# Patient Record
Sex: FEMALE | Race: BLACK OR AFRICAN AMERICAN | NOT HISPANIC OR LATINO | Employment: OTHER | URBAN - METROPOLITAN AREA
[De-identification: names, ages, dates, MRNs, and addresses within clinical notes are randomized per-mention and may not be internally consistent; named-entity substitution may affect disease eponyms.]

---

## 2017-01-19 ENCOUNTER — TELEPHONE (OUTPATIENT)
Dept: INTERNAL MEDICINE | Facility: CLINIC | Age: 62
End: 2017-01-19

## 2017-01-19 NOTE — TELEPHONE ENCOUNTER
----- Message from Garrett Mckinney sent at 1/19/2017  3:49 PM CST -----  Contact: Self [phone]  The above pt is requesting a call back regarding a study you told her about, please call and advice    Thanks

## 2017-01-24 ENCOUNTER — LAB VISIT (OUTPATIENT)
Dept: LAB | Facility: HOSPITAL | Age: 62
End: 2017-01-24
Attending: INTERNAL MEDICINE
Payer: MEDICARE

## 2017-01-24 ENCOUNTER — OFFICE VISIT (OUTPATIENT)
Dept: INTERNAL MEDICINE | Facility: CLINIC | Age: 62
End: 2017-01-24
Payer: MEDICARE

## 2017-01-24 VITALS
HEIGHT: 70 IN | HEART RATE: 60 BPM | TEMPERATURE: 98 F | DIASTOLIC BLOOD PRESSURE: 70 MMHG | SYSTOLIC BLOOD PRESSURE: 122 MMHG | WEIGHT: 204.56 LBS | BODY MASS INDEX: 29.29 KG/M2

## 2017-01-24 DIAGNOSIS — E11.59 HYPERTENSION ASSOCIATED WITH DIABETES: ICD-10-CM

## 2017-01-24 DIAGNOSIS — E11.65 UNCONTROLLED TYPE 2 DIABETES MELLITUS WITH HYPERGLYCEMIA, WITHOUT LONG-TERM CURRENT USE OF INSULIN: ICD-10-CM

## 2017-01-24 DIAGNOSIS — E11.65 UNCONTROLLED TYPE 2 DIABETES MELLITUS WITH HYPERGLYCEMIA, WITHOUT LONG-TERM CURRENT USE OF INSULIN: Primary | ICD-10-CM

## 2017-01-24 DIAGNOSIS — E11.69 COMBINED HYPERLIPIDEMIA ASSOCIATED WITH TYPE 2 DIABETES MELLITUS: Chronic | ICD-10-CM

## 2017-01-24 DIAGNOSIS — I15.2 HYPERTENSION ASSOCIATED WITH DIABETES: ICD-10-CM

## 2017-01-24 DIAGNOSIS — E78.2 COMBINED HYPERLIPIDEMIA ASSOCIATED WITH TYPE 2 DIABETES MELLITUS: Chronic | ICD-10-CM

## 2017-01-24 DIAGNOSIS — I67.1 CEREBRAL ANEURYSM: ICD-10-CM

## 2017-01-24 LAB
ALBUMIN SERPL BCP-MCNC: 4.2 G/DL
ALP SERPL-CCNC: 79 U/L
ALT SERPL W/O P-5'-P-CCNC: 13 U/L
ANION GAP SERPL CALC-SCNC: 5 MMOL/L
AST SERPL-CCNC: 14 U/L
BILIRUB SERPL-MCNC: 0.6 MG/DL
BUN SERPL-MCNC: 11 MG/DL
CALCIUM SERPL-MCNC: 10.4 MG/DL
CHLORIDE SERPL-SCNC: 103 MMOL/L
CHOLEST/HDLC SERPL: 2.9 {RATIO}
CO2 SERPL-SCNC: 30 MMOL/L
CREAT SERPL-MCNC: 0.9 MG/DL
EST. GFR  (AFRICAN AMERICAN): >60 ML/MIN/1.73 M^2
EST. GFR  (NON AFRICAN AMERICAN): >60 ML/MIN/1.73 M^2
GLUCOSE SERPL-MCNC: 116 MG/DL
HDL/CHOLESTEROL RATIO: 34.3 %
HDLC SERPL-MCNC: 166 MG/DL
HDLC SERPL-MCNC: 57 MG/DL
LDLC SERPL CALC-MCNC: 93.6 MG/DL
NONHDLC SERPL-MCNC: 109 MG/DL
POTASSIUM SERPL-SCNC: 4.4 MMOL/L
PROT SERPL-MCNC: 7.7 G/DL
SODIUM SERPL-SCNC: 138 MMOL/L
TRIGL SERPL-MCNC: 77 MG/DL

## 2017-01-24 PROCEDURE — 4010F ACE/ARB THERAPY RXD/TAKEN: CPT | Mod: S$GLB,,, | Performed by: INTERNAL MEDICINE

## 2017-01-24 PROCEDURE — 99214 OFFICE O/P EST MOD 30 MIN: CPT | Mod: S$GLB,,, | Performed by: INTERNAL MEDICINE

## 2017-01-24 PROCEDURE — 80053 COMPREHEN METABOLIC PANEL: CPT

## 2017-01-24 PROCEDURE — 36415 COLL VENOUS BLD VENIPUNCTURE: CPT

## 2017-01-24 PROCEDURE — 83036 HEMOGLOBIN GLYCOSYLATED A1C: CPT

## 2017-01-24 PROCEDURE — 99499 UNLISTED E&M SERVICE: CPT | Mod: S$GLB,,, | Performed by: INTERNAL MEDICINE

## 2017-01-24 PROCEDURE — 99999 PR PBB SHADOW E&M-EST. PATIENT-LVL III: CPT | Mod: PBBFAC,,, | Performed by: INTERNAL MEDICINE

## 2017-01-24 PROCEDURE — 3078F DIAST BP <80 MM HG: CPT | Mod: S$GLB,,, | Performed by: INTERNAL MEDICINE

## 2017-01-24 PROCEDURE — 3045F PR MOST RECENT HEMOGLOBIN A1C LEVEL 7.0-9.0%: CPT | Mod: S$GLB,,, | Performed by: INTERNAL MEDICINE

## 2017-01-24 PROCEDURE — 80061 LIPID PANEL: CPT

## 2017-01-24 PROCEDURE — 3074F SYST BP LT 130 MM HG: CPT | Mod: S$GLB,,, | Performed by: INTERNAL MEDICINE

## 2017-01-24 PROCEDURE — 1159F MED LIST DOCD IN RCRD: CPT | Mod: S$GLB,,, | Performed by: INTERNAL MEDICINE

## 2017-01-24 RX ORDER — FLUCONAZOLE 150 MG/1
150 TABLET ORAL ONCE
Refills: 3 | COMMUNITY
Start: 2016-12-31 | End: 2017-07-05 | Stop reason: SDUPTHER

## 2017-01-24 RX ORDER — ACETAMINOPHEN 500 MG
5000 TABLET ORAL DAILY
COMMUNITY
End: 2017-01-24

## 2017-01-24 NOTE — PROGRESS NOTES
Subjective:       Patient ID: Alison Branch is a 61 y.o. female.    Chief Complaint: Diabetes and Hypertension    HPI - Mrs. Branch's brother was admitted for diabetes complications; since then, she has been more adherent to medications.  She's taking invokana every day now, and has had 10 lbs weight loss d/t that since August.  Now out of the obese range.  No yeast infections, though she takes a diflucan every couple of months if an itch occurs.  Also taking her antihypertensives and statin.  She is asymptomatic from her coiled aneurysm. Wonders should she take celexa for ADD complaints at her psychiatrist's recommendation (yes). No other complaints.    PMH:  DM2, adherent to regimen now  HTN, at goal  Brain aneurysm s/p coiling  HLP, on a statin  ADD  Obesity     Meds:  Reviewed and reconciled in EPIC with patient during visit today.    Review of Systems   Constitutional: Negative for fatigue.   HENT: Negative for congestion.    Respiratory: Positive for shortness of breath.    Cardiovascular: Positive for chest pain.   Gastrointestinal: Negative for abdominal pain.   Genitourinary: Negative for vaginal discharge.   Musculoskeletal: Negative for arthralgias.   Skin: Negative for rash.   Neurological: Negative for headaches.   Psychiatric/Behavioral: Negative for sleep disturbance.       Objective:      Physical Exam   Constitutional: She is oriented to person, place, and time. She appears well-developed and well-nourished.   HENT:   Head: Normocephalic and atraumatic.   Cardiovascular: Normal rate, regular rhythm and normal heart sounds.  Exam reveals no gallop and no friction rub.    No murmur heard.  Pulmonary/Chest: Effort normal and breath sounds normal. No respiratory distress. She has no wheezes. She has no rales. She exhibits no tenderness.   Neurological: She is alert and oriented to person, place, and time.   Skin: Skin is warm and dry. No erythema.   Psychiatric: She has a normal mood and affect.    Nursing note and vitals reviewed.      Assessment:       1. Uncontrolled type 2 diabetes mellitus with hyperglycemia, without long-term current use of insulin    2. Hypertension associated with diabetes    3. Combined hyperlipidemia associated with type 2 diabetes mellitus    4. Cerebral aneurysm, coiled in 2010        Plan:       Alison was seen today for diabetes and hypertension.    Diagnoses and all orders for this visit:    Uncontrolled type 2 diabetes mellitus with hyperglycemia, without long-term current use of insulin - seems improved.  Repeat lab work; needs opto eval  -     Hemoglobin A1c; Future  -     Ambulatory consult to Optometry  -     Comprehensive metabolic panel; Future    Hypertension associated with diabetes - at goal, stay the course  -     Comprehensive metabolic panel; Future    Combined hyperlipidemia associated with type 2 diabetes mellitus - on a statin, time for repeat lipid panel  -     Lipid panel; Future    Cerebral aneurysm, coiled in 2010 - stable.  Continue observation    rtc 3 months, sooner prn.    JUVE Rodriguez MD MPH  Staff Internist

## 2017-01-25 ENCOUNTER — INITIAL CONSULT (OUTPATIENT)
Dept: OPTOMETRY | Facility: CLINIC | Age: 62
End: 2017-01-25
Payer: MEDICARE

## 2017-01-25 ENCOUNTER — PATIENT MESSAGE (OUTPATIENT)
Dept: INTERNAL MEDICINE | Facility: CLINIC | Age: 62
End: 2017-01-25

## 2017-01-25 DIAGNOSIS — H52.03 HYPERMETROPIA OF BOTH EYES: ICD-10-CM

## 2017-01-25 DIAGNOSIS — H25.13 NUCLEAR SCLEROTIC CATARACT OF BOTH EYES: ICD-10-CM

## 2017-01-25 DIAGNOSIS — E11.9 TYPE 2 DIABETES MELLITUS WITHOUT RETINOPATHY: Primary | ICD-10-CM

## 2017-01-25 DIAGNOSIS — H04.123 BILATERAL DRY EYES: ICD-10-CM

## 2017-01-25 LAB
ESTIMATED AVG GLUCOSE: 177 MG/DL
HBA1C MFR BLD HPLC: 7.8 %

## 2017-01-25 PROCEDURE — 99999 PR PBB SHADOW E&M-EST. PATIENT-LVL II: CPT | Mod: PBBFAC,,, | Performed by: OPTOMETRIST

## 2017-01-25 PROCEDURE — 92014 COMPRE OPH EXAM EST PT 1/>: CPT | Mod: S$GLB,,, | Performed by: OPTOMETRIST

## 2017-01-25 PROCEDURE — 92015 DETERMINE REFRACTIVE STATE: CPT | Mod: S$GLB,,, | Performed by: OPTOMETRIST

## 2017-01-25 PROCEDURE — 99499 UNLISTED E&M SERVICE: CPT | Mod: S$GLB,,, | Performed by: OPTOMETRIST

## 2017-01-25 NOTE — Clinical Note
Dear Dr. Rodriguez,  Thank you for referring Ms. Branch for a diabetic eye examination; there is no retinopathy and I will continue to monitor yearly. Please let me know if you have questions.  Sincerely, Jenny Casey OD

## 2017-01-25 NOTE — LETTER
January 25, 2017      Mike Rodriguez II, MD  [address]  [facility] [address]           [facility]  [address]  [facility] [address]  Phone: [phone]          Patient: Alison Branch   MR Number: [id number]   YOB: 1955   Date of Visit: 1/25/2017       Dear Dr. Mike Rodriguez II:    Thank you for referring Alison Branch to me for evaluation. Attached you will find relevant portions of my assessment and plan of care.    If you have questions, please do not hesitate to call me. I look forward to following Alison Branch along with you.    Sincerely,    Jenny Casey, OD    Enclosure  CC:  No Recipients    If you would like to receive this communication electronically, please contact [email or web address] or [phone] to request more information on [employer] Link access.    For providers and/or their staff who would like to refer a patient to [facility], please contact us through our one-stop-shop provider referral line, [facility] , at [phone].    If you feel you have received this communication in error or would no longer like to receive these types of communications, please e-mail [email or web address]

## 2017-01-25 NOTE — PATIENT INSTRUCTIONS
Thank you very much for coming in for your eye examination. It was a pleasure working with you today. Below is some information you might find helpful.     You have dryness of your eyes. Please use over-the-counter artificial tears or lubricants (such as Systane, Refresh, Blink, Genteal), 1 drop in each eye 3-4 times per day. Please avoid drops that advertise redness-relief as these can be unhealthy for your eyes and can cause permanent redness if used long-term.    ==============================================    Your eyes are very healthy; there are no signs ocular complications from diabetes.  Please continue working with your primary care doctor to manage the diabetes.  It is important you have your eyes checked every year to evaluate the health of your eyes. We will send you a reminder in 1 year for your next examination, or please contact us sooner if you need us.    ==============================================    CATARACT    Symptoms and Signs:  A cataract starts out small, and at first has little effect on your vision. You may notice that your vision is blurred a little, like looking through a cloudy piece of glass or viewing an impressionist painting. A cataract may make light from the sun or a lamp seem too bright or glaring. Or you may notice when you drive at night that the oncoming headlights cause more glare than before. Colors may not appear as bright as they once did.  The type of cataract you have will affect exactly which symptoms you experience and how soon they will occur. When a nuclear cataract first develops it can bring about a temporary improvement in your near vision, called second sight. Unfortunately, the improved vision is short-lived and will disappear as the cataract worsens. Meanwhile, a sub-capsular cataract may not produce any symptoms until it's well-developed.    Causes:  No one knows for sure why the eye's lens changes as we age, forming cataracts. Researchers are gradually  identifying factors that may cause cataracts - and information that may help to prevent them.  Many studies suggest that exposure to ultraviolet light is associated with cataracts, so eye care practitioners recommend wearing sunglasses and a wide-brimmed hat to lessen your exposure.  Other studies suggest people with diabetes are at risk for developing a cataract.   Some eye care practitioners believe that a diet high in antioxidants, such as beta-carotene (vitamin A), selenium and vitamins C and E, may forestall cataracts.  The most important of these is probably vitamin C; it might be helpful to supplement the diet with an extra Vitamin C tablet.  Meanwhile, eating a lot of salt may increase your risk.  Other risk factors include cigarette smoke, air pollution and heavy alcohol consumption.  We simply recommend that you be careful to use sunglasses and to take Vitamin C.    Treatment:  When symptoms begin to appear, we can improve your vision for a while using new glasses, strong bifocals, magnification, appropriate lighting or other visual aids.  This is true in your case; your cataract does not impact your vision very much at this time. If you experience any of the symptoms we described you can return at any time. Otherwise it is fine to see you in 1 year.    Jenny Casey, OD

## 2017-01-25 NOTE — MR AVS SNAPSHOT
[facility]  [address]  [facility] [address]  Phone: [phone]                  Alison Branch   2017 8:00 AM   Initial consult    Description:  Female : 1955   Provider:  Jenny Casey OD   Department:  [facility]           Reason for Visit     Diabetic Eye Exam                To Do List           Future Appointments        Provider Department Dept Phone    2017 8:40 AM Mike Rodriguez II, MD [facility] [phone]      Goals (5 Years of Data)     None      Follow-Up and Disposition     Return in about 1 year (around 2018) for annual diabetic eye examination.      [facility]     [facility] Line -  Assistance  Registered nurses in the [facility] Center provide clinical advisement, health education, appointment booking, and other advisory services.  Call for this free service at [phone].             Medications           Message regarding Medications     Verify the changes and/or additions to your medication regime listed below are the same as discussed with your clinician today.  If any of these changes or additions are incorrect, please notify your healthcare provider.             Verify that the below list of medications is an accurate representation of the medications you are currently taking.  If none reported, the list may be blank. If incorrect, please contact your healthcare provider. Carry this list with you in case of emergency.           Current Medications     aspirin (ECOTRIN) 81 MG EC tablet Take 81 mg by mouth once daily.    atorvastatin (LIPITOR) 40 MG tablet Take 1 tablet (40 mg total) by mouth once daily.    blood sugar diagnostic Strp 1 strip by Misc.(Non-Drug; Combo Route) route once daily. Heladio Result.  250.02.  Check Blood Sugar Twice Daily.    blood-glucose meter kit Use as instructed    conjugated estrogens (PREMARIN) vaginal cream Place 1 g vaginally once  daily.    econazole nitrate 1 % cream Apply topically 2 (two) times daily. To affected areas in between toes x 4 wks    ergocalciferol (ERGOCALCIFEROL) 50,000 unit Cap Take 1 capsule (50,000 Units total) by mouth every 7 days.    fluconazole (DIFLUCAN) 150 MG Tab Take 150 mg by mouth once.    INVOKANA 300 mg Tab Take 1 tablet by mouth once daily.    lancets Misc 1 Device by Misc.(Non-Drug; Combo Route) route once daily. Heladio Result.  250.02.  Check Blood Sugar Twice Daily.    loratadine (CLARITIN) 10 mg tablet Take 1 tablet (10 mg total) by mouth once daily.    losartan (COZAAR) 50 MG tablet Take 1 tablet (50 mg total) by mouth once daily.           Clinical Reference Information           Allergies as of 1/25/2017     Metformin      Immunizations Administered on Date of Encounter - 1/25/2017     None      Instructions    Thank you very much for coming in for your eye examination. It was a pleasure working with you today. Below is some information you might find helpful.     You have dryness of your eyes. Please use over-the-counter artificial tears or lubricants (such as Systane, Refresh, Blink, Genteal), 1 drop in each eye 3-4 times per day. Please avoid drops that advertise redness-relief as these can be unhealthy for your eyes and can cause permanent redness if used long-term.    ==============================================    Your eyes are very healthy; there are no signs ocular complications from diabetes.  Please continue working with your primary care doctor to manage the diabetes.  It is important you have your eyes checked every year to evaluate the health of your eyes. We will send you a reminder in 1 year for your next examination, or please contact us sooner if you need us.    ==============================================    CATARACT    Symptoms and Signs:  A cataract starts out small, and at first has little effect on your vision. You may notice that your vision is blurred a little, like looking  through a cloudy piece of glass or viewing an impressionist painting. A cataract may make light from the sun or a lamp seem too bright or glaring. Or you may notice when you drive at night that the oncoming headlights cause more glare than before. Colors may not appear as bright as they once did.  The type of cataract you have will affect exactly which symptoms you experience and how soon they will occur. When a nuclear cataract first develops it can bring about a temporary improvement in your near vision, called second sight. Unfortunately, the improved vision is short-lived and will disappear as the cataract worsens. Meanwhile, a sub-capsular cataract may not produce any symptoms until it's well-developed.    Causes:  No one knows for sure why the eye's lens changes as we age, forming cataracts. Researchers are gradually identifying factors that may cause cataracts - and information that may help to prevent them.  Many studies suggest that exposure to ultraviolet light is associated with cataracts, so eye care practitioners recommend wearing sunglasses and a wide-brimmed hat to lessen your exposure.  Other studies suggest people with diabetes are at risk for developing a cataract.   Some eye care practitioners believe that a diet high in antioxidants, such as beta-carotene (vitamin A), selenium and vitamins C and E, may forestall cataracts.  The most important of these is probably vitamin C; it might be helpful to supplement the diet with an extra Vitamin C tablet.  Meanwhile, eating a lot of salt may increase your risk.  Other risk factors include cigarette smoke, air pollution and heavy alcohol consumption.  We simply recommend that you be careful to use sunglasses and to take Vitamin C.    Treatment:  When symptoms begin to appear, we can improve your vision for a while using new glasses, strong bifocals, magnification, appropriate lighting or other visual aids.  This is true in your case; your cataract does  not impact your vision very much at this time. If you experience any of the symptoms we described you can return at any time. Otherwise it is fine to see you in 1 year.    Jenny Casey, MAVERICK

## 2017-01-27 ENCOUNTER — PATIENT MESSAGE (OUTPATIENT)
Dept: INTERNAL MEDICINE | Facility: CLINIC | Age: 62
End: 2017-01-27

## 2017-02-01 ENCOUNTER — PATIENT MESSAGE (OUTPATIENT)
Dept: INTERNAL MEDICINE | Facility: CLINIC | Age: 62
End: 2017-02-01

## 2017-02-01 DIAGNOSIS — B35.3 TINEA PEDIS, UNSPECIFIED LATERALITY: ICD-10-CM

## 2017-02-01 RX ORDER — ECONAZOLE NITRATE 10 MG/G
CREAM TOPICAL 2 TIMES DAILY
Qty: 85 G | Refills: 0 | Status: SHIPPED | OUTPATIENT
Start: 2017-02-01 | End: 2017-05-15

## 2017-02-03 DIAGNOSIS — E11.59 HYPERTENSION ASSOCIATED WITH DIABETES: ICD-10-CM

## 2017-02-03 DIAGNOSIS — I15.2 HYPERTENSION ASSOCIATED WITH DIABETES: ICD-10-CM

## 2017-02-03 RX ORDER — LOSARTAN POTASSIUM 50 MG/1
50 TABLET ORAL DAILY
Qty: 90 TABLET | Refills: 3 | Status: SHIPPED | OUTPATIENT
Start: 2017-02-03 | End: 2017-04-26 | Stop reason: SDUPTHER

## 2017-02-21 DIAGNOSIS — E55.9 VITAMIN D DEFICIENCY: ICD-10-CM

## 2017-02-21 RX ORDER — ERGOCALCIFEROL 1.25 MG/1
50000 CAPSULE ORAL
Qty: 8 CAPSULE | Refills: 3 | Status: SHIPPED | OUTPATIENT
Start: 2017-02-21 | End: 2018-02-01 | Stop reason: SDUPTHER

## 2017-03-14 RX ORDER — CANAGLIFLOZIN 300 MG/1
300 TABLET, FILM COATED ORAL DAILY
Qty: 90 TABLET | Refills: 2 | Status: SHIPPED | OUTPATIENT
Start: 2017-03-14 | End: 2017-07-07

## 2017-03-29 ENCOUNTER — PATIENT MESSAGE (OUTPATIENT)
Dept: RESEARCH | Facility: HOSPITAL | Age: 62
End: 2017-03-29

## 2017-04-18 ENCOUNTER — OFFICE VISIT (OUTPATIENT)
Dept: INTERNAL MEDICINE | Facility: CLINIC | Age: 62
End: 2017-04-18
Payer: MEDICARE

## 2017-04-18 ENCOUNTER — LAB VISIT (OUTPATIENT)
Dept: LAB | Facility: HOSPITAL | Age: 62
End: 2017-04-18
Attending: INTERNAL MEDICINE
Payer: MEDICARE

## 2017-04-18 VITALS
HEART RATE: 60 BPM | SYSTOLIC BLOOD PRESSURE: 124 MMHG | TEMPERATURE: 99 F | HEIGHT: 70 IN | DIASTOLIC BLOOD PRESSURE: 78 MMHG | BODY MASS INDEX: 29.95 KG/M2 | WEIGHT: 209.19 LBS

## 2017-04-18 DIAGNOSIS — I15.2 HYPERTENSION ASSOCIATED WITH DIABETES: Primary | ICD-10-CM

## 2017-04-18 DIAGNOSIS — E78.2 COMBINED HYPERLIPIDEMIA ASSOCIATED WITH TYPE 2 DIABETES MELLITUS: Chronic | ICD-10-CM

## 2017-04-18 DIAGNOSIS — E66.9 OBESITY (BMI 30.0-34.9): ICD-10-CM

## 2017-04-18 DIAGNOSIS — E11.65 UNCONTROLLED TYPE 2 DIABETES MELLITUS WITH HYPERGLYCEMIA, WITHOUT LONG-TERM CURRENT USE OF INSULIN: ICD-10-CM

## 2017-04-18 DIAGNOSIS — E11.59 HYPERTENSION ASSOCIATED WITH DIABETES: Primary | ICD-10-CM

## 2017-04-18 DIAGNOSIS — E11.69 COMBINED HYPERLIPIDEMIA ASSOCIATED WITH TYPE 2 DIABETES MELLITUS: Chronic | ICD-10-CM

## 2017-04-18 DIAGNOSIS — K58.9 IRRITABLE BOWEL SYNDROME, UNSPECIFIED TYPE: ICD-10-CM

## 2017-04-18 DIAGNOSIS — F90.0 ATTENTION DEFICIT HYPERACTIVITY DISORDER (ADHD), PREDOMINANTLY INATTENTIVE TYPE: ICD-10-CM

## 2017-04-18 PROCEDURE — 3074F SYST BP LT 130 MM HG: CPT | Mod: S$GLB,,, | Performed by: INTERNAL MEDICINE

## 2017-04-18 PROCEDURE — 1160F RVW MEDS BY RX/DR IN RCRD: CPT | Mod: S$GLB,,, | Performed by: INTERNAL MEDICINE

## 2017-04-18 PROCEDURE — 99999 PR PBB SHADOW E&M-EST. PATIENT-LVL III: CPT | Mod: PBBFAC,,, | Performed by: INTERNAL MEDICINE

## 2017-04-18 PROCEDURE — 3045F PR MOST RECENT HEMOGLOBIN A1C LEVEL 7.0-9.0%: CPT | Mod: S$GLB,,, | Performed by: INTERNAL MEDICINE

## 2017-04-18 PROCEDURE — 4010F ACE/ARB THERAPY RXD/TAKEN: CPT | Mod: S$GLB,,, | Performed by: INTERNAL MEDICINE

## 2017-04-18 PROCEDURE — 99499 UNLISTED E&M SERVICE: CPT | Mod: S$GLB,,, | Performed by: INTERNAL MEDICINE

## 2017-04-18 PROCEDURE — 3078F DIAST BP <80 MM HG: CPT | Mod: S$GLB,,, | Performed by: INTERNAL MEDICINE

## 2017-04-18 PROCEDURE — 83036 HEMOGLOBIN GLYCOSYLATED A1C: CPT

## 2017-04-18 PROCEDURE — 99214 OFFICE O/P EST MOD 30 MIN: CPT | Mod: S$GLB,,, | Performed by: INTERNAL MEDICINE

## 2017-04-18 PROCEDURE — 36415 COLL VENOUS BLD VENIPUNCTURE: CPT

## 2017-04-18 RX ORDER — BUPROPION HYDROCHLORIDE 150 MG/1
150 TABLET ORAL DAILY
Qty: 90 TABLET | Refills: 3 | Status: SHIPPED | OUTPATIENT
Start: 2017-04-18 | End: 2017-05-15

## 2017-04-18 RX ORDER — BUPROPION HYDROCHLORIDE 150 MG/1
150 TABLET ORAL DAILY
Qty: 90 TABLET | Refills: 3 | Status: SHIPPED | OUTPATIENT
Start: 2017-04-18 | End: 2017-04-18 | Stop reason: SDUPTHER

## 2017-04-18 NOTE — PROGRESS NOTES
Subjective:       Patient ID: Alison Branch is a 61 y.o. female.    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  She feels well today.  She's taking her antihypertensives.  BS continues to come down - last a1c was 7.9.  Invokana-associated weight loss has stabilized; she's actually gained 5 lbs and is back in the obese range.  Didn't tolerate citalopram for ADHD, with daytime fatigue.  Still having trouble with concentration and attention.  Lipids at goal    She describes chronic constipation so bad that she intermittently has to increase fiber intake.  Not taking a fiber supplement.  She's a former smoker and a nondrinker.    PMH:  DM2, adherent to regimen now  HTN, at goal  Brain aneurysm s/p coiling  HLP, on a statin  ADD  Obesity     Meds:  Reviewed and reconciled in EPIC with patient during visit today.    Review of Systems   Constitutional: Negative for fever.   HENT: Negative for congestion.    Respiratory: Negative for cough.    Cardiovascular: Negative for chest pain.   Gastrointestinal: Negative for abdominal pain.   Genitourinary: Negative for difficulty urinating.   Musculoskeletal: Negative for arthralgias.   Skin: Negative for rash.   Neurological: Negative for headaches.   Psychiatric/Behavioral: Negative for sleep disturbance.       Objective:      Physical Exam   Constitutional: She is oriented to person, place, and time. She appears well-developed and well-nourished.   Friendly, well-appearing BF in NAD   HENT:   Head: Normocephalic and atraumatic.   Cardiovascular: Normal rate, regular rhythm and normal heart sounds.  Exam reveals no gallop and no friction rub.    No murmur heard.  Pulmonary/Chest: Effort normal and breath sounds normal. No respiratory distress. She has no wheezes. She has no rales. She exhibits no tenderness.   Neurological: She is alert and oriented to person, place, and time.   Skin: Skin is warm and dry. No erythema.    Psychiatric: She has a normal mood and affect.   Nursing note and vitals reviewed.      Assessment:       1. Hypertension associated with diabetes    2. Uncontrolled type 2 diabetes mellitus with hyperglycemia, without long-term current use of insulin    3. Attention deficit hyperactivity disorder (ADHD), predominantly inattentive type    4. Combined hyperlipidemia associated with type 2 diabetes mellitus    5. Irritable bowel syndrome, unspecified type    6. Obesity (BMI 30.0-34.9)        Plan:       Alison was seen today for diabetes.    Diagnoses and all orders for this visit:    Hypertension associated with diabetes - at goal, stay the course    Uncontrolled type 2 diabetes mellitus with hyperglycemia, without long-term current use of insulin - was out of range last visit, but she's been more adherent recently.  Repeat lab work today and adjust based on numbers.  -     Hemoglobin A1c; Future    Attention deficit hyperactivity disorder (ADHD), predominantly inattentive type - untreated.  Traditional ADD meds unsafe given her high risk of MI.  Will change off citalopram to wellbutrin  -     buPROPion (WELLBUTRIN XL) 150 MG TB24 tablet; Take 1 tablet (150 mg total) by mouth once daily.    Combined hyperlipidemia associated with type 2 diabetes mellitus - stable, on a statin    Irritable bowel syndrome, unspecified type - discussed that adding a fiber supplement at night could regularize this    Obesity (BMI 30.0-34.9) - with comorbidity.  Advised weight loss, which she will do    rtc prn, or 3 months.    JUVE Rodriguez MD MPH  Staff Internist

## 2017-04-18 NOTE — MR AVS SNAPSHOT
[facility]  [address]  [facility] [address]  Phone: [phone]  Fax: [phone]                  Alison Branch   2017 8:40 AM   Office Visit    Description:  Female : 1955   Provider:  Mike Rodriguez II, MD   Department:  [facility]           Reason for Visit     Diabetes           Diagnoses this Visit        Comments    Hypertension associated with diabetes    -  Primary     Uncontrolled type 2 diabetes mellitus with hyperglycemia, without long-term current use of insulin         Attention deficit hyperactivity disorder (ADHD), predominantly inattentive type         Combined hyperlipidemia associated with type 2 diabetes mellitus         Irritable bowel syndrome, unspecified type         Obesity (BMI 30.0-34.9)                To Do List           Goals (5 Years of Data)     None      Follow-Up and Disposition     Return in about 3 months (around 2017).       These Medications        Disp Refills Start End    buPROPion (WELLBUTRIN XL) 150 MG TB24 tablet 90 tablet 3 2017    Take 1 tablet (150 mg total) by mouth once daily. - Oral    Pharmacy: [id number] - [place], [place] - [address] Ph #: [phone]         [facility] On Call     [facility] On Call Nurse Care Line -  Assistance  Unless otherwise directed by your provider, please contact [facility], our nurse care line that is available for  assistance.     Registered nurses in the [facility] provide: appointment scheduling, clinical advisement, health education, and other advisory services.  Call: [phone] (toll free)               Medications           Message regarding Medications     Verify the changes and/or additions to your medication regime listed below are the same as discussed with your clinician today.  If any of these changes or additions are incorrect, please notify your healthcare provider.        START taking  "these NEW medications        Refills    buPROPion (WELLBUTRIN XL) 150 MG TB24 tablet 3    Sig: Take 1 tablet (150 mg total) by mouth once daily.    Class: Normal    Route: Oral           Verify that the below list of medications is an accurate representation of the medications you are currently taking.  If none reported, the list may be blank. If incorrect, please contact your healthcare provider. Carry this list with you in case of emergency.           Current Medications     aspirin (ECOTRIN) 81 MG EC tablet Take 81 mg by mouth once daily.    atorvastatin (LIPITOR) 40 MG tablet Take 1 tablet (40 mg total) by mouth once daily.    blood sugar diagnostic Strp 1 strip by Misc.(Non-Drug; Combo Route) route once daily. Heladio Result.  250.02.  Check Blood Sugar Twice Daily.    blood-glucose meter kit Use as instructed    econazole nitrate 1 % cream Apply topically 2 (two) times daily. To affected areas in between toes x 4 wks    ergocalciferol (ERGOCALCIFEROL) 50,000 unit Cap Take 1 capsule (50,000 Units total) by mouth every 7 days.    INVOKANA 300 mg Tab tablet Take 1 tablet (300 mg total) by mouth once daily.    lancets Misc 1 Device by Misc.(Non-Drug; Combo Route) route once daily. Heladio Result.  250.02.  Check Blood Sugar Twice Daily.    loratadine (CLARITIN) 10 mg tablet Take 1 tablet (10 mg total) by mouth once daily.    losartan (COZAAR) 50 MG tablet Take 1 tablet (50 mg total) by mouth once daily.    buPROPion (WELLBUTRIN XL) 150 MG TB24 tablet Take 1 tablet (150 mg total) by mouth once daily.    fluconazole (DIFLUCAN) 150 MG Tab Take 150 mg by mouth once.           Clinical Reference Information           Your Vitals Were     BP Pulse Temp Height Weight BMI    124/78 (BP Location: Right arm, Patient Position: Sitting, BP Method: Manual) 60 98.8 °F (37.1 °C) (Oral) 5' 9.5" (1.765 m) 94.9 kg (209 lb 3.5 oz) 30.45 kg/m2      Blood Pressure          Most Recent Value    BP  124/78      Allergies as of 4/18/2017     " Metformin      Immunizations Administered on Date of Encounter - 4/18/2017     None      Orders Placed During Today's Visit     Future Labs/Procedures Expected by Expires    Hemoglobin A1c  4/18/2017 7/17/2017      Language Assistance Services     ATTENTION: Language assistance services are available, free of charge. Please call [phone].      ATENCIÓN: Si habla español, tiene a grande disposición servicios gratuitos de asistencia lingüística. Llame al [phone].     CHÚ Ý: N?u b?n nói Ti?ng Vi?t, có các d?ch v? h? tr? ngôn ng? mi?n phí dành cho b?n. G?i s? [phone].         [facility] complies with applicable Federal civil rights laws and does not discriminate on the basis of race, color, national origin, age, disability, or sex.

## 2017-04-19 ENCOUNTER — PATIENT MESSAGE (OUTPATIENT)
Dept: INTERNAL MEDICINE | Facility: CLINIC | Age: 62
End: 2017-04-19

## 2017-04-19 LAB
ESTIMATED AVG GLUCOSE: 186 MG/DL
HBA1C MFR BLD HPLC: 8.1 %

## 2017-04-26 ENCOUNTER — PATIENT MESSAGE (OUTPATIENT)
Dept: INTERNAL MEDICINE | Facility: CLINIC | Age: 62
End: 2017-04-26

## 2017-04-26 DIAGNOSIS — I15.2 HYPERTENSION ASSOCIATED WITH DIABETES: ICD-10-CM

## 2017-04-26 DIAGNOSIS — J06.9 UPPER RESPIRATORY TRACT INFECTION, UNSPECIFIED TYPE: ICD-10-CM

## 2017-04-26 DIAGNOSIS — R09.82 POSTNASAL DRIP: ICD-10-CM

## 2017-04-26 DIAGNOSIS — E11.59 HYPERTENSION ASSOCIATED WITH DIABETES: ICD-10-CM

## 2017-04-26 RX ORDER — LOSARTAN POTASSIUM 50 MG/1
50 TABLET ORAL DAILY
Qty: 90 TABLET | Refills: 0 | Status: SHIPPED | OUTPATIENT
Start: 2017-04-26 | End: 2018-05-04 | Stop reason: SDUPTHER

## 2017-04-26 RX ORDER — ATORVASTATIN CALCIUM 40 MG/1
40 TABLET, FILM COATED ORAL DAILY
Qty: 30 TABLET | Refills: 0 | Status: SHIPPED | OUTPATIENT
Start: 2017-04-26 | End: 2017-05-04 | Stop reason: SDUPTHER

## 2017-04-26 RX ORDER — LORATADINE 10 MG/1
10 TABLET ORAL DAILY
Qty: 90 TABLET | Refills: 0 | Status: SHIPPED | OUTPATIENT
Start: 2017-04-26 | End: 2017-11-11

## 2017-05-05 ENCOUNTER — TELEPHONE (OUTPATIENT)
Dept: ADMINISTRATIVE | Facility: HOSPITAL | Age: 62
End: 2017-05-05

## 2017-05-05 RX ORDER — ATORVASTATIN CALCIUM 40 MG/1
TABLET, FILM COATED ORAL
Qty: 90 TABLET | Refills: 3 | Status: SHIPPED | OUTPATIENT
Start: 2017-05-05 | End: 2018-05-07 | Stop reason: SDUPTHER

## 2017-05-05 NOTE — TELEPHONE ENCOUNTER
Please advise if you want me to just make her an appointment to come in a discuss her meds with you?

## 2017-05-05 NOTE — TELEPHONE ENCOUNTER
"Please see message below from [facility] nurse-     Alison Branch [phone] I spoke with her to discuss gaps in fills of her medications: Losartan 90d supply last filled 1/22/17, Invokana 30d supply last filled 3/14/17. She reports she will not start Januvia because she reports she has not taken Invokana consistently because she forgets frequently and did not let Dr. Rodriguez know, reports whens she takes Invokana as ordered her BS are "good, less than 130's". She reports frequent increased forgetfulness, reports she will get up to go get water to take her medications and will forget why. She was unaware that she has missed so many days of her medications and can not recall the last time she took and can not even remember if she took this morning. Please advise patient.     Thanks,  Nasrin Marks, RN  RN Navigator  [facility]     "

## 2017-05-15 ENCOUNTER — OFFICE VISIT (OUTPATIENT)
Dept: INTERNAL MEDICINE | Facility: CLINIC | Age: 62
End: 2017-05-15
Payer: MEDICARE

## 2017-05-15 VITALS
SYSTOLIC BLOOD PRESSURE: 118 MMHG | TEMPERATURE: 98 F | DIASTOLIC BLOOD PRESSURE: 68 MMHG | HEART RATE: 63 BPM | BODY MASS INDEX: 31.25 KG/M2 | HEIGHT: 69 IN | WEIGHT: 211 LBS

## 2017-05-15 DIAGNOSIS — E11.69 COMBINED HYPERLIPIDEMIA ASSOCIATED WITH TYPE 2 DIABETES MELLITUS: Chronic | ICD-10-CM

## 2017-05-15 DIAGNOSIS — E11.59 HYPERTENSION ASSOCIATED WITH DIABETES: Primary | ICD-10-CM

## 2017-05-15 DIAGNOSIS — I15.2 HYPERTENSION ASSOCIATED WITH DIABETES: Primary | ICD-10-CM

## 2017-05-15 DIAGNOSIS — F90.0 ATTENTION DEFICIT HYPERACTIVITY DISORDER (ADHD), PREDOMINANTLY INATTENTIVE TYPE: ICD-10-CM

## 2017-05-15 DIAGNOSIS — E11.65 UNCONTROLLED TYPE 2 DIABETES MELLITUS WITH HYPERGLYCEMIA, WITHOUT LONG-TERM CURRENT USE OF INSULIN: ICD-10-CM

## 2017-05-15 DIAGNOSIS — E78.2 COMBINED HYPERLIPIDEMIA ASSOCIATED WITH TYPE 2 DIABETES MELLITUS: Chronic | ICD-10-CM

## 2017-05-15 PROCEDURE — 3078F DIAST BP <80 MM HG: CPT | Mod: S$GLB,,, | Performed by: INTERNAL MEDICINE

## 2017-05-15 PROCEDURE — 3045F PR MOST RECENT HEMOGLOBIN A1C LEVEL 7.0-9.0%: CPT | Mod: S$GLB,,, | Performed by: INTERNAL MEDICINE

## 2017-05-15 PROCEDURE — 99499 UNLISTED E&M SERVICE: CPT | Mod: S$GLB,,, | Performed by: INTERNAL MEDICINE

## 2017-05-15 PROCEDURE — 99214 OFFICE O/P EST MOD 30 MIN: CPT | Mod: S$GLB,,, | Performed by: INTERNAL MEDICINE

## 2017-05-15 PROCEDURE — 4010F ACE/ARB THERAPY RXD/TAKEN: CPT | Mod: S$GLB,,, | Performed by: INTERNAL MEDICINE

## 2017-05-15 PROCEDURE — 1160F RVW MEDS BY RX/DR IN RCRD: CPT | Mod: S$GLB,,, | Performed by: INTERNAL MEDICINE

## 2017-05-15 PROCEDURE — 3074F SYST BP LT 130 MM HG: CPT | Mod: S$GLB,,, | Performed by: INTERNAL MEDICINE

## 2017-05-15 PROCEDURE — 99999 PR PBB SHADOW E&M-EST. PATIENT-LVL III: CPT | Mod: PBBFAC,,, | Performed by: INTERNAL MEDICINE

## 2017-05-15 RX ORDER — ERGOCALCIFEROL 1.25 MG/1
50000 CAPSULE ORAL
COMMUNITY
End: 2017-05-15

## 2017-05-15 RX ORDER — ALPRAZOLAM 0.25 MG/1
TABLET ORAL
COMMUNITY
Start: 2017-04-26 | End: 2017-12-14

## 2017-05-15 RX ORDER — ESCITALOPRAM OXALATE 5 MG/1
TABLET ORAL
COMMUNITY
Start: 2017-04-26 | End: 2017-12-14

## 2017-05-15 NOTE — PROGRESS NOTES
Subjective:       Patient ID: Alison Branch is a 61 y.o. female.    Chief Complaint: Medication Refill    HPI - Mrs. Branch realized a few weeks ago that she had been missing many doses of her medications.  She has purchased a pill box to help with organization, and her daughters are going to help her remember to take the medications.  She didn't start januvia, b/c she thought more consistent adherence to invokana would bring her BS into control.  She feels well.  Nonsmoker, nondrinker.  Not sexually active.  She's still in seminary school    PMH:  DM2, adherent to regimen  HTN, at goal  Brain aneurysm s/p coiling  HLP, on a statin  ADD  Obesity     Meds:  Reviewed and reconciled in EPIC with patient during visit today.     Review of Systems   Constitutional: Negative for fever.   HENT: Negative for congestion.    Respiratory: Negative for shortness of breath.    Cardiovascular: Negative for chest pain.   Gastrointestinal: Negative for abdominal pain.   Genitourinary: Negative for difficulty urinating.   Musculoskeletal: Negative for arthralgias.   Skin: Negative for rash.   Neurological: Negative for headaches.   Psychiatric/Behavioral: Negative for sleep disturbance.       Objective:      Physical Exam   Constitutional: She is oriented to person, place, and time. She appears well-developed and well-nourished.   HENT:   Head: Normocephalic and atraumatic.   Cardiovascular: Normal rate, regular rhythm and normal heart sounds.  Exam reveals no gallop and no friction rub.    No murmur heard.  Pulmonary/Chest: Effort normal and breath sounds normal. No respiratory distress. She has no wheezes. She has no rales. She exhibits no tenderness.   Neurological: She is alert and oriented to person, place, and time.   Skin: Skin is warm and dry. No erythema.   Psychiatric: She has a normal mood and affect.   Nursing note and vitals reviewed.      Assessment:       1. Hypertension associated with diabetes    2. Uncontrolled type  2 diabetes mellitus with hyperglycemia, without long-term current use of insulin    3. Combined hyperlipidemia associated with type 2 diabetes mellitus    4. Attention deficit hyperactivity disorder (ADHD), predominantly inattentive type        Plan:       Alison was seen today for medication refill.    Diagnoses and all orders for this visit:    Hypertension associated with diabetes - at goal, stay the course    Uncontrolled type 2 diabetes mellitus with hyperglycemia, without long-term current use of insulin - not at goal, but newly adherent to regimen.  Stay the course    Combined hyperlipidemia associated with type 2 diabetes mellitus - on a statin, stay the course    Attention deficit hyperactivity disorder (ADHD), predominantly inattentive type - seeing an outside counselor with adjustment of medications recently.  Stay the course    rtc prn, or 3 months    JUVE Rodriguez MD MPH  Staff Internist

## 2017-05-15 NOTE — MR AVS SNAPSHOT
[facility] - [facility] Medicine  [address]  [facility] [address]  Phone: [phone]  Fax: [phone]                  Alison Branch   5/15/2017 8:40 AM   Office Visit    Description:  Female : 1955   Provider:  Mike Rodriguez II, MD   Department:  [facility] - Internal Medicine           Reason for Visit     Medication Refill           Diagnoses this Visit        Comments    Hypertension associated with diabetes    -  Primary     Uncontrolled type 2 diabetes mellitus with hyperglycemia, without long-term current use of insulin         Combined hyperlipidemia associated with type 2 diabetes mellitus         Attention deficit hyperactivity disorder (ADHD), predominantly inattentive type                To Do List           Goals (5 Years of Data)     None      Follow-Up and Disposition     Return in about 3 months (around 8/15/2017).    Follow-up and Disposition History      [facility]     [facility] -  Assistance  Unless otherwise directed by your provider, please contact [facility], our nurse care line that is available for  assistance.     Registered nurses in the [facility] provide: appointment scheduling, clinical advisement, health education, and other advisory services.  Call: [phone] (toll free)               Medications           Message regarding Medications     Verify the changes and/or additions to your medication regime listed below are the same as discussed with your clinician today.  If any of these changes or additions are incorrect, please notify your healthcare provider.        STOP taking these medications     econazole nitrate 1 % cream Apply topically 2 (two) times daily. To affected areas in between toes x 4 wks    SITagliptan (JANUVIA) 100 MG Tab Take 1 tablet (100 mg total) by mouth once daily.    buPROPion (WELLBUTRIN XL) 150 MG TB24 tablet Take 1 tablet (150 mg total) by mouth once daily.           Verify  "that the below list of medications is an accurate representation of the medications you are currently taking.  If none reported, the list may be blank. If incorrect, please contact your healthcare provider. Carry this list with you in case of emergency.           Current Medications     aspirin (ECOTRIN) 81 MG EC tablet Take 81 mg by mouth once daily.    atorvastatin (LIPITOR) 40 MG tablet TAKE ONE TABLET BY MOUTH ONCE DAILY    blood sugar diagnostic Strp 1 strip by Misc.(Non-Drug; Combo Route) route once daily. Heladio Result.  250.02.  Check Blood Sugar Twice Daily.    blood-glucose meter kit Use as instructed    ergocalciferol (ERGOCALCIFEROL) 50,000 unit Cap Take 1 capsule (50,000 Units total) by mouth every 7 days.    fluconazole (DIFLUCAN) 150 MG Tab Take 150 mg by mouth once.    INVOKANA 300 mg Tab tablet Take 1 tablet (300 mg total) by mouth once daily.    lancets Misc 1 Device by Misc.(Non-Drug; Combo Route) route once daily. Heladio Result.  250.02.  Check Blood Sugar Twice Daily.    loratadine (CLARITIN) 10 mg tablet Take 1 tablet (10 mg total) by mouth once daily.    losartan (COZAAR) 50 MG tablet Take 1 tablet (50 mg total) by mouth once daily.    alprazolam (XANAX) 0.25 MG tablet     escitalopram oxalate (LEXAPRO) 5 MG Tab            Clinical Reference Information           Your Vitals Were     BP Pulse Temp Height Weight BMI    118/68 (BP Location: Right arm, Patient Position: Sitting, BP Method: Manual) 63 97.6 °F (36.4 °C) (Oral) 5' 9" (1.753 m) 95.7 kg (210 lb 15.7 oz) 31.16 kg/m2      Blood Pressure          Most Recent Value    BP  118/68      Allergies as of 5/15/2017     Metformin      Immunizations Administered on Date of Encounter - 5/15/2017     None      Language Assistance Services     ATTENTION: Language assistance services are available, free of charge. Please call [phone].      ATENCIÓN: Si habla español, tiene a grande disposición servicios gratuitos de asistencia lingüística. Llame al " [phone].     CHRISSY Ý: N?u b?n nói Ti?ng Vi?t, có các d?ch v? h? tr? ngôn ng? mi?n phí dành cho b?n. G?i s? [phone].         [facility] complies with applicable Federal civil rights laws and does not discriminate on the basis of race, color, national origin, age, disability, or sex.

## 2017-07-03 ENCOUNTER — TELEPHONE (OUTPATIENT)
Dept: ADMINISTRATIVE | Facility: HOSPITAL | Age: 62
End: 2017-07-03

## 2017-07-03 DIAGNOSIS — E11.9 DIABETES MELLITUS TYPE 2 IN NONOBESE: Primary | ICD-10-CM

## 2017-07-03 NOTE — TELEPHONE ENCOUNTER
Please see message below from [facility] nurse-     Alison Jose Carlos [phone] called to report she no longer wants to take the Invokana due to frequent yeast infections. She is requesting an order for Januvia that she reported was previously ordered but she never filled. Please advise patient.     Thanks,    Nasrin Marks, RN  RN Navigator  [facility]

## 2017-07-05 RX ORDER — FLUCONAZOLE 150 MG/1
150 TABLET ORAL ONCE
Qty: 3 TABLET | Refills: 0 | Status: SHIPPED | OUTPATIENT
Start: 2017-07-05 | End: 2017-08-25 | Stop reason: SDUPTHER

## 2017-07-07 ENCOUNTER — OFFICE VISIT (OUTPATIENT)
Dept: PODIATRY | Facility: CLINIC | Age: 62
End: 2017-07-07
Payer: MEDICARE

## 2017-07-07 VITALS
HEART RATE: 55 BPM | SYSTOLIC BLOOD PRESSURE: 120 MMHG | HEIGHT: 69 IN | DIASTOLIC BLOOD PRESSURE: 65 MMHG | WEIGHT: 210 LBS | RESPIRATION RATE: 18 BRPM | BODY MASS INDEX: 31.1 KG/M2

## 2017-07-07 DIAGNOSIS — E11.49 TYPE II DIABETES MELLITUS WITH NEUROLOGICAL MANIFESTATIONS: ICD-10-CM

## 2017-07-07 DIAGNOSIS — L85.1 ACQUIRED PALMAR AND PLANTAR HYPERKERATOSIS: Primary | ICD-10-CM

## 2017-07-07 PROCEDURE — 99999 PR PBB SHADOW E&M-EST. PATIENT-LVL III: CPT | Mod: PBBFAC,,, | Performed by: PODIATRIST

## 2017-07-07 PROCEDURE — 99499 UNLISTED E&M SERVICE: CPT | Mod: S$GLB,,, | Performed by: PODIATRIST

## 2017-07-07 PROCEDURE — 99213 OFFICE O/P EST LOW 20 MIN: CPT | Mod: S$GLB,,, | Performed by: PODIATRIST

## 2017-07-07 PROCEDURE — 3045F PR MOST RECENT HEMOGLOBIN A1C LEVEL 7.0-9.0%: CPT | Mod: S$GLB,,, | Performed by: PODIATRIST

## 2017-07-07 PROCEDURE — 4010F ACE/ARB THERAPY RXD/TAKEN: CPT | Mod: S$GLB,,, | Performed by: PODIATRIST

## 2017-07-07 RX ORDER — UREA 500 MG/G
1 CREAM TOPICAL DAILY
Qty: 255 G | Refills: 5 | Status: SHIPPED | OUTPATIENT
Start: 2017-07-07 | End: 2017-08-17

## 2017-07-07 RX ORDER — AMMONIUM LACTATE 12 G/100G
1 CREAM TOPICAL DAILY
Qty: 140 G | Refills: 5 | Status: SHIPPED | OUTPATIENT
Start: 2017-07-07 | End: 2017-08-17

## 2017-07-07 RX ORDER — TEMAZEPAM 7.5 MG/1
7.5 CAPSULE ORAL
COMMUNITY
End: 2017-08-17

## 2017-07-07 RX ORDER — BISACODYL 5 MG
5 TABLET, DELAYED RELEASE (ENTERIC COATED) ORAL
COMMUNITY
End: 2017-08-17

## 2017-07-07 NOTE — TELEPHONE ENCOUNTER
Please call Mrs. Branch.  I'll send in the prescription for Januvia (also called sitagliptin).  There are several drugs in this class, and I have no way to know which is covered by her insurance.  If this one isn't covered, she MUST CALL us and let us know which one will be covered and affordable.  DON'T wait until next appointment.    Thanks.  D

## 2017-07-07 NOTE — PROGRESS NOTES
Subjective:      Patient ID: Alison Branch is a 61 y.o. female.    Chief Complaint: PCP (Mike Rodriguez II, MD 5/15/17); Diabetic Foot Exam; and Foot Problem (very dry skin )    Alison is a 61 y.o. female who presents to the clinic upon referral from Dr. Jqaueline forde. provider found  for evaluation and treatment of diabetic feet. Alison has a past medical history of Allergy; Brain aneurysm (2010); Diabetes mellitus; Diabetes type 2, controlled; Fever blister; High cholesterol; History of Bell's palsy; and HTN (hypertension) (5/20/2014). Patient relates no major problem with feet. Only complaints today consist of dry skin. She has tried numerous rx topicals in the past w/ moderate improvement. Seen by Derm in the past.    PCP: Mike Rodriguez Ii, MD    Date Last Seen by PCP: cancer      Current shoe gear: Casual shoes    Hemoglobin A1C   Date Value Ref Range Status   04/18/2017 8.1 (H) 4.5 - 6.2 % Final     Comment:     According to ADA guidelines, hemoglobin A1C <7.0% represents  optimal control in non-pregnant diabetic patients.  Different  metrics may apply to specific populations.   Standards of Medical Care in Diabetes - 2016.  For the purpose of screening for the presence of diabetes:  <5.7%     Consistent with the absence of diabetes  5.7-6.4%  Consistent with increasing risk for diabetes   (prediabetes)  >or=6.5%  Consistent with diabetes  Currently no consensus exists for use of hemoglobin A1C  for diagnosis of diabetes for children.     01/24/2017 7.8 (H) 4.5 - 6.2 % Final     Comment:     According to ADA guidelines, hemoglobin A1C <7.0% represents  optimal control in non-pregnant diabetic patients.  Different  metrics may apply to specific populations.   Standards of Medical Care in Diabetes - 2016.  For the purpose of screening for the presence of diabetes:  <5.7%     Consistent with the absence of diabetes  5.7-6.4%  Consistent with increasing risk for diabetes   (prediabetes)  >or=6.5%  Consistent with  "diabetes  Currently no consensus exists for use of hemoglobin A1C  for diagnosis of diabetes for children.     08/09/2016 8.2 (H) 4.5 - 6.2 % Final     Comment:     According to ADA guidelines, hemoglobin A1C <7.0% represents  optimal control in non-pregnant diabetic patients.  Different  metrics may apply to specific populations.   Standards of Medical Care in Diabetes - 2016.  For the purpose of screening for the presence of diabetes:  <5.7%     Consistent with the absence of diabetes  5.7-6.4%  Consistent with increasing risk for diabetes   (prediabetes)  >or=6.5%  Consistent with diabetes  Currently no consensus exists for use of hemoglobin A1C  for diagnosis of diabetes for children.             ROS        Objective:       Vitals:    07/07/17 0814   BP: 120/65   Pulse: (!) 55   Resp: 18   Weight: 95.3 kg (210 lb)   Height: 5' 9" (1.753 m)   PainSc: 0-No pain        Physical Exam          Assessment:       Encounter Diagnoses   Name Primary?    Acquired palmar and plantar hyperkeratosis Yes    Type II diabetes mellitus with neurological manifestations          Plan:       Alison was seen today for pcp, diabetic foot exam and foot problem.    Diagnoses and all orders for this visit:    Acquired palmar and plantar hyperkeratosis    Type II diabetes mellitus with neurological manifestations    Other orders  -     urea 50 % Crea; Apply 1 application topically once daily at 6am.  -     ammonium lactate 12 % Crea; Apply 1 application topically once daily.      I counseled the patient on her conditions, their implications and medical management.    Hyperkeratotic plaques b/l foot   rx urea  And amlactin  Occlusion disucssed       "

## 2017-08-11 ENCOUNTER — TELEPHONE (OUTPATIENT)
Dept: INTERNAL MEDICINE | Facility: CLINIC | Age: 62
End: 2017-08-11

## 2017-08-11 DIAGNOSIS — Z12.31 OTHER SCREENING MAMMOGRAM: Primary | ICD-10-CM

## 2017-08-11 NOTE — TELEPHONE ENCOUNTER
----- Message from Elizabeth Craven sent at 8/11/2017 11:18 AM CDT -----  Contact: Patient [phone]  Orders Needed    Orders being requested: Mammogram    Does patient have future appt with PCP: Yes 08/17/2017    Please notify patient when orders have been placed. Patient would like to schedule it for 08/17/2017.    Thank You

## 2017-08-17 ENCOUNTER — TELEPHONE (OUTPATIENT)
Dept: INTERNAL MEDICINE | Facility: CLINIC | Age: 62
End: 2017-08-17

## 2017-08-17 ENCOUNTER — OFFICE VISIT (OUTPATIENT)
Dept: INTERNAL MEDICINE | Facility: CLINIC | Age: 62
End: 2017-08-17
Payer: MEDICARE

## 2017-08-17 ENCOUNTER — HOSPITAL ENCOUNTER (OUTPATIENT)
Dept: RADIOLOGY | Facility: HOSPITAL | Age: 62
Discharge: HOME | End: 2017-08-17
Attending: INTERNAL MEDICINE
Payer: MEDICARE

## 2017-08-17 VITALS
HEART RATE: 67 BPM | DIASTOLIC BLOOD PRESSURE: 88 MMHG | BODY MASS INDEX: 30.65 KG/M2 | WEIGHT: 214.06 LBS | TEMPERATURE: 98 F | HEIGHT: 70 IN | SYSTOLIC BLOOD PRESSURE: 136 MMHG

## 2017-08-17 DIAGNOSIS — R21 RASH: ICD-10-CM

## 2017-08-17 DIAGNOSIS — E11.69 COMBINED HYPERLIPIDEMIA ASSOCIATED WITH TYPE 2 DIABETES MELLITUS: Chronic | ICD-10-CM

## 2017-08-17 DIAGNOSIS — E78.2 COMBINED HYPERLIPIDEMIA ASSOCIATED WITH TYPE 2 DIABETES MELLITUS: Chronic | ICD-10-CM

## 2017-08-17 DIAGNOSIS — M54.9 BACK PAIN, UNSPECIFIED BACK LOCATION, UNSPECIFIED BACK PAIN LATERALITY, UNSPECIFIED CHRONICITY: ICD-10-CM

## 2017-08-17 DIAGNOSIS — I15.2 HYPERTENSION ASSOCIATED WITH DIABETES: ICD-10-CM

## 2017-08-17 DIAGNOSIS — E11.59 HYPERTENSION ASSOCIATED WITH DIABETES: ICD-10-CM

## 2017-08-17 DIAGNOSIS — E11.65 UNCONTROLLED TYPE 2 DIABETES MELLITUS WITH HYPERGLYCEMIA, WITHOUT LONG-TERM CURRENT USE OF INSULIN: Primary | ICD-10-CM

## 2017-08-17 DIAGNOSIS — Z12.31 OTHER SCREENING MAMMOGRAM: ICD-10-CM

## 2017-08-17 PROCEDURE — 77063 BREAST TOMOSYNTHESIS BI: CPT | Mod: 26,,, | Performed by: RADIOLOGY

## 2017-08-17 PROCEDURE — 77067 SCR MAMMO BI INCL CAD: CPT | Mod: 26,,, | Performed by: RADIOLOGY

## 2017-08-17 PROCEDURE — 99999 PR PBB SHADOW E&M-EST. PATIENT-LVL III: CPT | Mod: PBBFAC,,, | Performed by: INTERNAL MEDICINE

## 2017-08-17 PROCEDURE — 3045F PR MOST RECENT HEMOGLOBIN A1C LEVEL 7.0-9.0%: CPT | Mod: S$GLB,,, | Performed by: INTERNAL MEDICINE

## 2017-08-17 PROCEDURE — 99214 OFFICE O/P EST MOD 30 MIN: CPT | Mod: S$GLB,,, | Performed by: INTERNAL MEDICINE

## 2017-08-17 PROCEDURE — 99499 UNLISTED E&M SERVICE: CPT | Mod: S$GLB,,, | Performed by: INTERNAL MEDICINE

## 2017-08-17 PROCEDURE — 3079F DIAST BP 80-89 MM HG: CPT | Mod: S$GLB,,, | Performed by: INTERNAL MEDICINE

## 2017-08-17 PROCEDURE — 3008F BODY MASS INDEX DOCD: CPT | Mod: S$GLB,,, | Performed by: INTERNAL MEDICINE

## 2017-08-17 PROCEDURE — 77067 SCR MAMMO BI INCL CAD: CPT | Mod: TC

## 2017-08-17 PROCEDURE — 3075F SYST BP GE 130 - 139MM HG: CPT | Mod: S$GLB,,, | Performed by: INTERNAL MEDICINE

## 2017-08-17 RX ORDER — FLUCONAZOLE 150 MG/1
TABLET ORAL
COMMUNITY
Start: 2017-07-12 | End: 2017-08-17

## 2017-08-17 NOTE — TELEPHONE ENCOUNTER
Please call Mrs. Branch and tell her that the cheaper form of urea cream isn't available.  Thanks.

## 2017-08-17 NOTE — PROGRESS NOTES
Subjective:       Patient ID: Alison Branch is a 61 y.o. female.    Chief Complaint: Diabetes and Hypertension    HPI - Mrs. Branch has lower back pain today.  Started yesterday; recurrent.  Would like to go to back clinic, but the copays are too high for her.  She still has a persistent rash on her feet; uses a wide variety of creams but cannot make it go away.  Would like to try barrier cream; would see derm for diagnostic reasons.  She's taking the januvia regularly (did not tolerate the flozin d/t yeast infections), and wants to see what her a1c is today.  Getting mammogram today.  She's a nonsmoker    PMH:  DM2, adherent to regimen  HTN, at goal  Brain aneurysm s/p coiling  HLP, on a statin  ADD  Obesity     Meds:  Reviewed and reconciled in EPIC with patient during visit today.    Review of Systems   Constitutional: Negative for fever.   HENT: Negative for congestion.    Respiratory: Negative for shortness of breath.    Cardiovascular: Negative for chest pain.   Gastrointestinal: Negative for abdominal pain.   Genitourinary: Negative for difficulty urinating.   Musculoskeletal: Positive for back pain.   Skin: Positive for rash.   Neurological: Negative for headaches.   Psychiatric/Behavioral: Positive for decreased concentration.       Objective:      Physical Exam   Constitutional: She is oriented to person, place, and time. She appears well-developed and well-nourished.   Friendly obese bf in NAD   HENT:   Head: Normocephalic and atraumatic.   Cardiovascular: Normal rate, regular rhythm and normal heart sounds.  Exam reveals no gallop and no friction rub.    No murmur heard.  Pulmonary/Chest: Effort normal and breath sounds normal. No respiratory distress. She has no wheezes. She has no rales. She exhibits no tenderness.   Neurological: She is alert and oriented to person, place, and time.   Skin: Skin is warm and dry. No erythema.   Psychiatric: She has a normal mood and affect.   Nursing note and vitals  reviewed.      Assessment:       1. Uncontrolled type 2 diabetes mellitus with hyperglycemia, without long-term current use of insulin    2. Hypertension associated with diabetes    3. Combined hyperlipidemia associated with type 2 diabetes mellitus    4. Rash    5. Back pain, unspecified back location, unspecified back pain laterality, unspecified chronicity        Plan:       Alison was seen today for diabetes and hypertension.    Diagnoses and all orders for this visit:    Uncontrolled type 2 diabetes mellitus with hyperglycemia, without long-term current use of insulin - stable, on januvia.  Needs another a1c  -     Hemoglobin A1c; Future    Hypertension associated with diabetes - at goal, stay the course    Combined hyperlipidemia associated with type 2 diabetes mellitus - at goal, on a statin.  Stay the course    Rash - uncertain diagnosis.  Will try another type of urea cream and ask derm to evaluate  -     urea 30 % Crea; Apply to feet daily as needed  -     Ambulatory consult to Dermatology    Back pain, unspecified back location, unspecified back pain laterality, unspecified chronicity - short course of nsaids.  Topicals, heating pads.  Time    rtc prn, or 6 months    JUVE Rodriguez MD MPH  Staff Internist

## 2017-08-18 ENCOUNTER — PATIENT MESSAGE (OUTPATIENT)
Dept: INTERNAL MEDICINE | Facility: CLINIC | Age: 62
End: 2017-08-18

## 2017-08-18 DIAGNOSIS — E11.65 TYPE 2 DIABETES MELLITUS WITH HYPERGLYCEMIA, WITHOUT LONG-TERM CURRENT USE OF INSULIN: Primary | ICD-10-CM

## 2017-08-28 RX ORDER — FLUCONAZOLE 150 MG/1
TABLET ORAL
Qty: 3 TABLET | Refills: 0 | Status: SHIPPED | OUTPATIENT
Start: 2017-08-28 | End: 2017-09-02 | Stop reason: SDUPTHER

## 2017-09-02 ENCOUNTER — OFFICE VISIT (OUTPATIENT)
Dept: INTERNAL MEDICINE | Facility: CLINIC | Age: 62
End: 2017-09-02
Payer: MEDICARE

## 2017-09-02 VITALS
DIASTOLIC BLOOD PRESSURE: 78 MMHG | SYSTOLIC BLOOD PRESSURE: 132 MMHG | OXYGEN SATURATION: 97 % | HEIGHT: 70 IN | WEIGHT: 216.06 LBS | BODY MASS INDEX: 30.93 KG/M2 | TEMPERATURE: 98 F | HEART RATE: 72 BPM

## 2017-09-02 DIAGNOSIS — J01.40 ACUTE NON-RECURRENT PANSINUSITIS: Primary | ICD-10-CM

## 2017-09-02 PROCEDURE — 99999 PR PBB SHADOW E&M-EST. PATIENT-LVL III: CPT | Mod: PBBFAC,,, | Performed by: INTERNAL MEDICINE

## 2017-09-02 PROCEDURE — 3078F DIAST BP <80 MM HG: CPT | Mod: S$GLB,,, | Performed by: INTERNAL MEDICINE

## 2017-09-02 PROCEDURE — 3075F SYST BP GE 130 - 139MM HG: CPT | Mod: S$GLB,,, | Performed by: INTERNAL MEDICINE

## 2017-09-02 PROCEDURE — 99213 OFFICE O/P EST LOW 20 MIN: CPT | Mod: S$GLB,,, | Performed by: INTERNAL MEDICINE

## 2017-09-02 PROCEDURE — 3008F BODY MASS INDEX DOCD: CPT | Mod: S$GLB,,, | Performed by: INTERNAL MEDICINE

## 2017-09-02 RX ORDER — FLUCONAZOLE 150 MG/1
150 TABLET ORAL ONCE
Qty: 1 TABLET | Refills: 0 | Status: SHIPPED | OUTPATIENT
Start: 2017-09-02 | End: 2017-09-02

## 2017-09-02 RX ORDER — AMOXICILLIN AND CLAVULANATE POTASSIUM 875; 125 MG/1; MG/1
1 TABLET, FILM COATED ORAL EVERY 12 HOURS
Qty: 14 TABLET | Refills: 0 | Status: SHIPPED | OUTPATIENT
Start: 2017-09-02 | End: 2017-09-09

## 2017-09-02 RX ORDER — CODEINE PHOSPHATE AND GUAIFENESIN 10; 100 MG/5ML; MG/5ML
5 SOLUTION ORAL NIGHTLY PRN
Qty: 118 ML | Refills: 0 | Status: SHIPPED | OUTPATIENT
Start: 2017-09-02 | End: 2017-09-12

## 2017-09-02 NOTE — PROGRESS NOTES
Subjective:       Patient ID: Alison Branch is a 61 y.o. female.    Chief Complaint: Sinusitis; Cough; and Sore Throat    HPI     Patient is a 61 year old female here today for congestion and cough.  Sx began > 1 week ago with nasal congestion, sore throat, now with more sinus pressure, swelling around the eyes, right ear pain, cough is productive. No fever. Chills and body ache and fatigue. No upset stomach or diarrhea. No known sick contacts.     Review of Systems   Constitutional: Positive for chills and fatigue. Negative for fever.   HENT: Positive for congestion, ear pain, facial swelling, postnasal drip, rhinorrhea, sinus pain, sinus pressure, sore throat and trouble swallowing.    Respiratory: Positive for cough.    Cardiovascular: Negative for chest pain.   Gastrointestinal: Negative for abdominal pain, constipation, diarrhea, nausea and vomiting.       Objective:      Physical Exam   Constitutional: She is oriented to person, place, and time. She appears well-developed and well-nourished. No distress.   HENT:   Head: Normocephalic and atraumatic.   Right Ear: External ear normal.   Left Ear: External ear normal.   +swelling noted to maxillary sinuses and tenderness R>L  Posterior oropharyngeal erythema  No tonsillar exudate   Eyes: Conjunctivae and EOM are normal. Pupils are equal, round, and reactive to light.   Neck: Normal range of motion. Neck supple. No thyromegaly present.   Cardiovascular: Normal rate, regular rhythm, normal heart sounds and intact distal pulses.    No murmur heard.  Pulmonary/Chest: Effort normal and breath sounds normal. No respiratory distress. She has no wheezes. She has no rales.   Musculoskeletal: She exhibits no edema.   Lymphadenopathy:     She has cervical adenopathy.   Neurological: She is alert and oriented to person, place, and time.   Skin: Skin is warm and dry. She is not diaphoretic.   Nursing note and vitals reviewed.      Assessment:       1. Acute non-recurrent  pansinusitis        Plan:       Sx > 1 week no improvement with OTC medication  Start Augmentin BID x 7 days, Gi side effects reviewed  Diflucan 150 mg PO x 1 dose in case yeast infection occurs  Continue flonase  Okay to use Mucinex Q12H prn cough during day for cough  Rx for Cheratussin nightly prn cough, sedation side effects reviewed  Hydrate, rest  RTC PRN

## 2017-09-27 ENCOUNTER — TELEPHONE (OUTPATIENT)
Dept: INTERNAL MEDICINE | Facility: CLINIC | Age: 62
End: 2017-09-27

## 2017-09-27 ENCOUNTER — PATIENT MESSAGE (OUTPATIENT)
Dept: INTERNAL MEDICINE | Facility: CLINIC | Age: 62
End: 2017-09-27

## 2017-09-27 DIAGNOSIS — E11.65 UNCONTROLLED TYPE 2 DIABETES MELLITUS WITH HYPERGLYCEMIA, WITHOUT LONG-TERM CURRENT USE OF INSULIN: Primary | ICD-10-CM

## 2017-09-27 RX ORDER — GLIPIZIDE 5 MG/1
5 TABLET, FILM COATED, EXTENDED RELEASE ORAL
Qty: 90 TABLET | Refills: 3 | Status: SHIPPED | OUTPATIENT
Start: 2017-09-27 | End: 2017-10-06 | Stop reason: SDUPTHER

## 2017-09-27 NOTE — TELEPHONE ENCOUNTER
Adding glipizide to her regimen to help control high sugars.  Intolerant of flozins and metformin.

## 2017-10-05 ENCOUNTER — TELEPHONE (OUTPATIENT)
Dept: ADMINISTRATIVE | Facility: HOSPITAL | Age: 62
End: 2017-10-05

## 2017-10-05 DIAGNOSIS — E11.65 TYPE 2 DIABETES MELLITUS WITH HYPERGLYCEMIA, WITHOUT LONG-TERM CURRENT USE OF INSULIN: ICD-10-CM

## 2017-10-05 DIAGNOSIS — E11.9 DIABETES MELLITUS TYPE 2 IN NONOBESE: ICD-10-CM

## 2017-10-05 DIAGNOSIS — E11.65 UNCONTROLLED TYPE 2 DIABETES MELLITUS WITH HYPERGLYCEMIA, WITHOUT LONG-TERM CURRENT USE OF INSULIN: ICD-10-CM

## 2017-10-05 NOTE — TELEPHONE ENCOUNTER
Please see message below from [facility] nurse-     Alison Branch [phone] I spoke with the patient today for medication adherence call following identifying gaps in fills of Januvia, 30d last filled 8-14-17. spoke to the patient who reports she has only been taking Glipizide 5mg with breakfast since ordered. Currently Januvia and Glipizide are on patient's profile. Clarification needed if patient is to continue taking Januvia as well, she reports she was not aware that she was to continue if it was.  In addition please send order for glipizide to pharmacy as when it was ordered it seems as if the transmission failed. Patient reports blood sugars have been 150's before meals and 190's after meals, she reports noncompliance with diabetic diet. Please advise.    Thanks,    Nasrin Marks, RN  RN Navigator  [facility]

## 2017-10-06 RX ORDER — GLIPIZIDE 5 MG/1
5 TABLET, FILM COATED, EXTENDED RELEASE ORAL
Qty: 90 TABLET | Refills: 3 | Status: SHIPPED | OUTPATIENT
Start: 2017-10-06 | End: 2018-01-03 | Stop reason: SDUPTHER

## 2017-10-07 NOTE — TELEPHONE ENCOUNTER
Please call her. She consistently mis-interprets instructions.  This is frustrating.    Please tell her that she needs to be taking three medications for her diabetes - januvia and glipizide daily, plus the injectable dulaglutide once per week for her diabetes.  I'm sending refills for all three to her pharmacy now.    D

## 2017-10-09 ENCOUNTER — TELEPHONE (OUTPATIENT)
Dept: ADMINISTRATIVE | Facility: HOSPITAL | Age: 62
End: 2017-10-09

## 2017-10-09 DIAGNOSIS — E11.65 UNCONTROLLED TYPE 2 DIABETES MELLITUS WITH HYPERGLYCEMIA, WITHOUT LONG-TERM CURRENT USE OF INSULIN: Primary | ICD-10-CM

## 2017-10-09 NOTE — TELEPHONE ENCOUNTER
Please see message below from [facility] nurse-     Please notify Dr. Rodriguez I received a call back from the patient who reports she received a call back from a N clinical pharmacist who informed her that Victoza is a covered PHN medication and she request MD to consider new order in place of Trulicity. Please review and advise patient.    Thanks,  Nasrin Marks RN Navigator [facility]

## 2017-10-09 NOTE — TELEPHONE ENCOUNTER
Spoke to patient in length about which medications she is to take daily and the injectable once a week. Patient understood and was to  prescriptions and start today.

## 2017-10-10 NOTE — TELEPHONE ENCOUNTER
I stopped trulicity and ordered victoza.  Please inform patient that victoza is a daily injection.    Thanks.  D

## 2017-10-11 ENCOUNTER — TELEPHONE (OUTPATIENT)
Dept: INTERNAL MEDICINE | Facility: CLINIC | Age: 62
End: 2017-10-11

## 2017-10-11 NOTE — TELEPHONE ENCOUNTER
----- Message from India Presley sent at 10/11/2017 11:40 AM CDT -----  Contact: self/[phone]  Pt called in regards to wanting to know does she need to go to class for her injection. If so schedule her for Friday after 12:00 pm or before 3:00 pm.        Please advise

## 2017-10-20 ENCOUNTER — CLINICAL SUPPORT (OUTPATIENT)
Dept: DIABETES | Facility: CLINIC | Age: 62
End: 2017-10-20
Payer: MEDICARE

## 2017-10-20 DIAGNOSIS — E11.65 UNCONTROLLED TYPE 2 DIABETES MELLITUS WITH HYPERGLYCEMIA, WITHOUT LONG-TERM CURRENT USE OF INSULIN: ICD-10-CM

## 2017-10-20 PROCEDURE — 99999 PR PBB SHADOW E&M-EST. PATIENT-LVL II: CPT | Mod: PBBFAC,,,

## 2017-10-20 PROCEDURE — G0109 DIAB MANAGE TRN IND/GROUP: HCPCS | Mod: S$GLB,,, | Performed by: INTERNAL MEDICINE

## 2017-11-09 ENCOUNTER — TELEPHONE (OUTPATIENT)
Dept: INTERNAL MEDICINE | Facility: CLINIC | Age: 62
End: 2017-11-09

## 2017-11-09 DIAGNOSIS — B35.9 TINEA: Primary | ICD-10-CM

## 2017-11-09 RX ORDER — FLUCONAZOLE 150 MG/1
150 TABLET ORAL ONCE
Qty: 1 TABLET | Refills: 0 | Status: SHIPPED | OUTPATIENT
Start: 2017-11-09 | End: 2017-11-09

## 2017-11-09 NOTE — TELEPHONE ENCOUNTER
----- Message from Elizabeth Craven sent at 11/9/2017  3:03 PM CST -----  Contact: Patient [phone]  Prescription Request:     Name of medication: See Symptoms    Reason for request: Yeast Infection under breast and on right side of vagina.    Pharmacy: [phone] [place] [phone] [address]    Please advise. Requesting that you send asap.    Thank You

## 2017-11-09 NOTE — TELEPHONE ENCOUNTER
Patient states she has a yeast infection under breast and vaginal. Would like something called in please advise

## 2017-11-11 ENCOUNTER — OFFICE VISIT (OUTPATIENT)
Dept: INTERNAL MEDICINE | Facility: CLINIC | Age: 62
End: 2017-11-11
Payer: MEDICARE

## 2017-11-11 VITALS
WEIGHT: 218.5 LBS | HEIGHT: 70 IN | TEMPERATURE: 98 F | HEART RATE: 64 BPM | OXYGEN SATURATION: 99 % | DIASTOLIC BLOOD PRESSURE: 72 MMHG | SYSTOLIC BLOOD PRESSURE: 128 MMHG | BODY MASS INDEX: 31.28 KG/M2

## 2017-11-11 DIAGNOSIS — J32.9 SINUSITIS, UNSPECIFIED CHRONICITY, UNSPECIFIED LOCATION: ICD-10-CM

## 2017-11-11 DIAGNOSIS — R21 RASH: Primary | ICD-10-CM

## 2017-11-11 PROCEDURE — 99214 OFFICE O/P EST MOD 30 MIN: CPT | Mod: S$GLB,,, | Performed by: INTERNAL MEDICINE

## 2017-11-11 PROCEDURE — 99999 PR PBB SHADOW E&M-EST. PATIENT-LVL III: CPT | Mod: PBBFAC,,, | Performed by: INTERNAL MEDICINE

## 2017-11-11 RX ORDER — DEXTROAMPHETAMINE SACCHARATE, AMPHETAMINE ASPARTATE, DEXTROAMPHETAMINE SULFATE AND AMPHETAMINE SULFATE 1.25; 1.25; 1.25; 1.25 MG/1; MG/1; MG/1; MG/1
5 TABLET ORAL DAILY PRN
COMMUNITY
Start: 2017-09-13 | End: 2017-12-14

## 2017-11-11 RX ORDER — KETOCONAZOLE 20 MG/G
CREAM TOPICAL 2 TIMES DAILY
Qty: 60 G | Refills: 2 | Status: SHIPPED | OUTPATIENT
Start: 2017-11-11 | End: 2018-10-30

## 2017-11-11 RX ORDER — LEVOCETIRIZINE DIHYDROCHLORIDE 5 MG/1
5 TABLET, FILM COATED ORAL NIGHTLY
Qty: 30 TABLET | Refills: 0 | Status: SHIPPED | OUTPATIENT
Start: 2017-11-11 | End: 2017-12-14

## 2017-11-11 NOTE — PROGRESS NOTES
Subjective:       Patient ID: Alison Branch is a 62 y.o. female.    Chief Complaint: Sinusitis and Vaginitis    HPI     62-year-old female here for evaluation of possible yeast infection.    She has had a yeast infection for about a week now.  This is under her skin folds of her abdomen and breast.  It is really itchy and has a bad odor.    She has sinus trouble that started the last three days.  She was up all night coughing and has a scratchy throat. Her ear hurts a little bit.  She has sinus pressure.  Her cough is dry.  She has been taking mucinex and flonase.  She has not tried an anithistamine.    Review of Systems    Objective:      Physical Exam   Constitutional: She is oriented to person, place, and time. She appears well-developed and well-nourished.   HENT:   Head: Normocephalic and atraumatic.   Mouth/Throat: No oropharyngeal exudate.   Eyes: EOM are normal. Pupils are equal, round, and reactive to light. Right eye exhibits no discharge. Left eye exhibits no discharge. No scleral icterus.   Neck: Normal range of motion. Neck supple. No tracheal deviation present. No thyromegaly present.   Cardiovascular: Normal rate, regular rhythm and normal heart sounds.  Exam reveals no gallop and no friction rub.    No murmur heard.  Pulmonary/Chest: Effort normal and breath sounds normal. No respiratory distress. She has no wheezes. She has no rales. She exhibits no tenderness.   Abdominal: Soft. Bowel sounds are normal. She exhibits no distension and no mass. There is no tenderness. There is no rebound and no guarding.   Musculoskeletal: Normal range of motion. She exhibits no edema or tenderness.   Neurological: She is alert and oriented to person, place, and time.   Skin: Skin is warm and dry. Rash noted. No erythema. No pallor.   Psychiatric: She has a normal mood and affect. Her behavior is normal.   Vitals reviewed.      Assessment:       1. Rash    2. Sinusitis, unspecified chronicity, unspecified location         Plan:       1.  Ketoconazole ointment twice a day.  2.  Likely viral.  Counseled patient to call back on Wednesday of next week or Friday if no better.  Would prescribe Augmentin at the time.  Continue with over-the-counter Flonase, Mucinex.  Xyzal prescribed.

## 2017-11-15 RX ORDER — AMOXICILLIN AND CLAVULANATE POTASSIUM 875; 125 MG/1; MG/1
1 TABLET, FILM COATED ORAL EVERY 12 HOURS
Qty: 14 TABLET | Refills: 0 | OUTPATIENT
Start: 2017-11-15

## 2017-11-16 ENCOUNTER — OFFICE VISIT (OUTPATIENT)
Dept: INTERNAL MEDICINE | Facility: CLINIC | Age: 62
End: 2017-11-16
Payer: MEDICARE

## 2017-11-16 ENCOUNTER — TELEPHONE (OUTPATIENT)
Dept: INTERNAL MEDICINE | Facility: CLINIC | Age: 62
End: 2017-11-16

## 2017-11-16 VITALS
WEIGHT: 216.06 LBS | HEIGHT: 70 IN | BODY MASS INDEX: 30.93 KG/M2 | DIASTOLIC BLOOD PRESSURE: 72 MMHG | SYSTOLIC BLOOD PRESSURE: 118 MMHG | HEART RATE: 70 BPM

## 2017-11-16 DIAGNOSIS — J01.00 ACUTE NON-RECURRENT MAXILLARY SINUSITIS: Primary | ICD-10-CM

## 2017-11-16 DIAGNOSIS — J45.21 MILD INTERMITTENT REACTIVE AIRWAY DISEASE WITH ACUTE EXACERBATION: ICD-10-CM

## 2017-11-16 PROCEDURE — 99499 UNLISTED E&M SERVICE: CPT | Mod: S$GLB,,, | Performed by: INTERNAL MEDICINE

## 2017-11-16 PROCEDURE — 99999 PR PBB SHADOW E&M-EST. PATIENT-LVL III: CPT | Mod: PBBFAC,,, | Performed by: INTERNAL MEDICINE

## 2017-11-16 PROCEDURE — 99213 OFFICE O/P EST LOW 20 MIN: CPT | Mod: S$GLB,,, | Performed by: INTERNAL MEDICINE

## 2017-11-16 RX ORDER — FLUCONAZOLE 100 MG/1
100 TABLET ORAL DAILY
Qty: 3 TABLET | Refills: 1 | Status: SHIPPED | OUTPATIENT
Start: 2017-11-16 | End: 2018-04-26 | Stop reason: SDUPTHER

## 2017-11-16 RX ORDER — ALBUTEROL SULFATE 90 UG/1
2 AEROSOL, METERED RESPIRATORY (INHALATION) EVERY 6 HOURS PRN
Qty: 18 G | Refills: 1 | Status: SHIPPED | OUTPATIENT
Start: 2017-11-16 | End: 2019-08-01

## 2017-11-16 RX ORDER — AMOXICILLIN AND CLAVULANATE POTASSIUM 875; 125 MG/1; MG/1
1 TABLET, FILM COATED ORAL EVERY 12 HOURS
Qty: 20 TABLET | Refills: 0 | Status: SHIPPED | OUTPATIENT
Start: 2017-11-16 | End: 2017-11-26

## 2017-11-16 NOTE — TELEPHONE ENCOUNTER
----- Message from Bessy Hughes sent at 11/15/2017 12:25 PM CST -----  Contact: pt [phone]  Pt would like a call from the nurse in regards to seeing the doctor tomorrow pt has a sinus infection pt do not want to see another doctor,please advise pt

## 2017-11-16 NOTE — PROGRESS NOTES
Clinic Note  11/16/2017      Subjective:       Patient ID:  Alison is a 62 y.o. female being seen for an urgent care visit.    Chief Complaint: Sinus Problem; Chest Congestion; Nasal Congestion; Sore Throat; and Cough    Sinus Problem   This is a recurrent problem. The current episode started in the past 7 days. The problem is unchanged. There has been no fever. The pain is mild. Associated symptoms include coughing and a sore throat. Past treatments include oral decongestants (flonase). The treatment provided no relief.   Sore Throat    Associated symptoms include coughing.   Cough   Associated symptoms include a sore throat.       Review of Systems   HENT: Positive for sore throat.    Respiratory: Positive for cough.        Medication List with Changes/Refills   New Medications    ALBUTEROL 90 MCG/ACTUATION INHALER    Inhale 2 puffs into the lungs every 6 (six) hours as needed for Wheezing. Rescue    AMOXICILLIN-CLAVULANATE 875-125MG (AUGMENTIN) 875-125 MG PER TABLET    Take 1 tablet by mouth every 12 (twelve) hours.    FLUCONAZOLE (DIFLUCAN) 100 MG TABLET    Take 1 tablet (100 mg total) by mouth once daily.   Current Medications    ALPRAZOLAM (XANAX) 0.25 MG TABLET        ASPIRIN (ECOTRIN) 81 MG EC TABLET    Take 81 mg by mouth once daily.    ATORVASTATIN (LIPITOR) 40 MG TABLET    TAKE ONE TABLET BY MOUTH ONCE DAILY    BLOOD SUGAR DIAGNOSTIC STRP    1 strip by Misc.(Non-Drug; Combo Route) route once daily. Heladio Result.  250.02.  Check Blood Sugar Twice Daily.    BLOOD-GLUCOSE METER KIT    Use as instructed    DEXTROAMPHETAMINE-AMPHETAMINE 5 MG TAB    Take 5 mg by mouth daily as needed.    ERGOCALCIFEROL (ERGOCALCIFEROL) 50,000 UNIT CAP    Take 1 capsule (50,000 Units total) by mouth every 7 days.    ESCITALOPRAM OXALATE (LEXAPRO) 5 MG TAB        GLIPIZIDE (GLUCOTROL) 5 MG TR24    Take 1 tablet (5 mg total) by mouth daily with breakfast.    KETOCONAZOLE (NIZORAL) 2 % CREAM    Apply topically 2 (two) times daily.     "LANCETS MISC    1 Device by Misc.(Non-Drug; Combo Route) route once daily. Heladio Result.  250.02.  Check Blood Sugar Twice Daily.    LEVOCETIRIZINE (XYZAL) 5 MG TABLET    Take 1 tablet (5 mg total) by mouth every evening.    LIRAGLUTIDE 0.6 MG/0.1 ML, 18 MG/3 ML, SUBQ PNIJ 0.6 MG/0.1 ML (18 MG/3 ML) PNIJ    Inject 0.6 mg into the skin once daily.    LOSARTAN (COZAAR) 50 MG TABLET    Take 1 tablet (50 mg total) by mouth once daily.    SITAGLIPTIN (JANUVIA) 100 MG TAB    Take 1 tablet (100 mg total) by mouth once daily.    UREA 30 % CREA    Apply to feet daily as needed       Patient Active Problem List   Diagnosis    Uncontrolled type 2 diabetes mellitus with hyperglycemia, without long-term current use of insulin    Combined hyperlipidemia associated with type 2 diabetes mellitus    Attention deficit hyperactivity disorder (ADHD), predominantly inattentive type    Cerebral aneurysm, coiled in 2010    Hypertension associated with diabetes    Hyperkeratosis of palms and soles    Irritable bowel syndrome           Objective:      /72   Pulse 70   Ht 5' 10" (1.778 m)   Wt 98 kg (216 lb 0.8 oz)   BMI 31.00 kg/m²   Estimated body mass index is 31 kg/m² as calculated from the following:    Height as of this encounter: 5' 10" (1.778 m).    Weight as of this encounter: 98 kg (216 lb 0.8 oz).  Physical Exam   Constitutional: She is well-developed, well-nourished, and in no distress.   HENT:   Right Ear: A middle ear effusion is present.   Left Ear: A middle ear effusion is present.   Nose: Mucosal edema present. Right sinus exhibits maxillary sinus tenderness and frontal sinus tenderness.   Mouth/Throat: No oropharyngeal exudate, posterior oropharyngeal edema, posterior oropharyngeal erythema or tonsillar abscesses.   Cardiovascular: Normal rate and normal heart sounds.    Pulmonary/Chest: Effort normal. No accessory muscle usage. No respiratory distress. She has no decreased breath sounds. She has wheezes " (scattered, clear with coughing). She has no rhonchi. She has no rales.         Assessment and Plan:         Problem List Items Addressed This Visit     None      Visit Diagnoses     Acute non-recurrent maxillary sinusitis    -  Primary    Right sided - unilateral.  WIll cont flonase, give 10d augmentin.  Diflucan as gets vag yeast infection w abx.  Sugars doing ok in 140s.  Cont otc guaifenesin as well.        Mild intermittent reactive airway disease with acute exacerbation    - albuerol to help with wheezing. Do not feel needs inhaled/oral steroids.          Follow Up:   Return if symptoms worsen or fail to improve.        Salvador Ibarra

## 2017-12-14 ENCOUNTER — OFFICE VISIT (OUTPATIENT)
Dept: INTERNAL MEDICINE | Facility: CLINIC | Age: 62
End: 2017-12-14
Payer: MEDICARE

## 2017-12-14 VITALS
DIASTOLIC BLOOD PRESSURE: 60 MMHG | HEART RATE: 67 BPM | HEIGHT: 70 IN | WEIGHT: 216.25 LBS | BODY MASS INDEX: 30.96 KG/M2 | SYSTOLIC BLOOD PRESSURE: 120 MMHG

## 2017-12-14 DIAGNOSIS — F90.0 ATTENTION DEFICIT HYPERACTIVITY DISORDER (ADHD), PREDOMINANTLY INATTENTIVE TYPE: ICD-10-CM

## 2017-12-14 DIAGNOSIS — J32.9 CHRONIC SINUSITIS, UNSPECIFIED LOCATION: ICD-10-CM

## 2017-12-14 DIAGNOSIS — E11.69 COMBINED HYPERLIPIDEMIA ASSOCIATED WITH TYPE 2 DIABETES MELLITUS: Chronic | ICD-10-CM

## 2017-12-14 DIAGNOSIS — R09.82 POSTNASAL DRIP: ICD-10-CM

## 2017-12-14 DIAGNOSIS — E11.59 HYPERTENSION ASSOCIATED WITH DIABETES: ICD-10-CM

## 2017-12-14 DIAGNOSIS — E78.2 COMBINED HYPERLIPIDEMIA ASSOCIATED WITH TYPE 2 DIABETES MELLITUS: Chronic | ICD-10-CM

## 2017-12-14 DIAGNOSIS — M54.9 BACK PAIN, UNSPECIFIED BACK LOCATION, UNSPECIFIED BACK PAIN LATERALITY, UNSPECIFIED CHRONICITY: ICD-10-CM

## 2017-12-14 DIAGNOSIS — E11.65 UNCONTROLLED TYPE 2 DIABETES MELLITUS WITH HYPERGLYCEMIA, WITHOUT LONG-TERM CURRENT USE OF INSULIN: Primary | ICD-10-CM

## 2017-12-14 DIAGNOSIS — I15.2 HYPERTENSION ASSOCIATED WITH DIABETES: ICD-10-CM

## 2017-12-14 PROCEDURE — 99214 OFFICE O/P EST MOD 30 MIN: CPT | Mod: S$GLB,,, | Performed by: INTERNAL MEDICINE

## 2017-12-14 PROCEDURE — 99999 PR PBB SHADOW E&M-EST. PATIENT-LVL III: CPT | Mod: PBBFAC,,, | Performed by: INTERNAL MEDICINE

## 2017-12-14 PROCEDURE — 99499 UNLISTED E&M SERVICE: CPT | Mod: S$GLB,,, | Performed by: INTERNAL MEDICINE

## 2017-12-14 NOTE — PROGRESS NOTES
"Subjective:       Patient ID: Alison Branch is a 62 y.o. female.    Chief Complaint: Hyperglycemia    HPI - Ms Branch has been off Januvia because she is in the donut hole, and she never started liraglutide due to cost.  We had been making progress on her a1c (last was 7.8), but she doesn't want to check it today b/c she's sure it will be high.  She is getting BS in the "100's" at home - when pressed, numbers are 109-134 range.  She's taking antihypertensives.  Not on adderall for add d/t comorbidities.  She has low back pain, which is worse when she drives [employer].  She has been taking ibuprofen for this and wonders if that's ok (no).  She wonders what she can do for her chronic sinusitis and PND - using flonase, but incorrectly.  No longer on an antihistamine.She is not a smoker.    PMH:  DM2, adherent to regimen  HTN, at goal  Brain aneurysm s/p coiling  HLP, on a statin  ADD  Obesity     Meds:  Reviewed and reconciled in EPIC with patient during visit today.     Review of Systems   Constitutional: Negative for fever.   HENT: Positive for congestion and postnasal drip.    Respiratory: Positive for wheezing. Negative for shortness of breath.    Cardiovascular: Negative for chest pain.   Gastrointestinal: Negative for abdominal pain.   Genitourinary: Negative for difficulty urinating.   Musculoskeletal: Positive for back pain.   Skin: Negative for rash.   Neurological: Negative for headaches.   Psychiatric/Behavioral: Negative for sleep disturbance.       Objective:      Physical Exam   Constitutional: She is oriented to person, place, and time. She appears well-developed and well-nourished.   Friendly BF in [facility]   HENT:   Head: Normocephalic and atraumatic.   Cardiovascular: Normal rate, regular rhythm and normal heart sounds.  Exam reveals no gallop and no friction rub.    No murmur heard.  Pulmonary/Chest: Effort normal and breath sounds normal. No respiratory distress. She has no wheezes. She has no rales. She " exhibits no tenderness.   Neurological: She is alert and oriented to person, place, and time.   Skin: Skin is warm and dry. No erythema.   Psychiatric: She has a normal mood and affect.   Nursing note and vitals reviewed.      Assessment:       1. Uncontrolled type 2 diabetes mellitus with hyperglycemia, without long-term current use of insulin    2. Hypertension associated with diabetes    3. Attention deficit hyperactivity disorder (ADHD), predominantly inattentive type    4. Combined hyperlipidemia associated with type 2 diabetes mellitus    5. Back pain, unspecified back location, unspecified back pain laterality, unspecified chronicity    6. Chronic sinusitis, unspecified location    7. Postnasal drip        Plan:       Alison was seen today for hyperglycemia.    Diagnoses and all orders for this visit:    Uncontrolled type 2 diabetes mellitus with hyperglycemia, without long-term current use of insulin - seems at goal, but I'm not convinced.  Please restart Januvia as soon as you can in the new year.    Hypertension associated with diabetes - at goal, stay the course    Attention deficit hyperactivity disorder (ADHD), predominantly inattentive type - not on treatment.  I cannot support use of adderall d/t multiple comorbidities.  Will consider nonstimulant medications like strattera next visit    Combined hyperlipidemia associated with type 2 diabetes mellitus - stable on a statin.  Stay the course    Back pain, unspecified back location, unspecified back pain laterality, unspecified chronicity - worsening.  I recommended against use of nsaids.  Tylenol would be safer, and she can use topicals like icy hot or chaitanya cartwright.  -     Ambulatory consult to [facility]    Chronic sinusitis, unspecified location - new complaint.  Instructed on proper use of flonase.  Add in an OTC antihistamine    Postnasal drip    rtc prn, or in 3 months    JUVE Rodriguez MD MPH  Staff Internist

## 2017-12-20 NOTE — PROGRESS NOTES
"CC: LEFT > Right knee pain    62 y.o. Female who presents as a new patient to me. Complaint is left knee pain and swelling x 1wk, denies antecedent injury or trauma.  Reports a history of intermittent similar such episodes that have been going on for years.  Usually fairly self-limited but has been worsening recently.  Denies history of gout or pseudogout.  Her father does have gout.  Pain localizes mostly over the medial joint line, questionable mechanical symptoms.  Pain also anterior retropatellar with associated mechanical complaints.  Denies instability. Symptoms worse with stairs, bent knee activities and transitional movements. Better with rest, tylenol. Denies injection or surgical history to the left knee.     Negative for smoking.   Positive for diabetes. Last A1C; 7.8 4 mo ago.  History of recently uncontrolled serum glucose levels    REVIEW OF SYSTEMS:   Constitution: Negative. Negative for chills, fever and night sweats.    Hematologic/Lymphatic: Negative for bleeding problem. Does not bruise/bleed easily.   Skin: Negative for dry skin, itching and rash.   Musculoskeletal: Negative for falls. Positive for left knee pain and  muscle weakness.     PAST MEDICAL HISTORY:   Past Medical History:   Diagnosis Date    Allergy     Brain aneurysm 2010    s/p coiling of one; another not coiled    Diabetes mellitus     Diabetes type 2, controlled     Fever blister     High cholesterol     History of Bell's palsy     HTN (hypertension) 5/20/2014       PAST SURGICAL HISTORY:   Past Surgical History:   Procedure Laterality Date    BREAST CYST ASPIRATION      CERVICAL FUSION      COLONOSCOPY N/A 3/9/2016    Procedure: COLONOSCOPY;  Surgeon: Elliott Zimmerman MD;  Location: [address]);  Service: Endoscopy;  Laterality: N/A;    head surgery      stent and "curling" for aneurysm    HYSTERECTOMY      TVH secondary to SUF       FAMILY HISTORY:   Family History   Problem Relation Age of Onset    Rheum " arthritis Father     Diabetes Father     Heart failure Father     Migraines Father     Cataracts Father     Cancer Mother 63     pancreatic    Stomach cancer Mother     Breast cancer Maternal Aunt      50s    Ovarian cancer Cousin     Diabetes Sister     Diabetes Brother     Cancer Maternal Aunt      Lung CA    Melanoma Neg Hx     Colon cancer Neg Hx     Amblyopia Neg Hx     Blindness Neg Hx     Glaucoma Neg Hx     Macular degeneration Neg Hx     Retinal detachment Neg Hx     Strabismus Neg Hx     Stroke Neg Hx     Thyroid disease Neg Hx        SOCIAL HISTORY:   Social History     Social History    Marital status: Single     Spouse name: N/A    Number of children: N/A    Years of education: N/A     Occupational History    Student      Unemployed     Social History Main Topics    Smoking status: Former Smoker     Packs/day: 2.00     Years: 30.00     Quit date: 1/1/1999    Smokeless tobacco: Never Used    Alcohol use No    Drug use: No    Sexual activity: No     Other Topics Concern    Are You Pregnant Or Think You May Be? No    Breast-Feeding No     Social History Narrative    No narrative on file       MEDICATIONS:     Current Outpatient Prescriptions:     albuterol 90 mcg/actuation inhaler, Inhale 2 puffs into the lungs every 6 (six) hours as needed for Wheezing. Rescue, Disp: 18 g, Rfl: 1    aspirin (ECOTRIN) 81 MG EC tablet, Take 81 mg by mouth once daily., Disp: , Rfl:     atorvastatin (LIPITOR) 40 MG tablet, TAKE ONE TABLET BY MOUTH ONCE DAILY, Disp: 90 tablet, Rfl: 3    blood sugar diagnostic Strp, 1 strip by Misc.(Non-Drug; Combo Route) route once daily. Heladio Result.  250.02.  Check Blood Sugar Twice Daily., Disp: 200 each, Rfl: 3    blood-glucose meter kit, Use as instructed, Disp: 1 each, Rfl: 0    ergocalciferol (ERGOCALCIFEROL) 50,000 unit Cap, Take 1 capsule (50,000 Units total) by mouth every 7 days., Disp: 8 capsule, Rfl: 3    glipiZIDE (GLUCOTROL) 5 MG TR24, Take  "1 tablet (5 mg total) by mouth daily with breakfast., Disp: 90 tablet, Rfl: 3    ketoconazole (NIZORAL) 2 % cream, Apply topically 2 (two) times daily., Disp: 60 g, Rfl: 2    lancets Misc, 1 Device by Misc.(Non-Drug; Combo Route) route once daily. Heladio Result.  250.02.  Check Blood Sugar Twice Daily., Disp: 200 each, Rfl: 3    losartan (COZAAR) 50 MG tablet, Take 1 tablet (50 mg total) by mouth once daily., Disp: 90 tablet, Rfl: 0    SITagliptin (JANUVIA) 100 MG Tab, Take 1 tablet (100 mg total) by mouth once daily., Disp: 90 tablet, Rfl: 3    ALLERGIES:   Review of patient's allergies indicates:   Allergen Reactions    Metformin      diarrhea        PHYSICAL EXAMINATION:  /70   Pulse 67   Ht 5' 10" (1.778 m)   Wt 98 kg (216 lb)   BMI 30.99 kg/m²   General: Well-developed well-nourished 62 y.o. femalein no acute distress   Cardiovascular: Regular rhythm by palpation of distal pulse, normal color and temperature, no concerning varicosities on symptomatic side   Lungs: No labored breathing or wheezing appreciated   Neuro: Alert and oriented ×3   Psychiatric: well oriented to person, place and time, demonstrates normal mood and affect   Skin: No rashes, lesions or ulcers, normal temperature, turgor, and texture on involved extremity    Ortho/SPM Exam  Examination of the left knee demonstrates a 2+ effusion, no warmth or erythema.  Pain with forced flexion and extension.  Passive range of motion from 0° to 120° with pain.  Guards with Jose's testing.  Medial greater than lateral joint line tenderness.  Equivocal patellar grind.  Negative patellar apprehension.  Ligamentously stable.    IMAGING:    X-rays including standing, weight bearing AP and flexion bilateral knees, LEFT knee lateral and sunrise views ordered and images reviewed by me show:    No significant degenerative disease.  Patella sits centrally within the trochlear groove without tilting.  Chondrocalcinosis noted in the contralateral " knee    ASSESSMENT:      ICD-10-CM ICD-9-CM   1. Pain in both knees, unspecified chronicity M25.561 719.46    M25.562    2. Effusion of left knee M25.462 719.06   3. Chondromalacia of left patella M22.42 717.7       PLAN:       Findings were discussed with the patient.  History and exam concerning for possible inflammatory based joint disorder, possible gout or pseudogout.  I recommended knee aspiration today and submission of synovial fluid for crystal analysis.  Patient unable to have a steroid injection given recently uncontrolled serum glucose levels.  She also cannot have any oral anti-inflammatory medication per her primary care provider.  Recurrent knee effusions with pain has been a chronic issue.  Given her intermittent mechanical symptoms, I would like to obtain an MRI to further assess for internal derangement.  I'll see her back after this study to discuss results and treatment options.      Large Joint Aspiration/Injection  Date/Time: 12/21/2017 11:01 AM  Performed by: ESTELA ALBA  Authorized by: ESTELA ALBA     Consent Done?:  Yes (Verbal)  Indications:  Joint swelling  Procedure site marked: Yes    Timeout: Prior to procedure the correct patient, procedure, and site was verified      Location:  Knee  Site:  L knee  Prep: Patient was prepped and draped in usual sterile fashion    Needle size:  22 G  Approach:  Superior  Aspirate amount (ml):  40  Aspirate:  Serous and yellow  Patient tolerance:  Patient tolerated the procedure well with no immediate complications

## 2017-12-21 ENCOUNTER — HOSPITAL ENCOUNTER (OUTPATIENT)
Dept: RADIOLOGY | Facility: HOSPITAL | Age: 62
Discharge: HOME | End: 2017-12-21
Attending: ORTHOPAEDIC SURGERY
Payer: MEDICARE

## 2017-12-21 ENCOUNTER — OFFICE VISIT (OUTPATIENT)
Dept: SPORTS MEDICINE | Facility: CLINIC | Age: 62
End: 2017-12-21
Payer: MEDICARE

## 2017-12-21 VITALS
DIASTOLIC BLOOD PRESSURE: 70 MMHG | BODY MASS INDEX: 30.92 KG/M2 | WEIGHT: 216 LBS | HEIGHT: 70 IN | HEART RATE: 67 BPM | SYSTOLIC BLOOD PRESSURE: 122 MMHG

## 2017-12-21 DIAGNOSIS — M25.561 PAIN IN BOTH KNEES, UNSPECIFIED CHRONICITY: ICD-10-CM

## 2017-12-21 DIAGNOSIS — M25.561 PAIN IN BOTH KNEES, UNSPECIFIED CHRONICITY: Primary | ICD-10-CM

## 2017-12-21 DIAGNOSIS — M25.462 EFFUSION OF LEFT KNEE: ICD-10-CM

## 2017-12-21 DIAGNOSIS — M25.562 PAIN IN BOTH KNEES, UNSPECIFIED CHRONICITY: ICD-10-CM

## 2017-12-21 DIAGNOSIS — M25.562 PAIN IN BOTH KNEES, UNSPECIFIED CHRONICITY: Primary | ICD-10-CM

## 2017-12-21 DIAGNOSIS — M22.42 CHONDROMALACIA OF LEFT PATELLA: ICD-10-CM

## 2017-12-21 LAB
BODY FLD TYPE: NORMAL
CRYSTALS FLD MICRO: NEGATIVE

## 2017-12-21 PROCEDURE — 99499 UNLISTED E&M SERVICE: CPT | Mod: S$GLB,,, | Performed by: ORTHOPAEDIC SURGERY

## 2017-12-21 PROCEDURE — 73564 X-RAY EXAM KNEE 4 OR MORE: CPT | Mod: 26,RT,, | Performed by: RADIOLOGY

## 2017-12-21 PROCEDURE — 87116 MYCOBACTERIA CULTURE: CPT

## 2017-12-21 PROCEDURE — 87075 CULTR BACTERIA EXCEPT BLOOD: CPT

## 2017-12-21 PROCEDURE — 73564 X-RAY EXAM KNEE 4 OR MORE: CPT | Mod: TC,50,PO

## 2017-12-21 PROCEDURE — 89060 EXAM SYNOVIAL FLUID CRYSTALS: CPT

## 2017-12-21 PROCEDURE — 87205 SMEAR GRAM STAIN: CPT

## 2017-12-21 PROCEDURE — 73564 X-RAY EXAM KNEE 4 OR MORE: CPT | Mod: 26,LT,, | Performed by: RADIOLOGY

## 2017-12-21 PROCEDURE — 20610 DRAIN/INJ JOINT/BURSA W/O US: CPT | Mod: LT,S$GLB,, | Performed by: ORTHOPAEDIC SURGERY

## 2017-12-21 PROCEDURE — 99999 PR PBB SHADOW E&M-EST. PATIENT-LVL IV: CPT | Mod: PBBFAC,,, | Performed by: ORTHOPAEDIC SURGERY

## 2017-12-21 PROCEDURE — 89051 BODY FLUID CELL COUNT: CPT

## 2017-12-21 PROCEDURE — 87102 FUNGUS ISOLATION CULTURE: CPT

## 2017-12-21 PROCEDURE — 99204 OFFICE O/P NEW MOD 45 MIN: CPT | Mod: 25,S$GLB,, | Performed by: ORTHOPAEDIC SURGERY

## 2017-12-21 PROCEDURE — 87070 CULTURE OTHR SPECIMN AEROBIC: CPT

## 2017-12-22 ENCOUNTER — TELEPHONE (OUTPATIENT)
Dept: ORTHOPEDICS | Facility: OTHER | Age: 62
End: 2017-12-22

## 2017-12-22 LAB
APPEARANCE FLD: NORMAL
BODY FLD TYPE: NORMAL
COLOR FLD: NORMAL
GRAM STN SPEC: NORMAL
GRAM STN SPEC: NORMAL
LYMPHOCYTES NFR FLD MANUAL: 23 %
MONOS+MACROS NFR FLD MANUAL: 70 %
NEUTROPHILS NFR FLD MANUAL: 7 %
PATH INTERP FLD-IMP: NORMAL
WBC # FLD: 290 /CU MM

## 2017-12-22 NOTE — TELEPHONE ENCOUNTER
I called and talked to the patient this morning about her lab results. No evidence of gout or pseudogout. Knee is feeling somewhat better but effusion coming back a bit. MRI scheduled for 12/27. Will see her back in clinic afterwards.

## 2017-12-26 LAB — BACTERIA SPEC AEROBE CULT: NO GROWTH

## 2017-12-27 ENCOUNTER — HOSPITAL ENCOUNTER (OUTPATIENT)
Dept: RADIOLOGY | Facility: HOSPITAL | Age: 62
Discharge: HOME | End: 2017-12-27
Attending: ORTHOPAEDIC SURGERY
Payer: MEDICARE

## 2017-12-27 DIAGNOSIS — M22.42 CHONDROMALACIA OF LEFT PATELLA: ICD-10-CM

## 2017-12-27 DIAGNOSIS — M25.462 EFFUSION OF LEFT KNEE: ICD-10-CM

## 2017-12-27 DIAGNOSIS — M25.561 PAIN IN BOTH KNEES, UNSPECIFIED CHRONICITY: ICD-10-CM

## 2017-12-27 DIAGNOSIS — M25.562 PAIN IN BOTH KNEES, UNSPECIFIED CHRONICITY: ICD-10-CM

## 2017-12-27 PROCEDURE — 73721 MRI JNT OF LWR EXTRE W/O DYE: CPT | Mod: TC,LT

## 2017-12-27 PROCEDURE — 73721 MRI JNT OF LWR EXTRE W/O DYE: CPT | Mod: 26,LT,, | Performed by: RADIOLOGY

## 2017-12-29 LAB — BACTERIA SPEC ANAEROBE CULT: NORMAL

## 2018-01-03 ENCOUNTER — OFFICE VISIT (OUTPATIENT)
Dept: INTERNAL MEDICINE | Facility: CLINIC | Age: 63
End: 2018-01-03
Payer: MEDICARE

## 2018-01-03 VITALS
HEART RATE: 66 BPM | DIASTOLIC BLOOD PRESSURE: 56 MMHG | BODY MASS INDEX: 31.72 KG/M2 | HEIGHT: 69 IN | SYSTOLIC BLOOD PRESSURE: 96 MMHG | WEIGHT: 214.19 LBS

## 2018-01-03 DIAGNOSIS — I15.2 HYPERTENSION ASSOCIATED WITH DIABETES: Primary | ICD-10-CM

## 2018-01-03 DIAGNOSIS — B96.89 ACUTE BACTERIAL SINUSITIS: ICD-10-CM

## 2018-01-03 DIAGNOSIS — E11.59 HYPERTENSION ASSOCIATED WITH DIABETES: Primary | ICD-10-CM

## 2018-01-03 DIAGNOSIS — J01.90 ACUTE BACTERIAL SINUSITIS: ICD-10-CM

## 2018-01-03 DIAGNOSIS — E11.65 UNCONTROLLED TYPE 2 DIABETES MELLITUS WITH HYPERGLYCEMIA, WITHOUT LONG-TERM CURRENT USE OF INSULIN: ICD-10-CM

## 2018-01-03 PROCEDURE — 99214 OFFICE O/P EST MOD 30 MIN: CPT | Mod: S$GLB,,, | Performed by: INTERNAL MEDICINE

## 2018-01-03 PROCEDURE — 99499 UNLISTED E&M SERVICE: CPT | Mod: S$GLB,,, | Performed by: INTERNAL MEDICINE

## 2018-01-03 PROCEDURE — 99999 PR PBB SHADOW E&M-EST. PATIENT-LVL III: CPT | Mod: PBBFAC,,, | Performed by: INTERNAL MEDICINE

## 2018-01-03 RX ORDER — FLUCONAZOLE 150 MG/1
150 TABLET ORAL WEEKLY
Qty: 3 TABLET | Refills: 0 | Status: SHIPPED | OUTPATIENT
Start: 2018-01-03 | End: 2018-01-04

## 2018-01-03 RX ORDER — FLUTICASONE PROPIONATE 50 MCG
2 SPRAY, SUSPENSION (ML) NASAL DAILY
Qty: 16 G | Refills: 12 | Status: SHIPPED | OUTPATIENT
Start: 2018-01-03 | End: 2019-05-09 | Stop reason: SDUPTHER

## 2018-01-03 RX ORDER — AMOXICILLIN 875 MG/1
875 TABLET, FILM COATED ORAL EVERY 12 HOURS
Qty: 20 TABLET | Refills: 0 | Status: SHIPPED | OUTPATIENT
Start: 2018-01-03 | End: 2018-01-13

## 2018-01-03 RX ORDER — GLIPIZIDE 5 MG/1
5 TABLET, FILM COATED, EXTENDED RELEASE ORAL 2 TIMES DAILY
Qty: 180 TABLET | Refills: 3 | Status: SHIPPED | OUTPATIENT
Start: 2018-01-03 | End: 2018-02-16 | Stop reason: SDUPTHER

## 2018-01-03 NOTE — PROGRESS NOTES
"Chief Complaint: Congestion    HPI: This is a 62 year old woman who presents due to sinus congestion since 12/25/17.  She had scratchy throat at first then sinus congestion. She was tired for 2 days. The congestion quickly moved to her chest. She now has a wet cough with green rhonrrhea. She has left ear pain. NO facial pain or tooth pain.  She has been wheezing and is using her albuterol daily which helps. She has also been taking mucinex am then mucinex pm which helps a little. . No fever or chills, nausea, vomiting, diarrhea.    Blood sugars have been ranging 110-170. She is taking the glipizide 5 mg daily and has been off her Januvia since December because [facility] cannot afford it. She is watching her diet closely    She is on losartan 50 mg daily due to her coiling of her aneurysm. No bp problems. NO chest pain or shortness of breath    Past Medical History:   Diagnosis Date    Allergy     Brain aneurysm 2010    s/p coiling of one; another not coiled    Diabetes mellitus     Diabetes type 2, controlled     Fever blister     High cholesterol     History of Bell's palsy     HTN (hypertension) 5/20/2014     Past Surgical History:   Procedure Laterality Date    BREAST CYST ASPIRATION      CERVICAL FUSION      COLONOSCOPY N/A 3/9/2016    Procedure: COLONOSCOPY;  Surgeon: Elliott Zimmerman MD;  Location: [facility] ([address] [place] [address]);  Service: Endoscopy;  Laterality: N/A;    head surgery      stent and "curling" for aneurysm    HYSTERECTOMY      TVH secondary to SUF       Meds and allergies: updated on [facility]      Physical exam:  BP (!) 96/56 (BP Location: Left arm, Patient Position: Sitting, BP Method: Medium (Manual))   Pulse 66   Ht 5' 9" (1.753 m)   Wt 97.1 kg (214 lb 2.8 oz)   BMI 31.63 kg/m²     General: alert, oriented x 3, no apparent distress.  Affect normal  HEENT: Conjunctivae: anicteric, PERRL, EOMI, TM red fluid bilatearlly, Oralpharynx clear  Neck: supple, no thyroid enlargement, no cervical " lymphadenopathy  Resp: effort normal, lungs clear bilaterally  CV: Regular rate and rhythm without murmurs, gallops or rubs, no lower extremity edema,    Assessment/Plan:  Acute bacterial sinusitis - amoxil 875 mg twice daily for 10 days. Diflucan  Diabetes- follow up with Dr Rodriguez. Try incraseing glipizide to 5 mg twice dialy since not taking Januvia  HTN - controlled.  LEt me know if no improvement or worsen

## 2018-01-04 ENCOUNTER — TELEPHONE (OUTPATIENT)
Dept: INTERNAL MEDICINE | Facility: CLINIC | Age: 63
End: 2018-01-04

## 2018-01-04 NOTE — TELEPHONE ENCOUNTER
----- Message from Elizabeth Craven sent at 1/4/2018  9:35 AM CST -----  Contact: Patient [phone]  Patient needs medical necessity form sent to [facility] [employer] for her diabetic shoes.     Please call and advise.    Thank You

## 2018-01-08 NOTE — PROGRESS NOTES
"CC: LEFT > Right knee pain    62 y.o. Female who returns today as follow-up. LOV we aspirated 40cc of fluid out of her left knee; negative for gout or pseudogout. Overall reports 80% leta due to improved ROM, but having persistent MJL pain with prominent mechanical symptoms. Bothersome with stairs, bent knee activities and transitional movements. Has been wearing a compression sleeve full time which improves her pain. Pain and swelling intermittent for many years. No history of discrete trauma. Better with rest, tylenol, self directed PT. Denies injection or surgical history to the left knee.     No hx of gout. Father  + gout.   Negative for smoking.   Positive for diabetes. Last A1C; 7.8 4 mo ago. History of recently uncontrolled serum glucose levels. Not a candidate for steroid injection for this reason.    REVIEW OF SYSTEMS:   Constitution: Negative. Negative for chills, fever and night sweats.    Hematologic/Lymphatic: Negative for bleeding problem. Does not bruise/bleed easily.   Skin: Negative for dry skin, itching and rash.   Musculoskeletal: Negative for falls. Positive for left knee pain and  muscle weakness.     PAST MEDICAL HISTORY:   Past Medical History:   Diagnosis Date    Allergy     Brain aneurysm 2010    s/p coiling of one; another not coiled    Diabetes mellitus     Diabetes type 2, controlled     Fever blister     High cholesterol     History of Bell's palsy     HTN (hypertension) 5/20/2014       PAST SURGICAL HISTORY:   Past Surgical History:   Procedure Laterality Date    BREAST CYST ASPIRATION      CERVICAL FUSION      COLONOSCOPY N/A 3/9/2016    Procedure: COLONOSCOPY;  Surgeon: Elliott Zimmerman MD;  Location: [facility] ([address] [place] [address]);  Service: Endoscopy;  Laterality: N/A;    head surgery      stent and "curling" for aneurysm    HYSTERECTOMY      TVH secondary to SUF       FAMILY HISTORY:   Family History   Problem Relation Age of Onset    Rheum arthritis Father     Diabetes Father "     Heart failure Father     Migraines Father     Cataracts Father     Cancer Mother 63     pancreatic    Stomach cancer Mother     Breast cancer Maternal Aunt      50s    Ovarian cancer Cousin     Diabetes Sister     Diabetes Brother     Cancer Maternal Aunt      Lung CA    Melanoma Neg Hx     Colon cancer Neg Hx     Amblyopia Neg Hx     Blindness Neg Hx     Glaucoma Neg Hx     Macular degeneration Neg Hx     Retinal detachment Neg Hx     Strabismus Neg Hx     Stroke Neg Hx     Thyroid disease Neg Hx        SOCIAL HISTORY:   Social History     Social History    Marital status: Single     Spouse name: N/A    Number of children: N/A    Years of education: N/A     Occupational History    Student      Unemployed     Social History Main Topics    Smoking status: Former Smoker     Packs/day: 2.00     Years: 30.00     Quit date: 1/1/1999    Smokeless tobacco: Never Used    Alcohol use No    Drug use: No    Sexual activity: No     Other Topics Concern    Are You Pregnant Or Think You May Be? No    Breast-Feeding No     Social History Narrative    No narrative on file       MEDICATIONS:     Current Outpatient Prescriptions:     albuterol 90 mcg/actuation inhaler, Inhale 2 puffs into the lungs every 6 (six) hours as needed for Wheezing. Rescue, Disp: 18 g, Rfl: 1    amoxicillin (AMOXIL) 875 MG tablet, Take 1 tablet (875 mg total) by mouth every 12 (twelve) hours., Disp: 20 tablet, Rfl: 0    aspirin (ECOTRIN) 81 MG EC tablet, Take 81 mg by mouth once daily., Disp: , Rfl:     atorvastatin (LIPITOR) 40 MG tablet, TAKE ONE TABLET BY MOUTH ONCE DAILY, Disp: 90 tablet, Rfl: 3    blood sugar diagnostic Strp, 1 strip by Misc.(Non-Drug; Combo Route) route once daily. Heladio Result.  250.02.  Check Blood Sugar Twice Daily., Disp: 200 each, Rfl: 3    blood-glucose meter kit, Use as instructed, Disp: 1 each, Rfl: 0    ergocalciferol (ERGOCALCIFEROL) 50,000 unit Cap, Take 1 capsule (50,000 Units total)  by mouth every 7 days., Disp: 8 capsule, Rfl: 3    fluticasone (FLONASE) 50 mcg/actuation nasal spray, 2 sprays by Each Nare route once daily., Disp: 16 g, Rfl: 12    glipiZIDE (GLUCOTROL) 5 MG TR24, Take 1 tablet (5 mg total) by mouth 2 (two) times daily., Disp: 180 tablet, Rfl: 3    ketoconazole (NIZORAL) 2 % cream, Apply topically 2 (two) times daily., Disp: 60 g, Rfl: 2    lancets Misc, 1 Device by Misc.(Non-Drug; Combo Route) route once daily. Heladio Result.  250.02.  Check Blood Sugar Twice Daily., Disp: 200 each, Rfl: 3    losartan (COZAAR) 50 MG tablet, Take 1 tablet (50 mg total) by mouth once daily., Disp: 90 tablet, Rfl: 0    SITagliptin (JANUVIA) 100 MG Tab, Take 1 tablet (100 mg total) by mouth once daily., Disp: 90 tablet, Rfl: 3    ALLERGIES:   Review of patient's allergies indicates:   Allergen Reactions    Metformin      diarrhea        PHYSICAL EXAMINATION:  There were no vitals taken for this visit.  General: Well-developed well-nourished 62 y.o. femalein no acute distress   Cardiovascular: Regular rhythm by palpation of distal pulse, normal color and temperature, no concerning varicosities on symptomatic side   Lungs: No labored breathing or wheezing appreciated   Neuro: Alert and oriented ×3   Psychiatric: well oriented to person, place and time, demonstrates normal mood and affect   Skin: No rashes, lesions or ulcers, normal temperature, turgor, and texture on involved extremity    Ortho/SPM Exam  Pain with forced flexion and extension. Medially located. Passive range of motion from 0° to 125° with pain.  Guards with Jose's testing with pain medially.  Medial greater than lateral joint line tenderness.  Equivocal patellar grind.  Negative patellar apprehension.  Ligamentously stable. Trace effusion today.    IMAGING:    X-rays including standing, weight bearing AP and flexion bilateral knees, LEFT knee lateral and sunrise views ordered and images reviewed by me show:    No significant  degenerative disease.  Patella sits centrally within the trochlear groove without tilting.  Chondrocalcinosis noted in the contralateral knee    MRI was personally reviewed by me with the patient, images show.   1. Medial meniscus tear at the junction of the posterior horn and body along its inferior surface.   2. Ligamentous strain at the origin of the MCL and the fibular collateral ligament.   3. Large suprapatellar joint effusion.    ASSESSMENT:      ICD-10-CM ICD-9-CM   1. Complex tear of medial meniscus of left knee as current injury, initial encounter S83.232A 836.0   2. Chronic pain of left knee M25.562 719.46    G89.29 338.29       PLAN:     Findings reviewed with the patient. Chronically symptomatic medial meniscus tear. Failed to respond to initial conservative tx. Patient is a candidate for left knee arthroscopic partial meniscectomy with chondroplasty and peripatellar releases. Inherent risk for continued or recurrent symptoms secondary to underlying DJD. Patient also understands small risk for worsened complaints and eventual need for additional surgery to include arthroplasty. She will need preop medical clearance and will discuss with her primary care provider. Best to wait until serum glucose levels better controlled and she will let us know when she wants to proceed.    Informed Consent:    The details of the surgical procedure were explained, including the location of probable incisions and a description of possible hardware and/or grafts to be used. We also discussed the potential benefit of Amniox tissue biologic augmentation with associated literature support, theoretical risks and benefits. Alternatives to both operative and non-operative options with associated risks and benefits were discussed. The patient understands the likely convalescence after surgery and, in particular, the expected postop rehab and recovery course. The outlined risks and potential complications of the proposed  procedure include but are not limited to: infection, poor wound healing, scarring, deformity, stiffness, swelling, continued or recurrent pain, instability, hardware or prosthetic failure if implanted, symptomatic hardware requiring removal, weakness, neurovascular injury, numbness, chronic regional pain disorder, tissue nonhealing/irreparability/retear, subsequent contralateral limb injury or pathology, chondral injury, arthritis, fracture, blood clot formation, inability to return to previous level of activity, anesthetic or regional block complication up to death, need for additional procedure as indicated intraoperatively, and potential need for further surgery.    The patient was also informed and understands that the risks of surgery are greater for patients with a current condition or history of heart disease, obesity, clotting disorders, recurrent infections, steroid use, current or past smoking, and factors such as sedentary lifestyle and noncompliance with medications, therapy or follow-up. The degree of the increased risk is hard to estimate with any degree of precision. If applicable, smoking cessation was discussed.     All questions were answered. The patient has verbalized understanding of these issues and wishes to proceed with the surgery as discussed.                Procedures

## 2018-01-09 ENCOUNTER — OFFICE VISIT (OUTPATIENT)
Dept: SPORTS MEDICINE | Facility: CLINIC | Age: 63
End: 2018-01-09
Payer: MEDICARE

## 2018-01-09 VITALS
DIASTOLIC BLOOD PRESSURE: 57 MMHG | HEART RATE: 66 BPM | BODY MASS INDEX: 31.7 KG/M2 | WEIGHT: 214 LBS | HEIGHT: 69 IN | SYSTOLIC BLOOD PRESSURE: 122 MMHG

## 2018-01-09 DIAGNOSIS — G89.29 CHRONIC PAIN OF LEFT KNEE: ICD-10-CM

## 2018-01-09 DIAGNOSIS — M25.562 CHRONIC PAIN OF LEFT KNEE: ICD-10-CM

## 2018-01-09 DIAGNOSIS — S83.232A COMPLEX TEAR OF MEDIAL MENISCUS OF LEFT KNEE AS CURRENT INJURY, INITIAL ENCOUNTER: Primary | ICD-10-CM

## 2018-01-09 PROCEDURE — 99214 OFFICE O/P EST MOD 30 MIN: CPT | Mod: 57,S$GLB,, | Performed by: ORTHOPAEDIC SURGERY

## 2018-01-09 PROCEDURE — 99999 PR PBB SHADOW E&M-EST. PATIENT-LVL III: CPT | Mod: PBBFAC,,, | Performed by: ORTHOPAEDIC SURGERY

## 2018-01-22 ENCOUNTER — TELEPHONE (OUTPATIENT)
Dept: INTERNAL MEDICINE | Facility: CLINIC | Age: 63
End: 2018-01-22

## 2018-01-22 NOTE — TELEPHONE ENCOUNTER
----- Message from Donna Loja sent at 1/22/2018  2:06 PM CST -----  Contact: Patient [phone]  Patient is returning a missed call.    Please call and advise.    Thank you

## 2018-01-22 NOTE — TELEPHONE ENCOUNTER
----- Message from Sylvia Block sent at 1/22/2018  8:40 AM CST -----  Contact: [phone]  Patient would like to get medical advice.  Symptoms (please be specific):  Congestion   How long has patient had these symptoms:  1 week  Pharm[facility] name and phone #:  [facility] [phone] (Phone)  [phone] (Fax)  Any drug allergies:    Comments:

## 2018-01-23 ENCOUNTER — OFFICE VISIT (OUTPATIENT)
Dept: INTERNAL MEDICINE | Facility: CLINIC | Age: 63
End: 2018-01-23
Payer: MEDICARE

## 2018-01-23 VITALS
DIASTOLIC BLOOD PRESSURE: 77 MMHG | HEART RATE: 64 BPM | OXYGEN SATURATION: 98 % | HEIGHT: 69 IN | WEIGHT: 213 LBS | BODY MASS INDEX: 31.55 KG/M2 | SYSTOLIC BLOOD PRESSURE: 115 MMHG

## 2018-01-23 DIAGNOSIS — R09.81 NASAL CONGESTION: ICD-10-CM

## 2018-01-23 DIAGNOSIS — R05.9 COUGH: Primary | ICD-10-CM

## 2018-01-23 PROCEDURE — 99999 PR PBB SHADOW E&M-EST. PATIENT-LVL III: CPT | Mod: PBBFAC,,, | Performed by: INTERNAL MEDICINE

## 2018-01-23 PROCEDURE — 99213 OFFICE O/P EST LOW 20 MIN: CPT | Mod: S$GLB,,, | Performed by: INTERNAL MEDICINE

## 2018-01-25 LAB — FUNGUS SPEC CULT: NORMAL

## 2018-01-26 ENCOUNTER — PATIENT MESSAGE (OUTPATIENT)
Dept: SPORTS MEDICINE | Facility: CLINIC | Age: 63
End: 2018-01-26

## 2018-01-26 NOTE — PROGRESS NOTES
Subjective:       Patient ID: Alison Branch is a 62 y.o. female.    Chief Complaint: URI (pt complains of symptoms like nasal/chest congestion, sinus pressure, clear mucus, minor nausea etc.) and Leg Pain (pt complains of moderate pain in the R leg, hard to walk/put pressure on. little stiffness, swelling & soreness.)   urgent visit  The patient reports that this is the third doctor's visit she's had for a cold and sinus problem.  Her ears are ringing.  She has a postnasal drip and frequent cough.  She is worn out with this.  HPI  Review of Systems   Constitutional: Negative.  Negative for activity change, appetite change, chills, fatigue, fever and unexpected weight change.   HENT: Positive for congestion and postnasal drip. Negative for hearing loss and tinnitus.    Eyes: Negative for visual disturbance.   Respiratory: Positive for cough. Negative for shortness of breath and wheezing.    Cardiovascular: Negative.  Negative for chest pain, palpitations and leg swelling.   Gastrointestinal: Negative for abdominal pain, blood in stool, constipation, diarrhea and nausea.   Genitourinary: Negative for dysuria, frequency and urgency.   Musculoskeletal: Negative for back pain and neck stiffness.   Skin: Negative for rash.   Neurological: Negative for headaches.   Psychiatric/Behavioral: Negative for dysphoric mood and sleep disturbance. The patient is not nervous/anxious.        Objective:      Physical Exam   Constitutional: She appears well-nourished.   HENT:   Head: Normocephalic and atraumatic.   Right Ear: External ear normal.   Left Ear: External ear normal.   Nose: Nose normal.   Mouth/Throat: Oropharynx is clear and moist.   Eyes: Conjunctivae are normal. No scleral icterus.   Neck: Neck supple.   Cardiovascular: Normal rate, regular rhythm and normal heart sounds.    Pulmonary/Chest: Effort normal and breath sounds normal. No respiratory distress. She has no wheezes. She has no rales. She exhibits no tenderness.    Abdominal: Soft. There is no tenderness.   Musculoskeletal: She exhibits no edema.   Lymphadenopathy:     She has no cervical adenopathy.   Neurological: She is alert.   Skin: Skin is warm and dry.   Psychiatric: She has a normal mood and affect. Her behavior is normal.   Nursing note and vitals reviewed.      Assessment:       1. Cough    2. Nasal congestion        Plan:   Alison was seen today for uri and leg pain.    Diagnoses and all orders for this visit:    Cough and  Nasal congestion.  Unfortunately, there isn't much that can be done except provide reassurance that a tincture of time will clear all of this up.    She has a number of over-the-counter products that are safe for her to use such as guaifenesin and loratadine.    She also has Flonase nasal spray and an albuterol inhaler if needed for wheezing but no wheezing is heard on this exam

## 2018-01-29 ENCOUNTER — TELEPHONE (OUTPATIENT)
Dept: SPORTS MEDICINE | Facility: CLINIC | Age: 63
End: 2018-01-29

## 2018-01-29 NOTE — TELEPHONE ENCOUNTER
Spoke with Ms. Branch about the possible upcoming surgery. Went over the details of the surgery and recovery time. She has an appointment with her PCP on 2/15 and she will call us back for a surgical date. All questions were answered. EN.

## 2018-02-01 DIAGNOSIS — E55.9 VITAMIN D DEFICIENCY: ICD-10-CM

## 2018-02-01 RX ORDER — ERGOCALCIFEROL 1.25 MG/1
50000 CAPSULE ORAL
Qty: 12 CAPSULE | Refills: 3 | Status: SHIPPED | OUTPATIENT
Start: 2018-02-01 | End: 2019-02-23 | Stop reason: SDUPTHER

## 2018-02-15 ENCOUNTER — OFFICE VISIT (OUTPATIENT)
Dept: INTERNAL MEDICINE | Facility: CLINIC | Age: 63
End: 2018-02-15
Payer: MEDICARE

## 2018-02-15 ENCOUNTER — CLINICAL SUPPORT (OUTPATIENT)
Dept: OPTOMETRY | Facility: CLINIC | Age: 63
End: 2018-02-15
Attending: INTERNAL MEDICINE
Payer: MEDICARE

## 2018-02-15 ENCOUNTER — LAB VISIT (OUTPATIENT)
Dept: LAB | Facility: HOSPITAL | Age: 63
End: 2018-02-15
Attending: INTERNAL MEDICINE
Payer: MEDICARE

## 2018-02-15 VITALS
BODY MASS INDEX: 30.71 KG/M2 | WEIGHT: 214.5 LBS | HEART RATE: 64 BPM | SYSTOLIC BLOOD PRESSURE: 126 MMHG | HEIGHT: 70 IN | DIASTOLIC BLOOD PRESSURE: 72 MMHG

## 2018-02-15 DIAGNOSIS — E11.65 UNCONTROLLED TYPE 2 DIABETES MELLITUS WITH HYPERGLYCEMIA, WITHOUT LONG-TERM CURRENT USE OF INSULIN: ICD-10-CM

## 2018-02-15 DIAGNOSIS — G89.29 CHRONIC PAIN OF LEFT KNEE: ICD-10-CM

## 2018-02-15 DIAGNOSIS — E11.65 UNCONTROLLED TYPE 2 DIABETES MELLITUS WITH HYPERGLYCEMIA, WITHOUT LONG-TERM CURRENT USE OF INSULIN: Primary | ICD-10-CM

## 2018-02-15 DIAGNOSIS — R09.82 POSTNASAL DRIP: ICD-10-CM

## 2018-02-15 DIAGNOSIS — M23.204 DEGENERATIVE TEAR OF LEFT MEDIAL MENISCUS: ICD-10-CM

## 2018-02-15 DIAGNOSIS — R05.3 CHRONIC COUGH: ICD-10-CM

## 2018-02-15 DIAGNOSIS — I15.2 HYPERTENSION ASSOCIATED WITH DIABETES: ICD-10-CM

## 2018-02-15 DIAGNOSIS — I72.9 ANEURYSM OF UNSPECIFIED SITE: ICD-10-CM

## 2018-02-15 DIAGNOSIS — Z01.818 PREOPERATIVE EXAMINATION: ICD-10-CM

## 2018-02-15 DIAGNOSIS — E11.59 HYPERTENSION ASSOCIATED WITH DIABETES: ICD-10-CM

## 2018-02-15 DIAGNOSIS — M25.562 CHRONIC PAIN OF LEFT KNEE: ICD-10-CM

## 2018-02-15 LAB
ALBUMIN SERPL BCP-MCNC: 4.2 G/DL
ALP SERPL-CCNC: 70 U/L
ALT SERPL W/O P-5'-P-CCNC: 32 U/L
ANION GAP SERPL CALC-SCNC: 4 MMOL/L
AST SERPL-CCNC: 24 U/L
BILIRUB SERPL-MCNC: 0.4 MG/DL
BUN SERPL-MCNC: 13 MG/DL
CALCIUM SERPL-MCNC: 10.2 MG/DL
CHLORIDE SERPL-SCNC: 105 MMOL/L
CHOLEST SERPL-MCNC: 168 MG/DL
CHOLEST/HDLC SERPL: 2.8 {RATIO}
CO2 SERPL-SCNC: 30 MMOL/L
CREAT SERPL-MCNC: 0.8 MG/DL
EST. GFR  (AFRICAN AMERICAN): >60 ML/MIN/1.73 M^2
EST. GFR  (NON AFRICAN AMERICAN): >60 ML/MIN/1.73 M^2
ESTIMATED AVG GLUCOSE: 171 MG/DL
GLUCOSE SERPL-MCNC: 181 MG/DL
HBA1C MFR BLD HPLC: 7.6 %
HDLC SERPL-MCNC: 59 MG/DL
HDLC SERPL: 35.1 %
LDLC SERPL CALC-MCNC: 98 MG/DL
NONHDLC SERPL-MCNC: 109 MG/DL
POTASSIUM SERPL-SCNC: 4.8 MMOL/L
PROT SERPL-MCNC: 7.7 G/DL
SODIUM SERPL-SCNC: 139 MMOL/L
TRIGL SERPL-MCNC: 55 MG/DL

## 2018-02-15 PROCEDURE — 36415 COLL VENOUS BLD VENIPUNCTURE: CPT

## 2018-02-15 PROCEDURE — 3008F BODY MASS INDEX DOCD: CPT | Mod: S$GLB,,, | Performed by: INTERNAL MEDICINE

## 2018-02-15 PROCEDURE — 99999 PR PBB SHADOW E&M-EST. PATIENT-LVL II: CPT | Mod: PBBFAC,,,

## 2018-02-15 PROCEDURE — 99999 PR PBB SHADOW E&M-EST. PATIENT-LVL III: CPT | Mod: PBBFAC,,, | Performed by: INTERNAL MEDICINE

## 2018-02-15 PROCEDURE — 83036 HEMOGLOBIN GLYCOSYLATED A1C: CPT

## 2018-02-15 PROCEDURE — 99499 UNLISTED E&M SERVICE: CPT | Mod: S$GLB,,, | Performed by: INTERNAL MEDICINE

## 2018-02-15 PROCEDURE — 99214 OFFICE O/P EST MOD 30 MIN: CPT | Mod: S$GLB,,, | Performed by: INTERNAL MEDICINE

## 2018-02-15 PROCEDURE — 80053 COMPREHEN METABOLIC PANEL: CPT

## 2018-02-15 PROCEDURE — 80061 LIPID PANEL: CPT

## 2018-02-15 PROCEDURE — 92250 FUNDUS PHOTOGRAPHY W/I&R: CPT | Mod: S$GLB,,, | Performed by: OPHTHALMOLOGY

## 2018-02-15 RX ORDER — ALPRAZOLAM 0.25 MG/1
TABLET ORAL
COMMUNITY
Start: 2018-02-07 | End: 2018-02-22 | Stop reason: CLARIF

## 2018-02-15 NOTE — PROGRESS NOTES
Subjective:       Patient ID: Alison Branch is a 62 y.o. female.    Chief Complaint: Diabetes and Hyperlipidemia    HPI - Ms Branch feels well today.  She is here for diabetes and hypertension f/u.  She has been adherent to her medication regimen for diabetes since our last visit, though she has a history of nonadherence in the past.  She has no headaches; she has no sequelae of the aneurysm coiling.  She has a medial meniscus tear in her left knee and would like preoperative evaluation. She can climb a flight of stairs without chest pain or dyspnea.  She has no chest pains or dyspnea at any time.    She also has had a chronic cough - feels mucous draining down her throat.  Sometimes thicker than others.  Not on an ACE-I, not a smoker; no GERD symptoms    PMH:  DM2, finally adherent to regimen  HTN, at goal  Brain aneurysm s/p coiling  HLP, on a statin  ADD  Obesity  Chronic cough  Left knee pain     Meds:  Reviewed and reconciled in EPIC with patient during visit today.    Review of Systems   Constitutional: Negative for fever.   HENT: Positive for postnasal drip.    Respiratory: Negative for shortness of breath.    Cardiovascular: Negative for chest pain.   Gastrointestinal: Negative for abdominal pain.   Genitourinary: Negative for difficulty urinating.   Musculoskeletal: Positive for arthralgias.   Skin: Negative for rash.   Neurological: Negative for headaches.   Psychiatric/Behavioral: Negative for sleep disturbance.       Objective:      Physical Exam   Constitutional: She is oriented to person, place, and time. She appears well-developed and well-nourished.   HENT:   Head: Normocephalic and atraumatic.   Right Ear: External ear normal.   Left Ear: External ear normal.   Mouth/Throat: Oropharynx is clear and moist. No oropharyngeal exudate.   Neck: No thyromegaly present.   Cardiovascular: Normal rate, regular rhythm and normal heart sounds.  Exam reveals no gallop and no friction rub.    No murmur  heard.  Pulmonary/Chest: Effort normal and breath sounds normal. No respiratory distress. She has no wheezes. She has no rales. She exhibits no tenderness.   Lymphadenopathy:     She has no cervical adenopathy.   Neurological: She is alert and oriented to person, place, and time.   Skin: Skin is warm and dry. No erythema.   Psychiatric: She has a normal mood and affect.   Nursing note and vitals reviewed.      Assessment:       1. Uncontrolled type 2 diabetes mellitus with hyperglycemia, without long-term current use of insulin    2. Hypertension associated with diabetes    3. Aneurysm of unspecified site    4. Preoperative examination    5. Degenerative tear of left medial meniscus    6. Chronic pain of left knee    7. Chronic cough    8. Postnasal drip        Plan:       Alison was seen today for diabetes and hyperlipidemia.    Diagnoses and all orders for this visit:    Uncontrolled type 2 diabetes mellitus with hyperglycemia, without long-term current use of insulin - seems more stable.  Due for a good bit of diabetic health maintenance, which we will do today  -     Hemoglobin A1c; Future  -     Lipid panel; Future  -     Diabetic Eye Screening Photo; Future  -     Comprehensive metabolic panel; Future    Hypertension associated with diabetes - at goal, stay the course  -     Lipid panel; Future  -     Comprehensive metabolic panel; Future    Aneurysm of unspecified site - stable, stay the course    Preoperative examination - she is low risk for cardiovascular complications from this intermediate risk surgery.  She can proceed without further cardiovascular risk stratification.  She should continue all medications through the perioperative period, but hold her diabetic medications on the morning of surgery.  DVT prophylaxis per the surgeon's preferred protocol    Degenerative tear of left medial meniscus - new complaint to me.  Management per ortho    Chronic pain of left knee    Chronic cough - discussed use of  nonsedating antihistamines and nasal steroids.  Neti Pot.  She'll try all of this.  New complaint to me    Postnasal drip    rtc prn, or in 3 months    JUVE Rodriguez MD MPH  Staff Internist

## 2018-02-15 NOTE — PROGRESS NOTES
HPI     Diabetic Eye Exam    Additional comments: Photos           Comments   62 y.o. y/o here for screening for Diabetic Renopathy with non-dilated   fundus photos per Mike Rodriguez Ii, MD    Small pupils        Last edited by Jaylene Snyder MA on 2/15/2018 11:14 AM. (History)            Assessment /Plan     For exam results, see Encounter Report.    Uncontrolled type 2 diabetes mellitus with hyperglycemia, without long-term current use of insulin  -     Diabetic Eye Screening Photo

## 2018-02-16 ENCOUNTER — PATIENT MESSAGE (OUTPATIENT)
Dept: SPORTS MEDICINE | Facility: CLINIC | Age: 63
End: 2018-02-16

## 2018-02-16 ENCOUNTER — PATIENT MESSAGE (OUTPATIENT)
Dept: INTERNAL MEDICINE | Facility: CLINIC | Age: 63
End: 2018-02-16

## 2018-02-16 ENCOUNTER — TELEPHONE (OUTPATIENT)
Dept: OPTOMETRY | Facility: CLINIC | Age: 63
End: 2018-02-16

## 2018-02-16 DIAGNOSIS — E11.65 UNCONTROLLED TYPE 2 DIABETES MELLITUS WITH HYPERGLYCEMIA, WITHOUT LONG-TERM CURRENT USE OF INSULIN: ICD-10-CM

## 2018-02-16 RX ORDER — GLIPIZIDE 10 MG/1
10 TABLET, FILM COATED, EXTENDED RELEASE ORAL 2 TIMES DAILY
Qty: 180 TABLET | Refills: 3 | Status: SHIPPED | OUTPATIENT
Start: 2018-02-16 | End: 2019-01-28 | Stop reason: SDUPTHER

## 2018-02-20 ENCOUNTER — TELEPHONE (OUTPATIENT)
Dept: SPORTS MEDICINE | Facility: CLINIC | Age: 63
End: 2018-02-20

## 2018-02-20 DIAGNOSIS — M23.204 OLD TEAR OF MEDIAL MENISCUS OF LEFT KNEE, UNSPECIFIED TEAR TYPE: Primary | ICD-10-CM

## 2018-02-20 DIAGNOSIS — M25.562 ACUTE PAIN OF LEFT KNEE: Primary | ICD-10-CM

## 2018-02-21 ENCOUNTER — PATIENT MESSAGE (OUTPATIENT)
Dept: INTERNAL MEDICINE | Facility: CLINIC | Age: 63
End: 2018-02-21

## 2018-02-22 ENCOUNTER — ANESTHESIA EVENT (OUTPATIENT)
Dept: SURGERY | Facility: OTHER | Age: 63
End: 2018-02-22
Payer: MEDICARE

## 2018-02-22 ENCOUNTER — HOSPITAL ENCOUNTER (OUTPATIENT)
Dept: PREADMISSION TESTING | Facility: OTHER | Age: 63
Discharge: HOME | End: 2018-02-22
Attending: ORTHOPAEDIC SURGERY
Payer: MEDICARE

## 2018-02-22 ENCOUNTER — OFFICE VISIT (OUTPATIENT)
Dept: SPORTS MEDICINE | Facility: CLINIC | Age: 63
End: 2018-02-22
Payer: MEDICARE

## 2018-02-22 ENCOUNTER — TELEPHONE (OUTPATIENT)
Dept: SPORTS MEDICINE | Facility: CLINIC | Age: 63
End: 2018-02-22

## 2018-02-22 VITALS
HEART RATE: 65 BPM | TEMPERATURE: 98 F | OXYGEN SATURATION: 99 % | SYSTOLIC BLOOD PRESSURE: 130 MMHG | DIASTOLIC BLOOD PRESSURE: 66 MMHG

## 2018-02-22 VITALS
BODY MASS INDEX: 30.64 KG/M2 | HEART RATE: 66 BPM | WEIGHT: 214 LBS | SYSTOLIC BLOOD PRESSURE: 138 MMHG | DIASTOLIC BLOOD PRESSURE: 78 MMHG | HEIGHT: 70 IN

## 2018-02-22 DIAGNOSIS — Z01.818 PRE-OPERATIVE CLEARANCE: ICD-10-CM

## 2018-02-22 DIAGNOSIS — S83.242D ACUTE MEDIAL MENISCUS TEAR OF LEFT KNEE, SUBSEQUENT ENCOUNTER: Primary | ICD-10-CM

## 2018-02-22 LAB
BASOPHILS # BLD AUTO: 0.02 K/UL
BASOPHILS NFR BLD: 0.3 %
DIFFERENTIAL METHOD: ABNORMAL
EOSINOPHIL # BLD AUTO: 0.2 K/UL
EOSINOPHIL NFR BLD: 2.6 %
ERYTHROCYTE [DISTWIDTH] IN BLOOD BY AUTOMATED COUNT: 14.6 %
HCT VFR BLD AUTO: 39.4 %
HGB BLD-MCNC: 12.2 G/DL
LYMPHOCYTES # BLD AUTO: 2.6 K/UL
LYMPHOCYTES NFR BLD: 41.1 %
MCH RBC QN AUTO: 26.9 PG
MCHC RBC AUTO-ENTMCNC: 31 G/DL
MCV RBC AUTO: 87 FL
MONOCYTES # BLD AUTO: 0.3 K/UL
MONOCYTES NFR BLD: 5.4 %
NEUTROPHILS # BLD AUTO: 3.1 K/UL
NEUTROPHILS NFR BLD: 50.1 %
PLATELET # BLD AUTO: 224 K/UL
PMV BLD AUTO: 12 FL
RBC # BLD AUTO: 4.53 M/UL
WBC # BLD AUTO: 6.27 K/UL

## 2018-02-22 PROCEDURE — 36415 COLL VENOUS BLD VENIPUNCTURE: CPT

## 2018-02-22 PROCEDURE — 99499 UNLISTED E&M SERVICE: CPT | Mod: S$GLB,,, | Performed by: PHYSICIAN ASSISTANT

## 2018-02-22 PROCEDURE — 93005 ELECTROCARDIOGRAM TRACING: CPT

## 2018-02-22 PROCEDURE — 93010 ELECTROCARDIOGRAM REPORT: CPT | Mod: ,,, | Performed by: INTERNAL MEDICINE

## 2018-02-22 PROCEDURE — 85025 COMPLETE CBC W/AUTO DIFF WBC: CPT

## 2018-02-22 PROCEDURE — 99999 PR PBB SHADOW E&M-EST. PATIENT-LVL III: CPT | Mod: PBBFAC,,, | Performed by: PHYSICIAN ASSISTANT

## 2018-02-22 RX ORDER — FAMOTIDINE 20 MG/1
20 TABLET, FILM COATED ORAL
Status: CANCELLED | OUTPATIENT
Start: 2018-02-22 | End: 2018-02-22

## 2018-02-22 RX ORDER — ASPIRIN 325 MG
325 TABLET, DELAYED RELEASE (ENTERIC COATED) ORAL 2 TIMES DAILY
Qty: 28 TABLET | Refills: 0 | Status: SHIPPED | OUTPATIENT
Start: 2018-02-22 | End: 2018-03-14

## 2018-02-22 RX ORDER — ESCITALOPRAM OXALATE 10 MG/1
10 TABLET ORAL DAILY
COMMUNITY
End: 2019-08-29

## 2018-02-22 RX ORDER — LIDOCAINE HYDROCHLORIDE 10 MG/ML
0.5 INJECTION, SOLUTION EPIDURAL; INFILTRATION; INTRACAUDAL; PERINEURAL ONCE
Status: CANCELLED | OUTPATIENT
Start: 2018-02-22 | End: 2018-02-22

## 2018-02-22 RX ORDER — PREGABALIN 75 MG/1
150 CAPSULE ORAL
Status: ACTIVE | OUTPATIENT
Start: 2018-02-22 | End: 2018-02-23

## 2018-02-22 RX ORDER — OXYCODONE AND ACETAMINOPHEN 5; 325 MG/1; MG/1
1 TABLET ORAL EVERY 6 HOURS PRN
Qty: 30 TABLET | Refills: 0 | Status: SHIPPED | OUTPATIENT
Start: 2018-02-22 | End: 2018-07-12

## 2018-02-22 RX ORDER — SODIUM CHLORIDE, SODIUM LACTATE, POTASSIUM CHLORIDE, CALCIUM CHLORIDE 600; 310; 30; 20 MG/100ML; MG/100ML; MG/100ML; MG/100ML
INJECTION, SOLUTION INTRAVENOUS CONTINUOUS
Status: CANCELLED | OUTPATIENT
Start: 2018-02-22

## 2018-02-22 RX ORDER — ONDANSETRON 4 MG/1
4 TABLET, FILM COATED ORAL EVERY 8 HOURS PRN
Qty: 30 TABLET | Refills: 0 | Status: SHIPPED | OUTPATIENT
Start: 2018-02-22 | End: 2018-03-01

## 2018-02-22 RX ORDER — OXYCODONE HYDROCHLORIDE 5 MG/1
5 TABLET ORAL EVERY 4 HOURS PRN
Qty: 40 TABLET | Refills: 0 | Status: SHIPPED | OUTPATIENT
Start: 2018-02-22 | End: 2018-02-22 | Stop reason: RX

## 2018-02-22 NOTE — DISCHARGE INSTRUCTIONS
PRE-ADMIT TESTING -  [phone]    [address]  [facility]          Your surgery has been scheduled at [facility]. We are pleased to have the opportunity to serve you. For Further Information please call [phone].    On the day of surgery please report to the Information Desk on the 1st floor.    · CONTACT YOUR PHYSICIAN'S OFFICE THE DAY PRIOR TO YOUR SURGERY TO OBTAIN YOUR ARRIVAL TIME.     · The evening before surgery do not eat anything after 9 p.m. ( this includes hard candy, chewing gum and mints).  You may only have GATORADE, POWERADE AND WATER  from 9 p.m. until you leave your home.   DO NOT DRINK ANY LIQUIDS ON THE WAY TO THE HOSPITAL.      SPECIAL MEDICATION INSTRUCTIONS: TAKE medications checked off by the Anesthesiologist on your Medication List.    Angiogram Patients: Take medications as instructed by your physician, including aspirin.     Surgery Patients:    If you take ASPIRIN - Your PHYSICIAN/SURGEON will need to inform you IF/OR when you need to stop taking aspirin prior to your surgery.     Do Not take any medications containing IBUPROFEN.  Do Not Wear any make-up or dark nail polish   (especially eye make-up) to surgery. If you come to surgery with makeup on you will be required to remove the makeup or nail polish.    Do not shave your surgical area at least 5 days prior to your surgery. The surgical prep will be performed at the hospital according to Infection Control regulations.    Leave all valuables at home.   Do Not wear any jewelry or watches, including any metal in body piercings.  Contact Lens must be removed before surgery. Either do not wear the contact lens or bring a case and solution for storage.  Please bring a container for eyeglasses or dentures as required.  Bring any paperwork your physician has provided, such as consent forms,  history and physicals, doctor's orders, etc.   Bring comfortable clothes that are loose fitting to wear upon  discharge. Take into consideration the type of surgery being performed.  Maintain your diet as advised per your physician the day prior to surgery.      Adequate rest the night before surgery is advised.   Park in the Parking lot behind the hospital or in the [facility] across the street from the parking lot. Parking is complimentary.  If you will be discharged the same day as your procedure, please arrange for a responsible adult to drive you home or to accompany you if traveling by taxi.   YOU WILL NOT BE PERMITTED TO DRIVE OR TO LEAVE THE HOSPITAL ALONE AFTER SURGERY.   It is strongly recommended that you arrange for someone to remain with you for the first 24 hrs following your surgery.       Thank you for your cooperation.  The Staff of [facility].        Bathing Instructions                                                                 Please shower the evening before and morning of your procedure with    ANTIBACTERIAL SOAP. ( DIAL, etc )  Concentrate on the surgical area   for at least 3 minutes and rinse completely. Dry off as usual.   Do not use any deodorant, powder, body lotions, perfume, after shave or    cologne.

## 2018-02-22 NOTE — TELEPHONE ENCOUNTER
S/w Ester with [facility] and told her Renate said she will send in Percocet 5mg since they do not have the immediate release

## 2018-02-22 NOTE — TELEPHONE ENCOUNTER
----- Message from Abiola White sent at 2/22/2018 10:27 AM CST -----  Contact: Ester/[facility]  Oxycodone 5mg immediate release. She stated that they dont carry the immediate release plain. Request that she could send to another pharmacy or send the combination. Ester could be reached at  [phone]

## 2018-02-22 NOTE — H&P
"Alison Branch  is here for a completion of her perioperative paperwork. she  Is scheduled to undergo left knee arthroscopic partial meniscectomy with chondroplasty and peripatellar releases and possible Amniox arthrocentesis on 2/23/2018.  She is a healthy individual and does need clearance for this procedure. Dr. Rodriguez stated that "she is low risk for cardiovascular complications from this intermediate risk surgery.  She can proceed without further cardiovascular risk stratification.  She should continue all medications through the perioperative period, but hold her diabetic medications on the morning of surgery.  DVT prophylaxis per the surgeon's preferred protocol."    Risks, indications and benefits of the surgical procedure were discussed with the patient. All questions with regard to surgery, rehab, expected return to functional activities, activities of daily living and recreational endeavors were answered to her satisfaction.    Patient was informed and understands the risks of surgery are greater for patients with a current condition or hx of heart disease, obesity, clotting disorders, recurrent infections, steroid use, current or past smoking, and factors such as sedentary lifestyle and noncompliance with medications, therapy or f/u. The degree of the increased risk is hard to estimate w/ any degree of precision.    Once no other questions were asked, a brief history and physical exam was then performed.      PHYSICAL EXAM:  GEN: A&Ox3, WD WN NAD  HEENT: WNL  CHEST: CTAB, no W/R/R  HEART: RRR, no M/R/G   ABD: Soft, NT ND, BS x4 QUADS  MS: Refer to previous note for detailed MS exam  NEURO: CN II-XII intact       The surgical consent was then reviewed with the patient, who agreed with all the contents of the consent form and it was signed.     PHYSICAL THERAPY:  She was also instructed regarding physical therapy and will begin on  POD#1-3 at [facility].     POST OP CARE: Instructions were reviewed " including care of the wound and dressing after surgery and when she can shower.     PAIN MANAGEMENT: Alison Branch was instructed regarding the Polar ice unit that will be in place after surgery and her postoperative pain medications.     MEDICATION:  Roxicodone 5 mg 1-2 q 4 hours PRN for pain  Zofran 4 mg q 8 hours PRN for nausea and vomiting.  Aspirin 325mg BID x 2 weeks for DVT prophylaxis starting on the evening after surgery.    Patient was instructed to purchase and take Colace to counter possible GI side effects of taking opiates.     DVT prophylaxis was discussed with the patient today including risk factors for developing DVTs and history of DVTs. The patient was asked if any specific recommendations were given from the doctor/s that did pre-operative surgical clearance.      If the patient was previously taking 81mg baby aspirin, they were told to not take it will using the above stated aspirin and to restart the 81mg aspirin after completion of the aspirin dose.      Patient was also told to buy over the counter Prilosec medication and take it once daily for GI protection as long as they are taking NSAIDs or Aspirin.     The patient was told that narcotic pain medications may make them drowsy and instructions were given to not sign legal documents, drive or operate heavy machinery, cars, or equipment while under the influence of narcotic medications.     Dr. Min was present in clinic during this pre-op evaluation. The patient was offered the opportunity to ask Dr. Min any further questions regarding the procedure which may not have been addressed during their previous informed consent discussion. The patient has declined to see Dr. Min today.    As there were no other questions to be asked, she was given my business card along with Dr. Min's business card if she has any questions or concerns prior to surgery or in the postop period.

## 2018-02-22 NOTE — ANESTHESIA PREPROCEDURE EVALUATION
02/22/2018  Alison Branch is a 62 y.o., female.    Anesthesia Evaluation    I have reviewed the Patient Summary Reports.    I have reviewed the Nursing Notes.   I have reviewed the Medications.     Review of Systems  Anesthesia Hx:  No problems with previous Anesthesia  History of prior surgery of interest to airway management or planning: cervical fusion. Previous anesthesia: General Denies Family Hx of Anesthesia complications.   Denies Personal Hx of Anesthesia complications.   Social:  Non-Smoker    Hematology/Oncology:  Hematology Normal   Oncology Normal     EENT/Dental:EENT/Dental Normal   Cardiovascular:   Hypertension, well controlled hyperlipidemia ECG has been reviewed. NSR w non sp st t wave changes   Pulmonary:  Pulmonary Normal    Renal/:  Renal/ Normal     Hepatic/GI:  Hepatic/GI Normal    Musculoskeletal:   Arthritis     Neurological:   Coil for aneurysm 2010   Endocrine:   Diabetes, type 2    Psych:   Psychiatric History          Physical Exam  General:  Obesity    Airway/Jaw/Neck:  Airway Findings: Mouth Opening: Normal Tongue: Normal  Jaw/Neck Findings:  Neck ROM: Extension Decreased, Mild      Dental:  Dental Findings: molar caps, In tact        Mental Status:  Mental Status Findings:  Cooperative, Alert and Oriented         Anesthesia Plan  Type of Anesthesia, risks & benefits discussed:  Anesthesia Type:  general  Patient's Preference:   Intra-op Monitoring Plan:   Intra-op Monitoring Plan Comments:   Post Op Pain Control Plan: multimodal analgesia  Post Op Pain Control Plan Comments:   Induction:   IV  Beta Blocker:         Informed Consent: Patient understands risks and agrees with Anesthesia plan.  Questions answered. Anesthesia consent signed with patient.  ASA Score: 3     Day of Surgery Review of History & Physical:    H&P update referred to the surgeon.     Anesthesia Plan  Notes: BMP ion epic.CBC and EKG today    Day of surgery update: EKG SB        Ready For Surgery From Anesthesia Perspective.

## 2018-02-23 ENCOUNTER — SURGERY (OUTPATIENT)
Age: 63
End: 2018-02-23

## 2018-02-23 ENCOUNTER — HOSPITAL ENCOUNTER (OUTPATIENT)
Facility: OTHER | Age: 63
Discharge: HOME | End: 2018-02-23
Attending: ORTHOPAEDIC SURGERY | Admitting: ORTHOPAEDIC SURGERY
Payer: MEDICARE

## 2018-02-23 ENCOUNTER — ANESTHESIA (OUTPATIENT)
Dept: SURGERY | Facility: OTHER | Age: 63
End: 2018-02-23
Payer: MEDICARE

## 2018-02-23 VITALS
RESPIRATION RATE: 16 BRPM | TEMPERATURE: 97 F | BODY MASS INDEX: 30.64 KG/M2 | SYSTOLIC BLOOD PRESSURE: 137 MMHG | WEIGHT: 214 LBS | OXYGEN SATURATION: 100 % | DIASTOLIC BLOOD PRESSURE: 65 MMHG | HEIGHT: 70 IN | HEART RATE: 70 BPM

## 2018-02-23 DIAGNOSIS — S83.242D ACUTE MEDIAL MENISCUS TEAR OF LEFT KNEE, SUBSEQUENT ENCOUNTER: ICD-10-CM

## 2018-02-23 PROBLEM — S83.242A ACUTE MEDIAL MENISCUS TEAR OF LEFT KNEE: Status: RESOLVED | Noted: 2018-02-23 | Resolved: 2018-02-23

## 2018-02-23 PROBLEM — S83.242A ACUTE MEDIAL MENISCUS TEAR OF LEFT KNEE: Status: ACTIVE | Noted: 2018-02-23

## 2018-02-23 LAB
ACID FAST MOD KINY STN SPEC: NORMAL
MYCOBACTERIUM SPEC QL CULT: NORMAL
POCT GLUCOSE: 142 MG/DL (ref 70–110)

## 2018-02-23 PROCEDURE — 63600175 PHARM REV CODE 636 W HCPCS: Performed by: ANESTHESIOLOGY

## 2018-02-23 PROCEDURE — 25000003 PHARM REV CODE 250: Performed by: NURSE ANESTHETIST, CERTIFIED REGISTERED

## 2018-02-23 PROCEDURE — 82962 GLUCOSE BLOOD TEST: CPT | Performed by: ORTHOPAEDIC SURGERY

## 2018-02-23 PROCEDURE — 25000003 PHARM REV CODE 250: Performed by: ANESTHESIOLOGY

## 2018-02-23 PROCEDURE — 29880 ARTHRS KNE SRG MNISECTMY M&L: CPT | Mod: LT,,, | Performed by: ORTHOPAEDIC SURGERY

## 2018-02-23 PROCEDURE — 71000033 HC RECOVERY, INTIAL HOUR: Performed by: ORTHOPAEDIC SURGERY

## 2018-02-23 PROCEDURE — 36000710: Performed by: ORTHOPAEDIC SURGERY

## 2018-02-23 PROCEDURE — 63600175 PHARM REV CODE 636 W HCPCS: Performed by: ORTHOPAEDIC SURGERY

## 2018-02-23 PROCEDURE — 25000003 PHARM REV CODE 250: Performed by: ORTHOPAEDIC SURGERY

## 2018-02-23 PROCEDURE — 27201423 OPTIME MED/SURG SUP & DEVICES STERILE SUPPLY: Performed by: ORTHOPAEDIC SURGERY

## 2018-02-23 PROCEDURE — 37000009 HC ANESTHESIA EA ADD 15 MINS: Performed by: ORTHOPAEDIC SURGERY

## 2018-02-23 PROCEDURE — 36000711: Performed by: ORTHOPAEDIC SURGERY

## 2018-02-23 PROCEDURE — 37000008 HC ANESTHESIA 1ST 15 MINUTES: Performed by: ORTHOPAEDIC SURGERY

## 2018-02-23 PROCEDURE — 63600175 PHARM REV CODE 636 W HCPCS: Performed by: PHYSICIAN ASSISTANT

## 2018-02-23 PROCEDURE — 71000015 HC POSTOP RECOV 1ST HR: Performed by: ORTHOPAEDIC SURGERY

## 2018-02-23 PROCEDURE — 71000016 HC POSTOP RECOV ADDL HR: Performed by: ORTHOPAEDIC SURGERY

## 2018-02-23 PROCEDURE — 63600175 PHARM REV CODE 636 W HCPCS: Performed by: NURSE ANESTHETIST, CERTIFIED REGISTERED

## 2018-02-23 RX ORDER — EPINEPHRINE 1 MG/ML
INJECTION, SOLUTION INTRACARDIAC; INTRAMUSCULAR; INTRAVENOUS; SUBCUTANEOUS
Status: DISCONTINUED | OUTPATIENT
Start: 2018-02-23 | End: 2018-02-23 | Stop reason: HOSPADM

## 2018-02-23 RX ORDER — ACETAMINOPHEN 10 MG/ML
INJECTION, SOLUTION INTRAVENOUS
Status: DISCONTINUED | OUTPATIENT
Start: 2018-02-23 | End: 2018-02-23

## 2018-02-23 RX ORDER — ONDANSETRON 2 MG/ML
INJECTION INTRAMUSCULAR; INTRAVENOUS
Status: DISCONTINUED | OUTPATIENT
Start: 2018-02-23 | End: 2018-02-23

## 2018-02-23 RX ORDER — MEPERIDINE HYDROCHLORIDE 50 MG/ML
12.5 INJECTION INTRAMUSCULAR; INTRAVENOUS; SUBCUTANEOUS ONCE AS NEEDED
Status: DISCONTINUED | OUTPATIENT
Start: 2018-02-23 | End: 2018-02-23 | Stop reason: HOSPADM

## 2018-02-23 RX ORDER — ASPIRIN 81 MG/1
81 TABLET ORAL DAILY
Refills: 0 | Status: ON HOLD | COMMUNITY
Start: 2018-03-10 | End: 2020-10-01 | Stop reason: HOSPADM

## 2018-02-23 RX ORDER — OXYCODONE HYDROCHLORIDE 5 MG/1
5 TABLET ORAL
Status: DISCONTINUED | OUTPATIENT
Start: 2018-02-23 | End: 2018-02-23 | Stop reason: HOSPADM

## 2018-02-23 RX ORDER — FAMOTIDINE 20 MG/1
20 TABLET, FILM COATED ORAL
Status: COMPLETED | OUTPATIENT
Start: 2018-02-23 | End: 2018-02-23

## 2018-02-23 RX ORDER — LIDOCAINE HCL/PF 100 MG/5ML
SYRINGE (ML) INTRAVENOUS
Status: DISCONTINUED | OUTPATIENT
Start: 2018-02-23 | End: 2018-02-23

## 2018-02-23 RX ORDER — SODIUM CHLORIDE 0.9 % (FLUSH) 0.9 %
3 SYRINGE (ML) INJECTION
Status: DISCONTINUED | OUTPATIENT
Start: 2018-02-23 | End: 2018-02-23 | Stop reason: HOSPADM

## 2018-02-23 RX ORDER — KETOROLAC TROMETHAMINE 30 MG/ML
30 INJECTION, SOLUTION INTRAMUSCULAR; INTRAVENOUS ONCE
Status: COMPLETED | OUTPATIENT
Start: 2018-02-23 | End: 2018-02-23

## 2018-02-23 RX ORDER — OXYCODONE HYDROCHLORIDE 5 MG/1
5 TABLET ORAL EVERY 4 HOURS PRN
Status: DISCONTINUED | OUTPATIENT
Start: 2018-02-23 | End: 2018-02-23 | Stop reason: HOSPADM

## 2018-02-23 RX ORDER — ONDANSETRON 2 MG/ML
4 INJECTION INTRAMUSCULAR; INTRAVENOUS DAILY PRN
Status: DISCONTINUED | OUTPATIENT
Start: 2018-02-23 | End: 2018-02-23 | Stop reason: HOSPADM

## 2018-02-23 RX ORDER — HYDROMORPHONE HYDROCHLORIDE 2 MG/ML
0.4 INJECTION, SOLUTION INTRAMUSCULAR; INTRAVENOUS; SUBCUTANEOUS EVERY 5 MIN PRN
Status: DISCONTINUED | OUTPATIENT
Start: 2018-02-23 | End: 2018-02-23 | Stop reason: HOSPADM

## 2018-02-23 RX ORDER — SODIUM CHLORIDE, SODIUM LACTATE, POTASSIUM CHLORIDE, CALCIUM CHLORIDE 600; 310; 30; 20 MG/100ML; MG/100ML; MG/100ML; MG/100ML
INJECTION, SOLUTION INTRAVENOUS CONTINUOUS
Status: DISCONTINUED | OUTPATIENT
Start: 2018-02-23 | End: 2018-02-23 | Stop reason: HOSPADM

## 2018-02-23 RX ORDER — FENTANYL CITRATE 50 UG/ML
25 INJECTION, SOLUTION INTRAMUSCULAR; INTRAVENOUS EVERY 5 MIN PRN
Status: DISCONTINUED | OUTPATIENT
Start: 2018-02-23 | End: 2018-02-23 | Stop reason: HOSPADM

## 2018-02-23 RX ORDER — MIDAZOLAM HYDROCHLORIDE 1 MG/ML
INJECTION INTRAMUSCULAR; INTRAVENOUS
Status: DISCONTINUED | OUTPATIENT
Start: 2018-02-23 | End: 2018-02-23

## 2018-02-23 RX ORDER — SODIUM CHLORIDE, SODIUM LACTATE, POTASSIUM CHLORIDE, CALCIUM CHLORIDE 600; 310; 30; 20 MG/100ML; MG/100ML; MG/100ML; MG/100ML
INJECTION, SOLUTION INTRAVENOUS CONTINUOUS PRN
Status: DISCONTINUED | OUTPATIENT
Start: 2018-02-23 | End: 2018-02-23

## 2018-02-23 RX ORDER — OXYCODONE HYDROCHLORIDE 5 MG/1
15 TABLET ORAL EVERY 4 HOURS PRN
Status: DISCONTINUED | OUTPATIENT
Start: 2018-02-23 | End: 2018-02-23 | Stop reason: HOSPADM

## 2018-02-23 RX ORDER — FENTANYL CITRATE 50 UG/ML
INJECTION, SOLUTION INTRAMUSCULAR; INTRAVENOUS
Status: DISCONTINUED | OUTPATIENT
Start: 2018-02-23 | End: 2018-02-23

## 2018-02-23 RX ORDER — PROPOFOL 10 MG/ML
VIAL (ML) INTRAVENOUS
Status: DISCONTINUED | OUTPATIENT
Start: 2018-02-23 | End: 2018-02-23

## 2018-02-23 RX ORDER — OXYCODONE HYDROCHLORIDE 5 MG/1
5 TABLET ORAL ONCE
Status: COMPLETED | OUTPATIENT
Start: 2018-02-23 | End: 2018-02-23

## 2018-02-23 RX ORDER — ONDANSETRON 2 MG/ML
4 INJECTION INTRAMUSCULAR; INTRAVENOUS EVERY 12 HOURS PRN
Status: DISCONTINUED | OUTPATIENT
Start: 2018-02-23 | End: 2018-02-23 | Stop reason: HOSPADM

## 2018-02-23 RX ORDER — LIDOCAINE HYDROCHLORIDE 10 MG/ML
0.5 INJECTION, SOLUTION EPIDURAL; INFILTRATION; INTRACAUDAL; PERINEURAL ONCE
Status: DISCONTINUED | OUTPATIENT
Start: 2018-02-23 | End: 2018-02-23 | Stop reason: HOSPADM

## 2018-02-23 RX ORDER — CEFAZOLIN SODIUM 2 G/50ML
2 SOLUTION INTRAVENOUS
Status: DISCONTINUED | OUTPATIENT
Start: 2018-02-23 | End: 2018-02-23 | Stop reason: HOSPADM

## 2018-02-23 RX ORDER — DIPHENHYDRAMINE HYDROCHLORIDE 50 MG/ML
12.5 INJECTION INTRAMUSCULAR; INTRAVENOUS EVERY 30 MIN PRN
Status: DISCONTINUED | OUTPATIENT
Start: 2018-02-23 | End: 2018-02-23 | Stop reason: HOSPADM

## 2018-02-23 RX ADMIN — PROPOFOL 200 MG: 10 INJECTION, EMULSION INTRAVENOUS at 07:02

## 2018-02-23 RX ADMIN — ONDANSETRON 4 MG: 2 INJECTION INTRAMUSCULAR; INTRAVENOUS at 07:02

## 2018-02-23 RX ADMIN — FENTANYL CITRATE 50 MCG: 50 INJECTION, SOLUTION INTRAMUSCULAR; INTRAVENOUS at 08:02

## 2018-02-23 RX ADMIN — CEFAZOLIN SODIUM 2 G: 2 SOLUTION INTRAVENOUS at 07:02

## 2018-02-23 RX ADMIN — FENTANYL CITRATE 25 MCG: 50 INJECTION, SOLUTION INTRAMUSCULAR; INTRAVENOUS at 09:02

## 2018-02-23 RX ADMIN — KETOROLAC TROMETHAMINE 30 MG: 30 INJECTION, SOLUTION INTRAMUSCULAR at 10:02

## 2018-02-23 RX ADMIN — FENTANYL CITRATE 100 MCG: 50 INJECTION, SOLUTION INTRAMUSCULAR; INTRAVENOUS at 07:02

## 2018-02-23 RX ADMIN — LIDOCAINE HYDROCHLORIDE 100 MG: 20 INJECTION, SOLUTION INTRAVENOUS at 07:02

## 2018-02-23 RX ADMIN — LIDOCAINE HYDROCHLORIDE 50 MG: 20 INJECTION, SOLUTION INTRAVENOUS at 08:02

## 2018-02-23 RX ADMIN — OXYCODONE HYDROCHLORIDE 5 MG: 5 TABLET ORAL at 10:02

## 2018-02-23 RX ADMIN — EPINEPHRINE 6 ML: 1 INJECTION, SOLUTION INTRAMUSCULAR; SUBCUTANEOUS at 07:02

## 2018-02-23 RX ADMIN — SODIUM CHLORIDE, SODIUM LACTATE, POTASSIUM CHLORIDE, AND CALCIUM CHLORIDE: 600; 310; 30; 20 INJECTION, SOLUTION INTRAVENOUS at 07:02

## 2018-02-23 RX ADMIN — ACETAMINOPHEN 1000 MG: 10 INJECTION, SOLUTION INTRAVENOUS at 07:02

## 2018-02-23 RX ADMIN — FAMOTIDINE 20 MG: 20 TABLET, FILM COATED ORAL at 06:02

## 2018-02-23 RX ADMIN — OXYCODONE HYDROCHLORIDE 5 MG: 5 TABLET ORAL at 08:02

## 2018-02-23 RX ADMIN — ROPIVACAINE HYDROCHLORIDE: 5 INJECTION, SOLUTION EPIDURAL; INFILTRATION; PERINEURAL at 07:02

## 2018-02-23 NOTE — OP NOTE
[facility]   OPERATIVE REPORT   ORTHOPAEDIC SURGERY   PROVIDER: DR. RADHA ALBA    PATIENT INFORMATION   Alison Branch 62 y.o. female 1955   MRN: [id number]   LOCATION: [facility]     DATE OF PROCEDURE: 2/23/2018     PREOPERATIVE DIAGNOSES:   Left  1. knee medial meniscus tear   2. knee chondromalacia     POSTOPERATIVE DIAGNOSES:   Left  1. knee medial and lateral meniscus tears  2. knee chondromalacia  3. knee synovitis  4. knee plica    PROCEDURE PERFORMED:   Left  1. knee arthroscopic medial and lateral partial meniscectomies (CPT 57834)  2. knee arthroscopic chondroplasty (CPT 25276)  3. knee arthroscopic partial synovectomy/debridement (CPT 59588)         4. knee arthroscopic plica excision(CPT 12219)  5. knee arthroscopic loose body removal (CPT 08649)    Surgeon(s) and Role:     * ESTELA Alba MD - Primary        SMA Debra    ANESTHESIA: General with local injection    ESTIMATED BLOOD LOSS: 10 cc    IMPLANTS: * No implants in log *     SPECIMENS:   Specimen (12h ago through future)    None        COMPLICATIONS: None.     INTRAOPERATIVE COUNTS: Correct.     PROPHYLACTIC IV ANTIBIOTICS: Given per [facility] Protocol.    INDICATIONS FOR PROCEDURE:  The patient presented complaining of chronic knee pain.  Imaging confirmed medial meniscus pathology, along with chondromalacia of the knee.  Primary complaints, on history and exam, are meniscal based with mechanical symptoms.  The patient has failed a course of conservative treatment.  Given the extent and duration of these complaints, the patient has been indicated for arthroscopic intervention to include meniscus treatment as indicated, chondroplasty, debridement and parapatellar releases.  Details of such were reviewed preoperatively to include the expected postoperative rehabilitation and recovery course.  Outlined risks and potential complications of surgery include but are not limited to: infection, stiffness, progression  of arthritis, tissue re-tearing, neurovascular injury, blood clot formation, potential need further surgery. The patient has elected to proceed.    PROCEDURE IN DETAIL:  The patient was identified in preop holding. Permit was obtained for the above procedure. IV antibiotic was initiated for prophylaxis and the patient was brought to the operating room.       After Anesthesia was administered, a Time Out was verbalized with all OR staff agreeing to the patient and procedure.      The left knee and leg were prepped and draped in the usual sterile fashion.      Diagnostic arthroscopy was undertaken after establishing routine anteromedial and anterolateral scope portals.      Significant Findings:  1. Patellofemoral compartment - grade 2 proximal chondromalacia patella with central trochlear grooving, otherwise good mobility and no lateral capsular tightness; large medial and lateral synovial plicae with anterior compartment synovitis.  2. Notch - Normal ACL and PCL  3. Medial Compartment - Meniscus, complex tear from the posterior root with approximate 50% detachment to the mid body. A portion of the torn posterior root was flipped posteriorly. The horizontal component at the body was flipped underneath at the plateau. Cartilage, grade 2 changes with unstable chondral flaps over the middle 1/3 central to lateral portion of the medial femoral condyle, small area grade 2 change over the posteromedial edge of the medial tibial plateau, adjacent to the flipped body fragment  4. Lateral compartment - Meniscus, inner third free edge tearing of the posterior horn at the intact root attachment. Cartilage, grade 1-2 changes of the lateral tibial plateau - small isolated area   5. Other - diffuse anterior compartment synovitis     Detailed description:     1. Meniscectomy: Partial medial and lateral meniscectomies were carried out from the posterior horn to mid body with a combination of biters and isiah.  Trimming was  completed to stable, contoured edges.     2. Releases: Peripatellar releases were performed with combination of cautery and thermal ablation, releasing thickened plicae and performing a partial synovectomy anteriorly   3. Chondroplasty and loose bodies: Debridement was carried out at the medial femoral condyle, medial tibial plateau, lateral tibial plateau and proximal patella to smooth and stabilize the cartilage with a non-aggressive shaver.     Photos were taken.     The dressing was placed after local was administered subcutaneously and the patient was awakened and transferred to the recovery room in satisfactory condition. There were no apparent complications.     POSTOPERATIVE PLAN: Patient may weight bear as tolerates on a walker. Full range of motion to tolerance. Will start physical therapy right away.  mg BID x 2 weeks for DVT prophylaxis.

## 2018-02-23 NOTE — DISCHARGE INSTRUCTIONS
Anesthesia: After Your Surgery  Youve just had surgery. During surgery, you received medication called anesthesia to keep you comfortable and pain-free. After surgery, you may experience some pain or nausea. This is common. Here are some tips for feeling better and recovering after surgery.    Going home  Your doctor or nurse will show you how to take care of yourself when you go home. He or she will also answer your questions. Have an adult family member or friend drive you home. For the first 24 hours after your surgery:  · Do not drive or use heavy equipment.  · Do not make important decisions or sign legal documents.  · Avoid alcohol.  · Have someone stay with you, if needed. He or she can watch for problems and help keep you safe.  Be sure to keep all follow-up appointments with your doctor. And rest after your procedure for as long as your doctor tells you to.    Coping with pain  If you have pain after surgery, pain medication will help you feel better. Take it as directed, before pain becomes severe. Also, ask your doctor or pharmacist about other ways to control pain, such as with heat, ice, and relaxation. And follow any other instructions your surgeon or nurse gives you.    Tips for taking pain medication  To get the best relief possible, remember these points:  · Pain medications can upset your stomach. Taking them with a little food may help.  · Most pain relievers taken by mouth need at least 20 to 30 minutes to take effect.  · Taking medication on a schedule can help you remember to take it. Try to time your medication so that you can take it before beginning an activity, such as dressing, walking, or sitting down for dinner.  · Constipation is a common side effect of pain medications. Contact your doctor before taking any medications like laxatives or stool softeners to help relieve constipation. Also ask about any dietary restrictions, because drinking lots of fluids and eating foods like fruits  and vegetables that are high in fiber can also help. Remember, dont take laxatives unless your surgeon has prescribed them.  · Mixing alcohol and pain medication can cause dizziness and slow your breathing. It can even be fatal. Dont drink alcohol while taking pain medication.  · Pain medication can slow your reflexes. Dont drive or operate machinery while taking pain medication.  If your health care provider tells you to take acetaminophen to help relieve your pain, ask him or her how much you are supposed to take each day. (Acetaminophen is the generic name for Tylenol and other brand-name pain relievers.) Acetaminophen or other pain relievers may interact with your prescription medicines or other over-the-counter (OTC) drugs. Some prescription medications contain acetaminophen along with other active ingredients. Using both prescription and OTC acetaminophen for pain can cause you to overdose. The FDA recommends that you read the labels on your OTC medications carefully. This will help you to clearly understand the list of active ingredients, dosing instructions, and any warnings. It may also help you avoid taking too much acetaminophen. If you have questions or don't understand the information, ask your pharmacist or health care provider to explain it to you before you take the OTC medication.    Managing nausea  Some people have an upset stomach after surgery. This is often due to anesthesia, pain, pain medications, or the stress of surgery. The following tips will help you manage nausea and get good nutrition as you recover. If you were on a special diet before surgery, ask your doctor if you should follow it during recovery. These tips may help:  · Dont push yourself to eat. Your body will tell you when to eat and how much.  · Start off with clear liquids and soup. They are easier to digest.  · Progress to semi-solid foods (mashed potatoes, applesauce, and gelatin) as you feel ready.  · Slowly move to  solid foods. Dont eat fatty, rich, or spicy foods at first.  · Dont force yourself to have three large meals a day. Instead, eat smaller amounts more often.  · Take pain medications with a small amount of solid food, such as crackers or toast to avoid nausea.      Call your surgeon if  · You still have pain an hour after taking medication (it may not be strong enough).  · You feel too sleepy, dizzy, or groggy (medication may be too strong).  · You have side effects like nausea, vomiting, or skin changes (rash, itching, or hives).   © 1863-7825 [employer]. [address] [place] [address], [place] [address]. All rights reserved. This information is not intended as a substitute for professional medical care. Always follow your healthcare professional's instructions.

## 2018-02-23 NOTE — PLAN OF CARE
Alison Branch has met all discharge criteria from Phase II. Vital Signs are stable, ambulating  without difficulty. Discharge instructions given, patient verbalized understanding. Discharged from facility via wheelchair in stable condition.

## 2018-02-23 NOTE — ANESTHESIA POSTPROCEDURE EVALUATION
"Anesthesia Post Evaluation    Patient: Alison Branch    Procedure(s) Performed: Procedure(s) (LRB):  MENISCECTOMY (Left)  CHONDROPLASTY-KNEE (Left)  SYNOVECTOMY-KNEE (Left)    Final Anesthesia Type: general  Patient location during evaluation: PACU  Patient participation: Yes- Able to Participate  Level of consciousness: oriented and awake  Post-procedure vital signs: reviewed and stable  Pain management: adequate  Airway patency: patent  PONV status at discharge: No PONV  Anesthetic complications: no      Cardiovascular status: hemodynamically stable  Respiratory status: unassisted, spontaneous ventilation and room air  Hydration status: euvolemic  Follow-up not needed.        Visit Vitals  BP (!) 141/68   Pulse 75   Temp 36.3 °C (97.4 °F)   Resp 16   Ht 5' 9.5" (1.765 m)   Wt 97.1 kg (214 lb)   SpO2 98%   Breastfeeding? No   BMI 31.15 kg/m²       Pain/Rajat Score: Pain Assessment Performed: Yes (2/23/2018  8:28 AM)  Presence of Pain: complains of pain/discomfort (2/23/2018  9:22 AM)  Pain Rating Prior to Med Admin: 7 (2/23/2018  9:15 AM)  Rajat Score: 9 (2/23/2018  9:22 AM)      "

## 2018-02-23 NOTE — TRANSFER OF CARE
"Anesthesia Transfer of Care Note    Patient: Alison Branch    Procedure(s) Performed: Procedure(s) (LRB):  MENISCECTOMY (Left)  CHONDROPLASTY-KNEE (Left)  SYNOVECTOMY-KNEE (Left)    Patient location: PACU    Anesthesia Type: general    Transport from OR: Transported from OR on 2-3 L/min O2 by NC with adequate spontaneous ventilation    Post pain: adequate analgesia    Post assessment: no apparent anesthetic complications    Post vital signs: stable    Level of consciousness: sedated and responds to stimulation    Nausea/Vomiting: no nausea/vomiting    Complications: none    Transfer of care protocol was followed      Last vitals:   Visit Vitals  /71 (BP Location: Left arm, Patient Position: Sitting)   Pulse 66   Temp 36.6 °C (97.9 °F) (Oral)   Resp 16   Ht 5' 9.5" (1.765 m)   Wt 97.1 kg (214 lb)   SpO2 98%   Breastfeeding? No   BMI 31.15 kg/m²     "

## 2018-02-23 NOTE — PLAN OF CARE
Patient prefers to have one of her daughters - not sure which will be here present for discharge teaching.

## 2018-02-27 ENCOUNTER — CLINICAL SUPPORT (OUTPATIENT)
Dept: REHABILITATION | Facility: HOSPITAL | Age: 63
End: 2018-02-27
Attending: ORTHOPAEDIC SURGERY
Payer: MEDICARE

## 2018-02-27 DIAGNOSIS — M25.562 ACUTE PAIN OF LEFT KNEE: ICD-10-CM

## 2018-02-27 PROCEDURE — 97110 THERAPEUTIC EXERCISES: CPT

## 2018-02-27 PROCEDURE — 97140 MANUAL THERAPY 1/> REGIONS: CPT

## 2018-02-27 PROCEDURE — G8979 MOBILITY GOAL STATUS: HCPCS | Mod: CJ

## 2018-02-27 PROCEDURE — 97161 PT EVAL LOW COMPLEX 20 MIN: CPT

## 2018-02-27 PROCEDURE — G8978 MOBILITY CURRENT STATUS: HCPCS | Mod: CK

## 2018-02-27 NOTE — PROGRESS NOTES
"[facility]   PATIENT EVALUATION    Date: 02/27/2018    Visit #: 1 of 20    Start Time:  500pm  Stop Time:  605pm    Evaluation Date: 2/27/18  Visit # authorized: n/a  Authorization period: n/a  Plan of care Expiration: n/a    History     History of Present Illness: s/p left knee menisectomy x 3 days   Onset Date: chronic left knee pain, "few years"  Date of Surgery:2/23/18  Primary Diagnosis: No diagnosis found.  Treatment Diagnosis: gait abnormality, difficulty walking, weakness  Past Medical History:   Diagnosis Date    Allergy     Brain aneurysm 2010    s/p coiling of one; another not coiled    Diabetes mellitus     Diabetes type 2, controlled     Fever blister     High cholesterol     History of Bell's palsy     HTN (hypertension) 5/20/2014     Precautions: WB as tolerated; ROM as tolerated  Prior Therapy: nil  Diagnostic Tests: n/a  Prior Level of Function: Independent  Sports/Recreational Activities: walking  Extremity Dominance: right  Functional Deficits Leading to Referral/Nature of Injury: decrease in functional mobility   Patient Therapy Goals: return to walking pain free    Subjective     Alison Branch states that her knee is hurting.  She is having trouble with utilizing her walker and getting frustrated.    Pain:  Location: knee   Description: Throbbing  Activities Which Increase Pain: Standing  Activities Which Decrease Pain: pain medication  Pain Scale: 6/10 at best 4/10 now  7/10 at worst    Objective     Posture: guarded, pain present   Palpation: TTP at the surgical region  Sensation: decreased due to procedure and swelling   DTRs:  Range of Motion/Strength:  N/a, pt post op x 3 days.  Able to perform SLR with minimal lag; 5-90 degrees of motions.  More motion noted in seated position versus supine.       Flexibility- deferred    Left Right   SLR     Hamstring 90°/90°     Prone Rectus Femoris     Gastrocnemius     Soleus     Ramakrishnas     Iliopsoas   " "    Range of motion    Knee:        Left Right   Extension  5    Flexion 90                          Strength:  Deferred secondary to procedure          Gait: With AD.  Device Used -  Pick-up walker  Analysis:   Special Tests: n/a  Other: n/a  Treatment:      Therapeutic exercise 15 min  Quad sets 30x  SLR w/ assist 15x  Marching 1 min  Standing hip abd 20x    Manual therapy 9 min  Patella mobilization all directions 5 min  PROM 5-90; ankle talar mobs a/p p/a               PT reviewed FOTO scores for Alison Branch on 2/27/18  FOTO score: 60    Functional Limitations Reports - G Codes  Current CK 40%  Goal CJ 29%  Category: mobility  Tool: FOTO      Current ():  61  Goal (): 70  Discharge ():  n/a            Assessment   This is a 62 y.o. female referred to outpatient physical therapy and presents with a medical diagnosis of s/p left knee menisectomy  and demonstrates limitations as described in the problem list.Pt presented with left knee pain for "several years".  Pt underwent surgery on 2/27/2018.   Pt demonstrates excellent rehab potential. Pt will benefit from physcial therapy services in order to maximize pain free and/or functional use of left knee. The following goals were discussed with the patient and patient is in agreement with them as to be addressed in the treatment plan. Pt was given a HEP consisting of wound care and functional mobility.. Pt verbally understood the instructions as they were given and demonstrated proper form and technique during therapy. Pt was advised to perform these exercises free of pain, and to stop performing them if pain occurs.         Initial Assessment (Pertinent finding, problem list and factors affecting outcome):   Medical necessity is demonstrated by the following problem list:   - Pain limits function of effected part for all activities  - Unable to participate in daily activities   - Requires skilled supervision to complete and progress HEP  - Fall risk " - impaired balance   - Continued inability to participate in vocational pursuits      Rehab Potiential: excellent    Short Term Goals (4-5 Weeks):    - Pt will increase ROM to  Full motion in the left knee in order to perform ADL and IADLs   - Pt will increase strength to 4/5 in the left knee in order to perform ADLs and IADLs  - Decrease Pain to 2-3/10 with all activities   - Pt independent with HEP with progressions.     Long Term Goals (8-10 Weeks):   - Pt will increase strength to full strength in the left knee 5/5 in order to perform ADLs and IADLs more efficiently  - Decrease Pain to  0-2/10 with all activities         Plan     Pt will be seen 2 times per week for 4-5 weeks. Recommended Treatment Plan will include:   AAROM/PROM exercise  Manual soft tissue and/or joint mobilization  Therapeutic Exercise   Individualized Home Exercise Program  Modalities:   Pt education:on HEP    Therapist: Darrel Stern, PT  I CERTIFY THE NEED FOR THESE SERVICES FURNISHED UNDER THIS PLAN OF TREATMENT AND WHILE UNDER MY CARE    Physician's comments: ________________________________________________________________________________________________________________________________________________    Physician's Name: ___________________________________

## 2018-02-28 NOTE — PLAN OF CARE
" [facility]   PATIENT EVALUATION     Date: 02/27/2018     Visit #: 1 of 20     Start Time:  500pm  Stop Time:  605pm     Evaluation Date: 2/27/18  Visit # authorized: n/a  Authorization period: n/a  Plan of care Expiration: n/a     History      History of Present Illness: s/p left knee menisectomy x 3 days   Onset Date: chronic left knee pain, "few years"  Date of Surgery:2/23/18  Primary Diagnosis: No diagnosis found.  Treatment Diagnosis: gait abnormality, difficulty walking, weakness       Past Medical History:   Diagnosis Date    Allergy      Brain aneurysm 2010     s/p coiling of one; another not coiled    Diabetes mellitus      Diabetes type 2, controlled      Fever blister      High cholesterol      History of Bell's palsy      HTN (hypertension) 5/20/2014      Precautions: WB as tolerated; ROM as tolerated  Prior Therapy: nil  Diagnostic Tests: n/a  Prior Level of Function: Independent  Sports/Recreational Activities: walking  Extremity Dominance: right  Functional Deficits Leading to Referral/Nature of Injury: decrease in functional mobility   Patient Therapy Goals: return to walking pain free     Subjective      Alison Branch states that her knee is hurting.  She is having trouble with utilizing her walker and getting frustrated.     Pain:  Location: knee   Description: Throbbing  Activities Which Increase Pain: Standing  Activities Which Decrease Pain: pain medication  Pain Scale: 6/10 at best 4/10 now  7/10 at worst     Objective      Posture: guarded, pain present   Palpation: TTP at the surgical region  Sensation: decreased due to procedure and swelling            DTRs:  Range of Motion/Strength:  N/a, pt post op x 3 days.  Able to perform SLR with minimal lag; 5-90 degrees of motions.  More motion noted in seated position versus supine.        Flexibility- deferred     Left Right   SLR       Hamstring 90°/90°       Prone Rectus Femoris     " "  Gastrocnemius       Soleus       Ramakrishnas       Iliopsoas          Range of motion     Knee:        Left Right   Extension  5     Flexion 90                                      Strength:  Deferred secondary to procedure              Gait: With AD.  Device Used -  Pick-up walker  Analysis:   Special Tests: n/a  Other: n/a  Treatment:       Therapeutic exercise 15 min  Quad sets 30x  SLR w/ assist 15x  Marching 1 min  Standing hip abd 20x     Manual therapy 9 min  Patella mobilization all directions 5 min  PROM 5-90; ankle talar mobs a/p p/a                     PT reviewed FOTO scores for Alison Branch on 2/27/18  FOTO score: 60     Functional Limitations Reports - G Codes  Current CK 40%  Goal CJ 29%  Category: mobility  Tool: FOTO        Current ():  61  Goal (): 70  Discharge ():  n/a                 Assessment   This is a 62 y.o. female referred to outpatient physical therapy and presents with a medical diagnosis of s/p left knee menisectomy  and demonstrates limitations as described in the problem list.Pt presented with left knee pain for "several years".  Pt underwent surgery on 2/27/2018.   Pt demonstrates excellent rehab potential. Pt will benefit from physcial therapy services in order to maximize pain free and/or functional use of left knee. The following goals were discussed with the patient and patient is in agreement with them as to be addressed in the treatment plan. Pt was given a HEP consisting of wound care and functional mobility.. Pt verbally understood the instructions as they were given and demonstrated proper form and technique during therapy. Pt was advised to perform these exercises free of pain, and to stop performing them if pain occurs.            Initial Assessment (Pertinent finding, problem list and factors affecting outcome):   Medical necessity is demonstrated by the following problem list:   - Pain limits function of effected part for all activities  - Unable to " participate in daily activities   - Requires skilled supervision to complete and progress HEP  - Fall risk - impaired balance   - Continued inability to participate in vocational pursuits        Rehab Potiential: excellent     Short Term Goals (4-5 Weeks):    - Pt will increase ROM to  Full motion in the left knee in order to perform ADL and IADLs   - Pt will increase strength to 4/5 in the left knee in order to perform ADLs and IADLs  - Decrease Pain to 2-3/10 with all activities     - Pt independent with HEP with progressions.      Long Term Goals (8-10 Weeks):   - Pt will increase strength to full strength in the left knee 5/5 in order to perform ADLs and IADLs more efficiently  - Decrease Pain to  0-2/10 with all activities            Plan      Pt will be seen 2 times per week for 4-5 weeks. Recommended Treatment Plan will include:              AAROM/PROM exercise  Manual soft tissue and/or joint mobilization  Therapeutic Exercise              Individualized Home Exercise Program  Modalities:              Pt education:on HEP     Therapist: Darrel Stern, PT  I CERTIFY THE NEED FOR THESE SERVICES FURNISHED UNDER THIS PLAN OF TREATMENT AND WHILE UNDER MY CARE     Physician's comments: ________________________________________________________________________________________________________________________________________________     Physician's Name: ___________________________________

## 2018-03-05 ENCOUNTER — TELEPHONE (OUTPATIENT)
Dept: INTERNAL MEDICINE | Facility: CLINIC | Age: 63
End: 2018-03-05

## 2018-03-05 DIAGNOSIS — E11.65 UNCONTROLLED TYPE 2 DIABETES MELLITUS WITH HYPERGLYCEMIA, WITHOUT LONG-TERM CURRENT USE OF INSULIN: Primary | ICD-10-CM

## 2018-03-05 DIAGNOSIS — Q82.8 HYPERKERATOSIS OF PALMS AND SOLES: ICD-10-CM

## 2018-03-06 ENCOUNTER — CLINICAL SUPPORT (OUTPATIENT)
Dept: REHABILITATION | Facility: HOSPITAL | Age: 63
End: 2018-03-06
Attending: ORTHOPAEDIC SURGERY
Payer: MEDICARE

## 2018-03-06 DIAGNOSIS — M25.562 ACUTE PAIN OF LEFT KNEE: Primary | ICD-10-CM

## 2018-03-06 PROCEDURE — 97140 MANUAL THERAPY 1/> REGIONS: CPT

## 2018-03-06 PROCEDURE — 97110 THERAPEUTIC EXERCISES: CPT

## 2018-03-06 NOTE — PROGRESS NOTES
"[facility]   PATIENT PROGRESS NOTE    Date: 03/06/2018    Visit #: 2 of 20    Start Time:  1:08  Stop Time:  2:05    Evaluation Date: 2/27/18  Visit # authorized: n/a  Authorization period: n/a  Plan of care Expiration: n/a    History     History of Present Illness: s/p left knee menisectomy x 3 days   Onset Date: chronic left knee pain, "few years"  Date of Surgery:2/23/18  Primary Diagnosis: No diagnosis found.  Treatment Diagnosis: gait abnormality, difficulty walking, weakness  Past Medical History:   Diagnosis Date    Allergy     Brain aneurysm 2010    s/p coiling of one; another not coiled    Diabetes mellitus     Diabetes type 2, controlled     Fever blister     High cholesterol     History of Bell's palsy     HTN (hypertension) 5/20/2014     Precautions: WB as tolerated; ROM as tolerated  Prior Therapy: nil  Diagnostic Tests: n/a  Prior Level of Function: Independent  Sports/Recreational Activities: walking  Extremity Dominance: right  Functional Deficits Leading to Referral/Nature of Injury: decrease in functional mobility   Patient Therapy Goals: return to walking pain free    Subjective     Pt reports w/ minimal pn in L knee.      Objective     Posture: guarded, pain present               Gait: With AD.  Device Used -  Pick-up walker  Analysis:   Special Tests: n/a  Other: n/a  Treatment:      TREATMENT:    Quad sets 30x  SLR 3 x 8   Marching 1 min  Standing hip abd 30 x   Manual therapy 9 min  Patella mobilization all directions 8 min  Therex time: 45 min     PT reviewed FOTO scores for Alison Branch on 2/27/18  FOTO score: 60    Functional Limitations Reports - G Codes  Current CK 40%  Goal CJ 29%  Category: mobility  Tool: FOTO    Current ():  61  Goal (): 70  Discharge ():  n/a    Assessment   Pt per tx well w/ no c/o pn.  Pt displayed increased strength during therex w/ VCs for technique.  Pt edu on and instructed to cont HEP.  Cont to " progress as per.    Pt will benefit from physcial therapy services in order to maximize pain free and/or functional use of left knee. The following goals were discussed with the patient and patient is in agreement with them as to be addressed in the treatment plan. Pt was given a HEP consisting of wound care and functional mobility.. Pt verbally understood the instructions as they were given and demonstrated proper form and technique during therapy. Pt was advised to perform these exercises free of pain, and to stop performing them if pain occurs.         Initial Assessment (Pertinent finding, problem list and factors affecting outcome):   Medical necessity is demonstrated by the following problem list:   - Pain limits function of effected part for all activities  - Unable to participate in daily activities   - Requires skilled supervision to complete and progress HEP  - Fall risk - impaired balance   - Continued inability to participate in vocational pursuits      Rehab Potiential: excellent    Short Term Goals (4-5 Weeks):    - Pt will increase ROM to  Full motion in the left knee in order to perform ADL and IADLs   - Pt will increase strength to 4/5 in the left knee in order to perform ADLs and IADLs  - Decrease Pain to 2-3/10 with all activities   - Pt independent with HEP with progressions.     Long Term Goals (8-10 Weeks):   - Pt will increase strength to full strength in the left knee 5/5 in order to perform ADLs and IADLs more efficiently  - Decrease Pain to  0-2/10 with all activities         Plan     Pt will be seen 2 times per week for 4-5 weeks. Recommended Treatment Plan will include:   AAROM/PROM exercise  Manual soft tissue and/or joint mobilization  Therapeutic Exercise   Individualized Home Exercise Program  Modalities:   Pt education:on HEP    Therapist: Bayron Avilez PTA

## 2018-03-07 NOTE — PROGRESS NOTES
S:Alison Branch presents for post-operative evaluation.     DATE OF PROCEDURE: 2/23/2018      PROCEDURE PERFORMED:   Left  1. knee arthroscopic medial and lateral partial meniscectomies   2. knee arthroscopic chondroplasty  3. knee arthroscopic partial synovectomy/debridement          4. knee arthroscopic plica excision  5. knee arthroscopic loose body removal      Alison Branch reports to be doing very well 2wk s/p the above mentioned procedure. Accompanied by her daughter today. Denies fevers, chills, night sweats, chest pain, difficulty breathing, calf pain or tenderness. Presents today FWB without assistance; no longer using the walker. Has been in for PT 2x since surgery at the [place] location.  Her physical therapist is in clinic with us today.  Seeing good progress daily. Pre-surgery pain has predominately resolved.  Has d/c'd pain medication.     O: LLE - The incisions are healing well.  No signs of infection.  Sutures were removed. No significant pain or unusual tenderness.  Approximate 5-10° extensor lag.  Full passive extension.  Flexion to 115 degrees.     A/P: Arthroscopic pictures were reviewed with the patient and her daughter. Plan to follow the rehab plan as previously outlined. Discussed the importance of low-impact cardio exercises and weight loss over the long-term. RTC in 4 weeks. All questions answered.

## 2018-03-08 ENCOUNTER — OFFICE VISIT (OUTPATIENT)
Dept: SPORTS MEDICINE | Facility: CLINIC | Age: 63
End: 2018-03-08
Payer: MEDICARE

## 2018-03-08 VITALS
SYSTOLIC BLOOD PRESSURE: 150 MMHG | HEIGHT: 69 IN | HEART RATE: 60 BPM | WEIGHT: 214 LBS | BODY MASS INDEX: 31.7 KG/M2 | DIASTOLIC BLOOD PRESSURE: 74 MMHG

## 2018-03-08 DIAGNOSIS — Z47.89 SURGICAL AFTERCARE, MUSCULOSKELETAL SYSTEM: Primary | ICD-10-CM

## 2018-03-08 PROCEDURE — 99024 POSTOP FOLLOW-UP VISIT: CPT | Mod: S$GLB,,, | Performed by: ORTHOPAEDIC SURGERY

## 2018-03-08 PROCEDURE — 99999 PR PBB SHADOW E&M-EST. PATIENT-LVL III: CPT | Mod: PBBFAC,,, | Performed by: ORTHOPAEDIC SURGERY

## 2018-03-09 ENCOUNTER — CLINICAL SUPPORT (OUTPATIENT)
Dept: REHABILITATION | Facility: HOSPITAL | Age: 63
End: 2018-03-09
Attending: ORTHOPAEDIC SURGERY
Payer: MEDICARE

## 2018-03-09 ENCOUNTER — TELEPHONE (OUTPATIENT)
Dept: SPORTS MEDICINE | Facility: CLINIC | Age: 63
End: 2018-03-09

## 2018-03-09 DIAGNOSIS — Z47.89 SURGICAL AFTERCARE, MUSCULOSKELETAL SYSTEM: Primary | ICD-10-CM

## 2018-03-09 DIAGNOSIS — M25.562 ACUTE PAIN OF LEFT KNEE: Primary | ICD-10-CM

## 2018-03-09 PROCEDURE — 97110 THERAPEUTIC EXERCISES: CPT

## 2018-03-09 PROCEDURE — 97140 MANUAL THERAPY 1/> REGIONS: CPT

## 2018-03-09 NOTE — PROGRESS NOTES
"[facility]   PATIENT EVALUATION    Date: 03/09/2018    Visit #: 3 of 20    Start Time:  1100am  Stop Time:  1155am    Evaluation Date: 2/27/18  Visit # authorized: n/a  Authorization period: n/a  Plan of care Expiration: n/a    History     History of Present Illness: s/p left knee menisectomy x 3 days   Onset Date: chronic left knee pain, "few years"  Date of Surgery:2/23/18  Primary Diagnosis:   1. Acute pain of left knee       Treatment Diagnosis: gait abnormality, difficulty walking, weakness  Past Medical History:   Diagnosis Date    Allergy     Brain aneurysm 2010    s/p coiling of one; another not coiled    Diabetes mellitus     Diabetes type 2, controlled     Fever blister     High cholesterol     History of Bell's palsy     HTN (hypertension) 5/20/2014     Precautions: WB as tolerated; ROM as tolerated  Prior Therapy: nil  Diagnostic Tests: n/a  Prior Level of Function: Independent  Sports/Recreational Activities: walking  Extremity Dominance: right  Functional Deficits Leading to Referral/Nature of Injury: decrease in functional mobility   Patient Therapy Goals: return to walking pain free    Subjective     Alison Branch states that her knee is doing better.  She presents to the PT clinic ambulating without assist device.  Pt states that he walker "just gets in the way" and pt is afraid she may fall over it.      Pain:  Location: knee   Description: Throbbing  Activities Which Increase Pain: Standing  Activities Which Decrease Pain: pain medication  Pain Scale: 6/10 at best 4/10 now  7/10 at worst    Objective     Posture: guarded, pain present   Palpation: TTP at the surgical region  Sensation: decreased due to procedure and swelling   DTRs:  Range of Motion/Strength:  N/a, pt post op x 3 days.  Able to perform SLR with minimal lag; 5-90 degrees of motions.  More motion noted in seated position versus supine.           Treatment:      Therapeutic exercise 25 " min  Quad sets 30x  SLR w/ assist 15x  Marching 1 min  Standing hip abd 20x  Wt shifting behind table 2 min   Standing HS curl 10x   SLR-abduction 10x   Mini squats 10x   LAQ and SAQs 10x each     Manual therapy 8 min  Patella mobilization all directions   PROM 5-90; ankle talar mobs a/p p/a     Total one on one time:  45 min             Assessment   Pt tolerated activities nicely.  We initiated bike revolution today, and she was able to get to half revolutions.  Added SLR in sidelying and LAQs/SAQs to her HEP.  Also initiated mini squats and wt shifting exercises.  Pt ambulating without an assist device and gait is antalgic and somewhat unsteady.  Pt states that she may get a cane from her sister and it was advised that she try and get the cane if she isn't going to use her PUW.  Pt was also advised that she can utilize just one crutch as well.  Her HEP was updated with new exercises and pt was given the handout with pictures and instructions as well.          Initial Assessment (Pertinent finding, problem list and factors affecting outcome):   Medical necessity is demonstrated by the following problem list:   - Pain limits function of effected part for all activities  - Unable to participate in daily activities   - Requires skilled supervision to complete and progress HEP  - Fall risk - impaired balance   - Continued inability to participate in vocational pursuits      Rehab Potiential: excellent    Short Term Goals (4-5 Weeks):    - Pt will increase ROM to  Full motion in the left knee in order to perform ADL and IADLs   - Pt will increase strength to 4/5 in the left knee in order to perform ADLs and IADLs  - Decrease Pain to 2-3/10 with all activities   - Pt independent with HEP with progressions.     Long Term Goals (8-10 Weeks):   - Pt will increase strength to full strength in the left knee 5/5 in order to perform ADLs and IADLs more efficiently  - Decrease Pain to  0-2/10 with all activities         Plan      Continue physical therapy as planned.     Therapist: Darrel Stern PT  I CERTIFY THE NEED FOR THESE SERVICES FURNISHED UNDER THIS PLAN OF TREATMENT AND WHILE UNDER MY CARE    Physician's comments: ________________________________________________________________________________________________________________________________________________    Physician's Name: ___________________________________

## 2018-03-12 ENCOUNTER — CLINICAL SUPPORT (OUTPATIENT)
Dept: REHABILITATION | Facility: HOSPITAL | Age: 63
End: 2018-03-12
Attending: ORTHOPAEDIC SURGERY
Payer: MEDICARE

## 2018-03-12 DIAGNOSIS — M25.562 ACUTE PAIN OF LEFT KNEE: Primary | ICD-10-CM

## 2018-03-12 PROCEDURE — 97110 THERAPEUTIC EXERCISES: CPT

## 2018-03-12 PROCEDURE — 97140 MANUAL THERAPY 1/> REGIONS: CPT

## 2018-03-12 NOTE — PROGRESS NOTES
"[facility]   PATIENT EVALUATION    Date: 03/12/2018    Visit #: 3 of 20    Start Time:  900am  Stop Time:  955am    Evaluation Date: 2/27/18  Visit # authorized: n/a  Authorization period: n/a  Plan of care Expiration: n/a    History     History of Present Illness: s/p left knee menisectomy x 3 days   Onset Date: chronic left knee pain, "few years"  Date of Surgery:2/23/18  Primary Diagnosis:   1. Acute pain of left knee       Treatment Diagnosis: gait abnormality, difficulty walking, weakness  Past Medical History:   Diagnosis Date    Allergy     Brain aneurysm 2010    s/p coiling of one; another not coiled    Diabetes mellitus     Diabetes type 2, controlled     Fever blister     High cholesterol     History of Bell's palsy     HTN (hypertension) 5/20/2014     Precautions: WB as tolerated; ROM as tolerated  Prior Therapy: nil  Diagnostic Tests: n/a  Prior Level of Function: Independent  Sports/Recreational Activities: walking  Extremity Dominance: right  Functional Deficits Leading to Referral/Nature of Injury: decrease in functional mobility   Patient Therapy Goals: return to walking pain free    Subjective     Alison Branch states that her knee is sore today.  She notes that she will be getting a cane today to help with gait.  She does have a PUW, however this is assist device is inappropriate for her at this time.  Pt ambulating into the clinic with antalgic gait.      Pain:  Location: knee   Description: Throbbing  Activities Which Increase Pain: Standing  Activities Which Decrease Pain: pain medication  Pain Scale: 6/10 at best 4/10 now  7/10 at worst    Objective     Posture: guarded, pain present   Palpation: TTP at the surgical region  Sensation: decreased due to procedure and swelling   DTRs:  Range of Motion/Strength:  N/a, pt post op x 3 days.  Able to perform SLR with minimal lag; 5-90 degrees of motions.  More motion noted in seated position versus supine. "           Treatment:      Therapeutic exercise 32 min  Quad sets 30x  SLR w/ assist 30x  Marching 1 min  Bike 7 min with full revolution at 6 seat height   SAQ sets 30x 1.5#  Shuttle mVP DL 30x 3 cords, SL 15x 1 cord       Manual therapy 10 min  Patella mobilization all directions   Seated tibial A/p P/a at 90 degrees gd IV  PROM 5-90; ankle talar mobs a/p p/a     Total one on one time:  45 min             Assessment   Pt tolerated activities nicely.  Initiated shuttle leg press exercises today both SL and Dl. No issues with this exercise.  Pt does perform his exercises slow and deliberate, but with good technique.  Cueing again for heel strike during gait and also for sleeping positions.  Pt presents with -5 degrees of extension which she was educated on knee lag and sleeping positions.  Pt was sleeping with a pillow under knee post op.  Performed LLD stretch stretch with 5# on knee for end range extension in supine.        Initial Assessment (Pertinent finding, problem list and factors affecting outcome):   Medical necessity is demonstrated by the following problem list:   - Pain limits function of effected part for all activities  - Unable to participate in daily activities   - Requires skilled supervision to complete and progress HEP  - Fall risk - impaired balance   - Continued inability to participate in vocational pursuits      Rehab Potiential: excellent    Short Term Goals (4-5 Weeks):    - Pt will increase ROM to  Full motion in the left knee in order to perform ADL and IADLs   - Pt will increase strength to 4/5 in the left knee in order to perform ADLs and IADLs  - Decrease Pain to 2-3/10 with all activities   - Pt independent with HEP with progressions.     Long Term Goals (8-10 Weeks):   - Pt will increase strength to full strength in the left knee 5/5 in order to perform ADLs and IADLs more efficiently  - Decrease Pain to  0-2/10 with all activities         Plan     Continue physical therapy as  planned.     Therapist: ANY Coker CERTIFY THE NEED FOR THESE SERVICES FURNISHED UNDER THIS PLAN OF TREATMENT AND WHILE UNDER MY CARE    Physician's comments: ________________________________________________________________________________________________________________________________________________    Physician's Name: ___________________________________

## 2018-04-04 NOTE — PROGRESS NOTES
S:Alison Branch presents for post-operative evaluation.     DATE OF PROCEDURE: 2/23/2018      PROCEDURE PERFORMED:   Left  1. knee arthroscopic medial and lateral partial meniscectomies   2. knee arthroscopic chondroplasty  3. knee arthroscopic partial synovectomy/debridement          4. knee arthroscopic plica excision  5. knee arthroscopic loose body removal      Alison Branch reports to be doing well 6wk s/p the above mentioned procedure. Denies fevers, chills, night sweats, chest pain, difficulty breathing, calf pain or tenderness. Presents today FWB without assistance; no longer using the cane. Has not been in for therapy over the last 2 weeks due to an insurance issue, but has been doing a HEP. Has a visit scheduled for tomorrow morning at the [facility]. Seeing continued progress. Pre-surgery pain has predominately resolved.  Has d/c'd pain medication. SANE score 80.     O: LLE - The incisions are well healed.  No signs of infection. No significant pain or unusual tenderness. 2+ effusion. Approximate 5° extensor lag.  Full passive extension.  Flexion to 115 degrees. Quad actively fires.    A/P: Arthroscopic pictures were reviewed again with the patient.  Underlying tricompartmental degenerative changes present.  Aspiration today, 50cc - clear synovial fluid. Fit for a 3D knee sleeve. Plan to follow the rehab plan as previously outlined; will place new PT referral today to assure additional visits. Discussed the importance of low-impact cardio exercises and weight loss over the long-term. RTC in 6 weeks. All questions answered.

## 2018-04-05 ENCOUNTER — OFFICE VISIT (OUTPATIENT)
Dept: SPORTS MEDICINE | Facility: CLINIC | Age: 63
End: 2018-04-05
Payer: MEDICARE

## 2018-04-05 VITALS
SYSTOLIC BLOOD PRESSURE: 112 MMHG | BODY MASS INDEX: 31.7 KG/M2 | HEIGHT: 69 IN | DIASTOLIC BLOOD PRESSURE: 68 MMHG | HEART RATE: 67 BPM | WEIGHT: 214 LBS

## 2018-04-05 DIAGNOSIS — Z47.89 SURGICAL AFTERCARE, MUSCULOSKELETAL SYSTEM: Primary | ICD-10-CM

## 2018-04-05 DIAGNOSIS — M25.462 KNEE EFFUSION, LEFT: ICD-10-CM

## 2018-04-05 DIAGNOSIS — M25.562 LEFT KNEE PAIN, UNSPECIFIED CHRONICITY: ICD-10-CM

## 2018-04-05 PROCEDURE — 99499 UNLISTED E&M SERVICE: CPT | Mod: S$GLB,,, | Performed by: ORTHOPAEDIC SURGERY

## 2018-04-05 PROCEDURE — 99999 PR PBB SHADOW E&M-EST. PATIENT-LVL III: CPT | Mod: PBBFAC,,, | Performed by: ORTHOPAEDIC SURGERY

## 2018-04-05 PROCEDURE — 20610 DRAIN/INJ JOINT/BURSA W/O US: CPT | Mod: 58,LT,S$GLB, | Performed by: ORTHOPAEDIC SURGERY

## 2018-04-06 ENCOUNTER — CLINICAL SUPPORT (OUTPATIENT)
Dept: REHABILITATION | Facility: HOSPITAL | Age: 63
End: 2018-04-06
Attending: ORTHOPAEDIC SURGERY
Payer: MEDICARE

## 2018-04-06 DIAGNOSIS — M25.562 ACUTE PAIN OF LEFT KNEE: Primary | ICD-10-CM

## 2018-04-06 PROCEDURE — 97110 THERAPEUTIC EXERCISES: CPT

## 2018-04-06 PROCEDURE — 97140 MANUAL THERAPY 1/> REGIONS: CPT

## 2018-04-06 NOTE — PROGRESS NOTES
"[facility] PHYSICAL THERAP[facility]   PATIENT EVALUATION    Date: 04/06/2018    Visit #: 3 of 20    Start Time:  900am  Stop Time:  955am    Evaluation Date: 2/27/18  Visit # authorized: n/a  Authorization period: n/a  Plan of care Expiration: n/a    History     History of Present Illness: s/p left knee menisectomy x 3 days   Onset Date: chronic left knee pain, "few years"  Date of Surgery:2/23/18  Primary Diagnosis:   1. Acute pain of left knee       Treatment Diagnosis: gait abnormality, difficulty walking, weakness  Past Medical History:   Diagnosis Date    Allergy     Brain aneurysm 2010    s/p coiling of one; another not coiled    Diabetes mellitus     Diabetes type 2, controlled     Fever blister     High cholesterol     History of Bell's palsy     HTN (hypertension) 5/20/2014     Precautions: WB as tolerated; ROM as tolerated  Prior Therapy: nil  Diagnostic Tests: n/a  Prior Level of Function: Independent  Sports/Recreational Activities: walking  Extremity Dominance: right  Functional Deficits Leading to Referral/Nature of Injury: decrease in functional mobility   Patient Therapy Goals: return to walking pain free    Subjective     Alison Branch states that she hasn't been to therapy secondary to insurance issues, however she did have her knee drained yesterday by Dr. Brannon and still has some residual soreness from procedure.  Pt has been very worried about her knee due to lack of therapy.  States that she was having no pain prior to yesterdays procedure.  Explained to pt that it will help her with her exercises, the less fluid in knee.      Pain:  Location: knee   Description: Throbbing  Activities Which Increase Pain: Standing  Activities Which Decrease Pain: pain medication  Pain Scale: 6/10 at best 4/10 now  7/10 at worst    Objective     Posture: guarded, pain present   Palpation: TTP at the surgical region  Sensation: decreased due to procedure and swelling   DTRs:  Range of " Motion/Strength:  N/a, pt post op x 3 days.  Able to perform SLR with minimal lag; 5-90 degrees of motions.  More motion noted in seated position versus supine.           Treatment:      Therapeutic exercise 35 min  Quad sets 30x  SLR w/ assist 30x  Bike 10 min with full revolution at 6 seat height  Nustep 10 min    SAQ sets 30x 1.5#  Shuttle mVP DL 30x 3 cords, SL 15x 1 cord       Manual therapy 10 min  Patella mobilization all directions   Seated tibial A/p P/a at 90 degrees gd IV       Total one on one time:  45 min             Assessment   Pt tolerated activities nicely.  Pt has not been into therapy for 4 weeks and her knee has become stiff.  She has 10 degrees of extension lag in the left knee, however she states that her knee was drained yesterday and now is very sore.  She did have issues with sit to stand and with walking secondary to residual soreness from knee draining procedure.        Initial Assessment (Pertinent finding, problem list and factors affecting outcome):   Medical necessity is demonstrated by the following problem list:   - Pain limits function of effected part for all activities  - Unable to participate in daily activities   - Requires skilled supervision to complete and progress HEP  - Fall risk - impaired balance   - Continued inability to participate in vocational pursuits      Rehab Potiential: excellent    Short Term Goals (4-5 Weeks):    - Pt will increase ROM to  Full motion in the left knee in order to perform ADL and IADLs   - Pt will increase strength to 4/5 in the left knee in order to perform ADLs and IADLs  - Decrease Pain to 2-3/10 with all activities   - Pt independent with HEP with progressions.     Long Term Goals (8-10 Weeks):   - Pt will increase strength to full strength in the left knee 5/5 in order to perform ADLs and IADLs more efficiently  - Decrease Pain to  0-2/10 with all activities         Plan     Continue physical therapy as planned.     Therapist: Darrel ESTES  Jarred, PT  I CERTIFY THE NEED FOR THESE SERVICES FURNISHED UNDER THIS PLAN OF TREATMENT AND WHILE UNDER MY CARE    Physician's comments: ________________________________________________________________________________________________________________________________________________    Physician's Name: ___________________________________

## 2018-04-07 NOTE — PROCEDURES
Large Joint Aspiration/Injection  Date/Time: 4/5/2018 11:32 AM  Performed by: ESTELA ALBA  Authorized by: ESTELA ALBA     Consent Done?:  Yes (Verbal)  Indications:  Joint swelling  Procedure site marked: Yes    Timeout: Prior to procedure the correct patient, procedure, and site was verified      Location:  Knee  Site:  L knee  Prep: Patient was prepped and draped in usual sterile fashion    Needle size:  22 G  Approach:  Superior  Aspirate amount (ml):  50  Aspirate:  Clear and serous  Patient tolerance:  Patient tolerated the procedure well with no immediate complications

## 2018-04-13 ENCOUNTER — TELEPHONE (OUTPATIENT)
Dept: INTERNAL MEDICINE | Facility: CLINIC | Age: 63
End: 2018-04-13

## 2018-04-13 NOTE — TELEPHONE ENCOUNTER
----- Message from Fred Garcia sent at 4/12/2018  3:58 PM CDT -----  Contact: [facility]/[phone]  Office is calling because the pt is in need of a new DEQUAN Matrix glucose meter and they need it faxed over to their office. Fax number is [phone]. Please advise.      Thanks

## 2018-04-26 ENCOUNTER — TELEPHONE (OUTPATIENT)
Dept: SPORTS MEDICINE | Facility: CLINIC | Age: 63
End: 2018-04-26

## 2018-04-26 RX ORDER — FLUCONAZOLE 100 MG/1
TABLET ORAL
Qty: 3 TABLET | Refills: 1 | Status: SHIPPED | OUTPATIENT
Start: 2018-04-26 | End: 2018-07-31

## 2018-04-26 NOTE — TELEPHONE ENCOUNTER
Spoke to pt. Pt called in regards to authorization for PT appointments and swelling in the knee. Pt spoke to Joi. Pt instructed to elevate, use compression stocking, and ice. Pt denies any calf pain and tenderness.     ----- Message from Satnam Chacon sent at 4/26/2018 12:22 PM CDT -----  Contact: self  Patient ask for a call at  due to swelling in left leg

## 2018-05-04 DIAGNOSIS — E11.59 HYPERTENSION ASSOCIATED WITH DIABETES: ICD-10-CM

## 2018-05-04 DIAGNOSIS — I15.2 HYPERTENSION ASSOCIATED WITH DIABETES: ICD-10-CM

## 2018-05-04 RX ORDER — LOSARTAN POTASSIUM 50 MG/1
TABLET ORAL
Qty: 90 TABLET | Refills: 3 | Status: SHIPPED | OUTPATIENT
Start: 2018-05-04 | End: 2019-03-19

## 2018-05-07 RX ORDER — ATORVASTATIN CALCIUM 40 MG/1
TABLET, FILM COATED ORAL
Qty: 90 TABLET | Refills: 3 | Status: SHIPPED | OUTPATIENT
Start: 2018-05-07 | End: 2019-05-09 | Stop reason: SDUPTHER

## 2018-05-10 ENCOUNTER — CLINICAL SUPPORT (OUTPATIENT)
Dept: REHABILITATION | Facility: HOSPITAL | Age: 63
End: 2018-05-10
Attending: ORTHOPAEDIC SURGERY
Payer: MEDICARE

## 2018-05-10 DIAGNOSIS — M25.562 CHRONIC PAIN OF LEFT KNEE: ICD-10-CM

## 2018-05-10 DIAGNOSIS — G89.29 CHRONIC PAIN OF LEFT KNEE: ICD-10-CM

## 2018-05-10 PROCEDURE — 97110 THERAPEUTIC EXERCISES: CPT | Mod: PO

## 2018-05-10 NOTE — PROGRESS NOTES
"  [facility]   PATIENT EVALUATION    Date: 05/10/2018    Visit #: 4 of 20    Start Time:  900am  Stop Time:  1000am    Evaluation Date: 2/27/18  Visit # authorized: n/a  Authorization period: n/a  Plan of care Expiration: n/a     By Location/POS By Department    none ESTELA Min MD [facility]   Priority Start Date Expiration Date Referral Entered By   Routine 04/07/2018 05/15/2018 ESTELA Min MD   Visits Requested Visits Authorized Visits Completed Visits Scheduled   1 5 6 for 2018 1   Procedure Information     Procedure Modifiers Provider Requested Approved   [id number] - Ambulatory Referral to Physical/Occupational Therapy  Darrel Stern, PT 1 1   Diagnosis Information     Diagnosis   M25.562 (ICD-10-CM) - Left knee pain, unspecified chronicity         History   History of Present Illness: 2/23/18  1. knee arthroscopic medial and lateral partial meniscectomies   2. knee arthroscopic chondroplasty  3. knee arthroscopic partial synovectomy/debridement          4. knee arthroscopic plica excision  5. knee arthroscopic loose body removal      History     History of Present Illness: s/p left knee menisectomy 2/23/18  Onset Date: chronic left knee pain, "few years"  Date of Surgery:2/23/18  Treatment Diagnosis: gait abnormality, difficulty walking, weakness  Past Medical History:   Diagnosis Date    Allergy     Brain aneurysm 2010    s/p coiling of one; another not coiled    Diabetes mellitus     Diabetes type 2, controlled     Fever blister     High cholesterol     History of Bell's palsy     HTN (hypertension) 5/20/2014     Precautions: WB as tolerated; ROM as tolerated  Prior Therapy: nil  Diagnostic Tests: n/a  Prior Level of Function: Independent  Sports/Recreational Activities: walking  Extremity Dominance: right  Functional Deficits Leading to Referral/Nature of Injury: decrease in functional mobility   Patient Therapy " "Goals: return to walking pain free    Subjective   . "Girl, I am suffering and this knee doesn't work right!"  Pain:  Location: entire left knee   Description: deep  Activities Which Increase Pain: Standing  Activities Which Decrease Pain: pain medication  Pain Scale:8/10 at best 4/10   9/10 at worst    Objective     Posture: walks with left hip elevated  However by end of session she was able to achieve level pelvis.    Lumbar region hyperlordotic with protrusive abdomen  Palpation: TTP at entire left knee  Edema:  Moderate at lateral border of the knee. She is wearing a compression sleeve for the knee    Left knee ROM  Date:  5/10/18  POSITION: supine  AROM:        7-115  AAROM:      3-117    Treatment   Therapeutic exercises with individualized treatment by therapist for 45 min  Recumbent Bike 10 min level 3 with full revolution for ROM stim  Quad sets 2x10  SLR w/ VCx 2 x 10  SAQ sets 30x 1.5#  Gastroc stretches  On incline 3 x 20 sec  Hamstring stretches on steps 3 x 20 sec  Knee flexion stretches on steps 3 x20 sec  Leg press 2 x 10 100# 3 ways  Gait training with emphasis on correct posture and gait cycle-pt was able to walk without deviation aat conclusion of session    Manual therapy 10 min  Patella mobilization all directions   Seated tibial A/p P/a at 90 degrees gd IV     Education:  Pt advised to continue with initially provided by another PT HEP and to bring in the handout next session so I can review it. She expressed understandign and agreement.      Objective     Pt with slow progress following left knee medial and lateral arthroscopy in February.  She is very anxious and also does not seem to be capable of 'pushing herself' with activity and exercises.  She demonstrated decreased ROM and strength of the left knee which may be partially due to increased edema in the knee.  She will benefit from continued PT to address following concerns.      Medical necessity is demonstrated by the following problem " list:   - Pain limits function of effected part for all activities  - Unable to participate in daily activities   - Requires skilled supervision to complete and progress HEP  - Fall risk - impaired balance   - Continued inability to participate in vocational pursuits      Rehab Potiential: excellent    Short Term Goals (4-5 Weeks):  These have not been met as of 5/10/18 thus will be continued for an additional 4 weeks since she is with a different PT managing her rehab.  - Pt will increase ROM to  Full motion in the left knee in order to perform ADL and IADLs   - Pt will increase strength to 4/5 in the left knee in order to perform ADLs and IADLs  - Decrease Pain to 2-3/10 with all activities   - Pt independent with HEP with progressions.     Long Term Goals (8-10 Weeks):   continued  - Pt will increase strength to full strength in the left knee 5/5 in order to perform ADLs and IADLs more efficiently  - Decrease Pain to  0-2/10 with all activities         Plan     Continue physical therapy as planned.     Therapist: Sylvia Elizabeth, PT

## 2018-05-15 ENCOUNTER — CLINICAL SUPPORT (OUTPATIENT)
Dept: REHABILITATION | Facility: HOSPITAL | Age: 63
End: 2018-05-15
Attending: ORTHOPAEDIC SURGERY
Payer: MEDICARE

## 2018-05-15 DIAGNOSIS — G89.29 CHRONIC PAIN OF LEFT KNEE: ICD-10-CM

## 2018-05-15 DIAGNOSIS — M25.562 CHRONIC PAIN OF LEFT KNEE: ICD-10-CM

## 2018-05-15 PROCEDURE — 97110 THERAPEUTIC EXERCISES: CPT | Mod: PO

## 2018-05-15 NOTE — PROGRESS NOTES
"   PHYSICAL THERAPY       Date: 05/15/2018    Visit #: 7 of 20    Start Time: 815 am  Late arrival  Stop Time:  910am  325 billable minutes of one to one treatment    Evaluation Date: 2/27/18  Visit # authorized: n/a  Authorization period: n/a  Plan of care Expiration: n/a     By Location/POS By Department    none ESTELA Min MD [facility]   Priority Start Date Expiration Date Referral Entered By   Routine 04/07/2018 05/15/2018 ESTELA Min MD   Visits Requested Visits Authorized Visits Completed Visits Scheduled   1 5 7 for 2018 1   Procedure Information     Procedure Modifiers Provider Requested Approved   REF87 - Ambulatory Referral to Physical/Occupational Therapy  Darrel Stern, PT 1 1   Diagnosis Information     Diagnosis   M25.562 (ICD-10-CM) - Left knee pain, unspecified chronicity         History   History of Present Illness: 2/23/18  1. knee arthroscopic medial and lateral partial meniscectomies   2. knee arthroscopic chondroplasty  3. knee arthroscopic partial synovectomy/debridement          4. knee arthroscopic plica excision  5. knee arthroscopic loose body removal      History     History of Present Illness: s/p left knee menisectomy 2/23/18  Onset Date: chronic left knee pain, "few years"  Date of Surgery:2/23/18  Treatment Diagnosis: gait abnormality, difficulty walking, weakness  Past Medical History:   Diagnosis Date    Allergy     Brain aneurysm 2010    s/p coiling of one; another not coiled    Diabetes mellitus     Diabetes type 2, controlled     Fever blister     High cholesterol     History of Bell's palsy     HTN (hypertension) 5/20/2014     Precautions: WB as tolerated; ROM as tolerated  Prior Therapy: nil  Diagnostic Tests: n/a  Prior Level of Function: Independent  Sports/Recreational Activities: walking  Extremity Dominance: right  Functional Deficits Leading to Referral/Nature of Injury: decrease in functional mobility   Patient " Therapy Goals: return to walking pain free    Subjective   .This left knee is misery!  It keeps swelling so much!  Pain:  Location: entire left knee   Description: deep  Pain Scale:6/10 at arrival 4/10 at departure    Objective     Posture: antalgic gait with left knee extended   Palpation: TTP at entire left knee  Edema:  Mild to moderate confined to left knee globally    Date:  5/10/18  POSITION: supine  AROM:        7-115  AAROM:      3-117    Treatment   Therapeutic exercises with individualized treatment by therapist for 30 min  SLR w/ VCx 2 x 10 with 2#  SAQ sets 2 x 10 2#  Sidelying SLR 2 x 10 2#  Prone hip extension 2 x 10 2#  Prone hamstring curls 2 x 10 2#  Knee flexion stretches on steps 3 x20 sec  Leg press 2 x 10 100# 3 ways  Gait training with emphasis on correct posture and gait cycle    Performed independently  Recumbent Bike 10 min level 3 with full revolution for ROM stim  Quad sets 2x10  Gastroc stretches  On incline 3 x 20 sec  Hamstring stretches on steps 3 x 20 sec       Education:  Pt advised to continue with initially provided by another PT HEP and to bring in the handout next session so I can review it. She expressed understandign and agreement.      Objective     Pt made some progress today with exercises.  She self limits more due to fear of pain and then is surprised by her ability.  She needs maximal encouragement.     She demonstrated decreased ROM and strength of the left knee which may be partially due to increased edema in the knee.  She will benefit from continued PT to address following concerns.      Medical necessity is demonstrated by the following problem list:   - Pain limits function of effected part for all activities  - Unable to participate in daily activities   - Requires skilled supervision to complete and progress HEP  - Fall risk - impaired balance   - Continued inability to participate in vocational pursuits      Rehab Potiential: excellent    Short Term Goals (4-5  Weeks):  These have not been met as of 5/10/18 thus will be continued for an additional 4 weeks since she is with a different PT managing her rehab.  - Pt will increase ROM to  Full motion in the left knee in order to perform ADL and IADLs   - Pt will increase strength to 4/5 in the left knee in order to perform ADLs and IADLs  - Decrease Pain to 2-3/10 with all activities   - Pt independent with HEP with progressions.     Long Term Goals (8-10 Weeks):   continued  - Pt will increase strength to full strength in the left knee 5/5 in order to perform ADLs and IADLs more efficiently  - Decrease Pain to  0-2/10 with all activities         Plan     Continue physical therapy as planned.     Therapist: Sylvia Elizabeth, PT

## 2018-05-16 NOTE — PROGRESS NOTES
S:Alison Branch presents for post-operative evaluation.     DATE OF PROCEDURE: 2/23/2018      PROCEDURE PERFORMED:   Left  1. knee arthroscopic medial and lateral partial meniscectomies   2. knee arthroscopic chondroplasty  3. knee arthroscopic partial synovectomy/debridement          4. knee arthroscopic plica excision  5. knee arthroscopic loose body removal      Alison Branch reports to be doing well 12wk s/p the above mentioned procedure. Overall much better from preop.  The knee still feels stiff early in the day but improves with activity.  Occasional activity related intermittent soreness with mild swelling. SANE score 85.     O: LLE - No significant swelling.  Approximate 5° extensor lag with stiffness at terminal range.  Able to achieve full extension with over pressure.  Flexion to 130°.  No pain on range of motion.  No significant tenderness. Quad function improved.  Walks with a slight bent knee gait.    A/P:  Underlying tricompartmental degenerative changes present. Continue to wear 3D Sleeve as needed. Discussed the importance of low-impact cardio exercises and weight loss over the long-term.  Patient will reengage with physical therapy for terminal extension stretch and strengthening.  Gait will be improved once terminal extension is more easily achieved. RTC in 8 weeks. All questions answered.

## 2018-05-17 ENCOUNTER — OFFICE VISIT (OUTPATIENT)
Dept: SPORTS MEDICINE | Facility: CLINIC | Age: 63
End: 2018-05-17
Payer: MEDICARE

## 2018-05-17 VITALS
BODY MASS INDEX: 31.7 KG/M2 | HEIGHT: 69 IN | DIASTOLIC BLOOD PRESSURE: 80 MMHG | HEART RATE: 65 BPM | WEIGHT: 214 LBS | SYSTOLIC BLOOD PRESSURE: 140 MMHG

## 2018-05-17 DIAGNOSIS — Z47.89 SURGICAL AFTERCARE, MUSCULOSKELETAL SYSTEM: Primary | ICD-10-CM

## 2018-05-17 PROCEDURE — 99024 POSTOP FOLLOW-UP VISIT: CPT | Mod: S$GLB,,, | Performed by: ORTHOPAEDIC SURGERY

## 2018-05-17 PROCEDURE — 99999 PR PBB SHADOW E&M-EST. PATIENT-LVL III: CPT | Mod: PBBFAC,,, | Performed by: ORTHOPAEDIC SURGERY

## 2018-05-18 ENCOUNTER — CLINICAL SUPPORT (OUTPATIENT)
Dept: REHABILITATION | Facility: HOSPITAL | Age: 63
End: 2018-05-18
Attending: ORTHOPAEDIC SURGERY
Payer: MEDICARE

## 2018-05-18 DIAGNOSIS — G89.29 CHRONIC PAIN OF LEFT KNEE: ICD-10-CM

## 2018-05-18 DIAGNOSIS — M25.562 CHRONIC PAIN OF LEFT KNEE: ICD-10-CM

## 2018-05-18 PROCEDURE — 97110 THERAPEUTIC EXERCISES: CPT | Mod: PO

## 2018-05-18 NOTE — PROGRESS NOTES
"   PHYSICAL THERAPY       Date: 05/18/2018    Visit #: 9of 20    Start Time: 915 am  Late arrival  Stop Time:  1000  45billable minutes of one to one treatment    Evaluation Date: 2/27/18  Visit # authorized: n/a  Authorization period: n/a  Plan of care Expiration: n/a     By Location/POS By Department    none ESTELA Min MD [facility]   Priority Start Date Expiration Date Referral Entered By   Routine 04/07/2018 05/15/2018 ESTELA Min MD   Visits Requested Visits Authorized Visits Completed Visits Scheduled   1 5 9 for 2018 1   Procedure Information     Diagnosis   M25.562 (ICD-10-CM) - Left knee pain, unspecified chronicity         History   History of Present Illness: 2/23/18  1. knee arthroscopic medial and lateral partial meniscectomies   2. knee arthroscopic chondroplasty  3. knee arthroscopic partial synovectomy/debridement          4. knee arthroscopic plica excision  5. knee arthroscopic loose body removal      History     History of Present Illness: s/p left knee menisectomy 2/23/18  Onset Date: chronic left knee pain, "few years"  Date of Surgery:2/23/18  Treatment Diagnosis: gait abnormality, difficulty walking, weakness  Precautions: WB as tolerated; ROM as tolerated  Patient Therapy Goals: return to walking pain free    Subjective   Lets go! I feel great and no pain in the knee!    Pain Scale:0/10 at arrival 0/10 at departure    Objective     Posture: normal gait pattern without deviation  Edema:  Mild to left knee globally    Date:  5/10/18                             5/18/18  POSITION: supine  AROM:        7-115                           5-119  AAROM:      3-117                           3-121 with pain    Treatment   Therapeutic exercises with individualized treatment by therapist [email or web address]  SLR w/ VCx 2 x 10 with 2#  SAQ sets 2 x 10 2#  VMO SLR 2 x 10 2#  Sidelying SLR 2 x 10 2#  Prone hip extension 2 x 10 2#  Prone hamstring curls 2 x 10 2#  Knee " flexion stretches on steps 3 x20 sec  Gastroc stretch on incline 3 x20 sec  Hamstring stretch on shane[s 3 x20 sec  Leg press 2 x 10 120# 3 ways      Performed independently  Recumbent Bike 8 min level 6 with full revolution     Education:  Pt advised to continue with  HEP She expressed understandign and agreement.      Assessment       Pt making good progress .  She did not self limit or require  coaxing to exercise today   She still needs to work on strength and ROM but I do not anticipate that she will require more than 3 or 4 additional PT sessions to achieve goals.      She will benefit from continued PT to address following concerns.      Medical necessity is demonstrated by the following problem list:   - Pain limits function of effected part for all activities  - Unable to participate in daily activities   - Requires skilled supervision to complete and progress HEP  - Fall risk - impaired balance   - Continued inability to participate in vocational pursuits      Rehab Potiential: excellent    Short Term Goals (4-5 Weeks):  These have not been met as of 5/10/18 thus will be continued for an additional 4 weeks since she is with a different PT managing her rehab.  - Pt will increase ROM to  Full motion in the left knee in order to perform ADL and IADLs   - Pt will increase strength to 4/5 in the left knee in order to perform ADLs and IADLs  - Decrease Pain to 2-3/10 with all activities   - Pt independent with HEP with progressions.     Long Term Goals (8-10 Weeks):   continued  - Pt will increase strength to full strength in the left knee 5/5 in order to perform ADLs and IADLs more efficiently  - Decrease Pain to  0-2/10 with all activities         Plan     Continue physical therapy as planned.     Therapist: Sylvia Elizabeth, PT

## 2018-07-03 ENCOUNTER — DOCUMENTATION ONLY (OUTPATIENT)
Dept: REHABILITATION | Facility: HOSPITAL | Age: 63
End: 2018-07-03

## 2018-07-03 NOTE — PROGRESS NOTES
PHYSICAL THERAPY  Discharge  Date of Discharge 7/3/2018    Name: Alison JIMENEZ [facility] #: [id number]    Pt was seen in Physical Therapy for initial evaluation on:2/27/18  Pt's last date of treatment in Physical Therapy was:5/18/18  Number of treatment sessions completed: 8  Reason for discharge:    self discharge / did not return for follow up appointments  Status at Discharge:  Goals not met     Discharge update in functional G code not available due to unplanned discharge.

## 2018-07-10 ENCOUNTER — OFFICE VISIT (OUTPATIENT)
Dept: SPORTS MEDICINE | Facility: CLINIC | Age: 63
End: 2018-07-10
Payer: MEDICARE

## 2018-07-10 VITALS
HEIGHT: 69 IN | WEIGHT: 214 LBS | SYSTOLIC BLOOD PRESSURE: 131 MMHG | DIASTOLIC BLOOD PRESSURE: 74 MMHG | BODY MASS INDEX: 31.7 KG/M2 | HEART RATE: 62 BPM

## 2018-07-10 DIAGNOSIS — M25.662 KNEE STIFFNESS, LEFT: Primary | ICD-10-CM

## 2018-07-10 PROCEDURE — 99213 OFFICE O/P EST LOW 20 MIN: CPT | Mod: S$GLB,,, | Performed by: ORTHOPAEDIC SURGERY

## 2018-07-10 PROCEDURE — 99999 PR PBB SHADOW E&M-EST. PATIENT-LVL III: CPT | Mod: PBBFAC,,, | Performed by: ORTHOPAEDIC SURGERY

## 2018-07-10 PROCEDURE — 3075F SYST BP GE 130 - 139MM HG: CPT | Mod: CPTII,S$GLB,, | Performed by: ORTHOPAEDIC SURGERY

## 2018-07-10 PROCEDURE — 3008F BODY MASS INDEX DOCD: CPT | Mod: CPTII,S$GLB,, | Performed by: ORTHOPAEDIC SURGERY

## 2018-07-10 PROCEDURE — 3078F DIAST BP <80 MM HG: CPT | Mod: CPTII,S$GLB,, | Performed by: ORTHOPAEDIC SURGERY

## 2018-07-10 NOTE — PROGRESS NOTES
"CC: LEFT knee pain    DATE OF PROCEDURE: 2/23/2018   PROCEDURE PERFORMED:   Left  1. knee arthroscopic medial and lateral partial meniscectomies   2. knee arthroscopic chondroplasty  3. knee arthroscopic partial synovectomy/debridement          4. knee arthroscopic plica excision  5. knee arthroscopic loose body removal      Alison Branch reports to be doing well 4.5 mos s/p the above mentioned procedure. Overall much better from preop. D/c'd therapy. Patient had a slip and fall 3 week ago with some recurrent pain, pain has since resolved and states she is back on track. No recurrent effusions. Admits to not doing as much as she should as far as low impact cardio and strengthening exercises. SANE score 85-90.     Pain Score: 0-No pain    REVIEW OF SYSTEMS:   Constitution: Negative. Negative for chills, fever and night sweats.    Hematologic/Lymphatic: Negative for bleeding problem. Does not bruise/bleed easily.   Skin: Negative for dry skin, itching and rash.   Musculoskeletal: Negative for falls. Positive for left knee muscle weakness.     PAST MEDICAL HISTORY:   Past Medical History:   Diagnosis Date    Allergy     Brain aneurysm 2010    s/p coiling of one; another not coiled    Diabetes mellitus     Diabetes type 2, controlled     Fever blister     High cholesterol     History of Bell's palsy     HTN (hypertension) 5/20/2014       PAST SURGICAL HISTORY:   Past Surgical History:   Procedure Laterality Date    BREAST CYST ASPIRATION      CERVICAL FUSION      COLONOSCOPY N/A 3/9/2016    Procedure: COLONOSCOPY;  Surgeon: Elliott Zimmerman MD;  Location: [address]);  Service: Endoscopy;  Laterality: N/A;    head surgery      stent and "curling" for aneurysm    HYSTERECTOMY      TVH secondary to SUF       FAMILY HISTORY:   Family History   Problem Relation Age of Onset    Rheum arthritis Father     Diabetes Father     Heart failure Father     Migraines Father     Cataracts Father     Cancer " Mother 63        pancreatic    Stomach cancer Mother     Breast cancer Maternal Aunt         50s    Ovarian cancer Cousin     Diabetes Sister     Diabetes Brother     Cancer Maternal Aunt         Lung CA    Melanoma Neg Hx     Colon cancer Neg Hx     Amblyopia Neg Hx     Blindness Neg Hx     Glaucoma Neg Hx     Macular degeneration Neg Hx     Retinal detachment Neg Hx     Strabismus Neg Hx     Stroke Neg Hx     Thyroid disease Neg Hx        SOCIAL HISTORY:   Social History     Social History    Marital status: Single     Spouse name: N/A    Number of children: N/A    Years of education: N/A     Occupational History    Student      Unemployed     Social History Main Topics    Smoking status: Former Smoker     Packs/day: 2.00     Years: 30.00     Quit date: 1/1/1999    Smokeless tobacco: Never Used    Alcohol use No    Drug use: No    Sexual activity: No     Other Topics Concern    Are You Pregnant Or Think You May Be? No    Breast-Feeding No     Social History Narrative    No narrative on file     MEDICATIONS:     Current Outpatient Prescriptions:     albuterol 90 mcg/actuation inhaler, Inhale 2 puffs into the lungs every 6 (six) hours as needed for Wheezing. Rescue, Disp: 18 g, Rfl: 1    aspirin (ECOTRIN) 81 MG EC tablet, Take 1 tablet (81 mg total) by mouth once daily., Disp: , Rfl: 0    atorvastatin (LIPITOR) 40 MG tablet, TAKE ONE TABLET BY MOUTH ONCE DAILY, Disp: 90 tablet, Rfl: 3    blood sugar diagnostic Strp, 1 strip by Misc.(Non-Drug; Combo Route) route once daily. Heladio Result.  250.02.  Check Blood Sugar Twice Daily., Disp: 200 each, Rfl: 3    blood-glucose meter kit, Use as instructed, Disp: 1 each, Rfl: 0    ergocalciferol (ERGOCALCIFEROL) 50,000 unit Cap, Take 1 capsule (50,000 Units total) by mouth every 7 days., Disp: 12 capsule, Rfl: 3    escitalopram oxalate (LEXAPRO) 10 MG tablet, Take 10 mg by mouth once daily., Disp: , Rfl:     fluconazole (DIFLUCAN) 100 MG  "tablet, TAKE ONE TABLET BY MOUTH ONCE DAILY, Disp: 3 tablet, Rfl: 1    glipiZIDE (GLUCOTROL) 10 MG TR24, Take 1 tablet (10 mg total) by mouth 2 (two) times daily., Disp: 180 tablet, Rfl: 3    ketoconazole (NIZORAL) 2 % cream, Apply topically 2 (two) times daily., Disp: 60 g, Rfl: 2    lancets Misc, 1 Device by Misc.(Non-Drug; Combo Route) route once daily. Heladio Result.  250.02.  Check Blood Sugar Twice Daily., Disp: 200 each, Rfl: 3    losartan (COZAAR) 50 MG tablet, TAKE ONE TABLET BY MOUTH ONCE DAILY, Disp: 90 tablet, Rfl: 3    oxyCODONE-acetaminophen (PERCOCET) 5-325 mg per tablet, Take 1 tablet by mouth every 6 (six) hours as needed for Pain., Disp: 30 tablet, Rfl: 0    SITagliptin (JANUVIA) 100 MG Tab, Take 1 tablet (100 mg total) by mouth once daily., Disp: 90 tablet, Rfl: 3    ALLERGIES:   Review of patient's allergies indicates:   Allergen Reactions    Metformin      diarrhea        PHYSICAL EXAMINATION:  /74   Pulse 62   Ht 5' 9" (1.753 m)   Wt 97.1 kg (214 lb)   BMI 31.60 kg/m²   General: Well-developed well-nourished 62 y.o. femalein no acute distress   Cardiovascular: Regular rhythm by palpation of distal pulse, normal color and temperature, no concerning varicosities on symptomatic side   Lungs: No labored breathing or wheezing appreciated   Neuro: Alert and oriented ×3   Psychiatric: well oriented to person, place and time, demonstrates normal mood and affect   Skin: No rashes, lesions or ulcers, normal temperature, turgor, and texture on involved extremity    Ortho/SPM Exam  LLE - No significant swelling. Active flexion to 0-130°.  No pain on range of motion.  No significant tenderness. Quad function improved.      IMAGING: No new images indicated today.     ASSESSMENT:      ICD-10-CM ICD-9-CM   1. Knee stiffness, left M25.662 719.56       PLAN:     -Patient doing well, SANE 85-90  -Underlying tricompartmental degenerative changes present, continue to wear 3D Sleeve as needed. She " understands the inherent risk for continued or recurrent problems related to her arthritis. Discussed the need for weight loss and low-impact cardio exercise.  -RTC as needed.   -All questions answered      Procedures

## 2018-07-12 ENCOUNTER — LAB VISIT (OUTPATIENT)
Dept: LAB | Facility: HOSPITAL | Age: 63
End: 2018-07-12
Attending: INTERNAL MEDICINE
Payer: MEDICARE

## 2018-07-12 ENCOUNTER — OFFICE VISIT (OUTPATIENT)
Dept: INTERNAL MEDICINE | Facility: CLINIC | Age: 63
End: 2018-07-12
Payer: MEDICARE

## 2018-07-12 VITALS
DIASTOLIC BLOOD PRESSURE: 82 MMHG | SYSTOLIC BLOOD PRESSURE: 126 MMHG | HEART RATE: 70 BPM | HEIGHT: 70 IN | BODY MASS INDEX: 31.37 KG/M2 | WEIGHT: 219.13 LBS

## 2018-07-12 DIAGNOSIS — R10.9 ABDOMINAL PAIN, UNSPECIFIED ABDOMINAL LOCATION: ICD-10-CM

## 2018-07-12 DIAGNOSIS — Z12.39 SCREENING FOR BREAST CANCER: ICD-10-CM

## 2018-07-12 DIAGNOSIS — E11.65 UNCONTROLLED TYPE 2 DIABETES MELLITUS WITH HYPERGLYCEMIA, WITHOUT LONG-TERM CURRENT USE OF INSULIN: ICD-10-CM

## 2018-07-12 DIAGNOSIS — G89.29 CHRONIC PAIN OF LEFT KNEE: ICD-10-CM

## 2018-07-12 DIAGNOSIS — I15.2 HYPERTENSION ASSOCIATED WITH DIABETES: ICD-10-CM

## 2018-07-12 DIAGNOSIS — E11.59 HYPERTENSION ASSOCIATED WITH DIABETES: ICD-10-CM

## 2018-07-12 DIAGNOSIS — M25.562 CHRONIC PAIN OF LEFT KNEE: ICD-10-CM

## 2018-07-12 DIAGNOSIS — E11.65 UNCONTROLLED TYPE 2 DIABETES MELLITUS WITH HYPERGLYCEMIA, WITHOUT LONG-TERM CURRENT USE OF INSULIN: Primary | ICD-10-CM

## 2018-07-12 LAB
ALBUMIN SERPL BCP-MCNC: 4.1 G/DL
ALP SERPL-CCNC: 80 U/L
ALT SERPL W/O P-5'-P-CCNC: 14 U/L
ANION GAP SERPL CALC-SCNC: 7 MMOL/L
AST SERPL-CCNC: 13 U/L
BILIRUB SERPL-MCNC: 0.6 MG/DL
BUN SERPL-MCNC: 14 MG/DL
CALCIUM SERPL-MCNC: 10 MG/DL
CHLORIDE SERPL-SCNC: 104 MMOL/L
CO2 SERPL-SCNC: 28 MMOL/L
CREAT SERPL-MCNC: 0.8 MG/DL
EST. GFR  (AFRICAN AMERICAN): >60 ML/MIN/1.73 M^2
EST. GFR  (NON AFRICAN AMERICAN): >60 ML/MIN/1.73 M^2
ESTIMATED AVG GLUCOSE: 192 MG/DL
GLUCOSE SERPL-MCNC: 158 MG/DL
HBA1C MFR BLD HPLC: 8.3 %
POTASSIUM SERPL-SCNC: 4.3 MMOL/L
PROT SERPL-MCNC: 7.4 G/DL
SODIUM SERPL-SCNC: 139 MMOL/L

## 2018-07-12 PROCEDURE — 3079F DIAST BP 80-89 MM HG: CPT | Mod: CPTII,S$GLB,, | Performed by: INTERNAL MEDICINE

## 2018-07-12 PROCEDURE — 80053 COMPREHEN METABOLIC PANEL: CPT

## 2018-07-12 PROCEDURE — 36415 COLL VENOUS BLD VENIPUNCTURE: CPT

## 2018-07-12 PROCEDURE — 3008F BODY MASS INDEX DOCD: CPT | Mod: CPTII,S$GLB,, | Performed by: INTERNAL MEDICINE

## 2018-07-12 PROCEDURE — 3045F PR MOST RECENT HEMOGLOBIN A1C LEVEL 7.0-9.0%: CPT | Mod: CPTII,S$GLB,, | Performed by: INTERNAL MEDICINE

## 2018-07-12 PROCEDURE — 83036 HEMOGLOBIN GLYCOSYLATED A1C: CPT

## 2018-07-12 PROCEDURE — 99214 OFFICE O/P EST MOD 30 MIN: CPT | Mod: S$GLB,,, | Performed by: INTERNAL MEDICINE

## 2018-07-12 PROCEDURE — 3074F SYST BP LT 130 MM HG: CPT | Mod: CPTII,S$GLB,, | Performed by: INTERNAL MEDICINE

## 2018-07-12 PROCEDURE — 99999 PR PBB SHADOW E&M-EST. PATIENT-LVL III: CPT | Mod: PBBFAC,,, | Performed by: INTERNAL MEDICINE

## 2018-07-12 NOTE — PROGRESS NOTES
Subjective:       Patient ID: Alison Branch is a 62 y.o. female.    Chief Complaint: Diabetes    HPI - Ms Branch couldn't afford januvia and has been out of it for two months.  She'll restart with this paycheck.  She knows A1C will be too high.  She's due for mammogram next month.  She had left knee meniscectomy 5 months ago and feels much better.  She has gained some weight and is going to start working on that.  Gas pains on right side of abdomen; not bothering her at the moment.  Due for diabetic foot exam, but o/w up to date.  She's not a cigarette smoker.    PMH:  DM2, nonadherent again  HTN, at goal  Brain aneurysm s/p coiling  HLP, on a statin  ADD  Obesity  Chronic cough  Left knee pain     Meds:  Reviewed and reconciled in EPIC with patient during visit today.    Review of Systems   Constitutional: Negative for fever.   HENT: Negative for congestion.    Respiratory: Negative for shortness of breath.    Cardiovascular: Negative for chest pain.   Gastrointestinal: Positive for abdominal pain.   Genitourinary: Negative for difficulty urinating.   Musculoskeletal: Positive for arthralgias.   Skin: Negative for rash.   Neurological: Negative for dizziness.   Psychiatric/Behavioral: Negative for sleep disturbance.       Objective:      Physical Exam   Constitutional: She is oriented to person, place, and time. She appears well-developed and well-nourished.   HENT:   Head: Normocephalic and atraumatic.   Cardiovascular: Normal rate, regular rhythm and normal heart sounds.  Exam reveals no gallop and no friction rub.    No murmur heard.  Pulses:       Dorsalis pedis pulses are 1+ on the right side, and 2+ on the left side.   Pulmonary/Chest: Effort normal and breath sounds normal. No respiratory distress. She has no wheezes. She has no rales. She exhibits no tenderness.   Musculoskeletal:        Right foot: There is normal range of motion and no deformity.        Left foot: There is no deformity.   Feet:   Right  Foot:   Protective Sensation: 4 sites tested. 4 sites sensed.   Skin Integrity: Negative for ulcer, blister or skin breakdown.   Left Foot:   Protective Sensation: 4 sites tested. 4 sites sensed.   Skin Integrity: Negative for ulcer, blister or skin breakdown.   Neurological: She is alert and oriented to person, place, and time.   Skin: Skin is warm and dry. No erythema.   Psychiatric: She has a normal mood and affect.   Nursing note and vitals reviewed.      Assessment:       1. Uncontrolled type 2 diabetes mellitus with hyperglycemia, without long-term current use of insulin    2. Hypertension associated with diabetes    3. Chronic pain of left knee    4. Screening for breast cancer    5. Abdominal pain, unspecified abdominal location        Plan:       Alison was seen today for diabetes.    Diagnoses and all orders for this visit:    Uncontrolled type 2 diabetes mellitus with hyperglycemia, without long-term current use of insulin - unstable off meds.  Anticipating poor control today with labs.  Encouraged adherence; can't get this cost any lower  -     HEMOGLOBIN A1C; Future  -     Comprehensive metabolic panel; Future    Hypertension associated with diabetes - at goal, stay the course    Chronic pain of left knee - improved s/p surgery and PT.  Stay the course    Screening for breast cancer  -     Mammo Digital Screening Bilat with CAD; Future    Abdominal pain, unspecified abdominal location - new complaint for me.  Seems quiescent.  Provided reassurance; will observe    rtc prn, or in 3 months    JUVE Rodriguez MD MPH  Staff Internist

## 2018-07-31 ENCOUNTER — PATIENT MESSAGE (OUTPATIENT)
Dept: INTERNAL MEDICINE | Facility: CLINIC | Age: 63
End: 2018-07-31

## 2018-07-31 DIAGNOSIS — J18.9 PNEUMONIA DUE TO INFECTIOUS ORGANISM, UNSPECIFIED LATERALITY, UNSPECIFIED PART OF LUNG: Primary | ICD-10-CM

## 2018-07-31 RX ORDER — AZITHROMYCIN 250 MG/1
TABLET, FILM COATED ORAL
Qty: 6 TABLET | Refills: 0 | Status: SHIPPED | OUTPATIENT
Start: 2018-07-31 | End: 2018-08-05

## 2018-07-31 RX ORDER — FLUCONAZOLE 150 MG/1
150 TABLET ORAL DAILY
Qty: 1 TABLET | Refills: 0 | Status: SHIPPED | OUTPATIENT
Start: 2018-07-31 | End: 2018-08-01

## 2018-08-01 ENCOUNTER — TELEPHONE (OUTPATIENT)
Dept: INTERNAL MEDICINE | Facility: CLINIC | Age: 63
End: 2018-08-01

## 2018-08-01 NOTE — TELEPHONE ENCOUNTER
Message left for pt to call office to notify pt that prescription has been called into pharmacy since yesterday

## 2018-08-01 NOTE — TELEPHONE ENCOUNTER
----- Message from Alfredo Ortiz sent at 7/31/2018  4:40 PM CDT -----  Contact: Patient [phone]  Patient stating is having bad Sinus infection requesting Antibiotics to be sent to pharmacy below please. Patient requesting call to inform Rx has been sent.    Pharmacy: [facility] [id number] [place] [address] [address] AT [facility] [phone] (Phone) [phone] (Fax    Please call an advise  Thank you

## 2018-08-02 ENCOUNTER — TELEPHONE (OUTPATIENT)
Dept: INTERNAL MEDICINE | Facility: CLINIC | Age: 63
End: 2018-08-02

## 2018-08-02 NOTE — TELEPHONE ENCOUNTER
----- Message from Lilia Kraft sent at 8/2/2018  8:44 AM CDT -----  Contact: self/[phone]  Patient is returning a phone call.  Who left a message for the patient: Luan  Does patient know what this is regarding:  prescription  Comments: thank you!!

## 2018-08-24 ENCOUNTER — PATIENT MESSAGE (OUTPATIENT)
Dept: INTERNAL MEDICINE | Facility: CLINIC | Age: 63
End: 2018-08-24

## 2018-08-24 DIAGNOSIS — E11.9 DIABETES MELLITUS, TYPE II: ICD-10-CM

## 2018-08-24 RX ORDER — LANCETS
1 EACH MISCELLANEOUS DAILY
Qty: 200 EACH | Refills: 3 | Status: ON HOLD | OUTPATIENT
Start: 2018-08-24 | End: 2021-05-26 | Stop reason: HOSPADM

## 2018-09-07 ENCOUNTER — HOSPITAL ENCOUNTER (OUTPATIENT)
Dept: RADIOLOGY | Facility: HOSPITAL | Age: 63
Discharge: HOME | End: 2018-09-07
Attending: INTERNAL MEDICINE
Payer: MEDICARE

## 2018-09-07 DIAGNOSIS — Z12.39 SCREENING FOR BREAST CANCER: ICD-10-CM

## 2018-09-07 PROCEDURE — 77067 SCR MAMMO BI INCL CAD: CPT | Mod: 26,,, | Performed by: RADIOLOGY

## 2018-09-07 PROCEDURE — 77063 BREAST TOMOSYNTHESIS BI: CPT | Mod: TC

## 2018-09-07 PROCEDURE — 77063 BREAST TOMOSYNTHESIS BI: CPT | Mod: 26,,, | Performed by: RADIOLOGY

## 2018-09-26 ENCOUNTER — OFFICE VISIT (OUTPATIENT)
Dept: OPTOMETRY | Facility: CLINIC | Age: 63
End: 2018-09-26
Payer: MEDICARE

## 2018-09-26 DIAGNOSIS — Z46.0 FITTING AND ADJUSTMENT OF SPECTACLES AND CONTACT LENSES: Primary | ICD-10-CM

## 2018-09-26 DIAGNOSIS — E11.9 DIABETES MELLITUS TYPE 2 WITHOUT RETINOPATHY: Primary | ICD-10-CM

## 2018-09-26 DIAGNOSIS — H25.13 NUCLEAR SCLEROTIC CATARACT OF BOTH EYES: ICD-10-CM

## 2018-09-26 DIAGNOSIS — H52.03 HYPERMETROPIA OF BOTH EYES: ICD-10-CM

## 2018-09-26 PROCEDURE — 99499 UNLISTED E&M SERVICE: CPT | Mod: S$GLB,,, | Performed by: OPTOMETRIST

## 2018-09-26 PROCEDURE — 99999 PR PBB SHADOW E&M-EST. PATIENT-LVL I: CPT | Mod: PBBFAC,,, | Performed by: OPTOMETRIST

## 2018-09-26 PROCEDURE — 99999 PR PBB SHADOW E&M-EST. PATIENT-LVL II: CPT | Mod: PBBFAC,,, | Performed by: OPTOMETRIST

## 2018-09-26 PROCEDURE — 99211 OFF/OP EST MAY X REQ PHY/QHP: CPT | Mod: PBBFAC | Performed by: OPTOMETRIST

## 2018-09-26 PROCEDURE — 92014 COMPRE OPH EXAM EST PT 1/>: CPT | Mod: S$PBB,,, | Performed by: OPTOMETRIST

## 2018-09-26 PROCEDURE — 92015 DETERMINE REFRACTIVE STATE: CPT | Mod: ,,, | Performed by: OPTOMETRIST

## 2018-09-26 PROCEDURE — 92310 CONTACT LENS FITTING OU: CPT | Mod: ,,, | Performed by: OPTOMETRIST

## 2018-09-26 PROCEDURE — 99212 OFFICE O/P EST SF 10 MIN: CPT | Mod: PBBFAC,27 | Performed by: OPTOMETRIST

## 2018-09-26 RX ORDER — DEXTROAMPHETAMINE SACCHARATE, AMPHETAMINE ASPARTATE, DEXTROAMPHETAMINE SULFATE AND AMPHETAMINE SULFATE 2.5; 2.5; 2.5; 2.5 MG/1; MG/1; MG/1; MG/1
10 TABLET ORAL
COMMUNITY
End: 2018-10-30

## 2018-09-26 RX ORDER — PRAVASTATIN SODIUM 40 MG/1
40 TABLET ORAL
COMMUNITY
End: 2018-10-30

## 2018-09-26 RX ORDER — LIDOCAINE 50 MG/G
1 PATCH TOPICAL
Status: ON HOLD | COMMUNITY
End: 2019-07-08

## 2018-09-26 RX ORDER — BISACODYL 5 MG
5 TABLET, DELAYED RELEASE (ENTERIC COATED) ORAL
COMMUNITY
End: 2018-10-30

## 2018-09-26 NOTE — PROGRESS NOTES
HPI     Last Eye Exam: 2017 at [facility] in [facility].    Pt here for routine eye exam CL fitting.  Poor fit so can't wear, poor comfort, falls out of right eye    CONTACT LENSES:  Currently wears aquaclear od / enfilcon A toric os.  Right CL always fall to the nasal corner of eye.  CLs don't fit right per pt--due to this, pt wears spectacles more often.  Solution: Refresh  Disposed of weekly.    GENERAL EYE SYMPTOMS:  (--) Eye pain/discomfort  (--) Blurry Vision -- pt denies blurry vision w/ spectacles/contacts.  (--) Double Vision  (--) Itchy Eyes  (--) Watery Eyes  (+) Dry Eyes  (+) Floaters/Black Spots off/on; started in 2014 od > os.  (--) Headaches  (+) Photophobia more often at night.  ---------------------------------------------------------------------------    EYE MEDS:  none  ---------------------------------------------------------------------------    LENSES:  Glasses: PALs  Contacts: aquaclear od / enfilcon A toric os.      Last edited by Jaquan Machado, OD on 9/26/2018  3:23 PM. (History)            Assessment /Plan     For exam results, see Encounter Report.    Diabetes mellitus type 2 without retinopathy  -No retinopathy noted today.  Continued control with primary care physician and annual comprehensive eye exam.    Nuclear sclerotic cataract of both eyes  -Educated patient on presence of cataracts at today's exam, monitor at annual dilated fundus exam. 5+ years surgical estimate.  -reviewed Cataracts impact on glare    Hypermetropia of both eyes  Eyeglass Final Rx     Eyeglass Final Rx       Sphere Cylinder Axis Dist VA Add    Right +2.00 Sphere  20/20-- +2.25    Left +2.00 +1.00 180 20/20 +2.25    Type:  PAL    Expiration Date:  9/27/2019              Dispensed #2, Order #3  -Ok to dispense 1 wk PHREV      RTC 12 mo annual

## 2018-10-09 ENCOUNTER — TELEPHONE (OUTPATIENT)
Dept: OPTOMETRY | Facility: CLINIC | Age: 63
End: 2018-10-09

## 2018-10-09 ENCOUNTER — PATIENT MESSAGE (OUTPATIENT)
Dept: INTERNAL MEDICINE | Facility: CLINIC | Age: 63
End: 2018-10-09

## 2018-10-09 DIAGNOSIS — B37.9 YEAST INFECTION: Primary | ICD-10-CM

## 2018-10-09 RX ORDER — FLUCONAZOLE 150 MG/1
150 TABLET ORAL DAILY
Qty: 1 TABLET | Refills: 0 | Status: SHIPPED | OUTPATIENT
Start: 2018-10-09 | End: 2018-10-10

## 2018-10-09 NOTE — TELEPHONE ENCOUNTER
----- Message from ELVIN Clemons sent at 10/9/2018 12:42 PM CDT -----  Contact: Alison  Can patient wear readers over contact lens?  ----- Message -----  From: Juana Meza  Sent: 10/9/2018  12:13 PM  To: Jeannette Partida Staff    Pt calling to confirm her contact lens prescription.  She would like to finalize the 300.  She would like to speak to someone to determine if she should use readers when she is reading as well.  She can be reached at [phone]

## 2018-10-09 NOTE — TELEPHONE ENCOUNTER
Final  Contact Lens Final Rx     Final Contact Lens Rx       Brand Base Curve Diameter Sphere Cylinder Axis    Right Acuvue Oasys 1 Day 8.50 14.1 +2.00 Sphere     Left Acuvue Oasys 1 Day for Astigmatism 8.50 14.3 +3.00 -0.75 100    Expiration Date:  10/10/2019    Replacement:  Daily    Wearing Schedule:  Daily wear                + 2.225 OTC readers

## 2018-10-11 ENCOUNTER — OFFICE VISIT (OUTPATIENT)
Dept: OPTOMETRY | Facility: CLINIC | Age: 63
End: 2018-10-11

## 2018-10-11 DIAGNOSIS — H52.03 HYPERMETROPIA OF BOTH EYES: Primary | ICD-10-CM

## 2018-10-11 PROCEDURE — 99499 UNLISTED E&M SERVICE: CPT | Mod: S$GLB,,, | Performed by: OPTOMETRIST

## 2018-10-11 PROCEDURE — 92499 UNLISTED OPH SVC/PROCEDURE: CPT | Mod: ,,, | Performed by: OPTOMETRIST

## 2018-10-11 NOTE — PROGRESS NOTES
HPI     Pt requesting lens covered specifically by [employer]  Was told [employer] takes Acuvue Advanced and AV2    Last edited by Jaquan Machado, OD on 10/11/2018 10:21 AM. (History)            Assessment /Plan     For exam results, see Encounter Report.    Hypermetropia of both eyes  -Explained AV2 and AV Advance no longer come in Astigmatism and that both lenses are outdated   -Pt decided to continue with current Rx  -Pt requested Trials, none given since ok to order supply lenses    RTC 1 yr

## 2018-10-30 ENCOUNTER — PATIENT MESSAGE (OUTPATIENT)
Dept: BARIATRICS | Facility: CLINIC | Age: 63
End: 2018-10-30

## 2018-10-30 ENCOUNTER — OFFICE VISIT (OUTPATIENT)
Dept: INTERNAL MEDICINE | Facility: CLINIC | Age: 63
End: 2018-10-30
Payer: MEDICARE

## 2018-10-30 ENCOUNTER — LAB VISIT (OUTPATIENT)
Dept: LAB | Facility: HOSPITAL | Age: 63
End: 2018-10-30
Attending: INTERNAL MEDICINE
Payer: MEDICARE

## 2018-10-30 VITALS
SYSTOLIC BLOOD PRESSURE: 134 MMHG | OXYGEN SATURATION: 96 % | DIASTOLIC BLOOD PRESSURE: 68 MMHG | HEIGHT: 70 IN | BODY MASS INDEX: 31.34 KG/M2 | HEART RATE: 64 BPM | WEIGHT: 218.94 LBS

## 2018-10-30 DIAGNOSIS — E11.65 UNCONTROLLED TYPE 2 DIABETES MELLITUS WITH HYPERGLYCEMIA, WITHOUT LONG-TERM CURRENT USE OF INSULIN: ICD-10-CM

## 2018-10-30 DIAGNOSIS — E66.9 OBESITY (BMI 30-39.9): ICD-10-CM

## 2018-10-30 DIAGNOSIS — E11.69 MIXED HYPERLIPIDEMIA DUE TO TYPE 2 DIABETES MELLITUS: Chronic | ICD-10-CM

## 2018-10-30 DIAGNOSIS — I15.2 HYPERTENSION ASSOCIATED WITH DIABETES: ICD-10-CM

## 2018-10-30 DIAGNOSIS — E11.59 HYPERTENSION ASSOCIATED WITH DIABETES: ICD-10-CM

## 2018-10-30 DIAGNOSIS — B37.9 YEAST INFECTION: ICD-10-CM

## 2018-10-30 DIAGNOSIS — E11.59 HYPERTENSION ASSOCIATED WITH DIABETES: Primary | ICD-10-CM

## 2018-10-30 DIAGNOSIS — E78.2 MIXED HYPERLIPIDEMIA DUE TO TYPE 2 DIABETES MELLITUS: Chronic | ICD-10-CM

## 2018-10-30 DIAGNOSIS — I15.2 HYPERTENSION ASSOCIATED WITH DIABETES: Primary | ICD-10-CM

## 2018-10-30 LAB
ALBUMIN SERPL BCP-MCNC: 4.2 G/DL
ALP SERPL-CCNC: 88 U/L
ALT SERPL W/O P-5'-P-CCNC: 19 U/L
ANION GAP SERPL CALC-SCNC: 9 MMOL/L
AST SERPL-CCNC: 15 U/L
BILIRUB SERPL-MCNC: 0.6 MG/DL
BUN SERPL-MCNC: 14 MG/DL
CALCIUM SERPL-MCNC: 10.2 MG/DL
CHLORIDE SERPL-SCNC: 102 MMOL/L
CO2 SERPL-SCNC: 28 MMOL/L
CREAT SERPL-MCNC: 0.9 MG/DL
EST. GFR  (AFRICAN AMERICAN): >60 ML/MIN/1.73 M^2
EST. GFR  (NON AFRICAN AMERICAN): >60 ML/MIN/1.73 M^2
ESTIMATED AVG GLUCOSE: 189 MG/DL
GLUCOSE SERPL-MCNC: 206 MG/DL
HBA1C MFR BLD HPLC: 8.2 %
POTASSIUM SERPL-SCNC: 4.7 MMOL/L
PROT SERPL-MCNC: 7.8 G/DL
SODIUM SERPL-SCNC: 139 MMOL/L

## 2018-10-30 PROCEDURE — 80053 COMPREHEN METABOLIC PANEL: CPT

## 2018-10-30 PROCEDURE — 3045F PR MOST RECENT HEMOGLOBIN A1C LEVEL 7.0-9.0%: CPT | Mod: CPTII,,, | Performed by: INTERNAL MEDICINE

## 2018-10-30 PROCEDURE — 99214 OFFICE O/P EST MOD 30 MIN: CPT | Mod: S$PBB,,, | Performed by: INTERNAL MEDICINE

## 2018-10-30 PROCEDURE — 3008F BODY MASS INDEX DOCD: CPT | Mod: CPTII,,, | Performed by: INTERNAL MEDICINE

## 2018-10-30 PROCEDURE — 3078F DIAST BP <80 MM HG: CPT | Mod: CPTII,,, | Performed by: INTERNAL MEDICINE

## 2018-10-30 PROCEDURE — 36415 COLL VENOUS BLD VENIPUNCTURE: CPT

## 2018-10-30 PROCEDURE — 99999 PR PBB SHADOW E&M-EST. PATIENT-LVL IV: CPT | Mod: PBBFAC,,, | Performed by: INTERNAL MEDICINE

## 2018-10-30 PROCEDURE — 3075F SYST BP GE 130 - 139MM HG: CPT | Mod: CPTII,,, | Performed by: INTERNAL MEDICINE

## 2018-10-30 PROCEDURE — 83036 HEMOGLOBIN GLYCOSYLATED A1C: CPT

## 2018-10-30 PROCEDURE — 99214 OFFICE O/P EST MOD 30 MIN: CPT | Mod: PBBFAC | Performed by: INTERNAL MEDICINE

## 2018-10-30 RX ORDER — KETOCONAZOLE 200 MG/1
200 TABLET ORAL ONCE
Qty: 1 TABLET | Refills: 0 | Status: SHIPPED | OUTPATIENT
Start: 2018-10-30 | End: 2019-02-01

## 2018-10-30 NOTE — PROGRESS NOTES
Subjective:       Patient ID: Alison Branch is a 63 y.o. female.    Chief Complaint: Follow-up    [facility] - Ms Branch is most worried about her weight today.  She is trying a low carb diet and walking 30 minutes three times per week, but not making progress.  She also says the sitagliptin is causing yeast infections (doubt).  She is not a smoker.  Not taking BS at home; last a1c was above 8.    PMH:  DM2, says she's taking both meds  HTN, at goal  Brain aneurysm s/p coiling  HLP, on a statin  ADD, not on a stimulant  Obesity  Chronic cough  Left knee pain     Meds:  Reviewed and reconciled in EPIC with patient during visit today.     Review of Systems   Constitutional: Negative for fever.   HENT: Negative for congestion.    Respiratory: Negative for shortness of breath.    Cardiovascular: Negative for chest pain.   Gastrointestinal: Negative for abdominal pain.   Genitourinary: Positive for vaginal discharge.   Musculoskeletal: Negative for arthralgias.   Skin: Negative for rash.   Neurological: Negative for headaches.   Psychiatric/Behavioral: Negative for sleep disturbance.       Objective:      Physical Exam   Constitutional: She is oriented to person, place, and time. She appears well-developed and well-nourished.   Friendly, well-appearing woman   HENT:   Head: Normocephalic and atraumatic.   Cardiovascular: Normal rate, regular rhythm and normal heart sounds. Exam reveals no gallop and no friction rub.   No murmur heard.  Pulmonary/Chest: Effort normal and breath sounds normal. No respiratory distress. She has no wheezes. She has no rales. She exhibits no tenderness.   Neurological: She is alert and oriented to person, place, and time.   Skin: Skin is warm and dry. No erythema.   Psychiatric: She has a normal mood and affect.   Nursing note and vitals reviewed.      Assessment:       1. Hypertension associated with diabetes    2. Uncontrolled type 2 diabetes mellitus with hyperglycemia, without long-term current  use of insulin    3. Mixed hyperlipidemia due to type 2 diabetes mellitus    4. Yeast infection    5. Obesity (BMI 30-39.9)        Plan:       Alison was seen today for follow-up.    Diagnoses and all orders for this visit:    Hypertension associated with diabetes - at goal, stay the course  -     Comprehensive metabolic panel; Future    Uncontrolled type 2 diabetes mellitus with hyperglycemia, without long-term current use of insulin - unstable, not at goal.  Repeat a1c and adjust meds as needed  -     Hemoglobin A1c; Future    Mixed hyperlipidemia due to type 2 diabetes mellitus - stable on a statin.  Stay the course    Yeast infection - will treat based on symptoms.  If this doesn't work, go to gyn  -     ketoconazole (NIZORAL) 200 mg Tab; Take 1 tablet (200 mg total) by mouth once. for 1 dose    Obesity (BMI 30-39.9) - unstable.  Will ask bariatric medicine to help  -     Ambulatory consult to Bariatric Medicine    rtc prn, or in 3 months    JUVE Rodriguez MD MPH  Staff Internist

## 2018-11-01 ENCOUNTER — PATIENT MESSAGE (OUTPATIENT)
Dept: INTERNAL MEDICINE | Facility: CLINIC | Age: 63
End: 2018-11-01

## 2018-11-01 DIAGNOSIS — E11.65 UNCONTROLLED TYPE 2 DIABETES MELLITUS WITH HYPERGLYCEMIA, WITHOUT LONG-TERM CURRENT USE OF INSULIN: Primary | ICD-10-CM

## 2018-11-05 DIAGNOSIS — E11.9 DIABETES MELLITUS TYPE 2 IN NONOBESE: ICD-10-CM

## 2018-11-05 RX ORDER — SITAGLIPTIN 100 MG/1
TABLET, FILM COATED ORAL
Qty: 30 TABLET | Refills: 11 | Status: SHIPPED | OUTPATIENT
Start: 2018-11-05 | End: 2020-01-22 | Stop reason: SDUPTHER

## 2018-11-16 ENCOUNTER — TELEPHONE (OUTPATIENT)
Dept: INTERNAL MEDICINE | Facility: CLINIC | Age: 63
End: 2018-11-16

## 2018-11-16 NOTE — TELEPHONE ENCOUNTER
----- Message from Angeli Chacon sent at 11/16/2018  3:05 PM CST -----  Contact: [phone]  Patient would like to know do MD have any samples of JANUVIA 100 mg Tab, stated she is out of medication and would not be able to buy medication  till January. Please call and advise,Thanks

## 2018-11-16 NOTE — TELEPHONE ENCOUNTER
Please call Ms Branch.  We do not keep samples of medications at [facility].  She can talk to her pharmacist to see what they can do.  She can also talk to our financial assistance person here at [facility]; perhaps they can help.    It is important for her to take the januvia so as to keep her diabetes under control.    Thanks.  Dr. ONEIL

## 2018-11-28 ENCOUNTER — TELEPHONE (OUTPATIENT)
Dept: PULMONOLOGY | Facility: CLINIC | Age: 63
End: 2018-11-28

## 2018-11-29 ENCOUNTER — TELEPHONE (OUTPATIENT)
Dept: OPTOMETRY | Facility: CLINIC | Age: 63
End: 2018-11-29

## 2019-01-15 ENCOUNTER — OFFICE VISIT (OUTPATIENT)
Dept: INTERNAL MEDICINE | Facility: CLINIC | Age: 64
End: 2019-01-15
Payer: MEDICARE

## 2019-01-15 VITALS
HEART RATE: 70 BPM | HEIGHT: 70 IN | BODY MASS INDEX: 31.08 KG/M2 | TEMPERATURE: 98 F | WEIGHT: 217.06 LBS | DIASTOLIC BLOOD PRESSURE: 68 MMHG | SYSTOLIC BLOOD PRESSURE: 126 MMHG

## 2019-01-15 DIAGNOSIS — E11.59 HYPERTENSION ASSOCIATED WITH DIABETES: ICD-10-CM

## 2019-01-15 DIAGNOSIS — B37.9 ANTIBIOTIC-INDUCED YEAST INFECTION: ICD-10-CM

## 2019-01-15 DIAGNOSIS — J32.9 SINUSITIS, UNSPECIFIED CHRONICITY, UNSPECIFIED LOCATION: Primary | ICD-10-CM

## 2019-01-15 DIAGNOSIS — I15.2 HYPERTENSION ASSOCIATED WITH DIABETES: ICD-10-CM

## 2019-01-15 DIAGNOSIS — T36.95XA ANTIBIOTIC-INDUCED YEAST INFECTION: ICD-10-CM

## 2019-01-15 DIAGNOSIS — E11.65 UNCONTROLLED TYPE 2 DIABETES MELLITUS WITH HYPERGLYCEMIA, WITHOUT LONG-TERM CURRENT USE OF INSULIN: ICD-10-CM

## 2019-01-15 PROCEDURE — 3008F BODY MASS INDEX DOCD: CPT | Mod: CPTII,S$GLB,, | Performed by: PHYSICIAN ASSISTANT

## 2019-01-15 PROCEDURE — 3045F PR MOST RECENT HEMOGLOBIN A1C LEVEL 7.0-9.0%: ICD-10-PCS | Mod: CPTII,S$GLB,, | Performed by: PHYSICIAN ASSISTANT

## 2019-01-15 PROCEDURE — 3045F PR MOST RECENT HEMOGLOBIN A1C LEVEL 7.0-9.0%: CPT | Mod: CPTII,S$GLB,, | Performed by: PHYSICIAN ASSISTANT

## 2019-01-15 PROCEDURE — 3074F PR MOST RECENT SYSTOLIC BLOOD PRESSURE < 130 MM HG: ICD-10-PCS | Mod: CPTII,S$GLB,, | Performed by: PHYSICIAN ASSISTANT

## 2019-01-15 PROCEDURE — 99999 PR PBB SHADOW E&M-EST. PATIENT-LVL IV: CPT | Mod: PBBFAC,,, | Performed by: PHYSICIAN ASSISTANT

## 2019-01-15 PROCEDURE — 99214 PR OFFICE/OUTPT VISIT, EST, LEVL IV, 30-39 MIN: ICD-10-PCS | Mod: S$GLB,,, | Performed by: PHYSICIAN ASSISTANT

## 2019-01-15 PROCEDURE — 99999 PR PBB SHADOW E&M-EST. PATIENT-LVL IV: ICD-10-PCS | Mod: PBBFAC,,, | Performed by: PHYSICIAN ASSISTANT

## 2019-01-15 PROCEDURE — 3078F DIAST BP <80 MM HG: CPT | Mod: CPTII,S$GLB,, | Performed by: PHYSICIAN ASSISTANT

## 2019-01-15 PROCEDURE — 3008F PR BODY MASS INDEX (BMI) DOCUMENTED: ICD-10-PCS | Mod: CPTII,S$GLB,, | Performed by: PHYSICIAN ASSISTANT

## 2019-01-15 PROCEDURE — 3074F SYST BP LT 130 MM HG: CPT | Mod: CPTII,S$GLB,, | Performed by: PHYSICIAN ASSISTANT

## 2019-01-15 PROCEDURE — 99214 OFFICE O/P EST MOD 30 MIN: CPT | Mod: S$GLB,,, | Performed by: PHYSICIAN ASSISTANT

## 2019-01-15 PROCEDURE — 3078F PR MOST RECENT DIASTOLIC BLOOD PRESSURE < 80 MM HG: ICD-10-PCS | Mod: CPTII,S$GLB,, | Performed by: PHYSICIAN ASSISTANT

## 2019-01-15 RX ORDER — AMOXICILLIN AND CLAVULANATE POTASSIUM 875; 125 MG/1; MG/1
1 TABLET, FILM COATED ORAL EVERY 12 HOURS
Qty: 20 TABLET | Refills: 0 | Status: SHIPPED | OUTPATIENT
Start: 2019-01-15 | End: 2019-02-01

## 2019-01-15 RX ORDER — TERCONAZOLE 4 MG/G
1 CREAM VAGINAL NIGHTLY
Qty: 45 G | Refills: 0 | Status: SHIPPED | OUTPATIENT
Start: 2019-01-15 | End: 2019-05-20 | Stop reason: SDUPTHER

## 2019-01-15 NOTE — PATIENT INSTRUCTIONS
Sinusitis (Antibiotic Treatment)    The sinuses are air-filled spaces within the bones of the face. They connect to the inside of the nose. Sinusitis is an inflammation of the tissue lining the sinus cavity. Sinus inflammation can occur during a cold. It can also be due to allergies to pollens and other particles in the air. Sinusitis can cause symptoms of sinus congestion and fullness. A sinus infection causes fever, headache and facial pain. There is often green or yellow drainage from the nose or into the back of the throat (post-nasal drip). You have been given antibiotics to treat this condition.  Home care:  · Take the full course of antibiotics as instructed. Do not stop taking them, even if you feel better.  · Drink plenty of water, hot tea, and other liquids. This may help thin mucus. It also may promote sinus drainage.  · Heat may help soothe painful areas of the face. Use a towel soaked in hot water. Or,  the shower and direct the hot spray onto your face. Using a vaporizer along with a menthol rub at night may also help.   · An expectorant containing guaifenesin may help thin the mucus and promote drainage from the sinuses.  · Over-the-counter decongestants may be used unless a similar medicine was prescribed. Nasal sprays work the fastest. Use one that contains phenylephrine or oxymetazoline. First blow the nose gently. Then use the spray. Do not use these medicines more often than directed on the label or symptoms may get worse. You may also use tablets containing pseudoephedrine. Avoid products that combine ingredients, because side effects may be increased. Read labels. You can also ask the pharmacist for help. (NOTE: Persons with high blood pressure should not use decongestants. They can raise blood pressure.)  · Over-the-counter antihistamines may help if allergies contributed to your sinusitis.    · Do not use nasal rinses or irrigation during an acute sinus infection, unless told to by  your health care provider. Rinsing may spread the infection to other sinuses.  · Use acetaminophen or ibuprofen to control pain, unless another pain medicine was prescribed. (If you have chronic liver or kidney disease or ever had a stomach ulcer, talk with your doctor before using these medicines. Aspirin should never be used in anyone under 18 years of age who is ill with a fever. It may cause severe liver damage.)  · Don't smoke. This can worsen symptoms.  Follow-up care  Follow up with your healthcare provider or our staff if you are not improving within the next week.  When to seek medical advice  Call your healthcare provider if any of these occur:  · Facial pain or headache becoming more severe  · Stiff neck  · Unusual drowsiness or confusion  · Swelling of the forehead or eyelids  · Vision problems, including blurred or double vision  · Fever of 100.4ºF (38ºC) or higher, or as directed by your healthcare provider  · Seizure  · Breathing problems  · Symptoms not resolving within 10 days  Date Last Reviewed: 4/13/2015  © 6160-4431 [employer] [address] [place] [address], [place] [address]. All rights reserved. This information is not intended as a substitute for professional medical care. Always follow your healthcare professional's instructions.

## 2019-01-18 DIAGNOSIS — E11.9 DIABETES MELLITUS, TYPE II: ICD-10-CM

## 2019-01-18 RX ORDER — INSULIN PUMP SYRINGE, 3 ML
EACH MISCELLANEOUS
Qty: 1 EACH | Refills: 0 | Status: SHIPPED | OUTPATIENT
Start: 2019-01-18 | End: 2020-05-26

## 2019-01-24 ENCOUNTER — TELEPHONE (OUTPATIENT)
Dept: INTERNAL MEDICINE | Facility: CLINIC | Age: 64
End: 2019-01-24

## 2019-01-24 ENCOUNTER — OFFICE VISIT (OUTPATIENT)
Dept: INTERNAL MEDICINE | Facility: CLINIC | Age: 64
End: 2019-01-24
Payer: MEDICARE

## 2019-01-24 VITALS
TEMPERATURE: 98 F | HEART RATE: 72 BPM | SYSTOLIC BLOOD PRESSURE: 118 MMHG | WEIGHT: 222.88 LBS | DIASTOLIC BLOOD PRESSURE: 60 MMHG | OXYGEN SATURATION: 99 % | HEIGHT: 70 IN | BODY MASS INDEX: 31.91 KG/M2

## 2019-01-24 DIAGNOSIS — J00 ACUTE NASOPHARYNGITIS: Primary | ICD-10-CM

## 2019-01-24 PROCEDURE — 3078F DIAST BP <80 MM HG: CPT | Mod: CPTII,GC,S$GLB, | Performed by: STUDENT IN AN ORGANIZED HEALTH CARE EDUCATION/TRAINING PROGRAM

## 2019-01-24 PROCEDURE — 99213 PR OFFICE/OUTPT VISIT, EST, LEVL III, 20-29 MIN: ICD-10-PCS | Mod: GC,S$GLB,, | Performed by: STUDENT IN AN ORGANIZED HEALTH CARE EDUCATION/TRAINING PROGRAM

## 2019-01-24 PROCEDURE — 3074F PR MOST RECENT SYSTOLIC BLOOD PRESSURE < 130 MM HG: ICD-10-PCS | Mod: CPTII,GC,S$GLB, | Performed by: STUDENT IN AN ORGANIZED HEALTH CARE EDUCATION/TRAINING PROGRAM

## 2019-01-24 PROCEDURE — 3074F SYST BP LT 130 MM HG: CPT | Mod: CPTII,GC,S$GLB, | Performed by: STUDENT IN AN ORGANIZED HEALTH CARE EDUCATION/TRAINING PROGRAM

## 2019-01-24 PROCEDURE — 99999 PR PBB SHADOW E&M-EST. PATIENT-LVL III: ICD-10-PCS | Mod: PBBFAC,GC,, | Performed by: STUDENT IN AN ORGANIZED HEALTH CARE EDUCATION/TRAINING PROGRAM

## 2019-01-24 PROCEDURE — 3008F PR BODY MASS INDEX (BMI) DOCUMENTED: ICD-10-PCS | Mod: CPTII,GC,S$GLB, | Performed by: STUDENT IN AN ORGANIZED HEALTH CARE EDUCATION/TRAINING PROGRAM

## 2019-01-24 PROCEDURE — 3078F PR MOST RECENT DIASTOLIC BLOOD PRESSURE < 80 MM HG: ICD-10-PCS | Mod: CPTII,GC,S$GLB, | Performed by: STUDENT IN AN ORGANIZED HEALTH CARE EDUCATION/TRAINING PROGRAM

## 2019-01-24 PROCEDURE — 99999 PR PBB SHADOW E&M-EST. PATIENT-LVL III: CPT | Mod: PBBFAC,GC,, | Performed by: STUDENT IN AN ORGANIZED HEALTH CARE EDUCATION/TRAINING PROGRAM

## 2019-01-24 PROCEDURE — 3008F BODY MASS INDEX DOCD: CPT | Mod: CPTII,GC,S$GLB, | Performed by: STUDENT IN AN ORGANIZED HEALTH CARE EDUCATION/TRAINING PROGRAM

## 2019-01-24 PROCEDURE — 99213 OFFICE O/P EST LOW 20 MIN: CPT | Mod: GC,S$GLB,, | Performed by: STUDENT IN AN ORGANIZED HEALTH CARE EDUCATION/TRAINING PROGRAM

## 2019-01-24 RX ORDER — IPRATROPIUM BROMIDE 21 UG/1
2 SPRAY, METERED NASAL 2 TIMES DAILY
Qty: 30 ML | Refills: 0 | Status: SHIPPED | OUTPATIENT
Start: 2019-01-24 | End: 2019-02-01

## 2019-01-24 NOTE — PROGRESS NOTES
Subjective     Chief Complaint: cough     History of Present Illness:  Ms. Alison Branch is a 63 y.o. female with HTN and DM II who is here as pt has not been feeling well despite antibiotics treatment for presumptive bacterial sinusitis. Pt was treated with Augmentin 8/10 days today after she developed cough, sore throat along with some head ache. Reports getting better two days after she started abx but then symptoms recurred with feeling of ear fullness. Denies fever, chills, unusual fatigue, myalgia/arthralgia, wheezing. She has congestion and sneezing. Has tried flonase with some relief. Reports sick contact at her [employer].     Review of Systems   Constitutional: Negative for chills, fever and malaise/fatigue.   HENT: Positive for congestion, sinus pain and sore throat. Negative for tinnitus.    Respiratory: Positive for cough. Negative for sputum production, shortness of breath and wheezing.    Cardiovascular: Negative for chest pain, palpitations and leg swelling.   Gastrointestinal: Negative for diarrhea, nausea and vomiting.   Genitourinary: Negative for dysuria, frequency and urgency.   Musculoskeletal: Negative for back pain, joint pain and myalgias.   Skin: Negative for itching and rash.   Neurological: Positive for headaches. Negative for dizziness, tremors and focal weakness.       PAST HISTORY:     Past Medical History:   Diagnosis Date    Allergy     Brain aneurysm 2010    s/p coiling of one; another not coiled    Breast cyst     Diabetes mellitus     Diabetes type 2, controlled     Fever blister     High cholesterol     History of Bell's palsy     HTN (hypertension) 5/20/2014       Past Surgical History:   Procedure Laterality Date    BREAST CYST ASPIRATION      CERVICAL FUSION      CHONDROPLASTY-KNEE Left 2/23/2018    Per[facility]EL[facility] at [facility]    COLONOSCOPY N/A 3/9/2016    Performed by Elliott Zimmerman MD at [facility] (4TH FLR)    head surgery      stent and  ""curling" for aneurysm    HYSTERECTOMY      TVH secondary to SUF    MENISCECTOMY Left 2018    [facility]    SYNOVECTOMY-KNEE Left 2018    [facility]       Family History   Problem Relation Age of Onset    Rheum arthritis Father     Diabetes Father     Heart failure Father     Migraines Father     Cataracts Father     Cancer Mother 63        pancreatic    Stomach cancer Mother     Breast cancer Maternal Aunt         50s    Ovarian cancer Cousin     Diabetes Sister     Diabetes Brother     Cancer Maternal Aunt         Lung CA    Melanoma Neg Hx     Colon cancer Neg Hx     Amblyopia Neg Hx     Blindness Neg Hx     Glaucoma Neg Hx     Macular degeneration Neg Hx     Retinal detachment Neg Hx     Strabismus Neg Hx     Stroke Neg Hx     Thyroid disease Neg Hx        Social History     Socioeconomic History    Marital status: Single     Spouse name: None    Number of children: None    Years of education: None    Highest education level: None   Social Needs    Financial resource strain: None    Food insecurity - worry: None    Food insecurity - inability: None    Transportation needs - medical: None    Transportation needs - non-medical: None   Occupational History    Occupation: Student     Comment: Unemployed   Tobacco Use    Smoking status: Former Smoker     Packs/day: 2.00     Years: 30.00     Pack years: 60.00     Last attempt to quit: 1999     Years since quittin.0    Smokeless tobacco: Never Used   Substance and Sexual Activity    Alcohol use: No     Alcohol/week: 0.0 oz    Drug use: No    Sexual activity: No     Partners: Male     Birth control/protection: Surgical   Other Topics Concern    Are you pregnant or think you may be? No    Breast-feeding No   Social History Narrative    None       MEDICATIONS & ALLERGIES:     Current Outpatient Medications on File Prior to Visit   Medication " "Sig    albuterol 90 mcg/actuation inhaler Inhale 2 puffs into the lungs every 6 (six) hours as needed for Wheezing. Rescue    amoxicillin-clavulanate 875-125mg (AUGMENTIN) 875-125 mg per tablet Take 1 tablet by mouth every 12 (twelve) hours.    aspirin (ECOTRIN) 81 MG EC tablet Take 1 tablet (81 mg total) by mouth once daily.    atorvastatin (LIPITOR) 40 MG tablet TAKE ONE TABLET BY MOUTH ONCE DAILY    blood sugar diagnostic Strp 1 strip by Misc.(Non-Drug; Combo Route) route once daily. Heladio Result.  250.02.  Check Blood Sugar Twice Daily.    blood-glucose meter kit Use as instructed    canagliflozin (INVOKANA) 100 mg Tab Take 1 tablet (100 mg total) by mouth once daily.    ergocalciferol (ERGOCALCIFEROL) 50,000 unit Cap Take 1 capsule (50,000 Units total) by mouth every 7 days.    escitalopram oxalate (LEXAPRO) 10 MG tablet Take 10 mg by mouth once daily.    glipiZIDE (GLUCOTROL) 10 MG TR24 Take 1 tablet (10 mg total) by mouth 2 (two) times daily.    JANUVIA 100 mg Tab TAKE ONE TABLET BY MOUTH ONCE DAILY    ketoconazole (NIZORAL) 200 mg Tab Take 1 tablet (200 mg total) by mouth once. for 1 dose    lancets Misc 1 Device by Misc.(Non-Drug; Combo Route) route once daily. Heladio Result.  250.02.  Check Blood Sugar Twice Daily.    lidocaine (LIDODERM) 5 % Place 1 patch onto the skin.    losartan (COZAAR) 50 MG tablet TAKE ONE TABLET BY MOUTH ONCE DAILY    terconazole (TERAZOL 7) 0.4 % Crea Place 1 applicator vaginally every evening.     No current facility-administered medications on file prior to visit.        Review of patient's allergies indicates:   Allergen Reactions    Metformin      diarrhea       OBJECTIVE:     Vital Signs:  Vitals:    01/24/19 1317   BP: 118/60   BP Location: Right arm   Patient Position: Sitting   BP Method: Large (Manual)   Pulse: 72   Temp: 97.7 °F (36.5 °C)   SpO2: 99%   Weight: 101.1 kg (222 lb 14.2 oz)   Height: 5' 10" (1.778 m)       Body mass index is 31.98 kg/m². "     Physical Exam:  General:  Well developed, well nourished, no acute distress  Head: Normocephalic, atraumatic  Eyes: PERRL, EOMI, clear sclera  Throat: no pharyngeal exudate, no tonsillar exudate. Small petechiae x2 in the oropharynx.  Neck: supple, normal ROM, no thyromegaly, no LAP   CVS:  RRR, S1 and S2 normal, no murmurs, rubs, gallops, 2+ peripheral pulses  Resp:  Lungs clear to auscultation, no wheezes, rales, rhonchi, cough  GI:  Abdomen soft, non-tender, non-distended, normoactive bowel sounds  MSK:  No muscle atrophy, cyanosis, peripheral edema   Skin:  No rashes, ulcers, erythema  Neuro:  CNII-XII grossly intact, no focal deficits noted  Psych:  Appropriate mood and affect, normal judgement    Laboratory  Lab Results   Component Value Date    WBC 6.27 02/22/2018    HGB 12.2 02/22/2018    HCT 39.4 02/22/2018    MCV 87 02/22/2018     02/22/2018     @DGCCZXJMM50(GLU,NA,K,Cl,CO2,BUN,Creatinine,Calcium,MG)@  Lab Results   Component Value Date    INR 1.0 05/20/2014     Lab Results   Component Value Date    HGBA1C 8.2 (H) 10/30/2018       ASSESSMENT & PLAN:   Ms. Alison Branch is a 63 y.o. female with URI here for evaluation.     Alison was seen today for cough, sore throat, otalgia and nasal congestion.    Diagnoses and all orders for this visit:    Acute nasopharyngitis: URI symptoms likely viral given no improvement with Abx likely rhinosinusitis vs pharyngitis. No further diagnostic tests indicated. Continue supportive treatment with anti-histamine-decongestants, NSAIDs/Tylenol for sore throat/pain, nasal sprays for sneezing/nasal inflammation.     Other orders  -     ipratropium (ATROVENT) 0.03 % nasal spray; 2 sprays by Nasal route 2 (two) times daily.    Discussed with Dr. Raymond - staff attestation to follow

## 2019-01-24 NOTE — TELEPHONE ENCOUNTER
Pt symptoms are worse then when she say Jitendra on 1/15. Pt would like recommendations. Please see message below.

## 2019-01-24 NOTE — PATIENT INSTRUCTIONS
--You may take over the counter Aleve or Tylenol for sore throat  --May use Flonase with nasal washes for stuffy/itchy nose and sneezing  --Zyrtec or Allegra- D for decongestion    --try hot tea with lemon and honey as well  --Get more sleep/rest   --You may use Delsym for cough   --Nasal Ipratropium for sneezing and runny nose.  from pharmacy. Apply as directed.

## 2019-01-24 NOTE — TELEPHONE ENCOUNTER
Chief Complaint   Patient presents with   • Knee Pain     HISTORY OF PRESENT ILLNESS: Thierno presents today for evaluation of some bilateral knee pain. The left knee seems worse than the right. It's been a little painful to touch. It's been bothersome for the last 6 weeks.  He decided a couple of months ago that he was start to incorporate some running into his workouts.  For the 1st 2 weeks things seem fine but then he started to notice increasing knee pain.  He actually had to lay off for a couple of weeks (walking and running) because of the pain.  He is been able to work on things but it's just been difficult.  He is tried and occasional ibuprofen with does help  interestingly he only runs when he has a good comfort with the area where he is walking area is blind and has to take his dog. His dog loves to run.  He tried to run yesterday and just couldn't.    The knees do not want to lock up or give out and he's not convinced it was swollen.    He reports he has also been trying to figure out what to do with regard to insurance and things. His wife Tracy will be retiring sometime in the near future.      Past Medical History:   Diagnosis Date   • Allergic rhinitis     spring/fall    • Eczema     childhood    • Hypertension 2013   • Retinitis pigmentosa of both eyes     R.P.    • Sleep apnea with use of continuous positive airway pressure (CPAP)     auto-pap      Problem list reviewed and updated.  Outpatient Medications Marked as Taking for the 9/5/18 encounter (Office Visit) with [facility], DO   Medication Sig Dispense Refill   • DULERA 200-5 MCG/ACT inhaler INHALE 2 PUFFS BY MOUTH TWICE DAILY 39 g 3   • cetirizine (ZYRTEC) 10 MG tablet TAKE 1 TABLET BY MOUTH DAILY 90 tablet 3   • albuterol (VENTOLIN HFA) 108 (90 Base) MCG/ACT inhaler Inhale 2 puffs into the lungs every 4 hours as needed for Shortness of Breath or Wheezing. 3 Inhaler 5   • Cholecalciferol (VITAMIN D) 2000 units tablet Take 2,000 Units by mouth  LM for pt to call the office back     daily.     • Omega-3 Fatty Acids (FISH OIL) 1000 MG capsule Take 3 g by mouth daily.     • multivitamin (THERAGRAN) per tablet Take 1 tablet by mouth daily.       Medications reviewed and updated.  ALLERGIES:   Allergen Reactions   • Peanut Other (See Comments)     Oral and throat swelling    • Tree Nuts Other (See Comments)     Mild      Social History     Socioeconomic History   • Marital status: /Civil Union     Spouse name: Tracy    • Number of children: 0   • Years of education: Not on file   • Highest education level: Not on file   Social Needs   • Financial resource strain: Not on file   • Food insecurity - worry: Not on file   • Food insecurity - inability: Not on file   • Transportation needs - medical: Not on file   • Transportation needs - non-medical: Not on file   Occupational History   • 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     Comment: disabled    Tobacco Use   • Smoking status: Former Smoker     Packs/day: 0.25     Years: 30.00     Pack years: 7.50     Types: Cigarettes   • Smokeless tobacco: Never Used   Substance and Sexual Activity   • Alcohol use: Yes     Alcohol/week: 1.8 - 2.4 oz     Types: 3 - 4 Cans of beer per week   • Drug use: No     Comment: acid and pot as teen    • Sexual activity: Yes     Partners: Female   Other Topics Concern   •  Service Not Asked   • Blood Transfusions Not Asked   • Caffeine Concern Not Asked   • Occupational Exposure Not Asked   • Hobby Hazards Not Asked   • Sleep Concern Yes   • Stress Concern Not Asked   • Weight Concern No   • Special Diet Not Asked   • Back Care No   • Exercise Yes   • Bike Helmet Not Asked   • Seat Belt Yes     Comment: Does not drive   • Self-Exams Not Asked   Social History Narrative        Grew up in [place] area         Worked as IT until sight worsened         [place] to [place] With [place]         Second marriage - Tracy        Previous seeing Eye Dog is Shay - (thrasher retriever)     New dog - Max-  2015         Enjoys Music  - writes/sings -  Bass guiter and upright bass      Social History     Tobacco Use   Smoking Status Former Smoker   • Packs/day: 0.25   • Years: 30.00   • Pack years: 7.50   • Types: Cigarettes   Smokeless Tobacco Never Used     Past Surgical History:   Procedure Laterality Date   • Appendectomy  1970   • Colonoscopy remove lesion by snare  05/28/2013    Dr. Downey, Polyps, hyperplastic polyp   • Nasal scopy,open maxill sinus  03/2011    Sinus Surgery, polyp removal-Dr. Caruso   • Reconstr nose  early 20's    Rhinoplasty   • Remove tonsils/adenoids,14+ y/o     • Remv cataract intracap,insert lens  2008    right/left   • Repair of hammertoe,one Left 10/20/2017    Dr Rolon       Pertinent family history reviewed.    Review of Systems:    Denies hip pain bac pain or sciatic pain.    PHYSICAL EXAMINATION: Blood pressure 120/80, pulse 72, weight 90.3 kg.    Vitals:  Blood pressure 120/80, pulse 72, weight 90.3 kg.  GENERAL: the patient is pleasant and conversant. NAD. Body mass index is 25.55 kg/m².  PSYCH: The patient is alert and oriented x 3 with a normal affect.   SKIN: Warm and dry.  There is no eczema, there is no acute appearing rash on visible skin.   He does have a dermatofibroma about his left arm tattoo. He has a calcified nodule left anterior shin .  Exam of the bilateral knees reveals no observable swelling, induration, effusion, erythema, or ecchymosis. Patient has full active and passive range of motion. Palpation of the joint reveals no joint line tenderness or irregularity. Lachman's and Panda's tests are both negative. Drawer sign is negative.    IMPRESSION:  M25.562, G89.29 Chronic pain of left knee  (primary encounter diagnosis)  M25.561, M25.562, G89.29 Chronic pain of both knees  D23.9 Dermatofibroma  PLAN:    Will obtain an x-ray of the left knee since it's the most symptomatic.  Trial of diclofenac.  Ice after activity and heat as needed in the morning.   He may need to get intophysical  therapy for strengthening.  ortho referral if not getting better  Reviewed the skin changes  Reassurance.    Refill of steroid cream for occasional use on the face.  Risk indication side effects and alternatives to the treatment and/or testing discussed.      Orders Placed This Encounter   • XR Knee 3 View Left   • diclofenac sodium 100 MG extended-release tablet     Sig: Take 1 tablet by mouth daily.     Dispense:  30 tablet     Refill:  0   • triamcinolone (ARISTOCORT) 0.1 % cream     Sig: Apply topically twice daily as needed for rash or itch on affected area avoid face (uses multiple areas)     Dispense:  30 g     Refill:  5       Patient Instructions     Anserine bursitis /  Patellar Tendonitis     With the prescribed anti-inflammatory/arthritis medication you should not use ibuprofen, Advil, Motrin, naproxen, or Aleve.    If not getting better then come in and see Dr. Torres           Return in about 1 month (around 10/5/2018).    See orders as entered this encounter. See patient instructions as documented within this encounter.    For new acute problems the  patient understands that this is a provisional diagnosis. Provisional diagnoses can and do change. The diagnosis provided  is based on the history and symptoms with which the patient presented today.

## 2019-01-24 NOTE — TELEPHONE ENCOUNTER
----- Message from Elizabeth Craven sent at 1/24/2019  7:50 AM CST -----  Contact: Patient [phone]      ----- Message -----  From: Mukesh Hartmann  Sent: 1/24/2019   7:42 AM  To: Jitendra Dennis Staff    Patient would like to get medical advice.    Symptoms (please be specific):  Sore throat, ear ache, nasal congestion, and pain in eyes    How long has patient had these symptoms: Since her last visit with you on 1/15/18      Pharmacy name and phone # [id number] - [place], [place] - [facility] [address] [phone] (Phone) [phone] (Fax)    Comments:  She said her throat hurts worse today that it did on the 15th

## 2019-01-24 NOTE — TELEPHONE ENCOUNTER
Spoke with pt, states the rx helped at first but then symptoms re occurred. Pt states the only thing that has cleared up some is the sinus head pressure. Pt states the sore throat is worse, and she is still filled with mucus. Pt is scheduled to come in for 1 pm today. Wants to know if she needs to keep appointment or if PA can recommend something for her.      Please advise

## 2019-01-28 DIAGNOSIS — E11.65 UNCONTROLLED TYPE 2 DIABETES MELLITUS WITH HYPERGLYCEMIA, WITHOUT LONG-TERM CURRENT USE OF INSULIN: ICD-10-CM

## 2019-01-28 RX ORDER — GLIPIZIDE 10 MG/1
TABLET, FILM COATED, EXTENDED RELEASE ORAL
Qty: 180 TABLET | Refills: 3 | Status: SHIPPED | OUTPATIENT
Start: 2019-01-28 | End: 2019-07-10

## 2019-02-01 ENCOUNTER — LAB VISIT (OUTPATIENT)
Dept: LAB | Facility: HOSPITAL | Age: 64
End: 2019-02-01
Attending: INTERNAL MEDICINE
Payer: MEDICARE

## 2019-02-01 ENCOUNTER — OFFICE VISIT (OUTPATIENT)
Dept: INTERNAL MEDICINE | Facility: CLINIC | Age: 64
End: 2019-02-01
Payer: MEDICARE

## 2019-02-01 VITALS
SYSTOLIC BLOOD PRESSURE: 126 MMHG | OXYGEN SATURATION: 99 % | HEART RATE: 67 BPM | WEIGHT: 219.13 LBS | DIASTOLIC BLOOD PRESSURE: 80 MMHG | BODY MASS INDEX: 31.37 KG/M2 | HEIGHT: 70 IN

## 2019-02-01 DIAGNOSIS — I15.2 HYPERTENSION ASSOCIATED WITH DIABETES: ICD-10-CM

## 2019-02-01 DIAGNOSIS — I72.9 ANEURYSM OF UNSPECIFIED SITE: ICD-10-CM

## 2019-02-01 DIAGNOSIS — E11.65 UNCONTROLLED TYPE 2 DIABETES MELLITUS WITH HYPERGLYCEMIA, WITHOUT LONG-TERM CURRENT USE OF INSULIN: ICD-10-CM

## 2019-02-01 DIAGNOSIS — J06.9 UPPER RESPIRATORY TRACT INFECTION, UNSPECIFIED TYPE: ICD-10-CM

## 2019-02-01 DIAGNOSIS — E11.59 HYPERTENSION ASSOCIATED WITH DIABETES: ICD-10-CM

## 2019-02-01 DIAGNOSIS — E66.9 OBESITY (BMI 30-39.9): ICD-10-CM

## 2019-02-01 DIAGNOSIS — E11.59 HYPERTENSION ASSOCIATED WITH DIABETES: Primary | ICD-10-CM

## 2019-02-01 DIAGNOSIS — I15.2 HYPERTENSION ASSOCIATED WITH DIABETES: Primary | ICD-10-CM

## 2019-02-01 LAB
ALBUMIN SERPL BCP-MCNC: 3.8 G/DL
ALBUMIN/CREAT UR: 10.4 UG/MG
ALP SERPL-CCNC: 87 U/L
ALT SERPL W/O P-5'-P-CCNC: 18 U/L
ANION GAP SERPL CALC-SCNC: 7 MMOL/L
AST SERPL-CCNC: 16 U/L
BILIRUB SERPL-MCNC: 0.4 MG/DL
BUN SERPL-MCNC: 11 MG/DL
CALCIUM SERPL-MCNC: 10.4 MG/DL
CHLORIDE SERPL-SCNC: 103 MMOL/L
CO2 SERPL-SCNC: 29 MMOL/L
CREAT SERPL-MCNC: 0.8 MG/DL
CREAT UR-MCNC: 134 MG/DL
EST. GFR  (AFRICAN AMERICAN): >60 ML/MIN/1.73 M^2
EST. GFR  (NON AFRICAN AMERICAN): >60 ML/MIN/1.73 M^2
ESTIMATED AVG GLUCOSE: 209 MG/DL
GLUCOSE SERPL-MCNC: 173 MG/DL
HBA1C MFR BLD HPLC: 8.9 %
MICROALBUMIN UR DL<=1MG/L-MCNC: 14 UG/ML
POTASSIUM SERPL-SCNC: 4.5 MMOL/L
PROT SERPL-MCNC: 7.7 G/DL
SODIUM SERPL-SCNC: 139 MMOL/L

## 2019-02-01 PROCEDURE — 99214 OFFICE O/P EST MOD 30 MIN: CPT | Mod: S$GLB,,, | Performed by: INTERNAL MEDICINE

## 2019-02-01 PROCEDURE — 3079F PR MOST RECENT DIASTOLIC BLOOD PRESSURE 80-89 MM HG: ICD-10-PCS | Mod: CPTII,S$GLB,, | Performed by: INTERNAL MEDICINE

## 2019-02-01 PROCEDURE — 3074F PR MOST RECENT SYSTOLIC BLOOD PRESSURE < 130 MM HG: ICD-10-PCS | Mod: CPTII,S$GLB,, | Performed by: INTERNAL MEDICINE

## 2019-02-01 PROCEDURE — 82043 UR ALBUMIN QUANTITATIVE: CPT

## 2019-02-01 PROCEDURE — 3045F PR MOST RECENT HEMOGLOBIN A1C LEVEL 7.0-9.0%: ICD-10-PCS | Mod: CPTII,S$GLB,, | Performed by: INTERNAL MEDICINE

## 2019-02-01 PROCEDURE — 80053 COMPREHEN METABOLIC PANEL: CPT

## 2019-02-01 PROCEDURE — 36415 COLL VENOUS BLD VENIPUNCTURE: CPT

## 2019-02-01 PROCEDURE — 83036 HEMOGLOBIN GLYCOSYLATED A1C: CPT

## 2019-02-01 PROCEDURE — 99499 UNLISTED E&M SERVICE: CPT | Mod: S$GLB,,, | Performed by: INTERNAL MEDICINE

## 2019-02-01 PROCEDURE — 3045F PR MOST RECENT HEMOGLOBIN A1C LEVEL 7.0-9.0%: CPT | Mod: CPTII,S$GLB,, | Performed by: INTERNAL MEDICINE

## 2019-02-01 PROCEDURE — 99214 PR OFFICE/OUTPT VISIT, EST, LEVL IV, 30-39 MIN: ICD-10-PCS | Mod: S$GLB,,, | Performed by: INTERNAL MEDICINE

## 2019-02-01 PROCEDURE — 99999 PR PBB SHADOW E&M-EST. PATIENT-LVL III: ICD-10-PCS | Mod: PBBFAC,,, | Performed by: INTERNAL MEDICINE

## 2019-02-01 PROCEDURE — 3008F PR BODY MASS INDEX (BMI) DOCUMENTED: ICD-10-PCS | Mod: CPTII,S$GLB,, | Performed by: INTERNAL MEDICINE

## 2019-02-01 PROCEDURE — 3074F SYST BP LT 130 MM HG: CPT | Mod: CPTII,S$GLB,, | Performed by: INTERNAL MEDICINE

## 2019-02-01 PROCEDURE — 99999 PR PBB SHADOW E&M-EST. PATIENT-LVL III: CPT | Mod: PBBFAC,,, | Performed by: INTERNAL MEDICINE

## 2019-02-01 PROCEDURE — 3008F BODY MASS INDEX DOCD: CPT | Mod: CPTII,S$GLB,, | Performed by: INTERNAL MEDICINE

## 2019-02-01 PROCEDURE — 99499 RISK ADDL DX/OHS AUDIT: ICD-10-PCS | Mod: S$GLB,,, | Performed by: INTERNAL MEDICINE

## 2019-02-01 PROCEDURE — 3079F DIAST BP 80-89 MM HG: CPT | Mod: CPTII,S$GLB,, | Performed by: INTERNAL MEDICINE

## 2019-02-01 RX ORDER — FLUTICASONE PROPIONATE 50 MCG
SPRAY, SUSPENSION (ML) NASAL
COMMUNITY
Start: 2018-12-21 | End: 2019-05-09

## 2019-02-01 NOTE — PROGRESS NOTES
Subjective:       Patient ID: Alison Branch is a 63 y.o. female.    Chief Complaint: Follow-up    HPI - Ms Branch has had URI symptoms for a month; has had a course of abx.  She's better, but still with congestion, dry cough and bloody nasal discharge.  Some sore throat, but not as bad as early in the course.  She's taking allegra and flonase.      She doesn't have a meter; one is coming from [place].  Now taking Glipizide and Januvia, but has only been on januvia for the past month.  She's not a smoker or a drinker.  Working on losing weight - has a  and a group to work with at the gym.  She had coiling of a cerebral aneurysm in the past.  Has another that hasn't been coiled and hasn't seen NSG in the past few years.    PMH:  DM2, A1C over 8  HTN, at goal  Brain aneurysm s/p coiling  HLP, on a statin  ADD, not on a stimulant  Obesity  Chronic cough  Left knee pain     Meds:  Reviewed and reconciled in EPIC with patient during visit today    Review of Systems   Constitutional: Negative for fever.   HENT: Positive for congestion, postnasal drip, rhinorrhea and sore throat.    Respiratory: Positive for cough.    Cardiovascular: Negative for chest pain.   Gastrointestinal: Negative for abdominal pain.   Genitourinary: Negative for difficulty urinating.   Musculoskeletal: Negative for arthralgias.   Skin: Negative for rash.   Neurological: Negative for headaches.   Psychiatric/Behavioral: Negative for sleep disturbance.       Objective:      Physical Exam   Constitutional: She is oriented to person, place, and time. She appears well-developed and well-nourished.   HENT:   Head: Normocephalic and atraumatic.   Right Ear: External ear normal.   Left Ear: External ear normal.   Mouth/Throat: Oropharynx is clear and moist. No oropharyngeal exudate.   Cardiovascular: Normal rate, regular rhythm and normal heart sounds. Exam reveals no gallop and no friction rub.   No murmur heard.  Pulmonary/Chest: Effort  normal and breath sounds normal. No respiratory distress. She has no wheezes. She has no rales. She exhibits no tenderness.   Lymphadenopathy:     She has no cervical adenopathy.   Neurological: She is alert and oriented to person, place, and time.   Skin: Skin is warm and dry. No erythema.   Psychiatric: She has a normal mood and affect.       Assessment:       1. Hypertension associated with diabetes    2. Uncontrolled type 2 diabetes mellitus with hyperglycemia, without long-term current use of insulin    3. Obesity (BMI 30-39.9)    4. Aneurysm of unspecified site    5. Upper respiratory tract infection, unspecified type        Plan:       Alison was seen today for follow-up.    Diagnoses and all orders for this visit:    Hypertension associated with diabetes - at goal, stay the course  -     Comprehensive metabolic panel; Future    Uncontrolled type 2 diabetes mellitus with hyperglycemia, without long-term current use of insulin - due for a1c; expect it to be out of range b/c she hasn't been on Januvia for long enough.  -     Hemoglobin A1c; Future  -     Comprehensive metabolic panel; Future  -     Microalbumin/creatinine urine ratio    Obesity (BMI 30-39.9) - working out now; hope we'll get better soon    Aneurysm of unspecified site - encouraged her to contact her outside neurosurgeon for f/u    Upper respiratory tract infection, unspecified type -  some mucinex.  Stay warm, dry.  Fluids, tylenol for aches and pains.  Rest, stay off work until recovered.    rtc prn    JUVE Rodriguez MD MPH  Staff Internist

## 2019-02-23 DIAGNOSIS — E55.9 VITAMIN D DEFICIENCY: ICD-10-CM

## 2019-02-25 RX ORDER — ERGOCALCIFEROL 1.25 MG/1
CAPSULE ORAL
Qty: 12 CAPSULE | Refills: 3 | Status: SHIPPED | OUTPATIENT
Start: 2019-02-25 | End: 2020-03-04

## 2019-03-04 DIAGNOSIS — E11.9 TYPE 2 DIABETES MELLITUS WITHOUT COMPLICATION: ICD-10-CM

## 2019-03-18 ENCOUNTER — PATIENT MESSAGE (OUTPATIENT)
Dept: INTERNAL MEDICINE | Facility: CLINIC | Age: 64
End: 2019-03-18

## 2019-03-18 DIAGNOSIS — E11.59 HYPERTENSION ASSOCIATED WITH DIABETES: Primary | ICD-10-CM

## 2019-03-18 DIAGNOSIS — I15.2 HYPERTENSION ASSOCIATED WITH DIABETES: Primary | ICD-10-CM

## 2019-03-19 RX ORDER — OLMESARTAN MEDOXOMIL 20 MG/1
20 TABLET ORAL DAILY
Qty: 90 TABLET | Refills: 3 | Status: SHIPPED | OUTPATIENT
Start: 2019-03-19 | End: 2020-02-03 | Stop reason: SDUPTHER

## 2019-03-26 ENCOUNTER — PES CALL (OUTPATIENT)
Dept: ADMINISTRATIVE | Facility: CLINIC | Age: 64
End: 2019-03-26

## 2019-05-08 ENCOUNTER — PATIENT OUTREACH (OUTPATIENT)
Dept: ADMINISTRATIVE | Facility: HOSPITAL | Age: 64
End: 2019-05-08

## 2019-05-08 DIAGNOSIS — E11.65 UNCONTROLLED TYPE 2 DIABETES MELLITUS WITH HYPERGLYCEMIA, WITHOUT LONG-TERM CURRENT USE OF INSULIN: Primary | ICD-10-CM

## 2019-05-08 NOTE — PROGRESS NOTES
Chart review completed. Immunizations reconciled, HM modifiers updated, HM duplicate entries deleted, care team updated, old orders deleted. Pt due for A1c & lipid, orders placed.

## 2019-05-09 DIAGNOSIS — I15.2 HYPERTENSION ASSOCIATED WITH DIABETES: ICD-10-CM

## 2019-05-09 DIAGNOSIS — E11.59 HYPERTENSION ASSOCIATED WITH DIABETES: ICD-10-CM

## 2019-05-09 RX ORDER — FLUTICASONE PROPIONATE 50 MCG
SPRAY, SUSPENSION (ML) NASAL
Qty: 16 G | Refills: 3 | Status: SHIPPED | OUTPATIENT
Start: 2019-05-09 | End: 2019-05-10 | Stop reason: SDUPTHER

## 2019-05-09 RX ORDER — ATORVASTATIN CALCIUM 40 MG/1
TABLET, FILM COATED ORAL
Qty: 90 TABLET | Refills: 3 | Status: SHIPPED | OUTPATIENT
Start: 2019-05-09 | End: 2020-04-02 | Stop reason: SDUPTHER

## 2019-05-09 RX ORDER — LOSARTAN POTASSIUM 50 MG/1
TABLET ORAL
Qty: 90 TABLET | Refills: 3 | OUTPATIENT
Start: 2019-05-09

## 2019-05-10 ENCOUNTER — OFFICE VISIT (OUTPATIENT)
Dept: INTERNAL MEDICINE | Facility: CLINIC | Age: 64
End: 2019-05-10
Payer: MEDICARE

## 2019-05-10 ENCOUNTER — PATIENT MESSAGE (OUTPATIENT)
Dept: INTERNAL MEDICINE | Facility: CLINIC | Age: 64
End: 2019-05-10

## 2019-05-10 ENCOUNTER — TELEPHONE (OUTPATIENT)
Dept: INFECTIOUS DISEASES | Facility: CLINIC | Age: 64
End: 2019-05-10

## 2019-05-10 ENCOUNTER — HOSPITAL ENCOUNTER (OUTPATIENT)
Dept: RADIOLOGY | Facility: HOSPITAL | Age: 64
Discharge: HOME | End: 2019-05-10
Attending: INTERNAL MEDICINE
Payer: MEDICARE

## 2019-05-10 ENCOUNTER — TELEPHONE (OUTPATIENT)
Dept: INTERNAL MEDICINE | Facility: CLINIC | Age: 64
End: 2019-05-10

## 2019-05-10 VITALS
HEIGHT: 70 IN | HEART RATE: 64 BPM | BODY MASS INDEX: 31.18 KG/M2 | DIASTOLIC BLOOD PRESSURE: 60 MMHG | SYSTOLIC BLOOD PRESSURE: 115 MMHG | WEIGHT: 217.81 LBS | OXYGEN SATURATION: 97 %

## 2019-05-10 DIAGNOSIS — E11.59 HYPERTENSION ASSOCIATED WITH DIABETES: ICD-10-CM

## 2019-05-10 DIAGNOSIS — I15.2 HYPERTENSION ASSOCIATED WITH DIABETES: ICD-10-CM

## 2019-05-10 DIAGNOSIS — R05.3 CHRONIC COUGH: ICD-10-CM

## 2019-05-10 DIAGNOSIS — E11.69 MIXED HYPERLIPIDEMIA DUE TO TYPE 2 DIABETES MELLITUS: Chronic | ICD-10-CM

## 2019-05-10 DIAGNOSIS — E11.65 UNCONTROLLED TYPE 2 DIABETES MELLITUS WITH HYPERGLYCEMIA, WITHOUT LONG-TERM CURRENT USE OF INSULIN: Primary | ICD-10-CM

## 2019-05-10 DIAGNOSIS — E78.2 MIXED HYPERLIPIDEMIA DUE TO TYPE 2 DIABETES MELLITUS: Chronic | ICD-10-CM

## 2019-05-10 DIAGNOSIS — B35.4 TINEA CORPORIS: ICD-10-CM

## 2019-05-10 DIAGNOSIS — E55.9 VITAMIN D DEFICIENCY: ICD-10-CM

## 2019-05-10 DIAGNOSIS — J18.9 PNEUMONIA OF LEFT UPPER LOBE DUE TO INFECTIOUS ORGANISM: Primary | ICD-10-CM

## 2019-05-10 PROCEDURE — 3074F SYST BP LT 130 MM HG: CPT | Mod: CPTII,S$GLB,, | Performed by: INTERNAL MEDICINE

## 2019-05-10 PROCEDURE — 3008F BODY MASS INDEX DOCD: CPT | Mod: CPTII,S$GLB,, | Performed by: INTERNAL MEDICINE

## 2019-05-10 PROCEDURE — 71046 X-RAY EXAM CHEST 2 VIEWS: CPT | Mod: TC

## 2019-05-10 PROCEDURE — 99214 OFFICE O/P EST MOD 30 MIN: CPT | Mod: S$GLB,,, | Performed by: INTERNAL MEDICINE

## 2019-05-10 PROCEDURE — 3078F PR MOST RECENT DIASTOLIC BLOOD PRESSURE < 80 MM HG: ICD-10-PCS | Mod: CPTII,S$GLB,, | Performed by: INTERNAL MEDICINE

## 2019-05-10 PROCEDURE — 71046 XR CHEST PA AND LATERAL: ICD-10-PCS | Mod: 26,,, | Performed by: RADIOLOGY

## 2019-05-10 PROCEDURE — 3045F PR MOST RECENT HEMOGLOBIN A1C LEVEL 7.0-9.0%: ICD-10-PCS | Mod: CPTII,S$GLB,, | Performed by: INTERNAL MEDICINE

## 2019-05-10 PROCEDURE — 71046 X-RAY EXAM CHEST 2 VIEWS: CPT | Mod: 26,,, | Performed by: RADIOLOGY

## 2019-05-10 PROCEDURE — 99214 PR OFFICE/OUTPT VISIT, EST, LEVL IV, 30-39 MIN: ICD-10-PCS | Mod: S$GLB,,, | Performed by: INTERNAL MEDICINE

## 2019-05-10 PROCEDURE — 3074F PR MOST RECENT SYSTOLIC BLOOD PRESSURE < 130 MM HG: ICD-10-PCS | Mod: CPTII,S$GLB,, | Performed by: INTERNAL MEDICINE

## 2019-05-10 PROCEDURE — 3008F PR BODY MASS INDEX (BMI) DOCUMENTED: ICD-10-PCS | Mod: CPTII,S$GLB,, | Performed by: INTERNAL MEDICINE

## 2019-05-10 PROCEDURE — 3045F PR MOST RECENT HEMOGLOBIN A1C LEVEL 7.0-9.0%: CPT | Mod: CPTII,S$GLB,, | Performed by: INTERNAL MEDICINE

## 2019-05-10 PROCEDURE — 3078F DIAST BP <80 MM HG: CPT | Mod: CPTII,S$GLB,, | Performed by: INTERNAL MEDICINE

## 2019-05-10 PROCEDURE — 99999 PR PBB SHADOW E&M-EST. PATIENT-LVL III: ICD-10-PCS | Mod: PBBFAC,,, | Performed by: INTERNAL MEDICINE

## 2019-05-10 PROCEDURE — 99999 PR PBB SHADOW E&M-EST. PATIENT-LVL III: CPT | Mod: PBBFAC,,, | Performed by: INTERNAL MEDICINE

## 2019-05-10 RX ORDER — MOXIFLOXACIN HYDROCHLORIDE 400 MG/1
400 TABLET ORAL DAILY
Qty: 10 TABLET | Refills: 0 | Status: SHIPPED | OUTPATIENT
Start: 2019-05-10 | End: 2019-05-20

## 2019-05-10 RX ORDER — FLUTICASONE PROPIONATE 50 MCG
SPRAY, SUSPENSION (ML) NASAL
Qty: 16 G | Refills: 3 | Status: SHIPPED | OUTPATIENT
Start: 2019-05-10 | End: 2020-04-02 | Stop reason: SDUPTHER

## 2019-05-10 NOTE — PROGRESS NOTES
Subjective:       Patient ID: Alison Branch is a 63 y.o. female.    Chief Complaint: Follow-up; Shortness of Breath; and Cough    HPI - Has been coughing and dyspneic for the past couple of months.  Found mold in her house that was bad enough for her to move out.  She's been out for a week, but the cough is about the same.  No f/c.  No bloody cough.  She was seeing BS in the 100 range; stopped sodas and iced tea to improve that.  She had some relapse in bad diet.  5 lbs down since Jan.  She's not a smoker.    PMH:  DM2, A1C over 8  HTN, at goal  Brain aneurysm s/p coiling  HLP, on a statin  ADD, not on a stimulant  Obesity  Chronic cough  Left knee pain     Meds:  Reviewed and reconciled in EPIC with patient during visit today    Review of Systems   Constitutional: Negative for fever.   HENT: Negative for congestion.    Respiratory: Positive for cough and shortness of breath.    Cardiovascular: Negative for chest pain.   Gastrointestinal: Negative for abdominal pain.   Genitourinary: Negative for difficulty urinating.   Musculoskeletal: Negative for arthralgias.   Skin: Negative for rash.   Neurological: Negative for headaches.   Psychiatric/Behavioral: Negative for sleep disturbance.       Objective:      Physical Exam   Constitutional: She appears well-developed and well-nourished. No distress.   Neurological: She is alert.   Skin: She is not diaphoretic.   Psychiatric: She has a normal mood and affect.   Nursing note and vitals reviewed.      Assessment:       1. Uncontrolled type 2 diabetes mellitus with hyperglycemia, without long-term current use of insulin    2. Mixed hyperlipidemia due to type 2 diabetes mellitus    3. Hypertension associated with diabetes    4. Vitamin D deficiency    5. Chronic cough    6. Tinea corporis        Plan:       Alison was seen today for follow-up, shortness of breath and cough.    Diagnoses and all orders for this visit:    Uncontrolled type 2 diabetes mellitus with  hyperglycemia, without long-term current use of insulin - unstable.  Adherent to medications for a full three months, so I'm hopeful a1c is down.  May need to adjust meds based on a1c.  She's sensitive to metformin  -     Hemoglobin A1c; Future    Mixed hyperlipidemia due to type 2 diabetes mellitus - on a statin, but no recent lipid panel  -     Lipid panel; Future    Hypertension associated with diabetes - at goal, stay the course    Vitamin D deficiency - stable, but no check in a couple of years  -     Vitamin D; Future    Chronic cough - discussed that this was likely PND/allergic.  Use claritin/flonase.  CXR to look for something more serious  -     X-Ray Chest PA And Lateral; Future  -     fluticasone propionate (FLONASE) 50 mcg/actuation nasal spray; USE TWO SPRAY(S) IN EACH NOSTRIL ONCE DAILY    Tinea corporis - under breasts.  Recommended gold bond powder.    rtc prn, or in 3 months    JUVE Rodriguez MD MPH  Staff Internist

## 2019-05-10 NOTE — TELEPHONE ENCOUNTER
"----- Message from Tigre Rodriguez MD sent at 5/10/2019 11:00 AM CDT -----  Please cancel her appointment to see Jaquan Watkinslili and arrange for her to see me next week Friday at 9:30 am.    ----- Message -----  From: Mike Rodriguez II, MD  Sent: 5/10/2019  10:02 AM  To: Tigre Rodriguez MD    Obi,  This lady came to me with a chronic cough.  Was told to move out of her house d/t "black mold".  CXR shows a left upper lobe pneumonia.  No risk factors for TB; no travel.  No weight loss, hemoptysis.  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    I started avelox.  How seriously would you consider TB or atypicals here?  What about this mold exposure?    Eckert    "

## 2019-05-13 ENCOUNTER — TELEPHONE (OUTPATIENT)
Dept: INTERNAL MEDICINE | Facility: CLINIC | Age: 64
End: 2019-05-13

## 2019-05-13 NOTE — TELEPHONE ENCOUNTER
----- Message from Corry Del Valle sent at 5/13/2019  2:36 PM CDT -----  Contact: [phone]  Patient is requesting a call from the office regarding her insulin injection.  Patient wants to know if she should continue to take other medicines with the insulin injection.    Please call and advise, thank you

## 2019-05-15 ENCOUNTER — TELEPHONE (OUTPATIENT)
Dept: INTERNAL MEDICINE | Facility: CLINIC | Age: 64
End: 2019-05-15

## 2019-05-15 DIAGNOSIS — I15.2 HYPERTENSION ASSOCIATED WITH DIABETES: ICD-10-CM

## 2019-05-15 DIAGNOSIS — E11.59 HYPERTENSION ASSOCIATED WITH DIABETES: ICD-10-CM

## 2019-05-15 RX ORDER — LOSARTAN POTASSIUM 50 MG/1
TABLET ORAL
Qty: 90 TABLET | Refills: 0 | Status: ON HOLD | OUTPATIENT
Start: 2019-05-15 | End: 2019-07-08

## 2019-05-15 NOTE — TELEPHONE ENCOUNTER
Hi, please call pharmacy and tell them to not fill the losartan prescription I just sent in since I noticed that she is on Benicar/olmesartan already.  Let me know if pharmacist has any questions.  Thank you, Cortez Perez

## 2019-05-17 ENCOUNTER — TELEPHONE (OUTPATIENT)
Dept: INFECTIOUS DISEASES | Facility: CLINIC | Age: 64
End: 2019-05-17

## 2019-05-17 ENCOUNTER — OFFICE VISIT (OUTPATIENT)
Dept: INFECTIOUS DISEASES | Facility: CLINIC | Age: 64
End: 2019-05-17
Payer: MEDICARE

## 2019-05-17 ENCOUNTER — LAB VISIT (OUTPATIENT)
Dept: LAB | Facility: HOSPITAL | Age: 64
End: 2019-05-17
Attending: INTERNAL MEDICINE
Payer: MEDICARE

## 2019-05-17 VITALS
BODY MASS INDEX: 30.87 KG/M2 | HEART RATE: 65 BPM | WEIGHT: 215.19 LBS | SYSTOLIC BLOOD PRESSURE: 98 MMHG | TEMPERATURE: 98 F | DIASTOLIC BLOOD PRESSURE: 68 MMHG

## 2019-05-17 DIAGNOSIS — R05.3 COUGH, PERSISTENT: ICD-10-CM

## 2019-05-17 DIAGNOSIS — R06.02 SHORTNESS OF BREATH: ICD-10-CM

## 2019-05-17 DIAGNOSIS — R91.8 LUNG MASS: Primary | ICD-10-CM

## 2019-05-17 DIAGNOSIS — J18.9 PNEUMONIA OF LEFT UPPER LOBE DUE TO INFECTIOUS ORGANISM: Primary | ICD-10-CM

## 2019-05-17 LAB
ALBUMIN SERPL BCP-MCNC: 4.1 G/DL (ref 3.5–5.2)
ALP SERPL-CCNC: 79 U/L (ref 55–135)
ALT SERPL W/O P-5'-P-CCNC: 26 U/L (ref 10–44)
ANION GAP SERPL CALC-SCNC: 9 MMOL/L (ref 8–16)
AST SERPL-CCNC: 27 U/L (ref 10–40)
BASOPHILS # BLD AUTO: 0.04 K/UL (ref 0–0.2)
BASOPHILS NFR BLD: 0.8 % (ref 0–1.9)
BILIRUB SERPL-MCNC: 0.5 MG/DL (ref 0.1–1)
BUN SERPL-MCNC: 23 MG/DL (ref 8–23)
CALCIUM SERPL-MCNC: 10 MG/DL (ref 8.7–10.5)
CHLORIDE SERPL-SCNC: 103 MMOL/L (ref 95–110)
CO2 SERPL-SCNC: 26 MMOL/L (ref 23–29)
CREAT SERPL-MCNC: 1 MG/DL (ref 0.5–1.4)
DIFFERENTIAL METHOD: ABNORMAL
EOSINOPHIL # BLD AUTO: 0.2 K/UL (ref 0–0.5)
EOSINOPHIL NFR BLD: 2.9 % (ref 0–8)
ERYTHROCYTE [DISTWIDTH] IN BLOOD BY AUTOMATED COUNT: 13.9 % (ref 11.5–14.5)
EST. GFR  (AFRICAN AMERICAN): >60 ML/MIN/1.73 M^2
EST. GFR  (NON AFRICAN AMERICAN): >60 ML/MIN/1.73 M^2
GLUCOSE SERPL-MCNC: 112 MG/DL (ref 70–110)
HCT VFR BLD AUTO: 42.2 % (ref 37–48.5)
HGB BLD-MCNC: 13.1 G/DL (ref 12–16)
IMM GRANULOCYTES # BLD AUTO: 0.02 K/UL (ref 0–0.04)
IMM GRANULOCYTES NFR BLD AUTO: 0.4 % (ref 0–0.5)
LYMPHOCYTES # BLD AUTO: 2 K/UL (ref 1–4.8)
LYMPHOCYTES NFR BLD: 39.6 % (ref 18–48)
MCH RBC QN AUTO: 26.9 PG (ref 27–31)
MCHC RBC AUTO-ENTMCNC: 31 G/DL (ref 32–36)
MCV RBC AUTO: 87 FL (ref 82–98)
MONOCYTES # BLD AUTO: 0.6 K/UL (ref 0.3–1)
MONOCYTES NFR BLD: 11.1 % (ref 4–15)
NEUTROPHILS # BLD AUTO: 2.3 K/UL (ref 1.8–7.7)
NEUTROPHILS NFR BLD: 45.2 % (ref 38–73)
NRBC BLD-RTO: 0 /100 WBC
PLATELET # BLD AUTO: 218 K/UL (ref 150–350)
PMV BLD AUTO: 12.5 FL (ref 9.2–12.9)
POTASSIUM SERPL-SCNC: 4.5 MMOL/L (ref 3.5–5.1)
PROT SERPL-MCNC: 7.7 G/DL (ref 6–8.4)
RBC # BLD AUTO: 4.87 M/UL (ref 4–5.4)
SODIUM SERPL-SCNC: 138 MMOL/L (ref 136–145)
WBC # BLD AUTO: 5.15 K/UL (ref 3.9–12.7)

## 2019-05-17 PROCEDURE — 80053 COMPREHEN METABOLIC PANEL: CPT

## 2019-05-17 PROCEDURE — 3008F PR BODY MASS INDEX (BMI) DOCUMENTED: ICD-10-PCS | Mod: CPTII,S$GLB,, | Performed by: INTERNAL MEDICINE

## 2019-05-17 PROCEDURE — 3074F PR MOST RECENT SYSTOLIC BLOOD PRESSURE < 130 MM HG: ICD-10-PCS | Mod: CPTII,S$GLB,, | Performed by: INTERNAL MEDICINE

## 2019-05-17 PROCEDURE — 99499 RISK ADDL DX/OHS AUDIT: ICD-10-PCS | Mod: S$GLB,,, | Performed by: INTERNAL MEDICINE

## 2019-05-17 PROCEDURE — 3074F SYST BP LT 130 MM HG: CPT | Mod: CPTII,S$GLB,, | Performed by: INTERNAL MEDICINE

## 2019-05-17 PROCEDURE — 99204 PR OFFICE/OUTPT VISIT, NEW, LEVL IV, 45-59 MIN: ICD-10-PCS | Mod: S$GLB,,, | Performed by: INTERNAL MEDICINE

## 2019-05-17 PROCEDURE — 85025 COMPLETE CBC W/AUTO DIFF WBC: CPT

## 2019-05-17 PROCEDURE — 99999 PR PBB SHADOW E&M-EST. PATIENT-LVL III: CPT | Mod: PBBFAC,,, | Performed by: INTERNAL MEDICINE

## 2019-05-17 PROCEDURE — 3078F DIAST BP <80 MM HG: CPT | Mod: CPTII,S$GLB,, | Performed by: INTERNAL MEDICINE

## 2019-05-17 PROCEDURE — 99204 OFFICE O/P NEW MOD 45 MIN: CPT | Mod: S$GLB,,, | Performed by: INTERNAL MEDICINE

## 2019-05-17 PROCEDURE — 3008F BODY MASS INDEX DOCD: CPT | Mod: CPTII,S$GLB,, | Performed by: INTERNAL MEDICINE

## 2019-05-17 PROCEDURE — 99999 PR PBB SHADOW E&M-EST. PATIENT-LVL III: ICD-10-PCS | Mod: PBBFAC,,, | Performed by: INTERNAL MEDICINE

## 2019-05-17 PROCEDURE — 3078F PR MOST RECENT DIASTOLIC BLOOD PRESSURE < 80 MM HG: ICD-10-PCS | Mod: CPTII,S$GLB,, | Performed by: INTERNAL MEDICINE

## 2019-05-17 PROCEDURE — 99499 UNLISTED E&M SERVICE: CPT | Mod: S$GLB,,, | Performed by: INTERNAL MEDICINE

## 2019-05-17 PROCEDURE — 87449 NOS EACH ORGANISM AG IA: CPT

## 2019-05-17 NOTE — PROGRESS NOTES
Subjective:      Patient ID: Alison Branch is a 63 y.o. female.    Chief Complaint:No chief complaint on file.    History of Present Illness    64 y/o with history of chronic cough for a few months.  She thought that she had a cold.  The cough persisted.  A few weeks ago, she had some post tussive emesis.  She did have an episode of diarrhea and thought it could have been some type of 'bug'.  She also had an episode where she sleep or lay down due to the cough.  She has been having some shortness of breath.  She has also been having some wheezing.  She was given albuterol nebulizer which helps sometimes.  She denies hemoptysis.  She feels that her house has mold in it.  She has moved in with her daughter about 2 weeks ago.  She reports nasal congestion and chest congestion. She was started on oral moxifloxacin by Dr. Rodriguez.  Since then, she states that she feels better but is still coughing.    Medical History  Diabetes mellitus  Hyperlipidemia  Overweight  Brain aneurysm    Social History  Born and raised in [place].  Briefly lived in [place] years ago.  She works as [employer] .  She also receives Social Security.  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  Quit smoking tobacco over 20 years ago.  Doesn't drink alcohol.  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      Review of Systems   Constitution: Positive for weight gain. Negative for chills, decreased appetite, fever, malaise/fatigue, night sweats and weight loss.   HENT: Negative for congestion, ear pain, hearing loss, hoarse voice, sore throat and tinnitus.    Eyes: Negative for blurred vision, redness and visual disturbance.   Cardiovascular: Positive for leg swelling. Negative for chest pain and palpitations.   Respiratory: Positive for cough, shortness of breath and wheezing. Negative for hemoptysis and sputum production.    Hematologic/Lymphatic: Negative for adenopathy. Bruises/bleeds easily.   Skin: Positive for dry skin.  Negative for itching, rash and suspicious lesions.   Musculoskeletal: Positive for back pain. Negative for joint pain, myalgias and neck pain.   Gastrointestinal: Positive for constipation. Negative for abdominal pain, diarrhea, heartburn, nausea and vomiting.   Genitourinary: Negative for dysuria, flank pain, frequency, hematuria, hesitancy and urgency.   Neurological: Negative for dizziness, headaches, numbness, paresthesias and weakness.   Psychiatric/Behavioral: Positive for depression. Negative for memory loss. The patient is nervous/anxious. The patient does not have insomnia.      Objective:   Physical Exam   Constitutional: She is oriented to person, place, and time. She appears well-developed and well-nourished. No distress.   HENT:   Head: Normocephalic and atraumatic.   Right Ear: External ear normal.   Left Ear: External ear normal.   Nose: Nose normal.   Mouth/Throat: Oropharynx is clear and moist. No oropharyngeal exudate.   Eyes: Pupils are equal, round, and reactive to light. Conjunctivae and EOM are normal. Right eye exhibits no discharge. Left eye exhibits no discharge. No scleral icterus.   Neck: Normal range of motion. Neck supple. No JVD present. No tracheal deviation present. No thyromegaly present.   Cardiovascular: Normal rate, regular rhythm, normal heart sounds and intact distal pulses. Exam reveals no gallop and no friction rub.   No murmur heard.  Pulmonary/Chest: Effort normal and breath sounds normal. No stridor. No respiratory distress. She has no wheezes. She has no rales. She exhibits no tenderness.   Coughing with each inhalation.   Abdominal: Soft. Bowel sounds are normal. She exhibits no distension and no mass. There is no tenderness. There is no rebound and no guarding.   Musculoskeletal: Normal range of motion. She exhibits no edema or tenderness.   Lymphadenopathy:     She has no cervical adenopathy.   Neurological: She is alert and oriented to person, place, and time. She  displays normal reflexes. No cranial nerve deficit. She exhibits normal muscle tone. Coordination normal.   Skin: Skin is warm. No rash noted. She is not diaphoretic. No erythema. No pallor.   Psychiatric: She has a normal mood and affect. Her behavior is normal. Judgment and thought content normal.   Nursing note and vitals reviewed.    Assessment:       1. Pneumonia of left upper lobe due to infectious organism    2. Cough, persistent    3. Shortness of breath        62 y/o female with chronic cough and left upper lobe opacification on chest x-ray.  She has been on oral moxifloxacin with limited improvement in her symptoms.  Differential includes atypical pneumonia such as legionella vs community acquired pneumonia.  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     I will check a CT scan of the chest to better characterize this area.  I will check sputum AFB and urine legionella.  She will follow up with me in about 3-4 weeks.  Plan:       Pneumonia of left upper lobe due to infectious organism  -     Legionella antigen, urine  -     CT Chest Without Contrast; Future; Expected date: 05/17/2019  -     Quantiferon Gold TB; Future; Expected date: 05/17/2019  -     AFB Culture & Smear; Standing  -     M. tuberculosis DNA by PCR [facility]; Sputum, Expectorated; Future; Expected date: 05/17/2019    Cough, persistent  -     Legionella antigen, urine  -     CT Chest Without Contrast; Future; Expected date: 05/17/2019  -     CBC auto differential; Future; Expected date: 05/17/2019  -     Comprehensive metabolic panel; Future; Expected date: 05/17/2019  -     Quantiferon Gold TB; Future; Expected date: 05/17/2019  -     AFB Culture & Smear; Standing  -     M. tuberculosis DNA by PCR [facility]; Sputum, Expectorated; Future; Expected date: 05/17/2019    Shortness of breath  -     Legionella antigen, urine  -     CT Chest Without Contrast; Future; Expected date: 05/17/2019  -     CBC auto differential;  Future; Expected date: 05/17/2019  -     Comprehensive metabolic panel; Future; Expected date: 05/17/2019  -     Quantiferon Gold TB; Future; Expected date: 05/17/2019  -     AFB Culture & Smear; Standing  -     M. tuberculosis DNA by PCR [facility]; Sputum, Expectorated; Future; Expected date: 05/17/2019

## 2019-05-17 NOTE — LETTER
May 17, 2019      Mike Rodriguez II, MD  [address]  [facility] [address]           [facility]  [address]  [facility] [address]  Phone: [phone]  Fax: [phone]          Patient: Alison Branch   MR Number: [id number]   YOB: 1955   Date of Visit: 5/17/2019       Dear Dr. Mike Rodriguez II:    Thank you for referring Alison Branch to me for evaluation. Attached you will find relevant portions of my assessment and plan of care.    If you have questions, please do not hesitate to call me. I look forward to following Alison Branch along with you.    Sincerely,    Tigre Rodriguez MD    Enclosure  CC:  No Recipients    If you would like to receive this communication electronically, please contact [email or web address] or [phone] to request more information on [employer] Link access.    For providers and/or their staff who would like to refer a patient to [facility], please contact us through our one-stop-shop provider referral line, [facility], at [phone].    If you feel you have received this communication in error or would no longer like to receive these types of communications, please e-mail [email or web address]

## 2019-05-17 NOTE — TELEPHONE ENCOUNTER
Dr. Harris,    I am referring this patient to pulmonary clinic.  I am concerned about her CT chest findings and the possibility of malignancy.  She reports that she quit smoking tobacco in the past.  I am hoping that either you or one of your colleagues would be able to see her soon.  Thanks for your help.

## 2019-05-20 DIAGNOSIS — B37.9 ANTIBIOTIC-INDUCED YEAST INFECTION: ICD-10-CM

## 2019-05-20 DIAGNOSIS — T36.95XA ANTIBIOTIC-INDUCED YEAST INFECTION: ICD-10-CM

## 2019-05-20 LAB — L PNEUMO AG UR QL IA: NOT DETECTED

## 2019-05-21 RX ORDER — TERCONAZOLE 4 MG/G
CREAM VAGINAL
Qty: 45 G | Refills: 0 | Status: SHIPPED | OUTPATIENT
Start: 2019-05-21 | End: 2020-09-03

## 2019-05-23 ENCOUNTER — OFFICE VISIT (OUTPATIENT)
Dept: PULMONOLOGY | Facility: CLINIC | Age: 64
End: 2019-05-23
Payer: MEDICARE

## 2019-05-23 VITALS
DIASTOLIC BLOOD PRESSURE: 67 MMHG | OXYGEN SATURATION: 98 % | BODY MASS INDEX: 31.16 KG/M2 | WEIGHT: 217.63 LBS | HEIGHT: 70 IN | SYSTOLIC BLOOD PRESSURE: 116 MMHG | HEART RATE: 78 BPM

## 2019-05-23 DIAGNOSIS — R91.8 LUNG MASS: ICD-10-CM

## 2019-05-23 PROCEDURE — 3008F BODY MASS INDEX DOCD: CPT | Mod: CPTII,S$GLB,, | Performed by: INTERNAL MEDICINE

## 2019-05-23 PROCEDURE — 3078F PR MOST RECENT DIASTOLIC BLOOD PRESSURE < 80 MM HG: ICD-10-PCS | Mod: CPTII,S$GLB,, | Performed by: INTERNAL MEDICINE

## 2019-05-23 PROCEDURE — 99204 OFFICE O/P NEW MOD 45 MIN: CPT | Mod: S$GLB,,, | Performed by: INTERNAL MEDICINE

## 2019-05-23 PROCEDURE — 99999 PR PBB SHADOW E&M-EST. PATIENT-LVL III: CPT | Mod: PBBFAC,,, | Performed by: INTERNAL MEDICINE

## 2019-05-23 PROCEDURE — 3074F PR MOST RECENT SYSTOLIC BLOOD PRESSURE < 130 MM HG: ICD-10-PCS | Mod: CPTII,S$GLB,, | Performed by: INTERNAL MEDICINE

## 2019-05-23 PROCEDURE — 3008F PR BODY MASS INDEX (BMI) DOCUMENTED: ICD-10-PCS | Mod: CPTII,S$GLB,, | Performed by: INTERNAL MEDICINE

## 2019-05-23 PROCEDURE — 3078F DIAST BP <80 MM HG: CPT | Mod: CPTII,S$GLB,, | Performed by: INTERNAL MEDICINE

## 2019-05-23 PROCEDURE — 3074F SYST BP LT 130 MM HG: CPT | Mod: CPTII,S$GLB,, | Performed by: INTERNAL MEDICINE

## 2019-05-23 PROCEDURE — 99999 PR PBB SHADOW E&M-EST. PATIENT-LVL III: ICD-10-PCS | Mod: PBBFAC,,, | Performed by: INTERNAL MEDICINE

## 2019-05-23 PROCEDURE — 99204 PR OFFICE/OUTPT VISIT, NEW, LEVL IV, 45-59 MIN: ICD-10-PCS | Mod: S$GLB,,, | Performed by: INTERNAL MEDICINE

## 2019-05-23 NOTE — H&P (VIEW-ONLY)
"Subjective:       Patient ID: Alison Branch is a 63 y.o. female.    Chief Complaint: Lung Mass    63 year old former smoker ppd for 30 years.  QUit 20 years ago.  Presented with a cough in January and thought that she had the flu.  Three months later cough persisted. CXR was obtained and sent to ID for pneumonia.  Does endorse that she had mold in her home.  No fevers, chills or sweats.  Cough is clear.      Review of Systems   Constitutional: Negative for weight loss and weight gain.   HENT: Negative for trouble swallowing.    Eyes: Negative for redness and itching.   Respiratory: Positive for sputum production (clear that is worse at night.) and dyspnea on extertion. Negative for wheezing.    Cardiovascular: Negative for chest pain and leg swelling.   Genitourinary: Negative for difficulty urinating.   Endocrine: Negative for cold intolerance and heat intolerance.    Musculoskeletal: Negative for arthralgias.   Skin: Negative for rash.   Gastrointestinal: Negative for acid reflux.   Neurological: Negative for headaches.   Hematological: Negative for adenopathy.   Psychiatric/Behavioral: Negative for confusion.       Started exercising at [employer] and began with chest pain on the left.  Progressively more short of breath since began exercising in [employer].     On ASA daily  No personal or family history of anesthesia complications.  Objective:       Vitals:    05/23/19 1613   BP: 116/67   BP Location: Left arm   Patient Position: Sitting   Pulse: 78   SpO2: 98%   Weight: 98.7 kg (217 lb 9.5 oz)   Height: 5' 10" (1.778 m)     Physical Exam   Constitutional: She is oriented to person, place, and time. She appears well-developed and well-nourished.   HENT:   Head: Normocephalic.   Nose: Nose normal.   Neck: Normal range of motion. Neck supple. No tracheal deviation present.   Cardiovascular: Normal rate, regular rhythm, normal heart sounds and intact distal pulses.   Pulmonary/Chest: Normal expansion, symmetric chest wall " expansion, effort normal and breath sounds normal.   Abdominal: Soft. Bowel sounds are normal. There is no hepatosplenomegaly.   Musculoskeletal: Normal range of motion. She exhibits no edema.   Lymphadenopathy: No supraclavicular adenopathy is present.     She has no cervical adenopathy.   Neurological: She is alert and oriented to person, place, and time. No cranial nerve deficit.   Skin: Skin is warm and dry.   Psychiatric: She has a normal mood and affect.   Vitals reviewed.       Personal Diagnostic Review  CT of chest performed on 5/17/2019 without contrast revealed CARLOS infiltrates with nodules suggesting lymphangitic spread of disease. Prevascular lymph node.    Sputum negative for infection including AFB.  IGRA was negative.  No flowsheet data found.      Assessment:       1. Lung mass        Outpatient Encounter Medications as of 5/23/2019   Medication Sig Dispense Refill    aspirin (ECOTRIN) 81 MG EC tablet Take 1 tablet (81 mg total) by mouth once daily.  0    atorvastatin (LIPITOR) 40 MG tablet TAKE 1 TABLET BY MOUTH ONCE DAILY 90 tablet 3    blood sugar diagnostic Strp 1 strip by Misc.(Non-Drug; Combo Route) route once daily. Heladio Result.  250.02.  Check Blood Sugar Twice Daily. 200 each 3    blood-glucose meter kit Use as instructed 1 each 0    escitalopram oxalate (LEXAPRO) 10 MG tablet Take 10 mg by mouth once daily.      fluticasone propionate (FLONASE) 50 mcg/actuation nasal spray USE TWO SPRAY(S) IN EACH NOSTRIL ONCE DAILY 16 g 3    glipiZIDE (GLUCOTROL) 10 MG TR24 TAKE 1 TABLET BY MOUTH TWICE DAILY 180 tablet 3    JANUVIA 100 mg Tab TAKE ONE TABLET BY MOUTH ONCE DAILY 30 tablet 11    lancets Misc 1 Device by Misc.(Non-Drug; Combo Route) route once daily. Heladio Result.  250.02.  Check Blood Sugar Twice Daily. 200 each 3    lidocaine (LIDODERM) 5 % Place 1 patch onto the skin.      losartan (COZAAR) 50 MG tablet TAKE 1 TABLET BY MOUTH ONCE DAILY 90 tablet 0    olmesartan (BENICAR) 20 MG  tablet Take 1 tablet (20 mg total) by mouth once daily. 90 tablet 3    terconazole (TERAZOL 7) 0.4 % Crea INSERT 1 APPLICATORFUL VAGINALLY ONCE DAILY IN THE EVENING 45 g 0    VITAMIN D2 50,000 unit capsule TAKE 1 CAPSULE BY MOUTH EVERY 7 DAYS 12 capsule 3    albuterol 90 mcg/actuation inhaler Inhale 2 puffs into the lungs every 6 (six) hours as needed for Wheezing. Rescue 18 g 1    [DISCONTINUED] dulaglutide (TRULICITY) 0.75 mg/0.5 mL PnIj Inject 0.5 mLs (0.75 mg total) into the skin every 7 days. 6 mL 3     No facility-administered encounter medications on file as of 5/23/2019.      No orders of the defined types were placed in this encounter.    Plan:     Problem List Items Addressed This Visit     Lung mass    Overview     CARLOS nodules.  Will plan on outpatient bronchoscopy with TBBx, BAL and perhaps brush.

## 2019-05-23 NOTE — PROGRESS NOTES
"Subjective:       Patient ID: Alison Branch is a 63 y.o. female.    Chief Complaint: Lung Mass    63 year old former smoker ppd for 30 years.  QUit 20 years ago.  Presented with a cough in January and thought that she had the flu.  Three months later cough persisted. CXR was obtained and sent to ID for pneumonia.  Does endorse that she had mold in her home.  No fevers, chills or sweats.  Cough is clear.      Review of Systems   Constitutional: Negative for weight loss and weight gain.   HENT: Negative for trouble swallowing.    Eyes: Negative for redness and itching.   Respiratory: Positive for sputum production (clear that is worse at night.) and dyspnea on extertion. Negative for wheezing.    Cardiovascular: Negative for chest pain and leg swelling.   Genitourinary: Negative for difficulty urinating.   Endocrine: Negative for cold intolerance and heat intolerance.    Musculoskeletal: Negative for arthralgias.   Skin: Negative for rash.   Gastrointestinal: Negative for acid reflux.   Neurological: Negative for headaches.   Hematological: Negative for adenopathy.   Psychiatric/Behavioral: Negative for confusion.       Started exercising at [employer] and began with chest pain on the left.  Progressively more short of breath since began exercising in [employer].     On ASA daily  No personal or family history of anesthesia complications.  Objective:       Vitals:    05/23/19 1613   BP: 116/67   BP Location: Left arm   Patient Position: Sitting   Pulse: 78   SpO2: 98%   Weight: 98.7 kg (217 lb 9.5 oz)   Height: 5' 10" (1.778 m)     Physical Exam   Constitutional: She is oriented to person, place, and time. She appears well-developed and well-nourished.   HENT:   Head: Normocephalic.   Nose: Nose normal.   Neck: Normal range of motion. Neck supple. No tracheal deviation present.   Cardiovascular: Normal rate, regular rhythm, normal heart sounds and intact distal pulses.   Pulmonary/Chest: Normal expansion, symmetric chest wall " expansion, effort normal and breath sounds normal.   Abdominal: Soft. Bowel sounds are normal. There is no hepatosplenomegaly.   Musculoskeletal: Normal range of motion. She exhibits no edema.   Lymphadenopathy: No supraclavicular adenopathy is present.     She has no cervical adenopathy.   Neurological: She is alert and oriented to person, place, and time. No cranial nerve deficit.   Skin: Skin is warm and dry.   Psychiatric: She has a normal mood and affect.   Vitals reviewed.       Personal Diagnostic Review  CT of chest performed on 5/17/2019 without contrast revealed CARLOS infiltrates with nodules suggesting lymphangitic spread of disease. Prevascular lymph node.    Sputum negative for infection including AFB.  IGRA was negative.  No flowsheet data found.      Assessment:       1. Lung mass        Outpatient Encounter Medications as of 5/23/2019   Medication Sig Dispense Refill    aspirin (ECOTRIN) 81 MG EC tablet Take 1 tablet (81 mg total) by mouth once daily.  0    atorvastatin (LIPITOR) 40 MG tablet TAKE 1 TABLET BY MOUTH ONCE DAILY 90 tablet 3    blood sugar diagnostic Strp 1 strip by Misc.(Non-Drug; Combo Route) route once daily. Heladio Result.  250.02.  Check Blood Sugar Twice Daily. 200 each 3    blood-glucose meter kit Use as instructed 1 each 0    escitalopram oxalate (LEXAPRO) 10 MG tablet Take 10 mg by mouth once daily.      fluticasone propionate (FLONASE) 50 mcg/actuation nasal spray USE TWO SPRAY(S) IN EACH NOSTRIL ONCE DAILY 16 g 3    glipiZIDE (GLUCOTROL) 10 MG TR24 TAKE 1 TABLET BY MOUTH TWICE DAILY 180 tablet 3    JANUVIA 100 mg Tab TAKE ONE TABLET BY MOUTH ONCE DAILY 30 tablet 11    lancets Misc 1 Device by Misc.(Non-Drug; Combo Route) route once daily. Heladio Result.  250.02.  Check Blood Sugar Twice Daily. 200 each 3    lidocaine (LIDODERM) 5 % Place 1 patch onto the skin.      losartan (COZAAR) 50 MG tablet TAKE 1 TABLET BY MOUTH ONCE DAILY 90 tablet 0    olmesartan (BENICAR) 20 MG  tablet Take 1 tablet (20 mg total) by mouth once daily. 90 tablet 3    terconazole (TERAZOL 7) 0.4 % Crea INSERT 1 APPLICATORFUL VAGINALLY ONCE DAILY IN THE EVENING 45 g 0    VITAMIN D2 50,000 unit capsule TAKE 1 CAPSULE BY MOUTH EVERY 7 DAYS 12 capsule 3    albuterol 90 mcg/actuation inhaler Inhale 2 puffs into the lungs every 6 (six) hours as needed for Wheezing. Rescue 18 g 1    [DISCONTINUED] dulaglutide (TRULICITY) 0.75 mg/0.5 mL PnIj Inject 0.5 mLs (0.75 mg total) into the skin every 7 days. 6 mL 3     No facility-administered encounter medications on file as of 5/23/2019.      No orders of the defined types were placed in this encounter.    Plan:     Problem List Items Addressed This Visit     Lung mass    Overview     CARLOS nodules.  Will plan on outpatient bronchoscopy with TBBx, BAL and perhaps brush.

## 2019-05-23 NOTE — LETTER
May 23, 2019      Tigre Rodriguez MD  [address] [facility] [address]           [facility] - Pulmonary Services  [address]  [facility] [address]  Phone: [phone]          Patient: Alison Branch   MR Number: [id number]   YOB: 1955   Date of Visit: 5/23/2019       Dear Dr. Tigre Rodriguez:    Thank you for referring Alison Branch to me for evaluation. Attached you will find relevant portions of my assessment and plan of care.    If you have questions, please do not hesitate to call me. I look forward to following Alison Branch along with you.    Sincerely,    Clara Harris MD    Enclosure  CC:  No Recipients    If you would like to receive this communication electronically, please contact [email or web address] or [phone] to request more information on [employer] Link access.    For providers and/or their staff who would like to refer a patient to [facility], please contact us through our one-stop-shop provider referral line, [facility], at [phone].    If you feel you have received this communication in error or would no longer like to receive these types of communications, please e-mail [email or web address]

## 2019-05-24 DIAGNOSIS — R91.8 LUNG MASS: Primary | ICD-10-CM

## 2019-05-27 ENCOUNTER — TELEPHONE (OUTPATIENT)
Dept: PULMONOLOGY | Facility: CLINIC | Age: 64
End: 2019-05-27

## 2019-05-27 NOTE — TELEPHONE ENCOUNTER
Spoke with patient, gave instruction for bronchoscopy, NPO after MN the night before procedure, may take meds with small sips of water, no blood thinners, arrive at [facility] 2nd floor at 0630.

## 2019-05-28 ENCOUNTER — HOSPITAL ENCOUNTER (OUTPATIENT)
Facility: HOSPITAL | Age: 64
Discharge: HOME | End: 2019-05-28
Attending: INTERNAL MEDICINE | Admitting: INTERNAL MEDICINE
Payer: MEDICARE

## 2019-05-28 VITALS
HEIGHT: 70 IN | SYSTOLIC BLOOD PRESSURE: 150 MMHG | TEMPERATURE: 98 F | DIASTOLIC BLOOD PRESSURE: 67 MMHG | RESPIRATION RATE: 20 BRPM | OXYGEN SATURATION: 97 % | WEIGHT: 218 LBS | HEART RATE: 74 BPM | BODY MASS INDEX: 31.21 KG/M2

## 2019-05-28 DIAGNOSIS — R91.8 LUNG MASS: Primary | ICD-10-CM

## 2019-05-28 LAB
ACID FAST MOD KINY STN SPEC: NORMAL
APPEARANCE FLD: NORMAL
BODY FLD TYPE: NORMAL
COLOR FLD: NORMAL
KOH PREP SPEC: NORMAL
LYMPHOCYTES NFR FLD MANUAL: 75 %
MONOS+MACROS NFR FLD MANUAL: 21 %
NEUTROPHILS NFR FLD MANUAL: 4 %
POCT GLUCOSE: 122 MG/DL (ref 70–110)
POCT GLUCOSE: 90 MG/DL (ref 70–110)
WBC # FLD: 444 /CU MM

## 2019-05-28 PROCEDURE — 87116 MYCOBACTERIA CULTURE: CPT

## 2019-05-28 PROCEDURE — 88341 PR IHC OR ICC EACH ADD'L SINGLE ANTIBODY  STAINPR: ICD-10-PCS | Mod: 26,,, | Performed by: PATHOLOGY

## 2019-05-28 PROCEDURE — 82962 GLUCOSE BLOOD TEST: CPT

## 2019-05-28 PROCEDURE — 87070 CULTURE OTHR SPECIMN AEROBIC: CPT

## 2019-05-28 PROCEDURE — 88305 TISSUE SPECIMEN TO PATHOLOGY, CARDIOLOGY/PULMONARY: ICD-10-PCS | Mod: 26,,, | Performed by: PATHOLOGY

## 2019-05-28 PROCEDURE — 99153 MOD SED SAME PHYS/QHP EA: CPT | Performed by: INTERNAL MEDICINE

## 2019-05-28 PROCEDURE — 88112 CYTOLOGY SPECIMEN- MEDICAL CYTOLOGY (FLUID/WASH/BRUSH): ICD-10-PCS | Mod: 26,,, | Performed by: PATHOLOGY

## 2019-05-28 PROCEDURE — 88341 IMHCHEM/IMCYTCHM EA ADD ANTB: CPT | Mod: 26,,, | Performed by: PATHOLOGY

## 2019-05-28 PROCEDURE — 89051 BODY FLUID CELL COUNT: CPT

## 2019-05-28 PROCEDURE — 31624 DX BRONCHOSCOPE/LAVAGE: CPT | Mod: 51,LT,, | Performed by: INTERNAL MEDICINE

## 2019-05-28 PROCEDURE — 87206 SMEAR FLUORESCENT/ACID STAI: CPT

## 2019-05-28 PROCEDURE — 87210 SMEAR WET MOUNT SALINE/INK: CPT

## 2019-05-28 PROCEDURE — 88104 CYTOLOGY SPECIMEN- MEDICAL CYTOLOGY (FLUID/WASH/BRUSH): ICD-10-PCS | Mod: 26,,, | Performed by: PATHOLOGY

## 2019-05-28 PROCEDURE — 88342 CYTOLOGY SPECIMEN- MEDICAL CYTOLOGY (FLUID/WASH/BRUSH): ICD-10-PCS | Mod: 26,,, | Performed by: PATHOLOGY

## 2019-05-28 PROCEDURE — 88104 CYTOPATH FL NONGYN SMEARS: CPT | Mod: 26,,, | Performed by: PATHOLOGY

## 2019-05-28 PROCEDURE — 88104 CYTOPATH FL NONGYN SMEARS: CPT | Mod: 59 | Performed by: PATHOLOGY

## 2019-05-28 PROCEDURE — 31628 BRONCHOSCOPY/LUNG BX EACH: CPT | Performed by: INTERNAL MEDICINE

## 2019-05-28 PROCEDURE — 87015 SPECIMEN INFECT AGNT CONCNTJ: CPT

## 2019-05-28 PROCEDURE — 88305 TISSUE EXAM BY PATHOLOGIST: CPT | Mod: 26,,, | Performed by: PATHOLOGY

## 2019-05-28 PROCEDURE — 31624 DX BRONCHOSCOPE/LAVAGE: CPT | Performed by: INTERNAL MEDICINE

## 2019-05-28 PROCEDURE — 87205 SMEAR GRAM STAIN: CPT

## 2019-05-28 PROCEDURE — 27201011 HC FORCEPS DISPOSABLE: Performed by: INTERNAL MEDICINE

## 2019-05-28 PROCEDURE — 87206 SMEAR FLUORESCENT/ACID STAI: CPT | Mod: 91

## 2019-05-28 PROCEDURE — 88312 CYTOLOGY SPECIMEN- MEDICAL CYTOLOGY (FLUID/WASH/BRUSH): ICD-10-PCS | Mod: 26,,, | Performed by: PATHOLOGY

## 2019-05-28 PROCEDURE — 87102 FUNGUS ISOLATION CULTURE: CPT

## 2019-05-28 PROCEDURE — 31623 PR BRONCHOSCOPY,DIAGNOSTIC W BRUSH: ICD-10-PCS | Mod: 51,LT,, | Performed by: INTERNAL MEDICINE

## 2019-05-28 PROCEDURE — 99152 MOD SED SAME PHYS/QHP 5/>YRS: CPT | Performed by: INTERNAL MEDICINE

## 2019-05-28 PROCEDURE — 31623 DX BRONCHOSCOPE/BRUSH: CPT | Performed by: INTERNAL MEDICINE

## 2019-05-28 PROCEDURE — 31628 PR BRONCHOSCOPY,TRANSBRONCH BIOPSY: ICD-10-PCS | Mod: LT,,, | Performed by: INTERNAL MEDICINE

## 2019-05-28 PROCEDURE — 88305 TISSUE EXAM BY PATHOLOGIST: CPT | Performed by: PATHOLOGY

## 2019-05-28 PROCEDURE — 63600175 PHARM REV CODE 636 W HCPCS: Performed by: INTERNAL MEDICINE

## 2019-05-28 PROCEDURE — 88342 IMHCHEM/IMCYTCHM 1ST ANTB: CPT | Mod: 59 | Performed by: PATHOLOGY

## 2019-05-28 PROCEDURE — 88112 CYTOPATH CELL ENHANCE TECH: CPT | Mod: 26,,, | Performed by: PATHOLOGY

## 2019-05-28 PROCEDURE — 25000003 PHARM REV CODE 250: Performed by: INTERNAL MEDICINE

## 2019-05-28 PROCEDURE — 31623 DX BRONCHOSCOPE/BRUSH: CPT | Mod: 51,LT,, | Performed by: INTERNAL MEDICINE

## 2019-05-28 PROCEDURE — 31624 PR BRONCHOSCOPY,DIAG2STIC W LAVAGE: ICD-10-PCS | Mod: 51,LT,, | Performed by: INTERNAL MEDICINE

## 2019-05-28 PROCEDURE — 88312 SPECIAL STAINS GROUP 1: CPT | Mod: 26,,, | Performed by: PATHOLOGY

## 2019-05-28 PROCEDURE — 88342 IMHCHEM/IMCYTCHM 1ST ANTB: CPT | Mod: 26,,, | Performed by: PATHOLOGY

## 2019-05-28 PROCEDURE — 88305 CYTOLOGY SPECIMEN- MEDICAL CYTOLOGY (FLUID/WASH/BRUSH): ICD-10-PCS | Mod: 26,,, | Performed by: PATHOLOGY

## 2019-05-28 PROCEDURE — 31628 BRONCHOSCOPY/LUNG BX EACH: CPT | Mod: LT,,, | Performed by: INTERNAL MEDICINE

## 2019-05-28 RX ORDER — FENTANYL CITRATE 50 UG/ML
INJECTION, SOLUTION INTRAMUSCULAR; INTRAVENOUS CODE/TRAUMA/SEDATION MEDICATION
Status: COMPLETED | OUTPATIENT
Start: 2019-05-28 | End: 2019-05-28

## 2019-05-28 RX ORDER — LIDOCAINE HYDROCHLORIDE 20 MG/ML
SOLUTION OROPHARYNGEAL CODE/TRAUMA/SEDATION MEDICATION
Status: COMPLETED | OUTPATIENT
Start: 2019-05-28 | End: 2019-05-28

## 2019-05-28 RX ORDER — MIDAZOLAM HYDROCHLORIDE 5 MG/ML
INJECTION INTRAMUSCULAR; INTRAVENOUS CODE/TRAUMA/SEDATION MEDICATION
Status: COMPLETED | OUTPATIENT
Start: 2019-05-28 | End: 2019-05-28

## 2019-05-28 RX ORDER — LIDOCAINE HYDROCHLORIDE 20 MG/ML
INJECTION, SOLUTION INFILTRATION; PERINEURAL CODE/TRAUMA/SEDATION MEDICATION
Status: COMPLETED | OUTPATIENT
Start: 2019-05-28 | End: 2019-05-28

## 2019-05-28 RX ORDER — LIDOCAINE HYDROCHLORIDE 10 MG/ML
INJECTION INFILTRATION; PERINEURAL CODE/TRAUMA/SEDATION MEDICATION
Status: COMPLETED | OUTPATIENT
Start: 2019-05-28 | End: 2019-05-28

## 2019-05-28 RX ADMIN — FENTANYL CITRATE 50 MCG: 50 INJECTION, SOLUTION INTRAMUSCULAR; INTRAVENOUS at 08:05

## 2019-05-28 RX ADMIN — TOPICAL ANESTHETIC 0.5 ML: 200 SPRAY DENTAL; PERIODONTAL at 08:05

## 2019-05-28 RX ADMIN — FENTANYL CITRATE 25 MCG: 50 INJECTION, SOLUTION INTRAMUSCULAR; INTRAVENOUS at 08:05

## 2019-05-28 RX ADMIN — LIDOCAINE HYDROCHLORIDE 5 ML: 20 SOLUTION OROPHARYNGEAL at 08:05

## 2019-05-28 RX ADMIN — MIDAZOLAM 1 MG: 5 INJECTION INTRAMUSCULAR; INTRAVENOUS at 08:05

## 2019-05-28 RX ADMIN — LIDOCAINE HYDROCHLORIDE 8 ML: 10 INJECTION, SOLUTION INFILTRATION; PERINEURAL at 08:05

## 2019-05-28 RX ADMIN — LIDOCAINE HYDROCHLORIDE 6 ML: 20 INJECTION, SOLUTION INFILTRATION; PERINEURAL at 08:05

## 2019-05-28 RX ADMIN — MIDAZOLAM 2 MG: 5 INJECTION INTRAMUSCULAR; INTRAVENOUS at 08:05

## 2019-05-28 NOTE — DISCHARGE INSTRUCTIONS
Flexible Bronchoscopy  A flexible bronchoscopy is an exam of the airways of your lungs. A thin, flexible tube called a bronchoscope is used. It has a light and small camera that allow the healthcare provider to view your airways.    Before your test  · Follow your healthcare provider's instructions carefully. If you dont, the exam may be canceled. Or you may need to take it again.  · If you are taking blood-thinning medicine, ask your healthcare provider if you should stop taking the medicine before this test.  · Have no food or drink for at least 8 hours before the test. Also, avoid smoking for 24 hours before the test.  · You will need to remove any dentures or removable devices from your mouth.  · Right before the test, you will be given sedating medicines to help you relax. The medicine may be given by an IV (intravenously) into one of your veins. In addition, your nose and throat may be numbed with a special spray to help prevent gagging and coughing.  · If you are having this test as an outpatient, make sure you have an adult friend or family member to drive you home.  During your test  Bronchoscopy takes 45 to 60 minutes and includes the following steps:  · You may be given medicine (anesthesia) so that you are unconscious or asleep during the procedure.  · The healthcare provider inserts the tube into your nose or mouth.  · If you have not been given anesthesia, you might feel a gagging sensation. To help ease this feeling, you will be told to swallow or take deep breaths. Your airway will remain open even with the tube in place. But you wont be able to talk.  · The provider checks your breathing passage. He or she may also remove tiny tissue samples for biopsy.  After your test  · You may have a mild sore throat or cough. Your voice may also be hoarse.  · Don't eat or drink until the anesthesia wears off.  · If you had a biopsy, you might see traces of blood being coughed up.  When to call your  healthcare provider  Call your healthcare provider right away if you have any of the following:  · Shortness of breath  · Chest pain  · Bleeding from your nose or throat  · Coughing up a large amount of blood  · A fever above 100.4°F (38°C) for more than 24 hours  Call 911  Call 911 if you have:  · Chest pain  · Severe shortness of breath   Date Last Reviewed: 12/1/2016 © 2000-2017 [employer]. [address] [place] [address], [place] [id number]. All rights reserved. This information is not intended as a substitute for professional medical care. Always follow your healthcare professional's instructions.        Recovery After Procedural Sedation (Adult)  You have been given medicine by vein to make you sleep during your surgery. This may have included both a pain medicine and sleeping medicine. Most of the effects have worn off. But you may still have some drowsiness for the next 6 to 8 hours.  Home care  Follow these guidelines when you get home:  · For the next 8 hours, you should be watched by a responsible adult. This person should make sure your condition is not getting worse.  · Don't drink any alcohol for the next 24 hours.  · Don't drive, operate dangerous machinery, or make important business or personal decisions during the next 24 hours.  Note: Your healthcare provider may tell you not to take any medicine by mouth for pain or sleep in the next 4 hours. These medicines may react with the medicines you were given in the hospital. This could cause a much stronger response than usual.  Follow-up care  Follow up with your healthcare provider if you are not alert and back to your usual level of activity within 12 hours.  When to seek medical advice  Call your healthcare provider right away if any of these occur:  · Drowsiness gets worse  · Weakness or dizziness gets worse  · Repeated vomiting  · You can't be awakened   Date Last Reviewed: 10/18/2016  © 1599-9724 [employer]. [address] [place] [address]  Road, [place] [address]. All rights reserved. This information is not intended as a substitute for professional medical care. Always follow your healthcare professional's instructions.

## 2019-05-28 NOTE — PLAN OF CARE
Discharge instructions reviewed with pt and daughter. Understanding verbalized. No complaints of pain reported. Pt reports having a frequent cough prior to procedure, upon post op arrive pt is able to cough up pink/ blood tinged sputum. MD notified about pt complaints stated to take OTC cough medication X 7 days. Daughter stated understanding.  To be transported to car by [facility].

## 2019-05-28 NOTE — SEDATION DOCUMENTATION
Specimens obtained during Bronchoscopy:  BAL CARLOS 180 ml nacl in 35 ml return, TBBX CARLOS, Brush CARLOS.  Verbal report given to DOSC RN at bedside to include documentation charted in procedural sedation documentation.  Patient to be NPO 1 hour post procedure and place in PO tolerance at 1005.  Moderate concious sedation was performed and cardiorespiratory functions were monitored the entire procedure by Bette Edward RN.  Sedation began at 0841  and concluded at 0935.  The patient tolerated the procedure well.  Bette Edward RN

## 2019-05-28 NOTE — PLAN OF CARE
Unable to review home medications with patient prior to transport from [facility] to pulmonology.

## 2019-05-28 NOTE — INTERVAL H&P NOTE
The patient has been examined and the H&P has been reviewed:    I concur with the findings and no changes have occurred since H&P was written.    Anesthesia/Surgery risks, benefits and alternative options discussed and understood by patient/family.    Consent obtained.    ASA: 2 Mallampati: 2          Active Hospital Problems    Diagnosis  POA    Lung mass [R91.8]  Yes     CARLOS nodules.  Will plan on outpatient bronchoscopy with TBBx, BAL and perhaps brush.            Resolved Hospital Problems   No resolved problems to display.

## 2019-05-28 NOTE — SEDATION DOCUMENTATION
H and P updated-yes, patient placed on cardiac monitor, anesthesia Plan:  Conscious sedation, ASA verified-yes, Airway exam performed-yes, Personal or Family history of anesthesia complications-No  Consent signed and witnessed, Bette Edward RN

## 2019-05-28 NOTE — DISCHARGE SUMMARY
[facility]  Pulmonology  Discharge Summary      Patient Name: Alison Branch     MRN: [id number]    Admission Date: 5/28/2019    Hospital Length of Stay: 0 days    Discharge Date and Time:  05/28/2019 9:23 AM    Attending Physician: Michael Medellin MD    Discharging Provider: Maxwell Dickerson MD    Primary Care Provider: Mike Rodriguez Ii, MD    Bronchoscopy Suite Course: Performed BAL/Brush/Transbronchial biopsies in the CARLOS.    Maxwell Dickerson MD  Pulmonology  [facility]

## 2019-05-30 LAB
BACTERIA SPEC AEROBE CULT: NORMAL
BACTERIA SPEC AEROBE CULT: NORMAL
GRAM STN SPEC: NORMAL

## 2019-05-31 ENCOUNTER — TELEPHONE (OUTPATIENT)
Dept: PULMONOLOGY | Facility: HOSPITAL | Age: 64
End: 2019-05-31

## 2019-05-31 DIAGNOSIS — C34.90 NON-SMALL CELL LUNG CANCER, UNSPECIFIED LATERALITY: Primary | ICD-10-CM

## 2019-06-01 NOTE — TELEPHONE ENCOUNTER
Called patient and relayed results of recent bronchoscopy/TBBx: NSCLC-favoring adenocarcinoma.  Placed orders for urgent Hem/Onc referral.    Maxwell Dickerson MD, MSc  Pulmonary/Critical Care Fellow

## 2019-06-03 ENCOUNTER — TELEPHONE (OUTPATIENT)
Dept: PULMONOLOGY | Facility: CLINIC | Age: 64
End: 2019-06-03

## 2019-06-03 ENCOUNTER — TELEPHONE (OUTPATIENT)
Dept: HEMATOLOGY/ONCOLOGY | Facility: CLINIC | Age: 64
End: 2019-06-03

## 2019-06-03 DIAGNOSIS — C34.31 MALIGNANT NEOPLASM OF LOWER LOBE, RIGHT BRONCHUS OR LUNG: ICD-10-CM

## 2019-06-03 DIAGNOSIS — C34.90 ADENOCARCINOMA, LUNG, UNSPECIFIED LATERALITY: Primary | ICD-10-CM

## 2019-06-03 NOTE — TELEPHONE ENCOUNTER
----- Message from Maxwell Dickerson MD sent at 6/1/2019 11:56 AM CDT -----  Dr Harris    I called your patient yesterday and relayed results of  herrecent bronchoscopy/TBBx: NSCLC-favoring adenocarcinoma.  I placed orders for a PET Scan (however unable to process order) and an urgent Hem/Onc referral.  Just wanted to keep you in the loop.    -Maxwell

## 2019-06-03 NOTE — NURSING
Received referral from Dr. Harris for pt with recent bronchoscopy.  Pt aware of results and is agreeable to schedule appointment for PET scan for   and to see hem/onc Dr. Belcher on .  Instructions given on where to go.  Also instructed to remail NPO 4hrs before PET. Verbalized. .   Oncology Navigation   Intake  Date of Diagnosis: 19  Cancer Type: Thoracic  MD Assigned: Ye  Internal / External Referral: Internal  Initial Nurse Navigator Contact: 19  Diagnosis to Initial Contact Timeline (days): 4 days  Contact Method: Individual basket  Date Worked: 19  First Appointment Available: 19  Appointment Date: 19  Schedule to Appointment Timeline (days): 4  First Available Date vs. Scheduled Date (days): 0  Multiple appointments: Yes     Treatment               Acuity  Stage: 0  Treatment Tolerability: Has not started treatment yet/treatment fully completed and side effects resolved  ECO  Comorbidities in Medical History: 0  Hospitalization Within the Past Month: 0   Needed: 0  Support: 0  Verbalizes Financial Concerns: 1  Transportation: 0  Mental Health: PHQ Score: 0  History of noncompliance/frequent no shows and cancellations: 0  Verbalizes the need for more education: 0  Other Factors (+1 for Each): 0  Navigation Acuity: 1

## 2019-06-03 NOTE — NURSING
Returned call to pt who is concerned that laying flat for PET will cause her to cough.  Message sent to Dr. Harris's office.

## 2019-06-04 ENCOUNTER — TELEPHONE (OUTPATIENT)
Dept: PULMONOLOGY | Facility: CLINIC | Age: 64
End: 2019-06-04

## 2019-06-04 ENCOUNTER — TELEPHONE (OUTPATIENT)
Dept: PULMONOLOGY | Facility: HOSPITAL | Age: 64
End: 2019-06-04

## 2019-06-04 RX ORDER — BENZONATATE 200 MG/1
200 CAPSULE ORAL 3 TIMES DAILY PRN
Qty: 30 CAPSULE | Refills: 11 | Status: SHIPPED | OUTPATIENT
Start: 2019-06-04 | End: 2019-06-14

## 2019-06-04 NOTE — TELEPHONE ENCOUNTER
----- Message from Maria T Dangelo RN sent at 6/3/2019  6:04 PM CDT -----  Hi Dr. Harris,       Ms. Branch is concerned about coughing during her PET scan.  She is asking for something for her cough.    Thanks  Maria T

## 2019-06-04 NOTE — TELEPHONE ENCOUNTER
I spoke to Ms Branch to let her know that Dr Harris called in a Rx for cough.  If her insurance does not cover cough medications like many, than Dr Harris suggested Cristy DM over the counter per Dr Harris. Ms Branch verbalized understanding.

## 2019-06-06 ENCOUNTER — TELEPHONE (OUTPATIENT)
Dept: PULMONOLOGY | Facility: CLINIC | Age: 64
End: 2019-06-06

## 2019-06-06 ENCOUNTER — HOSPITAL ENCOUNTER (OUTPATIENT)
Dept: RADIOLOGY | Facility: HOSPITAL | Age: 64
Discharge: HOME | End: 2019-06-06
Attending: INTERNAL MEDICINE
Payer: MEDICARE

## 2019-06-06 DIAGNOSIS — C34.90 ADENOCARCINOMA, LUNG, UNSPECIFIED LATERALITY: ICD-10-CM

## 2019-06-06 DIAGNOSIS — C34.31 MALIGNANT NEOPLASM OF LOWER LOBE, RIGHT BRONCHUS OR LUNG: ICD-10-CM

## 2019-06-06 LAB — POCT GLUCOSE: 71 MG/DL (ref 70–110)

## 2019-06-06 PROCEDURE — 78815 PET IMAGE W/CT SKULL-THIGH: CPT | Mod: 26,PI,, | Performed by: RADIOLOGY

## 2019-06-06 PROCEDURE — 78815 PET IMAGE W/CT SKULL-THIGH: CPT | Mod: TC

## 2019-06-06 PROCEDURE — 78815 NM PET CT ROUTINE: ICD-10-PCS | Mod: 26,PI,, | Performed by: RADIOLOGY

## 2019-06-06 PROCEDURE — A9552 F18 FDG: HCPCS

## 2019-06-06 NOTE — TELEPHONE ENCOUNTER
63 year old with newly diagnosed non small cell lung cancer favoring adenocarcinoma.  She will need a PET/CT to stage her cancer prior to being seen by oncology tomorrow, June 7.  The PET/CT scheduled today at June 6 is medically urgent so treatment planning for lung cancer can begin.

## 2019-06-06 NOTE — TELEPHONE ENCOUNTER
----- Message from Mariama Meza sent at 6/6/2019  8:32 AM CDT -----  Contact:   Patient   Needs Advice    Reason for call:        Communication Preference:   Phone     [phone]    Additional Information:   Patient  Called stating that an approval is needed for the Dr for upcoming Pet Scan schedule for today   ....  Call the pt back to further assist her   Thanks,

## 2019-06-06 NOTE — TELEPHONE ENCOUNTER
I spoke to Ms Branch to let her know Dr Harris sent a note to pre service stating it was medically urgent for PET scan today at 1:30pm to start treatment planning for lung cancer to begin. Pt verbalized understanding.

## 2019-06-07 ENCOUNTER — TELEPHONE (OUTPATIENT)
Dept: HEMATOLOGY/ONCOLOGY | Facility: CLINIC | Age: 64
End: 2019-06-07

## 2019-06-07 ENCOUNTER — INITIAL CONSULT (OUTPATIENT)
Dept: HEMATOLOGY/ONCOLOGY | Facility: CLINIC | Age: 64
End: 2019-06-07
Payer: MEDICARE

## 2019-06-07 VITALS
RESPIRATION RATE: 18 BRPM | DIASTOLIC BLOOD PRESSURE: 62 MMHG | SYSTOLIC BLOOD PRESSURE: 136 MMHG | WEIGHT: 216 LBS | HEIGHT: 69 IN | BODY MASS INDEX: 31.99 KG/M2 | HEART RATE: 62 BPM | OXYGEN SATURATION: 98 % | TEMPERATURE: 98 F

## 2019-06-07 DIAGNOSIS — C34.12 MALIGNANT NEOPLASM OF UPPER LOBE OF LEFT LUNG: Primary | ICD-10-CM

## 2019-06-07 DIAGNOSIS — E11.65 UNCONTROLLED TYPE 2 DIABETES MELLITUS WITH HYPERGLYCEMIA, WITHOUT LONG-TERM CURRENT USE OF INSULIN: ICD-10-CM

## 2019-06-07 DIAGNOSIS — C77.1 SECONDARY MALIGNANT NEOPLASM OF MEDIASTINAL LYMPH NODE: ICD-10-CM

## 2019-06-07 DIAGNOSIS — C34.12 MALIGNANT NEOPLASM OF UPPER LOBE OF LEFT LUNG: ICD-10-CM

## 2019-06-07 PROCEDURE — 99499 RISK ADDL DX/OHS AUDIT: ICD-10-PCS | Mod: S$GLB,,, | Performed by: INTERNAL MEDICINE

## 2019-06-07 PROCEDURE — 99205 PR OFFICE/OUTPT VISIT, NEW, LEVL V, 60-74 MIN: ICD-10-PCS | Mod: S$GLB,,, | Performed by: INTERNAL MEDICINE

## 2019-06-07 PROCEDURE — 3075F PR MOST RECENT SYSTOLIC BLOOD PRESS GE 130-139MM HG: ICD-10-PCS | Mod: CPTII,S$GLB,, | Performed by: INTERNAL MEDICINE

## 2019-06-07 PROCEDURE — 99999 PR PBB SHADOW E&M-EST. PATIENT-LVL III: CPT | Mod: PBBFAC,,, | Performed by: INTERNAL MEDICINE

## 2019-06-07 PROCEDURE — 3008F PR BODY MASS INDEX (BMI) DOCUMENTED: ICD-10-PCS | Mod: CPTII,S$GLB,, | Performed by: INTERNAL MEDICINE

## 2019-06-07 PROCEDURE — 3008F BODY MASS INDEX DOCD: CPT | Mod: CPTII,S$GLB,, | Performed by: INTERNAL MEDICINE

## 2019-06-07 PROCEDURE — 3045F PR MOST RECENT HEMOGLOBIN A1C LEVEL 7.0-9.0%: CPT | Mod: CPTII,S$GLB,, | Performed by: INTERNAL MEDICINE

## 2019-06-07 PROCEDURE — 3078F PR MOST RECENT DIASTOLIC BLOOD PRESSURE < 80 MM HG: ICD-10-PCS | Mod: CPTII,S$GLB,, | Performed by: INTERNAL MEDICINE

## 2019-06-07 PROCEDURE — 99205 OFFICE O/P NEW HI 60 MIN: CPT | Mod: S$GLB,,, | Performed by: INTERNAL MEDICINE

## 2019-06-07 PROCEDURE — 3075F SYST BP GE 130 - 139MM HG: CPT | Mod: CPTII,S$GLB,, | Performed by: INTERNAL MEDICINE

## 2019-06-07 PROCEDURE — 3045F PR MOST RECENT HEMOGLOBIN A1C LEVEL 7.0-9.0%: ICD-10-PCS | Mod: CPTII,S$GLB,, | Performed by: INTERNAL MEDICINE

## 2019-06-07 PROCEDURE — 99999 PR PBB SHADOW E&M-EST. PATIENT-LVL III: ICD-10-PCS | Mod: PBBFAC,,, | Performed by: INTERNAL MEDICINE

## 2019-06-07 PROCEDURE — 99499 UNLISTED E&M SERVICE: CPT | Mod: S$GLB,,, | Performed by: INTERNAL MEDICINE

## 2019-06-07 PROCEDURE — 3078F DIAST BP <80 MM HG: CPT | Mod: CPTII,S$GLB,, | Performed by: INTERNAL MEDICINE

## 2019-06-07 RX ORDER — PROMETHAZINE HYDROCHLORIDE AND CODEINE PHOSPHATE 6.25; 1 MG/5ML; MG/5ML
5 SOLUTION ORAL EVERY 4 HOURS PRN
Qty: 473 ML | Refills: 0 | Status: SHIPPED | OUTPATIENT
Start: 2019-06-07 | End: 2019-06-17

## 2019-06-07 RX ORDER — PROMETHAZINE HYDROCHLORIDE AND CODEINE PHOSPHATE 6.25; 1 MG/5ML; MG/5ML
5 SOLUTION ORAL EVERY 4 HOURS PRN
Qty: 473 ML | Refills: 0 | Status: SHIPPED | OUTPATIENT
Start: 2019-06-07 | End: 2019-06-07 | Stop reason: SDUPTHER

## 2019-06-07 RX ORDER — DIAZEPAM 5 MG/1
5 TABLET ORAL ONCE
Qty: 1 TABLET | Refills: 0 | Status: SHIPPED | OUTPATIENT
Start: 2019-06-07 | End: 2019-06-13

## 2019-06-07 NOTE — TELEPHONE ENCOUNTER
----- Message from Brianne Cam sent at 6/7/2019  2:09 PM CDT -----  Type:  Pharmacy Calling to Clarify an RX    Name of Caller: carine  Pharmacy Name:  [facility]  Prescription Name:  promethazine-codeine 6.25-10 mg/5 ml (PHENERGAN WITH CODEINE) 6.25-10 mg/5 mL syrup  What do they need to clarify?: Need more information on medication   Best Call Back Number: [phone]  Additional Information:   Thank You  KETTY Cam

## 2019-06-07 NOTE — LETTER
June 7, 2019      Clara Harris MD  [address]  [facility] [address]           [facility] Hematology Oncology  [address]  [facility] [address]  Phone: [phone]          Patient: Alison Branch   MR Number: [id number]   YOB: 1955   Date of Visit: 6/7/2019       Dear Dr. Clara Harris:    Thank you for referring Alison Branch to me for evaluation. Attached you will find relevant portions of my assessment and plan of care.    If you have questions, please do not hesitate to call me. I look forward to following Alison Branch along with you.    Sincerely,    Tara Belcher MD    Enclosure  CC:  No Recipients    If you would like to receive this communication electronically, please contact [email or web address] or [phone] to request more information on [facility] access.    For providers and/or their staff who would like to refer a patient to [facility], please contact us through our one-stop-shop provider referral line, [facility] , at [phone].    If you feel you have received this communication in error or would no longer like to receive these types of communications, please e-mail [email or web address]

## 2019-06-07 NOTE — Clinical Note
Schedule MRI brain and MRI pelvis Reji present at Thoracic on Wednesday so schedule to see me on thursday

## 2019-06-07 NOTE — PROGRESS NOTES
REASON FOR VISIT:  New diagnosis of lung cancer.    REFERRING PHYSICIAN:  Clara Harris M.D. with Pulmonary.    HISTORY OF PRESENT ILLNESS:  Ms. Branch is a 63-year-old female who smoked for   about 30 years and quit 20 years ago, presented with cough end of last year, but   worsened into January.  Since the cough persisted, she saw her PCP, underwent a   chest x-ray on 05/10/2019, that revealed left upper lobe pneumonia and a repeat   CT was done in the week after that on 05/17/2019 that showed left upper lobe   mass arising from the lateral pleural surface in the left upper lobe posterior   subsegment measuring 2.6 x 3 cm.  There are satellite mass more medially near   the aortic arch that is 2 cm, also spiculated and irregular as well as thickened   interlobar septa in the left lung apex and anterior segment, prevascular lymph   node lateral to the aortic arch, short axis measuring 9 mm.  She then underwent   a bronchoscopy on 05/28/2019, and that revealed there was an infiltration   obtained in the left apical posterior segment of the left upper lobe cytology   brush was done.  Transbronchial biopsies of an area of infiltration were also   performed in the apicoposterior segment of the left upper lobe.  Pathology   revealed adenocarcinoma; however, the specimen was not adequate enough to send   for molecular testing.  She underwent a PET scan on 06/06/2019.  that reveals   significant hypermetabolic activity in the large irregular spiculated pulmonary   mass in the lateral aspect of the left upper lobe consistent with the patient's   known pulmonary adenocarcinoma.  There is also tracer avidity in the medial left   upper lobe satellite lesion and diffusely throughout much of the anterior left   upper lobe where there is prominent nodular paraseptal thickening.  There are   some numerous scattered subcentimeter pulmonary nodules throughout the right   lung, all of which are too small for detection by PET.  For  example, there is a   0.4 cm nodule in the superior right lower lobe and a micro nodule in the   posterior segment of the right upper lobe.  There is a 0.5 cm, normal size right   paratracheal lymph node with increased radiotracer uptake as well as   hypermetabolic aortic pulmonary lymph node and a left hilar lymph node.  There   is a focal hypermetabolic lesion in the left anterior superior iliac spine   associated with well defined lytic lesion.  There is a hypermetabolic focus in   the anterior right fifth rib with a possible associated lytic lesion.  SUVs as   below lateral:  Left upper lobe  SUV max 17.9, anterior left upper lobe   satellite mass and septal thickening SUV max of 10.1, right paratracheal lymph   node SUV max of 4.8, left AP window lymph node SUV max of 17.9, left anterior   superior iliac spine SUV max of 3.9.  She comes in to discuss further   management.  Her main complaints include coughing, especially worse at night.    She does have some shortness of breath.  She does note some right hip pain,   which she is not sure how long she has had it.  She is accompanied to the clinic   today with her two daughters.  Her ECOG performance status is 0.    REVIEW OF SYSTEMS:  CONSTITUTIONAL:  Negative for appetite change, fatigue or weight change.  HEENT:  Negative for mouth sores.  EYES:  Negative for visual disturbance.  RESPIRATORY:  Positive for cough and shortness of breath.  CARDIOVASCULAR:  Negative for chest pain.  GASTROINTESTINAL:  Negative for diarrhea.  GENITOURINARY:  Negative for frequency.  MUSCULOSKELETAL:  Negative for back pain.  SKIN:  Negative for rash.  NEUROLOGIC:  Negative for headaches.  HEMATOLOGIC:  Negative for adenopathy.  PSYCHIATRIC AND BEHAVIORAL:  Not nervous or anxious.    COMORBID MEDICAL CONDITIONS:  Include brain aneurysm, breast cyst, diabetes,   high cholesterol, history of Bell's Palsy, hypertension.    PAST SURGICAL HISTORY:  Cervical fusion, hysterectomy, breast  cyst aspiration.    FAMILY HISTORY:  Brother with diabetes.  Cousin with ovarian cancer.  Father   with diabetes, heart failure, migraines, rheumatoid arthritis.  Maternal aunt,   breast cancer in the 1950s, also has lung cancer.  Mother with pancreatic cancer   at age 63 and stomach cancer.  Sister with diabetes.    SOCIAL HISTORY:  She quit in 1999, she smoked half pack a day for 30 years.    Denies any drug or alcohol use.  Currently, is not working, volunteers in a   [employer].    PHYSICAL EXAMINATION:  VITAL SIGNS:  Reviewed in EPIC.  GENERAL:  The patient is oriented to person, place and time.  She appears well   developed, well nourished, in no acute distress.  HEENT:  Right and left ears are normal.  Oropharynx is clear and moist, no   oropharyngeal exudate.  Teeth, gums and lips are normal.  No sinus tenderness.    Palate and tongue, posterior pharynx are normal.  EYES:  Conjunctivae and lids are normal.  NECK:  Supple.  No JVD.  No thyromegaly.  CARDIOVASCULAR:  Normal rate, regular rhythm.  Normal heart sounds.  Intact   distal pulses.  No murmurs heard.  No edema or tenderness in the extremities.  PULMONARY AND CHEST:  Bilateral equal breath sounds heard.  Decreased breath   sounds noted in the left upper lobe.  Some wheeze is present.  ABDOMEN:  Soft, nontender, nondistended.  No hepatomegaly or splenomegaly.  MUSCULOSKELETAL:  Normal range of motion.  Gait is normal.  EXTREMITIES:  No clubbing or cyanosis.  INTEGUMENTARY:  No submental, submandibular, cervical, supraclavicular lymph   nodes noted.  NEUROLOGIC:  Alert and oriented to person, place and time.  Normal strength,   normal reflexes.  No sensory deficit.  Gait is normal.  SKIN:  Warm, dry and intact.  No bruising, no lesions, no rashes.  Nail show no   clubbing.  PSYCHIATRIC:  Normal mood and affect.  Speech, behavior, judgment, thought   content, cognition and memory are normal.    LABORATORY DATA:  From 05/17/2019, reveals a normal CBC and a  normal CMP.    IMAGING:  As discussed above in the HPI, which  includes CT chest and a PET   scan.    ASSESSMENT AND PLAN:  Ms. Alison Branch is a 63-year-old female who presents with   a new diagnosis of left upper lobe lung cancer with definite evidence of   mediastinal lymph nodes; however, she does have a couple of lytic lesions, one   in the rib and other in the iliac area.  We will go ahead and obtain MRI of the   pelvis to further evaluate that as well as MRI brain for completion of staging.    We will present her case in the thoracic conference next week.  This appears to   be stage IV since molecular markings were not available, we will send for   Guardant today.  If Guardant comes back inconclusive, she will need a repeat   biopsy.  However, if it is determined to be stage III, then we will proceed as   planned.  All of the patient's questions were answered to her satisfaction.    Distress score is not done.      SPS/HN  dd: 06/07/2019 14:12:00 (CDT)  td: 06/08/2019 04:56:56 (CDT)  Doc ID   #[id number]  Job ID #[id number]    CC:       Dictated # [id number]

## 2019-06-10 ENCOUNTER — TELEPHONE (OUTPATIENT)
Dept: HEMATOLOGY/ONCOLOGY | Facility: CLINIC | Age: 64
End: 2019-06-10

## 2019-06-10 NOTE — TELEPHONE ENCOUNTER
----- Message from Vonnie Noel sent at 6/10/2019  3:51 PM CDT -----  Contact: Alison can be reached at [phone]  Pt would like to be called back concerning appt scheduled for Wednesday, June 12th. Pt wants to confirm date for appt because the doctor told her Thursday, June 13th.    Thank you!

## 2019-06-12 ENCOUNTER — TELEPHONE (OUTPATIENT)
Dept: HEMATOLOGY/ONCOLOGY | Facility: CLINIC | Age: 64
End: 2019-06-12

## 2019-06-12 ENCOUNTER — DOCUMENTATION ONLY (OUTPATIENT)
Dept: CARDIOTHORACIC SURGERY | Facility: CLINIC | Age: 64
End: 2019-06-12

## 2019-06-12 NOTE — PATIENT CARE CONFERENCE
[facility]      THORACIC MULTIDISCIPLINARY TUMOR BOARD  PATIENT REVIEW FORM  ________________________________________________________________________    CLINIC #: [id number]  DATE: 06/12/2019    DIAGNOSIS:   NSCLC    PRESENTER:   Dr. Belcher     PATIENT SUMMARY:   64 yo female former smoker with HTN,  CARLOS adenocarcinoma. Persistent cough for 6 month prompting CXR and ultimately chest CT which showed 3 x 2.6 cm CARLOS mass abutting pleura with additional 2cm more medially and prevascular LN. Bronchoscopy with biopsy on 5/28 significant for adenocarcinoma. PET 6/6/19 significant uptake in CARLOS, mediastinal and metastatic right iliac lesion with associated lytic changes. Additional micronodules in right lung too small for PET.     BOARD RECOMMENDATIONS:   Guardant pending. If negative may need repeat biopsy. Recommend proceeding with chemotherapy. Uptake in ilium can be attributed to bone graft procedure for C spine stabilization surgery.         CONSULT NEEDED:     [] Surgery    [] Hem/Onc    [] Rad/Onc    [] Dietary                 [] Social Service    [] Psychology       [] Pulmonology    Clinical Stage: Tumor 3 Node(s) 2 Metastasis 0  Pathologic Stage: Tumor  Node(s)  Metastasis       GROUP STAGE:     [] O    [] 1A    [] IB    [] IIA    [] IIB     [] IIIA     [x] IIIB     [] IIIC    [] IV                               [] Local recurrence     [] Regional recurrence     [] Distant recurrence                   [x] NSCLC     [] SCLC     Tumor type adenocarcinoma    Unstageable:      [] Yes     [x] No  Metastatic site(s): no         [x] [place] Treatment Guidelines reviewed and care planned is consistent with guidelines.         (i.e., NCCN, NCI, PD, ACO, AUA, etc.)    PRESENTATION AT CANCER CONFERENCE:         [] Prospective    [] Retrospective     [] Follow-Up          [] Eligible for clinical trial

## 2019-06-12 NOTE — TELEPHONE ENCOUNTER
Case discussed in thoracic conference, consensus is to proceed with chemo     Guardant is pending. If negative will need repeat biopsy

## 2019-06-13 ENCOUNTER — OFFICE VISIT (OUTPATIENT)
Dept: HEMATOLOGY/ONCOLOGY | Facility: CLINIC | Age: 64
End: 2019-06-13
Payer: MEDICARE

## 2019-06-13 VITALS
HEART RATE: 65 BPM | SYSTOLIC BLOOD PRESSURE: 117 MMHG | TEMPERATURE: 98 F | RESPIRATION RATE: 18 BRPM | OXYGEN SATURATION: 99 % | DIASTOLIC BLOOD PRESSURE: 52 MMHG | HEIGHT: 69 IN | WEIGHT: 216.25 LBS | BODY MASS INDEX: 32.03 KG/M2

## 2019-06-13 DIAGNOSIS — C77.1 SECONDARY MALIGNANT NEOPLASM OF MEDIASTINAL LYMPH NODE: ICD-10-CM

## 2019-06-13 DIAGNOSIS — C79.51 SECONDARY CANCER OF BONE: ICD-10-CM

## 2019-06-13 DIAGNOSIS — E11.65 UNCONTROLLED TYPE 2 DIABETES MELLITUS WITH HYPERGLYCEMIA, WITHOUT LONG-TERM CURRENT USE OF INSULIN: ICD-10-CM

## 2019-06-13 DIAGNOSIS — C34.12 MALIGNANT NEOPLASM OF UPPER LOBE OF LEFT LUNG: Primary | ICD-10-CM

## 2019-06-13 PROCEDURE — 99215 OFFICE O/P EST HI 40 MIN: CPT | Mod: S$GLB,,, | Performed by: INTERNAL MEDICINE

## 2019-06-13 PROCEDURE — 3045F PR MOST RECENT HEMOGLOBIN A1C LEVEL 7.0-9.0%: ICD-10-PCS | Mod: CPTII,S$GLB,, | Performed by: INTERNAL MEDICINE

## 2019-06-13 PROCEDURE — 99999 PR PBB SHADOW E&M-EST. PATIENT-LVL IV: CPT | Mod: PBBFAC,,, | Performed by: INTERNAL MEDICINE

## 2019-06-13 PROCEDURE — 3074F PR MOST RECENT SYSTOLIC BLOOD PRESSURE < 130 MM HG: ICD-10-PCS | Mod: CPTII,S$GLB,, | Performed by: INTERNAL MEDICINE

## 2019-06-13 PROCEDURE — 99215 PR OFFICE/OUTPT VISIT, EST, LEVL V, 40-54 MIN: ICD-10-PCS | Mod: S$GLB,,, | Performed by: INTERNAL MEDICINE

## 2019-06-13 PROCEDURE — 99499 RISK ADDL DX/OHS AUDIT: ICD-10-PCS | Mod: S$GLB,,, | Performed by: INTERNAL MEDICINE

## 2019-06-13 PROCEDURE — 3045F PR MOST RECENT HEMOGLOBIN A1C LEVEL 7.0-9.0%: CPT | Mod: CPTII,S$GLB,, | Performed by: INTERNAL MEDICINE

## 2019-06-13 PROCEDURE — 3078F DIAST BP <80 MM HG: CPT | Mod: CPTII,S$GLB,, | Performed by: INTERNAL MEDICINE

## 2019-06-13 PROCEDURE — 3008F BODY MASS INDEX DOCD: CPT | Mod: CPTII,S$GLB,, | Performed by: INTERNAL MEDICINE

## 2019-06-13 PROCEDURE — 3008F PR BODY MASS INDEX (BMI) DOCUMENTED: ICD-10-PCS | Mod: CPTII,S$GLB,, | Performed by: INTERNAL MEDICINE

## 2019-06-13 PROCEDURE — 99999 PR PBB SHADOW E&M-EST. PATIENT-LVL IV: ICD-10-PCS | Mod: PBBFAC,,, | Performed by: INTERNAL MEDICINE

## 2019-06-13 PROCEDURE — 3078F PR MOST RECENT DIASTOLIC BLOOD PRESSURE < 80 MM HG: ICD-10-PCS | Mod: CPTII,S$GLB,, | Performed by: INTERNAL MEDICINE

## 2019-06-13 PROCEDURE — 3074F SYST BP LT 130 MM HG: CPT | Mod: CPTII,S$GLB,, | Performed by: INTERNAL MEDICINE

## 2019-06-13 PROCEDURE — 99499 UNLISTED E&M SERVICE: CPT | Mod: S$GLB,,, | Performed by: INTERNAL MEDICINE

## 2019-06-13 NOTE — PROGRESS NOTES
Subjective:       Patient ID: Alison Branch is a 63 y.o. female.    Chief Complaint: Malignant neoplasm of upper lobe of left lung  Ms. Branch is a 63-year-old female who smoked for about 30 years and quit 20 years ago, presented with cough end of last year, but worsened into January.  Since the cough persisted, she saw her PCP, underwent a chest x-ray on 05/10/2019, that revealed left upper lobe pneumonia and a repeat CT was done in the week after that on 05/17/2019 that showed left upper lobe   mass arising from the lateral pleural surface in the left upper lobe posterior subsegment measuring 2.6 x 3 cm.  There are satellite mass more medially near the aortic arch that is 2 cm, also spiculated and irregular as well as thickened interlobar septa in the left lung apex and anterior segment, prevascular lymph node lateral to the aortic arch, short axis measuring 9 mm.      She then underwent a bronchoscopy on 05/28/2019, and that revealed there was an infiltration obtained in the left apical posterior segment of the left upper lobe cytology brush was done.  Transbronchial biopsies of an area of infiltration were also performed in the apicoposterior segment of the left upper lobe.    Pathology revealed adenocarcinoma; however, the specimen was not adequate enough to send for molecular testing.      She underwent a PET scan on 06/06/2019.  that reveals significant hypermetabolic activity in the large irregular spiculated pulmonary mass in the lateral aspect of the left upper lobe consistent with the patient's known pulmonary adenocarcinoma.  There is also tracer avidity in the medial left upper lobe satellite lesion and diffusely throughout much of the anterior left upper lobe where there is prominent nodular paraseptal thickening.  There are some numerous scattered subcentimeter pulmonary nodules throughout the right lung, all of which are too small for detection by PET.  For example, there is a 0.4 cm nodule in the  "superior right lower lobe and a micro nodule in the posterior segment of the right upper lobe.  There is a 0.5 cm, normal size right   paratracheal lymph node with increased radiotracer uptake as well as hypermetabolic aortic pulmonary lymph node and a left hilar lymph node.  There is a focal hypermetabolic lesion in the left anterior superior iliac spine associated with well defined lytic lesion.  There is a hypermetabolic focus in the anterior right fifth rib with a possible associated lytic lesion.  SUVs as   below lateral:  Left upper lobe  SUV max 17.9, anterior left upper lobe satellite mass and septal thickening SUV max of 10.1, right paratracheal lymph node SUV max of 4.8, left AP window lymph node SUV max of 17.9, left anterior superior iliac spine SUV max of 3.9"    MRI pelvis from 6/10/19  reveals "Region of osseous is irregularity at the left anterior iliac spine likely related to bone graft harvest procedure.  See  discussion above.  No suspicious signal or enhancement to suggest active/malignant process"   MRI brain from 6/10//19 reveal No intracranial abnormality.    HPIDiana comes in to discuss further management. We discussed her case yesterday at [facility], and plan is to wait for GAURDANT and proceed with treatment accordingly  She notes cough and denies any new complaints.    Review of Systems   Constitutional: Negative for appetite change, fatigue and unexpected weight change.   HENT: Negative for mouth sores.    Eyes: Negative for visual disturbance.   Respiratory: Positive for cough. Negative for shortness of breath.    Cardiovascular: Negative for chest pain.   Gastrointestinal: Negative for abdominal pain and diarrhea.   Genitourinary: Negative for frequency.   Musculoskeletal: Negative for back pain.   Skin: Negative for rash.   Neurological: Negative for headaches.   Hematological: Negative for adenopathy.   Psychiatric/Behavioral: The patient is not nervous/anxious.    All other systems " reviewed and are negative.      PMFSH: all information reviewed and updated as relevant to today's visit  Objective:      Physical Exam   Constitutional: She is oriented to person, place, and time. She appears well-developed and well-nourished.   HENT:   Mouth/Throat: No oropharyngeal exudate.   Cardiovascular: Normal rate and normal heart sounds.   Pulmonary/Chest: Effort normal and breath sounds normal. She has no wheezes.   Abdominal: Soft. Bowel sounds are normal. There is no tenderness.   Musculoskeletal: She exhibits no edema or tenderness.   Lymphadenopathy:     She has no cervical adenopathy.   Neurological: She is alert and oriented to person, place, and time. Coordination normal.   Skin: Skin is warm and dry. No rash noted.   Psychiatric: She has a normal mood and affect. Judgment and thought content normal.   Vitals reviewed.        LABS:  WBC   Date Value Ref Range Status   05/17/2019 5.15 3.90 - 12.70 K/uL Final     Hemoglobin   Date Value Ref Range Status   05/17/2019 13.1 12.0 - 16.0 g/dL Final     Hematocrit   Date Value Ref Range Status   05/17/2019 42.2 37.0 - 48.5 % Final     Platelets   Date Value Ref Range Status   05/17/2019 218 150 - 350 K/uL Final     Gran # (ANC)   Date Value Ref Range Status   05/17/2019 2.3 1.8 - 7.7 K/uL Final     Gran%   Date Value Ref Range Status   05/17/2019 45.2 38.0 - 73.0 % Final       Chemistry        Component Value Date/Time     05/17/2019 1014    K 4.5 05/17/2019 1014     05/17/2019 1014    CO2 26 05/17/2019 1014    BUN 23 05/17/2019 1014    CREATININE 1.0 05/17/2019 1014     (H) 05/17/2019 1014        Component Value Date/Time    CALCIUM 10.0 05/17/2019 1014    ALKPHOS 79 05/17/2019 1014    AST 27 05/17/2019 1014    ALT 26 05/17/2019 1014    BILITOT 0.5 05/17/2019 1014    ESTGFRAFRICA >60.0 05/17/2019 1014    EGFRNONAA >60.0 05/17/2019 1014          Assessment:       1. 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 Secondary cancer of bone    4. Uncontrolled type 2 diabetes mellitus with hyperglycemia, without long-term current use of insulin        Plan:        1,2,3. Reviewed all scans and recs from [facility]. The rib lesion is concerning fro met. The iliac lesion is most likely related to bone graft. GAURDANT was sent out 1 week ago. As soon as results are back will plan on treatment accordingly/  She will see her dentist in order to get clearance for Zometa/Xgeva   4. Stable on meds    Above care plan was discussed with patient and accompanying daughter and all questions were addressed to their satisfaction

## 2019-06-19 DIAGNOSIS — C34.12 MALIGNANT NEOPLASM OF UPPER LOBE OF LEFT LUNG: Primary | ICD-10-CM

## 2019-06-20 ENCOUNTER — CLINICAL SUPPORT (OUTPATIENT)
Dept: HEMATOLOGY/ONCOLOGY | Facility: CLINIC | Age: 64
End: 2019-06-20
Payer: MEDICARE

## 2019-06-20 VITALS — WEIGHT: 211.44 LBS | HEIGHT: 69 IN | BODY MASS INDEX: 31.32 KG/M2

## 2019-06-20 DIAGNOSIS — Z71.3 NUTRITIONAL COUNSELING: Primary | ICD-10-CM

## 2019-06-20 DIAGNOSIS — E11.65 TYPE 2 DIABETES MELLITUS WITH HYPERGLYCEMIA, WITHOUT LONG-TERM CURRENT USE OF INSULIN: ICD-10-CM

## 2019-06-20 DIAGNOSIS — C34.12 MALIGNANT NEOPLASM OF BRONCHUS OF LEFT UPPER LOBE: ICD-10-CM

## 2019-06-20 PROCEDURE — 99999 PR PBB SHADOW E&M-EST. PATIENT-LVL II: CPT | Mod: PBBFAC,,, | Performed by: DIETITIAN, REGISTERED

## 2019-06-20 PROCEDURE — 99999 PR PBB SHADOW E&M-EST. PATIENT-LVL II: ICD-10-PCS | Mod: PBBFAC,,, | Performed by: DIETITIAN, REGISTERED

## 2019-06-20 PROCEDURE — 97802 PR MED NUTR THER, 1ST, INDIV, EA 15 MIN: ICD-10-PCS | Mod: S$GLB,,, | Performed by: DIETITIAN, REGISTERED

## 2019-06-20 PROCEDURE — 97802 MEDICAL NUTRITION INDIV IN: CPT | Mod: S$GLB,,, | Performed by: DIETITIAN, REGISTERED

## 2019-06-20 NOTE — PROGRESS NOTES
"Medical Nutrition Therapy Oncology Progress Note    Name: Alison Branch MRN: [id number]  : 1955    Age: 63 y.o.  Ethnicity: /Black Language: English    Diagnosis: No diagnosis found.    Chemo Regimen:    Referring MD: Dr. Belcher Frequency:  Radiation:            Goal of Cancer treatment n/a         Nutrition Assessment     Chief Complaint:   Chief Complaint   Patient presents with    Nutrition Counseling    Lung Cancer        Anthropometric Measurements:  Height: 5' 9" (1.753 m)  Current Weight: 95.9 kg (211 lb 6.7 oz)  Ideal Body Weight: 145#  Percent Ideal Body Weight:: 145%  BMI: Body mass index is 31.22 kg/m².     Weight History:   Wt Readings from Last 8 Encounters:   19 95.9 kg (211 lb 6.7 oz)   19 98.1 kg (216 lb 4.3 oz)   19 98 kg (216 lb)   19 98.9 kg (218 lb)   19 98.7 kg (217 lb 9.5 oz)   19 97.6 kg (215 lb 2.7 oz)   05/10/19 98.8 kg (217 lb 13 oz)   19 99.4 kg (219 lb 2.2 oz)     Medical Health History:  Feeding tube placed: No  Pre-treatment: Yes    Past Surgical History:   Procedure Laterality Date    BREAST CYST ASPIRATION      Bronchoscopy N/A 2019    [facility] 2ND [facility]    CERVICAL FUSION      CHONDROPLASTY-KNEE Left 2018    [facility] at [facility]    COLONOSCOPY N/A 3/9/2016    Performed by Elliott Zimmerman MD at [facility] ENDO (4TH FLR)    head surgery      stent and "curling" for aneurysm    HYSTERECTOMY      TVH secondary to SUF    MENISCECTOMY Left 2018    Performed [facility] at [facility] OR    SYNOVECTOMY-KNEE Left 2018    Performed by [facility] at [facility] OR        Medications:   Current Outpatient Medications:     albuterol 90 mcg/actuation inhaler, Inhale 2 puffs into the lungs every 6 (six) hours as needed for Wheezing. Rescue, Disp: 18 g, Rfl: 1    aspirin (ECOTRIN) 81 MG EC tablet, Take 1 tablet (81 mg total) by mouth once " daily., Disp: , Rfl: 0    atorvastatin (LIPITOR) 40 MG tablet, TAKE 1 TABLET BY MOUTH ONCE DAILY, Disp: 90 tablet, Rfl: 3    blood sugar diagnostic Strp, 1 strip by Misc.(Non-Drug; Combo Route) route once daily. Heladio Result.  250.02.  Check Blood Sugar Twice Daily., Disp: 200 each, Rfl: 3    blood-glucose meter kit, Use as instructed, Disp: 1 each, Rfl: 0    escitalopram oxalate (LEXAPRO) 10 MG tablet, Take 10 mg by mouth once daily., Disp: , Rfl:     fluticasone propionate (FLONASE) 50 mcg/actuation nasal spray, USE TWO SPRAY(S) IN EACH NOSTRIL ONCE DAILY, Disp: 16 g, Rfl: 3    glipiZIDE (GLUCOTROL) 10 MG TR24, TAKE 1 TABLET BY MOUTH TWICE DAILY, Disp: 180 tablet, Rfl: 3    JANUVIA 100 mg Tab, TAKE ONE TABLET BY MOUTH ONCE DAILY, Disp: 30 tablet, Rfl: 11    lancets Misc, 1 Device by Misc.(Non-Drug; Combo Route) route once daily. Heladio Result.  250.02.  Check Blood Sugar Twice Daily., Disp: 200 each, Rfl: 3    lidocaine (LIDODERM) 5 %, Place 1 patch onto the skin., Disp: , Rfl:     losartan (COZAAR) 50 MG tablet, TAKE 1 TABLET BY MOUTH ONCE DAILY, Disp: 90 tablet, Rfl: 0    olmesartan (BENICAR) 20 MG tablet, Take 1 tablet (20 mg total) by mouth once daily., Disp: 90 tablet, Rfl: 3    terconazole (TERAZOL 7) 0.4 % Crea, INSERT 1 APPLICATORFUL VAGINALLY ONCE DAILY IN THE EVENING, Disp: 45 g, Rfl: 0    VITAMIN D2 50,000 unit capsule, TAKE 1 CAPSULE BY MOUTH EVERY 7 DAYS, Disp: 12 capsule, Rfl: 3    Last Labs:  Last Labs:  Glucose   Date Value Ref Range Status   05/17/2019 112 (H) 70 - 110 mg/dL Final   02/01/2019 173 (H) 70 - 110 mg/dL Final     BUN, Bld   Date Value Ref Range Status   05/17/2019 23 8 - 23 mg/dL Final   02/01/2019 11 8 - 23 mg/dL Final     Creatinine   Date Value Ref Range Status   05/17/2019 1.0 0.5 - 1.4 mg/dL Final   02/01/2019 0.8 0.5 - 1.4 mg/dL Final     Sodium   Date Value Ref Range Status   05/17/2019 138 136 - 145 mmol/L Final   02/01/2019 139 136 - 145 mmol/L Final     Potassium    Date Value Ref Range Status   05/17/2019 4.5 3.5 - 5.1 mmol/L Final   02/01/2019 4.5 3.5 - 5.1 mmol/L Final     No results found for: PHOS  Calcium   Date Value Ref Range Status   05/17/2019 10.0 8.7 - 10.5 mg/dL Final   02/01/2019 10.4 8.7 - 10.5 mg/dL Final     No results found for: PREALBUMIN  Total Protein   Date Value Ref Range Status   05/17/2019 7.7 6.0 - 8.4 g/dL Final   02/01/2019 7.7 6.0 - 8.4 g/dL Final     Cholesterol   Date Value Ref Range Status   05/10/2019 163 120 - 199 mg/dL Final     Comment:     The National Cholesterol Education Program (NCEP) has set the  following guidelines (reference ranges) for Cholesterol:  Optimal.....................<200 mg/dL  Borderline High.............200-239 mg/dL  High........................> or = 240 mg/dL     02/15/2018 168 120 - 199 mg/dL Final     Comment:     The National Cholesterol Education Program (NCEP) has set the  following guidelines (reference ranges) for Cholesterol:  Optimal.....................<200 mg/dL  Borderline High.............200-239 mg/dL  High........................> or = 240 mg/dL       Hemoglobin A1C   Date Value Ref Range Status   05/10/2019 8.7 (H) 4.0 - 5.6 % Final     Comment:     ADA Screening Guidelines:  5.7-6.4%  Consistent with prediabetes  >or=6.5%  Consistent with diabetes  High levels of fetal hemoglobin interfere with the HbA1C  assay. Heterozygous hemoglobin variants (HbS, HgC, etc)do  not significantly interfere with this assay.   However, presence of multiple variants may affect accuracy.     02/01/2019 8.9 (H) 4.0 - 5.6 % Final     Comment:     ADA Screening Guidelines:  5.7-6.4%  Consistent with prediabetes  >or=6.5%  Consistent with diabetes  High levels of fetal hemoglobin interfere with the HbA1C  assay. Heterozygous hemoglobin variants (HbS, HgC, etc)do  not significantly interfere with this assay.   However, presence of multiple variants may affect accuracy.       Hemoglobin   Date Value Ref Range Status  "  05/17/2019 13.1 12.0 - 16.0 g/dL Final   02/22/2018 12.2 12.0 - 16.0 g/dL Final     Hematocrit   Date Value Ref Range Status   05/17/2019 42.2 37.0 - 48.5 % Final   02/22/2018 39.4 37.0 - 48.5 % Final     No results found for: IRON  No components found for: FROLATE  Vit D, 25-Hydroxy   Date Value Ref Range Status   05/10/2019 29 (L) 30 - 96 ng/mL Final     Comment:     Vitamin D deficiency.........<10 ng/mL                              Vitamin D insufficiency......10-29 ng/mL       Vitamin D sufficiency........> or equal to 30 ng/mL  Vitamin D toxicity............>100 ng/mL     08/09/2016 27 (L) 30 - 96 ng/mL Final     Comment:     Vitamin D deficiency.........<10 ng/mL                              Vitamin D insufficiency......10-29 ng/mL       Vitamin D sufficiency........> or equal to 30 ng/mL  Vitamin D toxicity............>100 ng/mL       WBC   Date Value Ref Range Status   05/17/2019 5.15 3.90 - 12.70 K/uL Final   02/22/2018 6.27 3.90 - 12.70 K/uL Final       Client History/Food Access:    Living Situation: Lives alone   Who: Shops for Groceries? Patient   Who : Prepares meals? Patient   Who: Fills prescriptions? Patient   Are there financial difficulties purchasing food? 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 Current weight of 211# which is a 5# weight loss in 2 weeks. Pt states she eats "anything and everything", but also that she "hasn't been eating a lot lately". Pt has new lung cancer diagnosis that she calls "LC" as she doesn't like to say/think about/or hear the word cancer. She is here today for information to improve diet. At this time, most meals come from outside of the home. Pt notes that she "loves sweets" and eats them daily. Pt has diabetes with an A1C of 8.7% (May 2019). States she has been checking her blood sugar more " "often lately. Corrects her low blood sugars by eating an apple. Encouraged 1/2 orange juice or 3-4 glucose tablets. Provided handout on diabetes management with a sample meal plan. Reviewed plate method of filling 1/2 plate with fruits and vegetables, 1/4 plate with lean protein, and 1/4 plate with whole grains/starches. Encouraged pt to decrease intake of sweets, red meat/processed meat, fried foods, and meals consumed away from the home. Provided ideas on how to meal prep/plan as well as 5 day meal plan and information on healthy diet. Encouraged activity to help with fatigue; 10 min intervals okay as pt with cough. Reviewed medications, supplement/herbal intake and no food/med interactions noted. Lab results noted. Compliance is fair. Comprehension is fair.  24-hour recall:  Breakfast: grits, biscuit, vargas or hot sausage, 1/2 unsweet 1/2 sweet tea  Lunch: chicken nuggets or rice and gravy  Dinner: fast food--"McDouble with fries"  Snack: candy, pie, ice cream, cakes    Nutritional Needs:  Estimated Needs Method Use    7179-1640 kcal/day [] Predictive Equation: Roberts-Benavides   [x]  25-30 kcal/kg (adjusted)   Protein 80-90 g 1.0-1.2 gm/kg/day   Fluid 2200 ml 30 ml/kg/day     Food/Nutrient Intake (oral, enteral or parenteral) and Patient Behaviors     Calorie intake: Excessive   Protein intake: Excessive     Yes/No    N/A Uses medical food supplements   N/A Cooking techniques to minimize fatigue   N/A Currently modifying food textures   Yes Able to maintain usual physical actiivty     Nutrition Diagnosis     Nutrition Diagnosis Related to (Etiology) As Evidenced By (Signs/Symptoms)   Excessive carbohydrate intake Food and nutrition related knowledge deficit 24 hour recall; frequent intake of sweets; A1C of 8.7%                 Nutritional Intervention and Prescription        Nutrition Intervention General/healthful diet and Carbohydrate-modified diet   Goals/Expected Outcomes Pt to follow plate method by filling " 1/2 plate with fruits and vegetables, 1/4 plate with lean protein, and 1/4 plate with whole grains/starches.  Pt to continue monitoring blood sugar and correct lows with glucose tablets as needed.  Pt to limit food consumed away from home.   Progress Initial     Nutrition Intervention Other : exercise   Goals/Expected Outcomes Pt to exercise for 30 minutes 5 days/week as tolerated.   Progress Initial     Nutrition Intervention Strategies  Self-monitoring   Goals/Expected Outcomes Pt to develop meal prep/plan to increase number of meals prepared at home and increase vegetables consumed.   Progress Initial     Pt needs education? yes (see intervention)    Coordination of Nutrition Care: Comments:   Collaboration with other providers MD         Monitoring and Evaluation     Monitor: diet education needs    Next Visit: PRN    Materials Provided:  1. Diabetes management 2. 1500, 2200 calorie meal plans   3. Healthy eating 4. General, healthful nutrition           Total time: 45 minutes    Nutrition Score:      ©2010 [employer] Oncology Toolkit

## 2019-06-20 NOTE — PLAN OF CARE
START ON PATHWAY REGIMEN - Non-Small Cell Lung    [id number]        Pembrolizumab (Keytruda(R))       Pemetrexed (Alimta(R))       Carboplatin (Paraplatin(R))           Additional Orders: Begin folic acid and vitamin B12 supplementation, and   dexamethasone prior to initiation of pemetrexed - see PI for details. In   studies, patients received up to 4 cycles of pembrolizumab + pemetrexed +   carboplatin followed by pembrolizumab up to a total of 35 cycles and pemetrexed   indefinitely until disease progression or toxicity. Serious immune-mediated   adverse events can occur with pembrolizumab. Please monitor your patient and   refer to the linked immune-mediated adverse reaction management materials for   more information.    **Always confirm dose/schedule in your pharmacy ordering system**    Patient Characteristics:  Stage IV Metastatic, Nonsquamous, Initial Chemotherapy/Immunotherapy, PS = 0, 1,   PD-L1 Expression Positive 1-49% (TPS) / Negative / Not Tested / Awaiting Test   Results  AJCC T Category: T2b  Current Disease Status: Distant Metastases  AJCC N Category: N2  AJCC M Category: M1  AJCC 8 Stage Grouping: IV  Histology: Nonsquamous Cell  ROS1 Rearrangement Status: Negative  T790M Mutation Status: Not Applicable - EGFR Mutation Negative/Unknown  Other Mutations/Biomarkers: No Other Actionable Mutations  NTRK Gene Fusion Status: Negative  PD-L1 Expression Status: PD-L1 Positive 1-49% (TPS)  Chemotherapy/Immunotherapy LOT: Initial Chemotherapy/Immunotherapy  Molecular Targeted Therapy: Not Appropriate  ALK Translocation Status: Negative  EGFR Mutation Status: Negative/Wild Type  BRAF V600E Mutation Status: Negative  Performance Status: PS = 0, 1  Intent of Therapy:  Non-Curative / Palliative Intent, Discussed with Patient

## 2019-06-21 ENCOUNTER — TELEPHONE (OUTPATIENT)
Dept: HEMATOLOGY/ONCOLOGY | Facility: CLINIC | Age: 64
End: 2019-06-21

## 2019-06-21 NOTE — TELEPHONE ENCOUNTER
Spoke with patient.  Scheduled her to see dr chapin next Thursday, on the day her daughter is off work.  She thanked nurse.

## 2019-06-21 NOTE — TELEPHONE ENCOUNTER
----- Message from Tara Belcher MD sent at 6/20/2019  6:01 PM CDT -----  Please get auth for Carboplatin, Alimta and Keytruda.    Schedule her to see me one day next week to discuss treatment plan and to start the week after

## 2019-06-21 NOTE — TELEPHONE ENCOUNTER
spoke with daughter in regards to appointments, will keep appointment as is due to availability .

## 2019-06-21 NOTE — TELEPHONE ENCOUNTER
----- Message from Audra Avelar sent at 6/21/2019 10:16 AM CDT -----  Contact: Pt's Daughter Suellen [phone]  Pt's Daughter Suellen called to speak to the nurse regarding the pt's care and to reschedule the pt's appt and would like a call back at [phone]

## 2019-06-25 ENCOUNTER — TELEPHONE (OUTPATIENT)
Dept: HEMATOLOGY/ONCOLOGY | Facility: CLINIC | Age: 64
End: 2019-06-25

## 2019-06-25 NOTE — NURSING
Called to confirm scheduling of Chemo Class for Thursday 6/27 @ 10:00 AM.  Agreed and verbalized understanding.

## 2019-06-27 ENCOUNTER — CLINICAL SUPPORT (OUTPATIENT)
Dept: HEMATOLOGY/ONCOLOGY | Facility: CLINIC | Age: 64
End: 2019-06-27
Payer: MEDICARE

## 2019-06-27 ENCOUNTER — OFFICE VISIT (OUTPATIENT)
Dept: HEMATOLOGY/ONCOLOGY | Facility: CLINIC | Age: 64
End: 2019-06-27
Payer: MEDICARE

## 2019-06-27 VITALS
TEMPERATURE: 98 F | WEIGHT: 210.56 LBS | HEART RATE: 59 BPM | BODY MASS INDEX: 31.19 KG/M2 | OXYGEN SATURATION: 98 % | RESPIRATION RATE: 16 BRPM | HEIGHT: 69 IN | DIASTOLIC BLOOD PRESSURE: 55 MMHG | SYSTOLIC BLOOD PRESSURE: 114 MMHG

## 2019-06-27 DIAGNOSIS — C34.12 MALIGNANT NEOPLASM OF UPPER LOBE OF LEFT LUNG: Primary | ICD-10-CM

## 2019-06-27 DIAGNOSIS — C77.1 SECONDARY MALIGNANT NEOPLASM OF MEDIASTINAL LYMPH NODE: ICD-10-CM

## 2019-06-27 PROCEDURE — 3008F PR BODY MASS INDEX (BMI) DOCUMENTED: ICD-10-PCS | Mod: CPTII,S$GLB,, | Performed by: INTERNAL MEDICINE

## 2019-06-27 PROCEDURE — 3078F PR MOST RECENT DIASTOLIC BLOOD PRESSURE < 80 MM HG: ICD-10-PCS | Mod: CPTII,S$GLB,, | Performed by: INTERNAL MEDICINE

## 2019-06-27 PROCEDURE — 96372 PR INJECTION,THERAP/PROPH/DIAG2ST, IM OR SUBCUT: ICD-10-PCS | Mod: S$GLB,,, | Performed by: INTERNAL MEDICINE

## 2019-06-27 PROCEDURE — 99499 UNLISTED E&M SERVICE: CPT | Mod: S$GLB,,, | Performed by: INTERNAL MEDICINE

## 2019-06-27 PROCEDURE — 99999 PR PBB SHADOW E&M-EST. PATIENT-LVL V: ICD-10-PCS | Mod: PBBFAC,,, | Performed by: INTERNAL MEDICINE

## 2019-06-27 PROCEDURE — 99999 PR PBB SHADOW E&M-EST. PATIENT-LVL V: CPT | Mod: PBBFAC,,, | Performed by: INTERNAL MEDICINE

## 2019-06-27 PROCEDURE — 3074F SYST BP LT 130 MM HG: CPT | Mod: CPTII,S$GLB,, | Performed by: INTERNAL MEDICINE

## 2019-06-27 PROCEDURE — 99499 RISK ADDL DX/OHS AUDIT: ICD-10-PCS | Mod: S$GLB,,, | Performed by: INTERNAL MEDICINE

## 2019-06-27 PROCEDURE — 99215 OFFICE O/P EST HI 40 MIN: CPT | Mod: 25,S$GLB,, | Performed by: INTERNAL MEDICINE

## 2019-06-27 PROCEDURE — 96372 THER/PROPH/DIAG INJ SC/IM: CPT | Mod: S$GLB,,, | Performed by: INTERNAL MEDICINE

## 2019-06-27 PROCEDURE — 99215 PR OFFICE/OUTPT VISIT, EST, LEVL V, 40-54 MIN: ICD-10-PCS | Mod: 25,S$GLB,, | Performed by: INTERNAL MEDICINE

## 2019-06-27 PROCEDURE — 3074F PR MOST RECENT SYSTOLIC BLOOD PRESSURE < 130 MM HG: ICD-10-PCS | Mod: CPTII,S$GLB,, | Performed by: INTERNAL MEDICINE

## 2019-06-27 PROCEDURE — 99497 PR ADVNCD CARE PLAN 30 MIN: ICD-10-PCS | Mod: 25,S$GLB,, | Performed by: INTERNAL MEDICINE

## 2019-06-27 PROCEDURE — 3008F BODY MASS INDEX DOCD: CPT | Mod: CPTII,S$GLB,, | Performed by: INTERNAL MEDICINE

## 2019-06-27 PROCEDURE — 99497 ADVNCD CARE PLAN 30 MIN: CPT | Mod: 25,S$GLB,, | Performed by: INTERNAL MEDICINE

## 2019-06-27 PROCEDURE — 3078F DIAST BP <80 MM HG: CPT | Mod: CPTII,S$GLB,, | Performed by: INTERNAL MEDICINE

## 2019-06-27 RX ORDER — PROMETHAZINE HYDROCHLORIDE 25 MG/1
25 TABLET ORAL EVERY 6 HOURS PRN
Qty: 60 TABLET | Refills: 7 | Status: SHIPPED | OUTPATIENT
Start: 2019-06-27 | End: 2019-07-04

## 2019-06-27 RX ORDER — DEXAMETHASONE 6 MG/1
TABLET ORAL
Qty: 24 TABLET | Refills: 3 | Status: ON HOLD | OUTPATIENT
Start: 2019-06-27 | End: 2019-08-20 | Stop reason: HOSPADM

## 2019-06-27 RX ORDER — ONDANSETRON HYDROCHLORIDE 8 MG/1
8 TABLET, FILM COATED ORAL 4 TIMES DAILY PRN
Qty: 60 TABLET | Refills: 2 | Status: SHIPPED | OUTPATIENT
Start: 2019-06-27 | End: 2020-03-17 | Stop reason: SDUPTHER

## 2019-06-27 RX ORDER — FOLIC ACID 1 MG/1
1 TABLET ORAL DAILY
Qty: 100 TABLET | Refills: 3 | Status: SHIPPED | OUTPATIENT
Start: 2019-06-27 | End: 2020-03-27 | Stop reason: SDUPTHER

## 2019-06-27 RX ORDER — CYANOCOBALAMIN 1000 UG/ML
1000 INJECTION, SOLUTION INTRAMUSCULAR; SUBCUTANEOUS ONCE
Status: COMPLETED | OUTPATIENT
Start: 2019-06-27 | End: 2019-06-27

## 2019-06-27 RX ADMIN — CYANOCOBALAMIN 1000 MCG: 1000 INJECTION, SOLUTION INTRAMUSCULAR; SUBCUTANEOUS at 10:06

## 2019-06-27 NOTE — PROGRESS NOTES
Subjective:       Patient ID: Alison Branch is a 63 y.o. female.    Chief Complaint: Malignant neoplasm of upper lobe of left lung  Ms. Branch is a 63-year-old female who smoked for about 30 years and quit 20 years ago, presented with cough end of last year, but worsened into January.  Since the cough persisted, she saw her PCP, underwent a chest x-ray on 05/10/2019, that revealed left upper lobe pneumonia and a repeat CT was done in the week after that on 05/17/2019 that showed left upper lobe   mass arising from the lateral pleural surface in the left upper lobe posterior subsegment measuring 2.6 x 3 cm.  There are satellite mass more medially near the aortic arch that is 2 cm, also spiculated and irregular as well as thickened interlobar septa in the left lung apex and anterior segment, prevascular lymph node lateral to the aortic arch, short axis measuring 9 mm.       She then underwent a bronchoscopy on 05/28/2019, and that revealed there was an infiltration obtained in the left apical posterior segment of the left upper lobe cytology brush was done.  Transbronchial biopsies of an area of infiltration were also performed in the apicoposterior segment of the left upper lobe.    Pathology revealed adenocarcinoma; however, the specimen was not adequate enough to send for molecular testing.       She underwent a PET scan on 06/06/2019.  that reveals significant hypermetabolic activity in the large irregular spiculated pulmonary mass in the lateral aspect of the left upper lobe consistent with the patient's known pulmonary adenocarcinoma.  There is also tracer avidity in the medial left upper lobe satellite lesion and diffusely throughout much of the anterior left upper lobe where there is prominent nodular paraseptal thickening.  There are some numerous scattered subcentimeter pulmonary nodules throughout the right lung, all of which are too small for detection by PET.  For example, there is a 0.4 cm nodule in the  "superior right lower lobe and a micro nodule in the posterior segment of the right upper lobe.  There is a 0.5 cm, normal size right   paratracheal lymph node with increased radiotracer uptake as well as hypermetabolic aortic pulmonary lymph node and a left hilar lymph node.  There is a focal hypermetabolic lesion in the left anterior superior iliac spine associated with well defined lytic lesion.  There is a hypermetabolic focus in the anterior right fifth rib with a possible associated lytic lesion.  SUVs as   below lateral:  Left upper lobe  SUV max 17.9, anterior left upper lobe satellite mass and septal thickening SUV max of 10.1, right paratracheal lymph node SUV max of 4.8, left AP window lymph node SUV max of 17.9, left anterior superior iliac spine SUV max of 3.9"     MRI pelvis from 6/10/19  reveals "Region of osseous is irregularity at the left anterior iliac spine likely related to bone graft harvest procedure.  See  discussion above.  No suspicious signal or enhancement to suggest active/malignant process"   MRI brain from 6/10//19 reveal No intracranial abnormality.     We discussed her case at [facility], and plan was to wait for GAURDANT and proceed with treatment accordingly  Her GAURDANT is negative for molecular markers.    HPIShe comes in today for Carboplatin, Alimta and Keytruda    Review of Systems   Constitutional: Negative for appetite change, fatigue and unexpected weight change.   HENT: Negative for mouth sores.    Eyes: Negative for visual disturbance.   Respiratory: Negative for cough and shortness of breath.    Cardiovascular: Negative for chest pain.   Gastrointestinal: Negative for abdominal pain and diarrhea.   Genitourinary: Negative for frequency.   Musculoskeletal: Negative for back pain.   Skin: Negative for rash.   Neurological: Negative for headaches.   Hematological: Negative for adenopathy.   Psychiatric/Behavioral: The patient is not nervous/anxious.    All other systems " reviewed and are negative.      Objective:      Physical Exam   Constitutional: She is oriented to person, place, and time. She appears well-developed and well-nourished.   HENT:   Mouth/Throat: No oropharyngeal exudate.   Cardiovascular: Normal rate and normal heart sounds.   Pulmonary/Chest: Effort normal and breath sounds normal. She has no wheezes.   Abdominal: Soft. Bowel sounds are normal. There is no tenderness.   Musculoskeletal: She exhibits no edema or tenderness.   Lymphadenopathy:     She has no cervical adenopathy.   Neurological: She is alert and oriented to person, place, and time. Coordination normal.   Skin: Skin is warm and dry. No rash noted.   Psychiatric: She has a normal mood and affect. Judgment and thought content normal.   Vitals reviewed.        LABS:  WBC   Date Value Ref Range Status   05/17/2019 5.15 3.90 - 12.70 K/uL Final     Hemoglobin   Date Value Ref Range Status   05/17/2019 13.1 12.0 - 16.0 g/dL Final     Hematocrit   Date Value Ref Range Status   05/17/2019 42.2 37.0 - 48.5 % Final     Platelets   Date Value Ref Range Status   05/17/2019 218 150 - 350 K/uL Final     Gran # (ANC)   Date Value Ref Range Status   05/17/2019 2.3 1.8 - 7.7 K/uL Final     Gran%   Date Value Ref Range Status   05/17/2019 45.2 38.0 - 73.0 % Final       Chemistry        Component Value Date/Time     05/17/2019 1014    K 4.5 05/17/2019 1014     05/17/2019 1014    CO2 26 05/17/2019 1014    BUN 23 05/17/2019 1014    CREATININE 1.0 05/17/2019 1014     (H) 05/17/2019 1014        Component Value Date/Time    CALCIUM 10.0 05/17/2019 1014    ALKPHOS 79 05/17/2019 1014    AST 27 05/17/2019 1014    ALT 26 05/17/2019 1014    BILITOT 0.5 05/17/2019 1014    ESTGFRAFRICA >60.0 05/17/2019 1014    EGFRNONAA >60.0 05/17/2019 1014          Assessment:       1. 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 Molecular  markers are negative on GUARDANT. She will proceed with Carboplatin, Alimta and Keytruda as soon as auth is complete.  Patient was consented for chemotherapy today 6/27/2019 .   An extensive discussion was had which included a thorough discussion of the risk and benefits of treatment and alternatives.  Risks, including but not limited to, possible hair loss, bone marrow damage (anemia, thrombocytopenia, immune suppression, neutropenia), damage to body organs (brain, heart, liver, kidney, lungs, nervous system, skin, and others), allergic reactions, sterility, nausea/vomiting, constipation/diarrhea, sores in the mouth, secondary cancers, local damage at possible injection sites, and rarely death were all discussed.  The patient agrees with the plan, and all questions have been answered to their satisfaction.  Consent was signed the patient, provider, and a third party witness.      Will also start Xgeva for cycle 2       Advance Care Planning     Power of   I initiated the process of advance care planning today and explained the importance of this process to the patient.  I introduced the concept of advance directives to the patient, as well. Then the patient received detailed information about the importance of designating a Health Care Power of  (HCPOA). She was also instructed to communicate with this person about their wishes for future healthcare, should she become sick and lose decision-making capacity. The patient has not previously appointed a HCPOA. After our discussion, the patient has decided to complete a HCPOA and has appointed her daughter   I spent a total time of 25 minutes discussing this issue with the patient.         Above care plan was discussed with patient and accompanying daughters and all questions were addressed to their satisfaction

## 2019-06-27 NOTE — PATIENT INSTRUCTIONS
TAKE FOLIC ACID 1 TABLET DAILY  DEXAMETHASONE: TAKE 1 TABLET TWICE A DAY ON DAY THE DAY BEFORE AND FOR 2 DAYS AFTER CHEMO.  DO NOT TAKE ON THE DAY OF CHEMO  TAKE ZOFRAN OR PHENERGAN FOR NAUSEA/VOITING  TAKE ZOFRAN FIRST AND THEN PHENERGAN IF ZOFRAN DOES NOT HELP IN 30 minutes.  YOU CAN REPEAT ZOFRAN AND PHENERGAN EVERY 6 HOURS AS NEEDED   Carboplatin injection  What is this medicine?  CARBOPLATIN (JULIANA nikko gio tin) is a chemotherapy drug. It targets fast dividing cells, like cancer cells, and causes these cells to die. This medicine is used to treat ovarian cancer and many other cancers.  How should I use this medicine?  This drug is usually given as an infusion into a vein. It is administered in a hospital or clinic by a specially trained health care professional.  Talk to your pediatrician regarding the use of this medicine in children. Special care may be needed.  What side effects may I notice from receiving this medicine?  Side effects that you should report to your doctor or health care professional as soon as possible:  · allergic reactions like skin rash, itching or hives, swelling of the face, lips, or tongue  · signs of infection - fever or chills, cough, sore throat, pain or difficulty passing urine  · signs of decreased platelets or bleeding - bruising, pinpoint red spots on the skin, black, tarry stools, nosebleeds  · signs of decreased red blood cells - unusually weak or tired, fainting spells, lightheadedness  · breathing problems  · changes in hearing  · changes in vision  · chest pain  · high blood pressure  · low blood counts - This drug may decrease the number of white blood cells, red blood cells and platelets. You may be at increased risk for infections and bleeding.  · nausea and vomiting  · pain, swelling, redness or irritation at the injection site  · pain, tingling, numbness in the hands or feet  · problems with balance, talking, walking  · trouble passing urine or change in the amount of  urine  Side effects that usually do not require medical attention (report to your doctor or health care professional if they continue or are bothersome):  · hair loss  · loss of appetite  · metallic taste in the mouth or changes in taste  What may interact with this medicine?  · medicines for seizures  · medicines to increase blood counts like filgrastim, pegfilgrastim, sargramostim  · some antibiotics like amikacin, gentamicin, neomycin, streptomycin, tobramycin  · vaccines  Talk to your doctor or health care professional before taking any of these medicines:  · acetaminophen  · aspirin  · ibuprofen  · ketoprofen  · naproxen  What if I miss a dose?  It is important not to miss a dose. Call your doctor or health care professional if you are unable to keep an appointment.  Where should I keep my medicine?  This drug is given in a hospital or clinic and will not be stored at home.  What should I tell my health care provider before I take this medicine?  They need to know if you have any of these conditions:  · blood disorders  · hearing problems  · kidney disease  · recent or ongoing radiation therapy  · an unusual or allergic reaction to carboplatin, cisplatin, other chemotherapy, other medicines, foods, dyes, or preservatives  · pregnant or trying to get pregnant  · breast-feeding  What should I watch for while using this medicine?  Your condition will be monitored carefully while you are receiving this medicine. You will need important blood work done while you are taking this medicine.  This drug may make you feel generally unwell. This is not uncommon, as chemotherapy can affect healthy cells as well as cancer cells. Report any side effects. Continue your course of treatment even though you feel ill unless your doctor tells you to stop.  In some cases, you may be given additional medicines to help with side effects. Follow all directions for their use.  Call your doctor or health care professional for advice if  you get a fever, chills or sore throat, or other symptoms of a cold or flu. Do not treat yourself. This drug decreases your body's ability to fight infections. Try to avoid being around people who are sick.  This medicine may increase your risk to bruise or bleed. Call your doctor or health care professional if you notice any unusual bleeding.  Be careful brushing and flossing your teeth or using a toothpick because you may get an infection or bleed more easily. If you have any dental work done, tell your dentist you are receiving this medicine.  Avoid taking products that contain aspirin, acetaminophen, ibuprofen, naproxen, or ketoprofen unless instructed by your doctor. These medicines may hide a fever.  Do not become pregnant while taking this medicine. Women should inform their doctor if they wish to become pregnant or think they might be pregnant. There is a potential for serious side effects to an unborn child. Talk to your health care professional or pharmacist for more information. Do not breast-feed an infant while taking this medicine.  NOTE:This sheet is a summary. It may not cover all possible information. If you have questions about this medicine, talk to your doctor, pharmacist, or health care provider. Copyright© 2017 [employer]        Pemetrexed injection  What is this medicine?  PEMETREXED (PEM e TREX ed) is a chemotherapy drug. This medicine affects cells that are rapidly growing, such as cancer cells and cells in your mouth and stomach. It is usually used to treat lung cancers like non-small cell lung cancer and mesothelioma. It may also be used to treat other cancers.  How should I use this medicine?  This drug is given as an infusion into a vein. It is administered in a hospital or clinic by a specially trained health care professional.  Talk to your pediatrician regarding the use of this medicine in children. Special care may be needed.  What side effects may I notice from receiving this  medicine?  Side effects that you should report to your doctor or health care professional as soon as possible:  · allergic reactions like skin rash, itching or hives, swelling of the face, lips, or tongue  · low blood counts - this medicine may decrease the number of white blood cells, red blood cells and platelets. You may be at increased risk for infections and bleeding.  · signs of infection - fever or chills, cough, sore throat, pain or difficulty passing urine  · signs of decreased platelets or bleeding - bruising, pinpoint red spots on the skin, black, tarry stools, blood in the urine  · signs of decreased red blood cells - unusually weak or tired, fainting spells, lightheadedness  · breathing problems, like a dry cough  · changes in emotions or moods  · chest pain  · confusion  · diarrhea  · high blood pressure  · mouth or throat sores or ulcers  · pain, swelling, warmth in the leg  · pain on swallowing  · swelling of the ankles, feet, hands  · trouble passing urine or change in the amount of urine  · vomiting  · yellowing of the eyes or skin  Side effects that usually do not require medical attention (report to your doctor or health care professional if they continue or are bothersome):  · hair loss  · loss of appetite  · nausea  · stomach upset  What may interact with this medicine?  · aspirin and aspirin-like medicines  · medicines to increase blood counts like filgrastim, pegfilgrastim, sargramostim  · methotrexate  · NSAIDS, medicines for pain and inflammation, like ibuprofen or naproxen  · probenecid  · pyrimethamine  · vaccines  Talk to your doctor or health care professional before taking any of these medicines:  · acetaminophen  · aspirin  · ibuprofen  · ketoprofen  · naproxen  What if I miss a dose?  It is important not to miss your dose. Call your doctor or health care professional if you are unable to keep an appointment.  Where should I keep my medicine?  This drug is given in a hospital or  clinic and will not be stored at home.  What should I tell my health care provider before I take this medicine?  They need to know if you have any of these conditions:  · if you frequently drink alcohol containing beverages  · infection (especially a virus infection such as chickenpox, cold sores, or herpes)  · kidney disease  · liver disease  · low blood counts, like low platelets, red bloods, or white blood cells  · an unusual or allergic reaction to pemetrexed, mannitol, other medicines, foods, dyes, or preservatives  · pregnant or trying to get pregnant  · breast-feeding  What should I watch for while using this medicine?  Visit your doctor for checks on your progress. This drug may make you feel generally unwell. This is not uncommon, as chemotherapy can affect healthy cells as well as cancer cells. Report any side effects. Continue your course of treatment even though you feel ill unless your doctor tells you to stop.  In some cases, you may be given additional medicines to help with side effects. Follow all directions for their use.  Call your doctor or health care professional for advice if you get a fever, chills or sore throat, or other symptoms of a cold or flu. Do not treat yourself. This drug decreases your body's ability to fight infections. Try to avoid being around people who are sick.  This medicine may increase your risk to bruise or bleed. Call your doctor or health care professional if you notice any unusual bleeding.  Be careful brushing and flossing your teeth or using a toothpick because you may get an infection or bleed more easily. If you have any dental work done, tell your dentist you are receiving this medicine.  Avoid taking products that contain aspirin, acetaminophen, ibuprofen, naproxen, or ketoprofen unless instructed by your doctor. These medicines may hide a fever.  Call your doctor or health care professional if you get diarrhea or mouth sores. Do not treat yourself.  To protect  your kidneys, drink water or other fluids as directed while you are taking this medicine.  Men and women must use effective birth control while taking this medicine. You may also need to continue using effective birth control for a time after stopping this medicine. Do not become pregnant while taking this medicine. Tell your doctor right away if you think that you or your partner might be pregnant. There is a potential for serious side effects to an unborn child. Talk to your health care professional or pharmacist for more information. Do not breast-feed an infant while taking this medicine. This medicine may lower sperm counts.  NOTE:This sheet is a summary. It may not cover all possible information. If you have questions about this medicine, talk to your doctor, pharmacist, or health care provider. Copyright© 2017 [employer]

## 2019-06-27 NOTE — Clinical Note
Please check on auth. She needs CBC,CMP, mag prior to cycle 1 of Carbo, Alimta and KEYTRUDA. Also schedule CBC,CMP, TSH and free T4 and see me 3 weeks after cycle 1 for Carbvoplatin, Alimta and Keytruda and Xgeva (Xgeva needs aith for cycle 2)

## 2019-06-28 DIAGNOSIS — C34.12 MALIGNANT NEOPLASM OF UPPER LOBE OF LEFT LUNG: Primary | ICD-10-CM

## 2019-06-28 NOTE — NURSING
Patient and daughter arrived to chemo class, chemo binder and appointment scheduled reviewed with family while daughter was available on phone.  Reviewed power point and requested a copy of power point that was given.  Reviewed chemo drugs with chemo care information.  Requesting to have a port placed for checmo therapy.  Toured chemo infusion area.  No additional questions asked.

## 2019-07-02 ENCOUNTER — TELEPHONE (OUTPATIENT)
Dept: HEMATOLOGY/ONCOLOGY | Facility: CLINIC | Age: 64
End: 2019-07-02

## 2019-07-02 DIAGNOSIS — C34.12 MALIGNANT NEOPLASM OF UPPER LOBE OF LEFT LUNG: Primary | ICD-10-CM

## 2019-07-02 DIAGNOSIS — R53.1 WEAKNESS: ICD-10-CM

## 2019-07-02 NOTE — TELEPHONE ENCOUNTER
spoke with Suellen on today in regards to all upcoming appointments, Suellen is aware and has confirm.

## 2019-07-02 NOTE — TELEPHONE ENCOUNTER
"----- Message from Liane Quiros sent at 7/2/2019  8:50 AM CDT -----  Contact: [phone] Suellen  Pt calling in regards to getting a port put in, and she has questions about rather or not to continue taking aspirin . She also wants to know if the port placement is something that will be paid for by the insurance. Please contact her at [phone]  "Thank you for all that you do for our patients'"  "

## 2019-07-03 LAB — FUNGUS SPEC CULT: NORMAL

## 2019-07-08 ENCOUNTER — HOSPITAL ENCOUNTER (OUTPATIENT)
Facility: OTHER | Age: 64
Discharge: HOME | End: 2019-07-08
Attending: RADIOLOGY | Admitting: RADIOLOGY
Payer: MEDICARE

## 2019-07-08 VITALS
BODY MASS INDEX: 31.19 KG/M2 | SYSTOLIC BLOOD PRESSURE: 116 MMHG | HEIGHT: 69 IN | DIASTOLIC BLOOD PRESSURE: 61 MMHG | HEART RATE: 66 BPM | TEMPERATURE: 99 F | WEIGHT: 210.56 LBS | OXYGEN SATURATION: 98 % | RESPIRATION RATE: 16 BRPM

## 2019-07-08 DIAGNOSIS — C34.12 MALIGNANT NEOPLASM OF UPPER LOBE OF LEFT LUNG: ICD-10-CM

## 2019-07-08 LAB
BASOPHILS NFR BLD: 2 % (ref 0–1.9)
DIFFERENTIAL METHOD: ABNORMAL
EOSINOPHIL NFR BLD: 2 % (ref 0–8)
ERYTHROCYTE [DISTWIDTH] IN BLOOD BY AUTOMATED COUNT: 14.1 % (ref 11.5–14.5)
HCT VFR BLD AUTO: 38.9 % (ref 37–48.5)
HGB BLD-MCNC: 12.1 G/DL (ref 12–16)
HYPOCHROMIA BLD QL SMEAR: ABNORMAL
INR PPP: 0.9 (ref 0.8–1.2)
LYMPHOCYTES NFR BLD: 20 % (ref 18–48)
MCH RBC QN AUTO: 26.4 PG (ref 27–31)
MCHC RBC AUTO-ENTMCNC: 31.1 G/DL (ref 32–36)
MCV RBC AUTO: 85 FL (ref 82–98)
MONOCYTES NFR BLD: 4 % (ref 4–15)
NEUTROPHILS NFR BLD: 72 % (ref 38–73)
PLATELET # BLD AUTO: 193 K/UL (ref 150–350)
PLATELET BLD QL SMEAR: ABNORMAL
PMV BLD AUTO: 11.8 FL (ref 9.2–12.9)
POCT GLUCOSE: 107 MG/DL (ref 70–110)
PROTHROMBIN TIME: 10.1 SEC (ref 9–12.5)
RBC # BLD AUTO: 4.59 M/UL (ref 4–5.4)
WBC # BLD AUTO: 5.64 K/UL (ref 3.9–12.7)

## 2019-07-08 PROCEDURE — 85610 PROTHROMBIN TIME: CPT

## 2019-07-08 PROCEDURE — 36415 COLL VENOUS BLD VENIPUNCTURE: CPT

## 2019-07-08 PROCEDURE — 85007 BL SMEAR W/DIFF WBC COUNT: CPT

## 2019-07-08 PROCEDURE — 63600175 PHARM REV CODE 636 W HCPCS: Performed by: RADIOLOGY

## 2019-07-08 PROCEDURE — 99153 MOD SED SAME PHYS/QHP EA: CPT | Performed by: RADIOLOGY

## 2019-07-08 PROCEDURE — C1894 INTRO/SHEATH, NON-LASER: HCPCS | Performed by: RADIOLOGY

## 2019-07-08 PROCEDURE — C1788 PORT, INDWELLING, IMP: HCPCS | Performed by: RADIOLOGY

## 2019-07-08 PROCEDURE — 99152 MOD SED SAME PHYS/QHP 5/>YRS: CPT | Performed by: RADIOLOGY

## 2019-07-08 PROCEDURE — 85027 COMPLETE CBC AUTOMATED: CPT

## 2019-07-08 PROCEDURE — 25000003 PHARM REV CODE 250: Performed by: RADIOLOGY

## 2019-07-08 DEVICE — POWERPORT CLEARVUE IMPLANTABLE PORT WITH ATTACHABLE 8F POLYURETHANE OPEN-ENDED SINGLE-LUMEN VENOUS CATHETER INTERMEDIATE KIT
Type: IMPLANTABLE DEVICE | Site: HEART | Status: FUNCTIONAL
Brand: POWERPORT CLEARVUE

## 2019-07-08 RX ORDER — LIDOCAINE HYDROCHLORIDE 10 MG/ML
INJECTION INFILTRATION; PERINEURAL
Status: DISCONTINUED | OUTPATIENT
Start: 2019-07-08 | End: 2019-07-08 | Stop reason: HOSPADM

## 2019-07-08 RX ORDER — FENTANYL CITRATE 50 UG/ML
INJECTION, SOLUTION INTRAMUSCULAR; INTRAVENOUS
Status: DISCONTINUED | OUTPATIENT
Start: 2019-07-08 | End: 2019-07-08 | Stop reason: HOSPADM

## 2019-07-08 RX ORDER — CEFAZOLIN SODIUM 1 G/50ML
SOLUTION INTRAVENOUS
Status: DISCONTINUED | OUTPATIENT
Start: 2019-07-08 | End: 2019-07-08 | Stop reason: HOSPADM

## 2019-07-08 RX ORDER — HYDROCODONE BITARTRATE AND ACETAMINOPHEN 5; 325 MG/1; MG/1
1 TABLET ORAL EVERY 4 HOURS PRN
Status: DISCONTINUED | OUTPATIENT
Start: 2019-07-08 | End: 2019-07-08 | Stop reason: HOSPADM

## 2019-07-08 RX ORDER — MIDAZOLAM HYDROCHLORIDE 1 MG/ML
INJECTION, SOLUTION INTRAMUSCULAR; INTRAVENOUS
Status: DISCONTINUED | OUTPATIENT
Start: 2019-07-08 | End: 2019-07-08 | Stop reason: HOSPADM

## 2019-07-08 NOTE — H&P
"Consult/H&P Note  Interventional Radiology    Consult Requested By: oncology    Reason for Consult: port placement    SUBJECTIVE:     Chief Complaint: lung cancer    History of Present Illness: 62 yo F with L lung cancer, needs port placement.    Past Medical History:   Diagnosis Date    Allergy     Brain aneurysm 2010    s/p coiling of one; another not coiled    Breast cyst     Diabetes mellitus     Diabetes type 2, controlled     Fever blister     High cholesterol     History of Bell's palsy     HTN (hypertension) 5/20/2014     Past Surgical History:   Procedure Laterality Date    BREAST CYST ASPIRATION      Bronchoscopy N/A 5/28/2019    Performed by [facility] at [facility] OR 2ND FLR    CERVICAL FUSION      CHONDROPLASTY-KNEE Left 2/23/2018    Performed by [facility] at [facility] OR    COLONOSCOPY N/A 3/9/2016    Performed by Elliott Zimmerman MD at [facility] ENDO (4TH FLR)    head surgery      stent and "curling" for aneurysm    HYSTERECTOMY      TVH secondary to SUF    MENISCECTOMY Left 2/23/2018    Per[facility] at [facility] OR    SYNOVECTOMY-KNEE Left 2/23/2018    Performed by [facility] at [facility] OR     Family History   Problem Relation Age of Onset    Rheum arthritis Father     Diabetes Father     Heart failure Father     Migraines Father     Cataracts Father     Cancer Mother 63        pancreatic    Stomach cancer Mother     Breast cancer Maternal Aunt         50s    Ovarian cancer Cousin     Diabetes Sister     Diabetes Brother     Cancer Maternal Aunt         Lung CA    Melanoma Neg Hx     Colon cancer Neg Hx     Amblyopia Neg Hx     Blindness Neg Hx     Glaucoma Neg Hx     Macular degeneration Neg Hx     Retinal detachment Neg Hx     Strabismus Neg Hx     Stroke Neg Hx     Thyroid disease Neg Hx      Social History     Tobacco Use    Smoking status: Former Smoker     Packs/day: 0.50     Years: 30.00     Pack years: 15.00     Last " attempt to quit: 1999     Years since quittin.5    Smokeless tobacco: Never Used   Substance Use Topics    Alcohol use: No     Alcohol/week: 0.0 oz    Drug use: No       Review of Systems:  Constitutional/General:No fever, chills, change in appetite or weight loss.  Hematological/Immuno: no known coagulopathies  Respiratory: +cough  Cardiovascular: no chest pain  Gastrointestinal: no abdominal pain  Genito-Urinary: no dysuria  Musculoskeletal: negative  Skin: Negative for rash, itching, pigmentation changes, nail or hair changes.  Neurological: no TIA or stroke symptoms  Psychiatric: normal mood/affect, good insight/judgement      OBJECTIVE:     Vital Signs Range (Last 24H):  Temp:  [98.6 °F (37 °C)]   Pulse:  [68]   Resp:  [16]   BP: (110)/(55)   SpO2:  [98 %]     Physical Exam:  General- Patient alert and oriented x3 in NAD  ENT- PERRLA,  Neck- No masses  CV- Regular rate and rhythm  Resp-  No increased WOB  GI- Non tender/non-distended  Extrem- No cyanosis, clubbing, edema.   Derm- No rashes, masses, or lesions noted  Neuro-  No focal deficits noted.     Physical Exam  Body mass index is 31.09 kg/m².    Scheduled Meds:   Continuous Infusions:   PRN Meds:    Allergies:   Review of patient's allergies indicates:   Allergen Reactions    Metformin      diarrhea       Labs:  Recent Labs   Lab 19  1146   INR 0.9       Recent Labs   Lab 19  1146   WBC 5.64   HGB 12.1   HCT 38.9   MCV 85       No results for input(s): GLU, NA, K, CL, CO2, BUN, CREATININE, CALCIUM, MG, ALT, AST, ALBUMIN, BILITOT, BILIDIR in the last 168 hours.    Vitals (Most Recent):  Temp: 98.6 °F (37 °C) (19)  Pulse: 68 (19)  Resp: 16 (19)  BP: (!) 110/55 (19)  SpO2: 98 % (19)    ASA: 2  Mallampati: 2    Consent obtained    ASSESSMENT/PLAN:     R chest port placement. Moderate sedation.    Active Hospital Problems    Diagnosis  POA    Malignant neoplasm of upper  lobe of left lung [C34.12]  Yes     CARLOS nodules.  Will plan on outpatient bronchoscopy with TBBx, BAL and perhaps brush.            Resolved Hospital Problems   No resolved problems to display.           Cali Damico MD

## 2019-07-08 NOTE — PROCEDURES
Radiology Post-Procedure Note    Pre Op Diagnosis: lung cancer  Post Op Diagnosis: Same    Procedure: port placement    Procedure performed by: Cali Damico MD    Written Informed Consent Obtained: Yes  Specimen Removed: NO  Estimated Blood Loss: Minimal    Findings:   Successful R chest port placement.    Patient tolerated procedure well.    @SIG@

## 2019-07-08 NOTE — DISCHARGE SUMMARY
Radiology Discharge Summary      Hospital Course: No complications    Admit Date: 7/8/2019  Discharge Date: 07/08/2019     Instructions Given to Patient: Yes  Diet: Resume prior diet  Activity: activity as tolerated and no driving for today    Description of Condition on Discharge: Stable  Vital Signs (Most Recent): Temp: 98.6 °F (37 °C) (07/08/19 1213)  Pulse: 68 (07/08/19 1213)  Resp: 16 (07/08/19 1213)  BP: (!) 110/55 (07/08/19 1213)  SpO2: 98 % (07/08/19 1213)    Discharge Disposition: Home    Discharge Diagnosis: lung cancer, port ready for use     Follow-up: per oncology    @SIG@

## 2019-07-08 NOTE — DISCHARGE INSTRUCTIONS
Anesthesia safety  Tips for anesthesia safety include the following:   · Follow all instructions you are given for how long not to eat or drink before your procedure.  · Be sure your healthcare provider knows what medicines you take, especially any anti-inflammatory medicine or blood thinners. This includes aspirin and any other over-the-counter medicines, herbs, and supplements.  · Have an adult family member or friend drive you home after the procedure.  · For the first 24 hours after your surgery:  ¨ Do not drive or use heavy equipment.  ¨ Do not make important decisions or sign documents.  ¨ Avoid alcohol.  ¨ Have someone stay with you, if possible. They can watch for problems and help keep you safe.  Date Last Reviewed: 12/1/2016  © 9075-8249 [employer]. [address] [place] [address], [place] [address]. All rights reserved. This information is not intended as a substitute for professional medical care. Always follow your healthcare professional's instructions.

## 2019-07-09 ENCOUNTER — INFUSION (OUTPATIENT)
Dept: INFUSION THERAPY | Facility: HOSPITAL | Age: 64
End: 2019-07-09
Attending: INTERNAL MEDICINE
Payer: MEDICARE

## 2019-07-09 VITALS
SYSTOLIC BLOOD PRESSURE: 117 MMHG | TEMPERATURE: 98 F | RESPIRATION RATE: 18 BRPM | HEART RATE: 59 BPM | DIASTOLIC BLOOD PRESSURE: 59 MMHG

## 2019-07-09 DIAGNOSIS — C77.1 SECONDARY MALIGNANT NEOPLASM OF MEDIASTINAL LYMPH NODE: Primary | ICD-10-CM

## 2019-07-09 DIAGNOSIS — C34.12 MALIGNANT NEOPLASM OF UPPER LOBE OF LEFT LUNG: ICD-10-CM

## 2019-07-09 PROCEDURE — 25000003 PHARM REV CODE 250: Performed by: INTERNAL MEDICINE

## 2019-07-09 PROCEDURE — 63600175 PHARM REV CODE 636 W HCPCS: Mod: JG | Performed by: INTERNAL MEDICINE

## 2019-07-09 PROCEDURE — 96367 TX/PROPH/DG ADDL SEQ IV INF: CPT

## 2019-07-09 PROCEDURE — 96413 CHEMO IV INFUSION 1 HR: CPT

## 2019-07-09 PROCEDURE — 96417 CHEMO IV INFUS EACH ADDL SEQ: CPT

## 2019-07-09 PROCEDURE — 96411 CHEMO IV PUSH ADDL DRUG: CPT

## 2019-07-09 RX ORDER — SODIUM CHLORIDE 0.9 % (FLUSH) 0.9 %
10 SYRINGE (ML) INJECTION
Status: DISCONTINUED | OUTPATIENT
Start: 2019-07-09 | End: 2019-07-09 | Stop reason: HOSPADM

## 2019-07-09 RX ORDER — HEPARIN 100 UNIT/ML
500 SYRINGE INTRAVENOUS
Status: DISCONTINUED | OUTPATIENT
Start: 2019-07-09 | End: 2019-07-09 | Stop reason: HOSPADM

## 2019-07-09 RX ORDER — SODIUM CHLORIDE 0.9 % (FLUSH) 0.9 %
10 SYRINGE (ML) INJECTION
Status: CANCELLED | OUTPATIENT
Start: 2019-07-09

## 2019-07-09 RX ORDER — HEPARIN 100 UNIT/ML
500 SYRINGE INTRAVENOUS
Status: CANCELLED | OUTPATIENT
Start: 2019-07-09

## 2019-07-09 RX ADMIN — SODIUM CHLORIDE 200 MG: 9 INJECTION, SOLUTION INTRAVENOUS at 04:07

## 2019-07-09 RX ADMIN — SODIUM CHLORIDE: 0.9 INJECTION, SOLUTION INTRAVENOUS at 03:07

## 2019-07-09 RX ADMIN — APREPITANT 130 MG: 130 INJECTION, EMULSION INTRAVENOUS at 04:07

## 2019-07-09 RX ADMIN — CARBOPLATIN 530 MG: 10 INJECTION, SOLUTION INTRAVENOUS at 05:07

## 2019-07-09 RX ADMIN — SODIUM CHLORIDE 1075 MG: 9 INJECTION, SOLUTION INTRAVENOUS at 05:07

## 2019-07-09 RX ADMIN — PALONOSETRON: 0.05 INJECTION, SOLUTION INTRAVENOUS at 05:07

## 2019-07-09 RX ADMIN — HEPARIN 500 UNITS: 100 SYRINGE at 06:07

## 2019-07-09 NOTE — PLAN OF CARE
Problem: Adult Inpatient Plan of Care  Goal: Plan of Care Review  Outcome: Ongoing (interventions implemented as appropriate)  1830:  Patient tolearted C1D1 well, NAD noted, denies any changes.  Port flushed per protocol and de-accessed, steri-strips remain intact.  AVS given.  Patient advised to contact MD for any issues.  Released in stable condition, ambulated off unit accompanied by 2 daughters.

## 2019-07-10 ENCOUNTER — TELEPHONE (OUTPATIENT)
Dept: HEMATOLOGY/ONCOLOGY | Facility: CLINIC | Age: 64
End: 2019-07-10

## 2019-07-10 ENCOUNTER — TELEPHONE (OUTPATIENT)
Dept: INTERNAL MEDICINE | Facility: CLINIC | Age: 64
End: 2019-07-10

## 2019-07-10 DIAGNOSIS — E11.65 UNCONTROLLED TYPE 2 DIABETES MELLITUS WITH HYPERGLYCEMIA, WITHOUT LONG-TERM CURRENT USE OF INSULIN: Primary | ICD-10-CM

## 2019-07-10 RX ORDER — INSULIN GLARGINE 100 [IU]/ML
10 INJECTION, SOLUTION SUBCUTANEOUS NIGHTLY
Qty: 9 ML | Refills: 3 | Status: SHIPPED | OUTPATIENT
Start: 2019-07-10 | End: 2019-07-22 | Stop reason: SDUPTHER

## 2019-07-10 NOTE — TELEPHONE ENCOUNTER
Spoke with patient.  She received carbo/alimta/keytruda yesterday.   She is calling to note her blood glucose being 300 this morning before breakfast and post breakfast it jumped up to right above 400.    Presently her glucose is 390, and she denies any symptoms.     She took her januvia and glipizide this morning.  Dr. Rodriguez, her pcp, manages her diabetes.     She is calling to get advice from dr chapin---even if the recommendation is to contact her PCP.    Nurse informed patient she would forward message over to dr chapin---and would contact her back afterwards. She thanked nurse.      Message routed to dr chapin

## 2019-07-10 NOTE — TELEPHONE ENCOUNTER
Most likely the steroids given with chemo increased her blood sugar. Can she call Dr. Rodriguez or can you send them a message to help with that

## 2019-07-10 NOTE — TELEPHONE ENCOUNTER
Spoke to her at length.  Will transition to insulin while she's getting chemotherapy.  Please call her.    1. She should start taking 10 units of glargine insulin nightly.  Have her come for a nurse appointment for insulin teaching  2. Once she starts that, stop the glipizide.  3. She should continue to monitor sugar, and I'll adjust dose based on her numbers.    Thank.s

## 2019-07-10 NOTE — TELEPHONE ENCOUNTER
----- Message from Brianne Cam sent at 7/10/2019 12:06 PM CDT -----  Contact: Pt  Pt called to speak with nurse about this medication dexAMETHasone (DECADRON) 6 MG tablet states that her sugar level won't go down and pt has questions   callback [phone]  Thank You  KETTY Cam

## 2019-07-10 NOTE — TELEPHONE ENCOUNTER
----- Message from Gina Powell sent at 7/10/2019  4:18 PM CDT -----  Contact: Patient [phone]  Pt states that she is calling to speak with someone in regards to her insulin glargine 100 units/mL (3mL) SubQ pen. She states that this medication was supposed to be sent over to [facility] [address] [place] [address] Kiara Thorne. Please advise.      Thanks

## 2019-07-11 ENCOUNTER — TELEPHONE (OUTPATIENT)
Dept: INTERNAL MEDICINE | Facility: CLINIC | Age: 64
End: 2019-07-11

## 2019-07-11 NOTE — TELEPHONE ENCOUNTER
Please call her back.  Because of her insurance, we have to use this pharmacy here at [facility] to get approval.  Otherwise the insulin can be very expensive.

## 2019-07-11 NOTE — TELEPHONE ENCOUNTER
Called patient to schedule appt to give insulin injection instructions. Patient states her daughter gave her the first injection last night the pharmacist walked her through the injection and her niece is a nurse who also helped her.   Spoke with her daughter and reviewed given a 2 unit air shot before each injection and voiced understanding and that she had not been doing that but will start.   Gave phone number to call if she would like to schedule an appointment.   Kamini Esposito RN HC

## 2019-07-11 NOTE — TELEPHONE ENCOUNTER
Spoke with patient, stated she p/u the insulin from [employer] which was $28. Stated she took her insulin last night but her sugars are running in the 300s-500s, is currently 397. She wants to know if she should take 10u in the morning as well just until her sugars get back regulated.

## 2019-07-11 NOTE — TELEPHONE ENCOUNTER
Advised her to go to twice daily insulin tomorrow for the next 3-4 days.  She has stopped glipizide.

## 2019-07-15 ENCOUNTER — TELEPHONE (OUTPATIENT)
Dept: INTERNAL MEDICINE | Facility: CLINIC | Age: 64
End: 2019-07-15

## 2019-07-15 ENCOUNTER — TELEPHONE (OUTPATIENT)
Dept: HEMATOLOGY/ONCOLOGY | Facility: CLINIC | Age: 64
End: 2019-07-15

## 2019-07-15 NOTE — TELEPHONE ENCOUNTER
----- Message from Bella Nichols sent at 7/15/2019 10:02 AM CDT -----  Contact: [phone]  Patient would like to get medical advice.  Symptoms (please be specific):  Burning while urinating   How long has patient had these symptoms:  2 days   Pharmacy name and phone # (copy/paste from chart): [facility] [address] [place] [address] [address]   [phone] (Phone)  [phone] (Fax)  Would the patient rather a call back or a response via [facility]?: call back   Comments:  Please advise ,thanks

## 2019-07-15 NOTE — TELEPHONE ENCOUNTER
Spoke with patient.  She notes burning with urination and increased frequency.   She will contact her PCP to discuss.  Nurse explained this is appropriate as they may want a UA on her.  She thanked nurse.

## 2019-07-15 NOTE — TELEPHONE ENCOUNTER
Spoke with pt, she is having burning while urinating, symptom started today. I offered her UC appt, pt will have to contact her daughter for transportation and then will call back to schedule an appt.

## 2019-07-15 NOTE — TELEPHONE ENCOUNTER
----- Message from Brianne Cam sent at 7/15/2019  9:43 AM CDT -----  Contact: Pt   Pt called to speak with nurse have some questions   Callback#[phone]  Thank You  KETTY Cam

## 2019-07-16 ENCOUNTER — OFFICE VISIT (OUTPATIENT)
Dept: INTERNAL MEDICINE | Facility: CLINIC | Age: 64
End: 2019-07-16
Payer: MEDICARE

## 2019-07-16 VITALS
DIASTOLIC BLOOD PRESSURE: 60 MMHG | HEIGHT: 69 IN | BODY MASS INDEX: 30.98 KG/M2 | TEMPERATURE: 98 F | WEIGHT: 209.19 LBS | HEART RATE: 80 BPM | SYSTOLIC BLOOD PRESSURE: 92 MMHG | OXYGEN SATURATION: 95 %

## 2019-07-16 DIAGNOSIS — E66.9 OBESITY (BMI 30.0-34.9): ICD-10-CM

## 2019-07-16 DIAGNOSIS — R30.0 DYSURIA: ICD-10-CM

## 2019-07-16 DIAGNOSIS — N39.0 ACUTE LOWER UTI: Primary | ICD-10-CM

## 2019-07-16 LAB
BACTERIA #/AREA URNS AUTO: ABNORMAL /HPF
BILIRUB UR QL STRIP: NEGATIVE
CLARITY UR REFRACT.AUTO: ABNORMAL
COLOR UR AUTO: YELLOW
GLUCOSE UR QL STRIP: ABNORMAL
HGB UR QL STRIP: NEGATIVE
KETONES UR QL STRIP: NEGATIVE
LEUKOCYTE ESTERASE UR QL STRIP: ABNORMAL
MICROSCOPIC COMMENT: ABNORMAL
NITRITE UR QL STRIP: NEGATIVE
PH UR STRIP: 6 [PH] (ref 5–8)
PROT UR QL STRIP: NEGATIVE
RBC #/AREA URNS AUTO: 1 /HPF (ref 0–4)
SP GR UR STRIP: 1.02 (ref 1–1.03)
SQUAMOUS #/AREA URNS AUTO: 4 /HPF
URN SPEC COLLECT METH UR: ABNORMAL
WBC #/AREA URNS AUTO: 8 /HPF (ref 0–5)
YEAST UR QL AUTO: ABNORMAL

## 2019-07-16 PROCEDURE — 99999 PR PBB SHADOW E&M-EST. PATIENT-LVL III: CPT | Mod: PBBFAC,,, | Performed by: NURSE PRACTITIONER

## 2019-07-16 PROCEDURE — 3078F DIAST BP <80 MM HG: CPT | Mod: CPTII,S$GLB,, | Performed by: NURSE PRACTITIONER

## 2019-07-16 PROCEDURE — 99213 PR OFFICE/OUTPT VISIT, EST, LEVL III, 20-29 MIN: ICD-10-PCS | Mod: S$GLB,,, | Performed by: NURSE PRACTITIONER

## 2019-07-16 PROCEDURE — 3008F BODY MASS INDEX DOCD: CPT | Mod: CPTII,S$GLB,, | Performed by: NURSE PRACTITIONER

## 2019-07-16 PROCEDURE — 3074F SYST BP LT 130 MM HG: CPT | Mod: CPTII,S$GLB,, | Performed by: NURSE PRACTITIONER

## 2019-07-16 PROCEDURE — 3078F PR MOST RECENT DIASTOLIC BLOOD PRESSURE < 80 MM HG: ICD-10-PCS | Mod: CPTII,S$GLB,, | Performed by: NURSE PRACTITIONER

## 2019-07-16 PROCEDURE — 3074F PR MOST RECENT SYSTOLIC BLOOD PRESSURE < 130 MM HG: ICD-10-PCS | Mod: CPTII,S$GLB,, | Performed by: NURSE PRACTITIONER

## 2019-07-16 PROCEDURE — 99999 PR PBB SHADOW E&M-EST. PATIENT-LVL III: ICD-10-PCS | Mod: PBBFAC,,, | Performed by: NURSE PRACTITIONER

## 2019-07-16 PROCEDURE — 81001 URINALYSIS AUTO W/SCOPE: CPT

## 2019-07-16 PROCEDURE — 3008F PR BODY MASS INDEX (BMI) DOCUMENTED: ICD-10-PCS | Mod: CPTII,S$GLB,, | Performed by: NURSE PRACTITIONER

## 2019-07-16 PROCEDURE — 99213 OFFICE O/P EST LOW 20 MIN: CPT | Mod: S$GLB,,, | Performed by: NURSE PRACTITIONER

## 2019-07-16 RX ORDER — PHENAZOPYRIDINE HYDROCHLORIDE 100 MG/1
100 TABLET, FILM COATED ORAL
Qty: 9 TABLET | Refills: 0 | Status: SHIPPED | OUTPATIENT
Start: 2019-07-16 | End: 2019-07-19

## 2019-07-16 RX ORDER — NITROFURANTOIN 25; 75 MG/1; MG/1
100 CAPSULE ORAL 2 TIMES DAILY
Qty: 14 CAPSULE | Refills: 0 | Status: SHIPPED | OUTPATIENT
Start: 2019-07-16 | End: 2019-07-23

## 2019-07-16 RX ORDER — DIAZEPAM 5 MG/1
TABLET ORAL
Refills: 2 | COMMUNITY
Start: 2019-06-20 | End: 2020-05-26

## 2019-07-16 NOTE — PATIENT INSTRUCTIONS
"Meds as prescribed.    Drink plenty of water to flush bladder.    Avoid caffeine, sugary drinks, and ETOH.    Wipe from front to back.    Do not hold bladder when needing to void for long periods.      Bladder Infection, Female (Adult)    Urine is normally doesn't have any bacteria in it. But bacteria can get into the urinary tract from the skin around the rectum. Or they can travel in the blood from elsewhere in the body. Once they are in your urinary tract, they can cause infection in the urethra (urethritis), the bladder (cystitis), or the kidneys (pyelonephritis).  The most common place for an infection is in the bladder. This is called a bladder infection. This is one of the most common infections in women. Most bladder infections are easily treated. They are not serious unless the infection spreads to the kidney.  The phrases "bladder infection," "UTI," and "cystitis" are often used to describe the same thing. But they are not always the same. Cystitis is an inflammation of the bladder. The most common cause of cystitis is an infection.  Symptoms  The infection causes inflammation in the urethra and bladder. This causes many of the symptoms. The most common symptoms of a bladder infection are:  · Pain or burning when urinating  · Having to urinate more often than usual  · Urgent need to urinate  · Only a small amount of urine comes out  · Blood in urine  · Abdominal discomfort. This is usually in the lower abdomen above the pubic bone.  · Cloudy urine  · Strong- or bad-smelling urine  · Unable to urinate (urinary retention)  · Unable to hold urine in (urinary incontinence)  · Fever  · Loss of appetite  · Confusion (in older adults)  Causes  Bladder infections are not contagious. You can't get one from someone else, from a toilet seat, or from sharing a bath.  The most common cause of bladder infections is bacteria from the bowels. The bacteria get onto the skin around the opening of the urethra. From there, " they can get into the urine and travel up to the bladder, causing inflammation and infection. This usually happens because of:  · Wiping improperly after urinating. Always wipe from front to back.  · Bowel incontinence  · Pregnancy  · Procedures such as having a catheter inserted  · Older age  · Not emptying your bladder. This can allow bacteria a chance to grow in your urine.  · Dehydration  · Constipation  · Sex  · Use of a diaphragm for birth control   Treatment  Bladder infections are diagnosed by a urine test. They are treated with antibiotics and usually clear up quickly without complications. Treatment helps prevent a more serious kidney infection.  Medicines  Medicines can help in the treatment of a bladder infection:  · Take antibiotics until they are used up, even if you feel better. It is important to finish them to make sure the infection has cleared.  · You can use acetaminophen or ibuprofen for pain, fever, or discomfort, unless another medicine was prescribed. If you have chronic liver or kidney disease, talk with your healthcare provider before using these medicines. Also talk with your provider if you've ever had a stomach ulcer or gastrointestinal bleeding, or are taking blood-thinner medicines.  · If you are given phenazopydridine to reduce burning with urination, it will cause your urine to become a bright orange color. This can stain clothing.  Care and prevention  These self-care steps can help prevent future infections:  · Drink plenty of fluids to prevent dehydration and flush out your bladder. Do this unless you must restrict fluids for other health reasons, or your doctor told you not to.  · Proper cleaning after going to the bathroom is important. Wipe from front to back after using the toilet to prevent the spread of bacteria.  · Urinate more often. Don't try to hold urine in for a long time.  · Wear loose-fitting clothes and cotton underwear. Avoid tight-fitting pants.  · Improve your  diet and prevent constipation. Eat more fresh fruit and vegetables, and fiber, and less junk and fatty foods.  · Avoid sex until your symptoms are gone.  · Avoid caffeine, alcohol, and spicy foods. These can irritate your bladder.  · Urinate right after intercourse to flush out your bladder.  · If you use birth control pills and have frequent bladder infections, discuss it with your doctor.  Follow-up care  Call your healthcare provider if all symptoms are not gone after 3 days of treatment. This is especially important if you have repeat infections.  If a culture was done, you will be told if your treatment needs to be changed. If directed, you can call to find out the results.  If X-rays were done, you will be told if the results will affect your treatment.  Call 911  Call 911 if any of the following occur:  · Trouble breathing  · Hard to wake up or confusion  · Fainting or loss of consciousness  · Rapid heart rate  When to seek medical advice  Call your healthcare provider right away if any of these occur:  · Fever of 100.4ºF (38.0ºC) or higher, or as directed by your healthcare provider  · Symptoms are not better by the third day of treatment  · Back or belly (abdominal) pain that gets worse  · Repeated vomiting, or unable to keep medicine down  · Weakness or dizziness  · Vaginal discharge  · Pain, redness, or swelling in the outer vaginal area (labia)  Date Last Reviewed: 10/1/2016  © 3262-3950 [employer]. [address] [place] [address], [place] [address]. All rights reserved. This information is not intended as a substitute for professional medical care. Always follow your healthcare professional's instructions.

## 2019-07-16 NOTE — PROGRESS NOTES
Subjective:       Patient ID: Alison Branch is a 63 y.o. female.    Chief Complaint: Dysuria and Urinary Frequency    Pt of Dr. Rodriguez, here for burning on urination that started yesterday.    Pt is diabetic, states BS have been elevated.    Dysuria    This is a new problem. The current episode started yesterday. The problem occurs intermittently. The problem has been unchanged. The quality of the pain is described as burning. The patient is experiencing no pain. There has been no fever. Associated symptoms include frequency, nausea and constipation. Pertinent negatives include no chills, discharge, flank pain, hematuria, urgency, vomiting, rash or withholding. She has tried increased fluids for the symptoms. The treatment provided no relief. Her past medical history is significant for diabetes mellitus.     Review of Systems   Constitutional: Negative for chills and fever.   Respiratory: Negative for chest tightness and shortness of breath.    Cardiovascular: Negative for chest pain, palpitations and leg swelling.   Gastrointestinal: Positive for constipation and nausea. Negative for vomiting.   Endocrine: Negative for cold intolerance, heat intolerance, polydipsia, polyphagia and polyuria.   Genitourinary: Positive for dysuria and frequency. Negative for flank pain, hematuria and urgency.   Musculoskeletal: Negative for arthralgias, back pain and myalgias.   Skin: Negative for color change, pallor and rash.   Allergic/Immunologic: Negative for environmental allergies, food allergies and immunocompromised state.   Neurological: Negative for dizziness.   Hematological: Negative for adenopathy. Does not bruise/bleed easily.   Psychiatric/Behavioral: Negative for suicidal ideas.         Review of patient's allergies indicates:   Allergen Reactions    Metformin      diarrhea     Current Outpatient Medications:     aspirin (ECOTRIN) 81 MG EC tablet, Take 1 tablet (81 mg total) by mouth once daily., Disp: , Rfl: 0     atorvastatin (LIPITOR) 40 MG tablet, TAKE 1 TABLET BY MOUTH ONCE DAILY, Disp: 90 tablet, Rfl: 3    blood sugar diagnostic Strp, 1 strip by Misc.(Non-Drug; Combo Route) route once daily. [employer] Result.  250.02.  Check Blood Sugar Twice Daily., Disp: 200 each, Rfl: 3    blood-glucose meter kit, Use as instructed, Disp: 1 each, Rfl: 0    dexAMETHasone (DECADRON) 6 MG tablet, Take 1 tablet twice daily with food the day before and for 2 days after chemo. Do not take on the day of chemo, Disp: 24 tablet, Rfl: 3    diazePAM (VALIUM) 5 MG tablet, TAKE 1 TABLET BY MOUTH ONCE DAILY AS NEEDED FOR ANXIETY, Disp: , Rfl: 2    escitalopram oxalate (LEXAPRO) 10 MG tablet, Take 10 mg by mouth once daily., Disp: , Rfl:     fluticasone propionate (FLONASE) 50 mcg/actuation nasal spray, USE TWO SPRAY(S) IN EACH NOSTRIL ONCE DAILY, Disp: 16 g, Rfl: 3    folic acid (FOLVITE) 1 MG tablet, Take 1 tablet (1 mg total) by mouth once daily. Start today, Disp: 100 tablet, Rfl: 3    insulin glargine 100 units/mL (3mL) SubQ pen, Inject 10 Units into the skin every evening., Disp: 9 mL, Rfl: 3    JANUVIA 100 mg Tab, TAKE ONE TABLET BY MOUTH ONCE DAILY, Disp: 30 tablet, Rfl: 11    lancets Misc, 1 Device by Misc.(Non-Drug; Combo Route) route once daily. [employer] Result.  250.02.  Check Blood Sugar Twice Daily., Disp: 200 each, Rfl: 3    olmesartan (BENICAR) 20 MG tablet, Take 1 tablet (20 mg total) by mouth once daily., Disp: 90 tablet, Rfl: 3    ondansetron (ZOFRAN) 8 MG tablet, Take 1 tablet (8 mg total) by mouth 4 (four) times daily as needed for Nausea., Disp: 60 tablet, Rfl: 2    terconazole (TERAZOL 7) 0.4 % Crea, INSERT 1 APPLICATORFUL VAGINALLY ONCE DAILY IN THE EVENING, Disp: 45 g, Rfl: 0    VITAMIN D2 50,000 unit capsule, TAKE 1 CAPSULE BY MOUTH EVERY 7 DAYS, Disp: 12 capsule, Rfl: 3    albuterol 90 mcg/actuation inhaler, Inhale 2 puffs into the lungs every 6 (six) hours as needed for Wheezing. Rescue, Disp: 18 g, Rfl: 1    Patient  "Active Problem List   Diagnosis    Uncontrolled type 2 diabetes mellitus with hyperglycemia, without long-term current use of insulin    Mixed hyperlipidemia due to type 2 diabetes mellitus    Attention deficit hyperactivity disorder (ADHD), predominantly inattentive type    Cerebral aneurysm, coiled in 2010    Hypertension associated with diabetes    Hyperkeratosis of palms and soles    Irritable bowel syndrome    Aneurysm of unspecified site    Left knee pain    Obesity (BMI 30-39.9)    Vitamin D deficiency    Malignant neoplasm of upper lobe of left lung    Secondary malignant neoplasm of mediastinal lymph node     Past Medical History:   Diagnosis Date    Allergy     Brain aneurysm 2010    s/p coiling of one; another not coiled    Breast cyst     Diabetes mellitus     Diabetes type 2, controlled     Fever blister     High cholesterol     History of Bell's palsy     HTN (hypertension) 5/20/2014     Past Surgical History:   Procedure Laterality Date    BREAST CYST ASPIRATION      Bronchoscopy N/A 5/28/2019    Performed by [facility] Provider at [facility] OR 2ND FLR    CERVICAL FUSION      CHONDROPLASTY-KNEE Left 2/23/2018    Performed by [facility]ELA Min [facility] at [facility] [place] OR    COLONOSCOPY N/A 3/9/2016    Performed by Elliott Zimmerman MD at [facility] ENDO (4TH FLR)    head surgery      stent and "curling" for aneurysm    HYSTERECTOMY      TVH secondary to SUF    INSERTION, PORT-A-CATH Right 7/8/2019    Performed by Cali Damico MD at [facility] [place] CATH LAB    MENISCECTOMY Left 2/23/2018    Performed by ESTELA Min MD at [facility] [place] OR    SYNOVECTOMY-KNEE Left 2/23/2018    Performed by ESTELA Min MD at [facility] [place] [facility]     Social History     Socioeconomic History    Marital status: Single     Spouse name: Not on file    Number of children: Not on file    Years of education: Not on file    Highest education level: Not on file   Occupational History    Occupation: Student     Comment: Unemployed "   Social Needs    Financial resource strain: Not on file    Food insecurity:     Worry: Not on file     Inability: Not on file    Transportation needs:     Medical: Not on file     Non-medical: Not on file   Tobacco Use    Smoking status: Former Smoker     Packs/day: 0.50     Years: 30.00     Pack years: 15.00     Last attempt to quit: 1999     Years since quittin.5    Smokeless tobacco: Never Used   Substance and Sexual Activity    Alcohol use: No     Alcohol/week: 0.0 oz    Drug use: No    Sexual activity: Never     Partners: Female     Birth control/protection: Surgical   Lifestyle    Physical activity:     Days per week: Not on file     Minutes per session: Not on file    Stress: Not on file   Relationships    Social connections:     Talks on phone: Not on file     Gets together: Not on file     Attends Oriental orthodox service: Not on file     Active member of club or organization: Not on file     Attends meetings of clubs or organizations: Not on file     Relationship status: Not on file   Other Topics Concern    Are you pregnant or think you may be? No    Breast-feeding No   Social History Narrative    Not on file     Family History   Problem Relation Age of Onset    Rheum arthritis Father     Diabetes Father     Heart failure Father     Migraines Father     Cataracts Father     Cancer Mother 63        pancreatic    Stomach cancer Mother     Breast cancer Maternal Aunt         50s    Ovarian cancer Cousin     Diabetes Sister     Diabetes Brother     Cancer Maternal Aunt         Lung CA    Melanoma Neg Hx     Colon cancer Neg Hx     Amblyopia Neg Hx     Blindness Neg Hx     Glaucoma Neg Hx     Macular degeneration Neg Hx     Retinal detachment Neg Hx     Strabismus Neg Hx     Stroke Neg Hx     Thyroid disease Neg Hx        Objective:       Vitals:    19 0824   BP: 92/60   Pulse: 80   Temp: 98.3 °F (36.8 °C)   SpO2: 95%   Weight: 94.9 kg (209 lb 3.5 oz)   Height: 5'  "9" (1.753 m)   PainSc: 0-No pain       Body mass index is 30.9 kg/m².    Physical Exam   Constitutional: She is oriented to person, place, and time. She appears well-developed and well-nourished.   obese   HENT:   Head: Normocephalic.   Eyes: Pupils are equal, round, and reactive to light. Conjunctivae are normal.   Neck: Normal range of motion.   Cardiovascular: Normal rate.   Pulmonary/Chest: Effort normal.   Abdominal: Soft. Bowel sounds are normal.   Musculoskeletal: Normal range of motion.   Neurological: She is alert and oriented to person, place, and time.   Skin: Skin is warm and dry. Capillary refill takes less than 2 seconds.   Psychiatric: She has a normal mood and affect. Her behavior is normal. Judgment and thought content normal.   Nursing note and vitals reviewed.      Assessment:       1. Acute lower UTI    2. Dysuria    3. BMI 30.0-30.9,adult    4. Obesity (BMI 30.0-34.9)        Plan:       Alison was seen today for dysuria and urinary frequency.    Diagnoses and all orders for this visit:    Acute lower UTI  -     nitrofurantoin, macrocrystal-monohydrate, (MACROBID) 100 MG capsule; Take 1 capsule (100 mg total) by mouth 2 (two) times daily. With food for 7 days  -     phenazopyridine (PYRIDIUM) 100 MG tablet; Take 1 tablet (100 mg total) by mouth 3 (three) times daily with meals. for 3 days    Dysuria  -     URINALYSIS  -     phenazopyridine (PYRIDIUM) 100 MG tablet; Take 1 tablet (100 mg total) by mouth 3 (three) times daily with meals. for 3 days    BMI 30.0-30.9,adult  BMI reviewed    Obesity (BMI 30.0-34.9)  BMI reviewed.    Diet and exercise to lose weight.    Meds as prescribed.    Drink plenty of water to flush bladder.    Avoid caffeine, sugary drinks, and ETOH.    Wipe from front to back.    Do not hold bladder when needing to void for long periods.    Self care instructions provided in AVS    Follow up if symptoms worsen or fail to improve.        "

## 2019-07-22 ENCOUNTER — PATIENT MESSAGE (OUTPATIENT)
Dept: INTERNAL MEDICINE | Facility: CLINIC | Age: 64
End: 2019-07-22

## 2019-07-22 ENCOUNTER — TELEPHONE (OUTPATIENT)
Dept: INTERNAL MEDICINE | Facility: CLINIC | Age: 64
End: 2019-07-22

## 2019-07-22 DIAGNOSIS — B37.9 YEAST INFECTION: Primary | ICD-10-CM

## 2019-07-22 DIAGNOSIS — E11.65 UNCONTROLLED TYPE 2 DIABETES MELLITUS WITH HYPERGLYCEMIA, WITHOUT LONG-TERM CURRENT USE OF INSULIN: ICD-10-CM

## 2019-07-22 RX ORDER — FLUCONAZOLE 150 MG/1
150 TABLET ORAL DAILY
Qty: 1 TABLET | Refills: 0 | Status: SHIPPED | OUTPATIENT
Start: 2019-07-22 | End: 2019-07-23

## 2019-07-22 RX ORDER — INSULIN GLARGINE 100 [IU]/ML
15 INJECTION, SOLUTION SUBCUTANEOUS NIGHTLY
Qty: 15 ML | Refills: 3 | Status: SHIPPED | OUTPATIENT
Start: 2019-07-22 | End: 2019-08-01 | Stop reason: SDUPTHER

## 2019-07-22 RX ORDER — PEN NEEDLE, DIABETIC 30 GX3/16"
NEEDLE, DISPOSABLE MISCELLANEOUS
Qty: 100 EACH | Refills: 3 | Status: SHIPPED | OUTPATIENT
Start: 2019-07-22 | End: 2019-08-02 | Stop reason: SDUPTHER

## 2019-07-22 NOTE — TELEPHONE ENCOUNTER
----- Message from India Presley sent at 7/22/2019  2:29 PM CDT -----  Contact: pharmacy/lety/[phone]  Pharmacy called in regards to the pt Rx will only pay for a brand name pin needle called bd shruthi.       [facility]  Please advise

## 2019-07-23 NOTE — TELEPHONE ENCOUNTER
Please call [facility] pharmacy and give a verbal order to change my prescription to the brand name pin needle called ABRAN hawkins.    Thanks

## 2019-07-26 ENCOUNTER — LAB VISIT (OUTPATIENT)
Dept: LAB | Facility: HOSPITAL | Age: 64
End: 2019-07-26
Payer: MEDICARE

## 2019-07-26 ENCOUNTER — OFFICE VISIT (OUTPATIENT)
Dept: HEMATOLOGY/ONCOLOGY | Facility: CLINIC | Age: 64
End: 2019-07-26
Payer: MEDICARE

## 2019-07-26 VITALS
DIASTOLIC BLOOD PRESSURE: 57 MMHG | SYSTOLIC BLOOD PRESSURE: 121 MMHG | WEIGHT: 210.56 LBS | HEART RATE: 63 BPM | HEIGHT: 69 IN | RESPIRATION RATE: 18 BRPM | BODY MASS INDEX: 31.19 KG/M2 | TEMPERATURE: 98 F | OXYGEN SATURATION: 99 %

## 2019-07-26 DIAGNOSIS — E11.65 UNCONTROLLED TYPE 2 DIABETES MELLITUS WITH HYPERGLYCEMIA, WITHOUT LONG-TERM CURRENT USE OF INSULIN: ICD-10-CM

## 2019-07-26 DIAGNOSIS — C34.12 MALIGNANT NEOPLASM OF UPPER LOBE OF LEFT LUNG: Primary | ICD-10-CM

## 2019-07-26 DIAGNOSIS — C34.12 MALIGNANT NEOPLASM OF UPPER LOBE OF LEFT LUNG: ICD-10-CM

## 2019-07-26 DIAGNOSIS — C77.1 SECONDARY MALIGNANT NEOPLASM OF MEDIASTINAL LYMPH NODE: ICD-10-CM

## 2019-07-26 DIAGNOSIS — R53.1 WEAKNESS: ICD-10-CM

## 2019-07-26 DIAGNOSIS — R30.0 DYSURIA: ICD-10-CM

## 2019-07-26 LAB
ALBUMIN SERPL BCP-MCNC: 3.8 G/DL (ref 3.5–5.2)
ALP SERPL-CCNC: 77 U/L (ref 55–135)
ALT SERPL W/O P-5'-P-CCNC: 69 U/L (ref 10–44)
ANION GAP SERPL CALC-SCNC: 7 MMOL/L (ref 8–16)
AST SERPL-CCNC: 23 U/L (ref 10–40)
BILIRUB SERPL-MCNC: 0.4 MG/DL (ref 0.1–1)
BUN SERPL-MCNC: 13 MG/DL (ref 8–23)
CALCIUM SERPL-MCNC: 10 MG/DL (ref 8.7–10.5)
CHLORIDE SERPL-SCNC: 103 MMOL/L (ref 95–110)
CO2 SERPL-SCNC: 28 MMOL/L (ref 23–29)
CREAT SERPL-MCNC: 0.9 MG/DL (ref 0.5–1.4)
ERYTHROCYTE [DISTWIDTH] IN BLOOD BY AUTOMATED COUNT: 13.8 % (ref 11.5–14.5)
EST. GFR  (AFRICAN AMERICAN): >60 ML/MIN/1.73 M^2
EST. GFR  (NON AFRICAN AMERICAN): >60 ML/MIN/1.73 M^2
GLUCOSE SERPL-MCNC: 285 MG/DL (ref 70–110)
HCT VFR BLD AUTO: 36.6 % (ref 37–48.5)
HGB BLD-MCNC: 11.3 G/DL (ref 12–16)
IMM GRANULOCYTES # BLD AUTO: 0.03 K/UL (ref 0–0.04)
MCH RBC QN AUTO: 26.7 PG (ref 27–31)
MCHC RBC AUTO-ENTMCNC: 30.9 G/DL (ref 32–36)
MCV RBC AUTO: 86 FL (ref 82–98)
NEUTROPHILS # BLD AUTO: 1.8 K/UL (ref 1.8–7.7)
PLATELET # BLD AUTO: 175 K/UL (ref 150–350)
PMV BLD AUTO: 11.2 FL (ref 9.2–12.9)
POTASSIUM SERPL-SCNC: 5.4 MMOL/L (ref 3.5–5.1)
PROT SERPL-MCNC: 6.9 G/DL (ref 6–8.4)
RBC # BLD AUTO: 4.24 M/UL (ref 4–5.4)
SODIUM SERPL-SCNC: 138 MMOL/L (ref 136–145)
T4 FREE SERPL-MCNC: 0.94 NG/DL (ref 0.71–1.51)
TSH SERPL DL<=0.005 MIU/L-ACNC: 1.64 UIU/ML (ref 0.4–4)
WBC # BLD AUTO: 3.91 K/UL (ref 3.9–12.7)

## 2019-07-26 PROCEDURE — 3008F BODY MASS INDEX DOCD: CPT | Mod: CPTII,S$GLB,, | Performed by: INTERNAL MEDICINE

## 2019-07-26 PROCEDURE — 3045F PR MOST RECENT HEMOGLOBIN A1C LEVEL 7.0-9.0%: CPT | Mod: CPTII,S$GLB,, | Performed by: INTERNAL MEDICINE

## 2019-07-26 PROCEDURE — 99499 RISK ADDL DX/OHS AUDIT: ICD-10-PCS | Mod: S$GLB,,, | Performed by: INTERNAL MEDICINE

## 2019-07-26 PROCEDURE — 3074F SYST BP LT 130 MM HG: CPT | Mod: CPTII,S$GLB,, | Performed by: INTERNAL MEDICINE

## 2019-07-26 PROCEDURE — 99999 PR PBB SHADOW E&M-EST. PATIENT-LVL IV: CPT | Mod: PBBFAC,,, | Performed by: INTERNAL MEDICINE

## 2019-07-26 PROCEDURE — 99215 PR OFFICE/OUTPT VISIT, EST, LEVL V, 40-54 MIN: ICD-10-PCS | Mod: S$GLB,,, | Performed by: INTERNAL MEDICINE

## 2019-07-26 PROCEDURE — 99999 PR PBB SHADOW E&M-EST. PATIENT-LVL IV: ICD-10-PCS | Mod: PBBFAC,,, | Performed by: INTERNAL MEDICINE

## 2019-07-26 PROCEDURE — 84443 ASSAY THYROID STIM HORMONE: CPT

## 2019-07-26 PROCEDURE — 36415 COLL VENOUS BLD VENIPUNCTURE: CPT

## 2019-07-26 PROCEDURE — 85027 COMPLETE CBC AUTOMATED: CPT

## 2019-07-26 PROCEDURE — 84439 ASSAY OF FREE THYROXINE: CPT

## 2019-07-26 PROCEDURE — 3074F PR MOST RECENT SYSTOLIC BLOOD PRESSURE < 130 MM HG: ICD-10-PCS | Mod: CPTII,S$GLB,, | Performed by: INTERNAL MEDICINE

## 2019-07-26 PROCEDURE — 3008F PR BODY MASS INDEX (BMI) DOCUMENTED: ICD-10-PCS | Mod: CPTII,S$GLB,, | Performed by: INTERNAL MEDICINE

## 2019-07-26 PROCEDURE — 3078F DIAST BP <80 MM HG: CPT | Mod: CPTII,S$GLB,, | Performed by: INTERNAL MEDICINE

## 2019-07-26 PROCEDURE — 3045F PR MOST RECENT HEMOGLOBIN A1C LEVEL 7.0-9.0%: ICD-10-PCS | Mod: CPTII,S$GLB,, | Performed by: INTERNAL MEDICINE

## 2019-07-26 PROCEDURE — 99215 OFFICE O/P EST HI 40 MIN: CPT | Mod: S$GLB,,, | Performed by: INTERNAL MEDICINE

## 2019-07-26 PROCEDURE — 99499 UNLISTED E&M SERVICE: CPT | Mod: S$GLB,,, | Performed by: INTERNAL MEDICINE

## 2019-07-26 PROCEDURE — 80053 COMPREHEN METABOLIC PANEL: CPT

## 2019-07-26 PROCEDURE — 3078F PR MOST RECENT DIASTOLIC BLOOD PRESSURE < 80 MM HG: ICD-10-PCS | Mod: CPTII,S$GLB,, | Performed by: INTERNAL MEDICINE

## 2019-07-26 RX ORDER — BENZONATATE 200 MG/1
200 CAPSULE ORAL 3 TIMES DAILY PRN
Refills: 11 | COMMUNITY
Start: 2019-07-21 | End: 2019-09-11

## 2019-07-26 RX ORDER — HEPARIN 100 UNIT/ML
500 SYRINGE INTRAVENOUS
Status: CANCELLED | OUTPATIENT
Start: 2019-07-26

## 2019-07-26 RX ORDER — SODIUM CHLORIDE 0.9 % (FLUSH) 0.9 %
10 SYRINGE (ML) INJECTION
Status: CANCELLED | OUTPATIENT
Start: 2019-07-26

## 2019-07-26 NOTE — Clinical Note
Hi Dr. Rodriguez,Her blood sugar has been running around 300 at home. She gets steroids for 2 days around chemo time which is every 3 weeks. Can you please help adjust her Diabetes meds. Thank you

## 2019-07-26 NOTE — PROGRESS NOTES
Subjective:       Patient ID: Alison Branch is a 63 y.o. female.    Chief Complaint: Malignant neoplasm of upper lobe of left lung  Ms. Branch is a 63-year-old female who smoked for about 30 years and quit 20 years ago, presented with cough end of last year, but worsened into January.  Since the cough persisted, she saw her PCP, underwent a chest x-ray on 05/10/2019, that revealed left upper lobe pneumonia and a repeat CT was done in the week after that on 05/17/2019 that showed left upper lobe   mass arising from the lateral pleural surface in the left upper lobe posterior subsegment measuring 2.6 x 3 cm.  There are satellite mass more medially near the aortic arch that is 2 cm, also spiculated and irregular as well as thickened interlobar septa in the left lung apex and anterior segment, prevascular lymph node lateral to the aortic arch, short axis measuring 9 mm.       She then underwent a bronchoscopy on 05/28/2019, and that revealed there was an infiltration obtained in the left apical posterior segment of the left upper lobe cytology brush was done.  Transbronchial biopsies of an area of infiltration were also performed in the apicoposterior segment of the left upper lobe.    Pathology revealed adenocarcinoma; however, the specimen was not adequate enough to send for molecular testing.       She underwent a PET scan on 06/06/2019.  that reveals significant hypermetabolic activity in the large irregular spiculated pulmonary mass in the lateral aspect of the left upper lobe consistent with the patient's known pulmonary adenocarcinoma.  There is also tracer avidity in the medial left upper lobe satellite lesion and diffusely throughout much of the anterior left upper lobe where there is prominent nodular paraseptal thickening.  There are some numerous scattered subcentimeter pulmonary nodules throughout the right lung, all of which are too small for detection by PET.  For example, there is a 0.4 cm nodule in the  "superior right lower lobe and a micro nodule in the posterior segment of the right upper lobe.  There is a 0.5 cm, normal size right   paratracheal lymph node with increased radiotracer uptake as well as hypermetabolic aortic pulmonary lymph node and a left hilar lymph node.  There is a focal hypermetabolic lesion in the left anterior superior iliac spine associated with well defined lytic lesion.  There is a hypermetabolic focus in the anterior right fifth rib with a possible associated lytic lesion.  SUVs as   below lateral:  Left upper lobe  SUV max 17.9, anterior left upper lobe satellite mass and septal thickening SUV max of 10.1, right paratracheal lymph node SUV max of 4.8, left AP window lymph node SUV max of 17.9, left anterior superior iliac spine SUV max of 3.9"     MRI pelvis from 6/10/19  reveals "Region of osseous is irregularity at the left anterior iliac spine likely related to bone graft harvest procedure.  See  discussion above.  No suspicious signal or enhancement to suggest active/malignant process"   MRI brain from 6/10//19 reveal No intracranial abnormality.     We discussed her case at [facility], and plan was to wait for GAURDANT and proceed with treatment accordingly  Her GAURDANT is negative for molecular markers.     HPI She comes in for cycle 2 of Carboplatin, Alimta and Keytruda. She notes nausea today but she did well after chemo.  After chemo, she denies any nausea, vomiting, diarrhea, constipation, abdominal pain, weight loss or loss of appetite, chest pain, shortness of breath, leg swelling, fatigue, pain, headache, dizziness, or mood changes. Her ECOG PS is zero .  She is accompanied by her daughter. She notes burning sensation when urination, recently had UTI and competed a course of Nitrofurantoin.          Review of Systems   Constitutional: Negative for appetite change, fatigue and unexpected weight change.   HENT: Negative for mouth sores.    Eyes: Negative for visual " disturbance.   Respiratory: Negative for cough and shortness of breath.    Cardiovascular: Negative for chest pain.   Gastrointestinal: Negative for abdominal pain and diarrhea.   Genitourinary: Positive for dysuria. Negative for dyspareunia and frequency.   Musculoskeletal: Negative for back pain.   Skin: Negative for rash.   Neurological: Negative for headaches.   Hematological: Negative for adenopathy.   Psychiatric/Behavioral: The patient is not nervous/anxious.    All other systems reviewed and are negative.      Objective:      Physical Exam   Constitutional: She is oriented to person, place, and time. She appears well-developed and well-nourished.   HENT:   Mouth/Throat: No oropharyngeal exudate.   Cardiovascular: Normal rate and normal heart sounds.   Pulmonary/Chest: Effort normal and breath sounds normal. She has no wheezes.   Abdominal: Soft. Bowel sounds are normal. There is no tenderness.   Musculoskeletal: She exhibits no edema or tenderness.   Lymphadenopathy:     She has no cervical adenopathy.   Neurological: She is alert and oriented to person, place, and time. Coordination normal.   Skin: Skin is warm and dry. No rash noted.   Psychiatric: She has a normal mood and affect. Judgment and thought content normal.   Vitals reviewed.      LABS:  WBC   Date Value Ref Range Status   07/26/2019 3.91 3.90 - 12.70 K/uL Final     Hemoglobin   Date Value Ref Range Status   07/26/2019 11.3 (L) 12.0 - 16.0 g/dL Final     Hematocrit   Date Value Ref Range Status   07/26/2019 36.6 (L) 37.0 - 48.5 % Final     Platelets   Date Value Ref Range Status   07/26/2019 175 150 - 350 K/uL Final     Gran # (ANC)   Date Value Ref Range Status   07/26/2019 1.8 1.8 - 7.7 K/uL Final     Comment:     The ANC is based on a white cell differential from an   automated cell counter. It has not been microscopically   reviewed for the presence of abnormal cells. Clinical   correlation is required.         Chemistry        Component  Value Date/Time     07/26/2019 0738    K 5.4 (H) 07/26/2019 0738     07/26/2019 0738    CO2 28 07/26/2019 0738    BUN 13 07/26/2019 0738    CREATININE 0.9 07/26/2019 0738     (H) 07/26/2019 0738        Component Value Date/Time    CALCIUM 10.0 07/26/2019 0738    ALKPHOS 77 07/26/2019 0738    AST 23 07/26/2019 0738    ALT 69 (H) 07/26/2019 0738    BILITOT 0.4 07/26/2019 0738    ESTGFRAFRICA >60.0 07/26/2019 0738    EGFRNONAA >60.0 07/26/2019 0738        TSH   Date Value Ref Range Status   02/04/2016 0.939 0.400 - 4.000 uIU/mL Final       Assessment:       1. 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 Uncontrolled type 2 diabetes mellitus with hyperglycemia, without long-term current use of insulin    4. Dysuria        Plan:        1,2. She is doing well clinically and will proceed with cycle 2 of Carboplatin, Alimta and Keytruda and will return in 3 weeks for next cycle.  3. Blood sugar at home ~ 300, sent message to Dr. Rodriguez, her PCP  4. Check UA and culture.    Above care plan was discussed with patient and accompanying daughter and all questions were addressed to their satisfaction

## 2019-07-29 ENCOUNTER — TELEPHONE (OUTPATIENT)
Dept: HEMATOLOGY/ONCOLOGY | Facility: CLINIC | Age: 64
End: 2019-07-29

## 2019-07-29 ENCOUNTER — INFUSION (OUTPATIENT)
Dept: INFUSION THERAPY | Facility: HOSPITAL | Age: 64
End: 2019-07-29
Attending: INTERNAL MEDICINE
Payer: MEDICARE

## 2019-07-29 VITALS
RESPIRATION RATE: 18 BRPM | SYSTOLIC BLOOD PRESSURE: 123 MMHG | HEART RATE: 66 BPM | DIASTOLIC BLOOD PRESSURE: 64 MMHG | TEMPERATURE: 98 F

## 2019-07-29 DIAGNOSIS — C77.1 SECONDARY MALIGNANT NEOPLASM OF MEDIASTINAL LYMPH NODE: Primary | ICD-10-CM

## 2019-07-29 DIAGNOSIS — C34.12 MALIGNANT NEOPLASM OF UPPER LOBE OF LEFT LUNG: ICD-10-CM

## 2019-07-29 PROCEDURE — 96367 TX/PROPH/DG ADDL SEQ IV INF: CPT

## 2019-07-29 PROCEDURE — 96417 CHEMO IV INFUS EACH ADDL SEQ: CPT

## 2019-07-29 PROCEDURE — 63600175 PHARM REV CODE 636 W HCPCS: Mod: JG | Performed by: INTERNAL MEDICINE

## 2019-07-29 PROCEDURE — 96411 CHEMO IV PUSH ADDL DRUG: CPT

## 2019-07-29 PROCEDURE — 96413 CHEMO IV INFUSION 1 HR: CPT

## 2019-07-29 RX ORDER — HEPARIN 100 UNIT/ML
500 SYRINGE INTRAVENOUS
Status: DISCONTINUED | OUTPATIENT
Start: 2019-07-29 | End: 2019-07-29 | Stop reason: HOSPADM

## 2019-07-29 RX ORDER — SODIUM CHLORIDE 0.9 % (FLUSH) 0.9 %
10 SYRINGE (ML) INJECTION
Status: DISCONTINUED | OUTPATIENT
Start: 2019-07-29 | End: 2019-07-29 | Stop reason: HOSPADM

## 2019-07-29 RX ADMIN — HEPARIN 500 UNITS: 100 SYRINGE at 01:07

## 2019-07-29 RX ADMIN — SODIUM CHLORIDE 1075 MG: 9 INJECTION, SOLUTION INTRAVENOUS at 12:07

## 2019-07-29 RX ADMIN — DEXAMETHASONE SODIUM PHOSPHATE: 4 INJECTION, SOLUTION INTRAMUSCULAR; INTRAVENOUS at 12:07

## 2019-07-29 RX ADMIN — SODIUM CHLORIDE 200 MG: 9 INJECTION, SOLUTION INTRAVENOUS at 10:07

## 2019-07-29 RX ADMIN — SODIUM CHLORIDE: 0.9 INJECTION, SOLUTION INTRAVENOUS at 10:07

## 2019-07-29 RX ADMIN — APREPITANT 130 MG: 130 INJECTION, EMULSION INTRAVENOUS at 11:07

## 2019-07-29 RX ADMIN — CARBOPLATIN 620 MG: 10 INJECTION, SOLUTION INTRAVENOUS at 12:07

## 2019-07-29 NOTE — PLAN OF CARE
Problem: Adult Inpatient Plan of Care  Goal: Plan of Care Review  Outcome: Ongoing (interventions implemented as appropriate)  Pt tolerated Keytruda, Alimta, Carbo with no complications. VSS. Xgeva not administered. Pre-service department stated it needed to be sent to insurance company and no auth for xgeva as of today. KAILYN Thompson with Dr. Belcher, notified. Pt instructed to call MD with any problems. NAD. Pt discharged home independently.

## 2019-07-29 NOTE — TELEPHONE ENCOUNTER
Spoke with patient.  She did labs last Friday.  Those are sufficient for treatment today.  She thanked nurse.

## 2019-07-30 LAB
ACID FAST MOD KINY STN SPEC: NORMAL
MYCOBACTERIUM SPEC QL CULT: NORMAL

## 2019-08-01 ENCOUNTER — NURSE TRIAGE (OUTPATIENT)
Dept: ADMINISTRATIVE | Facility: CLINIC | Age: 64
End: 2019-08-01

## 2019-08-01 ENCOUNTER — OFFICE VISIT (OUTPATIENT)
Dept: INTERNAL MEDICINE | Facility: CLINIC | Age: 64
End: 2019-08-01
Payer: MEDICARE

## 2019-08-01 VITALS
WEIGHT: 208.75 LBS | OXYGEN SATURATION: 99 % | DIASTOLIC BLOOD PRESSURE: 70 MMHG | SYSTOLIC BLOOD PRESSURE: 124 MMHG | BODY MASS INDEX: 30.92 KG/M2 | HEIGHT: 69 IN | HEART RATE: 51 BPM

## 2019-08-01 DIAGNOSIS — E11.65 UNCONTROLLED TYPE 2 DIABETES MELLITUS WITH HYPERGLYCEMIA, WITHOUT LONG-TERM CURRENT USE OF INSULIN: ICD-10-CM

## 2019-08-01 DIAGNOSIS — R30.0 DYSURIA: ICD-10-CM

## 2019-08-01 DIAGNOSIS — E11.59 HYPERTENSION ASSOCIATED WITH DIABETES: ICD-10-CM

## 2019-08-01 DIAGNOSIS — R53.83 FATIGUE, UNSPECIFIED TYPE: ICD-10-CM

## 2019-08-01 DIAGNOSIS — R53.81 DEBILITY: ICD-10-CM

## 2019-08-01 DIAGNOSIS — C34.12 MALIGNANT NEOPLASM OF UPPER LOBE OF LEFT LUNG: Primary | ICD-10-CM

## 2019-08-01 DIAGNOSIS — I15.2 HYPERTENSION ASSOCIATED WITH DIABETES: ICD-10-CM

## 2019-08-01 PROCEDURE — 99999 PR PBB SHADOW E&M-EST. PATIENT-LVL IV: CPT | Mod: PBBFAC,,, | Performed by: INTERNAL MEDICINE

## 2019-08-01 PROCEDURE — 3078F DIAST BP <80 MM HG: CPT | Mod: CPTII,S$GLB,, | Performed by: INTERNAL MEDICINE

## 2019-08-01 PROCEDURE — 99499 RISK ADDL DX/OHS AUDIT: ICD-10-PCS | Mod: S$GLB,,, | Performed by: INTERNAL MEDICINE

## 2019-08-01 PROCEDURE — 3008F BODY MASS INDEX DOCD: CPT | Mod: CPTII,S$GLB,, | Performed by: INTERNAL MEDICINE

## 2019-08-01 PROCEDURE — 3078F PR MOST RECENT DIASTOLIC BLOOD PRESSURE < 80 MM HG: ICD-10-PCS | Mod: CPTII,S$GLB,, | Performed by: INTERNAL MEDICINE

## 2019-08-01 PROCEDURE — 3045F PR MOST RECENT HEMOGLOBIN A1C LEVEL 7.0-9.0%: ICD-10-PCS | Mod: CPTII,S$GLB,, | Performed by: INTERNAL MEDICINE

## 2019-08-01 PROCEDURE — 3074F PR MOST RECENT SYSTOLIC BLOOD PRESSURE < 130 MM HG: ICD-10-PCS | Mod: CPTII,S$GLB,, | Performed by: INTERNAL MEDICINE

## 2019-08-01 PROCEDURE — 3045F PR MOST RECENT HEMOGLOBIN A1C LEVEL 7.0-9.0%: CPT | Mod: CPTII,S$GLB,, | Performed by: INTERNAL MEDICINE

## 2019-08-01 PROCEDURE — 99214 PR OFFICE/OUTPT VISIT, EST, LEVL IV, 30-39 MIN: ICD-10-PCS | Mod: S$GLB,,, | Performed by: INTERNAL MEDICINE

## 2019-08-01 PROCEDURE — 99214 OFFICE O/P EST MOD 30 MIN: CPT | Mod: S$GLB,,, | Performed by: INTERNAL MEDICINE

## 2019-08-01 PROCEDURE — 99999 PR PBB SHADOW E&M-EST. PATIENT-LVL IV: ICD-10-PCS | Mod: PBBFAC,,, | Performed by: INTERNAL MEDICINE

## 2019-08-01 PROCEDURE — 3074F SYST BP LT 130 MM HG: CPT | Mod: CPTII,S$GLB,, | Performed by: INTERNAL MEDICINE

## 2019-08-01 PROCEDURE — 99499 UNLISTED E&M SERVICE: CPT | Mod: S$GLB,,, | Performed by: INTERNAL MEDICINE

## 2019-08-01 PROCEDURE — 3008F PR BODY MASS INDEX (BMI) DOCUMENTED: ICD-10-PCS | Mod: CPTII,S$GLB,, | Performed by: INTERNAL MEDICINE

## 2019-08-01 RX ORDER — CIPROFLOXACIN 500 MG/1
500 TABLET ORAL 2 TIMES DAILY
Qty: 10 TABLET | Refills: 0 | Status: SHIPPED | OUTPATIENT
Start: 2019-08-01 | End: 2019-08-06

## 2019-08-01 RX ORDER — INSULIN GLARGINE 100 [IU]/ML
15 INJECTION, SOLUTION SUBCUTANEOUS NIGHTLY
Qty: 15 ML | Refills: 3 | Status: SHIPPED | OUTPATIENT
Start: 2019-08-01 | End: 2019-08-01 | Stop reason: SDUPTHER

## 2019-08-01 RX ORDER — INSULIN ASPART 100 [IU]/ML
INJECTION, SOLUTION INTRAVENOUS; SUBCUTANEOUS
Qty: 15 ML | Refills: 3 | Status: SHIPPED | OUTPATIENT
Start: 2019-08-01 | End: 2019-08-19 | Stop reason: SDUPTHER

## 2019-08-01 RX ORDER — INSULIN GLARGINE 100 [IU]/ML
20 INJECTION, SOLUTION SUBCUTANEOUS NIGHTLY
Qty: 18 ML | Refills: 0 | Status: SHIPPED | OUTPATIENT
Start: 2019-08-01 | End: 2019-08-19 | Stop reason: SDUPTHER

## 2019-08-01 NOTE — PROGRESS NOTES
Subjective:       Patient ID: Alison Branch is a 63 y.o. female.    Chief Complaint: Follow-up    [facility] - First visit with Ms Branch since her cancer diagnosis.  Here with her daughter.  Trouble controlling BS at home despite starting insulin once per day.  BP has been fine.  She has started developing dysuria and thinks a UTI is coming.  She's having a lot of fatigue from the CTX and has difficulty standing in the shower; wants a shower stool.  She's a former smoker (distant past).    PMH:  Adenocarcinoma lung, dx 2019  DM2, A1C over 8  HTN, at goal  Brain aneurysm s/p coiling  HLP, on a statin  ADD, not on a stimulant  Obesity  Chronic cough  Left knee pain     Meds:  Reviewed and reconciled in EPIC with patient during visit today     Review of Systems   Constitutional: Positive for fatigue. Negative for fever.   HENT: Negative for congestion.    Respiratory: Negative for shortness of breath.    Cardiovascular: Negative for chest pain.   Gastrointestinal: Negative for abdominal pain.   Genitourinary: Positive for dysuria.   Musculoskeletal: Positive for arthralgias.   Skin: Negative for rash.   Neurological: Positive for weakness. Negative for headaches.   Psychiatric/Behavioral: Positive for sleep disturbance (sleeping more).       Objective:      Physical Exam   Constitutional: She appears well-developed and well-nourished. No distress.   HENT:   Head: Normocephalic and atraumatic.   Neurological: She is alert.   Skin: She is not diaphoretic.   Psychiatric: She has a normal mood and affect.   Nursing note and vitals reviewed.      Assessment:       1. Malignant neoplasm of upper lobe of left lung    2. Uncontrolled type 2 diabetes mellitus with hyperglycemia, without long-term current use of insulin    3. Hypertension associated with diabetes    4. Dysuria    5. Fatigue, unspecified type    6. Debility        Plan:       Alison was seen today for follow-up.    Diagnoses and all orders for this visit:    Malignant  neoplasm of upper lobe of left lung - new problem.  Followed by oncology  -     BATH/SHOWER CHAIR FOR HOME USE  -     Ambulatory referral to Home Health    Uncontrolled type 2 diabetes mellitus with hyperglycemia, without long-term current use of insulin - out of range, d/t dexamethasone.  WIll increase glargine to 20 units every evening and add in sliding scale novolog before three meals per day.  Call on Monday with the sugar and insulin use so that we can adjust further.  -     insulin glargine 100 units/mL (3mL) SubQ pen; Inject 15 Units into the skin every evening.  -     insulin aspart U-100 (NOVOLOG) 100 unit/mL (3 mL) InPn pen; Inject subcutaneously before meals per sliding scale.  -250, 4 units; 250-300, 6 units; 300+, 8 units    Hypertension associated with diabetes - at goal, stay the course    Dysuria - new problem.  Will give cipro to start when UTI symptoms start  -     ciprofloxacin HCl (CIPRO) 500 MG tablet; Take 1 tablet (500 mg total) by mouth 2 (two) times daily. for 5 days    Fatigue, unspecified type - will ask home health to assess needs in the home.  Ordering shower chair now  -     BATH/SHOWER CHAIR FOR HOME USE  -     Ambulatory referral to Home Health    Debility  -     BATH/SHOWER CHAIR FOR HOME USE  -     Ambulatory referral to Home Health    rtc prn, or in 3 months    JUVE Rodriguez MD MPH  Staff Internist

## 2019-08-01 NOTE — TELEPHONE ENCOUNTER
rec'd call from , pt has blood glucose greater than 500, she was trying to reach the endocrinology dept when pt disconnected; I was asked to contact her.  She states her glucose has been elevated all week, and that she has an appt at 0940 this morning with her PCP, which she told the person who tried to transfer her to me.  She has no further needs at this time.     Reason for Disposition   Caller has cancelled the call before the first contact    Protocols used: NO CONTACT OR DUPLICATE CONTACT CALL-A-OH

## 2019-08-02 ENCOUNTER — TELEPHONE (OUTPATIENT)
Dept: INTERNAL MEDICINE | Facility: CLINIC | Age: 64
End: 2019-08-02

## 2019-08-02 DIAGNOSIS — E11.65 UNCONTROLLED TYPE 2 DIABETES MELLITUS WITH HYPERGLYCEMIA, WITHOUT LONG-TERM CURRENT USE OF INSULIN: ICD-10-CM

## 2019-08-02 RX ORDER — FLUCONAZOLE 150 MG/1
150 TABLET ORAL DAILY
Qty: 1 TABLET | Refills: 0 | Status: SHIPPED | OUTPATIENT
Start: 2019-08-02 | End: 2019-08-03

## 2019-08-02 RX ORDER — PEN NEEDLE, DIABETIC 30 GX3/16"
NEEDLE, DISPOSABLE MISCELLANEOUS
Qty: 100 EACH | Refills: 3 | Status: SHIPPED | OUTPATIENT
Start: 2019-08-02 | End: 2019-11-11

## 2019-08-02 NOTE — TELEPHONE ENCOUNTER
----- Message from Elizabeth Craven sent at 8/2/2019  9:14 AM CDT -----  Contact: Patient [phone]  Requesting the following Rx to be sent to [facility] - [place] - [address] due to the copay being lower    insulin aspart U-100 (NOVOLOG) 100 unit/mL (3 mL) InPn pen   insulin glargine 100 units/mL (3mL) SubQ pen  ciprofloxacin HCl (CIPRO) 500 MG tablet    Please call and advise.    Thank You

## 2019-08-02 NOTE — TELEPHONE ENCOUNTER
----- Message from Kati Travis sent at 8/2/2019 11:09 AM CDT -----  Sonny Rodriguez this is Kati from the primary care pharmacy. Ms. Alison Branch has requested a prescription for insulin pen needles. She is asking that the prescription be sent to the [facility] [address]. The phone number there is [phone]. Thanks!

## 2019-08-02 NOTE — TELEPHONE ENCOUNTER
----- Message from Mukesh Hartmann sent at 8/2/2019 11:46 AM CDT -----  Contact: [facility]/ [phone]  Pharmacy is calling to clarify an RX.  RX name:  insulin aspart U-100 (NOVOLOG) 100 unit/mL (3 mL) InPn pen  What do they need to clarify:  The maximum she should take per day

## 2019-08-05 ENCOUNTER — TELEPHONE (OUTPATIENT)
Dept: INTERNAL MEDICINE | Facility: CLINIC | Age: 64
End: 2019-08-05

## 2019-08-05 NOTE — TELEPHONE ENCOUNTER
----- Message from Jeannette Rao LCSW sent at 8/5/2019 10:44 AM CDT -----  Regarding: Need Additional Home Health Orders  Good Morning Dr. Rodriguez,    I received a referral from our  in Cancer Services and in reviewing patient's chart I saw that you ordered  home health services (OT and ) for patient recently.   I spoke with patient this morning and she said that she hasn't heard anything about the home health. Services haven't begun.  Was [facility] with [facility] Medical Necessity Form?  Also, I think she would benefit from a nurse, an aide, and PT. Will you order these services too?  (Skilled services of the Nurse and/or PT needs to be ordered along with OT)       I sent a separate message about prescription assistance.    Thanks,           Jeannette  Oncology Social Worker

## 2019-08-05 NOTE — TELEPHONE ENCOUNTER
----- Message from Jeannette Rao LCSW sent at 8/5/2019 10:31 AM CDT -----  Regarding: Patient will soon be in Part D Gap and needs assistance obtaining prescriptions.  Contact: [phone]  Good Morning,    I received a consult from our  that patient needs assistance. I just spoke with patient and she said that she will be in the Part D gap soon and will not be able to afford prescriptions. I am going to mail her a [employer] application but that will only help with a limited $ amount per month and I see she takes multiple prescriptions. Can you refer her to any available  programs? If she needs anything immediately I may be able to help out of our Patient Assistance Fund. Please keep me posted.    Thanks,             Jeannette  Oncology Social Worker

## 2019-08-06 ENCOUNTER — TELEPHONE (OUTPATIENT)
Dept: PULMONOLOGY | Facility: CLINIC | Age: 64
End: 2019-08-06

## 2019-08-06 ENCOUNTER — PATIENT MESSAGE (OUTPATIENT)
Dept: INTERNAL MEDICINE | Facility: CLINIC | Age: 64
End: 2019-08-06

## 2019-08-09 ENCOUNTER — TELEPHONE (OUTPATIENT)
Dept: INTERNAL MEDICINE | Facility: CLINIC | Age: 64
End: 2019-08-09

## 2019-08-09 DIAGNOSIS — C34.12 MALIGNANT NEOPLASM OF UPPER LOBE OF LEFT LUNG: ICD-10-CM

## 2019-08-09 DIAGNOSIS — R53.81 DEBILITY: ICD-10-CM

## 2019-08-09 DIAGNOSIS — E11.65 UNCONTROLLED TYPE 2 DIABETES MELLITUS WITH HYPERGLYCEMIA, WITHOUT LONG-TERM CURRENT USE OF INSULIN: Primary | ICD-10-CM

## 2019-08-09 DIAGNOSIS — R53.83 FATIGUE, UNSPECIFIED TYPE: ICD-10-CM

## 2019-08-09 NOTE — TELEPHONE ENCOUNTER
Hi, please fax to [facility] --  Orders Placed This Encounter    Ambulatory referral to Home Health     Thank you, Cortez Perez

## 2019-08-09 NOTE — TELEPHONE ENCOUNTER
----- Message from Elizabeth Craven sent at 8/9/2019 11:12 AM CDT -----  Contact: [email or web address] [phone]  Received request for home health and needs clarification.    Please call and advise.    Thank You

## 2019-08-12 ENCOUNTER — TELEPHONE (OUTPATIENT)
Dept: PULMONOLOGY | Facility: CLINIC | Age: 64
End: 2019-08-12

## 2019-08-13 ENCOUNTER — TELEPHONE (OUTPATIENT)
Dept: INTERNAL MEDICINE | Facility: CLINIC | Age: 64
End: 2019-08-13

## 2019-08-13 DIAGNOSIS — R53.81 DEBILITY: ICD-10-CM

## 2019-08-13 DIAGNOSIS — C34.12 MALIGNANT NEOPLASM OF UPPER LOBE OF LEFT LUNG: Primary | ICD-10-CM

## 2019-08-13 PROCEDURE — G0180 PR HOME HEALTH MD CERTIFICATION: ICD-10-PCS | Mod: ,,, | Performed by: INTERNAL MEDICINE

## 2019-08-13 PROCEDURE — G0180 MD CERTIFICATION HHA PATIENT: HCPCS | Mod: ,,, | Performed by: INTERNAL MEDICINE

## 2019-08-13 NOTE — TELEPHONE ENCOUNTER
----- Message from Lilia Kraft sent at 8/13/2019  2:16 PM CDT -----  Contact: Pramod/[facility]/[phone]  Type:Home Health(orders,updates,clarifications,etc)    Home Health Agency/Nurse: [facility]    Phone number: [phone]    Reason for call:    Comments: Pramod called in regards patient need a rollator walker. Please if order can place and [facility] fax # [phone]. Thank you

## 2019-08-15 ENCOUNTER — OFFICE VISIT (OUTPATIENT)
Dept: HEMATOLOGY/ONCOLOGY | Facility: CLINIC | Age: 64
End: 2019-08-15
Payer: MEDICARE

## 2019-08-15 ENCOUNTER — LAB VISIT (OUTPATIENT)
Dept: LAB | Facility: HOSPITAL | Age: 64
End: 2019-08-15
Attending: INTERNAL MEDICINE
Payer: MEDICARE

## 2019-08-15 VITALS
SYSTOLIC BLOOD PRESSURE: 107 MMHG | WEIGHT: 209 LBS | OXYGEN SATURATION: 97 % | HEART RATE: 59 BPM | BODY MASS INDEX: 30.96 KG/M2 | DIASTOLIC BLOOD PRESSURE: 53 MMHG | RESPIRATION RATE: 18 BRPM | HEIGHT: 69 IN | TEMPERATURE: 98 F

## 2019-08-15 DIAGNOSIS — E11.65 UNCONTROLLED TYPE 2 DIABETES MELLITUS WITH HYPERGLYCEMIA, WITHOUT LONG-TERM CURRENT USE OF INSULIN: ICD-10-CM

## 2019-08-15 DIAGNOSIS — C77.1 SECONDARY MALIGNANT NEOPLASM OF MEDIASTINAL LYMPH NODE: ICD-10-CM

## 2019-08-15 DIAGNOSIS — C34.12 MALIGNANT NEOPLASM OF UPPER LOBE OF LEFT LUNG: Primary | ICD-10-CM

## 2019-08-15 DIAGNOSIS — E07.9 THYROID DISORDER: ICD-10-CM

## 2019-08-15 DIAGNOSIS — C34.12 MALIGNANT NEOPLASM OF UPPER LOBE OF LEFT LUNG: ICD-10-CM

## 2019-08-15 LAB
ALBUMIN SERPL BCP-MCNC: 3.8 G/DL (ref 3.5–5.2)
ALP SERPL-CCNC: 77 U/L (ref 55–135)
ALT SERPL W/O P-5'-P-CCNC: 57 U/L (ref 10–44)
ANION GAP SERPL CALC-SCNC: 8 MMOL/L (ref 8–16)
AST SERPL-CCNC: 24 U/L (ref 10–40)
BILIRUB SERPL-MCNC: 0.3 MG/DL (ref 0.1–1)
BUN SERPL-MCNC: 12 MG/DL (ref 8–23)
CALCIUM SERPL-MCNC: 10.3 MG/DL (ref 8.7–10.5)
CHLORIDE SERPL-SCNC: 102 MMOL/L (ref 95–110)
CO2 SERPL-SCNC: 28 MMOL/L (ref 23–29)
CREAT SERPL-MCNC: 1 MG/DL (ref 0.5–1.4)
ERYTHROCYTE [DISTWIDTH] IN BLOOD BY AUTOMATED COUNT: 14.6 % (ref 11.5–14.5)
EST. GFR  (AFRICAN AMERICAN): >60 ML/MIN/1.73 M^2
EST. GFR  (NON AFRICAN AMERICAN): >60 ML/MIN/1.73 M^2
GLUCOSE SERPL-MCNC: 263 MG/DL (ref 70–110)
HCT VFR BLD AUTO: 36.9 % (ref 37–48.5)
HGB BLD-MCNC: 11.5 G/DL (ref 12–16)
IMM GRANULOCYTES # BLD AUTO: 0.07 K/UL (ref 0–0.04)
MCH RBC QN AUTO: 27.1 PG (ref 27–31)
MCHC RBC AUTO-ENTMCNC: 31.2 G/DL (ref 32–36)
MCV RBC AUTO: 87 FL (ref 82–98)
NEUTROPHILS # BLD AUTO: 2.2 K/UL (ref 1.8–7.7)
PLATELET # BLD AUTO: 117 K/UL (ref 150–350)
PMV BLD AUTO: 11.3 FL (ref 9.2–12.9)
POTASSIUM SERPL-SCNC: 4.7 MMOL/L (ref 3.5–5.1)
PROT SERPL-MCNC: 7 G/DL (ref 6–8.4)
RBC # BLD AUTO: 4.24 M/UL (ref 4–5.4)
SODIUM SERPL-SCNC: 138 MMOL/L (ref 136–145)
WBC # BLD AUTO: 4.58 K/UL (ref 3.9–12.7)

## 2019-08-15 PROCEDURE — 99499 RISK ADDL DX/OHS AUDIT: ICD-10-PCS | Mod: S$GLB,,, | Performed by: INTERNAL MEDICINE

## 2019-08-15 PROCEDURE — 99215 OFFICE O/P EST HI 40 MIN: CPT | Mod: S$GLB,,, | Performed by: INTERNAL MEDICINE

## 2019-08-15 PROCEDURE — 36415 COLL VENOUS BLD VENIPUNCTURE: CPT

## 2019-08-15 PROCEDURE — 99499 UNLISTED E&M SERVICE: CPT | Mod: S$GLB,,, | Performed by: INTERNAL MEDICINE

## 2019-08-15 PROCEDURE — 99215 PR OFFICE/OUTPT VISIT, EST, LEVL V, 40-54 MIN: ICD-10-PCS | Mod: S$GLB,,, | Performed by: INTERNAL MEDICINE

## 2019-08-15 PROCEDURE — 3074F SYST BP LT 130 MM HG: CPT | Mod: CPTII,S$GLB,, | Performed by: INTERNAL MEDICINE

## 2019-08-15 PROCEDURE — 3074F PR MOST RECENT SYSTOLIC BLOOD PRESSURE < 130 MM HG: ICD-10-PCS | Mod: CPTII,S$GLB,, | Performed by: INTERNAL MEDICINE

## 2019-08-15 PROCEDURE — 3078F DIAST BP <80 MM HG: CPT | Mod: CPTII,S$GLB,, | Performed by: INTERNAL MEDICINE

## 2019-08-15 PROCEDURE — 99999 PR PBB SHADOW E&M-EST. PATIENT-LVL V: CPT | Mod: PBBFAC,,, | Performed by: INTERNAL MEDICINE

## 2019-08-15 PROCEDURE — 99999 PR PBB SHADOW E&M-EST. PATIENT-LVL V: ICD-10-PCS | Mod: PBBFAC,,, | Performed by: INTERNAL MEDICINE

## 2019-08-15 PROCEDURE — 80053 COMPREHEN METABOLIC PANEL: CPT

## 2019-08-15 PROCEDURE — 3045F PR MOST RECENT HEMOGLOBIN A1C LEVEL 7.0-9.0%: ICD-10-PCS | Mod: CPTII,S$GLB,, | Performed by: INTERNAL MEDICINE

## 2019-08-15 PROCEDURE — 3078F PR MOST RECENT DIASTOLIC BLOOD PRESSURE < 80 MM HG: ICD-10-PCS | Mod: CPTII,S$GLB,, | Performed by: INTERNAL MEDICINE

## 2019-08-15 PROCEDURE — 3008F PR BODY MASS INDEX (BMI) DOCUMENTED: ICD-10-PCS | Mod: CPTII,S$GLB,, | Performed by: INTERNAL MEDICINE

## 2019-08-15 PROCEDURE — 85027 COMPLETE CBC AUTOMATED: CPT

## 2019-08-15 PROCEDURE — 3008F BODY MASS INDEX DOCD: CPT | Mod: CPTII,S$GLB,, | Performed by: INTERNAL MEDICINE

## 2019-08-15 PROCEDURE — 3045F PR MOST RECENT HEMOGLOBIN A1C LEVEL 7.0-9.0%: CPT | Mod: CPTII,S$GLB,, | Performed by: INTERNAL MEDICINE

## 2019-08-15 RX ORDER — HEPARIN 100 UNIT/ML
500 SYRINGE INTRAVENOUS
Status: CANCELLED | OUTPATIENT
Start: 2019-08-19

## 2019-08-15 RX ORDER — CYANOCOBALAMIN 1000 UG/ML
1000 INJECTION, SOLUTION INTRAMUSCULAR; SUBCUTANEOUS
Status: CANCELLED | OUTPATIENT
Start: 2019-08-19

## 2019-08-15 RX ORDER — SODIUM CHLORIDE 0.9 % (FLUSH) 0.9 %
10 SYRINGE (ML) INJECTION
Status: CANCELLED | OUTPATIENT
Start: 2019-08-19

## 2019-08-15 RX ORDER — CARBOXYMETHYLCELLULOSE SODIUM 5 MG/ML
1 SOLUTION/ DROPS OPHTHALMIC 3 TIMES DAILY PRN
COMMUNITY
End: 2021-05-13

## 2019-08-15 NOTE — Clinical Note
Please have chemo RN draw Hgb A1C on Monday prior to chemo. Schedule CBC,CMp, TSH and free T4, CT chest, abdomen/pelvis, bone scan and see me in 3 weeks and for for Carboplatin, Alimta and Keytruda

## 2019-08-19 ENCOUNTER — PATIENT MESSAGE (OUTPATIENT)
Dept: ENDOCRINOLOGY | Facility: CLINIC | Age: 64
End: 2019-08-19

## 2019-08-19 ENCOUNTER — TELEPHONE (OUTPATIENT)
Dept: HEMATOLOGY/ONCOLOGY | Facility: CLINIC | Age: 64
End: 2019-08-19

## 2019-08-19 ENCOUNTER — PATIENT MESSAGE (OUTPATIENT)
Dept: INTERNAL MEDICINE | Facility: CLINIC | Age: 64
End: 2019-08-19

## 2019-08-19 ENCOUNTER — NURSE TRIAGE (OUTPATIENT)
Dept: ADMINISTRATIVE | Facility: CLINIC | Age: 64
End: 2019-08-19

## 2019-08-19 ENCOUNTER — TELEPHONE (OUTPATIENT)
Dept: ENDOCRINOLOGY | Facility: CLINIC | Age: 64
End: 2019-08-19

## 2019-08-19 ENCOUNTER — INFUSION (OUTPATIENT)
Dept: INFUSION THERAPY | Facility: HOSPITAL | Age: 64
End: 2019-08-19
Attending: INTERNAL MEDICINE
Payer: MEDICARE

## 2019-08-19 ENCOUNTER — TELEPHONE (OUTPATIENT)
Dept: INTERNAL MEDICINE | Facility: CLINIC | Age: 64
End: 2019-08-19

## 2019-08-19 VITALS
WEIGHT: 210.75 LBS | TEMPERATURE: 98 F | BODY MASS INDEX: 31.21 KG/M2 | HEIGHT: 69 IN | RESPIRATION RATE: 18 BRPM | HEART RATE: 63 BPM | DIASTOLIC BLOOD PRESSURE: 62 MMHG | SYSTOLIC BLOOD PRESSURE: 127 MMHG

## 2019-08-19 DIAGNOSIS — C77.1 SECONDARY MALIGNANT NEOPLASM OF MEDIASTINAL LYMPH NODE: ICD-10-CM

## 2019-08-19 DIAGNOSIS — C34.12 MALIGNANT NEOPLASM OF UPPER LOBE OF LEFT LUNG: ICD-10-CM

## 2019-08-19 DIAGNOSIS — E11.65 UNCONTROLLED TYPE 2 DIABETES MELLITUS WITH HYPERGLYCEMIA, WITHOUT LONG-TERM CURRENT USE OF INSULIN: ICD-10-CM

## 2019-08-19 DIAGNOSIS — R73.9 HYPERGLYCEMIA: Primary | ICD-10-CM

## 2019-08-19 DIAGNOSIS — E11.65 UNCONTROLLED TYPE 2 DIABETES MELLITUS WITH HYPERGLYCEMIA, WITHOUT LONG-TERM CURRENT USE OF INSULIN: Primary | ICD-10-CM

## 2019-08-19 LAB
ESTIMATED AVG GLUCOSE: 280 MG/DL (ref 68–131)
HBA1C MFR BLD HPLC: 11.4 % (ref 4–5.6)

## 2019-08-19 PROCEDURE — 63600175 PHARM REV CODE 636 W HCPCS: Mod: JG | Performed by: INTERNAL MEDICINE

## 2019-08-19 PROCEDURE — 96372 THER/PROPH/DIAG INJ SC/IM: CPT

## 2019-08-19 PROCEDURE — 96413 CHEMO IV INFUSION 1 HR: CPT

## 2019-08-19 PROCEDURE — 96417 CHEMO IV INFUS EACH ADDL SEQ: CPT

## 2019-08-19 PROCEDURE — 83036 HEMOGLOBIN GLYCOSYLATED A1C: CPT

## 2019-08-19 PROCEDURE — 96367 TX/PROPH/DG ADDL SEQ IV INF: CPT

## 2019-08-19 PROCEDURE — 96411 CHEMO IV PUSH ADDL DRUG: CPT

## 2019-08-19 RX ORDER — SODIUM CHLORIDE 0.9 % (FLUSH) 0.9 %
10 SYRINGE (ML) INJECTION
Status: DISCONTINUED | OUTPATIENT
Start: 2019-08-19 | End: 2019-08-19 | Stop reason: HOSPADM

## 2019-08-19 RX ORDER — INSULIN GLARGINE 100 [IU]/ML
30 INJECTION, SOLUTION SUBCUTANEOUS NIGHTLY
Qty: 27 ML | Refills: 3 | Status: SHIPPED | OUTPATIENT
Start: 2019-08-19 | End: 2019-09-04

## 2019-08-19 RX ORDER — CYANOCOBALAMIN 1000 UG/ML
1000 INJECTION, SOLUTION INTRAMUSCULAR; SUBCUTANEOUS
Status: COMPLETED | OUTPATIENT
Start: 2019-08-19 | End: 2019-08-19

## 2019-08-19 RX ORDER — HEPARIN 100 UNIT/ML
500 SYRINGE INTRAVENOUS
Status: DISCONTINUED | OUTPATIENT
Start: 2019-08-19 | End: 2019-08-19 | Stop reason: HOSPADM

## 2019-08-19 RX ORDER — INSULIN ASPART 100 [IU]/ML
INJECTION, SOLUTION INTRAVENOUS; SUBCUTANEOUS
Qty: 21 ML | Refills: 3 | Status: ON HOLD | OUTPATIENT
Start: 2019-08-19 | End: 2019-08-20 | Stop reason: SDUPTHER

## 2019-08-19 RX ADMIN — CARBOPLATIN 565 MG: 10 INJECTION, SOLUTION INTRAVENOUS at 01:08

## 2019-08-19 RX ADMIN — APREPITANT 130 MG: 130 INJECTION, EMULSION INTRAVENOUS at 12:08

## 2019-08-19 RX ADMIN — SODIUM CHLORIDE 200 MG: 9 INJECTION, SOLUTION INTRAVENOUS at 11:08

## 2019-08-19 RX ADMIN — CYANOCOBALAMIN 1000 MCG: 1000 INJECTION, SOLUTION INTRAMUSCULAR at 12:08

## 2019-08-19 RX ADMIN — DENOSUMAB 120 MG: 120 INJECTION SUBCUTANEOUS at 01:08

## 2019-08-19 RX ADMIN — SODIUM CHLORIDE: 0.9 INJECTION, SOLUTION INTRAVENOUS at 11:08

## 2019-08-19 RX ADMIN — SODIUM CHLORIDE 1075 MG: 9 INJECTION, SOLUTION INTRAVENOUS at 01:08

## 2019-08-19 RX ADMIN — HEPARIN 500 UNITS: 100 SYRINGE at 02:08

## 2019-08-19 RX ADMIN — DEXAMETHASONE SODIUM PHOSPHATE: 4 INJECTION, SOLUTION INTRA-ARTICULAR; INTRALESIONAL; INTRAMUSCULAR; INTRAVENOUS; SOFT TISSUE at 12:08

## 2019-08-19 NOTE — TELEPHONE ENCOUNTER
Patient is scheduled with Ai Billingsley on 08/29 at 8:00am    ----- Message from Courtney Hutchinson sent at 8/19/2019  1:49 PM CDT -----  Contact: pt  Patient Requesting Sooner Appointment.     Reason for sooner appt.: Pt sugar level 11.4, R73.9 (ICD-10-CM) - Hyperglycemia    When is the first available appointment?9/13    Communication Preference:[phone] (cell) or [phone](daughter)    Additional Information:    Patient: BETINA CORMIER [[id number]] MRN: [id number]    <=">  Status: Authorized Type: Consultation  Class: Internal Reasons:    Diagnosis: R73.9 (ICD-10-CM) - Hyperglycemia Procedures: REF22 - AMB REFERRAL TO ENDOCRINOLOGY  Start: Aug 19, 2019 Expiration: Aug 18, 2020  Requested: 1 Authorized: 1  Scheduled: 1 Completed:    Authorization #:   Precertification #:    Referring Location: [facility] Referred to Location:    Referring Department: [facility] HEMATOLOGY ONCOLOGY Referred To Department:    Referring Provider: ENRIQUETA MOTTA Referred To Provider:

## 2019-08-19 NOTE — PLAN OF CARE
Problem: Adult Inpatient Plan of Care  Goal: Plan of Care Review  Outcome: Ongoing (interventions implemented as appropriate)  Pt received Keytruda, alimta and carbo; tolerated well. VSS and NAD. Pt instructed to call MD with any concerns. Pt discharged home independently.

## 2019-08-19 NOTE — TELEPHONE ENCOUNTER
Pharmacist would like to know: Total amount of units allowed per day for Novolog. Please, resend with clarification.

## 2019-08-20 ENCOUNTER — PATIENT MESSAGE (OUTPATIENT)
Dept: ENDOCRINOLOGY | Facility: HOSPITAL | Age: 64
End: 2019-08-20

## 2019-08-20 ENCOUNTER — TELEPHONE (OUTPATIENT)
Dept: INTERNAL MEDICINE | Facility: CLINIC | Age: 64
End: 2019-08-20

## 2019-08-20 ENCOUNTER — TELEPHONE (OUTPATIENT)
Dept: HOME HEALTH SERVICES | Facility: HOSPITAL | Age: 64
End: 2019-08-20

## 2019-08-20 ENCOUNTER — HOSPITAL ENCOUNTER (OUTPATIENT)
Facility: HOSPITAL | Age: 64
Discharge: HOME | End: 2019-08-20
Attending: EMERGENCY MEDICINE | Admitting: INTERNAL MEDICINE
Payer: MEDICARE

## 2019-08-20 ENCOUNTER — TELEPHONE (OUTPATIENT)
Dept: HEMATOLOGY/ONCOLOGY | Facility: CLINIC | Age: 64
End: 2019-08-20

## 2019-08-20 ENCOUNTER — PATIENT MESSAGE (OUTPATIENT)
Dept: INTERNAL MEDICINE | Facility: CLINIC | Age: 64
End: 2019-08-20

## 2019-08-20 VITALS
DIASTOLIC BLOOD PRESSURE: 59 MMHG | HEIGHT: 70 IN | SYSTOLIC BLOOD PRESSURE: 126 MMHG | WEIGHT: 210 LBS | RESPIRATION RATE: 18 BRPM | OXYGEN SATURATION: 98 % | TEMPERATURE: 98 F | BODY MASS INDEX: 30.06 KG/M2 | HEART RATE: 60 BPM

## 2019-08-20 DIAGNOSIS — R73.9 HYPERGLYCEMIA: ICD-10-CM

## 2019-08-20 DIAGNOSIS — T38.0X5A ADRENAL CORTICAL STEROIDS CAUSING ADVERSE EFFECT IN THERAPEUTIC USE: ICD-10-CM

## 2019-08-20 DIAGNOSIS — N17.9 AKI (ACUTE KIDNEY INJURY): Primary | ICD-10-CM

## 2019-08-20 DIAGNOSIS — E11.65 UNCONTROLLED TYPE 2 DIABETES MELLITUS WITH HYPERGLYCEMIA, WITHOUT LONG-TERM CURRENT USE OF INSULIN: ICD-10-CM

## 2019-08-20 PROBLEM — E87.1 HYPONATREMIA: Status: ACTIVE | Noted: 2019-08-20

## 2019-08-20 PROBLEM — E87.1 HYPONATREMIA: Status: RESOLVED | Noted: 2019-08-20 | Resolved: 2019-08-20

## 2019-08-20 LAB
ALBUMIN SERPL BCP-MCNC: 3.5 G/DL (ref 3.5–5.2)
ALBUMIN SERPL BCP-MCNC: 4.1 G/DL (ref 3.5–5.2)
ALLENS TEST: ABNORMAL
ALP SERPL-CCNC: 73 U/L (ref 55–135)
ALP SERPL-CCNC: 93 U/L (ref 55–135)
ALT SERPL W/O P-5'-P-CCNC: 34 U/L (ref 10–44)
ALT SERPL W/O P-5'-P-CCNC: 40 U/L (ref 10–44)
ANION GAP SERPL CALC-SCNC: 12 MMOL/L (ref 8–16)
ANION GAP SERPL CALC-SCNC: 9 MMOL/L (ref 8–16)
AST SERPL-CCNC: 14 U/L (ref 10–40)
AST SERPL-CCNC: 16 U/L (ref 10–40)
B-OH-BUTYR BLD STRIP-SCNC: 0.1 MMOL/L (ref 0–0.5)
BACTERIA #/AREA URNS AUTO: ABNORMAL /HPF
BASOPHILS # BLD AUTO: 0.01 K/UL (ref 0–0.2)
BASOPHILS # BLD AUTO: 0.01 K/UL (ref 0–0.2)
BASOPHILS NFR BLD: 0.1 % (ref 0–1.9)
BASOPHILS NFR BLD: 0.1 % (ref 0–1.9)
BILIRUB SERPL-MCNC: 0.2 MG/DL (ref 0.1–1)
BILIRUB SERPL-MCNC: 0.3 MG/DL (ref 0.1–1)
BILIRUB UR QL STRIP: NEGATIVE
BUN SERPL-MCNC: 26 MG/DL (ref 8–23)
BUN SERPL-MCNC: 28 MG/DL (ref 8–23)
CALCIUM SERPL-MCNC: 10.1 MG/DL (ref 8.7–10.5)
CALCIUM SERPL-MCNC: 9.1 MG/DL (ref 8.7–10.5)
CHLORIDE SERPL-SCNC: 106 MMOL/L (ref 95–110)
CHLORIDE SERPL-SCNC: 98 MMOL/L (ref 95–110)
CLARITY UR REFRACT.AUTO: CLEAR
CO2 SERPL-SCNC: 22 MMOL/L (ref 23–29)
CO2 SERPL-SCNC: 23 MMOL/L (ref 23–29)
COLOR UR AUTO: ABNORMAL
CREAT SERPL-MCNC: 1.1 MG/DL (ref 0.5–1.4)
CREAT SERPL-MCNC: 1.5 MG/DL (ref 0.5–1.4)
DELSYS: ABNORMAL
DIFFERENTIAL METHOD: ABNORMAL
DIFFERENTIAL METHOD: ABNORMAL
EOSINOPHIL # BLD AUTO: 0 K/UL (ref 0–0.5)
EOSINOPHIL # BLD AUTO: 0 K/UL (ref 0–0.5)
EOSINOPHIL NFR BLD: 0 % (ref 0–8)
EOSINOPHIL NFR BLD: 0 % (ref 0–8)
ERYTHROCYTE [DISTWIDTH] IN BLOOD BY AUTOMATED COUNT: 14.5 % (ref 11.5–14.5)
ERYTHROCYTE [DISTWIDTH] IN BLOOD BY AUTOMATED COUNT: 14.7 % (ref 11.5–14.5)
EST. GFR  (AFRICAN AMERICAN): 43 ML/MIN/1.73 M^2
EST. GFR  (AFRICAN AMERICAN): >60 ML/MIN/1.73 M^2
EST. GFR  (NON AFRICAN AMERICAN): 37.3 ML/MIN/1.73 M^2
EST. GFR  (NON AFRICAN AMERICAN): 54.3 ML/MIN/1.73 M^2
GLUCOSE SERPL-MCNC: 429 MG/DL (ref 70–110)
GLUCOSE SERPL-MCNC: 694 MG/DL (ref 70–110)
GLUCOSE UR QL STRIP: ABNORMAL
HCO3 UR-SCNC: 24.2 MMOL/L (ref 24–28)
HCT VFR BLD AUTO: 32.2 % (ref 37–48.5)
HCT VFR BLD AUTO: 36.5 % (ref 37–48.5)
HGB BLD-MCNC: 10.1 G/DL (ref 12–16)
HGB BLD-MCNC: 11.1 G/DL (ref 12–16)
HGB UR QL STRIP: NEGATIVE
IMM GRANULOCYTES # BLD AUTO: 0.13 K/UL (ref 0–0.04)
IMM GRANULOCYTES # BLD AUTO: 0.14 K/UL (ref 0–0.04)
IMM GRANULOCYTES NFR BLD AUTO: 1.3 % (ref 0–0.5)
IMM GRANULOCYTES NFR BLD AUTO: 1.3 % (ref 0–0.5)
KETONES UR QL STRIP: NEGATIVE
LEUKOCYTE ESTERASE UR QL STRIP: NEGATIVE
LYMPHOCYTES # BLD AUTO: 1.1 K/UL (ref 1–4.8)
LYMPHOCYTES # BLD AUTO: 1.1 K/UL (ref 1–4.8)
LYMPHOCYTES NFR BLD: 10.3 % (ref 18–48)
LYMPHOCYTES NFR BLD: 10.5 % (ref 18–48)
MAGNESIUM SERPL-MCNC: 1.9 MG/DL (ref 1.6–2.6)
MCH RBC QN AUTO: 27.1 PG (ref 27–31)
MCH RBC QN AUTO: 27.4 PG (ref 27–31)
MCHC RBC AUTO-ENTMCNC: 30.4 G/DL (ref 32–36)
MCHC RBC AUTO-ENTMCNC: 31.4 G/DL (ref 32–36)
MCV RBC AUTO: 88 FL (ref 82–98)
MCV RBC AUTO: 89 FL (ref 82–98)
MICROSCOPIC COMMENT: ABNORMAL
MODE: ABNORMAL
MONOCYTES # BLD AUTO: 0.5 K/UL (ref 0.3–1)
MONOCYTES # BLD AUTO: 0.7 K/UL (ref 0.3–1)
MONOCYTES NFR BLD: 4.7 % (ref 4–15)
MONOCYTES NFR BLD: 7 % (ref 4–15)
NEUTROPHILS # BLD AUTO: 8.4 K/UL (ref 1.8–7.7)
NEUTROPHILS # BLD AUTO: 9 K/UL (ref 1.8–7.7)
NEUTROPHILS NFR BLD: 81.1 % (ref 38–73)
NEUTROPHILS NFR BLD: 83.6 % (ref 38–73)
NITRITE UR QL STRIP: NEGATIVE
NRBC BLD-RTO: 0 /100 WBC
NRBC BLD-RTO: 0 /100 WBC
PCO2 BLDA: 49.1 MMHG (ref 35–45)
PH SMN: 7.3 [PH] (ref 7.35–7.45)
PH UR STRIP: 5 [PH] (ref 5–8)
PLATELET # BLD AUTO: 234 K/UL (ref 150–350)
PLATELET # BLD AUTO: 268 K/UL (ref 150–350)
PMV BLD AUTO: 11.7 FL (ref 9.2–12.9)
PMV BLD AUTO: 11.7 FL (ref 9.2–12.9)
PO2 BLDA: 25 MMHG (ref 40–60)
POC BE: -2 MMOL/L
POC SATURATED O2: 39 % (ref 95–100)
POC TCO2: 26 MMOL/L (ref 24–29)
POCT GLUCOSE: 266 MG/DL (ref 70–110)
POCT GLUCOSE: 316 MG/DL (ref 70–110)
POCT GLUCOSE: 319 MG/DL (ref 70–110)
POCT GLUCOSE: 389 MG/DL (ref 70–110)
POCT GLUCOSE: 414 MG/DL (ref 70–110)
POCT GLUCOSE: 414 MG/DL (ref 70–110)
POCT GLUCOSE: 490 MG/DL (ref 70–110)
POCT GLUCOSE: >500 MG/DL (ref 70–110)
POTASSIUM SERPL-SCNC: 4.5 MMOL/L (ref 3.5–5.1)
POTASSIUM SERPL-SCNC: 4.6 MMOL/L (ref 3.5–5.1)
PROT SERPL-MCNC: 6.2 G/DL (ref 6–8.4)
PROT SERPL-MCNC: 7.6 G/DL (ref 6–8.4)
PROT UR QL STRIP: NEGATIVE
RBC # BLD AUTO: 3.68 M/UL (ref 4–5.4)
RBC # BLD AUTO: 4.1 M/UL (ref 4–5.4)
RBC #/AREA URNS AUTO: 1 /HPF (ref 0–4)
SAMPLE: ABNORMAL
SITE: ABNORMAL
SODIUM SERPL-SCNC: 133 MMOL/L (ref 136–145)
SODIUM SERPL-SCNC: 137 MMOL/L (ref 136–145)
SP GR UR STRIP: 1.02 (ref 1–1.03)
SQUAMOUS #/AREA URNS AUTO: 1 /HPF
URN SPEC COLLECT METH UR: ABNORMAL
WBC # BLD AUTO: 10.33 K/UL (ref 3.9–12.7)
WBC # BLD AUTO: 10.73 K/UL (ref 3.9–12.7)
WBC #/AREA URNS AUTO: 1 /HPF (ref 0–5)
YEAST UR QL AUTO: ABNORMAL

## 2019-08-20 PROCEDURE — G0378 HOSPITAL OBSERVATION PER HR: HCPCS

## 2019-08-20 PROCEDURE — 93010 ELECTROCARDIOGRAM REPORT: CPT | Mod: ,,, | Performed by: INTERNAL MEDICINE

## 2019-08-20 PROCEDURE — 99214 PR OFFICE/OUTPT VISIT, EST, LEVL IV, 30-39 MIN: ICD-10-PCS | Mod: ,,, | Performed by: NURSE PRACTITIONER

## 2019-08-20 PROCEDURE — 93010 EKG 12-LEAD: ICD-10-PCS | Mod: ,,, | Performed by: INTERNAL MEDICINE

## 2019-08-20 PROCEDURE — 85025 COMPLETE CBC W/AUTO DIFF WBC: CPT

## 2019-08-20 PROCEDURE — 81001 URINALYSIS AUTO W/SCOPE: CPT

## 2019-08-20 PROCEDURE — 80053 COMPREHEN METABOLIC PANEL: CPT | Mod: 91

## 2019-08-20 PROCEDURE — 83735 ASSAY OF MAGNESIUM: CPT

## 2019-08-20 PROCEDURE — S5571 INSULIN DISPOS PEN 3 ML: HCPCS | Performed by: NURSE PRACTITIONER

## 2019-08-20 PROCEDURE — 99214 OFFICE O/P EST MOD 30 MIN: CPT | Mod: ,,, | Performed by: NURSE PRACTITIONER

## 2019-08-20 PROCEDURE — 82010 KETONE BODYS QUAN: CPT

## 2019-08-20 PROCEDURE — 63600175 PHARM REV CODE 636 W HCPCS: Performed by: EMERGENCY MEDICINE

## 2019-08-20 PROCEDURE — 82803 BLOOD GASES ANY COMBINATION: CPT

## 2019-08-20 PROCEDURE — 63600175 PHARM REV CODE 636 W HCPCS: Performed by: STUDENT IN AN ORGANIZED HEALTH CARE EDUCATION/TRAINING PROGRAM

## 2019-08-20 PROCEDURE — 99900035 HC TECH TIME PER 15 MIN (STAT)

## 2019-08-20 PROCEDURE — 99220 PR INITIAL OBSERVATION CARE,LEVL III: CPT | Mod: GC,ICN,, | Performed by: INTERNAL MEDICINE

## 2019-08-20 PROCEDURE — 96374 THER/PROPH/DIAG INJ IV PUSH: CPT

## 2019-08-20 PROCEDURE — 99285 PR EMERGENCY DEPT VISIT,LEVEL V: ICD-10-PCS | Mod: ,,, | Performed by: EMERGENCY MEDICINE

## 2019-08-20 PROCEDURE — 93005 ELECTROCARDIOGRAM TRACING: CPT

## 2019-08-20 PROCEDURE — 63600175 PHARM REV CODE 636 W HCPCS: Performed by: NURSE PRACTITIONER

## 2019-08-20 PROCEDURE — 96361 HYDRATE IV INFUSION ADD-ON: CPT

## 2019-08-20 PROCEDURE — 99285 EMERGENCY DEPT VISIT HI MDM: CPT | Mod: 25

## 2019-08-20 PROCEDURE — 96372 THER/PROPH/DIAG INJ SC/IM: CPT | Mod: 91

## 2019-08-20 PROCEDURE — 80053 COMPREHEN METABOLIC PANEL: CPT

## 2019-08-20 PROCEDURE — 25000003 PHARM REV CODE 250: Performed by: INTERNAL MEDICINE

## 2019-08-20 PROCEDURE — 99285 EMERGENCY DEPT VISIT HI MDM: CPT | Mod: ,,, | Performed by: EMERGENCY MEDICINE

## 2019-08-20 PROCEDURE — 82962 GLUCOSE BLOOD TEST: CPT | Mod: 91

## 2019-08-20 PROCEDURE — 99220 PR INITIAL OBSERVATION CARE,LEVL III: ICD-10-PCS | Mod: GC,ICN,, | Performed by: INTERNAL MEDICINE

## 2019-08-20 RX ORDER — INSULIN ASPART 100 [IU]/ML
5 INJECTION, SOLUTION INTRAVENOUS; SUBCUTANEOUS ONCE
Status: COMPLETED | OUTPATIENT
Start: 2019-08-20 | End: 2019-08-20

## 2019-08-20 RX ORDER — INSULIN ASPART 100 [IU]/ML
2 INJECTION, SOLUTION INTRAVENOUS; SUBCUTANEOUS ONCE
Status: COMPLETED | OUTPATIENT
Start: 2019-08-20 | End: 2019-08-20

## 2019-08-20 RX ORDER — IBUPROFEN 200 MG
16 TABLET ORAL
Status: DISCONTINUED | OUTPATIENT
Start: 2019-08-20 | End: 2019-08-20 | Stop reason: HOSPADM

## 2019-08-20 RX ORDER — DEXTROSE MONOHYDRATE 100 MG/ML
12.5 INJECTION, SOLUTION INTRAVENOUS
Status: DISCONTINUED | OUTPATIENT
Start: 2019-08-20 | End: 2019-08-20

## 2019-08-20 RX ORDER — INSULIN ASPART 100 [IU]/ML
1-10 INJECTION, SOLUTION INTRAVENOUS; SUBCUTANEOUS
Status: DISCONTINUED | OUTPATIENT
Start: 2019-08-20 | End: 2019-08-20 | Stop reason: HOSPADM

## 2019-08-20 RX ORDER — IBUPROFEN 200 MG
24 TABLET ORAL
Status: DISCONTINUED | OUTPATIENT
Start: 2019-08-20 | End: 2019-08-20 | Stop reason: HOSPADM

## 2019-08-20 RX ORDER — INSULIN ASPART 100 [IU]/ML
INJECTION, SOLUTION INTRAVENOUS; SUBCUTANEOUS
Qty: 15 ML | Refills: 3 | Status: SHIPPED | OUTPATIENT
Start: 2019-08-20 | End: 2019-08-20 | Stop reason: CLARIF

## 2019-08-20 RX ORDER — ONDANSETRON 8 MG/1
8 TABLET, ORALLY DISINTEGRATING ORAL EVERY 8 HOURS PRN
Status: DISCONTINUED | OUTPATIENT
Start: 2019-08-20 | End: 2019-08-20 | Stop reason: HOSPADM

## 2019-08-20 RX ORDER — FOLIC ACID 1 MG/1
1 TABLET ORAL DAILY
Status: DISCONTINUED | OUTPATIENT
Start: 2019-08-20 | End: 2019-08-20 | Stop reason: HOSPADM

## 2019-08-20 RX ORDER — INSULIN ASPART 100 [IU]/ML
4 INJECTION, SOLUTION INTRAVENOUS; SUBCUTANEOUS ONCE
Status: COMPLETED | OUTPATIENT
Start: 2019-08-20 | End: 2019-08-20

## 2019-08-20 RX ORDER — INSULIN ASPART 100 [IU]/ML
10 INJECTION, SOLUTION INTRAVENOUS; SUBCUTANEOUS
Status: DISCONTINUED | OUTPATIENT
Start: 2019-08-20 | End: 2019-08-20 | Stop reason: HOSPADM

## 2019-08-20 RX ORDER — DEXTROSE MONOHYDRATE 100 MG/ML
25 INJECTION, SOLUTION INTRAVENOUS
Status: DISCONTINUED | OUTPATIENT
Start: 2019-08-20 | End: 2019-08-20

## 2019-08-20 RX ORDER — GLUCAGON 1 MG
1 KIT INJECTION
Status: DISCONTINUED | OUTPATIENT
Start: 2019-08-20 | End: 2019-08-20 | Stop reason: HOSPADM

## 2019-08-20 RX ORDER — SODIUM CHLORIDE 0.9 % (FLUSH) 0.9 %
10 SYRINGE (ML) INJECTION
Status: DISCONTINUED | OUTPATIENT
Start: 2019-08-20 | End: 2019-08-20 | Stop reason: HOSPADM

## 2019-08-20 RX ORDER — INSULIN ASPART 100 [IU]/ML
8 INJECTION, SOLUTION INTRAVENOUS; SUBCUTANEOUS
Qty: 15 ML | Refills: 0 | Status: SHIPPED | OUTPATIENT
Start: 2019-08-20 | End: 2019-08-24

## 2019-08-20 RX ADMIN — INSULIN ASPART 10 UNITS: 100 INJECTION, SOLUTION INTRAVENOUS; SUBCUTANEOUS at 05:08

## 2019-08-20 RX ADMIN — SODIUM CHLORIDE 1000 ML: 0.9 INJECTION, SOLUTION INTRAVENOUS at 02:08

## 2019-08-20 RX ADMIN — INSULIN ASPART 10 UNITS: 100 INJECTION, SOLUTION INTRAVENOUS; SUBCUTANEOUS at 07:08

## 2019-08-20 RX ADMIN — INSULIN ASPART 2 UNITS: 100 INJECTION, SOLUTION INTRAVENOUS; SUBCUTANEOUS at 03:08

## 2019-08-20 RX ADMIN — INSULIN HUMAN 8 UNITS: 100 INJECTION, SOLUTION PARENTERAL at 02:08

## 2019-08-20 RX ADMIN — INSULIN ASPART 5 UNITS: 100 INJECTION, SOLUTION INTRAVENOUS; SUBCUTANEOUS at 01:08

## 2019-08-20 RX ADMIN — FOLIC ACID 1 MG: 1 TABLET ORAL at 10:08

## 2019-08-20 RX ADMIN — INSULIN ASPART 4 UNITS: 100 INJECTION, SOLUTION INTRAVENOUS; SUBCUTANEOUS at 06:08

## 2019-08-20 RX ADMIN — INSULIN ASPART 6 UNITS: 100 INJECTION, SOLUTION INTRAVENOUS; SUBCUTANEOUS at 05:08

## 2019-08-20 RX ADMIN — INSULIN DETEMIR 24 UNITS: 100 INJECTION, SOLUTION SUBCUTANEOUS at 10:08

## 2019-08-20 RX ADMIN — SODIUM CHLORIDE 1000 ML: 0.9 INJECTION, SOLUTION INTRAVENOUS at 01:08

## 2019-08-20 NOTE — TELEPHONE ENCOUNTER
----- Message from Lilia Kraft sent at 8/20/2019 10:39 AM CDT -----  Contact: [facility]/[phone]  Pharmacy is requesting clarification on Rx insulin aspart U-100 (NOVOLOG) 100 unit/mL (3 mL) InPn pen. Please call and advise. Thank you

## 2019-08-20 NOTE — TELEPHONE ENCOUNTER
"----- Message from Liane Quiros sent at 8/20/2019  4:03 PM CDT -----  Name Of Caller: Alison   Provider name: Tara Belcher MD     What is the nature of the call?:  - pt will be discharged shortly and she's calling about the chemo medications that she will continue once she leaves. They will be making a change to her   How long will be she on the  dexAMETHasone (DECADRON) 6 MG tablet ?   Does patient feel the need to be seen today? No     Relationship to the Pt?: self   Contact Preference?: [phone]  Additional Notes:    "Thank you for all that you do for our patients'"      "

## 2019-08-20 NOTE — HOSPITAL COURSE
Patient is a 63 year old female with non small cell lung cancer and uncontrolled diabetes admitted with ROSEMARY and hyperglycemia. The patient was did not have metabolic acidosis or anion gap The patient ROSEMARY resolved with fluids. The patient has been taking 6mg oral dexamethasone twice daily before day of chemo and once the day after chemo which has been contributing to decline in control of her blood glucose. Endocrine consulted and recommendations and orders placed. Patient will be discharged.

## 2019-08-20 NOTE — TELEPHONE ENCOUNTER
Patient called to report the following:     -chemo treatment today   -cbg reading HI on glucometer   -has taken insulin and water since call and   -cbg reading over 600mg/dl at the time of call   -advised to report to ED     Reason for Disposition   Blood glucose > 500 mg/dl (27.5 mmol/l)    Additional Information   Negative: Acting confused (e.g., disoriented, slurred speech)   Negative: Unconscious or difficult to awaken   Negative: Very weak (e.g., can't stand)   Negative: Sounds like a life-threatening emergency to the triager   Negative: [1] Vomiting AND [2] signs of dehydration (e.g., very dry mouth, lightheaded, etc.)   Negative: [1] Blood glucose > 240 mg/dl (13 mmol/l) AND [2] rapid breathing    Protocols used: DIABETES - HIGH BLOOD SUGAR-A-

## 2019-08-20 NOTE — CONSULTS
[facility]  Endocrinology  Diabetes Consult Note    Consult Requested by: Lucy Bernal MD   Reason for admit: ROSEMARY (acute kidney injury)    HISTORY OF PRESENT ILLNESS:  Reason for Consult: Management of T2DM, Hyperglycemia     Surgical Procedure and Date: N/A    Diabetes diagnosis year: > 10 years    Lab Results   Component Value Date    HGBA1C 11.4 (H) 2019       Home Diabetes Medications: Basaglar 30 units q HS, Novolog correction scale, Januvia 100 mg PO daily - previously on Metformin but stopped due to nausea    How often checking glucose at home? >4 x day   BG readings on regimen: 300-500 recently  Hypoglycemia on the regimen?  No  Missed doses on regimen?  No    Diabetes Complications include:     Hyperglycemia and Diabetic peripheral neuropathy     Complicating diabetes co morbidities:   Active Cancer and Glucocorticoid use       HPI:   Patient is a 63 y.o. female with a diagnosis of DM2, ROSEMARY, and NSCLC.  [place] [facility] [place] critical glucose of > 600 after taking prescribed dexamethasone at home for lung cancer.  Patient previously well controlled on Januvia and Glipizide but recently started on insulin (19) when started on steroids and chemo.  Patient's DM managed by her PCP Dr. Rodriguez.  Patient schedule in endocrinology clinic with Ai Billingsley on 19/ at 8 am.  Positive family history of DM (father, sister, brother).  Endocrinology consulted for DM/BG management.            Interval HPI:   Overnight events: Transferred from ED to [facility] early this morning.  BG markedly elevated overnight, received regular insulin and aspart in ED.    Eatin%  Nausea: No  Hypoglycemia and intervention: No  Fever: No  TPN and/or TF: No    PMH, PSH, FH, SH reviewed     Review of Systems    Constitutional: Positive for weight changes, labile.  Eyes: Negative for visual disturbance.  Respiratory: Negative for cough.   Cardiovascular: Negative for chest  pain.  Gastrointestinal: Negative for nausea.  Positive for constipation.  Endocrine: Negative for polyuria, polydipsia.  Musculoskeletal: Negative for back pain.  Skin: Negative for rash.  Neurological: Negative for syncope.  Psychiatric/Behavioral: Negative for depression.    Current Medications and/or Treatments Impacting Glycemic Control  Immunotherapy:    Immunosuppressants     None        Steroids:   Hormones (From admission, onward)    None        Pressors:    Autonomic Drugs (From admission, onward)    None        Hyperglycemia/Diabetes Medications:   Antihyperglycemics (From admission, onward)    Start     Stop Route Frequency Ordered    08/20/19 0900  insulin detemir U-100 pen 24 Units      -- SubQ Daily 08/20/19 0741    08/20/19 0840  insulin aspart U-100 pen 1-10 Units      -- SubQ Before meals & nightly PRN 08/20/19 0741    08/20/19 0745  insulin aspart U-100 pen 10 Units      -- SubQ 3 times daily with meals 08/20/19 0741             PHYSICAL EXAMINATION:  Vitals:    08/20/19 0716   BP: (!) 113/54   Pulse: 69   Resp: 18   Temp: 97.8 °F (36.6 °C)     Body mass index is 30.13 kg/m².    Physical Exam     Constitutional: Well developed, well nourished, NAD.  ENT: External ears no masses with nose patent; normal hearing.  Neck: Supple; trachea midline.  Cardiovascular: Normal heart sounds, no LE edema. DP +2 bilaterally.  Lungs: Normal effort; lungs anterior bilaterally diminished to auscultation.  Abdomen: Soft, no masses, no hernias.  MS: No clubbing or cyanosis of nails noted; unable to assess gait.  Skin: No rashes, lesions, or ulcers; no nodules. Injection sites are ok. No lipo hypertropthy or atrophy.  Psychiatric: Good judgement and insight; normal mood and affect.  Neurological: Cranial nerves are grossly intact.  Foot: Nails in good condition, no amputations noted.      Labs Reviewed and Include   Recent Labs   Lab 08/20/19  0325   *   CALCIUM 9.1   ALBUMIN 3.5   PROT 6.2      K 4.6    CO2 22*      BUN 26*   CREATININE 1.1   ALKPHOS 73   ALT 34   AST 14   BILITOT 0.2     Lab Results   Component Value Date    WBC 10.33 08/20/2019    HGB 10.1 (L) 08/20/2019    HCT 32.2 (L) 08/20/2019    MCV 88 08/20/2019     08/20/2019     No results for input(s): TSH, FREET4 in the last 168 hours.  Lab Results   Component Value Date    HGBA1C 11.4 (H) 08/19/2019       Nutritional status:   Body mass index is 30.13 kg/m².  Lab Results   Component Value Date    ALBUMIN 3.5 08/20/2019    ALBUMIN 4.1 08/20/2019    ALBUMIN 3.8 08/15/2019     No results found for: PREALBUMIN    Estimated Creatinine Clearance: 67.2 mL/min (based on SCr of 1.1 mg/dL).    Accu-Checks  Recent Labs     08/20/19  0030 08/20/19  0205 08/20/19  0248 08/20/19  0327 08/20/19  0635 08/20/19  1012   POCTGLUCOSE >500* 490* 414* 389* 319* 414*        ASSESSMENT and PLAN    * ROSEMARY (acute kidney injury)  Titrate insulin slowly to avoid hypoglycemia as the risk of hypoglycemia increases with decreased creatinine clearance.  Caution with insulin stacking  Estimated Creatinine Clearance: 67.2 mL/min (based on SCr of 1.1 mg/dL).      Uncontrolled type 2 diabetes mellitus with hyperglycemia, without long-term current use of insulin  BG goal 140 - 180     Levemir 24 units daily in AM, first dose today - determined via reduced home dose and weight based dosing at 0.5 modifier  Novolog 10 units with meals  Moderate dose correction scale - given oral steroids  BG monitoring /HS/0200 - to assess for nocturnal hyper/hypoglycemia    Discharge planning: If patient discharges today, recommend patient discharge on prandial heavy weight based dosing regimen of Basaglar 24 units daily in AM, Novolog 8 units with meals plus correction scale 180/25, and Januvia 100 mg PO daily.  Will provide patient with BG logs with dosing instructions prior to discharge.  Patient to follow up with [facility] endocrinology until clinic appointment on 8/29/19.  Will need refill  on diagnostic glucose test strips prior to discharge.        Adrenal cortical steroids causing adverse effect in therapeutic use  On steroid taper per primary team; may elevate BG readings            Plan discussed with patient and RN at bedside.     Clifford Flores NP  Endocrinology  [facility]

## 2019-08-20 NOTE — HPI
64 yo W with pmhx IDDM, HTN, HLD, NSCLC on chemo (carboplatin, Alimta and keytruda) presents with a chief complaint of hyperglycemia.  Patient's glucometer only reads up to 600, but it read high this evening.  Patient takes meticulous notes of her glucose readings.  Beginning on August 7th, she was started on dexamethasone.  Prior to this time, home glucose readings were in the low 100s.  Since this time, her glucoses have been labile.  They have registered up to the 500s.  Patient reports 100% compliance with her insulin.  She reports compliance with her diabetic diet.  Patient reports recurrent dysuria that was previously treated for UTI on Cipro.  She also reports some rhinorrhea. This evening, her glucose was greater than 600.  In triage, our glucometer read high.  No fevers, chest pain, shortness of breath, rashes, abdominal pain. Patient has been scheduled for an outpatient appointment with endocrinology but given severity of hyperglycemia today, she presents. Patient denies any fevers, night sweats, n/v/d/c, abd pain, dysuria, hematuria or hematochezia, cough, wheezing, SOB, CP, leg swelling, HA, dizziness, weakness, hallucinations, SI, HI, or self injury at this time.      ONCOLOGY HISTORY:  Malignant neoplasm of upper lobe of left lung    Ms. Branch is a 63-year-old female who smoked for about 30 years and quit 20 years ago, presented with cough end of last year, but worsened into January.  Since the cough persisted, she saw her PCP, underwent a chest x-ray on 05/10/2019, that revealed left upper lobe pneumonia and a repeat CT was done in the week after that on 05/17/2019 that showed left upper lobe   mass arising from the lateral pleural surface in the left upper lobe posterior subsegment measuring 2.6 x 3 cm.  There are satellite mass more medially near the aortic arch that is 2 cm, also spiculated and irregular as well as thickened interlobar septa in the left lung apex and anterior segment,  "prevascular lymph node lateral to the aortic arch, short axis measuring 9 mm.       She then underwent a bronchoscopy on 05/28/2019, and that revealed there was an infiltration obtained in the left apical posterior segment of the left upper lobe cytology brush was done.  Transbronchial biopsies of an area of infiltration were also performed in the apicoposterior segment of the left upper lobe.    Pathology revealed adenocarcinoma; however, the specimen was not adequate enough to send for molecular testing.       She underwent a PET scan on 06/06/2019.  that reveals significant hypermetabolic activity in the large irregular spiculated pulmonary mass in the lateral aspect of the left upper lobe consistent with the patient's known pulmonary adenocarcinoma.  There is also tracer avidity in the medial left upper lobe satellite lesion and diffusely throughout much of the anterior left upper lobe where there is prominent nodular paraseptal thickening.  There are some numerous scattered subcentimeter pulmonary nodules throughout the right lung, all of which are too small for detection by PET.  For example, there is a 0.4 cm nodule in the superior right lower lobe and a micro nodule in the posterior segment of the right upper lobe.  There is a 0.5 cm, normal size right   paratracheal lymph node with increased radiotracer uptake as well as hypermetabolic aortic pulmonary lymph node and a left hilar lymph node.  There is a focal hypermetabolic lesion in the left anterior superior iliac spine associated with well defined lytic lesion.  There is a hypermetabolic focus in the anterior right fifth rib with a possible associated lytic lesion.  SUVs as   below lateral:  Left upper lobe  SUV max 17.9, anterior left upper lobe satellite mass and septal thickening SUV max of 10.1, right paratracheal lymph node SUV max of 4.8, left AP window lymph node SUV max of 17.9, left anterior superior iliac spine SUV max of 3.9"     MRI pelvis " "from 6/10/19  reveals "Region of osseous is irregularity at the left anterior iliac spine likely related to bone graft harvest procedure.  See  discussion above.  No suspicious signal or enhancement to suggest active/malignant process"   MRI brain from 6/10//19 reveal No intracranial abnormality.     We discussed her case at [facility], and plan was to wait for GAURDANT and proceed with treatment accordingly  Her GAURDANT is negative for molecular markers.     8/2019: cycle 3 of Carboplatin, Alimta and Keytruda.  "

## 2019-08-20 NOTE — SUBJECTIVE & OBJECTIVE
"Oncology Treatment Plan:   OP NSCLC PEMBROLIZUMAB PEMETREXED CARBOPLATIN Q3W    Medications:  Continuous Infusions:  Scheduled Meds:   insulin aspart U-100  2 Units Subcutaneous Once     PRN Meds:ondansetron, sodium chloride 0.9%     Review of patient's allergies indicates:   Allergen Reactions    Metformin      diarrhea        Past Medical History:   Diagnosis Date    Allergy     Brain aneurysm     s/p coiling of one; another not coiled    Breast cyst     Diabetes mellitus     Diabetes type 2, controlled     Fever blister     High cholesterol     History of Bell's palsy     HTN (hypertension) 2014     Past Surgical History:   Procedure Laterality Date    BREAST CYST ASPIRATION      Bronchoscopy N/A 2019    Performed by [facility] OR 2ND FLR    CERVICAL FUSION      CHONDROPLASTY-KNEE Left 2018    Performed by [facility]ELA Min [facility] at [facility] OR    COLONOSCOPY N/A 3/9/2016    Performed by Elliott Zimmerman MD at [facility] ENDO (4TH FLR)    head surgery      stent and "curling" for aneurysm    HYSTERECTOMY      TVH secondary to SUF    INSERTION, PORT-A-CATH Right 2019    Performed by Cali Damico MD at [facility] CATH LAB    MENISCECTOMY Left 2018    Performed by [facility]ELA Min MD at [facility] OR    SYNOVECTOMY-KNEE Left 2018    Performed by ESTELA Min MD at [facility] OR     Family History     Problem Relation (Age of Onset)    Breast cancer Maternal Aunt    Cancer Mother (63), Maternal Aunt    Cataracts Father    Diabetes Father, Sister, Brother    Heart failure Father    Migraines Father    Ovarian cancer Cousin    Rheum arthritis Father    Stomach cancer Mother        Tobacco Use    Smoking status: Former Smoker     Packs/day: 0.50     Years: 30.00     Pack years: 15.00     Last attempt to quit: 1999     Years since quittin.6    Smokeless tobacco: Never Used   Substance and Sexual Activity    Alcohol use: No     Alcohol/week: 0.0 oz    " Drug use: No    Sexual activity: Never     Partners: Female     Birth control/protection: Surgical       Review of Systems   Constitutional: Negative for activity change, chills and fever.   HENT: Negative for sore throat and trouble swallowing.    Eyes: Negative for photophobia and visual disturbance.   Respiratory: Negative for chest tightness and shortness of breath.    Cardiovascular: Negative for chest pain, palpitations and leg swelling.   Gastrointestinal: Negative for abdominal distention, abdominal pain, blood in stool, constipation, diarrhea, nausea and vomiting.   Endocrine: Positive for polyuria. Negative for heat intolerance.   Genitourinary: Negative for difficulty urinating, dysuria and hematuria.   Musculoskeletal: Negative for gait problem and joint swelling.   Skin: Negative for pallor and rash.   Allergic/Immunologic: Positive for immunocompromised state.   Neurological: Negative for dizziness, seizures, syncope and facial asymmetry.   Psychiatric/Behavioral: Negative for agitation, behavioral problems and confusion.     Objective:     Vital Signs (Most Recent):  Temp: 97.6 °F (36.4 °C) (08/20/19 0205)  Pulse: 94 (08/20/19 0237)  Resp: 17 (08/20/19 0237)  BP: 125/62 (08/20/19 0308)  SpO2: 99 % (08/20/19 0308) Vital Signs (24h Range):  Temp:  [97.4 °F (36.3 °C)-97.7 °F (36.5 °C)] 97.6 °F (36.4 °C)  Pulse:  [57-94] 94  Resp:  [15-22] 17  SpO2:  [98 %-100 %] 99 %  BP: (117-156)/(58-67) 125/62     Weight: 95.3 kg (210 lb)  Body mass index is 30.13 kg/m².  Body surface area is 2.17 meters squared.      Intake/Output Summary (Last 24 hours) at 8/20/2019 0321  Last data filed at 8/20/2019 0212  Gross per 24 hour   Intake 1000 ml   Output --   Net 1000 ml       Physical Exam   Constitutional: She is oriented to person, place, and time. She appears well-developed and well-nourished. No distress.   HENT:   Head: Normocephalic and atraumatic.   Eyes: Pupils are equal, round, and reactive to light. No  scleral icterus.   Neck: Normal range of motion. Neck supple. No JVD present. No thyromegaly present.   Cardiovascular: Normal rate and regular rhythm.   No murmur heard.  Pulmonary/Chest: Effort normal and breath sounds normal. No stridor. She has no wheezes. She has no rales.   Abdominal: Soft. She exhibits no distension and no mass. There is no tenderness. There is no guarding.   Musculoskeletal: Normal range of motion. She exhibits no edema or deformity.   Neurological: She is alert and oriented to person, place, and time. No cranial nerve deficit.   Skin: Skin is warm and dry. Capillary refill takes less than 2 seconds. She is not diaphoretic. No erythema.   Psychiatric: She has a normal mood and affect. Her behavior is normal.   Nursing note and vitals reviewed.      Significant Labs:     Recent Results (from the past 24 hour(s))   Hemoglobin A1c    Collection Time: 08/19/19 11:47 AM   Result Value Ref Range    Hemoglobin A1C 11.4 (H) 4.0 - 5.6 %    Estimated Avg Glucose 280 (H) 68 - 131 mg/dL   CBC auto differential    Collection Time: 08/20/19 12:50 AM   Result Value Ref Range    WBC 10.73 3.90 - 12.70 K/uL    RBC 4.10 4.00 - 5.40 M/uL    Hemoglobin 11.1 (L) 12.0 - 16.0 g/dL    Hematocrit 36.5 (L) 37.0 - 48.5 %    Mean Corpuscular Volume 89 82 - 98 fL    Mean Corpuscular Hemoglobin 27.1 27.0 - 31.0 pg    Mean Corpuscular Hemoglobin Conc 30.4 (L) 32.0 - 36.0 g/dL    RDW 14.7 (H) 11.5 - 14.5 %    Platelets 268 150 - 350 K/uL    MPV 11.7 9.2 - 12.9 fL    Immature Granulocytes 1.3 (H) 0.0 - 0.5 %    Gran # (ANC) 9.0 (H) 1.8 - 7.7 K/uL    Immature Grans (Abs) 0.14 (H) 0.00 - 0.04 K/uL    Lymph # 1.1 1.0 - 4.8 K/uL    Mono # 0.5 0.3 - 1.0 K/uL    Eos # 0.0 0.0 - 0.5 K/uL    Baso # 0.01 0.00 - 0.20 K/uL    nRBC 0 0 /100 WBC    Gran% 83.6 (H) 38.0 - 73.0 %    Lymph% 10.3 (L) 18.0 - 48.0 %    Mono% 4.7 4.0 - 15.0 %    Eosinophil% 0.0 0.0 - 8.0 %    Basophil% 0.1 0.0 - 1.9 %    Differential Method Automated     Comprehensive metabolic panel    Collection Time: 08/20/19 12:50 AM   Result Value Ref Range    Sodium 133 (L) 136 - 145 mmol/L    Potassium 4.5 3.5 - 5.1 mmol/L    Chloride 98 95 - 110 mmol/L    CO2 23 23 - 29 mmol/L    Glucose 694 (HH) 70 - 110 mg/dL    BUN, Bld 28 (H) 8 - 23 mg/dL    Creatinine 1.5 (H) 0.5 - 1.4 mg/dL    Calcium 10.1 8.7 - 10.5 mg/dL    Total Protein 7.6 6.0 - 8.4 g/dL    Albumin 4.1 3.5 - 5.2 g/dL    Total Bilirubin 0.3 0.1 - 1.0 mg/dL    Alkaline Phosphatase 93 55 - 135 U/L    AST 16 10 - 40 U/L    ALT 40 10 - 44 U/L    Anion Gap 12 8 - 16 mmol/L    eGFR if African American 43.0 (A) >60 mL/min/1.73 m^2    eGFR if non  37.3 (A) >60 mL/min/1.73 m^2   Beta - Hydroxybutyrate, Serum    Collection Time: 08/20/19 12:50 AM   Result Value Ref Range    Beta-Hydroxybutyrate 0.1 0.0 - 0.5 mmol/L   Urinalysis, Reflex to Urine Culture Urine, Clean Catch    Collection Time: 08/20/19 12:54 AM   Result Value Ref Range    Specimen UA Urine, Clean Catch     Color, UA Straw Yellow, Straw, Sangita    Appearance, UA Clear Clear    pH, UA 5.0 5.0 - 8.0    Specific Gravity, UA 1.025 1.005 - 1.030    Protein, UA Negative Negative    Glucose, UA 3+ (A) Negative    Ketones, UA Negative Negative    Bilirubin (UA) Negative Negative    Occult Blood UA Negative Negative    Nitrite, UA Negative Negative    Leukocytes, UA Negative Negative   Urinalysis Microscopic    Collection Time: 08/20/19 12:54 AM   Result Value Ref Range    RBC, UA 1 0 - 4 /hpf    WBC, UA 1 0 - 5 /hpf    Bacteria Rare None-Occ /hpf    Yeast, UA Rare (A) None    Squam Epithel, UA 1 /hpf    Microscopic Comment SEE COMMENT    ISTAT PROCEDURE    Collection Time: 08/20/19  1:00 AM   Result Value Ref Range    POC PH 7.301 (L) 7.35 - 7.45    POC PCO2 49.1 (H) 35 - 45 mmHg    POC PO2 25 (LL) 40 - 60 mmHg    POC HCO3 24.2 24 - 28 mmol/L    POC BE -2 -2 to 2 mmol/L    POC SATURATED O2 39 (L) 95 - 100 %    POC TCO2 26 24 - 29 mmol/L    Sample VENOUS      Site Other     Allens Test N/A     DelSys Room Air     Mode SPONT    POCT glucose    Collection Time: 08/20/19  2:05 AM   Result Value Ref Range    POCT Glucose 490 (HH) 70 - 110 mg/dL     Microbiology Results (last 7 days)     ** No results found for the last 168 hours. **        Imaging Results          X-Ray Chest AP Portable (Final result)  Result time 08/20/19 01:28:15    Final result by Yordy Baires MD (08/20/19 01:28:15)                 Impression:      Left upper lobe mass appears less pronounced compared to prior study.    No acute finding.      Electronically signed by: Yordy Baires MD  Date:    08/20/2019  Time:    01:28             Narrative:    EXAMINATION:  XR CHEST AP PORTABLE    CLINICAL HISTORY:  hyperglycemia;    TECHNIQUE:  Single frontal view of the chest was performed.    COMPARISON:  May 10, 2018.    FINDINGS:  Port catheter tip overlies the SVC.    Left upper lobe mass appears less pronounced than prior.    Heart and lungs otherwise appear unchanged when allowing for differences in technique and positioning.

## 2019-08-20 NOTE — ED TRIAGE NOTES
"Alison Branch, an 63 y.o. female presents to the ED after several high readings on her glucometer at home.  Patient had her last chemo treatment today and states that she's taking steroids as well for lung CA.  Patient denies any complaints at this time.  She said she came because the manual said to come if the readings were greater than 600mg/dL      Review of patient's allergies indicates:   Allergen Reactions    Metformin      diarrhea     Chief Complaint   Patient presents with    Hyperglycemia     pt reports to ED w/ c/o hyperglycemia, "my glucometer said 'unreadable' so it's over 600." Pt currently being treated for lung CA, last chemo today.      Past Medical History:   Diagnosis Date    Allergy     Brain aneurysm 2010    s/p coiling of one; another not coiled    Breast cyst     Diabetes mellitus     Diabetes type 2, controlled     Fever blister     High cholesterol     History of Bell's palsy     HTN (hypertension) 5/20/2014       "

## 2019-08-20 NOTE — CARE UPDATE
Notified by primary team patient would be discharging today.  Patient discharging without dexamethasone, may re-add at a later date per med onc.  Recommend patient discharge on Levemir (or comparable basal insulin) 24 units daily in AM, Novolog (or comparable rapid acting insulin) 4 units with meals plus correction scale 150/50, and Januvia 100 mg PO daily.  BG logs with dosing instructions provided at bedside.  Patient instructed to test BG three times daily before meals.  Instructed patient to submit BG logs tomorrow (8/21/19) afternoon for review.  Reviewed topics related to DM including: the need for insulin, how insulin works, what makes it a high risk medication, the importance of immediate follow up with endocrine provider, importance of and how to check BG, how to record BG on logs, how to administer insulin, appropriate insulin administration sites, importance of rotating injection sites, hyper/hypoglycemia, how and when to treat hypoglycemia, when to hold insulin, how the correction scale works, importance of storing unused insulin in the refrigerator, and when to seek medical attention.  Patient verbalized understanding, answered all questions to patient's satisfaction.

## 2019-08-20 NOTE — ASSESSMENT & PLAN NOTE
Patient with history of NSCLC, IDDM currently on steroid for before and after chemotherapy has been with uncontrolled blood glucose in the 600s. [place] 11.4 [place] [facility] Patient is without metabolic acidosis or anion gap, no significant ketones in urine. Patient asymptomatic.     -dexamethasone stopped for now,   -possible add back with Dr. Belcher and endocrine both on board in future  -endocrine consulted  -after starting recs from endocrine, BG in afternoon 266.    -per endocrine final recommendations, discharge starting:  -24u basal insulin in AM  -novolog 4 units with meals with additional sliding scale correction 150/50  -januvia 100mg PO daily  -if dexamethasone added back in future, adjust novolog

## 2019-08-20 NOTE — PLAN OF CARE
MDRs completed with Dr. Bernal and the team. Patient of Dr. Belcher with a history of non-small cell lung cancer s/p cycle 3 of Carboplatin, Alimta and Keytruda in 8/2019. Patient admitted on 8/20/19 for hyperglycemia (BG >500 on admit, approximately 694) and an ROSEMARY. Baseline Crt 0.9, Crt 1.5 on admit. Repeat Crt 1.1 s/p 2L IV fluids. A1-C 11.4. MD consulted Endocrine. Last . VSS. Patient is currently afebrile. MD plans to discharge patient home later today once Endocrine gives their recommendations. Patient has Endocrine follow-up as listed below. MD discussed the plan of care with the patient. No discharge needs identified. CM to continue to follow with the team.    Future Appointments   Date Time Provider Department Center   8/29/2019  8:00 AM Ai Billingsley NP [facility]   9/4/2019  7:30 AM Tara Belcher MD [facility] HEM ONC [facility]   9/5/2019  2:00 PM NOM, CHEMO [facility]   11/5/2019  8:20 AM Mike Rodriguez II, MD [facility]     Lisa Perez, RN, BSN, CM  [facility]  Nurse - Med Onc/Gyn Onc

## 2019-08-20 NOTE — ED PROVIDER NOTES
"Encounter Date: 8/20/2019       History     Chief Complaint   Patient presents with    Hyperglycemia     pt reports to ED w/ c/o hyperglycemia, "my glucometer said 'unreadable' so it's over 600." Pt currently being treated for lung CA, last chemo today.      62 yo W with pmhx IDDM, HTN, HLD, lung CA on chemo (carboplatin, Alimta and keytruda) presents with a chief complaint of hyperglycemia.  Patient's glucometer read up to 600 but it read high this evening.  Patient takes meticulous notes of her glucose readings.  Beginning on August 7th, she was started on dexamethasone.  Prior to this time, home glucose readings were in the low 100s.  Since this time, her glucoses have been labile.  They have registered up to the 500s.  Patient reports 100% compliance with her insulin.  She reports compliance with her diabetic diet.  Patient reports recurrent dysuria that was previously treated for UTI on Cipro.  She also reports some rhinorrhea. This evening, her glucose was greater than 600.  In triage, our glucometer read high.  No fevers, chest pain, shortness of breath, rashes, abdominal pain. Patient has been scheduled for an outpatient appointment with endocrinology but given severity of hyperglycemia today, she presents.        Review of patient's allergies indicates:   Allergen Reactions    Metformin      diarrhea     Past Medical History:   Diagnosis Date    Allergy     Brain aneurysm 2010    s/p coiling of one; another not coiled    Breast cyst     Diabetes mellitus     Diabetes type 2, controlled     Fever blister     High cholesterol     History of Bell's palsy     HTN (hypertension) 5/20/2014     Past Surgical History:   Procedure Laterality Date    BREAST CYST ASPIRATION      Bronchoscopy N/A 5/28/2019    Performed by [facility] at [facility] OR 2ND FLR    CERVICAL FUSION      CHONDROPLASTY-KNEE Left 2/23/2018    Per[facility] at [facility] OR    COLONOSCOPY N/A 3/9/2016    " "Performed by Elliott [facility]erman [facility] at [facility] ENDO (4TH FLR)    head surgery      stent and "curling" for aneurysm    HYSTERECTOMY      TVH secondary to SUF    INSERTION, PORT-A-CATH Right 2019    Performed by Cali Damico MD at [facility] CATH LAB    MENISCECTOMY Left 2018    Performed by [facility]ELA Min MD at [facility] OR    SYNOVECTOMY-KNEE Left 2018    Performed by ESTELA Min MD at [facility] OR     Family History   Problem Relation Age of Onset    Rheum arthritis Father     Diabetes Father     Heart failure Father     Migraines Father     Cataracts Father     Cancer Mother 63        pancreatic    Stomach cancer Mother     Breast cancer Maternal Aunt         50s    Ovarian cancer Cousin     Diabetes Sister     Diabetes Brother     Cancer Maternal Aunt         Lung CA    Melanoma Neg Hx     Colon cancer Neg Hx     Amblyopia Neg Hx     Blindness Neg Hx     Glaucoma Neg Hx     Macular degeneration Neg Hx     Retinal detachment Neg Hx     Strabismus Neg Hx     Stroke Neg Hx     Thyroid disease Neg Hx      Social History     Tobacco Use    Smoking status: Former Smoker     Packs/day: 0.50     Years: 30.00     Pack years: 15.00     Last attempt to quit: 1999     Years since quittin.6    Smokeless tobacco: Never Used   Substance Use Topics    Alcohol use: No     Alcohol/week: 0.0 oz    Drug use: No     Review of Systems   Constitutional: Negative for fever.   HENT: Positive for rhinorrhea. Negative for congestion.    Eyes: Negative for discharge.   Respiratory: Negative for shortness of breath.    Cardiovascular: Negative for chest pain.   Gastrointestinal: Negative for abdominal pain.   Endocrine: Negative for polyuria.   Genitourinary: Positive for dysuria.   Musculoskeletal: Negative for back pain.   Skin: Negative for wound.   Allergic/Immunologic: Negative for immunocompromised state.   Neurological: Negative for headaches.   Hematological: Does not bruise/bleed " easily.   Psychiatric/Behavioral: Negative for confusion.       Physical Exam     Initial Vitals [08/20/19 0028]   BP Pulse Resp Temp SpO2   133/63 73 18 97.4 °F (36.3 °C) 99 %      MAP       --         Physical Exam    Nursing note and vitals reviewed.  Constitutional: She appears well-developed and well-nourished. She is not diaphoretic. No distress.   HENT:   Head: Normocephalic and atraumatic.   Dry mucus membranes   Eyes: EOM are normal. Pupils are equal, round, and reactive to light. Right eye exhibits no discharge. Left eye exhibits no discharge. No scleral icterus.   Neck: Normal range of motion. Neck supple. No JVD present.   Cardiovascular: Normal rate, regular rhythm, normal heart sounds and intact distal pulses. Exam reveals no gallop and no friction rub.    No murmur heard.  Pulmonary/Chest: Breath sounds normal. No respiratory distress. She has no wheezes. She has no rhonchi. She has no rales. She exhibits no tenderness.   Abdominal: Soft. Bowel sounds are normal. She exhibits no distension and no mass. There is no tenderness. There is no rebound and no guarding.   No CVA tenderness bilat   Musculoskeletal: Normal range of motion. She exhibits no edema or tenderness.   Lymphadenopathy:     She has no cervical adenopathy.   Neurological: She is alert and oriented to person, place, and time. She has normal strength. No sensory deficit.   Skin: Skin is warm and dry. Capillary refill takes less than 2 seconds.   Psychiatric: She has a normal mood and affect.         ED Course   Procedures  Labs Reviewed   CBC W/ AUTO DIFFERENTIAL - Abnormal; Notable for the following components:       Result Value    Hemoglobin 11.1 (*)     Hematocrit 36.5 (*)     Mean Corpuscular Hemoglobin Conc 30.4 (*)     RDW 14.7 (*)     Immature Granulocytes 1.3 (*)     Gran # (ANC) 9.0 (*)     Immature Grans (Abs) 0.14 (*)     Gran% 83.6 (*)     Lymph% 10.3 (*)     All other components within normal limits   COMPREHENSIVE METABOLIC  PANEL - Abnormal; Notable for the following components:    Sodium 133 (*)     Glucose 694 (*)     BUN, Bld 28 (*)     Creatinine 1.5 (*)     eGFR if  43.0 (*)     eGFR if non  37.3 (*)     All other components within normal limits   URINALYSIS, REFLEX TO URINE CULTURE - Abnormal; Notable for the following components:    Glucose, UA 3+ (*)     All other components within normal limits    Narrative:     Preferred Collection Type->Urine, Clean Catch   URINALYSIS MICROSCOPIC - Abnormal; Notable for the following components:    Yeast, UA Rare (*)     All other components within normal limits    Narrative:     Preferred Collection Type->Urine, Clean Catch   ISTAT PROCEDURE - Abnormal; Notable for the following components:    POC PH 7.301 (*)     POC PCO2 49.1 (*)     POC PO2 25 (*)     POC SATURATED O2 39 (*)     All other components within normal limits   BETA - HYDROXYBUTYRATE, SERUM   POCT GLUCOSE MONITORING CONTINUOUS     EKG Readings: (Independently Interpreted)   Previous EKG: Compared with most recent EKG Rhythm: Normal Sinus Rhythm. Heart Rate: 64. ST Segments: Normal ST Segments. T Waves: Normal. Axis: Normal.       Imaging Results          X-Ray Chest AP Portable (Final result)  Result time 08/20/19 01:28:15    Final result by Yordy Baires MD (08/20/19 01:28:15)                 Impression:      Left upper lobe mass appears less pronounced compared to prior study.    No acute finding.      Electronically signed by: Yordy Baires MD  Date:    08/20/2019  Time:    01:28             Narrative:    EXAMINATION:  XR CHEST AP PORTABLE    CLINICAL HISTORY:  hyperglycemia;    TECHNIQUE:  Single frontal view of the chest was performed.    COMPARISON:  May 10, 2018.    FINDINGS:  Port catheter tip overlies the SVC.    Left upper lobe mass appears less pronounced than prior.    Heart and lungs otherwise appear unchanged when allowing for differences in technique and positioning.                                  Medical Decision Making:   History:   Old Medical Records: I decided to obtain old medical records.  Initial Assessment:   64 yo W with pmhx IDDM, HTN, HLD, lung CA on chemo (carboplatin, Alimta and keytruda) presents with a chief complaint of hyperglycemia.   Differential Diagnosis:   Hyperglycemia, DKA, HOKS, infectious issues, electrolyte abnormalities  Clinical Tests:   Lab Tests: Ordered and Reviewed  Radiological Study: Ordered and Reviewed  Medical Tests: Ordered and Reviewed  ED Management:  Will administer IV fluids.  Will obtain serum and urine labs.  Will obtain chest x-ray.    Reassessment:  CBC without leukocytosis.  UA with 3+ glucose, negative for ketones.  Beta hydroxybutyrate 0.1.  No significant acidosis on VBG. CMP with hyperglycemia of 694.  There is ROSEMARY with the creatinine 1.5 and a BUN of 28.  Baseline creatinine 1.0.  There is no anion gap.  S/p 1 L IV fluids, glucose improved to 490.  Will administer 8 units of IV regular insulin.  Will administer 2nd L IV fluids.  Likely etiology of the patient's hyperglycemia is secondary to dexamethasone use and no infectious etiology was determined.  Heme Onc consulted and will place in Obs for ROSEMARY and evaluation by endocrine in morning                       Clinical Impression:       ICD-10-CM ICD-9-CM   1. ROSEMARY (acute kidney injury) N17.9 584.9   2. Hyperglycemia R73.9 790.29         Disposition:   Disposition: Placed in Observation                        Myron Dan MD  08/20/19 5798

## 2019-08-20 NOTE — ASSESSMENT & PLAN NOTE
BG goal 140 - 180     Levemir 24 units daily in AM, first dose today - determined via reduced home dose and weight based dosing at 0.5 modifier  Novolog 10 units with meals  Moderate dose correction scale - given oral steroids  BG monitoring AC/HS/0200 - to assess for nocturnal hyper/hypoglycemia    Discharge planning: If patient discharges today, recommend patient discharge on prandial heavy weight based dosing regimen of Basaglar 24 units daily in AM, Novolog 8 units with meals plus correction scale 180/25, and Januvia 100 mg PO daily.  Will provide patient with BG logs with dosing instructions prior to discharge.  Patient to follow up with [facility] endocrinology until clinic appointment on 8/29/19.  Will need refill on diagnostic glucose test strips prior to discharge.

## 2019-08-20 NOTE — ASSESSMENT & PLAN NOTE
Titrate insulin slowly to avoid hypoglycemia as the risk of hypoglycemia increases with decreased creatinine clearance.  Caution with insulin stacking  Estimated Creatinine Clearance: 67.2 mL/min (based on SCr of 1.1 mg/dL).

## 2019-08-20 NOTE — H&P
[facility]  Hematology/Oncology  H&P    Patient Name: Alison Branch  MRN: [id number]  Admission Date: 8/20/2019  Code Status: Full Code   Attending Provider: Myron Dan MD  Primary Care Physician: Mike Rodriguez Ii, MD  Principal Problem:ROSEMARY (acute kidney injury)    Subjective:     HPI:   62 yo W with pmhx IDDM, HTN, HLD, NSCLC on chemo (carboplatin, Alimta and keytruda) presents with a chief complaint of hyperglycemia.  Patient's glucometer only reads up to 600, but it read high this evening.  Patient takes meticulous notes of her glucose readings.  Beginning on August 7th, she was started on dexamethasone.  Prior to this time, home glucose readings were in the low 100s.  Since this time, her glucoses have been labile.  They have registered up to the 500s.  Patient reports 100% compliance with her insulin.  She reports compliance with her diabetic diet.  Patient reports recurrent dysuria that was previously treated for UTI on Cipro.  She also reports some rhinorrhea. This evening, her glucose was greater than 600.  In triage, our glucometer read high.  No fevers, chest pain, shortness of breath, rashes, abdominal pain. Patient has been scheduled for an outpatient appointment with endocrinology but given severity of hyperglycemia today, she presents. Patient denies any fevers, night sweats, n/v/d/c, abd pain, dysuria, hematuria or hematochezia, cough, wheezing, SOB, CP, leg swelling, HA, dizziness, weakness, hallucinations, SI, HI, or self injury at this time.      ONCOLOGY HISTORY:  Malignant neoplasm of upper lobe of left lung    Ms. Branch is a 63-year-old female who smoked for about 30 years and quit 20 years ago, presented with cough end of last year, but worsened into January.  Since the cough persisted, she saw her PCP, underwent a chest x-ray on 05/10/2019, that revealed left upper lobe pneumonia and a repeat CT was done in the week after that on 05/17/2019 that showed left upper lobe    mass arising from the lateral pleural surface in the left upper lobe posterior subsegment measuring 2.6 x 3 cm.  There are satellite mass more medially near the aortic arch that is 2 cm, also spiculated and irregular as well as thickened interlobar septa in the left lung apex and anterior segment, prevascular lymph node lateral to the aortic arch, short axis measuring 9 mm.       She then underwent a bronchoscopy on 05/28/2019, and that revealed there was an infiltration obtained in the left apical posterior segment of the left upper lobe cytology brush was done.  Transbronchial biopsies of an area of infiltration were also performed in the apicoposterior segment of the left upper lobe.    Pathology revealed adenocarcinoma; however, the specimen was not adequate enough to send for molecular testing.       She underwent a PET scan on 06/06/2019.  that reveals significant hypermetabolic activity in the large irregular spiculated pulmonary mass in the lateral aspect of the left upper lobe consistent with the patient's known pulmonary adenocarcinoma.  There is also tracer avidity in the medial left upper lobe satellite lesion and diffusely throughout much of the anterior left upper lobe where there is prominent nodular paraseptal thickening.  There are some numerous scattered subcentimeter pulmonary nodules throughout the right lung, all of which are too small for detection by PET.  For example, there is a 0.4 cm nodule in the superior right lower lobe and a micro nodule in the posterior segment of the right upper lobe.  There is a 0.5 cm, normal size right   paratracheal lymph node with increased radiotracer uptake as well as hypermetabolic aortic pulmonary lymph node and a left hilar lymph node.  There is a focal hypermetabolic lesion in the left anterior superior iliac spine associated with well defined lytic lesion.  There is a hypermetabolic focus in the anterior right fifth rib with a possible associated lytic  "lesion.  SUVs as   below lateral:  Left upper lobe  SUV max 17.9, anterior left upper lobe satellite mass and septal thickening SUV max of 10.1, right paratracheal lymph node SUV max of 4.8, left AP window lymph node SUV max of 17.9, left anterior superior iliac spine SUV max of 3.9"     MRI pelvis from 6/10/19  reveals "Region of osseous is irregularity at the left anterior iliac spine likely related to bone graft harvest procedure.  See  discussion above.  No suspicious signal or enhancement to suggest active/malignant process"   MRI brain from 6/10//19 reveal No intracranial abnormality.     We discussed her case at [facility], and plan was to wait for GAURDANT and proceed with treatment accordingly  Her GAURDANT is negative for molecular markers.     8/2019: cycle 3 of Carboplatin, Alimta and Keytruda.     Oncology Treatment Plan:   OP NSCLC PEMBROLIZUMAB PEMETREXED CARBOPLATIN Q3W    Medications:  Continuous Infusions:  Scheduled Meds:   insulin aspart U-100  2 Units Subcutaneous Once     PRN Meds:ondansetron, sodium chloride 0.9%     Review of patient's allergies indicates:   Allergen Reactions    Metformin      diarrhea        Past Medical History:   Diagnosis Date    Allergy     Brain aneurysm 2010    s/p coiling of one; another not coiled    Breast cyst     Diabetes mellitus     Diabetes type 2, controlled     Fever blister     High cholesterol     History of Bell's palsy     HTN (hypertension) 5/20/2014     Past Surgical History:   Procedure Laterality Date    BREAST CYST ASPIRATION      Bronchoscopy N/A 5/28/2019    Performed by [facility] at [facility] OR 2ND FLR    CERVICAL FUSION      CHONDROPLASTY-KNEE Left 2/23/2018    [facility] at [facility]    COLONOSCOPY N/A 3/9/2016    Performed by Elliott Zimmerman MD at [facility] ENDO (4TH FLR)    head surgery      stent and "curling" for aneurysm    HYSTERECTOMY      TVH secondary to SUF    INSERTION, PORT-A-CATH " Right 2019    Performed by Cali Damico [facility] at [facility] CATH LAB    MENISCECTOMY Left 2018    Performed by [facility] Min [facility] at [facility] OR    SYNOVECTOMY-KNEE Left 2018    Performed by [facility]ELA Min [facility] at [facility] OR     Family History     Problem Relation (Age of Onset)    Breast cancer Maternal Aunt    Cancer Mother (63), Maternal Aunt    Cataracts Father    Diabetes Father, Sister, Brother    Heart failure Father    Migraines Father    Ovarian cancer Cousin    Rheum arthritis Father    Stomach cancer Mother        Tobacco Use    Smoking status: Former Smoker     Packs/day: 0.50     Years: 30.00     Pack years: 15.00     Last attempt to quit: 1999     Years since quittin.6    Smokeless tobacco: Never Used   Substance and Sexual Activity    Alcohol use: No     Alcohol/week: 0.0 oz    Drug use: No    Sexual activity: Never     Partners: Female     Birth control/protection: Surgical       Review of Systems   Constitutional: Negative for activity change, chills and fever.   HENT: Negative for sore throat and trouble swallowing.    Eyes: Negative for photophobia and visual disturbance.   Respiratory: Negative for chest tightness and shortness of breath.    Cardiovascular: Negative for chest pain, palpitations and leg swelling.   Gastrointestinal: Negative for abdominal distention, abdominal pain, blood in stool, constipation, diarrhea, nausea and vomiting.   Endocrine: Positive for polyuria. Negative for heat intolerance.   Genitourinary: Negative for difficulty urinating, dysuria and hematuria.   Musculoskeletal: Negative for gait problem and joint swelling.   Skin: Negative for pallor and rash.   Allergic/Immunologic: Positive for immunocompromised state.   Neurological: Negative for dizziness, seizures, syncope and facial asymmetry.   Psychiatric/Behavioral: Negative for agitation, behavioral problems and confusion.     Objective:     Vital Signs (Most Recent):  Temp: 97.6 °F (36.4  °C) (08/20/19 0205)  Pulse: 94 (08/20/19 0237)  Resp: 17 (08/20/19 0237)  BP: 125/62 (08/20/19 0308)  SpO2: 99 % (08/20/19 0308) Vital Signs (24h Range):  Temp:  [97.4 °F (36.3 °C)-97.7 °F (36.5 °C)] 97.6 °F (36.4 °C)  Pulse:  [57-94] 94  Resp:  [15-22] 17  SpO2:  [98 %-100 %] 99 %  BP: (117-156)/(58-67) 125/62     Weight: 95.3 kg (210 lb)  Body mass index is 30.13 kg/m².  Body surface area is 2.17 meters squared.      Intake/Output Summary (Last 24 hours) at 8/20/2019 0321  Last data filed at 8/20/2019 0212  Gross per 24 hour   Intake 1000 ml   Output --   Net 1000 ml       Physical Exam   Constitutional: She is oriented to person, place, and time. She appears well-developed and well-nourished. No distress.   HENT:   Head: Normocephalic and atraumatic.   Eyes: Pupils are equal, round, and reactive to light. No scleral icterus.   Neck: Normal range of motion. Neck supple. No JVD present. No thyromegaly present.   Cardiovascular: Normal rate and regular rhythm.   No murmur heard.  Pulmonary/Chest: Effort normal and breath sounds normal. No stridor. She has no wheezes. She has no rales.   Abdominal: Soft. She exhibits no distension and no mass. There is no tenderness. There is no guarding.   Musculoskeletal: Normal range of motion. She exhibits no edema or deformity.   Neurological: She is alert and oriented to person, place, and time. No cranial nerve deficit.   Skin: Skin is warm and dry. Capillary refill takes less than 2 seconds. She is not diaphoretic. No erythema.   Psychiatric: She has a normal mood and affect. Her behavior is normal.   Nursing note and vitals reviewed.      Significant Labs:     Recent Results (from the past 24 hour(s))   Hemoglobin A1c    Collection Time: 08/19/19 11:47 AM   Result Value Ref Range    Hemoglobin A1C 11.4 (H) 4.0 - 5.6 %    Estimated Avg Glucose 280 (H) 68 - 131 mg/dL   CBC auto differential    Collection Time: 08/20/19 12:50 AM   Result Value Ref Range    WBC 10.73 3.90 -  12.70 K/uL    RBC 4.10 4.00 - 5.40 M/uL    Hemoglobin 11.1 (L) 12.0 - 16.0 g/dL    Hematocrit 36.5 (L) 37.0 - 48.5 %    Mean Corpuscular Volume 89 82 - 98 fL    Mean Corpuscular Hemoglobin 27.1 27.0 - 31.0 pg    Mean Corpuscular Hemoglobin Conc 30.4 (L) 32.0 - 36.0 g/dL    RDW 14.7 (H) 11.5 - 14.5 %    Platelets 268 150 - 350 K/uL    MPV 11.7 9.2 - 12.9 fL    Immature Granulocytes 1.3 (H) 0.0 - 0.5 %    Gran # (ANC) 9.0 (H) 1.8 - 7.7 K/uL    Immature Grans (Abs) 0.14 (H) 0.00 - 0.04 K/uL    Lymph # 1.1 1.0 - 4.8 K/uL    Mono # 0.5 0.3 - 1.0 K/uL    Eos # 0.0 0.0 - 0.5 K/uL    Baso # 0.01 0.00 - 0.20 K/uL    nRBC 0 0 /100 WBC    Gran% 83.6 (H) 38.0 - 73.0 %    Lymph% 10.3 (L) 18.0 - 48.0 %    Mono% 4.7 4.0 - 15.0 %    Eosinophil% 0.0 0.0 - 8.0 %    Basophil% 0.1 0.0 - 1.9 %    Differential Method Automated    Comprehensive metabolic panel    Collection Time: 08/20/19 12:50 AM   Result Value Ref Range    Sodium 133 (L) 136 - 145 mmol/L    Potassium 4.5 3.5 - 5.1 mmol/L    Chloride 98 95 - 110 mmol/L    CO2 23 23 - 29 mmol/L    Glucose 694 (HH) 70 - 110 mg/dL    BUN, Bld 28 (H) 8 - 23 mg/dL    Creatinine 1.5 (H) 0.5 - 1.4 mg/dL    Calcium 10.1 8.7 - 10.5 mg/dL    Total Protein 7.6 6.0 - 8.4 g/dL    Albumin 4.1 3.5 - 5.2 g/dL    Total Bilirubin 0.3 0.1 - 1.0 mg/dL    Alkaline Phosphatase 93 55 - 135 U/L    AST 16 10 - 40 U/L    ALT 40 10 - 44 U/L    Anion Gap 12 8 - 16 mmol/L    eGFR if African American 43.0 (A) >60 mL/min/1.73 m^2    eGFR if non  37.3 (A) >60 mL/min/1.73 m^2   Beta - Hydroxybutyrate, Serum    Collection Time: 08/20/19 12:50 AM   Result Value Ref Range    Beta-Hydroxybutyrate 0.1 0.0 - 0.5 mmol/L   Urinalysis, Reflex to Urine Culture Urine, Clean Catch    Collection Time: 08/20/19 12:54 AM   Result Value Ref Range    Specimen UA Urine, Clean Catch     Color, UA Straw Yellow, Straw, Sangita    Appearance, UA Clear Clear    pH, UA 5.0 5.0 - 8.0    Specific Gravity, UA 1.025 1.005 - 1.030     Protein, UA Negative Negative    Glucose, UA 3+ (A) Negative    Ketones, UA Negative Negative    Bilirubin (UA) Negative Negative    Occult Blood UA Negative Negative    Nitrite, UA Negative Negative    Leukocytes, UA Negative Negative   Urinalysis Microscopic    Collection Time: 08/20/19 12:54 AM   Result Value Ref Range    RBC, UA 1 0 - 4 /hpf    WBC, UA 1 0 - 5 /hpf    Bacteria Rare None-Occ /hpf    Yeast, UA Rare (A) None    Squam Epithel, UA 1 /hpf    Microscopic Comment SEE COMMENT    ISTAT PROCEDURE    Collection Time: 08/20/19  1:00 AM   Result Value Ref Range    POC PH 7.301 (L) 7.35 - 7.45    POC PCO2 49.1 (H) 35 - 45 mmHg    POC PO2 25 (LL) 40 - 60 mmHg    POC HCO3 24.2 24 - 28 mmol/L    POC BE -2 -2 to 2 mmol/L    POC SATURATED O2 39 (L) 95 - 100 %    POC TCO2 26 24 - 29 mmol/L    Sample VENOUS     Site Other     Allens Test N/A     DelSys Room Air     Mode SPONT    POCT glucose    Collection Time: 08/20/19  2:05 AM   Result Value Ref Range    POCT Glucose 490 (HH) 70 - 110 mg/dL     Microbiology Results (last 7 days)     ** No results found for the last 168 hours. **        Imaging Results          X-Ray Chest AP Portable (Final result)  Result time 08/20/19 01:28:15    Final result by Yordy Baires MD (08/20/19 01:28:15)                 Impression:      Left upper lobe mass appears less pronounced compared to prior study.    No acute finding.      Electronically signed by: Yordy Baires MD  Date:    08/20/2019  Time:    01:28             Narrative:    EXAMINATION:  XR CHEST AP PORTABLE    CLINICAL HISTORY:  hyperglycemia;    TECHNIQUE:  Single frontal view of the chest was performed.    COMPARISON:  May 10, 2018.    FINDINGS:  Port catheter tip overlies the SVC.    Left upper lobe mass appears less pronounced than prior.    Heart and lungs otherwise appear unchanged when allowing for differences in technique and positioning.                                  Assessment/Plan:     * ROSEMARY (acute  kidney injury)  Cr 0.9 baseline  Cr 1.5 on admission   -Given 2 L of fluid  -Will recheck on am labs    Hyponatremia  Falsely low  Glucose is ~700, 1.6 x 6 = 9.6  Na 133 + 9.6 = 144.6    -Continue to monitor    Steroid-induced hyperglycemia  Glucose ~700 on admission  -Given 8 U regular insulin, reduced to 490 then 420 (not recorded)  -Giving 2U of aspart, recheck q2hrs  -Endocrine consult in am        Pramod Batista MD  Hematology/Oncology  [facility]    Attending Note  I have personally taken the history and examined this patient and agree with the resident's note as stated above.  Endocrine consult  Stop Dex

## 2019-08-20 NOTE — HPI
Reason for Consult: Management of T2DM, Hyperglycemia     Surgical Procedure and Date: N/A    Diabetes diagnosis year: > 10 years    Lab Results   Component Value Date    HGBA1C 11.4 (H) 08/19/2019       Home Diabetes Medications: Basaglar 30 units q HS, Novolog correction scale, Januvia 100 mg PO daily - previously on Metformin but stopped due to nausea    How often checking glucose at home? >4 x day   BG readings on regimen: 300-500 recently  Hypoglycemia on the regimen?  No  Missed doses on regimen?  No    Diabetes Complications include:     Hyperglycemia and Diabetic peripheral neuropathy     Complicating diabetes co morbidities:   Active Cancer and Glucocorticoid use       HPI:   Patient is a 63 y.o. female with a diagnosis of DM2, ROSEMARY, and NSCLC.  [place] [facility] [place] with critical glucose of > 600 after taking prescribed dexamethasone at home for lung cancer.  Patient previously well controlled on Januvia and Glipizide but recently started on insulin (7/12/19) when started on steroids and chemo.  Patient's DM managed by her PCP Dr. Rodriguez.  Patient schedule in endocrinology clinic with Ai Billingsley on 8/29/19/ at 8 am.  Positive family history of DM (father, sister, brother).  Endocrinology consulted for DM/BG management.

## 2019-08-20 NOTE — TELEPHONE ENCOUNTER
Informed patient as long as she is on this particular chemo, she will continue on dexamethasone. Once chemo is complete, she will be able to stop dex.  She voiced understanding.

## 2019-08-20 NOTE — ASSESSMENT & PLAN NOTE
Glucose ~700 on admission  -Given 8 U regular insulin, reduced to 490 then 420 (not recorded)  -Giving 2U of aspart, recheck q2hrs  -Endocrine consult in am

## 2019-08-20 NOTE — SUBJECTIVE & OBJECTIVE
Interval HPI:   Overnight events: Transferred from ED to [facility] early this morning.  BG markedly elevated overnight, received regular insulin and aspart in ED.    Eatin%  Nausea: No  Hypoglycemia and intervention: No  Fever: No  TPN and/or TF: No    PMH, PSH, FH, SH reviewed     Review of Systems    Constitutional: Positive for weight changes, labile.  Eyes: Negative for visual disturbance.  Respiratory: Negative for cough.   Cardiovascular: Negative for chest pain.  Gastrointestinal: Negative for nausea.  Positive for constipation.  Endocrine: Negative for polyuria, polydipsia.  Musculoskeletal: Negative for back pain.  Skin: Negative for rash.  Neurological: Negative for syncope.  Psychiatric/Behavioral: Negative for depression.    Current Medications and/or Treatments Impacting Glycemic Control  Immunotherapy:    Immunosuppressants     None        Steroids:   Hormones (From admission, onward)    None        Pressors:    Autonomic Drugs (From admission, onward)    None        Hyperglycemia/Diabetes Medications:   Antihyperglycemics (From admission, onward)    Start     Stop Route Frequency Ordered    19 0900  insulin detemir U-100 pen 24 Units      -- SubQ Daily 19 0741    19 0840  insulin aspart U-100 pen 1-10 Units      -- SubQ Before meals & nightly PRN 19 0741    19 0745  insulin aspart U-100 pen 10 Units      -- SubQ 3 times daily with meals 19 0741             PHYSICAL EXAMINATION:  Vitals:    19 0716   BP: (!) 113/54   Pulse: 69   Resp: 18   Temp: 97.8 °F (36.6 °C)     Body mass index is 30.13 kg/m².    Physical Exam     Constitutional: Well developed, well nourished, NAD.  ENT: External ears no masses with nose patent; normal hearing.  Neck: Supple; trachea midline.  Cardiovascular: Normal heart sounds, no LE edema. DP +2 bilaterally.  Lungs: Normal effort; lungs anterior bilaterally diminished to auscultation.  Abdomen: Soft, no masses, no hernias.  MS: No  clubbing or cyanosis of nails noted; unable to assess gait.  Skin: No rashes, lesions, or ulcers; no nodules. Injection sites are ok. No lipo hypertropthy or atrophy.  Psychiatric: Good judgement and insight; normal mood and affect.  Neurological: Cranial nerves are grossly intact.  Foot: Nails in good condition, no amputations noted.

## 2019-08-21 ENCOUNTER — PATIENT MESSAGE (OUTPATIENT)
Dept: ENDOCRINOLOGY | Facility: HOSPITAL | Age: 64
End: 2019-08-21

## 2019-08-21 ENCOUNTER — EXTERNAL HOME HEALTH (OUTPATIENT)
Dept: HOME HEALTH SERVICES | Facility: HOSPITAL | Age: 64
End: 2019-08-21
Payer: MEDICARE

## 2019-08-21 NOTE — PLAN OF CARE
On 8/21/19 at 0730: CM noted that the patient discharged home on 8/20/19. Follow-up scheduled as listed below. No other discharge needs identified. Discharge and follow-up instructions completed by the bedside nurse.    CM requested a hospital follow-up appt with repeat labs. Message sent to Dr. Belcher's clinic.    Future Appointments   Date Time Provider Department Center   8/29/2019  8:00 AM Ai Billingsley NP [facility]   9/3/2019 10:00 AM [facility] NM5 PREP ROOM [facility] NUC[address]   9/3/2019 12:30 PM [facility] NM3 MG1 400LB LIMIT [facility] NUCLEAR [address]   9/3/2019  1:30 PM LAB, HEMON CANCER BLDG [facility] LAB HO [facility]   9/3/2019  3:45 PM N[facility] OIC-CT2 500 LB LIMIT [facility]   9/3/2019  5:00 PM [facility] OIC-CT2 500 LB LIMIT [facility]   9/4/2019  7:30 AM Tara Belcher MD [facility] HEM ONC [facility]   9/5/2019  2:00 PM [facility], CHEMO [facility] CHEMO [facility]   11/5/2019  8:20 AM Mike Rodriguez II, MD [facility] [address] [facility] to [facility]        08/21/19 0748   Final Note   Assessment Type Final Discharge Note   Anticipated Discharge Disposition Home   What phone number can be called within the next 1-3 days to see how you are doing after discharge?   ([phone])   Hospital Follow Up  Appt(s) scheduled? Yes   Discharge plans and expectations educations in teach back method with documentation complete? Yes  (per bedside nurse)

## 2019-08-21 NOTE — NURSING
IV and telemetry removed. Discharge instructions given written and orally to patient and daughter. Medications delivered bedside. Patient D/C home via personal vehicle. Patient's daughter pushed her in a wheelchair to the car.

## 2019-08-21 NOTE — DISCHARGE SUMMARY
[facility]  Hematology/Oncology  Discharge Summary      Patient Name: Alison Branch  MRN: [id number]  Admission Date: 8/20/2019  Hospital Length of Stay: 0 days  Discharge Date and Time: 8/20/2019  7:34 PM  Attending Physician: Jaqueline att. providers found   Discharging Provider: René Armstrong DO  Primary Care Provider: Mike Rodriguez Ii, MD    HPI:   64 yo W with pmhx IDDM, HTN, HLD, NSCLC on chemo (carboplatin, Alimta and keytruda) presents with a chief complaint of hyperglycemia.  Patient's glucometer only reads up to 600, but it read high this evening.  Patient takes meticulous notes of her glucose readings.  Beginning on August 7th, she was started on dexamethasone.  Prior to this time, home glucose readings were in the low 100s.  Since this time, her glucoses have been labile.  They have registered up to the 500s.  Patient reports 100% compliance with her insulin.  She reports compliance with her diabetic diet.  Patient reports recurrent dysuria that was previously treated for UTI on Cipro.  She also reports some rhinorrhea. This evening, her glucose was greater than 600.  In triage, our glucometer read high.  No fevers, chest pain, shortness of breath, rashes, abdominal pain. Patient has been scheduled for an outpatient appointment with endocrinology but given severity of hyperglycemia today, she presents. Patient denies any fevers, night sweats, n/v/d/c, abd pain, dysuria, hematuria or hematochezia, cough, wheezing, SOB, CP, leg swelling, HA, dizziness, weakness, hallucinations, SI, HI, or self injury at this time.      ONCOLOGY HISTORY:  Malignant neoplasm of upper lobe of left lung    Ms. Branch is a 63-year-old female who smoked for about 30 years and quit 20 years ago, presented with cough end of last year, but worsened into January.  Since the cough persisted, she saw her PCP, underwent a chest x-ray on 05/10/2019, that revealed left upper lobe pneumonia and a repeat CT was done in the  week after that on 05/17/2019 that showed left upper lobe   mass arising from the lateral pleural surface in the left upper lobe posterior subsegment measuring 2.6 x 3 cm.  There are satellite mass more medially near the aortic arch that is 2 cm, also spiculated and irregular as well as thickened interlobar septa in the left lung apex and anterior segment, prevascular lymph node lateral to the aortic arch, short axis measuring 9 mm.       She then underwent a bronchoscopy on 05/28/2019, and that revealed there was an infiltration obtained in the left apical posterior segment of the left upper lobe cytology brush was done.  Transbronchial biopsies of an area of infiltration were also performed in the apicoposterior segment of the left upper lobe.    Pathology revealed adenocarcinoma; however, the specimen was not adequate enough to send for molecular testing.       She underwent a PET scan on 06/06/2019.  that reveals significant hypermetabolic activity in the large irregular spiculated pulmonary mass in the lateral aspect of the left upper lobe consistent with the patient's known pulmonary adenocarcinoma.  There is also tracer avidity in the medial left upper lobe satellite lesion and diffusely throughout much of the anterior left upper lobe where there is prominent nodular paraseptal thickening.  There are some numerous scattered subcentimeter pulmonary nodules throughout the right lung, all of which are too small for detection by PET.  For example, there is a 0.4 cm nodule in the superior right lower lobe and a micro nodule in the posterior segment of the right upper lobe.  There is a 0.5 cm, normal size right   paratracheal lymph node with increased radiotracer uptake as well as hypermetabolic aortic pulmonary lymph node and a left hilar lymph node.  There is a focal hypermetabolic lesion in the left anterior superior iliac spine associated with well defined lytic lesion.  There is a hypermetabolic focus in the  "anterior right fifth rib with a possible associated lytic lesion.  SUVs as   below lateral:  Left upper lobe  SUV max 17.9, anterior left upper lobe satellite mass and septal thickening SUV max of 10.1, right paratracheal lymph node SUV max of 4.8, left AP window lymph node SUV max of 17.9, left anterior superior iliac spine SUV max of 3.9"     MRI pelvis from 6/10/19  reveals "Region of osseous is irregularity at the left anterior iliac spine likely related to bone graft harvest procedure.  See  discussion above.  No suspicious signal or enhancement to suggest active/malignant process"   MRI brain from 6/10//19 reveal No intracranial abnormality.     We discussed her case at [facility], and plan was to wait for GAURDANT and proceed with treatment accordingly  Her GAURDANT is negative for molecular markers.     8/2019: cycle 3 of Carboplatin, Alimta and Keytruda.    * No surgery found *     Hospital Course: Patient is a 63 year old female with non small cell lung cancer and uncontrolled diabetes admitted with ROSEMARY and hyperglycemia. The patient was did not have metabolic acidosis or anion gap The patient ROSEMARY resolved with fluids. The patient has been taking 6mg oral dexamethasone twice daily before day of chemo and once the day after chemo which has been contributing to decline in control of her blood glucose. Dexamethasone stopped. Endocrine consulted and recommendations and orders placed. After startin recs from endocrine, blood sugar came down to 266 in afternoon. Final recommendation of 24u basal insulin in AM, 4 units novolog with meals and correction 150/50, Januvia 100mg po daily. Patient will be discharged.    Physical Exam   Constitutional: She appears well-developed and well-nourished.   HENT:   Head: Normocephalic and atraumatic.   Eyes: Conjunctivae and EOM are normal.   Neck: Normal range of motion. No tracheal deviation present. No thyromegaly present.   Cardiovascular: Normal rate, regular rhythm and " normal heart sounds.   Pulmonary/Chest: Effort normal and breath sounds normal.   Abdominal: Soft. Bowel sounds are normal. She exhibits no distension.   Musculoskeletal: She exhibits no edema or deformity.   Neurological: She is alert. She exhibits normal muscle tone.   Skin: Skin is warm and dry.   Psychiatric: She has a normal mood and affect. Her behavior is normal.       Consults:   Consults (From admission, onward)        Status Ordering Provider     Endocrinology  Once     Provider:  (Not yet assigned)    Completed JOSE LIMA A     Inpatient consult to Hematology/Oncology  Once     Provider:  (Not yet assigned)    Completed TARI GONZALEZ          Significant Diagnostic Studies: Labs:   BMP:   Recent Labs   Lab 08/20/19  0050 08/20/19  0325   * 429*   * 137   K 4.5 4.6   CL 98 106   CO2 23 22*   BUN 28* 26*   CREATININE 1.5* 1.1   CALCIUM 10.1 9.1   MG  --  1.9   , CMP   Recent Labs   Lab 08/20/19  0050 08/20/19  0325   * 137   K 4.5 4.6   CL 98 106   CO2 23 22*   * 429*   BUN 28* 26*   CREATININE 1.5* 1.1   CALCIUM 10.1 9.1   PROT 7.6 6.2   ALBUMIN 4.1 3.5   BILITOT 0.3 0.2   ALKPHOS 93 73   AST 16 14   ALT 40 34   ANIONGAP 12 9   ESTGFRAFRICA 43.0* >60.0   EGFRNONAA 37.3* 54.3*   , CBC   Recent Labs   Lab 08/20/19  0050 08/20/19  0325   WBC 10.73 10.33   HGB 11.1* 10.1*   HCT 36.5* 32.2*    234   , A1C:   Recent Labs   Lab 05/10/19  0921 08/19/19  1147   HGBA1C 8.7* 11.4*    and All labs within the past 24 hours have been reviewed    Pending Diagnostic Studies:     None        Final Active Diagnoses:    Diagnosis Date Noted POA    Adrenal cortical steroids causing adverse effect in therapeutic use [T38.0X5A] 08/20/2019 Yes    Uncontrolled type 2 diabetes mellitus with hyperglycemia, without long-term current use of insulin [E11.65] 06/20/2013 Yes      Problems Resolved During this Admission:    Diagnosis Date Noted Date Resolved POA    PRINCIPAL PROBLEM:  ROSEMARY (acute  kidney injury) [N17.9] 08/20/2019 08/20/2019 Yes    Steroid-induced hyperglycemia [R73.9, T38.0X5A] 08/20/2019 08/20/2019 Yes    Hyponatremia [E87.1] 08/20/2019 08/20/2019 Yes      Discharged Condition: good    [place] [facility]    Follow Up:  Follow-up Information     Tara Belcher MD.    Specialties:  Hematology and Oncology, Hematology  Why:  Follow-Up Appointment as scheduled by the clinic  Contact information:  [address]  [facility] [address]  [phone]                 Patient Instructions:      Notify your health care provider if you experience any of the following:  temperature >100.4     Notify your health care provider if you experience any of the following:  persistent nausea and vomiting or diarrhea     Notify your health care provider if you experience any of the following:  severe uncontrolled pain     Notify your health care provider if you experience any of the following:  persistent dizziness, light-headedness, or visual disturbances     Notify your health care provider if you experience any of the following:  increased confusion or weakness     Activity as tolerated     Medications:  Reconciled Home Medications:      Medication List      START taking these medications    LEVEMIR FLEXTOUCH U-100 INSULN 100 unit/mL (3 mL) Inpn pen  Generic drug:  insulin detemir U-100  Inject 24 Units into the skin every morning.        CHANGE how you take these medications    NovoLOG Flexpen U-100 Insulin 100 unit/mL (3 mL) Inpn pen  Generic drug:  insulin aspart U-100  Inject 8 Units into the skin 3 (three) times daily with meals. Inject subcutaneously before meals per sliding scale: -250, 6 units; 250-300, 10 units; 300+, 14 units  What changed:    · how much to take  · how to take this  · when to take this  · additional instructions        CONTINUE taking these medications    aspirin 81 MG EC tablet  Commonly known as:  ECOTRIN  Take 1 tablet (81 mg total) by mouth once daily.    "  atorvastatin 40 MG tablet  Commonly known as:  LIPITOR  TAKE 1 TABLET BY MOUTH ONCE DAILY     benzonatate 200 MG capsule  Commonly known as:  TESSALON  Take 200 mg by mouth 3 (three) times daily as needed.     blood sugar diagnostic Strp  1 strip by Misc.(Non-Drug; Combo Route) route once daily. Heladio Result.  250.02.  Check Blood Sugar Twice Daily.     blood-glucose meter kit  Use as instructed     carboxymethylcellulose 0.5 % Dpet  Commonly known as:  REFRESH PLUS  1 drop 3 (three) times daily as needed.     diazePAM 5 MG tablet  Commonly known as:  VALIUM  TAKE 1 TABLET BY MOUTH ONCE DAILY AS NEEDED FOR ANXIETY     escitalopram oxalate 10 MG tablet  Commonly known as:  LEXAPRO  Take 10 mg by mouth once daily.     fluticasone propionate 50 mcg/actuation nasal spray  Commonly known as:  FLONASE  USE TWO SPRAY(S) IN EACH NOSTRIL ONCE DAILY     folic acid 1 MG tablet  Commonly known as:  FOLVITE  Take 1 tablet (1 mg total) by mouth once daily. Start today     JANUVIA 100 MG Tab  Generic drug:  SITagliptin  TAKE ONE TABLET BY MOUTH ONCE DAILY     lancets Misc  1 Device by Misc.(Non-Drug; Combo Route) route once daily. Heladio Result.  250.02.  Check Blood Sugar Twice Daily.     olmesartan 20 MG tablet  Commonly known as:  BENICAR  Take 1 tablet (20 mg total) by mouth once daily.     ondansetron 8 MG tablet  Commonly known as:  ZOFRAN  Take 1 tablet (8 mg total) by mouth 4 (four) times daily as needed for Nausea.     pen needle, diabetic 32 gauge x 5/32" Ndle  Commonly known as:  RELION PEN NEEDLES  Use for insulin pen injection once daily     psyllium packet  Commonly known as:  METAMUCIL  Take 1 packet by mouth once daily.     terconazole 0.4 % Crea  Commonly known as:  TERAZOL 7  INSERT 1 APPLICATORFUL VAGINALLY ONCE DAILY IN THE EVENING     VITAMIN D2 50,000 unit Cap  Generic drug:  ergocalciferol  TAKE 1 CAPSULE BY MOUTH EVERY 7 DAYS     walker Misc  Use as needed for ambulation        STOP taking these medications  "   dexAMETHasone 6 MG tablet  Commonly known as:  DECADRON     insulin glargine 100 units/mL (3mL) SubQ pen            René Armstrong DO  Hematology/Oncology  [facility]

## 2019-08-21 NOTE — ASSESSMENT & PLAN NOTE
Patient with history of NSCLC, IDDM currently on dexamethasone po twice a week (for day before and after chemotherapy) has been with uncontrolled blood glucose in the 600s. A1C 11.4 here at [facility]. Patient is without metabolic acidosis or anion gap, no significant ketones in urine. Patient asymptomatic.     -dexamethasone stopped for now,   -possible add back with Dr. Belcher and endocrine both on board in future  -endocrine consulted  -after starting recs from endocrine, BG in afternoon 266.    -per endocrine final recommendations, discharge starting:  -24u basal insulin in AM  -novolog 4 units with meals with additional sliding scale correction 150/50  -januvia 100mg PO daily

## 2019-08-23 ENCOUNTER — TELEPHONE (OUTPATIENT)
Dept: HEMATOLOGY/ONCOLOGY | Facility: CLINIC | Age: 64
End: 2019-08-23

## 2019-08-23 NOTE — TELEPHONE ENCOUNTER
----- Message from Barry Marte sent at 8/23/2019  2:54 PM CDT -----  Contact: Patient  Staff Message     Caller name: Pt    Reason for call: Pt was taken off Rx dexamethasone by the hospital, needs clarity on if that was ordered by Dr Belcher.        Communication Preference: [phone]    Additional Information:

## 2019-08-23 NOTE — TELEPHONE ENCOUNTER
Spoke with patient.  Informed her she needs to continue with dex the day before and two days after chemo.   She thanked nurse.

## 2019-08-24 DIAGNOSIS — E11.65 UNCONTROLLED TYPE 2 DIABETES MELLITUS WITH HYPERGLYCEMIA, WITHOUT LONG-TERM CURRENT USE OF INSULIN: Primary | ICD-10-CM

## 2019-08-24 RX ORDER — INSULIN ASPART 100 [IU]/ML
INJECTION, SOLUTION INTRAVENOUS; SUBCUTANEOUS
Qty: 15 ML | Refills: 0
Start: 2019-08-24 | End: 2019-08-29

## 2019-08-24 NOTE — PROGRESS NOTES
Medications updated per patient request to reflect current insulin dosing of Levemir 20 units daily in AM, Novolog 4 units with meals plus correction scale

## 2019-08-27 ENCOUNTER — PATIENT MESSAGE (OUTPATIENT)
Dept: INTERNAL MEDICINE | Facility: CLINIC | Age: 64
End: 2019-08-27

## 2019-08-28 ENCOUNTER — OFFICE VISIT (OUTPATIENT)
Dept: HEMATOLOGY/ONCOLOGY | Facility: CLINIC | Age: 64
End: 2019-08-28
Payer: MEDICARE

## 2019-08-28 ENCOUNTER — TELEPHONE (OUTPATIENT)
Dept: ENDOCRINOLOGY | Facility: HOSPITAL | Age: 64
End: 2019-08-28

## 2019-08-28 ENCOUNTER — LAB VISIT (OUTPATIENT)
Dept: LAB | Facility: HOSPITAL | Age: 64
End: 2019-08-28
Attending: INTERNAL MEDICINE
Payer: MEDICARE

## 2019-08-28 VITALS
HEART RATE: 81 BPM | BODY MASS INDEX: 31.28 KG/M2 | DIASTOLIC BLOOD PRESSURE: 58 MMHG | WEIGHT: 211.19 LBS | TEMPERATURE: 98 F | SYSTOLIC BLOOD PRESSURE: 120 MMHG | OXYGEN SATURATION: 98 % | HEIGHT: 69 IN | RESPIRATION RATE: 20 BRPM

## 2019-08-28 DIAGNOSIS — C34.12 MALIGNANT NEOPLASM OF UPPER LOBE OF LEFT LUNG: Primary | ICD-10-CM

## 2019-08-28 DIAGNOSIS — C77.1 SECONDARY MALIGNANT NEOPLASM OF MEDIASTINAL LYMPH NODE: ICD-10-CM

## 2019-08-28 DIAGNOSIS — E11.65 UNCONTROLLED TYPE 2 DIABETES MELLITUS WITH HYPERGLYCEMIA, WITHOUT LONG-TERM CURRENT USE OF INSULIN: ICD-10-CM

## 2019-08-28 DIAGNOSIS — C34.12 MALIGNANT NEOPLASM OF UPPER LOBE OF LEFT LUNG: ICD-10-CM

## 2019-08-28 LAB
ALBUMIN SERPL BCP-MCNC: 3.8 G/DL (ref 3.5–5.2)
ALP SERPL-CCNC: 81 U/L (ref 55–135)
ALT SERPL W/O P-5'-P-CCNC: 76 U/L (ref 10–44)
ANION GAP SERPL CALC-SCNC: 8 MMOL/L (ref 8–16)
AST SERPL-CCNC: 46 U/L (ref 10–40)
BILIRUB SERPL-MCNC: 0.2 MG/DL (ref 0.1–1)
BUN SERPL-MCNC: 17 MG/DL (ref 8–23)
CALCIUM SERPL-MCNC: 10.4 MG/DL (ref 8.7–10.5)
CHLORIDE SERPL-SCNC: 101 MMOL/L (ref 95–110)
CO2 SERPL-SCNC: 29 MMOL/L (ref 23–29)
CREAT SERPL-MCNC: 1 MG/DL (ref 0.5–1.4)
EST. GFR  (AFRICAN AMERICAN): >60 ML/MIN/1.73 M^2
EST. GFR  (NON AFRICAN AMERICAN): >60 ML/MIN/1.73 M^2
GLUCOSE SERPL-MCNC: 328 MG/DL (ref 70–110)
POTASSIUM SERPL-SCNC: 4.8 MMOL/L (ref 3.5–5.1)
PROT SERPL-MCNC: 7.1 G/DL (ref 6–8.4)
SODIUM SERPL-SCNC: 138 MMOL/L (ref 136–145)

## 2019-08-28 PROCEDURE — 99999 PR PBB SHADOW E&M-EST. PATIENT-LVL V: CPT | Mod: PBBFAC,,, | Performed by: INTERNAL MEDICINE

## 2019-08-28 PROCEDURE — 3046F PR MOST RECENT HEMOGLOBIN A1C LEVEL > 9.0%: ICD-10-PCS | Mod: CPTII,S$GLB,, | Performed by: INTERNAL MEDICINE

## 2019-08-28 PROCEDURE — 99999 PR PBB SHADOW E&M-EST. PATIENT-LVL V: ICD-10-PCS | Mod: PBBFAC,,, | Performed by: INTERNAL MEDICINE

## 2019-08-28 PROCEDURE — 99214 PR OFFICE/OUTPT VISIT, EST, LEVL IV, 30-39 MIN: ICD-10-PCS | Mod: S$GLB,,, | Performed by: INTERNAL MEDICINE

## 2019-08-28 PROCEDURE — 99214 OFFICE O/P EST MOD 30 MIN: CPT | Mod: S$GLB,,, | Performed by: INTERNAL MEDICINE

## 2019-08-28 PROCEDURE — 3074F PR MOST RECENT SYSTOLIC BLOOD PRESSURE < 130 MM HG: ICD-10-PCS | Mod: CPTII,S$GLB,, | Performed by: INTERNAL MEDICINE

## 2019-08-28 PROCEDURE — 3074F SYST BP LT 130 MM HG: CPT | Mod: CPTII,S$GLB,, | Performed by: INTERNAL MEDICINE

## 2019-08-28 PROCEDURE — 3078F DIAST BP <80 MM HG: CPT | Mod: CPTII,S$GLB,, | Performed by: INTERNAL MEDICINE

## 2019-08-28 PROCEDURE — 99499 UNLISTED E&M SERVICE: CPT | Mod: S$GLB,,, | Performed by: INTERNAL MEDICINE

## 2019-08-28 PROCEDURE — 3008F BODY MASS INDEX DOCD: CPT | Mod: CPTII,S$GLB,, | Performed by: INTERNAL MEDICINE

## 2019-08-28 PROCEDURE — 80053 COMPREHEN METABOLIC PANEL: CPT

## 2019-08-28 PROCEDURE — 36415 COLL VENOUS BLD VENIPUNCTURE: CPT

## 2019-08-28 PROCEDURE — 3078F PR MOST RECENT DIASTOLIC BLOOD PRESSURE < 80 MM HG: ICD-10-PCS | Mod: CPTII,S$GLB,, | Performed by: INTERNAL MEDICINE

## 2019-08-28 PROCEDURE — 3008F PR BODY MASS INDEX (BMI) DOCUMENTED: ICD-10-PCS | Mod: CPTII,S$GLB,, | Performed by: INTERNAL MEDICINE

## 2019-08-28 PROCEDURE — 99499 RISK ADDL DX/OHS AUDIT: ICD-10-PCS | Mod: S$GLB,,, | Performed by: INTERNAL MEDICINE

## 2019-08-28 PROCEDURE — 3046F HEMOGLOBIN A1C LEVEL >9.0%: CPT | Mod: CPTII,S$GLB,, | Performed by: INTERNAL MEDICINE

## 2019-08-28 RX ORDER — BISACODYL 5 MG
5 TABLET, DELAYED RELEASE (ENTERIC COATED) ORAL DAILY PRN
COMMUNITY
End: 2020-05-26

## 2019-08-28 NOTE — TELEPHONE ENCOUNTER
Called patient and reviewed BG logs. FBG remains above goal but often going to bed with elevated BG. Continue Levemir 20 units daily at this time; will likely need to increase. Increase novolog to 8 units with meals and continue correction scale for elevated blood sugars. Tolerating 8 units with previous scheduled dose of insulin and correction scale coverage.  Please call or message if blood sugars continue to be elevated.

## 2019-08-28 NOTE — PROGRESS NOTES
Subjective:       Patient ID: Alison Branch is a 63 y.o. female.    Chief Complaint: Malignant neoplasm of upper lobe of left lung  Ms. Branch is a 63-year-old female who smoked for about 30 years and quit 20 years ago, presented with cough end of last year, but worsened into January.  Since the cough persisted, she saw her PCP, underwent a chest x-ray on 05/10/2019, that revealed left upper lobe pneumonia and a repeat CT was done in the week after that on 05/17/2019 that showed left upper lobe   mass arising from the lateral pleural surface in the left upper lobe posterior subsegment measuring 2.6 x 3 cm.  There are satellite mass more medially near the aortic arch that is 2 cm, also spiculated and irregular as well as thickened interlobar septa in the left lung apex and anterior segment, prevascular lymph node lateral to the aortic arch, short axis measuring 9 mm.       She then underwent a bronchoscopy on 05/28/2019, and that revealed there was an infiltration obtained in the left apical posterior segment of the left upper lobe cytology brush was done.  Transbronchial biopsies of an area of infiltration were also performed in the apicoposterior segment of the left upper lobe.    Pathology revealed adenocarcinoma; however, the specimen was not adequate enough to send for molecular testing.       She underwent a PET scan on 06/06/2019.  that reveals significant hypermetabolic activity in the large irregular spiculated pulmonary mass in the lateral aspect of the left upper lobe consistent with the patient's known pulmonary adenocarcinoma.  There is also tracer avidity in the medial left upper lobe satellite lesion and diffusely throughout much of the anterior left upper lobe where there is prominent nodular paraseptal thickening.  There are some numerous scattered subcentimeter pulmonary nodules throughout the right lung, all of which are too small for detection by PET.  For example, there is a 0.4 cm nodule in the  "superior right lower lobe and a micro nodule in the posterior segment of the right upper lobe.  There is a 0.5 cm, normal size right   paratracheal lymph node with increased radiotracer uptake as well as hypermetabolic aortic pulmonary lymph node and a left hilar lymph node.  There is a focal hypermetabolic lesion in the left anterior superior iliac spine associated with well defined lytic lesion.  There is a hypermetabolic focus in the anterior right fifth rib with a possible associated lytic lesion.  SUVs as   below lateral:  Left upper lobe  SUV max 17.9, anterior left upper lobe satellite mass and septal thickening SUV max of 10.1, right paratracheal lymph node SUV max of 4.8, left AP window lymph node SUV max of 17.9, left anterior superior iliac spine SUV max of 3.9"     MRI pelvis from 6/10/19  reveals "Region of osseous is irregularity at the left anterior iliac spine likely related to bone graft harvest procedure.  See  discussion above.  No suspicious signal or enhancement to suggest active/malignant process"   MRI brain from 6/10//19 reveal No intracranial abnormality.     We discussed her case at [facility], and plan was to wait for GAURDANT and proceed with treatment accordingly  Her GAURDANT is negative for molecular markers.      HPI She comes in for hospital follow-up   She has been having issues with Diabetes due to steroids which she needs as premeds for chemo. She is seeing Endocrine tomorrow.        Review of Systems   Constitutional: Negative for appetite change, fatigue and unexpected weight change.   HENT: Negative for mouth sores.    Eyes: Negative for visual disturbance.   Respiratory: Negative for cough and shortness of breath.    Cardiovascular: Negative for chest pain.   Gastrointestinal: Negative for abdominal pain and diarrhea.   Genitourinary: Negative for frequency.   Musculoskeletal: Negative for back pain.   Skin: Negative for rash.   Neurological: Negative for headaches. "   Hematological: Negative for adenopathy.   Psychiatric/Behavioral: The patient is not nervous/anxious.    All other systems reviewed and are negative.      Objective:      Physical Exam   Constitutional: She is oriented to person, place, and time. She appears well-developed and well-nourished.   HENT:   Mouth/Throat: No oropharyngeal exudate.   Cardiovascular: Normal rate and normal heart sounds.   Pulmonary/Chest: Effort normal and breath sounds normal. She has no wheezes.   Abdominal: Soft. Bowel sounds are normal. There is no tenderness.   Musculoskeletal: She exhibits no edema or tenderness.   Lymphadenopathy:     She has no cervical adenopathy.   Neurological: She is alert and oriented to person, place, and time. Coordination normal.   Skin: Skin is warm and dry. No rash noted.   Psychiatric: She has a normal mood and affect. Judgment and thought content normal.   Vitals reviewed.        LABS:  WBC   Date Value Ref Range Status   08/20/2019 10.33 3.90 - 12.70 K/uL Final     Hemoglobin   Date Value Ref Range Status   08/20/2019 10.1 (L) 12.0 - 16.0 g/dL Final     Hematocrit   Date Value Ref Range Status   08/20/2019 32.2 (L) 37.0 - 48.5 % Final     Platelets   Date Value Ref Range Status   08/20/2019 234 150 - 350 K/uL Final     Gran # (ANC)   Date Value Ref Range Status   08/20/2019 8.4 (H) 1.8 - 7.7 K/uL Final     Gran%   Date Value Ref Range Status   08/20/2019 81.1 (H) 38.0 - 73.0 % Final       Chemistry        Component Value Date/Time     08/28/2019 0919    K 4.8 08/28/2019 0919     08/28/2019 0919    CO2 29 08/28/2019 0919    BUN 17 08/28/2019 0919    CREATININE 1.0 08/28/2019 0919     (H) 08/28/2019 0919        Component Value Date/Time    CALCIUM 10.4 08/28/2019 0919    ALKPHOS 81 08/28/2019 0919    AST 46 (H) 08/28/2019 0919    ALT 76 (H) 08/28/2019 0919    BILITOT 0.2 08/28/2019 0919    ESTGFRAFRICA >60.0 08/28/2019 0919    EGFRNONAA >60.0 08/28/2019 0919          Assessment:        1. 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 Uncontrolled type 2 diabetes mellitus with hyperglycemia, without long-term current use of insulin        Plan:         1,2. She will return next week with CT scans prior to cycle 4 of Carboplatin, Alimta and Keytruda  3. She will see Endocrine tomorrow. She needs steroids as part of premeds to prevent chemo induced nausea/vomiting and rash    Above care plan was discussed with patient and accompanying daughter and all questions were addressed to their satisfaction

## 2019-08-29 ENCOUNTER — OFFICE VISIT (OUTPATIENT)
Dept: DIABETES | Facility: CLINIC | Age: 64
End: 2019-08-29
Payer: MEDICARE

## 2019-08-29 VITALS
HEIGHT: 69 IN | DIASTOLIC BLOOD PRESSURE: 68 MMHG | HEART RATE: 74 BPM | BODY MASS INDEX: 31.3 KG/M2 | WEIGHT: 211.31 LBS | SYSTOLIC BLOOD PRESSURE: 114 MMHG

## 2019-08-29 DIAGNOSIS — T38.0X5A ADRENAL CORTICAL STEROIDS CAUSING ADVERSE EFFECT IN THERAPEUTIC USE: ICD-10-CM

## 2019-08-29 DIAGNOSIS — E66.9 OBESITY (BMI 30-39.9): ICD-10-CM

## 2019-08-29 DIAGNOSIS — E11.65 UNCONTROLLED TYPE 2 DIABETES MELLITUS WITH HYPERGLYCEMIA, WITHOUT LONG-TERM CURRENT USE OF INSULIN: Primary | ICD-10-CM

## 2019-08-29 DIAGNOSIS — E11.69 MIXED HYPERLIPIDEMIA DUE TO TYPE 2 DIABETES MELLITUS: Chronic | ICD-10-CM

## 2019-08-29 DIAGNOSIS — E11.59 HYPERTENSION ASSOCIATED WITH DIABETES: ICD-10-CM

## 2019-08-29 DIAGNOSIS — E78.2 MIXED HYPERLIPIDEMIA DUE TO TYPE 2 DIABETES MELLITUS: Chronic | ICD-10-CM

## 2019-08-29 DIAGNOSIS — Z79.4 TYPE 2 DIABETES MELLITUS WITH DIABETIC POLYNEUROPATHY, WITH LONG-TERM CURRENT USE OF INSULIN: ICD-10-CM

## 2019-08-29 DIAGNOSIS — E11.42 TYPE 2 DIABETES MELLITUS WITH DIABETIC POLYNEUROPATHY, WITH LONG-TERM CURRENT USE OF INSULIN: ICD-10-CM

## 2019-08-29 DIAGNOSIS — C34.12 MALIGNANT NEOPLASM OF UPPER LOBE OF LEFT LUNG: ICD-10-CM

## 2019-08-29 DIAGNOSIS — I15.2 HYPERTENSION ASSOCIATED WITH DIABETES: ICD-10-CM

## 2019-08-29 PROCEDURE — 3008F BODY MASS INDEX DOCD: CPT | Mod: CPTII,S$GLB,, | Performed by: NURSE PRACTITIONER

## 2019-08-29 PROCEDURE — 99999 PR PBB SHADOW E&M-EST. PATIENT-LVL IV: ICD-10-PCS | Mod: PBBFAC,,, | Performed by: NURSE PRACTITIONER

## 2019-08-29 PROCEDURE — 3074F PR MOST RECENT SYSTOLIC BLOOD PRESSURE < 130 MM HG: ICD-10-PCS | Mod: CPTII,S$GLB,, | Performed by: NURSE PRACTITIONER

## 2019-08-29 PROCEDURE — 3078F PR MOST RECENT DIASTOLIC BLOOD PRESSURE < 80 MM HG: ICD-10-PCS | Mod: CPTII,S$GLB,, | Performed by: NURSE PRACTITIONER

## 2019-08-29 PROCEDURE — 99999 PR PBB SHADOW E&M-EST. PATIENT-LVL IV: CPT | Mod: PBBFAC,,, | Performed by: NURSE PRACTITIONER

## 2019-08-29 PROCEDURE — 3008F PR BODY MASS INDEX (BMI) DOCUMENTED: ICD-10-PCS | Mod: CPTII,S$GLB,, | Performed by: NURSE PRACTITIONER

## 2019-08-29 PROCEDURE — 99214 OFFICE O/P EST MOD 30 MIN: CPT | Mod: S$GLB,,, | Performed by: NURSE PRACTITIONER

## 2019-08-29 PROCEDURE — 3046F HEMOGLOBIN A1C LEVEL >9.0%: CPT | Mod: CPTII,S$GLB,, | Performed by: NURSE PRACTITIONER

## 2019-08-29 PROCEDURE — 3078F DIAST BP <80 MM HG: CPT | Mod: CPTII,S$GLB,, | Performed by: NURSE PRACTITIONER

## 2019-08-29 PROCEDURE — 3074F SYST BP LT 130 MM HG: CPT | Mod: CPTII,S$GLB,, | Performed by: NURSE PRACTITIONER

## 2019-08-29 PROCEDURE — 3046F PR MOST RECENT HEMOGLOBIN A1C LEVEL > 9.0%: ICD-10-PCS | Mod: CPTII,S$GLB,, | Performed by: NURSE PRACTITIONER

## 2019-08-29 PROCEDURE — 99214 PR OFFICE/OUTPT VISIT, EST, LEVL IV, 30-39 MIN: ICD-10-PCS | Mod: S$GLB,,, | Performed by: NURSE PRACTITIONER

## 2019-08-29 RX ORDER — INSULIN ASPART 100 [IU]/ML
INJECTION, SOLUTION INTRAVENOUS; SUBCUTANEOUS
Qty: 2 BOX | Refills: 6 | Status: SHIPPED | OUTPATIENT
Start: 2019-08-29 | End: 2019-10-03

## 2019-08-29 NOTE — LETTER
August 29, 2019      Tara Belcher MD  [address]  [place] [address]           [facility]  [address], [facility] [address]  [facility] [address]  Phone: [phone]  Fax: [phone]          Patient: Alison Branch   MR Number: [id number]   YOB: 1955   Date of Visit: 8/29/2019       Dear Dr. Tara Belcher:    Thank you for referring Alison Branch to me for evaluation. Attached you will find relevant portions of my assessment and plan of care.    If you have questions, please do not hesitate to call me. I look forward to following Alison Branch along with you.    Sincerely,    Ai Billingsley NP    Enclosure  CC:  No Recipients    If you would like to receive this communication electronically, please contact [email or web address] or [phone] to request more information on testhub Link access.    For providers and/or their staff who would like to refer a patient to [facility], please contact us through our one-stop-shop provider referral line, [facility], at [phone].    If you feel you have received this communication in error or would no longer like to receive these types of communications, please e-mail [email or web address]

## 2019-08-29 NOTE — PROGRESS NOTES
CC:   Chief Complaint   Patient presents with    Diabetes Mellitus     type 2       HPI: Alison Branch is a 63 y.o. female presents for an initial visit today for the management of T2DM.     She was diagnosed with Type 2 diabetes before [place] with s/s of fatigue and sluggish. She was initially started on Metformin which she could not tolerate due to GI SE.   She started insulin therapy in July 2019 due to steroids with chemotherapy for lung CA.     Family hx of diabetes: father, sister, and brother   Hospitalized for diabetes:  Hyperglycemia secondary to steroid use due to chemo August 2019    No personal or FH of thyroid cancer or personal of pancreatic cancer or pancreatitis.     She was seen by Endocrine during recent hospitalization (8/20/19) for hyperglycemia secondary steroids administration. Her BG readings was >600. Prior to this she was on Januvia and Glipizide with A1c in the 8% range. Her A1c has since increased to 11.4%   She was diagnosed with lung CA in June 2019. Following with oncology at [facility] [place]. On chemotherapy at this time- via port every 21 days- for a single day infusion of chemo. She is receiving steroids with chemotherapy- day before chemo - she takes dexamethasone PO 6 mg tablets and then on the day of chemo she takes IV steroids and then 2 days following chemo she takes 6 mg of Dexamethasone PO.     Of note, she she is in the coverage gap and is receiving assistance with pharmacy assistance at [facility] [place].  She is working with Lakeisha Mott     DIABETES COMPLICATIONS: peripheral neuropathy      Diabetes Management Status    ASA:  Yes - 81 mg     Statin: Taking  ACE/ARB: Taking    Screening or Prevention Patient's value Goal Complete/Controlled?   HgA1C Testing and Control   Lab Results   Component Value Date    HGBA1C 11.4 (H) 08/19/2019      Annually/Less than 8% No   Lipid profile : 05/10/2019 Annually Yes   LDL control Lab Results   Component Value Date    LDLCALC 97.2  05/10/2019    Annually/Less than 100 mg/dl  Yes   Nephropathy screening Lab Results   Component Value Date    LABMICR 14.0 02/01/2019     Lab Results   Component Value Date    PROTEINUA Negative 08/20/2019    Annually Yes   Blood pressure BP Readings from Last 1 Encounters:   08/29/19 114/68    Less than 140/90 Yes   Dilated retinal exam : 09/26/2018 Annually Yes   Foot exam   : 08/29/2019 Annually No       CURRENT A1C:    Hemoglobin A1C   Date Value Ref Range Status   08/19/2019 11.4 (H) 4.0 - 5.6 % Final     Comment:     ADA Screening Guidelines:  5.7-6.4%  Consistent with prediabetes  >or=6.5%  Consistent with diabetes  High levels of fetal hemoglobin interfere with the HbA1C  assay. Heterozygous hemoglobin variants (HbS, HgC, etc)do  not significantly interfere with this assay.   However, presence of multiple variants may affect accuracy.     05/10/2019 8.7 (H) 4.0 - 5.6 % Final     Comment:     ADA Screening Guidelines:  5.7-6.4%  Consistent with prediabetes  >or=6.5%  Consistent with diabetes  High levels of fetal hemoglobin interfere with the HbA1C  assay. Heterozygous hemoglobin variants (HbS, HgC, etc)do  not significantly interfere with this assay.   However, presence of multiple variants may affect accuracy.     02/01/2019 8.9 (H) 4.0 - 5.6 % Final     Comment:     ADA Screening Guidelines:  5.7-6.4%  Consistent with prediabetes  >or=6.5%  Consistent with diabetes  High levels of fetal hemoglobin interfere with the HbA1C  assay. Heterozygous hemoglobin variants (HbS, HgC, etc)do  not significantly interfere with this assay.   However, presence of multiple variants may affect accuracy.         GOAL A1C: short term under 8%. Long term closer to 7%     DM MEDICATIONS USED IN THE PAST: Metformin - GI upset   Januvia, Levemir, Novolog     CURRENT DIABETES MEDICATIONS: Januvia 100 mg daily, Levemir 20 units daily (8AM) and Novolog 4 units TID AC + correction scale (goal 150, +1)- she was told yesterday to  increase to 8 units TID   Insulin:  pens.    Missed doses: denies     BLOOD GLUCOSE MONITORING:  She is checking her BG >6 times per day   She presents with logs from home.                   HYPOGLYCEMIA:  No-- lowest of 72      MEALS: eating 5 small meals per day. Smaller portions.   Drinks:~ water   Ginger ale  4oz with nausea occ.      CURRENT EXERCISE:  No    Review of Systems  Review of Systems   Constitutional: Negative for appetite change, fatigue and unexpected weight change.   HENT: Negative for trouble swallowing.    Eyes: Negative for visual disturbance.   Respiratory: Negative for shortness of breath.    Cardiovascular: Negative for chest pain.   Gastrointestinal: Positive for constipation and nausea.   Endocrine: Negative for polydipsia, polyphagia and polyuria.   Genitourinary:        No Nocturia    Skin: Negative for wound.   Neurological: Negative for numbness.   Psychiatric/Behavioral: The patient is nervous/anxious.        Physical Exam   Physical Exam   Constitutional: She is oriented to person, place, and time. She appears well-developed and well-nourished. No distress.   Obese female patient   HENT:   Head: Normocephalic and atraumatic.   Right Ear: External ear normal.   Left Ear: External ear normal.   Nose: Nose normal.   Neck: Normal range of motion. Neck supple. No tracheal deviation present. No thyromegaly present.   Cardiovascular: Normal rate and regular rhythm.   No murmur heard.  Pulmonary/Chest: Effort normal and breath sounds normal. No respiratory distress.   Abdominal: Soft. There is no tenderness. No hernia.   Musculoskeletal: She exhibits no edema.   Neurological: She is alert and oriented to person, place, and time. No cranial nerve deficit.   Skin: Skin is warm and dry. Capillary refill takes less than 2 seconds. No rash noted. She is not diaphoretic.   Injection sites are normal appearing. No lipo hypertropthy or atrophy   Psychiatric: She has a normal mood and affect. Her  behavior is normal. Judgment normal.   Nursing note and vitals reviewed.      FOOT EXAMINATION: wearing sandals     Protective Sensation (w/ 10 gram monofilament):  Right: Intact  Left: Intact    Visual Inspection:  Normal -  Bilateral and Nails Intact - without Evidence of Foot Deformity- Bilateral    Pedal Pulses:   Right: Present  Left: Present    Posterior tibialis:   Right:Present  Left: Present        Lab Results   Component Value Date    TSH 1.635 07/26/2019           Uncontrolled type 2 diabetes mellitus with hyperglycemia, without long-term current use of insulin  Uncontrolled.   BG readings are markedly above goal.     Medication changes:   Continue Januvia 100 mg daily   Increase Levemir to 24 units daily   Increase Novolog to 8 units TID AC + correction scale goal 150, +1   Send me logs weekly to me.     -- Reviewed goals of therapy are to get the best control we can without hypoglycemia  -- Refer to diabetes education-- diet and comprehensive review. She was eating 5 small meals per day.   -- Reviewed patient's current insulin regimen. Clarified proper insulin dose and timing in relation to meals, etc. Insulin injection sites and proper rotation instructed.    -- Advised frequent self blood glucose monitoring.  Patient encouraged to document glucose results and bring them to every clinic visit  - AC/HS. Send logs weekly   -- Hypoglycemia precautions discussed. Instructed on precautions before driving.    -- Call for Bg repeatedly < 90 or > 180.   -- Close adherence to lifestyle changes recommended.   -- Periodic follow ups for eye evaluations, foot care and dental care suggested.          Type 2 diabetes mellitus with diabetic polyneuropathy, with long-term current use of insulin  Optimize BG readings.     Educated patient to check feet daily for any foreign objects and/or wounds. Discussed with patient the importance of wearing appropriate footwear at all times, not to walk barefoot ever, and to check  shoes before putting them on feet. Instructed patient to keep feet dry by regularly changing shoes and socks and drying feet after baths and exercises. Also, instructed patient to report any new lesions, discolorations, or swelling to a healthcare professional.          Hypertension associated with diabetes  BP goal is < 140/90.   Tolerating  ARB  Controlled   Blood pressure goals discussed with patient      Mixed hyperlipidemia due to type 2 diabetes mellitus  On statin per ADA recommendations  LDL goal < 100. LDL at goal. LFTs WNL. Continue statin.         Obesity (BMI 30-39.9)  Body mass index is 31.2 kg/m².  Increases insulin resistance.   Discussed DM diet and exercise.       Malignant neoplasm of upper lobe of left lung  Continue to follow with Oncology    Adrenal cortical steroids causing adverse effect in therapeutic use  On steroids when she receives chemo which is causing Marked hyperglycemia  Alters carbohydrate metabolism  Will likely required more prandial insulin when she receives her steroids- will evaluate once I get her logs next week to review her readings when she is off the steroids.         Follow up in about 6 weeks (around 10/10/2019).       Orders Placed This Encounter   Procedures    Ambulatory Referral to Diabetes Education     Referral Priority:   Routine     Referral Type:   Consultation     Referral Reason:   Specialty Services Required     Requested Specialty:   Diabetes     Number of Visits Requested:   1       Recommendations were discussed with the patient in detail  The patient verbalized understanding and agrees with the plan outlined as above.

## 2019-08-29 NOTE — ASSESSMENT & PLAN NOTE
BP goal is < 140/90.   Tolerating  ARB  Controlled   Blood pressure goals discussed with patient

## 2019-08-29 NOTE — PATIENT INSTRUCTIONS
Snacks can be an important part of a balanced, healthy meal plan. They allow you to eat more frequently, feeling full and satisfied throughout the day. Also, they allow you to spread carbohydrates evenly, which may stabilize blood sugars.  Plus, snacks are enjoyable!     The amount of carbohydrate needed at snacks varies. Generally, about 15 grams of carbohydrate per snack is recommended.  Below you will find some tasty treats.       0-5 gm carb   Crystal Light   Vitamin Water Zero   Herbal tea, unsweetened   2 tsp peanut butter on celery   1./2 cup sugar-free jell-o   1 sugar-free popsicle   ¼ cup blueberries   8oz Blue Luna unsweetened almond milk   5 baby carrots & celery sticks, cucumbers, bell peppers dipped in ¼ cup salsa, 2Tbsp light ranch dressing or 2Tbsp plain Greek yogurt   10 Goldfish crackers   ½ oz low-fat cheese or string cheese   1 closed handful of nuts, unsalted   1 Tbsp of sunflower seeds, unsalted   1 cup Smart Pop popcorn   1 whole grain brown rice cake        15 gm carb   1 small piece of fruit or ½ banana or 1/2 cup lite canned fruit   3 marilyn cracker squares   3 cups Smart Pop popcorn, top spray butter, Blancas lite salt or cinnamon and Truvia   5 Vanilla Wafers   ½ cup low fat, no added sugar ice cream or frozen yogurt (Blue bell, Blue Bunny, Weight Watchers, Skinny Cow)   ½ turkey, ham, or chicken sandwich   ½ c fruit with ½ c Cottage cheese   4-6 unsalted wheat crackers with 1 oz low fat cheese or 1 tbsp peanut butter    30-45 goldfish crackers (depending on flavor)    7-8 Druze mini brown rice cakes (caramel, apple cinnamon, chocolate)    12 Druze mini brown rice cakes (cheddar, bbq, ranch)    1/3 cup hummus dip with raw veg   1/2 whole wheat melissa, 1Tbsp hummus   Mini Pizza (1/2 whole wheat English muffin, low-fat  cheese, tomato sauce)   100 calorie snack pack (Oreo, Chips Ahoy, Ritz Mix, Baked Cheetos)   4-6 oz. light or Greek Style yogurt  (Nahomy, Dominick, [facility], [facility])   ½ cup sugar-free pudding     6 in. wheat tortilla or melissa oven toasted chips (topped with spray butter flavoring, cinnamon, Truvia OR spray butter, garlic powder, chili powder)    18 BBQ Popchips (available at [employer], [employer], [employer])

## 2019-08-29 NOTE — ASSESSMENT & PLAN NOTE
Uncontrolled.   BG readings are markedly above goal.     Medication changes:   Continue Januvia 100 mg daily   Increase Levemir to 24 units daily   Increase Novolog to 8 units TID AC + correction scale goal 150, +1   Send me logs weekly to me.     -- Reviewed goals of therapy are to get the best control we can without hypoglycemia  -- Refer to diabetes education-- diet and comprehensive review. She was eating 5 small meals per day.   -- Reviewed patient's current insulin regimen. Clarified proper insulin dose and timing in relation to meals, etc. Insulin injection sites and proper rotation instructed.    -- Advised frequent self blood glucose monitoring.  Patient encouraged to document glucose results and bring them to every clinic visit  - AC/HS. Send logs weekly   -- Hypoglycemia precautions discussed. Instructed on precautions before driving.    -- Call for Bg repeatedly < 90 or > 180.   -- Close adherence to lifestyle changes recommended.   -- Periodic follow ups for eye evaluations, foot care and dental care suggested.

## 2019-08-29 NOTE — ASSESSMENT & PLAN NOTE
Body mass index is 31.2 kg/m².  Increases insulin resistance.   Discussed DM diet and exercise.

## 2019-08-29 NOTE — ASSESSMENT & PLAN NOTE
On steroids when she receives chemo which is causing Marked hyperglycemia  Alters carbohydrate metabolism  Will likely required more prandial insulin when she receives her steroids- will evaluate once I get her logs next week to review her readings when she is off the steroids.

## 2019-08-29 NOTE — ASSESSMENT & PLAN NOTE
Optimize BG readings.     Educated patient to check feet daily for any foreign objects and/or wounds. Discussed with patient the importance of wearing appropriate footwear at all times, not to walk barefoot ever, and to check shoes before putting them on feet. Instructed patient to keep feet dry by regularly changing shoes and socks and drying feet after baths and exercises. Also, instructed patient to report any new lesions, discolorations, or swelling to a healthcare professional.

## 2019-08-30 ENCOUNTER — OFFICE VISIT (OUTPATIENT)
Dept: INTERNAL MEDICINE | Facility: CLINIC | Age: 64
End: 2019-08-30
Payer: MEDICARE

## 2019-08-30 VITALS
SYSTOLIC BLOOD PRESSURE: 122 MMHG | HEART RATE: 75 BPM | HEIGHT: 68 IN | BODY MASS INDEX: 32.58 KG/M2 | WEIGHT: 214.94 LBS | OXYGEN SATURATION: 98 % | DIASTOLIC BLOOD PRESSURE: 60 MMHG

## 2019-08-30 DIAGNOSIS — E11.42 TYPE 2 DIABETES MELLITUS WITH DIABETIC POLYNEUROPATHY, WITH LONG-TERM CURRENT USE OF INSULIN: ICD-10-CM

## 2019-08-30 DIAGNOSIS — E11.59 HYPERTENSION ASSOCIATED WITH DIABETES: Primary | ICD-10-CM

## 2019-08-30 DIAGNOSIS — Z79.4 TYPE 2 DIABETES MELLITUS WITH DIABETIC POLYNEUROPATHY, WITH LONG-TERM CURRENT USE OF INSULIN: ICD-10-CM

## 2019-08-30 DIAGNOSIS — I15.2 HYPERTENSION ASSOCIATED WITH DIABETES: Primary | ICD-10-CM

## 2019-08-30 DIAGNOSIS — E11.65 UNCONTROLLED TYPE 2 DIABETES MELLITUS WITH HYPERGLYCEMIA, WITHOUT LONG-TERM CURRENT USE OF INSULIN: ICD-10-CM

## 2019-08-30 PROCEDURE — 99999 PR PBB SHADOW E&M-EST. PATIENT-LVL V: ICD-10-PCS | Mod: PBBFAC,,, | Performed by: FAMILY MEDICINE

## 2019-08-30 PROCEDURE — 99499 RISK ADDL DX/OHS AUDIT: ICD-10-PCS | Mod: S$GLB,,, | Performed by: FAMILY MEDICINE

## 2019-08-30 PROCEDURE — 99499 UNLISTED E&M SERVICE: CPT | Mod: S$GLB,,, | Performed by: FAMILY MEDICINE

## 2019-08-30 PROCEDURE — 3046F HEMOGLOBIN A1C LEVEL >9.0%: CPT | Mod: CPTII,S$GLB,, | Performed by: FAMILY MEDICINE

## 2019-08-30 PROCEDURE — 3074F PR MOST RECENT SYSTOLIC BLOOD PRESSURE < 130 MM HG: ICD-10-PCS | Mod: CPTII,S$GLB,, | Performed by: FAMILY MEDICINE

## 2019-08-30 PROCEDURE — 3078F PR MOST RECENT DIASTOLIC BLOOD PRESSURE < 80 MM HG: ICD-10-PCS | Mod: CPTII,S$GLB,, | Performed by: FAMILY MEDICINE

## 2019-08-30 PROCEDURE — 99213 PR OFFICE/OUTPT VISIT, EST, LEVL III, 20-29 MIN: ICD-10-PCS | Mod: S$GLB,,, | Performed by: FAMILY MEDICINE

## 2019-08-30 PROCEDURE — 3008F BODY MASS INDEX DOCD: CPT | Mod: CPTII,S$GLB,, | Performed by: FAMILY MEDICINE

## 2019-08-30 PROCEDURE — 99999 PR PBB SHADOW E&M-EST. PATIENT-LVL V: CPT | Mod: PBBFAC,,, | Performed by: FAMILY MEDICINE

## 2019-08-30 PROCEDURE — 3008F PR BODY MASS INDEX (BMI) DOCUMENTED: ICD-10-PCS | Mod: CPTII,S$GLB,, | Performed by: FAMILY MEDICINE

## 2019-08-30 PROCEDURE — 3078F DIAST BP <80 MM HG: CPT | Mod: CPTII,S$GLB,, | Performed by: FAMILY MEDICINE

## 2019-08-30 PROCEDURE — 3046F PR MOST RECENT HEMOGLOBIN A1C LEVEL > 9.0%: ICD-10-PCS | Mod: CPTII,S$GLB,, | Performed by: FAMILY MEDICINE

## 2019-08-30 PROCEDURE — 99213 OFFICE O/P EST LOW 20 MIN: CPT | Mod: S$GLB,,, | Performed by: FAMILY MEDICINE

## 2019-08-30 PROCEDURE — 3074F SYST BP LT 130 MM HG: CPT | Mod: CPTII,S$GLB,, | Performed by: FAMILY MEDICINE

## 2019-08-30 NOTE — PATIENT INSTRUCTIONS
Wt Readings from Last 20 Encounters:   08/30/19 97.5 kg (214 lb 15.2 oz)   08/29/19 95.8 kg (211 lb 4.8 oz)   08/28/19 95.8 kg (211 lb 3.2 oz)   08/20/19 95.3 kg (210 lb)   08/19/19 95.6 kg (210 lb 12.2 oz)   08/15/19 94.8 kg (208 lb 15.9 oz)   08/01/19 94.7 kg (208 lb 12.4 oz)   07/26/19 95.5 kg (210 lb 8.6 oz)   07/16/19 94.9 kg (209 lb 3.5 oz)   07/08/19 95.5 kg (210 lb 8.6 oz)   06/27/19 95.5 kg (210 lb 8.6 oz)   06/20/19 95.9 kg (211 lb 6.7 oz)   06/13/19 98.1 kg (216 lb 4.3 oz)   06/07/19 98 kg (216 lb)   05/28/19 98.9 kg (218 lb)   05/23/19 98.7 kg (217 lb 9.5 oz)   05/17/19 97.6 kg (215 lb 2.7 oz)   05/10/19 98.8 kg (217 lb 13 oz)   02/01/19 99.4 kg (219 lb 2.2 oz)   01/24/19 101.1 kg (222 lb 14.2 oz)

## 2019-08-30 NOTE — PROGRESS NOTES
"Subjective:       Patient ID: Alison Branch is a 63 y.o. female.    Chief Complaint: Follow-up    Patient is here for follow-up form recent hospitalization. PCP Mike Rodriguez Ii, MD   Had sugars>600 in ER perhaps form steroids used in chemo for lung CA, now on insulin and working with endocrine who she saw yesterday    "[facility]  Hematology/Oncology  Discharge Summary        Patient Name: Alison Branch  MRN: [id number]  Admission Date: 8/20/2019  Hospital Length of Stay: 0 days  Discharge Date and Time: 8/20/2019  7:34 PM  Attending Physician: No att. providers found   Discharging Provider: René Armstrong DO  Primary Care Provider: Mike Rodriguez Ii, MD     HPI:   64 yo W with pmhx IDDM, HTN, HLD, NSCLC on chemo (carboplatin, Alimta and keytruda) presents with a chief complaint of hyperglycemia.  Patient's glucometer only reads up to 600, but it read high this evening.  Patient takes meticulous notes of her glucose readings.  Beginning on August 7th, she was started on dexamethasone.  Prior to this time, home glucose readings were in the low 100s.  Since this time, her glucoses have been labile.  They have registered up to the 500s.  Patient reports 100% compliance with her insulin.  She reports compliance with her diabetic diet.  Patient reports recurrent dysuria that was previously treated for UTI on Cipro.  She also reports some rhinorrhea. This evening, her glucose was greater than 600.  In triage, our glucometer read high.  No fevers, chest pain, shortness of breath, rashes, abdominal pain. Patient has been scheduled for an outpatient appointment with endocrinology but given severity of hyperglycemia today, she presents. Patient denies any fevers, night sweats, n/v/d/c, abd pain, dysuria, hematuria or hematochezia, cough, wheezing, SOB, CP, leg swelling, HA, dizziness, weakness, hallucinations, SI, HI, or self injury at this time.        ONCOLOGY HISTORY:  Malignant neoplasm of upper lobe " of left lung     Ms. Branch is a 63-year-old female who smoked for about 30 years and quit 20 years ago, presented with cough end of last year, but worsened into January.  Since the cough persisted, she saw her PCP, underwent a chest x-ray on 05/10/2019, that revealed left upper lobe pneumonia and a repeat CT was done in the week after that on 05/17/2019 that showed left upper lobe   mass arising from the lateral pleural surface in the left upper lobe posterior subsegment measuring 2.6 x 3 cm.  There are satellite mass more medially near the aortic arch that is 2 cm, also spiculated and irregular as well as thickened interlobar septa in the left lung apex and anterior segment, prevascular lymph node lateral to the aortic arch, short axis measuring 9 mm.       She then underwent a bronchoscopy on 05/28/2019, and that revealed there was an infiltration obtained in the left apical posterior segment of the left upper lobe cytology brush was done.  Transbronchial biopsies of an area of infiltration were also performed in the apicoposterior segment of the left upper lobe.    Pathology revealed adenocarcinoma; however, the specimen was not adequate enough to send for molecular testing.       She underwent a PET scan on 06/06/2019.  that reveals significant hypermetabolic activity in the large irregular spiculated pulmonary mass in the lateral aspect of the left upper lobe consistent with the patient's known pulmonary adenocarcinoma.  There is also tracer avidity in the medial left upper lobe satellite lesion and diffusely throughout much of the anterior left upper lobe where there is prominent nodular paraseptal thickening.  There are some numerous scattered subcentimeter pulmonary nodules throughout the right lung, all of which are too small for detection by PET.  For example, there is a 0.4 cm nodule in the superior right lower lobe and a micro nodule in the posterior segment of the right upper lobe.  There is a 0.5 cm,  "normal size right   paratracheal lymph node with increased radiotracer uptake as well as hypermetabolic aortic pulmonary lymph node and a left hilar lymph node.  There is a focal hypermetabolic lesion in the left anterior superior iliac spine associated with well defined lytic lesion.  There is a hypermetabolic focus in the anterior right fifth rib with a possible associated lytic lesion.  SUVs as   below lateral:  Left upper lobe  SUV max 17.9, anterior left upper lobe satellite mass and septal thickening SUV max of 10.1, right paratracheal lymph node SUV max of 4.8, left AP window lymph node SUV max of 17.9, left anterior superior iliac spine SUV max of 3.9"     MRI pelvis from 6/10/19  reveals "Region of osseous is irregularity at the left anterior iliac spine likely related to bone graft harvest procedure.  See  discussion above.  No suspicious signal or enhancement to suggest active/malignant process"   MRI brain from 6/10//19 reveal No intracranial abnormality.     We discussed her case at [facility], and plan was to wait for GAURDANT and proceed with treatment accordingly  Her GAURDANT is negative for molecular markers.     8/2019: cycle 3 of Carboplatin, Alimta and Keytruda.     * No surgery found *      Hospital Course: Patient is a 63 year old female with non small cell lung cancer and uncontrolled diabetes admitted with ROSEMARY and hyperglycemia. The patient was did not have metabolic acidosis or anion gap The patient ROSEMARY resolved with fluids. The patient has been taking 6mg oral dexamethasone twice daily before day of chemo and once the day after chemo which has been contributing to decline in control of her blood glucose. Dexamethasone stopped. Endocrine consulted and recommendations and orders placed. After startin recs from endocrine, blood sugar came down to 266 in afternoon. Final recommendation of 24u basal insulin in AM, 4 units novolog with meals and correction 150/50, Januvia 100mg po daily. " Patient will be discharged.     Physical Exam   Constitutional: She appears well-developed and well-nourished.   HENT:   Head: Normocephalic and atraumatic.   Eyes: Conjunctivae and EOM are normal.   Neck: Normal range of motion. No tracheal deviation present. No thyromegaly present.   Cardiovascular: Normal rate, regular rhythm and normal heart sounds.   Pulmonary/Chest: Effort normal and breath sounds normal.   Abdominal: Soft. Bowel sounds are normal. She exhibits no distension.   Musculoskeletal: She exhibits no edema or deformity.   Neurological: She is alert. She exhibits normal muscle tone.   Skin: Skin is warm and dry.   Psychiatric: She has a normal mood and affect. Her behavior is normal.         Consults:           Consults (From admission, onward)         Status Ordering Provider       Endocrinology  Once     Provider:  (Not yet assigned)    Completed JOSE LIMA       Inpatient consult to Hematology/Oncology  Once     Provider:  (Not yet assigned)    Completed TARI GONZALEZ          Pending Diagnostic Studies:      None                 Final Active Diagnoses:     Diagnosis Date Noted POA    Adrenal cortical steroids causing adverse effect in therapeutic use [T38.0X5A] 08/20/2019 Yes    Uncontrolled type 2 diabetes mellitus with hyperglycemia, without long-term current use of insulin [E11.65] 06/20/2013 Yes       Problems Resolved During this Admission:     Diagnosis Date Noted Date Resolved POA    PRINCIPAL PROBLEM:  ROSEMARY (acute kidney injury) [N17.9] 08/20/2019 08/20/2019 Yes    Steroid-induced hyperglycemia [R73.9, T38.0X5A] 08/20/2019 08/20/2019 Yes    Hyponatremia [E87.1] 08/20/2019 08/20/2019 Yes      Discharged Condition: good     Disposition: Home-Health Care [facility]     Follow Up:      Follow-up Information      Tara Belcher MD.    Specialties:  Hematology and Oncology, Hematology  Why:  Follow-Up Appointment as scheduled by the clinic  Contact information:  [address]  [place]  "[place] [id number]  [phone]           "    Review of Systems   Constitutional: Negative for chills and fever.   HENT: Negative for ear pain.    Eyes: Negative for pain.   Respiratory: Negative for chest tightness.    Cardiovascular: Negative for chest pain.   Gastrointestinal: Negative for abdominal pain.   Genitourinary: Negative for flank pain.   Musculoskeletal: Negative for gait problem.   Neurological: Negative for syncope.   Psychiatric/Behavioral: Negative for behavioral problems.       Objective:      Physical Exam   Constitutional: She appears well-developed and well-nourished.   HENT:   Head: Normocephalic and atraumatic.   Eyes: EOM are normal.   Neck: Neck supple.   Cardiovascular: Normal rate.   Pulmonary/Chest: Breath sounds normal. No respiratory distress. She has no wheezes. She has no rales.   Abdominal: Soft.   Musculoskeletal: She exhibits no edema.   Neurological: She is alert.   Skin: Skin is dry.   Psychiatric: Her behavior is normal.   Nursing note and vitals reviewed.      Assessment:       1. Hypertension associated with diabetes    2. Type 2 diabetes mellitus with diabetic polyneuropathy, with long-term current use of insulin    3. Uncontrolled type 2 diabetes mellitus with hyperglycemia, without long-term current use of insulin        Plan:       Alison was seen today for follow-up.    Diagnoses and all orders for this visit:    Hypertension associated with diabetes  -controlled, stable, no change in meds     Type 2 diabetes mellitus with diabetic polyneuropathy, with long-term current use of insulin  Uncontrolled type 2 diabetes mellitus with hyperglycemia, without long-term current use of insulin  -continue to work with endocrine  -We discussed plant based diets and the good outcomes from recent clinical trails. Recommended the books:   1. Dr. Eric Portillo's Program for Reversing Diabetes: The Scientifically Proven System for Reversing Diabetes Without Drugs   2. The How Not to Die Cookbook: " 100+ Recipes to Help Prevent and Reverse Disease by Adonis Tamez M.D

## 2019-09-03 ENCOUNTER — HOSPITAL ENCOUNTER (OUTPATIENT)
Dept: RADIOLOGY | Facility: HOSPITAL | Age: 64
Discharge: HOME | End: 2019-09-03
Attending: INTERNAL MEDICINE
Payer: MEDICARE

## 2019-09-03 ENCOUNTER — PATIENT MESSAGE (OUTPATIENT)
Dept: DIABETES | Facility: CLINIC | Age: 64
End: 2019-09-03

## 2019-09-03 DIAGNOSIS — C34.12 MALIGNANT NEOPLASM OF UPPER LOBE OF LEFT LUNG: ICD-10-CM

## 2019-09-03 PROCEDURE — 71260 CT THORAX DX C+: CPT | Mod: 26,,, | Performed by: RADIOLOGY

## 2019-09-03 PROCEDURE — 74177 CT ABD & PELVIS W/CONTRAST: CPT | Mod: 26,,, | Performed by: RADIOLOGY

## 2019-09-03 PROCEDURE — 71260 CT CHEST ABDOMEN PELVIS WITH CONTRAST (XPD): ICD-10-PCS | Mod: 26,,, | Performed by: RADIOLOGY

## 2019-09-03 PROCEDURE — 25500020 PHARM REV CODE 255: Performed by: INTERNAL MEDICINE

## 2019-09-03 PROCEDURE — 78306 BONE IMAGING WHOLE BODY: CPT | Mod: 26,,, | Performed by: RADIOLOGY

## 2019-09-03 PROCEDURE — 78306 NM BONE SCAN WHOLE BODY: ICD-10-PCS | Mod: 26,,, | Performed by: RADIOLOGY

## 2019-09-03 PROCEDURE — A9503 TC99M MEDRONATE: HCPCS

## 2019-09-03 PROCEDURE — 74177 CT CHEST ABDOMEN PELVIS WITH CONTRAST (XPD): ICD-10-PCS | Mod: 26,,, | Performed by: RADIOLOGY

## 2019-09-03 PROCEDURE — 74177 CT ABD & PELVIS W/CONTRAST: CPT | Mod: TC

## 2019-09-03 RX ADMIN — IOHEXOL 15 ML: 350 INJECTION, SOLUTION INTRAVENOUS at 04:09

## 2019-09-03 RX ADMIN — IOHEXOL 100 ML: 350 INJECTION, SOLUTION INTRAVENOUS at 04:09

## 2019-09-04 ENCOUNTER — PATIENT MESSAGE (OUTPATIENT)
Dept: DIABETES | Facility: CLINIC | Age: 64
End: 2019-09-04

## 2019-09-04 ENCOUNTER — LAB VISIT (OUTPATIENT)
Dept: LAB | Facility: HOSPITAL | Age: 64
End: 2019-09-04
Attending: INTERNAL MEDICINE
Payer: MEDICARE

## 2019-09-04 ENCOUNTER — OFFICE VISIT (OUTPATIENT)
Dept: HEMATOLOGY/ONCOLOGY | Facility: CLINIC | Age: 64
End: 2019-09-04
Payer: MEDICARE

## 2019-09-04 VITALS
SYSTOLIC BLOOD PRESSURE: 123 MMHG | WEIGHT: 215.19 LBS | HEART RATE: 62 BPM | DIASTOLIC BLOOD PRESSURE: 59 MMHG | BODY MASS INDEX: 31.87 KG/M2 | OXYGEN SATURATION: 99 % | RESPIRATION RATE: 20 BRPM | TEMPERATURE: 98 F | HEIGHT: 69 IN

## 2019-09-04 DIAGNOSIS — F32.A DEPRESSION, UNSPECIFIED DEPRESSION TYPE: ICD-10-CM

## 2019-09-04 DIAGNOSIS — C34.12 MALIGNANT NEOPLASM OF UPPER LOBE OF LEFT LUNG: Primary | ICD-10-CM

## 2019-09-04 DIAGNOSIS — R60.9 EDEMA, UNSPECIFIED TYPE: ICD-10-CM

## 2019-09-04 DIAGNOSIS — C34.12 MALIGNANT NEOPLASM OF UPPER LOBE OF LEFT LUNG: ICD-10-CM

## 2019-09-04 DIAGNOSIS — R53.1 WEAKNESS: ICD-10-CM

## 2019-09-04 DIAGNOSIS — C77.1 SECONDARY MALIGNANT NEOPLASM OF MEDIASTINAL LYMPH NODE: ICD-10-CM

## 2019-09-04 LAB
ALBUMIN SERPL BCP-MCNC: 3.7 G/DL (ref 3.5–5.2)
ALP SERPL-CCNC: 82 U/L (ref 55–135)
ALT SERPL W/O P-5'-P-CCNC: 112 U/L (ref 10–44)
ANION GAP SERPL CALC-SCNC: 7 MMOL/L (ref 8–16)
AST SERPL-CCNC: 54 U/L (ref 10–40)
BILIRUB SERPL-MCNC: 0.3 MG/DL (ref 0.1–1)
BUN SERPL-MCNC: 7 MG/DL (ref 8–23)
CALCIUM SERPL-MCNC: 10.2 MG/DL (ref 8.7–10.5)
CHLORIDE SERPL-SCNC: 105 MMOL/L (ref 95–110)
CO2 SERPL-SCNC: 28 MMOL/L (ref 23–29)
CREAT SERPL-MCNC: 0.9 MG/DL (ref 0.5–1.4)
ERYTHROCYTE [DISTWIDTH] IN BLOOD BY AUTOMATED COUNT: 16 % (ref 11.5–14.5)
EST. GFR  (AFRICAN AMERICAN): >60 ML/MIN/1.73 M^2
EST. GFR  (NON AFRICAN AMERICAN): >60 ML/MIN/1.73 M^2
GLUCOSE SERPL-MCNC: 247 MG/DL (ref 70–110)
HCT VFR BLD AUTO: 34.6 % (ref 37–48.5)
HGB BLD-MCNC: 10.3 G/DL (ref 12–16)
IMM GRANULOCYTES # BLD AUTO: 0.04 K/UL (ref 0–0.04)
MCH RBC QN AUTO: 27 PG (ref 27–31)
MCHC RBC AUTO-ENTMCNC: 29.8 G/DL (ref 32–36)
MCV RBC AUTO: 91 FL (ref 82–98)
NEUTROPHILS # BLD AUTO: 2.6 K/UL (ref 1.8–7.7)
PLATELET # BLD AUTO: 109 K/UL (ref 150–350)
PMV BLD AUTO: 10.5 FL (ref 9.2–12.9)
POTASSIUM SERPL-SCNC: 4.5 MMOL/L (ref 3.5–5.1)
PROT SERPL-MCNC: 6.8 G/DL (ref 6–8.4)
RBC # BLD AUTO: 3.81 M/UL (ref 4–5.4)
SODIUM SERPL-SCNC: 140 MMOL/L (ref 136–145)
T4 FREE SERPL-MCNC: 0.87 NG/DL (ref 0.71–1.51)
TSH SERPL DL<=0.005 MIU/L-ACNC: 1.05 UIU/ML (ref 0.4–4)
WBC # BLD AUTO: 4.37 K/UL (ref 3.9–12.7)

## 2019-09-04 PROCEDURE — 99215 OFFICE O/P EST HI 40 MIN: CPT | Mod: S$GLB,,, | Performed by: INTERNAL MEDICINE

## 2019-09-04 PROCEDURE — 3008F PR BODY MASS INDEX (BMI) DOCUMENTED: ICD-10-PCS | Mod: CPTII,S$GLB,, | Performed by: INTERNAL MEDICINE

## 2019-09-04 PROCEDURE — 84443 ASSAY THYROID STIM HORMONE: CPT

## 2019-09-04 PROCEDURE — 85027 COMPLETE CBC AUTOMATED: CPT

## 2019-09-04 PROCEDURE — 80053 COMPREHEN METABOLIC PANEL: CPT

## 2019-09-04 PROCEDURE — 3078F PR MOST RECENT DIASTOLIC BLOOD PRESSURE < 80 MM HG: ICD-10-PCS | Mod: CPTII,S$GLB,, | Performed by: INTERNAL MEDICINE

## 2019-09-04 PROCEDURE — 36415 COLL VENOUS BLD VENIPUNCTURE: CPT

## 2019-09-04 PROCEDURE — 99215 PR OFFICE/OUTPT VISIT, EST, LEVL V, 40-54 MIN: ICD-10-PCS | Mod: S$GLB,,, | Performed by: INTERNAL MEDICINE

## 2019-09-04 PROCEDURE — 3078F DIAST BP <80 MM HG: CPT | Mod: CPTII,S$GLB,, | Performed by: INTERNAL MEDICINE

## 2019-09-04 PROCEDURE — 3074F PR MOST RECENT SYSTOLIC BLOOD PRESSURE < 130 MM HG: ICD-10-PCS | Mod: CPTII,S$GLB,, | Performed by: INTERNAL MEDICINE

## 2019-09-04 PROCEDURE — 99999 PR PBB SHADOW E&M-EST. PATIENT-LVL V: CPT | Mod: PBBFAC,,, | Performed by: INTERNAL MEDICINE

## 2019-09-04 PROCEDURE — 3074F SYST BP LT 130 MM HG: CPT | Mod: CPTII,S$GLB,, | Performed by: INTERNAL MEDICINE

## 2019-09-04 PROCEDURE — 3008F BODY MASS INDEX DOCD: CPT | Mod: CPTII,S$GLB,, | Performed by: INTERNAL MEDICINE

## 2019-09-04 PROCEDURE — 84439 ASSAY OF FREE THYROXINE: CPT

## 2019-09-04 PROCEDURE — 99499 RISK ADDL DX/OHS AUDIT: ICD-10-PCS | Mod: S$GLB,,, | Performed by: INTERNAL MEDICINE

## 2019-09-04 PROCEDURE — 99999 PR PBB SHADOW E&M-EST. PATIENT-LVL V: ICD-10-PCS | Mod: PBBFAC,,, | Performed by: INTERNAL MEDICINE

## 2019-09-04 PROCEDURE — 99499 UNLISTED E&M SERVICE: CPT | Mod: S$GLB,,, | Performed by: INTERNAL MEDICINE

## 2019-09-04 RX ORDER — DEXAMETHASONE 6 MG/1
6 TABLET ORAL
COMMUNITY
Start: 2019-06-28 | End: 2019-11-05

## 2019-09-04 RX ORDER — SODIUM CHLORIDE 0.9 % (FLUSH) 0.9 %
10 SYRINGE (ML) INJECTION
Status: CANCELLED | OUTPATIENT
Start: 2019-09-05

## 2019-09-04 RX ORDER — HEPARIN 100 UNIT/ML
500 SYRINGE INTRAVENOUS
Status: CANCELLED | OUTPATIENT
Start: 2019-09-05

## 2019-09-04 NOTE — PROGRESS NOTES
Subjective:       Patient ID: Alison Branch is a 63 y.o. female.    Chief Complaint: Malignant neoplasm of upper lobe of left lung  Ms. Branch is a 63-year-old female who smoked for about 30 years and quit 20 years ago, presented with cough end of last year, but worsened into January.  Since the cough persisted, she saw her PCP, underwent a chest x-ray on 05/10/2019, that revealed left upper lobe pneumonia and a repeat CT was done in the week after that on 05/17/2019 that showed left upper lobe   mass arising from the lateral pleural surface in the left upper lobe posterior subsegment measuring 2.6 x 3 cm.  There are satellite mass more medially near the aortic arch that is 2 cm, also spiculated and irregular as well as thickened interlobar septa in the left lung apex and anterior segment, prevascular lymph node lateral to the aortic arch, short axis measuring 9 mm.       She then underwent a bronchoscopy on 05/28/2019, and that revealed there was an infiltration obtained in the left apical posterior segment of the left upper lobe cytology brush was done.  Transbronchial biopsies of an area of infiltration were also performed in the apicoposterior segment of the left upper lobe.    Pathology revealed adenocarcinoma; however, the specimen was not adequate enough to send for molecular testing.       She underwent a PET scan on 06/06/2019.  that reveals significant hypermetabolic activity in the large irregular spiculated pulmonary mass in the lateral aspect of the left upper lobe consistent with the patient's known pulmonary adenocarcinoma.  There is also tracer avidity in the medial left upper lobe satellite lesion and diffusely throughout much of the anterior left upper lobe where there is prominent nodular paraseptal thickening.  There are some numerous scattered subcentimeter pulmonary nodules throughout the right lung, all of which are too small for detection by PET.  For example, there is a 0.4 cm nodule in the  "superior right lower lobe and a micro nodule in the posterior segment of the right upper lobe.  There is a 0.5 cm, normal size right   paratracheal lymph node with increased radiotracer uptake as well as hypermetabolic aortic pulmonary lymph node and a left hilar lymph node.  There is a focal hypermetabolic lesion in the left anterior superior iliac spine associated with well defined lytic lesion.  There is a hypermetabolic focus in the anterior right fifth rib with a possible associated lytic lesion.  SUVs as   below lateral:  Left upper lobe  SUV max 17.9, anterior left upper lobe satellite mass and septal thickening SUV max of 10.1, right paratracheal lymph node SUV max of 4.8, left AP window lymph node SUV max of 17.9, left anterior superior iliac spine SUV max of 3.9"     MRI pelvis from 6/10/19  reveals "Region of osseous is irregularity at the left anterior iliac spine likely related to bone graft harvest procedure.  See  discussion above.  No suspicious signal or enhancement to suggest active/malignant process"   MRI brain from 6/10//19 reveal No intracranial abnormality.     We discussed her case at [facility], and plan was to wait for GAURDANT and proceed with treatment accordingly  Her GAURDANT is negative for molecular markers.      HPI She was having issues with Diabetes due to steroids which she needs as premeds for chemo. She saw Endocrine and is on Insulin, unable to take Januvia due to cost. [facility] pharmacy is trying to get it for her.  She comes in for cycle 4 of Carboplatin, Alimta and Keytruda. Bone scan from 9/3/19 reveals "Focus of increased avidity involving the anterior portion of the right 5th rib, corresponding to abnormal radiotracer uptake on prior PET-CT.  Finding remains nonspecific and may represent an area of prior trauma however solitary metastatic focus is not excluded:  Ct scans from 9/3/19 reveal "Decrease in size of the two spiculated masses in the left upper lobe as above. " " Persistent interlobular septal thickening throughout the left upper lobe noted, similar to prior examination.  Decrease in size of the mediastinal lymph node lateral to the aortic arch.  Stable micronodules in the right lung. Mixed lytic/sclerotic osseous lesion involving the left anterior superior iliac spine, concerning for possible osseous metastasis noting that this lesion was hypermetabolic on recent PET-CT. RECIST SUMMARY: Date of prior examination for comparison: 05/17/2019. Lesion 1: Left upper lobe pulmonary nodule.  2.8 cm. Series 2 image 34.  Prior measurement 3.0 cm. Lesion 2: Left upper lobe pulmonary nodule.  1.4 cm. Series 601 image 72.  Prior measurement 2.0 cm. Lesion 3: Mediastinal lymph node.  0.8 cm. Series 2 image 35.  Prior measurement 1 cm"      Review of Systems   Constitutional: Negative for appetite change, fatigue and unexpected weight change.   HENT: Negative for mouth sores.    Eyes: Negative for visual disturbance.   Respiratory: Negative for cough and shortness of breath.    Cardiovascular: Negative for chest pain.   Gastrointestinal: Negative for abdominal pain and diarrhea.   Genitourinary: Negative for frequency.   Musculoskeletal: Negative for back pain.   Skin: Negative for rash.   Neurological: Negative for headaches.   Hematological: Negative for adenopathy.   Psychiatric/Behavioral: The patient is not nervous/anxious.    All other systems reviewed and are negative.      Objective:      Physical Exam   Constitutional: She is oriented to person, place, and time. She appears well-developed and well-nourished.   HENT:   Mouth/Throat: No oropharyngeal exudate.   Cardiovascular: Normal rate and normal heart sounds.   Pulmonary/Chest: Effort normal and breath sounds normal. She has no wheezes.   Abdominal: Soft. Bowel sounds are normal. There is no tenderness.   Musculoskeletal: She exhibits no edema or tenderness.   Lymphadenopathy:     She has no cervical adenopathy. "   Neurological: She is alert and oriented to person, place, and time. Coordination normal.   Skin: Skin is warm and dry. No rash noted.   Psychiatric: She has a normal mood and affect. Judgment and thought content normal.   Vitals reviewed.        LABS:  WBC   Date Value Ref Range Status   09/04/2019 4.37 3.90 - 12.70 K/uL Final     Hemoglobin   Date Value Ref Range Status   09/04/2019 10.3 (L) 12.0 - 16.0 g/dL Final     Hematocrit   Date Value Ref Range Status   09/04/2019 34.6 (L) 37.0 - 48.5 % Final     Platelets   Date Value Ref Range Status   09/04/2019 109 (L) 150 - 350 K/uL Final     Gran # (ANC)   Date Value Ref Range Status   09/04/2019 2.6 1.8 - 7.7 K/uL Final     Comment:     The ANC is based on a white cell differential from an   automated cell counter. It has not been microscopically   reviewed for the presence of abnormal cells. Clinical   correlation is required.         Chemistry        Component Value Date/Time     09/04/2019 0904    K 4.5 09/04/2019 0904     09/04/2019 0904    CO2 28 09/04/2019 0904    BUN 7 (L) 09/04/2019 0904    CREATININE 0.9 09/04/2019 0904     (H) 09/04/2019 0904        Component Value Date/Time    CALCIUM 10.2 09/04/2019 0904    ALKPHOS 82 09/04/2019 0904    AST 54 (H) 09/04/2019 0904     (H) 09/04/2019 0904    BILITOT 0.3 09/04/2019 0904    ESTGFRAFRICA >60.0 09/04/2019 0904    EGFRNONAA >60.0 09/04/2019 0904          Assessment:       1. 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 Depression, unspecified depression type    4. Edema, unspecified type        Plan:        1,2. She is doing well and her CT scans reveal a decrease in the size of the lesions. She will proceed with cycle 4 of Carboplatin, Alimta and Keytruda and will return in 3 weeks to start maintenance Alimta and Keytruda.    3. Referral to Psychology.    4. Obtain lower extremity ultrasound    Above care plan was  discussed with patient and accompanying daughter and all questions were addressed to their satisfaction

## 2019-09-04 NOTE — Clinical Note
Schedule psychology next available. Also schedule CBC,CMp, TSH and free T4 and see me in 3 weeks and for Alimta and Keytruda

## 2019-09-05 ENCOUNTER — TELEPHONE (OUTPATIENT)
Dept: HEMATOLOGY/ONCOLOGY | Facility: CLINIC | Age: 64
End: 2019-09-05

## 2019-09-05 ENCOUNTER — INFUSION (OUTPATIENT)
Dept: INFUSION THERAPY | Facility: HOSPITAL | Age: 64
End: 2019-09-05
Attending: INTERNAL MEDICINE
Payer: MEDICARE

## 2019-09-05 ENCOUNTER — TELEPHONE (OUTPATIENT)
Dept: INFUSION THERAPY | Facility: HOSPITAL | Age: 64
End: 2019-09-05

## 2019-09-05 ENCOUNTER — PATIENT MESSAGE (OUTPATIENT)
Dept: INTERNAL MEDICINE | Facility: CLINIC | Age: 64
End: 2019-09-05

## 2019-09-05 VITALS
WEIGHT: 215.19 LBS | SYSTOLIC BLOOD PRESSURE: 129 MMHG | RESPIRATION RATE: 20 BRPM | BODY MASS INDEX: 31.87 KG/M2 | HEART RATE: 63 BPM | HEIGHT: 69 IN | DIASTOLIC BLOOD PRESSURE: 63 MMHG | TEMPERATURE: 98 F

## 2019-09-05 DIAGNOSIS — C34.12 MALIGNANT NEOPLASM OF UPPER LOBE OF LEFT LUNG: ICD-10-CM

## 2019-09-05 DIAGNOSIS — C77.1 SECONDARY MALIGNANT NEOPLASM OF MEDIASTINAL LYMPH NODE: Primary | ICD-10-CM

## 2019-09-05 DIAGNOSIS — Z12.31 ENCOUNTER FOR SCREENING MAMMOGRAM FOR BREAST CANCER: Primary | ICD-10-CM

## 2019-09-05 DIAGNOSIS — C34.12 MALIGNANT NEOPLASM OF UPPER LOBE OF LEFT LUNG: Primary | ICD-10-CM

## 2019-09-05 PROCEDURE — 63600175 PHARM REV CODE 636 W HCPCS: Performed by: INTERNAL MEDICINE

## 2019-09-05 PROCEDURE — 96411 CHEMO IV PUSH ADDL DRUG: CPT

## 2019-09-05 PROCEDURE — 96417 CHEMO IV INFUS EACH ADDL SEQ: CPT

## 2019-09-05 PROCEDURE — A4216 STERILE WATER/SALINE, 10 ML: HCPCS | Performed by: INTERNAL MEDICINE

## 2019-09-05 PROCEDURE — 25000003 PHARM REV CODE 250: Performed by: INTERNAL MEDICINE

## 2019-09-05 PROCEDURE — 96367 TX/PROPH/DG ADDL SEQ IV INF: CPT

## 2019-09-05 PROCEDURE — 96413 CHEMO IV INFUSION 1 HR: CPT

## 2019-09-05 RX ORDER — HEPARIN 100 UNIT/ML
500 SYRINGE INTRAVENOUS
Status: DISCONTINUED | OUTPATIENT
Start: 2019-09-05 | End: 2019-09-05 | Stop reason: HOSPADM

## 2019-09-05 RX ORDER — LIDOCAINE AND PRILOCAINE 25; 25 MG/G; MG/G
CREAM TOPICAL
Qty: 30 G | Refills: 0 | Status: SHIPPED | OUTPATIENT
Start: 2019-09-05 | End: 2019-12-19

## 2019-09-05 RX ORDER — SODIUM CHLORIDE 0.9 % (FLUSH) 0.9 %
10 SYRINGE (ML) INJECTION
Status: DISCONTINUED | OUTPATIENT
Start: 2019-09-05 | End: 2019-09-05 | Stop reason: HOSPADM

## 2019-09-05 RX ADMIN — DEXAMETHASONE SODIUM PHOSPHATE: 4 INJECTION, SOLUTION INTRAMUSCULAR; INTRAVENOUS at 02:09

## 2019-09-05 RX ADMIN — HEPARIN SODIUM (PORCINE) LOCK FLUSH IV SOLN 100 UNIT/ML 500 UNITS: 100 SOLUTION at 04:09

## 2019-09-05 RX ADMIN — CARBOPLATIN 615 MG: 10 INJECTION, SOLUTION INTRAVENOUS at 03:09

## 2019-09-05 RX ADMIN — Medication 10 ML: at 04:09

## 2019-09-05 RX ADMIN — SODIUM CHLORIDE 1075 MG: 9 INJECTION, SOLUTION INTRAVENOUS at 03:09

## 2019-09-05 RX ADMIN — SODIUM CHLORIDE 200 MG: 9 INJECTION, SOLUTION INTRAVENOUS at 01:09

## 2019-09-05 RX ADMIN — APREPITANT 130 MG: 130 INJECTION, EMULSION INTRAVENOUS at 02:09

## 2019-09-05 NOTE — TELEPHONE ENCOUNTER
----- Message from Tara Belcher MD sent at 9/4/2019  4:32 PM CDT -----  Schedule psychology next available. Also schedule CBC,CMp, TSH and free T4 and see me in 3 weeks and for Alimta and Keytruda

## 2019-09-05 NOTE — PLAN OF CARE
Problem: Nausea and Vomiting (Chemotherapy Effects)  Goal: Fluid and Electrolyte Balance  Outcome: Ongoing (interventions implemented as appropriate)  Patient here for Keytruda/alimta/carbo.  Assessment competed and labs reviewed.  VSS.  No needs at this time.  Chair reclined and blanket offered.  Will continue to monitor.

## 2019-09-05 NOTE — TELEPHONE ENCOUNTER
Good afternoon all.  Spoke patient while receiving chemo today.  has no availability on pt request date. Patient has been scheduled to see Dr Garcia on 9/13 @ 8am. Pt request for daughter to accompany her in visit.

## 2019-09-05 NOTE — PLAN OF CARE
Problem: Adult Inpatient Plan of Care  Goal: Plan of Care Review  Outcome: Ongoing (interventions implemented as appropriate)  Tolerated well.  No reactions noted.  No questions or concerns at this time.  Ambulated off unit in [facility].

## 2019-09-06 ENCOUNTER — PATIENT MESSAGE (OUTPATIENT)
Dept: INTERNAL MEDICINE | Facility: CLINIC | Age: 64
End: 2019-09-06

## 2019-09-06 ENCOUNTER — DOCUMENTATION ONLY (OUTPATIENT)
Dept: HEMATOLOGY/ONCOLOGY | Facility: CLINIC | Age: 64
End: 2019-09-06

## 2019-09-06 ENCOUNTER — PATIENT MESSAGE (OUTPATIENT)
Dept: DIABETES | Facility: CLINIC | Age: 64
End: 2019-09-06

## 2019-09-06 DIAGNOSIS — R53.81 DEBILITY: Primary | ICD-10-CM

## 2019-09-06 DIAGNOSIS — C34.12 MALIGNANT NEOPLASM OF UPPER LOBE OF LEFT LUNG: ICD-10-CM

## 2019-09-06 NOTE — PROGRESS NOTES
Received call/voice mail message from patient and returned call to her at [phone]. Patient called re: needing assistance obtaining a prescription medication - EMLA cream. Initially the pharmacy staff quoted her a cost of $80 but they called her back to let her know that her insurance did approve and cover it and her co-pay was $5, which she was able to afford and she has picked up the medication at [facility]. She also said that she has been out of Tessalon Perles and this one isn't covered by her insurance. Offered to assist her through the [facility] Patient Assistance Fund. Patient will call the pharmacy and ask for the prescription to be transferred from her usual [facility] to [facility]. Spoke with Iban at [facility] and he stated that the cost is $27.13. Explained that I will prepare a cost transfer form and fax it to his attention. He asked that I fax it to [phone], and this is being done. Patient will  the prescription at her convenience. No other needs indicated at this time.

## 2019-09-09 ENCOUNTER — TELEPHONE (OUTPATIENT)
Dept: PHARMACY | Facility: CLINIC | Age: 64
End: 2019-09-09

## 2019-09-09 ENCOUNTER — TELEPHONE (OUTPATIENT)
Dept: INTERNAL MEDICINE | Facility: CLINIC | Age: 64
End: 2019-09-09

## 2019-09-09 NOTE — TELEPHONE ENCOUNTER
----- Message from Cary Horne sent at 9/9/2019  9:52 AM CDT -----  Contact: self   Patient would like to get medical advice.  Symptoms (please be specific):  UTI; incontinence   How long has patient had these symptoms:  Since 9/7  Pharmacy name and phone # (copy/paste from chart):  [facility] [address] [place] [address] - [facility] [address] [phone] (Phone)  [phone] (Fax)  Any drug allergies (copy/paste from chart):    See chart  Would the patient rather a call back or a response via [facility]?:  Call back  Comments:

## 2019-09-10 ENCOUNTER — PATIENT MESSAGE (OUTPATIENT)
Dept: DIABETES | Facility: CLINIC | Age: 64
End: 2019-09-10

## 2019-09-11 ENCOUNTER — OFFICE VISIT (OUTPATIENT)
Dept: INTERNAL MEDICINE | Facility: CLINIC | Age: 64
End: 2019-09-11
Payer: MEDICARE

## 2019-09-11 ENCOUNTER — HOSPITAL ENCOUNTER (OUTPATIENT)
Dept: RADIOLOGY | Facility: HOSPITAL | Age: 64
Discharge: HOME | End: 2019-09-11
Attending: INTERNAL MEDICINE
Payer: MEDICARE

## 2019-09-11 ENCOUNTER — TELEPHONE (OUTPATIENT)
Dept: INTERNAL MEDICINE | Facility: CLINIC | Age: 64
End: 2019-09-11

## 2019-09-11 VITALS
DIASTOLIC BLOOD PRESSURE: 60 MMHG | WEIGHT: 209.44 LBS | HEIGHT: 69 IN | BODY MASS INDEX: 31.02 KG/M2 | SYSTOLIC BLOOD PRESSURE: 120 MMHG

## 2019-09-11 DIAGNOSIS — N30.00 ACUTE CYSTITIS WITHOUT HEMATURIA: Primary | ICD-10-CM

## 2019-09-11 DIAGNOSIS — K59.00 CONSTIPATION, UNSPECIFIED CONSTIPATION TYPE: ICD-10-CM

## 2019-09-11 LAB
BACTERIA #/AREA URNS AUTO: ABNORMAL /HPF
BILIRUB UR QL STRIP: NEGATIVE
CLARITY UR REFRACT.AUTO: ABNORMAL
COLOR UR AUTO: YELLOW
GLUCOSE UR QL STRIP: ABNORMAL
HGB UR QL STRIP: NEGATIVE
HYALINE CASTS UR QL AUTO: 1 /LPF
KETONES UR QL STRIP: NEGATIVE
LEUKOCYTE ESTERASE UR QL STRIP: ABNORMAL
MICROSCOPIC COMMENT: ABNORMAL
NITRITE UR QL STRIP: NEGATIVE
PH UR STRIP: 6 [PH] (ref 5–8)
PROT UR QL STRIP: NEGATIVE
RBC #/AREA URNS AUTO: 1 /HPF (ref 0–4)
SP GR UR STRIP: 1.02 (ref 1–1.03)
SQUAMOUS #/AREA URNS AUTO: 5 /HPF
URN SPEC COLLECT METH UR: ABNORMAL
WBC #/AREA URNS AUTO: 7 /HPF (ref 0–5)
YEAST UR QL AUTO: ABNORMAL

## 2019-09-11 PROCEDURE — 74019 RADEX ABDOMEN 2 VIEWS: CPT | Mod: 26,,, | Performed by: INTERNAL MEDICINE

## 2019-09-11 PROCEDURE — 3008F BODY MASS INDEX DOCD: CPT | Mod: CPTII,S$GLB,, | Performed by: INTERNAL MEDICINE

## 2019-09-11 PROCEDURE — 87086 URINE CULTURE/COLONY COUNT: CPT

## 2019-09-11 PROCEDURE — 74019 XR ABDOMEN FLAT AND ERECT: ICD-10-PCS | Mod: 26,,, | Performed by: INTERNAL MEDICINE

## 2019-09-11 PROCEDURE — 99214 OFFICE O/P EST MOD 30 MIN: CPT | Mod: S$GLB,,, | Performed by: INTERNAL MEDICINE

## 2019-09-11 PROCEDURE — 99999 PR PBB SHADOW E&M-EST. PATIENT-LVL IV: CPT | Mod: PBBFAC,,, | Performed by: INTERNAL MEDICINE

## 2019-09-11 PROCEDURE — 99214 PR OFFICE/OUTPT VISIT, EST, LEVL IV, 30-39 MIN: ICD-10-PCS | Mod: S$GLB,,, | Performed by: INTERNAL MEDICINE

## 2019-09-11 PROCEDURE — 3074F SYST BP LT 130 MM HG: CPT | Mod: CPTII,S$GLB,, | Performed by: INTERNAL MEDICINE

## 2019-09-11 PROCEDURE — 3008F PR BODY MASS INDEX (BMI) DOCUMENTED: ICD-10-PCS | Mod: CPTII,S$GLB,, | Performed by: INTERNAL MEDICINE

## 2019-09-11 PROCEDURE — 74019 RADEX ABDOMEN 2 VIEWS: CPT | Mod: TC

## 2019-09-11 PROCEDURE — 3074F PR MOST RECENT SYSTOLIC BLOOD PRESSURE < 130 MM HG: ICD-10-PCS | Mod: CPTII,S$GLB,, | Performed by: INTERNAL MEDICINE

## 2019-09-11 PROCEDURE — 3078F DIAST BP <80 MM HG: CPT | Mod: CPTII,S$GLB,, | Performed by: INTERNAL MEDICINE

## 2019-09-11 PROCEDURE — 81001 URINALYSIS AUTO W/SCOPE: CPT

## 2019-09-11 PROCEDURE — 99999 PR PBB SHADOW E&M-EST. PATIENT-LVL IV: ICD-10-PCS | Mod: PBBFAC,,, | Performed by: INTERNAL MEDICINE

## 2019-09-11 PROCEDURE — 3078F PR MOST RECENT DIASTOLIC BLOOD PRESSURE < 80 MM HG: ICD-10-PCS | Mod: CPTII,S$GLB,, | Performed by: INTERNAL MEDICINE

## 2019-09-11 RX ORDER — NITROFURANTOIN 25; 75 MG/1; MG/1
100 CAPSULE ORAL 2 TIMES DAILY
Qty: 10 CAPSULE | Refills: 0 | Status: SHIPPED | OUTPATIENT
Start: 2019-09-11 | End: 2019-11-05

## 2019-09-11 NOTE — PROGRESS NOTES
"[facility]  History & Physical    Patient Name: Alison Branch  MRN: [id number]  Admission Date: 09/11/2019    PCP:     Mike Rodriguez MD    CC:     Chief Complaint   Patient presents with    Urinary Tract Infection       HISTORY OF PRESENT ILLNESS:     Alison Branch is a 63 y.o. female that (in part)  has a past medical history of Allergy, Brain aneurysm, Breast cyst, Diabetes mellitus, Diabetes type 2, controlled, Fever blister, High cholesterol, History of Bell's palsy, and HTN (hypertension).    Constipation - drinks 8 bottles of water a day. Enema once a week since June or July. "Clears a little bit" of stool but not much. Stools are pellet shaped. Takes Miralax, 3 stool softeners (Docalax) in the morning and at night; no passage of stool. Takes metamucil daily. Abdominal pain generalized. The constipation has worsened since starting chemo. Bowel leakage occurs twice weekly; no bowel movements. No history of abdominal surgery.     She reports having UTIs after every session of chemo. The UTI usually occurs a couple days after chemo. This is her third case of UTI (9/11/19). Her previous UTIs occurred on 7/16/19 and 8/1/19. She was treated with nitrofurantoin and cipro respectively. She currently complains of increased urinary frequency, incontinence, dysuria (pain when urinating), pain to touch.      REVIEW OF SYSTEMS:     -- Constitutional: No fever or chills.  -- Respiratory: No cough, shortness of breath, hemoptysis, stridor, wheezing, or night sweats.  -- Cardiovascular: No chest pain or palpitations.   -- Gastrointestinal: vomiting, abdominal pain, hematemesis, melena, dyspepsia, or change in bowel habits.  -- Genitourinary: hematuria, dysuria, frequency, urgency, nocturia, polyuria, stones, or incontinence.  -- Integument/breast: No rash, pruritis, pigmentation changes, dryness, or changes in hair    PAST MEDICAL / SURGICAL HISTORY:     Past Medical History:   Diagnosis Date    Allergy     Brain " "aneurysm 2010    s/p coiling of one; another not coiled    Breast cyst     Diabetes mellitus     Diabetes type 2, controlled     Fever blister     High cholesterol     History of Bell's palsy     HTN (hypertension) 5/20/2014     Past Surgical History:   Procedure Laterality Date    BREAST CYST ASPIRATION      Bronchoscopy N/A 5/28/2019    Performed by [facility] Provider at [facility] OR 2ND FLR    CERVICAL FUSION      CHONDROPLASTY-KNEE Left 2/23/2018    Performed by ESTELA Min MD at [facility] OR    COLONOSCOPY N/A 3/9/2016    Performed by Elliott Zimmerman MD at [facility] ENDO (4TH FLR)    head surgery      stent and "curling" for aneurysm    HYSTERECTOMY      TVH secondary to SUF    INSERTION, PORT-A-CATH Right 7/8/2019    Performed by Cali Damico MD at [facility] CATH LAB    MENISCECTOMY Left 2/23/2018    Performed by [facility]ELA Min MD at [facility] OR    SYNOVECTOMY-KNEE Left 2/23/2018    Performed by [facility]ELA Min MD at [facility] OR         FAMILY HISTORY:     Family History   Problem Relation Age of Onset    Rheum arthritis Father     Diabetes Father     Heart failure Father     Migraines Father     Cataracts Father     Cancer Mother 63        pancreatic    Stomach cancer Mother     Breast cancer Maternal Aunt         50s    Ovarian cancer Cousin     Diabetes Sister     Diabetes Brother     Cancer Maternal Aunt         Lung CA    Melanoma Neg Hx     Colon cancer Neg Hx     Amblyopia Neg Hx     Blindness Neg Hx     Glaucoma Neg Hx     Macular degeneration Neg Hx     Retinal detachment Neg Hx     Strabismus Neg Hx     Stroke Neg Hx     Thyroid disease Neg Hx          SOCIAL HISTORY:     Social History     Socioeconomic History    Marital status: Single     Spouse name: Not on file    Number of children: Not on file    Years of education: Not on file    Highest education level: Not on file   Occupational History    Occupation: Student     Comment: Unemployed   Social Needs "    Financial resource strain: Not on file    Food insecurity:     Worry: Not on file     Inability: Not on file    Transportation needs:     Medical: Not on file     Non-medical: Not on file   Tobacco Use    Smoking status: Former Smoker     Packs/day: 0.50     Years: 30.00     Pack years: 15.00     Last attempt to quit: 1999     Years since quittin.7    Smokeless tobacco: Never Used   Substance and Sexual Activity    Alcohol use: No     Alcohol/week: 0.0 oz    Drug use: No    Sexual activity: Never     Partners: Female     Birth control/protection: Surgical   Lifestyle    Physical activity:     Days per week: Not on file     Minutes per session: Not on file    Stress: Not on file   Relationships    Social connections:     Talks on phone: Not on file     Gets together: Not on file     Attends Jainism service: Not on file     Active member of club or organization: Not on file     Attends meetings of clubs or organizations: Not on file     Relationship status: Not on file   Other Topics Concern    Are you pregnant or think you may be? No    Breast-feeding No   Social History Narrative    Not on file       HOME MEDICATIONS:     Prior to Admission medications    Medication Sig Start Date End Date Taking? Authorizing Provider   aspirin (ECOTRIN) 81 MG EC tablet Take 1 tablet (81 mg total) by mouth once daily. 3/10/18  Yes ESTELA Min MD   atorvastatin (LIPITOR) 40 MG tablet TAKE 1 TABLET BY MOUTH ONCE DAILY 19  Yes Mike Rodriguez II, MD   bisacodyl (DULCOLAX) 5 mg EC tablet Take 5 mg by mouth daily as needed for Constipation.   Yes Historical Provider, MD   blood sugar diagnostic Strp To check BG 5 times per day 19  Yes Ai Billingsley NP   blood-glucose meter kit Use as instructed 19  Yes Mike Rodriguez II, MD   carboxymethylcellulose (REFRESH PLUS) 0.5 % Dpet 1 drop 3 (three) times daily as needed.   Yes Historical Provider, MD   dexAMETHasone (DECADRON) 6 MG tablet Take  "6 mg by mouth. 6/28/19  Yes Historical Provider, MD   dexAMETHasone (DECADRON) 6 MG tablet Take 1 tablet by mouth two times a day with food the day before chemotherapy and for two days after chemotherapy. Do not take the day of chemotherapy. 6/27/19  Yes Tara Belcher MD   diazePAM (VALIUM) 5 MG tablet TAKE 1 TABLET BY MOUTH ONCE DAILY AS NEEDED FOR ANXIETY 6/20/19  Yes Historical Provider, MD   ergocalciferol (ERGOCALCIFEROL) 50,000 unit Cap TAKE 1 CAPSULE BY MOUTH EVERY 7 DAYS 2/25/19  Yes Mike Rodriguez II, MD   fluticasone propionate (FLONASE) 50 mcg/actuation nasal spray USE TWO SPRAY(S) IN EACH NOSTRIL ONCE DAILY 5/10/19  Yes Mike Rodriguez II, MD   folic acid (FOLVITE) 1 MG tablet Take 1 tablet (1 mg total) by mouth once daily. Start today 6/27/19 6/26/20 Yes Tara Belcher MD   insulin aspart U-100 (NOVOLOG) 100 unit/mL (3 mL) InPn pen Inject 8 units into the skin 3 times daily with meals plus correction scale. Max TDD 54 units 8/29/19  Yes Ai Billingsley NP   insulin detemir U-100 (LEVEMIR FLEXTOUCH) 100 unit/mL (3 mL) SubQ InPn pen Inject 24 Units into the skin once daily. 8/29/19  Yes Ai Billingsley NP   JANUVIA 100 mg Tab TAKE ONE TABLET BY MOUTH ONCE DAILY 11/5/18  Yes Iban Vincent Jr., MD   lancets Misc 1 Device by Misc.(Non-Drug; Combo Route) route once daily. Heladio Result.  250.02.  Check Blood Sugar Twice Daily. 8/24/18  Yes Mike Rodriguez II, MD   lidocaine-prilocaine (EMLA) cream Apply to PORT one hour prior to chemo administration. 9/5/19  Yes Tara Belcher MD   olmesartan (BENICAR) 20 MG tablet Take 1 tablet (20 mg total) by mouth once daily. 3/19/19 3/18/20 Yes Mike Rodriguez II, MD   ondansetron (ZOFRAN) 8 MG tablet Take 1 tablet (8 mg total) by mouth 4 (four) times daily as needed for Nausea. 6/27/19 6/26/20 Yes Tara Belcher MD   pen needle, diabetic (RELION PEN NEEDLES) 32 gauge x 5/32" Ndle Use for insulin pen injection once daily 8/2/19  Yes Mike Rodriguez II, MD   psyllium " "(METAMUCIL) packet Take 1 packet by mouth once daily.   Yes Historical Provider, MD   terconazole (TERAZOL 7) 0.4 % Crea INSERT 1 APPLICATORFUL VAGINALLY ONCE DAILY IN THE EVENING 5/21/19  Yes MD abhijeet Jenkins II Misc Please provide rollator walker for this debilitated cancer patient.  Thank you. 9/6/19  Yes Mike Rodriguez II, MD   benzonatate (TESSALON) 200 MG capsule Take 200 mg by mouth 3 (three) times daily as needed. 7/21/19 9/11/19 Yes Historical Provider, MD   benzonatate (TESSALON) 200 MG capsule TAKE 1 CAPSULE BY MOUTH THREE TIMES DAILY AS NEEDED FOR COUGH 6/4/19 9/11/19 Yes Clara Harris MD          \[facility] [place] [facility]\"" MEDICATIONS:     Scheduled Meds:   Continuous Infusions:   PRN Meds:       PHYSICAL EXAM:     Wt Readings from Last 1 Encounters:   09/11/19 0950 95 kg (209 lb 7 oz)     Body mass index is 30.93 kg/m².  Vitals:    09/11/19 0950   BP: 120/60   BP Location: Left arm   Patient Position: Sitting   BP Method: Medium (Manual)   Weight: 95 kg (209 lb 7 oz)   Height: 5' 9" (1.753 m)     -- General appearance: well developed. appears stated age   -- Head: normocephalic, atraumatic   -- Neck: supple, symmetrical, trachea midline, no JVD and thyroid not grossly enlarged, appears symmetric  -- Lungs: clear to auscultation bilaterally. normal respiratory effort. No use of accessory muscles.   -- Heart: regular rate and regular rhythm. S1, S2 normal.  no click, rub or gallop   -- Abdomen: soft, non-tender, non-distended, non-tympanic; bowel sounds normal; no masses. No flank pain.        MICROBIOLOGY DATA:     Urine Culture, Routine   Date Value Ref Range Status   07/26/2019   Final    Multiple organisms isolated. None in predominance.  Repeat if   07/26/2019 clinically necessary.  Final   02/01/2016 No growth  Final     AFB Culture & Smear   Date Value Ref Range Status   05/28/2019 No growth after 8 weeks.   Final   12/21/2017 No growth after 8 weeks.   Final     ASSESSMENT & PLAN:     Primary " Diagnosis: UTI & Constipation    Patient presents with persistent constipation and UTI. The constipation has worsened as her chemo treatments progresses. She uses enema, miralax, docalax, and metamucil currently. It was recommended she increases her metamucil intake to twice daily. We will perform an AXR to identify impaction or obstructions. We will decide course of action based on these results. Her UTI is recurrent. Past cultures did not yield any results. We will perform a culture and treat with nitrofurantoin. If cultures remain negative we will refer her to urology for workup.    Alison was seen today for urinary tract infection.    Diagnoses and all orders for this visit:    Acute cystitis without hematuria  -     Urinalysis  -     Urine culture    Constipation, unspecified constipation type  -     X-Ray Abdomen Flat And Erect; Future    Other orders  -     nitrofurantoin, macrocrystal-monohydrate, (MACROBID) 100 MG capsule; Take 1 capsule (100 mg total) by mouth 2 (two) times daily.        I have personally taken the history and examined this patient and I agree with the student's assessment and plan as above.      ===============================================================    Puma Thaddeus

## 2019-09-11 NOTE — TELEPHONE ENCOUNTER
----- Message from Bella Nichols sent at 9/11/2019  8:54 AM CDT -----  Contact: [phone]  Patient would like to get medical advice.  Symptoms (please be specific):UTI     How long has patient had these symptoms: 6 days   Pharmacy name and phone # (copy/paste from chart):  [facility] [address] [place] [address] [address]   [phone] (Phone)  [phone] (Fax)  Would the patient rather a call back or a response via [facility]?: call back   Comments:  Please advise, thanks

## 2019-09-12 ENCOUNTER — CLINICAL SUPPORT (OUTPATIENT)
Dept: DIABETES | Facility: CLINIC | Age: 64
End: 2019-09-12
Payer: MEDICARE

## 2019-09-12 ENCOUNTER — TELEPHONE (OUTPATIENT)
Dept: INTERNAL MEDICINE | Facility: CLINIC | Age: 64
End: 2019-09-12

## 2019-09-12 VITALS — WEIGHT: 209.44 LBS | HEIGHT: 69 IN | BODY MASS INDEX: 31.02 KG/M2

## 2019-09-12 DIAGNOSIS — E11.42 TYPE 2 DIABETES MELLITUS WITH DIABETIC POLYNEUROPATHY, WITH LONG-TERM CURRENT USE OF INSULIN: ICD-10-CM

## 2019-09-12 DIAGNOSIS — E11.65 UNCONTROLLED TYPE 2 DIABETES MELLITUS WITH HYPERGLYCEMIA, WITHOUT LONG-TERM CURRENT USE OF INSULIN: Primary | ICD-10-CM

## 2019-09-12 DIAGNOSIS — Z79.4 TYPE 2 DIABETES MELLITUS WITH DIABETIC POLYNEUROPATHY, WITH LONG-TERM CURRENT USE OF INSULIN: ICD-10-CM

## 2019-09-12 PROBLEM — M25.562 LEFT KNEE PAIN: Status: RESOLVED | Noted: 2018-05-10 | Resolved: 2019-09-12

## 2019-09-12 LAB
BACTERIA UR CULT: NORMAL
BACTERIA UR CULT: NORMAL

## 2019-09-12 PROCEDURE — G0108 PR DIAB MANAGE TRN  PER INDIV: ICD-10-PCS | Mod: S$GLB,,, | Performed by: DIETITIAN, REGISTERED

## 2019-09-12 PROCEDURE — G0108 DIAB MANAGE TRN  PER INDIV: HCPCS | Mod: S$GLB,,, | Performed by: DIETITIAN, REGISTERED

## 2019-09-12 PROCEDURE — 99999 PR PBB SHADOW E&M-EST. PATIENT-LVL III: CPT | Mod: PBBFAC,,, | Performed by: DIETITIAN, REGISTERED

## 2019-09-12 PROCEDURE — 99999 PR PBB SHADOW E&M-EST. PATIENT-LVL III: ICD-10-PCS | Mod: PBBFAC,,, | Performed by: DIETITIAN, REGISTERED

## 2019-09-12 NOTE — TELEPHONE ENCOUNTER
I spoke to her and reviewed her abdominal xray.  No obstruction but a fair amount of stool.  She will work on using MiraLax at least once daily.  She states that she had an excellent bowel movement today and is feeling much better.  Follow up poor results.  Urine culture still pending.

## 2019-09-12 NOTE — PROGRESS NOTES
Diabetes Education  Author: Shelly Crocker RD  Date: 9/12/2019    Diabetes Care Management Summary  Diabetes Education Record Assessment/Progress: Initial  Current Diabetes Risk Level: High     Last A1c:   Lab Results   Component Value Date    HGBA1C 11.4 (H) 08/19/2019     Last Visit with Diabetes Educator: : 10/20/2017  Last OPCM Referral: : Not Found   Enrolled in OPCM: No      Diabetes Type  Diabetes Type : Type II    Diabetes History  Diabetes Diagnosis: >10 years  Current Treatment: Oral Medication, Insulin(novolog, januvia (ran out about 2 weeks ago), levemir)  Reviewed Problem List with Patient: Yes    Health Maintenance was reviewed today with patient. Discussed with patient importance of routine eye exams, foot exams/foot care, blood work (i.e.: A1c, microalbumin, and lipid), dental visits, yearly flu vaccine, and pneumonia vaccine as indicated by PCP. Patient verbalized understanding.     Health Maintenance Topics with due status: Not Due       Topic Last Completion Date    Colonoscopy 04/08/2016    TETANUS VACCINE 08/09/2016    Lipid Panel 05/10/2019    Hemoglobin A1c 08/19/2019    Foot Exam 08/29/2019    Low Dose Statin 09/11/2019     Health Maintenance Due   Topic Date Due    Pneumococcal Vaccine (Highest Risk) (2 of 3 - PCV13) 10/29/2016    Mammogram  09/07/2019    Eye Exam  09/26/2019       Nutrition  Meal Planning: water, 3 meals per day, eats out seldom, diet drinks  What type of beverages do you drink?: water, juice, diet soda/tea  Meal Plan 24 Hour Recall - Breakfast: 7:20am 1c grits, 1 slice ham, 4oz orange juice  Meal Plan 24 Hour Recall - Lunch: 12:00PM whopper dressed with small raymond and diet rootbeer  Meal Plan 24 Hour Recall - Dinner: chicken on honey wheat bread  Meal Plan 24 Hour Recall - Snack: stopped snacking    Monitoring   Monitoring: Other(true metrix)  Self Monitoring : four times a day(Once daily)  Blood Glucose Logs: Yes  Do you use a personal continuous glucose monitor?:  No  In the last month, how often have you had a low blood sugar reaction?: never  What are your symptoms of low blood sugar?: weak and shaky  How do you treat low blood sugar?: juice  Can you tell when your blood sugar is too high?: yes  How do you treat high blood sugar?: insulin    Exercise   Exercise Type: none  Frequency: Never    Current Diabetes Treatment   Current Treatment: Oral Medication, Insulin(novolog, januvia (ran out about 2 weeks ago), levemir)    Social History  Preferred Learning Method: Face to Face  Primary Support: Self, Daughter, Family  Smoking Status: Ex Smoker  Alcohol Use: Rarely            DDS-2 Score  ( > 3 = SIGNIFICANT DISTRESS): 6  DDS Score  ( > 3 = SIGNIFICANT DISTRESS): 3.18  Emotional [place] Score: 4  Physician-Related Distress: 1.5  Regimen-Related Distress: 5  Interpersonal Distress: 1    Barriers to Change  Barriers to Change: None  Learning Challenges : None    Readiness to Learn   Readiness to Learn : Acceptance    Cultural Influences  Cultural Influences: No    Diabetes Education Assessment/Progress  Diabetes Disease Process (diabetes disease process and treatment options): Instructed, Written Materials Provided, Comprehends Key Points, Discussion, Individual Session  Nutrition (Incorporating nutritional management into one's lifestyle): Instructed, Written Materials Provided, Comprehends Key Points, Demonstration, Discussion, Individual Session, Needs Review  Physical Activity (incorporating physical activity into one's lifestyle): Instructed, Written Materials Provided, Comprehends Key Points, Discussion, Individual Session  Medications (states correct name, dose, onset, peak, duration, side effects & timing of meds): Instructed, Written Materials Provided, Comprehends Key Points, Discussion, Individual Session  Monitoring (monitoring blood glucose/other parameters & using results): Instructed, Written Materials Provided, Comprehends Key Points, Discussion, Individual  Session  Acute Complications (preventing, detecting, and treating acute complications): Instructed, Written Materials Provided, Comprehends Key Points, Individual Session, Discussion  Chronic Complications (preventing, detecting, and treating chronic complications): Instructed, Written Materials Provided, Comprehends Key Points, Discussion, Individual Session  Clinical (diabetes, other pertinent medical history, and relevant comorbidities reviewed during visit): Instructed, Comprehends Key Points, Discussion, Individual Session, Written Materials Provided  Cognitive (knowledge of self-management skills, functional health literacy): Instructed, Comprehends Key Points, Discussion, Individual Session, Written Materials Provided  Psychosocial (emotional response to diabetes): Instructed, Comprehends Key Points, Discussion, Individual Session, Written Materials Provided  Diabetes Distress and Support Systems: Instructed, Comprehends Key Points, Discussion, Individual Session, Written Materials Provided  Behavioral (readiness for change, lifestyle practices, self-care behaviors): Instructed, Comprehends Key Points, Discussion, Individual Session, Written Materials Provided  .Patient educated on what is DM, T1DM, T2DM, risk factors, managing DM, DM diet, carbohydrate counting, meal planning, reading a food label, healthy snack options, benefits of physical activity, diabetes care schedule, foot care guidelines, diabetes and retinopathy screening, s/s hypo and hyperglycemia, long/short term complication of uncontrolled DM, importance of compliance with treatment plan, how to use a glucometer, reviewed understanding diabetes distress, medications for treating DM, their mechanism of action and possible side effects, reviewed current level and goal level for HgbA1c, blood glucose, microalbumin, and lipids. Patient provided with written literature, diabetes management resources and support, DM Management program contact  information.  Patient educated on DM diet, injection site rotation, and correction scale insulin plus set dose.       Goals  Patient has selected/evaluated goals during today's session: Yes, selected  Healthy Eating: Set  Start Date: 09/12/19  Target Date: 03/12/20         Diabetes Care Plan/Intervention  Education Plan/Intervention: Individual Follow-Up DSMT    Diabetes Meal Plan  Restrictions: Low Fat, Low Sodium, Restricted Carbohydrate  Calories: 1800  Carbohydrate Per Meal: 30-45g  Carbohydrate Per Snack : 7-15g  Fat: 50  Protein: 135    Today's Self-Management Care Plan was developed with the patient's input and is based on barriers identified during today's assessment.    The long and short-term goals in the care plan were written with the patient/caregiver's input. The patient has agreed to work toward these goals to improve her overall diabetes control.      The patient received a copy of today's self-management plan and verbalized understanding of the care plan, goals, and all of today's instructions.      The patient was encouraged to communicate with her physician and care team regarding her condition(s) and treatment.  I provided the patient with my contact information today and encouraged her to contact me via phone or patient portal as needed.     Education Units of Time   Time Spent: 60 min

## 2019-09-13 ENCOUNTER — HOSPITAL ENCOUNTER (OUTPATIENT)
Dept: RADIOLOGY | Facility: HOSPITAL | Age: 64
Discharge: HOME | End: 2019-09-13
Attending: INTERNAL MEDICINE
Payer: MEDICARE

## 2019-09-13 ENCOUNTER — TELEPHONE (OUTPATIENT)
Dept: INTERNAL MEDICINE | Facility: CLINIC | Age: 64
End: 2019-09-13

## 2019-09-13 ENCOUNTER — PATIENT OUTREACH (OUTPATIENT)
Dept: ADMINISTRATIVE | Facility: OTHER | Age: 64
End: 2019-09-13

## 2019-09-13 DIAGNOSIS — R60.9 EDEMA, UNSPECIFIED TYPE: ICD-10-CM

## 2019-09-13 DIAGNOSIS — Z12.31 ENCOUNTER FOR SCREENING MAMMOGRAM FOR BREAST CANCER: ICD-10-CM

## 2019-09-13 PROCEDURE — 77067 SCR MAMMO BI INCL CAD: CPT | Mod: 26,,, | Performed by: RADIOLOGY

## 2019-09-13 PROCEDURE — 93970 US LOWER EXTREMITY VEINS BILATERAL: ICD-10-PCS | Mod: 26,,, | Performed by: RADIOLOGY

## 2019-09-13 PROCEDURE — 77067 SCR MAMMO BI INCL CAD: CPT | Mod: TC

## 2019-09-13 PROCEDURE — 93970 EXTREMITY STUDY: CPT | Mod: TC

## 2019-09-13 PROCEDURE — 77067 MAMMO DIGITAL SCREENING BILAT WITH CAD: ICD-10-PCS | Mod: 26,,, | Performed by: RADIOLOGY

## 2019-09-13 PROCEDURE — 93970 EXTREMITY STUDY: CPT | Mod: 26,,, | Performed by: RADIOLOGY

## 2019-09-13 NOTE — TELEPHONE ENCOUNTER
----- Message from Sona Gaston sent at 9/13/2019  1:46 PM CDT -----  Contact: Fernando PALMER/ [employer] [phone]    ext [phone]  Fernando is requesting a medical necessity form, clinical notes and doctors for walker bert with a seat.  Fax:[phone]. Please advise

## 2019-09-16 ENCOUNTER — PATIENT MESSAGE (OUTPATIENT)
Dept: INTERNAL MEDICINE | Facility: CLINIC | Age: 64
End: 2019-09-16

## 2019-09-16 ENCOUNTER — OFFICE VISIT (OUTPATIENT)
Dept: OBSTETRICS AND GYNECOLOGY | Facility: CLINIC | Age: 64
End: 2019-09-16
Payer: MEDICARE

## 2019-09-16 VITALS
WEIGHT: 213.38 LBS | SYSTOLIC BLOOD PRESSURE: 110 MMHG | BODY MASS INDEX: 30.55 KG/M2 | HEIGHT: 70 IN | DIASTOLIC BLOOD PRESSURE: 60 MMHG

## 2019-09-16 DIAGNOSIS — Z01.419 ENCOUNTER FOR WELL WOMAN EXAM: Primary | ICD-10-CM

## 2019-09-16 DIAGNOSIS — N39.0 RECURRENT UTI: ICD-10-CM

## 2019-09-16 PROCEDURE — 99999 PR PBB SHADOW E&M-EST. PATIENT-LVL IV: CPT | Mod: PBBFAC,,, | Performed by: NURSE PRACTITIONER

## 2019-09-16 PROCEDURE — 3078F DIAST BP <80 MM HG: CPT | Mod: CPTII,S$GLB,, | Performed by: NURSE PRACTITIONER

## 2019-09-16 PROCEDURE — G0101 PR CA SCREEN;PELVIC/BREAST EXAM: ICD-10-PCS | Mod: S$GLB,,, | Performed by: NURSE PRACTITIONER

## 2019-09-16 PROCEDURE — 3074F SYST BP LT 130 MM HG: CPT | Mod: CPTII,S$GLB,, | Performed by: NURSE PRACTITIONER

## 2019-09-16 PROCEDURE — 3078F PR MOST RECENT DIASTOLIC BLOOD PRESSURE < 80 MM HG: ICD-10-PCS | Mod: CPTII,S$GLB,, | Performed by: NURSE PRACTITIONER

## 2019-09-16 PROCEDURE — 99999 PR PBB SHADOW E&M-EST. PATIENT-LVL IV: ICD-10-PCS | Mod: PBBFAC,,, | Performed by: NURSE PRACTITIONER

## 2019-09-16 PROCEDURE — 3074F PR MOST RECENT SYSTOLIC BLOOD PRESSURE < 130 MM HG: ICD-10-PCS | Mod: CPTII,S$GLB,, | Performed by: NURSE PRACTITIONER

## 2019-09-16 PROCEDURE — G0101 CA SCREEN;PELVIC/BREAST EXAM: HCPCS | Mod: S$GLB,,, | Performed by: NURSE PRACTITIONER

## 2019-09-16 NOTE — TELEPHONE ENCOUNTER
Nadege,  Send my note from 8/1/2019.  I think I already wrote the order, right?  Can you get the medical necessity form?    D

## 2019-09-17 ENCOUNTER — TELEPHONE (OUTPATIENT)
Dept: INTERNAL MEDICINE | Facility: CLINIC | Age: 64
End: 2019-09-17

## 2019-09-17 ENCOUNTER — TELEPHONE (OUTPATIENT)
Dept: INFUSION THERAPY | Facility: HOSPITAL | Age: 64
End: 2019-09-17

## 2019-09-17 NOTE — TELEPHONE ENCOUNTER
----- Message from Lilia Kraft sent at 9/17/2019 12:58 PM CDT -----  Contact: self/[phone]  Patient called in regards needing to talk with Dr Rodriguez about home health requested a rollator and insurance company has not receive the order. Please call and follow up. Please call and advise. Thank you!!

## 2019-09-18 ENCOUNTER — PATIENT OUTREACH (OUTPATIENT)
Dept: ADMINISTRATIVE | Facility: OTHER | Age: 64
End: 2019-09-18

## 2019-09-19 ENCOUNTER — OFFICE VISIT (OUTPATIENT)
Dept: PSYCHIATRY | Facility: CLINIC | Age: 64
End: 2019-09-19
Payer: COMMERCIAL

## 2019-09-19 DIAGNOSIS — F90.0 ATTENTION DEFICIT HYPERACTIVITY DISORDER (ADHD), PREDOMINANTLY INATTENTIVE TYPE: Primary | ICD-10-CM

## 2019-09-19 DIAGNOSIS — Z91.119 NONADHERENCE WITH DIETARY RESTRICTION: ICD-10-CM

## 2019-09-19 DIAGNOSIS — E11.42 TYPE 2 DIABETES MELLITUS WITH DIABETIC POLYNEUROPATHY, WITH LONG-TERM CURRENT USE OF INSULIN: Primary | ICD-10-CM

## 2019-09-19 DIAGNOSIS — Z79.4 TYPE 2 DIABETES MELLITUS WITH DIABETIC POLYNEUROPATHY, WITH LONG-TERM CURRENT USE OF INSULIN: Primary | ICD-10-CM

## 2019-09-19 PROBLEM — F32.A DEPRESSION: Status: ACTIVE | Noted: 2019-09-19

## 2019-09-19 PROCEDURE — 90791 PR PSYCHIATRIC DIAGNOSTIC EVALUATION: ICD-10-PCS | Mod: S$GLB,,, | Performed by: PSYCHOLOGIST

## 2019-09-19 PROCEDURE — 99999 PR PBB SHADOW E&M-EST. PATIENT-LVL II: ICD-10-PCS | Mod: PBBFAC,,, | Performed by: PSYCHOLOGIST

## 2019-09-19 PROCEDURE — 99999 PR PBB SHADOW E&M-EST. PATIENT-LVL II: CPT | Mod: PBBFAC,,, | Performed by: PSYCHOLOGIST

## 2019-09-19 PROCEDURE — 90791 PSYCH DIAGNOSTIC EVALUATION: CPT | Mod: S$GLB,,, | Performed by: PSYCHOLOGIST

## 2019-09-19 PROCEDURE — 99212 OFFICE O/P EST SF 10 MIN: CPT | Mod: PBBFAC | Performed by: PSYCHOLOGIST

## 2019-09-19 NOTE — PATIENT INSTRUCTIONS
GOALS:   Document daily eating; bring DM nutritional material to next appointment  Talk with SW about financial assistane and Medicaid  Bring Daughter to next appointment

## 2019-09-19 NOTE — PROGRESS NOTES
INFORMED CONSENT/ LIMITS of CONFIDENTIALITY: Prior to beginning the interview, the patient's identification was confirmed via name and date of birth. Alison Branch  was informed of the possible risks and benefits of psychological interventions (e.g., counseling, psychotherapy, testing) and provided information regarding the handling of protected health records and   the limits of confidentiality, including the importance of reporting any suicidal or homicidal ideation to ensure safety of all parties. This provider explained the purpose of today's appointment and the patient was provided with time to ask questions regarding this information.  Acceptance and understanding of these conditions was expressed, and Alison Branch freely consented to this evaluation.     PSYCHO-ONCOLOGY INTAKE    Diagnostic Interview - CPT 95519    Date: 9/19/2019  Site: [facility]     Evaluation Length (direct face-to-face time):  45 mins    Referral Source: Tara Belcher MD   Oncologist:   PCP: Mike Rodriguez Ii, MD    Clinical status of patient: Outpatient    Alison Branch, a 63 y.o. female, seen for initial evaluation visit.  Met with patient. Patient was late to the appointment and stated that she needed to leave early to  her grandchildren from school.     Chief complaint/reason for encounter: adjustment to illness, depression, and familial stress    Medical/Surgical History:    Patient Active Problem List   Diagnosis    Uncontrolled type 2 diabetes mellitus with hyperglycemia, without long-term current use of insulin    Mixed hyperlipidemia due to type 2 diabetes mellitus    Attention deficit hyperactivity disorder (ADHD), predominantly inattentive type    Cerebral aneurysm, coiled in 2010    Hypertension associated with diabetes    Hyperkeratosis of palms and soles    Irritable bowel syndrome    Aneurysm of unspecified site    Obesity (BMI 30-39.9)    Vitamin D deficiency    Malignant neoplasm of upper  lobe of left lung    Secondary malignant neoplasm of mediastinal lymph node    Adrenal cortical steroids causing adverse effect in therapeutic use    Type 2 diabetes mellitus with diabetic polyneuropathy, with long-term current use of insulin       Health Behaviors:       ETOH Use: No (rare)       Tobacco Use: No, former smoker   Illicit Drug Use:  No     Prescription Misuse:No   Caffeine: tea rarely   Exercise:The patient engages in environmental activity only.   Firearms:  No   Advanced directives:Yes     Family History:   Psychiatric illness: No     Alcohol/Drug Abuse: No     Suicide: No      Past Psychiatric History:   Inpatient treatment: No     Outpatient treatment: Yes- [facility]; sees a psychiatrist for medication management, but has not been in 3 months due to financial strain    Prior substance abuse treatment: No     Suicide Attempts: No     Psychotropic Medications:  Current: Valium       Past: multiple psychotropics (could not recall names)    Current medications as per below, allergies reviewed in chart.    Current Outpatient Medications   Medication    aspirin (ECOTRIN) 81 MG EC tablet    atorvastatin (LIPITOR) 40 MG tablet    bisacodyl (DULCOLAX) 5 mg EC tablet    blood sugar diagnostic Strp    blood-glucose meter kit    carboxymethylcellulose (REFRESH PLUS) 0.5 % Dpet    dexAMETHasone (DECADRON) 6 MG tablet    dexAMETHasone (DECADRON) 6 MG tablet    diazePAM (VALIUM) 5 MG tablet    ergocalciferol (ERGOCALCIFEROL) 50,000 unit Cap    fluticasone propionate (FLONASE) 50 mcg/actuation nasal spray    folic acid (FOLVITE) 1 MG tablet    insulin aspart U-100 (NOVOLOG) 100 unit/mL (3 mL) InPn pen    insulin detemir U-100 (LEVEMIR FLEXTOUCH) 100 unit/mL (3 mL) SubQ InPn pen    JANUVIA 100 mg Tab    lancets Misc    lidocaine-prilocaine (EMLA) cream    nitrofurantoin, macrocrystal-monohydrate, (MACROBID) 100 MG capsule    olmesartan (BENICAR) 20 MG tablet     "ondansetron (ZOFRAN) 8 MG tablet    pen needle, diabetic (RELION PEN NEEDLES) 32 gauge x 5/32" Ndle    psyllium (METAMUCIL) packet    terconazole (TERAZOL 7) 0.4 % Crea    walker Misc     No current facility-administered medications for this visit.         CAM Therapies: None     Screening: Depression: Denies, Positive screener items related to medical conditions, cancer and associated treatment (I.e., fatigue, sleep issues, memory issues)  Araceli: Denies Psychosis: Denies    Generalized anxiety: Denies  (Standardized anxiety screening used- DELMER-7= does not meet screener) Panic Disorder: Denies    Social/specific phobia: Denies OCD: Denies   Sexual Dysfunction:  Denies Trauma: Denies      Social situation/Stressors: Alison Branch lives with daughter in [place].  She has been on SSI since 2010.  She worked in customer service prior.    Alison Branch has been  3 times, first marriage for 8 years, second marriage for 8 years, and third 14, all ending in divorce. She has three children.  She is single  The patient reports had adequate support from two children, she does not have a good relationship with one of her children.   Alison Branch is spiritual, but not Yazidism.  Alison Branch's hobbies include spiritual reading, movies, eating, shopping.  Additional stressors:  Familial Stress    Strengths:Values and traditions, Setting and pursuing goals, hopes, dreams, aspirations, Interpersonal relationships and supports available - family, relatives, friends and Cultural/spiritual/Yazidism and community involvement  Liabilities: Financial strain    Current Evaluation:     Mental Status Exam: Alison Branch arrived late promptly for the assessment session. The patient was fully cooperative throughout the interview and was an adequate historian   Appearance: age appropriate, appropriately  dressed, adequately  groomed  Behavior/Cooperation: friendly and cooperative  Speech: normal in rate, volume, and tone and " appropriate quality, quantity and organization of sentences  Mood: euthymic  Affect: euthymic  Thought Process: Distractible, jumped from topic to topic  Thought Content: normal, no suicidality, no homicidality, delusions, or paranoia;did not appear to be responding to internal stimuli during the interview.   Orientation: grossly intact  Memory: Grossly intact  Attention Span/Concentration: attention difficulties present  Fund of Knowledge: average  Estimate of Intelligence: average from verbal skills and history  Cognition: grossly intact  Insight: patient has awareness of illness; good insight into own behavior and behavior of others  Judgment: the patient's behavior is adequate to circumstances    Pain: Will be assessed at next appointment    History of present illness:       Malignant neoplasm of upper lobe of left lung    5/23/2019 Initial Diagnosis     Malignant neoplasm of upper lobe of left lung         6/24/2019 -  Chemotherapy     Treatment Summary   Plan Name: OP NSCLC PEMBROLIZUMAB PEMETREXED CARBOPLATIN Q3W  Treatment Goal: Palliative  Status: Active  Start Date: 7/9/2019  End Date: 6/17/2021 (Planned)  Provider: Tara Belcher MD  Chemotherapy: CARBOplatin (PARAPLATIN) 530 mg in sodium chloride 0.9% 250 mL chemo infusion, 530 mg (100 % of original dose 531.5 mg), Intravenous, Clinic/HOD 1 time, 4 of 4 cycles  Dose modification:   (original dose 622 mg, Cycle 2),   (original dose 567 mg, Cycle 3),   (original dose 616 mg, Cycle 4),   (original dose 531.5 mg, Cycle 1)  Administration: 620 mg (7/29/2019), 565 mg (8/19/2019), 615 mg (9/5/2019), 530 mg (7/9/2019)  PEMEtrexed (ALIMTA) 1,075 mg in sodium chloride 0.9% 100 mL chemo infusion, 500 mg/m2 = 1,075 mg, Intravenous, Clinic/HOD 1 time, 4 of 35 cycles  Administration: 1,075 mg (7/29/2019), 1,075 mg (8/19/2019), 1,075 mg (9/5/2019), 1,075 mg (7/9/2019)  pembrolizumab (KEYTRUDA) 200 mg in sodium chloride 0.9% 100 mL chemo infusion, 200 mg, Intravenous,  Clinic/HOD 1 time, 4 of 35 cycles  Administration: 200 mg (7/29/2019), 200 mg (8/19/2019), 200 mg (9/5/2019), 200 mg (7/9/2019)           Secondary malignant neoplasm of mediastinal lymph node    6/7/2019 Initial Diagnosis     Secondary malignant neoplasm of mediastinal lymph node         6/24/2019 -  Chemotherapy     Treatment Summary   Plan Name: OP NSCLC PEMBROLIZUMAB PEMETREXED CARBOPLATIN Q3W  Treatment Goal: Palliative  Status: Active  Start Date: 7/9/2019  End Date: 6/17/2021 (Planned)  Provider: Tara Belcher MD  Chemotherapy: CARBOplatin (PARAPLATIN) 530 mg in sodium chloride 0.9% 250 mL chemo infusion, 530 mg (100 % of original dose 531.5 mg), Intravenous, Clinic/HOD 1 time, 4 of 4 cycles  Dose modification:   (original dose 622 mg, Cycle 2),   (original dose 567 mg, Cycle 3),   (original dose 616 mg, Cycle 4),   (original dose 531.5 mg, Cycle 1)  Administration: 620 mg (7/29/2019), 565 mg (8/19/2019), 615 mg (9/5/2019), 530 mg (7/9/2019)  PEMEtrexed (ALIMTA) 1,075 mg in sodium chloride 0.9% 100 mL chemo infusion, 500 mg/m2 = 1,075 mg, Intravenous, Clinic/HOD 1 time, 4 of 35 cycles  Administration: 1,075 mg (7/29/2019), 1,075 mg (8/19/2019), 1,075 mg (9/5/2019), 1,075 mg (7/9/2019)  pembrolizumab (KEYTRUDA) 200 mg in sodium chloride 0.9% 100 mL chemo infusion, 200 mg, Intravenous, Clinic/HOD 1 time, 4 of 35 cycles  Administration: 200 mg (7/29/2019), 200 mg (8/19/2019), 200 mg (9/5/2019), 200 mg (7/9/2019)          Patient reported having a history of symptoms related to ADHD during childhood, that were exacerbated by her history of brain injuries. Patient has several chronic medical conditions including DM2, for which she has variable compliance. She stated that with regard to nutrition, she is unable to afford most health food options, often forgets doses of her medication, and struggles with consistent eating schedule. She has also been out of Januvia for about a month. She stated that she is working  with DEBORAH for financial assistance.    Alison Branch has adjusted to illness fairly well primarily through active coping strategies, passive coping strategies, focus on alternative activities, focus on family and prayer. She has engaged in appropriate information gathering.  The patient has good family/friend support.  Her support system is coping adequately with the diagnosis/treatment/prognosis. Illness-related psychosocial stressors include financial strain , absence from work and difficulty meeting family responsibilities.  The patient has a good partnership with her [facility] oncology treatment team. The patient reports the following barriers to cancer care:financial limitations and insurance coverage.     Symptoms:   · Mood: weight gain, insomnia and fatigue;  prior depression:residual effect after brain anerysm ; PHQ-9=7  · Anxiety: denied; no prior;  DELMER-7=0  · Substance abuse: denied  · Cognitive functioning: attention, memory, and concentration issues  · Health behaviors: Nonadherence to recomendtion for nutrition and medication for DM2 management  · Sleep: Will assess at next appointment  · Pain: Will assess at next appointment       Assessment - Diagnosis - Goals:     ADHD, by history  Medical Nonadherent     Plan:individual psychotherapy to address cognitive complaints, non-adherence, and adjustment.     Summary and Recommendations  Alison Branch is a 63 y.o. female referred by Tara Belcher MD for psychological evaluation and treatment.  Ms. Branch appears to be coping well with her diagnosis and proposed treatment course.  She is interested in CBT to cognitive complaints and medical non adherence and associated psychosocial correlates and will follow up with me for that purpose.  She was encouraged to continue with pleasant events scheduling and to increase exercise. SMART Goal Development at next appointment    GOALS:   Document daily eating; bring DM nutritional material to next appointment  Talk with DEBORAH  about financial assistance and Medicaid  Bring Daughter to next appointment      Jagruti Garcia, PhD  Clinical Psychologist  [place] License #[id number]

## 2019-09-19 NOTE — PSYCH TESTING
1.  Little interest or pleasure in doing things: Not at all                       = 0   2.  Feeling down, depressed or hopeless: Not at all                       = 0   3.  Trouble falling or staying asleep, or sleeping too much: Several days                = 1   4.  Feeling tired or having little energy: Several days                = 1   5.  Poor appetite or overeating: Nearly every day           = 3   6.  Feeling bad about yourself - or that you are a failure or have let yourself or your family down: Not at all                       = 0   7.  Trouble concentrating on things, such as reading the newspaper or watching television: Several days                = 1   8.  Moving or speaking so slowly that other people could have noticed.  Or the opposite - being fidgety or restless that you have been moving around a lot more than usual: Several days                = 1   9.  Thoughts that you would be better off dead, or of hurting yourself: Not at all                       = 0    PHQ-9 Total:  7, mild     The patient completed the General Anxiety Disorder, 7 Items (GAD7)  and received a score of 0, indicating none anxiety symptoms presently   impacting current functioning.

## 2019-09-19 NOTE — Clinical Note
Thanks for the referral. This patient has non adherence due to financial strain, memory/concentration issues (history of ADHD and brain injury)  and does not manage her diabetes well:  has been out of Januvia for about a month, forgets to take meds, skips meals, and cannot afford healthy food. I will be working an SMART goal setting around managing diabetes since she gets steroids with chemo. She is also seeing nutrition. I recommended that she either write down information from her visit with you or that she is diligent with the AVS. I will also be processing cancer and managing mood. Just wanted to update you. Thanks for the referral.

## 2019-09-19 NOTE — Clinical Note
Saw this patient for Psych service. She reported that she has been out of Januvia for about a month. She has significant concentration and memory problems and forgets to take her diabetes medication. She also will forget to eat during the day, and has limited finances to afford heathy food. I will be working an SMART goal setting, processing cancer, and mood management. Just wanted to update you

## 2019-09-20 ENCOUNTER — TELEPHONE (OUTPATIENT)
Dept: INTERNAL MEDICINE | Facility: CLINIC | Age: 64
End: 2019-09-20

## 2019-09-20 ENCOUNTER — TELEPHONE (OUTPATIENT)
Dept: INFUSION THERAPY | Facility: HOSPITAL | Age: 64
End: 2019-09-20

## 2019-09-20 NOTE — PROGRESS NOTES
"Chief Complaint   Patient presents with    Annual Exam       History of Present Illness: Alison Branch is a 63 y.o. female that presents today 2019   Pt presents today for well woman exam. . Pt denies any abnormal pap smears in the past. Pt had total hysterectomy (non-cancerous reasons per pt) and has not been on hormone replacement. Pt is currently being treated for lung cancer. She does not desire any STD testing. Pt had mammogram screening in September. Pt c/o urinary incontinence and UTIs. She relates the UTIs to the time of her cancer treatments. She denies any current symptoms. No other complaints or concerns noted.     Past Medical History:   Diagnosis Date    Allergy     Brain aneurysm     s/p coiling of one; another not coiled    Breast cyst     Diabetes mellitus     Diabetes type 2, controlled     Fever blister     High cholesterol     History of Bell's palsy     HTN (hypertension) 2014       Past Surgical History:   Procedure Laterality Date    BREAST CYST ASPIRATION      BRONCHOSCOPY N/A 2019    Procedure: Bronchoscopy;  [facility];  Location: [facility] OR [address] [place] [address];  Service: Anesthesiology;  Laterality: N/A;    CERVICAL FUSION      COLONOSCOPY N/A 3/9/2016    Procedure: COLONOSCOPY;  Surgeon: Elliott Zimmerman MD;  Location: [facility] ENDO (4TH FLR);  Service: Endoscopy;  Laterality: N/A;    head surgery      stent and "curling" for aneurysm    HYSTERECTOMY      TVH secondary to SUF    INSERTION OF TUNNELED CENTRAL VENOUS CATHETER (CVC) WITH SUBCUTANEOUS PORT Right 2019    Procedure: INSERTION, PORT-A-CATH;  Surgeon: Cali Damico MD;  Location: [facility] CATH LAB;  Service: Radiology;  Laterality: Right;       Family History   Problem Relation Age of Onset    Rheum arthritis Father     Diabetes Father     Heart failure Father     Migraines Father     Cataracts Father     Cancer Mother 63        pancreatic    Stomach cancer Mother     " Breast cancer Maternal Aunt         50s    Ovarian cancer Cousin     Diabetes Sister     Diabetes Brother     Cancer Maternal Aunt         Lung CA    Melanoma Neg Hx     Colon cancer Neg Hx     Amblyopia Neg Hx     Blindness Neg Hx     Glaucoma Neg Hx     Macular degeneration Neg Hx     Retinal detachment Neg Hx     Strabismus Neg Hx     Stroke Neg Hx     Thyroid disease Neg Hx        Social History     Socioeconomic History    Marital status: Single     Spouse name: Not on file    Number of children: Not on file    Years of education: Not on file    Highest education level: Not on file   Occupational History    Occupation: Student     Comment: Unemployed   Social Needs    Financial resource strain: Not on file    Food insecurity:     Worry: Not on file     Inability: Not on file    Transportation needs:     Medical: Not on file     Non-medical: Not on file   Tobacco Use    Smoking status: Former Smoker     Packs/day: 0.50     Years: 30.00     Pack years: 15.00     Last attempt to quit: 1999     Years since quittin.7    Smokeless tobacco: Never Used   Substance and Sexual Activity    Alcohol use: No     Alcohol/week: 0.0 standard drinks    Drug use: No    Sexual activity: Never     Partners: Female     Birth control/protection: Surgical   Lifestyle    Physical activity:     Days per week: Not on file     Minutes per session: Not on file    Stress: Not on file   Relationships    Social connections:     Talks on phone: Not on file     Gets together: Not on file     Attends Catholic service: Not on file     Active member of club or organization: Not on file     Attends meetings of clubs or organizations: Not on file     Relationship status: Not on file   Other Topics Concern    Are you pregnant or think you may be? No    Breast-feeding No   Social History Narrative    Not on file       OB History    Para Term  AB Living   6 6 3     3   SAB TAB Ectopic Multiple  Live Births           3      # Outcome Date GA Lbr José Luis/2nd Weight Sex Delivery Anes PTL Lv   6 Term            5 Term            4 Term            3 Para      Vag-Spont   MAURISIO   2 Para      Vag-Spont   MAURISIO   1 Para      Vag-Spont   MAURISIO       Current Outpatient Medications   Medication Sig Dispense Refill    aspirin (ECOTRIN) 81 MG EC tablet Take 1 tablet (81 mg total) by mouth once daily.  0    atorvastatin (LIPITOR) 40 MG tablet TAKE 1 TABLET BY MOUTH ONCE DAILY 90 tablet 3    bisacodyl (DULCOLAX) 5 mg EC tablet Take 5 mg by mouth daily as needed for Constipation.      blood sugar diagnostic Strp To check BG 5 times per day 450 each 3    blood sugar diagnostic Strp To check BG 4 times daily, to use with insurance preferred meter 360 strip 3    blood-glucose meter kit Use as instructed 1 each 0    blood-glucose meter kit To check BG 4 times daily, to use with insurance preferred meter 1 each 0    carboxymethylcellulose (REFRESH PLUS) 0.5 % Dpet 1 drop 3 (three) times daily as needed.      dexAMETHasone (DECADRON) 6 MG tablet Take 6 mg by mouth.      dexAMETHasone (DECADRON) 6 MG tablet Take 1 tablet by mouth two times a day with food the day before chemotherapy and for two days after chemotherapy. Do not take the day of chemotherapy. 24 tablet 4    diazePAM (VALIUM) 5 MG tablet TAKE 1 TABLET BY MOUTH ONCE DAILY AS NEEDED FOR ANXIETY  2    ergocalciferol (ERGOCALCIFEROL) 50,000 unit Cap TAKE 1 CAPSULE BY MOUTH EVERY 7 DAYS 12 capsule 3    fluticasone propionate (FLONASE) 50 mcg/actuation nasal spray USE TWO SPRAY(S) IN EACH NOSTRIL ONCE DAILY 16 g 3    folic acid (FOLVITE) 1 MG tablet Take 1 tablet (1 mg total) by mouth once daily. Start today 100 tablet 3    insulin aspart U-100 (NOVOLOG) 100 unit/mL (3 mL) InPn pen Inject 8 units into the skin 3 times daily with meals plus correction scale. Max TDD 54 units 2 Box 6    insulin detemir U-100 (LEVEMIR FLEXTOUCH) 100 unit/mL (3 mL) SubQ InPn pen Inject  "24 Units into the skin every morning. 15 mL 0    insulin detemir U-100 (LEVEMIR FLEXTOUCH) 100 unit/mL (3 mL) SubQ InPn pen Inject 24 Units into the skin once daily. 1 Box 6    JANUVIA 100 mg Tab TAKE ONE TABLET BY MOUTH ONCE DAILY 30 tablet 11    lancets Misc 1 Device by Misc.(Non-Drug; Combo Route) route once daily. Heladio Result.  250.02.  Check Blood Sugar Twice Daily. 200 each 3    lancets Misc To check BG 4 times daily, to use with insurance preferred meter 360 each 3    lidocaine-prilocaine (EMLA) cream Apply to PORT one hour prior to chemo administration. 30 g 0    nitrofurantoin, macrocrystal-monohydrate, (MACROBID) 100 MG capsule Take 1 capsule (100 mg total) by mouth 2 (two) times daily. (Patient not taking: Reported on 9/25/2019) 10 capsule 0    olmesartan (BENICAR) 20 MG tablet Take 1 tablet (20 mg total) by mouth once daily. 90 tablet 3    ondansetron (ZOFRAN) 8 MG tablet Take 1 tablet (8 mg total) by mouth 4 (four) times daily as needed for Nausea. 60 tablet 2    pen needle, diabetic (RELION PEN NEEDLES) 32 gauge x 5/32" Ndle Use for insulin pen injection once daily 100 each 3    phenazopyridine (PYRIDIUM) 200 MG tablet Take 1 tablet (200 mg total) by mouth 3 (three) times daily as needed for Pain. (Patient not taking: Reported on 9/25/2019) 15 tablet 2    psyllium (METAMUCIL) packet Take 1 packet by mouth once daily.      terconazole (TERAZOL 7) 0.4 % Crea INSERT 1 APPLICATORFUL VAGINALLY ONCE DAILY IN THE EVENING 45 g 0    walker Misc Please provide rollator walker for this debilitated cancer patient.  Thank you.  0     No current facility-administered medications for this visit.        Review of patient's allergies indicates:  No Known Allergies    Review of Symptoms:  GENERAL: Denies weight gain or weight loss. Feeling well overall.   SKIN: Denies rash or lesions.   HEAD: Denies head injury or headache.   NODES: Denies enlarged lymph nodes.   CHEST: Denies chest pain or shortness of " "breath.   CARDIOVASCULAR: Denies palpitations or left sided chest pain.   ABDOMEN: No abdominal pain, constipation, diarrhea, nausea, vomiting or rectal bleeding.   URINARY: No frequency, dysuria, hematuria, or burning on urination.    /60   Ht 5' 10" (1.778 m)   Wt 96.8 kg (213 lb 6.5 oz)     Physical Exam:  APPEARANCE: Well nourished, well developed, in no acute distress.  SKIN: Normal skin turgor, no lesions.  NECK: Neck symmetric without masses   RESPIRATORY: Normal respiratory effort with no retractions or use of accessory muscles  ABDOMEN: Soft. No tenderness or masses. No hepatosplenomegaly. No hernias.  BREASTS: Symmetrical, no skin changes or visible lesions. No palpable masses, nipple discharge or adenopathy bilaterally.  PELVIC: Normal external female genitalia without lesions. Normal hair distribution. Adequate perineal body, normal urethral meatus. Urethra with no masses.  Bladder nontender. Vagina dry with minimal rugae without lesions or discharge. Cervix pink and without lesions. No significant cystocele or rectocele. Bimanual exam showed uterus normal size, shape, position, mobile and nontender. Adnexa without masses or tenderness. Urethra and bladder normal.     ASSESSMENT/PLAN:  Encounter for well woman exam    Recurrent UTI  -     Ambulatory referral to Urology          Patient was counseled today on Pap guidelines, recommendation for pelvic exams, mammograms starting annually at age 40, Colonoscopy after the age of 50, Dexa Bone Scan and calcium and vitamin D supplementation in menopause and to see her PCP for other health maintenance.       Follow-up:  RTC in 1 year for well visit as needed  "

## 2019-09-20 NOTE — TELEPHONE ENCOUNTER
----- Message from Cary Horne sent at 9/20/2019 12:00 PM CDT -----  Contact: self   Patient is returning a phone call.  Who left a message for the patient: Nadege Deal MA  Does patient know what this is regarding:  Medical necessity form for the rollater walker; needs additional information for [employer]  Comments:

## 2019-09-20 NOTE — PROGRESS NOTES
Dr. Garcia, I contacted Ms Branch & confirmed her follow up visit with you on Thursday 9/26 @ 4pm.

## 2019-09-20 NOTE — TELEPHONE ENCOUNTER
----- Message from Judith Meza sent at 9/20/2019  9:55 AM CDT -----  Contact: Rosalia with [facility]    Rosalia called in regards to patient needed a roller walker with a seat please fax over clinical notes and 's orders to .

## 2019-09-22 ENCOUNTER — PATIENT MESSAGE (OUTPATIENT)
Dept: DIABETES | Facility: CLINIC | Age: 64
End: 2019-09-22

## 2019-09-23 ENCOUNTER — OFFICE VISIT (OUTPATIENT)
Dept: UROLOGY | Facility: CLINIC | Age: 64
End: 2019-09-23
Payer: MEDICARE

## 2019-09-23 ENCOUNTER — TELEPHONE (OUTPATIENT)
Dept: PULMONOLOGY | Facility: CLINIC | Age: 64
End: 2019-09-23

## 2019-09-23 VITALS
BODY MASS INDEX: 30.49 KG/M2 | HEART RATE: 73 BPM | DIASTOLIC BLOOD PRESSURE: 63 MMHG | HEIGHT: 70 IN | SYSTOLIC BLOOD PRESSURE: 109 MMHG | WEIGHT: 212.94 LBS

## 2019-09-23 DIAGNOSIS — R81 GLUCOSURIA: ICD-10-CM

## 2019-09-23 DIAGNOSIS — N39.41 URGE INCONTINENCE: ICD-10-CM

## 2019-09-23 DIAGNOSIS — R39.15 URGENCY OF URINATION: ICD-10-CM

## 2019-09-23 DIAGNOSIS — R39.9 UTI SYMPTOMS: Primary | ICD-10-CM

## 2019-09-23 DIAGNOSIS — R35.0 FREQUENCY OF URINATION: ICD-10-CM

## 2019-09-23 PROCEDURE — 3078F PR MOST RECENT DIASTOLIC BLOOD PRESSURE < 80 MM HG: ICD-10-PCS | Mod: CPTII,S$GLB,, | Performed by: PHYSICIAN ASSISTANT

## 2019-09-23 PROCEDURE — 51798 US URINE CAPACITY MEASURE: CPT | Mod: S$GLB,,, | Performed by: PHYSICIAN ASSISTANT

## 2019-09-23 PROCEDURE — 3074F SYST BP LT 130 MM HG: CPT | Mod: CPTII,S$GLB,, | Performed by: PHYSICIAN ASSISTANT

## 2019-09-23 PROCEDURE — 3008F BODY MASS INDEX DOCD: CPT | Mod: CPTII,S$GLB,, | Performed by: PHYSICIAN ASSISTANT

## 2019-09-23 PROCEDURE — 99499 RISK ADDL DX/OHS AUDIT: ICD-10-PCS | Mod: S$GLB,,, | Performed by: PHYSICIAN ASSISTANT

## 2019-09-23 PROCEDURE — 99999 PR PBB SHADOW E&M-EST. PATIENT-LVL V: CPT | Mod: PBBFAC,,, | Performed by: PHYSICIAN ASSISTANT

## 2019-09-23 PROCEDURE — 99204 OFFICE O/P NEW MOD 45 MIN: CPT | Mod: S$GLB,,, | Performed by: PHYSICIAN ASSISTANT

## 2019-09-23 PROCEDURE — 99499 UNLISTED E&M SERVICE: CPT | Mod: S$GLB,,, | Performed by: PHYSICIAN ASSISTANT

## 2019-09-23 PROCEDURE — 3074F PR MOST RECENT SYSTOLIC BLOOD PRESSURE < 130 MM HG: ICD-10-PCS | Mod: CPTII,S$GLB,, | Performed by: PHYSICIAN ASSISTANT

## 2019-09-23 PROCEDURE — 3008F PR BODY MASS INDEX (BMI) DOCUMENTED: ICD-10-PCS | Mod: CPTII,S$GLB,, | Performed by: PHYSICIAN ASSISTANT

## 2019-09-23 PROCEDURE — 99999 PR PBB SHADOW E&M-EST. PATIENT-LVL V: ICD-10-PCS | Mod: PBBFAC,,, | Performed by: PHYSICIAN ASSISTANT

## 2019-09-23 PROCEDURE — 99204 PR OFFICE/OUTPT VISIT, NEW, LEVL IV, 45-59 MIN: ICD-10-PCS | Mod: S$GLB,,, | Performed by: PHYSICIAN ASSISTANT

## 2019-09-23 PROCEDURE — 51798 PR MEAS,POST-VOID RES,US,NON-IMAGING: ICD-10-PCS | Mod: S$GLB,,, | Performed by: PHYSICIAN ASSISTANT

## 2019-09-23 PROCEDURE — 3078F DIAST BP <80 MM HG: CPT | Mod: CPTII,S$GLB,, | Performed by: PHYSICIAN ASSISTANT

## 2019-09-23 RX ORDER — PHENAZOPYRIDINE HYDROCHLORIDE 200 MG/1
200 TABLET, FILM COATED ORAL 3 TIMES DAILY PRN
Qty: 15 TABLET | Refills: 2 | Status: SHIPPED | OUTPATIENT
Start: 2019-09-23 | End: 2019-09-28

## 2019-09-23 NOTE — LETTER
September 23, 2019      Adrianna Campbell, NP  [address] [place] [address] Drive  [address]  [facility] [address]           [facility] - Urology 4th Floor  [address]  [place] [address]  Phone: [phone]          Patient: Alison Branch   MR Number: [id number]   YOB: 1955   Date of Visit: 9/23/2019       Dear Adrianna Campbell:    Thank you for referring Alison Branch to me for evaluation. Attached you will find relevant portions of my assessment and plan of care.    If you have questions, please do not hesitate to call me. I look forward to following Alison Branch along with you.    Sincerely,    Nubia Saleh PA-C    Enclosure  CC:  No Recipients    If you would like to receive this communication electronically, please contact [email or web address] or [phone] to request more information on [employer] Link access.    For providers and/or their staff who would like to refer a patient to [facility], please contact us through our one-stop-shop provider referral line, [facility], at [phone].    If you feel you have received this communication in error or would no longer like to receive these types of communications, please e-mail [email or web address]

## 2019-09-23 NOTE — PROGRESS NOTES
CHIEF COMPLAINT:    Mrs. Branch is a 63 y.o. female presenting for recurrent  uti.  PRESENTING ILLNESS:    Alison Branch is a 63 y.o. female with a PMH of lung cancer who presents for recurrent uti.     She has a history of lung cancer and is receiving chemo (carboplatin, alimta, and keytruda).  She does chemo q 21 days.   She reports having UTIs after every session of chemo. The UTI usually occurs a couple days after chemo. This is her third case of UTI symptoms (9/11/19). Her previous UTI symptoms occurred on 7/16/19 and 8/1/19. She was treated with nitrofurantoin and cipro respectively.  After the first chemo she reports darkening of the labia, dysuria, frequency, urgency, and urge incontinence.  Now she just experiences frequency and urgency, urge incontinence after chemo.      Otherwise she feels ok.  She drinks a lot of water before chemo.  She drinks 2-4L of water a day.  She does not have any urinary complaints today.   She does experience constipation as a side effect from the chemo.  She take miralax as needed.  She has seen GI but she was not satisfied with the care she received.        She reports a good urinary stream and complete bladder emptying.  Urine cultures:     Component      Latest Ref Rng & Units 9/11/2019          10:28 AM   Urine Culture, Routine       Multiple organisms isolated. None in predominance.         Component      Latest Ref Rng & Units 7/26/2019           8:44 AM   Urine Culture, Routine       Multiple organisms isolated. None in predominance.       Component      Latest Ref Rng & Units 2/1/2016             Urine Culture, Routine       No growth     REVIEW OF SYSTEMS:      Constitutional: Negative for fever and chills.   HENT: Negative for hearing loss.   Eyes: Negative for visual disturbance.   Respiratory: Negative for shortness of breath.   Cardiovascular: Negative for chest pain.   Gastrointestinal: Positive for constipation.    Negative for vomiting.   Genitourinary:  See  "HPI  Neurological: Negative for dizziness.   Hematological: Does not bruise/bleed easily.   Psychiatric/Behavioral: Negative for confusion.     PATIENT HISTORY:    Past Medical History:   Diagnosis Date    Allergy     Brain aneurysm 2010    s/p coiling of one; another not coiled    Breast cyst     Diabetes mellitus     Diabetes type 2, controlled     Fever blister     High cholesterol     History of Bell's palsy     HTN (hypertension) 5/20/2014       Past Surgical History:   Procedure Laterality Date    BREAST CYST ASPIRATION      Bronchoscopy N/A 5/28/2019    Performed by [facility] OR 2ND FLR    CERVICAL FUSION      CHONDROPLASTY-KNEE Left 2/23/2018    [facility] at [facility] OR    COLONOSCOPY N/A 3/9/2016    Performed by Elliott Zimmerman MD at [facility] ENDO (4TH FLR)    head surgery      stent and "curling" for aneurysm    HYSTERECTOMY      TVH secondary to SUF    INSERTION, PORT-A-CATH Right 7/8/2019    Performed by Cali Damico MD at [facility] CATH LAB    MENISCECTOMY Left 2/23/2018    [facility] OR    SYNOVECTOMY-KNEE Left 2/23/2018    Per[facility] at [facility] OR       Family History   Problem Relation Age of Onset    Rheum arthritis Father     Diabetes Father     Heart failure Father     Migraines Father     Cataracts Father     Cancer Mother 63        pancreatic    Stomach cancer Mother     Breast cancer Maternal Aunt         50s    Ovarian cancer Cousin     Diabetes Sister     Diabetes Brother     Cancer Maternal Aunt         Lung CA    Melanoma Neg Hx     Colon cancer Neg Hx     Amblyopia Neg Hx     Blindness Neg Hx     Glaucoma Neg Hx     Macular degeneration Neg Hx     Retinal detachment Neg Hx     Strabismus Neg Hx     Stroke Neg Hx     Thyroid disease Neg Hx        Social History     Socioeconomic History    Marital status: Single     Spouse name: Not on file    Number of " children: Not on file    Years of education: Not on file    Highest education level: Not on file   Occupational History    Occupation: Student     Comment: Unemployed   Social Needs    Financial resource strain: Not on file    Food insecurity:     Worry: Not on file     Inability: Not on file    Transportation needs:     Medical: Not on file     Non-medical: Not on file   Tobacco Use    Smoking status: Former Smoker     Packs/day: 0.50     Years: 30.00     Pack years: 15.00     Last attempt to quit: 1999     Years since quittin.7    Smokeless tobacco: Never Used   Substance and Sexual Activity    Alcohol use: No     Alcohol/week: 0.0 standard drinks    Drug use: No    Sexual activity: Never     Partners: Female     Birth control/protection: Surgical   Lifestyle    Physical activity:     Days per week: Not on file     Minutes per session: Not on file    Stress: Not on file   Relationships    Social connections:     Talks on phone: Not on file     Gets together: Not on file     Attends Orthodoxy service: Not on file     Active member of club or organization: Not on file     Attends meetings of clubs or organizations: Not on file     Relationship status: Not on file   Other Topics Concern    Are you pregnant or think you may be? No    Breast-feeding No   Social History Narrative    Not on file       Allergies:  Patient has no known allergies.    Medications:    Current Outpatient Medications:     aspirin (ECOTRIN) 81 MG EC tablet, Take 1 tablet (81 mg total) by mouth once daily., Disp: , Rfl: 0    atorvastatin (LIPITOR) 40 MG tablet, TAKE 1 TABLET BY MOUTH ONCE DAILY, Disp: 90 tablet, Rfl: 3    bisacodyl (DULCOLAX) 5 mg EC tablet, Take 5 mg by mouth daily as needed for Constipation., Disp: , Rfl:     blood sugar diagnostic Strp, To check BG 5 times per day, Disp: 450 each, Rfl: 3    blood-glucose meter kit, Use as instructed, Disp: 1 each, Rfl: 0    carboxymethylcellulose (REFRESH PLUS)  0.5 % Dpet, 1 drop 3 (three) times daily as needed., Disp: , Rfl:     dexAMETHasone (DECADRON) 6 MG tablet, Take 6 mg by mouth., Disp: , Rfl:     dexAMETHasone (DECADRON) 6 MG tablet, Take 1 tablet by mouth two times a day with food the day before chemotherapy and for two days after chemotherapy. Do not take the day of chemotherapy., Disp: 24 tablet, Rfl: 4    diazePAM (VALIUM) 5 MG tablet, TAKE 1 TABLET BY MOUTH ONCE DAILY AS NEEDED FOR ANXIETY, Disp: , Rfl: 2    ergocalciferol (ERGOCALCIFEROL) 50,000 unit Cap, TAKE 1 CAPSULE BY MOUTH EVERY 7 DAYS, Disp: 12 capsule, Rfl: 3    fluticasone propionate (FLONASE) 50 mcg/actuation nasal spray, USE TWO SPRAY(S) IN EACH NOSTRIL ONCE DAILY, Disp: 16 g, Rfl: 3    folic acid (FOLVITE) 1 MG tablet, Take 1 tablet (1 mg total) by mouth once daily. Start today, Disp: 100 tablet, Rfl: 3    insulin aspart U-100 (NOVOLOG) 100 unit/mL (3 mL) InPn pen, Inject 8 units into the skin 3 times daily with meals plus correction scale. Max TDD 54 units, Disp: 2 Box, Rfl: 6    insulin detemir U-100 (LEVEMIR FLEXTOUCH) 100 unit/mL (3 mL) SubQ InPn pen, Inject 24 Units into the skin every morning., Disp: 15 mL, Rfl: 0    insulin detemir U-100 (LEVEMIR FLEXTOUCH) 100 unit/mL (3 mL) SubQ InPn pen, Inject 24 Units into the skin once daily., Disp: 1 Box, Rfl: 6    JANUVIA 100 mg Tab, TAKE ONE TABLET BY MOUTH ONCE DAILY, Disp: 30 tablet, Rfl: 11    lancets Misc, 1 Device by Misc.(Non-Drug; Combo Route) route once daily. Heladio Result.  250.02.  Check Blood Sugar Twice Daily., Disp: 200 each, Rfl: 3    lidocaine-prilocaine (EMLA) cream, Apply to PORT one hour prior to chemo administration., Disp: 30 g, Rfl: 0    nitrofurantoin, macrocrystal-monohydrate, (MACROBID) 100 MG capsule, Take 1 capsule (100 mg total) by mouth 2 (two) times daily., Disp: 10 capsule, Rfl: 0    olmesartan (BENICAR) 20 MG tablet, Take 1 tablet (20 mg total) by mouth once daily., Disp: 90 tablet, Rfl: 3    ondansetron  "(ZOFRAN) 8 MG tablet, Take 1 tablet (8 mg total) by mouth 4 (four) times daily as needed for Nausea., Disp: 60 tablet, Rfl: 2    pen needle, diabetic (RELION PEN NEEDLES) 32 gauge x 5/32" Ndle, Use for insulin pen injection once daily, Disp: 100 each, Rfl: 3    psyllium (METAMUCIL) packet, Take 1 packet by mouth once daily., Disp: , Rfl:     terconazole (TERAZOL 7) 0.4 % Crea, INSERT 1 APPLICATORFUL VAGINALLY ONCE DAILY IN THE EVENING, Disp: 45 g, Rfl: 0    walker Misc, Please provide rollator walker for this debilitated cancer patient.  Thank you., Disp: , Rfl: 0    phenazopyridine (PYRIDIUM) 200 MG tablet, Take 1 tablet (200 mg total) by mouth 3 (three) times daily as needed for Pain., Disp: 15 tablet, Rfl: 2    PHYSICAL EXAMINATION:    Constitutional: She appears well-developed and well-nourished.  She is in no apparent distress.    Eyes: No scleral icterus noted bilaterally. No discharge bilaterally.    Nose: No rhinorrhea    Cardiovascular: Normal rate.  No pitting edema noted in lower extremities bilaterally    Pulmonary/Chest: Effort normal. No respiratory distress.     Abdominal:  She exhibits no distension.  There is no CVA tenderness.     Lymphadenopathy:          Right: No supraclavicular adenopathy present.        Left: No supraclavicular adenopathy present.     Neurological: She is alert and oriented to person, place, and time.     Skin: Skin is warm and dry.     Psych: Cooperative with normal affect.    Genitourinary: declined    Physical Exam    Bladder scan performed by Nurse Calvert.  PVR 0ml  LABS:    U/a: 1.020, pH 5, tr leuks, 250 glucose, otherwise negative.     IMPRESSION:    Encounter Diagnoses   Name Primary?    UTI symptoms Yes    Frequency of urination     Urgency of urination     Urge incontinence     Glucosuria        PLAN:        Reviewed urine cultures.  She hasnt had any positive urine cultures indicating an infection.    Pyridium as needed for urinary symptoms after chemo.  Do " not take more than 3 days consecutively.    Home collects  If no relief with pyridium and urine cultures remain negative, discussed trying an OAB medication to take as needed after chemo treatment.    She will message me after her next chemo to give an update on her symptoms.      Discussed glucosuria.  She has been out of her DM medication for 2 weeks. She should be restarting it soon.  Recommend she continue to take her DM medication as needed and continue follow up with pcp.          Nubia Saleh PA-C

## 2019-09-24 ENCOUNTER — TELEPHONE (OUTPATIENT)
Dept: INTERNAL MEDICINE | Facility: CLINIC | Age: 64
End: 2019-09-24

## 2019-09-24 DIAGNOSIS — Z79.4 TYPE 2 DIABETES MELLITUS WITH DIABETIC POLYNEUROPATHY, WITH LONG-TERM CURRENT USE OF INSULIN: Primary | ICD-10-CM

## 2019-09-24 DIAGNOSIS — E11.65 UNCONTROLLED TYPE 2 DIABETES MELLITUS WITH HYPERGLYCEMIA, WITHOUT LONG-TERM CURRENT USE OF INSULIN: ICD-10-CM

## 2019-09-24 DIAGNOSIS — E11.42 TYPE 2 DIABETES MELLITUS WITH DIABETIC POLYNEUROPATHY, WITH LONG-TERM CURRENT USE OF INSULIN: Primary | ICD-10-CM

## 2019-09-24 RX ORDER — INSULIN PUMP SYRINGE, 3 ML
EACH MISCELLANEOUS
Qty: 1 EACH | Refills: 0 | Status: SHIPPED | OUTPATIENT
Start: 2019-09-24 | End: 2019-11-05

## 2019-09-24 RX ORDER — LANCETS
EACH MISCELLANEOUS
Qty: 360 EACH | Refills: 3 | Status: SHIPPED | OUTPATIENT
Start: 2019-09-24 | End: 2019-11-05

## 2019-09-24 NOTE — TELEPHONE ENCOUNTER
----- Message from Elizabeth Craven sent at 9/24/2019  8:23 AM CDT -----  Contact: [facility] [phone]  2nd Request    Requesting orders for a rollator with a seat and clinical notes to be faxed to [phone].    Please call and advise.    Thank You

## 2019-09-24 NOTE — TELEPHONE ENCOUNTER
Called [facility] and spoke to Mango.to confirm faxed over paperwork. She stated that she will check her fax. Confirmation received on my end.

## 2019-09-25 ENCOUNTER — OFFICE VISIT (OUTPATIENT)
Dept: HEMATOLOGY/ONCOLOGY | Facility: CLINIC | Age: 64
End: 2019-09-25
Payer: MEDICARE

## 2019-09-25 VITALS
HEIGHT: 69 IN | TEMPERATURE: 98 F | BODY MASS INDEX: 31.77 KG/M2 | HEART RATE: 75 BPM | SYSTOLIC BLOOD PRESSURE: 112 MMHG | WEIGHT: 214.5 LBS | OXYGEN SATURATION: 97 % | DIASTOLIC BLOOD PRESSURE: 57 MMHG | RESPIRATION RATE: 18 BRPM

## 2019-09-25 DIAGNOSIS — C34.12 MALIGNANT NEOPLASM OF UPPER LOBE OF LEFT LUNG: Primary | ICD-10-CM

## 2019-09-25 DIAGNOSIS — C77.1 SECONDARY MALIGNANT NEOPLASM OF MEDIASTINAL LYMPH NODE: ICD-10-CM

## 2019-09-25 DIAGNOSIS — C79.51 SECONDARY MALIGNANT NEOPLASM OF BONE: ICD-10-CM

## 2019-09-25 PROCEDURE — 3078F PR MOST RECENT DIASTOLIC BLOOD PRESSURE < 80 MM HG: ICD-10-PCS | Mod: CPTII,S$GLB,, | Performed by: INTERNAL MEDICINE

## 2019-09-25 PROCEDURE — 3008F BODY MASS INDEX DOCD: CPT | Mod: CPTII,S$GLB,, | Performed by: INTERNAL MEDICINE

## 2019-09-25 PROCEDURE — 99215 PR OFFICE/OUTPT VISIT, EST, LEVL V, 40-54 MIN: ICD-10-PCS | Mod: S$GLB,,, | Performed by: INTERNAL MEDICINE

## 2019-09-25 PROCEDURE — 99999 PR PBB SHADOW E&M-EST. PATIENT-LVL III: ICD-10-PCS | Mod: PBBFAC,,, | Performed by: INTERNAL MEDICINE

## 2019-09-25 PROCEDURE — 99215 OFFICE O/P EST HI 40 MIN: CPT | Mod: S$GLB,,, | Performed by: INTERNAL MEDICINE

## 2019-09-25 PROCEDURE — 3078F DIAST BP <80 MM HG: CPT | Mod: CPTII,S$GLB,, | Performed by: INTERNAL MEDICINE

## 2019-09-25 PROCEDURE — 3074F SYST BP LT 130 MM HG: CPT | Mod: CPTII,S$GLB,, | Performed by: INTERNAL MEDICINE

## 2019-09-25 PROCEDURE — 3074F PR MOST RECENT SYSTOLIC BLOOD PRESSURE < 130 MM HG: ICD-10-PCS | Mod: CPTII,S$GLB,, | Performed by: INTERNAL MEDICINE

## 2019-09-25 PROCEDURE — 99999 PR PBB SHADOW E&M-EST. PATIENT-LVL III: CPT | Mod: PBBFAC,,, | Performed by: INTERNAL MEDICINE

## 2019-09-25 PROCEDURE — 99499 UNLISTED E&M SERVICE: CPT | Mod: S$GLB,,, | Performed by: INTERNAL MEDICINE

## 2019-09-25 PROCEDURE — 3008F PR BODY MASS INDEX (BMI) DOCUMENTED: ICD-10-PCS | Mod: CPTII,S$GLB,, | Performed by: INTERNAL MEDICINE

## 2019-09-25 PROCEDURE — 99499 RISK ADDL DX/OHS AUDIT: ICD-10-PCS | Mod: S$GLB,,, | Performed by: INTERNAL MEDICINE

## 2019-09-25 RX ORDER — SODIUM CHLORIDE 0.9 % (FLUSH) 0.9 %
10 SYRINGE (ML) INJECTION
Status: CANCELLED | OUTPATIENT
Start: 2019-09-26

## 2019-09-25 RX ORDER — HEPARIN 100 UNIT/ML
500 SYRINGE INTRAVENOUS
Status: CANCELLED | OUTPATIENT
Start: 2019-09-26

## 2019-09-25 NOTE — PROGRESS NOTES
Subjective:       Patient ID: Alison Branch is a 63 y.o. female.    Chief Complaint: Malignant neoplasm of upper lobe of left lung  Ms. Branch is a 63-year-old female who smoked for about 30 years and quit 20 years ago, presented with cough end of last year, but worsened into January.  Since the cough persisted, she saw her PCP, underwent a chest x-ray on 05/10/2019, that revealed left upper lobe pneumonia and a repeat CT was done in the week after that on 05/17/2019 that showed left upper lobe   mass arising from the lateral pleural surface in the left upper lobe posterior subsegment measuring 2.6 x 3 cm.  There are satellite mass more medially near the aortic arch that is 2 cm, also spiculated and irregular as well as thickened interlobar septa in the left lung apex and anterior segment, prevascular lymph node lateral to the aortic arch, short axis measuring 9 mm.       She then underwent a bronchoscopy on 05/28/2019, and that revealed there was an infiltration obtained in the left apical posterior segment of the left upper lobe cytology brush was done.  Transbronchial biopsies of an area of infiltration were also performed in the apicoposterior segment of the left upper lobe.    Pathology revealed adenocarcinoma; however, the specimen was not adequate enough to send for molecular testing.       She underwent a PET scan on 06/06/2019.  that reveals significant hypermetabolic activity in the large irregular spiculated pulmonary mass in the lateral aspect of the left upper lobe consistent with the patient's known pulmonary adenocarcinoma.  There is also tracer avidity in the medial left upper lobe satellite lesion and diffusely throughout much of the anterior left upper lobe where there is prominent nodular paraseptal thickening.  There are some numerous scattered subcentimeter pulmonary nodules throughout the right lung, all of which are too small for detection by PET.  For example, there is a 0.4 cm nodule in the  "superior right lower lobe and a micro nodule in the posterior segment of the right upper lobe.  There is a 0.5 cm, normal size right   paratracheal lymph node with increased radiotracer uptake as well as hypermetabolic aortic pulmonary lymph node and a left hilar lymph node.  There is a focal hypermetabolic lesion in the left anterior superior iliac spine associated with well defined lytic lesion.  There is a hypermetabolic focus in the anterior right fifth rib with a possible associated lytic lesion.  SUVs as   below lateral:  Left upper lobe  SUV max 17.9, anterior left upper lobe satellite mass and septal thickening SUV max of 10.1, right paratracheal lymph node SUV max of 4.8, left AP window lymph node SUV max of 17.9, left anterior superior iliac spine SUV max of 3.9"     MRI pelvis from 6/10/19  reveals "Region of osseous is irregularity at the left anterior iliac spine likely related to bone graft harvest procedure.  See  discussion above.  No suspicious signal or enhancement to suggest active/malignant process"   MRI brain from 6/10//19 reveal No intracranial abnormality.     We discussed her case at [facility], and plan was to wait for GAURDANT and proceed with treatment accordingly  Her GAURDANT is negative for molecular markers.    Marilee has completed 4 cycles of Carboplatin, Alimta and Keytruda. Comes in to start ALimta and Keytruda maintenance. She feels well and denies any new issues      Review of Systems   Constitutional: Negative for appetite change, fatigue and unexpected weight change.   HENT: Negative for mouth sores.    Eyes: Negative for visual disturbance.   Respiratory: Negative for cough and shortness of breath.    Cardiovascular: Negative for chest pain.   Gastrointestinal: Negative for abdominal pain and diarrhea.   Genitourinary: Negative for frequency.   Musculoskeletal: Negative for back pain.   Skin: Negative for rash.   Neurological: Negative for headaches.   Hematological: " Negative for adenopathy.   Psychiatric/Behavioral: The patient is not nervous/anxious.    All other systems reviewed and are negative.      Objective:      Physical Exam   Constitutional: She is oriented to person, place, and time. She appears well-developed and well-nourished.   HENT:   Mouth/Throat: No oropharyngeal exudate.   Cardiovascular: Normal rate and normal heart sounds.   Pulmonary/Chest: Effort normal and breath sounds normal. She has no wheezes.   Abdominal: Soft. Bowel sounds are normal. There is no tenderness.   Musculoskeletal: She exhibits no edema or tenderness.   Lymphadenopathy:     She has no cervical adenopathy.   Neurological: She is alert and oriented to person, place, and time. Coordination normal.   Skin: Skin is warm and dry. No rash noted.   Psychiatric: She has a normal mood and affect. Judgment and thought content normal.   Vitals reviewed.      LABS:  WBC   Date Value Ref Range Status   09/25/2019 4.14 3.90 - 12.70 K/uL Final     Hemoglobin   Date Value Ref Range Status   09/25/2019 10.8 (L) 12.0 - 16.0 g/dL Final     Hematocrit   Date Value Ref Range Status   09/25/2019 34.1 (L) 37.0 - 48.5 % Final     Platelets   Date Value Ref Range Status   09/25/2019 180 150 - 350 K/uL Final     Gran # (ANC)   Date Value Ref Range Status   09/25/2019 1.8 1.8 - 7.7 K/uL Final     Comment:     The ANC is based on a white cell differential from an   automated cell counter. It has not been microscopically   reviewed for the presence of abnormal cells. Clinical   correlation is required.         Chemistry        Component Value Date/Time     09/25/2019 0824    K 4.7 09/25/2019 0824     09/25/2019 0824    CO2 28 09/25/2019 0824    BUN 18 09/25/2019 0824    CREATININE 0.9 09/25/2019 0824     (H) 09/25/2019 0824        Component Value Date/Time    CALCIUM 10.0 09/25/2019 0824    ALKPHOS 78 09/25/2019 0824    AST 24 09/25/2019 0824    ALT 47 (H) 09/25/2019 0824    BILITOT 0.4 09/25/2019  0824    ESTGFRAFRICA >60.0 09/25/2019 0824    EGFRNONAA >60.0 09/25/2019 0824        TSH   Date Value Ref Range Status   09/25/2019 0.484 0.400 - 4.000 uIU/mL Final     Free T4   Date Value Ref Range Status   09/25/2019 0.94 0.71 - 1.51 ng/dL Final        Assessment:       1. 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 Secondary malignant neoplasm of bone        Plan:        1,2,3. She is doing well and will proceed with Alimta, Keytruda and Xgeva today and will return in 3 weeks for next cycle.    Above care plan was discussed with patient and all questions were addressed to her satisfaction

## 2019-09-26 ENCOUNTER — OFFICE VISIT (OUTPATIENT)
Dept: PSYCHIATRY | Facility: CLINIC | Age: 64
End: 2019-09-26
Payer: COMMERCIAL

## 2019-09-26 ENCOUNTER — INFUSION (OUTPATIENT)
Dept: INFUSION THERAPY | Facility: HOSPITAL | Age: 64
End: 2019-09-26
Attending: INTERNAL MEDICINE
Payer: MEDICARE

## 2019-09-26 VITALS
TEMPERATURE: 99 F | DIASTOLIC BLOOD PRESSURE: 63 MMHG | BODY MASS INDEX: 31.77 KG/M2 | RESPIRATION RATE: 18 BRPM | WEIGHT: 214.5 LBS | HEART RATE: 71 BPM | HEIGHT: 69 IN | SYSTOLIC BLOOD PRESSURE: 127 MMHG | OXYGEN SATURATION: 99 %

## 2019-09-26 DIAGNOSIS — C34.12 MALIGNANT NEOPLASM OF UPPER LOBE OF LEFT LUNG: ICD-10-CM

## 2019-09-26 DIAGNOSIS — C77.1 SECONDARY MALIGNANT NEOPLASM OF MEDIASTINAL LYMPH NODE: Primary | ICD-10-CM

## 2019-09-26 DIAGNOSIS — F32.A DEPRESSION, UNSPECIFIED DEPRESSION TYPE: ICD-10-CM

## 2019-09-26 DIAGNOSIS — F90.0 ATTENTION DEFICIT HYPERACTIVITY DISORDER (ADHD), PREDOMINANTLY INATTENTIVE TYPE: Primary | ICD-10-CM

## 2019-09-26 PROCEDURE — 96413 CHEMO IV INFUSION 1 HR: CPT

## 2019-09-26 PROCEDURE — 96417 CHEMO IV INFUS EACH ADDL SEQ: CPT

## 2019-09-26 PROCEDURE — 96411 CHEMO IV PUSH ADDL DRUG: CPT

## 2019-09-26 PROCEDURE — 99999 PR PBB SHADOW E&M-EST. PATIENT-LVL I: CPT | Mod: PBBFAC,,, | Performed by: PSYCHOLOGIST

## 2019-09-26 PROCEDURE — A4216 STERILE WATER/SALINE, 10 ML: HCPCS | Performed by: INTERNAL MEDICINE

## 2019-09-26 PROCEDURE — 96367 TX/PROPH/DG ADDL SEQ IV INF: CPT

## 2019-09-26 PROCEDURE — 90834 PR PSYCHOTHERAPY W/PATIENT, 45 MIN: ICD-10-PCS | Mod: S$GLB,,, | Performed by: PSYCHOLOGIST

## 2019-09-26 PROCEDURE — 96372 THER/PROPH/DIAG INJ SC/IM: CPT | Mod: 59

## 2019-09-26 PROCEDURE — 25000003 PHARM REV CODE 250: Performed by: INTERNAL MEDICINE

## 2019-09-26 PROCEDURE — 90834 PSYTX W PT 45 MINUTES: CPT | Mod: S$GLB,,, | Performed by: PSYCHOLOGIST

## 2019-09-26 PROCEDURE — 99999 PR PBB SHADOW E&M-EST. PATIENT-LVL I: ICD-10-PCS | Mod: PBBFAC,,, | Performed by: PSYCHOLOGIST

## 2019-09-26 PROCEDURE — 63600175 PHARM REV CODE 636 W HCPCS: Performed by: INTERNAL MEDICINE

## 2019-09-26 RX ORDER — BENZONATATE 200 MG/1
CAPSULE ORAL
Qty: 30 CAPSULE | Refills: 10 | OUTPATIENT
Start: 2019-09-26

## 2019-09-26 RX ORDER — SODIUM CHLORIDE 0.9 % (FLUSH) 0.9 %
10 SYRINGE (ML) INJECTION
Status: DISCONTINUED | OUTPATIENT
Start: 2019-09-26 | End: 2019-09-26 | Stop reason: HOSPADM

## 2019-09-26 RX ORDER — HEPARIN 100 UNIT/ML
500 SYRINGE INTRAVENOUS
Status: DISCONTINUED | OUTPATIENT
Start: 2019-09-26 | End: 2019-09-26 | Stop reason: HOSPADM

## 2019-09-26 RX ADMIN — SODIUM CHLORIDE 1075 MG: 9 INJECTION, SOLUTION INTRAVENOUS at 02:09

## 2019-09-26 RX ADMIN — DENOSUMAB 120 MG: 120 INJECTION SUBCUTANEOUS at 02:09

## 2019-09-26 RX ADMIN — SODIUM CHLORIDE 200 MG: 9 INJECTION, SOLUTION INTRAVENOUS at 02:09

## 2019-09-26 RX ADMIN — HEPARIN 500 UNITS: 100 SYRINGE at 03:09

## 2019-09-26 RX ADMIN — DEXAMETHASONE SODIUM PHOSPHATE 10 MG: 4 INJECTION, SOLUTION INTRA-ARTICULAR; INTRALESIONAL; INTRAMUSCULAR; INTRAVENOUS; SOFT TISSUE at 01:09

## 2019-09-26 RX ADMIN — Medication 1 MG: at 02:09

## 2019-09-26 RX ADMIN — SODIUM CHLORIDE: 9 INJECTION, SOLUTION INTRAVENOUS at 01:09

## 2019-09-26 RX ADMIN — SODIUM CHLORIDE, PRESERVATIVE FREE 10 ML: 5 INJECTION INTRAVENOUS at 03:09

## 2019-09-26 NOTE — PLAN OF CARE
Pt tolerated C5 keytruda/alimta without adverse effects. VSS. Provided AVS & verbalized understanding of RTC date. DC ambulating independently.

## 2019-09-26 NOTE — PLAN OF CARE
Labs , hx, and medications reviewed. Assessment completed. Discussed plan of care with patient. Patient in agreement. VSS.  Chair reclined and warm blanket and snack offered. Will cont to monitor

## 2019-09-26 NOTE — Clinical Note
This patient presented with her binder of logs where she documents meals, blood glucose, temperature, and MANY other things. She is VERY disorganized and this may be impacting her adherence (missing medications, forgetting to eat etc). I am working with her to improve.

## 2019-09-27 ENCOUNTER — TELEPHONE (OUTPATIENT)
Dept: DIABETES | Facility: CLINIC | Age: 64
End: 2019-09-27

## 2019-09-27 ENCOUNTER — PATIENT MESSAGE (OUTPATIENT)
Dept: DIABETES | Facility: CLINIC | Age: 64
End: 2019-09-27

## 2019-09-27 NOTE — TELEPHONE ENCOUNTER
----- Message from Daysi Haile sent at 9/27/2019 11:17 AM CDT -----  Contact: Patient  Type: Needs Medical Advice    Who Called:  Alisonpatient  Symptoms (please be specific):  N/A  How long has patient had these symptoms:  N/A  Pharmacy name and phone #:  N/A  Best Call Back Number: [phone]  Additional Information: Calling because she needs a copy of the range that her glucose should be. Please call her. Thanks.

## 2019-10-01 ENCOUNTER — PATIENT MESSAGE (OUTPATIENT)
Dept: DIABETES | Facility: CLINIC | Age: 64
End: 2019-10-01

## 2019-10-02 ENCOUNTER — TELEPHONE (OUTPATIENT)
Dept: DIABETES | Facility: CLINIC | Age: 64
End: 2019-10-02

## 2019-10-02 NOTE — TELEPHONE ENCOUNTER
Pt called the office because she was scared of take Januvia with Levemir and Novolog.     Spoke to JULIA Cloud who says the is ok to take Januvia while on Levermir and Novolog.

## 2019-10-03 ENCOUNTER — PATIENT MESSAGE (OUTPATIENT)
Dept: DIABETES | Facility: CLINIC | Age: 64
End: 2019-10-03

## 2019-10-03 ENCOUNTER — OFFICE VISIT (OUTPATIENT)
Dept: PSYCHIATRY | Facility: CLINIC | Age: 64
End: 2019-10-03
Payer: COMMERCIAL

## 2019-10-03 DIAGNOSIS — F32.A DEPRESSION, UNSPECIFIED DEPRESSION TYPE: ICD-10-CM

## 2019-10-03 DIAGNOSIS — E11.65 UNCONTROLLED TYPE 2 DIABETES MELLITUS WITH HYPERGLYCEMIA, WITHOUT LONG-TERM CURRENT USE OF INSULIN: ICD-10-CM

## 2019-10-03 DIAGNOSIS — C34.12 MALIGNANT NEOPLASM OF UPPER LOBE OF LEFT LUNG: ICD-10-CM

## 2019-10-03 DIAGNOSIS — Z91.119 NONADHERENCE WITH DIETARY RESTRICTION: ICD-10-CM

## 2019-10-03 DIAGNOSIS — F90.0 ATTENTION DEFICIT HYPERACTIVITY DISORDER (ADHD), PREDOMINANTLY INATTENTIVE TYPE: Primary | ICD-10-CM

## 2019-10-03 PROCEDURE — 90832 PR PSYCHOTHERAPY W/PATIENT, 30 MIN: ICD-10-PCS | Mod: S$GLB,,, | Performed by: PSYCHOLOGIST

## 2019-10-03 PROCEDURE — 99999 PR PBB SHADOW E&M-EST. PATIENT-LVL I: CPT | Mod: PBBFAC,,, | Performed by: PSYCHOLOGIST

## 2019-10-03 PROCEDURE — 99499 UNLISTED E&M SERVICE: CPT | Mod: S$PBB,,, | Performed by: PSYCHOLOGIST

## 2019-10-03 PROCEDURE — 99499 RISK ADDL DX/OHS AUDIT: ICD-10-PCS | Mod: S$PBB,,, | Performed by: PSYCHOLOGIST

## 2019-10-03 PROCEDURE — 99999 PR PBB SHADOW E&M-EST. PATIENT-LVL I: ICD-10-PCS | Mod: PBBFAC,,, | Performed by: PSYCHOLOGIST

## 2019-10-03 PROCEDURE — 90832 PSYTX W PT 30 MINUTES: CPT | Mod: S$GLB,,, | Performed by: PSYCHOLOGIST

## 2019-10-03 RX ORDER — INSULIN ASPART 100 [IU]/ML
INJECTION, SOLUTION INTRAVENOUS; SUBCUTANEOUS
Qty: 30 ML | Refills: 6 | Status: SHIPPED | OUTPATIENT
Start: 2019-10-03 | End: 2019-11-07

## 2019-10-07 NOTE — PROGRESS NOTES
INFORMED CONSENT: Alison Branch   is known to this provider and identity was confirmed via NAME and .  The patient was has been informed of the risks and benefits associated with engaging in psychotherapy, the handling of protected health information, the rights of privacy and the limits of confidentiality. The patient has also been informed of the importance of reporting any suicidal or homicidal ideation to this or any provider to ensure safety of all parties, and the Alison Branch expressed understanding. The patient was agreeable to these terms and freely participates in individual psychotherapy.    PSYCHO-ONCOLOGY NOTE/ Individual Psychotherapy     Date: 10/3/2019   [facility]        Therapeutic Intervention: Met with patient.  Outpatient - Behavior modifying psychotherapy 30 min - CPT code 39312      Patient was last seen by me on 2019    Problem list  Patient Active Problem List   Diagnosis    Uncontrolled type 2 diabetes mellitus with hyperglycemia, without long-term current use of insulin    Mixed hyperlipidemia due to type 2 diabetes mellitus    Attention deficit hyperactivity disorder (ADHD), predominantly inattentive type    Cerebral aneurysm, coiled in     Hypertension associated with diabetes    Hyperkeratosis of palms and soles    Irritable bowel syndrome    Aneurysm of unspecified site    Obesity (BMI 30-39.9)    Vitamin D deficiency    Malignant neoplasm of upper lobe of left lung    Secondary malignant neoplasm of mediastinal lymph node    Adrenal cortical steroids causing adverse effect in therapeutic use    Type 2 diabetes mellitus with diabetic polyneuropathy, with long-term current use of insulin    Depression       Chief complaint/reason for encounter: attention deficit and depression   Met with patient to evaluate psychosocial adaptation to diagnosis/treatment/survivorship of Lung Cancer and Diabetes    Current Medications  Current Outpatient Medications  "  Medication    aspirin (ECOTRIN) 81 MG EC tablet    atorvastatin (LIPITOR) 40 MG tablet    bisacodyl (DULCOLAX) 5 mg EC tablet    blood sugar diagnostic Strp    blood sugar diagnostic Strp    blood-glucose meter kit    blood-glucose meter kit    carboxymethylcellulose (REFRESH PLUS) 0.5 % Dpet    dexAMETHasone (DECADRON) 6 MG tablet    dexAMETHasone (DECADRON) 6 MG tablet    diazePAM (VALIUM) 5 MG tablet    ergocalciferol (ERGOCALCIFEROL) 50,000 unit Cap    fluticasone propionate (FLONASE) 50 mcg/actuation nasal spray    folic acid (FOLVITE) 1 MG tablet    insulin aspart U-100 (NOVOLOG) 100 unit/mL (3 mL) InPn pen    insulin detemir U-100 (LEVEMIR FLEXTOUCH) 100 unit/mL (3 mL) SubQ InPn pen    JANUVIA 100 mg Tab    lancets Misc    lancets Misc    lidocaine-prilocaine (EMLA) cream    nitrofurantoin, macrocrystal-monohydrate, (MACROBID) 100 MG capsule    olmesartan (BENICAR) 20 MG tablet    ondansetron (ZOFRAN) 8 MG tablet    pen needle, diabetic (RELION PEN NEEDLES) 32 gauge x 5/32" Ndle    psyllium (METAMUCIL) packet    terconazole (TERAZOL 7) 0.4 % Crea    walker Misc     No current facility-administered medications for this visit.        Objective:  Alison Branch arrived promptly for the session. Her granddaughter was also present during the interview, with the consent of Alison Branch.   Ms. Branch was independently ambulatory at the time of session. The patient was fully cooperative throughout the session.  Appearance: age appropriate, appropriately  dressed, adequately  groomed  Behavior/Cooperation: friendly and cooperative  Speech: normal in rate, volume, and tone and appropriate quality, quantity and organization of sentences  Mood: anxious  Affect: appropriate  Thought Process: goal-directed, logical  Thought Content: normal,  No delusions or paranoia; did not appear to be responding to internal stimuli during the session  Orientation: grossly intact  Memory: Grossly " intact  Attention Span/Concentration: Attends to session without distraction; reports no difficulty  Fund of Knowledge: average  Estimate of Intelligence: average from verbal skills and history  Cognition: grossly intact  Insight: patient has awareness of illness; good insight into own behavior and behavior of others  Judgment: the patient's behavior is adequate to circumstances    Interval history and content of current session: Patient stated that she was unable to stay for a full appointment today due to feeling unwell following chemo and needing to run last minute errands. We reviewed her diabetes management log and discussed options for improving adherence to medical recommendations by increasing compensatory strategies for attention problems.  Discussed current adaptation to disease and treatment status. Reports to be coping in a passive manner. Evaluated cognitive response, paying particular attention to negative intrusive thoughts of a persistent and detrimental nature. Thoughts of this type are in evidence with mild distress. Provided cognitive behavioral therapy to address negative cognitions. Identified and evaluated psychosocial and environmental stressors secondary to diagnosis and treatment.  Examined proactive behaviors that may be implemented to minimize or ameliorate psychosocial stressors secondary to diagnosis and treatment.     Risk parameters:   Patient reports no suicidal ideation  Patient reports no homicidal ideation  Patient reports no self-injurious behavior  Patient reports no violent behavior   Safety needs:  None at this time      Verbal deficits: None     Patient's response to intervention:The patient's response to intervention is accepting.     Progress toward goals and other mental status changes:  The patient's progress toward goals is fair .      Progress to date:Progress - Ongoing, but Slow      Goals from last visit: Attempted, partially met      Client Strengths: verbal,  intelligent, successful, good social support, commitment to wellness, strong shannon, strong cultural traditions     Diagnosis: No diagnosis found.    Treatment Plan:individual psychotherapy  · Target symptoms: distractability, attentional complaints, nonadherence  · Why chosen therapy is appropriate versus another modality: relevant to diagnosis, evidence based practice  · Outcome monitoring methods: self-report, lab data, observation, checklist/rating scale  · Therapeutic intervention type: behavior modifying psychotherapy  · Prognosis: Fair      Behavioral goals:    Exercise: Continued engaging in physical activity as indicated by medical team   Nutrition: Document food intake, blood glucose, insulin usage, and carbs on new and simplified log.    Therapy: Meet with Diabetes educator    Return to clinic: 2 weeks    Next Session:   Reviewed log and begin to develop SMART Goals related to medical recommendations     Length of Service (minutes direct face-to-face contact): 30    Jagruti Garcia, PhD  [place] License #[id number]

## 2019-10-08 ENCOUNTER — TELEPHONE (OUTPATIENT)
Dept: INFUSION THERAPY | Facility: HOSPITAL | Age: 64
End: 2019-10-08

## 2019-10-08 ENCOUNTER — PATIENT MESSAGE (OUTPATIENT)
Dept: DIABETES | Facility: CLINIC | Age: 64
End: 2019-10-08

## 2019-10-08 ENCOUNTER — PATIENT OUTREACH (OUTPATIENT)
Dept: ADMINISTRATIVE | Facility: OTHER | Age: 64
End: 2019-10-08

## 2019-10-08 DIAGNOSIS — Z13.5 ENCOUNTER FOR SCREENING FOR DIABETIC RETINOPATHY: Primary | ICD-10-CM

## 2019-10-09 ENCOUNTER — OFFICE VISIT (OUTPATIENT)
Dept: OPTOMETRY | Facility: CLINIC | Age: 64
End: 2019-10-09
Payer: MEDICARE

## 2019-10-09 ENCOUNTER — TELEPHONE (OUTPATIENT)
Dept: INFUSION THERAPY | Facility: HOSPITAL | Age: 64
End: 2019-10-09

## 2019-10-09 DIAGNOSIS — E11.9 DIABETES MELLITUS TYPE 2 WITHOUT RETINOPATHY: Primary | ICD-10-CM

## 2019-10-09 DIAGNOSIS — H52.03 HYPERMETROPIA OF BOTH EYES: ICD-10-CM

## 2019-10-09 DIAGNOSIS — H25.13 NUCLEAR SCLEROTIC CATARACT OF BOTH EYES: ICD-10-CM

## 2019-10-09 DIAGNOSIS — Z46.0 FITTING AND ADJUSTMENT OF SPECTACLES AND CONTACT LENSES: Primary | ICD-10-CM

## 2019-10-09 PROCEDURE — 92310 CONTACT LENS FITTING OU: CPT | Mod: CSM,,, | Performed by: OPTOMETRIST

## 2019-10-09 PROCEDURE — 92014 PR EYE EXAM, EST PATIENT,COMPREHESV: ICD-10-PCS | Mod: S$GLB,,, | Performed by: OPTOMETRIST

## 2019-10-09 PROCEDURE — 99999 PR PBB SHADOW E&M-EST. PATIENT-LVL IV: ICD-10-PCS | Mod: PBBFAC,,, | Performed by: OPTOMETRIST

## 2019-10-09 PROCEDURE — 92310 PR CONTACT LENS FITTING (NO CHANGE): ICD-10-PCS | Mod: CSM,,, | Performed by: OPTOMETRIST

## 2019-10-09 PROCEDURE — 99999 PR PBB SHADOW E&M-EST. PATIENT-LVL IV: CPT | Mod: PBBFAC,,, | Performed by: OPTOMETRIST

## 2019-10-09 PROCEDURE — 92014 COMPRE OPH EXAM EST PT 1/>: CPT | Mod: S$GLB,,, | Performed by: OPTOMETRIST

## 2019-10-09 PROCEDURE — 92015 DETERMINE REFRACTIVE STATE: CPT | Mod: S$GLB,,, | Performed by: OPTOMETRIST

## 2019-10-09 PROCEDURE — 92015 PR REFRACTION: ICD-10-PCS | Mod: S$GLB,,, | Performed by: OPTOMETRIST

## 2019-10-09 NOTE — PATIENT INSTRUCTIONS
Blepharitis is inflammation of the eyelids caused by increased bacteria on the eyelid margins and eyelashes.  It can contribute to Dry Eyes and cause burning especially in the morning. I recommend you use Ocusoft HypoChlorevery night prior to bedtime to help control this condition.      [email or web address]

## 2019-10-09 NOTE — PROGRESS NOTES
HPI     Annual diabetic eye exam. And CLFit  Refresh for dryness. Using BID. Worse since starting Chemo  Cls part time use only.  Mainly Christian  Hemoglobin A1C       Date                     Value               Ref Range             Status                08/19/2019               11.4 (H)            4.0 - 5.6 %           Final                   05/10/2019               8.7 (H)             4.0 - 5.6 %           Final                  02/01/2019               8.9 (H)             4.0 - 5.6 %           Final               Pt complaints of dryness OU    Last edited by Jaquan Machado, OD on 10/9/2019  8:56 AM. (History)            Assessment /Plan     For exam results, see Encounter Report.    Diabetes mellitus type 2 without retinopathy  -No retinopathy noted today.  Continued control with primary care physician and annual comprehensive eye exam.    Nuclear sclerotic cataract of both eyes  -Educated patient on presence of cataracts at today's exam, monitor at annual dilated fundus exam. 8+ years surgical estimate.    Hypermetropia of both eyes  Eyeglass Final Rx     Eyeglass Final Rx       Sphere Cylinder Axis Dist VA Add    Right +2.25 Sphere  20/25 +2.25    Left +2.25 +1.00 180 20/20 +2.25    Type:  PAL    Expiration Date:  10/9/2020              Contact Lens Final Rx     Final Contact Lens Rx       Brand Base Curve Diameter Sphere Cylinder Axis    Right Acuvue Oasys 1 Day 8.50 14.1 +2.25 Sphere     Left Acuvue Oasys 1 Day for Astigmatism 8.50 14.3 +3.00 -0.75 100    Expiration Date:  10/9/2020    Replacement:  Daily    Wearing Schedule:  Daily wear                  RTC 1 yr

## 2019-10-10 ENCOUNTER — OFFICE VISIT (OUTPATIENT)
Dept: DIABETES | Facility: CLINIC | Age: 64
End: 2019-10-10
Payer: MEDICARE

## 2019-10-10 ENCOUNTER — CLINICAL SUPPORT (OUTPATIENT)
Dept: DIABETES | Facility: CLINIC | Age: 64
End: 2019-10-10
Payer: MEDICARE

## 2019-10-10 VITALS
BODY MASS INDEX: 31.74 KG/M2 | SYSTOLIC BLOOD PRESSURE: 108 MMHG | HEIGHT: 69 IN | DIASTOLIC BLOOD PRESSURE: 64 MMHG | WEIGHT: 214.31 LBS | HEART RATE: 76 BPM

## 2019-10-10 VITALS — BODY MASS INDEX: 31.74 KG/M2 | WEIGHT: 214.31 LBS | HEIGHT: 69 IN

## 2019-10-10 DIAGNOSIS — E11.65 UNCONTROLLED TYPE 2 DIABETES MELLITUS WITH HYPERGLYCEMIA, WITHOUT LONG-TERM CURRENT USE OF INSULIN: ICD-10-CM

## 2019-10-10 DIAGNOSIS — T38.0X5A ADRENAL CORTICAL STEROIDS CAUSING ADVERSE EFFECT IN THERAPEUTIC USE: ICD-10-CM

## 2019-10-10 DIAGNOSIS — E66.9 OBESITY (BMI 30-39.9): ICD-10-CM

## 2019-10-10 DIAGNOSIS — Z79.4 TYPE 2 DIABETES MELLITUS WITH DIABETIC POLYNEUROPATHY, WITH LONG-TERM CURRENT USE OF INSULIN: Primary | ICD-10-CM

## 2019-10-10 DIAGNOSIS — E11.42 TYPE 2 DIABETES MELLITUS WITH DIABETIC POLYNEUROPATHY, WITH LONG-TERM CURRENT USE OF INSULIN: Primary | ICD-10-CM

## 2019-10-10 DIAGNOSIS — I15.2 HYPERTENSION ASSOCIATED WITH DIABETES: ICD-10-CM

## 2019-10-10 DIAGNOSIS — E78.2 MIXED HYPERLIPIDEMIA DUE TO TYPE 2 DIABETES MELLITUS: Chronic | ICD-10-CM

## 2019-10-10 DIAGNOSIS — C34.12 MALIGNANT NEOPLASM OF UPPER LOBE OF LEFT LUNG: ICD-10-CM

## 2019-10-10 DIAGNOSIS — E11.59 HYPERTENSION ASSOCIATED WITH DIABETES: ICD-10-CM

## 2019-10-10 DIAGNOSIS — E11.69 MIXED HYPERLIPIDEMIA DUE TO TYPE 2 DIABETES MELLITUS: Chronic | ICD-10-CM

## 2019-10-10 PROCEDURE — 99999 PR PBB SHADOW E&M-EST. PATIENT-LVL III: CPT | Mod: PBBFAC,,, | Performed by: NURSE PRACTITIONER

## 2019-10-10 PROCEDURE — 99499 UNLISTED E&M SERVICE: CPT | Mod: S$GLB,,, | Performed by: NURSE PRACTITIONER

## 2019-10-10 PROCEDURE — 3008F BODY MASS INDEX DOCD: CPT | Mod: CPTII,S$GLB,, | Performed by: NURSE PRACTITIONER

## 2019-10-10 PROCEDURE — 99999 PR PBB SHADOW E&M-EST. PATIENT-LVL III: CPT | Mod: PBBFAC,,, | Performed by: DIETITIAN, REGISTERED

## 2019-10-10 PROCEDURE — 99999 PR PBB SHADOW E&M-EST. PATIENT-LVL III: ICD-10-PCS | Mod: PBBFAC,,, | Performed by: NURSE PRACTITIONER

## 2019-10-10 PROCEDURE — 99214 PR OFFICE/OUTPT VISIT, EST, LEVL IV, 30-39 MIN: ICD-10-PCS | Mod: S$GLB,,, | Performed by: NURSE PRACTITIONER

## 2019-10-10 PROCEDURE — 3078F PR MOST RECENT DIASTOLIC BLOOD PRESSURE < 80 MM HG: ICD-10-PCS | Mod: CPTII,S$GLB,, | Performed by: NURSE PRACTITIONER

## 2019-10-10 PROCEDURE — G0108 DIAB MANAGE TRN  PER INDIV: HCPCS | Mod: S$GLB,,, | Performed by: DIETITIAN, REGISTERED

## 2019-10-10 PROCEDURE — 3074F PR MOST RECENT SYSTOLIC BLOOD PRESSURE < 130 MM HG: ICD-10-PCS | Mod: CPTII,S$GLB,, | Performed by: NURSE PRACTITIONER

## 2019-10-10 PROCEDURE — 3078F DIAST BP <80 MM HG: CPT | Mod: CPTII,S$GLB,, | Performed by: NURSE PRACTITIONER

## 2019-10-10 PROCEDURE — 99499 RISK ADDL DX/OHS AUDIT: ICD-10-PCS | Mod: S$GLB,,, | Performed by: NURSE PRACTITIONER

## 2019-10-10 PROCEDURE — 3046F HEMOGLOBIN A1C LEVEL >9.0%: CPT | Mod: CPTII,S$GLB,, | Performed by: NURSE PRACTITIONER

## 2019-10-10 PROCEDURE — 3008F PR BODY MASS INDEX (BMI) DOCUMENTED: ICD-10-PCS | Mod: CPTII,S$GLB,, | Performed by: NURSE PRACTITIONER

## 2019-10-10 PROCEDURE — 3046F PR MOST RECENT HEMOGLOBIN A1C LEVEL > 9.0%: ICD-10-PCS | Mod: CPTII,S$GLB,, | Performed by: NURSE PRACTITIONER

## 2019-10-10 PROCEDURE — G0108 PR DIAB MANAGE TRN  PER INDIV: ICD-10-PCS | Mod: S$GLB,,, | Performed by: DIETITIAN, REGISTERED

## 2019-10-10 PROCEDURE — 99999 PR PBB SHADOW E&M-EST. PATIENT-LVL III: ICD-10-PCS | Mod: PBBFAC,,, | Performed by: DIETITIAN, REGISTERED

## 2019-10-10 PROCEDURE — 3074F SYST BP LT 130 MM HG: CPT | Mod: CPTII,S$GLB,, | Performed by: NURSE PRACTITIONER

## 2019-10-10 PROCEDURE — 99214 OFFICE O/P EST MOD 30 MIN: CPT | Mod: S$GLB,,, | Performed by: NURSE PRACTITIONER
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
## 2019-10-10 NOTE — PROGRESS NOTES
Diabetes Education  Author: Shelly Crocker RD  Date: 10/10/2019    Diabetes Care Management Summary  Diabetes Education Record Assessment/Progress: Comprehensive/Group  Current Diabetes Risk Level: High     Last A1c:   Lab Results   Component Value Date    HGBA1C 11.4 (H) 08/19/2019     Last Visit with Diabetes Educator: : 10/20/2017  Last OPCM Referral: : Not Found   Enrolled in OPCM: No      Diabetes Type  Diabetes Type : Type II    Diabetes History  Current Treatment: Oral Medication, Insulin  Reviewed Problem List with Patient: Yes    Health Maintenance was reviewed today with patient. Discussed with patient importance of routine eye exams, foot exams/foot care, blood work (i.e.: A1c, microalbumin, and lipid), dental visits, yearly flu vaccine, and pneumonia vaccine as indicated by PCP. Patient verbalized understanding.     Health Maintenance Topics with due status: Not Due       Topic Last Completion Date    Colonoscopy 04/08/2016    TETANUS VACCINE 08/09/2016    Lipid Panel 05/10/2019    Hemoglobin A1c 08/19/2019    Foot Exam 08/29/2019    Mammogram 09/13/2019    Eye Exam 10/09/2019    Low Dose Statin 10/10/2019     Health Maintenance Due   Topic Date Due    Pneumococcal Vaccine (Highest Risk) (2 of 3 - PCV13) 10/29/2016       Nutrition  Meal Planning: water, 3 meals per day, eats out seldom  What type of sweetener do you use?: sugar  What type of beverages do you drink?: water, juice, diet soda/tea(1/2 and 1/2 tea)  Meal Plan 24 Hour Recall - Breakfast: 7:20am 1c grits with butter  Meal Plan 24 Hour Recall - Lunch: 12:00PM [employer]in [employer] with 1/2 and 1/2 tea  Meal Plan 24 Hour Recall - Dinner: 1c rice, smothered turkey neck and 1/2c beets  Meal Plan 24 Hour Recall - Snack: stopped snacking    Monitoring   Self Monitoring : four times a day at least(Once daily)  Blood Glucose Logs: Yes  Do you use a personal continuous glucose monitor?: No  In the last month, how often have you had a low blood sugar  reaction?: never  Can you tell when your blood sugar is too high?: yes    Exercise   Exercise Type: none  Frequency: Never    Current Diabetes Treatment   Current Treatment: Oral Medication, Insulin    Social History  Preferred Learning Method: Face to Face  Primary Support: Self, Family, Daughter  Smoking Status: Ex Smoker  Alcohol Use: Never                                Barriers to Change  Barriers to Change: None  Learning Challenges : None    Readiness to Learn   Readiness to Learn : Acceptance    Cultural Influences  Cultural Influences: No    Diabetes Education Assessment/Progress  Diabetes Disease Process (diabetes disease process and treatment options): Comprehends Key Points, Discussion, Individual Session  Nutrition (Incorporating nutritional management into one's lifestyle): Comprehends Key Points, Demonstration, Discussion, Individual Session  Physical Activity (incorporating physical activity into one's lifestyle): Instructed, Written Materials Provided, Comprehends Key Points, Discussion, Individual Session  Medications (states correct name, dose, onset, peak, duration, side effects & timing of meds): Comprehends Key Points, Discussion, Individual Session  Monitoring (monitoring blood glucose/other parameters & using results): Comprehends Key Points, Discussion, Individual Session  Acute Complications (preventing, detecting, and treating acute complications): Comprehends Key Points, Individual Session, Discussion  Chronic Complications (preventing, detecting, and treating chronic complications): Comprehends Key Points, Discussion, Individual Session  Clinical (diabetes, other pertinent medical history, and relevant comorbidities reviewed during visit): Comprehends Key Points, Discussion, Individual Session  Cognitive (knowledge of self-management skills, functional health literacy): Comprehends Key Points, Discussion, Individual Session  Psychosocial (emotional response to diabetes): Comprehends Key  Points, Discussion, Individual Session  Diabetes Distress and Support Systems: Comprehends Key Points, Discussion, Individual Session  Behavioral (readiness for change, lifestyle practices, self-care behaviors): Comprehends Key Points, Discussion, Individual Session  Patient educated on what is DM, T1DM, T2DM, risk factors, managing DM, DM diet, carbohydrate counting, meal planning, reading a food label, healthy snack options, benefits of physical activity, diabetes care schedule, foot care guidelines, diabetes and retinopathy screening, s/s hypo and hyperglycemia, long/short term complication of uncontrolled DM, importance of compliance with treatment plan, how to use a glucometer, reviewed understanding diabetes distress, medications for treating DM, their mechanism of action and possible side effects, reviewed current level and goal level for HgbA1c, blood glucose, microalbumin, and lipids. Patient provided with written literature, diabetes management resources and support, DM Management program contact information.    Goals  Patient has selected/evaluated goals during today's session: Yes, evaluated  Healthy Eating: In Progress  Physical Activity: In Progress  Monitoring: In Progress         Diabetes Care Plan/Intervention  Education Plan/Intervention: Individual Follow-Up DSMT    Diabetes Meal Plan  Restrictions: Low Fat, Low Sodium, Restricted Carbohydrate  Calories: 1800  Carbohydrate Per Meal: 30-45g  Carbohydrate Per Snack : 7-15g  Fat: 50  Protein: 135    Today's Self-Management Care Plan was developed with the patient's input and is based on barriers identified during today's assessment.    The long and short-term goals in the care plan were written with the patient/caregiver's input. The patient has agreed to work toward these goals to improve her overall diabetes control.      The patient received a copy of today's self-management plan and verbalized understanding of the care plan, goals, and all of  today's instructions.      The patient was encouraged to communicate with her physician and care team regarding her condition(s) and treatment.  I provided the patient with my contact information today and encouraged her to contact me via phone or patient portal as needed.     Education Units of Time   Time Spent: 30 min

## 2019-10-11 ENCOUNTER — PATIENT MESSAGE (OUTPATIENT)
Dept: DIABETES | Facility: CLINIC | Age: 64
End: 2019-10-11

## 2019-10-15 ENCOUNTER — LAB VISIT (OUTPATIENT)
Dept: LAB | Facility: HOSPITAL | Age: 64
End: 2019-10-15
Attending: INTERNAL MEDICINE
Payer: MEDICARE

## 2019-10-15 DIAGNOSIS — C34.12 MALIGNANT NEOPLASM OF UPPER LOBE OF LEFT LUNG: ICD-10-CM

## 2019-10-15 LAB
ALBUMIN SERPL BCP-MCNC: 3.8 G/DL (ref 3.5–5.2)
ALP SERPL-CCNC: 65 U/L (ref 55–135)
ALT SERPL W/O P-5'-P-CCNC: 65 U/L (ref 10–44)
ANION GAP SERPL CALC-SCNC: 9 MMOL/L (ref 8–16)
AST SERPL-CCNC: 46 U/L (ref 10–40)
BILIRUB SERPL-MCNC: 0.3 MG/DL (ref 0.1–1)
BUN SERPL-MCNC: 9 MG/DL (ref 8–23)
CALCIUM SERPL-MCNC: 9.9 MG/DL (ref 8.7–10.5)
CHLORIDE SERPL-SCNC: 107 MMOL/L (ref 95–110)
CO2 SERPL-SCNC: 28 MMOL/L (ref 23–29)
CREAT SERPL-MCNC: 0.9 MG/DL (ref 0.5–1.4)
ERYTHROCYTE [DISTWIDTH] IN BLOOD BY AUTOMATED COUNT: 17.9 % (ref 11.5–14.5)
EST. GFR  (AFRICAN AMERICAN): >60 ML/MIN/1.73 M^2
EST. GFR  (NON AFRICAN AMERICAN): >60 ML/MIN/1.73 M^2
GLUCOSE SERPL-MCNC: 196 MG/DL (ref 70–110)
HCT VFR BLD AUTO: 36.1 % (ref 37–48.5)
HGB BLD-MCNC: 10.6 G/DL (ref 12–16)
IMM GRANULOCYTES # BLD AUTO: 0.07 K/UL (ref 0–0.04)
MCH RBC QN AUTO: 28 PG (ref 27–31)
MCHC RBC AUTO-ENTMCNC: 29.4 G/DL (ref 32–36)
MCV RBC AUTO: 96 FL (ref 82–98)
NEUTROPHILS # BLD AUTO: 2.9 K/UL (ref 1.8–7.7)
PLATELET # BLD AUTO: 210 K/UL (ref 150–350)
PMV BLD AUTO: 11.5 FL (ref 9.2–12.9)
POTASSIUM SERPL-SCNC: 4.9 MMOL/L (ref 3.5–5.1)
PROT SERPL-MCNC: 6.9 G/DL (ref 6–8.4)
RBC # BLD AUTO: 3.78 M/UL (ref 4–5.4)
SODIUM SERPL-SCNC: 144 MMOL/L (ref 136–145)
WBC # BLD AUTO: 5.35 K/UL (ref 3.9–12.7)

## 2019-10-15 PROCEDURE — 36415 COLL VENOUS BLD VENIPUNCTURE: CPT

## 2019-10-15 PROCEDURE — 85027 COMPLETE CBC AUTOMATED: CPT

## 2019-10-15 PROCEDURE — 87086 URINE CULTURE/COLONY COUNT: CPT

## 2019-10-15 PROCEDURE — 80053 COMPREHEN METABOLIC PANEL: CPT

## 2019-10-16 ENCOUNTER — INFUSION (OUTPATIENT)
Dept: INFUSION THERAPY | Facility: HOSPITAL | Age: 64
End: 2019-10-16
Attending: INTERNAL MEDICINE
Payer: MEDICARE

## 2019-10-16 ENCOUNTER — PATIENT MESSAGE (OUTPATIENT)
Dept: DIABETES | Facility: CLINIC | Age: 64
End: 2019-10-16

## 2019-10-16 ENCOUNTER — OFFICE VISIT (OUTPATIENT)
Dept: HEMATOLOGY/ONCOLOGY | Facility: CLINIC | Age: 64
End: 2019-10-16
Payer: MEDICARE

## 2019-10-16 VITALS
BODY MASS INDEX: 32.65 KG/M2 | HEART RATE: 69 BPM | RESPIRATION RATE: 20 BRPM | DIASTOLIC BLOOD PRESSURE: 60 MMHG | WEIGHT: 220.44 LBS | TEMPERATURE: 98 F | OXYGEN SATURATION: 99 % | SYSTOLIC BLOOD PRESSURE: 129 MMHG | HEIGHT: 69 IN

## 2019-10-16 VITALS
HEART RATE: 71 BPM | RESPIRATION RATE: 18 BRPM | DIASTOLIC BLOOD PRESSURE: 58 MMHG | TEMPERATURE: 98 F | SYSTOLIC BLOOD PRESSURE: 131 MMHG

## 2019-10-16 DIAGNOSIS — C77.1 SECONDARY MALIGNANT NEOPLASM OF MEDIASTINAL LYMPH NODE: ICD-10-CM

## 2019-10-16 DIAGNOSIS — C34.12 MALIGNANT NEOPLASM OF UPPER LOBE OF LEFT LUNG: ICD-10-CM

## 2019-10-16 DIAGNOSIS — C77.1 SECONDARY MALIGNANT NEOPLASM OF MEDIASTINAL LYMPH NODE: Primary | ICD-10-CM

## 2019-10-16 DIAGNOSIS — C34.12 MALIGNANT NEOPLASM OF UPPER LOBE OF LEFT LUNG: Primary | ICD-10-CM

## 2019-10-16 DIAGNOSIS — E03.9 HYPOTHYROIDISM, UNSPECIFIED TYPE: ICD-10-CM

## 2019-10-16 LAB — BACTERIA UR CULT: NORMAL

## 2019-10-16 PROCEDURE — 96367 TX/PROPH/DG ADDL SEQ IV INF: CPT

## 2019-10-16 PROCEDURE — 99215 OFFICE O/P EST HI 40 MIN: CPT | Mod: S$GLB,,, | Performed by: INTERNAL MEDICINE

## 2019-10-16 PROCEDURE — 99999 PR PBB SHADOW E&M-EST. PATIENT-LVL V: ICD-10-PCS | Mod: PBBFAC,,, | Performed by: INTERNAL MEDICINE

## 2019-10-16 PROCEDURE — 3078F PR MOST RECENT DIASTOLIC BLOOD PRESSURE < 80 MM HG: ICD-10-PCS | Mod: CPTII,S$GLB,, | Performed by: INTERNAL MEDICINE

## 2019-10-16 PROCEDURE — 99499 UNLISTED E&M SERVICE: CPT | Mod: S$GLB,,, | Performed by: INTERNAL MEDICINE

## 2019-10-16 PROCEDURE — 96413 CHEMO IV INFUSION 1 HR: CPT

## 2019-10-16 PROCEDURE — 96411 CHEMO IV PUSH ADDL DRUG: CPT

## 2019-10-16 PROCEDURE — 3074F SYST BP LT 130 MM HG: CPT | Mod: CPTII,S$GLB,, | Performed by: INTERNAL MEDICINE

## 2019-10-16 PROCEDURE — 96372 THER/PROPH/DIAG INJ SC/IM: CPT | Mod: 59

## 2019-10-16 PROCEDURE — 63600175 PHARM REV CODE 636 W HCPCS: Performed by: INTERNAL MEDICINE

## 2019-10-16 PROCEDURE — A4216 STERILE WATER/SALINE, 10 ML: HCPCS | Performed by: INTERNAL MEDICINE

## 2019-10-16 PROCEDURE — 3008F BODY MASS INDEX DOCD: CPT | Mod: CPTII,S$GLB,, | Performed by: INTERNAL MEDICINE

## 2019-10-16 PROCEDURE — 99499 RISK ADDL DX/OHS AUDIT: ICD-10-PCS | Mod: S$GLB,,, | Performed by: INTERNAL MEDICINE

## 2019-10-16 PROCEDURE — 3078F DIAST BP <80 MM HG: CPT | Mod: CPTII,S$GLB,, | Performed by: INTERNAL MEDICINE

## 2019-10-16 PROCEDURE — 3008F PR BODY MASS INDEX (BMI) DOCUMENTED: ICD-10-PCS | Mod: CPTII,S$GLB,, | Performed by: INTERNAL MEDICINE

## 2019-10-16 PROCEDURE — 99215 PR OFFICE/OUTPT VISIT, EST, LEVL V, 40-54 MIN: ICD-10-PCS | Mod: S$GLB,,, | Performed by: INTERNAL MEDICINE

## 2019-10-16 PROCEDURE — 99999 PR PBB SHADOW E&M-EST. PATIENT-LVL V: CPT | Mod: PBBFAC,,, | Performed by: INTERNAL MEDICINE

## 2019-10-16 PROCEDURE — 3074F PR MOST RECENT SYSTOLIC BLOOD PRESSURE < 130 MM HG: ICD-10-PCS | Mod: CPTII,S$GLB,, | Performed by: INTERNAL MEDICINE

## 2019-10-16 PROCEDURE — 25000003 PHARM REV CODE 250: Performed by: INTERNAL MEDICINE

## 2019-10-16 RX ORDER — CYANOCOBALAMIN 1000 UG/ML
1000 INJECTION, SOLUTION INTRAMUSCULAR; SUBCUTANEOUS ONCE
Status: COMPLETED | OUTPATIENT
Start: 2019-10-16 | End: 2019-10-16

## 2019-10-16 RX ORDER — CYANOCOBALAMIN 1000 UG/ML
1000 INJECTION, SOLUTION INTRAMUSCULAR; SUBCUTANEOUS ONCE
Status: CANCELLED
Start: 2019-10-17

## 2019-10-16 RX ORDER — HEPARIN 100 UNIT/ML
500 SYRINGE INTRAVENOUS
Status: DISCONTINUED | OUTPATIENT
Start: 2019-10-16 | End: 2019-10-16 | Stop reason: HOSPADM

## 2019-10-16 RX ORDER — SODIUM CHLORIDE 0.9 % (FLUSH) 0.9 %
10 SYRINGE (ML) INJECTION
Status: DISCONTINUED | OUTPATIENT
Start: 2019-10-16 | End: 2019-10-16 | Stop reason: HOSPADM

## 2019-10-16 RX ORDER — BENZONATATE 100 MG/1
100 CAPSULE ORAL 2 TIMES DAILY
Qty: 60 CAPSULE | Refills: 1 | Status: SHIPPED | OUTPATIENT
Start: 2019-10-16 | End: 2020-03-26

## 2019-10-16 RX ORDER — SODIUM CHLORIDE 0.9 % (FLUSH) 0.9 %
10 SYRINGE (ML) INJECTION
Status: CANCELLED | OUTPATIENT
Start: 2019-10-17

## 2019-10-16 RX ORDER — HEPARIN 100 UNIT/ML
500 SYRINGE INTRAVENOUS
Status: CANCELLED | OUTPATIENT
Start: 2019-10-17

## 2019-10-16 RX ADMIN — GRANISETRON HYDROCHLORIDE 1 MG: 0.1 INJECTION, SOLUTION INTRAVENOUS at 01:10

## 2019-10-16 RX ADMIN — SODIUM CHLORIDE: 0.9 INJECTION, SOLUTION INTRAVENOUS at 12:10

## 2019-10-16 RX ADMIN — DEXAMETHASONE SODIUM PHOSPHATE 10 MG: 4 INJECTION, SOLUTION INTRA-ARTICULAR; INTRALESIONAL; INTRAMUSCULAR; INTRAVENOUS; SOFT TISSUE at 01:10

## 2019-10-16 RX ADMIN — SODIUM CHLORIDE 1075 MG: 9 INJECTION, SOLUTION INTRAVENOUS at 01:10

## 2019-10-16 RX ADMIN — SODIUM CHLORIDE 200 MG: 9 INJECTION, SOLUTION INTRAVENOUS at 12:10

## 2019-10-16 RX ADMIN — HEPARIN SODIUM (PORCINE) LOCK FLUSH IV SOLN 100 UNIT/ML 500 UNITS: 100 SOLUTION at 02:10

## 2019-10-16 RX ADMIN — CYANOCOBALAMIN 1000 MCG: 1000 INJECTION, SOLUTION INTRAMUSCULAR at 01:10

## 2019-10-16 RX ADMIN — Medication 10 ML: at 02:10

## 2019-10-16 NOTE — PLAN OF CARE
Pt tolerated Keytruda and Alimta today. NAD. Port flushed + blood return present, flushed. Hep lock and deaccesed. AVS given to pt. Discharged home. Ambulated independently with a walker

## 2019-10-16 NOTE — PLAN OF CARE
Problem: Adult Inpatient Plan of Care  Goal: Optimal Comfort and Wellbeing  Intervention: Provide Person-Centered Care  Flowsheets (Taken 10/16/2019 1528)  Trust Relationship/Rapport: care explained;choices provided;emotional support provided;thoughts/feelings acknowledged;empathic listening provided;questions answered;questions encouraged;reassurance provided

## 2019-10-16 NOTE — PLAN OF CARE
Problem: Adult Inpatient Plan of Care  Goal: Optimal Comfort and Wellbeing  Intervention: Provide Person-Centered Care  10/16/2019 1547 by Caitie Hawley RN  Flowsheets (Taken 10/16/2019 1547)  Trust Relationship/Rapport: choices provided; emotional support provided; empathic listening provided; questions answered; questions encouraged; reassurance provided; thoughts/feelings acknowledged; care explained  10/16/2019 1528 by Caitie Hawley RN  Flowsheets (Taken 10/16/2019 1528)  Trust Relationship/Rapport: care explained;choices provided;emotional support provided;thoughts/feelings acknowledged;empathic listening provided;questions answered;questions encouraged;reassurance provided

## 2019-10-16 NOTE — PROGRESS NOTES
Subjective:       Patient ID: Alison Branch is a 64 y.o. female.    Chief Complaint: Malignant neoplasm of upper lobe of left lung  Ms. Branch is a 63-year-old female who smoked for about 30 years and quit 20 years ago, presented with cough end of last year, but worsened into January.  Since the cough persisted, she saw her PCP, underwent a chest x-ray on 05/10/2019, that revealed left upper lobe pneumonia and a repeat CT was done in the week after that on 05/17/2019 that showed left upper lobe   mass arising from the lateral pleural surface in the left upper lobe posterior subsegment measuring 2.6 x 3 cm.  There are satellite mass more medially near the aortic arch that is 2 cm, also spiculated and irregular as well as thickened interlobar septa in the left lung apex and anterior segment, prevascular lymph node lateral to the aortic arch, short axis measuring 9 mm.       She then underwent a bronchoscopy on 05/28/2019, and that revealed there was an infiltration obtained in the left apical posterior segment of the left upper lobe cytology brush was done.  Transbronchial biopsies of an area of infiltration were also performed in the apicoposterior segment of the left upper lobe.    Pathology revealed adenocarcinoma; however, the specimen was not adequate enough to send for molecular testing.       She underwent a PET scan on 06/06/2019.  that reveals significant hypermetabolic activity in the large irregular spiculated pulmonary mass in the lateral aspect of the left upper lobe consistent with the patient's known pulmonary adenocarcinoma.  There is also tracer avidity in the medial left upper lobe satellite lesion and diffusely throughout much of the anterior left upper lobe where there is prominent nodular paraseptal thickening.  There are some numerous scattered subcentimeter pulmonary nodules throughout the right lung, all of which are too small for detection by PET.  For example, there is a 0.4 cm nodule in the  "superior right lower lobe and a micro nodule in the posterior segment of the right upper lobe.  There is a 0.5 cm, normal size right   paratracheal lymph node with increased radiotracer uptake as well as hypermetabolic aortic pulmonary lymph node and a left hilar lymph node.  There is a focal hypermetabolic lesion in the left anterior superior iliac spine associated with well defined lytic lesion.  There is a hypermetabolic focus in the anterior right fifth rib with a possible associated lytic lesion.  SUVs as   below lateral:  Left upper lobe  SUV max 17.9, anterior left upper lobe satellite mass and septal thickening SUV max of 10.1, right paratracheal lymph node SUV max of 4.8, left AP window lymph node SUV max of 17.9, left anterior superior iliac spine SUV max of 3.9"     MRI pelvis from 6/10/19  reveals "Region of osseous is irregularity at the left anterior iliac spine likely related to bone graft harvest procedure.  See  discussion above.  No suspicious signal or enhancement to suggest active/malignant process"   MRI brain from 6/10//19 reveal No intracranial abnormality.     We discussed her case at [facility], and plan was to wait for GAURDANT and proceed with treatment accordingly  Her GAURDANT is negative for molecular markers.     She has completed 4 cycles of Carboplatin, Alimta and Keytruda as of 9/5/19. She is now on  ALimta and Keytruda maintenance.    [facility] comes in for maintenance Alimta and Keytruda.  She notes shortness of breath on moderate exertion  She denies any nausea, vomiting, diarrhea, constipation, abdominal pain, weight loss or loss of appetite, chest pain,  leg swelling, fatigue, pain, headache, dizziness, or mood changes. Her ECOG PS is zero. She is by herself             Review of Systems   Constitutional: Negative for appetite change, fatigue and unexpected weight change.   HENT: Negative for mouth sores.    Eyes: Negative for visual disturbance.   Respiratory: Positive for " shortness of breath. Negative for cough.    Cardiovascular: Negative for chest pain.   Gastrointestinal: Negative for abdominal pain and diarrhea.   Genitourinary: Negative for frequency.   Musculoskeletal: Negative for back pain.   Skin: Negative for rash.   Neurological: Negative for headaches.   Hematological: Negative for adenopathy.   Psychiatric/Behavioral: The patient is not nervous/anxious.    All other systems reviewed and are negative.      Objective:      Physical Exam   Constitutional: She is oriented to person, place, and time. She appears well-developed and well-nourished.   HENT:   Mouth/Throat: No oropharyngeal exudate.   Cardiovascular: Normal rate and normal heart sounds.   Pulmonary/Chest: Effort normal and breath sounds normal. She has no wheezes.   Abdominal: Soft. Bowel sounds are normal. There is no tenderness.   Musculoskeletal: She exhibits no edema or tenderness.   Lymphadenopathy:     She has no cervical adenopathy.   Neurological: She is alert and oriented to person, place, and time. Coordination normal.   Skin: Skin is warm and dry. No rash noted.   Psychiatric: She has a normal mood and affect. Judgment and thought content normal.   Vitals reviewed.        LABS:  WBC   Date Value Ref Range Status   10/15/2019 5.35 3.90 - 12.70 K/uL Final     Hemoglobin   Date Value Ref Range Status   10/15/2019 10.6 (L) 12.0 - 16.0 g/dL Final     Hematocrit   Date Value Ref Range Status   10/15/2019 36.1 (L) 37.0 - 48.5 % Final     Platelets   Date Value Ref Range Status   10/15/2019 210 150 - 350 K/uL Final     Gran # (ANC)   Date Value Ref Range Status   10/15/2019 2.9 1.8 - 7.7 K/uL Final     Comment:     The ANC is based on a white cell differential from an   automated cell counter. It has not been microscopically   reviewed for the presence of abnormal cells. Clinical   correlation is required.         Chemistry        Component Value Date/Time     10/15/2019 0827    K 4.9 10/15/2019 0827      10/15/2019 0827    CO2 28 10/15/2019 0827    BUN 9 10/15/2019 0827    CREATININE 0.9 10/15/2019 0827     (H) 10/15/2019 0827        Component Value Date/Time    CALCIUM 9.9 10/15/2019 0827    ALKPHOS 65 10/15/2019 0827    AST 46 (H) 10/15/2019 0827    ALT 65 (H) 10/15/2019 0827    BILITOT 0.3 10/15/2019 0827    ESTGFRAFRICA >60.0 10/15/2019 0827    EGFRNONAA >60.0 10/15/2019 0827           Assessment:       1. 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 Hypothyroidism, unspecified type        Plan:        1,2,3. She is doing well and will proceed with Alimta and keytruda maintenance and will return in 3 weeks for next cycle. She will also be due for Xgeva for next time in 3 week.  Check TSH and free T4 in 3 weeks    Above care plan was discussed with patient and all questions were addressed to her satisfaction

## 2019-10-17 ENCOUNTER — DOCUMENTATION ONLY (OUTPATIENT)
Dept: HEMATOLOGY/ONCOLOGY | Facility: CLINIC | Age: 64
End: 2019-10-17

## 2019-10-17 NOTE — NURSING
Visited with pt while waiting in chemo infusion lobby.  Appears well daughter at side.  Treatment Plan Name  OP NSCLC PEMBROLIZUMAB PEMETREXED CARBOPLATIN Q3W    Last Treatment Date  6/17/2021 (Planned)    Recent Labs  No labs resulted in past 72 hours    Next Treatment Date  Upcoming Treatment Dates - OP NSCLC PEMBROLIZUMAB PEMETREXED CARBOPLATIN Q3W       Days with orders in the following treatment categories:            Chemotherapy            Intrathecal Administration    11/7/2019       pembrolizumab (KEYTRUDA) 200 mg in sodium chloride 0.9% 100 mL chemo infusion       PEMEtrexed (ALIMTA) 1,075 mg in sodium chloride 0.9% 100 mL chemo infusion  11/28/2019       pembrolizumab (KEYTRUDA) 200 mg in sodium chloride 0.9% 100 mL chemo infusion       PEMEtrexed (ALIMTA) 1,075 mg in sodium chloride 0.9% 100 mL chemo infusion  12/19/2019       pembrolizumab (KEYTRUDA) 200 mg in sodium chloride 0.9% 100 mL chemo infusion       PEMEtrexed (ALIMTA) 1,075 mg in sodium chloride 0.9% 100 mL chemo infusion

## 2019-10-19 ENCOUNTER — PATIENT MESSAGE (OUTPATIENT)
Dept: DIABETES | Facility: CLINIC | Age: 64
End: 2019-10-19

## 2019-10-21 ENCOUNTER — OFFICE VISIT (OUTPATIENT)
Dept: PSYCHIATRY | Facility: CLINIC | Age: 64
End: 2019-10-21
Payer: MEDICARE

## 2019-10-21 ENCOUNTER — TELEPHONE (OUTPATIENT)
Dept: HEMATOLOGY/ONCOLOGY | Facility: CLINIC | Age: 64
End: 2019-10-21

## 2019-10-21 DIAGNOSIS — F32.A DEPRESSION, UNSPECIFIED DEPRESSION TYPE: ICD-10-CM

## 2019-10-21 DIAGNOSIS — C34.12 MALIGNANT NEOPLASM OF UPPER LOBE OF LEFT LUNG: Primary | ICD-10-CM

## 2019-10-21 DIAGNOSIS — F90.0 ATTENTION DEFICIT HYPERACTIVITY DISORDER (ADHD), PREDOMINANTLY INATTENTIVE TYPE: ICD-10-CM

## 2019-10-21 DIAGNOSIS — C77.1 SECONDARY MALIGNANT NEOPLASM OF MEDIASTINAL LYMPH NODE: ICD-10-CM

## 2019-10-21 PROCEDURE — 90832 PR PSYCHOTHERAPY W/PATIENT, 30 MIN: ICD-10-PCS | Mod: S$GLB,,, | Performed by: PSYCHOLOGIST

## 2019-10-21 PROCEDURE — 90832 PSYTX W PT 30 MINUTES: CPT | Mod: S$GLB,,, | Performed by: PSYCHOLOGIST

## 2019-10-21 PROCEDURE — 99999 PR PBB SHADOW E&M-EST. PATIENT-LVL II: ICD-10-PCS | Mod: PBBFAC,,, | Performed by: PSYCHOLOGIST

## 2019-10-21 PROCEDURE — 99999 PR PBB SHADOW E&M-EST. PATIENT-LVL II: CPT | Mod: PBBFAC,,, | Performed by: PSYCHOLOGIST

## 2019-10-21 NOTE — Clinical Note
Hey there, Seeing this patient for psychotherapy. She is significantly distractible, inattentive, and disorganized, which is impacting her ability to adhere to medications and recommendations (she forgets doses, forgets to eat, and her 4 inch binder is overwhelm) and daily tasks and responsibilities. Brain MRI ruled out mets. I noted that she was previously on a medication for inattentiveness. Have non-stimulant medication options been discussed? Would you think this is reasonable. I will complete a cognitive exam at our next appointment Any info would help in directing her to improve. Thanks!

## 2019-10-21 NOTE — PROGRESS NOTES
INFORMED CONSENT: Alison Branch   is known to this provider and identity was confirmed via NAME and .  The patient was has been informed of the risks and benefits associated with engaging in psychotherapy, the handling of protected health information, the rights of privacy and the limits of confidentiality. The patient has also been informed of the importance of reporting any suicidal or homicidal ideation to this or any provider to ensure safety of all parties, and the Alison Branch expressed understanding. The patient was agreeable to these terms and freely participates in individual psychotherapy.    PSYCHO-ONCOLOGY NOTE/ Individual Psychotherapy     Date: 10/21/2019   [facility]        Therapeutic Intervention: Met with patient.  Outpatient - Behavior modifying psychotherapy 30 min - CPT code 39664      Patient was last seen by me on 10/3/2019    Problem list  Patient Active Problem List   Diagnosis    Uncontrolled type 2 diabetes mellitus with hyperglycemia, without long-term current use of insulin    Mixed hyperlipidemia due to type 2 diabetes mellitus    Attention deficit hyperactivity disorder (ADHD), predominantly inattentive type    Cerebral aneurysm, coiled in     Hypertension associated with diabetes    Hyperkeratosis of palms and soles    Irritable bowel syndrome    Aneurysm of unspecified site    Obesity (BMI 30-39.9)    Vitamin D deficiency    Malignant neoplasm of upper lobe of left lung    Secondary malignant neoplasm of mediastinal lymph node    Adrenal cortical steroids causing adverse effect in therapeutic use    Type 2 diabetes mellitus with diabetic polyneuropathy, with long-term current use of insulin    Depression       Chief complaint/reason for encounter: attention deficit and depression   Met with patient to evaluate psychosocial adaptation to diagnosis/treatment/survivorship of Lung Cancer    Current Medications  Current Outpatient Medications   Medication  "   aspirin (ECOTRIN) 81 MG EC tablet    atorvastatin (LIPITOR) 40 MG tablet    benzonatate (TESSALON PERLES) 100 MG capsule    bisacodyl (DULCOLAX) 5 mg EC tablet    blood sugar diagnostic Strp    blood sugar diagnostic Strp    blood-glucose meter kit    blood-glucose meter kit    carboxymethylcellulose (REFRESH PLUS) 0.5 % Dpet    dexAMETHasone (DECADRON) 6 MG tablet    dexAMETHasone (DECADRON) 6 MG tablet    diazePAM (VALIUM) 5 MG tablet    ergocalciferol (ERGOCALCIFEROL) 50,000 unit Cap    fluticasone propionate (FLONASE) 50 mcg/actuation nasal spray    folic acid (FOLVITE) 1 MG tablet    insulin aspart U-100 (NOVOLOG) 100 unit/mL (3 mL) InPn pen    insulin detemir U-100 (LEVEMIR FLEXTOUCH) 100 unit/mL (3 mL) SubQ InPn pen    JANUVIA 100 mg Tab    lancets Misc    lancets Misc    lidocaine-prilocaine (EMLA) cream    nitrofurantoin, macrocrystal-monohydrate, (MACROBID) 100 MG capsule    olmesartan (BENICAR) 20 MG tablet    ondansetron (ZOFRAN) 8 MG tablet    pen needle, diabetic (RELION PEN NEEDLES) 32 gauge x 5/32" Ndle    psyllium (METAMUCIL) packet    terconazole (TERAZOL 7) 0.4 % Crea    walker Misc     No current facility-administered medications for this visit.        Objective:  Alison Branch arrived 30 mins late for the session.   Ms. Branch was independently ambulatory at the time of session. The patient was fully cooperative throughout the session.  Appearance: age appropriate, appropriately  dressed, adequately  groomed  Behavior/Cooperation: friendly and cooperative  Speech: normal in rate, volume, and tone and appropriate quality, quantity and organization of sentences  Mood: dysthymic  Affect: blunted  Thought Process: goal-directed, logical  Thought Content: normal,  No delusions or paranoia; did not appear to be responding to internal stimuli during the session  Orientation: grossly intact  Memory: Grossly intact  Attention Span/Concentration: Significant difficulty  Fund " "of Knowledge: average  Estimate of Intelligence: average from verbal skills and history  Cognition: grossly intact  Insight: patient has awareness of illness; good insight into own behavior and behavior of others  Judgment: the patient's behavior is adequate to circumstances    Interval history and content of current session: Patient reported continued difficulty with organization, stating that she has "assignments" to document her glucose levels, diet, and other vitals which increases overwhelming feelings.  Discussed current adaptation to disease and treatment status. Reports to be coping with moderate difficulty. Evaluated cognitive response, paying particular attention to negative intrusive thoughts of a persistent and detrimental nature. Thoughts of this type are in evidence with mild distress. Provided cognitive behavioral therapy to address negative cognitions. Identified and evaluated psychosocial and environmental stressors secondary to diagnosis and treatment.  Examined proactive behaviors that may be implemented to minimize or ameliorate psychosocial stressors secondary to diagnosis and treatment.     Risk parameters:   Patient reports no suicidal ideation  Patient reports no homicidal ideation  Patient reports no self-injurious behavior  Patient reports no violent behavior   Safety needs:  None at this time      Verbal deficits: None     Patient's response to intervention:The patient's response to intervention is accepting.     Progress toward goals and other mental status changes:  The patient's progress toward goals is fair , not progressing.      Progress to date:No Progress - Continue Objectives      Goals from last visit: Attempted, not met     Patient reported outcomes:    Distress Thermometer: 5        Client Strengths: verbal, intelligent, successful, good social support, good insight, commitment to wellness, strong shannon, strong cultural traditions     Diagnosis:   ADHD, by " history  Depression      ICD-10-CM ICD-9-CM   1. Malignant neoplasm of upper lobe of left lung C34.12 162.3   2. Secondary malignant neoplasm of mediastinal lymph node C77.1 196.1       Treatment Plan:individual psychotherapy  · Target symptoms: depression, attention defiicts  · Why chosen therapy is appropriate versus another modality: relevant to diagnosis, evidence based practice  · Outcome monitoring methods: self-report, psychological tests, checklist/rating scale  · Therapeutic intervention type: behavior modifying psychotherapy  · Prognosis: Good      Behavioral goals:   Document food intake, insulin use, and daily water  Will consult with PCP and Oncologist regarding inattention and forgetfulness.     Patient has a history of ADHD inattentive type but no longer takes Adderall, which she found effective, she did report inattentiveness for most of her life.      Return to clinic: 2 weeks    Next Session:   Document 7 days of Food, Glucose, and Insulin Use     Length of Service (minutes direct face-to-face contact): 30    Jagruti Garcia, PhD  [place] License #[id number]

## 2019-10-21 NOTE — TELEPHONE ENCOUNTER
Spoke with patient.  Informed her dr cantu and dr chapin spoke about her forgetfulness today---and they want to obtain a MRI brain.   Patient endorses new onset HAs, which she has been attributing to her chemo. No vision changes. No movement/ambulatory issues.     Patient scheduled for MRI brain tomorrow at 1015am.    She thanked nurse.      Message routed to dr dolan and dr chapin

## 2019-10-22 ENCOUNTER — PATIENT OUTREACH (OUTPATIENT)
Dept: ADMINISTRATIVE | Facility: HOSPITAL | Age: 64
End: 2019-10-22

## 2019-10-22 ENCOUNTER — HOSPITAL ENCOUNTER (OUTPATIENT)
Dept: RADIOLOGY | Facility: HOSPITAL | Age: 64
Discharge: HOME | End: 2019-10-22
Attending: INTERNAL MEDICINE
Payer: MEDICARE

## 2019-10-22 DIAGNOSIS — C34.12 MALIGNANT NEOPLASM OF UPPER LOBE OF LEFT LUNG: ICD-10-CM

## 2019-10-22 DIAGNOSIS — C77.1 SECONDARY MALIGNANT NEOPLASM OF MEDIASTINAL LYMPH NODE: ICD-10-CM

## 2019-10-22 PROCEDURE — 70553 MRI BRAIN STEM W/O & W/DYE: CPT | Mod: 26,,, | Performed by: RADIOLOGY

## 2019-10-22 PROCEDURE — A9585 GADOBUTROL INJECTION: HCPCS | Performed by: INTERNAL MEDICINE

## 2019-10-22 PROCEDURE — 70553 MRI BRAIN STEM W/O & W/DYE: CPT | Mod: TC

## 2019-10-22 PROCEDURE — 25500020 PHARM REV CODE 255: Performed by: INTERNAL MEDICINE

## 2019-10-22 PROCEDURE — 70553 MRI BRAIN W WO CONTRAST: ICD-10-PCS | Mod: 26,,, | Performed by: RADIOLOGY

## 2019-10-22 RX ORDER — GADOBUTROL 604.72 MG/ML
10 INJECTION INTRAVENOUS
Status: COMPLETED | OUTPATIENT
Start: 2019-10-22 | End: 2019-10-22

## 2019-10-22 RX ADMIN — GADOBUTROL 10 ML: 604.72 INJECTION INTRAVENOUS at 11:10

## 2019-10-22 NOTE — TELEPHONE ENCOUNTER
----- Message from Tara Belcher MD sent at 10/22/2019  1:29 PM CDT -----  MRI brain is normal. Please let her know

## 2019-10-22 NOTE — PROGRESS NOTES
Chart review completed. Pt most recent A1c 11.4- pt has seen diabetes educator recently and is forwarding blood sugar logs to endo for review. Next A1c has been scheduled.

## 2019-10-24 ENCOUNTER — TELEPHONE (OUTPATIENT)
Dept: INFUSION THERAPY | Facility: HOSPITAL | Age: 64
End: 2019-10-24

## 2019-10-25 ENCOUNTER — TELEPHONE (OUTPATIENT)
Dept: INFUSION THERAPY | Facility: HOSPITAL | Age: 64
End: 2019-10-25

## 2019-10-29 ENCOUNTER — TELEPHONE (OUTPATIENT)
Dept: HEMATOLOGY/ONCOLOGY | Facility: CLINIC | Age: 64
End: 2019-10-29

## 2019-10-29 NOTE — TELEPHONE ENCOUNTER
----- Message from Brianne Cam sent at 10/29/2019  2:59 PM CDT -----  Contact: Pt   Pt called to speak with nurse about getting a letter about pt attending  Fitness class and that she has a caretaker that will attend the class with pt   Callback#[phone]  Thank You  KETTY Cam

## 2019-10-29 NOTE — LETTER
October 29, 2019    Alison Branch  [address] [facility] [address]             [facility]  [address]  [place] [address]  Phone: [phone] To Whom It May Concern:    From an oncological standpoint, it is medically necessary for Alison Branch to have a  with her at her gym visits. Any accommodations you can allow for her to have a visitor with her during her fitness activities would be greatly appreciated. If you need any additional information, please feel free to contact me at my office.    Sincerely,          Tara Belcher MD

## 2019-10-29 NOTE — TELEPHONE ENCOUNTER
Spoke with patient.  She is calling to ask if dr chapin would be ok writing a letter stating it is medically necessary for her to have a  with her while at the gym, as she states she falls often. On her normal membership, she is only allowed 7 guest visit passes. She goes to the gym 3 times per week--and states this would help her a lot, as she doesn't want her family members who come with her to have to pay for a membership. She states she will talk to dr chapin about it at her next clinic visit.   She just wanted to let us know ahead of time.    Message routed to dr chapin (just FYI)

## 2019-11-05 ENCOUNTER — TELEPHONE (OUTPATIENT)
Dept: HEMATOLOGY/ONCOLOGY | Facility: CLINIC | Age: 64
End: 2019-11-05

## 2019-11-05 ENCOUNTER — LAB VISIT (OUTPATIENT)
Dept: LAB | Facility: HOSPITAL | Age: 64
End: 2019-11-05
Attending: INTERNAL MEDICINE
Payer: MEDICARE

## 2019-11-05 ENCOUNTER — OFFICE VISIT (OUTPATIENT)
Dept: INTERNAL MEDICINE | Facility: CLINIC | Age: 64
End: 2019-11-05
Payer: MEDICARE

## 2019-11-05 ENCOUNTER — OFFICE VISIT (OUTPATIENT)
Dept: PSYCHIATRY | Facility: CLINIC | Age: 64
End: 2019-11-05
Payer: COMMERCIAL

## 2019-11-05 VITALS
BODY MASS INDEX: 32.24 KG/M2 | OXYGEN SATURATION: 99 % | HEIGHT: 69 IN | WEIGHT: 217.63 LBS | SYSTOLIC BLOOD PRESSURE: 118 MMHG | HEART RATE: 72 BPM | DIASTOLIC BLOOD PRESSURE: 70 MMHG

## 2019-11-05 DIAGNOSIS — F33.0 MILD EPISODE OF RECURRENT MAJOR DEPRESSIVE DISORDER: Primary | ICD-10-CM

## 2019-11-05 DIAGNOSIS — E11.69 MIXED HYPERLIPIDEMIA DUE TO TYPE 2 DIABETES MELLITUS: Chronic | ICD-10-CM

## 2019-11-05 DIAGNOSIS — E78.2 MIXED HYPERLIPIDEMIA DUE TO TYPE 2 DIABETES MELLITUS: Chronic | ICD-10-CM

## 2019-11-05 DIAGNOSIS — E11.42 TYPE 2 DIABETES MELLITUS WITH DIABETIC POLYNEUROPATHY, WITH LONG-TERM CURRENT USE OF INSULIN: ICD-10-CM

## 2019-11-05 DIAGNOSIS — R41.3 MEMORY DEFICIT: ICD-10-CM

## 2019-11-05 DIAGNOSIS — Z79.4 TYPE 2 DIABETES MELLITUS WITH DIABETIC POLYNEUROPATHY, WITH LONG-TERM CURRENT USE OF INSULIN: ICD-10-CM

## 2019-11-05 DIAGNOSIS — E11.59 HYPERTENSION ASSOCIATED WITH DIABETES: ICD-10-CM

## 2019-11-05 DIAGNOSIS — Z79.4 TYPE 2 DIABETES MELLITUS WITH DIABETIC POLYNEUROPATHY, WITH LONG-TERM CURRENT USE OF INSULIN: Primary | ICD-10-CM

## 2019-11-05 DIAGNOSIS — E11.42 TYPE 2 DIABETES MELLITUS WITH DIABETIC POLYNEUROPATHY, WITH LONG-TERM CURRENT USE OF INSULIN: Primary | ICD-10-CM

## 2019-11-05 DIAGNOSIS — C34.12 MALIGNANT NEOPLASM OF UPPER LOBE OF LEFT LUNG: ICD-10-CM

## 2019-11-05 DIAGNOSIS — I15.2 HYPERTENSION ASSOCIATED WITH DIABETES: ICD-10-CM

## 2019-11-05 DIAGNOSIS — E03.9 HYPOTHYROIDISM, UNSPECIFIED TYPE: ICD-10-CM

## 2019-11-05 DIAGNOSIS — R41.840 ATTENTION AND CONCENTRATION DEFICIT: ICD-10-CM

## 2019-11-05 PROBLEM — F32.A DEPRESSION: Status: RESOLVED | Noted: 2019-09-19 | Resolved: 2019-11-05

## 2019-11-05 PROBLEM — F33.9 MAJOR DEPRESSIVE DISORDER, RECURRENT, UNSPECIFIED: Status: ACTIVE | Noted: 2019-11-05

## 2019-11-05 LAB
ALBUMIN SERPL BCP-MCNC: 4.1 G/DL (ref 3.5–5.2)
ALP SERPL-CCNC: 59 U/L (ref 55–135)
ALT SERPL W/O P-5'-P-CCNC: 119 U/L (ref 10–44)
ANION GAP SERPL CALC-SCNC: 7 MMOL/L (ref 8–16)
AST SERPL-CCNC: 58 U/L (ref 10–40)
BILIRUB SERPL-MCNC: 0.4 MG/DL (ref 0.1–1)
BUN SERPL-MCNC: 21 MG/DL (ref 8–23)
CALCIUM SERPL-MCNC: 10.5 MG/DL (ref 8.7–10.5)
CHLORIDE SERPL-SCNC: 104 MMOL/L (ref 95–110)
CO2 SERPL-SCNC: 29 MMOL/L (ref 23–29)
CREAT SERPL-MCNC: 0.9 MG/DL (ref 0.5–1.4)
ERYTHROCYTE [DISTWIDTH] IN BLOOD BY AUTOMATED COUNT: 16.2 % (ref 11.5–14.5)
EST. GFR  (AFRICAN AMERICAN): >60 ML/MIN/1.73 M^2
EST. GFR  (NON AFRICAN AMERICAN): >60 ML/MIN/1.73 M^2
ESTIMATED AVG GLUCOSE: 220 MG/DL (ref 68–131)
GLUCOSE SERPL-MCNC: 166 MG/DL (ref 70–110)
HBA1C MFR BLD HPLC: 9.3 % (ref 4–5.6)
HCT VFR BLD AUTO: 36 % (ref 37–48.5)
HGB BLD-MCNC: 11.3 G/DL (ref 12–16)
MCH RBC QN AUTO: 28.9 PG (ref 27–31)
MCHC RBC AUTO-ENTMCNC: 31.4 G/DL (ref 32–36)
MCV RBC AUTO: 92 FL (ref 82–98)
NEUTROPHILS # BLD AUTO: 3 K/UL (ref 1.8–7.7)
PLATELET # BLD AUTO: 200 K/UL (ref 150–350)
PMV BLD AUTO: 10.9 FL (ref 9.2–12.9)
POTASSIUM SERPL-SCNC: 4.6 MMOL/L (ref 3.5–5.1)
PROT SERPL-MCNC: 7.3 G/DL (ref 6–8.4)
RBC # BLD AUTO: 3.91 M/UL (ref 4–5.4)
SODIUM SERPL-SCNC: 140 MMOL/L (ref 136–145)
T4 FREE SERPL-MCNC: 0.9 NG/DL (ref 0.71–1.51)
TSH SERPL DL<=0.005 MIU/L-ACNC: 0.76 UIU/ML (ref 0.4–4)
WBC # BLD AUTO: 5.5 K/UL (ref 3.9–12.7)

## 2019-11-05 PROCEDURE — 90686 FLU VACCINE (QUAD) GREATER THAN OR EQUAL TO 3YO PRESERVATIVE FREE IM: ICD-10-PCS | Mod: S$GLB,,, | Performed by: INTERNAL MEDICINE

## 2019-11-05 PROCEDURE — 90670 PCV13 VACCINE IM: CPT | Mod: S$GLB,,, | Performed by: INTERNAL MEDICINE

## 2019-11-05 PROCEDURE — 90832 PR PSYCHOTHERAPY W/PATIENT, 30 MIN: ICD-10-PCS | Mod: S$GLB,,, | Performed by: PSYCHOLOGIST

## 2019-11-05 PROCEDURE — 84439 ASSAY OF FREE THYROXINE: CPT

## 2019-11-05 PROCEDURE — 83036 HEMOGLOBIN GLYCOSYLATED A1C: CPT

## 2019-11-05 PROCEDURE — G0009 PNEUMOCOCCAL CONJUGATE VACCINE 13-VALENT LESS THAN 5YO & GREATER THAN: ICD-10-PCS | Mod: S$GLB,,, | Performed by: INTERNAL MEDICINE

## 2019-11-05 PROCEDURE — 3078F DIAST BP <80 MM HG: CPT | Mod: CPTII,S$GLB,, | Performed by: INTERNAL MEDICINE

## 2019-11-05 PROCEDURE — 3008F PR BODY MASS INDEX (BMI) DOCUMENTED: ICD-10-PCS | Mod: CPTII,S$GLB,, | Performed by: INTERNAL MEDICINE

## 2019-11-05 PROCEDURE — 80053 COMPREHEN METABOLIC PANEL: CPT

## 2019-11-05 PROCEDURE — 99999 PR PBB SHADOW E&M-EST. PATIENT-LVL I: ICD-10-PCS | Mod: PBBFAC,,, | Performed by: PSYCHOLOGIST

## 2019-11-05 PROCEDURE — 99999 PR PBB SHADOW E&M-EST. PATIENT-LVL I: CPT | Mod: PBBFAC,,, | Performed by: PSYCHOLOGIST

## 2019-11-05 PROCEDURE — 3074F PR MOST RECENT SYSTOLIC BLOOD PRESSURE < 130 MM HG: ICD-10-PCS | Mod: CPTII,S$GLB,, | Performed by: INTERNAL MEDICINE

## 2019-11-05 PROCEDURE — 96130 PSYCL TST EVAL PHYS/QHP 1ST: CPT | Mod: S$GLB,,, | Performed by: PSYCHOLOGIST

## 2019-11-05 PROCEDURE — 99999 PR PBB SHADOW E&M-EST. PATIENT-LVL III: CPT | Mod: PBBFAC,,, | Performed by: INTERNAL MEDICINE

## 2019-11-05 PROCEDURE — 99499 UNLISTED E&M SERVICE: CPT | Mod: S$GLB,,, | Performed by: INTERNAL MEDICINE

## 2019-11-05 PROCEDURE — 99999 PR PBB SHADOW E&M-EST. PATIENT-LVL III: ICD-10-PCS | Mod: PBBFAC,,, | Performed by: INTERNAL MEDICINE

## 2019-11-05 PROCEDURE — 3074F SYST BP LT 130 MM HG: CPT | Mod: CPTII,S$GLB,, | Performed by: INTERNAL MEDICINE

## 2019-11-05 PROCEDURE — G0008 FLU VACCINE (QUAD) GREATER THAN OR EQUAL TO 3YO PRESERVATIVE FREE IM: ICD-10-PCS | Mod: S$GLB,,, | Performed by: INTERNAL MEDICINE

## 2019-11-05 PROCEDURE — 3078F PR MOST RECENT DIASTOLIC BLOOD PRESSURE < 80 MM HG: ICD-10-PCS | Mod: CPTII,S$GLB,, | Performed by: INTERNAL MEDICINE

## 2019-11-05 PROCEDURE — 90832 PSYTX W PT 30 MINUTES: CPT | Mod: S$GLB,,, | Performed by: PSYCHOLOGIST

## 2019-11-05 PROCEDURE — 96130 PR PSYCHOLOGIC TEST EVAL SVCS, 1ST HR: ICD-10-PCS | Mod: S$GLB,,, | Performed by: PSYCHOLOGIST

## 2019-11-05 PROCEDURE — G0008 ADMIN INFLUENZA VIRUS VAC: HCPCS | Mod: S$GLB,,, | Performed by: INTERNAL MEDICINE

## 2019-11-05 PROCEDURE — 99499 RISK ADDL DX/OHS AUDIT: ICD-10-PCS | Mod: S$GLB,,, | Performed by: INTERNAL MEDICINE

## 2019-11-05 PROCEDURE — 85027 COMPLETE CBC AUTOMATED: CPT

## 2019-11-05 PROCEDURE — G0009 ADMIN PNEUMOCOCCAL VACCINE: HCPCS | Mod: S$GLB,,, | Performed by: INTERNAL MEDICINE

## 2019-11-05 PROCEDURE — 36415 COLL VENOUS BLD VENIPUNCTURE: CPT

## 2019-11-05 PROCEDURE — 3046F PR MOST RECENT HEMOGLOBIN A1C LEVEL > 9.0%: ICD-10-PCS | Mod: CPTII,S$GLB,, | Performed by: INTERNAL MEDICINE

## 2019-11-05 PROCEDURE — 90686 IIV4 VACC NO PRSV 0.5 ML IM: CPT | Mod: S$GLB,,, | Performed by: INTERNAL MEDICINE

## 2019-11-05 PROCEDURE — 84443 ASSAY THYROID STIM HORMONE: CPT

## 2019-11-05 PROCEDURE — 3008F BODY MASS INDEX DOCD: CPT | Mod: CPTII,S$GLB,, | Performed by: INTERNAL MEDICINE

## 2019-11-05 PROCEDURE — 3046F HEMOGLOBIN A1C LEVEL >9.0%: CPT | Mod: CPTII,S$GLB,, | Performed by: INTERNAL MEDICINE

## 2019-11-05 PROCEDURE — 90670 PNEUMOCOCCAL CONJUGATE VACCINE 13-VALENT LESS THAN 5YO & GREATER THAN: ICD-10-PCS | Mod: S$GLB,,, | Performed by: INTERNAL MEDICINE

## 2019-11-05 PROCEDURE — 99214 OFFICE O/P EST MOD 30 MIN: CPT | Mod: 25,S$GLB,, | Performed by: INTERNAL MEDICINE

## 2019-11-05 PROCEDURE — 99214 PR OFFICE/OUTPT VISIT, EST, LEVL IV, 30-39 MIN: ICD-10-PCS | Mod: 25,S$GLB,, | Performed by: INTERNAL MEDICINE

## 2019-11-05 NOTE — PROGRESS NOTES
Subjective:       Patient ID: Alison Branch is a 64 y.o. female.    Chief Complaint: Follow-up    HPI - Ms Branch feels will.  Still in the midst of lung cancer ctx with dexamethasone as part of the therapy.  BS has stabilized some on higher doses of insulin and aggressive sliding scale, with morning sugar today of 187.  This is higher than usual.  She's due for flu and prevnar 13 immunizations.  Wants to see what her a1c looks like.  No other complaints.  Not a smoker; none in past 20 years    PMH:  Adenocarcinoma lung, dx 2019  DM2, A1C over 8  HTN, at goal  Brain aneurysm s/p coiling  HLP, on a statin  ADD, not on a stimulant  Obesity     Meds:  Reviewed and reconciled in EPIC with patient during visit today    Review of Systems   Constitutional: Negative for fever.   HENT: Negative for congestion.    Respiratory: Negative for shortness of breath.    Cardiovascular: Negative for chest pain.   Gastrointestinal: Negative for abdominal pain.   Genitourinary: Negative for difficulty urinating.   Musculoskeletal: Negative for arthralgias.   Skin: Negative for rash.   Neurological: Negative for headaches.   Psychiatric/Behavioral: Negative for sleep disturbance.       Objective:      Physical Exam   Constitutional: She is oriented to person, place, and time. She appears well-developed and well-nourished.   HENT:   Head: Normocephalic and atraumatic.   Cardiovascular: Normal rate, regular rhythm and normal heart sounds. Exam reveals no gallop and no friction rub.   No murmur heard.  Pulmonary/Chest: Effort normal and breath sounds normal. No respiratory distress. She has no wheezes. She has no rales. She exhibits no tenderness.   Neurological: She is alert and oriented to person, place, and time.   Skin: Skin is warm and dry. No erythema.   Psychiatric: She has a normal mood and affect.   Nursing note and vitals reviewed.      Assessment:       1. Type 2 diabetes mellitus with diabetic polyneuropathy, with long-term  current use of insulin    2. Malignant neoplasm of upper lobe of left lung    3. Mixed hyperlipidemia due to type 2 diabetes mellitus    4. Hypertension associated with diabetes        Plan:       Alison was seen today for follow-up.    Diagnoses and all orders for this visit:    Type 2 diabetes mellitus with diabetic polyneuropathy, with long-term current use of insulin - right now, in the midst of CTX, our goal is reasonable control.  Repeating a1c  -     Hemoglobin A1c; Future    Malignant neoplasm of upper lobe of left lung    Mixed hyperlipidemia due to type 2 diabetes mellitus - stable on a statin.  Stay the course    Hypertension associated with diabetes - at goal, stay the course    Other orders  -     Influenza - Quadrivalent (PF)  -     (In Office Administered) Pneumococcal Conjugate Vaccine (13 Valent) (IM)

## 2019-11-06 ENCOUNTER — TELEPHONE (OUTPATIENT)
Dept: PSYCHIATRY | Facility: CLINIC | Age: 64
End: 2019-11-06

## 2019-11-06 ENCOUNTER — TELEPHONE (OUTPATIENT)
Dept: HEMATOLOGY/ONCOLOGY | Facility: CLINIC | Age: 64
End: 2019-11-06

## 2019-11-06 ENCOUNTER — PATIENT MESSAGE (OUTPATIENT)
Dept: DIABETES | Facility: CLINIC | Age: 64
End: 2019-11-06

## 2019-11-06 ENCOUNTER — OFFICE VISIT (OUTPATIENT)
Dept: HEMATOLOGY/ONCOLOGY | Facility: CLINIC | Age: 64
End: 2019-11-06
Payer: MEDICARE

## 2019-11-06 ENCOUNTER — INFUSION (OUTPATIENT)
Dept: INFUSION THERAPY | Facility: HOSPITAL | Age: 64
End: 2019-11-06
Attending: INTERNAL MEDICINE
Payer: MEDICARE

## 2019-11-06 VITALS
SYSTOLIC BLOOD PRESSURE: 114 MMHG | RESPIRATION RATE: 18 BRPM | TEMPERATURE: 98 F | DIASTOLIC BLOOD PRESSURE: 65 MMHG | HEART RATE: 79 BPM

## 2019-11-06 VITALS
WEIGHT: 210.56 LBS | TEMPERATURE: 98 F | HEART RATE: 88 BPM | OXYGEN SATURATION: 98 % | SYSTOLIC BLOOD PRESSURE: 115 MMHG | RESPIRATION RATE: 18 BRPM | DIASTOLIC BLOOD PRESSURE: 56 MMHG | HEIGHT: 69 IN | BODY MASS INDEX: 31.19 KG/M2

## 2019-11-06 DIAGNOSIS — R79.89 ELEVATED LIVER FUNCTION TESTS: ICD-10-CM

## 2019-11-06 DIAGNOSIS — E55.9 VITAMIN D DEFICIENCY: ICD-10-CM

## 2019-11-06 DIAGNOSIS — C34.12 MALIGNANT NEOPLASM OF UPPER LOBE OF LEFT LUNG: Primary | ICD-10-CM

## 2019-11-06 DIAGNOSIS — C77.1 SECONDARY MALIGNANT NEOPLASM OF MEDIASTINAL LYMPH NODE: ICD-10-CM

## 2019-11-06 DIAGNOSIS — C77.1 SECONDARY MALIGNANT NEOPLASM OF MEDIASTINAL LYMPH NODE: Primary | ICD-10-CM

## 2019-11-06 DIAGNOSIS — R41.3 MEMORY CHANGE: Primary | ICD-10-CM

## 2019-11-06 DIAGNOSIS — C34.12 MALIGNANT NEOPLASM OF UPPER LOBE OF LEFT LUNG: ICD-10-CM

## 2019-11-06 DIAGNOSIS — E11.65 UNCONTROLLED TYPE 2 DIABETES MELLITUS WITH HYPERGLYCEMIA, WITHOUT LONG-TERM CURRENT USE OF INSULIN: ICD-10-CM

## 2019-11-06 PROCEDURE — 3008F BODY MASS INDEX DOCD: CPT | Mod: CPTII,S$GLB,, | Performed by: INTERNAL MEDICINE

## 2019-11-06 PROCEDURE — 3046F HEMOGLOBIN A1C LEVEL >9.0%: CPT | Mod: CPTII,S$GLB,, | Performed by: INTERNAL MEDICINE

## 2019-11-06 PROCEDURE — 99215 OFFICE O/P EST HI 40 MIN: CPT | Mod: S$GLB,,, | Performed by: INTERNAL MEDICINE

## 2019-11-06 PROCEDURE — 63600175 PHARM REV CODE 636 W HCPCS: Performed by: INTERNAL MEDICINE

## 2019-11-06 PROCEDURE — 96367 TX/PROPH/DG ADDL SEQ IV INF: CPT

## 2019-11-06 PROCEDURE — 3074F SYST BP LT 130 MM HG: CPT | Mod: CPTII,S$GLB,, | Performed by: INTERNAL MEDICINE

## 2019-11-06 PROCEDURE — 99999 PR PBB SHADOW E&M-EST. PATIENT-LVL V: ICD-10-PCS | Mod: PBBFAC,,, | Performed by: INTERNAL MEDICINE

## 2019-11-06 PROCEDURE — 3008F PR BODY MASS INDEX (BMI) DOCUMENTED: ICD-10-PCS | Mod: CPTII,S$GLB,, | Performed by: INTERNAL MEDICINE

## 2019-11-06 PROCEDURE — 99499 RISK ADDL DX/OHS AUDIT: ICD-10-PCS | Mod: S$GLB,,, | Performed by: INTERNAL MEDICINE

## 2019-11-06 PROCEDURE — 3078F DIAST BP <80 MM HG: CPT | Mod: CPTII,S$GLB,, | Performed by: INTERNAL MEDICINE

## 2019-11-06 PROCEDURE — 3078F PR MOST RECENT DIASTOLIC BLOOD PRESSURE < 80 MM HG: ICD-10-PCS | Mod: CPTII,S$GLB,, | Performed by: INTERNAL MEDICINE

## 2019-11-06 PROCEDURE — 96413 CHEMO IV INFUSION 1 HR: CPT

## 2019-11-06 PROCEDURE — 99999 PR PBB SHADOW E&M-EST. PATIENT-LVL V: CPT | Mod: PBBFAC,,, | Performed by: INTERNAL MEDICINE

## 2019-11-06 PROCEDURE — 96372 THER/PROPH/DIAG INJ SC/IM: CPT | Mod: 59

## 2019-11-06 PROCEDURE — A4216 STERILE WATER/SALINE, 10 ML: HCPCS | Performed by: INTERNAL MEDICINE

## 2019-11-06 PROCEDURE — 25000003 PHARM REV CODE 250: Performed by: INTERNAL MEDICINE

## 2019-11-06 PROCEDURE — 99499 UNLISTED E&M SERVICE: CPT | Mod: S$GLB,,, | Performed by: INTERNAL MEDICINE

## 2019-11-06 PROCEDURE — 96411 CHEMO IV PUSH ADDL DRUG: CPT

## 2019-11-06 PROCEDURE — 3074F PR MOST RECENT SYSTOLIC BLOOD PRESSURE < 130 MM HG: ICD-10-PCS | Mod: CPTII,S$GLB,, | Performed by: INTERNAL MEDICINE

## 2019-11-06 PROCEDURE — 99215 PR OFFICE/OUTPT VISIT, EST, LEVL V, 40-54 MIN: ICD-10-PCS | Mod: S$GLB,,, | Performed by: INTERNAL MEDICINE

## 2019-11-06 PROCEDURE — 3046F PR MOST RECENT HEMOGLOBIN A1C LEVEL > 9.0%: ICD-10-PCS | Mod: CPTII,S$GLB,, | Performed by: INTERNAL MEDICINE

## 2019-11-06 RX ORDER — HEPARIN 100 UNIT/ML
500 SYRINGE INTRAVENOUS
Status: CANCELLED | OUTPATIENT
Start: 2019-11-07

## 2019-11-06 RX ORDER — SODIUM CHLORIDE 0.9 % (FLUSH) 0.9 %
10 SYRINGE (ML) INJECTION
Status: DISCONTINUED | OUTPATIENT
Start: 2019-11-06 | End: 2019-11-06 | Stop reason: HOSPADM

## 2019-11-06 RX ORDER — HEPARIN 100 UNIT/ML
500 SYRINGE INTRAVENOUS
Status: DISCONTINUED | OUTPATIENT
Start: 2019-11-06 | End: 2019-11-06 | Stop reason: HOSPADM

## 2019-11-06 RX ORDER — SODIUM CHLORIDE 0.9 % (FLUSH) 0.9 %
10 SYRINGE (ML) INJECTION
Status: CANCELLED | OUTPATIENT
Start: 2019-11-07

## 2019-11-06 RX ADMIN — DENOSUMAB 120 MG: 120 INJECTION SUBCUTANEOUS at 01:11

## 2019-11-06 RX ADMIN — SODIUM CHLORIDE 200 MG: 9 INJECTION, SOLUTION INTRAVENOUS at 11:11

## 2019-11-06 RX ADMIN — HEPARIN 500 UNITS: 100 SYRINGE at 01:11

## 2019-11-06 RX ADMIN — DEXAMETHASONE SODIUM PHOSPHATE 10 MG: 4 INJECTION, SOLUTION INTRA-ARTICULAR; INTRALESIONAL; INTRAMUSCULAR; INTRAVENOUS; SOFT TISSUE at 12:11

## 2019-11-06 RX ADMIN — SODIUM CHLORIDE 1075 MG: 9 INJECTION, SOLUTION INTRAVENOUS at 01:11

## 2019-11-06 RX ADMIN — Medication 10 ML: at 01:11

## 2019-11-06 RX ADMIN — SODIUM CHLORIDE: 9 INJECTION, SOLUTION INTRAVENOUS at 11:11

## 2019-11-06 RX ADMIN — GRANISETRON HYDROCHLORIDE 1 MG: 1 INJECTION, SOLUTION INTRAVENOUS at 12:11

## 2019-11-06 NOTE — TELEPHONE ENCOUNTER
----- Message from Tara Belcher MD sent at 11/6/2019 10:57 AM CST -----  Schedule CBC,CMP and see me or Ramona in 3 weeks and for ALimta and Keytruda

## 2019-11-06 NOTE — PLAN OF CARE
1326-Patient tolerated treatment well. Discharged without complaints or S/S of adverse event. AVS given.  Instructed to call provider for any questions or concerns.

## 2019-11-06 NOTE — PLAN OF CARE
1110-Labs , hx, and medications reviewed, patient was seen by Md prior to arrival. Patient verbalized she is taking folic acid and calcium at home as instructed. Assessment completed. Discussed plan of care with patient. Patient in agreement. Chair reclined and warm blanket and snack offered.

## 2019-11-06 NOTE — TELEPHONE ENCOUNTER
----- Message from Tara Belcher MD sent at 11/6/2019  2:14 PM CST -----  Please schedule to see Neurology as soon as possible

## 2019-11-06 NOTE — PROGRESS NOTES
Subjective:       Patient ID: Alison Branch is a 64 y.o. female.    Chief Complaint: Malignant neoplasm of upper lobe of left lung  Ms. Branch is a 63-year-old female who smoked for about 30 years and quit 20 years ago, presented with cough end of last year, but worsened into January.  Since the cough persisted, she saw her PCP, underwent a chest x-ray on 05/10/2019, that revealed left upper lobe pneumonia and a repeat CT was done in the week after that on 05/17/2019 that showed left upper lobe   mass arising from the lateral pleural surface in the left upper lobe posterior subsegment measuring 2.6 x 3 cm.  There are satellite mass more medially near the aortic arch that is 2 cm, also spiculated and irregular as well as thickened interlobar septa in the left lung apex and anterior segment, prevascular lymph node lateral to the aortic arch, short axis measuring 9 mm.       She then underwent a bronchoscopy on 05/28/2019, and that revealed there was an infiltration obtained in the left apical posterior segment of the left upper lobe cytology brush was done.  Transbronchial biopsies of an area of infiltration were also performed in the apicoposterior segment of the left upper lobe.    Pathology revealed adenocarcinoma; however, the specimen was not adequate enough to send for molecular testing.       She underwent a PET scan on 06/06/2019.  that reveals significant hypermetabolic activity in the large irregular spiculated pulmonary mass in the lateral aspect of the left upper lobe consistent with the patient's known pulmonary adenocarcinoma.  There is also tracer avidity in the medial left upper lobe satellite lesion and diffusely throughout much of the anterior left upper lobe where there is prominent nodular paraseptal thickening.  There are some numerous scattered subcentimeter pulmonary nodules throughout the right lung, all of which are too small for detection by PET.  For example, there is a 0.4 cm nodule in the  "superior right lower lobe and a micro nodule in the posterior segment of the right upper lobe.  There is a 0.5 cm, normal size right   paratracheal lymph node with increased radiotracer uptake as well as hypermetabolic aortic pulmonary lymph node and a left hilar lymph node.  There is a focal hypermetabolic lesion in the left anterior superior iliac spine associated with well defined lytic lesion.  There is a hypermetabolic focus in the anterior right fifth rib with a possible associated lytic lesion.  SUVs as   below lateral:  Left upper lobe  SUV max 17.9, anterior left upper lobe satellite mass and septal thickening SUV max of 10.1, right paratracheal lymph node SUV max of 4.8, left AP window lymph node SUV max of 17.9, left anterior superior iliac spine SUV max of 3.9"     MRI pelvis from 6/10/19  reveals "Region of osseous is irregularity at the left anterior iliac spine likely related to bone graft harvest procedure.  See  discussion above.  No suspicious signal or enhancement to suggest active/malignant process"   MRI brain from 6/10//19 reveal No intracranial abnormality.     We discussed her case at [facility], and plan was to wait for GAURDANT and proceed with treatment accordingly  Her GAURDANT is negative for molecular markers.     She has completed 4 cycles of Carboplatin, Alimta and Keytruda as of 9/5/19. She is now on  ALimta and Keytruda maintenance.       HPI She comes in for maintenance Alimta and Keytruda. She notes fatigue, dizziness and falls. MRI brain on 10/22/19 was normal. She has seen Dr. dolan and did not do well on her cognitive eval/  She denies any nausea, vomiting, diarrhea, constipation, abdominal pain, weight loss or loss of appetite, chest pain, shortness of breath, leg swelling, fatigue, pain, headache, dizziness, or mood changes. Her ECOG PS is zero. She is by herself.        Review of Systems   Constitutional: Positive for fatigue. Negative for appetite change and unexpected " weight change.   HENT: Negative for mouth sores.    Eyes: Negative for visual disturbance.   Respiratory: Negative for cough and shortness of breath.    Cardiovascular: Negative for chest pain.   Gastrointestinal: Negative for abdominal pain and diarrhea.   Genitourinary: Negative for frequency.   Musculoskeletal: Negative for back pain.   Skin: Negative for rash.   Neurological: Negative for headaches.   Hematological: Negative for adenopathy.   Psychiatric/Behavioral: The patient is not nervous/anxious.    All other systems reviewed and are negative.      Objective:      Physical Exam   Constitutional: She is oriented to person, place, and time. She appears well-developed and well-nourished.   HENT:   Mouth/Throat: No oropharyngeal exudate.   Cardiovascular: Normal rate and normal heart sounds.   Pulmonary/Chest: Effort normal and breath sounds normal. She has no wheezes.   Abdominal: Soft. Bowel sounds are normal. There is no tenderness.   Musculoskeletal: She exhibits no edema or tenderness.   Lymphadenopathy:     She has no cervical adenopathy.   Neurological: She is alert and oriented to person, place, and time. Coordination normal.   Skin: Skin is warm and dry. No rash noted.   Psychiatric: She has a normal mood and affect. Judgment and thought content normal.   Vitals reviewed.        LABS:  WBC   Date Value Ref Range Status   11/05/2019 5.50 3.90 - 12.70 K/uL Final     Hemoglobin   Date Value Ref Range Status   11/05/2019 11.3 (L) 12.0 - 16.0 g/dL Final     Hematocrit   Date Value Ref Range Status   11/05/2019 36.0 (L) 37.0 - 48.5 % Final     Platelets   Date Value Ref Range Status   11/05/2019 200 150 - 350 K/uL Final     Gran # (ANC)   Date Value Ref Range Status   11/05/2019 3.0 1.8 - 7.7 K/uL Final     Comment:     The ANC is based on a white cell differential from an   automated cell counter. It has not been microscopically   reviewed for the presence of abnormal cells. Clinical   correlation is  required.         Chemistry        Component Value Date/Time     11/05/2019 0957    K 4.6 11/05/2019 0957     11/05/2019 0957    CO2 29 11/05/2019 0957    BUN 21 11/05/2019 0957    CREATININE 0.9 11/05/2019 0957     (H) 11/05/2019 0957        Component Value Date/Time    CALCIUM 10.5 11/05/2019 0957    ALKPHOS 59 11/05/2019 0957    AST 58 (H) 11/05/2019 0957     (H) 11/05/2019 0957    BILITOT 0.4 11/05/2019 0957    ESTGFRAFRICA >60 11/05/2019 0957    EGFRNONAA >60 11/05/2019 0957        TSH   Date Value Ref Range Status   11/05/2019 0.756 0.400 - 4.000 uIU/mL Final     Free T4   Date Value Ref Range Status   11/05/2019 0.90 0.71 - 1.51 ng/dL Final         Assessment:       1. 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 Uncontrolled type 2 diabetes mellitus with hyperglycemia, without long-term current use of insulin    4. Elevated liver function tests        Plan:         She will proceed with Alimta and Keytruda and will return in 3 weeks for next cycle. Scan in mid December  4. Mildly elevated, monitor closely  5. Refer to neurology.    Above care plan was discussed with patient and all questions were addressed to her satisfaction

## 2019-11-07 DIAGNOSIS — E11.65 UNCONTROLLED TYPE 2 DIABETES MELLITUS WITH HYPERGLYCEMIA, WITHOUT LONG-TERM CURRENT USE OF INSULIN: ICD-10-CM

## 2019-11-07 PROBLEM — R41.3 MEMORY DEFICIT: Status: ACTIVE | Noted: 2019-11-07

## 2019-11-07 RX ORDER — INSULIN ASPART 100 [IU]/ML
INJECTION, SOLUTION INTRAVENOUS; SUBCUTANEOUS
Qty: 3 BOX | Refills: 6 | Status: SHIPPED | OUTPATIENT
Start: 2019-11-07 | End: 2020-03-09

## 2019-11-07 NOTE — PROGRESS NOTES
INFORMED CONSENT: Alison Branch   is known to this provider and identity was confirmed via NAME and .  The patient has been informed of the risks and benefits associated with engaging in psychotherapy, the handling of protected health information, the rights of privacy and the limits of confidentiality. The patient has also been informed of the importance of reporting any suicidal or homicidal ideation to this or any provider to ensure safety of all parties, and the Alison Branch expressed understanding. The patient was agreeable to these terms and freely participates in individual psychotherapy.    PSYCHO-ONCOLOGY NOTE/ Individual Psychotherapy     Date: 2019   [facility]        Therapeutic Intervention: Met with patient.  Outpatient - Behavior modifying psychotherapy 30 min - CPT code 07945; Brief Cognitive Screen Administration, results, and feedback to patient, CPT Code: 09486    Patient was last seen by me on 10/21/2019    Problem list  Patient Active Problem List   Diagnosis    Uncontrolled type 2 diabetes mellitus with hyperglycemia, without long-term current use of insulin    Mixed hyperlipidemia due to type 2 diabetes mellitus    Attention and concentration deficit    Cerebral aneurysm, coiled in     Hypertension associated with diabetes    Hyperkeratosis of palms and soles    Irritable bowel syndrome    Aneurysm of unspecified site    Obesity (BMI 30-39.9)    Vitamin D deficiency    Malignant neoplasm of upper lobe of left lung    Secondary malignant neoplasm of mediastinal lymph node    Adrenal cortical steroids causing adverse effect in therapeutic use    Type 2 diabetes mellitus with diabetic polyneuropathy, with long-term current use of insulin    Major depressive disorder, recurrent, unspecified       Chief complaint/reason for encounter: attention deficit, depression and cognition   Met with patient to evaluate psychosocial adaptation to  "diagnosis/treatment/survivorship of Lung Cancer    Current Medications  Current Outpatient Medications   Medication    aspirin (ECOTRIN) 81 MG EC tablet    atorvastatin (LIPITOR) 40 MG tablet    benzonatate (TESSALON PERLES) 100 MG capsule    bisacodyl (DULCOLAX) 5 mg EC tablet    blood sugar diagnostic Strp    blood-glucose meter kit    carboxymethylcellulose (REFRESH PLUS) 0.5 % Dpet    dexAMETHasone (DECADRON) 6 MG tablet    diazePAM (VALIUM) 5 MG tablet    ergocalciferol (ERGOCALCIFEROL) 50,000 unit Cap    fluticasone propionate (FLONASE) 50 mcg/actuation nasal spray    folic acid (FOLVITE) 1 MG tablet    insulin aspart U-100 (NOVOLOG) 100 unit/mL (3 mL) InPn pen    insulin detemir U-100 (LEVEMIR FLEXTOUCH) 100 unit/mL (3 mL) SubQ InPn pen    JANUVIA 100 mg Tab    lancets Misc    lidocaine-prilocaine (EMLA) cream    olmesartan (BENICAR) 20 MG tablet    ondansetron (ZOFRAN) 8 MG tablet    pen needle, diabetic (RELION PEN NEEDLES) 32 gauge x 5/32" Ndle    psyllium (METAMUCIL) packet    terconazole (TERAZOL 7) 0.4 % Crea    walker Misc     No current facility-administered medications for this visit.        Objective:  Alison Branch arrived promptly for the session.    Ms. Branch ambulated with a walker at the time of session. The patient was fully cooperative throughout the session.  Appearance: age appropriate, appropriately  dressed, adequately  groomed  Behavior/Cooperation: lethargic  Speech: appropriate quality, quantity and organization of sentences and slowed   Mood: dysthymic  Affect: dysphoric  Thought Process: goal-directed, logical  Thought Content: normal,  No delusions or paranoia; did not appear to be responding to internal stimuli during the session  Orientation: grossly intact  Memory: Impaired  Attention Span/Concentration: Moderate Difficulty  Fund of Knowledge: average  Estimate of Intelligence: average from verbal skills and history  Cognition: Impaired  Insight: patient " has awareness of illness; good insight into own behavior and behavior of others  Judgment: the patient's behavior is adequate to circumstances    Interval history and content of current session:   Discussed current adaptation to disease and treatment status. Reports to be coping in a passive manner. Evaluated cognitive response, paying particular attention to negative intrusive thoughts of a persistent and detrimental nature. Thoughts of this type are in evidence with mild distress. Provided cognitive behavioral therapy to address negative cognitions. Identified and evaluated psychosocial and environmental stressors secondary to diagnosis and treatment.  Examined proactive behaviors that may be implemented to minimize or ameliorate psychosocial stressors secondary to diagnosis and treatment. Administered and completed the SLUMS.    The [employer] Mental Status Examination (UMS), a cognitive screener, was administered to assess the Patient's current mental status and cognitive capacity. Patient obtained a score of 24/30. This score does not fall within normal limits as compared to those within similar age and level of education, and suggests mild impairments in neurocognitive functioning. Areas of concern include delayed memory, working memory, and immediate recall. She struggled to remember five  objects after a brief delay, could not identify the largest figure from a group of objects, and struggled to recall animals during a one minute time limit. It is difficulty to ascertain that etiology of her problems given her multiple medical concerns, presence of depression, history of attention problems, as well as current treatment for cancer. A full neurology and neuropsychological consult is recommended. Feedback provided to patient and she agreed to a referral to neurology.     Risk parameters:   Patient reports no suicidal ideation  Patient reports no homicidal ideation  Patient reports no  self-injurious behavior  Patient reports no violent behavior   Safety needs:  None at this time      Verbal deficits: None     Patient's response to intervention:The patient's response to intervention is accepting.     Progress toward goals and other mental status changes:  The patient's progress toward goals is limited.      Progress to date:No Progress - Continue Objectives      Goals from last visit: Not attempted     Patient reported outcomes:    Distress Thermometer: 3     Client Strengths: verbal, intelligent, successful, good social support, good insight, commitment to wellness, strong shannon, strong cultural traditions     Diagnosis:   Major Depressive Disorder  Memory and Attention Problems    Recommendation: Refer to Neurology to evaluated memory/attention problems as well has difficulty with gait, dizziness, falling  R/O Depression vs. Neurocognitive Disorder vs. History of ADHD     Treatment Plan:individual psychotherapy, medication management by physician and Neuropsychological Testing  · Target symptoms: depression, cognition  · Why chosen therapy is appropriate versus another modality: relevant to diagnosis, evidence based practice  · Outcome monitoring methods: self-report, observation, checklist/rating scale  · Therapeutic intervention type: behavior modifying psychotherapy  · Prognosis: Fair     Behavioral goals:   Continue to document on daily logs    Return to clinic: 3 weeks       Length of Service (minutes direct face-to-face contact): 70  Jagruti Garcia, PhD  [place] License #[id number]

## 2019-11-09 ENCOUNTER — PATIENT MESSAGE (OUTPATIENT)
Dept: DIABETES | Facility: CLINIC | Age: 64
End: 2019-11-09

## 2019-11-09 DIAGNOSIS — E11.65 UNCONTROLLED TYPE 2 DIABETES MELLITUS WITH HYPERGLYCEMIA, WITHOUT LONG-TERM CURRENT USE OF INSULIN: ICD-10-CM

## 2019-11-11 RX ORDER — PEN NEEDLE, DIABETIC 30 GX3/16"
NEEDLE, DISPOSABLE MISCELLANEOUS
Qty: 150 EACH | Refills: 11 | Status: SHIPPED | OUTPATIENT
Start: 2019-11-11 | End: 2020-05-26

## 2019-11-11 RX ORDER — BLOOD SUGAR DIAGNOSTIC
1 STRIP MISCELLANEOUS
Qty: 150 EACH | Refills: 11 | OUTPATIENT
Start: 2019-11-11

## 2019-11-25 ENCOUNTER — PATIENT MESSAGE (OUTPATIENT)
Dept: INTERNAL MEDICINE | Facility: CLINIC | Age: 64
End: 2019-11-25

## 2019-11-25 DIAGNOSIS — B37.9 YEAST INFECTION: Primary | ICD-10-CM

## 2019-11-26 ENCOUNTER — OFFICE VISIT (OUTPATIENT)
Dept: PSYCHIATRY | Facility: CLINIC | Age: 64
End: 2019-11-26
Payer: COMMERCIAL

## 2019-11-26 ENCOUNTER — TELEPHONE (OUTPATIENT)
Dept: HEMATOLOGY/ONCOLOGY | Facility: CLINIC | Age: 64
End: 2019-11-26

## 2019-11-26 DIAGNOSIS — F33.1 MODERATE EPISODE OF RECURRENT MAJOR DEPRESSIVE DISORDER: Primary | ICD-10-CM

## 2019-11-26 DIAGNOSIS — R41.840 ATTENTION AND CONCENTRATION DEFICIT: ICD-10-CM

## 2019-11-26 DIAGNOSIS — R41.3 MEMORY DEFICIT: ICD-10-CM

## 2019-11-26 PROCEDURE — 99999 PR PBB SHADOW E&M-EST. PATIENT-LVL II: ICD-10-PCS | Mod: PBBFAC,,, | Performed by: PSYCHOLOGIST

## 2019-11-26 PROCEDURE — 90832 PR PSYCHOTHERAPY W/PATIENT, 30 MIN: ICD-10-PCS | Mod: S$GLB,,, | Performed by: PSYCHOLOGIST

## 2019-11-26 PROCEDURE — 99999 PR PBB SHADOW E&M-EST. PATIENT-LVL II: CPT | Mod: PBBFAC,,, | Performed by: PSYCHOLOGIST

## 2019-11-26 PROCEDURE — 99499 RISK ADDL DX/OHS AUDIT: ICD-10-PCS | Mod: S$PBB,,, | Performed by: PSYCHOLOGIST

## 2019-11-26 PROCEDURE — 99499 UNLISTED E&M SERVICE: CPT | Mod: S$PBB,,, | Performed by: PSYCHOLOGIST

## 2019-11-26 PROCEDURE — 90832 PSYTX W PT 30 MINUTES: CPT | Mod: S$GLB,,, | Performed by: PSYCHOLOGIST

## 2019-11-26 RX ORDER — FLUCONAZOLE 150 MG/1
150 TABLET ORAL DAILY
Qty: 1 TABLET | Refills: 0 | Status: SHIPPED | OUTPATIENT
Start: 2019-11-26 | End: 2019-11-27

## 2019-11-26 NOTE — PROGRESS NOTES
INFORMED CONSENT: Alison Branch   is known to this provider and identity was confirmed via NAME and .  The patient has been informed of the risks and benefits associated with engaging in psychotherapy, the handling of protected health information, the rights of privacy and the limits of confidentiality. The patient has also been informed of the importance of reporting any suicidal or homicidal ideation to this or any provider to ensure safety of all parties, and the Alison Branch expressed understanding. The patient was agreeable to these terms and freely participates in individual psychotherapy.    PSYCHO-ONCOLOGY NOTE/ Individual Psychotherapy     Date: 2019   [facility]        Therapeutic Intervention: Met with patient.  Outpatient - Supportive psychotherapy 30 min - CPT Code 80435      Patient was last seen by me on 2019    Problem list  Patient Active Problem List   Diagnosis    Uncontrolled type 2 diabetes mellitus with hyperglycemia, without long-term current use of insulin    Mixed hyperlipidemia due to type 2 diabetes mellitus    Attention and concentration deficit    Cerebral aneurysm, coiled in     Hypertension associated with diabetes    Hyperkeratosis of palms and soles    Irritable bowel syndrome    Aneurysm of unspecified site    Obesity (BMI 30-39.9)    Vitamin D deficiency    Malignant neoplasm of upper lobe of left lung    Secondary malignant neoplasm of mediastinal lymph node    Adrenal cortical steroids causing adverse effect in therapeutic use    Type 2 diabetes mellitus with diabetic polyneuropathy, with long-term current use of insulin    Major depressive disorder, recurrent, unspecified    Memory deficit       Chief complaint/reason for encounter: depression and anxiety   Met with patient to evaluate psychosocial adaptation to diagnosis/treatment/survivorship of Lung Cancer    Current Medications  Current Outpatient Medications   Medication     "aspirin (ECOTRIN) 81 MG EC tablet    atorvastatin (LIPITOR) 40 MG tablet    benzonatate (TESSALON PERLES) 100 MG capsule    bisacodyl (DULCOLAX) 5 mg EC tablet    blood sugar diagnostic Strp    blood-glucose meter kit    carboxymethylcellulose (REFRESH PLUS) 0.5 % Dpet    dexAMETHasone (DECADRON) 6 MG tablet    diazePAM (VALIUM) 5 MG tablet    ergocalciferol (ERGOCALCIFEROL) 50,000 unit Cap    fluconazole (DIFLUCAN) 150 MG Tab    fluticasone propionate (FLONASE) 50 mcg/actuation nasal spray    folic acid (FOLVITE) 1 MG tablet    insulin aspart U-100 (NOVOLOG) 100 unit/mL (3 mL) InPn pen    insulin detemir U-100 (LEVEMIR FLEXTOUCH) 100 unit/mL (3 mL) SubQ InPn pen    JANUVIA 100 mg Tab    lancets Misc    lidocaine-prilocaine (EMLA) cream    olmesartan (BENICAR) 20 MG tablet    ondansetron (ZOFRAN) 8 MG tablet    pen needle, diabetic (BD ULTRA-FINE BRYANT PEN NEEDLE) 32 gauge x 5/32" Ndle    psyllium (METAMUCIL) packet    terconazole (TERAZOL 7) 0.4 % Crea    walker Misc     No current facility-administered medications for this visit.        Objective:  Alison Branch arrived 45 mins late for the session.   Ms. Branch was independently ambulatory at the time of session. The patient was fully cooperative throughout the session.  Appearance: age appropriate, appropriately  dressed, adequately  groomed  Behavior/Cooperation: friendly and cooperative  Speech: normal in rate, volume, and tone and appropriate quality, quantity and organization of sentences  Mood: euthymic  Affect: dysphoric  Thought Process: goal-directed, logical  Thought Content: normal,  No delusions or paranoia; did not appear to be responding to internal stimuli during the session  Orientation: grossly intact  Memory: Grossly intact  Attention Span/Concentration: Attends to session without distraction; reports no difficulty  Fund of Knowledge: average  Estimate of Intelligence: average from verbal skills and history  Cognition: " grossly intact  Insight: patient has awareness of illness; good insight into own behavior and behavior of others  Judgment: the patient's behavior is adequate to circumstances    Interval history and content of current session: Discussed importance of being on time to appointments as patient was 45 mins late today. Problem-solved around setting reminder in calendar.   Reports to be coping with moderate difficulty. Evaluated cognitive response, paying particular attention to negative intrusive thoughts of a persistent and detrimental nature. Thoughts of this type are in evidence with mild distress. Provided cognitive behavioral therapy to address negative cognitions. Identified and evaluated psychosocial and environmental stressors secondary to diagnosis and treatment.  Examined proactive behaviors that may be implemented to minimize or ameliorate psychosocial stressors secondary to diagnosis and treatment.     Risk parameters:   Patient reports no suicidal ideation  Patient reports no homicidal ideation  Patient reports no self-injurious behavior  Patient reports no violent behavior   Safety needs:  None at this time      Verbal deficits: None     Patient's response to intervention:The patient's response to intervention is accepting.     Progress toward goals and other mental status changes:  The patient's progress toward goals is good.      Progress to date:No Progress - Continue Objectives      Goals from last visit: Not attempted     Patient reported outcomes:    Distress Thermometer:     PHQ-9=    DELMER-7=      Client Strengths: verbal, intelligent, successful, good social support, good insight, commitment to wellness, strong shannon, strong cultural traditions     Diagnosis:     ICD-10-CM ICD-9-CM   1. Moderate episode of recurrent major depressive disorder F33.1 296.32   2. Attention and concentration deficit R41.840 799.51   3. Memory deficit R41.3 780.93       Treatment Plan:individual psychotherapy, medication  management by physician and Neuropsychological Testing  · Target symptoms: depression, anxiety   · Why chosen therapy is appropriate versus another modality: relevant to diagnosis, evidence based practice  · Outcome monitoring methods: self-report, observation, checklist/rating scale  · Therapeutic intervention type: behavior modifying psychotherapy  · Prognosis: Fair      Behavioral goals:   Meet with neurology    Return to clinic: 3 weeks       Length of Service (minutes direct face-to-face contact): 30    Jagruti Garcia, PhD  [place] License #[id number]

## 2019-11-26 NOTE — PROGRESS NOTES
"Subjective:       Patient ID: Alison Branch is a 64 y.o. female.    Chief Complaint: Malignant neoplasm of upper lobe of left lung    History:  Past Medical History:   Diagnosis Date    Allergy     Brain aneurysm 2010    s/p coiling of one; another not coiled    Breast cyst     Depression 9/19/2019    Diabetes mellitus     Diabetes type 2, controlled     Fever blister     High cholesterol     History of Bell's palsy     HTN (hypertension) 5/20/2014     Past Surgical History:   Procedure Laterality Date    BREAST CYST ASPIRATION      BRONCHOSCOPY N/A 5/28/2019    Procedure: Bronchoscopy;  Surgeon: [facility] Diagnostic Provider;  [facility];  Service: Anesthesiology;  Laterality: N/A;    CERVICAL FUSION      COLONOSCOPY N/A 3/9/2016    Procedure: COLONOSCOPY;  Surgeon: Elliott Zimmerman MD;  Location: [facility] ENDO (4TH FLR);  Service: Endoscopy;  Laterality: N/A;    head surgery      stent and "curling" for aneurysm    HYSTERECTOMY      TVH secondary to SUF    INSERTION OF TUNNELED CENTRAL VENOUS CATHETER (CVC) WITH SUBCUTANEOUS PORT Right 7/8/2019    Procedure: INSERTION, PORT-A-CATH;  Surgeon: Cali Damico MD;  Location: [facility] CATH LAB;  Service: Radiology;  Laterality: Right;         Malignant neoplasm of upper lobe of left lung    5/23/2019 Initial Diagnosis     Malignant neoplasm of upper lobe of left lung      6/24/2019 -  Chemotherapy     Treatment Summary   Plan Name: OP NSCLC PEMBROLIZUMAB PEMETREXED CARBOPLATIN Q3W  Treatment Goal: Palliative  Status: Active  Start Date: 7/9/2019  End Date: 6/17/2021 (Planned)  Provider: Tara Belcher MD  Chemotherapy: CARBOplatin (PARAPLATIN) 530 mg in sodium chloride 0.9% 250 mL chemo infusion, 530 mg (100 % of original dose 531.5 mg), Intravenous, Clinic/HOD 1 time, 4 of 4 cycles  Dose modification:   (original dose 622 mg, Cycle 2),   (original dose 567 mg, Cycle 3),   (original dose 616 mg, Cycle 4),   (original dose 531.5 mg, Cycle " 1)  Administration: 620 mg (7/29/2019), 565 mg (8/19/2019), 615 mg (9/5/2019), 530 mg (7/9/2019)  PEMEtrexed (ALIMTA) 1,075 mg in sodium chloride 0.9% 100 mL chemo infusion, 500 mg/m2 = 1,075 mg, Intravenous, Clinic/HOD 1 time, 7 of 35 cycles  Administration: 1,075 mg (7/29/2019), 1,075 mg (8/19/2019), 1,075 mg (9/5/2019), 1,075 mg (9/26/2019), 1,075 mg (7/9/2019), 1,075 mg (10/16/2019), 1,075 mg (11/6/2019)  pembrolizumab (KEYTRUDA) 200 mg in sodium chloride 0.9% 100 mL chemo infusion, 200 mg, Intravenous, Clinic/HOD 1 time, 7 of 35 cycles  Administration: 200 mg (7/29/2019), 200 mg (8/19/2019), 200 mg (9/5/2019), 200 mg (9/26/2019), 200 mg (7/9/2019), 200 mg (10/16/2019), 200 mg (11/6/2019)        Secondary malignant neoplasm of mediastinal lymph node    6/7/2019 Initial Diagnosis     Secondary malignant neoplasm of mediastinal lymph node      6/24/2019 -  Chemotherapy     Treatment Summary   Plan Name: OP NSCLC PEMBROLIZUMAB PEMETREXED CARBOPLATIN Q3W  Treatment Goal: Palliative  Status: Active  Start Date: 7/9/2019  End Date: 6/17/2021 (Planned)  Provider: Tara Belcher MD  Chemotherapy: CARBOplatin (PARAPLATIN) 530 mg in sodium chloride 0.9% 250 mL chemo infusion, 530 mg (100 % of original dose 531.5 mg), Intravenous, Clinic/HOD 1 time, 4 of 4 cycles  Dose modification:   (original dose 622 mg, Cycle 2),   (original dose 567 mg, Cycle 3),   (original dose 616 mg, Cycle 4),   (original dose 531.5 mg, Cycle 1)  Administration: 620 mg (7/29/2019), 565 mg (8/19/2019), 615 mg (9/5/2019), 530 mg (7/9/2019)  PEMEtrexed (ALIMTA) 1,075 mg in sodium chloride 0.9% 100 mL chemo infusion, 500 mg/m2 = 1,075 mg, Intravenous, [facility] 1 time, 7 of 35 cycles  Administration: 1,075 mg (7/29/2019), 1,075 mg (8/19/2019), 1,075 mg (9/5/2019), 1,075 mg (9/26/2019), 1,075 mg (7/9/2019), 1,075 mg (10/16/2019), 1,075 mg (11/6/2019)  pembrolizumab (KEYTRUDA) 200 mg in sodium chloride 0.9% 100 mL chemo infusion, 200 mg, Intravenous,  Clinic/HOD 1 time, 7 of 35 cycles  Administration: 200 mg (7/29/2019), 200 mg (8/19/2019), 200 mg (9/5/2019), 200 mg (9/26/2019), 200 mg (7/9/2019), 200 mg (10/16/2019), 200 mg (11/6/2019)          Allergies:  Review of patient's allergies indicates:  No Known Allergies     HPI   Ms. Branch is a 63-year-old female who smoked for about 30 years and quit 20 years ago, presented with cough end of last year, but worsened into January.  Since the cough persisted, she saw her PCP, underwent a chest x-ray on 05/10/2019, that revealed left upper lobe pneumonia and a repeat CT was done in the week after that on 05/17/2019 that showed left upper lobe   mass arising from the lateral pleural surface in the left upper lobe posterior subsegment measuring 2.6 x 3 cm.  There are satellite mass more medially near the aortic arch that is 2 cm, also spiculated and irregular as well as thickened interlobar septa in the left lung apex and anterior segment, prevascular lymph node lateral to the aortic arch, short axis measuring 9 mm.       She then underwent a bronchoscopy on 05/28/2019, and that revealed there was an infiltration obtained in the left apical posterior segment of the left upper lobe cytology brush was done.  Transbronchial biopsies of an area of infiltration were also performed in the apicoposterior segment of the left upper lobe.    Pathology revealed adenocarcinoma; however, the specimen was not adequate enough to send for molecular testing.       She underwent a PET scan on 06/06/2019.  that reveals significant hypermetabolic activity in the large irregular spiculated pulmonary mass in the lateral aspect of the left upper lobe consistent with the patient's known pulmonary adenocarcinoma.  There is also tracer avidity in the medial left upper lobe satellite lesion and diffusely throughout much of the anterior left upper lobe where there is prominent nodular paraseptal thickening.  There are some numerous scattered  "subcentimeter pulmonary nodules throughout the right lung, all of which are too small for detection by PET.  For example, there is a 0.4 cm nodule in the superior right lower lobe and a micro nodule in the posterior segment of the right upper lobe.  There is a 0.5 cm, normal size right   paratracheal lymph node with increased radiotracer uptake as well as hypermetabolic aortic pulmonary lymph node and a left hilar lymph node.  There is a focal hypermetabolic lesion in the left anterior superior iliac spine associated with well defined lytic lesion.  There is a hypermetabolic focus in the anterior right fifth rib with a possible associated lytic lesion.  SUVs as   below lateral:  Left upper lobe  SUV max 17.9, anterior left upper lobe satellite mass and septal thickening SUV max of 10.1, right paratracheal lymph node SUV max of 4.8, left AP window lymph node SUV max of 17.9, left anterior superior iliac spine SUV max of 3.9"     MRI pelvis from 6/10/19  reveals "Region of osseous is irregularity at the left anterior iliac spine likely related to bone graft harvest procedure.  See  discussion above.  No suspicious signal or enhancement to suggest active/malignant process"   MRI brain from 6/10//19 reveal No intracranial abnormality.     We discussed her case at [facility], and plan was to wait for GAURDANT and proceed with treatment accordingly  Her GAURDANT is negative for molecular markers.     She has completed 4 cycles of Carboplatin, Alimta and Keytruda as of 9/5/19. She is now on  ALimta and Keytruda maintenance. Last scan was performed on 9/4/2019 that displayed Decrease in size of the two spiculated masses in the left upper lobe as above.  Persistent interlobular septal thickening throughout the left upper lobe noted, similar to prior examination.  Decrease in size of the mediastinal lymph node lateral to the aortic arch.  Stable micronodules in the right lung.  Mixed lytic/sclerotic osseous lesion involving " the left anterior superior iliac spine, concerning for possible osseous metastasis noting that this lesion was hypermetabolic on recent PET-CT.     HPI She comes in for maintenance Alimta and Keytruda. She notes some moderate swelling of her ankles and feet toward the end of the day. She has some peeling of the skin of the bottom of her feet but there is no bleeding or signs of infection. She continues to ambulate with her walker. She is also having intermittent shortness of breath with exertion but not at rest. Neuropathy is stable. She reports having recurrent dysuria as well when she receives chemotherapy. She was given Pyridium before that helped. She has not had a UA performed lately. She denies worsening fatigue, dizziness and falls. MRI brain on 10/22/19 was normal. She has seen Dr. dolan and did not do well on her cognitive eval/. However, she feels that she is doing much better today. She denies any nausea, vomiting, diarrhea, constipation, abdominal pain, weight loss or loss of appetite, chest pain, leg swelling, fatigue, pain, headache, hematuria, melena, reflux, dysphagia, odynophagia, dizziness, or mood changes. Her ECOG PS is zero. She is with her daughter today.     ECO  ECOG SCORE         Review of Systems   Constitutional: Negative for activity change, appetite change, chills, fatigue, fever and unexpected weight change.   HENT: Negative for mouth sores, sore throat and trouble swallowing.    Eyes: Negative for visual disturbance.   Respiratory: Positive for shortness of breath. Negative for apnea, cough, chest tightness, wheezing and stridor.         Shortness of breath occurs with movement only. No chest pain or shortness of breath at rest   Cardiovascular: Negative for chest pain and leg swelling.        Swelling of the feet and ankles. No report of leg swelling.   Gastrointestinal: Negative for abdominal distention, abdominal pain, anal bleeding, blood in stool, constipation, diarrhea,  nausea, rectal pain and vomiting.   Genitourinary: Negative for dysuria and hematuria.   Musculoskeletal: Negative for back pain and gait problem.   Skin: Negative for color change and rash.   Neurological: Positive for numbness. Negative for dizziness, seizures, syncope, speech difficulty, weakness, light-headedness and headaches.   Hematological: Negative for adenopathy.   Psychiatric/Behavioral: Negative for behavioral problems, confusion, sleep disturbance and suicidal ideas. The patient is not nervous/anxious.        Objective:      Physical Exam   Constitutional: She is oriented to person, place, and time. She appears well-developed and well-nourished. No distress.   HENT:   Head: Normocephalic and atraumatic.   Mouth/Throat: Oropharynx is clear and moist. No oropharyngeal exudate.   Eyes: Pupils are equal, round, and reactive to light. Conjunctivae and EOM are normal. No scleral icterus.   Neck: Normal range of motion. Neck supple.   Cardiovascular: Normal rate, regular rhythm and normal heart sounds. Exam reveals no gallop and no friction rub.   No murmur heard.  No pitting edema is noted of the feet and ankles.    Pulmonary/Chest: Effort normal and breath sounds normal. No respiratory distress. She has no rales. She exhibits no tenderness.   Abdominal: Soft. Bowel sounds are normal. She exhibits no distension and no mass. There is no tenderness. There is no rebound and no guarding. No hernia.   Musculoskeletal: Normal range of motion. She exhibits no edema or tenderness.        Right foot: There is normal range of motion and no deformity.        Left foot: There is normal range of motion and no deformity.   Feet:   Right Foot:   Skin Integrity: Positive for dry skin. Negative for ulcer, blister, skin breakdown, erythema, warmth or callus.   Left Foot:   Skin Integrity: Positive for dry skin. Negative for ulcer, blister, skin breakdown, erythema, warmth or callus.   Lymphadenopathy:     She has no cervical  adenopathy.   Neurological: She is alert and oriented to person, place, and time. She has normal strength. No cranial nerve deficit or sensory deficit. She exhibits normal muscle tone. Gait normal.   Skin: Skin is warm and dry. Capillary refill takes less than 2 seconds. No ecchymosis, no petechiae and no rash noted. No erythema.   Scaling and peeling of soles of the feet bilaterally. Multiple areas of hyperpigmentation and discoloration of the soles.    Psychiatric: She has a normal mood and affect. Her behavior is normal. Judgment and thought content normal.   Nursing note and vitals reviewed.      Results:   [facility]   Order: [id number]   Status:  Final result   Visible to patient:  Yes (Patient Portal) Next appt:  12/10/2019 at 10:00 AM in Primary Care (LAB, [facility]) Dx:  Malignant neoplasm of upper lobe of l...    Ref Range & Units 1d ago 3wk ago 1mo ago   Sodium 136 - 145 mmol/L 142  140  144    Potassium 3.5 - 5.1 mmol/L 4.5  4.6  4.9    Chloride 95 - 110 mmol/L 107  104  107    CO2 23 - 29 mmol/L 28  29  28    Glucose 70 - 110 mg/dL 174 High   166High   196High     BUN, Bld 8 - 23 mg/dL 14  21  9    Creatinine 0.5 - 1.4 mg/dL 1.0  0.9  0.9    Calcium 8.7 - 10.5 mg/dL 10.1  10.5  9.9    Total Protein 6.0 - 8.4 g/dL 7.2  7.3  6.9    Albumin 3.5 - 5.2 g/dL 3.7  4.1  3.8    Total Bilirubin 0.1 - 1.0 mg/dL 0.4  0.4 CM 0.3 CM   Comment: For infants and newborns, interpretation of results should be based   on gestational age, weight and in agreement with clinical   observations.   Premature Infant recommended reference ranges:   Up to 24 hours.............<8.0 mg/dL   Up to 48 hours............<12.0 mg/dL   3-5 days..................<15.0 mg/dL   6-29 days.................<15.0 mg/dL    Alkaline Phosphatase 55 - 135 U/L 65  59  65    AST 10 - 40 U/L 38  58High   46High     ALT 10 - 44 U/L 60 High   119High   65High     Anion Gap 8 - 16 mmol/L 7 Low   7Low   9    eGFR if African American >60 mL/min/1.73 m^2 >60.0  >60   >60.0    eGFR if non African American >60 mL/min/1.73 m^2 >60.0  >60 CM >60.0 CM   Comment: Calculation used to obtain the estimated glomerular filtration   rate (eGFR) is the CKD-EPI equation.            CBC Oncology   Order: [id number]   Status:  Final result   Visible to patient:  No (Not Released) Next appt:  12/10/2019 at 10:00 AM in Primary Care (LAB, [facility]) Dx:  Malignant neoplasm of upper lobe of l...    Ref Range & Units 10:45 3wk ago 1mo ago   WBC 3.90 - 12.70 K/uL 7.86  5.50  5.35    RBC 4.00 - 5.40 M/uL 3.80 Low   3.91Low   3.78Low     Hemoglobin 12.0 - 16.0 g/dL 11.1 Low   11.3Low   10.6Low     Hematocrit 37.0 - 48.5 % 35.7 Low   36.0Low   36.1Low     Mean Corpuscular Volume 82 - 98 fL 94  92  96    Mean Corpuscular Hemoglobin 27.0 - 31.0 pg 29.2  28.9  28.0    Mean Corpuscular Hemoglobin Conc 32.0 - 36.0 g/dL 31.1 Low   31.4Low   29.4Low     RDW 11.5 - 14.5 % 14.8 High   16.2High   17.9High     Platelets 150 - 350 K/uL 280  200  210    MPV 9.2 - 12.9 fL 11.4  10.9  11.5    Gran # (ANC) 1.8 - 7.7 K/uL 6.4  3.0 CM 2.9 CM   Comment: The ANC is based on a white cell differential from an   automated cell counter. It has not been microscopically   reviewed for the presence of abnormal cells. Clinical   correlation is required.    Immature Grans (Abs) 0.00 - 0.04 K/uL 0.08High    0.07High  CM   Comment: Mild elevation in immature granulocytes is non specific and   can be seen in a variety of conditions including stress response,   acute inflammation, trauma and pregnancy. Correlation with other   laboratory and clinical findings is essential            CT Chest Abdoment Pelvis With Contrast   Order: [id number]   Status:  Final result   Visible to patient:  Yes (Patient Portal) Next appt:  12/10/2019 at 10:00 AM in Primary Care (LAB, [facility]) Dx:  Malignant neoplasm of upper lobe of l...   Details     Reading Physician Reading Date Result Priority   Modesto Allred MD 9/4/2019 Routine      Narrative      EXAMINATION:  CT CHEST ABDOMEN PELVIS WITH CONTRAST (XPD)    CLINICAL HISTORY:  lung cancer staging on chemo immunotherapy;Malignant neoplasm of upper lobe, left bronchus or lung    TECHNIQUE:  The patient was surveyed from the thoracic inlet through the pelvis after the administration of 100 cc Omni 350 IV contrast as well as oral contrast and data was reconstructed for coronal, sagittal, and axial images.    COMPARISON:  CT chest 05/17/2019    FINDINGS:  The soft tissues at the base of the neck are unremarkable.  Subcentimeter hypoattenuating right thyroid nodule.  A right chest wall port is noted with the distal catheter tip in the right atrium.    The hilar contours are unremarkable.  The esophagus is normal in course and contour.    The thoracic aorta is normal in course, caliber, and contour without significant atherosclerotic calcifications.The heart does not appear enlarged and there is no pericardial effusion.    Pre-vascular lymph node lateral to the aortic arch measures 0.75 cm in short axis (series 2, image 35), previously measuring 0.97 cm.    Redemonstration of a lobular spiculated mass arising from the lateral pleural surface of the left upper lobe measuring 2.0 x 2.8 cm, 2.2 cm in vertical diameter (series 2, image 34) (series 601, image 86), decreased in size from most recent exam.  The satellite spiculated mass or medially near the aortic arch measures 1.4 cm in vertical diameter (series 601, image 72), previously measuring 2 cm.  Thickened interlobular septa in the left lung apex is mildly improved compared to most recent.    Multiple subcentimeter micronodules throughout the right lung that are stable compared to prior.    No evidence of pleural fluid or pneumothorax.    The trachea and proximal airways are patent.    The liver is normal in size and attenuation with no focal hepatic abnormality.  The gallbladder shows no evidence of stones or pericholecystic fluid.  There is no intra-or  extrahepatic biliary ductal dilatation.    The stomach, spleen, pancreas, and adrenal glands are unremarkable.    The kidneys are normal in size and location.  There is no evidence of hydronephrosis.  The ureters appear normal in course and caliber without evidence of ureteral dilatation. The urinary bladder demonstrates no significant abnormality.    The abdominal aorta is normal in course and caliber with moderate atherosclerotic calcifications.    The visualized loops of small bowel show no evidence of obstruction or inflammation. Large bowel is unremarkable. There is no ascites, free fluid, or intraperitoneal free air. There is no evidence of lymph node enlargement in the abdomen or pelvis.    Superior endplate deformity at T10.  Mild anterolisthesis of L4-L5.  a mixed lytic/sclerotic left anterior superior iliac spine lesion is again noted, previously characterized as hypermetabolic on recent PET-CT and concerning for possible osseous metastasis.  No obvious right rib lesion is seen to correspond to the area of hypermetabolic uptake seen on recent PET-CT.      Impression       Decrease in size of the two spiculated masses in the left upper lobe as above.  Persistent interlobular septal thickening throughout the left upper lobe noted, similar to prior examination.  Decrease in size of the mediastinal lymph node lateral to the aortic arch.  Stable micronodules in the right lung.    Mixed lytic/sclerotic osseous lesion involving the left anterior superior iliac spine, concerning for possible osseous metastasis noting that this lesion was hypermetabolic on recent PET-CT.    RECIST SUMMARY:    Date of prior examination for comparison: 05/17/2019    Lesion 1: Left upper lobe pulmonary nodule.  2.8 cm. Series 2 image 34.  Prior measurement 3.0 cm.    Lesion 2: Left upper lobe pulmonary nodule.  1.4 cm. Series 601 image 72.  Prior measurement 2.0 cm.    Lesion 3: Mediastinal lymph node.  0.8 cm. Series 2 image 35.   Prior measurement 1 cm.    Electronically signed by resident: Skyler Yan  Date: 09/04/2019  Time: 07:19    Electronically signed by: Modesto Allred  Date: 09/04/2019  Time: 14:00                Assessment:   1. Malignant Neoplasm of upper lobe of L lung  2. Secondary malignant neoplasm of mediastinal lymph node  3. Dysuria  4. Shortness of breath with exertion  5. Peripheral edema  6. Peripheral neuropathy  7. Hyperkeratosis of palms and soles     Plan:   1,2. Continue with maintenance Alimta and Keytruda today. Lab work has been reviewed and acceptable for treatment. Will return in 3 weeks for her next cycle. Last dose of Xgeva was given on 11/8/2019. She will receive her next dose in 4-6 weeks.   -Repeat imaging studies with CT C/A/P will be repeated in approx. 6 weeks  3. Will order a UA. Pyridium prescribed 200mg TID x 3 days as needed for dysuria  4. Will continue to monitor. No evidence for respiratory distress and vital signs are stable.   5. Stable. Continue to elevate and monitor sodium intake  6,7. Stable. Will continue to use skin cream as needed for scaling and peeling. [place]ula lotion was also recommended. Continue on folic acid.    Pte and family members displayed understanding of the above encounter and treatment plan. All thoughtful questions were answered to their satisfaction. Pte was advised to notify the care team or proceed to the ER if signs and symptoms worsen.     STAFF NOTE:  Agree with above

## 2019-11-27 ENCOUNTER — OFFICE VISIT (OUTPATIENT)
Dept: HEMATOLOGY/ONCOLOGY | Facility: CLINIC | Age: 64
End: 2019-11-27
Payer: MEDICARE

## 2019-11-27 ENCOUNTER — INFUSION (OUTPATIENT)
Dept: INFUSION THERAPY | Facility: HOSPITAL | Age: 64
End: 2019-11-27
Attending: INTERNAL MEDICINE
Payer: MEDICARE

## 2019-11-27 VITALS
HEART RATE: 20 BPM | DIASTOLIC BLOOD PRESSURE: 74 MMHG | RESPIRATION RATE: 20 BRPM | HEIGHT: 69 IN | BODY MASS INDEX: 32.75 KG/M2 | WEIGHT: 221.13 LBS | OXYGEN SATURATION: 97 % | SYSTOLIC BLOOD PRESSURE: 127 MMHG | TEMPERATURE: 99 F

## 2019-11-27 VITALS
SYSTOLIC BLOOD PRESSURE: 146 MMHG | DIASTOLIC BLOOD PRESSURE: 67 MMHG | TEMPERATURE: 98 F | HEART RATE: 85 BPM | RESPIRATION RATE: 18 BRPM

## 2019-11-27 DIAGNOSIS — C34.12 MALIGNANT NEOPLASM OF UPPER LOBE OF LEFT LUNG: Primary | ICD-10-CM

## 2019-11-27 DIAGNOSIS — C77.1 SECONDARY MALIGNANT NEOPLASM OF MEDIASTINAL LYMPH NODE: Primary | ICD-10-CM

## 2019-11-27 DIAGNOSIS — T45.1X5A CHEMOTHERAPY-INDUCED PERIPHERAL NEUROPATHY: ICD-10-CM

## 2019-11-27 DIAGNOSIS — R06.02 SHORTNESS OF BREATH ON EXERTION: ICD-10-CM

## 2019-11-27 DIAGNOSIS — G62.0 CHEMOTHERAPY-INDUCED PERIPHERAL NEUROPATHY: ICD-10-CM

## 2019-11-27 DIAGNOSIS — R60.0 EDEMA, PERIPHERAL: ICD-10-CM

## 2019-11-27 DIAGNOSIS — R30.0 DYSURIA: ICD-10-CM

## 2019-11-27 DIAGNOSIS — C34.12 MALIGNANT NEOPLASM OF UPPER LOBE OF LEFT LUNG: ICD-10-CM

## 2019-11-27 DIAGNOSIS — C77.1 SECONDARY MALIGNANT NEOPLASM OF MEDIASTINAL LYMPH NODE: ICD-10-CM

## 2019-11-27 DIAGNOSIS — Q82.8 HYPERKERATOSIS OF PALMS AND SOLES: ICD-10-CM

## 2019-11-27 PROBLEM — G62.9 PERIPHERAL NEUROPATHY: Status: ACTIVE | Noted: 2019-11-27

## 2019-11-27 LAB
BACTERIA #/AREA URNS AUTO: NORMAL /HPF
BILIRUB UR QL STRIP: NEGATIVE
CLARITY UR REFRACT.AUTO: CLEAR
COLOR UR AUTO: YELLOW
GLUCOSE UR QL STRIP: ABNORMAL
HGB UR QL STRIP: NEGATIVE
HYALINE CASTS UR QL AUTO: 1 /LPF
KETONES UR QL STRIP: NEGATIVE
LEUKOCYTE ESTERASE UR QL STRIP: NEGATIVE
MICROSCOPIC COMMENT: NORMAL
NITRITE UR QL STRIP: NEGATIVE
PH UR STRIP: 5 [PH] (ref 5–8)
PROT UR QL STRIP: NEGATIVE
RBC #/AREA URNS AUTO: 0 /HPF (ref 0–4)
SP GR UR STRIP: 1.02 (ref 1–1.03)
SQUAMOUS #/AREA URNS AUTO: 4 /HPF
URN SPEC COLLECT METH UR: ABNORMAL
WBC #/AREA URNS AUTO: 0 /HPF (ref 0–5)
YEAST UR QL AUTO: NORMAL

## 2019-11-27 PROCEDURE — 3074F PR MOST RECENT SYSTOLIC BLOOD PRESSURE < 130 MM HG: ICD-10-PCS | Mod: CPTII,S$GLB,, | Performed by: PHYSICIAN ASSISTANT

## 2019-11-27 PROCEDURE — 3008F BODY MASS INDEX DOCD: CPT | Mod: CPTII,S$GLB,, | Performed by: PHYSICIAN ASSISTANT

## 2019-11-27 PROCEDURE — 99999 PR PBB SHADOW E&M-EST. PATIENT-LVL V: CPT | Mod: PBBFAC,,, | Performed by: PHYSICIAN ASSISTANT

## 2019-11-27 PROCEDURE — 3074F SYST BP LT 130 MM HG: CPT | Mod: CPTII,S$GLB,, | Performed by: PHYSICIAN ASSISTANT

## 2019-11-27 PROCEDURE — 3078F PR MOST RECENT DIASTOLIC BLOOD PRESSURE < 80 MM HG: ICD-10-PCS | Mod: CPTII,S$GLB,, | Performed by: PHYSICIAN ASSISTANT

## 2019-11-27 PROCEDURE — 99999 PR PBB SHADOW E&M-EST. PATIENT-LVL V: ICD-10-PCS | Mod: PBBFAC,,, | Performed by: PHYSICIAN ASSISTANT

## 2019-11-27 PROCEDURE — 25000003 PHARM REV CODE 250: Performed by: PHYSICIAN ASSISTANT

## 2019-11-27 PROCEDURE — 96411 CHEMO IV PUSH ADDL DRUG: CPT

## 2019-11-27 PROCEDURE — 63600175 PHARM REV CODE 636 W HCPCS: Performed by: PHYSICIAN ASSISTANT

## 2019-11-27 PROCEDURE — 99499 RISK ADDL DX/OHS AUDIT: ICD-10-PCS | Mod: S$GLB,,, | Performed by: PHYSICIAN ASSISTANT

## 2019-11-27 PROCEDURE — 3008F PR BODY MASS INDEX (BMI) DOCUMENTED: ICD-10-PCS | Mod: CPTII,S$GLB,, | Performed by: PHYSICIAN ASSISTANT

## 2019-11-27 PROCEDURE — 81001 URINALYSIS AUTO W/SCOPE: CPT

## 2019-11-27 PROCEDURE — 3078F DIAST BP <80 MM HG: CPT | Mod: CPTII,S$GLB,, | Performed by: PHYSICIAN ASSISTANT

## 2019-11-27 PROCEDURE — 96413 CHEMO IV INFUSION 1 HR: CPT

## 2019-11-27 PROCEDURE — 99215 PR OFFICE/OUTPT VISIT, EST, LEVL V, 40-54 MIN: ICD-10-PCS | Mod: S$GLB,,, | Performed by: PHYSICIAN ASSISTANT

## 2019-11-27 PROCEDURE — 99499 UNLISTED E&M SERVICE: CPT | Mod: S$GLB,,, | Performed by: PHYSICIAN ASSISTANT

## 2019-11-27 PROCEDURE — 96367 TX/PROPH/DG ADDL SEQ IV INF: CPT

## 2019-11-27 PROCEDURE — 99215 OFFICE O/P EST HI 40 MIN: CPT | Mod: S$GLB,,, | Performed by: PHYSICIAN ASSISTANT

## 2019-11-27 RX ORDER — SODIUM CHLORIDE 0.9 % (FLUSH) 0.9 %
10 SYRINGE (ML) INJECTION
Status: CANCELLED | OUTPATIENT
Start: 2019-11-28

## 2019-11-27 RX ORDER — SODIUM CHLORIDE 0.9 % (FLUSH) 0.9 %
10 SYRINGE (ML) INJECTION
Status: DISCONTINUED | OUTPATIENT
Start: 2019-11-27 | End: 2019-11-27 | Stop reason: HOSPADM

## 2019-11-27 RX ORDER — HEPARIN 100 UNIT/ML
500 SYRINGE INTRAVENOUS
Status: CANCELLED | OUTPATIENT
Start: 2019-11-28

## 2019-11-27 RX ORDER — HEPARIN 100 UNIT/ML
500 SYRINGE INTRAVENOUS
Status: DISCONTINUED | OUTPATIENT
Start: 2019-11-27 | End: 2019-11-27 | Stop reason: HOSPADM

## 2019-11-27 RX ORDER — PHENAZOPYRIDINE HYDROCHLORIDE 200 MG/1
200 TABLET, FILM COATED ORAL 3 TIMES DAILY PRN
Qty: 20 TABLET | Refills: 0 | Status: SHIPPED | OUTPATIENT
Start: 2019-11-27 | End: 2020-01-30 | Stop reason: SDUPTHER

## 2019-11-27 RX ADMIN — SODIUM CHLORIDE 1075 MG: 9 INJECTION, SOLUTION INTRAVENOUS at 01:11

## 2019-11-27 RX ADMIN — Medication 10 MG: at 12:11

## 2019-11-27 RX ADMIN — SODIUM CHLORIDE: 0.9 INJECTION, SOLUTION INTRAVENOUS at 12:11

## 2019-11-27 RX ADMIN — HEPARIN 500 UNITS: 100 SYRINGE at 01:11

## 2019-11-27 RX ADMIN — Medication 1 MG: at 12:11

## 2019-11-27 RX ADMIN — SODIUM CHLORIDE 200 MG: 9 INJECTION, SOLUTION INTRAVENOUS at 01:11

## 2019-11-27 NOTE — Clinical Note
Please schedule 3 week f/u with CBC, CMP and consideration for chemotherapy with Dr. Belcher. Thank you

## 2019-11-27 NOTE — PLAN OF CARE
Patient tolerated Keytruda and Alimta with no complications. VSS. NAD. Pt instructed to call MD with any problems. Pt discharged home independently via walker.

## 2019-11-30 ENCOUNTER — PATIENT MESSAGE (OUTPATIENT)
Dept: DIABETES | Facility: CLINIC | Age: 64
End: 2019-11-30

## 2019-12-02 ENCOUNTER — HOSPITAL ENCOUNTER (EMERGENCY)
Facility: HOSPITAL | Age: 64
Discharge: HOME | End: 2019-12-03
Attending: EMERGENCY MEDICINE
Payer: MEDICARE

## 2019-12-02 DIAGNOSIS — R50.9 FEVER, UNSPECIFIED FEVER CAUSE: Primary | ICD-10-CM

## 2019-12-02 PROCEDURE — 99285 PR EMERGENCY DEPT VISIT,LEVEL V: ICD-10-PCS | Mod: ,,, | Performed by: EMERGENCY MEDICINE

## 2019-12-02 PROCEDURE — 99285 EMERGENCY DEPT VISIT HI MDM: CPT | Mod: ,,, | Performed by: EMERGENCY MEDICINE

## 2019-12-02 PROCEDURE — 87502 INFLUENZA DNA AMP PROBE: CPT

## 2019-12-02 PROCEDURE — 96360 HYDRATION IV INFUSION INIT: CPT

## 2019-12-02 PROCEDURE — 99284 EMERGENCY DEPT VISIT MOD MDM: CPT | Mod: 25

## 2019-12-03 VITALS
DIASTOLIC BLOOD PRESSURE: 74 MMHG | SYSTOLIC BLOOD PRESSURE: 122 MMHG | TEMPERATURE: 97 F | RESPIRATION RATE: 18 BRPM | HEART RATE: 90 BPM | HEIGHT: 70 IN | BODY MASS INDEX: 31.5 KG/M2 | OXYGEN SATURATION: 99 % | WEIGHT: 220 LBS

## 2019-12-03 LAB
ALBUMIN SERPL BCP-MCNC: 3.8 G/DL (ref 3.5–5.2)
ALP SERPL-CCNC: 58 U/L (ref 55–135)
ALT SERPL W/O P-5'-P-CCNC: 32 U/L (ref 10–44)
ANION GAP SERPL CALC-SCNC: 8 MMOL/L (ref 8–16)
AST SERPL-CCNC: 15 U/L (ref 10–40)
BASOPHILS # BLD AUTO: 0.01 K/UL (ref 0–0.2)
BASOPHILS NFR BLD: 0.2 % (ref 0–1.9)
BILIRUB SERPL-MCNC: 0.5 MG/DL (ref 0.1–1)
BILIRUB UR QL STRIP: NEGATIVE
BUN SERPL-MCNC: 26 MG/DL (ref 8–23)
CALCIUM SERPL-MCNC: 10 MG/DL (ref 8.7–10.5)
CHLORIDE SERPL-SCNC: 101 MMOL/L (ref 95–110)
CLARITY UR REFRACT.AUTO: CLEAR
CO2 SERPL-SCNC: 27 MMOL/L (ref 23–29)
COLOR UR AUTO: YELLOW
CREAT SERPL-MCNC: 1 MG/DL (ref 0.5–1.4)
DIFFERENTIAL METHOD: ABNORMAL
EOSINOPHIL # BLD AUTO: 0 K/UL (ref 0–0.5)
EOSINOPHIL NFR BLD: 0.7 % (ref 0–8)
ERYTHROCYTE [DISTWIDTH] IN BLOOD BY AUTOMATED COUNT: 14.6 % (ref 11.5–14.5)
EST. GFR  (AFRICAN AMERICAN): >60 ML/MIN/1.73 M^2
EST. GFR  (NON AFRICAN AMERICAN): >60 ML/MIN/1.73 M^2
GLUCOSE SERPL-MCNC: 138 MG/DL (ref 70–110)
GLUCOSE UR QL STRIP: ABNORMAL
HCT VFR BLD AUTO: 38 % (ref 37–48.5)
HGB BLD-MCNC: 12.2 G/DL (ref 12–16)
HGB UR QL STRIP: NEGATIVE
IMM GRANULOCYTES # BLD AUTO: 0.01 K/UL (ref 0–0.04)
IMM GRANULOCYTES NFR BLD AUTO: 0.2 % (ref 0–0.5)
INFLUENZA A, MOLECULAR: NEGATIVE
INFLUENZA B, MOLECULAR: NEGATIVE
KETONES UR QL STRIP: NEGATIVE
LACTATE SERPL-SCNC: 1.4 MMOL/L (ref 0.5–2.2)
LEUKOCYTE ESTERASE UR QL STRIP: NEGATIVE
LYMPHOCYTES # BLD AUTO: 1.8 K/UL (ref 1–4.8)
LYMPHOCYTES NFR BLD: 30.3 % (ref 18–48)
MAGNESIUM SERPL-MCNC: 1.9 MG/DL (ref 1.6–2.6)
MCH RBC QN AUTO: 30 PG (ref 27–31)
MCHC RBC AUTO-ENTMCNC: 32.1 G/DL (ref 32–36)
MCV RBC AUTO: 93 FL (ref 82–98)
MONOCYTES # BLD AUTO: 0.1 K/UL (ref 0.3–1)
MONOCYTES NFR BLD: 1.3 % (ref 4–15)
NEUTROPHILS # BLD AUTO: 4 K/UL (ref 1.8–7.7)
NEUTROPHILS NFR BLD: 67.3 % (ref 38–73)
NITRITE UR QL STRIP: NEGATIVE
NRBC BLD-RTO: 0 /100 WBC
PH UR STRIP: 5 [PH] (ref 5–8)
PHOSPHATE SERPL-MCNC: 3.8 MG/DL (ref 2.7–4.5)
PLATELET # BLD AUTO: 299 K/UL (ref 150–350)
PMV BLD AUTO: 11 FL (ref 9.2–12.9)
POTASSIUM SERPL-SCNC: 4 MMOL/L (ref 3.5–5.1)
PROT SERPL-MCNC: 7.6 G/DL (ref 6–8.4)
PROT UR QL STRIP: NEGATIVE
RBC # BLD AUTO: 4.07 M/UL (ref 4–5.4)
SODIUM SERPL-SCNC: 136 MMOL/L (ref 136–145)
SP GR UR STRIP: 1.02 (ref 1–1.03)
SPECIMEN SOURCE: NORMAL
URN SPEC COLLECT METH UR: ABNORMAL
WBC # BLD AUTO: 6 K/UL (ref 3.9–12.7)

## 2019-12-03 PROCEDURE — 87502 INFLUENZA DNA AMP PROBE: CPT

## 2019-12-03 PROCEDURE — 81003 URINALYSIS AUTO W/O SCOPE: CPT

## 2019-12-03 PROCEDURE — 84100 ASSAY OF PHOSPHORUS: CPT

## 2019-12-03 PROCEDURE — 80053 COMPREHEN METABOLIC PANEL: CPT

## 2019-12-03 PROCEDURE — 63600175 PHARM REV CODE 636 W HCPCS: Performed by: STUDENT IN AN ORGANIZED HEALTH CARE EDUCATION/TRAINING PROGRAM

## 2019-12-03 PROCEDURE — 85025 COMPLETE CBC W/AUTO DIFF WBC: CPT

## 2019-12-03 PROCEDURE — 83735 ASSAY OF MAGNESIUM: CPT

## 2019-12-03 PROCEDURE — 87040 BLOOD CULTURE FOR BACTERIA: CPT

## 2019-12-03 PROCEDURE — 83605 ASSAY OF LACTIC ACID: CPT

## 2019-12-03 RX ORDER — DOXYCYCLINE 100 MG/1
100 CAPSULE ORAL 2 TIMES DAILY
Qty: 10 CAPSULE | Refills: 0 | Status: SHIPPED | OUTPATIENT
Start: 2019-12-03 | End: 2019-12-08

## 2019-12-03 RX ORDER — DOXYCYCLINE 100 MG/1
100 CAPSULE ORAL 2 TIMES DAILY
Qty: 10 CAPSULE | Refills: 0 | Status: SHIPPED | OUTPATIENT
Start: 2019-12-03 | End: 2019-12-03 | Stop reason: SDUPTHER

## 2019-12-03 RX ORDER — SODIUM CHLORIDE 9 MG/ML
1000 INJECTION, SOLUTION INTRAVENOUS
Status: COMPLETED | OUTPATIENT
Start: 2019-12-03 | End: 2019-12-03

## 2019-12-03 RX ADMIN — SODIUM CHLORIDE 1000 ML: 0.9 INJECTION, SOLUTION INTRAVENOUS at 01:12

## 2019-12-03 NOTE — ED NOTES
LOC: The patient is awake, alert, and oriented to place, time, situation. Affect is appropriate.  Speech is appropriate and clear.     APPEARANCE: Patient resting comfortably in no acute distress.  Patient is clean and well groomed.    SKIN: The skin is warm and dry; color consistent with ethnicity.  Patient has normal skin turgor and moist mucus membranes.  Skin intact; no breakdown or bruising noted.  C/o feeling hot to the touch, Pt c/o fever PTA. Current temp 99.6 after home tylenol.     MUSCULOSKELETAL: Patient moving upper and lower extremities without difficulty. C/o generalized weakness. Ambulatory with steady gait, uses walker.     RESPIRATORY: Airway is open and patent. Respirations spontaneous, even, easy, and non-labored.  Patient has a normal effort and rate.  No accessory muscle use noted. Denies cough.     CARDIAC:  Normal rhythm and rate noted.  No peripheral edema noted. No complaints of chest pain. NS on monitor HR 86.     ABDOMEN: Soft and non tender to palpation.  No distention noted.     NEUROLOGIC: Eyes open spontaneously.  Behavior appropriate to situation.  Follows commands; facial expression symmetrical.  Purposeful motor response noted; normal sensation in all extremities.

## 2019-12-03 NOTE — ED TRIAGE NOTES
Pt reports fever and weakness starting the day after Thanksgiving. Pt currently receiving chemo for lung cancer.

## 2019-12-08 LAB — BACTERIA BLD CULT: NORMAL

## 2019-12-09 ENCOUNTER — OFFICE VISIT (OUTPATIENT)
Dept: INTERNAL MEDICINE | Facility: CLINIC | Age: 64
End: 2019-12-09
Payer: MEDICARE

## 2019-12-09 ENCOUNTER — PATIENT MESSAGE (OUTPATIENT)
Dept: PULMONOLOGY | Facility: CLINIC | Age: 64
End: 2019-12-09

## 2019-12-09 VITALS
WEIGHT: 224 LBS | SYSTOLIC BLOOD PRESSURE: 130 MMHG | BODY MASS INDEX: 32.07 KG/M2 | TEMPERATURE: 99 F | HEIGHT: 70 IN | OXYGEN SATURATION: 99 % | DIASTOLIC BLOOD PRESSURE: 60 MMHG | HEART RATE: 91 BPM

## 2019-12-09 DIAGNOSIS — R06.02 SHORTNESS OF BREATH ON EXERTION: ICD-10-CM

## 2019-12-09 DIAGNOSIS — E11.65 UNCONTROLLED TYPE 2 DIABETES MELLITUS WITH HYPERGLYCEMIA, WITHOUT LONG-TERM CURRENT USE OF INSULIN: ICD-10-CM

## 2019-12-09 DIAGNOSIS — Z09 HOSPITAL DISCHARGE FOLLOW-UP: Primary | ICD-10-CM

## 2019-12-09 DIAGNOSIS — C34.12 MALIGNANT NEOPLASM OF UPPER LOBE OF LEFT LUNG: ICD-10-CM

## 2019-12-09 PROCEDURE — 3046F HEMOGLOBIN A1C LEVEL >9.0%: CPT | Mod: CPTII,S$GLB,, | Performed by: PHYSICIAN ASSISTANT

## 2019-12-09 PROCEDURE — 3008F BODY MASS INDEX DOCD: CPT | Mod: CPTII,S$GLB,, | Performed by: PHYSICIAN ASSISTANT

## 2019-12-09 PROCEDURE — 3075F SYST BP GE 130 - 139MM HG: CPT | Mod: CPTII,S$GLB,, | Performed by: PHYSICIAN ASSISTANT

## 2019-12-09 PROCEDURE — 3075F PR MOST RECENT SYSTOLIC BLOOD PRESS GE 130-139MM HG: ICD-10-PCS | Mod: CPTII,S$GLB,, | Performed by: PHYSICIAN ASSISTANT

## 2019-12-09 PROCEDURE — 99213 PR OFFICE/OUTPT VISIT, EST, LEVL III, 20-29 MIN: ICD-10-PCS | Mod: S$GLB,,, | Performed by: PHYSICIAN ASSISTANT

## 2019-12-09 PROCEDURE — 3078F DIAST BP <80 MM HG: CPT | Mod: CPTII,S$GLB,, | Performed by: PHYSICIAN ASSISTANT

## 2019-12-09 PROCEDURE — 3046F PR MOST RECENT HEMOGLOBIN A1C LEVEL > 9.0%: ICD-10-PCS | Mod: CPTII,S$GLB,, | Performed by: PHYSICIAN ASSISTANT

## 2019-12-09 PROCEDURE — 99499 UNLISTED E&M SERVICE: CPT | Mod: S$GLB,,, | Performed by: PHYSICIAN ASSISTANT

## 2019-12-09 PROCEDURE — 3008F PR BODY MASS INDEX (BMI) DOCUMENTED: ICD-10-PCS | Mod: CPTII,S$GLB,, | Performed by: PHYSICIAN ASSISTANT

## 2019-12-09 PROCEDURE — 99999 PR PBB SHADOW E&M-EST. PATIENT-LVL V: ICD-10-PCS | Mod: PBBFAC,,, | Performed by: PHYSICIAN ASSISTANT

## 2019-12-09 PROCEDURE — 99499 RISK ADDL DX/OHS AUDIT: ICD-10-PCS | Mod: S$GLB,,, | Performed by: PHYSICIAN ASSISTANT

## 2019-12-09 PROCEDURE — 3078F PR MOST RECENT DIASTOLIC BLOOD PRESSURE < 80 MM HG: ICD-10-PCS | Mod: CPTII,S$GLB,, | Performed by: PHYSICIAN ASSISTANT

## 2019-12-09 PROCEDURE — 99213 OFFICE O/P EST LOW 20 MIN: CPT | Mod: S$GLB,,, | Performed by: PHYSICIAN ASSISTANT

## 2019-12-09 PROCEDURE — 99999 PR PBB SHADOW E&M-EST. PATIENT-LVL V: CPT | Mod: PBBFAC,,, | Performed by: PHYSICIAN ASSISTANT

## 2019-12-09 NOTE — PROGRESS NOTES
"Subjective:       Patient ID: Alison Branch is a 64 y.o. female with PMH of DM, HLD, Lung cancer, Brain aneurysm.     Chief Complaint: Follow-up (ER)    HPI     Established pt of Mike Rodriguez Ii, MD (new to me)      Here for ED visit (19) follow up for fever.   CXR found to have bilateral interstitial infiltarte and known CARLOS mass.   She was treated with Doxy, IV Fluids.     Today she is "feeling much better". Has one day left of antibiotics. No more fevers. Mild cough and clear phlegm. Has mild sob with exertion and this is stable.     She inquires about her Novolog. Statees he glucose was around 109 on  prior to her scheduled Novolog, she took 16units and felt her BG drop, this was resoled with food, but because of this held her night time Levemir and glucose was 237 today. She follows closely with Endo. Last A1c 9.3%    Current regimen   Levemir  nightly  Novolog 12-20 units TID      Past Medical History:   Diagnosis Date    Allergy     Brain aneurysm     s/p coiling of one; another not coiled    Breast cyst     Depression 2019    Diabetes mellitus     Diabetes type 2, controlled     Fever blister     High cholesterol     History of Bell's palsy     HTN (hypertension) 2014     Social History     Tobacco Use    Smoking status: Former Smoker     Packs/day: 0.50     Years: 30.00     Pack years: 15.00     Last attempt to quit: 1999     Years since quittin.9    Smokeless tobacco: Never Used   Substance Use Topics    Alcohol use: No     Alcohol/week: 0.0 standard drinks    Drug use: No     Review of patient's allergies indicates:  No Known Allergies      Review of Systems   Constitutional: Negative for chills, diaphoresis, fever and unexpected weight change.   Eyes: Negative for visual disturbance.   Respiratory: Positive for cough and shortness of breath (stable, exertion). Negative for wheezing.    Cardiovascular: Negative for chest pain and leg swelling. " "  Gastrointestinal: Negative for abdominal pain, nausea and vomiting.   Skin: Negative for rash.   Neurological: Negative for weakness and headaches.       Objective: /60 (BP Location: Left arm, Patient Position: Sitting, BP Method: Large (Manual))   Pulse 91   Temp 98.7 °F (37.1 °C)   Ht 5' 10" (1.778 m)   Wt 101.6 kg (223 lb 15.8 oz)   SpO2 99%   BMI 32.14 kg/m²         Physical Exam   Constitutional: She appears well-developed and well-nourished. No distress.   HENT:   Head: Normocephalic and atraumatic.   Mouth/Throat: Oropharynx is clear and moist.   Cardiovascular: Normal rate and regular rhythm. Exam reveals no friction rub.   No murmur heard.  Pulmonary/Chest: Effort normal and breath sounds normal. She has no wheezes. She has no rales.   Abdominal: Soft. Bowel sounds are normal. There is no tenderness.   Musculoskeletal:   ambulating with rolling walker   Neurological: She is alert.   Skin: Skin is warm and dry. No rash noted.   Vitals reviewed.      Assessment:       1. Hospital discharge follow-up    2. Malignant neoplasm of upper lobe of left lung    3. Shortness of breath on exertion    4. Uncontrolled type 2 diabetes mellitus with hyperglycemia, without long-term current use of insulin        Plan:         Alison was seen today for follow-up.    Diagnoses and all orders for this visit:    Hospital discharge follow-up  VSS in clinic  Fever resolved   Complete course of anbx as prescribed  ED precautions discussed  Keep upcoming appt with Oncology    Malignant neoplasm of upper lobe of left lung  Shortness of breath on exertion  Followed by Oncology      Uncontrolled type 2 diabetes mellitus with hyperglycemia, without long-term current use of insulin  Lab Results   Component Value Date    HGBA1C 9.3 (H) 11/05/2019   2/2 of hypoglycemics discussed  Reviewedd Novolog and correction scale  Keep BG log  Follow up with Endo            Future Appointments   Date Time Provider Department Center "   12/10/2019 10:00 AM LAB,  [facility]   12/12/2019 10:00 AM Shelly Crocker RD, CDE [facility]   12/16/2019 11:00 AM Jagruti Garcia, PhD [facility] CAN PSY [facility]   12/19/2019  9:10 AM LAB, HEMONC CANCER BLDG [facility] HO [facility]   12/19/2019 10:15 AM Tara Belcher MD [facility] HEM ONC [facility]   12/19/2019 11:30 AM [facility], CHEMO N[facility] CHEMO [facility]   1/10/2020  9:00 AM David Mo MD [facility] NEURO [address]   2/3/2020 10:40 AM Mike Rodriguez II, MD [facility] IM [address] [facility]   4/22/2020  1:30 PM [facility]

## 2019-12-10 ENCOUNTER — CLINICAL SUPPORT (OUTPATIENT)
Dept: PRIMARY CARE CLINIC | Facility: CLINIC | Age: 64
End: 2019-12-10
Payer: MEDICARE

## 2019-12-10 DIAGNOSIS — E11.65 UNCONTROLLED TYPE 2 DIABETES MELLITUS WITH HYPERGLYCEMIA, WITHOUT LONG-TERM CURRENT USE OF INSULIN: ICD-10-CM

## 2019-12-10 LAB
ESTIMATED AVG GLUCOSE: 214 MG/DL (ref 68–131)
HBA1C MFR BLD HPLC: 9.1 % (ref 4–5.6)

## 2019-12-10 PROCEDURE — 82985 ASSAY OF GLYCATED PROTEIN: CPT

## 2019-12-10 PROCEDURE — 83036 HEMOGLOBIN GLYCOSYLATED A1C: CPT | Mod: 59

## 2019-12-12 ENCOUNTER — TELEPHONE (OUTPATIENT)
Dept: DIABETES | Facility: CLINIC | Age: 64
End: 2019-12-12

## 2019-12-12 ENCOUNTER — OFFICE VISIT (OUTPATIENT)
Dept: INTERNAL MEDICINE | Facility: CLINIC | Age: 64
End: 2019-12-12
Payer: MEDICARE

## 2019-12-12 ENCOUNTER — CLINICAL SUPPORT (OUTPATIENT)
Dept: DIABETES | Facility: CLINIC | Age: 64
End: 2019-12-12
Payer: MEDICARE

## 2019-12-12 VITALS
WEIGHT: 221.56 LBS | HEIGHT: 70 IN | DIASTOLIC BLOOD PRESSURE: 64 MMHG | OXYGEN SATURATION: 96 % | HEART RATE: 97 BPM | SYSTOLIC BLOOD PRESSURE: 106 MMHG | BODY MASS INDEX: 31.72 KG/M2 | WEIGHT: 221.56 LBS | BODY MASS INDEX: 31.79 KG/M2 | TEMPERATURE: 99 F

## 2019-12-12 DIAGNOSIS — R05.3 CHRONIC COUGHING: ICD-10-CM

## 2019-12-12 DIAGNOSIS — E11.42 TYPE 2 DIABETES MELLITUS WITH DIABETIC POLYNEUROPATHY, WITH LONG-TERM CURRENT USE OF INSULIN: ICD-10-CM

## 2019-12-12 DIAGNOSIS — J01.11 ACUTE RECURRENT FRONTAL SINUSITIS: Primary | ICD-10-CM

## 2019-12-12 DIAGNOSIS — Z87.01 HX OF BACTERIAL PNEUMONIA: ICD-10-CM

## 2019-12-12 DIAGNOSIS — C34.12 MALIGNANT NEOPLASM OF UPPER LOBE OF LEFT LUNG: ICD-10-CM

## 2019-12-12 DIAGNOSIS — E66.9 OBESITY (BMI 30-39.9): ICD-10-CM

## 2019-12-12 DIAGNOSIS — E11.65 UNCONTROLLED TYPE 2 DIABETES MELLITUS WITH HYPERGLYCEMIA, WITHOUT LONG-TERM CURRENT USE OF INSULIN: Primary | ICD-10-CM

## 2019-12-12 DIAGNOSIS — Z79.4 TYPE 2 DIABETES MELLITUS WITH DIABETIC POLYNEUROPATHY, WITH LONG-TERM CURRENT USE OF INSULIN: ICD-10-CM

## 2019-12-12 LAB — FRUCTOSAMINE SERPL-SCNC: 514 UMOL /L (ref 151–300)

## 2019-12-12 PROCEDURE — 3078F PR MOST RECENT DIASTOLIC BLOOD PRESSURE < 80 MM HG: ICD-10-PCS | Mod: CPTII,S$GLB,, | Performed by: NURSE PRACTITIONER

## 2019-12-12 PROCEDURE — 3074F SYST BP LT 130 MM HG: CPT | Mod: CPTII,S$GLB,, | Performed by: NURSE PRACTITIONER

## 2019-12-12 PROCEDURE — 99999 PR PBB SHADOW E&M-EST. PATIENT-LVL III: ICD-10-PCS | Mod: PBBFAC,,, | Performed by: DIETITIAN, REGISTERED

## 2019-12-12 PROCEDURE — 99999 PR PBB SHADOW E&M-EST. PATIENT-LVL III: CPT | Mod: PBBFAC,,, | Performed by: DIETITIAN, REGISTERED

## 2019-12-12 PROCEDURE — G0108 PR DIAB MANAGE TRN  PER INDIV: ICD-10-PCS | Mod: S$GLB,,, | Performed by: DIETITIAN, REGISTERED

## 2019-12-12 PROCEDURE — 3008F PR BODY MASS INDEX (BMI) DOCUMENTED: ICD-10-PCS | Mod: CPTII,S$GLB,, | Performed by: NURSE PRACTITIONER

## 2019-12-12 PROCEDURE — 99999 PR PBB SHADOW E&M-EST. PATIENT-LVL III: CPT | Mod: PBBFAC,,, | Performed by: NURSE PRACTITIONER

## 2019-12-12 PROCEDURE — 99214 PR OFFICE/OUTPT VISIT, EST, LEVL IV, 30-39 MIN: ICD-10-PCS | Mod: S$GLB,,, | Performed by: NURSE PRACTITIONER

## 2019-12-12 PROCEDURE — 3074F PR MOST RECENT SYSTOLIC BLOOD PRESSURE < 130 MM HG: ICD-10-PCS | Mod: CPTII,S$GLB,, | Performed by: NURSE PRACTITIONER

## 2019-12-12 PROCEDURE — 3078F DIAST BP <80 MM HG: CPT | Mod: CPTII,S$GLB,, | Performed by: NURSE PRACTITIONER

## 2019-12-12 PROCEDURE — G0108 DIAB MANAGE TRN  PER INDIV: HCPCS | Mod: S$GLB,,, | Performed by: DIETITIAN, REGISTERED

## 2019-12-12 PROCEDURE — 99214 OFFICE O/P EST MOD 30 MIN: CPT | Mod: S$GLB,,, | Performed by: NURSE PRACTITIONER

## 2019-12-12 PROCEDURE — 99999 PR PBB SHADOW E&M-EST. PATIENT-LVL III: ICD-10-PCS | Mod: PBBFAC,,, | Performed by: NURSE PRACTITIONER

## 2019-12-12 PROCEDURE — 3008F BODY MASS INDEX DOCD: CPT | Mod: CPTII,S$GLB,, | Performed by: NURSE PRACTITIONER

## 2019-12-12 RX ORDER — MOXIFLOXACIN HYDROCHLORIDE 400 MG/1
400 TABLET ORAL DAILY
Qty: 10 TABLET | Refills: 0 | Status: SHIPPED | OUTPATIENT
Start: 2019-12-12 | End: 2019-12-22

## 2019-12-12 RX ORDER — FLUCONAZOLE 150 MG/1
150 TABLET ORAL DAILY
Qty: 1 TABLET | Refills: 0 | Status: SHIPPED | OUTPATIENT
Start: 2019-12-12 | End: 2019-12-13

## 2019-12-12 NOTE — TELEPHONE ENCOUNTER
Sonny Acosta ,   Thanks so much for helping us schedule some of Ai's patients that need to be seen by Marisa.   See note below.      Melanie Hughes    private slots so Karla Cortes MA, send to staff  Medicaid pts are tough- only have a few a month- will have to override if necessary  filled up mostly til mid jan fyi   Marisa Lindquist, MSN, APRN, FNP-C  Internal Medicine  Diabetes Nurse Practitioner  [facility]  [facility] [address] [place] [address]  [phone]  fax # [phone]

## 2019-12-12 NOTE — PROGRESS NOTES
Diabetes Education  Author: Shelly Crocker RD, CDE  Date: 12/12/2019    Diabetes Care Management Summary  Diabetes Education Record Assessment/Progress: Comprehensive/Group  Current Diabetes Risk Level: High     Last A1c:   Lab Results   Component Value Date    HGBA1C 9.1 (H) 12/10/2019     Last Visit with Diabetes Educator: Last Education Visit: Not Found  Last OPCM Referral: : Not Found   Enrolled in OPCM: No      Diabetes Type  Diabetes Type : Type II    Diabetes History  Diabetes Diagnosis: >10 years  Current Treatment: Oral Medication, Insulin, Diet  Reviewed Problem List with Patient: Yes    Health Maintenance was reviewed today with patient. Discussed with patient importance of routine eye exams, foot exams/foot care, blood work (i.e.: A1c, microalbumin, and lipid), dental visits, yearly flu vaccine, and pneumonia vaccine as indicated by PCP. Patient verbalized understanding.     Health Maintenance Topics with due status: Not Due       Topic Last Completion Date    Colonoscopy 04/08/2016    TETANUS VACCINE 08/09/2016    Lipid Panel 05/10/2019    Foot Exam 08/29/2019    Mammogram 09/13/2019    Eye Exam 10/09/2019    Pneumococcal Vaccine (Highest Risk) 11/05/2019    Hemoglobin A1c 12/10/2019    Low Dose Statin 12/12/2019     There are no preventive care reminders to display for this patient.    Nutrition  What type of sweetener do you use?: sugar  What type of beverages do you drink?: water, juice, diet soda/tea(1/2 and 1/2 tea)  Meal Plan 24 Hour Recall - Breakfast: 7:20am 1c grits with butter  Meal Plan 24 Hour Recall - Lunch: 12:00PM Grilled Chicken Salad  Meal Plan 24 Hour Recall - Dinner: Smothered Shrimp and Eggplant  Meal Plan 24 Hour Recall - Snack: stopped snacking    Monitoring   Self Monitoring : four times a day at least(Once daily)  Blood Glucose Logs: Yes  Do you use a personal continuous glucose monitor?: No  In the last month, how often have you had a low blood sugar reaction?: more than once a  week  What are your symptoms of low blood sugar?: weak, disoriented, shaky  How do you treat low blood sugar?: juice, went to the ED  Can you tell when your blood sugar is too high?: yes  How do you treat high blood sugar?: insulin    Exercise   Exercise Type: none  Frequency: Never    Current Diabetes Treatment   Current Treatment: Oral Medication, Insulin, Diet    Social History  Preferred Learning Method: Face to Face  Primary Support: Self, Daughter, Family  Smoking Status: Ex Smoker  Alcohol Use: Rarely                                Barriers to Change  Barriers to Change: None  Learning Challenges : None    Readiness to Learn   Readiness to Learn : Acceptance    Cultural Influences  Cultural Influences: No    Diabetes Education Assessment/Progress  Diabetes Disease Process (diabetes disease process and treatment options): Comprehends Key Points, Discussion, Individual Session  Nutrition (Incorporating nutritional management into one's lifestyle): Comprehends Key Points, Demonstration, Discussion, Individual Session  Physical Activity (incorporating physical activity into one's lifestyle): Instructed, Comprehends Key Points, Discussion, Individual Session  Medications (states correct name, dose, onset, peak, duration, side effects & timing of meds): Comprehends Key Points, Discussion, Individual Session  Monitoring (monitoring blood glucose/other parameters & using results): Comprehends Key Points, Discussion, Individual Session  Acute Complications (preventing, detecting, and treating acute complications): Comprehends Key Points, Individual Session, Discussion  Chronic Complications (preventing, detecting, and treating chronic complications): Comprehends Key Points, Discussion, Individual Session  Clinical (diabetes, other pertinent medical history, and relevant comorbidities reviewed during visit): Comprehends Key Points, Discussion, Individual Session  Cognitive (knowledge of self-management skills,  functional health literacy): Comprehends Key Points, Discussion, Individual Session  Psychosocial (emotional response to diabetes): Comprehends Key Points, Discussion, Individual Session  Diabetes Distress and Support Systems: Comprehends Key Points, Discussion, Individual Session  Behavioral (readiness for change, lifestyle practices, self-care behaviors): Comprehends Key Points, Discussion, Individual Session  Patient had insulin adjusted. Interested in getting the Dexcom sensor.    Goals  Patient has selected/evaluated goals during today's session: Yes, evaluated  Healthy Eating: In Progress  Physical Activity: In Progress  Monitoring: In Progress         Diabetes Care Plan/Intervention  Education Plan/Intervention: Individual Follow-Up DSMT    Diabetes Meal Plan  Restrictions: Low Fat, Low Sodium, Restricted Carbohydrate  Calories: 1800  Carbohydrate Per Meal: 30-45g  Carbohydrate Per Snack : 7-15g  Fat: 50  Protein: 135    Today's Self-Management Care Plan was developed with the patient's input and is based on barriers identified during today's assessment.    The long and short-term goals in the care plan were written with the patient/caregiver's input. The patient has agreed to work toward these goals to improve her overall diabetes control.      The patient received a copy of today's self-management plan and verbalized understanding of the care plan, goals, and all of today's instructions.      The patient was encouraged to communicate with her physician and care team regarding her condition(s) and treatment.  I provided the patient with my contact information today and encouraged her to contact me via phone or patient portal as needed.     Education Units of Time   Time Spent: 30 min

## 2019-12-12 NOTE — PATIENT INSTRUCTIONS
Avelox 400mg daily for 10 days    Continue Flonase nasal spray, do one spray each nostril twice a day    Continue Tessalon perles as needed for coughing    Rest and Fluids, Tylenol or Motrin otc as needed for pain and or fever    Warm liquids to thin mucus    Allergen avoidance discussed, humidified air recommended    Warm salt water gargles as needed for throat pain    Nasal spray, taught how to correctly use    Diflucan as a one time dose after you complete Avelox only if yeast infection develops      Sinusitis (Antibiotic Treatment)    The sinuses are air-filled spaces within the bones of the face. They connect to the inside of the nose. Sinusitis is an inflammation of the tissue lining the sinus cavity. Sinus inflammation can occur during a cold. It can also be due to allergies to pollens and other particles in the air. Sinusitis can cause symptoms of sinus congestion and fullness. A sinus infection causes fever, headache and facial pain. There is often green or yellow drainage from the nose or into the back of the throat (post-nasal drip). You have been given antibiotics to treat this condition.  Home care:  · Take the full course of antibiotics as instructed. Do not stop taking them, even if you feel better.  · Drink plenty of water, hot tea, and other liquids. This may help thin mucus. It also may promote sinus drainage.  · Heat may help soothe painful areas of the face. Use a towel soaked in hot water. Or,  the shower and direct the hot spray onto your face. Using a vaporizer along with a menthol rub at night may also help.   · An expectorant containing guaifenesin may help thin the mucus and promote drainage from the sinuses.  · Over-the-counter decongestants may be used unless a similar medicine was prescribed. Nasal sprays work the fastest. Use one that contains phenylephrine or oxymetazoline. First blow the nose gently. Then use the spray. Do not use these medicines more often than directed on  the label or symptoms may get worse. You may also use tablets containing pseudoephedrine. Avoid products that combine ingredients, because side effects may be increased. Read labels. You can also ask the pharmacist for help. (NOTE: Persons with high blood pressure should not use decongestants. They can raise blood pressure.)  · Over-the-counter antihistamines may help if allergies contributed to your sinusitis.    · Do not use nasal rinses or irrigation during an acute sinus infection, unless told to by your health care provider. Rinsing may spread the infection to other sinuses.  · Use acetaminophen or ibuprofen to control pain, unless another pain medicine was prescribed. (If you have chronic liver or kidney disease or ever had a stomach ulcer, talk with your doctor before using these medicines. Aspirin should never be used in anyone under 18 years of age who is ill with a fever. It may cause severe liver damage.)  · Don't smoke. This can worsen symptoms.  Follow-up care  Follow up with your healthcare provider or our staff if you are not improving within the next week.  When to seek medical advice  Call your healthcare provider if any of these occur:  · Facial pain or headache becoming more severe  · Stiff neck  · Unusual drowsiness or confusion  · Swelling of the forehead or eyelids  · Vision problems, including blurred or double vision  · Fever of 100.4ºF (38ºC) or higher, or as directed by your healthcare provider  · Seizure  · Breathing problems  · Symptoms not resolving within 10 days  Date Last Reviewed: 4/13/2015  © 5334-7154 "[employer]. [address] [place] [address], [place] [address]. All rights reserved. This information is not intended as a substitute for professional medical care. Always follow your healthcare professional's instructions.

## 2019-12-12 NOTE — LETTER
December 12, 2019      Ai Billingsley, NP  [address]  [address]  [facility] [address]         Patient: Alison Branch   MR Number: [id number]   YOB: 1955   Date of Visit: 12/12/2019       Dear Dr. Billingsley:    Thank you for referring Alison for diabetes self-management education and support. She has completed all components of our Diabetes Management Program and her Self-Management Support Plan. Below is a summary of her clinical outcomes and goal progress.    Patient Outcomes:    A1c Status:   Lab Results   Component Value Date    HGBA1C 9.1 (H) 12/10/2019    HGBA1C 9.3 (H) 11/05/2019     Goals  Patient has selected/evaluated goals during today's session: Yes, evaluated  Healthy Eating: In Progress  Physical Activity: In Progress  Monitoring: In Progress         Follow up:   · Alison to follow diabetes support plan indicated above  · Alison to attend medical appointments as scheduled  · Alison to update you on her DM education progress as needed  · Complete paperwork for the Dexcom      If you have questions, please do not hesitate to call me. I look forward to providing additional education and support as needed.    Sincerely,    Shelly Crocker RD, CDE

## 2019-12-12 NOTE — PROGRESS NOTES
Subjective:       Patient ID: Alison Branch is a 64 y.o. female.    Chief Complaint: Sinus Problem; Otalgia (right); Cough; and Headache    Pt of Dr. Rodriguez, here for headache, runny nose and earache. Hx of lung cancer, sees Dr. Belcher, has chemo next week. Was recently in ER on 12-2-19 with Pneumonia, completed course of Doxy.     Lingering cough on and off for 2 weeks.    Cough   This is a recurrent problem. The current episode started 1 to 4 weeks ago (off and on last 2 weeks). The problem has been waxing and waning. The problem occurs every few hours. The cough is non-productive. Associated symptoms include ear pain, a fever, headaches, nasal congestion, rhinorrhea and shortness of breath. Pertinent negatives include no chest pain, chills, ear congestion, heartburn, hemoptysis, myalgias, postnasal drip, rash, sore throat, sweats, weight loss or wheezing. Associated symptoms comments: Low grade fever    Right ear pain. Nothing aggravates the symptoms. Risk factors for lung disease include animal exposure (dog in home). She has tried OTC cough suppressant (mucinex otc) for the symptoms. The treatment provided mild relief. Her past medical history is significant for pneumonia. There is no history of environmental allergies. current lung cancer dx     Review of Systems   Constitutional: Positive for fever. Negative for chills and weight loss.        States had low grade temps of 99   HENT: Positive for congestion, ear pain, rhinorrhea and sinus pressure. Negative for postnasal drip, sinus pain, sneezing, sore throat, tinnitus and trouble swallowing.    Eyes: Negative for pain and visual disturbance.   Respiratory: Positive for cough and shortness of breath. Negative for hemoptysis and wheezing.    Cardiovascular: Negative for chest pain.   Gastrointestinal: Negative for abdominal pain, diarrhea, heartburn, nausea and vomiting.   Musculoskeletal: Negative for arthralgias and myalgias.   Skin: Negative for rash.    Allergic/Immunologic: Negative for environmental allergies, food allergies and immunocompromised state.   Neurological: Positive for headaches. Negative for dizziness, syncope and weakness.   Hematological: Negative for adenopathy. Does not bruise/bleed easily.   Psychiatric/Behavioral: Negative for suicidal ideas.         Review of patient's allergies indicates:  No Known Allergies      Current Outpatient Medications:     aspirin (ECOTRIN) 81 MG EC tablet, Take 1 tablet (81 mg total) by mouth once daily., Disp: , Rfl: 0    atorvastatin (LIPITOR) 40 MG tablet, TAKE 1 TABLET BY MOUTH ONCE DAILY, Disp: 90 tablet, Rfl: 3    benzonatate (TESSALON PERLES) 100 MG capsule, Take 1 capsule (100 mg total) by mouth 2 (two) times daily., Disp: 60 capsule, Rfl: 1    bisacodyl (DULCOLAX) 5 mg EC tablet, Take 5 mg by mouth daily as needed for Constipation., Disp: , Rfl:     blood sugar diagnostic Strp, To check BG 5 times per day, Disp: 450 each, Rfl: 3    blood-glucose meter kit, Use as instructed, Disp: 1 each, Rfl: 0    carboxymethylcellulose (REFRESH PLUS) 0.5 % Dpet, 1 drop 3 (three) times daily as needed., Disp: , Rfl:     dexAMETHasone (DECADRON) 6 MG tablet, Take 1 tablet by mouth two times a day with food the day before chemotherapy and for two days after chemotherapy. Do not take the day of chemotherapy., Disp: 24 tablet, Rfl: 4    diazePAM (VALIUM) 5 MG tablet, TAKE 1 TABLET BY MOUTH ONCE DAILY AS NEEDED FOR ANXIETY, Disp: , Rfl: 2    ergocalciferol (ERGOCALCIFEROL) 50,000 unit Cap, TAKE 1 CAPSULE BY MOUTH EVERY 7 DAYS, Disp: 12 capsule, Rfl: 3    fluticasone propionate (FLONASE) 50 mcg/actuation nasal spray, USE TWO SPRAY(S) IN EACH NOSTRIL ONCE DAILY, Disp: 16 g, Rfl: 3    folic acid (FOLVITE) 1 MG tablet, Take 1 tablet (1 mg total) by mouth once daily. Start today, Disp: 100 tablet, Rfl: 3    insulin aspart U-100 (NOVOLOG) 100 unit/mL (3 mL) InPn pen, Inject 12-20 units into the skin 3 times daily with  "meals plus correction scale. Max Total daily dose of 90 units, Disp: 3 Box, Rfl: 6    insulin detemir U-100 (LEVEMIR FLEXTOUCH) 100 unit/mL (3 mL) SubQ InPn pen, Inject 28-33 units nightly, Disp: 1 Box, Rfl: 6    JANUVIA 100 mg Tab, TAKE ONE TABLET BY MOUTH ONCE DAILY, Disp: 30 tablet, Rfl: 11    lancets Misc, 1 Device by Misc.(Non-Drug; Combo Route) route once daily. Heladio Result.  250.02.  Check Blood Sugar Twice Daily., Disp: 200 each, Rfl: 3    lidocaine-prilocaine (EMLA) cream, Apply to PORT one hour prior to chemo administration., Disp: 30 g, Rfl: 0    olmesartan (BENICAR) 20 MG tablet, Take 1 tablet (20 mg total) by mouth once daily., Disp: 90 tablet, Rfl: 3    ondansetron (ZOFRAN) 8 MG tablet, Take 1 tablet (8 mg total) by mouth 4 (four) times daily as needed for Nausea., Disp: 60 tablet, Rfl: 2    pen needle, diabetic (BD ULTRA-FINE BRYANT PEN NEEDLE) 32 gauge x 5/32" Ndle, To use 5 times per day with insulin injections., Disp: 150 each, Rfl: 11    phenazopyridine (PYRIDIUM) 200 MG tablet, Take 1 tablet (200 mg total) by mouth 3 (three) times daily as needed for Pain., Disp: 20 tablet, Rfl: 0    psyllium (METAMUCIL) packet, Take 1 packet by mouth once daily., Disp: , Rfl:     terconazole (TERAZOL 7) 0.4 % Crea, INSERT 1 APPLICATORFUL VAGINALLY ONCE DAILY IN THE EVENING, Disp: 45 g, Rfl: 0    walker Misc, Please provide rollator walker for this debilitated cancer patient.  Thank you., Disp: , Rfl: 0    Patient Active Problem List   Diagnosis    Uncontrolled type 2 diabetes mellitus with hyperglycemia, without long-term current use of insulin    Mixed hyperlipidemia due to type 2 diabetes mellitus    Attention and concentration deficit    Cerebral aneurysm, coiled in 2010    Hypertension associated with diabetes    Hyperkeratosis of palms and soles    Irritable bowel syndrome    Aneurysm of unspecified site    Obesity (BMI 30-39.9)    Vitamin D deficiency    Malignant neoplasm of upper lobe " "of left lung    Secondary malignant neoplasm of mediastinal lymph node    Adrenal cortical steroids causing adverse effect in therapeutic use    Type 2 diabetes mellitus with diabetic polyneuropathy, with long-term current use of insulin    Major depressive disorder, recurrent, unspecified    Memory deficit    Dysuria    Edema, peripheral    Chemotherapy-induced peripheral neuropathy    Shortness of breath on exertion     Past Medical History:   Diagnosis Date    Allergy     Brain aneurysm 2010    s/p coiling of one; another not coiled    Breast cyst     Depression 9/19/2019    Diabetes mellitus     Diabetes type 2, controlled     Fever blister     High cholesterol     History of Bell's palsy     HTN (hypertension) 5/20/2014     Past Surgical History:   Procedure Laterality Date    BREAST CYST ASPIRATION      BRONCHOSCOPY N/A 5/28/2019    Procedure: Bronchoscopy;  Surgeon: [facility] Diagnostic Provider;  [facility];  Service: Anesthesiology;  Laterality: N/A;    CERVICAL FUSION      COLONOSCOPY N/A 3/9/2016    Procedure: COLONOSCOPY;  Surgeon: Elliott Zimmerman MD;  Location: [facility] ENDO (4TH FLR);  Service: Endoscopy;  Laterality: N/A;    head surgery      stent and "curling" for aneurysm    HYSTERECTOMY      TVH secondary to SUF    INSERTION OF TUNNELED CENTRAL VENOUS CATHETER (CVC) WITH SUBCUTANEOUS PORT Right 7/8/2019    Procedure: INSERTION, PORT-A-CATH;  Surgeon: Cali Damico MD;  Location: [facility] [place] CATH LAB;  Service: Radiology;  Laterality: Right;     Social History     Socioeconomic History    Marital status: Single     Spouse name: Not on file    Number of children: Not on file    Years of education: Not on file    Highest education level: Not on file   Occupational History    Occupation: Student     Comment: Unemployed   Social Needs    Financial resource strain: Not on file    Food insecurity:     Worry: Not on file     Inability: Not on file    Transportation " "needs:     Medical: Not on file     Non-medical: Not on file   Tobacco Use    Smoking status: Former Smoker     Packs/day: 0.50     Years: 30.00     Pack years: 15.00     Last attempt to quit: 1999     Years since quittin.9    Smokeless tobacco: Never Used   Substance and Sexual Activity    Alcohol use: No     Alcohol/week: 0.0 standard drinks    Drug use: No    Sexual activity: Never     Partners: Female     Birth control/protection: Surgical   Lifestyle    Physical activity:     Days per week: Not on file     Minutes per session: Not on file    Stress: Not on file   Relationships    Social connections:     Talks on phone: Not on file     Gets together: Not on file     Attends [employer] service: Not on file     Active member of club or organization: Not on file     Attends meetings of clubs or organizations: Not on file     Relationship status: Not on file   Other Topics Concern    Are you pregnant or think you may be? No    Breast-feeding No   Social History Narrative    Not on file     Family History   Problem Relation Age of Onset    Rheum arthritis Father     Diabetes Father     Heart failure Father     Migraines Father     Cataracts Father     Cancer Mother 63        pancreatic    Stomach cancer Mother     Breast cancer Maternal Aunt         50s    Ovarian cancer Cousin     Diabetes Sister     Diabetes Brother     Cancer Maternal Aunt         Lung CA    Melanoma Neg Hx     Colon cancer Neg Hx     Amblyopia Neg Hx     Blindness Neg Hx     Glaucoma Neg Hx     Macular degeneration Neg Hx     Retinal detachment Neg Hx     Strabismus Neg Hx     Stroke Neg Hx     Thyroid disease Neg Hx        Objective:       Vitals:    19 1327   BP: 106/64   Pulse: 97   Temp: 98.5 °F (36.9 °C)   SpO2: 96%   Weight: 100.5 kg (221 lb 9 oz)   Height: 5' 10" (1.778 m)   PainSc: 0-No pain       Body mass index is 31.79 kg/m².    Physical Exam   Constitutional: She is oriented to person, " place, and time. Vital signs are normal. She appears well-developed and well-nourished.   obese   HENT:   Head: Normocephalic.   Right Ear: Tympanic membrane, external ear and ear canal normal.   Left Ear: Tympanic membrane, external ear and ear canal normal.   Nose: Mucosal edema and rhinorrhea present. No nasal deformity. No epistaxis. Right sinus exhibits frontal sinus tenderness. Right sinus exhibits no maxillary sinus tenderness. Left sinus exhibits frontal sinus tenderness. Left sinus exhibits no maxillary sinus tenderness.   Mouth/Throat: Uvula is midline, oropharynx is clear and moist and mucous membranes are normal. Mucous membranes are not pale. No oropharyngeal exudate.   Clear PND noted on exam   Eyes: Pupils are equal, round, and reactive to light. Conjunctivae, EOM and lids are normal.   Neck: Trachea normal, normal range of motion and phonation normal. Neck supple. No JVD present. Carotid bruit is not present. No thyromegaly present.   Cardiovascular: Normal rate, regular rhythm, normal heart sounds and intact distal pulses.   Pulmonary/Chest: Effort normal and breath sounds normal.   Abdominal: Normal appearance.   Musculoskeletal: Normal range of motion.   Neurological: She is alert and oriented to person, place, and time.   Skin: Skin is warm, dry and intact. Capillary refill takes less than 2 seconds.   Psychiatric: She has a normal mood and affect. Her speech is normal and behavior is normal. Judgment and thought content normal. Cognition and memory are normal.   Nursing note and vitals reviewed.      Assessment:       1. Acute recurrent frontal sinusitis    2. Chronic coughing    3. BMI 31.0-31.9,adult    4. Obesity (BMI 30-39.9)    5. Malignant neoplasm of upper lobe of left lung    6. Hx of bacterial pneumonia        Plan:       Alison was seen today for sinus problem, otalgia, cough and headache.    Diagnoses and all orders for this visit:    Acute recurrent frontal sinusitis  -      moxifloxacin (AVELOX) 400 mg tablet; Take 1 tablet (400 mg total) by mouth once daily. for 10 days    Chronic coughing  -     moxifloxacin (AVELOX) 400 mg tablet; Take 1 tablet (400 mg total) by mouth once daily. for 10 days    BMI 31.0-31.9,adult  BMI reviewed    Obesity (BMI 30-39.9)  BMI reviewed.    Diet and exercise to lose weight.    Malignant neoplasm of upper lobe of left lung  Has chemo next week with Dr. Belcher    Hx of bacterial pneumonia  -     moxifloxacin (AVELOX) 400 mg tablet; Take 1 tablet (400 mg total) by mouth once daily. for 10 days    Other orders  -     fluconazole (DIFLUCAN) 150 MG Tab; Take 1 tablet (150 mg total) by mouth once daily. Take if yeast infection develops after you complete the antibiotic for 1 day    Avelox 400mg daily for 10 days    Continue Flonase nasal spray, do one spray each nostril twice a day    Continue Tessalon perles as needed for coughing    Rest and Fluids, Tylenol or Motrin otc as needed for pain and or fever    Warm liquids to thin mucus    Allergen avoidance discussed, humidified air recommended    Warm salt water gargles as needed for throat pain    Nasal spray, taught how to correctly use    Diflucan as a one time dose after you complete Avelox only if yeast infection develops    Self care instructions provided in AVS    Follow up if symptoms worsen or fail to improve.

## 2019-12-12 NOTE — TELEPHONE ENCOUNTER
Can we work on getting patient a Dexcom? Patient has been experiencing hypoglycemia lately. She is on MDI and testing 4 times a day.

## 2019-12-12 NOTE — TELEPHONE ENCOUNTER
----- Message from Ai Billingsley NP sent at 12/11/2019  8:55 PM CST -----  Please call lab results to patient.   A1c is still elevated at 9.1%   It has improved from 11.4% but still above goal.   Please see if we can set her up to see Marisa Lindquist NP while I am out on leave or with [facility] for diabetes management.   Please ask that she bring her BG logs for them to review during the visit so they can make the necessary insulin changes.   Thank you

## 2019-12-13 ENCOUNTER — HOSPITAL ENCOUNTER (EMERGENCY)
Facility: HOSPITAL | Age: 64
Discharge: HOME | End: 2019-12-14
Attending: EMERGENCY MEDICINE
Payer: MEDICARE

## 2019-12-13 ENCOUNTER — PATIENT MESSAGE (OUTPATIENT)
Dept: HEMATOLOGY/ONCOLOGY | Facility: CLINIC | Age: 64
End: 2019-12-13

## 2019-12-13 VITALS
BODY MASS INDEX: 31.5 KG/M2 | OXYGEN SATURATION: 97 % | WEIGHT: 220 LBS | HEART RATE: 96 BPM | RESPIRATION RATE: 18 BRPM | HEIGHT: 70 IN | TEMPERATURE: 98 F | SYSTOLIC BLOOD PRESSURE: 131 MMHG | DIASTOLIC BLOOD PRESSURE: 60 MMHG

## 2019-12-13 DIAGNOSIS — C34.12 CANCER OF UPPER LOBE OF LEFT LUNG: ICD-10-CM

## 2019-12-13 DIAGNOSIS — R06.02 SHORTNESS OF BREATH: ICD-10-CM

## 2019-12-13 DIAGNOSIS — R50.9 FEVER, UNSPECIFIED FEVER CAUSE: Primary | ICD-10-CM

## 2019-12-13 DIAGNOSIS — D64.9 ANEMIA, UNSPECIFIED TYPE: ICD-10-CM

## 2019-12-13 LAB
ALBUMIN SERPL BCP-MCNC: 3.3 G/DL (ref 3.5–5.2)
ALP SERPL-CCNC: 66 U/L (ref 55–135)
ALT SERPL W/O P-5'-P-CCNC: 29 U/L (ref 10–44)
ANION GAP SERPL CALC-SCNC: 8 MMOL/L (ref 8–16)
AST SERPL-CCNC: 26 U/L (ref 10–40)
BASOPHILS # BLD AUTO: 0.03 K/UL (ref 0–0.2)
BASOPHILS NFR BLD: 0.4 % (ref 0–1.9)
BILIRUB SERPL-MCNC: 0.4 MG/DL (ref 0.1–1)
BILIRUB UR QL STRIP: NEGATIVE
BUN SERPL-MCNC: 12 MG/DL (ref 8–23)
CALCIUM SERPL-MCNC: 10.2 MG/DL (ref 8.7–10.5)
CHLORIDE SERPL-SCNC: 104 MMOL/L (ref 95–110)
CLARITY UR REFRACT.AUTO: CLEAR
CO2 SERPL-SCNC: 27 MMOL/L (ref 23–29)
COLOR UR AUTO: YELLOW
CREAT SERPL-MCNC: 1.1 MG/DL (ref 0.5–1.4)
DIFFERENTIAL METHOD: ABNORMAL
EOSINOPHIL # BLD AUTO: 0.1 K/UL (ref 0–0.5)
EOSINOPHIL NFR BLD: 1.1 % (ref 0–8)
ERYTHROCYTE [DISTWIDTH] IN BLOOD BY AUTOMATED COUNT: 14.6 % (ref 11.5–14.5)
EST. GFR  (AFRICAN AMERICAN): >60 ML/MIN/1.73 M^2
EST. GFR  (NON AFRICAN AMERICAN): 53.9 ML/MIN/1.73 M^2
GLUCOSE SERPL-MCNC: 160 MG/DL (ref 70–110)
GLUCOSE UR QL STRIP: NEGATIVE
HCT VFR BLD AUTO: 32.7 % (ref 37–48.5)
HGB BLD-MCNC: 9.9 G/DL (ref 12–16)
HGB UR QL STRIP: NEGATIVE
IMM GRANULOCYTES # BLD AUTO: 0.08 K/UL (ref 0–0.04)
IMM GRANULOCYTES NFR BLD AUTO: 1.1 % (ref 0–0.5)
INFLUENZA A, MOLECULAR: NEGATIVE
INFLUENZA B, MOLECULAR: NEGATIVE
KETONES UR QL STRIP: NEGATIVE
LEUKOCYTE ESTERASE UR QL STRIP: NEGATIVE
LYMPHOCYTES # BLD AUTO: 1.6 K/UL (ref 1–4.8)
LYMPHOCYTES NFR BLD: 21.7 % (ref 18–48)
MCH RBC QN AUTO: 28.8 PG (ref 27–31)
MCHC RBC AUTO-ENTMCNC: 30.3 G/DL (ref 32–36)
MCV RBC AUTO: 95 FL (ref 82–98)
MONOCYTES # BLD AUTO: 1.1 K/UL (ref 0.3–1)
MONOCYTES NFR BLD: 14.2 % (ref 4–15)
NEUTROPHILS # BLD AUTO: 4.5 K/UL (ref 1.8–7.7)
NEUTROPHILS NFR BLD: 61.5 % (ref 38–73)
NITRITE UR QL STRIP: NEGATIVE
NRBC BLD-RTO: 0 /100 WBC
PH UR STRIP: 5 [PH] (ref 5–8)
PLATELET # BLD AUTO: 215 K/UL (ref 150–350)
PMV BLD AUTO: 11.3 FL (ref 9.2–12.9)
POCT GLUCOSE: 161 MG/DL (ref 70–110)
POTASSIUM SERPL-SCNC: 4.1 MMOL/L (ref 3.5–5.1)
PROCALCITONIN SERPL IA-MCNC: 0.07 NG/ML
PROT SERPL-MCNC: 7 G/DL (ref 6–8.4)
PROT UR QL STRIP: NEGATIVE
RBC # BLD AUTO: 3.44 M/UL (ref 4–5.4)
SODIUM SERPL-SCNC: 139 MMOL/L (ref 136–145)
SP GR UR STRIP: 1.01 (ref 1–1.03)
SPECIMEN SOURCE: NORMAL
URN SPEC COLLECT METH UR: NORMAL
WBC # BLD AUTO: 7.37 K/UL (ref 3.9–12.7)

## 2019-12-13 PROCEDURE — 87502 INFLUENZA DNA AMP PROBE: CPT

## 2019-12-13 PROCEDURE — 99285 PR EMERGENCY DEPT VISIT,LEVEL V: ICD-10-PCS | Mod: ,,, | Performed by: EMERGENCY MEDICINE

## 2019-12-13 PROCEDURE — 99284 EMERGENCY DEPT VISIT MOD MDM: CPT | Mod: 25

## 2019-12-13 PROCEDURE — 84145 PROCALCITONIN (PCT): CPT

## 2019-12-13 PROCEDURE — 80053 COMPREHEN METABOLIC PANEL: CPT

## 2019-12-13 PROCEDURE — 81003 URINALYSIS AUTO W/O SCOPE: CPT

## 2019-12-13 PROCEDURE — 85025 COMPLETE CBC W/AUTO DIFF WBC: CPT

## 2019-12-13 PROCEDURE — 82962 GLUCOSE BLOOD TEST: CPT

## 2019-12-13 PROCEDURE — 99285 EMERGENCY DEPT VISIT HI MDM: CPT | Mod: ,,, | Performed by: EMERGENCY MEDICINE

## 2019-12-14 NOTE — ED PROVIDER NOTES
"Encounter Date: 12/13/2019       History     Chief Complaint   Patient presents with    Fever     highest of 101 at 1700--took 1g of tylenol after taking temp. also endorses sweats, headaches, nausea. hx of lung cancer and receiving chemo--scheduled for next session next week, last session 11/27     64 year old female with history of CARLOS adenocarcinoma on keytruda presents for fever at home, Tmax of 101, with cold sweats, which occurred today around 4pm and resolved with tylenol prior to arrival. She came in for further workup based on suggestion of her heme-onc physician. She endorses chronic shortness of breath without acute change in cough or productive cough. She denies myalgias or headache. She denies dysuria, hematuria, urgency or frequency. She endorses normal PO intake and normal BM. Does not feel her symptoms are worsening or improving, but are rather staying steady.        Review of patient's allergies indicates:  No Known Allergies  Past Medical History:   Diagnosis Date    Allergy     Brain aneurysm 2010    s/p coiling of one; another not coiled    Breast cyst     Depression 9/19/2019    Diabetes mellitus     Diabetes type 2, controlled     Fever blister     High cholesterol     History of Bell's palsy     HTN (hypertension) 5/20/2014     Past Surgical History:   Procedure Laterality Date    BREAST CYST ASPIRATION      BRONCHOSCOPY N/A 5/28/2019    Procedure: Bronchoscopy;  Surgeon: [facility] Diagnostic Provider;  Location: [facility] OR [address] [place] [address];  Service: Anesthesiology;  Laterality: N/A;    CERVICAL FUSION      COLONOSCOPY N/A 3/9/2016    Procedure: COLONOSCOPY;  Surgeon: Ellitot Zimmerman MD;  Location: [facility] ENDO (4TH FLR);  Service: Endoscopy;  Laterality: N/A;    head surgery      stent and "curling" for aneurysm    HYSTERECTOMY      TVH secondary to SUF    INSERTION OF TUNNELED CENTRAL VENOUS CATHETER (CVC) WITH SUBCUTANEOUS PORT Right 7/8/2019    Procedure: INSERTION, PORT-A-CATH;  Surgeon: " Cali Damico MD;  [facility]: [facility] CATH LAB;  Service: Radiology;  Laterality: Right;     Family History   Problem Relation Age of Onset    Rheum arthritis Father     Diabetes Father     Heart failure Father     Migraines Father     Cataracts Father     Cancer Mother 63        pancreatic    Stomach cancer Mother     Breast cancer Maternal Aunt         50s    Ovarian cancer Cousin     Diabetes Sister     Diabetes Brother     Cancer Maternal Aunt         Lung CA    Melanoma Neg Hx     Colon cancer Neg Hx     Amblyopia Neg Hx     Blindness Neg Hx     Glaucoma Neg Hx     Macular degeneration Neg Hx     Retinal detachment Neg Hx     Strabismus Neg Hx     Stroke Neg Hx     Thyroid disease Neg Hx      Social History     Tobacco Use    Smoking status: Former Smoker     Packs/day: 0.50     Years: 30.00     Pack years: 15.00     Last attempt to quit: 1999     Years since quittin.9    Smokeless tobacco: Never Used   Substance Use Topics    Alcohol use: No     Alcohol/week: 0.0 standard drinks    Drug use: No     Review of Systems   Constitutional: Positive for fever.   HENT: Negative for sore throat.    Respiratory: Positive for shortness of breath.    Cardiovascular: Negative for chest pain.   Gastrointestinal: Negative for nausea.   Genitourinary: Negative for dysuria.   Musculoskeletal: Negative for back pain.   Skin: Negative for rash.   Neurological: Negative for weakness.   Hematological: Does not bruise/bleed easily.       Physical Exam     Initial Vitals [19]   BP Pulse Resp Temp SpO2   131/60 96 18 98 °F (36.7 °C) 97 %      MAP       --         Physical Exam    Nursing note and vitals reviewed.  Constitutional: She appears well-developed and well-nourished. She is not diaphoretic. No distress.   HENT:   Head: Normocephalic and atraumatic.   Right Ear: External ear normal.   Left Ear: External ear normal.   Mouth/Throat: Oropharynx is clear and moist.   Has yellow  facemask in place.   Eyes: EOM are normal. Pupils are equal, round, and reactive to light. Right eye exhibits no discharge. Left eye exhibits no discharge.   Neck: Normal range of motion. Neck supple. No tracheal deviation present.   Cardiovascular: Normal rate, regular rhythm, normal heart sounds and intact distal pulses.   Pulmonary/Chest: Breath sounds normal. No respiratory distress. She has no wheezes. She has no rhonchi. She has no rales. She exhibits no tenderness.   Abdominal: Soft. She exhibits no distension. There is no tenderness. There is no rebound and no guarding.   Musculoskeletal: Normal range of motion. She exhibits no edema or tenderness.   Neurological: She is alert and oriented to person, place, and time. She has normal strength. No cranial nerve deficit or sensory deficit. GCS score is 15. GCS eye subscore is 4. GCS verbal subscore is 5. GCS motor subscore is 6.   Skin: Skin is warm and dry. Capillary refill takes less than 2 seconds. No rash noted.   Psychiatric: She has a normal mood and affect. Her behavior is normal. Thought content normal.         ED Course   Procedures  Labs Reviewed   CBC W/ AUTO DIFFERENTIAL - Abnormal; Notable for the following components:       Result Value    RBC 3.44 (*)     Hemoglobin 9.9 (*)     Hematocrit 32.7 (*)     Mean Corpuscular Hemoglobin Conc 30.3 (*)     RDW 14.6 (*)     Immature Granulocytes 1.1 (*)     Immature Grans (Abs) 0.08 (*)     Mono # 1.1 (*)     All other components within normal limits   COMPREHENSIVE METABOLIC PANEL - Abnormal; Notable for the following components:    Glucose 160 (*)     Albumin 3.3 (*)     eGFR if non  53.9 (*)     All other components within normal limits   POCT GLUCOSE - Abnormal; Notable for the following components:    POCT Glucose 161 (*)     All other components within normal limits   INFLUENZA A & B BY MOLECULAR   PROCALCITONIN   URINALYSIS, REFLEX TO URINE CULTURE    Narrative:     Preferred  "Collection Type->Urine, Clean Catch          Imaging Results          X-Ray Chest PA And Lateral (Final result)  Result time 12/13/19 21:15:25    Final result by Gregory Jin MD (12/13/19 21:15:25)                 Impression:      Grossly stable masslike opacity in the left upper lobe with coarse reticulonodular densities throughout the left mid and upper lung zone.  No detrimental change in lung aeration when compared with 12/03/2019.      Electronically signed by: Gregory Jin MD  Date:    12/13/2019  Time:    21:15             Narrative:    EXAMINATION:  XR CHEST PA AND LATERAL    CLINICAL HISTORY:  Provided history is "  Shortness of breath".    TECHNIQUE:  Frontal and lateral views of the chest were performed.    COMPARISON:  12/03/2019.    FINDINGS:  Cardiac silhouette is stable.  Right-sided Port-A-Cath overlies the SVC.  Atherosclerotic calcifications overlie the aortic arch.  Masslike opacity again noted in the left upper lobe with reticulonodular opacities throughout the left mid and upper lung zone.  No confluent area of consolidation.  No large pleural effusion.  No pneumothorax.  No detrimental change when compared with the prior study.                              X-Rays:   Independently Interpreted Readings:   Other Readings:  CXR viewed. No acute infiltrate, effusion or PTX on my read. Left upper lobe mass present.    Medical Decision Making:   History:   Old Medical Records: I decided to obtain old medical records.  Old Records Summarized: records from previous admission(s).       <> Summary of Records: Follows here for her lung adenocarcinoma.  Independently Interpreted Test(s):   I have ordered and independently interpreted X-rays - see prior notes.  Clinical Tests:   Lab Tests: Ordered and Reviewed  Radiological Study: Ordered and Reviewed       APC / Resident Notes:   64 year old female with CARLOS adenocarcinoma on keytruda cycle 3 presents with fever at home, treated with tylenol. ROS " otherwise negative, no cough, chronic SOB, no urinary symptoms, no GI symptoms. Vitals normal here. Patients lung sounds are clear to auscultation, abdomen is soft and non-tender. CBC without evidence of neutropenia or leukocytosis, Hgb stable at 9.9. Abdomen soft and non-tender, CMP within normal limits lessening likelihood of intra-abodminal source. Flu negative. CXR without acute consolidation making pneumonia less likely. Patient has been afebrile here. Urinalysis inconsistent with UTI. I do not have a clear indication of why she is having intermittent fevers at this time. However, she is well-appearing, not septic, and does not need hospitalization at this time. She is stable for discharge with outpatient follow-up.    aCl Hdz  [employer] EM  PGY-2         Attending Attestation:   Physician Attestation Statement for Resident:  As the supervising MD   Physician Attestation Statement: I have personally seen and examined this patient.   I agree with the above history. -:   As the supervising MD I agree with the above PE.    As the supervising MD I agree with the above treatment, course, plan, and disposition.   -:     Vitals normal. Afebrile. Well appearing. Here w/ fevers at home, but afebrile here. Not neutropenic; no signs overwhelming sepsis. CXR w/o evidence of acute infection. Lab work grossly unremarkable. Maintained normal vitals here. No obvious source of infection (port side without erythema warmth or TTP). Stable for d/c w/ oncology f/u.      I have reviewed and agree with the residents interpretation of the following: lab data and x-rays.  I have reviewed the following: old records at this facility and old x-rays.                                  Clinical Impression:       ICD-10-CM ICD-9-CM   1. Fever, unspecified fever cause R50.9 780.60   2. Shortness of breath R06.02 786.05   3. Cancer of upper lobe of left lung C34.12 162.3   4. Anemia, unspecified type D64.9 285.9         Disposition:    Disposition: Discharged  Condition: Stable                     Cal Hdz MD  Resident  12/14/19 0018       Chaim Zepeda MD  12/15/19 [id number]

## 2019-12-14 NOTE — DISCHARGE INSTRUCTIONS
Future Appointments   Date Time Provider Department Center   12/16/2019 11:00 AM Jagruti Garcia, PhD [facility] CAN PSY [facility]   12/18/2019  2:30 PM LAB, APPOINTMENT [facility] INTMED [facility] LAB IM [facility] PCW   12/19/2019 10:30 AM Doris Mackey PA-C [facility] HEM ONC [address]   12/19/2019 11:30 AM NOM, CHEMO [facility] CHEMO [facility]rut   12/27/2019  2:00 PM MELODY Alex, FNP [facility] IM Jarad [address] PCW   1/10/2020  9:00 AM David Mo MD [facility] NEURO [address]   2/3/2020 10:40 AM Mike Rodriguez II, MD [facility] IM [facility] PCW   4/22/2020  1:30 PM Ai Billingsley NP [facility] Clin

## 2019-12-14 NOTE — ED TRIAGE NOTES
"Patient presents to the ed with complaints of a low grade fever with some chill and one episode of "sweating out of nowhere" that began at about noon stated she began taking her temps and they began at 99 and progressed to 101 by 4pm she took 1g tylenol stated she is on maintenance doses of keytruda for small cell lung ca. States she is on 21 day rotations and has been feeling pretty good other than having been treated recently for a UTi.   Denies Chest pain, Sob endorses some nausea no vomiting.    "

## 2019-12-16 ENCOUNTER — OFFICE VISIT (OUTPATIENT)
Dept: PSYCHIATRY | Facility: CLINIC | Age: 64
End: 2019-12-16
Payer: COMMERCIAL

## 2019-12-16 DIAGNOSIS — R41.840 ATTENTION AND CONCENTRATION DEFICIT: ICD-10-CM

## 2019-12-16 DIAGNOSIS — R41.3 MEMORY DEFICIT: ICD-10-CM

## 2019-12-16 DIAGNOSIS — F33.0 MILD EPISODE OF RECURRENT MAJOR DEPRESSIVE DISORDER: Primary | ICD-10-CM

## 2019-12-16 DIAGNOSIS — C34.12 MALIGNANT NEOPLASM OF UPPER LOBE OF LEFT LUNG: ICD-10-CM

## 2019-12-16 PROCEDURE — 90834 PSYTX W PT 45 MINUTES: CPT | Mod: S$GLB,,, | Performed by: PSYCHOLOGIST

## 2019-12-16 PROCEDURE — 90834 PR PSYCHOTHERAPY W/PATIENT, 45 MIN: ICD-10-PCS | Mod: S$GLB,,, | Performed by: PSYCHOLOGIST

## 2019-12-16 PROCEDURE — 99999 PR PBB SHADOW E&M-EST. PATIENT-LVL II: ICD-10-PCS | Mod: PBBFAC,,, | Performed by: PSYCHOLOGIST

## 2019-12-16 PROCEDURE — 99999 PR PBB SHADOW E&M-EST. PATIENT-LVL II: CPT | Mod: PBBFAC,,, | Performed by: PSYCHOLOGIST

## 2019-12-16 NOTE — PROGRESS NOTES
INFORMED CONSENT: Alison Branch   is known to this provider and identity was confirmed via NAME and .  The patient has been informed of the risks and benefits associated with engaging in psychotherapy, the handling of protected health information, the rights of privacy and the limits of confidentiality. The patient has also been informed of the importance of reporting any suicidal or homicidal ideation to this or any provider to ensure safety of all parties, and the Alison Branch expressed understanding. The patient was agreeable to these terms and freely participates in individual psychotherapy.    PSYCHO-ONCOLOGY NOTE/ Individual Psychotherapy     Date: 2019   Site:  [facility]        Therapeutic Intervention: Met with patient.  Outpatient - Behavior modifying psychotherapy 45 min - CPT code 55684      Patient was last seen by me on 2019    Problem list  Patient Active Problem List   Diagnosis    Uncontrolled type 2 diabetes mellitus with hyperglycemia, without long-term current use of insulin    Mixed hyperlipidemia due to type 2 diabetes mellitus    Attention and concentration deficit    Cerebral aneurysm, coiled in     Hypertension associated with diabetes    Hyperkeratosis of palms and soles    Irritable bowel syndrome    Aneurysm of unspecified site    Obesity (BMI 30-39.9)    Vitamin D deficiency    Malignant neoplasm of upper lobe of left lung    Secondary malignant neoplasm of mediastinal lymph node    Adrenal cortical steroids causing adverse effect in therapeutic use    Type 2 diabetes mellitus with diabetic polyneuropathy, with long-term current use of insulin    Major depressive disorder, recurrent, unspecified    Memory deficit    Dysuria    Edema, peripheral    Chemotherapy-induced peripheral neuropathy    Shortness of breath on exertion       Chief complaint/reason for encounter: depression, anxiety and cognition   Met with patient to evaluate psychosocial  "adaptation to diagnosis/treatment/survivorship of Lung Cancer    Current Medications  Current Outpatient Medications   Medication    aspirin (ECOTRIN) 81 MG EC tablet    atorvastatin (LIPITOR) 40 MG tablet    benzonatate (TESSALON PERLES) 100 MG capsule    bisacodyl (DULCOLAX) 5 mg EC tablet    blood sugar diagnostic Strp    blood-glucose meter kit    carboxymethylcellulose (REFRESH PLUS) 0.5 % Dpet    dexAMETHasone (DECADRON) 6 MG tablet    diazePAM (VALIUM) 5 MG tablet    ergocalciferol (ERGOCALCIFEROL) 50,000 unit Cap    fluticasone propionate (FLONASE) 50 mcg/actuation nasal spray    folic acid (FOLVITE) 1 MG tablet    insulin aspart U-100 (NOVOLOG) 100 unit/mL (3 mL) InPn pen    insulin detemir U-100 (LEVEMIR FLEXTOUCH) 100 unit/mL (3 mL) SubQ InPn pen    JANUVIA 100 mg Tab    lancets Misc    lidocaine-prilocaine (EMLA) cream    moxifloxacin (AVELOX) 400 mg tablet    olmesartan (BENICAR) 20 MG tablet    ondansetron (ZOFRAN) 8 MG tablet    pen needle, diabetic (BD ULTRA-FINE BRYANT PEN NEEDLE) 32 gauge x 5/32" Ndle    phenazopyridine (PYRIDIUM) 200 MG tablet    psyllium (METAMUCIL) packet    terconazole (TERAZOL 7) 0.4 % Crea    walker Misc     No current facility-administered medications for this visit.        Objective:  Alison Branch arrived late promptly for the session.    Ms. Branch was independently ambulatory at the time of session. The patient was fully cooperative throughout the session.  Appearance: age appropriate, appropriately  dressed, adequately  groomed  Behavior/Cooperation: friendly and cooperative  Speech: quiet  Mood: euthymic  Affect: appropriate  Thought Process: goal-directed, logical  Thought Content: normal,  No delusions or paranoia; did not appear to be responding to internal stimuli during the session  Orientation: grossly intact  Memory: Grossly intact  Attention Span/Concentration: Attends to session without distraction; reports no difficulty  Fund of " Knowledge: average  Estimate of Intelligence: average from verbal skills and history  Cognition: grossly intact  Insight: patient has awareness of illness; good insight into own behavior and behavior of others  Judgment: the patient's behavior is adequate to circumstances    Interval history and content of current session: Patient reported that she has not been feeling well and presented to the ED x2.  Discussed current adaptation to disease and treatment status. Reports to be coping in a passive manner. Evaluated cognitive response, paying particular attention to negative intrusive thoughts of a persistent and detrimental nature. Thoughts of this type are in evidence with mild distress. Provided cognitive behavioral therapy to address negative cognitions. Identified and evaluated psychosocial and environmental stressors secondary to diagnosis and treatment.  Examined proactive behaviors that may be implemented to minimize or ameliorate psychosocial stressors secondary to diagnosis and treatment.     Risk parameters:   Patient reports no suicidal ideation  Patient reports no homicidal ideation  Patient reports no self-injurious behavior  Patient reports no violent behavior   Safety needs:  None at this time      Verbal deficits: None     Patient's response to intervention:The patient's response to intervention is accepting.     Progress toward goals and other mental status changes:  The patient's progress toward goals is not progressing.      Progress to date:No Progress - Continue Objectives      Goals from last visit: Attempted, not met     Patient reported outcomes:    Distress Thermometer: 2   PHQ-9= 4, no SI   DELMER-7=2      Client Strengths: verbal, intelligent, successful, good social support, good insight, commitment to wellness, strong shannon, strong cultural traditions     Diagnosis:     ICD-10-CM ICD-9-CM   1. Mild episode of recurrent major depressive disorder F33.0 296.31   2. Memory deficit R41.3 780.93    3. Attention and concentration deficit R41.840 799.51   4. Malignant neoplasm of upper lobe of left lung C34.12 162.3       Treatment Plan:individual psychotherapy and Referral to Neurology  · Target symptoms: depression, lack of focus, anxiety   · Why chosen therapy is appropriate versus another modality: relevant to diagnosis, evidence based practice  · Outcome monitoring methods: self-report, observation, checklist/rating scale  · Therapeutic intervention type: behavior modifying psychotherapy  · Prognosis: Good      Behavioral goals:   Utilize simple logs in lieu of 5 different documentation sheets to reduce confusion and forgetfulness    Return to clinic: 3 weeks    Next Session:   Draft List of current neurological deficits/symptoms to present for Neuro referral given tendency of forgetfulness in medical appointments     Length of Service (minutes direct face-to-face contact): 45    Jagruti Garcia, PhD  [place] License #[id number]

## 2019-12-18 ENCOUNTER — OFFICE VISIT (OUTPATIENT)
Dept: INTERNAL MEDICINE | Facility: CLINIC | Age: 64
End: 2019-12-18
Payer: MEDICARE

## 2019-12-18 ENCOUNTER — LAB VISIT (OUTPATIENT)
Dept: LAB | Facility: HOSPITAL | Age: 64
End: 2019-12-18
Attending: INTERNAL MEDICINE
Payer: MEDICARE

## 2019-12-18 VITALS
OXYGEN SATURATION: 98 % | TEMPERATURE: 98 F | DIASTOLIC BLOOD PRESSURE: 70 MMHG | HEART RATE: 91 BPM | SYSTOLIC BLOOD PRESSURE: 120 MMHG | BODY MASS INDEX: 31.35 KG/M2 | WEIGHT: 218.5 LBS

## 2019-12-18 DIAGNOSIS — Z87.01 HX OF BACTERIAL PNEUMONIA: ICD-10-CM

## 2019-12-18 DIAGNOSIS — C34.12 MALIGNANT NEOPLASM OF UPPER LOBE OF LEFT LUNG: ICD-10-CM

## 2019-12-18 DIAGNOSIS — E11.42 TYPE 2 DIABETES MELLITUS WITH DIABETIC POLYNEUROPATHY, WITH LONG-TERM CURRENT USE OF INSULIN: ICD-10-CM

## 2019-12-18 DIAGNOSIS — Z09 HOSPITAL DISCHARGE FOLLOW-UP: Primary | ICD-10-CM

## 2019-12-18 DIAGNOSIS — Z79.4 TYPE 2 DIABETES MELLITUS WITH DIABETIC POLYNEUROPATHY, WITH LONG-TERM CURRENT USE OF INSULIN: ICD-10-CM

## 2019-12-18 LAB
ALBUMIN SERPL BCP-MCNC: 3.9 G/DL (ref 3.5–5.2)
ALP SERPL-CCNC: 81 U/L (ref 55–135)
ALT SERPL W/O P-5'-P-CCNC: 39 U/L (ref 10–44)
ANION GAP SERPL CALC-SCNC: 10 MMOL/L (ref 8–16)
AST SERPL-CCNC: 35 U/L (ref 10–40)
BILIRUB SERPL-MCNC: 0.5 MG/DL (ref 0.1–1)
BUN SERPL-MCNC: 18 MG/DL (ref 8–23)
CALCIUM SERPL-MCNC: 10.7 MG/DL (ref 8.7–10.5)
CHLORIDE SERPL-SCNC: 98 MMOL/L (ref 95–110)
CO2 SERPL-SCNC: 25 MMOL/L (ref 23–29)
CREAT SERPL-MCNC: 1.2 MG/DL (ref 0.5–1.4)
ERYTHROCYTE [DISTWIDTH] IN BLOOD BY AUTOMATED COUNT: 14.8 % (ref 11.5–14.5)
EST. GFR  (AFRICAN AMERICAN): 56 ML/MIN/1.73 M^2
EST. GFR  (NON AFRICAN AMERICAN): 49 ML/MIN/1.73 M^2
GLUCOSE SERPL-MCNC: 356 MG/DL (ref 70–110)
HCT VFR BLD AUTO: 35 % (ref 37–48.5)
HGB BLD-MCNC: 11.1 G/DL (ref 12–16)
MCH RBC QN AUTO: 28.8 PG (ref 27–31)
MCHC RBC AUTO-ENTMCNC: 31.7 G/DL (ref 32–36)
MCV RBC AUTO: 91 FL (ref 82–98)
NEUTROPHILS # BLD AUTO: 5.6 K/UL (ref 1.8–7.7)
PLATELET # BLD AUTO: 323 K/UL (ref 150–350)
PMV BLD AUTO: 10.8 FL (ref 9.2–12.9)
POTASSIUM SERPL-SCNC: 4.9 MMOL/L (ref 3.5–5.1)
PROT SERPL-MCNC: 8.7 G/DL (ref 6–8.4)
RBC # BLD AUTO: 3.86 M/UL (ref 4–5.4)
SODIUM SERPL-SCNC: 133 MMOL/L (ref 136–145)
WBC # BLD AUTO: 6.58 K/UL (ref 3.9–12.7)

## 2019-12-18 PROCEDURE — 3074F SYST BP LT 130 MM HG: CPT | Mod: CPTII,S$GLB,, | Performed by: PHYSICIAN ASSISTANT

## 2019-12-18 PROCEDURE — 99999 PR PBB SHADOW E&M-EST. PATIENT-LVL V: ICD-10-PCS | Mod: PBBFAC,,, | Performed by: PHYSICIAN ASSISTANT

## 2019-12-18 PROCEDURE — 3008F PR BODY MASS INDEX (BMI) DOCUMENTED: ICD-10-PCS | Mod: CPTII,S$GLB,, | Performed by: PHYSICIAN ASSISTANT

## 2019-12-18 PROCEDURE — 3046F HEMOGLOBIN A1C LEVEL >9.0%: CPT | Mod: CPTII,S$GLB,, | Performed by: PHYSICIAN ASSISTANT

## 2019-12-18 PROCEDURE — 3008F BODY MASS INDEX DOCD: CPT | Mod: CPTII,S$GLB,, | Performed by: PHYSICIAN ASSISTANT

## 2019-12-18 PROCEDURE — 85027 COMPLETE CBC AUTOMATED: CPT

## 2019-12-18 PROCEDURE — 99999 PR PBB SHADOW E&M-EST. PATIENT-LVL V: CPT | Mod: PBBFAC,,, | Performed by: PHYSICIAN ASSISTANT

## 2019-12-18 PROCEDURE — 80053 COMPREHEN METABOLIC PANEL: CPT

## 2019-12-18 PROCEDURE — 36415 COLL VENOUS BLD VENIPUNCTURE: CPT

## 2019-12-18 PROCEDURE — 99499 RISK ADDL DX/OHS AUDIT: ICD-10-PCS | Mod: S$GLB,,, | Performed by: PHYSICIAN ASSISTANT

## 2019-12-18 PROCEDURE — 3046F PR MOST RECENT HEMOGLOBIN A1C LEVEL > 9.0%: ICD-10-PCS | Mod: CPTII,S$GLB,, | Performed by: PHYSICIAN ASSISTANT

## 2019-12-18 PROCEDURE — 3074F PR MOST RECENT SYSTOLIC BLOOD PRESSURE < 130 MM HG: ICD-10-PCS | Mod: CPTII,S$GLB,, | Performed by: PHYSICIAN ASSISTANT

## 2019-12-18 PROCEDURE — 99213 OFFICE O/P EST LOW 20 MIN: CPT | Mod: S$GLB,,, | Performed by: PHYSICIAN ASSISTANT

## 2019-12-18 PROCEDURE — 99499 UNLISTED E&M SERVICE: CPT | Mod: S$GLB,,, | Performed by: PHYSICIAN ASSISTANT

## 2019-12-18 PROCEDURE — 3078F PR MOST RECENT DIASTOLIC BLOOD PRESSURE < 80 MM HG: ICD-10-PCS | Mod: CPTII,S$GLB,, | Performed by: PHYSICIAN ASSISTANT

## 2019-12-18 PROCEDURE — 3078F DIAST BP <80 MM HG: CPT | Mod: CPTII,S$GLB,, | Performed by: PHYSICIAN ASSISTANT

## 2019-12-18 PROCEDURE — 99213 PR OFFICE/OUTPT VISIT, EST, LEVL III, 20-29 MIN: ICD-10-PCS | Mod: S$GLB,,, | Performed by: PHYSICIAN ASSISTANT

## 2019-12-18 NOTE — PROGRESS NOTES
Subjective:       Patient ID: Alison Branch is a 64 y.o. female.    Chief Complaint: No chief complaint on file.    HPI     Established pt of Mike Rodriguez Ii, MD     Here for ED follow up.     Seen on  for fever, had TMAX 101F, this resolved prior to her arrival to ED with tylenol. In the ED CXR was stable, UA negative, procalcitonin WNL, flu negative, blood cultures negative. She was seen one day prior on  for bacterial sinusitis on Avelox.    Today inclcni she state she is feeling better, coughing less. Notes rhinorrhea and mild nasal congestion. No recurrent fevers. She is afebrile today in clinic. She continues to take antibiotic Avelox.     She has appt on tomorrow with Oncology for chemo.     Past Medical History:   Diagnosis Date    Allergy     Brain aneurysm     s/p coiling of one; another not coiled    Breast cyst     Depression 2019    Diabetes mellitus     Diabetes type 2, controlled     Fever blister     High cholesterol     History of Bell's palsy     HTN (hypertension) 2014     Social History     Tobacco Use    Smoking status: Former Smoker     Packs/day: 0.50     Years: 30.00     Pack years: 15.00     Last attempt to quit: 1999     Years since quittin.9    Smokeless tobacco: Never Used   Substance Use Topics    Alcohol use: No     Alcohol/week: 0.0 standard drinks    Drug use: No     Review of patient's allergies indicates:  No Known Allergies      Review of Systems   Constitutional: Positive for fatigue. Negative for chills, fever and unexpected weight change.   HENT: Positive for congestion and rhinorrhea.    Respiratory: Positive for cough (improved) and shortness of breath (stable). Negative for wheezing.    Cardiovascular: Negative for chest pain and leg swelling.   Gastrointestinal: Negative for abdominal pain, nausea and vomiting.   Skin: Negative for rash.   Neurological: Negative for weakness and headaches.       Objective: /70   Pulse  91   Temp 97.6 °F (36.4 °C)   Wt 99.1 kg (218 lb 7.6 oz)   SpO2 98%   BMI 31.35 kg/m²         Physical Exam   Constitutional: She appears well-developed and well-nourished. No distress.   HENT:   Head: Normocephalic and atraumatic.   Nose: Rhinorrhea present.   Mouth/Throat: Oropharynx is clear and moist.   Cardiovascular: Normal rate and regular rhythm. Exam reveals no friction rub.   No murmur heard.  Pulmonary/Chest: Effort normal and breath sounds normal. She has no wheezes. She has no rales.   Musculoskeletal: She exhibits no edema.   Ambulating with rolling walker   Lymphadenopathy:     She has no cervical adenopathy.   Neurological: She is alert.   Skin: Skin is warm and dry. No rash noted.   Vitals reviewed.      Assessment:       1. Hospital discharge follow-up    2. Malignant neoplasm of upper lobe of left lung    3. Hx of bacterial pneumonia    4. Type 2 diabetes mellitus with diabetic polyneuropathy, with long-term current use of insulin        Plan:         Alison was seen today for follow-up and cough.    Diagnoses and all orders for this visit:    Hospital discharge follow-up  Malignant neoplasm of upper lobe of left lung  Hx of bacterial pneumonia    Keep upcoming f/u with Oncology on tomorrow for re-eval  Continue antibiotics as prescribed  Tessalon prn  OTC antihistamine.         Type 2 diabetes mellitus with diabetic polyneuropathy, with long-term current use of insulin  A1c improved but note transient elevation of BG with steroid for chemo  Complaint with insulin encouraged  Keep upcoming Endo f/u on 12/27        Bhavna Perez PA-C

## 2019-12-19 ENCOUNTER — OFFICE VISIT (OUTPATIENT)
Dept: HEMATOLOGY/ONCOLOGY | Facility: CLINIC | Age: 64
End: 2019-12-19
Payer: MEDICARE

## 2019-12-19 ENCOUNTER — INFUSION (OUTPATIENT)
Dept: INFUSION THERAPY | Facility: HOSPITAL | Age: 64
End: 2019-12-19
Attending: INTERNAL MEDICINE
Payer: MEDICARE

## 2019-12-19 VITALS
DIASTOLIC BLOOD PRESSURE: 56 MMHG | WEIGHT: 217.63 LBS | RESPIRATION RATE: 18 BRPM | HEART RATE: 73 BPM | BODY MASS INDEX: 32.24 KG/M2 | HEIGHT: 69 IN | TEMPERATURE: 97 F | SYSTOLIC BLOOD PRESSURE: 121 MMHG

## 2019-12-19 VITALS
BODY MASS INDEX: 32.24 KG/M2 | WEIGHT: 217.63 LBS | SYSTOLIC BLOOD PRESSURE: 141 MMHG | DIASTOLIC BLOOD PRESSURE: 58 MMHG | OXYGEN SATURATION: 99 % | HEIGHT: 69 IN | RESPIRATION RATE: 20 BRPM | TEMPERATURE: 97 F | HEART RATE: 80 BPM

## 2019-12-19 DIAGNOSIS — T45.1X5A CHEMOTHERAPY-INDUCED PERIPHERAL NEUROPATHY: ICD-10-CM

## 2019-12-19 DIAGNOSIS — R60.0 EDEMA, PERIPHERAL: ICD-10-CM

## 2019-12-19 DIAGNOSIS — R06.02 SHORTNESS OF BREATH ON EXERTION: ICD-10-CM

## 2019-12-19 DIAGNOSIS — C77.1 SECONDARY MALIGNANT NEOPLASM OF MEDIASTINAL LYMPH NODE: ICD-10-CM

## 2019-12-19 DIAGNOSIS — Z79.4 TYPE 2 DIABETES MELLITUS WITH DIABETIC POLYNEUROPATHY, WITH LONG-TERM CURRENT USE OF INSULIN: ICD-10-CM

## 2019-12-19 DIAGNOSIS — R50.9 FEVER OF UNKNOWN ORIGIN (FUO): ICD-10-CM

## 2019-12-19 DIAGNOSIS — E11.42 TYPE 2 DIABETES MELLITUS WITH DIABETIC POLYNEUROPATHY, WITH LONG-TERM CURRENT USE OF INSULIN: ICD-10-CM

## 2019-12-19 DIAGNOSIS — C34.12 MALIGNANT NEOPLASM OF UPPER LOBE OF LEFT LUNG: Primary | ICD-10-CM

## 2019-12-19 DIAGNOSIS — G62.0 CHEMOTHERAPY-INDUCED PERIPHERAL NEUROPATHY: ICD-10-CM

## 2019-12-19 DIAGNOSIS — C34.12 MALIGNANT NEOPLASM OF UPPER LOBE OF LEFT LUNG: ICD-10-CM

## 2019-12-19 DIAGNOSIS — R50.9 FEVER OF UNKNOWN ORIGIN (FUO): Primary | ICD-10-CM

## 2019-12-19 PROCEDURE — 3008F BODY MASS INDEX DOCD: CPT | Mod: CPTII,S$GLB,, | Performed by: PHYSICIAN ASSISTANT

## 2019-12-19 PROCEDURE — 3046F HEMOGLOBIN A1C LEVEL >9.0%: CPT | Mod: CPTII,S$GLB,, | Performed by: PHYSICIAN ASSISTANT

## 2019-12-19 PROCEDURE — 96367 TX/PROPH/DG ADDL SEQ IV INF: CPT

## 2019-12-19 PROCEDURE — 63600175 PHARM REV CODE 636 W HCPCS: Mod: JG | Performed by: PHYSICIAN ASSISTANT

## 2019-12-19 PROCEDURE — 99214 PR OFFICE/OUTPT VISIT, EST, LEVL IV, 30-39 MIN: ICD-10-PCS | Mod: S$GLB,,, | Performed by: PHYSICIAN ASSISTANT

## 2019-12-19 PROCEDURE — 3008F PR BODY MASS INDEX (BMI) DOCUMENTED: ICD-10-PCS | Mod: CPTII,S$GLB,, | Performed by: PHYSICIAN ASSISTANT

## 2019-12-19 PROCEDURE — 99499 UNLISTED E&M SERVICE: CPT | Mod: S$GLB,,, | Performed by: PHYSICIAN ASSISTANT

## 2019-12-19 PROCEDURE — 96413 CHEMO IV INFUSION 1 HR: CPT

## 2019-12-19 PROCEDURE — A4216 STERILE WATER/SALINE, 10 ML: HCPCS | Performed by: PHYSICIAN ASSISTANT

## 2019-12-19 PROCEDURE — 3078F DIAST BP <80 MM HG: CPT | Mod: CPTII,S$GLB,, | Performed by: PHYSICIAN ASSISTANT

## 2019-12-19 PROCEDURE — 99999 PR PBB SHADOW E&M-EST. PATIENT-LVL V: ICD-10-PCS | Mod: PBBFAC,,, | Performed by: PHYSICIAN ASSISTANT

## 2019-12-19 PROCEDURE — 3074F PR MOST RECENT SYSTOLIC BLOOD PRESSURE < 130 MM HG: ICD-10-PCS | Mod: CPTII,S$GLB,, | Performed by: PHYSICIAN ASSISTANT

## 2019-12-19 PROCEDURE — 25000003 PHARM REV CODE 250: Performed by: PHYSICIAN ASSISTANT

## 2019-12-19 PROCEDURE — 96417 CHEMO IV INFUS EACH ADDL SEQ: CPT

## 2019-12-19 PROCEDURE — 99999 PR PBB SHADOW E&M-EST. PATIENT-LVL V: CPT | Mod: PBBFAC,,, | Performed by: PHYSICIAN ASSISTANT

## 2019-12-19 PROCEDURE — 99499 RISK ADDL DX/OHS AUDIT: ICD-10-PCS | Mod: S$GLB,,, | Performed by: PHYSICIAN ASSISTANT

## 2019-12-19 PROCEDURE — 3078F PR MOST RECENT DIASTOLIC BLOOD PRESSURE < 80 MM HG: ICD-10-PCS | Mod: CPTII,S$GLB,, | Performed by: PHYSICIAN ASSISTANT

## 2019-12-19 PROCEDURE — 99214 OFFICE O/P EST MOD 30 MIN: CPT | Mod: S$GLB,,, | Performed by: PHYSICIAN ASSISTANT

## 2019-12-19 PROCEDURE — 3074F SYST BP LT 130 MM HG: CPT | Mod: CPTII,S$GLB,, | Performed by: PHYSICIAN ASSISTANT

## 2019-12-19 PROCEDURE — 3046F PR MOST RECENT HEMOGLOBIN A1C LEVEL > 9.0%: ICD-10-PCS | Mod: CPTII,S$GLB,, | Performed by: PHYSICIAN ASSISTANT

## 2019-12-19 RX ORDER — LIDOCAINE AND PRILOCAINE 25; 25 MG/G; MG/G
CREAM TOPICAL
Qty: 30 G | Refills: 0 | Status: SHIPPED | OUTPATIENT
Start: 2019-12-19 | End: 2020-04-24

## 2019-12-19 RX ORDER — HEPARIN 100 UNIT/ML
500 SYRINGE INTRAVENOUS
Status: DISCONTINUED | OUTPATIENT
Start: 2019-12-19 | End: 2019-12-19 | Stop reason: HOSPADM

## 2019-12-19 RX ORDER — SODIUM CHLORIDE 0.9 % (FLUSH) 0.9 %
10 SYRINGE (ML) INJECTION
Status: DISCONTINUED | OUTPATIENT
Start: 2019-12-19 | End: 2019-12-19 | Stop reason: HOSPADM

## 2019-12-19 RX ORDER — SODIUM CHLORIDE 0.9 % (FLUSH) 0.9 %
10 SYRINGE (ML) INJECTION
Status: CANCELLED | OUTPATIENT
Start: 2019-12-19

## 2019-12-19 RX ORDER — HEPARIN 100 UNIT/ML
500 SYRINGE INTRAVENOUS
Status: CANCELLED | OUTPATIENT
Start: 2019-12-19

## 2019-12-19 RX ADMIN — DENOSUMAB 120 MG: 120 INJECTION SUBCUTANEOUS at 01:12

## 2019-12-19 RX ADMIN — HEPARIN 500 UNITS: 100 SYRINGE at 01:12

## 2019-12-19 RX ADMIN — Medication 10 ML: at 01:12

## 2019-12-19 RX ADMIN — SODIUM CHLORIDE 200 MG: 9 INJECTION, SOLUTION INTRAVENOUS at 12:12

## 2019-12-19 RX ADMIN — SODIUM CHLORIDE 1075 MG: 9 INJECTION, SOLUTION INTRAVENOUS at 01:12

## 2019-12-19 RX ADMIN — DEXAMETHASONE SODIUM PHOSPHATE 10 MG: 4 INJECTION, SOLUTION INTRA-ARTICULAR; INTRALESIONAL; INTRAMUSCULAR; INTRAVENOUS; SOFT TISSUE at 11:12

## 2019-12-19 RX ADMIN — Medication 1 MG: at 12:12

## 2019-12-19 NOTE — PROGRESS NOTES
"Subjective:       Patient ID: Alison Branch is a 64 y.o. female.    Chief Complaint: Malignant neoplasm of upper lobe of left lung    History:  Past Medical History:   Diagnosis Date    Allergy     Brain aneurysm 2010    s/p coiling of one; another not coiled    Breast cyst     Depression 9/19/2019    Diabetes mellitus     Diabetes type 2, controlled     Fever blister     High cholesterol     History of Bell's palsy     HTN (hypertension) 5/20/2014     Past Surgical History:   Procedure Laterality Date    BREAST CYST ASPIRATION      BRONCHOSCOPY N/A 5/28/2019    Procedure: Bronchoscopy;  Surgeon: [facility] Diagnostic Provider;  [facility];  Service: Anesthesiology;  Laterality: N/A;    CERVICAL FUSION      COLONOSCOPY N/A 3/9/2016    Procedure: COLONOSCOPY;  Surgeon: Elliott Zimmerman MD;  Location: [facility] ENDO (4TH FLR);  Service: Endoscopy;  Laterality: N/A;    head surgery      stent and "curling" for aneurysm    HYSTERECTOMY      TVH secondary to SUF    INSERTION OF TUNNELED CENTRAL VENOUS CATHETER (CVC) WITH SUBCUTANEOUS PORT Right 7/8/2019    Procedure: INSERTION, PORT-A-CATH;  Surgeon: Cali Damico MD;  Location: [facility] CATH LAB;  Service: Radiology;  Laterality: Right;         Malignant neoplasm of upper lobe of left lung    5/23/2019 Initial Diagnosis     Malignant neoplasm of upper lobe of left lung      6/24/2019 -  Chemotherapy     Treatment Summary   Plan Name: OP NSCLC PEMBROLIZUMAB PEMETREXED CARBOPLATIN Q3W  Treatment Goal: Palliative  Status: Active  Start Date: 7/9/2019  End Date: 6/17/2021 (Planned)  Provider: Tara Belcher MD  Chemotherapy: CARBOplatin (PARAPLATIN) 530 mg in sodium chloride 0.9% 250 mL chemo infusion, 530 mg (100 % of original dose 531.5 mg), Intravenous, Clinic/HOD 1 time, 4 of 4 cycles  Dose modification:   (original dose 622 mg, Cycle 2),   (original dose 567 mg, Cycle 3),   (original dose 616 mg, Cycle 4),   (original dose 531.5 mg, Cycle " 1)  Administration: 620 mg (7/29/2019), 565 mg (8/19/2019), 615 mg (9/5/2019), 530 mg (7/9/2019)  PEMEtrexed (ALIMTA) 1,075 mg in sodium chloride 0.9% 100 mL chemo infusion, 500 mg/m2 = 1,075 mg, Intravenous, Clinic/HOD 1 time, 8 of 35 cycles  Administration: 1,075 mg (7/29/2019), 1,075 mg (8/19/2019), 1,075 mg (9/5/2019), 1,075 mg (9/26/2019), 1,075 mg (7/9/2019), 1,075 mg (10/16/2019), 1,075 mg (11/6/2019), 1,075 mg (11/27/2019)  pembrolizumab (KEYTRUDA) 200 mg in sodium chloride 0.9% 100 mL chemo infusion, 200 mg, Intravenous, Clinic/HOD 1 time, 8 of 35 cycles  Administration: 200 mg (7/29/2019), 200 mg (8/19/2019), 200 mg (9/5/2019), 200 mg (9/26/2019), 200 mg (7/9/2019), 200 mg (10/16/2019), 200 mg (11/6/2019), 200 mg (11/27/2019)        Secondary malignant neoplasm of mediastinal lymph node    6/7/2019 Initial Diagnosis     Secondary malignant neoplasm of mediastinal lymph node      6/24/2019 -  Chemotherapy     Treatment Summary   Plan Name: OP NSCLC PEMBROLIZUMAB PEMETREXED CARBOPLATIN Q3W  Treatment Goal: Palliative  Status: Active  Start Date: 7/9/2019  End Date: 6/17/2021 (Planned)  Provider: Tara Belcher MD  Chemotherapy: CARBOplatin (PARAPLATIN) 530 mg in sodium chloride 0.9% 250 mL chemo infusion, 530 mg (100 % of original dose 531.5 mg), Intravenous, Clinic/HOD 1 time, 4 of 4 cycles  Dose modification:   (original dose 622 mg, Cycle 2),   (original dose 567 mg, Cycle 3),   (original dose 616 mg, Cycle 4),   (original dose 531.5 mg, Cycle 1)  Administration: 620 mg (7/29/2019), 565 mg (8/19/2019), 615 mg (9/5/2019), 530 mg (7/9/2019)  PEMEtrexed (ALIMTA) 1,075 mg in sodium chloride 0.9% 100 mL chemo infusion, 500 mg/m2 = 1,075 mg, Intravenous, [facility] [place] [facility]\"" 1 time, 8 of 35 cycles  Administration: 1,075 mg (7/29/2019), 1,075 mg (8/19/2019), 1,075 mg (9/5/2019), 1,075 mg (9/26/2019), 1,075 mg (7/9/2019), 1,075 mg (10/16/2019), 1,075 mg (11/6/2019), 1,075 mg (11/27/2019)  pembrolizumab (KEYTRUDA) 200 mg in  sodium chloride 0.9% 100 mL chemo infusion, 200 mg, Intravenous, Clinic/HOD 1 time, 8 of 35 cycles  Administration: 200 mg (7/29/2019), 200 mg (8/19/2019), 200 mg (9/5/2019), 200 mg (9/26/2019), 200 mg (7/9/2019), 200 mg (10/16/2019), 200 mg (11/6/2019), 200 mg (11/27/2019)          Allergies:  Review of patient's allergies indicates:  No Known Allergies     HPI Ms. Branch is a 63-year-old female who smoked for about 30 years and quit 20 years ago, presented with cough end of last year, but worsened into January.  Since the cough persisted, she saw her PCP, underwent a chest x-ray on 05/10/2019, that revealed left upper lobe pneumonia and a repeat CT was done in the week after that on 05/17/2019 that showed left upper lobe   mass arising from the lateral pleural surface in the left upper lobe posterior subsegment measuring 2.6 x 3 cm.  There are satellite mass more medially near the aortic arch that is 2 cm, also spiculated and irregular as well as thickened interlobar septa in the left lung apex and anterior segment, prevascular lymph node lateral to the aortic arch, short axis measuring 9 mm.       She then underwent a bronchoscopy on 05/28/2019, and that revealed there was an infiltration obtained in the left apical posterior segment of the left upper lobe cytology brush was done.  Transbronchial biopsies of an area of infiltration were also performed in the apicoposterior segment of the left upper lobe.    Pathology revealed adenocarcinoma; however, the specimen was not adequate enough to send for molecular testing.       She underwent a PET scan on 06/06/2019.  that reveals significant hypermetabolic activity in the large irregular spiculated pulmonary mass in the lateral aspect of the left upper lobe consistent with the patient's known pulmonary adenocarcinoma.  There is also tracer avidity in the medial left upper lobe satellite lesion and diffusely throughout much of the anterior left upper lobe where there  "is prominent nodular paraseptal thickening.  There are some numerous scattered subcentimeter pulmonary nodules throughout the right lung, all of which are too small for detection by PET.  For example, there is a 0.4 cm nodule in the superior right lower lobe and a micro nodule in the posterior segment of the right upper lobe.  There is a 0.5 cm, normal size right   paratracheal lymph node with increased radiotracer uptake as well as hypermetabolic aortic pulmonary lymph node and a left hilar lymph node.  There is a focal hypermetabolic lesion in the left anterior superior iliac spine associated with well defined lytic lesion.  There is a hypermetabolic focus in the anterior right fifth rib with a possible associated lytic lesion.  SUVs as   below lateral:  Left upper lobe  SUV max 17.9, anterior left upper lobe satellite mass and septal thickening SUV max of 10.1, right paratracheal lymph node SUV max of 4.8, left AP window lymph node SUV max of 17.9, left anterior superior iliac spine SUV max of 3.9"     MRI pelvis from 6/10/19  reveals "Region of osseous is irregularity at the left anterior iliac spine likely related to bone graft harvest procedure.  See  discussion above.  No suspicious signal or enhancement to suggest active/malignant process"   MRI brain from 6/10//19 reveal No intracranial abnormality.     We discussed her case at [facility], and plan was to wait for GAURDANT and proceed with treatment accordingly  Her GAURDANT is negative for molecular markers.     She has completed 4 cycles of Carboplatin, Alimta and Keytruda as of 9/5/19. She is now on  ALimta and Keytruda maintenance. Last scan was performed on 9/4/2019 that displayed Decrease in size of the two spiculated masses in the left upper lobe as above.  Persistent interlobular septal thickening throughout the left upper lobe noted, similar to prior examination.  Decrease in size of the mediastinal lymph node lateral to the aortic arch.  Stable " micronodules in the right lung.  Mixed lytic/sclerotic osseous lesion involving the left anterior superior iliac spine, concerning for possible osseous metastasis noting that this lesion was hypermetabolic on recent PET-CT.     Interval History 12/19/2019: She comes in for cycle 9 of maintenance Alimta and Keytruda. She was recently admitted to the hospital on 2 separate occasions for fever of unknown origin and worsening shortness of breath. During her hospital course, she was afebrile with negative imaging studies for pneumonia. She reported having a fever at home, treated with tylenol. Her exam during her hospital admission was otherwise negative, no cough, chronic SOB, no urinary symptoms, no GI symptoms. Vitals were also normal at the time. Patients lung sounds were clear to auscultation, abdomen was soft and non-tender. CBC without evidence of neutropenia or leukocytosis, Hgb was also stable at 9.9. Given that abdominal exam was unremarkable and CMP was within normal limits, it was less likely that there was an intra-abodminal source causing the fever. Flu negative. CXR without acute consolidation. Urinalysis inconsistent with UTI. Hence, there is no clear indication as to the cause of her intermittent fevers. However, she was well-appearing, not septic, and did not need hospitalization at this time. She was placed on antibiotics that she is due to complete this Sunday.    She is doing much better today and is afebrile. She notes some moderate swelling of her ankles and feet toward the end of the day but this has improved. She also reports intermittent night sweats and itching of her abdomen when she administers her insulin injection to the abdomen . She has some peeling of the skin of the bottom of her feet but there is no bleeding or signs of infection. She continues to ambulate with her walker. She is also having intermittent shortness of breath with exertion but not at rest and has not worsened. Neuropathy  is stable. Previous report of dysuria has improved after this last round of chemotherapy. She denies worsening fatigue, dizziness and falls. MRI brain on 10/22/19 was normal. However, she feels that she is doing much better today. She denies any nausea, vomiting, diarrhea, constipation, abdominal pain, weight loss or loss of appetite, chest pain, leg swelling, fatigue, pain, headache, hematuria, melena, reflux, dysphagia, odynophagia, dizziness, or mood changes. She did have some insomnia but attributes this to the dexamethasone that was taken. Her ECOG PS is zero.     ECO  ECOG SCORE          Review of Systems   Constitutional: Negative for appetite change, chills, fatigue, fever and unexpected weight change.        Night sweats   HENT: Negative for congestion, facial swelling, mouth sores, sinus pressure, sinus pain, sore throat and trouble swallowing.    Eyes: Negative for photophobia, pain and visual disturbance.   Respiratory: Positive for cough and shortness of breath. Negative for apnea, choking, chest tightness, wheezing and stridor.    Cardiovascular: Positive for leg swelling. Negative for chest pain.   Gastrointestinal: Negative for abdominal distention, abdominal pain, blood in stool, constipation, diarrhea, nausea, rectal pain and vomiting.   Genitourinary: Negative for dysuria, flank pain and urgency.   Musculoskeletal: Negative for back pain, gait problem, joint swelling and neck pain.   Skin: Negative for color change and rash.        Itching of the abdomen   Neurological: Negative for dizziness, syncope, weakness, light-headedness, numbness and headaches.   Hematological: Negative for adenopathy. Does not bruise/bleed easily.   Psychiatric/Behavioral: Negative for agitation, behavioral problems and confusion. The patient is not nervous/anxious.        Objective:      Physical Exam   Constitutional: She is oriented to person, place, and time. She appears well-developed and well-nourished. No  distress.   HENT:   Head: Normocephalic and atraumatic.   Mouth/Throat: No oropharyngeal exudate.   Eyes: Pupils are equal, round, and reactive to light. Conjunctivae and EOM are normal. No scleral icterus.   Neck: Normal range of motion. Neck supple.   Cardiovascular: Normal rate and regular rhythm. Exam reveals no gallop and no friction rub.   No murmur heard.  Pulmonary/Chest: Breath sounds normal. No respiratory distress. She exhibits no tenderness.   Abdominal: Soft. Bowel sounds are normal. She exhibits no distension and no mass. There is no tenderness. There is no rebound and no guarding. No hernia.   No rash is noted of the abdomen. There is dryness of the skin with slight scaling. No bleeding or open wounds present. There is no skin breakdown.   Musculoskeletal: Normal range of motion. She exhibits no edema or tenderness.   Lymphadenopathy:     She has no cervical adenopathy.   Neurological: She is alert and oriented to person, place, and time.   Skin: Skin is warm and dry. Capillary refill takes less than 2 seconds. No bruising, no ecchymosis, no laceration and no rash noted. No erythema.   Psychiatric: She has a normal mood and affect. Her behavior is normal. Judgment and thought content normal.   Nursing note and vitals reviewed.      Results:   CBC Oncology   Order: [id number]   Status:  Final result   Visible to patient:  No (Not Released) Next appt:  Today at 10:30 AM in Hematology and Oncology (Doris Mackey PA-C) Dx:  Malignant neoplasm of upper lobe of l...    Ref Range & Units 1d ago 6d ago   WBC 3.90 - 12.70 K/uL 6.58  7.37    RBC 4.00 - 5.40 M/uL 3.86 Low   3.44Low     Hemoglobin 12.0 - 16.0 g/dL 11.1 Low   9.9Low     Hematocrit 37.0 - 48.5 % 35.0 Low   32.7Low     Mean Corpuscular Volume 82 - 98 fL 91  95    Mean Corpuscular Hemoglobin 27.0 - 31.0 pg 28.8  28.8    Mean Corpuscular Hemoglobin Conc 32.0 - 36.0 g/dL 31.7 Low   30.3Low     RDW 11.5 - 14.5 % 14.8 High   14.6High     Platelets 150 -  350 K/uL 323  215    MPV 9.2 - 12.9 fL 10.8  11.3    Gran # (ANC) 1.8 - 7.7 K/uL 5.6  4.5    Comment: The ANC is based on a white cell differential from an   automated cell counter. It has not been microscopically   reviewed for the presence of abnormal cells. Clinical   correlation is required.            Comprehensive metabolic panel   Order: [id number]   Status:  Final result   Visible to patient:  No (Not Released) Next appt:  Today at 10:30 AM in Hematology and Oncology (Doris Mackey PA-C) Dx:  Malignant neoplasm of upper lobe of l...    Ref Range & Units 1d ago 6d ago   Sodium 136 - 145 mmol/L 133 Low   139    Potassium 3.5 - 5.1 mmol/L 4.9  4.1    Chloride 95 - 110 mmol/L 98  104    CO2 23 - 29 mmol/L 25  27    Glucose 70 - 110 mg/dL 356 High   160High     BUN, Bld 8 - 23 mg/dL 18  12    Creatinine 0.5 - 1.4 mg/dL 1.2  1.1    Calcium 8.7 - 10.5 mg/dL 10.7 High   10.2    Total Protein 6.0 - 8.4 g/dL 8.7 High   7.0    Albumin 3.5 - 5.2 g/dL 3.9  3.3Low     Total Bilirubin 0.1 - 1.0 mg/dL 0.5  0.4 CM   Comment: For infants and newborns, interpretation of results should be based   on gestational age, weight and in agreement with clinical   observations.   Premature Infant recommended reference ranges:   Up to 24 hours.............<8.0 mg/dL   Up to 48 hours............<12.0 mg/dL   3-5 days..................<15.0 mg/dL   6-29 days.................<15.0 mg/dL    Alkaline Phosphatase 55 - 135 U/L 81  66    AST 10 - 40 U/L 35  26    ALT 10 - 44 U/L 39  29    Anion Gap 8 - 16 mmol/L 10  8    eGFR if African American >60 mL/min/1.73 m^2 56 Abnormal   >60.0    eGFR if non African American >60 mL/min/1.73 m^2 49 Abnormal   53.9Abnormal  CM   Comment: Calculation used to obtain the estimated glomerular filtration   rate (eGFR) is the CKD-EPI equation.                 Assessment:   1. Malignant neoplasm of upper lobe of L lung  2. Secondary Malignant neoplasm of mediastinal lymph node  3. FUO  4. Night sweats  5. Type  II DM with polyneuropathy  6. Pruritis of the abdomen  7. Peripheral edema  Plan:   1,2. Will continue with cycle 9 of maintenance Alimta and Keytruda. Lab work is stable and she is doing much better clinically. We will order her CT scan of the chest, abdomen and pelvis given her most recent visits to the ER for fever. Her chest x-ray, blood cultures and UA have been unremarkable. It could be that her fever could be 2/2 to her tumor burden. Hence, we will order the CT for tomorrow.   -She will return in 3 weeks for her next cycle of maintenance therapy with Alimta and Keytruda. That will be cycle 10. Will contact her with the CT scan results.   3,4. Will continue to monitor. Will order a CT scan. Pte was advised to notify the care team or proceed to the ER if her temperature rises above 100.4  -Will complete course of antibiotics on Sunday, 12/22/2019.   5. Improving. Pte will continue to f/u with Endocrinologist and take medication as directed.   6. Resolved. Will continue to monitor. Can take Benadryl OTC if needed. Physical exam is negative for any signs of infection.   7. Stable.     Pte displayed understanding of the above encounter and treatment plan. All thoughtful questions were answered to their satisfaction. Pte was advised to notify the care team or proceed to the ER if signs and symptoms worsen.   Staff Note:  Agree with above

## 2019-12-19 NOTE — Clinical Note
Please schedule 3 week f/u with Dr. Belcher for next cycle of treatment with Keytruda and Alimta with CBC and CMP. Please schedule CT scan of chest, abdomen and pelvis for tomorrow. Thank you.

## 2019-12-20 ENCOUNTER — HOSPITAL ENCOUNTER (OUTPATIENT)
Dept: RADIOLOGY | Facility: HOSPITAL | Age: 64
Discharge: HOME | End: 2019-12-20
Attending: PHYSICIAN ASSISTANT
Payer: MEDICARE

## 2019-12-20 ENCOUNTER — OFFICE VISIT (OUTPATIENT)
Dept: PSYCHIATRY | Facility: CLINIC | Age: 64
End: 2019-12-20
Payer: COMMERCIAL

## 2019-12-20 DIAGNOSIS — F33.0 MILD EPISODE OF RECURRENT MAJOR DEPRESSIVE DISORDER: Primary | ICD-10-CM

## 2019-12-20 DIAGNOSIS — R50.9 FEVER OF UNKNOWN ORIGIN (FUO): ICD-10-CM

## 2019-12-20 DIAGNOSIS — R41.840 ATTENTION AND CONCENTRATION DEFICIT: ICD-10-CM

## 2019-12-20 DIAGNOSIS — C34.12 MALIGNANT NEOPLASM OF UPPER LOBE OF LEFT LUNG: ICD-10-CM

## 2019-12-20 DIAGNOSIS — R41.3 MEMORY DEFICIT: ICD-10-CM

## 2019-12-20 PROCEDURE — 74177 CT CHEST ABDOMEN PELVIS WITH CONTRAST (XPD): ICD-10-PCS | Mod: 26,,, | Performed by: RADIOLOGY

## 2019-12-20 PROCEDURE — 99999 PR PBB SHADOW E&M-EST. PATIENT-LVL II: ICD-10-PCS | Mod: PBBFAC,,, | Performed by: PSYCHOLOGIST

## 2019-12-20 PROCEDURE — 71260 CT CHEST ABDOMEN PELVIS WITH CONTRAST (XPD): ICD-10-PCS | Mod: 26,,, | Performed by: RADIOLOGY

## 2019-12-20 PROCEDURE — 74177 CT ABD & PELVIS W/CONTRAST: CPT | Mod: 26,,, | Performed by: RADIOLOGY

## 2019-12-20 PROCEDURE — 90832 PR PSYCHOTHERAPY W/PATIENT, 30 MIN: ICD-10-PCS | Mod: S$GLB,,, | Performed by: PSYCHOLOGIST

## 2019-12-20 PROCEDURE — 71260 CT THORAX DX C+: CPT | Mod: 26,,, | Performed by: RADIOLOGY

## 2019-12-20 PROCEDURE — 99999 PR PBB SHADOW E&M-EST. PATIENT-LVL II: CPT | Mod: PBBFAC,,, | Performed by: PSYCHOLOGIST

## 2019-12-20 PROCEDURE — 74177 CT ABD & PELVIS W/CONTRAST: CPT | Mod: TC

## 2019-12-20 PROCEDURE — 25500020 PHARM REV CODE 255: Performed by: PHYSICIAN ASSISTANT

## 2019-12-20 PROCEDURE — 90832 PSYTX W PT 30 MINUTES: CPT | Mod: S$GLB,,, | Performed by: PSYCHOLOGIST

## 2019-12-20 RX ADMIN — IOHEXOL 15 ML: 350 INJECTION, SOLUTION INTRAVENOUS at 05:12

## 2019-12-20 RX ADMIN — IOHEXOL 100 ML: 350 INJECTION, SOLUTION INTRAVENOUS at 05:12

## 2019-12-20 NOTE — PROGRESS NOTES
INFORMED CONSENT: Alison Branch   is known to this provider and identity was confirmed via NAME and .  The patient has been informed of the risks and benefits associated with engaging in psychotherapy, the handling of protected health information, the rights of privacy and the limits of confidentiality. The patient has also been informed of the importance of reporting any suicidal or homicidal ideation to this or any provider to ensure safety of all parties, and the Alison Branch expressed understanding. The patient was agreeable to these terms and freely participates in individual psychotherapy.    PSYCHO-ONCOLOGY NOTE/ Individual Psychotherapy     Date: 2019   [facility]        Therapeutic Intervention: Met with patient.  Outpatient - Behavior modifying psychotherapy 30 min - CPT code 22542      Patient was last seen by me on 2019    Problem list  Patient Active Problem List   Diagnosis    Uncontrolled type 2 diabetes mellitus with hyperglycemia, without long-term current use of insulin    Mixed hyperlipidemia due to type 2 diabetes mellitus    Attention and concentration deficit    Cerebral aneurysm, coiled in     Hypertension associated with diabetes    Hyperkeratosis of palms and soles    Irritable bowel syndrome    Aneurysm of unspecified site    Obesity (BMI 30-39.9)    Vitamin D deficiency    Malignant neoplasm of upper lobe of left lung    Secondary malignant neoplasm of mediastinal lymph node    Adrenal cortical steroids causing adverse effect in therapeutic use    Type 2 diabetes mellitus with diabetic polyneuropathy, with long-term current use of insulin    Major depressive disorder, recurrent, unspecified    Memory deficit    Dysuria    Edema, peripheral    Chemotherapy-induced peripheral neuropathy    Shortness of breath on exertion    Fever of unknown origin (FUO)       Chief complaint/reason for encounter: depression, anxiety and interpersonal   Met  "with patient to evaluate psychosocial adaptation to diagnosis/treatment/survivorship of Lung cancer    Current Medications  Current Outpatient Medications   Medication    aspirin (ECOTRIN) 81 MG EC tablet    atorvastatin (LIPITOR) 40 MG tablet    benzonatate (TESSALON PERLES) 100 MG capsule    bisacodyl (DULCOLAX) 5 mg EC tablet    blood sugar diagnostic Strp    blood-glucose meter kit    carboxymethylcellulose (REFRESH PLUS) 0.5 % Dpet    dexAMETHasone (DECADRON) 6 MG tablet    diazePAM (VALIUM) 5 MG tablet    ergocalciferol (ERGOCALCIFEROL) 50,000 unit Cap    fluticasone propionate (FLONASE) 50 mcg/actuation nasal spray    folic acid (FOLVITE) 1 MG tablet    insulin aspart U-100 (NOVOLOG) 100 unit/mL (3 mL) InPn pen    insulin detemir U-100 (LEVEMIR FLEXTOUCH) 100 unit/mL (3 mL) SubQ InPn pen    JANUVIA 100 mg Tab    lancets Misc    lidocaine-prilocaine (EMLA) cream    moxifloxacin (AVELOX) 400 mg tablet    olmesartan (BENICAR) 20 MG tablet    ondansetron (ZOFRAN) 8 MG tablet    pen needle, diabetic (BD ULTRA-FINE BRYANT PEN NEEDLE) 32 gauge x 5/32" Ndle    phenazopyridine (PYRIDIUM) 200 MG tablet    psyllium (METAMUCIL) packet    terconazole (TERAZOL 7) 0.4 % Crea    walker Misc     No current facility-administered medications for this visit.        Objective:  Alison Branch arrived 30 mins late for the session.   Ms. Branch was independently ambulatory at the time of session. The patient was fully cooperative throughout the session.  Appearance: age appropriate, appropriately  dressed, adequately  groomed  Behavior/Cooperation: friendly and cooperative  Speech: normal in rate, volume, and tone and appropriate quality, quantity and organization of sentences  Mood: mildly angry  Affect: appropriate  Thought Process: goal-directed, logical  Thought Content: normal,  No delusions or paranoia; did not appear to be responding to internal stimuli during the session  Orientation: grossly " intact  Memory: Grossly intact  Attention Span/Concentration: Attends to session without distraction; reports no difficulty  Fund of Knowledge: average  Estimate of Intelligence: average from verbal skills and history  Cognition: grossly intact  Insight: patient has awareness of illness; good insight into own behavior and behavior of others  Judgment: the patient's behavior is adequate to circumstances    Interval history and content of current session: Patient reported that revealed having family discord with her sisters related her her cancer diagnosis.  Discussed current adaptation to disease and treatment status and family's adaptation to disease and treatment status. Reports to be coping with great difficulty. Evaluated cognitive response, paying particular attention to negative intrusive thoughts of a persistent and detrimental nature. Thoughts of this type are in evidence with moderate distress. Provided cognitive behavioral therapy to address negative cognitions. Identified and evaluated psychosocial and environmental stressors secondary to diagnosis and treatment.  Examined proactive behaviors that may be implemented to minimize or ameliorate psychosocial stressors secondary to diagnosis and treatment.     Risk parameters:   Patient reports no suicidal ideation  Patient reports no homicidal ideation  Patient reports no self-injurious behavior  Patient reports no violent behavior   Safety needs:  None at this time      Verbal deficits: None     Patient's response to intervention:The patient's response to intervention is accepting.     Progress toward goals and other mental status changes:  The patient's progress toward goals is good.      Progress to date:Progress as Expected      Goals from last visit: Not attempted     Patient reported outcomes:    Distress Thermometer: 8   PHQ-9= 7, no SI   DELMER-7= 4      Client Strengths: verbal, successful, good social support, good insight, commitment to wellness,  strong shannon, strong cultural traditions     Diagnosis:     ICD-10-CM ICD-9-CM   1. Mild episode of recurrent major depressive disorder F33.0 296.31   2. Attention and concentration deficit R41.840 799.51   3. Memory deficit R41.3 780.93   4. Malignant neoplasm of upper lobe of left lung C34.12 162.3       Treatment Plan:individual psychotherapy and medication management by physician  · Target symptoms: depression, anxiety   · Why chosen therapy is appropriate versus another modality: relevant to diagnosis, evidence based practice  · Outcome monitoring methods: self-report, observation, checklist/rating scale  · Therapeutic intervention type: behavior modifying psychotherapy  · Prognosis: Good      Behavioral goals:   [place] Setting with toxi family member  Speak with PCP about restarting Lexapro.     Return to clinic: 2 weeks    Next Session:    Continuing with [place] Settings     Length of Service (minutes direct face-to-face contact): 30    Jagruti Garcia, PhD  [place] License #[id number]

## 2019-12-20 NOTE — Clinical Note
Hey! Ms. Branch agreed to restart her Lexapro. Her [facility] PCP writes it for her. She is also worried about how many medications she is taking. Do you know if the clinical pharmacists here do a medication review of some sort

## 2019-12-30 ENCOUNTER — TELEPHONE (OUTPATIENT)
Dept: PSYCHIATRY | Facility: CLINIC | Age: 64
End: 2019-12-30

## 2019-12-30 DIAGNOSIS — E11.42 TYPE 2 DIABETES MELLITUS WITH DIABETIC POLYNEUROPATHY, WITH LONG-TERM CURRENT USE OF INSULIN: Primary | ICD-10-CM

## 2019-12-30 DIAGNOSIS — Z79.4 TYPE 2 DIABETES MELLITUS WITH DIABETIC POLYNEUROPATHY, WITH LONG-TERM CURRENT USE OF INSULIN: Primary | ICD-10-CM

## 2019-12-30 NOTE — TELEPHONE ENCOUNTER
Returned phone call. Patient stated that she needed another Endocrine referral due to her provider being out. Will forward message to Dr. Belcher.

## 2019-12-31 ENCOUNTER — PATIENT MESSAGE (OUTPATIENT)
Dept: INTERNAL MEDICINE | Facility: CLINIC | Age: 64
End: 2019-12-31

## 2020-01-02 NOTE — TELEPHONE ENCOUNTER
----- Message from Tara Belcher MD sent at 8/19/2019  1:24 PM CDT -----  Needs to see Endocrine ASAP  
Spoke with patient.   will schedule with endocrinology asap.  Referral entered.      All appointments mailed.    
minimum assist (75% patients effort)/moderate assist (50% patients effort)

## 2020-01-06 ENCOUNTER — OFFICE VISIT (OUTPATIENT)
Dept: PSYCHIATRY | Facility: CLINIC | Age: 65
End: 2020-01-06
Payer: COMMERCIAL

## 2020-01-06 ENCOUNTER — TELEPHONE (OUTPATIENT)
Dept: ENDOCRINOLOGY | Facility: CLINIC | Age: 65
End: 2020-01-06

## 2020-01-06 DIAGNOSIS — R41.3 MEMORY DEFICIT: ICD-10-CM

## 2020-01-06 DIAGNOSIS — C34.12 MALIGNANT NEOPLASM OF UPPER LOBE OF LEFT LUNG: ICD-10-CM

## 2020-01-06 DIAGNOSIS — F33.0 MILD EPISODE OF RECURRENT MAJOR DEPRESSIVE DISORDER: Primary | ICD-10-CM

## 2020-01-06 PROCEDURE — 99999 PR PBB SHADOW E&M-EST. PATIENT-LVL I: CPT | Mod: PBBFAC,,, | Performed by: PSYCHOLOGIST

## 2020-01-06 PROCEDURE — 99999 PR PBB SHADOW E&M-EST. PATIENT-LVL I: ICD-10-PCS | Mod: PBBFAC,,, | Performed by: PSYCHOLOGIST

## 2020-01-06 PROCEDURE — 90834 PSYTX W PT 45 MINUTES: CPT | Mod: S$GLB,,, | Performed by: PSYCHOLOGIST

## 2020-01-06 PROCEDURE — 90834 PR PSYCHOTHERAPY W/PATIENT, 45 MIN: ICD-10-PCS | Mod: S$GLB,,, | Performed by: PSYCHOLOGIST

## 2020-01-06 NOTE — LETTER
January 6, 2020        David Mo MD  [address] [facility] [address] [address]             [place] [facility]  [address] [facility] [address]  Phone: [phone]  Fax: [phone]   Patient: Alison Branch   MR Number: [id number]   YOB: 1955   Date of Visit: 1/6/2020       Dear Dr. oM:    The [employer] Mental Status Examination (UMS), a cognitive screener, was administered to assess the Patient's current mental status and cognitive capacity. Patient obtained a score of 24/30. This score does not fall within normal limits as compared to those within similar age and level of education, and suggests mild impairments in neurocognitive functioning. Areas of concern include delayed memory, working memory, and immediate recall. She struggled to remember five  objects after a brief delay, could not identify the largest figure from a group of objects, and struggled to recall animals during a one minute time limit. It is difficulty to ascertain that etiology of her problems given her multiple medical concerns, presence of depression, history of attention problems, as well as current treatment for cancer. A full neurology and neuropsychological consult is recommended. Feedback provided to patient and she agreed to a referral to neurology.            If you have questions, please do not hesitate to call me. I look forward to following Alison along with you.    Sincerely,      Jagruti Garcia, PhD           CC  No Recipients

## 2020-01-06 NOTE — TELEPHONE ENCOUNTER
Pt stated that dr montaño message her on 12/9 explaining that pt doctor whom is at the [facility] was on maternity dr montaño will take over for her. I have her seen dr montaño on tomorrow at 10:30 to see dr montaño.                   ----- Message from Nadege Khan MA sent at 1/6/2020  2:58 PM CST -----  Contact: Self/[phone]  PT states that he Endo provider is out on maternity and has been speaking with Dr. Montaño though the portal and stated that she really needs to be seen.  The site where she does her injection have bruised and gotten sore

## 2020-01-06 NOTE — PROGRESS NOTES
INFORMED CONSENT: Alison Branch   is known to this provider and identity was confirmed via NAME and .  The patient has been informed of the risks and benefits associated with engaging in psychotherapy, the handling of protected health information, the rights of privacy and the limits of confidentiality. The patient has also been informed of the importance of reporting any suicidal or homicidal ideation to this or any provider to ensure safety of all parties, and the Alison Branch expressed understanding. The patient was agreeable to these terms and freely participates in individual psychotherapy.    PSYCHO-ONCOLOGY NOTE/ Individual Psychotherapy     Date: 2020   [facility]        Therapeutic Intervention: Met with patient.  Outpatient - Behavior modifying psychotherapy 45 min - CPT code 31316      Patient was last seen by me on 2019    Problem list  Patient Active Problem List   Diagnosis    Uncontrolled type 2 diabetes mellitus with hyperglycemia, without long-term current use of insulin    Mixed hyperlipidemia due to type 2 diabetes mellitus    Attention and concentration deficit    Cerebral aneurysm, coiled in     Hypertension associated with diabetes    Hyperkeratosis of palms and soles    Irritable bowel syndrome    Aneurysm of unspecified site    Obesity (BMI 30-39.9)    Vitamin D deficiency    Malignant neoplasm of upper lobe of left lung    Secondary malignant neoplasm of mediastinal lymph node    Adrenal cortical steroids causing adverse effect in therapeutic use    Type 2 diabetes mellitus with diabetic polyneuropathy, with long-term current use of insulin    Major depressive disorder, recurrent, unspecified    Memory deficit    Dysuria    Edema, peripheral    Chemotherapy-induced peripheral neuropathy    Shortness of breath on exertion    Fever of unknown origin (FUO)       Chief complaint/reason for encounter: depression and cognition   Met with patient to  "evaluate psychosocial adaptation to diagnosis/treatment/survivorship of Lung Cancer    Current Medications  Current Outpatient Medications   Medication    aspirin (ECOTRIN) 81 MG EC tablet    atorvastatin (LIPITOR) 40 MG tablet    benzonatate (TESSALON PERLES) 100 MG capsule    bisacodyl (DULCOLAX) 5 mg EC tablet    blood sugar diagnostic Strp    blood-glucose meter kit    carboxymethylcellulose (REFRESH PLUS) 0.5 % Dpet    dexAMETHasone (DECADRON) 6 MG tablet    diazePAM (VALIUM) 5 MG tablet    ergocalciferol (ERGOCALCIFEROL) 50,000 unit Cap    fluticasone propionate (FLONASE) 50 mcg/actuation nasal spray    folic acid (FOLVITE) 1 MG tablet    insulin aspart U-100 (NOVOLOG) 100 unit/mL (3 mL) InPn pen    insulin detemir U-100 (LEVEMIR FLEXTOUCH) 100 unit/mL (3 mL) SubQ InPn pen    JANUVIA 100 mg Tab    lancets Misc    lidocaine-prilocaine (EMLA) cream    olmesartan (BENICAR) 20 MG tablet    ondansetron (ZOFRAN) 8 MG tablet    pen needle, diabetic (BD ULTRA-FINE BRYANT PEN NEEDLE) 32 gauge x 5/32" Ndle    phenazopyridine (PYRIDIUM) 200 MG tablet    psyllium (METAMUCIL) packet    terconazole (TERAZOL 7) 0.4 % Crea    walker Misc     No current facility-administered medications for this visit.        Objective:  Alison Branch arrived promptly for the session.  Ms. Branch was independently ambulatory at the time of session. The patient was fully cooperative throughout the session.  Appearance: age appropriate, appropriately  dressed, adequately  groomed  Behavior/Cooperation: friendly and cooperative  Speech: normal in rate, volume, and tone and appropriate quality, quantity and organization of sentences  Mood: dysthymic  Affect: dysphoric  Thought Process: goal-directed, logical  Thought Content: normal,  No delusions or paranoia; did not appear to be responding to internal stimuli during the session  Orientation: grossly intact  Memory: Grossly intact  Attention Span/Concentration: Attends to " session without distraction; reports no difficulty  Fund of Knowledge: average  Estimate of Intelligence: average from verbal skills and history  Cognition: grossly intact  Insight: patient has awareness of illness; good insight into own behavior and behavior of others  Judgment: the patient's behavior is adequate to circumstances    Interval history and content of current session:  Patient able to follow goals of establishing boundaries with toxic family members. Discussed current adaptation to disease and treatment status. Reports to be coping with great difficulty. Evaluated cognitive response, paying particular attention to negative intrusive thoughts of a persistent and detrimental nature. Thoughts of this type are in evidence with moderate distress. Provided cognitive behavioral therapy to address negative cognitions. Identified and evaluated psychosocial and environmental stressors secondary to diagnosis and treatment.  Examined proactive behaviors that may be implemented to minimize or ameliorate psychosocial stressors secondary to diagnosis and treatment.     Risk parameters:   Patient reports no suicidal ideation  Patient reports no homicidal ideation  Patient reports no self-injurious behavior  Patient reports no violent behavior   Safety needs:  None at this time      Verbal deficits: None     Patient's response to intervention:The patient's response to intervention is accepting.     Progress toward goals and other mental status changes:  The patient's progress toward goals is fair .      Progress to date:Progress - Ongoing, but Slow      Goals from last visit: Met     Patient reported outcomes:    Distress Thermometer: 7   PHQ-9= 9   DELMER-7= 5      Client Strengths: verbal, intelligent, successful, good social support, good insight, commitment to wellness, strong shannon, strong cultural traditions     Diagnosis:     ICD-10-CM ICD-9-CM   1. Mild episode of recurrent major depressive disorder F33.0 296.31    2. Malignant neoplasm of upper lobe of left lung C34.12 162.3   3. Memory deficit R41.3 780.93       Treatment Plan:individual psychotherapy and medication management by physician  · Target symptoms: depression, memory  · Why chosen therapy is appropriate versus another modality: relevant to diagnosis, patient responds to this modality, evidence based practice  · Outcome monitoring methods: self-report, observation, checklist/rating scale  · Therapeutic intervention type: behavior modifying psychotherapy  · Prognosis: Good     Behavioral goals:   Continue with boundary settings  Follow-up with Neurology    Return to clinic: 3 weeks    Length of Service (minutes direct face-to-face contact): 45    Jagruti Garcia, PhD  [place] License #[id number]

## 2020-01-07 ENCOUNTER — PATIENT MESSAGE (OUTPATIENT)
Dept: ENDOCRINOLOGY | Facility: CLINIC | Age: 65
End: 2020-01-07

## 2020-01-07 ENCOUNTER — OFFICE VISIT (OUTPATIENT)
Dept: ENDOCRINOLOGY | Facility: CLINIC | Age: 65
End: 2020-01-07
Payer: MEDICARE

## 2020-01-07 VITALS
HEART RATE: 109 BPM | DIASTOLIC BLOOD PRESSURE: 60 MMHG | BODY MASS INDEX: 31.69 KG/M2 | WEIGHT: 213.94 LBS | HEIGHT: 69 IN | SYSTOLIC BLOOD PRESSURE: 124 MMHG

## 2020-01-07 DIAGNOSIS — T38.0X5A ADRENAL CORTICAL STEROIDS CAUSING ADVERSE EFFECT IN THERAPEUTIC USE: ICD-10-CM

## 2020-01-07 DIAGNOSIS — Z79.4 TYPE 2 DIABETES MELLITUS WITH HYPERGLYCEMIA, WITH LONG-TERM CURRENT USE OF INSULIN: ICD-10-CM

## 2020-01-07 DIAGNOSIS — E11.65 TYPE 2 DIABETES MELLITUS WITH HYPERGLYCEMIA, WITH LONG-TERM CURRENT USE OF INSULIN: ICD-10-CM

## 2020-01-07 PROCEDURE — 3008F PR BODY MASS INDEX (BMI) DOCUMENTED: ICD-10-PCS | Mod: CPTII,S$GLB,, | Performed by: INTERNAL MEDICINE

## 2020-01-07 PROCEDURE — 99999 PR PBB SHADOW E&M-EST. PATIENT-LVL III: ICD-10-PCS | Mod: PBBFAC,,, | Performed by: INTERNAL MEDICINE

## 2020-01-07 PROCEDURE — 3008F BODY MASS INDEX DOCD: CPT | Mod: CPTII,S$GLB,, | Performed by: INTERNAL MEDICINE

## 2020-01-07 PROCEDURE — 99499 RISK ADDL DX/OHS AUDIT: ICD-10-PCS | Mod: S$GLB,,, | Performed by: INTERNAL MEDICINE

## 2020-01-07 PROCEDURE — 3046F PR MOST RECENT HEMOGLOBIN A1C LEVEL > 9.0%: ICD-10-PCS | Mod: CPTII,S$GLB,, | Performed by: INTERNAL MEDICINE

## 2020-01-07 PROCEDURE — 3074F SYST BP LT 130 MM HG: CPT | Mod: CPTII,S$GLB,, | Performed by: INTERNAL MEDICINE

## 2020-01-07 PROCEDURE — 3078F DIAST BP <80 MM HG: CPT | Mod: CPTII,S$GLB,, | Performed by: INTERNAL MEDICINE

## 2020-01-07 PROCEDURE — 99999 PR PBB SHADOW E&M-EST. PATIENT-LVL III: CPT | Mod: PBBFAC,,, | Performed by: INTERNAL MEDICINE

## 2020-01-07 PROCEDURE — 3046F HEMOGLOBIN A1C LEVEL >9.0%: CPT | Mod: CPTII,S$GLB,, | Performed by: INTERNAL MEDICINE

## 2020-01-07 PROCEDURE — 99214 OFFICE O/P EST MOD 30 MIN: CPT | Mod: S$GLB,,, | Performed by: INTERNAL MEDICINE

## 2020-01-07 PROCEDURE — 3078F PR MOST RECENT DIASTOLIC BLOOD PRESSURE < 80 MM HG: ICD-10-PCS | Mod: CPTII,S$GLB,, | Performed by: INTERNAL MEDICINE

## 2020-01-07 PROCEDURE — 99214 PR OFFICE/OUTPT VISIT, EST, LEVL IV, 30-39 MIN: ICD-10-PCS | Mod: S$GLB,,, | Performed by: INTERNAL MEDICINE

## 2020-01-07 PROCEDURE — 99499 UNLISTED E&M SERVICE: CPT | Mod: S$GLB,,, | Performed by: INTERNAL MEDICINE

## 2020-01-07 PROCEDURE — 3074F PR MOST RECENT SYSTOLIC BLOOD PRESSURE < 130 MM HG: ICD-10-PCS | Mod: CPTII,S$GLB,, | Performed by: INTERNAL MEDICINE

## 2020-01-07 RX ORDER — INSULIN LISPRO 100 [IU]/ML
INJECTION, SOLUTION INTRAVENOUS; SUBCUTANEOUS
Qty: 15 ML | Refills: 3 | Status: SHIPPED | OUTPATIENT
Start: 2020-01-07 | End: 2020-02-03

## 2020-01-07 NOTE — ASSESSMENT & PLAN NOTE
Has regimen that changes when uses high doses of steroids.   Reviewed dosing today     Did not bring log     For now to continue, but once review log may need to make adjustments.   Has two regimens for days of dexamethasone and days of non dex

## 2020-01-07 NOTE — TELEPHONE ENCOUNTER
----- Message from Irena Singh sent at 1/7/2020  9:45 AM CST -----  [employer] NO LONGER COVER THE insulin aspart U-100 (NOVOLOG) 100 unit/mL (3 mL) InPn pen AS OF JANUARY ,2020 [facility] COVER'S THE GENERIC CALLED HUMALOG  PLEASE CALL AND ADVISE, PT WILL SEE DOCTOR TODAY  [facility] ([employer] [place])[phone]         THANKHERMELINDA MCGRAW

## 2020-01-07 NOTE — PATIENT INSTRUCTIONS
Non - Dexamethasone days     Levemir 26 units at bedtime   Humalog 16 units before breakfast/Humalog 10 units with lunch/Humalog 12 units with dinner   Sliding Scale:  150 - 200 + 1 unit  201 - 250 + 2 units   251 - 300 + 3 units   301 - 350 + 4 units    Dexamethasone days     Levemir 33 units at bedtime   Humalog 18 units with breakfast/humalog 12 units with lunch/humalog 14 units with dinner   Sliding scale  150 - 200 + 2 units   201 - 250 + 4 units   251 - 300 + 6 units  301 - 350 + 8 units     Send a blood sugar log so I can review your blood sugars as you may need an adjustment.   Follow up visit in four to five weeks with labs and visit.

## 2020-01-07 NOTE — PROGRESS NOTES
Subjective:      Patient ID: Alison Branch is a 64 y.o. female.    Chief Complaint:  Diabetes      History of Present Illness    Ms. Branch is a 64 year old woman with history of adennocarcinoma of the lung s/p chemotherapy currently on alimta and keytruda and dexamethasone which she is given with her chemotherapy every six weeks.   She is here for a follow up visit for type 2 diabetes that is uncontrolled and complicated.  Diagnosed 25 years ago.  Dexamethasone 6 mg twice a day the day before and two days after chemotherapy. Next dose is tomorrow.     Today patient reports no acute complaints, denies polyuria, excessive thirst, blurred vision.  Pt reports good compliance with therapy.    Current diabetic medications:  Levemir 26 units daily --> nonsteroid days   Levemir 33 Units --> steroid days    Novolog 16 units with breakfast, 10 units with lunch, and 12 units with dinner.  Uses sliding scale  Using pens  Hurts to touch her stomach with needle. So occasionally will have her daughter give the injection in the back of her arm.      Skipping meals due to nausea/low appetite or extreme fatigue/sleeping. Reports hypoglycemia as well, lowest is 64. Feels tremulousness and increased sweating. Treats with glucose tablets.   Admission for DKA a few months ago.     Blood sugar logs: did not bring log    Diabetes complications:    Has received Xgeva for bone mets as well. Last vitamin D level was 29. Serum calcium was 10.7 mg/dl with eGFR of 56 ml/min. Her creat has increased from 0.9 to 1.2 mg/dl.    Review of Systems  + nausea, low appetite   + fevers  + lower extremity swelling   + fatigue     Otherwise see HPI    Objective:   Physical Exam   Constitutional: She appears well-developed and well-nourished.   Cardiovascular: Normal rate.   Pulmonary/Chest: Effort normal and breath sounds normal.   Abdominal: Soft. Bowel sounds are normal. She exhibits no distension. There is no tenderness.   Minimal bruising   No erythema  "no evidence of local reaction   Musculoskeletal: She exhibits edema.   Skin: Skin is warm and dry.     Vitals:    01/07/20 1027   BP: 124/60   Pulse: 109   Weight: 97 kg (213 lb 15.3 oz)   Height: 5' 9" (1.753 m)       BP Readings from Last 3 Encounters:   01/09/20 (!) 145/66   01/09/20 128/60   01/07/20 124/60     Wt Readings from Last 1 Encounters:   01/10/20 0944 97.1 kg (214 lb)         Body mass index is 31.6 kg/m².    Lab Review:   Lab Results   Component Value Date    HGBA1C 9.1 (H) 12/10/2019     Lab Results   Component Value Date    CHOL 163 05/10/2019    HDL 53 05/10/2019    LDLCALC 97.2 05/10/2019    TRIG 64 05/10/2019    CHOLHDL 32.5 05/10/2019     Lab Results   Component Value Date     01/08/2020    K 4.5 01/08/2020     01/08/2020    CO2 26 01/08/2020     (H) 01/08/2020    BUN 13 01/08/2020    CREATININE 1.0 01/08/2020    CALCIUM 10.1 01/08/2020    PROT 7.4 01/08/2020    ALBUMIN 3.3 (L) 01/08/2020    BILITOT 0.3 01/08/2020    ALKPHOS 86 01/08/2020    AST 39 01/08/2020    ALT 59 (H) 01/08/2020    ANIONGAP 11 01/08/2020    ESTGFRAFRICA >60 01/08/2020    EGFRNONAA >60 01/08/2020    TSH 0.756 11/05/2019         Assessment and Plan     Type 2 diabetes mellitus with hyperglycemia, with long-term current use of insulin  Has regimen that changes when uses high doses of steroids.   Reviewed dosing today     Did not bring log     For now to continue, but once review log may need to make adjustments.   Has two regimens for days of dexamethasone and days of non dex            Adrenal cortical steroids causing adverse effect in therapeutic use  Dexamethasone leading to significant hyperglycemia  Have adjusted meal time insulin dosing today  To send log in 1 - 2 weeks        "

## 2020-01-08 ENCOUNTER — LAB VISIT (OUTPATIENT)
Dept: LAB | Facility: HOSPITAL | Age: 65
End: 2020-01-08
Payer: MEDICARE

## 2020-01-08 DIAGNOSIS — C34.12 MALIGNANT NEOPLASM OF UPPER LOBE OF LEFT LUNG: ICD-10-CM

## 2020-01-08 DIAGNOSIS — R50.9 FEVER OF UNKNOWN ORIGIN (FUO): ICD-10-CM

## 2020-01-08 LAB
ALBUMIN SERPL BCP-MCNC: 3.3 G/DL (ref 3.5–5.2)
ALP SERPL-CCNC: 86 U/L (ref 55–135)
ALT SERPL W/O P-5'-P-CCNC: 59 U/L (ref 10–44)
ANION GAP SERPL CALC-SCNC: 11 MMOL/L (ref 8–16)
AST SERPL-CCNC: 39 U/L (ref 10–40)
BILIRUB SERPL-MCNC: 0.3 MG/DL (ref 0.1–1)
BUN SERPL-MCNC: 13 MG/DL (ref 8–23)
CALCIUM SERPL-MCNC: 10.1 MG/DL (ref 8.7–10.5)
CHLORIDE SERPL-SCNC: 101 MMOL/L (ref 95–110)
CO2 SERPL-SCNC: 26 MMOL/L (ref 23–29)
CREAT SERPL-MCNC: 1 MG/DL (ref 0.5–1.4)
ERYTHROCYTE [DISTWIDTH] IN BLOOD BY AUTOMATED COUNT: 15.5 % (ref 11.5–14.5)
EST. GFR  (AFRICAN AMERICAN): >60 ML/MIN/1.73 M^2
EST. GFR  (NON AFRICAN AMERICAN): >60 ML/MIN/1.73 M^2
GLUCOSE SERPL-MCNC: 252 MG/DL (ref 70–110)
HCT VFR BLD AUTO: 32.9 % (ref 37–48.5)
HGB BLD-MCNC: 10.2 G/DL (ref 12–16)
MAGNESIUM SERPL-MCNC: 1.8 MG/DL (ref 1.6–2.6)
MCH RBC QN AUTO: 28 PG (ref 27–31)
MCHC RBC AUTO-ENTMCNC: 31 G/DL (ref 32–36)
MCV RBC AUTO: 90 FL (ref 82–98)
NEUTROPHILS # BLD AUTO: 3.9 K/UL (ref 1.8–7.7)
PLATELET # BLD AUTO: 332 K/UL (ref 150–350)
PMV BLD AUTO: 11.1 FL (ref 9.2–12.9)
POTASSIUM SERPL-SCNC: 4.5 MMOL/L (ref 3.5–5.1)
PROT SERPL-MCNC: 7.4 G/DL (ref 6–8.4)
RBC # BLD AUTO: 3.64 M/UL (ref 4–5.4)
SODIUM SERPL-SCNC: 138 MMOL/L (ref 136–145)
WBC # BLD AUTO: 6.44 K/UL (ref 3.9–12.7)

## 2020-01-08 PROCEDURE — 85027 COMPLETE CBC AUTOMATED: CPT

## 2020-01-08 PROCEDURE — 36415 COLL VENOUS BLD VENIPUNCTURE: CPT

## 2020-01-08 PROCEDURE — 83735 ASSAY OF MAGNESIUM: CPT

## 2020-01-08 PROCEDURE — 80053 COMPREHEN METABOLIC PANEL: CPT

## 2020-01-08 NOTE — TELEPHONE ENCOUNTER
Talk to pharmacy  Change pt Rx to generic               ----- Message from Lesa Chang sent at 1/8/2020 11:28 AM CST -----  Contact: [phone] Patt   Needs generic substitution for insulin lispro (HUMALOG KWIKPEN INSULIN) 100 unit/mL pen 15 mL   Insurance isn't covered please call nikki

## 2020-01-08 NOTE — TELEPHONE ENCOUNTER
Reviewed logs and attached to patient message from yesterday.   Non dex days:  Fastings: 170, 207, 180, 178, x, 252, 227, 127, 126, 151, 181, 215, 194  Before lunch: x, 236, 227, 335, 187, 220, 219, 231, 187, 82, 145, x, 56, 217  Before dinner: x,x, 198, x,x, 194, 171, 189, 145, 115, 156, x, 204, 186    Appetite varies, many days she eats very little due to fatigue, nausea etc.     No change to regimen, continue to use sliding scale    Dex days  Fastings: 134, 188, 169, 374, 402  Prelunch: 320, x, x, 151 (took 26 units of novolog with BG of 374),   Predinner: 286, 431, 298, 327    Continue regimen:  novolog 18/ 12/ 14 units before meals + moderate dose correction   levemir 33 units     Discussed importance of checking even if not hungry or not planning to eat.   Maintain good hydration even if not eating

## 2020-01-09 ENCOUNTER — INFUSION (OUTPATIENT)
Dept: INFUSION THERAPY | Facility: HOSPITAL | Age: 65
End: 2020-01-09
Attending: INTERNAL MEDICINE
Payer: MEDICARE

## 2020-01-09 ENCOUNTER — OFFICE VISIT (OUTPATIENT)
Dept: HEMATOLOGY/ONCOLOGY | Facility: CLINIC | Age: 65
End: 2020-01-09
Payer: MEDICARE

## 2020-01-09 ENCOUNTER — TELEPHONE (OUTPATIENT)
Dept: HEMATOLOGY/ONCOLOGY | Facility: CLINIC | Age: 65
End: 2020-01-09

## 2020-01-09 VITALS
DIASTOLIC BLOOD PRESSURE: 66 MMHG | RESPIRATION RATE: 18 BRPM | SYSTOLIC BLOOD PRESSURE: 145 MMHG | HEART RATE: 79 BPM | BODY MASS INDEX: 31.81 KG/M2 | TEMPERATURE: 98 F | HEIGHT: 69 IN | WEIGHT: 214.75 LBS

## 2020-01-09 VITALS
HEART RATE: 95 BPM | TEMPERATURE: 98 F | BODY MASS INDEX: 31.81 KG/M2 | OXYGEN SATURATION: 96 % | WEIGHT: 214.75 LBS | RESPIRATION RATE: 18 BRPM | SYSTOLIC BLOOD PRESSURE: 128 MMHG | DIASTOLIC BLOOD PRESSURE: 60 MMHG | HEIGHT: 69 IN

## 2020-01-09 DIAGNOSIS — C34.12 MALIGNANT NEOPLASM OF UPPER LOBE OF LEFT LUNG: Primary | ICD-10-CM

## 2020-01-09 DIAGNOSIS — C34.12 MALIGNANT NEOPLASM OF UPPER LOBE OF LEFT LUNG: ICD-10-CM

## 2020-01-09 DIAGNOSIS — R60.0 EDEMA, PERIPHERAL: ICD-10-CM

## 2020-01-09 DIAGNOSIS — C77.1 SECONDARY MALIGNANT NEOPLASM OF MEDIASTINAL LYMPH NODE: ICD-10-CM

## 2020-01-09 DIAGNOSIS — T45.1X5A CHEMOTHERAPY-INDUCED PERIPHERAL NEUROPATHY: ICD-10-CM

## 2020-01-09 DIAGNOSIS — R50.9 FEVER OF UNKNOWN ORIGIN (FUO): Primary | ICD-10-CM

## 2020-01-09 DIAGNOSIS — T17.908A ASPIRATION INTO AIRWAY, INITIAL ENCOUNTER: ICD-10-CM

## 2020-01-09 DIAGNOSIS — E11.65 TYPE 2 DIABETES MELLITUS WITH HYPERGLYCEMIA, WITH LONG-TERM CURRENT USE OF INSULIN: ICD-10-CM

## 2020-01-09 DIAGNOSIS — E03.9 HYPOTHYROIDISM, UNSPECIFIED TYPE: ICD-10-CM

## 2020-01-09 DIAGNOSIS — G62.0 CHEMOTHERAPY-INDUCED PERIPHERAL NEUROPATHY: ICD-10-CM

## 2020-01-09 DIAGNOSIS — Z79.4 TYPE 2 DIABETES MELLITUS WITH HYPERGLYCEMIA, WITH LONG-TERM CURRENT USE OF INSULIN: ICD-10-CM

## 2020-01-09 PROCEDURE — 63600175 PHARM REV CODE 636 W HCPCS: Performed by: INTERNAL MEDICINE

## 2020-01-09 PROCEDURE — 99215 PR OFFICE/OUTPT VISIT, EST, LEVL V, 40-54 MIN: ICD-10-PCS | Mod: S$GLB,,, | Performed by: INTERNAL MEDICINE

## 2020-01-09 PROCEDURE — 99499 UNLISTED E&M SERVICE: CPT | Mod: S$GLB,,, | Performed by: INTERNAL MEDICINE

## 2020-01-09 PROCEDURE — 3074F PR MOST RECENT SYSTOLIC BLOOD PRESSURE < 130 MM HG: ICD-10-PCS | Mod: CPTII,S$GLB,, | Performed by: INTERNAL MEDICINE

## 2020-01-09 PROCEDURE — 99215 OFFICE O/P EST HI 40 MIN: CPT | Mod: S$GLB,,, | Performed by: INTERNAL MEDICINE

## 2020-01-09 PROCEDURE — 3046F PR MOST RECENT HEMOGLOBIN A1C LEVEL > 9.0%: ICD-10-PCS | Mod: CPTII,S$GLB,, | Performed by: INTERNAL MEDICINE

## 2020-01-09 PROCEDURE — 3078F PR MOST RECENT DIASTOLIC BLOOD PRESSURE < 80 MM HG: ICD-10-PCS | Mod: CPTII,S$GLB,, | Performed by: INTERNAL MEDICINE

## 2020-01-09 PROCEDURE — 96411 CHEMO IV PUSH ADDL DRUG: CPT

## 2020-01-09 PROCEDURE — 96372 THER/PROPH/DIAG INJ SC/IM: CPT

## 2020-01-09 PROCEDURE — 3074F SYST BP LT 130 MM HG: CPT | Mod: CPTII,S$GLB,, | Performed by: INTERNAL MEDICINE

## 2020-01-09 PROCEDURE — A4216 STERILE WATER/SALINE, 10 ML: HCPCS | Performed by: INTERNAL MEDICINE

## 2020-01-09 PROCEDURE — 96413 CHEMO IV INFUSION 1 HR: CPT

## 2020-01-09 PROCEDURE — 3046F HEMOGLOBIN A1C LEVEL >9.0%: CPT | Mod: CPTII,S$GLB,, | Performed by: INTERNAL MEDICINE

## 2020-01-09 PROCEDURE — 3078F DIAST BP <80 MM HG: CPT | Mod: CPTII,S$GLB,, | Performed by: INTERNAL MEDICINE

## 2020-01-09 PROCEDURE — 3008F BODY MASS INDEX DOCD: CPT | Mod: CPTII,S$GLB,, | Performed by: INTERNAL MEDICINE

## 2020-01-09 PROCEDURE — 99499 RISK ADDL DX/OHS AUDIT: ICD-10-PCS | Mod: S$GLB,,, | Performed by: INTERNAL MEDICINE

## 2020-01-09 PROCEDURE — 3008F PR BODY MASS INDEX (BMI) DOCUMENTED: ICD-10-PCS | Mod: CPTII,S$GLB,, | Performed by: INTERNAL MEDICINE

## 2020-01-09 PROCEDURE — 99999 PR PBB SHADOW E&M-EST. PATIENT-LVL V: CPT | Mod: PBBFAC,,, | Performed by: INTERNAL MEDICINE

## 2020-01-09 PROCEDURE — 99999 PR PBB SHADOW E&M-EST. PATIENT-LVL V: ICD-10-PCS | Mod: PBBFAC,,, | Performed by: INTERNAL MEDICINE

## 2020-01-09 PROCEDURE — 25000003 PHARM REV CODE 250: Performed by: INTERNAL MEDICINE

## 2020-01-09 PROCEDURE — 96367 TX/PROPH/DG ADDL SEQ IV INF: CPT

## 2020-01-09 RX ORDER — SODIUM CHLORIDE 0.9 % (FLUSH) 0.9 %
10 SYRINGE (ML) INJECTION
Status: DISCONTINUED | OUTPATIENT
Start: 2020-01-09 | End: 2020-01-09 | Stop reason: HOSPADM

## 2020-01-09 RX ORDER — CYANOCOBALAMIN 1000 UG/ML
1000 INJECTION, SOLUTION INTRAMUSCULAR; SUBCUTANEOUS ONCE
Status: CANCELLED | OUTPATIENT
Start: 2020-01-09

## 2020-01-09 RX ORDER — CYANOCOBALAMIN 1000 UG/ML
1000 INJECTION, SOLUTION INTRAMUSCULAR; SUBCUTANEOUS ONCE
Status: COMPLETED | OUTPATIENT
Start: 2020-01-09 | End: 2020-01-09

## 2020-01-09 RX ORDER — HEPARIN 100 UNIT/ML
500 SYRINGE INTRAVENOUS
Status: CANCELLED | OUTPATIENT
Start: 2020-01-09

## 2020-01-09 RX ORDER — SODIUM CHLORIDE 0.9 % (FLUSH) 0.9 %
10 SYRINGE (ML) INJECTION
Status: CANCELLED | OUTPATIENT
Start: 2020-01-09

## 2020-01-09 RX ORDER — ESCITALOPRAM OXALATE 10 MG/1
10 TABLET ORAL DAILY
COMMUNITY
Start: 2019-12-20 | End: 2020-05-19

## 2020-01-09 RX ORDER — HEPARIN 100 UNIT/ML
500 SYRINGE INTRAVENOUS
Status: DISCONTINUED | OUTPATIENT
Start: 2020-01-09 | End: 2020-01-09 | Stop reason: HOSPADM

## 2020-01-09 RX ADMIN — SODIUM CHLORIDE 1075 MG: 9 INJECTION, SOLUTION INTRAVENOUS at 01:01

## 2020-01-09 RX ADMIN — DEXAMETHASONE SODIUM PHOSPHATE 10 MG: 4 INJECTION, SOLUTION INTRA-ARTICULAR; INTRALESIONAL; INTRAMUSCULAR; INTRAVENOUS; SOFT TISSUE at 11:01

## 2020-01-09 RX ADMIN — SODIUM CHLORIDE: 0.9 INJECTION, SOLUTION INTRAVENOUS at 11:01

## 2020-01-09 RX ADMIN — Medication 10 ML: at 01:01

## 2020-01-09 RX ADMIN — SODIUM CHLORIDE 200 MG: 9 INJECTION, SOLUTION INTRAVENOUS at 12:01

## 2020-01-09 RX ADMIN — GRANISETRON HYDROCHLORIDE 1 MG: 0.1 INJECTION, SOLUTION INTRAVENOUS at 12:01

## 2020-01-09 RX ADMIN — HEPARIN 500 UNITS: 100 SYRINGE at 01:01

## 2020-01-09 RX ADMIN — CYANOCOBALAMIN 1000 MCG: 1000 INJECTION, SOLUTION INTRAMUSCULAR at 12:01

## 2020-01-09 NOTE — NURSING
1123 (appt scheduled for 1100)  Pt here for Keytruda, Alimta infusion, B12 injection, accompanied by daughter, no new complaints or concerns at present; discussed treatment plan for today, all questions answered and pt agrees to proceed

## 2020-01-09 NOTE — PROGRESS NOTES
Subjective:       Patient ID: Alison Branch is a 64 y.o. female.    Chief Complaint: Malignant neoplasm of upper lobe of left lung  Ms. Branch is a 63-year-old female who smoked for about 30 years and quit 20 years ago, presented with cough end of last year, but worsened into January.  Since the cough persisted, she saw her PCP, underwent a chest x-ray on 05/10/2019, that revealed left upper lobe pneumonia and a repeat CT was done in the week after that on 05/17/2019 that showed left upper lobe   mass arising from the lateral pleural surface in the left upper lobe posterior subsegment measuring 2.6 x 3 cm.  There are satellite mass more medially near the aortic arch that is 2 cm, also spiculated and irregular as well as thickened interlobar septa in the left lung apex and anterior segment, prevascular lymph node lateral to the aortic arch, short axis measuring 9 mm.       She then underwent a bronchoscopy on 05/28/2019, and that revealed there was an infiltration obtained in the left apical posterior segment of the left upper lobe cytology brush was done.  Transbronchial biopsies of an area of infiltration were also performed in the apicoposterior segment of the left upper lobe.    Pathology revealed adenocarcinoma; however, the specimen was not adequate enough to send for molecular testing.       She underwent a PET scan on 06/06/2019.  that reveals significant hypermetabolic activity in the large irregular spiculated pulmonary mass in the lateral aspect of the left upper lobe consistent with the patient's known pulmonary adenocarcinoma.  There is also tracer avidity in the medial left upper lobe satellite lesion and diffusely throughout much of the anterior left upper lobe where there is prominent nodular paraseptal thickening.  There are some numerous scattered subcentimeter pulmonary nodules throughout the right lung, all of which are too small for detection by PET.  For example, there is a 0.4 cm nodule in the  "superior right lower lobe and a micro nodule in the posterior segment of the right upper lobe.  There is a 0.5 cm, normal size right   paratracheal lymph node with increased radiotracer uptake as well as hypermetabolic aortic pulmonary lymph node and a left hilar lymph node.  There is a focal hypermetabolic lesion in the left anterior superior iliac spine associated with well defined lytic lesion.  There is a hypermetabolic focus in the anterior right fifth rib with a possible associated lytic lesion.  SUVs as   below lateral:  Left upper lobe  SUV max 17.9, anterior left upper lobe satellite mass and septal thickening SUV max of 10.1, right paratracheal lymph node SUV max of 4.8, left AP window lymph node SUV max of 17.9, left anterior superior iliac spine SUV max of 3.9"     MRI pelvis from 6/10/19  reveals "Region of osseous is irregularity at the left anterior iliac spine likely related to bone graft harvest procedure.  See  discussion above.  No suspicious signal or enhancement to suggest active/malignant process"   MRI brain from 6/10//19 reveal No intracranial abnormality.     We discussed her case at [facility], and plan was to wait for GAURDANT and proceed with treatment accordingly  Her GAURDANT is negative for molecular markers.     She has completed 4 cycles of Carboplatin, Alimta and Keytruda as of 9/5/19. She is now on  ALimta and Keytruda maintenance.         HPI She comes in for maintenance Alimta and Keytruda  CT scans from 12/20/19 reveal "Relatively stable 3.0 x 2.7 cm spiculated mass in the left upper lobe, with interval development of patchy, irregular subsegmental opacities throughout the left upper lobe, with considerations including infection, non infectious inflammation, as well as aspiration.  Clinical correlation is advised. Interval development of innumerable right-sided pulmonary nodules measuring up to 0.5 cm, concerning for metastatic disease in this patient with known lung cancer. " "0.6 cm hypodensity in the left hepatic lobe, too small to characterize but favored to represent a simple hepatic cyst. Multiple stable osseous findings including superior endplate deformity at T10, grade 1 anterolisthesis of L4 on L5, and deformity at the left anterior superior iliac spine, and rounded sclerotic lesion in the L1 vertebral body. Other findings as above. RECIST SUMMARY: Date of prior examination for comparison: CT chest abdomen pelvis 09/03/2019 Lesion 1: Left upper lobe pulmonary mass.  3.0 cm. Series 2 image 27.  Prior measurement 2.8 cm"  She notes cough when she lies down at night. She denies any other symptoms. She is here with her daughter. ECOG PS is 1    Review of Systems   Constitutional: Negative for appetite change, fatigue and unexpected weight change.   HENT: Negative for mouth sores.    Eyes: Negative for visual disturbance.   Respiratory: Positive for cough. Negative for shortness of breath.    Cardiovascular: Negative for chest pain.   Gastrointestinal: Negative for abdominal pain and diarrhea.   Genitourinary: Negative for frequency.   Musculoskeletal: Negative for back pain.   Skin: Negative for rash.   Neurological: Negative for headaches.   Hematological: Negative for adenopathy.   Psychiatric/Behavioral: The patient is not nervous/anxious.    All other systems reviewed and are negative.      Objective:      Physical Exam   Constitutional: She is oriented to person, place, and time. She appears well-developed and well-nourished.   HENT:   Mouth/Throat: No oropharyngeal exudate.   Cardiovascular: Normal rate and normal heart sounds.   Pulmonary/Chest: Effort normal and breath sounds normal. She has no wheezes.   Abdominal: Soft. Bowel sounds are normal. There is no tenderness.   Musculoskeletal: She exhibits no edema or tenderness.   Lymphadenopathy:     She has no cervical adenopathy.   Neurological: She is alert and oriented to person, place, and time. Coordination normal.   Skin: " Skin is warm and dry. No rash noted.   Psychiatric: She has a normal mood and affect. Judgment and thought content normal.   Vitals reviewed.        LABS:  WBC   Date Value Ref Range Status   01/08/2020 6.44 3.90 - 12.70 K/uL Final     Hemoglobin   Date Value Ref Range Status   01/08/2020 10.2 (L) 12.0 - 16.0 g/dL Final     Hematocrit   Date Value Ref Range Status   01/08/2020 32.9 (L) 37.0 - 48.5 % Final     Platelets   Date Value Ref Range Status   01/08/2020 332 150 - 350 K/uL Final     Gran # (ANC)   Date Value Ref Range Status   01/08/2020 3.9 1.8 - 7.7 K/uL Final     Comment:     The ANC is based on a white cell differential from an   automated cell counter. It has not been microscopically   reviewed for the presence of abnormal cells. Clinical   correlation is required.         Chemistry        Component Value Date/Time     01/08/2020 1027    K 4.5 01/08/2020 1027     01/08/2020 1027    CO2 26 01/08/2020 1027    BUN 13 01/08/2020 1027    CREATININE 1.0 01/08/2020 1027     (H) 01/08/2020 1027        Component Value Date/Time    CALCIUM 10.1 01/08/2020 1027    ALKPHOS 86 01/08/2020 1027    AST 39 01/08/2020 1027    ALT 59 (H) 01/08/2020 1027    BILITOT 0.3 01/08/2020 1027    ESTGFRAFRICA >60 01/08/2020 1027    EGFRNONAA >60 01/08/2020 1027          Assessment:       1. 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 Type 2 diabetes mellitus with hyperglycemia, with long-term current use of insulin    4. Aspiration into airway, initial encounter    5. Hypothyroidism, unspecified type        Plan:        1,2,3. Reviewed CT scans which are overall stable. She will proceed with Alimta and Keytruda maintenance and will return in 3 weeks for next cycle.  4. Will obtain swallow eval.   5. Recheck TSH and free T4 in 3 weeks    Above care plan was discussed with patient and accompanying daughter and all questions were addressed to their  satisfaction

## 2020-01-09 NOTE — TELEPHONE ENCOUNTER
----- Message from Tara Belcher MD sent at 1/9/2020 11:13 AM CST -----  Also schedule for swallow eval

## 2020-01-09 NOTE — PLAN OF CARE
1323  Infusion completed, IM injection administered to R deltoid, pt tolerated all well; pt instructed to remain well hydrated; discussed when to contact MD, when to report to ER; AVS given to and reviewed with pt, pt and daughter verbalized understanding of all discussed and when to report next

## 2020-01-10 ENCOUNTER — OFFICE VISIT (OUTPATIENT)
Dept: NEUROLOGY | Facility: CLINIC | Age: 65
End: 2020-01-10
Payer: MEDICARE

## 2020-01-10 VITALS — HEIGHT: 69 IN | BODY MASS INDEX: 31.7 KG/M2 | WEIGHT: 214 LBS

## 2020-01-10 DIAGNOSIS — E55.9 VITAMIN D DEFICIENCY: ICD-10-CM

## 2020-01-10 DIAGNOSIS — G62.0 CHEMOTHERAPY-INDUCED NEUROPATHY: ICD-10-CM

## 2020-01-10 DIAGNOSIS — R26.89 BALANCE DISORDER: ICD-10-CM

## 2020-01-10 DIAGNOSIS — R41.3 MEMORY DIFFICULTY: Primary | ICD-10-CM

## 2020-01-10 DIAGNOSIS — T45.1X5A CHEMOTHERAPY-INDUCED NEUROPATHY: ICD-10-CM

## 2020-01-10 PROCEDURE — 99999 PR PBB SHADOW E&M-EST. PATIENT-LVL III: ICD-10-PCS | Mod: PBBFAC,,, | Performed by: PSYCHIATRY & NEUROLOGY

## 2020-01-10 PROCEDURE — 99205 PR OFFICE/OUTPT VISIT, NEW, LEVL V, 60-74 MIN: ICD-10-PCS | Mod: S$GLB,,, | Performed by: PSYCHIATRY & NEUROLOGY

## 2020-01-10 PROCEDURE — 3008F PR BODY MASS INDEX (BMI) DOCUMENTED: ICD-10-PCS | Mod: CPTII,S$GLB,, | Performed by: PSYCHIATRY & NEUROLOGY

## 2020-01-10 PROCEDURE — 3008F BODY MASS INDEX DOCD: CPT | Mod: CPTII,S$GLB,, | Performed by: PSYCHIATRY & NEUROLOGY

## 2020-01-10 PROCEDURE — 99205 OFFICE O/P NEW HI 60 MIN: CPT | Mod: S$GLB,,, | Performed by: PSYCHIATRY & NEUROLOGY

## 2020-01-10 PROCEDURE — 99999 PR PBB SHADOW E&M-EST. PATIENT-LVL III: CPT | Mod: PBBFAC,,, | Performed by: PSYCHIATRY & NEUROLOGY

## 2020-01-10 RX ORDER — GABAPENTIN 300 MG/1
300 CAPSULE ORAL NIGHTLY PRN
Qty: 90 CAPSULE | Refills: 3 | Status: SHIPPED | OUTPATIENT
Start: 2020-01-10 | End: 2020-03-20 | Stop reason: SDUPTHER

## 2020-01-10 NOTE — PATIENT INSTRUCTIONS
YOU WILL BE CONTACTED BY PHYSICAL THERAPY TO SCHEDULE APPOINTMENT FOR BALANCE TRAINING    TAKE GABAPENTIN AT BED TIME TO HELP WITH NERVE PAIN AND SLEEP     CHECK VITAMIN LEVELS TODAY    CONTINUE LEXAPRO AND FOLLOW UP WITH THERAPY

## 2020-01-10 NOTE — PROGRESS NOTES
[facility]  [facility] - NEUROLOGY  [facility], [place]    Date: January 10, 2020   Patient Name: Alison Branch   MRN: [id number]   PCP: Mike Rodriguez Ii  Referring Provider: Self, Aaareferral    Assessment:      This is Alison Branch, 64 y.o. female with DM and lung cancer s/p chemotherapy 2019 with related neuropathy and balance difficulty presents for cognitive difficulties in the setting of mood and sleep disturbances, MRI brain without pathology to explain.     Plan:      -  GBP 300mg qhs  -  Vitamin levels  -  Continue follow up with psychology    Return in 3 months, consider neuropsychology testing if no improvement       I discussed side effects of the medications. I asked the patient to stop the medication if she notices serious adverse effects as we discussed and to seek immediate medical attention at an ER.     David Mo MD  [facility]   Department of Neurology    Subjective:        HPI:   Ms. Alison Branch is a 64 y.o. female who presents with a chief complaint of memory difficulty    Patient reports first noting cognitive difficulty approximately 2010 around the time of intracranial aneurysm coil although she was concurrently diagnosed with depression around that time.  She describes difficulty with repeating herself and forgetting things people have told her although she remains fully independent.  She formerly worked at a [employer] but retired in 2009.  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  She reports frequent night time awakenings and unrestful sleep due in part to neuropathic pain in her feet which developed during chemotherapy, no history of treatment for this.  She has also developed balance difficulty and near falls over the past year.  She established talk therapy for depression 9/2019 and started lexapro a week ago.    PAST MEDICAL HISTORY:  Past Medical  "History:   Diagnosis Date    Allergy     Brain aneurysm 2010    s/p coiling of one; another not coiled    Breast cyst     Depression 9/19/2019    Diabetes mellitus     Diabetes type 2, controlled     Fever blister     High cholesterol     History of Bell's palsy     HTN (hypertension) 5/20/2014       PAST SURGICAL HISTORY:  Past Surgical History:   Procedure Laterality Date    BREAST CYST ASPIRATION      BRONCHOSCOPY N/A 5/28/2019    Procedure: Bronchoscopy;  Surgeon: [facility] Diagnostic Provider;  Location: [facility] OR 2ND FLR;  Service: Anesthesiology;  Laterality: N/A;    CERVICAL FUSION      COLONOSCOPY N/A 3/9/2016    Procedure: COLONOSCOPY;  Surgeon: Elliott Zimmerman MD;  Location: [facility] ENDO (4TH FLR);  Service: Endoscopy;  Laterality: N/A;    head surgery      stent and "curling" for aneurysm    HYSTERECTOMY      TVH secondary to SUF    INSERTION OF TUNNELED CENTRAL VENOUS CATHETER (CVC) WITH SUBCUTANEOUS PORT Right 7/8/2019    Procedure: INSERTION, PORT-A-CATH;  Surgeon: Cali Damico MD;  Location: [facility] CATH LAB;  Service: Radiology;  Laterality: Right;       CURRENT MEDS:  Current Outpatient Medications   Medication Sig Dispense Refill    aspirin (ECOTRIN) 81 MG EC tablet Take 1 tablet (81 mg total) by mouth once daily.  0    atorvastatin (LIPITOR) 40 MG tablet TAKE 1 TABLET BY MOUTH ONCE DAILY 90 tablet 3    benzonatate (TESSALON PERLES) 100 MG capsule Take 1 capsule (100 mg total) by mouth 2 (two) times daily. 60 capsule 1    bisacodyl (DULCOLAX) 5 mg EC tablet Take 5 mg by mouth daily as needed for Constipation.      blood sugar diagnostic Strp To check BG 5 times per day 450 each 3    blood-glucose meter kit Use as instructed 1 each 0    carboxymethylcellulose (REFRESH PLUS) 0.5 % Dpet 1 drop 3 (three) times daily as needed.      dexAMETHasone (DECADRON) 6 MG tablet Take 1 tablet by mouth two times a day with food the day before chemotherapy and for two days after chemotherapy. " "Do not take the day of chemotherapy. 24 tablet 4    diazePAM (VALIUM) 5 MG tablet TAKE 1 TABLET BY MOUTH ONCE DAILY AS NEEDED FOR ANXIETY  2    ergocalciferol (ERGOCALCIFEROL) 50,000 unit Cap TAKE 1 CAPSULE BY MOUTH EVERY 7 DAYS 12 capsule 3    escitalopram oxalate (LEXAPRO) 10 MG tablet       fluticasone propionate (FLONASE) 50 mcg/actuation nasal spray USE TWO SPRAY(S) IN EACH NOSTRIL ONCE DAILY 16 g 3    folic acid (FOLVITE) 1 MG tablet Take 1 tablet (1 mg total) by mouth once daily. Start today 100 tablet 3    insulin aspart U-100 (NOVOLOG) 100 unit/mL (3 mL) InPn pen Inject 12-20 units into the skin 3 times daily with meals plus correction scale. Max Total daily dose of 90 units 3 Box 6    insulin detemir U-100 (LEVEMIR FLEXTOUCH) 100 unit/mL (3 mL) SubQ InPn pen Inject 28-33 units nightly 1 Box 6    insulin lispro (HUMALOG KWIKPEN INSULIN) 100 unit/mL pen Takes 16 units before breakfast, 10 units before lunch and 12 units before dinner plus sliding scale up to 50 units daily 15 mL 3    JANUVIA 100 mg Tab TAKE ONE TABLET BY MOUTH ONCE DAILY 30 tablet 11    lancets Misc 1 Device by Misc.(Non-Drug; Combo Route) route once daily. Heladio Result.  250.02.  Check Blood Sugar Twice Daily. 200 each 3    lidocaine-prilocaine (EMLA) cream Apply to PORT one hour prior to chemo administration. 30 g 0    olmesartan (BENICAR) 20 MG tablet Take 1 tablet (20 mg total) by mouth once daily. 90 tablet 3    ondansetron (ZOFRAN) 8 MG tablet Take 1 tablet (8 mg total) by mouth 4 (four) times daily as needed for Nausea. 60 tablet 2    pen needle, diabetic (BD ULTRA-FINE BRYANT PEN NEEDLE) 32 gauge x 5/32" Ndle To use 5 times per day with insulin injections. 150 each 11    phenazopyridine (PYRIDIUM) 200 MG tablet Take 1 tablet (200 mg total) by mouth 3 (three) times daily as needed for Pain. 20 tablet 0    psyllium (METAMUCIL) packet Take 1 packet by mouth once daily.      terconazole (TERAZOL 7) 0.4 % Crea INSERT 1 " "APPLICATORFUL VAGINALLY ONCE DAILY IN THE EVENING 45 g 0    walker Misc Please provide rollator walker for this debilitated cancer patient.  Thank you.  0    gabapentin (NEURONTIN) 300 MG capsule Take 1 capsule (300 mg total) by mouth nightly as needed (Take as needed at bed time for neuropathy pain and sleep). 90 capsule 3     No current facility-administered medications for this visit.        ALLERGIES:  Review of patient's allergies indicates:  No Known Allergies    FAMILY HISTORY:  Family History   Problem Relation Age of Onset    Rheum arthritis Father     Diabetes Father     Heart failure Father     Migraines Father     Cataracts Father     Cancer Mother 63        pancreatic    Stomach cancer Mother     Breast cancer Maternal Aunt         50s    Ovarian cancer Cousin     Diabetes Sister     Diabetes Brother     Cancer Maternal Aunt         Lung CA    Melanoma Neg Hx     Colon cancer Neg Hx     Amblyopia Neg Hx     Blindness Neg Hx     Glaucoma Neg Hx     Macular degeneration Neg Hx     Retinal detachment Neg Hx     Strabismus Neg Hx     Stroke Neg Hx     Thyroid disease Neg Hx        SOCIAL HISTORY:  Social History     Tobacco Use    Smoking status: Former Smoker     Packs/day: 0.50     Years: 30.00     Pack years: 15.00     Last attempt to quit: 1999     Years since quittin.0    Smokeless tobacco: Never Used   Substance Use Topics    Alcohol use: No     Alcohol/week: 0.0 standard drinks    Drug use: No       Review of Systems:  12 review of systems is negative except for the symptoms mentioned in HPI.        Objective:     Vitals:    01/10/20 0944   Weight: 97.1 kg (214 lb)   Height: 5' 9" (1.753 m)       General: NAD, well nourished   Eyes: no tearing, discharge, no erythema   ENT: moist mucous membranes of the oral cavity, nares patent    Neck: Supple, full range of motion  Cardiovascular: Warm and well perfused, pulses equal and symmetrical  Lungs: Normal work of " breathing, normal chest wall excursions  Skin: No rash, lesions, or breakdown on exposed skin  Psychiatry: psychomotor slowing  Abdomen: soft, non tender, non distended  Extremeties: No cyanosis, clubbing or edema.    Neurological   MENTAL STATUS: Alert and oriented to person, place, and time. Speech without dysarthria, able to name and repeat without difficulty.   CRANIAL NERVES: Visual fields intact. PERRL. EOMI. Facial sensation intact. Face symmetrical. Hearing grossly intact. Full shoulder shrug bilaterally. Tongue protrudes midline   SENSORY: Sensation is intact to light touch throughout.    MOTOR: Normal bulk and tone. No pronator drift.  5/5 deltoid, biceps, triceps, interosseous, hand  bilaterally. 5/5 iliopsoas, knee extension/flexion, foot dorsi/plantarflexion bilaterally.    CEREBELLAR/COORDINATION/GAIT: Gait steady with normal arm swing and stride length.

## 2020-01-11 ENCOUNTER — PATIENT MESSAGE (OUTPATIENT)
Dept: HEMATOLOGY/ONCOLOGY | Facility: CLINIC | Age: 65
End: 2020-01-11

## 2020-01-12 NOTE — ASSESSMENT & PLAN NOTE
Dexamethasone leading to significant hyperglycemia  Have adjusted meal time insulin dosing today  To send log in 1 - 2 weeks

## 2020-01-15 ENCOUNTER — DOCUMENTATION ONLY (OUTPATIENT)
Dept: REHABILITATION | Facility: HOSPITAL | Age: 65
End: 2020-01-15

## 2020-01-15 NOTE — PROGRESS NOTES
Physical Therapy: No show/Cancellation of Visit  Date: 01/15/2020    Patient was a no show to today's PT evaluation. Patient can reschedule evaluation with central scheduling if she decides to proceed with outpatient rehab.     Cancel: 0  No show: 1    Therapist: Matias Nunez, PT

## 2020-01-17 NOTE — MR AVS SNAPSHOT
[facility]  [address]  [facility] [address]  Phone: [phone]  Fax: [phone]                  Alison Branch   2017 8:40 AM   Office Visit    Description:  Female : 1955   Provider:  Mike Rodriguez II, MD   Department:  [facility]           Reason for Visit     Diabetes     Hypertension           Diagnoses this Visit        Comments    Uncontrolled type 2 diabetes mellitus with hyperglycemia, without long-term current use of insulin    -  Primary     Hypertension associated with diabetes         Combined hyperlipidemia associated with type 2 diabetes mellitus         Cerebral aneurysm                To Do List           Goals (5 Years of Data)     None      Follow-Up and Disposition     Return in about 3 months (around 2017).      [facility]     [facility] Nurse [facility] -  Assistance  Registered nurses in the [facility] provide clinical advisement, health education, appointment booking, and other advisory services.  Call for this free service at [phone].             Medications           Message regarding Medications     Verify the changes and/or additions to your medication regime listed below are the same as discussed with your clinician today.  If any of these changes or additions are incorrect, please notify your healthcare provider.        STOP taking these medications     glipiZIDE (GLUCOTROL) 5 MG TR24 Take 1 tablet (5 mg total) by mouth daily with breakfast.    peg 3350-electrolytes-vit C (MOVIPREP) 100-7.5-2.691 gram PwPk Take 1 packet by mouth as directed. Take as directed    cholecalciferol, vitamin D3, (VITAMIN D3) 5,000 unit Tab Take 5,000 Units by mouth once daily.           Verify that the below list of medications is an accurate representation of the medications you are currently taking.  If none reported, the list may be blank. If incorrect, please contact your healthcare provider.  [facility]    Cardiology Consultation    Date of Admission:  1/16/2020  Primary cardiologist: Dr. Yun    Assessment & Plan   Jose Tejada is a 74 year old male who was admitted on 1/16/2020. I was asked to see the patient for post angiogram.    Summary:   1.  Coronary artery disease.  With prior LAD and right coronary artery stenting previously.  Status post attmpted intervention to an RCA  yesterday.   2.  Factor V Leiden and protein C deficiency.   3.  PRIMITIVO    Plan:  1.  Continue dual antiplatelet with aspirin and Plavix.  Resume warfarin.  Given his history of protein C deficiency and factor V Leiden would recommend bridging with Lovenox.  He is familiar with Lovenox as he bridged prior to his procedure.  Would recommend triple therapy for 1 week and then stopping aspirin and continuing Plavix.   2.  Continue high intensity statin, Imdur, Ranexa and lisinopril.  He has not been on metoprolol due to bradycardia.  3.  Repeat angiogram in 4 - 8 weeks to re-attempt intervention. This can be arranged as his next follow up.   4.  Okay for discharge today.  He has follow-up arranged in cardiology clinic on 1/24 at 9:30 AM.    Tiffany Newsome PA-C    Reason for Consult   Reason for consult: I was asked by Dr. Monzon to evaluate this patient for post angiogram.    Primary Care Physician   [facility]    Chief Complaint   Post angiogram    History is obtained from the patient    History of Present Illness   Jose Tejada is a 74 year old male who presents after his scheduled angiogram for intervention to a the RCA .     He has a long history of coronary artery disease and is followed closely in the clinic by  and BRENDAN pSicer.  He also has a history of protein C deficiency and factor he was hospitalized V Leiden with a history of DVT maintained on chronic warfarin.    He had stents to his LAD and right coronary artery in 1996 with a  "Carry this list with you in case of emergency.           Current Medications     aspirin (ECOTRIN) 81 MG EC tablet Take 81 mg by mouth once daily.    atorvastatin (LIPITOR) 40 MG tablet Take 1 tablet (40 mg total) by mouth once daily.    blood sugar diagnostic Strp 1 strip by Misc.(Non-Drug; Combo Route) route once daily. Heladio Result.  250.02.  Check Blood Sugar Twice Daily.    blood-glucose meter kit Use as instructed    conjugated estrogens (PREMARIN) vaginal cream Place 1 g vaginally once daily.    econazole nitrate 1 % cream Apply topically 2 (two) times daily. To affected areas in between toes x 4 wks    ergocalciferol (ERGOCALCIFEROL) 50,000 unit Cap Take 1 capsule (50,000 Units total) by mouth every 7 days.    INVOKANA 300 mg Tab Take 1 tablet by mouth once daily.    lancets Misc 1 Device by Misc.(Non-Drug; Combo Route) route once daily. Heladio Result.  250.02.  Check Blood Sugar Twice Daily.    loratadine (CLARITIN) 10 mg tablet Take 1 tablet (10 mg total) by mouth once daily.    losartan (COZAAR) 50 MG tablet Take 1 tablet (50 mg total) by mouth once daily.    fluconazole (DIFLUCAN) 150 MG Tab Take 150 mg by mouth once.           Clinical Reference Information           Vital Signs - Last Recorded  Most recent update: 1/24/2017  9:02 AM by Maggie Judge MA    BP Pulse Temp Ht Wt BMI    122/70 (BP Location: Left arm, Patient Position: Sitting, BP Method: Manual) 60 97.8 °F (36.6 °C) (Oral) 5' 9.5" (1.765 m) 92.8 kg (204 lb 9.4 oz) 29.78 kg/m2      Blood Pressure          Most Recent Value    BP  122/70      Allergies as of 1/24/2017     Metformin      Immunizations Administered on Date of Encounter - 1/24/2017     None      Orders Placed During Today's Visit      Normal Orders This Visit    Ambulatory consult to Optometry     Future Labs/Procedures Expected by Expires    Comprehensive metabolic panel  1/24/2017 4/24/2017    Hemoglobin A1c  1/24/2017 4/24/2017    Lipid panel  1/24/2017 1/24/2018      " known 70% lesion to his circumflex.  He was hospitalized in October with exertional chest tightness.  He had an abnormal nuclear stress test at that time.  Repeat coronary angiography showed a proximal chronic total occlusion of a dominant right coronary artery.  There is no significant renarrowing in the LAD and the ongoing 70% small circumflex lesion.  Flow reserve suggested this would not benefit from intervention.    He continues to have ongoing symptoms of exertional chest discomfort despite maximally telemetry medical therapy and was ultimately referred for repeat coronary angiography and intervention to the RCA  yesterday.  Intervention to this was difficult.  Post procedure images suggested the lesion has been taken down from the 100% obstruction to 95%.  He was admitted overnight post procedure for monitoring.  Dr. Yun recommended another attempt at intervention in 4 to 8 weeks.    He is doing well this morning. He denies chest pain or shortness of breath. He has been ambulating without difficulty this morning.     Past Medical History    Past Medical History:   Diagnosis Date     Asthma      CAD (coronary artery disease)     1996 angioplasty and stent to the prox LAD, PTCA with intracoronary stent placement of RCA, 70%OM; 10/24/19 Cath: Occluded RCA, prox circumflex lesion - pd/pa not significant, advised medical therapy first by Interventional cardiologist     DVT (deep venous thrombosis) (H)      Factor 5 Leiden mutation, heterozygous (H)      GERD (gastroesophageal reflux disease)      HTN (hypertension)      Hyperlipidemia      Neuropathy     wears brace R foot for drop foot     PRIMITIVO (obstructive sleep apnea)     cpap     Protein C deficiency (H)      Spinal stenosis        Past Surgical History   Past Surgical History:   Procedure Laterality Date     APPENDECTOMY OPEN  1958     ARTHROPLASTY HIP Left 1997     ARTHROPLASTY REVISION HIP Left 2013     AS SPINAL FUSION,ANT,EA ADNL LEVEL  2010     L4-L5     C TOTAL HIP ARTHROPLASTY Right 2015    THR     CV CORONARY ANGIOGRAM N/A 10/24/2019    Procedure: Coronary Angiogram;  Surgeon: Valdemar Hinojosa MD;  Location:  [facility]     CV CORONARY ANGIOGRAM  1996 1996 angioplasty and stent to the prox LAD, PTCA with intracoronary stent placement of RCA, 70%OM     CV FRACTIONAL FLOW RESERVE N/A 10/24/2019    Procedure: Fractional Flow [place];  Surgeon: Valdemar Hinojosa MD;  Location:  [facility]       Prior to Admission Medications   Prior to Admission Medications   Prescriptions Last Dose Informant Patient Reported? Taking?   Warfarin Sodium (COUMADIN PO) 1/10/2020  Yes No   Sig: Take 5 mg by mouth daily 7.5 mg on Monday and Friday, 5 mg all other days   albuterol (PROAIR HFA/PROVENTIL HFA/VENTOLIN HFA) 108 (90 Base) MCG/ACT inhaler More than a month at Unknown time  Yes No   Sig: Inhale 2 puffs into the lungs every 4 hours as needed for shortness of breath / dyspnea or wheezing   clopidogrel (PLAVIX) 75 MG tablet 1/10/2020  No No   Sig: Take 1 tablet (75 mg) by mouth daily   enoxaparin ANTICOAGULANT (LOVENOX ANTICOAGULANT) 80 MG/0.8ML syringe 1/15/2020 at 2000  No Yes   Sig: Inject 0.8 mLs (80 mg) Subcutaneous every 12 hours   gabapentin (NEURONTIN) 400 MG capsule 1/16/2020 at Unknown time  Yes Yes   Sig: Take 1,200 mg by mouth 3 times daily   isosorbide mononitrate (IMDUR) 30 MG 24 hr tablet 1/15/2020 at Unknown time  No Yes   Sig: Take 1 tablet (30 mg) by mouth daily   lidocaine (LIDODERM) 5 % patch More than a month at Unknown time  No No   Sig: Place 1 patch onto the skin every 24 hours   lisinopril (PRINIVIL/ZESTRIL) 20 MG tablet 1/16/2020 at Unknown time  No Yes   Sig: Take 1 tablet (20 mg) by mouth daily   metoprolol succinate ER (TOPROL XL) 25 MG 24 hr tablet Unknown at Unknown time  No No   Sig: Pt did not start med said he had bradycardia previously and was weaned off by PMD 5/2019   nitroGLYcerin (NITROSTAT) 0.4  MG sublingual tablet Unknown at Unknown time  No No   Sig: For chest pain place 1 tablet under the tongue every 5 minutes for 3 doses. If symptoms persist 5 minutes after 1st dose call 911.   nortriptyline (PAMELOR) 25 MG capsule 1/15/2020 at Unknown time  Yes Yes   Sig: Take 25 mg by mouth At Bedtime    omeprazole (PRILOSEC) 20 MG CR capsule 1/15/2020 at Unknown time  Yes Yes   Sig: Take 20 mg by mouth At Bedtime    oxyCODONE-acetaminophen (PERCOCET) 5-325 MG per tablet 1/16/2020 at Unknown time  Yes Yes   Sig: Take 2 tablets by mouth 3 times daily as needed Patient takes 2 tablets TID Scheduled   ranolazine (RANEXA) 500 MG 12 hr tablet 1/15/2020 at Unknown time  No Yes   Sig: Take 1 tablet (500 mg) by mouth 2 times daily   rosuvastatin (CRESTOR) 20 MG tablet 1/15/2020 at Unknown time  No Yes   Sig: Take 1 tablet (20 mg) by mouth daily      Facility-Administered Medications: None     Allergies   Allergies   Allergen Reactions     Niacin Other (See Comments)     Other reaction(s): Flushing         Norvasc [Amlodipine] Rash     Septra [Sulfamethoxazole W-Trimethoprim] Rash       Social History   Former smoker. .     Family History   Family History   Problem Relation Age of Onset     Myocardial Infarction Father      Heart Disease Maternal Grandfather        Review of Systems   The 10 point Review of Systems is negative other than noted in the HPI or here.     Physical Exam   Temp: 97.4  F (36.3  C) Temp src: Oral BP: 91/63(pre ex OT/CR) Pulse: 59 Heart Rate: 75 Resp: 16 SpO2: 95 % O2 Device: None (Room air) Oxygen Delivery: 2 LPM  Vital Signs with Ranges  Temp:  [97.4  F (36.3  C)-98.3  F (36.8  C)] 97.4  F (36.3  C)  Pulse:  [52-66] 59  Heart Rate:  [53-75] 75  Resp:  [11-23] 16  BP: ()/() 91/63  SpO2:  [91 %-100 %] 95 %  177 lbs 11.2 oz    Constitutional: Alert, no acute distress.  Eyes: sclera anicteric  Respiratory: No respiratory distress. Breath sounds are CTAB. No wheezes, rhonchi, or rales    Cardiovascular: Regular rate and rhythm. Normal S1, S2. No murmurs, rubs, or gallops. Bilateral femoral access sites are soft, no bruit or hematoma. He has some mild ecchymosis.   GI: abdomen soft.  Skin: warm and dry.   Musculoskeletal: no LE edema bilaterally. [place]  Neurologic: alert and oriented x 3.  Psychiatric: affect appropriate.     Data   -Data reviewed today: All pertinent laboratory and imaging results from this encounter were reviewed. I personally reviewed no images or EKG's today.  Recent Labs   Lab 01/17/20  0545 01/16/20  1005   WBC 7.4 5.1   HGB 12.4* 13.7   MCV 93 92    212   INR 1.17* 1.11    138   POTASSIUM 4.1 3.7   CHLORIDE 106 105   CO2 28 28   BUN 11 12   CR 0.79 0.82   ANIONGAP 3 5   CODI 8.8 9.3   * 99     Imaging:  Recent Results (from the past 24 hour(s))   Cardiac Catheterization    Narrative      Prox RCA lesion is 100% stenosed.      angioplasty of RCA.  Guider for RCA difficult, none ideal. Best was   AL1.0 with manupulation and 3DRC with guideliner.  Successful wire was  200.  Uncrossable with device until we used   laser to modify proximal cap. After that we could cross and dilate with   Takura 1.5mm balloon. this was only successful crossing. Microcatheters   did not cross. We used Turnpike Gold to partially cross which allowed   passage of rotablator wire but wire likely went behind stent strut so I   did not activate rotablator. At end of case there was EVAN 3 flow but   heavily calcified lesion  REC-another attempt in 4-8 weeks. Likely try AL1 or ART guiders,  200   wire. Consider turnpike spiral microcatheter. Then attempt passing   rotablator wire down RCA past stent struts or another run with laser.   Possible to try CSI device if it only crosses calcified area and doesnt   move past stent-making sure the wire is not behind any struts.  (Pre PCI   the RCA is 100% obstructed and EVAN 0 flow--post above procedures the   vessel is open with 95%  proximal calcified lesion with EVAN 3 flow)

## 2020-01-20 ENCOUNTER — PATIENT OUTREACH (OUTPATIENT)
Dept: ADMINISTRATIVE | Facility: HOSPITAL | Age: 65
End: 2020-01-20

## 2020-01-21 ENCOUNTER — CLINICAL SUPPORT (OUTPATIENT)
Dept: REHABILITATION | Facility: HOSPITAL | Age: 65
End: 2020-01-21
Attending: PSYCHIATRY & NEUROLOGY
Payer: MEDICARE

## 2020-01-21 DIAGNOSIS — Z74.09 IMPAIRED FUNCTIONAL MOBILITY, BALANCE, GAIT, AND ENDURANCE: ICD-10-CM

## 2020-01-21 PROCEDURE — 97162 PT EVAL MOD COMPLEX 30 MIN: CPT | Mod: PO

## 2020-01-21 NOTE — PLAN OF CARE
[facility]  Physical Therapy Neurological Rehabilitation Initial Evaluation    Name: Alison Branch  Clinic Number: [id number]    Therapy Diagnosis:   Encounter Diagnosis   Name Primary?    Impaired functional mobility, balance, gait, and endurance      Physician: David Mo MD    Physician Orders: PT Eval and Treat   Medical Diagnosis from Referral:   R41.3 (ICD-10-CM) - Memory difficulty   G62.0,T45.1X5A (ICD-10-CM) - Chemotherapy-induced neuropathy   R26.89 (ICD-10-CM) - Balance disorder   Evaluation Date: 1/21/2020  Authorization Period Expiration: 1/9/2020  Plan of Care Expiration: 3/21/2020  Visit # / Visits authorized: 1/ 6    Time In: 940 (pt arrives late for apt, unable to accomodate)  Time Out: 1015  Total Billable Time: 35 minutes    Precautions: Standard, Fall and cancer    Subjective   Date of onset: May 2019 (cancer diagnosis)  History of current condition - Alison reports: I've been falling a lot. Last fall was in Sept. Its like she is always losing her balance. Currently in Chemo, every 21 days. Reports she has been overly sleepy lately, which is why she is late today. She reports that there is nothing consistent with her falls, sometimes she gets dizzy, other times she thinks her feet are moving too fast, and sometimes it just happens. She thinks her neuropathy is causing some of the deficits as well, R side more affected with the neuropathy.     Medical History:   Past Medical History:   Diagnosis Date    Allergy     Brain aneurysm 2010    s/p coiling of one; another not coiled    Breast cyst     Depression 9/19/2019    Diabetes mellitus     Diabetes type 2, controlled     Fever blister     High cholesterol     History of Bell's palsy     HTN (hypertension) 5/20/2014       Surgical History:   Alison Branch  has a past surgical history that includes Cervical fusion; head surgery; Hysterectomy; Colonoscopy (N/A, 3/9/2016); Breast cyst aspiration;  "Bronchoscopy (N/A, 5/28/2019); and Insertion of tunneled central venous catheter (CVC) with subcutaneous port (Right, 7/8/2019).    Medications:   Alison has a current medication list which includes the following prescription(s): aspirin, atorvastatin, benzonatate, bisacodyl, blood sugar diagnostic, blood-glucose meter, carboxymethylcellulose, dexamethasone, diazepam, ergocalciferol, escitalopram oxalate, fluticasone propionate, folic acid, gabapentin, insulin aspart u-100, insulin detemir u-100, insulin lispro, januvia, lancets, lidocaine-prilocaine, olmesartan, ondansetron, pen needle, diabetic, phenazopyridine, psyllium, terconazole, and walker.    Allergies:   Review of patient's allergies indicates:  No Known Allergies     Imaging, x-ray chest: "Grossly stable masslike opacity in the left upper lobe with coarse reticulonodular densities throughout the left mid and upper lung zone.  No detrimental change in lung aeration when compared with 12/03/2019." (12/13/2019)    Prior Therapy: yes, at this facility, for back pain  Social History: lives with their daughter  Falls: last fall in Sept   DME: Rollator and Shower chair    Home Environment: 2nd story apt, handrails on B sides    Exercise Routine / History: none   Family Present at time of Eval: none   Occupation: not employed  Prior Level of Function: fully independent  Current Level of Function: mod I with rollator and increased time for completion of task    Pain:  Current 0/10, worst 7/10, best 0/10   Location: right back  and upper legs  Description: Aching and Tight  Aggravating Factors: Standing and Walking  Easing Factors: relaxation, lying down and rest    Pts goals: "To strengthen my legs, especially the R one, try to have more control"    Objective     Observation: pleasant and cooperative   Speech: fluent    Mental status: alert, oriented to person, place, and time, normal mood, behavior, speech, dress, motor activity, and thought processes  Appearance: " Casually dressed and Well groomed  Behavior:  calm, cooperative and adequate rapport can be established  Attention Span and Concentration:  Decreased and Easily distracted    Posture Alignment :slouched posture    Dominant hand:  right     Skin integrity:  Intact    Visual/Auditory: reports increased blurriness with vision recently  Tracking:Intact  Saccades: Intact  Acuity:Impaired: pt reports visual acuity has decreased, she is attempting to see MD for this soon  R/L discrimination: NT  Visual field: NT    ROM:   GROSS AROM/PROM  UPPER EXTREMITY  (R) UE: WFLs  (L) UE: WFLs  LOWER EXTREMITY  (R) LE: WFLs  (L) LE: WFLs       Lower Extremity Strength (tested in seated)  Right LE  Left LE    Hip flexion:  4-/5 Hip flexion: 4/5   Knee extension: 5/5 Knee extension: 5/5   Knee flexion: 5/5 Knee flexion: 5/5   Ankle dorsiflexion:  5/5 Ankle dorsiflexion: 5/5   Ankle plantarflexion:  5/5 Ankle plantarflexion: 5/5   Hip abduction: 5/5 Hip abduction: 5/5   Hip adduction: 5/5 Hip adduction 5/5   Hip extension: 4/5 Hip extension: 4/5     Upper extremity strength:  Not tested due to time restrictions.    Coordination:   Rapid Alternating Movements: intact  Point to Point:    -Finger to Nose: NT   -Heel to Shin: NT    Sensation: intact per patient  Proprioception: NT    Tone/Spasticity: NT    Functional Mobility (Bed mobility, transfers)  Bed mobility: Mod I  Supine to sit: Mod I  Sit to supine: Mod I  Rolling: Mod I  Transfers to bed: Mod I  Transfers to toilet: Mod I  Sit to stand:  Mod I  Stand pivot:  Mod I  Car transfers: Mod I       Evaluation   Single Limb Stance R LE 27 sec  (<10 sec = HIGH FALL RISK)   Single Limb Stance L LE 10 sec  (<10 sec = HIGH FALL RISK)   30 second Chair Rise 7 completed with arms   TUG 13.9 seconds   Self selected walking speed 1.05 m/sec (6m/5.7s)   Fast walking speed 1.25 m/sec (6m/4.8s)     30 second chair rise below average score:   Age  Men  Women  60-64  <14  <12  A below average score  "indicates a risk for fall.    Postural control:  [place] SENSORY TESTING:  (P= Pass, F= Fail; note any sway; hold each position for 30")  Condition 1: (firm surface/feet together/eyes open) P  Condition 2: (firm surface/feet together/eyes closed) P  Condition 3: (firm surface/feet in tandem/eyes open) P  Condition 4: (firm surface/feet in tandem/eyes closed) F; 2 sec  Condition 5: (soft surface/feet together/eyes open) P  Condition 6: (soft surface/feet together/eyes closed) P; mod sway  Condition 7: (Fakuda step test), measure distance varied from center starting position NT    Gait Assessment:   - AD used: none  - Assistance: mod I  - Stairs: Mod I    · GAIT DEVIATIONS:   Alison displays the following deviations with ambulation: WBOS, small shuffling steps, decreased arm swing   Impairments contributing to deviations: impaired motor control, impaired balance, decreased strength      Functional Gait Assessment:   1. Gait on level surface =  2   (3) Normal: less than 5.5 sec, no A.D., no imbalance, normal gait pattern, deviates< 6in   (2) Mild impairment: 7-5.6 sec, uses A.D., mild gait deviations, or deviates 6-10 in   (1) Moderate impairment: > 7 sec, slow speed, imbalance, deviates 10-15 in.   (0) Severe impairment: needs assist, deviates >15 in, reach/touch wall  2. Change in Gait Speed = 2   (3) Normal: smooth change w/o loss of balance or gait deviation, deviates < 6 in, significant difference between speeds   (2) Mild impairment: changes speed, but demonstrates mild gait deviations, deviates 6-10 in, OR no deviations but unable to significantly speed, OR uses A.D.   (1) Moderate impairment: minor changes to speed, OR changes speed w/ significant deviations, deviates 10-15 in, OR  Changes speed , but loses balance & recovers   (0) Severe impairment: cannot change speed, deviates >15 in, or loses balance & needs assist  3. Gait with horizontal head turns  = 2   (3) Normal: no change in gait, deviates <6 in   (2) " Mild impairment: slight change in speed, deviates 6-10 in, OR uses A.D.   (1) Moderate impairment: moderate change in speed, deviates 10-15 in   (0) Severe impairment: severe disruption of gait, deviates >15in  4. Gait with vertical head turns = 2   (3) Normal: no change in gait, deviates <6 in   (2) Mild impairment: slight change in speed, deviates 6-10 in OR uses A.D.   (1) Moderate impairment: moderate change in speed, deviates 10-15 in   (0) Severe impairment: severe disruption of gait, deviates >15 in  5. Gait with pivot turns = 3   (3) Normal: performs safely in 3 sec, no LOB   (2) Mild impairment: performs in >3 sec & no LOB, OR turns safely & requires several steps to regain LOB   (1) Moderate impairment: turns slow, OR requires several small steps for balance following turn & stop   (0) Severe impairment: cannot turn safely, needs assist  6. Step over obstacle = 3   (3) Normal: steps over 2 stacked boxes w/o change in speed or LOB   (2) Mild impairment: able to step over 1 box w/o change in speed or LOB   (1) Moderate impairment: steps over 1 box but must slow down, may require VC   (0) Severe impairment: cannot perform w/o assist  7. Gait with Narrow MINGO = 1   (3) Normal: 10 steps no staggering   (2) Mild impairment: 7-9 steps   (1) Moderate impairment: 4-7 steps   (0) Severe impairment: < 4 steps or cannot perform w/o assist  8. Gait with eyes closed = 2   (3) Normal: < 7 sec, no A.D., no LOB, normal gait pattern, deviates <6 in   (2) Mild impairment: 7.1-9 sec, mild gait deviations, deviates 6-10 in   (1) Moderate impairment: > 9 sec, abnormal pattern, LOB, deviates 10-15 in   (0) Severe impairment: cannot perform w/o assist, LOB, deviates >15in  9. Ambulating Backwards = 2   (3) Normal: no A.D., no LOB, normal gait pattern, deviates <6in   (2) Mild impairment: uses A.D., slower speed, mild gait deviations, deviates 6-10 in   (1) Moderate impairment: slow speed, abnormal gait pattern, LOB, deviates 10-15  in   (0) Severe impairment: severe gait deviations or LOB, deviates >15in  10. Steps = 2   (3) Normal: alternating feet, no rail   (2) Mild Impairment: alternating feet, uses rail   (1) Moderate impairment: step-to, uses rail   (0) Severe impairment: cannot perform safely    Score 21/30     Score:   <22/30 fall risk   <20/30 fall risk in older adults   <18/30 fall risk in Parkinsons       Endurance Deficit: yes; pt requires seated rest often     PT Evaluation Completed? Yes        CMS Impairment/Limitation/Restriction for FOTO Intake Survey    Therapist reviewed FOTO scores for Alison Branch on 1/21/2020.   FOTO documents entered into [employer] - see Media section.    Limitation Score: 44%  Category: Mobility    Current : CK = at least 40% but < 60% impaired, limited or restricted  Goal: CJ = at least 20% but < 40% impaired, limited or restricted         TREATMENT   Treatment not provided due to time restrictions.     Home Exercises and Patient Education Provided    Education provided:   - POC; role of PT      Assessment   Alison is a 64 y.o. female referred to outpatient Physical Therapy with a medical diagnosis of balance disorder. Pt presents to PT with the following impairments leading to his/her functional decline: decreased balance, decreased endurance, decreased strength, and impaired functional mobility. Pt reports that she struggles with R side low back and sciatica pain. She reports increased incidence of falls recently with most recent fall in Sept 2019. It is of note that pt reports being very sleepy recently due to medication changes. Pt reports varying reasons for fall, but PT tends to believe that falls may be partially due to poor endurance from currently participating in chemo treatments. PT to benefit pt with improving strength, endurance, decreasing pain, improving balance, and overall mobility.    Pt prognosis is Good.   Pt will benefit from skilled outpatient Physical Therapy to address the deficits  stated above and in the chart below, provide pt/family education, and to maximize pt's level of independence.     Plan of care discussed with patient: Yes  Pt's spiritual, cultural and educational needs considered and patient is agreeable to the plan of care and goals as stated below:     Anticipated Barriers for therapy: none at this time    Medical Necessity is demonstrated by the following  History  Co-morbidities and personal factors that may impact the plan of care Co-morbidities:   depression, diabetes, history of cancer and HTN    Personal Factors:   lifestyle  character     moderate   Examination  Body Structures and Functions, activity limitations and participation restrictions that may impact the plan of care Body Regions:   back  lower extremities  upper extremities    Body Systems:    gross symmetry  strength  balance  gait  transfers  transitions  motor control  motor learning  respiratory rate    Participation Restrictions:   Pt has a fear of falling and poor balance.    Activity limitations:   Learning and applying knowledge  no deficits    General Tasks and Commands  undertaking multiple tasks    Communication  no deficits    Mobility  lifting and carrying objects  walking    Self care  no deficits    Domestic Life  shopping  cooking  doing house work (cleaning house, washing dishes, laundry)    Interactions/Relationships  complex interpersonal interactions    Life Areas  no deficits    Community and Social Life  no deficits         moderate   Clinical Presentation evolving clinical presentation with changing clinical characteristics moderate   Decision Making/ Complexity Score: moderate     Goals:  Short Term Goals: 4 weeks   1. Pt will improve score on TUG to at least 10 sec in order to decrease risk for fall.   2. Pt will improve SSWS to at least 1.15 m/s to demonstrate improved mobility without AD.   3. Pt will improve 30 second chair rise score to at least 7 without UE support.  4. Pt will  report <40% limitation using FOTO impairment tool.    Long Term Goals: 8 weeks   1. Pt will improve score on FGA to at least 27/30 in order to demonstrate improved balance.   2. Pt will improve 30 second chair rise score to at least 10 without UE support.  3. Pt will improve SLS to at least 30 sec B in order to decrease risk for fall.   4. Pt will report no falls and ambulation without rollator for at least two weeks.     Plan   Plan of care Certification: 1/21/2020 to 3/21/2020.    Outpatient Physical Therapy 2 times weekly for 8 weeks to include the following interventions: Gait Training, Manual Therapy, Moist Heat/ Ice, Neuromuscular Re-ed, Patient Education, Therapeutic Activites and Therapeutic Exercise.     Matias Nunez, PT

## 2020-01-22 ENCOUNTER — PATIENT MESSAGE (OUTPATIENT)
Dept: DIABETES | Facility: CLINIC | Age: 65
End: 2020-01-22

## 2020-01-22 ENCOUNTER — PATIENT MESSAGE (OUTPATIENT)
Dept: ENDOCRINOLOGY | Facility: CLINIC | Age: 65
End: 2020-01-22

## 2020-01-22 ENCOUNTER — PATIENT MESSAGE (OUTPATIENT)
Dept: INTERNAL MEDICINE | Facility: CLINIC | Age: 65
End: 2020-01-22

## 2020-01-22 DIAGNOSIS — E11.9 DIABETES MELLITUS TYPE 2 IN NONOBESE: ICD-10-CM

## 2020-01-24 ENCOUNTER — HOSPITAL ENCOUNTER (OUTPATIENT)
Dept: RADIOLOGY | Facility: HOSPITAL | Age: 65
Discharge: HOME | End: 2020-01-24
Attending: INTERNAL MEDICINE
Payer: MEDICARE

## 2020-01-24 ENCOUNTER — CLINICAL SUPPORT (OUTPATIENT)
Dept: SPEECH THERAPY | Facility: HOSPITAL | Age: 65
End: 2020-01-24
Attending: INTERNAL MEDICINE
Payer: MEDICARE

## 2020-01-24 DIAGNOSIS — T17.908A ASPIRATION INTO AIRWAY, INITIAL ENCOUNTER: ICD-10-CM

## 2020-01-24 PROCEDURE — 74230 X-RAY XM SWLNG FUNCJ C+: CPT | Mod: 26,,, | Performed by: RADIOLOGY

## 2020-01-24 PROCEDURE — 74230 X-RAY XM SWLNG FUNCJ C+: CPT | Mod: TC

## 2020-01-24 PROCEDURE — 74230 FL MODIFIED BARIUM SWALLOW SPEECH STUDY: ICD-10-PCS | Mod: 26,,, | Performed by: RADIOLOGY

## 2020-01-24 PROCEDURE — 92611 MOTION FLUOROSCOPY/SWALLOW: CPT | Mod: GN

## 2020-01-24 NOTE — PATIENT INSTRUCTIONS
IMPRESSIONS:     1. Oropharyngeal swallow within normal limits for all consistencies tested    2. No cervical esophageal swallowing abnormalities were noted.    RECOMMENDATIONS/PLAN OF CARE:     1. Continue regular diet with thin liquids    2. Given difficulty when lying down, patient should ensure upright posture for all oral intake and remain fully upright for at least 30 minutes to 1 hour after oral intake and avoid eating within an hour before eating.    3.  Review of the swallow study results by the referring physician for further management of the patients concerns.    4. Contact [facility] at [phone] with any further questions or concerns.

## 2020-01-24 NOTE — PROGRESS NOTES
"MODIFIED BARIUM SWALLOW STUDY SPEECH PATHOLOGY REPORT    REASON FOR REFERRAL:  Ms. Alison Branch, age 64 y.o., was referred by Tara Shanks MD for a Modified Barium Swallow Study to rule out aspiration,assess overall swallowing anatomy and function, determine efficacy of compensatory strategies and recommend safest consistencies for oral intake if needed.  History negative for pneumonia. History includes cerebral aneurysm, coiled 2010, malignant neoplasm of upper lobe of left lung.    SWALLOWING HISTORY  Patient states she has difficulty feeling coughing, mostly when lying down. She feels her difficulty occurs after eating rather than during meals.  Diet consistency at present is a regular consistency with thin liquids.    Medications are taken by mouth with water without difficulty.  She has not made any changes to her diet as a result of her difficulty.    PREVIOUS MBSS:none  MEDICAL HISTORY:  Past Medical History:   Diagnosis Date    Allergy     Brain aneurysm 2010    s/p coiling of one; another not coiled    Breast cyst     Depression 9/19/2019    Diabetes mellitus     Diabetes type 2, controlled     Fever blister     High cholesterol     History of Bell's palsy     HTN (hypertension) 5/20/2014        SURGICAL HISTORY:  Past Surgical History:   Procedure Laterality Date    BREAST CYST ASPIRATION      BRONCHOSCOPY N/A 5/28/2019    Procedure: Bronchoscopy;  Surgeon: Iesha Diagnostic Provider;  Location: [facility] OR [address] [place] [address];  Service: Anesthesiology;  Laterality: N/A;    CERVICAL FUSION      COLONOSCOPY N/A 3/9/2016    Procedure: COLONOSCOPY;  Surgeon: Elliott Zimmerman MD;  Location: [facility] (4TH FLR);  Service: Endoscopy;  Laterality: N/A;    head surgery      stent and "curling" for aneurysm    HYSTERECTOMY      TVH secondary to SUF    INSERTION OF TUNNELED CENTRAL VENOUS CATHETER (CVC) WITH SUBCUTANEOUS PORT Right 7/8/2019    Procedure: INSERTION, PORT-A-CATH;  Surgeon: aCli Damico MD; "  Location: [facility];  Service: Radiology;  Laterality: Right;         FAMILY HISTORY:  Family History   Problem Relation Age of Onset    Rheum arthritis Father     Diabetes Father     Heart failure Father     Migraines Father     Cataracts Father     Cancer Mother 63        pancreatic    Stomach cancer Mother     Breast cancer Maternal Aunt         50s    Ovarian cancer Cousin     Diabetes Sister     Diabetes Brother     Cancer Maternal Aunt         Lung CA    Melanoma Neg Hx     Colon cancer Neg Hx     Amblyopia Neg Hx     Blindness Neg Hx     Glaucoma Neg Hx     Macular degeneration Neg Hx     Retinal detachment Neg Hx     Strabismus Neg Hx     Stroke Neg Hx     Thyroid disease Neg Hx         SOCIAL HISTORY:  Patient lives in [place].    BEHAVIOR:  Alison Branch was a pleasant lady who had normal affect and social interaction.  She was able to fully cooperate during the study.  Results of today's assessment were considered indicative of current levels of swallowing functioning.      HEARING:  Subjectively, within normal limits.     ORAL PERIPHERAL:   Informal examination of the oral mechanism revealed structures and functioning within normal limits for swallowing and speech purposes.    Voice quality was good. No wet or gurgled quality was appreciated before, during or after the study.    TEST FINDINGS:   Patient was seen in Radiology with the Radiologist for a Modified Barium Swallow Study and was positioned for a left lateral videofluoroscopic view      Consistencies assessed using radiopaque barium contrast:  Thin liquids (1 tsp x2) by spoon and single and continuous swallows from an open cup  Nectar thick liquids  Honey thick liquids  Thin puree (applesauce) with barium contrast pudding by spoon  Thick puree barium contrast pudding by spoon  Solid (1/4 cracker) with barium contrast pudding     Phases:  Oral:  Patient was able to obtain liquid and strip utensils adequately with no  anterior loss of material from the oral cavity.  She moved boluses through the oral cavity with appropriate transit time.  There was no pooling of liquids in the mouth.  Swallow reflex was triggered within normal limits.     Pharyngeal:  Boluses moved through the pharyngeal phase with no laryngeal penetration and no aspiration and no nasal regurgitation.      - Timely initiation  - Adequate soft palate elevation  - Adequate laryngeal elevation and anterior hyoid excursion  - Adequate tongue base retraction  - Complete epiglottic inversion  - Complete laryngeal vestibular closure  - Adequate pharyngeal stripping wave  - Adequate PE segment opening.  -  No pharyngeal residue    Cervical Esophageal:  Boluses entered the upper esophagus within normal limits.  No obstruction was noted.    [place] 8-point Penetration-Aspiration Scale:     Thin liquids: 1 - Material does not enter airway.  Thin puree: 1 - Material does not enter airway.  Thick puree:1 - Material does not enter airway.  Solid(barium laced cracker): 1 - Material does not enter airway.  Tablet/Capsule: 1 - Material does not enter airway.    Strategies assessed None needed, swallow WNL    IMPRESSIONS:     1. Oropharyngeal swallow within normal limits for all consistencies tested    2. No cervical esophageal swallowing abnormalities were noted.    RECOMMENDATIONS/PLAN OF CARE:     1. Continue regular diet with thin liquids    2. Given difficulty when lying down, patient should ensure upright posture for all oral intake and remain fully upright for at least 30 minutes to 1 hour after oral intake and avoid eating within an hour before eating.    3.  Review of the swallow study results by the referring physician for further management of the patients concerns.    4. Contact [facility] at [phone] with any further questions or concerns.

## 2020-01-24 NOTE — PLAN OF CARE
IMPRESSIONS:     1. Oropharyngeal swallow within normal limits for all consistencies tested    2. No cervical esophageal swallowing abnormalities were noted.    RECOMMENDATIONS/PLAN OF CARE:     1. Continue regular diet with thin liquids    2. Given difficulty when lying down, patient should ensure upright posture for all oral intake and remain fully upright for at least 30 minutes to 1 hour after oral intake and avoid eating within an hour before eating.    3.  Review of the swallow study results by the referring physician for further management of the patients concerns.    4. Contact [facility] at [phone] with any further questions or concerns.

## 2020-01-27 ENCOUNTER — OFFICE VISIT (OUTPATIENT)
Dept: PSYCHIATRY | Facility: CLINIC | Age: 65
End: 2020-01-27
Payer: COMMERCIAL

## 2020-01-27 DIAGNOSIS — Z79.4 TYPE 2 DIABETES MELLITUS WITH HYPERGLYCEMIA, WITH LONG-TERM CURRENT USE OF INSULIN: ICD-10-CM

## 2020-01-27 DIAGNOSIS — F33.0 MILD EPISODE OF RECURRENT MAJOR DEPRESSIVE DISORDER: Primary | ICD-10-CM

## 2020-01-27 DIAGNOSIS — C34.12 MALIGNANT NEOPLASM OF UPPER LOBE OF LEFT LUNG: ICD-10-CM

## 2020-01-27 DIAGNOSIS — R41.840 ATTENTION AND CONCENTRATION DEFICIT: ICD-10-CM

## 2020-01-27 DIAGNOSIS — E11.65 TYPE 2 DIABETES MELLITUS WITH HYPERGLYCEMIA, WITH LONG-TERM CURRENT USE OF INSULIN: ICD-10-CM

## 2020-01-27 PROCEDURE — 90834 PSYTX W PT 45 MINUTES: CPT | Mod: S$GLB,,, | Performed by: PSYCHOLOGIST

## 2020-01-27 PROCEDURE — 90834 PR PSYCHOTHERAPY W/PATIENT, 45 MIN: ICD-10-PCS | Mod: S$GLB,,, | Performed by: PSYCHOLOGIST

## 2020-01-27 PROCEDURE — 99999 PR PBB SHADOW E&M-EST. PATIENT-LVL II: CPT | Mod: PBBFAC,,, | Performed by: PSYCHOLOGIST

## 2020-01-27 PROCEDURE — 99999 PR PBB SHADOW E&M-EST. PATIENT-LVL II: ICD-10-PCS | Mod: PBBFAC,,, | Performed by: PSYCHOLOGIST

## 2020-01-27 NOTE — Clinical Note
Hey. I have this very complex patient. Uncontrolled Diabetes, loss of appetite, and does not eat or take insulin appropriately. She sees a diabetes RD but cannot seem to get her loss of appetite managed enough to eat. Whole meals are not realistic for her at this time. Any thoughts?

## 2020-01-27 NOTE — PROGRESS NOTES
INFORMED CONSENT: Alison Branch   is known to this provider and identity was confirmed via NAME and .  The patient has been informed of the risks and benefits associated with engaging in psychotherapy, the handling of protected health information, the rights of privacy and the limits of confidentiality. The patient has also been informed of the importance of reporting any suicidal or homicidal ideation to this or any provider to ensure safety of all parties, and the Alison Branch expressed understanding. The patient was agreeable to these terms and freely participates in individual psychotherapy.    PSYCHO-ONCOLOGY NOTE/ Individual Psychotherapy     Date: 2020   [facility]        Therapeutic Intervention: Met with patient.  Outpatient - Behavior modifying psychotherapy 45 min - CPT code 60372      Patient was last seen by me on 2020    Problem list  Patient Active Problem List   Diagnosis    Type 2 diabetes mellitus with hyperglycemia, with long-term current use of insulin    Mixed hyperlipidemia due to type 2 diabetes mellitus    Attention and concentration deficit    Cerebral aneurysm, coiled in     Hypertension associated with diabetes    Hyperkeratosis of palms and soles    Irritable bowel syndrome    Aneurysm of unspecified site    Obesity (BMI 30-39.9)    Vitamin D deficiency    Malignant neoplasm of upper lobe of left lung    Secondary malignant neoplasm of mediastinal lymph node    Adrenal cortical steroids causing adverse effect in therapeutic use    Type 2 diabetes mellitus with diabetic polyneuropathy, with long-term current use of insulin    Major depressive disorder, recurrent, unspecified    Memory deficit    Dysuria    Edema, peripheral    Chemotherapy-induced peripheral neuropathy    Shortness of breath on exertion    Fever of unknown origin (FUO)    Impaired functional mobility, balance, gait, and endurance       Chief complaint/reason for encounter:  "depression and cognition   Met with patient to evaluate psychosocial adaptation to diagnosis/treatment/survivorship of Lung Cancer    Current Medications  Current Outpatient Medications   Medication    aspirin (ECOTRIN) 81 MG EC tablet    atorvastatin (LIPITOR) 40 MG tablet    benzonatate (TESSALON PERLES) 100 MG capsule    bisacodyl (DULCOLAX) 5 mg EC tablet    blood sugar diagnostic Strp    blood-glucose meter kit    carboxymethylcellulose (REFRESH PLUS) 0.5 % Dpet    dexAMETHasone (DECADRON) 6 MG tablet    diazePAM (VALIUM) 5 MG tablet    ergocalciferol (ERGOCALCIFEROL) 50,000 unit Cap    escitalopram oxalate (LEXAPRO) 10 MG tablet    fluticasone propionate (FLONASE) 50 mcg/actuation nasal spray    folic acid (FOLVITE) 1 MG tablet    gabapentin (NEURONTIN) 300 MG capsule    insulin aspart U-100 (NOVOLOG) 100 unit/mL (3 mL) InPn pen    insulin detemir U-100 (LEVEMIR FLEXTOUCH) 100 unit/mL (3 mL) SubQ InPn pen    insulin lispro (HUMALOG KWIKPEN INSULIN) 100 unit/mL pen    lancets Misc    lidocaine-prilocaine (EMLA) cream    olmesartan (BENICAR) 20 MG tablet    ondansetron (ZOFRAN) 8 MG tablet    pen needle, diabetic (BD ULTRA-FINE BRYANT PEN NEEDLE) 32 gauge x 5/32" Ndle    phenazopyridine (PYRIDIUM) 200 MG tablet    psyllium (METAMUCIL) packet    SITagliptin (JANUVIA) 100 MG Tab    terconazole (TERAZOL 7) 0.4 % Crea    walker Misc     No current facility-administered medications for this visit.        Objective:  Alison Branch arrived  promptly for the session.  Ms. Branch was independently ambulatory at the time of session. The patient was fully cooperative throughout the session.  Appearance: age appropriate, appropriately  dressed, adequately  groomed  Behavior/Cooperation: friendly and cooperative  Speech: appropriate quality, quantity and organization of sentences and quiet  Mood: dysthymic  Affect: dysphoric  Thought Process: goal-directed, logical  Thought Content: normal,  No " delusions or paranoia; did not appear to be responding to internal stimuli during the session  Orientation: grossly intact  Memory: Grossly intact  Attention Span/Concentration: Attends to session without distraction; reports no difficulty  Fund of Knowledge: average  Estimate of Intelligence: average from verbal skills and history  Cognition: grossly intact  Insight: Fair  Judgment: the patient's behavior is adequate to circumstances    Interval history and content of current session: Patient reported that she attended all of her appointment. She denied depression, stating that her medication and continuing to set boundaries with toxic family members help.   Discussed current adaptation to disease and treatment status. Reports to be coping in a passive manner. Evaluated cognitive response, paying particular attention to negative intrusive thoughts of a persistent and detrimental nature. Thoughts of this type are in evidence with mild distress. Provided cognitive behavioral therapy to address negative cognitions. Identified and evaluated psychosocial and environmental stressors secondary to diagnosis and treatment.  Examined proactive behaviors that may be implemented to minimize or ameliorate psychosocial stressors secondary to diagnosis and treatment. Patient still has not fully consolidated her logs and continues to keep several documents per days for the same concerns, which complicates her ability to keep up with everything. Patient is reporting continues difficulty with nutrition and diabetes management.      Risk parameters:   Patient reports no suicidal ideation  Patient reports no homicidal ideation  Patient reports no self-injurious behavior  Patient reports no violent behavior   Safety needs:  None at this time      Verbal deficits: None     Patient's response to intervention:The patient's response to intervention is accepting.     Progress toward goals and other mental status changes:  The patient's  progress toward goals is good.      Progress to date:No Progress - Continue Objectives      Goals from last visit: Met     Patient reported outcomes:    Distress Thermometer: 3   PHQ-9= 4, no SI   DELMER-7= 0      Client Strengths: verbal, intelligent, successful, good social support, strong shannon, strong cultural traditions     Diagnosis:     ICD-10-CM ICD-9-CM   1. Mild episode of recurrent major depressive disorder F33.0 296.31   2. Attention and concentration deficit R41.840 799.51   3. Malignant neoplasm of upper lobe of left lung C34.12 162.3   4. Type 2 diabetes mellitus with hyperglycemia, with long-term current use of insulin E11.65 250.00    Z79.4 790.29     V58.67       Treatment Plan:individual psychotherapy and medication management by physician  · Target symptoms: depression  · Why chosen therapy is appropriate versus another modality: relevant to diagnosis, evidence based practice  · Outcome monitoring methods: self-report, observation, checklist/rating scale  · Therapeutic intervention type: behavior modifying psychotherapy  · Prognosis: Good      Behavioral goals:   Medication Adherence  Discuss Loss of Appetite and Nutritional Substitutions with Med Team    Return to clinic: 1 month       Length of Service (minutes direct face-to-face contact): 45    Jagruti Garcia, PhD  [place] License #[id number]

## 2020-01-27 NOTE — PROGRESS NOTES
"  Physical Therapy Daily Treatment Note     Name: Alison Branch  Clinic Number: [id number]    Therapy Diagnosis:   Encounter Diagnosis   Name Primary?    Impaired functional mobility, balance, gait, and endurance Yes     Physician: Ai Billingsley NP    Visit Date: 2020    Physician Orders: PT Eval and Treat   Medical Diagnosis from Referral:   R41.3 (ICD-10-CM) - Memory difficulty   G62.0,T45.1X5A (ICD-10-CM) - Chemotherapy-induced neuropathy   R26.89 (ICD-10-CM) - Balance disorder   Evaluation Date: 2020  Authorization Period Expiration: 2020  Plan of Care Expiration: 3/21/2020  Visit # / Visits authorized:      Time In: 938 (pt downstairs scheduling at start of session)  Time Out: 1015  Total Billable Time: 37 minutes    Precautions: Standard, Fall and cancer    Subjective     Pt reports: She is feeling a little off today, but she does not want to depend on her walker. She has chemo this week on Thursday. "I needed to change some of my apts because its not a good idea to come when I do chemo."    She was provided with home exercise program.  Response to previous treatment: first session since evaluation  Functional change: ongoing    Pain: 0/10  Location: n/a     Objective     Alison received therapeutic exercises to develop strength, endurance, flexibility, posture and core stabilization for 37 minutes includin minutes SciFit Stepper, level 1.0  3 x 10 reps of bridging with green theraband  3 x 10 reps of SLR  3x 10 reps of supine hip abduction  2 x 30 sec supine hamstring stretch  2 x 30 sec supine piriformis stretch (figure 4)  2 x 30 sec gastroc stretch    Alison participated in neuromuscular re-education activities to improve: Balance, Coordination, Kinesthetic, Sense, Proprioception and Posture for 0 minutes. The following activities were included:      Home Exercises Provided and Patient Education Provided     Education provided:   - HEP provided    Written Home Exercises Provided: " "yes.  Exercises were reviewed and Alison was able to demonstrate them prior to the end of the session.  Alison demonstrated good  understanding of the education provided.     See EMR under Patient Instructions for exercises provided 1/28/2020.    Assessment     Ms. Rosas appeared more fatigued and lethargic today than during evaluation but was willing to "push through and do what she has to do." PT assigned pt with HEP today and she reported normal muscular fatigue toward the end of each set of exercises. Pt demonstrates strong stretch with hamstring stretching and reports that she has done this one before and it always hurts. Pt remains appropriate for skilled PT services in order to improve strength, endurance, and functional mobility while participating in chemo therapy.    Alison is progressing well towards her goals.   Pt prognosis is Good.     Pt will continue to benefit from skilled outpatient physical therapy to address the deficits listed in the problem list box on initial evaluation, provide pt/family education and to maximize pt's level of independence in the home and community environment.     Pt's spiritual, cultural and educational needs considered and pt agreeable to plan of care and goals.     Anticipated barriers to physical therapy: none at this time    Goals:  Short Term Goals: 4 weeks   1. Pt will improve score on TUG to at least 10 sec in order to decrease risk for fall. ongoing  2. Pt will improve SSWS to at least 1.15 m/s to demonstrate improved mobility without AD. ongoing  3. Pt will improve 30 second chair rise score to at least 7 without UE support. ongoing  4. Pt will report <40% limitation using FOTO impairment tool. ongoing     Long Term Goals: 8 weeks   1. Pt will improve score on FGA to at least 27/30 in order to demonstrate improved balance. Ongoing  2. Pt will improve 30 second chair rise score to at least 10 without UE support. ongoing  3. Pt will improve SLS to at least 30 sec B in order " to decrease risk for fall. ongoing  4. Pt will report no falls and ambulation without rollator for at least two weeks. ongoing     Plan   Plan of care Certification: 1/21/2020 to 3/21/2020.    Continue to work on endurance tasks and strengthening, incorporate more balance in coming sessions.    Matias Nunez, PT

## 2020-01-28 ENCOUNTER — CLINICAL SUPPORT (OUTPATIENT)
Dept: REHABILITATION | Facility: HOSPITAL | Age: 65
End: 2020-01-28
Attending: PSYCHIATRY & NEUROLOGY
Payer: MEDICARE

## 2020-01-28 ENCOUNTER — PATIENT MESSAGE (OUTPATIENT)
Dept: DIABETES | Facility: CLINIC | Age: 65
End: 2020-01-28

## 2020-01-28 DIAGNOSIS — Z74.09 IMPAIRED FUNCTIONAL MOBILITY, BALANCE, GAIT, AND ENDURANCE: Primary | ICD-10-CM

## 2020-01-28 PROCEDURE — 97110 THERAPEUTIC EXERCISES: CPT | Mod: PO

## 2020-01-29 ENCOUNTER — INFUSION (OUTPATIENT)
Dept: INFUSION THERAPY | Facility: HOSPITAL | Age: 65
End: 2020-01-29
Attending: INTERNAL MEDICINE
Payer: MEDICARE

## 2020-01-29 DIAGNOSIS — R53.1 WEAKNESS: ICD-10-CM

## 2020-01-29 DIAGNOSIS — C34.12 MALIGNANT NEOPLASM OF UPPER LOBE OF LEFT LUNG: Primary | ICD-10-CM

## 2020-01-29 LAB
ALBUMIN SERPL BCP-MCNC: 3.5 G/DL (ref 3.5–5.2)
ALP SERPL-CCNC: 59 U/L (ref 55–135)
ALT SERPL W/O P-5'-P-CCNC: 104 U/L (ref 10–44)
ANION GAP SERPL CALC-SCNC: 7 MMOL/L (ref 8–16)
AST SERPL-CCNC: 70 U/L (ref 10–40)
BILIRUB SERPL-MCNC: 0.4 MG/DL (ref 0.1–1)
BUN SERPL-MCNC: 12 MG/DL (ref 8–23)
CALCIUM SERPL-MCNC: 9.9 MG/DL (ref 8.7–10.5)
CHLORIDE SERPL-SCNC: 103 MMOL/L (ref 95–110)
CO2 SERPL-SCNC: 29 MMOL/L (ref 23–29)
CREAT SERPL-MCNC: 0.8 MG/DL (ref 0.5–1.4)
ERYTHROCYTE [DISTWIDTH] IN BLOOD BY AUTOMATED COUNT: 17.1 % (ref 11.5–14.5)
EST. GFR  (AFRICAN AMERICAN): >60 ML/MIN/1.73 M^2
EST. GFR  (NON AFRICAN AMERICAN): >60 ML/MIN/1.73 M^2
GLUCOSE SERPL-MCNC: 213 MG/DL (ref 70–110)
HCT VFR BLD AUTO: 34.4 % (ref 37–48.5)
HGB BLD-MCNC: 10.1 G/DL (ref 12–16)
IMM GRANULOCYTES # BLD AUTO: 0.06 K/UL (ref 0–0.04)
MCH RBC QN AUTO: 27.3 PG (ref 27–31)
MCHC RBC AUTO-ENTMCNC: 29.4 G/DL (ref 32–36)
MCV RBC AUTO: 93 FL (ref 82–98)
NEUTROPHILS # BLD AUTO: 2.9 K/UL (ref 1.8–7.7)
PLATELET # BLD AUTO: 207 K/UL (ref 150–350)
PMV BLD AUTO: 10.8 FL (ref 9.2–12.9)
POTASSIUM SERPL-SCNC: 4.2 MMOL/L (ref 3.5–5.1)
PROT SERPL-MCNC: 6.5 G/DL (ref 6–8.4)
RBC # BLD AUTO: 3.7 M/UL (ref 4–5.4)
SODIUM SERPL-SCNC: 139 MMOL/L (ref 136–145)
WBC # BLD AUTO: 4.84 K/UL (ref 3.9–12.7)

## 2020-01-29 PROCEDURE — 36591 DRAW BLOOD OFF VENOUS DEVICE: CPT

## 2020-01-29 PROCEDURE — 63600175 PHARM REV CODE 636 W HCPCS: Performed by: INTERNAL MEDICINE

## 2020-01-29 PROCEDURE — 85027 COMPLETE CBC AUTOMATED: CPT

## 2020-01-29 PROCEDURE — 80053 COMPREHEN METABOLIC PANEL: CPT

## 2020-01-29 RX ORDER — HEPARIN 100 UNIT/ML
500 SYRINGE INTRAVENOUS
Status: COMPLETED | OUTPATIENT
Start: 2020-01-29 | End: 2020-01-29

## 2020-01-29 RX ADMIN — HEPARIN 500 UNITS: 100 SYRINGE at 11:01

## 2020-01-29 NOTE — NURSING
"Pt's PAC accessed via Bryant 20g 3/4" needle, flushed, labs collected and NS flushed and heploc flushed instilled to dwell. deaccessed dsg applied, no problems noted or reported. Ambulated off unit via walker w/o event.  "

## 2020-01-30 ENCOUNTER — OFFICE VISIT (OUTPATIENT)
Dept: HEMATOLOGY/ONCOLOGY | Facility: CLINIC | Age: 65
End: 2020-01-30
Payer: MEDICARE

## 2020-01-30 ENCOUNTER — INFUSION (OUTPATIENT)
Dept: INFUSION THERAPY | Facility: HOSPITAL | Age: 65
End: 2020-01-30
Attending: INTERNAL MEDICINE
Payer: MEDICARE

## 2020-01-30 VITALS
RESPIRATION RATE: 20 BRPM | TEMPERATURE: 98 F | OXYGEN SATURATION: 99 % | DIASTOLIC BLOOD PRESSURE: 60 MMHG | WEIGHT: 217.81 LBS | BODY MASS INDEX: 32.26 KG/M2 | HEART RATE: 71 BPM | HEIGHT: 69 IN | SYSTOLIC BLOOD PRESSURE: 130 MMHG

## 2020-01-30 VITALS
TEMPERATURE: 98 F | RESPIRATION RATE: 18 BRPM | DIASTOLIC BLOOD PRESSURE: 62 MMHG | SYSTOLIC BLOOD PRESSURE: 139 MMHG | HEART RATE: 64 BPM

## 2020-01-30 DIAGNOSIS — R60.0 EDEMA, PERIPHERAL: ICD-10-CM

## 2020-01-30 DIAGNOSIS — G62.0 CHEMOTHERAPY-INDUCED PERIPHERAL NEUROPATHY: ICD-10-CM

## 2020-01-30 DIAGNOSIS — C77.1 SECONDARY MALIGNANT NEOPLASM OF MEDIASTINAL LYMPH NODE: ICD-10-CM

## 2020-01-30 DIAGNOSIS — T45.1X5A CHEMOTHERAPY-INDUCED PERIPHERAL NEUROPATHY: ICD-10-CM

## 2020-01-30 DIAGNOSIS — E11.42 TYPE 2 DIABETES MELLITUS WITH DIABETIC POLYNEUROPATHY, WITH LONG-TERM CURRENT USE OF INSULIN: ICD-10-CM

## 2020-01-30 DIAGNOSIS — R30.0 DYSURIA: ICD-10-CM

## 2020-01-30 DIAGNOSIS — C34.12 MALIGNANT NEOPLASM OF UPPER LOBE OF LEFT LUNG: ICD-10-CM

## 2020-01-30 DIAGNOSIS — Z79.4 TYPE 2 DIABETES MELLITUS WITH DIABETIC POLYNEUROPATHY, WITH LONG-TERM CURRENT USE OF INSULIN: ICD-10-CM

## 2020-01-30 DIAGNOSIS — R50.9 FEVER OF UNKNOWN ORIGIN (FUO): Primary | ICD-10-CM

## 2020-01-30 DIAGNOSIS — R60.9 EDEMA, UNSPECIFIED TYPE: ICD-10-CM

## 2020-01-30 DIAGNOSIS — C34.12 MALIGNANT NEOPLASM OF UPPER LOBE OF LEFT LUNG: Primary | ICD-10-CM

## 2020-01-30 PROCEDURE — 99215 OFFICE O/P EST HI 40 MIN: CPT | Mod: S$GLB,,, | Performed by: INTERNAL MEDICINE

## 2020-01-30 PROCEDURE — 3078F PR MOST RECENT DIASTOLIC BLOOD PRESSURE < 80 MM HG: ICD-10-PCS | Mod: CPTII,S$GLB,, | Performed by: INTERNAL MEDICINE

## 2020-01-30 PROCEDURE — 96411 CHEMO IV PUSH ADDL DRUG: CPT

## 2020-01-30 PROCEDURE — 3075F PR MOST RECENT SYSTOLIC BLOOD PRESS GE 130-139MM HG: ICD-10-PCS | Mod: CPTII,S$GLB,, | Performed by: INTERNAL MEDICINE

## 2020-01-30 PROCEDURE — 3008F BODY MASS INDEX DOCD: CPT | Mod: CPTII,S$GLB,, | Performed by: INTERNAL MEDICINE

## 2020-01-30 PROCEDURE — 3078F DIAST BP <80 MM HG: CPT | Mod: CPTII,S$GLB,, | Performed by: INTERNAL MEDICINE

## 2020-01-30 PROCEDURE — 99215 PR OFFICE/OUTPT VISIT, EST, LEVL V, 40-54 MIN: ICD-10-PCS | Mod: S$GLB,,, | Performed by: INTERNAL MEDICINE

## 2020-01-30 PROCEDURE — 3046F PR MOST RECENT HEMOGLOBIN A1C LEVEL > 9.0%: ICD-10-PCS | Mod: CPTII,S$GLB,, | Performed by: INTERNAL MEDICINE

## 2020-01-30 PROCEDURE — 99499 UNLISTED E&M SERVICE: CPT | Mod: S$GLB,,, | Performed by: INTERNAL MEDICINE

## 2020-01-30 PROCEDURE — 96367 TX/PROPH/DG ADDL SEQ IV INF: CPT

## 2020-01-30 PROCEDURE — 99999 PR PBB SHADOW E&M-EST. PATIENT-LVL V: ICD-10-PCS | Mod: PBBFAC,,, | Performed by: INTERNAL MEDICINE

## 2020-01-30 PROCEDURE — 96413 CHEMO IV INFUSION 1 HR: CPT

## 2020-01-30 PROCEDURE — 99999 PR PBB SHADOW E&M-EST. PATIENT-LVL V: CPT | Mod: PBBFAC,,, | Performed by: INTERNAL MEDICINE

## 2020-01-30 PROCEDURE — 99499 RISK ADDL DX/OHS AUDIT: ICD-10-PCS | Mod: S$GLB,,, | Performed by: INTERNAL MEDICINE

## 2020-01-30 PROCEDURE — 63600175 PHARM REV CODE 636 W HCPCS: Performed by: INTERNAL MEDICINE

## 2020-01-30 PROCEDURE — 3008F PR BODY MASS INDEX (BMI) DOCUMENTED: ICD-10-PCS | Mod: CPTII,S$GLB,, | Performed by: INTERNAL MEDICINE

## 2020-01-30 PROCEDURE — 3075F SYST BP GE 130 - 139MM HG: CPT | Mod: CPTII,S$GLB,, | Performed by: INTERNAL MEDICINE

## 2020-01-30 PROCEDURE — 3046F HEMOGLOBIN A1C LEVEL >9.0%: CPT | Mod: CPTII,S$GLB,, | Performed by: INTERNAL MEDICINE

## 2020-01-30 RX ORDER — PHENAZOPYRIDINE HYDROCHLORIDE 200 MG/1
200 TABLET, FILM COATED ORAL 3 TIMES DAILY PRN
Qty: 20 TABLET | Refills: 0 | Status: SHIPPED | OUTPATIENT
Start: 2020-01-30 | End: 2020-03-17 | Stop reason: SDUPTHER

## 2020-01-30 RX ORDER — HEPARIN 100 UNIT/ML
500 SYRINGE INTRAVENOUS
Status: DISCONTINUED | OUTPATIENT
Start: 2020-01-30 | End: 2020-01-30 | Stop reason: HOSPADM

## 2020-01-30 RX ORDER — SODIUM CHLORIDE 0.9 % (FLUSH) 0.9 %
10 SYRINGE (ML) INJECTION
Status: CANCELLED | OUTPATIENT
Start: 2020-01-30

## 2020-01-30 RX ORDER — HEPARIN 100 UNIT/ML
500 SYRINGE INTRAVENOUS
Status: CANCELLED | OUTPATIENT
Start: 2020-01-30

## 2020-01-30 RX ORDER — SODIUM CHLORIDE 0.9 % (FLUSH) 0.9 %
10 SYRINGE (ML) INJECTION
Status: DISCONTINUED | OUTPATIENT
Start: 2020-01-30 | End: 2020-01-30 | Stop reason: HOSPADM

## 2020-01-30 RX ADMIN — SODIUM CHLORIDE 200 MG: 9 INJECTION, SOLUTION INTRAVENOUS at 11:01

## 2020-01-30 RX ADMIN — HEPARIN 500 UNITS: 100 SYRINGE at 01:01

## 2020-01-30 RX ADMIN — SODIUM CHLORIDE 1075 MG: 9 INJECTION, SOLUTION INTRAVENOUS at 12:01

## 2020-01-30 RX ADMIN — DEXAMETHASONE SODIUM PHOSPHATE 10 MG: 4 INJECTION, SOLUTION INTRA-ARTICULAR; INTRALESIONAL; INTRAMUSCULAR; INTRAVENOUS; SOFT TISSUE at 12:01

## 2020-01-30 RX ADMIN — GRANISETRON HYDROCHLORIDE 1 MG: 0.1 INJECTION, SOLUTION INTRAVENOUS at 12:01

## 2020-01-30 RX ADMIN — SODIUM CHLORIDE: 9 INJECTION, SOLUTION INTRAVENOUS at 11:01

## 2020-01-30 NOTE — PLAN OF CARE
Pt tolerated Keytruda and Alimta with no complications. VSS. Pt instructed to call MD with any problems. NAD. Pt discharged home independently.

## 2020-01-30 NOTE — PROGRESS NOTES
Subjective:       Patient ID: Alison Branch is a 64 y.o. female.    Chief Complaint: Malignant neoplasm of upper lobe of left lung  Ms. Branch is a 63-year-old female who smoked for about 30 years and quit 20 years ago, presented with cough end of last year, but worsened into January.  Since the cough persisted, she saw her PCP, underwent a chest x-ray on 05/10/2019, that revealed left upper lobe pneumonia and a repeat CT was done in the week after that on 05/17/2019 that showed left upper lobe   mass arising from the lateral pleural surface in the left upper lobe posterior subsegment measuring 2.6 x 3 cm.  There are satellite mass more medially near the aortic arch that is 2 cm, also spiculated and irregular as well as thickened interlobar septa in the left lung apex and anterior segment, prevascular lymph node lateral to the aortic arch, short axis measuring 9 mm.       She then underwent a bronchoscopy on 05/28/2019, and that revealed there was an infiltration obtained in the left apical posterior segment of the left upper lobe cytology brush was done.  Transbronchial biopsies of an area of infiltration were also performed in the apicoposterior segment of the left upper lobe.    Pathology revealed adenocarcinoma; however, the specimen was not adequate enough to send for molecular testing.       She underwent a PET scan on 06/06/2019.  that reveals significant hypermetabolic activity in the large irregular spiculated pulmonary mass in the lateral aspect of the left upper lobe consistent with the patient's known pulmonary adenocarcinoma.  There is also tracer avidity in the medial left upper lobe satellite lesion and diffusely throughout much of the anterior left upper lobe where there is prominent nodular paraseptal thickening.  There are some numerous scattered subcentimeter pulmonary nodules throughout the right lung, all of which are too small for detection by PET.  For example, there is a 0.4 cm nodule in the  "superior right lower lobe and a micro nodule in the posterior segment of the right upper lobe.  There is a 0.5 cm, normal size right   paratracheal lymph node with increased radiotracer uptake as well as hypermetabolic aortic pulmonary lymph node and a left hilar lymph node.  There is a focal hypermetabolic lesion in the left anterior superior iliac spine associated with well defined lytic lesion.  There is a hypermetabolic focus in the anterior right fifth rib with a possible associated lytic lesion.  SUVs as   below lateral:  Left upper lobe  SUV max 17.9, anterior left upper lobe satellite mass and septal thickening SUV max of 10.1, right paratracheal lymph node SUV max of 4.8, left AP window lymph node SUV max of 17.9, left anterior superior iliac spine SUV max of 3.9"     MRI pelvis from 6/10/19  reveals "Region of osseous is irregularity at the left anterior iliac spine likely related to bone graft harvest procedure.  See  discussion above.  No suspicious signal or enhancement to suggest active/malignant process"   MRI brain from 6/10//19 reveal No intracranial abnormality.     We discussed her case at [facility], and plan was to wait for GAURDANT and proceed with treatment accordingly  Her GAURDANT is negative for molecular markers.     She has completed 4 cycles of Carboplatin, Alimta and Keytruda as of 9/5/19. She is now on  ALimta and Keytruda maintenance.         HPI She comes in for maintenance Alimta and Keytruda. She feels well overall, notes increase urinary frequency which lasts 3 days after chemo  .  She denies any nausea, vomiting, diarrhea, constipation, abdominal pain, weight loss or loss of appetite, chest pain, shortness of breath, leg swelling, fatigue, pain, headache, dizziness, or mood changes. Her ECOG PS is . She is by herself.           Review of Systems   Constitutional: Negative for appetite change, fatigue and unexpected weight change.   HENT: Negative for mouth sores.    Eyes: " Negative for visual disturbance.   Respiratory: Negative for cough and shortness of breath.    Cardiovascular: Negative for chest pain.   Gastrointestinal: Negative for abdominal pain and diarrhea.   Genitourinary: Negative for frequency.   Musculoskeletal: Negative for back pain.   Skin: Negative for rash.   Neurological: Negative for headaches.   Hematological: Negative for adenopathy.   Psychiatric/Behavioral: The patient is not nervous/anxious.    All other systems reviewed and are negative.      PMFSH: all information reviewed and updated as relevant to today's visit  Objective:      Physical Exam   Constitutional: She is oriented to person, place, and time. She appears well-developed and well-nourished.   HENT:   Mouth/Throat: No oropharyngeal exudate.   Cardiovascular: Normal rate and normal heart sounds.   Pulmonary/Chest: Effort normal and breath sounds normal. She has no wheezes.   Abdominal: Soft. Bowel sounds are normal. There is no tenderness.   Musculoskeletal: She exhibits no edema or tenderness.   Lymphadenopathy:     She has no cervical adenopathy.   Neurological: She is alert and oriented to person, place, and time. Coordination normal.   Skin: Skin is warm and dry. No rash noted.   Psychiatric: She has a normal mood and affect. Judgment and thought content normal.   Vitals reviewed.        LABS:  WBC   Date Value Ref Range Status   01/29/2020 4.84 3.90 - 12.70 K/uL Final     Hemoglobin   Date Value Ref Range Status   01/29/2020 10.1 (L) 12.0 - 16.0 g/dL Final     Hematocrit   Date Value Ref Range Status   01/29/2020 34.4 (L) 37.0 - 48.5 % Final     Platelets   Date Value Ref Range Status   01/29/2020 207 150 - 350 K/uL Final     Gran # (ANC)   Date Value Ref Range Status   01/29/2020 2.9 1.8 - 7.7 K/uL Final     Comment:     The ANC is based on a white cell differential from an   automated cell counter. It has not been microscopically   reviewed for the presence of abnormal cells. Clinical    correlation is required.         Chemistry        Component Value Date/Time     01/29/2020 1058    K 4.2 01/29/2020 1058     01/29/2020 1058    CO2 29 01/29/2020 1058    BUN 12 01/29/2020 1058    CREATININE 0.8 01/29/2020 1058     (H) 01/29/2020 1058        Component Value Date/Time    CALCIUM 9.9 01/29/2020 1058    ALKPHOS 59 01/29/2020 1058    AST 70 (H) 01/29/2020 1058     (H) 01/29/2020 1058    BILITOT 0.4 01/29/2020 1058    ESTGFRAFRICA >60.0 01/29/2020 1058    EGFRNONAA >60.0 01/29/2020 1058        TSH   Date Value Ref Range Status   11/05/2019 0.756 0.400 - 4.000 uIU/mL Final     Free T4   Date Value Ref Range Status   11/05/2019 0.90 0.71 - 1.51 ng/dL Final       Assessment:       1. 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 Dysuria    4. Type 2 diabetes mellitus with diabetic polyneuropathy, with long-term current use of insulin    5. Chemotherapy-induced peripheral neuropathy    6. Edema, unspecified type        Plan:        1,2. She is doing well cliniclaly and will proceed with Alimta and Keytrda maintenance and will return in 3 weeks for next cycle  3. Refill sent  4. Stable on meds  5. She will continue with gabapentin  6. Check ultrasound lower extremities and if negative will get her script for compression stockings.    Above care plan was discussed with patient and all questions were addressed to their satisfaction

## 2020-01-31 DIAGNOSIS — C34.12 MALIGNANT NEOPLASM OF UPPER LOBE OF LEFT LUNG: Primary | ICD-10-CM

## 2020-02-03 ENCOUNTER — DOCUMENTATION ONLY (OUTPATIENT)
Dept: REHABILITATION | Facility: HOSPITAL | Age: 65
End: 2020-02-03

## 2020-02-03 ENCOUNTER — OFFICE VISIT (OUTPATIENT)
Dept: INTERNAL MEDICINE | Facility: CLINIC | Age: 65
End: 2020-02-03
Payer: MEDICARE

## 2020-02-03 VITALS
HEART RATE: 82 BPM | BODY MASS INDEX: 32.13 KG/M2 | DIASTOLIC BLOOD PRESSURE: 60 MMHG | OXYGEN SATURATION: 96 % | SYSTOLIC BLOOD PRESSURE: 100 MMHG | WEIGHT: 216.94 LBS | TEMPERATURE: 99 F | HEIGHT: 69 IN

## 2020-02-03 DIAGNOSIS — E11.65 TYPE 2 DIABETES MELLITUS WITH HYPERGLYCEMIA, WITH LONG-TERM CURRENT USE OF INSULIN: ICD-10-CM

## 2020-02-03 DIAGNOSIS — T38.0X5A ADRENAL CORTICAL STEROIDS CAUSING ADVERSE EFFECT IN THERAPEUTIC USE: ICD-10-CM

## 2020-02-03 DIAGNOSIS — I72.9 ANEURYSM OF UNSPECIFIED SITE: ICD-10-CM

## 2020-02-03 DIAGNOSIS — R53.83 FATIGUE, UNSPECIFIED TYPE: ICD-10-CM

## 2020-02-03 DIAGNOSIS — Z79.4 TYPE 2 DIABETES MELLITUS WITH HYPERGLYCEMIA, WITH LONG-TERM CURRENT USE OF INSULIN: ICD-10-CM

## 2020-02-03 DIAGNOSIS — R41.3 MEMORY DEFICIT: ICD-10-CM

## 2020-02-03 DIAGNOSIS — E11.59 HYPERTENSION ASSOCIATED WITH DIABETES: Primary | ICD-10-CM

## 2020-02-03 DIAGNOSIS — I15.2 HYPERTENSION ASSOCIATED WITH DIABETES: Primary | ICD-10-CM

## 2020-02-03 DIAGNOSIS — C34.12 MALIGNANT NEOPLASM OF UPPER LOBE OF LEFT LUNG: ICD-10-CM

## 2020-02-03 PROCEDURE — 99999 PR PBB SHADOW E&M-EST. PATIENT-LVL III: CPT | Mod: PBBFAC,,, | Performed by: INTERNAL MEDICINE

## 2020-02-03 PROCEDURE — 3074F PR MOST RECENT SYSTOLIC BLOOD PRESSURE < 130 MM HG: ICD-10-PCS | Mod: CPTII,S$GLB,, | Performed by: INTERNAL MEDICINE

## 2020-02-03 PROCEDURE — 3078F PR MOST RECENT DIASTOLIC BLOOD PRESSURE < 80 MM HG: ICD-10-PCS | Mod: CPTII,S$GLB,, | Performed by: INTERNAL MEDICINE

## 2020-02-03 PROCEDURE — 3008F PR BODY MASS INDEX (BMI) DOCUMENTED: ICD-10-PCS | Mod: CPTII,S$GLB,, | Performed by: INTERNAL MEDICINE

## 2020-02-03 PROCEDURE — 3078F DIAST BP <80 MM HG: CPT | Mod: CPTII,S$GLB,, | Performed by: INTERNAL MEDICINE

## 2020-02-03 PROCEDURE — 3046F HEMOGLOBIN A1C LEVEL >9.0%: CPT | Mod: CPTII,S$GLB,, | Performed by: INTERNAL MEDICINE

## 2020-02-03 PROCEDURE — 3074F SYST BP LT 130 MM HG: CPT | Mod: CPTII,S$GLB,, | Performed by: INTERNAL MEDICINE

## 2020-02-03 PROCEDURE — 99499 UNLISTED E&M SERVICE: CPT | Mod: S$GLB,,, | Performed by: INTERNAL MEDICINE

## 2020-02-03 PROCEDURE — 99999 PR PBB SHADOW E&M-EST. PATIENT-LVL III: ICD-10-PCS | Mod: PBBFAC,,, | Performed by: INTERNAL MEDICINE

## 2020-02-03 PROCEDURE — 99214 OFFICE O/P EST MOD 30 MIN: CPT | Mod: S$GLB,,, | Performed by: INTERNAL MEDICINE

## 2020-02-03 PROCEDURE — 3046F PR MOST RECENT HEMOGLOBIN A1C LEVEL > 9.0%: ICD-10-PCS | Mod: CPTII,S$GLB,, | Performed by: INTERNAL MEDICINE

## 2020-02-03 PROCEDURE — 99499 RISK ADDL DX/OHS AUDIT: ICD-10-PCS | Mod: S$GLB,,, | Performed by: INTERNAL MEDICINE

## 2020-02-03 PROCEDURE — 99214 PR OFFICE/OUTPT VISIT, EST, LEVL IV, 30-39 MIN: ICD-10-PCS | Mod: S$GLB,,, | Performed by: INTERNAL MEDICINE

## 2020-02-03 PROCEDURE — 3008F BODY MASS INDEX DOCD: CPT | Mod: CPTII,S$GLB,, | Performed by: INTERNAL MEDICINE

## 2020-02-03 RX ORDER — OLMESARTAN MEDOXOMIL 20 MG/1
10 TABLET ORAL DAILY
Qty: 45 TABLET | Refills: 3 | Status: SHIPPED | OUTPATIENT
Start: 2020-02-03 | End: 2020-03-09

## 2020-02-03 NOTE — PROGRESS NOTES
PT/PTA met face to face to discuss pt's treatment plan and progress towards established goals.  Continue with current PT POC with focus on endurance, strengthening and balance.  Patient will be seen by physical therapist at least every sixth treatment or 30 days, whichever occurs first.    Sylvia Woo, PTA  02/03/2020

## 2020-02-03 NOTE — PROGRESS NOTES
Face to face meeting completed with Matias Nunez PT regarding current status and progress of   Alison Branch .  Pramod Cordova, PTA

## 2020-02-03 NOTE — PROGRESS NOTES
Subjective:       Patient ID: Alison Branch is a 64 y.o. female.    Chief Complaint: Follow-up    HPI - Ms Branch feels fatigued.  She had CTX for her advanced lung cancer on 1/30 and always feels fatigued after that.  BS has been improved on this regimen - FSG was 124 this am, though a1c was 9.1 in December.  She's on basal/bolus insulin to keep this down.  She's living with her youngest daughter who provides a lot of support.  She has a counselor, also.  She's not a smoker, though she smoked in the distant past.    PMH:  Adenocarcinoma lung, dx 2019, currently undergoing CTX  DM2, A1C over 8  HTN, at goal  Brain aneurysm s/p coiling  HLP, on a statin  ADD, not on a stimulant  Obesity     Meds:  Reviewed and reconciled in EPIC with patient during visit today     Review of Systems   Constitutional: Negative for fever.   HENT: Negative for congestion.    Respiratory: Negative for shortness of breath.    Gastrointestinal: Negative for abdominal pain.   Genitourinary: Negative for difficulty urinating.   Musculoskeletal: Negative for arthralgias.   Skin: Negative for rash.   Neurological: Negative for headaches.   Psychiatric/Behavioral: Positive for sleep disturbance.       Objective:      Physical Exam   Constitutional: She is oriented to person, place, and time. She appears well-developed and well-nourished.   Sleepy woman, who closed her eyes in chair in the exam room.  Moon facies; buffalo hump   HENT:   Head: Normocephalic and atraumatic.   Cardiovascular: Normal rate, regular rhythm and normal heart sounds. Exam reveals no gallop and no friction rub.   No murmur heard.  Pulmonary/Chest: Effort normal and breath sounds normal. No respiratory distress. She has no wheezes. She has no rales. She exhibits no tenderness.   Neurological: She is alert and oriented to person, place, and time.   Skin: Skin is warm and dry. No erythema.   Psychiatric: She has a normal mood and affect.   Nursing note and vitals reviewed.       Assessment:       1. Hypertension associated with diabetes    2. Type 2 diabetes mellitus with hyperglycemia, with long-term current use of insulin    3. Aneurysm of unspecified site    4. Malignant neoplasm of upper lobe of left lung    5. Adrenal cortical steroids causing adverse effect in therapeutic use    6. Memory deficit    7. Fatigue, unspecified type        Plan:       Alison was seen today for follow-up.    Diagnoses and all orders for this visit:    Hypertension associated with diabetes - stable.  Maybe over-treated.  Will reduce dose by 1/2  -     olmesartan (BENICAR) 20 MG tablet; Take 0.5 tablets (10 mg total) by mouth once daily.    Type 2 diabetes mellitus with hyperglycemia, with long-term current use of insulin - stable on insulin. Stay the course    Aneurysm of unspecified site - noted in chart    Malignant neoplasm of upper lobe of left lung - per oncology    Adrenal cortical steroids causing adverse effect in therapeutic use - with significant difficulty controlling blood sugar.    Memory deficit    Fatigue, unspecified type    rtc prn, or in 6 months    JUVE Rodriguez MD MPH  Staff Internist

## 2020-02-05 ENCOUNTER — PATIENT MESSAGE (OUTPATIENT)
Dept: NEUROLOGY | Facility: CLINIC | Age: 65
End: 2020-02-05

## 2020-02-05 ENCOUNTER — DOCUMENTATION ONLY (OUTPATIENT)
Dept: REHABILITATION | Facility: HOSPITAL | Age: 65
End: 2020-02-05

## 2020-02-05 NOTE — PROGRESS NOTES
PT/PTA met face to face to discuss pt's treatment plan and progress towards established goals. Continue with current PT POC, including: endurance, strengthening and balance. Pt will be seen by physical therapist at least every 6th treatment day or every 30 days, whichever occurs first.      Honorio Faust, PTA  2/03/20    Face to face completed on 2/3/2020.  Matias Nunez, PT

## 2020-02-07 NOTE — PROGRESS NOTES
Physical Therapy Daily Treatment Note     Name: Alison Branch  [facility] Number: [id number]    Therapy Diagnosis:   Encounter Diagnosis   Name Primary?    Impaired functional mobility, balance, gait, and endurance Yes     Physician: David Mo MD    Visit Date: 2/10/2020    Physician Orders: PT Eval and Treat   Medical Diagnosis from Referral:   R41.3 (ICD-10-CM) - Memory difficulty   G62.0,T45.1X5A (ICD-10-CM) - Chemotherapy-induced neuropathy   R26.89 (ICD-10-CM) - Balance disorder   Evaluation Date: 2020  Authorization Period Expiration: 2020  Plan of Care Expiration: 3/21/2020  Visit # / Visits authorized:      Time In: 930  Time Out: 1015  Total Billable Time: 45 minutes    Precautions: Standard, Fall and cancer    Subjective     Pt reports: Doing fine today though does report that she lost her HEP paper. She states she did the exercises she could remember.    She was compliant with home exercise program.  Response to previous treatment: first session since evaluation  Functional change: ongoing    Pain: 0/10  Location: n/a     Objective     Alison received therapeutic exercises to develop strength, endurance, flexibility, posture and core stabilization for 37 minutes includin minutes SciFit Stepper, level 1.0  3 x 10 reps of bridging with green theraband  3 x 10 reps of SLR  3x 10 reps of supine hip abduction, with green theraband  2 x 30 sec supine hamstring stretch  2 x 30 sec supine piriformis stretch (figure 4)  2 x 30 sec gastroc stretch    Alison participated in neuromuscular re-education activities to improve: Balance, Coordination, Kinesthetic, Sense, Proprioception and Posture for 8 minutes. The following activities were included:  x20 alternating taps onto lg cone, CGA-Marci  x30 sec, firm ground NBOS, head turns L/R  x30 sec, firm ground NBOS, head turns up/down  x10 side stepping onto 4 pt fitter (5 ea direction)    Home Exercises Provided and Patient Education Provided      Education provided:   - HEP provided    Written Home Exercises Provided: yes.  Exercises were reviewed and Alison was able to demonstrate them prior to the end of the session.  Alison demonstrated good  understanding of the education provided.     See EMR under Patient Instructions for exercises provided 1/28/2020.    Assessment     Ms. Rosas did well in session today and demonstrates a fair understanding of HEP exercises despite reporting losing the paper with exercises on it. Pt was provided with reprint of this program. Pt continues to demonstrate muscle tightness in B LE and outwardly struggles with stretches. She does well with addition of balance activities today. Pt remains appropriate for skilled PT services in order to improve strength, endurance, and functional mobility while participating in chemo therapy.    Alison is progressing well towards her goals.   Pt prognosis is Good.     Pt will continue to benefit from skilled outpatient physical therapy to address the deficits listed in the problem list box on initial evaluation, provide pt/family education and to maximize pt's level of independence in the home and community environment.     Pt's spiritual, cultural and educational needs considered and pt agreeable to plan of care and goals.     Anticipated barriers to physical therapy: none at this time    Goals:  Short Term Goals: 4 weeks   1. Pt will improve score on TUG to at least 10 sec in order to decrease risk for fall. ongoing  2. Pt will improve SSWS to at least 1.15 m/s to demonstrate improved mobility without AD. ongoing  3. Pt will improve 30 second chair rise score to at least 7 without UE support. ongoing  4. Pt will report <40% limitation using FOTO impairment tool. ongoing     Long Term Goals: 8 weeks   1. Pt will improve score on FGA to at least 27/30 in order to demonstrate improved balance. Ongoing  2. Pt will improve 30 second chair rise score to at least 10 without UE support. ongoing  3.  Pt will improve SLS to at least 30 sec B in order to decrease risk for fall. ongoing  4. Pt will report no falls and ambulation without rollator for at least two weeks. ongoing     Plan   Plan of care Certification: 1/21/2020 to 3/21/2020.    Continue to work on endurance tasks and strengthening, incorporate more balance in coming sessions.    Matias Nunez, PT

## 2020-02-10 ENCOUNTER — CLINICAL SUPPORT (OUTPATIENT)
Dept: REHABILITATION | Facility: HOSPITAL | Age: 65
End: 2020-02-10
Attending: PSYCHIATRY & NEUROLOGY
Payer: MEDICARE

## 2020-02-10 ENCOUNTER — TELEPHONE (OUTPATIENT)
Dept: HEMATOLOGY/ONCOLOGY | Facility: CLINIC | Age: 65
End: 2020-02-10

## 2020-02-10 ENCOUNTER — HOSPITAL ENCOUNTER (OUTPATIENT)
Dept: RADIOLOGY | Facility: HOSPITAL | Age: 65
Discharge: HOME | End: 2020-02-10
Attending: INTERNAL MEDICINE
Payer: MEDICARE

## 2020-02-10 DIAGNOSIS — R60.9 EDEMA, UNSPECIFIED TYPE: ICD-10-CM

## 2020-02-10 DIAGNOSIS — Z74.09 IMPAIRED FUNCTIONAL MOBILITY, BALANCE, GAIT, AND ENDURANCE: Primary | ICD-10-CM

## 2020-02-10 PROCEDURE — 93970 US LOWER EXTREMITY VEINS BILATERAL: ICD-10-PCS | Mod: 26,,, | Performed by: INTERNAL MEDICINE

## 2020-02-10 PROCEDURE — 93970 EXTREMITY STUDY: CPT | Mod: TC

## 2020-02-10 PROCEDURE — 97112 NEUROMUSCULAR REEDUCATION: CPT | Mod: PO

## 2020-02-10 PROCEDURE — 93970 EXTREMITY STUDY: CPT | Mod: 26,,, | Performed by: INTERNAL MEDICINE

## 2020-02-10 PROCEDURE — 97110 THERAPEUTIC EXERCISES: CPT | Mod: PO

## 2020-02-10 NOTE — TELEPHONE ENCOUNTER
----- Message from Tara Belcher MD sent at 2/10/2020  4:47 PM CST -----  Please let her know ultrasound is normal

## 2020-02-11 ENCOUNTER — PATIENT MESSAGE (OUTPATIENT)
Dept: INTERNAL MEDICINE | Facility: CLINIC | Age: 65
End: 2020-02-11

## 2020-02-11 ENCOUNTER — TELEPHONE (OUTPATIENT)
Dept: HEMATOLOGY/ONCOLOGY | Facility: CLINIC | Age: 65
End: 2020-02-11

## 2020-02-11 DIAGNOSIS — D22.9 BENIGN MOLE: Primary | ICD-10-CM

## 2020-02-11 NOTE — TELEPHONE ENCOUNTER
Patient requesting a prescription for compression stockings. Will check with [facility] if order was placed already.

## 2020-02-11 NOTE — TELEPHONE ENCOUNTER
"----- Message from Liane Quiros sent at 2/11/2020  4:43 PM CST -----  Contact: [phone]  Name of caller:  Alison   Provider name: dari Pacheco MD   Contact Preference: [phone]  Is this regarding current patient or new patient?: current   What is the nature of the call?   - checked the authorization for the stocking, insurance is   good will cover them but, still hasn't received the request from the MD.     Additional Notes:   "Thank you for all that you do for our patients'"     "

## 2020-02-12 ENCOUNTER — OFFICE VISIT (OUTPATIENT)
Dept: INTERNAL MEDICINE | Facility: CLINIC | Age: 65
End: 2020-02-12
Payer: MEDICARE

## 2020-02-12 ENCOUNTER — DOCUMENTATION ONLY (OUTPATIENT)
Dept: REHABILITATION | Facility: HOSPITAL | Age: 65
End: 2020-02-12

## 2020-02-12 VITALS
BODY MASS INDEX: 32.29 KG/M2 | DIASTOLIC BLOOD PRESSURE: 68 MMHG | HEART RATE: 71 BPM | WEIGHT: 218 LBS | SYSTOLIC BLOOD PRESSURE: 116 MMHG | HEIGHT: 69 IN

## 2020-02-12 DIAGNOSIS — R60.0 EDEMA OF BOTH FEET: Primary | ICD-10-CM

## 2020-02-12 DIAGNOSIS — E66.9 OBESITY (BMI 30-39.9): ICD-10-CM

## 2020-02-12 PROCEDURE — 99214 OFFICE O/P EST MOD 30 MIN: CPT | Mod: S$GLB,,, | Performed by: NURSE PRACTITIONER

## 2020-02-12 PROCEDURE — 3074F PR MOST RECENT SYSTOLIC BLOOD PRESSURE < 130 MM HG: ICD-10-PCS | Mod: CPTII,S$GLB,, | Performed by: NURSE PRACTITIONER

## 2020-02-12 PROCEDURE — 99999 PR PBB SHADOW E&M-EST. PATIENT-LVL III: ICD-10-PCS | Mod: PBBFAC,,, | Performed by: NURSE PRACTITIONER

## 2020-02-12 PROCEDURE — 3078F PR MOST RECENT DIASTOLIC BLOOD PRESSURE < 80 MM HG: ICD-10-PCS | Mod: CPTII,S$GLB,, | Performed by: NURSE PRACTITIONER

## 2020-02-12 PROCEDURE — 3008F BODY MASS INDEX DOCD: CPT | Mod: CPTII,S$GLB,, | Performed by: NURSE PRACTITIONER

## 2020-02-12 PROCEDURE — 99214 PR OFFICE/OUTPT VISIT, EST, LEVL IV, 30-39 MIN: ICD-10-PCS | Mod: S$GLB,,, | Performed by: NURSE PRACTITIONER

## 2020-02-12 PROCEDURE — 3074F SYST BP LT 130 MM HG: CPT | Mod: CPTII,S$GLB,, | Performed by: NURSE PRACTITIONER

## 2020-02-12 PROCEDURE — 3008F PR BODY MASS INDEX (BMI) DOCUMENTED: ICD-10-PCS | Mod: CPTII,S$GLB,, | Performed by: NURSE PRACTITIONER

## 2020-02-12 PROCEDURE — 3078F DIAST BP <80 MM HG: CPT | Mod: CPTII,S$GLB,, | Performed by: NURSE PRACTITIONER

## 2020-02-12 PROCEDURE — 99999 PR PBB SHADOW E&M-EST. PATIENT-LVL III: CPT | Mod: PBBFAC,,, | Performed by: NURSE PRACTITIONER

## 2020-02-12 RX ORDER — HYDROCHLOROTHIAZIDE 12.5 MG/1
12.5 CAPSULE ORAL DAILY PRN
Qty: 30 CAPSULE | Refills: 1 | Status: SHIPPED | OUTPATIENT
Start: 2020-02-12 | End: 2020-05-26

## 2020-02-12 NOTE — PATIENT INSTRUCTIONS
Ultrasound of legs were negative for blood clot    Start HCTZ 12.5mg daily as needed for edema    Compression stockings per Dr. Belcher when they come in    Keep legs elevated, watch salt in diet, do not sit with legs prone long periods      Leg Swelling in Both Legs    Swelling of the feet, ankles, and legs is called edema. It is caused by excess fluid that has collected in the tissues. Extra fluid in the body settles in the lowest part because of gravity. This is why the legs and feet are most affected.  Some of the causes for edema include:  · Disease of the heart like congestive heart failure  · Standing or sitting for long periods of time  · Infection of the feet or legs  · Blood pooling in the veins of your legs (venous insufficiency)  · Dilated veins in your lower leg (varicose veins)  · Garters or other clothing that is tight on your legs. This will cause blood to pool in your legs because the clothing limits blood flow.  · Some medicines such as hormones like birth control pills, some blood pressure medicines like calcium channel blockers (amlodipine) and steroids, some antidepressants like MAO inhibitors and tricyclics  · Menstrual periods that cause you to retain fluids  · Many types of renal disease  · Liver failure or cirrhosis  · Pregnancy, some swelling is normal, but a sudden increase in leg swelling or weight gain can be a sign of a dangerous complication of pregnancy  · Poor nutrition  · Thyroid disease  Medical treatment will depend on what is causing the swelling in your legs. Your healthcare provider may prescribe water pills (diuretics) to get rid of the extra fluid.  Home care  Follow these guidelines when caring for yourself at home:  · Don't wear clothing like garters that is tight on your legs.  · Keep your legs up while lying or sitting.  · If infection, injury, or recent surgery is causing the swelling, stay off your legs as much as possible until symptoms get better.  · If your healthcare  provider says that your leg swelling is caused by venous insufficiency or varicose veins, don't sit or  one place for long periods of time. Take breaks and walk about every few hours. Brisk walking is a good exercise. It helps circulate the blood that has collected in your leg. Talk with your provider about using support stockings to stop daytime leg swelling.  · If your provider says that heart disease is causing your leg swelling, follow a low-salt diet to stop extra fluid from staying in your body. You may also need medicine.  Follow-up care  Follow up with your healthcare provider, or as advised.  When to seek medical advice  Call your healthcare provider right away if any of these occur:  · New shortness of breath or chest pain  · Shortness of breath or chest pain that gets worse  · Swelling in both legs or ankles that gets worse  · Swelling of the abdomen  · Redness, warmth, or swelling in one leg  · Fever of 100.4ºF (38ºC) or higher, or as directed by your healthcare provider  · Yellow color to your skin or eyes  · Rapid, unexplained weight gain  · Having to sleep upright or use an increased number of pillows  Date Last Reviewed: 3/31/2016  © 6094-1300 [employer]. [address] [place] [address], [place] [address]. All rights reserved. This information is not intended as a substitute for professional medical care. Always follow your healthcare professional's instructions.

## 2020-02-12 NOTE — PROGRESS NOTES
Physical Therapy: No show/Cancellation of Visit  Date: 02/12/2020    Patient was a no show to today's PT appointment. Patient's next scheduled appointment is 2/19/2020.     Cancel: 0  No show: 2    Therapist: Matias Nunez, PT

## 2020-02-13 ENCOUNTER — TELEPHONE (OUTPATIENT)
Dept: HEMATOLOGY/ONCOLOGY | Facility: CLINIC | Age: 65
End: 2020-02-13

## 2020-02-13 NOTE — TELEPHONE ENCOUNTER
Left VM for patient informing her compression stocking order was written by dr chapin. She can  at her convenience.

## 2020-02-13 NOTE — TELEPHONE ENCOUNTER
Jeannette,  I have compression stockings script for you.  Do you know anything about this?  ~justin

## 2020-02-13 NOTE — TELEPHONE ENCOUNTER
----- Message from Barry Marte sent at 2/13/2020 10:16 AM CST -----  Contact: Patient  Returning a call     Caller name:: Pt     Who Left Message for Patient:: Donna    Communication preference:: [phone]    Additional info:: Calling in regards to the compression stockings. States that the prescription needs to be sent to [facility] on a medical necessity form for auth.

## 2020-02-14 ENCOUNTER — PATIENT MESSAGE (OUTPATIENT)
Dept: DIABETES | Facility: CLINIC | Age: 65
End: 2020-02-14

## 2020-02-14 ENCOUNTER — PATIENT MESSAGE (OUTPATIENT)
Dept: INTERNAL MEDICINE | Facility: CLINIC | Age: 65
End: 2020-02-14

## 2020-02-17 ENCOUNTER — PATIENT MESSAGE (OUTPATIENT)
Dept: ENDOCRINOLOGY | Facility: CLINIC | Age: 65
End: 2020-02-17

## 2020-02-17 ENCOUNTER — LAB VISIT (OUTPATIENT)
Dept: LAB | Facility: HOSPITAL | Age: 65
End: 2020-02-17
Attending: INTERNAL MEDICINE
Payer: MEDICARE

## 2020-02-17 ENCOUNTER — TELEPHONE (OUTPATIENT)
Dept: HEMATOLOGY/ONCOLOGY | Facility: CLINIC | Age: 65
End: 2020-02-17

## 2020-02-17 ENCOUNTER — PATIENT MESSAGE (OUTPATIENT)
Dept: DIABETES | Facility: CLINIC | Age: 65
End: 2020-02-17

## 2020-02-17 DIAGNOSIS — E11.65 TYPE 2 DIABETES MELLITUS WITH HYPERGLYCEMIA, WITH LONG-TERM CURRENT USE OF INSULIN: ICD-10-CM

## 2020-02-17 DIAGNOSIS — Z79.4 TYPE 2 DIABETES MELLITUS WITH HYPERGLYCEMIA, WITH LONG-TERM CURRENT USE OF INSULIN: ICD-10-CM

## 2020-02-17 LAB
ESTIMATED AVG GLUCOSE: 203 MG/DL (ref 68–131)
HBA1C MFR BLD HPLC: 8.7 % (ref 4–5.6)

## 2020-02-17 PROCEDURE — 36415 COLL VENOUS BLD VENIPUNCTURE: CPT

## 2020-02-17 PROCEDURE — 83036 HEMOGLOBIN GLYCOSYLATED A1C: CPT

## 2020-02-17 NOTE — TELEPHONE ENCOUNTER
"----- Message from Obed Chan sent at 2/17/2020  2:52 PM CST -----  Contact: Alison  Consult/Advisory:    Name Of Caller:  Provider Name:   Does patient feel the need to be seen today?  Relationship to the Pt?:  Contact Preference?:  What is the nature of the call?:    Additional Notes:  "Thank you for all that you do for our patients'"      "

## 2020-02-17 NOTE — TELEPHONE ENCOUNTER
Returned call to patient per her request.  She was calling to clarify the appointment on the 19th.  She stated that she usually does not get her chemo until after she sees Dr. Mackey.  Explained to patient that this appointment is actually for some lab work, however, patient had labs today.   After reviewing counts, everything appeared stable.  Will cancel port collect labs.   She expressed gratitude.

## 2020-02-18 ENCOUNTER — PATIENT MESSAGE (OUTPATIENT)
Dept: INTERNAL MEDICINE | Facility: CLINIC | Age: 65
End: 2020-02-18

## 2020-02-18 ENCOUNTER — TELEPHONE (OUTPATIENT)
Dept: HEMATOLOGY/ONCOLOGY | Facility: CLINIC | Age: 65
End: 2020-02-18

## 2020-02-18 ENCOUNTER — OFFICE VISIT (OUTPATIENT)
Dept: PSYCHIATRY | Facility: CLINIC | Age: 65
End: 2020-02-18
Payer: COMMERCIAL

## 2020-02-18 DIAGNOSIS — R41.3 MEMORY DEFICIT: ICD-10-CM

## 2020-02-18 DIAGNOSIS — C34.12 MALIGNANT NEOPLASM OF UPPER LOBE OF LEFT LUNG: ICD-10-CM

## 2020-02-18 DIAGNOSIS — R41.840 ATTENTION AND CONCENTRATION DEFICIT: ICD-10-CM

## 2020-02-18 DIAGNOSIS — F33.0 MILD EPISODE OF RECURRENT MAJOR DEPRESSIVE DISORDER: Primary | ICD-10-CM

## 2020-02-18 PROCEDURE — 90832 PR PSYCHOTHERAPY W/PATIENT, 30 MIN: ICD-10-PCS | Mod: S$GLB,,, | Performed by: PSYCHOLOGIST

## 2020-02-18 PROCEDURE — 99999 PR PBB SHADOW E&M-EST. PATIENT-LVL II: ICD-10-PCS | Mod: PBBFAC,,, | Performed by: PSYCHOLOGIST

## 2020-02-18 PROCEDURE — 99999 PR PBB SHADOW E&M-EST. PATIENT-LVL II: CPT | Mod: PBBFAC,,, | Performed by: PSYCHOLOGIST

## 2020-02-18 PROCEDURE — 90832 PSYTX W PT 30 MINUTES: CPT | Mod: S$GLB,,, | Performed by: PSYCHOLOGIST

## 2020-02-18 NOTE — TELEPHONE ENCOUNTER
Received consult re: patient needing compression stockings. Completed a [employer] Medical Necessity form (printed from the website) and faxed it on 2/13/20 along with the order, patient's demographic and insurance information, list of diagnoses (SnapShot Information), recent Oncology and Internal Medicine progress notes and lower extremity ultrasound report to [employer] at [phone]. The order was written on an [facility] [employer] [facility] [employer] compression stockings prescription pad per Dr. Belcher, therefore specified on the Medical Necessity Form for this provider. Contacted patient via phone and discussed with her. Informed patient that [employer] has up to 14 days to process this standard request. Patient expressed understanding and agreement. Advised patient to follow up with her physician/staff in Internal Medicine or Endocrinology re: order for diabetic shoes and submission of the request to [employer]. Patient stated no other questions/concerns/needs at the time. Will continue to follow and assist as needs are identified.

## 2020-02-18 NOTE — TELEPHONE ENCOUNTER
Continue alternating doses based on dexamethasone dosing.    Increase novolog at lunch to 14 units otherwise no change.     Novolog 18/14/14  levemir 26 units at bedtime     Days after high dose dex:   Uses higher dose of levemir 33 units.

## 2020-02-18 NOTE — PROGRESS NOTES
INFORMED CONSENT: Alison Bracnh   is known to this provider and identity was confirmed via NAME and .  The patient has been informed of the risks and benefits associated with engaging in psychotherapy, the handling of protected health information, the rights of privacy and the limits of confidentiality. The patient has also been informed of the importance of reporting any suicidal or homicidal ideation to this or any provider to ensure safety of all parties, and the Alison Branch expressed understanding. The patient was agreeable to these terms and freely participates in individual psychotherapy.    PSYCHO-ONCOLOGY NOTE/ Individual Psychotherapy     Date: 2020   [facility]        Therapeutic Intervention: Met with patient.  Outpatient - Behavior modifying psychotherapy 30 min - CPT code 74461      Patient was last seen by me on 2020    Problem list  Patient Active Problem List   Diagnosis    Type 2 diabetes mellitus with hyperglycemia, with long-term current use of insulin    Mixed hyperlipidemia due to type 2 diabetes mellitus    Attention and concentration deficit    Cerebral aneurysm, coiled in     Hypertension associated with diabetes    Hyperkeratosis of palms and soles    Irritable bowel syndrome    Aneurysm of unspecified site    Obesity (BMI 30-39.9)    Vitamin D deficiency    Malignant neoplasm of upper lobe of left lung    Secondary malignant neoplasm of mediastinal lymph node    Adrenal cortical steroids causing adverse effect in therapeutic use    Type 2 diabetes mellitus with diabetic polyneuropathy, with long-term current use of insulin    Major depressive disorder, recurrent, unspecified    Memory deficit    Dysuria    Edema, peripheral    Chemotherapy-induced peripheral neuropathy    Shortness of breath on exertion    Fever of unknown origin (FUO)    Impaired functional mobility, balance, gait, and endurance       Chief complaint/reason for encounter:  "depression, anxiety and cognition   Met with patient to evaluate psychosocial adaptation to diagnosis/treatment/survivorship of Lung Cancer    Current Medications  Current Outpatient Medications   Medication    aspirin (ECOTRIN) 81 MG EC tablet    atorvastatin (LIPITOR) 40 MG tablet    benzonatate (TESSALON PERLES) 100 MG capsule    bisacodyl (DULCOLAX) 5 mg EC tablet    blood sugar diagnostic Strp    blood-glucose meter kit    carboxymethylcellulose (REFRESH PLUS) 0.5 % Dpet    dexAMETHasone (DECADRON) 6 MG tablet    diazePAM (VALIUM) 5 MG tablet    ergocalciferol (ERGOCALCIFEROL) 50,000 unit Cap    escitalopram oxalate (LEXAPRO) 10 MG tablet    fluticasone propionate (FLONASE) 50 mcg/actuation nasal spray    folic acid (FOLVITE) 1 MG tablet    gabapentin (NEURONTIN) 300 MG capsule    hydroCHLOROthiazide (MICROZIDE) 12.5 mg capsule    insulin aspart U-100 (NOVOLOG) 100 unit/mL (3 mL) InPn pen    insulin detemir U-100 (LEVEMIR FLEXTOUCH) 100 unit/mL (3 mL) SubQ InPn pen    lancets Misc    lidocaine-prilocaine (EMLA) cream    olmesartan (BENICAR) 20 MG tablet    ondansetron (ZOFRAN) 8 MG tablet    pen needle, diabetic (BD ULTRA-FINE BRYANT PEN NEEDLE) 32 gauge x 5/32" Ndle    phenazopyridine (PYRIDIUM) 200 MG tablet    psyllium (METAMUCIL) packet    SITagliptin (JANUVIA) 100 MG Tab    terconazole (TERAZOL 7) 0.4 % Crea    walker Misc     No current facility-administered medications for this visit.        Objective:  Alison Branch arrived promptly for the session.   Ms. Branch was independently ambulatory at the time of session. The patient was fully cooperative throughout the session.  Appearance: age appropriate, appropriately  dressed, adequately  groomed  Behavior/Cooperation: friendly and cooperative  Speech: normal in rate, volume, and tone and appropriate quality, quantity and organization of sentences  Mood: euthymic  Affect: mood congruent  Thought Process: goal-directed, " logical  Thought Content: normal,  No delusions or paranoia; did not appear to be responding to internal stimuli during the session  Orientation: grossly intact  Memory: Some Deficits  Attention Span/Concentration: Attends to session without distraction; reports no difficulty  Insight: patient has awareness of illness; good insight into own behavior and behavior of others  Judgment: the patient's behavior is adequate to circumstances    Interval history and content of current session: Patient is reporting having better control over management of chronic medical conditions. She has scaled down her logs to two logs and is finding great relief of her stress and better able to remember things. She also stated that she has been adherent to her medications and has taken her nutrition more seriously her last a1c WAS 8.7, a decrease of 11.4 in August 2019. Discussed current adaptation to disease and treatment status. Reports to be coping in an adequate manner. Evaluated cognitive response, paying particular attention to negative intrusive thoughts of a persistent and detrimental nature. Thoughts of this type are in evidence with mild distress. Provided cognitive behavioral therapy to address negative cognitions. Identified and evaluated psychosocial and environmental stressors secondary to diagnosis and treatment.  Examined proactive behaviors that may be implemented to minimize or ameliorate psychosocial stressors secondary to diagnosis and treatment.     Risk parameters:   Patient reports no suicidal ideation  Patient reports no homicidal ideation  Patient reports no self-injurious behavior  Patient reports no violent behavior   Safety needs:  None at this time      Verbal deficits: None     Patient's response to intervention:The patient's response to intervention is accepting.     Progress toward goals and other mental status changes:  The patient's progress toward goals is good.      Progress to date:No Progress -  Continue Objectives      Goals from last visit: Met     Patient reported outcomes:    Distress Thermometer: 4   PHQ-9= 2, no SI   DELMER-7=0      Client Strengths: verbal, intelligent, successful, good social support, good insight, commitment to wellness, strong shannon, strong cultural traditions     Diagnosis:     ICD-10-CM ICD-9-CM   1. Mild episode of recurrent major depressive disorder F33.0 296.31   2. Attention and concentration deficit R41.840 799.51   3. Malignant neoplasm of upper lobe of left lung C34.12 162.3   4. Memory deficit R41.3 780.93       Treatment Plan:individual psychotherapy  · Target symptoms: depression, anxiety   · Why chosen therapy is appropriate versus another modality: relevant to diagnosis, patient responds to this modality, evidence based practice  · Outcome monitoring methods: self-report, observation, checklist/rating scale  · Therapeutic intervention type: behavior modifying psychotherapy  · Prognosis: Fair      Behavioral goals:   Continue documenting eating and diabetes management; limit to two logs.    Return to clinic: 6 weeks    Next Session:   Possible Termination of Treatment  Reassessment of needs     Length of Service (minutes direct face-to-face contact): 30    Jagruti Garcia, PhD  [place] License #[id number]

## 2020-02-18 NOTE — TELEPHONE ENCOUNTER
"----- Message from Liane Quiros sent at 2/18/2020  4:36 PM CST -----  Contact: DEL  Name of caller: Alison   Provider name: Ye PAYTON MD   Contact Preference: Ph: [phone]  Is this regarding current patient or new patient?: current   What is the nature of the call?    - spoke with insurance they told her that thigh highs aren't   covered need a script for the knee high instead.     Additional Notes:   "Thank you for all that you do for our patients'"     "

## 2020-02-18 NOTE — Clinical Note
I am glad to say the Mrs. Branch is finally managing her medical conditions adequately. She is documenting her eating and medication and her A1C/glucose are decreasing!

## 2020-02-18 NOTE — TELEPHONE ENCOUNTER
Spoke with patient.  She is calling to ask for knee high compression hose order, as [employer] told her this would be automatically approved. The thigh high order is still pending review.     She understands dr chapin is out until Friday. Nurse will speak with smaantha tomorrow about possibly ordering them.  She thanked nurse.

## 2020-02-19 DIAGNOSIS — E11.65 UNCONTROLLED TYPE 2 DIABETES MELLITUS WITH HYPERGLYCEMIA, WITHOUT LONG-TERM CURRENT USE OF INSULIN: Primary | ICD-10-CM

## 2020-02-19 RX ORDER — PEN NEEDLE, DIABETIC 33 GX5/32"
1 NEEDLE, DISPOSABLE MISCELLANEOUS
Qty: 100 EACH | Refills: 5 | Status: ON HOLD | OUTPATIENT
Start: 2020-02-19 | End: 2021-05-26 | Stop reason: HOSPADM

## 2020-02-19 NOTE — TELEPHONE ENCOUNTER
"----- Message from Cary Horne sent at 2/18/2020  4:32 PM CST -----  Contact: self   Pt is requesting a Rx for her pen needle, diabetic (BD ULTRA-FINE BRYANT PEN NEEDLE) 32 gauge x 5/32" Ndle. Pt states the pen needles are $45 a box. Pt states Ai Mercado is on maternity leave. Pt states she has another question regarding her diabetic shoes. Please call and advise.   "

## 2020-02-20 ENCOUNTER — INFUSION (OUTPATIENT)
Dept: INFUSION THERAPY | Facility: HOSPITAL | Age: 65
End: 2020-02-20
Attending: INTERNAL MEDICINE
Payer: MEDICARE

## 2020-02-20 ENCOUNTER — OFFICE VISIT (OUTPATIENT)
Dept: HEMATOLOGY/ONCOLOGY | Facility: CLINIC | Age: 65
End: 2020-02-20
Payer: MEDICARE

## 2020-02-20 ENCOUNTER — TELEPHONE (OUTPATIENT)
Dept: HEMATOLOGY/ONCOLOGY | Facility: CLINIC | Age: 65
End: 2020-02-20

## 2020-02-20 VITALS
RESPIRATION RATE: 18 BRPM | DIASTOLIC BLOOD PRESSURE: 58 MMHG | BODY MASS INDEX: 32.58 KG/M2 | HEART RATE: 86 BPM | WEIGHT: 220 LBS | SYSTOLIC BLOOD PRESSURE: 124 MMHG | TEMPERATURE: 98 F | OXYGEN SATURATION: 96 % | HEIGHT: 69 IN

## 2020-02-20 VITALS
RESPIRATION RATE: 18 BRPM | DIASTOLIC BLOOD PRESSURE: 61 MMHG | HEART RATE: 75 BPM | SYSTOLIC BLOOD PRESSURE: 133 MMHG | TEMPERATURE: 99 F

## 2020-02-20 DIAGNOSIS — G62.0 CHEMOTHERAPY-INDUCED PERIPHERAL NEUROPATHY: ICD-10-CM

## 2020-02-20 DIAGNOSIS — T45.1X5A CHEMOTHERAPY-INDUCED PERIPHERAL NEUROPATHY: ICD-10-CM

## 2020-02-20 DIAGNOSIS — C77.1 SECONDARY MALIGNANT NEOPLASM OF MEDIASTINAL LYMPH NODE: ICD-10-CM

## 2020-02-20 DIAGNOSIS — C34.12 MALIGNANT NEOPLASM OF UPPER LOBE OF LEFT LUNG: ICD-10-CM

## 2020-02-20 DIAGNOSIS — R60.0 EDEMA, PERIPHERAL: ICD-10-CM

## 2020-02-20 DIAGNOSIS — Z79.4 TYPE 2 DIABETES MELLITUS WITH DIABETIC POLYNEUROPATHY, WITH LONG-TERM CURRENT USE OF INSULIN: ICD-10-CM

## 2020-02-20 DIAGNOSIS — R50.9 FEVER OF UNKNOWN ORIGIN (FUO): Primary | ICD-10-CM

## 2020-02-20 DIAGNOSIS — E11.42 TYPE 2 DIABETES MELLITUS WITH DIABETIC POLYNEUROPATHY, WITH LONG-TERM CURRENT USE OF INSULIN: ICD-10-CM

## 2020-02-20 DIAGNOSIS — R30.0 DYSURIA: ICD-10-CM

## 2020-02-20 DIAGNOSIS — C34.12 MALIGNANT NEOPLASM OF UPPER LOBE OF LEFT LUNG: Primary | ICD-10-CM

## 2020-02-20 PROCEDURE — 96367 TX/PROPH/DG ADDL SEQ IV INF: CPT

## 2020-02-20 PROCEDURE — 3008F BODY MASS INDEX DOCD: CPT | Mod: CPTII,S$GLB,, | Performed by: PHYSICIAN ASSISTANT

## 2020-02-20 PROCEDURE — 99214 PR OFFICE/OUTPT VISIT, EST, LEVL IV, 30-39 MIN: ICD-10-PCS | Mod: S$GLB,,, | Performed by: PHYSICIAN ASSISTANT

## 2020-02-20 PROCEDURE — 3052F PR MOST RECENT HEMOGLOBIN A1C LEVEL 8.0 - < 9.0%: ICD-10-PCS | Mod: CPTII,S$GLB,, | Performed by: PHYSICIAN ASSISTANT

## 2020-02-20 PROCEDURE — 96411 CHEMO IV PUSH ADDL DRUG: CPT

## 2020-02-20 PROCEDURE — 99214 OFFICE O/P EST MOD 30 MIN: CPT | Mod: S$GLB,,, | Performed by: PHYSICIAN ASSISTANT

## 2020-02-20 PROCEDURE — 3052F HG A1C>EQUAL 8.0%<EQUAL 9.0%: CPT | Mod: CPTII,S$GLB,, | Performed by: PHYSICIAN ASSISTANT

## 2020-02-20 PROCEDURE — 3074F SYST BP LT 130 MM HG: CPT | Mod: CPTII,S$GLB,, | Performed by: PHYSICIAN ASSISTANT

## 2020-02-20 PROCEDURE — 3008F PR BODY MASS INDEX (BMI) DOCUMENTED: ICD-10-PCS | Mod: CPTII,S$GLB,, | Performed by: PHYSICIAN ASSISTANT

## 2020-02-20 PROCEDURE — 99999 PR PBB SHADOW E&M-EST. PATIENT-LVL III: ICD-10-PCS | Mod: PBBFAC,,, | Performed by: PHYSICIAN ASSISTANT

## 2020-02-20 PROCEDURE — 3074F PR MOST RECENT SYSTOLIC BLOOD PRESSURE < 130 MM HG: ICD-10-PCS | Mod: CPTII,S$GLB,, | Performed by: PHYSICIAN ASSISTANT

## 2020-02-20 PROCEDURE — 99999 PR PBB SHADOW E&M-EST. PATIENT-LVL III: CPT | Mod: PBBFAC,,, | Performed by: PHYSICIAN ASSISTANT

## 2020-02-20 PROCEDURE — 96413 CHEMO IV INFUSION 1 HR: CPT

## 2020-02-20 PROCEDURE — 63600175 PHARM REV CODE 636 W HCPCS: Performed by: INTERNAL MEDICINE

## 2020-02-20 PROCEDURE — 3078F PR MOST RECENT DIASTOLIC BLOOD PRESSURE < 80 MM HG: ICD-10-PCS | Mod: CPTII,S$GLB,, | Performed by: PHYSICIAN ASSISTANT

## 2020-02-20 PROCEDURE — 3078F DIAST BP <80 MM HG: CPT | Mod: CPTII,S$GLB,, | Performed by: PHYSICIAN ASSISTANT

## 2020-02-20 RX ORDER — SODIUM CHLORIDE 0.9 % (FLUSH) 0.9 %
10 SYRINGE (ML) INJECTION
Status: CANCELLED | OUTPATIENT
Start: 2020-02-20

## 2020-02-20 RX ORDER — SODIUM CHLORIDE 0.9 % (FLUSH) 0.9 %
10 SYRINGE (ML) INJECTION
Status: DISCONTINUED | OUTPATIENT
Start: 2020-02-20 | End: 2020-02-20 | Stop reason: HOSPADM

## 2020-02-20 RX ORDER — HEPARIN 100 UNIT/ML
500 SYRINGE INTRAVENOUS
Status: DISCONTINUED | OUTPATIENT
Start: 2020-02-20 | End: 2020-02-20 | Stop reason: HOSPADM

## 2020-02-20 RX ORDER — HEPARIN 100 UNIT/ML
500 SYRINGE INTRAVENOUS
Status: CANCELLED | OUTPATIENT
Start: 2020-02-20

## 2020-02-20 RX ADMIN — SODIUM CHLORIDE 200 MG: 9 INJECTION, SOLUTION INTRAVENOUS at 10:02

## 2020-02-20 RX ADMIN — SODIUM CHLORIDE 1 MG: 9 INJECTION, SOLUTION INTRAVENOUS at 11:02

## 2020-02-20 RX ADMIN — DEXAMETHASONE SODIUM PHOSPHATE 10 MG: 4 INJECTION INTRA-ARTICULAR; INTRALESIONAL; INTRAMUSCULAR; INTRAVENOUS; SOFT TISSUE at 11:02

## 2020-02-20 RX ADMIN — HEPARIN 500 UNITS: 100 SYRINGE at 12:02

## 2020-02-20 RX ADMIN — SODIUM CHLORIDE: 9 INJECTION, SOLUTION INTRAVENOUS at 10:02

## 2020-02-20 RX ADMIN — SODIUM CHLORIDE 1075 MG: 9 INJECTION, SOLUTION INTRAVENOUS at 11:02

## 2020-02-20 NOTE — PLAN OF CARE
Patient tolerated Keytruda and Kajal with no complications. VSS. Pt instructed to call MD with any problems. NAD. Pt discharged home independently.

## 2020-02-20 NOTE — PROGRESS NOTES
"Subjective:       Patient ID: Alison Branch is a 64 y.o. female.    Chief Complaint: Malignant neoplasm of upper lobe of L lung    History:  Past Medical History:   Diagnosis Date    Allergy     Brain aneurysm 2010    s/p coiling of one; another not coiled    Breast cyst     Depression 9/19/2019    Diabetes mellitus     Diabetes type 2, controlled     Fever blister     High cholesterol     History of Bell's palsy     HTN (hypertension) 5/20/2014     Past Surgical History:   Procedure Laterality Date    BREAST CYST ASPIRATION      BRONCHOSCOPY N/A 5/28/2019    Procedure: Bronchoscopy;  Surgeon: [facility];  [facility];  Service: Anesthesiology;  Laterality: N/A;    CERVICAL FUSION      COLONOSCOPY N/A 3/9/2016    Procedure: COLONOSCOPY;  Surgeon: Elliott Zimmerman MD;  Location: [facility] ENDO (4TH FLR);  Service: Endoscopy;  Laterality: N/A;    head surgery      stent and "curling" for aneurysm    HYSTERECTOMY      TVH secondary to SUF    INSERTION OF TUNNELED CENTRAL VENOUS CATHETER (CVC) WITH SUBCUTANEOUS PORT Right 7/8/2019    Procedure: INSERTION, PORT-A-CATH;  Surgeon: Cali Damico MD;  Location: [facility] CATH LAB;  Service: Radiology;  Laterality: Right;         Malignant neoplasm of upper lobe of left lung    5/23/2019 Initial Diagnosis     Malignant neoplasm of upper lobe of left lung      6/24/2019 -  Chemotherapy     Treatment Summary   Plan Name: OP NSCLC PEMBROLIZUMAB PEMETREXED CARBOPLATIN Q3W  Treatment Goal: Palliative  Status: Active  Start Date: 7/9/2019  End Date: 6/17/2021 (Planned)  Provider: Tara Belcher MD  Chemotherapy: CARBOplatin (PARAPLATIN) 530 mg in sodium chloride 0.9% 250 mL chemo infusion, 530 mg (100 % of original dose 531.5 mg), Intravenous, Clinic/HOD 1 time, 4 of 4 cycles  Dose modification:   (original dose 622 mg, Cycle 2),   (original dose 567 mg, Cycle 3),   (original dose 616 mg, Cycle 4),   (original dose 531.5 mg, Cycle " 1)  Administration: 620 mg (7/29/2019), 565 mg (8/19/2019), 615 mg (9/5/2019), 530 mg (7/9/2019)  PEMEtrexed (ALIMTA) 1,075 mg in sodium chloride 0.9% 100 mL chemo infusion, 500 mg/m2 = 1,075 mg, Intravenous, Clinic/HOD 1 time, 11 of 35 cycles  Administration: 1,075 mg (7/29/2019), 1,075 mg (8/19/2019), 1,075 mg (9/5/2019), 1,075 mg (9/26/2019), 1,075 mg (7/9/2019), 1,075 mg (10/16/2019), 1,075 mg (11/6/2019), 1,075 mg (11/27/2019), 1,075 mg (12/19/2019), 1,075 mg (1/9/2020), 1,075 mg (1/30/2020)  pembrolizumab (KEYTRUDA) 200 mg in sodium chloride 0.9% 100 mL chemo infusion, 200 mg, Intravenous, Clinic/HOD 1 time, 11 of 35 cycles  Administration: 200 mg (7/29/2019), 200 mg (8/19/2019), 200 mg (9/5/2019), 200 mg (9/26/2019), 200 mg (7/9/2019), 200 mg (10/16/2019), 200 mg (11/6/2019), 200 mg (11/27/2019), 200 mg (12/19/2019), 200 mg (1/9/2020), 200 mg (1/30/2020)        Secondary malignant neoplasm of mediastinal lymph node    6/7/2019 Initial Diagnosis     Secondary malignant neoplasm of mediastinal lymph node      6/24/2019 -  Chemotherapy     Treatment Summary   Plan Name: OP NSCLC PEMBROLIZUMAB PEMETREXED CARBOPLATIN Q3W  Treatment Goal: Palliative  Status: Active  Start Date: 7/9/2019  End Date: 6/17/2021 (Planned)  Provider: Tara Belcher MD  Chemotherapy: CARBOplatin (PARAPLATIN) 530 mg in sodium chloride 0.9% 250 mL chemo infusion, 530 mg (100 % of original dose 531.5 mg), Intravenous, Clinic/HOD 1 time, 4 of 4 cycles  Dose modification:   (original dose 622 mg, Cycle 2),   (original dose 567 mg, Cycle 3),   (original dose 616 mg, Cycle 4),   (original dose 531.5 mg, Cycle 1)  Administration: 620 mg (7/29/2019), 565 mg (8/19/2019), 615 mg (9/5/2019), 530 mg (7/9/2019)  PEMEtrexed (ALIMTA) 1,075 mg in sodium chloride 0.9% 100 mL chemo infusion, 500 mg/m2 = 1,075 mg, Intravenous, Clinic/HOD 1 time, 11 of 35 cycles  Administration: 1,075 mg (7/29/2019), 1,075 mg (8/19/2019), 1,075 mg (9/5/2019), 1,075 mg  (9/26/2019), 1,075 mg (7/9/2019), 1,075 mg (10/16/2019), 1,075 mg (11/6/2019), 1,075 mg (11/27/2019), 1,075 mg (12/19/2019), 1,075 mg (1/9/2020), 1,075 mg (1/30/2020)  pembrolizumab (KEYTRUDA) 200 mg in sodium chloride 0.9% 100 mL chemo infusion, 200 mg, Intravenous, Clinic/HOD 1 time, 11 of 35 cycles  Administration: 200 mg (7/29/2019), 200 mg (8/19/2019), 200 mg (9/5/2019), 200 mg (9/26/2019), 200 mg (7/9/2019), 200 mg (10/16/2019), 200 mg (11/6/2019), 200 mg (11/27/2019), 200 mg (12/19/2019), 200 mg (1/9/2020), 200 mg (1/30/2020)          Allergies:  Review of patient's allergies indicates:  No Known Allergies     HPI Ms. Branch is a 63-year-old female who smoked for about 30 years and quit 20 years ago, presented with cough end of last year, but worsened into January.  Since the cough persisted, she saw her PCP, underwent a chest x-ray on 05/10/2019, that revealed left upper lobe pneumonia and a repeat CT was done in the week after that on 05/17/2019 that showed left upper lobe   mass arising from the lateral pleural surface in the left upper lobe posterior subsegment measuring 2.6 x 3 cm.  There are satellite mass more medially near the aortic arch that is 2 cm, also spiculated and irregular as well as thickened interlobar septa in the left lung apex and anterior segment, prevascular lymph node lateral to the aortic arch, short axis measuring 9 mm.       She then underwent a bronchoscopy on 05/28/2019, and that revealed there was an infiltration obtained in the left apical posterior segment of the left upper lobe cytology brush was done.  Transbronchial biopsies of an area of infiltration were also performed in the apicoposterior segment of the left upper lobe.    Pathology revealed adenocarcinoma; however, the specimen was not adequate enough to send for molecular testing.       She underwent a PET scan on 06/06/2019.  that reveals significant hypermetabolic activity in the large irregular spiculated pulmonary  "mass in the lateral aspect of the left upper lobe consistent with the patient's known pulmonary adenocarcinoma.  There is also tracer avidity in the medial left upper lobe satellite lesion and diffusely throughout much of the anterior left upper lobe where there is prominent nodular paraseptal thickening.  There are some numerous scattered subcentimeter pulmonary nodules throughout the right lung, all of which are too small for detection by PET.  For example, there is a 0.4 cm nodule in the superior right lower lobe and a micro nodule in the posterior segment of the right upper lobe.  There is a 0.5 cm, normal size right   paratracheal lymph node with increased radiotracer uptake as well as hypermetabolic aortic pulmonary lymph node and a left hilar lymph node.  There is a focal hypermetabolic lesion in the left anterior superior iliac spine associated with well defined lytic lesion.  There is a hypermetabolic focus in the anterior right fifth rib with a possible associated lytic lesion.  SUVs as   below lateral:  Left upper lobe  SUV max 17.9, anterior left upper lobe satellite mass and septal thickening SUV max of 10.1, right paratracheal lymph node SUV max of 4.8, left AP window lymph node SUV max of 17.9, left anterior superior iliac spine SUV max of 3.9"     MRI pelvis from 6/10/19  reveals "Region of osseous is irregularity at the left anterior iliac spine likely related to bone graft harvest procedure.  See  discussion above.  No suspicious signal or enhancement to suggest active/malignant process"   MRI brain from 6/10//19 reveal No intracranial abnormality.     We discussed her case at [facility], and plan was to wait for GAURDANT and proceed with treatment accordingly  Her GAURDANT is negative for molecular markers.     She has completed 4 cycles of Carboplatin, Alimta and Keytruda as of 9/5/19. She is now on  ALimta and Keytruda maintenance.      Interval History 2/20/2020: She comes in for maintenance " Alimta and Keytruda. She feels well overall, notes increase urinary frequency which lasts 3 days after chemo. This has not worsened. She also continues to have moderate swelling of the ankles. US of the lower extremity is negative for DVT. She is scheduled to get compression stockings. Overall, she is doing well. She denies any nausea, vomiting, diarrhea, constipation, abdominal pain, weight loss or loss of appetite, chest pain, shortness of breath, leg swelling, fatigue, pain, headache, dizziness, or mood changes. Her ECOG PS is zero. She is by herself.     ECOG:  ECOG SCORE            Review of Systems   Constitutional: Negative for appetite change, chills, fatigue, fever and unexpected weight change.   HENT: Negative for congestion, facial swelling, mouth sores, sinus pressure, sinus pain, sore throat and trouble swallowing.    Eyes: Negative for photophobia, pain and visual disturbance.   Respiratory: Positive for cough. Negative for chest tightness, shortness of breath, wheezing and stridor.    Cardiovascular: Negative for chest pain and leg swelling.   Gastrointestinal: Negative for abdominal distention, abdominal pain, blood in stool, constipation, diarrhea, nausea, rectal pain and vomiting.   Genitourinary: Negative for dysuria, flank pain and urgency.   Musculoskeletal: Negative for back pain, gait problem, joint swelling and neck pain.        Bilateral lower ankle edema   Skin: Negative for color change and rash.   Neurological: Negative for dizziness, syncope, weakness, light-headedness, numbness and headaches.   Hematological: Negative for adenopathy. Does not bruise/bleed easily.   Psychiatric/Behavioral: Negative for agitation, behavioral problems and confusion. The patient is not nervous/anxious.        Objective:      Physical Exam   Constitutional: She is oriented to person, place, and time. She appears well-developed and well-nourished. No distress.   HENT:   Head: Normocephalic and atraumatic.    Mouth/Throat: No oropharyngeal exudate.   Eyes: Pupils are equal, round, and reactive to light. Conjunctivae and EOM are normal. No scleral icterus.   Neck: Normal range of motion. Neck supple.   Cardiovascular: Normal rate and regular rhythm. Exam reveals no gallop and no friction rub.   No murmur heard.  Pulmonary/Chest: Breath sounds normal. No respiratory distress. She exhibits no tenderness.   Abdominal: Soft. Bowel sounds are normal. She exhibits no distension and no mass. There is no tenderness. There is no rebound and no guarding. No hernia.   Musculoskeletal: Normal range of motion. She exhibits edema. She exhibits no tenderness or deformity.   Mild, non-pitting edema of the ankles bilaterally    Lymphadenopathy:     She has no cervical adenopathy.   Neurological: She is alert and oriented to person, place, and time. She displays normal reflexes. No sensory deficit. Coordination normal.   Skin: Skin is warm and dry. Capillary refill takes less than 2 seconds. No rash noted. No erythema.   Psychiatric: She has a normal mood and affect. Her behavior is normal. Judgment and thought content normal.   Nursing note and vitals reviewed.      Results:   CBC Oncology   Order: [id number]   Status:  Final result   Visible to patient:  Yes (Patient Portal)   Next appt:  Today at 09:00 AM in Chemotherapy (NURSE 11, [facility] CHEMO)   Dx:  Malignant neoplasm of upper lobe of l...    Ref Range & Units 3d ago   WBC 3.90 - 12.70 K/uL 5.65    RBC 4.00 - 5.40 M/uL 4.02    Hemoglobin 12.0 - 16.0 g/dL 11.2Low     Hematocrit 37.0 - 48.5 % 37.6    Mean Corpuscular Volume 82 - 98 fL 94    Mean Corpuscular Hemoglobin 27.0 - 31.0 pg 27.9    Mean Corpuscular Hemoglobin Conc 32.0 - 36.0 g/dL 29.8Low     RDW 11.5 - 14.5 % 17.0High     Platelets 150 - 350 K/uL 168    MPV 9.2 - 12.9 fL 11.7    Gran # (ANC) 1.8 - 7.7 K/uL 3.1    Comment: The ANC is based on a white cell differential from an   automated cell counter. It has not been  microscopically   reviewed for the presence of abnormal cells. Clinical   correlation is required.    Immature Grans (Abs) 0.00 - 0.04 K/uL 0.12High     Comment: Mild elevation in immature granulocytes is non specific and   can be seen in a variety of conditions including stress response,   acute inflammation, trauma and pregnancy. Correlation with other   laboratory and clinical findings is essential.            Comprehensive metabolic panel   Order: [id number]   Status:  Final result   Visible to patient:  Yes (Patient Portal)   Next appt:  Today at 09:00 AM in Chemotherapy (NURSE 11, [facility] CHEMO)   Dx:  Malignant neoplasm of upper lobe of l...    Ref Range & Units 3d ago   Sodium 136 - 145 mmol/L 141    Potassium 3.5 - 5.1 mmol/L 4.7    Chloride 95 - 110 mmol/L 104    CO2 23 - 29 mmol/L 27    Glucose 70 - 110 mg/dL 162High     BUN, Bld 8 - 23 mg/dL 14    Creatinine 0.5 - 1.4 mg/dL 0.9    Calcium 8.7 - 10.5 mg/dL 10.0    Total Protein 6.0 - 8.4 g/dL 7.0    Albumin 3.5 - 5.2 g/dL 3.7    Total Bilirubin 0.1 - 1.0 mg/dL 0.4    Comment: For infants and newborns, interpretation of results should be based   on gestational age, weight and in agreement with clinical   observations.   Premature Infant recommended reference ranges:   Up to 24 hours.............<8.0 mg/dL   Up to 48 hours............<12.0 mg/dL   3-5 days..................<15.0 mg/dL   6-29 days.................<15.0 mg/dL    Alkaline Phosphatase 55 - 135 U/L 63    AST 10 - 40 U/L 45High     ALT 10 - 44 U/L 80High     Anion Gap 8 - 16 mmol/L 10    eGFR if African American >60 mL/min/1.73 m^2 >60.0    eGFR if non African American >60 mL/min/1.73 m^2 >60.0    Comment: Calculation used to obtain the estimated glomerular filtration   rate (eGFR) is the CKD-EPI equation.             US Lower Extremity Veins Bilateral   Order: [id number]   Status:  Final result   Visible to patient:  Yes (Patient Portal)   Next appt:  Today at 09:00 AM in Chemotherapy (NURSE 11, [facility]  CHEMO)   Dx:  Edema, unspecified type   Details     Reading Physician Reading Date Result Priority   Vivian Dowd MD 2/10/2020 Routine   Joe Michael MD 2/10/2020       Narrative     EXAMINATION:  US LOWER EXTREMITY VEINS BILATERAL    CLINICAL HISTORY:  Edema, unspecified    TECHNIQUE:  Duplex and color flow Doppler and dynamic compression was performed of the bilateral lower extremity veins was performed.    COMPARISON:  Lower extremity venous ultrasound 09/13/2019    FINDINGS:  Right thigh veins: The common femoral, femoral, popliteal, upper greater saphenous, and deep femoral veins are patent and free of thrombus. The veins are normally compressible and have normal phasic flow and augmentation response.    Right calf veins: The visualized calf veins are patent.    Left thigh veins: The common femoral, femoral, popliteal, upper greater saphenous, and deep femoral veins are patent and free of thrombus. The veins are normally compressible and have normal phasic flow and augmentation response.    Left calf veins: The visualized calf veins are patent.    Miscellaneous: Overlying soft tissue edema.      Impression       No evidence of deep venous thrombosis in either lower extremity.    Electronically signed by resident: Joe Michael  Date: 02/10/2020  Time: 15:12    Electronically signed by: Vivian Dowd MD  Date: 02/10/2020  Time: 15:18              Assessment:     1. 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 Dysuria    4. Type 2 diabetes mellitus with diabetic polyneuropathy, with long-term current use of insulin    5. Chemotherapy-induced peripheral neuropathy    6. Edema, unspecified type      Plan:   1,2. She is doing well clinically and will proceed with Alimta and Keytrda maintenance and will return in 3 weeks for next cycle  3. Stable c/w medications  4. Stable on meds. A1c is 8.7. Has f/u with her Endocrinologist on  2/24/2020.  5. She will continue with gabapentin  6. Ultrasound is negative for DVT. She is scheduled to get compression stockings. Overall, stable.      Pte and family members displayed understanding of the above encounter and treatment plan. All thoughtful questions were answered to their satisfaction. Pte was advised to notify the care team or proceed to the ER if signs and symptoms worsen.     STAFF NOTE: Agree with above

## 2020-02-20 NOTE — TELEPHONE ENCOUNTER
----- Message from Vonnie Snyder sent at 2/20/2020  2:26 PM CST -----  Contact: patient  Please call above patient at [phone] said she was put on fluid pills for anemia need to speak with the nurse to make sure it is ok waiting on a call back thanks.

## 2020-02-20 NOTE — TELEPHONE ENCOUNTER
Spoke with patient.  She is calling to make sure samantha is OK with her taking hctz for edema, as her pcp on call prescribed it.   samantha said patient was OK to take this.  She thanked nurse and voiced understanding.

## 2020-02-20 NOTE — Clinical Note
Please schedule 3 week f/u with Dr. Belcher with CBC, CMP, TSH, T4 free and next cycle of Keytruda and Alimta. Thank you.

## 2020-02-21 ENCOUNTER — TELEPHONE (OUTPATIENT)
Dept: INTERNAL MEDICINE | Facility: CLINIC | Age: 65
End: 2020-02-21

## 2020-02-21 NOTE — TELEPHONE ENCOUNTER
----- Message from Cary Horne sent at 2/21/2020  2:33 PM CST -----  Contact: [facility]    (fax) [phone]  Diabetic or Medical Supplies.  What supplies are needed: diabetic shoes and inserts  What is the brand name of the supplies:   Is this a refill or new prescription:  new  Who prescribed the supplies:  [employer]  Pharmacy or company name, phone # and location:  [facility]     [phone]   (fax) [phone]  Comments:

## 2020-02-24 NOTE — PROGRESS NOTES
Subjective:      Patient ID: Alison Branch is a 64 y.o. female.    Chief Complaint:  Diabetes    History of Present Illness  Alison Branch is here for follow up of T2DM.  Previously seen by Dr. Leos.  Last seen 1/7/20.  This is their first visit with me.      History of adennocarcinoma of the lung s/p chemotherapy currently on alimta and keytruda and dexamethasone which she is given with her chemotherapy every 21days.  Dexamethasone 6 mg twice a day the day before and one day after chemotherapy.   Has received Xgeva for bone mets as well. Last vitamin D level was 29.     Last chemo was on 2/20/20.     With regards to diabetes:    Diagnosed: ~1995  Known complications:  DKA -  RN -  PN -  Nephropathy -  CAD -    Current regimen: (Humalog covered in 2020)  Januvia 100mg daily   Levemir 26units (nonsteroid)  Levemir 33units (steroid)  Novolog 18-14-14 units  Steroid days /25   Non steroid days 150/50    Starting increased dosage the day of her steroid and has been having to continue with the increased dosage for 3 days after.     Other medications tried:  None.     Glucose Monitor: Glucomter  4 times a day testing  Log reviewed: forgot log    Hypoglycemia:  Yes.   Knows how to correct with 15 grams of carbs- juice, coke, or a peppermint.     Diet/Exercise:  Eats 3 meals a day.   Snacks : Occasionally, fruit.  Drinks : water.   Exercise - tries to stay active      Education - last visit: 12/2019  Eye Exam: 10/2019  Podiatry: 2017     Diabetes Management Status    Hemoglobin A1C   Date Value Ref Range Status   02/17/2020 8.7 (H) 4.0 - 5.6 % Final     Comment:     ADA Screening Guidelines:  5.7-6.4%  Consistent with prediabetes  >or=6.5%  Consistent with diabetes  High levels of fetal hemoglobin interfere with the HbA1C  assay. Heterozygous hemoglobin variants (HbS, HgC, etc)do  not significantly interfere with this assay.   However, presence of multiple variants may affect accuracy.     12/10/2019 9.1 (H) 4.0 - 5.6  % Final     Comment:     ADA Screening Guidelines:  5.7-6.4%  Consistent with prediabetes  >or=6.5%  Consistent with diabetes  High levels of fetal hemoglobin interfere with the HbA1C  assay. Heterozygous hemoglobin variants (HbS, HgC, etc)do  not significantly interfere with this assay.   However, presence of multiple variants may affect accuracy.     11/05/2019 9.3 (H) 4.0 - 5.6 % Final     Comment:     ADA Screening Guidelines:  5.7-6.4%  Consistent with prediabetes  >or=6.5%  Consistent with diabetes  High levels of fetal hemoglobin interfere with the HbA1C  assay. Heterozygous hemoglobin variants (HbS, HgC, etc)do  not significantly interfere with this assay.   However, presence of multiple variants may affect accuracy.       Statin: Taking  ACE/ARB: Taking  Screening or Prevention Patient's value Goal Complete/Controlled?   HgA1C Testing and Control   Lab Results   Component Value Date    HGBA1C 8.7 (H) 02/17/2020      Annually/Less than 8% No   Lipid profile : 05/10/2019 Annually Yes   LDL control Lab Results   Component Value Date    LDLCALC 97.2 05/10/2019    Annually/Less than 100 mg/dl  Yes   Nephropathy screening Lab Results   Component Value Date    LABMICR 14.0 02/01/2019     Lab Results   Component Value Date    PROTEINUA Negative 12/13/2019    Annually Yes   Blood pressure BP Readings from Last 1 Encounters:   02/27/20 112/74    Less than 140/90 Yes   Dilated retinal exam : 10/09/2019 Annually Yes   Foot exam   : 08/29/2019 Annually Yes     Review of Systems   Constitutional: Negative for fatigue.   Eyes: Negative for visual disturbance.   Respiratory: Negative for shortness of breath.    Cardiovascular: Negative for chest pain.   Gastrointestinal: Negative for abdominal pain.   Musculoskeletal: Negative for arthralgias.   Skin: Negative for wound.   Neurological: Negative for headaches.   Hematological: Bruises/bleeds easily.   Psychiatric/Behavioral: Negative for sleep disturbance.     + numbness  "and tingling in both feet.     Objective:   Physical Exam   Cardiovascular: Normal rate.   No edema present   Pulmonary/Chest: Effort normal.   Abdominal: Soft.   Vitals reviewed.    Appropriate footwear, foot exam deferred, done 8/2019.  Injection sites are without edema or erythema. No lipo hypertropthy or atrophy.    Visit Vitals  /74 (BP Location: Right arm, Patient Position: Sitting, BP Method: Medium (Manual))   Resp 16   Ht 5' 9" (1.753 m)   Wt 97 kg (213 lb 13.5 oz)   BMI 31.58 kg/m²     Body mass index is 31.58 kg/m².    Lab Review:   Lab Results   Component Value Date    HGBA1C 8.7 (H) 02/17/2020    HGBA1C 9.1 (H) 12/10/2019    HGBA1C 9.3 (H) 11/05/2019       Lab Results   Component Value Date    CHOL 163 05/10/2019    HDL 53 05/10/2019    LDLCALC 97.2 05/10/2019    TRIG 64 05/10/2019    CHOLHDL 32.5 05/10/2019     Lab Results   Component Value Date     02/17/2020    K 4.7 02/17/2020     02/17/2020    CO2 27 02/17/2020     (H) 02/17/2020    BUN 14 02/17/2020    CREATININE 0.9 02/17/2020    CALCIUM 10.0 02/17/2020    PROT 7.0 02/17/2020    ALBUMIN 3.7 02/17/2020    BILITOT 0.4 02/17/2020    ALKPHOS 63 02/17/2020    AST 45 (H) 02/17/2020    ALT 80 (H) 02/17/2020    ANIONGAP 10 02/17/2020    ESTGFRAFRICA >60.0 02/17/2020    EGFRNONAA >60.0 02/17/2020    TSH 0.756 11/05/2019     Vit D, 25-Hydroxy   Date Value Ref Range Status   01/10/2020 34 30 - 96 ng/mL Final     Comment:     Vitamin D deficiency.........<10 ng/mL                              Vitamin D insufficiency......10-29 ng/mL       Vitamin D sufficiency........> or equal to 30 ng/mL  Vitamin D toxicity............>100 ng/mL       Assessment and Plan     1. Type 2 diabetes mellitus with hyperglycemia, with long-term current use of insulin     2. Adrenal cortical steroids causing adverse effect in therapeutic use     3. Vitamin D deficiency     4. Malignant neoplasm of upper lobe of left lung     5. Hypertension associated with " diabetes     6. Mixed hyperlipidemia due to type 2 diabetes mellitus       Type 2 diabetes mellitus with hyperglycemia, with long-term current use of insulin  -- Upcoming appt in April with NP.  Will FU with Dr. Leos 4 months after that.  -- A1c goal <7.5%.  -- Medications discussed:  GLP1-DPP4   Insulin   -- Reviewed logs/CGM:  Patient reports she cannot find her glucometer as of last night. I sent in a new one.   Patient forgot logs. She will send them when she gets home.  Therefore, will continue current regimen for now.  During discussion it does seem she has some hypoglycemia midday if she does not eat, may want to decrease basal insulin.  -- Medication Changes:   CONTINUE:  Januvia 100mg daily   Levemir 26units (nonsteroid)  Levemir 33units (steroid)  Novolog 18-14-14 units  Steroid days /25   Non steroid days 150/50  -- Reviewed goals of therapy are to get the best control we can without hypoglycemia.  -- Reviewed patient's current insulin regimen. Clarified proper insulin dose and timing in relation to meals, etc. Insulin injection sites and proper rotation instructed.    -- Advised frequent self blood glucose monitoring.  Patient encouraged to document glucose results and bring them to every clinic visit.  -- Hypoglycemia precautions discussed. Instructed on precautions before driving.    -- Call for Bg repeatedly < 90 or > 180.   -- Close adherence to lifestyle changes recommended.   -- Periodic follow ups for eye evaluations, foot care and dental care suggested.    Adrenal cortical steroids causing adverse effect in therapeutic use  -- Causing steroid induced hyperglycemia.  -- Will continue to adjust insulin where appropriate.    Vitamin D deficiency  -- Stable.    Malignant neoplasm of upper lobe of left lung  -- Continue following with hem/onc.    Hypertension associated with diabetes  -- On ARB.  -- Controlled.  -- Blood pressure goals discussed with patient.    Mixed hyperlipidemia due to type  2 diabetes mellitus  -- Controlled.  -- On statin per ADA recommendations.    Follow up in about 6 months (around 8/27/2020).

## 2020-02-24 NOTE — PROGRESS NOTES
Physical Therapy Daily Treatment Note     Name: Alison Branch  Clinic Number: [id number]    Therapy Diagnosis:   Encounter Diagnosis   Name Primary?   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 She just finished her chemo and her stomach is really upset. She tried to call to cancel her apt but the  never answers the phone.    She was compliant with home exercise program.  Response to previous treatment: first session since evaluation  Functional change: ongoing    Pain: 0/10  Location: n/a     Objective     Alison received therapeutic exercises to develop strength, endurance, flexibility, posture and core stabilization for 11 minutes including:  10 minutes SciFit Stepper, level 1.0    Alison participated in neuromuscular re-education activities to improve: Balance, Coordination, Kinesthetic, Sense, Proprioception and Posture for 30 minutes. The following activities were included:       Evaluation 2/26/2020   Single Limb Stance R LE 27 sec  (<10 sec = HIGH FALL RISK) NT   Single Limb Stance L LE 10 sec  (<10 sec = HIGH FALL RISK) NT   30 second Chair Rise 7 completed with arms 9 without UE support   TUG 13.9 seconds 8.8 s   Self selected walking speed 1.05 m/sec (6m/5.7s) .54 m/s (6m/11.2s)   Fast walking speed 1.25 m/sec (6m/4.8s) .72 m/s (6m/8.3s)   FGA 21/30 18/30      JESSICA SENSORY TESTING:  (P= Pass, F= Fail;  "note any sway; hold each position for 30")  Condition 1: (firm surface/feet together/eyes open) P  Condition 2: (firm surface/feet together/eyes closed) P  Condition 3: (firm surface/feet in tandem/eyes open) P (rhomberg)  Condition 4: (firm surface/feet in tandem/eyes closed) NT  Condition 5: (soft surface/feet together/eyes open) P  Condition 6: (soft surface/feet together/eyes closed) P  Condition 7: (Fakuda step test), measure distance varied from center starting position NT    Functional Gait Assessment:   1. Gait on level surface =  1   (3) Normal: less than 5.5 sec, no A.D., no imbalance, normal gait pattern, deviates< 6in   (2) Mild impairment: 7-5.6 sec, uses A.D., mild gait deviations, or deviates 6-10 in   (1) Moderate impairment: > 7 sec, slow speed, imbalance, deviates 10-15 in.   (0) Severe impairment: needs assist, deviates >15 in, reach/touch wall  2. Change in Gait Speed = 2   (3) Normal: smooth change w/o loss of balance or gait deviation, deviates < 6 in, significant difference between speeds   (2) Mild impairment: changes speed, but demonstrates mild gait deviations, deviates 6-10 in, OR no deviations but unable to significantly speed, OR uses A.D.   (1) Moderate impairment: minor changes to speed, OR changes speed w/ significant deviations, deviates 10-15 in, OR  Changes speed , but loses balance & recovers   (0) Severe impairment: cannot change speed, deviates >15 in, or loses balance & needs assist  3. Gait with horizontal head turns  = 1   (3) Normal: no change in gait, deviates <6 in   (2) Mild impairment: slight change in speed, deviates 6-10 in, OR uses A.D.   (1) Moderate impairment: moderate change in speed, deviates 10-15 in   (0) Severe impairment: severe disruption of gait, deviates >15in  4. Gait with vertical head turns = 1   (3) Normal: no change in gait, deviates <6 in   (2) Mild impairment: slight change in speed, deviates 6-10 in OR uses A.D.   (1) Moderate impairment: moderate " change in speed, deviates 10-15 in   (0) Severe impairment: severe disruption of gait, deviates >15 in  5. Gait with pivot turns = 2   (3) Normal: performs safely in 3 sec, no LOB   (2) Mild impairment: performs in >3 sec & no LOB, OR turns safely & requires several steps to regain LOB   (1) Moderate impairment: turns slow, OR requires several small steps for balance following turn & stop   (0) Severe impairment: cannot turn safely, needs assist  6. Step over obstacle = 2   (3) Normal: steps over 2 stacked boxes w/o change in speed or LOB   (2) Mild impairment: able to step over 1 box w/o change in speed or LOB   (1) Moderate impairment: steps over 1 box but must slow down, may require VC   (0) Severe impairment: cannot perform w/o assist  7. Gait with Narrow MINGO = 3   (3) Normal: 10 steps no staggering   (2) Mild impairment: 7-9 steps   (1) Moderate impairment: 4-7 steps   (0) Severe impairment: < 4 steps or cannot perform w/o assist  8. Gait with eyes closed = 2   (3) Normal: < 7 sec, no A.D., no LOB, normal gait pattern, deviates <6 in   (2) Mild impairment: 7.1-9 sec, mild gait deviations, deviates 6-10 in   (1) Moderate impairment: > 9 sec, abnormal pattern, LOB, deviates 10-15 in   (0) Severe impairment: cannot perform w/o assist, LOB, deviates >15in  9. Ambulating Backwards = 2   (3) Normal: no A.D., no LOB, normal gait pattern, deviates <6in   (2) Mild impairment: uses A.D., slower speed, mild gait deviations, deviates 6-10 in   (1) Moderate impairment: slow speed, abnormal gait pattern, LOB, deviates 10-15 in   (0) Severe impairment: severe gait deviations or LOB, deviates >15in  10. Steps = 2   (3) Normal: alternating feet, no rail   (2) Mild Impairment: alternating feet, uses rail   (1) Moderate impairment: step-to, uses rail   (0) Severe impairment: cannot perform safely    Score 18/30     Score:   <22/30 fall risk   <20/30 fall risk in older adults   <18/30 fall risk in Parkinsons       CMS  Impairment/Limitation/Restriction for FOTO Status Survey    Therapist reviewed FOTO scores for Alison Branch on 2/26/2020.   FOTO documents entered into [employer] - see Media section.    Limitation Score: 50%  Category: Mobility    Current : CK = at least 40% but < 60% impaired, limited or restricted  Goal: CJ = at least 20% but < 40% impaired, limited or restricted           Home Exercises Provided and Patient Education Provided     Education provided:   - results of reassessment    Written Home Exercises Provided: yes.  Exercises were reviewed and Alison was able to demonstrate them prior to the end of the session.  Alison demonstrated good  understanding of the education provided.     See EMR under Patient Instructions for exercises provided 1/28/2020.    Assessment     Ms. Rosas tolerated reassessment well this morning though demonstrates varied progress during first month of skilled PT services. She was able to improve her time with TUG and improve her 30 sec chair rise score, though demonstrated a decline in SSWS, FWS, and FGA scores. Pt does report that she feels more steady on her feet at home, but spends a lot of time in bed when at home due to being really fatigued from chemo. It is of note that pt has varied participation within last month and is ongoing chemo therapy, a significantly variable product. Pt continues to demonstrate significant endurance deficits, as well and strength and balance deficits. She remains appropriate for skilled PT services and her goals have been updated.     Alison is progressing well towards her goals.   Pt prognosis is Good.     Pt will continue to benefit from skilled outpatient physical therapy to address the deficits listed in the problem list box on initial evaluation, provide pt/family education and to maximize pt's level of independence in the home and community environment.     Pt's spiritual, cultural and educational needs considered and pt agreeable to plan of care and goals.      Anticipated barriers to physical therapy: none at this time    Goals:  Short Term Goals: 4 weeks   1. Pt will improve score on TUG to at least 10 sec in order to decrease risk for fall. MET 2/26/20  2. Pt will improve SSWS to at least 1.15 m/s to demonstrate improved mobility without AD. ongoing  3. Pt will improve 30 second chair rise score to at least 7 without UE support. MET 2/26/2020  4. Pt will report <40% limitation using FOTO impairment tool. ongoing     Long Term Goals: 8 weeks   1. Pt will improve score on FGA to at least 27/30 in order to demonstrate improved balance. Ongoing  2. Pt will improve 30 second chair rise score to at least 10 without UE support. Ongoing, improving  3. Pt will improve SLS to at least 30 sec B in order to decrease risk for fall. ongoing  4. Pt will report no falls and ambulation without rollator for at least two weeks. ongoing    Plan   Plan of care Certification: 1/21/2020 to 3/21/2020.    Continue to work on endurance tasks, strengthening, and balance.     Matias Nunez, PT

## 2020-02-26 ENCOUNTER — TELEPHONE (OUTPATIENT)
Dept: INTERNAL MEDICINE | Facility: CLINIC | Age: 65
End: 2020-02-26

## 2020-02-26 ENCOUNTER — CLINICAL SUPPORT (OUTPATIENT)
Dept: REHABILITATION | Facility: HOSPITAL | Age: 65
End: 2020-02-26
Attending: PSYCHIATRY & NEUROLOGY
Payer: MEDICARE

## 2020-02-26 DIAGNOSIS — Z74.09 IMPAIRED FUNCTIONAL MOBILITY, BALANCE, GAIT, AND ENDURANCE: Primary | ICD-10-CM

## 2020-02-26 PROCEDURE — 97112 NEUROMUSCULAR REEDUCATION: CPT | Mod: PO

## 2020-02-26 PROCEDURE — 97110 THERAPEUTIC EXERCISES: CPT | Mod: PO

## 2020-02-26 NOTE — TELEPHONE ENCOUNTER
Message already sent to provider. Appt 02/28/2020 at 8:40. Needs clinic note stating the need for diabetic shoes    Thanks     Celeste

## 2020-02-26 NOTE — PLAN OF CARE
Physical Therapy Daily Treatment Note     Name: Alison Branch  Clinic Number: [id number]    Therapy Diagnosis:   Encounter Diagnosis   Name Primary?   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 She just finished her chemo and her stomach is really upset. She tried to call to cancel her apt but the  never answers the phone.    She was compliant with home exercise program.  Response to previous treatment: first session since evaluation  Functional change: ongoing    Pain: 0/10  Location: n/a     Objective     Alison received therapeutic exercises to develop strength, endurance, flexibility, posture and core stabilization for 11 minutes including:  10 minutes SciFit Stepper, level 1.0    Alison participated in neuromuscular re-education activities to improve: Balance, Coordination, Kinesthetic, Sense, Proprioception and Posture for 30 minutes. The following activities were included:       Evaluation 2/26/2020   Single Limb Stance R LE 27 sec  (<10 sec = HIGH FALL RISK) NT   Single Limb Stance L LE 10 sec  (<10 sec = HIGH FALL RISK) NT   30 second Chair Rise 7 completed with arms 9 without UE support   TUG 13.9 seconds 8.8 s   Self selected walking speed 1.05 m/sec (6m/5.7s) .54 m/s (6m/11.2s)   Fast walking speed 1.25 m/sec (6m/4.8s) .72 m/s (6m/8.3s)   FGA 21/30 18/30      JESSICA SENSORY TESTING:  (P= Pass, F= Fail;  "note any sway; hold each position for 30")  Condition 1: (firm surface/feet together/eyes open) P  Condition 2: (firm surface/feet together/eyes closed) P  Condition 3: (firm surface/feet in tandem/eyes open) P (rhomberg)  Condition 4: (firm surface/feet in tandem/eyes closed) NT  Condition 5: (soft surface/feet together/eyes open) P  Condition 6: (soft surface/feet together/eyes closed) P  Condition 7: (Fakuda step test), measure distance varied from center starting position NT    Functional Gait Assessment:   1. Gait on level surface =  1   (3) Normal: less than 5.5 sec, no A.D., no imbalance, normal gait pattern, deviates< 6in   (2) Mild impairment: 7-5.6 sec, uses A.D., mild gait deviations, or deviates 6-10 in   (1) Moderate impairment: > 7 sec, slow speed, imbalance, deviates 10-15 in.   (0) Severe impairment: needs assist, deviates >15 in, reach/touch wall  2. Change in Gait Speed = 2   (3) Normal: smooth change w/o loss of balance or gait deviation, deviates < 6 in, significant difference between speeds   (2) Mild impairment: changes speed, but demonstrates mild gait deviations, deviates 6-10 in, OR no deviations but unable to significantly speed, OR uses A.D.   (1) Moderate impairment: minor changes to speed, OR changes speed w/ significant deviations, deviates 10-15 in, OR  Changes speed , but loses balance & recovers   (0) Severe impairment: cannot change speed, deviates >15 in, or loses balance & needs assist  3. Gait with horizontal head turns  = 1   (3) Normal: no change in gait, deviates <6 in   (2) Mild impairment: slight change in speed, deviates 6-10 in, OR uses A.D.   (1) Moderate impairment: moderate change in speed, deviates 10-15 in   (0) Severe impairment: severe disruption of gait, deviates >15in  4. Gait with vertical head turns = 1   (3) Normal: no change in gait, deviates <6 in   (2) Mild impairment: slight change in speed, deviates 6-10 in OR uses A.D.   (1) Moderate impairment: moderate " change in speed, deviates 10-15 in   (0) Severe impairment: severe disruption of gait, deviates >15 in  5. Gait with pivot turns = 2   (3) Normal: performs safely in 3 sec, no LOB   (2) Mild impairment: performs in >3 sec & no LOB, OR turns safely & requires several steps to regain LOB   (1) Moderate impairment: turns slow, OR requires several small steps for balance following turn & stop   (0) Severe impairment: cannot turn safely, needs assist  6. Step over obstacle = 2   (3) Normal: steps over 2 stacked boxes w/o change in speed or LOB   (2) Mild impairment: able to step over 1 box w/o change in speed or LOB   (1) Moderate impairment: steps over 1 box but must slow down, may require VC   (0) Severe impairment: cannot perform w/o assist  7. Gait with Narrow MINGO = 3   (3) Normal: 10 steps no staggering   (2) Mild impairment: 7-9 steps   (1) Moderate impairment: 4-7 steps   (0) Severe impairment: < 4 steps or cannot perform w/o assist  8. Gait with eyes closed = 2   (3) Normal: < 7 sec, no A.D., no LOB, normal gait pattern, deviates <6 in   (2) Mild impairment: 7.1-9 sec, mild gait deviations, deviates 6-10 in   (1) Moderate impairment: > 9 sec, abnormal pattern, LOB, deviates 10-15 in   (0) Severe impairment: cannot perform w/o assist, LOB, deviates >15in  9. Ambulating Backwards = 2   (3) Normal: no A.D., no LOB, normal gait pattern, deviates <6in   (2) Mild impairment: uses A.D., slower speed, mild gait deviations, deviates 6-10 in   (1) Moderate impairment: slow speed, abnormal gait pattern, LOB, deviates 10-15 in   (0) Severe impairment: severe gait deviations or LOB, deviates >15in  10. Steps = 2   (3) Normal: alternating feet, no rail   (2) Mild Impairment: alternating feet, uses rail   (1) Moderate impairment: step-to, uses rail   (0) Severe impairment: cannot perform safely    Score 18/30     Score:   <22/30 fall risk   <20/30 fall risk in older adults   <18/30 fall risk in Parkinsons       CMS  Impairment/Limitation/Restriction for FOTO Status Survey    Therapist reviewed FOTO scores for Alison Branch on 2/26/2020.   FOTO documents entered into [email or web address] - see Media section.    Limitation Score: 50%  Category: Mobility    Current : CK = at least 40% but < 60% impaired, limited or restricted  Goal: CJ = at least 20% but < 40% impaired, limited or restricted           Home Exercises Provided and Patient Education Provided     Education provided:   - results of reassessment    Written Home Exercises Provided: yes.  Exercises were reviewed and Alison was able to demonstrate them prior to the end of the session.  Alison demonstrated good  understanding of the education provided.     See EMR under Patient Instructions for exercises provided 1/28/2020.    Assessment     Ms. Rosas tolerated reassessment well this morning though demonstrates varied progress during first month of skilled PT services. She was able to improve her time with TUG and improve her 30 sec chair rise score, though demonstrated a decline in SSWS, FWS, and FGA scores. Pt does report that she feels more steady on her feet at home, but spends a lot of time in bed when at home due to being really fatigued from chemo. It is of note that pt has varied participation within last month and is ongoing chemo therapy, a significantly variable product. Pt continues to demonstrate significant endurance deficits, as well and strength and balance deficits. She remains appropriate for skilled PT services and her goals have been updated.     Alison is progressing well towards her goals.   Pt prognosis is Good.     Pt will continue to benefit from skilled outpatient physical therapy to address the deficits listed in the problem list box on initial evaluation, provide pt/family education and to maximize pt's level of independence in the home and community environment.     Pt's spiritual, cultural and educational needs considered and pt agreeable to plan of care and goals.      Anticipated barriers to physical therapy: none at this time    Goals:  Short Term Goals: 4 weeks   1. Pt will improve score on TUG to at least 10 sec in order to decrease risk for fall. MET 2/26/20  2. Pt will improve SSWS to at least 1.15 m/s to demonstrate improved mobility without AD. ongoing  3. Pt will improve 30 second chair rise score to at least 7 without UE support. MET 2/26/2020  4. Pt will report <40% limitation using FOTO impairment tool. ongoing     Long Term Goals: 8 weeks   1. Pt will improve score on FGA to at least 27/30 in order to demonstrate improved balance. Ongoing  2. Pt will improve 30 second chair rise score to at least 10 without UE support. Ongoing, improving  3. Pt will improve SLS to at least 30 sec B in order to decrease risk for fall. ongoing  4. Pt will report no falls and ambulation without rollator for at least two weeks. ongoing    Plan   Plan of care Certification: 1/21/2020 to 3/21/2020.    Continue to work on endurance tasks, strengthening, and balance.     Matias Nunez, PT

## 2020-02-26 NOTE — TELEPHONE ENCOUNTER
----- Message from Kamini Kelly sent at 2/26/2020  4:03 PM CST -----  Contact: [facility] [phone] Fax [phone]  Patient needs diabetic shoes and inserts.  Requesting signed Rx, clilnicnotes, medical necessity form

## 2020-02-27 ENCOUNTER — OFFICE VISIT (OUTPATIENT)
Dept: ENDOCRINOLOGY | Facility: CLINIC | Age: 65
End: 2020-02-27
Payer: MEDICARE

## 2020-02-27 VITALS
HEIGHT: 69 IN | BODY MASS INDEX: 31.68 KG/M2 | WEIGHT: 213.88 LBS | RESPIRATION RATE: 16 BRPM | DIASTOLIC BLOOD PRESSURE: 74 MMHG | SYSTOLIC BLOOD PRESSURE: 112 MMHG

## 2020-02-27 DIAGNOSIS — E11.65 TYPE 2 DIABETES MELLITUS WITH HYPERGLYCEMIA, WITH LONG-TERM CURRENT USE OF INSULIN: Primary | ICD-10-CM

## 2020-02-27 DIAGNOSIS — E55.9 VITAMIN D DEFICIENCY: ICD-10-CM

## 2020-02-27 DIAGNOSIS — Z79.4 TYPE 2 DIABETES MELLITUS WITH HYPERGLYCEMIA, WITH LONG-TERM CURRENT USE OF INSULIN: Primary | ICD-10-CM

## 2020-02-27 DIAGNOSIS — I15.2 HYPERTENSION ASSOCIATED WITH DIABETES: ICD-10-CM

## 2020-02-27 DIAGNOSIS — E11.69 MIXED HYPERLIPIDEMIA DUE TO TYPE 2 DIABETES MELLITUS: Chronic | ICD-10-CM

## 2020-02-27 DIAGNOSIS — C34.12 MALIGNANT NEOPLASM OF UPPER LOBE OF LEFT LUNG: ICD-10-CM

## 2020-02-27 DIAGNOSIS — E11.59 HYPERTENSION ASSOCIATED WITH DIABETES: ICD-10-CM

## 2020-02-27 DIAGNOSIS — E78.2 MIXED HYPERLIPIDEMIA DUE TO TYPE 2 DIABETES MELLITUS: Chronic | ICD-10-CM

## 2020-02-27 DIAGNOSIS — T38.0X5A ADRENAL CORTICAL STEROIDS CAUSING ADVERSE EFFECT IN THERAPEUTIC USE: ICD-10-CM

## 2020-02-27 PROCEDURE — 3052F PR MOST RECENT HEMOGLOBIN A1C LEVEL 8.0 - < 9.0%: ICD-10-PCS | Mod: CPTII,S$GLB,, | Performed by: NURSE PRACTITIONER

## 2020-02-27 PROCEDURE — 3052F HG A1C>EQUAL 8.0%<EQUAL 9.0%: CPT | Mod: CPTII,S$GLB,, | Performed by: NURSE PRACTITIONER

## 2020-02-27 PROCEDURE — 99999 PR PBB SHADOW E&M-EST. PATIENT-LVL V: ICD-10-PCS | Mod: PBBFAC,,, | Performed by: NURSE PRACTITIONER

## 2020-02-27 PROCEDURE — 3008F PR BODY MASS INDEX (BMI) DOCUMENTED: ICD-10-PCS | Mod: CPTII,S$GLB,, | Performed by: NURSE PRACTITIONER

## 2020-02-27 PROCEDURE — 99499 UNLISTED E&M SERVICE: CPT | Mod: S$GLB,,, | Performed by: NURSE PRACTITIONER

## 2020-02-27 PROCEDURE — 3008F BODY MASS INDEX DOCD: CPT | Mod: CPTII,S$GLB,, | Performed by: NURSE PRACTITIONER

## 2020-02-27 PROCEDURE — 3074F PR MOST RECENT SYSTOLIC BLOOD PRESSURE < 130 MM HG: ICD-10-PCS | Mod: CPTII,S$GLB,, | Performed by: NURSE PRACTITIONER

## 2020-02-27 PROCEDURE — 3074F SYST BP LT 130 MM HG: CPT | Mod: CPTII,S$GLB,, | Performed by: NURSE PRACTITIONER

## 2020-02-27 PROCEDURE — 99999 PR PBB SHADOW E&M-EST. PATIENT-LVL V: CPT | Mod: PBBFAC,,, | Performed by: NURSE PRACTITIONER

## 2020-02-27 PROCEDURE — 99214 OFFICE O/P EST MOD 30 MIN: CPT | Mod: S$GLB,,, | Performed by: NURSE PRACTITIONER

## 2020-02-27 PROCEDURE — 99214 PR OFFICE/OUTPT VISIT, EST, LEVL IV, 30-39 MIN: ICD-10-PCS | Mod: S$GLB,,, | Performed by: NURSE PRACTITIONER

## 2020-02-27 PROCEDURE — 99499 RISK ADDL DX/OHS AUDIT: ICD-10-PCS | Mod: S$GLB,,, | Performed by: NURSE PRACTITIONER

## 2020-02-27 PROCEDURE — 3078F PR MOST RECENT DIASTOLIC BLOOD PRESSURE < 80 MM HG: ICD-10-PCS | Mod: CPTII,S$GLB,, | Performed by: NURSE PRACTITIONER

## 2020-02-27 PROCEDURE — 3078F DIAST BP <80 MM HG: CPT | Mod: CPTII,S$GLB,, | Performed by: NURSE PRACTITIONER

## 2020-02-27 RX ORDER — LANCETS
EACH MISCELLANEOUS
Qty: 200 EACH | Refills: 11 | Status: SHIPPED | OUTPATIENT
Start: 2020-02-27 | End: 2020-05-26

## 2020-02-27 RX ORDER — INSULIN PUMP SYRINGE, 3 ML
EACH MISCELLANEOUS
Qty: 1 EACH | Refills: 0 | Status: SHIPPED | OUTPATIENT
Start: 2020-02-27 | End: 2020-05-26

## 2020-02-27 NOTE — Clinical Note
She would like diabetic shoes. Apparently has gotten them before. I do not know what needs to be done if you can find out. Dr montaño has seen her if needs to sign off on them.

## 2020-02-27 NOTE — ASSESSMENT & PLAN NOTE
-- Upcoming appt in April with NP.  Will FU with Dr. Leos 4 months after that.  -- A1c goal <7.5%.  -- Medications discussed:  GLP1-DPP4   Insulin   -- Reviewed logs/CGM:  Patient reports she cannot find her glucometer as of last night. I sent in a new one.   Patient forgot logs. She will send them when she gets home.  Therefore, will continue current regimen for now.  During discussion it does seem she has some hypoglycemia midday if she does not eat, may want to decrease basal insulin.  -- Medication Changes:   CONTINUE:  Januvia 100mg daily   Levemir 26units (nonsteroid)  Levemir 33units (steroid)  Novolog 18-14-14 units  Steroid days /25   Non steroid days 150/50  -- Reviewed goals of therapy are to get the best control we can without hypoglycemia.  -- Reviewed patient's current insulin regimen. Clarified proper insulin dose and timing in relation to meals, etc. Insulin injection sites and proper rotation instructed.    -- Advised frequent self blood glucose monitoring.  Patient encouraged to document glucose results and bring them to every clinic visit.  -- Hypoglycemia precautions discussed. Instructed on precautions before driving.    -- Call for Bg repeatedly < 90 or > 180.   -- Close adherence to lifestyle changes recommended.   -- Periodic follow ups for eye evaluations, foot care and dental care suggested.

## 2020-03-02 ENCOUNTER — PATIENT MESSAGE (OUTPATIENT)
Dept: ENDOCRINOLOGY | Facility: CLINIC | Age: 65
End: 2020-03-02

## 2020-03-02 ENCOUNTER — PATIENT MESSAGE (OUTPATIENT)
Dept: DIABETES | Facility: CLINIC | Age: 65
End: 2020-03-02

## 2020-03-02 ENCOUNTER — TELEPHONE (OUTPATIENT)
Dept: HEMATOLOGY/ONCOLOGY | Facility: CLINIC | Age: 65
End: 2020-03-02

## 2020-03-02 NOTE — TELEPHONE ENCOUNTER
----- Message from Vonnie Snyder sent at 3/2/2020 12:03 PM CST -----  Contact: patient  Please call above patient at [phone] need to speak with the nurse about changing lab appointment waiting on a call back thanks.

## 2020-03-02 NOTE — TELEPHONE ENCOUNTER
spoke with pt on today in regards to new lab appointment, pt is aware of changes and has confirm.

## 2020-03-03 ENCOUNTER — DOCUMENTATION ONLY (OUTPATIENT)
Dept: REHABILITATION | Facility: HOSPITAL | Age: 65
End: 2020-03-03

## 2020-03-03 NOTE — PROGRESS NOTES
Physical Therapy: No show/Cancellation of Visit  Date: 03/03/2020    Patient was a no show to today's PT appointment. Patient's next scheduled appointment is 3/9/2020.     Cancel: 0  No show: 3    Therapist: Matias Nunez, PT

## 2020-03-04 DIAGNOSIS — E55.9 VITAMIN D DEFICIENCY: ICD-10-CM

## 2020-03-04 RX ORDER — ERGOCALCIFEROL 1.25 MG/1
CAPSULE ORAL
Qty: 12 CAPSULE | Refills: 3 | Status: SHIPPED | OUTPATIENT
Start: 2020-03-04 | End: 2020-05-22 | Stop reason: SDUPTHER

## 2020-03-06 ENCOUNTER — DOCUMENTATION ONLY (OUTPATIENT)
Dept: REHABILITATION | Facility: HOSPITAL | Age: 65
End: 2020-03-06

## 2020-03-06 ENCOUNTER — PATIENT MESSAGE (OUTPATIENT)
Dept: ENDOCRINOLOGY | Facility: CLINIC | Age: 65
End: 2020-03-06

## 2020-03-06 NOTE — PROGRESS NOTES
PT/PTA met face to face to discuss pt's treatment plan and progress towards established goals.  Continue with current PT POC with focus on endurance, strengthening, and balance.  Patient will be seen by physical therapist at least every sixth treatment or 30 days, whichever occurs first.    Raven Gaytan, PTA  03/06/2020

## 2020-03-07 DIAGNOSIS — I15.2 HYPERTENSION ASSOCIATED WITH DIABETES: ICD-10-CM

## 2020-03-07 DIAGNOSIS — E11.59 HYPERTENSION ASSOCIATED WITH DIABETES: ICD-10-CM

## 2020-03-09 ENCOUNTER — CLINICAL SUPPORT (OUTPATIENT)
Dept: REHABILITATION | Facility: HOSPITAL | Age: 65
End: 2020-03-09
Attending: PSYCHIATRY & NEUROLOGY
Payer: MEDICARE

## 2020-03-09 DIAGNOSIS — E11.65 UNCONTROLLED TYPE 2 DIABETES MELLITUS WITH HYPERGLYCEMIA, WITHOUT LONG-TERM CURRENT USE OF INSULIN: ICD-10-CM

## 2020-03-09 DIAGNOSIS — Z79.4 TYPE 2 DIABETES MELLITUS WITH HYPERGLYCEMIA, WITH LONG-TERM CURRENT USE OF INSULIN: Primary | ICD-10-CM

## 2020-03-09 DIAGNOSIS — E11.65 TYPE 2 DIABETES MELLITUS WITH HYPERGLYCEMIA, WITH LONG-TERM CURRENT USE OF INSULIN: Primary | ICD-10-CM

## 2020-03-09 DIAGNOSIS — Z74.09 IMPAIRED FUNCTIONAL MOBILITY, BALANCE, GAIT, AND ENDURANCE: Primary | ICD-10-CM

## 2020-03-09 PROCEDURE — 97110 THERAPEUTIC EXERCISES: CPT | Mod: PO

## 2020-03-09 RX ORDER — OLMESARTAN MEDOXOMIL 20 MG/1
TABLET ORAL
Qty: 90 TABLET | Refills: 3 | Status: SHIPPED | OUTPATIENT
Start: 2020-03-09 | End: 2020-04-02 | Stop reason: SDUPTHER

## 2020-03-09 RX ORDER — GLIPIZIDE 10 MG/1
TABLET, FILM COATED, EXTENDED RELEASE ORAL
Refills: 0 | OUTPATIENT
Start: 2020-03-09

## 2020-03-09 RX ORDER — SYRINGE,SAFETY WITH NEEDLE,1ML 25GX1"
SYRINGE (EA) MISCELLANEOUS
Qty: 60 EACH | Refills: 4 | Status: SHIPPED | OUTPATIENT
Start: 2020-03-09 | End: 2020-03-12

## 2020-03-09 NOTE — PROGRESS NOTES
Physical Therapy Daily Treatment Note     Name: Alison Branch  [facility] Number: [id number]    Therapy Diagnosis:   Encounter Diagnosis   Name Primary?   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 She noted some increased low back tightness following completion of her HEP.     She was compliant with home exercise program.  Response to previous treatment: first session since evaluation  Functional change: ongoing    Pain: 0/10  Location: n/a     Objective     Alison received therapeutic exercises to develop strength, endurance, flexibility, posture and core stabilization for 40 minutes including:  10 minutes SciFit Stepper, level 1.0  // bars:   2 x 10 reps standing hip abduction  2 x 10 reps standing hip flexion  2 x 10 reps standing hip extension  2 x 10 reps of heel raises  2 x 10 reps of toe raises    2 x 30 sec piriformis stretch  2 x 30 sec DKTC  2 x 30 sec lower trunk rotation    Alison participated in neuromuscular re-education activities to improve: Balance, Coordination, Kinesthetic, Sense, Proprioception and Posture for 0 minutes. The following activities were included:      Home Exercises Provided and Patient Education Provided     Education provided:   - results of reassessment    Written Home Exercises Provided:  yes.  Exercises were reviewed and Alison was able to demonstrate them prior to the end of the session.  Alison demonstrated good  understanding of the education provided.     See EMR under Patient Instructions for exercises provided 1/28/2020.    Assessment     Ms. Rosas tolerated session well today. PT updates pt's HEP after c/o some low back tightness when performing supine exercises. She was instructed to do exercises and finish with stretching to prevent this low back tightness. She tolerated these new exercises well and reports she will add them to her home program. Pt continues to demonstrate significant endurance deficits, as well and strength and balance deficits. She remains appropriate for skilled PT services for these reasons.     Alison is progressing well towards her goals.   Pt prognosis is Good.     Pt will continue to benefit from skilled outpatient physical therapy to address the deficits listed in the problem list box on initial evaluation, provide pt/family education and to maximize pt's level of independence in the home and community environment.     Pt's spiritual, cultural and educational needs considered and pt agreeable to plan of care and goals.     Anticipated barriers to physical therapy: none at this time    Goals:  Short Term Goals: 4 weeks   1. Pt will improve score on TUG to at least 10 sec in order to decrease risk for fall. MET 2/26/20  2. Pt will improve SSWS to at least 1.15 m/s to demonstrate improved mobility without AD. ongoing  3. Pt will improve 30 second chair rise score to at least 7 without UE support. MET 2/26/2020  4. Pt will report <40% limitation using FOTO impairment tool. ongoing     Long Term Goals: 8 weeks   1. Pt will improve score on FGA to at least 27/30 in order to demonstrate improved balance. Ongoing  2. Pt will improve 30 second chair rise score to at least 10 without UE support. Ongoing, improving  3. Pt will improve SLS to at least 30 sec B in order to decrease  risk for fall. ongoing  4. Pt will report no falls and ambulation without rollator for at least two weeks. ongoing    Plan   Plan of care Certification: 1/21/2020 to 3/21/2020.    Continue to work on endurance tasks, strengthening, and balance.     Matias Nunez, PT

## 2020-03-09 NOTE — PROGRESS NOTES
"Spoke with the patient over the phone.    During our visit 2/27/20 patient reported itching that she thought was related to her Novolog.  I inquired if she noticed this started when she started the insulin and she could not recall.  She happened to be switching to Humalog for insurance purposes.  She reports she experienced: swelling ankles and legs, light headaches, coughing, irritates at shot site and uncontrollable itching.  She clarified she has been experiencing this for some time now and has reported it to most of her doctors.  She told me she was started on a "fluid pill" due to this and swelling has decreased in the left leg.     It's hard for me to tell if these symptoms are related to the insulin or other comorbidities.  Regardless, she has stopped prandial insulin.  In the setting of steroid induced hyperglycemia she needs meal time insulin. Patient is willing to try regular insulin vial and syringe - she reports she was on this before.  Discussed decreasing dosage, timing of administration and duration of action.     Patient verbalized understanding,    Instructed to send glucose logs in 3 days.  Reach out to me sooner for any glucose <70 or consistently >200.    "

## 2020-03-10 ENCOUNTER — PATIENT MESSAGE (OUTPATIENT)
Dept: INTERNAL MEDICINE | Facility: CLINIC | Age: 65
End: 2020-03-10

## 2020-03-11 ENCOUNTER — DOCUMENTATION ONLY (OUTPATIENT)
Dept: REHABILITATION | Facility: HOSPITAL | Age: 65
End: 2020-03-11

## 2020-03-11 ENCOUNTER — LAB VISIT (OUTPATIENT)
Dept: LAB | Facility: HOSPITAL | Age: 65
End: 2020-03-11
Payer: MEDICARE

## 2020-03-11 ENCOUNTER — PATIENT MESSAGE (OUTPATIENT)
Dept: ENDOCRINOLOGY | Facility: CLINIC | Age: 65
End: 2020-03-11

## 2020-03-11 DIAGNOSIS — Z79.4 TYPE 2 DIABETES MELLITUS WITH DIABETIC POLYNEUROPATHY, WITH LONG-TERM CURRENT USE OF INSULIN: ICD-10-CM

## 2020-03-11 DIAGNOSIS — C34.12 MALIGNANT NEOPLASM OF UPPER LOBE OF LEFT LUNG: ICD-10-CM

## 2020-03-11 DIAGNOSIS — E11.42 TYPE 2 DIABETES MELLITUS WITH DIABETIC POLYNEUROPATHY, WITH LONG-TERM CURRENT USE OF INSULIN: ICD-10-CM

## 2020-03-11 LAB
ALBUMIN SERPL BCP-MCNC: 3.8 G/DL (ref 3.5–5.2)
ALP SERPL-CCNC: 62 U/L (ref 55–135)
ALT SERPL W/O P-5'-P-CCNC: 58 U/L (ref 10–44)
ANION GAP SERPL CALC-SCNC: 9 MMOL/L (ref 8–16)
AST SERPL-CCNC: 39 U/L (ref 10–40)
BILIRUB SERPL-MCNC: 0.4 MG/DL (ref 0.1–1)
BUN SERPL-MCNC: 13 MG/DL (ref 8–23)
CALCIUM SERPL-MCNC: 9.8 MG/DL (ref 8.7–10.5)
CHLORIDE SERPL-SCNC: 104 MMOL/L (ref 95–110)
CO2 SERPL-SCNC: 27 MMOL/L (ref 23–29)
CREAT SERPL-MCNC: 1 MG/DL (ref 0.5–1.4)
ERYTHROCYTE [DISTWIDTH] IN BLOOD BY AUTOMATED COUNT: 16.5 % (ref 11.5–14.5)
EST. GFR  (AFRICAN AMERICAN): >60 ML/MIN/1.73 M^2
EST. GFR  (NON AFRICAN AMERICAN): >60 ML/MIN/1.73 M^2
GLUCOSE SERPL-MCNC: 249 MG/DL (ref 70–110)
HCT VFR BLD AUTO: 41.1 % (ref 37–48.5)
HGB BLD-MCNC: 12 G/DL (ref 12–16)
IMM GRANULOCYTES # BLD AUTO: 0.04 K/UL (ref 0–0.04)
MCH RBC QN AUTO: 27.7 PG (ref 27–31)
MCHC RBC AUTO-ENTMCNC: 29.2 G/DL (ref 32–36)
MCV RBC AUTO: 95 FL (ref 82–98)
NEUTROPHILS # BLD AUTO: 2.9 K/UL (ref 1.8–7.7)
PLATELET # BLD AUTO: 205 K/UL (ref 150–350)
PMV BLD AUTO: 12.5 FL (ref 9.2–12.9)
POTASSIUM SERPL-SCNC: 4.8 MMOL/L (ref 3.5–5.1)
PROT SERPL-MCNC: 7.2 G/DL (ref 6–8.4)
RBC # BLD AUTO: 4.33 M/UL (ref 4–5.4)
SODIUM SERPL-SCNC: 140 MMOL/L (ref 136–145)
T4 FREE SERPL-MCNC: 0.98 NG/DL (ref 0.71–1.51)
TSH SERPL DL<=0.005 MIU/L-ACNC: 0.59 UIU/ML (ref 0.4–4)
WBC # BLD AUTO: 5.18 K/UL (ref 3.9–12.7)

## 2020-03-11 PROCEDURE — 84443 ASSAY THYROID STIM HORMONE: CPT

## 2020-03-11 PROCEDURE — 84439 ASSAY OF FREE THYROXINE: CPT

## 2020-03-11 PROCEDURE — 85027 COMPLETE CBC AUTOMATED: CPT

## 2020-03-11 PROCEDURE — 80053 COMPREHEN METABOLIC PANEL: CPT

## 2020-03-11 PROCEDURE — 36415 COLL VENOUS BLD VENIPUNCTURE: CPT

## 2020-03-11 NOTE — PROGRESS NOTES
Physical Therapy: No show/Cancellation of Visit  Date: 03/11/2020    Patient was a no show to today's PT appointment. Patient's next scheduled appointment is 3/26/20.     Cancel: 0  No show: 4    Therapist: Matias Nunez, PT

## 2020-03-11 NOTE — PROGRESS NOTES
Face to face meeting completed with Matias Nunez  PT regarding current status and progress of   Alison Branch .  Pramod Cordova PTA    Face to face conference held on 3/9/2020.  Matias Nunez, PT

## 2020-03-12 ENCOUNTER — INFUSION (OUTPATIENT)
Dept: INFUSION THERAPY | Facility: HOSPITAL | Age: 65
End: 2020-03-12
Attending: INTERNAL MEDICINE
Payer: MEDICARE

## 2020-03-12 ENCOUNTER — OFFICE VISIT (OUTPATIENT)
Dept: ENDOCRINOLOGY | Facility: CLINIC | Age: 65
End: 2020-03-12
Payer: MEDICARE

## 2020-03-12 ENCOUNTER — OFFICE VISIT (OUTPATIENT)
Dept: HEMATOLOGY/ONCOLOGY | Facility: CLINIC | Age: 65
End: 2020-03-12
Payer: MEDICARE

## 2020-03-12 ENCOUNTER — TELEPHONE (OUTPATIENT)
Dept: HEMATOLOGY/ONCOLOGY | Facility: CLINIC | Age: 65
End: 2020-03-12

## 2020-03-12 VITALS
WEIGHT: 217.63 LBS | SYSTOLIC BLOOD PRESSURE: 141 MMHG | TEMPERATURE: 98 F | BODY MASS INDEX: 32.24 KG/M2 | DIASTOLIC BLOOD PRESSURE: 65 MMHG | OXYGEN SATURATION: 97 % | HEIGHT: 69 IN | RESPIRATION RATE: 17 BRPM | HEART RATE: 77 BPM

## 2020-03-12 VITALS
DIASTOLIC BLOOD PRESSURE: 60 MMHG | HEART RATE: 94 BPM | TEMPERATURE: 99 F | WEIGHT: 218.25 LBS | SYSTOLIC BLOOD PRESSURE: 124 MMHG | BODY MASS INDEX: 32.33 KG/M2 | HEIGHT: 69 IN

## 2020-03-12 VITALS — RESPIRATION RATE: 18 BRPM | HEART RATE: 84 BPM | DIASTOLIC BLOOD PRESSURE: 65 MMHG | SYSTOLIC BLOOD PRESSURE: 142 MMHG

## 2020-03-12 DIAGNOSIS — C34.12 MALIGNANT NEOPLASM OF UPPER LOBE OF LEFT LUNG: ICD-10-CM

## 2020-03-12 DIAGNOSIS — E78.2 MIXED HYPERLIPIDEMIA DUE TO TYPE 2 DIABETES MELLITUS: Chronic | ICD-10-CM

## 2020-03-12 DIAGNOSIS — C77.1 SECONDARY MALIGNANT NEOPLASM OF MEDIASTINAL LYMPH NODE: ICD-10-CM

## 2020-03-12 DIAGNOSIS — R50.9 FEVER OF UNKNOWN ORIGIN (FUO): Primary | ICD-10-CM

## 2020-03-12 DIAGNOSIS — E11.59 HYPERTENSION ASSOCIATED WITH DIABETES: ICD-10-CM

## 2020-03-12 DIAGNOSIS — I15.2 HYPERTENSION ASSOCIATED WITH DIABETES: ICD-10-CM

## 2020-03-12 DIAGNOSIS — G62.0 CHEMOTHERAPY-INDUCED PERIPHERAL NEUROPATHY: ICD-10-CM

## 2020-03-12 DIAGNOSIS — E55.9 VITAMIN D DEFICIENCY: ICD-10-CM

## 2020-03-12 DIAGNOSIS — E11.65 TYPE 2 DIABETES MELLITUS WITH HYPERGLYCEMIA, WITH LONG-TERM CURRENT USE OF INSULIN: Primary | ICD-10-CM

## 2020-03-12 DIAGNOSIS — Z79.4 TYPE 2 DIABETES MELLITUS WITH HYPERGLYCEMIA, WITH LONG-TERM CURRENT USE OF INSULIN: Primary | ICD-10-CM

## 2020-03-12 DIAGNOSIS — C34.12 MALIGNANT NEOPLASM OF UPPER LOBE OF LEFT LUNG: Primary | ICD-10-CM

## 2020-03-12 DIAGNOSIS — Z79.4 TYPE 2 DIABETES MELLITUS WITH HYPERGLYCEMIA, WITH LONG-TERM CURRENT USE OF INSULIN: ICD-10-CM

## 2020-03-12 DIAGNOSIS — E11.65 TYPE 2 DIABETES MELLITUS WITH HYPERGLYCEMIA, WITH LONG-TERM CURRENT USE OF INSULIN: ICD-10-CM

## 2020-03-12 DIAGNOSIS — T45.1X5A CHEMOTHERAPY-INDUCED PERIPHERAL NEUROPATHY: ICD-10-CM

## 2020-03-12 DIAGNOSIS — E11.69 MIXED HYPERLIPIDEMIA DUE TO TYPE 2 DIABETES MELLITUS: Chronic | ICD-10-CM

## 2020-03-12 DIAGNOSIS — R60.0 EDEMA, PERIPHERAL: ICD-10-CM

## 2020-03-12 DIAGNOSIS — F40.240 CLAUSTROPHOBIA: ICD-10-CM

## 2020-03-12 PROCEDURE — 99999 PR PBB SHADOW E&M-EST. PATIENT-LVL V: CPT | Mod: PBBFAC,,, | Performed by: INTERNAL MEDICINE

## 2020-03-12 PROCEDURE — 3078F PR MOST RECENT DIASTOLIC BLOOD PRESSURE < 80 MM HG: ICD-10-PCS | Mod: CPTII,S$GLB,, | Performed by: NURSE PRACTITIONER

## 2020-03-12 PROCEDURE — 99499 UNLISTED E&M SERVICE: CPT | Mod: S$GLB,,, | Performed by: INTERNAL MEDICINE

## 2020-03-12 PROCEDURE — 3052F HG A1C>EQUAL 8.0%<EQUAL 9.0%: CPT | Mod: CPTII,S$GLB,, | Performed by: INTERNAL MEDICINE

## 2020-03-12 PROCEDURE — 63600175 PHARM REV CODE 636 W HCPCS: Performed by: INTERNAL MEDICINE

## 2020-03-12 PROCEDURE — 3078F DIAST BP <80 MM HG: CPT | Mod: CPTII,S$GLB,, | Performed by: INTERNAL MEDICINE

## 2020-03-12 PROCEDURE — 96372 THER/PROPH/DIAG INJ SC/IM: CPT | Mod: 59

## 2020-03-12 PROCEDURE — 3074F SYST BP LT 130 MM HG: CPT | Mod: CPTII,S$GLB,, | Performed by: NURSE PRACTITIONER

## 2020-03-12 PROCEDURE — 99999 PR PBB SHADOW E&M-EST. PATIENT-LVL V: CPT | Mod: PBBFAC,,, | Performed by: NURSE PRACTITIONER

## 2020-03-12 PROCEDURE — 96411 CHEMO IV PUSH ADDL DRUG: CPT

## 2020-03-12 PROCEDURE — 99499 RISK ADDL DX/OHS AUDIT: ICD-10-PCS | Mod: S$GLB,,, | Performed by: INTERNAL MEDICINE

## 2020-03-12 PROCEDURE — 99214 PR OFFICE/OUTPT VISIT, EST, LEVL IV, 30-39 MIN: ICD-10-PCS | Mod: S$GLB,,, | Performed by: NURSE PRACTITIONER

## 2020-03-12 PROCEDURE — 3077F PR MOST RECENT SYSTOLIC BLOOD PRESSURE >= 140 MM HG: ICD-10-PCS | Mod: CPTII,S$GLB,, | Performed by: INTERNAL MEDICINE

## 2020-03-12 PROCEDURE — 99214 OFFICE O/P EST MOD 30 MIN: CPT | Mod: S$GLB,,, | Performed by: NURSE PRACTITIONER

## 2020-03-12 PROCEDURE — 3052F PR MOST RECENT HEMOGLOBIN A1C LEVEL 8.0 - < 9.0%: ICD-10-PCS | Mod: CPTII,S$GLB,, | Performed by: INTERNAL MEDICINE

## 2020-03-12 PROCEDURE — 99999 PR PBB SHADOW E&M-EST. PATIENT-LVL V: ICD-10-PCS | Mod: PBBFAC,,, | Performed by: NURSE PRACTITIONER

## 2020-03-12 PROCEDURE — 3008F BODY MASS INDEX DOCD: CPT | Mod: CPTII,S$GLB,, | Performed by: INTERNAL MEDICINE

## 2020-03-12 PROCEDURE — 3074F PR MOST RECENT SYSTOLIC BLOOD PRESSURE < 130 MM HG: ICD-10-PCS | Mod: CPTII,S$GLB,, | Performed by: NURSE PRACTITIONER

## 2020-03-12 PROCEDURE — 3008F PR BODY MASS INDEX (BMI) DOCUMENTED: ICD-10-PCS | Mod: CPTII,S$GLB,, | Performed by: INTERNAL MEDICINE

## 2020-03-12 PROCEDURE — 96413 CHEMO IV INFUSION 1 HR: CPT

## 2020-03-12 PROCEDURE — 99499 UNLISTED E&M SERVICE: CPT | Mod: S$GLB,,, | Performed by: NURSE PRACTITIONER

## 2020-03-12 PROCEDURE — 3052F HG A1C>EQUAL 8.0%<EQUAL 9.0%: CPT | Mod: CPTII,S$GLB,, | Performed by: NURSE PRACTITIONER

## 2020-03-12 PROCEDURE — 96367 TX/PROPH/DG ADDL SEQ IV INF: CPT

## 2020-03-12 PROCEDURE — 3052F PR MOST RECENT HEMOGLOBIN A1C LEVEL 8.0 - < 9.0%: ICD-10-PCS | Mod: CPTII,S$GLB,, | Performed by: NURSE PRACTITIONER

## 2020-03-12 PROCEDURE — 99499 RISK ADDL DX/OHS AUDIT: ICD-10-PCS | Mod: S$GLB,,, | Performed by: NURSE PRACTITIONER

## 2020-03-12 PROCEDURE — 3008F BODY MASS INDEX DOCD: CPT | Mod: CPTII,S$GLB,, | Performed by: NURSE PRACTITIONER

## 2020-03-12 PROCEDURE — 3078F PR MOST RECENT DIASTOLIC BLOOD PRESSURE < 80 MM HG: ICD-10-PCS | Mod: CPTII,S$GLB,, | Performed by: INTERNAL MEDICINE

## 2020-03-12 PROCEDURE — 99999 PR PBB SHADOW E&M-EST. PATIENT-LVL V: ICD-10-PCS | Mod: PBBFAC,,, | Performed by: INTERNAL MEDICINE

## 2020-03-12 PROCEDURE — 3077F SYST BP >= 140 MM HG: CPT | Mod: CPTII,S$GLB,, | Performed by: INTERNAL MEDICINE

## 2020-03-12 PROCEDURE — 99215 PR OFFICE/OUTPT VISIT, EST, LEVL V, 40-54 MIN: ICD-10-PCS | Mod: S$GLB,,, | Performed by: INTERNAL MEDICINE

## 2020-03-12 PROCEDURE — 3008F PR BODY MASS INDEX (BMI) DOCUMENTED: ICD-10-PCS | Mod: CPTII,S$GLB,, | Performed by: NURSE PRACTITIONER

## 2020-03-12 PROCEDURE — 3078F DIAST BP <80 MM HG: CPT | Mod: CPTII,S$GLB,, | Performed by: NURSE PRACTITIONER

## 2020-03-12 PROCEDURE — 99215 OFFICE O/P EST HI 40 MIN: CPT | Mod: S$GLB,,, | Performed by: INTERNAL MEDICINE

## 2020-03-12 RX ORDER — CYANOCOBALAMIN 1000 UG/ML
1000 INJECTION, SOLUTION INTRAMUSCULAR; SUBCUTANEOUS ONCE
Status: CANCELLED | OUTPATIENT
Start: 2020-03-12

## 2020-03-12 RX ORDER — HEPARIN 100 UNIT/ML
500 SYRINGE INTRAVENOUS
Status: DISCONTINUED | OUTPATIENT
Start: 2020-03-12 | End: 2020-03-12 | Stop reason: HOSPADM

## 2020-03-12 RX ORDER — INSULIN LISPRO 100 [IU]/ML
INJECTION, SOLUTION INTRAVENOUS; SUBCUTANEOUS
Qty: 30 ML | Refills: 6 | Status: SHIPPED | OUTPATIENT
Start: 2020-03-12 | End: 2020-04-22

## 2020-03-12 RX ORDER — HEPARIN 100 UNIT/ML
500 SYRINGE INTRAVENOUS
Status: CANCELLED | OUTPATIENT
Start: 2020-03-12

## 2020-03-12 RX ORDER — CYANOCOBALAMIN 1000 UG/ML
1000 INJECTION, SOLUTION INTRAMUSCULAR; SUBCUTANEOUS ONCE
Status: COMPLETED | OUTPATIENT
Start: 2020-03-12 | End: 2020-03-12

## 2020-03-12 RX ORDER — SODIUM CHLORIDE 0.9 % (FLUSH) 0.9 %
10 SYRINGE (ML) INJECTION
Status: DISCONTINUED | OUTPATIENT
Start: 2020-03-12 | End: 2020-03-12 | Stop reason: HOSPADM

## 2020-03-12 RX ORDER — DIAZEPAM 5 MG/1
TABLET ORAL
Qty: 1 TABLET | Refills: 0 | Status: SHIPPED | OUTPATIENT
Start: 2020-03-12 | End: 2020-05-26

## 2020-03-12 RX ORDER — SODIUM CHLORIDE 0.9 % (FLUSH) 0.9 %
10 SYRINGE (ML) INJECTION
Status: CANCELLED | OUTPATIENT
Start: 2020-03-12

## 2020-03-12 RX ADMIN — HEPARIN 500 UNITS: 100 SYRINGE at 12:03

## 2020-03-12 RX ADMIN — SODIUM CHLORIDE: 0.9 INJECTION, SOLUTION INTRAVENOUS at 11:03

## 2020-03-12 RX ADMIN — SODIUM CHLORIDE 1075 MG: 9 INJECTION, SOLUTION INTRAVENOUS at 12:03

## 2020-03-12 RX ADMIN — SODIUM CHLORIDE 200 MG: 9 INJECTION, SOLUTION INTRAVENOUS at 12:03

## 2020-03-12 RX ADMIN — CYANOCOBALAMIN 1000 MCG: 1000 INJECTION, SOLUTION INTRAMUSCULAR at 11:03

## 2020-03-12 RX ADMIN — GRANISETRON HYDROCHLORIDE 1 MG: 1 INJECTION, SOLUTION INTRAVENOUS at 11:03

## 2020-03-12 NOTE — PROGRESS NOTES
PT/PTA met face to face to discuss pt's treatment plan and progress towards established goals. Continue with current PT POC, including: endurance and balance. Pt will be seen by physical therapist at least every 6th treatment day or every 30 days, whichever occurs first.      Honorio Faust, PTA  3/09/20

## 2020-03-12 NOTE — TELEPHONE ENCOUNTER
----- Message from Obed Chan sent at 3/12/2020  8:21 AM CDT -----  Contact: Alison Guzman Morning,    Alison Wilson called advised she is enroute to appointment approx. 15 mins away from clinic in traffic.

## 2020-03-12 NOTE — ASSESSMENT & PLAN NOTE
-- Upcoming appt in April with NP.  Will FU with Dr. Leos 4 months after that.  -- A1c goal <7.5%.  -- Medications discussed:  GLP1-DPP4   Insulin   -- Reviewed logs/CGM:  Patient had stopped prandial insulin since last visit.  She has been having ongoing SE that she thought was related to insulin.  We had a long discussion about this likely not being the case. She would like to stop the Januvia.  I am okay with this while she works this out.  Started prandial insulin as of yesterday.  Instructed to send glucose logs in 4 days.  Reach out to me sooner for any glucose <70 or consistently >250.  -- Medication Changes:   CONTINUE:  Levemir 26units (nonsteroid)  Levemir 33units (steroid)  Humalog 20-16-16 units  Steroid days /25   Non steroid days 150/50  STOP:  Januvia   -- Reviewed goals of therapy are to get the best control we can without hypoglycemia.  -- Reviewed patient's current insulin regimen. Clarified proper insulin dose and timing in relation to meals, etc. Insulin injection sites and proper rotation instructed.    -- Advised frequent self blood glucose monitoring.  Patient encouraged to document glucose results and bring them to every clinic visit.  -- Hypoglycemia precautions discussed. Instructed on precautions before driving.    -- Call for Bg repeatedly < 90 or > 180.   -- Close adherence to lifestyle changes recommended.   -- Periodic follow ups for eye evaluations, foot care and dental care suggested.

## 2020-03-12 NOTE — PROGRESS NOTES
Subjective:       Patient ID: Alison Branch is a 64 y.o. female.    Chief Complaint: Malignant neoplasm of upper lobe of left lung; dry cough; Nasal Congestion; slight HA; and Leg Swelling  Ms. Branch is a 63-year-old female who smoked for about 30 years and quit 20 years ago, presented with cough end of last year, but worsened into January.  Since the cough persisted, she saw her PCP, underwent a chest x-ray on 05/10/2019, that revealed left upper lobe pneumonia and a repeat CT was done in the week after that on 05/17/2019 that showed left upper lobe   mass arising from the lateral pleural surface in the left upper lobe posterior subsegment measuring 2.6 x 3 cm.  There are satellite mass more medially near the aortic arch that is 2 cm, also spiculated and irregular as well as thickened interlobar septa in the left lung apex and anterior segment, prevascular lymph node lateral to the aortic arch, short axis measuring 9 mm.       She then underwent a bronchoscopy on 05/28/2019, and that revealed there was an infiltration obtained in the left apical posterior segment of the left upper lobe cytology brush was done.  Transbronchial biopsies of an area of infiltration were also performed in the apicoposterior segment of the left upper lobe.    Pathology revealed adenocarcinoma; however, the specimen was not adequate enough to send for molecular testing.       She underwent a PET scan on 06/06/2019.  that reveals significant hypermetabolic activity in the large irregular spiculated pulmonary mass in the lateral aspect of the left upper lobe consistent with the patient's known pulmonary adenocarcinoma.  There is also tracer avidity in the medial left upper lobe satellite lesion and diffusely throughout much of the anterior left upper lobe where there is prominent nodular paraseptal thickening.  There are some numerous scattered subcentimeter pulmonary nodules throughout the right lung, all of which are too small for  "detection by PET.  For example, there is a 0.4 cm nodule in the superior right lower lobe and a micro nodule in the posterior segment of the right upper lobe.  There is a 0.5 cm, normal size right   paratracheal lymph node with increased radiotracer uptake as well as hypermetabolic aortic pulmonary lymph node and a left hilar lymph node.  There is a focal hypermetabolic lesion in the left anterior superior iliac spine associated with well defined lytic lesion.  There is a hypermetabolic focus in the anterior right fifth rib with a possible associated lytic lesion.  SUVs as   below lateral:  Left upper lobe  SUV max 17.9, anterior left upper lobe satellite mass and septal thickening SUV max of 10.1, right paratracheal lymph node SUV max of 4.8, left AP window lymph node SUV max of 17.9, left anterior superior iliac spine SUV max of 3.9"     MRI pelvis from 6/10/19  reveals "Region of osseous is irregularity at the left anterior iliac spine likely related to bone graft harvest procedure.  See  discussion above.  No suspicious signal or enhancement to suggest active/malignant process"   MRI brain from 6/10//19 reveal No intracranial abnormality.     We discussed her case at [facility], and plan was to wait for GAURDANT and proceed with treatment accordingly  Her GAURDANT is negative for molecular markers.     She has completed 4 cycles of Carboplatin, Alimta and Keytruda as of 9/5/19. She is now on  ALimta and Keytruda maintenance.         HPI She comes in for maintenance Alimta and Keytruda. She notes that she feels well and denies any new issues. She has chronic leg swelling which she thinks is related to Januvia and is seeing Endocrine today.,    Review of Systems   Constitutional: Negative for appetite change, fatigue and unexpected weight change.   HENT: Negative for mouth sores.    Eyes: Negative for visual disturbance.   Respiratory: Negative for cough and shortness of breath.    Cardiovascular: Negative for " chest pain.   Gastrointestinal: Negative for abdominal pain and diarrhea.   Genitourinary: Negative for frequency.   Musculoskeletal: Negative for back pain.   Skin: Negative for rash.   Neurological: Positive for headaches.   Hematological: Negative for adenopathy.   Psychiatric/Behavioral: The patient is not nervous/anxious.    All other systems reviewed and are negative.      Objective:      Physical Exam   Constitutional: She is oriented to person, place, and time. She appears well-developed and well-nourished.   HENT:   Mouth/Throat: No oropharyngeal exudate.   Cardiovascular: Normal rate and normal heart sounds.   Pulmonary/Chest: Effort normal and breath sounds normal. She has no wheezes.   Abdominal: Soft. Bowel sounds are normal. There is no tenderness.   Musculoskeletal: She exhibits edema. She exhibits no tenderness.   Lymphadenopathy:     She has no cervical adenopathy.   Neurological: She is alert and oriented to person, place, and time. Coordination normal.   Skin: Skin is warm and dry. No rash noted.   Psychiatric: She has a normal mood and affect. Judgment and thought content normal.   Vitals reviewed.        LABS:  WBC   Date Value Ref Range Status   03/11/2020 5.18 3.90 - 12.70 K/uL Final     Hemoglobin   Date Value Ref Range Status   03/11/2020 12.0 12.0 - 16.0 g/dL Final     Hematocrit   Date Value Ref Range Status   03/11/2020 41.1 37.0 - 48.5 % Final     Platelets   Date Value Ref Range Status   03/11/2020 205 150 - 350 K/uL Final     Gran # (ANC)   Date Value Ref Range Status   03/11/2020 2.9 1.8 - 7.7 K/uL Final     Comment:     The ANC is based on a white cell differential from an   automated cell counter. It has not been microscopically   reviewed for the presence of abnormal cells. Clinical   correlation is required.         Chemistry        Component Value Date/Time     03/11/2020 0915    K 4.8 03/11/2020 0915     03/11/2020 0915    CO2 27 03/11/2020 0915    BUN 13 03/11/2020  0915    CREATININE 1.0 03/11/2020 0915     (H) 03/11/2020 0915        Component Value Date/Time    CALCIUM 9.8 03/11/2020 0915    ALKPHOS 62 03/11/2020 0915    AST 39 03/11/2020 0915    ALT 58 (H) 03/11/2020 0915    BILITOT 0.4 03/11/2020 0915    ESTGFRAFRICA >60.0 03/11/2020 0915    EGFRNONAA >60.0 03/11/2020 0915        TSH   Date Value Ref Range Status   03/11/2020 0.593 0.400 - 4.000 uIU/mL Final     Free T4   Date Value Ref Range Status   03/11/2020 0.98 0.71 - 1.51 ng/dL Final       Assessment:       1. 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 Type 2 diabetes mellitus with hyperglycemia, with long-term current use of insulin    4. Claustrophobia        Plan:        1,2. She is doing well overall and will proceed with Alimta and Keytruda and will return in 3 weeks for next cycle with restaging CT scans and MRI brain  3. On meds, elevated today as she took dexamethasone premed.  4. Escribed Valium for prior to MRI brain     Above care plan was discussed with patient and accompanying daughter and all questions were addressed to their satisfaction

## 2020-03-12 NOTE — PROGRESS NOTES
Subjective:      Patient ID: Alison Branch is a 64 y.o. female.    Chief Complaint:  No chief complaint on file.    History of Present Illness  Alison Branch is here for follow up of T2DM.  Previously seen by me 2/27/20.    History of adennocarcinoma of the lung s/p chemotherapy currently on alimta and keytruda and dexamethasone which she is given with her chemotherapy every 21days.  Dexamethasone 6 mg twice a day the day before and one day after chemotherapy.   Has received Xgeva for bone mets as well. Last vitamin D level was 29.     Last chemo was on 3/12/20.     With regards to diabetes:    Diagnosed: ~1995  Known complications:  DKA -  RN -  PN -  Nephropathy -  CAD -    Current regimen:     In the interim patient stopped her Humalog due to thinking her SE were related to this.   As of yesterday, 3/11/20, she restarted the Humalog instead of starting regular insulin as discussed.     Januvia daily  Levemir 26units (nonsteroid)  Levemir 33units (steroid)  Humalog 20-16-16 units  Steroid days /25   Non steroid days 150/50    Starting increased dosage the day of her steroid and has been having to continue with the increased dosage for 4 days after.     Other medications tried:  None.     Glucose Monitor: Glucomter  4 times a day testing  Log reviewed: forgot log    Hypoglycemia:  None..   Knows how to correct with 15 grams of carbs- juice, coke, or a peppermint.     Diet/Exercise:  Eats 3 meals a day.   Snacks : Occasionally, fruit.  Drinks : water.   Exercise - tries to stay active      Education - last visit: 12/2019  Eye Exam: 10/2019  Podiatry: 2017     Diabetes Management Status    Hemoglobin A1C   Date Value Ref Range Status   02/17/2020 8.7 (H) 4.0 - 5.6 % Final     Comment:     ADA Screening Guidelines:  5.7-6.4%  Consistent with prediabetes  >or=6.5%  Consistent with diabetes  High levels of fetal hemoglobin interfere with the HbA1C  assay. Heterozygous hemoglobin variants (HbS, HgC, etc)do  not  significantly interfere with this assay.   However, presence of multiple variants may affect accuracy.     12/10/2019 9.1 (H) 4.0 - 5.6 % Final     Comment:     ADA Screening Guidelines:  5.7-6.4%  Consistent with prediabetes  >or=6.5%  Consistent with diabetes  High levels of fetal hemoglobin interfere with the HbA1C  assay. Heterozygous hemoglobin variants (HbS, HgC, etc)do  not significantly interfere with this assay.   However, presence of multiple variants may affect accuracy.     11/05/2019 9.3 (H) 4.0 - 5.6 % Final     Comment:     ADA Screening Guidelines:  5.7-6.4%  Consistent with prediabetes  >or=6.5%  Consistent with diabetes  High levels of fetal hemoglobin interfere with the HbA1C  assay. Heterozygous hemoglobin variants (HbS, HgC, etc)do  not significantly interfere with this assay.   However, presence of multiple variants may affect accuracy.       Statin: Taking  ACE/ARB: Taking  Screening or Prevention Patient's value Goal Complete/Controlled?   HgA1C Testing and Control   Lab Results   Component Value Date    HGBA1C 8.7 (H) 02/17/2020      Annually/Less than 8% No   Lipid profile : 05/10/2019 Annually Yes   LDL control Lab Results   Component Value Date    LDLCALC 97.2 05/10/2019    Annually/Less than 100 mg/dl  Yes   Nephropathy screening Lab Results   Component Value Date    LABMICR 14.0 02/01/2019     Lab Results   Component Value Date    PROTEINUA Negative 12/13/2019    Annually Yes   Blood pressure BP Readings from Last 1 Encounters:   03/12/20 124/60    Less than 140/90 Yes   Dilated retinal exam : 10/09/2019 Annually Yes   Foot exam   : 08/29/2019 Annually Yes     With regards to Vitamin D Deficiency:  Vit D, 25-Hydroxy   Date Value Ref Range Status   01/10/2020 34 30 - 96 ng/mL Final     Comment:     Vitamin D deficiency.........<10 ng/mL                              Vitamin D insufficiency......10-29 ng/mL       Vitamin D sufficiency........> or equal to 30 ng/mL  Vitamin D  "toxicity............>100 ng/mL       Current Meds: Ergo 50,000 weekly.    Review of Systems   Constitutional: Positive for fatigue.   Eyes: Negative for visual disturbance.   Respiratory: Negative for shortness of breath.    Cardiovascular: Negative for chest pain.   Gastrointestinal: Negative for abdominal pain.   Musculoskeletal: Positive for joint swelling. Negative for arthralgias.   Skin: Negative for wound.   Neurological: Positive for headaches.   Hematological: Bruises/bleeds easily.   Psychiatric/Behavioral: Negative for sleep disturbance.     + numbness and tingling in both feet.     Objective:   Physical Exam   Cardiovascular: Normal rate.   No edema present   Pulmonary/Chest: Effort normal.   Abdominal: Soft.   Vitals reviewed.    Appropriate footwear, foot exam deferred, done 8/2019.  Injection sites are without edema or erythema. No lipo hypertropthy or atrophy.    Visit Vitals  /60   Pulse 94   Temp 98.8 °F (37.1 °C)   Ht 5' 9" (1.753 m)   Wt 99 kg (218 lb 4.1 oz)   BMI 32.23 kg/m²     Body mass index is 32.23 kg/m².    Lab Review:   Lab Results   Component Value Date    HGBA1C 8.7 (H) 02/17/2020    HGBA1C 9.1 (H) 12/10/2019    HGBA1C 9.3 (H) 11/05/2019       Lab Results   Component Value Date    CHOL 163 05/10/2019    HDL 53 05/10/2019    LDLCALC 97.2 05/10/2019    TRIG 64 05/10/2019    CHOLHDL 32.5 05/10/2019     Lab Results   Component Value Date     03/11/2020    K 4.8 03/11/2020     03/11/2020    CO2 27 03/11/2020     (H) 03/11/2020    BUN 13 03/11/2020    CREATININE 1.0 03/11/2020    CALCIUM 9.8 03/11/2020    PROT 7.2 03/11/2020    ALBUMIN 3.8 03/11/2020    BILITOT 0.4 03/11/2020    ALKPHOS 62 03/11/2020    AST 39 03/11/2020    ALT 58 (H) 03/11/2020    ANIONGAP 9 03/11/2020    ESTGFRAFRICA >60.0 03/11/2020    EGFRNONAA >60.0 03/11/2020    TSH 0.593 03/11/2020     Vit D, 25-Hydroxy   Date Value Ref Range Status   01/10/2020 34 30 - 96 ng/mL Final     Comment:     Vitamin " D deficiency.........<10 ng/mL                              Vitamin D insufficiency......10-29 ng/mL       Vitamin D sufficiency........> or equal to 30 ng/mL  Vitamin D toxicity............>100 ng/mL       Assessment and Plan     1. Type 2 diabetes mellitus with hyperglycemia, with long-term current use of insulin  Ambulatory referral/consult to Allergy   2. Vitamin D deficiency     3. Malignant neoplasm of upper lobe of left lung     4. Hypertension associated with diabetes     5. Mixed hyperlipidemia due to type 2 diabetes mellitus       Type 2 diabetes mellitus with hyperglycemia, with long-term current use of insulin  -- Upcoming appt in April with NP.  Will FU with Dr. Leos 4 months after that.  -- A1c goal <7.5%.  -- Medications discussed:  GLP1-DPP4   Insulin   -- Reviewed logs/CGM:  Patient had stopped prandial insulin since last visit.  She has been having ongoing SE that she thought was related to insulin.  We had a long discussion about this likely not being the case. She would like to stop the Januvia.  I am okay with this while she works this out.  Started prandial insulin as of yesterday.  Instructed to send glucose logs in 4 days.  Reach out to me sooner for any glucose <70 or consistently >250.  -- Medication Changes:   CONTINUE:  Levemir 26units (nonsteroid)  Levemir 33units (steroid)  Humalog 20-16-16 units  Steroid days /25   Non steroid days 150/50  STOP:  Januvia   -- Reviewed goals of therapy are to get the best control we can without hypoglycemia.  -- Reviewed patient's current insulin regimen. Clarified proper insulin dose and timing in relation to meals, etc. Insulin injection sites and proper rotation instructed.    -- Advised frequent self blood glucose monitoring.  Patient encouraged to document glucose results and bring them to every clinic visit.  -- Hypoglycemia precautions discussed. Instructed on precautions before driving.    -- Call for Bg repeatedly < 90 or > 180.   --  Close adherence to lifestyle changes recommended.   -- Periodic follow ups for eye evaluations, foot care and dental care suggested.    Vitamin D deficiency  -- Stable.    Malignant neoplasm of upper lobe of left lung  -- Continue following with hem/onc.    Hypertension associated with diabetes  -- On ARB.  -- Controlled.  -- Blood pressure goals discussed with patient.    Mixed hyperlipidemia due to type 2 diabetes mellitus  -- Controlled.  -- On statin per ADA recommendations.    No follow-ups on file.    Referral to allergist.

## 2020-03-14 ENCOUNTER — PATIENT MESSAGE (OUTPATIENT)
Dept: ENDOCRINOLOGY | Facility: CLINIC | Age: 65
End: 2020-03-14

## 2020-03-16 ENCOUNTER — TELEPHONE (OUTPATIENT)
Dept: REHABILITATION | Facility: HOSPITAL | Age: 65
End: 2020-03-16

## 2020-03-17 DIAGNOSIS — C34.12 MALIGNANT NEOPLASM OF UPPER LOBE OF LEFT LUNG: ICD-10-CM

## 2020-03-17 DIAGNOSIS — R30.0 DYSURIA: ICD-10-CM

## 2020-03-17 RX ORDER — PHENAZOPYRIDINE HYDROCHLORIDE 200 MG/1
200 TABLET, FILM COATED ORAL 3 TIMES DAILY PRN
Qty: 20 TABLET | Refills: 0 | Status: SHIPPED | OUTPATIENT
Start: 2020-03-17 | End: 2020-03-27 | Stop reason: SDUPTHER

## 2020-03-17 RX ORDER — DEXAMETHASONE 6 MG/1
TABLET ORAL
Qty: 24 TABLET | Refills: 4 | Status: SHIPPED | OUTPATIENT
Start: 2020-03-17 | End: 2020-03-27 | Stop reason: SDUPTHER

## 2020-03-17 RX ORDER — ONDANSETRON HYDROCHLORIDE 8 MG/1
8 TABLET, FILM COATED ORAL 4 TIMES DAILY PRN
Qty: 60 TABLET | Refills: 2 | Status: SHIPPED | OUTPATIENT
Start: 2020-03-17 | End: 2020-06-16 | Stop reason: SDUPTHER

## 2020-03-18 ENCOUNTER — PATIENT MESSAGE (OUTPATIENT)
Dept: ENDOCRINOLOGY | Facility: CLINIC | Age: 65
End: 2020-03-18

## 2020-03-18 ENCOUNTER — TELEPHONE (OUTPATIENT)
Dept: REHABILITATION | Facility: HOSPITAL | Age: 65
End: 2020-03-18

## 2020-03-18 NOTE — TELEPHONE ENCOUNTER
PT spoke to Alison about her high risk for COVID-19 due to being a current chemotherapy patient and her impending therapy appointments. She expresses that she understands that she is high risk and is staying home at this time. Pt agrees to cancelling apts for this week and next week and would like PT to check in with her the week of March 30th about her appointments on that day. PT expressed to patient that there is no penalty for cancellation and we are happy to review/update HEP over the phone if desired. She reports that her HEP continues to be appropriate at this time. We will accommodate adding appointments to end of POC after COVID-19 risks are lowered in the community.     Clinic return phone number [phone]

## 2020-03-20 DIAGNOSIS — R41.3 MEMORY DIFFICULTY: ICD-10-CM

## 2020-03-20 DIAGNOSIS — G62.0 CHEMOTHERAPY-INDUCED NEUROPATHY: ICD-10-CM

## 2020-03-20 DIAGNOSIS — T45.1X5A CHEMOTHERAPY-INDUCED NEUROPATHY: ICD-10-CM

## 2020-03-20 DIAGNOSIS — R26.89 BALANCE DISORDER: ICD-10-CM

## 2020-03-20 RX ORDER — GABAPENTIN 300 MG/1
300 CAPSULE ORAL NIGHTLY PRN
Qty: 90 CAPSULE | Refills: 3 | Status: SHIPPED | OUTPATIENT
Start: 2020-03-20 | End: 2021-05-13

## 2020-03-23 ENCOUNTER — PATIENT MESSAGE (OUTPATIENT)
Dept: ENDOCRINOLOGY | Facility: CLINIC | Age: 65
End: 2020-03-23

## 2020-03-23 ENCOUNTER — TELEPHONE (OUTPATIENT)
Dept: INFUSION THERAPY | Facility: HOSPITAL | Age: 65
End: 2020-03-23

## 2020-03-23 ENCOUNTER — PATIENT MESSAGE (OUTPATIENT)
Dept: DIABETES | Facility: CLINIC | Age: 65
End: 2020-03-23

## 2020-03-23 DIAGNOSIS — E11.65 UNCONTROLLED TYPE 2 DIABETES MELLITUS WITH HYPERGLYCEMIA, WITHOUT LONG-TERM CURRENT USE OF INSULIN: ICD-10-CM

## 2020-03-24 ENCOUNTER — PATIENT MESSAGE (OUTPATIENT)
Dept: ENDOCRINOLOGY | Facility: CLINIC | Age: 65
End: 2020-03-24

## 2020-03-25 ENCOUNTER — PATIENT MESSAGE (OUTPATIENT)
Dept: ENDOCRINOLOGY | Facility: CLINIC | Age: 65
End: 2020-03-25

## 2020-03-25 ENCOUNTER — PATIENT MESSAGE (OUTPATIENT)
Dept: HEMATOLOGY/ONCOLOGY | Facility: CLINIC | Age: 65
End: 2020-03-25

## 2020-03-26 ENCOUNTER — OFFICE VISIT (OUTPATIENT)
Dept: ALLERGY | Facility: CLINIC | Age: 65
End: 2020-03-26
Payer: MEDICARE

## 2020-03-26 ENCOUNTER — LAB VISIT (OUTPATIENT)
Dept: LAB | Facility: HOSPITAL | Age: 65
End: 2020-03-26
Attending: STUDENT IN AN ORGANIZED HEALTH CARE EDUCATION/TRAINING PROGRAM
Payer: MEDICARE

## 2020-03-26 ENCOUNTER — TELEPHONE (OUTPATIENT)
Dept: ENDOCRINOLOGY | Facility: HOSPITAL | Age: 65
End: 2020-03-26

## 2020-03-26 VITALS — HEIGHT: 69 IN | RESPIRATION RATE: 16 BRPM | BODY MASS INDEX: 32.09 KG/M2 | WEIGHT: 216.69 LBS

## 2020-03-26 DIAGNOSIS — E11.59 HYPERTENSION ASSOCIATED WITH DIABETES: ICD-10-CM

## 2020-03-26 DIAGNOSIS — J31.0 CHRONIC RHINITIS: Primary | ICD-10-CM

## 2020-03-26 DIAGNOSIS — E11.65 UNCONTROLLED TYPE 2 DIABETES MELLITUS WITH HYPERGLYCEMIA, WITHOUT LONG-TERM CURRENT USE OF INSULIN: ICD-10-CM

## 2020-03-26 DIAGNOSIS — C34.12 MALIGNANT NEOPLASM OF UPPER LOBE OF LEFT LUNG: ICD-10-CM

## 2020-03-26 DIAGNOSIS — Z79.4 TYPE 2 DIABETES MELLITUS WITH HYPERGLYCEMIA, WITH LONG-TERM CURRENT USE OF INSULIN: ICD-10-CM

## 2020-03-26 DIAGNOSIS — J31.0 CHRONIC RHINITIS: ICD-10-CM

## 2020-03-26 DIAGNOSIS — E11.65 TYPE 2 DIABETES MELLITUS WITH HYPERGLYCEMIA, WITH LONG-TERM CURRENT USE OF INSULIN: ICD-10-CM

## 2020-03-26 DIAGNOSIS — I15.2 HYPERTENSION ASSOCIATED WITH DIABETES: ICD-10-CM

## 2020-03-26 LAB — IGE SERPL-ACNC: <35 IU/ML (ref 0–100)

## 2020-03-26 PROCEDURE — 86003 ALLG SPEC IGE CRUDE XTRC EA: CPT

## 2020-03-26 PROCEDURE — 99204 OFFICE O/P NEW MOD 45 MIN: CPT | Mod: S$GLB,,, | Performed by: STUDENT IN AN ORGANIZED HEALTH CARE EDUCATION/TRAINING PROGRAM

## 2020-03-26 PROCEDURE — 99999 PR PBB SHADOW E&M-EST. PATIENT-LVL III: ICD-10-PCS | Mod: PBBFAC,,, | Performed by: STUDENT IN AN ORGANIZED HEALTH CARE EDUCATION/TRAINING PROGRAM

## 2020-03-26 PROCEDURE — 3052F PR MOST RECENT HEMOGLOBIN A1C LEVEL 8.0 - < 9.0%: ICD-10-PCS | Mod: CPTII,S$GLB,, | Performed by: STUDENT IN AN ORGANIZED HEALTH CARE EDUCATION/TRAINING PROGRAM

## 2020-03-26 PROCEDURE — 99999 PR PBB SHADOW E&M-EST. PATIENT-LVL III: CPT | Mod: PBBFAC,,, | Performed by: STUDENT IN AN ORGANIZED HEALTH CARE EDUCATION/TRAINING PROGRAM

## 2020-03-26 PROCEDURE — 86003 ALLG SPEC IGE CRUDE XTRC EA: CPT | Mod: 59

## 2020-03-26 PROCEDURE — 3052F HG A1C>EQUAL 8.0%<EQUAL 9.0%: CPT | Mod: CPTII,S$GLB,, | Performed by: STUDENT IN AN ORGANIZED HEALTH CARE EDUCATION/TRAINING PROGRAM

## 2020-03-26 PROCEDURE — 36415 COLL VENOUS BLD VENIPUNCTURE: CPT

## 2020-03-26 PROCEDURE — 3008F PR BODY MASS INDEX (BMI) DOCUMENTED: ICD-10-PCS | Mod: CPTII,S$GLB,, | Performed by: STUDENT IN AN ORGANIZED HEALTH CARE EDUCATION/TRAINING PROGRAM

## 2020-03-26 PROCEDURE — 82785 ASSAY OF IGE: CPT

## 2020-03-26 PROCEDURE — 99204 PR OFFICE/OUTPT VISIT, NEW, LEVL IV, 45-59 MIN: ICD-10-PCS | Mod: S$GLB,,, | Performed by: STUDENT IN AN ORGANIZED HEALTH CARE EDUCATION/TRAINING PROGRAM

## 2020-03-26 PROCEDURE — 3008F BODY MASS INDEX DOCD: CPT | Mod: CPTII,S$GLB,, | Performed by: STUDENT IN AN ORGANIZED HEALTH CARE EDUCATION/TRAINING PROGRAM

## 2020-03-26 RX ORDER — BENZONATATE 100 MG/1
100 CAPSULE ORAL 2 TIMES DAILY
Qty: 60 CAPSULE | Refills: 1 | Status: SHIPPED | OUTPATIENT
Start: 2020-03-26 | End: 2020-09-03

## 2020-03-26 RX ORDER — CETIRIZINE HYDROCHLORIDE 10 MG/1
TABLET ORAL
Qty: 100 TABLET | Refills: 1 | Status: ON HOLD | OUTPATIENT
Start: 2020-03-26 | End: 2020-05-20

## 2020-03-26 NOTE — LETTER
March 26, 2020      Lisa Sandoval, JULIA  [address]  [facility] [address]           [facility] - Allergy/ Immunology  [address]  [facility] [address]  Phone: [phone]  Fax: [phone]          Patient: Alison Branch   MR Number: [id number]   YOB: 1955   Date of Visit: 3/26/2020       Dear Lisa Sandoval:    Thank you for referring Alison Branch to me for evaluation. Attached you will find relevant portions of my assessment and plan of care.    If you have questions, please do not hesitate to call me. I look forward to following Alison Branch along with you.    Sincerely,    Sofía Raymundo MD    Enclosure  CC:  No Recipients    If you would like to receive this communication electronically, please contact [email or web address] or [phone] to request more information on [employer] Link access.    For providers and/or their staff who would like to refer a patient to [facility], please contact us through our one-stop-shop provider referral line, [facility], at [phone].    If you feel you have received this communication in error or would no longer like to receive these types of communications, please e-mail [email or web address]

## 2020-03-26 NOTE — PATIENT INSTRUCTIONS
Testing  Blood work for allergy testing today       Check [facility] in one week for results or call [phone]       Contact me with questions or concerns       I will contact you if anything needs immediate attention.        Treatment    Nasal rinse daily.  When sick, increase to twice a day or more           distilled water + packet  (It's important not to use tap or regular bottled water.  If you don't have distilled water, you can boil tap water to sterilize it, then let it cool.)    Zyrtec as needed for runny nose.  1-2 tablets.        Follow up 2 weeks by virtual visit.

## 2020-03-26 NOTE — PROGRESS NOTES
Allergy Clinic Note  [facility]    Subjective:      Patient ID: Alison Branch is a 64 y.o. female.    Chief Complaint: Allergies; Allergic Rhinitis ; and Nasal Congestion      Referring Provider: Lisa Sandoval    History of Present Illness:  64-year-old female referred from endocrinology for evaluation and treatment of rhinitis.  She is here alone, and she is a fair historian.    Related medications  Tessalon Perles    She reports a 15 year history of perennial rhinitis.  Her chief complaint is runny nose.  Associated symptoms include stuffy nose, postnasal drip, ear symptoms, and throat clearing.  She denies significant itching or eye symptoms.  Her symptoms are perennial without clear variation.  There are no obvious precipitants.  She obtains partial relief from Mucinex day and Mucinex night.  She is intolerant of multiple medications:  Flonase causes nose bleeds; Allegra makes her sleepy; Claritin causes blisters in her mouth.  Currently she is using nothing by nose.  She had allergy testing more than 20 years ago and does not recall the results.    Additional History:   Past medical history is significant for lung cancer on chemotherapy currently, hypertension and diabetes.  No Hx of ENT surgery.  Family history is significant for a daughter with allergies.  There is no family history of.  Client  reports that she quit smoking about 21 years ago. She has a 15.00 pack-year smoking history. She has never used smokeless tobacco.  Exposures are notable for mold in the kitchen and 2 dogs.  No exposure to passive smoke or unusual substances.     Patient Active Problem List   Diagnosis    Type 2 diabetes mellitus with hyperglycemia, with long-term current use of insulin    Mixed hyperlipidemia due to type 2 diabetes mellitus    Attention and concentration deficit    Cerebral aneurysm, coiled in 2010    Hypertension associated with diabetes    Hyperkeratosis of palms and soles    Irritable bowel  syndrome    Aneurysm of unspecified site    Obesity (BMI 30-39.9)    Vitamin D deficiency    Malignant neoplasm of upper lobe of left lung    Secondary malignant neoplasm of mediastinal lymph node    Adrenal cortical steroids causing adverse effect in therapeutic use    Type 2 diabetes mellitus with diabetic polyneuropathy, with long-term current use of insulin    Major depressive disorder, recurrent, unspecified    Memory deficit    Dysuria    Edema, peripheral    Chemotherapy-induced peripheral neuropathy    Shortness of breath on exertion    Fever of unknown origin (FUO)    Impaired functional mobility, balance, gait, and endurance     Current Outpatient Medications on File Prior to Visit   Medication Sig Dispense Refill    aspirin (ECOTRIN) 81 MG EC tablet Take 1 tablet (81 mg total) by mouth once daily.  0    atorvastatin (LIPITOR) 40 MG tablet TAKE 1 TABLET BY MOUTH ONCE DAILY 90 tablet 3    blood sugar diagnostic Strp To check BG 4 times daily, to use with insurance preferred meter 200 each 11    blood-glucose meter kit Use as instructed 1 each 0    blood-glucose meter kit To check BG 4 times daily, to use with insurance preferred meter 1 each 0    carboxymethylcellulose (REFRESH PLUS) 0.5 % Dpet 1 drop 3 (three) times daily as needed.      dexAMETHasone (DECADRON) 6 MG tablet Take 1 tablet by mouth two times a day with food the day before chemotherapy and for two days after chemotherapy. Do not take the day of chemotherapy. 24 tablet 4    ergocalciferol (ERGOCALCIFEROL) 50,000 unit Cap TAKE 1 CAPSULE BY MOUTH EVERY 7 DAYS 12 capsule 3    escitalopram oxalate (LEXAPRO) 10 MG tablet Take 10 mg by mouth once daily.       fluticasone propionate (FLONASE) 50 mcg/actuation nasal spray USE TWO SPRAY(S) IN EACH NOSTRIL ONCE DAILY 16 g 3    folic acid (FOLVITE) 1 MG tablet Take 1 tablet (1 mg total) by mouth once daily. Start today 100 tablet 3    gabapentin (NEURONTIN) 300 MG capsule Take  "1 capsule (300 mg total) by mouth nightly as needed (Take as needed at bed time for neuropathy pain and sleep). 90 capsule 3    hydroCHLOROthiazide (MICROZIDE) 12.5 mg capsule Take 1 capsule (12.5 mg total) by mouth daily as needed (for swelling). 30 capsule 1    insulin detemir U-100 (LEVEMIR FLEXTOUCH) 100 unit/mL (3 mL) SubQ InPn pen Steroid day: Inject 33 under the skin units daily;;;Non-steroid day: Inject 26 units daily 15 mL 11    insulin lispro (HUMALOG KWIKPEN INSULIN) 100 unit/mL pen Inject subcutaneously 20-16-16 units plus sliding scale. Max TDD 90units. Make adjustments as directed. 30 mL 6    lancets Misc 1 Device by Misc.(Non-Drug; Combo Route) route once daily. Heladio Result.  250.02.  Check Blood Sugar Twice Daily. 200 each 3    lancets Misc To check BG 4 times daily, to use with insurance preferred meter 200 each 11    lidocaine-prilocaine (EMLA) cream Apply to PORT one hour prior to chemo administration. 30 g 0    olmesartan (BENICAR) 20 MG tablet TAKE 1 TABLET BY MOUTH ONCE DAILY 90 tablet 3    pen needle, diabetic (BD ULTRA-FINE BRYANT PEN NEEDLE) 32 gauge x 5/32" Ndle To use 5 times per day with insulin injections. 150 each 11    pen needle, diabetic 33 gauge x 5/32" Ndle 1 each by Misc.(Non-Drug; Combo Route) route 4 (four) times daily with meals and nightly. 100 each 5    SITagliptin (JANUVIA) 100 MG Tab Take 100 mg by mouth once daily.      [DISCONTINUED] benzonatate (TESSALON PERLES) 100 MG capsule Take 1 capsule (100 mg total) by mouth 2 (two) times daily. 60 capsule 1    [DISCONTINUED] blood sugar diagnostic Strp To check BG 5 times per day 450 each 3    bisacodyl (DULCOLAX) 5 mg EC tablet Take 5 mg by mouth daily as needed for Constipation.      diazePAM (VALIUM) 5 MG tablet TAKE 1 TABLET BY MOUTH ONCE DAILY AS NEEDED FOR ANXIETY  2    diazePAM (VALIUM) 5 MG tablet Take 1 tablet 30 minutes before MRI (Patient not taking: Reported on 3/26/2020) 1 tablet 0    ondansetron " "(ZOFRAN) 8 MG tablet Take 1 tablet (8 mg total) by mouth 4 (four) times daily as needed for Nausea. (Patient not taking: Reported on 3/26/2020) 60 tablet 2    phenazopyridine (PYRIDIUM) 200 MG tablet Take 1 tablet (200 mg total) by mouth 3 (three) times daily as needed for Pain. (Patient not taking: Reported on 3/26/2020) 20 tablet 0    terconazole (TERAZOL 7) 0.4 % Crea INSERT 1 APPLICATORFUL VAGINALLY ONCE DAILY IN THE EVENING (Patient not taking: Reported on 3/26/2020) 45 g 0    walker Misc Please provide rollator walker for this debilitated cancer patient.  Thank you. (Patient not taking: Reported on 3/26/2020)  0    [DISCONTINUED] insulin aspart U-100 (NOVOLOG) 100 unit/mL (3 mL) InPn pen Inject 12-20 units into the skin 3 times daily with meals plus correction scale. Max Total daily dose of 90 units 3 Box 6    [DISCONTINUED] insulin regular 100 unit/mL Inj injection Inject 14-10-10units subcutaneously before meals. (Patient taking differently: 20-16-16 plus sliding scale) 30 mL 12     No current facility-administered medications on file prior to visit.          Review of Systems   Constitutional: Negative for chills and fever.   HENT: Positive for ear pain. Negative for ear discharge and nosebleeds.         Postnasal drip sensation, runny nose   Eyes: Negative for discharge and redness.   Respiratory: Negative for hemoptysis, sputum production, shortness of breath, wheezing and stridor.    Cardiovascular: Negative for chest pain and palpitations.   Gastrointestinal: Negative for blood in stool, melena and vomiting.   Genitourinary: Negative for dysuria and hematuria.   Skin: Negative for itching and rash.   Neurological: Negative for seizures and loss of consciousness.   Endo/Heme/Allergies: Negative for polydipsia. Does not bruise/bleed easily.       Objective:   Resp 16   Ht 5' 9" (1.753 m)   Wt 98.3 kg (216 lb 11.4 oz)   BMI 32.00 kg/m²       Physical Exam   Constitutional: She is well-developed, " well-nourished, and in no distress.   HENT:   Head: Normocephalic and atraumatic.   Nose: Nose normal.   Tympanic membranes clear bilaterally.  Nares pink with no significant turbinates swelling.  Oropharynx benign without exudate.  Tongue is not coated.   Eyes: Conjunctivae are normal. No scleral icterus.   Neck: Neck supple.   Cardiovascular: Normal rate, regular rhythm, normal heart sounds and intact distal pulses.   Pulmonary/Chest: Effort normal and breath sounds normal. No stridor. No respiratory distress. She has no wheezes.   Abdominal: She exhibits no distension.   Musculoskeletal: She exhibits no edema or deformity.   Neurological: She is alert. GCS score is 15.   Skin: No rash noted. No erythema.   Psychiatric: Memory and affect normal.       Data:   Eosinophil count 0 on CBC from 12/03/2019    Assessment:     1. Chronic rhinitis    2. Type 2 diabetes mellitus with hyperglycemia, with long-term current use of insulin    3. Hypertension associated with diabetes        Plan:     Medical decision making:  Patient is presenting with longstanding rhinitis which may or may not be allergic.  I recommend screening for airborne allergies by the Immunocap method.  Therapeutically, in addition to her p.r.n. Zyrtec, I am recommending daily use of nasal rinses.  During periods of infection she should increased frequency of the rinses.    Alison was seen today for allergies, allergic rhinitis  and nasal congestion.    Diagnoses and all orders for this visit:    Chronic rhinitis  -     cetirizine (ZYRTEC) 10 MG tablet; 1-2 tablets daily as needed for runny nose  -     IgE; Future  -     Dermatophagoides [place]; Future  -     Dermatophagoides Pteronyssinus; Future  -     [place]; Future  -     Oleg; Future  -     [place]; Future  -     English Plantain; Future  -     Oak Pecan; Future  -     Pecan; Future  -     [place]; Future  -     Ragweed; Future  -     Alternaria; Future  -     Aspergillus; Future  -     Cat;  Future  -     Cockroach; Future  -     Dog; Future    Type 2 diabetes mellitus with hyperglycemia, with long-term current use of insulin  -     avoid systemic steroids    Hypertension associated with diabetes        -      avoid decongestants        Patient Instructions   Testing  Blood work for allergy testing today       [facility] or call [phone]       Contact me with questions or concerns       I will contact you if anything needs immediate attention.        Treatment    Nasal rinse daily.  When sick, increase to twice a day or more           distilled water + packet  (It's important not to use tap or regular bottled water.  If you don't have distilled water, you can boil tap water to sterilize it, then let it cool.)    Zyrtec as needed for runny nose.  1-2 tablets.        Follow up 2 weeks by virtual visit.          Follow up in about 2 weeks (around 4/9/2020).    Sofía Raymundo MD

## 2020-03-27 ENCOUNTER — TELEPHONE (OUTPATIENT)
Dept: HEMATOLOGY/ONCOLOGY | Facility: CLINIC | Age: 65
End: 2020-03-27

## 2020-03-27 ENCOUNTER — TELEPHONE (OUTPATIENT)
Dept: PULMONOLOGY | Facility: CLINIC | Age: 65
End: 2020-03-27

## 2020-03-27 DIAGNOSIS — R30.0 DYSURIA: ICD-10-CM

## 2020-03-27 DIAGNOSIS — C34.12 MALIGNANT NEOPLASM OF UPPER LOBE OF LEFT LUNG: ICD-10-CM

## 2020-03-27 RX ORDER — DEXAMETHASONE 6 MG/1
TABLET ORAL
Qty: 24 TABLET | Refills: 4 | OUTPATIENT
Start: 2020-03-27 | End: 2020-05-26

## 2020-03-27 RX ORDER — PHENAZOPYRIDINE HYDROCHLORIDE 200 MG/1
200 TABLET, FILM COATED ORAL 3 TIMES DAILY PRN
Qty: 20 TABLET | Refills: 0 | Status: SHIPPED | OUTPATIENT
Start: 2020-03-27 | End: 2020-09-03

## 2020-03-27 RX ORDER — FOLIC ACID 1 MG/1
1 TABLET ORAL DAILY
Qty: 100 TABLET | Refills: 3 | Status: ON HOLD | OUTPATIENT
Start: 2020-03-27 | End: 2020-06-29 | Stop reason: HOSPADM

## 2020-03-27 NOTE — TELEPHONE ENCOUNTER
Spoke with patient.  CT and MRI canceled for next week. Will push back one month.  Patient understands.

## 2020-03-27 NOTE — TELEPHONE ENCOUNTER
----- Message from Tara Belcher MD sent at 3/27/2020  8:56 AM CDT -----  OK to move back 4 weeks. Donna please let her and add a note in chart. Thanks  ----- Message -----  From: RT Luke  Sent: 3/27/2020   8:48 AM CDT  To: Tara Belcher MD    Due to concerns associated with Covid19 the radiology team is seeking to reschedule outpatient diagnostic exams between 3/21 - 4/21 that have been ordered prior to 3/19.  Alison Branch is scheduled for CT Abd/Pel on 3- at [facility].  Please respond to this message if you believe it is safe to postpone this exam and the radiology team will reschedule and update you on the patient's response.  If you have concerns that postponement is not safe (urgent or time sensitive diagnostic study), then reply and it will be performed as ordered.   If further discussion needed, please provide a contact number and a Radiologist will reach out to you shortly.

## 2020-03-30 ENCOUNTER — OFFICE VISIT (OUTPATIENT)
Dept: PSYCHIATRY | Facility: CLINIC | Age: 65
End: 2020-03-30
Payer: COMMERCIAL

## 2020-03-30 DIAGNOSIS — F33.0 MILD EPISODE OF RECURRENT MAJOR DEPRESSIVE DISORDER: Primary | ICD-10-CM

## 2020-03-30 DIAGNOSIS — E11.65 TYPE 2 DIABETES MELLITUS WITH HYPERGLYCEMIA, WITH LONG-TERM CURRENT USE OF INSULIN: ICD-10-CM

## 2020-03-30 DIAGNOSIS — Z79.4 TYPE 2 DIABETES MELLITUS WITH HYPERGLYCEMIA, WITH LONG-TERM CURRENT USE OF INSULIN: ICD-10-CM

## 2020-03-30 DIAGNOSIS — C34.12 MALIGNANT NEOPLASM OF UPPER LOBE OF LEFT LUNG: ICD-10-CM

## 2020-03-30 LAB
A ALTERNATA IGE QN: <0.1 KU/L
A FUMIGATUS IGE QN: <0.1 KU/L
BERMUDA GRASS IGE QN: <0.1 KU/L
CAT DANDER IGE QN: <0.1 KU/L
CEDAR IGE QN: <0.1 KU/L
D FARINAE IGE QN: <0.1 KU/L
D PTERONYSS IGE QN: <0.1 KU/L
DEPRECATED A ALTERNATA IGE RAST QL: NORMAL
DEPRECATED A FUMIGATUS IGE RAST QL: NORMAL
DEPRECATED BERMUDA GRASS IGE RAST QL: NORMAL
DEPRECATED CAT DANDER IGE RAST QL: NORMAL
DEPRECATED CEDAR IGE RAST QL: NORMAL
DEPRECATED D FARINAE IGE RAST QL: NORMAL
DEPRECATED D PTERONYSS IGE RAST QL: NORMAL
DEPRECATED DOG DANDER IGE RAST QL: NORMAL
DEPRECATED ELDER IGE RAST QL: NORMAL
DEPRECATED ENGL PLANTAIN IGE RAST QL: NORMAL
DEPRECATED PECAN/HICK TREE IGE RAST QL: NORMAL
DEPRECATED ROACH IGE RAST QL: NORMAL
DEPRECATED TIMOTHY IGE RAST QL: NORMAL
DEPRECATED WEST RAGWEED IGE RAST QL: NORMAL
DEPRECATED WHITE OAK IGE RAST QL: NORMAL
DOG DANDER IGE QN: <0.1 KU/L
ELDER IGE QN: <0.1 KU/L
ENGL PLANTAIN IGE QN: <0.1 KU/L
PECAN/HICK TREE IGE QN: <0.1 KU/L
ROACH IGE QN: <0.1 KU/L
TIMOTHY IGE QN: <0.1 KU/L
WEST RAGWEED IGE QN: <0.1 KU/L
WHITE OAK IGE QN: <0.1 KU/L

## 2020-03-30 PROCEDURE — 90832 PR PSYCHOTHERAPY W/PATIENT, 30 MIN: ICD-10-PCS | Mod: 95,,, | Performed by: PSYCHOLOGIST

## 2020-03-30 PROCEDURE — 90832 PSYTX W PT 30 MINUTES: CPT | Mod: 95,,, | Performed by: PSYCHOLOGIST

## 2020-03-30 NOTE — PROGRESS NOTES
INFORMED CONSENT: Alison Branch   is known to this provider and identity was confirmed via NAME and .  The patient has been informed of the risks and benefits associated with engaging in psychotherapy, the handling of protected health information, the rights of privacy and the limits of confidentiality. The patient has also been informed of the importance of reporting any suicidal or homicidal ideation to this or any provider to ensure safety of all parties, and the Alison Branch expressed understanding. The patient was agreeable to these terms and freely participates in individual psychotherapy.    Telemedicine PSYCHO-ONCOLOGY NOTE/ Individual Psychotherapy   (patient not on premises due to COVID-19 restrictions)    Consultation started: 3/30/2020 at 11:18 AM   The chief complaint leading to consultation is: anxiety, depression  The patient location is:  Patient home  Virtual visit with synchronous audio and video  Patient alone at the time of consultation    Crisis Disclaimer: Patient was informed that due to the virtual nature of the visit, that if a crisis develops, protocols will be implemented to ensure patient safety, including but not limited to: 1) Initiating a welfare check with local Law Enforcement, 2) Calling [facility]/National Crisis Hotline, and/or 3) Initiating PEC/CEC procedures.      Each patient provided medical services by telemedicine is:  (1) informed of the relationship between the physician and patient and the respective role of any other health care provider with respect to management of the patient; and (2) notified that he or she may decline to receive medical services by telemedicine and may withdraw from such care at any time.    Date: 3/30/2020   Site of therapist:  [facility]         Therapeutic Intervention: Met with patient.  Outpatient - Behavior modifying psychotherapy 30 min - CPT code 22407    Referring provider:    Chief complaint/reason for encounter: depression and  anxiety   Met with patient to evaluate psychosocial adaptation to survivorship of Lung Cancer    Patient was last seen by me on 2/18/2020    Objective:      Alison Branch arrived promptly for the sesion  The patient was fully cooperative throughout the session.  Appearance: age appropriate, appropriately  dressed, adequately  groomed  Behavior/Cooperation: friendly and cooperative  Speech: normal in rate, volume, and tone and appropriate quality, quantity and organization of sentences  Mood: euthymic  Affect: full range and appropriate  Thought Process: goal-directed, logical  Thought Content: normal,  No delusions or paranoia; did not appear to be responding to internal stimuli during the session  Orientation: grossly intact  Memory: Grossly intact  Attention Span/Concentration: Attends to session without distraction; reports no difficulty  Fund of Knowledge: average  Estimate of Intelligence: average from verbal skills and history  Cognition: grossly intact  Insight: patient has awareness of illness; good insight into own behavior and behavior of others  Judgment: the patient's behavior is adequate to circumstances      Interval history and content of current session: Patient reported her frustration with her PCP regarding her perception of him not investigating her concerns related to her diabetes and foot pain. Patient stated that she has several concerns regarding her care and would like to change PCPs. She is also engaging in physical activity, 30-40 mins per day. She continues to successfully manage her medical needs.  Discussed current adaptation to disease and treatment status. Reports to be coping in an adequate manner. Evaluated cognitive response, paying particular attention to negative intrusive thoughts of a persistent and detrimental nature. Thoughts of this type are in evidence with no distress. Identified and evaluated psychosocial and environmental stressors secondary to diagnosis and treatment.      Focused on providing cognitive behavioral therapy to address negative cognitions. Examined proactive behaviors that may be implemented to minimize or ameliorate psychosocial stressors secondary to diagnosis and treatment.     Risk parameters:   Patient reports no suicidal ideation  Patient reports no homicidal ideation  Patient reports no self-injurious behavior  Patient reports no violent behavior   Safety needs:  None at this time      Verbal deficits: None     Patient's response to intervention:The patient's response to intervention is accepting.     Progress toward goals and other mental status changes:  The patient's progress toward goals is good.      Progress to date:Problem Resolved      Goals from last visit: Met      Patient reported outcomes:      Distress Thermometer:   Distress Score 0            Practical Problems Physical Problems                                                   Family Problems                                         Emotional Problems                                                         Spiritual/Religions Concerns               Other Problems                Client Strengths: verbal, intelligent, successful, good social support, good insight, commitment to wellness, strong shannon, strong cultural traditions      ICD-10-CM ICD-9-CM   1. Mild episode of recurrent major depressive disorder F33.0 296.31   2. Type 2 diabetes mellitus with hyperglycemia, with long-term current use of insulin E11.65 250.00    Z79.4 790.29     V58.67   3. Malignant neoplasm of upper lobe of left lung C34.12 162.3       Treatment Plan:medication management by physician  · Target symptoms: depression, anxiety   · Why chosen therapy is appropriate versus another modality: relevant to diagnosis  · Outcome monitoring methods: self-report, observation, checklist/rating scale  · Therapeutic intervention type: behavior modifying psychotherapy  · Prognosis: Good      Behavioral goals:   Patient has reached all  goal in psychotherapy including managing medical logs and scaling down to two documentation log per day; increasing exercise, and stabilizing blood sugars. Patient has done well and has acquired all skills necessary to manage on her own. Patient stated she will RTC if needed or if symptoms worsen.     Return to clinic: as needed     Length of Service (minutes direct face-to-face contact): 30          Jagruti Garcia PhD  [place] License #[id number]

## 2020-03-30 NOTE — Clinical Note
YAY! Ms. Branch finally has it together. She is using all her skills to manage herself medically. Has some foot pain from neuropathy. But overall doing well and at her baseline functioning. She will return to me PRN.

## 2020-03-31 ENCOUNTER — PATIENT MESSAGE (OUTPATIENT)
Dept: DIABETES | Facility: CLINIC | Age: 65
End: 2020-03-31

## 2020-03-31 ENCOUNTER — TELEPHONE (OUTPATIENT)
Dept: REHABILITATION | Facility: HOSPITAL | Age: 65
End: 2020-03-31

## 2020-03-31 ENCOUNTER — LAB VISIT (OUTPATIENT)
Dept: LAB | Facility: HOSPITAL | Age: 65
End: 2020-03-31
Payer: MEDICARE

## 2020-03-31 DIAGNOSIS — C34.12 MALIGNANT NEOPLASM OF UPPER LOBE OF LEFT LUNG: ICD-10-CM

## 2020-03-31 LAB
ALBUMIN SERPL BCP-MCNC: 3.8 G/DL (ref 3.5–5.2)
ALP SERPL-CCNC: 69 U/L (ref 55–135)
ALT SERPL W/O P-5'-P-CCNC: 47 U/L (ref 10–44)
ANION GAP SERPL CALC-SCNC: 6 MMOL/L (ref 8–16)
AST SERPL-CCNC: 31 U/L (ref 10–40)
BILIRUB SERPL-MCNC: 0.4 MG/DL (ref 0.1–1)
BUN SERPL-MCNC: 13 MG/DL (ref 8–23)
CALCIUM SERPL-MCNC: 10.4 MG/DL (ref 8.7–10.5)
CHLORIDE SERPL-SCNC: 104 MMOL/L (ref 95–110)
CO2 SERPL-SCNC: 30 MMOL/L (ref 23–29)
CREAT SERPL-MCNC: 1.1 MG/DL (ref 0.5–1.4)
ERYTHROCYTE [DISTWIDTH] IN BLOOD BY AUTOMATED COUNT: 15.9 % (ref 11.5–14.5)
EST. GFR  (AFRICAN AMERICAN): >60 ML/MIN/1.73 M^2
EST. GFR  (NON AFRICAN AMERICAN): 53.9 ML/MIN/1.73 M^2
GLUCOSE SERPL-MCNC: 219 MG/DL (ref 70–110)
HCT VFR BLD AUTO: 39.6 % (ref 37–48.5)
HGB BLD-MCNC: 11.8 G/DL (ref 12–16)
IMM GRANULOCYTES # BLD AUTO: 0.05 K/UL (ref 0–0.04)
MCH RBC QN AUTO: 28.2 PG (ref 27–31)
MCHC RBC AUTO-ENTMCNC: 29.8 G/DL (ref 32–36)
MCV RBC AUTO: 95 FL (ref 82–98)
NEUTROPHILS # BLD AUTO: 2.8 K/UL (ref 1.8–7.7)
PLATELET # BLD AUTO: 194 K/UL (ref 150–350)
PMV BLD AUTO: 11.4 FL (ref 9.2–12.9)
POTASSIUM SERPL-SCNC: 4.3 MMOL/L (ref 3.5–5.1)
PROT SERPL-MCNC: 7.3 G/DL (ref 6–8.4)
RBC # BLD AUTO: 4.18 M/UL (ref 4–5.4)
SODIUM SERPL-SCNC: 140 MMOL/L (ref 136–145)
WBC # BLD AUTO: 5.44 K/UL (ref 3.9–12.7)

## 2020-03-31 PROCEDURE — 85027 COMPLETE CBC AUTOMATED: CPT

## 2020-03-31 PROCEDURE — 80053 COMPREHEN METABOLIC PANEL: CPT

## 2020-03-31 PROCEDURE — 36415 COLL VENOUS BLD VENIPUNCTURE: CPT

## 2020-03-31 NOTE — TELEPHONE ENCOUNTER
Therapy Postponed / COVID-19 pandemic   Patient Check-In Courtesy Call    Courtesy call to check in with patient today to ensure they are doing their home exercise program and to answer any questions. Alison Branch stated she is doing well. Wants to continue with her current HEP for another week, but may need an upgrade next week.   No new exercise recommendations given today.     Matias Nunez, PT  03/31/2020

## 2020-04-01 ENCOUNTER — TELEPHONE (OUTPATIENT)
Dept: HEMATOLOGY/ONCOLOGY | Facility: CLINIC | Age: 65
End: 2020-04-01

## 2020-04-01 ENCOUNTER — PATIENT MESSAGE (OUTPATIENT)
Dept: DIABETES | Facility: CLINIC | Age: 65
End: 2020-04-01

## 2020-04-01 DIAGNOSIS — Z79.4 TYPE 2 DIABETES MELLITUS WITH HYPERGLYCEMIA, WITH LONG-TERM CURRENT USE OF INSULIN: Primary | ICD-10-CM

## 2020-04-01 DIAGNOSIS — E11.65 TYPE 2 DIABETES MELLITUS WITH HYPERGLYCEMIA, WITH LONG-TERM CURRENT USE OF INSULIN: Primary | ICD-10-CM

## 2020-04-01 NOTE — TELEPHONE ENCOUNTER
Spoke with patient.  Her grandson is going to be driving her to and from appointments in the future, starting tomorrow. She will drop off tomorrow [place] paperwork for him.  She thanked nurse.

## 2020-04-01 NOTE — TELEPHONE ENCOUNTER
"----- Message from Liane Quiros sent at 4/1/2020 11:59 AM CDT -----  Contact: DEL  Name of caller: Alison   Provider name: Ye PAYTON MD  Contact Preference:  [phone]  Is this regarding current patient or new patient?: current   What is the nature of the call?    - pt has a form that will need to be completed by the MD.   Please call and assist Its a FMLA form for a relative that will   now be assisting her with her medical concerns.     Additional Notes:   "Thank you for all that you do for our patients'"     "

## 2020-04-02 ENCOUNTER — PATIENT MESSAGE (OUTPATIENT)
Dept: NEUROLOGY | Facility: CLINIC | Age: 65
End: 2020-04-02

## 2020-04-02 ENCOUNTER — TELEPHONE (OUTPATIENT)
Dept: HEMATOLOGY/ONCOLOGY | Facility: CLINIC | Age: 65
End: 2020-04-02

## 2020-04-02 ENCOUNTER — PATIENT MESSAGE (OUTPATIENT)
Dept: UROLOGY | Facility: CLINIC | Age: 65
End: 2020-04-02

## 2020-04-02 ENCOUNTER — OFFICE VISIT (OUTPATIENT)
Dept: HEMATOLOGY/ONCOLOGY | Facility: CLINIC | Age: 65
End: 2020-04-02
Payer: MEDICARE

## 2020-04-02 ENCOUNTER — INFUSION (OUTPATIENT)
Dept: INFUSION THERAPY | Facility: HOSPITAL | Age: 65
End: 2020-04-02
Attending: INTERNAL MEDICINE
Payer: MEDICARE

## 2020-04-02 ENCOUNTER — PATIENT MESSAGE (OUTPATIENT)
Dept: INTERNAL MEDICINE | Facility: CLINIC | Age: 65
End: 2020-04-02

## 2020-04-02 VITALS
SYSTOLIC BLOOD PRESSURE: 153 MMHG | TEMPERATURE: 98 F | BODY MASS INDEX: 32.09 KG/M2 | WEIGHT: 216.69 LBS | HEIGHT: 69 IN | RESPIRATION RATE: 18 BRPM | HEART RATE: 84 BPM | DIASTOLIC BLOOD PRESSURE: 70 MMHG

## 2020-04-02 DIAGNOSIS — G62.0 CHEMOTHERAPY-INDUCED PERIPHERAL NEUROPATHY: ICD-10-CM

## 2020-04-02 DIAGNOSIS — E55.9 VITAMIN D DEFICIENCY: ICD-10-CM

## 2020-04-02 DIAGNOSIS — T45.1X5A CHEMOTHERAPY-INDUCED PERIPHERAL NEUROPATHY: ICD-10-CM

## 2020-04-02 DIAGNOSIS — C34.12 MALIGNANT NEOPLASM OF UPPER LOBE OF LEFT LUNG: ICD-10-CM

## 2020-04-02 DIAGNOSIS — E03.9 HYPOTHYROIDISM, UNSPECIFIED TYPE: ICD-10-CM

## 2020-04-02 DIAGNOSIS — R05.3 CHRONIC COUGH: ICD-10-CM

## 2020-04-02 DIAGNOSIS — C77.1 SECONDARY MALIGNANT NEOPLASM OF MEDIASTINAL LYMPH NODE: ICD-10-CM

## 2020-04-02 DIAGNOSIS — E11.59 HYPERTENSION ASSOCIATED WITH DIABETES: ICD-10-CM

## 2020-04-02 DIAGNOSIS — E11.42 TYPE 2 DIABETES MELLITUS WITH DIABETIC POLYNEUROPATHY, WITH LONG-TERM CURRENT USE OF INSULIN: ICD-10-CM

## 2020-04-02 DIAGNOSIS — Z79.4 TYPE 2 DIABETES MELLITUS WITH DIABETIC POLYNEUROPATHY, WITH LONG-TERM CURRENT USE OF INSULIN: ICD-10-CM

## 2020-04-02 DIAGNOSIS — I15.2 HYPERTENSION ASSOCIATED WITH DIABETES: ICD-10-CM

## 2020-04-02 DIAGNOSIS — C34.12 MALIGNANT NEOPLASM OF UPPER LOBE OF LEFT LUNG: Primary | ICD-10-CM

## 2020-04-02 DIAGNOSIS — R50.9 FEVER OF UNKNOWN ORIGIN (FUO): Primary | ICD-10-CM

## 2020-04-02 DIAGNOSIS — R60.0 EDEMA, PERIPHERAL: ICD-10-CM

## 2020-04-02 PROCEDURE — 63600175 PHARM REV CODE 636 W HCPCS: Performed by: INTERNAL MEDICINE

## 2020-04-02 PROCEDURE — 96411 CHEMO IV PUSH ADDL DRUG: CPT

## 2020-04-02 PROCEDURE — 99215 PR OFFICE/OUTPT VISIT, EST, LEVL V, 40-54 MIN: ICD-10-PCS | Mod: 95,,, | Performed by: INTERNAL MEDICINE

## 2020-04-02 PROCEDURE — 96413 CHEMO IV INFUSION 1 HR: CPT

## 2020-04-02 PROCEDURE — 96367 TX/PROPH/DG ADDL SEQ IV INF: CPT

## 2020-04-02 PROCEDURE — 99215 OFFICE O/P EST HI 40 MIN: CPT | Mod: 95,,, | Performed by: INTERNAL MEDICINE

## 2020-04-02 PROCEDURE — 96372 THER/PROPH/DIAG INJ SC/IM: CPT

## 2020-04-02 PROCEDURE — 25000003 PHARM REV CODE 250: Performed by: INTERNAL MEDICINE

## 2020-04-02 RX ORDER — SODIUM CHLORIDE 0.9 % (FLUSH) 0.9 %
10 SYRINGE (ML) INJECTION
Status: DISCONTINUED | OUTPATIENT
Start: 2020-04-02 | End: 2020-04-02 | Stop reason: HOSPADM

## 2020-04-02 RX ORDER — CYANOCOBALAMIN 1000 UG/ML
1000 INJECTION, SOLUTION INTRAMUSCULAR; SUBCUTANEOUS ONCE
Status: COMPLETED | OUTPATIENT
Start: 2020-04-02 | End: 2020-04-02

## 2020-04-02 RX ORDER — HEPARIN 100 UNIT/ML
500 SYRINGE INTRAVENOUS
Status: DISCONTINUED | OUTPATIENT
Start: 2020-04-02 | End: 2020-04-02 | Stop reason: HOSPADM

## 2020-04-02 RX ORDER — CYANOCOBALAMIN 1000 UG/ML
1000 INJECTION, SOLUTION INTRAMUSCULAR; SUBCUTANEOUS ONCE
Status: CANCELLED
Start: 2020-04-02

## 2020-04-02 RX ORDER — SODIUM CHLORIDE 0.9 % (FLUSH) 0.9 %
10 SYRINGE (ML) INJECTION
Status: CANCELLED | OUTPATIENT
Start: 2020-04-02

## 2020-04-02 RX ORDER — HEPARIN 100 UNIT/ML
500 SYRINGE INTRAVENOUS
Status: CANCELLED | OUTPATIENT
Start: 2020-04-02

## 2020-04-02 RX ADMIN — Medication 10 MG: at 10:04

## 2020-04-02 RX ADMIN — SODIUM CHLORIDE 1075 MG: 9 INJECTION, SOLUTION INTRAVENOUS at 11:04

## 2020-04-02 RX ADMIN — SODIUM CHLORIDE 200 MG: 9 INJECTION, SOLUTION INTRAVENOUS at 10:04

## 2020-04-02 RX ADMIN — CYANOCOBALAMIN 1000 MCG: 1000 INJECTION, SOLUTION INTRAMUSCULAR at 10:04

## 2020-04-02 RX ADMIN — Medication 1 MG: at 10:04

## 2020-04-02 NOTE — PROGRESS NOTES
Subjective:    The patient location is: car  The chief complaint leading to consultation is: lung cancer  Visit type: Virtual visit with synchronous audio and video  Total time spent with patient: 25 minutes  Each patient to whom he or she provides medical services by telemedicine is:  (1) informed of the relationship between the physician and patient and the respective role of any other health care provider with respect to management of the patient; and (2) notified that he or she may decline to receive medical services by telemedicine and may withdraw from such care at any time.    Notes: see HPI     Patient ID: Alison Branch is a 64 y.o. female.    Chief Complaint: Lung Cancer  Ms. Branch is a 63-year-old female who smoked for about 30 years and quit 20 years ago, presented with cough end of last year, but worsened into January.  Since the cough persisted, she saw her PCP, underwent a chest x-ray on 05/10/2019, that revealed left upper lobe pneumonia and a repeat CT was done in the week after that on 05/17/2019 that showed left upper lobe   mass arising from the lateral pleural surface in the left upper lobe posterior subsegment measuring 2.6 x 3 cm.  There are satellite mass more medially near the aortic arch that is 2 cm, also spiculated and irregular as well as thickened interlobar septa in the left lung apex and anterior segment, prevascular lymph node lateral to the aortic arch, short axis measuring 9 mm.       She then underwent a bronchoscopy on 05/28/2019, and that revealed there was an infiltration obtained in the left apical posterior segment of the left upper lobe cytology brush was done.  Transbronchial biopsies of an area of infiltration were also performed in the apicoposterior segment of the left upper lobe.    Pathology revealed adenocarcinoma; however, the specimen was not adequate enough to send for molecular testing.       She underwent a PET scan on 06/06/2019.  that reveals significant  "hypermetabolic activity in the large irregular spiculated pulmonary mass in the lateral aspect of the left upper lobe consistent with the patient's known pulmonary adenocarcinoma.  There is also tracer avidity in the medial left upper lobe satellite lesion and diffusely throughout much of the anterior left upper lobe where there is prominent nodular paraseptal thickening.  There are some numerous scattered subcentimeter pulmonary nodules throughout the right lung, all of which are too small for detection by PET.  For example, there is a 0.4 cm nodule in the superior right lower lobe and a micro nodule in the posterior segment of the right upper lobe.  There is a 0.5 cm, normal size right   paratracheal lymph node with increased radiotracer uptake as well as hypermetabolic aortic pulmonary lymph node and a left hilar lymph node.  There is a focal hypermetabolic lesion in the left anterior superior iliac spine associated with well defined lytic lesion.  There is a hypermetabolic focus in the anterior right fifth rib with a possible associated lytic lesion.  SUVs as   below lateral:  Left upper lobe  SUV max 17.9, anterior left upper lobe satellite mass and septal thickening SUV max of 10.1, right paratracheal lymph node SUV max of 4.8, left AP window lymph node SUV max of 17.9, left anterior superior iliac spine SUV max of 3.9"     MRI pelvis from 6/10/19  reveals "Region of osseous is irregularity at the left anterior iliac spine likely related to bone graft harvest procedure.  See  discussion above.  No suspicious signal or enhancement to suggest active/malignant process"   MRI brain from 6/10//19 reveal No intracranial abnormality.     We discussed her case at [facility], and plan was to wait for GAURDANT and proceed with treatment accordingly  Her GAURDANT is negative for molecular markers.     She has completed 4 cycles of Carboplatin, Alimta and Keytruda as of 9/5/19. She is now on  ALimta and Keytruda " maintenance.          HPI She comes in for maintenance Alimta and Keytruda  She notes that she feels wells, She notes that she is shortness of breath as she is getting back into her routine. She notes her cough has been gradually worsening. No malaise. She notes headache but she feels that is because she stopped blood pressure meds. Denies fever,    She denies nausea, vomiting, diarrhea, constipation, abdominal pain, weight loss or loss of appetite, chest pain, shortness of breath, leg swelling, fatigue, pain, headache, dizziness, or mood changes. Her ECOG PS is zero.   She notes on and off feet swelling, denies any tingling and numbness.         Review of Systems   Constitutional: Positive for appetite change. Negative for unexpected weight change.   Eyes: Negative for visual disturbance.   Respiratory: Positive for cough and shortness of breath.    Cardiovascular: Negative for chest pain.   Gastrointestinal: Positive for abdominal pain. Negative for diarrhea.   Genitourinary: Positive for frequency.   Musculoskeletal: Positive for back pain.   Skin: Negative for rash.   Neurological: Positive for headaches.   Hematological: Negative for adenopathy.   Psychiatric/Behavioral: The patient is nervous/anxious.        PMFSH: all information reviewed and updated as relevant to today's visit  Objective:      Physical Exam      LABS:  WBC   Date Value Ref Range Status   03/31/2020 5.44 3.90 - 12.70 K/uL Final     Hemoglobin   Date Value Ref Range Status   03/31/2020 11.8 (L) 12.0 - 16.0 g/dL Final     Hematocrit   Date Value Ref Range Status   03/31/2020 39.6 37.0 - 48.5 % Final     Platelets   Date Value Ref Range Status   03/31/2020 194 150 - 350 K/uL Final     Gran # (ANC)   Date Value Ref Range Status   03/31/2020 2.8 1.8 - 7.7 K/uL Final     Comment:     The ANC is based on a white cell differential from an   automated cell counter. It has not been microscopically   reviewed for the presence of abnormal cells.  Clinical   correlation is required.         Chemistry        Component Value Date/Time     03/31/2020 1307    K 4.3 03/31/2020 1307     03/31/2020 1307    CO2 30 (H) 03/31/2020 1307    BUN 13 03/31/2020 1307    CREATININE 1.1 03/31/2020 1307     (H) 03/31/2020 1307        Component Value Date/Time    CALCIUM 10.4 03/31/2020 1307    ALKPHOS 69 03/31/2020 1307    AST 31 03/31/2020 1307    ALT 47 (H) 03/31/2020 1307    BILITOT 0.4 03/31/2020 1307    ESTGFRAFRICA >60.0 03/31/2020 1307    EGFRNONAA 53.9 (A) 03/31/2020 1307          Assessment:       1. 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 Type 2 diabetes mellitus with diabetic polyneuropathy, with long-term current use of insulin    4. Hypothyroidism, unspecified type        Plan:        1,2. She will proceed with Alimta and Keytruda and will return in 3 weeks with restaging CT scans (currently scheduled for 4/23/2020, delayed due to COVID pandemic). However if her cough worsens then will plan on restaging CT scans sooner  3. Stable on meds  4. Will check TSH and free T4 in 3 weeks      Above care plan was discussed with patient and all questions were addressed to her satisfaction

## 2020-04-02 NOTE — TELEPHONE ENCOUNTER
----- Message from Tara Belcher MD sent at 4/2/2020  9:07 AM CDT -----  Schedule CBC,CMP, TSH and free T4 and see me virtually in 3 weeks and for Alimta and Keytruda.

## 2020-04-02 NOTE — TELEPHONE ENCOUNTER
Called patient to schedule lab at [facility] on 4/22, virtual visit 4/23 and infusion same day.  She will get the chemo nurse to print her appts.

## 2020-04-03 RX ORDER — ERGOCALCIFEROL 1.25 MG/1
CAPSULE ORAL
Qty: 12 CAPSULE | Refills: 3 | Status: SHIPPED | OUTPATIENT
Start: 2020-04-03 | End: 2020-05-27

## 2020-04-03 RX ORDER — FLUTICASONE PROPIONATE 50 MCG
SPRAY, SUSPENSION (ML) NASAL
Qty: 16 G | Refills: 3 | Status: ON HOLD | OUTPATIENT
Start: 2020-04-03 | End: 2021-05-26 | Stop reason: HOSPADM

## 2020-04-03 RX ORDER — OLMESARTAN MEDOXOMIL 20 MG/1
20 TABLET ORAL DAILY
Qty: 90 TABLET | Refills: 3 | Status: ON HOLD | OUTPATIENT
Start: 2020-04-03 | End: 2020-06-27 | Stop reason: SDUPTHER

## 2020-04-03 RX ORDER — ATORVASTATIN CALCIUM 40 MG/1
40 TABLET, FILM COATED ORAL DAILY
Qty: 90 TABLET | Refills: 3 | Status: ON HOLD | OUTPATIENT
Start: 2020-04-03 | End: 2020-05-20

## 2020-04-03 RX ORDER — FLUCONAZOLE 100 MG/1
100 TABLET ORAL DAILY PRN
Qty: 5 TABLET | Refills: 0 | Status: SHIPPED | OUTPATIENT
Start: 2020-04-03 | End: 2020-05-03

## 2020-04-03 NOTE — TELEPHONE ENCOUNTER
----- Message from Yolanda Morris sent at 4/3/2020  3:27 PM CDT -----  Contact: Patient [phone]  Would like to get medical advice.  Symptoms (please be specific):  BP has increase 10points (139/52 pulse was 72 today & 138/54 pulse was 84 yesterday) and headaches  How long has patient had these symptoms:  Last month  [facility] [address]  Any drug allergies (copy from chart):      Would the patient rather a call back or a response via [facility]?:  call  Comments:  Patient would like rx olmesartan (BENICAR) 20 MG tablet she has been cutting in half but should like to have the complete 20 mg

## 2020-04-03 NOTE — TELEPHONE ENCOUNTER
Spoke  with pt, blood pressure #s on pt message.  She wants to get back to taking a whole tablet once daily instead of 1/2 tablet,  Pt is also requesting diflucan 100 mg which she stated you prescribe as needed. She is experiencing itching on her private parts.    Medication to go to mail order [facility]    Thanks     Celeste

## 2020-04-05 ENCOUNTER — PATIENT MESSAGE (OUTPATIENT)
Dept: DIABETES | Facility: CLINIC | Age: 65
End: 2020-04-05

## 2020-04-06 ENCOUNTER — PATIENT MESSAGE (OUTPATIENT)
Dept: DIABETES | Facility: CLINIC | Age: 65
End: 2020-04-06

## 2020-04-08 ENCOUNTER — TELEPHONE (OUTPATIENT)
Dept: REHABILITATION | Facility: HOSPITAL | Age: 65
End: 2020-04-08

## 2020-04-08 NOTE — TELEPHONE ENCOUNTER
Therapy Postponed / COVID-19 pandemic   Patient Check-In Courtesy Call    Courtesy call to check in with patient today to ensure they are doing their home exercise program and to answer any questions. Alison Branch stated she is doing well. She and her daughter are currently moving so she has been very busy with that.  No new exercise recommendations given today.   Patient educated on options of telemedicine upcoming and stated yes for interest and desire to get scheduled but not until she is finished moving, so early may for first telehealth visit.     Matias Nunez, PT  04/08/2020

## 2020-04-14 ENCOUNTER — TELEPHONE (OUTPATIENT)
Dept: HEMATOLOGY/ONCOLOGY | Facility: CLINIC | Age: 65
End: 2020-04-14

## 2020-04-14 ENCOUNTER — TELEPHONE (OUTPATIENT)
Dept: REHABILITATION | Facility: HOSPITAL | Age: 65
End: 2020-04-14

## 2020-04-14 NOTE — TELEPHONE ENCOUNTER
Spoke with patient.  Provided her with disability desk's phone number, as she dropped paperwork off to them. She thanked nurse.

## 2020-04-14 NOTE — TELEPHONE ENCOUNTER
----- Message from Barry Marte sent at 4/14/2020 11:05 AM CDT -----  Contact: Patient  Patient Advice/Staff Message     Caller name: Pt    Reason for call: Pt wants to know if the office received the [facility] paperwork for Bayron Branch that was faxed to medical records a week ago.    Do you feel you need to be seen today:: No        Communication Preference:[phone]    Additional Information:

## 2020-04-21 DIAGNOSIS — Z13.9 SCREENING FOR CONDITION: Primary | ICD-10-CM

## 2020-04-21 NOTE — PROGRESS NOTES
04/21/2020      In an effort to protect our immunocompromised patients from potential exposure to COVID-19, [facility] will now require all patients receiving an infusion, an injection, and/or radiation therapy to be tested for COVID-19 prior to their appointment.  All patients currently under treatment will be tested immediately, and patients initiating new treatment cycles or with one-time appointments (injections, transfusions, etc.) must be tested within 72 hours of their appointment.     Placed COVID-19 test order for patient.  A member of our team is to contact the patient in the near future to explain this process and the rationale behind it, to ask the COVID-19 screening questions, and to get the patient scheduled for their COVID-19 test.     The above was completed in accordance with instructions and guidelines set forth by [facility].     Signed,    Francois Bonilla, RIO     Date:  04/21/2020

## 2020-04-22 ENCOUNTER — CLINICAL SUPPORT (OUTPATIENT)
Dept: HEMATOLOGY/ONCOLOGY | Facility: CLINIC | Age: 65
End: 2020-04-22
Payer: MEDICARE

## 2020-04-22 ENCOUNTER — TELEPHONE (OUTPATIENT)
Dept: HEMATOLOGY/ONCOLOGY | Facility: CLINIC | Age: 65
End: 2020-04-22

## 2020-04-22 ENCOUNTER — CLINICAL SUPPORT (OUTPATIENT)
Dept: DIABETES | Facility: CLINIC | Age: 65
End: 2020-04-22
Payer: MEDICARE

## 2020-04-22 ENCOUNTER — OFFICE VISIT (OUTPATIENT)
Dept: DIABETES | Facility: CLINIC | Age: 65
End: 2020-04-22
Payer: MEDICARE

## 2020-04-22 ENCOUNTER — PATIENT MESSAGE (OUTPATIENT)
Dept: DIABETES | Facility: CLINIC | Age: 65
End: 2020-04-22

## 2020-04-22 DIAGNOSIS — E11.649 TYPE 2 DIABETES MELLITUS WITH HYPOGLYCEMIA WITHOUT COMA, WITH LONG-TERM CURRENT USE OF INSULIN: ICD-10-CM

## 2020-04-22 DIAGNOSIS — Z79.4 TYPE 2 DIABETES MELLITUS WITH DIABETIC POLYNEUROPATHY, WITH LONG-TERM CURRENT USE OF INSULIN: Primary | ICD-10-CM

## 2020-04-22 DIAGNOSIS — E11.42 TYPE 2 DIABETES MELLITUS WITH DIABETIC POLYNEUROPATHY, WITH LONG-TERM CURRENT USE OF INSULIN: ICD-10-CM

## 2020-04-22 DIAGNOSIS — Z79.4 TYPE 2 DIABETES MELLITUS WITH HYPERGLYCEMIA, WITH LONG-TERM CURRENT USE OF INSULIN: ICD-10-CM

## 2020-04-22 DIAGNOSIS — Z79.4 TYPE 2 DIABETES MELLITUS WITH DIABETIC POLYNEUROPATHY, WITH LONG-TERM CURRENT USE OF INSULIN: ICD-10-CM

## 2020-04-22 DIAGNOSIS — Z79.4 TYPE 2 DIABETES MELLITUS WITH HYPOGLYCEMIA WITHOUT COMA, WITH LONG-TERM CURRENT USE OF INSULIN: ICD-10-CM

## 2020-04-22 DIAGNOSIS — E11.65 UNCONTROLLED TYPE 2 DIABETES MELLITUS WITH HYPERGLYCEMIA, WITHOUT LONG-TERM CURRENT USE OF INSULIN: ICD-10-CM

## 2020-04-22 DIAGNOSIS — E11.65 TYPE 2 DIABETES MELLITUS WITH HYPERGLYCEMIA, WITH LONG-TERM CURRENT USE OF INSULIN: ICD-10-CM

## 2020-04-22 DIAGNOSIS — E11.42 TYPE 2 DIABETES MELLITUS WITH DIABETIC POLYNEUROPATHY, WITH LONG-TERM CURRENT USE OF INSULIN: Primary | ICD-10-CM

## 2020-04-22 DIAGNOSIS — T38.0X5A ADRENAL CORTICAL STEROIDS CAUSING ADVERSE EFFECT IN THERAPEUTIC USE: ICD-10-CM

## 2020-04-22 DIAGNOSIS — C34.12 MALIGNANT NEOPLASM OF UPPER LOBE OF LEFT LUNG: ICD-10-CM

## 2020-04-22 DIAGNOSIS — Z79.4 TYPE 2 DIABETES MELLITUS WITH HYPERGLYCEMIA, WITH LONG-TERM CURRENT USE OF INSULIN: Primary | ICD-10-CM

## 2020-04-22 DIAGNOSIS — E11.65 TYPE 2 DIABETES MELLITUS WITH HYPERGLYCEMIA, WITH LONG-TERM CURRENT USE OF INSULIN: Primary | ICD-10-CM

## 2020-04-22 DIAGNOSIS — Z13.9 SCREENING FOR CONDITION: ICD-10-CM

## 2020-04-22 LAB — SARS-COV-2 RNA RESP QL NAA+PROBE: NOT DETECTED

## 2020-04-22 PROCEDURE — 3052F HG A1C>EQUAL 8.0%<EQUAL 9.0%: CPT | Mod: CPTII,,, | Performed by: NURSE PRACTITIONER

## 2020-04-22 PROCEDURE — 99214 PR OFFICE/OUTPT VISIT, EST, LEVL IV, 30-39 MIN: ICD-10-PCS | Mod: 95,,, | Performed by: NURSE PRACTITIONER

## 2020-04-22 PROCEDURE — G0108 DIAB MANAGE TRN  PER INDIV: HCPCS | Mod: 95,,, | Performed by: DIETITIAN, REGISTERED

## 2020-04-22 PROCEDURE — U0002 COVID-19 LAB TEST NON-CDC: HCPCS

## 2020-04-22 PROCEDURE — 99214 OFFICE O/P EST MOD 30 MIN: CPT | Mod: 95,,, | Performed by: NURSE PRACTITIONER

## 2020-04-22 PROCEDURE — G0108 PR DIAB MANAGE TRN  PER INDIV: ICD-10-PCS | Mod: 95,,, | Performed by: DIETITIAN, REGISTERED

## 2020-04-22 PROCEDURE — 3052F PR MOST RECENT HEMOGLOBIN A1C LEVEL 8.0 - < 9.0%: ICD-10-PCS | Mod: CPTII,,, | Performed by: NURSE PRACTITIONER

## 2020-04-22 RX ORDER — INSULIN LISPRO 100 [IU]/ML
INJECTION, SOLUTION INTRAVENOUS; SUBCUTANEOUS
Qty: 45 ML | Refills: 11 | Status: ON HOLD | OUTPATIENT
Start: 2020-04-22 | End: 2020-05-21 | Stop reason: SDUPTHER

## 2020-04-22 NOTE — ASSESSMENT & PLAN NOTE
Uncontrolled.   A1c above goal-- but improving.   BG readings are above goal on steroid days and 3 days following.  She is starting to exercise and continues to work on her diet.      -- Medication Changes: Stop Januvia due to cost and being in the coverage Gap   On nonsteroid days: Levemir 26 units nightly and Humalog 20 units with breakfast and 16 units with lunch and dinner + correction scale goal 150, +1     On steroid days : Levemir 33 units nightly and Humalog 24 units TID AC + correction scale + correction scale goal 150, +2. Continue with the Humalog and Levemir doses 3 days following steroids.      -- Reviewed goals of therapy are to get the best control we can without hypoglycemia.  -- Reviewed patient's current insulin regimen. Clarified proper insulin dose and timing in relation to meals, etc. Insulin injection sites and proper rotation instructed.    -- Advised frequent self blood glucose monitoring.  Patient encouraged to document glucose results and bring them to every clinic visit.- continue to monitor blood sugars 4 times per day.  Paperwork submitted to [employer] for the Dexcom   -- Hypoglycemia precautions discussed. Instructed on precautions before driving.    -- Call for Bg repeatedly < 90 or > 180.   -- Close adherence to lifestyle changes recommended.   -- Periodic follow ups for eye evaluations, foot care and dental care suggested.  -- Refer to diabetes education-- Dexcom teaching     Would benefit greatly from a dexcom cgms. She has BG variability from 350-55--as she is on steroids treatment paired with chemotherapy treatment for lung cancer. Her A1c has greatly increased due to poor control and her being afraid of hypoglycemia-- especially nocturnal hypoglycemia. A dexcom cgms would help her keep control of her BG with real time alerts of hypoglycemia and trends. She checks her BG 4-5x daily.

## 2020-04-22 NOTE — Clinical Note
Hey -- Set her up for a follow-up visit with the you for dex com teaching---may be an like 3-4 weeks--I sent paperwork to [employer] todayThank you

## 2020-04-22 NOTE — PROGRESS NOTES
To:  Staff of Dr. Belcher:    Please phone this patient to let her know that her COVID-19 test came back negative and that, based on that result, she can proceed with treatment as indicated by her treatment provider.  If my assistance is needed, don't hesitate to reach out.      Thanks,  Francois Bonilla, DNP, APRN, FNP-BC, AOCNP  [facility] [place], 3rd Floor  [facility] [address], [place]  [address]  [phone]
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
## 2020-04-22 NOTE — TELEPHONE ENCOUNTER
----- Message from Delia Posey sent at 4/22/2020 10:19 AM CDT -----  Contact: Patient      ----- Message -----  From: Barry Marte  Sent: 4/22/2020  10:12 AM CDT  To: [facility]  Pool    Returning a call     Caller name:: Pt    Who Left Message for Patient:: Michelle    Communication preference:: [phone]    Additional info::

## 2020-04-22 NOTE — ASSESSMENT & PLAN NOTE
Reviewed hypoglycemia management: Treat with 1/2 glass of juice, 1/2 can regular coke, or 4 glucose tablets.   Monitor and repeat treatment every 15 minutes until BG is >70   Then have a snack, which includes a complex carbohydrate and protein.    Discussed with patient that she needs to have a complex carbohydrate snack prior to exercising if her blood sugar is 100 or less--with NO insulin administration

## 2020-04-22 NOTE — ASSESSMENT & PLAN NOTE
On steroids when she receives chemo which is causing Marked hyperglycemia  Alters carbohydrate metabolism  Adjusting prandial insulin doses to compensate for steroids on steroid days and days following.

## 2020-04-23 ENCOUNTER — TELEPHONE (OUTPATIENT)
Dept: PHARMACY | Facility: CLINIC | Age: 65
End: 2020-04-23

## 2020-04-23 ENCOUNTER — HOSPITAL ENCOUNTER (OUTPATIENT)
Dept: CARDIOLOGY | Facility: CLINIC | Age: 65
Discharge: HOME | End: 2020-04-23
Payer: MEDICARE

## 2020-04-23 ENCOUNTER — OFFICE VISIT (OUTPATIENT)
Dept: HEMATOLOGY/ONCOLOGY | Facility: CLINIC | Age: 65
End: 2020-04-23
Payer: MEDICARE

## 2020-04-23 ENCOUNTER — TELEPHONE (OUTPATIENT)
Dept: HEMATOLOGY/ONCOLOGY | Facility: CLINIC | Age: 65
End: 2020-04-23

## 2020-04-23 ENCOUNTER — INFUSION (OUTPATIENT)
Dept: INFUSION THERAPY | Facility: HOSPITAL | Age: 65
End: 2020-04-23
Attending: INTERNAL MEDICINE
Payer: MEDICARE

## 2020-04-23 ENCOUNTER — HOSPITAL ENCOUNTER (OUTPATIENT)
Dept: RADIOLOGY | Facility: HOSPITAL | Age: 65
Discharge: HOME | End: 2020-04-23
Attending: INTERNAL MEDICINE
Payer: MEDICARE

## 2020-04-23 DIAGNOSIS — C34.12 MALIGNANT NEOPLASM OF UPPER LOBE OF LEFT LUNG: ICD-10-CM

## 2020-04-23 DIAGNOSIS — Z79.4 TYPE 2 DIABETES MELLITUS WITH HYPERGLYCEMIA, WITH LONG-TERM CURRENT USE OF INSULIN: ICD-10-CM

## 2020-04-23 DIAGNOSIS — C77.1 SECONDARY MALIGNANT NEOPLASM OF MEDIASTINAL LYMPH NODE: ICD-10-CM

## 2020-04-23 DIAGNOSIS — E11.65 TYPE 2 DIABETES MELLITUS WITH HYPERGLYCEMIA, WITH LONG-TERM CURRENT USE OF INSULIN: ICD-10-CM

## 2020-04-23 DIAGNOSIS — C34.12 MALIGNANT NEOPLASM OF UPPER LOBE OF LEFT LUNG: Primary | ICD-10-CM

## 2020-04-23 PROBLEM — R50.9 FEVER OF UNKNOWN ORIGIN (FUO): Status: RESOLVED | Noted: 2019-12-19 | Resolved: 2020-04-23

## 2020-04-23 PROBLEM — R60.0 EDEMA, PERIPHERAL: Status: RESOLVED | Noted: 2019-11-27 | Resolved: 2020-04-23

## 2020-04-23 PROCEDURE — 25500020 PHARM REV CODE 255: Performed by: INTERNAL MEDICINE

## 2020-04-23 PROCEDURE — 93010 ELECTROCARDIOGRAM REPORT: CPT | Mod: S$GLB,,, | Performed by: INTERNAL MEDICINE

## 2020-04-23 PROCEDURE — 71260 CT CHEST WITH CONTRAST: ICD-10-PCS | Mod: 26,,, | Performed by: RADIOLOGY

## 2020-04-23 PROCEDURE — 74177 CT ABD & PELVIS W/CONTRAST: CPT | Mod: TC

## 2020-04-23 PROCEDURE — 71260 CT THORAX DX C+: CPT | Mod: TC

## 2020-04-23 PROCEDURE — 3052F PR MOST RECENT HEMOGLOBIN A1C LEVEL 8.0 - < 9.0%: ICD-10-PCS | Mod: CPTII,95,, | Performed by: INTERNAL MEDICINE

## 2020-04-23 PROCEDURE — 93010 EKG 12-LEAD: ICD-10-PCS | Mod: S$GLB,,, | Performed by: INTERNAL MEDICINE

## 2020-04-23 PROCEDURE — 71260 CT THORAX DX C+: CPT | Mod: 26,,, | Performed by: RADIOLOGY

## 2020-04-23 PROCEDURE — 99441 PR PHYSICIAN TELEPHONE EVALUATION 5-10 MIN: ICD-10-PCS | Mod: 95,,, | Performed by: INTERNAL MEDICINE

## 2020-04-23 PROCEDURE — 93005 EKG 12-LEAD: ICD-10-PCS | Mod: S$GLB,,, | Performed by: INTERNAL MEDICINE

## 2020-04-23 PROCEDURE — 3052F HG A1C>EQUAL 8.0%<EQUAL 9.0%: CPT | Mod: CPTII,95,, | Performed by: INTERNAL MEDICINE

## 2020-04-23 PROCEDURE — 93005 ELECTROCARDIOGRAM TRACING: CPT | Mod: S$GLB,,, | Performed by: INTERNAL MEDICINE

## 2020-04-23 PROCEDURE — 74177 CT ABDOMEN PELVIS WITH CONTRAST: ICD-10-PCS | Mod: 26,,, | Performed by: RADIOLOGY

## 2020-04-23 PROCEDURE — 99441 PR PHYSICIAN TELEPHONE EVALUATION 5-10 MIN: CPT | Mod: 95,,, | Performed by: INTERNAL MEDICINE

## 2020-04-23 PROCEDURE — 74177 CT ABD & PELVIS W/CONTRAST: CPT | Mod: 26,,, | Performed by: RADIOLOGY

## 2020-04-23 RX ADMIN — IOHEXOL 100 ML: 350 INJECTION, SOLUTION INTRAVENOUS at 11:04

## 2020-04-23 RX ADMIN — IOHEXOL 50 ML: 350 INJECTION, SOLUTION INTRAVENOUS at 01:04

## 2020-04-23 RX ADMIN — IOHEXOL 15 ML: 350 INJECTION, SOLUTION INTRAVENOUS at 11:04

## 2020-04-23 NOTE — PLAN OF CARE
DISCONTINUE ON PATHWAY REGIMEN - Non-Small Cell Lung    [id number]        Pembrolizumab (Keytruda(R))       Pemetrexed (Alimta(R))       Carboplatin (Paraplatin(R))           Additional Orders: Begin folic acid and vitamin B12 supplementation, and   dexamethasone prior to initiation of pemetrexed - see PI for details. In   studies, patients received up to 4 cycles of pembrolizumab + pemetrexed +   carboplatin followed by pembrolizumab up to a total of 35 cycles and pemetrexed   indefinitely until disease progression or toxicity. Serious immune-mediated   adverse events can occur with pembrolizumab. Please monitor your patient and   refer to the linked immune-mediated adverse reaction management materials for   more information.    **Always confirm dose/schedule in your pharmacy ordering system**    REASON: Other Reason  PRIOR TREATMENT: [id number]: Pembrolizumab 200 mg + Pemetrexed 500 mg/m2 +   Carboplatin AUC=5 q21 Days x 4-6 Cycles  TREATMENT RESPONSE: Unable to Evaluate    START ON PATHWAY REGIMEN - Non-Small Cell Lung    [id number]        Entrectinib (Rozlytrek)     **Always confirm dose/schedule in your pharmacy ordering system**    Patient Characteristics:  Stage IV Metastatic, Nonsquamous, Molecular Targeted Therapy, Initial Molecular   Targeted Therapy, ROS1 Rearrangement Positive  AJCC T Category: T2b  Current Disease Status: Distant Metastases  AJCC N Category: N2  AJCC M Category: M1  AJCC 8 Stage Grouping: IV  Histology: Nonsquamous Cell  ROS1 Rearrangement Status: Positive  T790M Mutation Status: Not Applicable - EGFR Mutation Negative/Unknown  Other Mutations/Biomarkers: No Other Actionable Mutations  NTRK Gene Fusion Status: Negative  PD-L1 Expression Status: PD-L1 Positive 1-49% (TPS)  Chemotherapy/Immunotherapy LOT: Not Appropriate  Molecular Targeted Therapy: Initial Molecular Targeted Therapy  ALK Rearrangement Status: Negative  EGFR Mutation Status: Negative/Wild Type  BRAF V600E Mutation Status:  Negative  Intent of Therapy:  Non-Curative / Palliative Intent, Discussed with Patient

## 2020-04-23 NOTE — TELEPHONE ENCOUNTER
Spoke with patient.  CT chest was not completed meliza with a/p.  Spoke with ct----they will take her now---  Patient understands to proceed to chemo afterwards--and dr chapin will speak with her then.

## 2020-04-23 NOTE — PATIENT INSTRUCTIONS
"Entrectinib: Patient drug information  Access [employer] here.  Copyright 3687-7894 [employer] All rights reserved.  (For additional information see "Entrectinib: Drug information" and see "Entrectinib: Pediatric drug information")  Brand Names: US  · Rozlytrek    Brand Names: Ayaka  · Rozlytrek    What is this drug used for?    It is used to treat cancer.    What do I need to tell my doctor BEFORE I take this drug?    If you are allergic to this drug; any part of this drug; or any other drugs, foods, or substances. Tell your doctor about the allergy and what signs you had.    If you take any other drugs (prescription or OTC, natural products, vitamins). There are many drugs that interact with this drug, like certain drugs that are used for HIV, infections, or seizures.    If you are breast-feeding. Do not breast-feed while you take this drug and for 1 week after your last dose.    This is not a list of all drugs or health problems that interact with this drug.    Tell your doctor and pharmacist about all of your drugs (prescription or OTC, natural products, vitamins) and health problems. You must check to make sure that it is safe for you to take this drug with all of your drugs and health problems. Do not start, stop, or change the dose of any drug without checking with your doctor.    What are some things I need to know or do while I take this drug?    Tell all of your health care providers that you take this drug. This includes your doctors, nurses, pharmacists, and dentists.    Avoid driving and doing other tasks or actions that call for you to be alert until you see how this drug affects you.    Have blood work checked as you have been told by the doctor. Talk with the doctor.    You will need to have your heart checked before starting this drug and while taking it. This includes an ECG. Talk with your doctor.    Avoid grapefruit and grapefruit juice.    High uric acid levels have happened with this " drug. Call your doctor right away if you have joint pain, swelling, or stiffness.    This drug may cause or make heart failure worse. Talk with the doctor.    A type of abnormal heartbeat (prolonged QT interval) can happen with this drug. Call your doctor right away if you have a fast heartbeat, a heartbeat that does not feel normal, or if you pass out.    This drug may raise the chance of a broken bone. Talk with the doctor.    Use with care in children. Talk with the doctor.    This drug may cause harm to an unborn baby. A pregnancy test will be done before you start this drug to show that you are NOT pregnant.    Women must use birth control while taking this drug and for some time after the last dose. Ask your doctor how long to use birth control. If you get pregnant, call your doctor right away.    Men with a partner who may get pregnant must use birth control while taking this drug and for some time after the last dose. Ask your doctor how long to use birth control. If your partner gets pregnant, call the doctor right away.    What are some side effects that I need to call my doctor about right away?    WARNING/CAUTION: Even though it may be rare, some people may have very bad and sometimes deadly side effects when taking a drug. Tell your doctor or get medical help right away if you have any of the following signs or symptoms that may be related to a very bad side effect:    Signs of an allergic reaction, like rash; hives; itching; red, swollen, blistered, or peeling skin with or without fever; wheezing; tightness in the chest or throat; trouble breathing, swallowing, or talking; unusual hoarseness; or swelling of the mouth, face, lips, tongue, or throat.    Signs of nervous system problems like a burning, numbness, or tingling feeling; change in balance; confusion; dizziness; hallucinations; memory problems or loss; new or worse behavior or mood changes like anxiety, depression, or thoughts of suicide;  trouble focusing; trouble sleeping; or trouble thinking or speaking.    Signs of liver problems like dark urine, feeling tired, not hungry, upset stomach or stomach pain, light-colored stools, throwing up, or yellow skin or eyes.    Signs of fluid and electrolyte problems like mood changes, confusion, muscle pain or weakness, a heartbeat that does not feel normal, very bad dizziness or passing out, fast heartbeat, more thirst, seizures, feeling very tired or weak, not hungry, unable to pass urine or change in the amount of urine produced, dry mouth, dry eyes, or very bad upset stomach or throwing up.    Signs of a urinary tract infection (UTI) like blood in the urine, burning or pain when passing urine, feeling the need to pass urine often or right away, fever, lower stomach pain, or pelvic pain.    Shortness of breath, a big weight gain, or swelling in the arms or legs.    Bone pain.    Change in eyesight.    If bright lights bother your eyes.    Swelling.    Very bad dizziness or passing out.    Feeling very tired or weak.    Fever, chills, or sore throat.    What are some other side effects of this drug?    All drugs may cause side effects. However, many people have no side effects or only have minor side effects. Call your doctor or get medical help if any of these side effects or any other side effects bother you or do not go away:    Feeling dizzy, tired, or weak.    Constipation, diarrhea, stomach pain, upset stomach, throwing up, or feeling less hungry.    Change in taste.    Muscle pain or weakness.    Back pain.    Pain in arms or legs.    Weight gain.    Cough.    Headache.    These are not all of the side effects that may occur. If you have questions about side effects, call your doctor. Call your doctor for medical advice about side effects.    You may report side effects to your national health agency.    How is this drug best taken?    Use this drug as ordered by your doctor. Read all information  given to you. Follow all instructions closely.    Take with or without food.    Swallow whole. Do not chew, break, open, or dissolve.    Keep taking this drug as you have been told by your doctor or other health care provider, even if you feel well.    If you throw up right after taking this drug, take 1 more dose as soon as you can on the same day. Then go back to your normal time the next day.    What do I do if I miss a dose?    Take a missed dose as soon as you think about it.    If it is less than 12 hours until the next dose, skip the missed dose and go back to your normal time.    Do not take 2 doses at the same time or extra doses.    How do I store and/or throw out this drug?    Store at room temperature.    Store in a dry place. Do not store in a bathroom.    Keep all drugs in a safe place. Keep all drugs out of the reach of children and pets.    Throw away unused or  drugs. Do not flush down a toilet or pour down a drain unless you are told to do so. Check with your pharmacist if you have questions about the best way to throw out drugs. There may be drug take-back programs in your area.    General drug facts    If your symptoms or health problems do not get better or if they become worse, call your doctor.    Do not share your drugs with others and do not take anyone else's drugs.    Some drugs may have another patient information leaflet. If you have any questions about this drug, please talk with your doctor, nurse, pharmacist, or other health care provider.    If you think there has been an overdose, call your poison control center or get medical care right away. Be ready to tell or show what was taken, how much, and when it happened.    Use of UpToDate is subject to the Subscription and License Agreement.  Topic 874633 Version 6.0  Close  The use of UpToDate is subject to theSubscription and License Agreement.  [employer] drug information & [employer] are subject to the [employer] License  Agreement.

## 2020-04-23 NOTE — TELEPHONE ENCOUNTER
Spoke with patient.  She notes she is still waiting for CTscan to be performed.  She will not make her VV with dr chapin for 11am.    Nurse switched her VV to an audio only call--so that dr chapin can call her when results return, as she is scheduled for tx today at 1pm.    Message routed to dr chapin

## 2020-04-23 NOTE — NURSING
Pt arrived to clinic, awaiting call from Dr. Belcher. Patient spoke with Dr. Belcher via telephone. Per MD, chemotherapy cancelled today. Patient discharged home independently via walker.

## 2020-04-23 NOTE — TELEPHONE ENCOUNTER
----- Message from Ashley Mace sent at 4/23/2020 12:37 PM CDT -----  Contact: PT  PT called to say she is on the 5th floor for chemo       Callback: [phone]

## 2020-04-23 NOTE — TELEPHONE ENCOUNTER
----- Message from Barry Marte sent at 4/23/2020 10:43 AM CDT -----  Contact: Patient  Patient Advice/Staff Message     Caller name: Pt    Reason for call: Pt calling to let the doctor know she may be a little late for her appt, she's still at the imaging center for her CT.    Do you feel you need to be seen today:: No        Communication Preference: [phone]    Additional Information:

## 2020-04-23 NOTE — Clinical Note
Sent Entrectinib to her pharmacy. Please schedule CBC,CMP and see me as soon as the drug is approved. Also needs Xgeva on that day.Also did not send this to schedulers, only to you to avoid confusion

## 2020-04-23 NOTE — PROGRESS NOTES
Established Patient - Audio Only Telehealth Visit     The patient location is: chemo unit  The chief complaint leading to consultation is: lung cancer  Visit type: Virtual visit with audio only (telephone)     The reason for the audio only service rather than synchronous audio and video virtual visit was related to technical difficulties or patient preference/necessity.     Each patient to whom I provide medical services by telemedicine is:  (1) informed of the relationship between the physician and patient and the respective role of any other health care provider with respect to management of the patient; and (2) notified that they may decline to receive medical services by telemedicine and may withdraw from such care at any time. Patient verbally consented to receive this service via voice-only telephone call.     Ms. Branch is a 63-year-old female who smoked for about 30 years and quit 20 years ago, presented with cough end of last year, but worsened into January.  Since the cough persisted, she saw her PCP, underwent a chest x-ray on 05/10/2019, that revealed left upper lobe pneumonia and a repeat CT was done in the week after that on 05/17/2019 that showed left upper lobe   mass arising from the lateral pleural surface in the left upper lobe posterior subsegment measuring 2.6 x 3 cm.  There are satellite mass more medially near the aortic arch that is 2 cm, also spiculated and irregular as well as thickened interlobar septa in the left lung apex and anterior segment, prevascular lymph node lateral to the aortic arch, short axis measuring 9 mm.       She then underwent a bronchoscopy on 05/28/2019, and that revealed there was an infiltration obtained in the left apical posterior segment of the left upper lobe cytology brush was done.  Transbronchial biopsies of an area of infiltration were also performed in the apicoposterior segment of the left upper lobe.    Pathology revealed adenocarcinoma; however, the  "specimen was not adequate enough to send for molecular testing.       She underwent a PET scan on 06/06/2019.  that reveals significant hypermetabolic activity in the large irregular spiculated pulmonary mass in the lateral aspect of the left upper lobe consistent with the patient's known pulmonary adenocarcinoma.  There is also tracer avidity in the medial left upper lobe satellite lesion and diffusely throughout much of the anterior left upper lobe where there is prominent nodular paraseptal thickening.  There are some numerous scattered subcentimeter pulmonary nodules throughout the right lung, all of which are too small for detection by PET.  For example, there is a 0.4 cm nodule in the superior right lower lobe and a micro nodule in the posterior segment of the right upper lobe.  There is a 0.5 cm, normal size right   paratracheal lymph node with increased radiotracer uptake as well as hypermetabolic aortic pulmonary lymph node and a left hilar lymph node.  There is a focal hypermetabolic lesion in the left anterior superior iliac spine associated with well defined lytic lesion.  There is a hypermetabolic focus in the anterior right fifth rib with a possible associated lytic lesion.  SUVs as   below lateral:  Left upper lobe  SUV max 17.9, anterior left upper lobe satellite mass and septal thickening SUV max of 10.1, right paratracheal lymph node SUV max of 4.8, left AP window lymph node SUV max of 17.9, left anterior superior iliac spine SUV max of 3.9"     MRI pelvis from 6/10/19  reveals "Region of osseous is irregularity at the left anterior iliac spine likely related to bone graft harvest procedure.  See  discussion above.  No suspicious signal or enhancement to suggest active/malignant process"   MRI brain from 6/10//19 reveal No intracranial abnormality.     We discussed her case at [facility], and plan was to wait for GAURDANT and proceed with treatment accordingly  Her GAURDANT is negative for " "molecular markers.     She has completed 4 cycles of Carboplatin, Alimta and Keytruda as of 9/5/19. She is now on  ALimta and Keytruda maintenance.           Marilee has been on maintenance Alimta and Keytruda     She comes in to review her CT scans from today 4/23/2020 which reveal "In this patient with a known history of lung cancer, there has been marked interval detrimental change when compared to CT dated 12/20/2019 as follows. Persistent left upper lobe volume loss with worsening masslike consolidation and enlarging index and non index lesions. Increased number of innumerable bilateral metastatic solid pulmonary nodules. Interval increased size and number of multiple osteoblastic metastatic lesions throughout the visualized axial skeleton. Stable mediastinal lymph nodes, several of which were noted to be hypermetabolic on previous PET-CT.  No new lymphadenopathy. Stable subcentimeter hepatic and splenic hypodensities, too small to accurately characterize.  She notes worsening cough and voice hoarseness. Path addendum from 5/2019 reveals ROS1     She denies any other new issues, her ECOG PS is zero.      Assessment and plan:  Lung cancer, secondary bone cancer: Reviewed CT scans with her and they reveal progression, reviewed old pathology, she has Ros1, sent Entrectinib to [facility].  Ratione of this change was discussed extensively with her and she is agreeable.  Will plan on seeing her once drug is approved. EKG today.  She will continue with Xgeva.    Above care plan was discussed with patient and all questions were addressed to her satisfaction                           This service was not originating from a related E/M service provided within the previous 7 days nor will  to an E/M service or procedure within the next 24 hours or my soonest available appointment.  Prevailing standard of care was able to be met in this audio-only visit.      "

## 2020-04-23 NOTE — TELEPHONE ENCOUNTER
Informed Patient  that [facility] received prescription for Rozlytrek and prior authorization is required.  [facility] will be back in touch once insurance determination is received.

## 2020-04-24 ENCOUNTER — PATIENT MESSAGE (OUTPATIENT)
Dept: DIABETES | Facility: CLINIC | Age: 65
End: 2020-04-24

## 2020-04-24 VITALS — HEIGHT: 69 IN | BODY MASS INDEX: 32.09 KG/M2 | WEIGHT: 216.69 LBS

## 2020-04-24 DIAGNOSIS — C34.12 MALIGNANT NEOPLASM OF UPPER LOBE OF LEFT LUNG: ICD-10-CM

## 2020-04-24 RX ORDER — LIDOCAINE AND PRILOCAINE 25; 25 MG/G; MG/G
CREAM TOPICAL
Qty: 30 G | Refills: 0 | Status: SHIPPED | OUTPATIENT
Start: 2020-04-24 | End: 2020-09-03

## 2020-04-24 NOTE — TELEPHONE ENCOUNTER
Rozlytrek is approved by the patient's insurance with a high copay. We will be assisting the patient in applying to the  patient assistance program. Sending a staff message to Dr Belcher regarding assistance application and faxing application prescription for her review and signature.

## 2020-04-24 NOTE — PROGRESS NOTES
Diabetes Education  Author: Shelly Crocker RD, CDE  Date: 4/24/2020    Diabetes Care Management Summary  Diabetes Education Record Assessment/Progress: Comprehensive/Group  Current Diabetes Risk Level: High     Last A1c:   Lab Results   Component Value Date    HGBA1C 8.7 (H) 02/17/2020     Last Visit with Diabetes Educator: Last Education Visit: Not Found  Last OPCM Referral: : Not Found   Enrolled in OPCM: No  Diabetes Care Specialist Virtual Visit Note   It was in the patient's best interest to receive diabetes self-management education and support services in this format to prevent the exposure to COVID-19.        The patient location is: home   The chief complaint leading to consultation is: Diabetes  Visit type: audiovisual  Total time spent with patient: 40 min   Each patient to whom he or she provides medical services by telemedicine is:  (1) informed of the relationship between the physician and patient and the respective role of any other health care provider with respect to management of the patient; and (2) notified that he or she may decline to receive medical services by telemedicine and may withdraw from such care at any time.    Diabetes Type  Diabetes Type : Type II    Diabetes History  Diabetes Diagnosis: >10 years  Current Treatment: Insulin  Reviewed Problem List with Patient: Yes    Health Maintenance was reviewed today with patient. Discussed with patient importance of routine eye exams, foot exams/foot care, blood work (i.e.: A1c, microalbumin, and lipid), dental visits, yearly flu vaccine, and pneumonia vaccine as indicated by PCP. Patient verbalized understanding.     Health Maintenance Topics with due status: Not Due       Topic Last Completion Date    Colonoscopy 04/08/2016    TETANUS VACCINE 08/09/2016    Lipid Panel 05/10/2019    Foot Exam 08/29/2019    Mammogram 09/13/2019    Eye Exam 10/09/2019    Pneumococcal Vaccine (Highest Risk) 11/05/2019    Hemoglobin A1c 02/17/2020    Low Dose  Statin 04/03/2020     There are no preventive care reminders to display for this patient.    Nutrition  Meal Planning: 3 meals per day, snacks between meal, water, eats out seldom  What type of sweetener do you use?: sugar  What type of beverages do you drink?: water, juice, diet soda/tea(1/2 and 1/2 tea)  Meal Plan 24 Hour Recall - Breakfast: 7:20am 1c grits with butter  Meal Plan 24 Hour Recall - Lunch: 12:00PM Grilled Chicken Salad  Meal Plan 24 Hour Recall - Dinner: Smothered Shrimp and Eggplant  Meal Plan 24 Hour Recall - Snack: stopped snacking    Monitoring   Monitoring: Other(true metrix)  Self Monitoring : four times a day at least(Once daily)  Blood Glucose Logs: Yes  Do you use a personal continuous glucose monitor?: No(could benefit from CGM - Dexcom ordered)  In the last month, how often have you had a low blood sugar reaction?: once  What are your symptoms of low blood sugar?: weak, disoriented, shaky  How do you treat low blood sugar?: juice, if too bad will go to the emergancy room  Can you tell when your blood sugar is too high?: yes  How do you treat high blood sugar?: take insulin    Exercise   Exercise Type: none  Frequency: Never    Current Diabetes Treatment   Current Treatment: Insulin    Social History  Preferred Learning Method: Face to Face  Primary Support: Self, Family, Daughter  Smoking Status: Ex Smoker  Alcohol Use: Rarely                                Barriers to Change  Barriers to Change: None  Learning Challenges : None    Readiness to Learn   Readiness to Learn : Acceptance    Cultural Influences  Cultural Influences: No    Diabetes Education Assessment/Progress  Diabetes Disease Process (diabetes disease process and treatment options): Comprehends Key Points, Discussion, Individual Session  Nutrition (Incorporating nutritional management into one's lifestyle): Comprehends Key Points, Demonstration, Discussion, Individual Session  Physical Activity (incorporating physical  activity into one's lifestyle): Instructed, Comprehends Key Points, Discussion, Individual Session  Medications (states correct name, dose, onset, peak, duration, side effects & timing of meds): Comprehends Key Points, Discussion, Individual Session  Monitoring (monitoring blood glucose/other parameters & using results): Comprehends Key Points, Discussion, Individual Session  Acute Complications (preventing, detecting, and treating acute complications): Comprehends Key Points, Individual Session, Discussion  Chronic Complications (preventing, detecting, and treating chronic complications): Comprehends Key Points, Discussion, Individual Session  Clinical (diabetes, other pertinent medical history, and relevant comorbidities reviewed during visit): Comprehends Key Points, Discussion, Individual Session  Cognitive (knowledge of self-management skills, functional health literacy): Comprehends Key Points, Discussion, Individual Session  Psychosocial (emotional response to diabetes): Comprehends Key Points, Discussion, Individual Session  Diabetes Distress and Support Systems: Comprehends Key Points, Discussion, Individual Session  Behavioral (readiness for change, lifestyle practices, self-care behaviors): Comprehends Key Points, Discussion, Individual Session    Goals  Patient has selected/evaluated goals during today's session: Yes, evaluated  Healthy Eating: In Progress  Physical Activity: In Progress  Monitoring: In Progress         Diabetes Care Plan/Intervention  Education Plan/Intervention: Individual Follow-Up DSMT    Diabetes Meal Plan  Restrictions: Low Fat, Low Sodium, Restricted Carbohydrate  Calories: 1800  Carbohydrate Per Meal: 30-45g  Carbohydrate Per Snack : 7-15g  Fat: 50  Protein: 135    Today's Self-Management Care Plan was developed with the patient's input and is based on barriers identified during today's assessment.    The long and short-term goals in the care plan were written with the  patient/caregiver's input. The patient has agreed to work toward these goals to improve her overall diabetes control.      The patient received a copy of today's self-management plan and verbalized understanding of the care plan, goals, and all of today's instructions.      The patient was encouraged to communicate with her physician and care team regarding her condition(s) and treatment.  I provided the patient with my contact information today and encouraged her to contact me via phone or patient portal as needed.     Education Units of Time   Time Spent: 30 min

## 2020-04-24 NOTE — TELEPHONE ENCOUNTER
DOCUMENTATION ONLY:  Prior authorization for Rozlytrek 200 mg Tablet #90/30 approved from 04/24/2020 to 12/31/2020  Case ID: [id number]    Co-pay: $2,057.75    Patient Assistance IS required. Sending to the financial assistance team to investigate assistance options. - PENG

## 2020-04-29 ENCOUNTER — TELEPHONE (OUTPATIENT)
Dept: REHABILITATION | Facility: HOSPITAL | Age: 65
End: 2020-04-29

## 2020-04-29 NOTE — TELEPHONE ENCOUNTER
Spoke with patient regarding her missed telehealth apt yesterday. She reports she took a pain pill and fell asleep inadvertently. PT offered to reschedule this appointment but patient decided with everything else going on she would rather just wait until she can come in person. Pt expressed that she is doing well and would feel better if she can come in so I can watch her better. PT informed that I do not have information on scheduling yet, but I will be in touch when I have more information.    Matias Nunez, PT  4/29/2020

## 2020-04-30 ENCOUNTER — PATIENT MESSAGE (OUTPATIENT)
Dept: DIABETES | Facility: CLINIC | Age: 65
End: 2020-04-30

## 2020-05-04 ENCOUNTER — HOSPITAL ENCOUNTER (OUTPATIENT)
Dept: RADIOLOGY | Facility: HOSPITAL | Age: 65
Discharge: HOME | End: 2020-05-04
Attending: INTERNAL MEDICINE
Payer: MEDICARE

## 2020-05-04 DIAGNOSIS — C34.12 MALIGNANT NEOPLASM OF UPPER LOBE OF LEFT LUNG: ICD-10-CM

## 2020-05-04 PROCEDURE — 70553 MRI BRAIN W WO CONTRAST: ICD-10-PCS | Mod: 26,,, | Performed by: RADIOLOGY

## 2020-05-04 PROCEDURE — 70553 MRI BRAIN STEM W/O & W/DYE: CPT | Mod: 26,,, | Performed by: RADIOLOGY

## 2020-05-04 PROCEDURE — 70553 MRI BRAIN STEM W/O & W/DYE: CPT | Mod: TC

## 2020-05-04 PROCEDURE — A9585 GADOBUTROL INJECTION: HCPCS | Performed by: INTERNAL MEDICINE

## 2020-05-04 PROCEDURE — 25500020 PHARM REV CODE 255: Performed by: INTERNAL MEDICINE

## 2020-05-04 RX ORDER — GADOBUTROL 604.72 MG/ML
10 INJECTION INTRAVENOUS
Status: COMPLETED | OUTPATIENT
Start: 2020-05-04 | End: 2020-05-04

## 2020-05-04 RX ADMIN — GADOBUTROL 10 ML: 604.72 INJECTION INTRAVENOUS at 08:05

## 2020-05-12 ENCOUNTER — OFFICE VISIT (OUTPATIENT)
Dept: NEUROLOGY | Facility: CLINIC | Age: 65
End: 2020-05-12
Payer: MEDICARE

## 2020-05-12 DIAGNOSIS — T45.1X5A CHEMOTHERAPY-INDUCED PERIPHERAL NEUROPATHY: ICD-10-CM

## 2020-05-12 DIAGNOSIS — G62.0 CHEMOTHERAPY-INDUCED PERIPHERAL NEUROPATHY: ICD-10-CM

## 2020-05-12 DIAGNOSIS — F33.0 MILD EPISODE OF RECURRENT MAJOR DEPRESSIVE DISORDER: ICD-10-CM

## 2020-05-12 DIAGNOSIS — R41.3 MEMORY DEFICIT: Primary | ICD-10-CM

## 2020-05-12 DIAGNOSIS — R41.840 ATTENTION AND CONCENTRATION DEFICIT: ICD-10-CM

## 2020-05-12 DIAGNOSIS — G47.33 OSA (OBSTRUCTIVE SLEEP APNEA): ICD-10-CM

## 2020-05-12 PROCEDURE — 99214 OFFICE O/P EST MOD 30 MIN: CPT | Mod: 95,,, | Performed by: PSYCHIATRY & NEUROLOGY

## 2020-05-12 PROCEDURE — 99499 RISK ADDL DX/OHS AUDIT: ICD-10-PCS | Mod: 95,,, | Performed by: PSYCHIATRY & NEUROLOGY

## 2020-05-12 PROCEDURE — 99214 PR OFFICE/OUTPT VISIT, EST, LEVL IV, 30-39 MIN: ICD-10-PCS | Mod: 95,,, | Performed by: PSYCHIATRY & NEUROLOGY

## 2020-05-12 PROCEDURE — 99499 UNLISTED E&M SERVICE: CPT | Mod: 95,,, | Performed by: PSYCHIATRY & NEUROLOGY

## 2020-05-12 NOTE — PROGRESS NOTES
[facility]  [facility] - NEUROLOGY  [facility], [place]    Date: May 12, 2020   Patient Name: Alison Branch   MRN: [id number]   PCP: Mike Rodriguez Ii  Referring Provider: No ref. provider found    Assessment:      The patient location is: home  The chief complaint leading to consultation is: memory, neuropathy  Visit type: audiovisual  Total time spent with patient: 20 minutes  Each patient to whom he or she provides medical services by telemedicine is:  (1) informed of the relationship between the physician and patient and the respective role of any other health care provider with respect to management of the patient; and (2) notified that he or she may decline to receive medical services by telemedicine and may withdraw from such care at any time.    This is Alison Branch, 64 y.o. female with DM and lung cancer s/p chemotherapy 2019 with related neuropathy and balance difficulty presents for cognitive difficulties in the setting of mood and sleep disturbances, MRI brain without pathology to explain.     Plan:      -  Home sleep study  -  Continue GBP 300mg qhs  -  Continue lexapro and follow up with psychology    Return in 3 months, consider neuropsychology testing if no improvement       I discussed side effects of the medications. I asked the patient to stop the medication if she notices serious adverse effects as we discussed and to seek immediate medical attention at an ER.     David Mo MD  [facility]   Department of Neurology    Subjective:   -  Condition generally stable, notes she often gets side track and forgets insulin or other medications and does not notice until later in the day, continues to remain busy with activities such as reading and cooking which she does without difficulty.  Continues to drive and assist in care of grandchildren.  -  Marked improvement in neuropathy pain with GBP, notes she tends to sleep from 11-12pm to 11am with snoring and some  headaches on awakening, tends to nap in the afternoon when waiting to pick grand daughter up from school  -  Has been living with daughter to avoid excess isolation since quarantine took effect     HPI:   Ms. Alison Branch is a 64 y.o. female who presents with a chief complaint of memory difficulty    Patient reports first noting cognitive difficulty approximately 2010 around the time of intracranial aneurysm coil although she was concurrently diagnosed with depression around that time.  She describes difficulty with repeating herself and forgetting things people have told her although she remains fully independent.  She formerly worked at a [employer] but retired in 2009.  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  She reports frequent night time awakenings and unrestful sleep due in part to neuropathic pain in her feet which developed during chemotherapy, no history of treatment for this.  She has also developed balance difficulty and near falls over the past year.  She established talk therapy for depression 9/2019 and started lexapro a week ago.    PAST MEDICAL HISTORY:  Past Medical History:   Diagnosis Date    Allergy     Brain aneurysm 2010    s/p coiling of one; another not coiled    Breast cyst     Depression 9/19/2019    Diabetes mellitus     Diabetes type 2, controlled     Fever blister     High cholesterol     History of Bell's palsy     HTN (hypertension) 5/20/2014    Recurrent upper respiratory infection (URI)        PAST SURGICAL HISTORY:  Past Surgical History:   Procedure Laterality Date    BREAST CYST ASPIRATION      BRONCHOSCOPY N/A 5/28/2019    Procedure: Bronchoscopy;  Surgeon: Iesha Diagnostic Provider;  Location: [facility] OR [address] [place] [address];  Service: Anesthesiology;  Laterality: N/A;    CERVICAL FUSION      COLONOSCOPY N/A 3/9/2016    Procedure: COLONOSCOPY;  Surgeon: Elliott Zimmerman MD;   "Location: [facility] ENDO (4TH FLR);  Service: Endoscopy;  Laterality: N/A;    head surgery      stent and "curling" for aneurysm    HYSTERECTOMY      TVH secondary to SUF    INSERTION OF TUNNELED CENTRAL VENOUS CATHETER (CVC) WITH SUBCUTANEOUS PORT Right 7/8/2019    Procedure: INSERTION, PORT-A-CATH;  Surgeon: Cali Damico MD;  Location: [facility] CATH LAB;  Service: Radiology;  Laterality: Right;       CURRENT MEDS:  Current Outpatient Medications   Medication Sig Dispense Refill    aspirin (ECOTRIN) 81 MG EC tablet Take 1 tablet (81 mg total) by mouth once daily.  0    atorvastatin (LIPITOR) 40 MG tablet Take 1 tablet (40 mg total) by mouth once daily. 90 tablet 3    benzonatate (TESSALON PERLES) 100 MG capsule Take 1 capsule (100 mg total) by mouth 2 (two) times daily. 60 capsule 1    bisacodyl (DULCOLAX) 5 mg EC tablet Take 5 mg by mouth daily as needed for Constipation.      blood sugar diagnostic Strp To check BG 4 times daily, to use with insurance preferred meter 200 each 11    blood sugar diagnostic Strp To check BG 5 times per day 450 each 3    blood-glucose meter kit Use as instructed 1 each 0    blood-glucose meter kit To check BG 4 times daily, to use with insurance preferred meter 1 each 0    blood-glucose meter,continuous (DEXCOM G6 ) Misc 1 Device by Misc.(Non-Drug; Combo Route) route once. for 1 dose 1 each 0    blood-glucose sensor (DEXCOM G6 SENSOR) Jami 1 sensor every 10 days 3 Device 11    blood-glucose transmitter (DEXCOM G6 TRANSMITTER) Jaim 1 transmitter every 3 months 1 Device 4    carboxymethylcellulose (REFRESH PLUS) 0.5 % Dpet 1 drop 3 (three) times daily as needed.      cetirizine (ZYRTEC) 10 MG tablet 1-2 tablets daily as needed for runny nose 100 tablet 1    dexAMETHasone (DECADRON) 6 MG tablet Take 1 tablet by mouth two times a day with food the day before chemotherapy and for two days after chemotherapy. Do not take the day of chemotherapy. 24 tablet 4    " diazePAM (VALIUM) 5 MG tablet TAKE 1 TABLET BY MOUTH ONCE DAILY AS NEEDED FOR ANXIETY  2    diazePAM (VALIUM) 5 MG tablet Take 1 tablet 30 minutes before MRI (Patient not taking: Reported on 3/26/2020) 1 tablet 0    entrectinib (ROZLYTREK) 200 mg oral tablet Take 3 capsules (600 mg total) by mouth once daily. 90 capsule 2    ergocalciferol (ERGOCALCIFEROL) 50,000 unit Cap TAKE 1 CAPSULE BY MOUTH EVERY 7 DAYS 12 capsule 3    escitalopram oxalate (LEXAPRO) 10 MG tablet Take 10 mg by mouth once daily.       fluticasone propionate (FLONASE) 50 mcg/actuation nasal spray USE TWO SPRAY(S) IN EACH NOSTRIL ONCE DAILY 16 g 3    folic acid (FOLVITE) 1 MG tablet Take 1 tablet (1 mg total) by mouth once daily. Start today 100 tablet 3    gabapentin (NEURONTIN) 300 MG capsule Take 1 capsule (300 mg total) by mouth nightly as needed (Take as needed at bed time for neuropathy pain and sleep). 90 capsule 3    hydroCHLOROthiazide (MICROZIDE) 12.5 mg capsule Take 1 capsule (12.5 mg total) by mouth daily as needed (for swelling). 30 capsule 1    insulin detemir U-100 (LEVEMIR FLEXTOUCH) 100 unit/mL (3 mL) SubQ InPn pen Steroid day: Inject 26-33 under the skin units daily. Non-steroid day: Inject 26 units daily 15 mL 11    insulin lispro (HUMALOG KWIKPEN INSULIN) 100 unit/mL pen Inject subcutaneously 20-16-16 units plus sliding scale.  24 units on steroid days Max  units. 45 mL 11    lancets Misc 1 Device by Misc.(Non-Drug; Combo Route) route once daily. Heladio Result.  250.02.  Check Blood Sugar Twice Daily. 200 each 3    lancets Misc To check BG 4 times daily, to use with insurance preferred meter 200 each 11    lidocaine-prilocaine (EMLA) cream Apply to PORT one hour prior to chemo administration. 30 g 0    olmesartan (BENICAR) 20 MG tablet Take 1 tablet (20 mg total) by mouth once daily. 90 tablet 3    ondansetron (ZOFRAN) 8 MG tablet Take 1 tablet (8 mg total) by mouth 4 (four) times daily as needed for Nausea.  "(Patient not taking: Reported on 3/26/2020) 60 tablet 2    pen needle, diabetic (BD ULTRA-FINE BRYANT PEN NEEDLE) 32 gauge x 5/32" Ndle To use 5 times per day with insulin injections. 150 each 11    pen needle, diabetic 33 gauge x 5/32" Ndle 1 each by Misc.(Non-Drug; Combo Route) route 4 (four) times daily with meals and nightly. 100 each 5    phenazopyridine (PYRIDIUM) 200 MG tablet Take 1 tablet (200 mg total) by mouth 3 (three) times daily as needed for Pain. 20 tablet 0    terconazole (TERAZOL 7) 0.4 % Crea INSERT 1 APPLICATORFUL VAGINALLY ONCE DAILY IN THE EVENING (Patient not taking: Reported on 3/26/2020) 45 g 0    walker Misc Please provide rollator walker for this debilitated cancer patient.  Thank you. (Patient not taking: Reported on 3/26/2020)  0     No current facility-administered medications for this visit.        ALLERGIES:  Review of patient's allergies indicates:  No Known Allergies    FAMILY HISTORY:  Family History   Problem Relation Age of Onset    Rheum arthritis Father     Diabetes Father     Heart failure Father     Migraines Father     Cataracts Father     Cancer Mother 63        pancreatic    Stomach cancer Mother     Breast cancer Maternal Aunt         50s    Ovarian cancer Cousin     Allergies Daughter     Diabetes Sister     Diabetes Brother     Cancer Maternal Aunt         Lung CA    Melanoma Neg Hx     Colon cancer Neg Hx     Amblyopia Neg Hx     Blindness Neg Hx     Glaucoma Neg Hx     Macular degeneration Neg Hx     Retinal detachment Neg Hx     Strabismus Neg Hx     Stroke Neg Hx     Thyroid disease Neg Hx     Allergic rhinitis Neg Hx     Angioedema Neg Hx     Asthma Neg Hx     Atopy Neg Hx     Eczema Neg Hx     Immunodeficiency Neg Hx     Rhinitis Neg Hx     Urticaria Neg Hx        SOCIAL HISTORY:  Social History     Tobacco Use    Smoking status: Former Smoker     Packs/day: 0.50     Years: 30.00     Pack years: 15.00     Last attempt to quit: " 1999     Years since quittin.3    Smokeless tobacco: Never Used   Substance Use Topics    Alcohol use: No     Alcohol/week: 0.0 standard drinks    Drug use: No       Review of Systems:  12 review of systems is negative except for the symptoms mentioned in HPI.        Objective:     There were no vitals filed for this visit.    General: NAD, well nourished   Eyes: no tearing, discharge, no erythema   ENT: moist mucous membranes of the oral cavity, nares patent    Neck: Supple, full range of motion  Cardiovascular: Warm and well perfused  Lungs: Normal work of breathing, normal chest wall excursions  Skin: No rash, lesions, or breakdown on exposed skin  Psychiatry: psychomotor slowing not noted as on prior visit  Abdomen: non distended  Extremeties: No cyanosis, clubbing or edema.    Neurological   MENTAL STATUS: Alert and oriented to person, place, and time. Speech without dysarthria, able to name and repeat without difficulty.   CRANIAL NERVES: Visual fields intact. PERRL. EOMI. Facial sensation intact. Face symmetrical. Hearing grossly intact. Full shoulder shrug bilaterally. Tongue protrudes midline   SENSORY: Sensation is intact to light touch throughout.    MOTOR: Normal bulk and tone. No pronator drift.     CEREBELLAR/COORDINATION/GAIT: Gait steady with normal arm swing and stride length.

## 2020-05-13 NOTE — TELEPHONE ENCOUNTER
FOR DOCUMENTATION ONLY:  Financial Assistance for Rozlytrek is approved from 5/12/20 to 12/31/20  Source: [employer] Patient Assistance Program  Phone: [phone]  Fax: [phone]  Dispensing Pharmacy: [facility]

## 2020-05-13 NOTE — TELEPHONE ENCOUNTER
Patient was approved to receive her Rozlytrek from [employer] Patient Assistance through 12/31/20 with $0.00 copay. Patient has been informed and provided with information needed to schedule a shipment and request refills. Sending a staff message to Dr Belcher regarding Rozlytrek assistance approval.

## 2020-05-14 ENCOUNTER — TELEPHONE (OUTPATIENT)
Dept: HEMATOLOGY/ONCOLOGY | Facility: CLINIC | Age: 65
End: 2020-05-14

## 2020-05-14 ENCOUNTER — PATIENT MESSAGE (OUTPATIENT)
Dept: DIABETES | Facility: CLINIC | Age: 65
End: 2020-05-14

## 2020-05-14 ENCOUNTER — PATIENT MESSAGE (OUTPATIENT)
Dept: INTERNAL MEDICINE | Facility: CLINIC | Age: 65
End: 2020-05-14

## 2020-05-14 ENCOUNTER — TELEPHONE (OUTPATIENT)
Dept: DERMATOLOGY | Facility: CLINIC | Age: 65
End: 2020-05-14

## 2020-05-14 DIAGNOSIS — E11.65 UNCONTROLLED TYPE 2 DIABETES MELLITUS WITH HYPERGLYCEMIA, WITHOUT LONG-TERM CURRENT USE OF INSULIN: ICD-10-CM

## 2020-05-14 DIAGNOSIS — Z13.9 SCREENING FOR CONDITION: Primary | ICD-10-CM

## 2020-05-14 DIAGNOSIS — E11.65 TYPE 2 DIABETES MELLITUS WITH HYPERGLYCEMIA, WITH LONG-TERM CURRENT USE OF INSULIN: Primary | ICD-10-CM

## 2020-05-14 DIAGNOSIS — Z79.4 TYPE 2 DIABETES MELLITUS WITH HYPERGLYCEMIA, WITH LONG-TERM CURRENT USE OF INSULIN: Primary | ICD-10-CM

## 2020-05-14 NOTE — TELEPHONE ENCOUNTER
----- Message from Dorothea Agustin sent at 5/14/2020 10:09 AM CDT -----  Contact: Self  Pt is calling to speak with Staff regarding being rescheduled.  Pt says she spoke with someone on Staff that was supposed to return her call but didn't.    She can be reached at [phone].    Thank you.

## 2020-05-14 NOTE — TELEPHONE ENCOUNTER
----- Message from Tara Belcher MD sent at 5/13/2020  2:38 PM CDT -----  Regarding: RE: Rozlytrek Assistance Approval  Please schedule labs and a virtual visit with me next week,   'CBC,CMP will do. Thanks   ----- Message -----  From: Bijal Mejia  Sent: 5/13/2020   2:34 PM CDT  To: Tara Belcher MD, Ye OQUENDO Staff  Subject: Rozlytrek Assistance Approval                    Hi Dr Belcher and Staff,    Patient was approved to receive her Rozlytrek from [employer] Patient Assistance through 12/31/20 with $0.00 copay. Patient has been informed and provided with information needed to schedule a shipment and request refills.    Thank you,  Bijal

## 2020-05-15 ENCOUNTER — PATIENT MESSAGE (OUTPATIENT)
Dept: INTERNAL MEDICINE | Facility: CLINIC | Age: 65
End: 2020-05-15

## 2020-05-18 ENCOUNTER — TELEPHONE (OUTPATIENT)
Dept: HEMATOLOGY/ONCOLOGY | Facility: CLINIC | Age: 65
End: 2020-05-18

## 2020-05-18 ENCOUNTER — CLINICAL SUPPORT (OUTPATIENT)
Dept: HEMATOLOGY/ONCOLOGY | Facility: CLINIC | Age: 65
DRG: 176 | End: 2020-05-18
Payer: MEDICARE

## 2020-05-18 DIAGNOSIS — Z13.9 SCREENING FOR CONDITION: ICD-10-CM

## 2020-05-18 LAB — SARS-COV-2 RNA RESP QL NAA+PROBE: NOT DETECTED

## 2020-05-18 PROCEDURE — U0003 INFECTIOUS AGENT DETECTION BY NUCLEIC ACID (DNA OR RNA); SEVERE ACUTE RESPIRATORY SYNDROME CORONAVIRUS 2 (SARS-COV-2) (CORONAVIRUS DISEASE [COVID-19]), AMPLIFIED PROBE TECHNIQUE, MAKING USE OF HIGH THROUGHPUT TECHNOLOGIES AS DESCRIBED BY CMS-2020-01-R: HCPCS

## 2020-05-19 ENCOUNTER — OFFICE VISIT (OUTPATIENT)
Dept: HEMATOLOGY/ONCOLOGY | Facility: CLINIC | Age: 65
DRG: 176 | End: 2020-05-19
Payer: MEDICARE

## 2020-05-19 ENCOUNTER — TELEPHONE (OUTPATIENT)
Dept: SLEEP MEDICINE | Facility: OTHER | Age: 65
End: 2020-05-19

## 2020-05-19 ENCOUNTER — TELEPHONE (OUTPATIENT)
Dept: HEMATOLOGY/ONCOLOGY | Facility: CLINIC | Age: 65
End: 2020-05-19

## 2020-05-19 ENCOUNTER — OFFICE VISIT (OUTPATIENT)
Dept: INTERNAL MEDICINE | Facility: CLINIC | Age: 65
DRG: 176 | End: 2020-05-19
Payer: MEDICARE

## 2020-05-19 VITALS
BODY MASS INDEX: 31.03 KG/M2 | DIASTOLIC BLOOD PRESSURE: 60 MMHG | SYSTOLIC BLOOD PRESSURE: 116 MMHG | OXYGEN SATURATION: 90 % | WEIGHT: 210.13 LBS | HEART RATE: 80 BPM

## 2020-05-19 DIAGNOSIS — Z13.9 SCREENING FOR CONDITION: Primary | ICD-10-CM

## 2020-05-19 DIAGNOSIS — B37.2 CANDIDAL INTERTRIGO: Primary | ICD-10-CM

## 2020-05-19 DIAGNOSIS — C79.51 SECONDARY MALIGNANT NEOPLASM OF BONE: ICD-10-CM

## 2020-05-19 DIAGNOSIS — C34.12 MALIGNANT NEOPLASM OF UPPER LOBE OF LEFT LUNG: ICD-10-CM

## 2020-05-19 DIAGNOSIS — C34.12 MALIGNANT NEOPLASM OF UPPER LOBE OF LEFT LUNG: Primary | ICD-10-CM

## 2020-05-19 DIAGNOSIS — C77.1 SECONDARY MALIGNANT NEOPLASM OF MEDIASTINAL LYMPH NODE: ICD-10-CM

## 2020-05-19 PROCEDURE — 99999 PR PBB SHADOW E&M-EST. PATIENT-LVL V: CPT | Mod: PBBFAC,,, | Performed by: PHYSICIAN ASSISTANT

## 2020-05-19 PROCEDURE — 3078F DIAST BP <80 MM HG: CPT | Mod: CPTII,S$GLB,, | Performed by: PHYSICIAN ASSISTANT

## 2020-05-19 PROCEDURE — 3074F SYST BP LT 130 MM HG: CPT | Mod: CPTII,S$GLB,, | Performed by: PHYSICIAN ASSISTANT

## 2020-05-19 PROCEDURE — 99443 PR PHYSICIAN TELEPHONE EVALUATION 21-30 MIN: CPT | Mod: 95,,, | Performed by: INTERNAL MEDICINE

## 2020-05-19 PROCEDURE — 3074F SYST BP LT 130 MM HG: CPT | Mod: CPTII,95,, | Performed by: INTERNAL MEDICINE

## 2020-05-19 PROCEDURE — 99443 PR PHYSICIAN TELEPHONE EVALUATION 21-30 MIN: ICD-10-PCS | Mod: 95,,, | Performed by: INTERNAL MEDICINE

## 2020-05-19 PROCEDURE — 3074F PR MOST RECENT SYSTOLIC BLOOD PRESSURE < 130 MM HG: ICD-10-PCS | Mod: CPTII,95,, | Performed by: INTERNAL MEDICINE

## 2020-05-19 PROCEDURE — 99213 PR OFFICE/OUTPT VISIT, EST, LEVL III, 20-29 MIN: ICD-10-PCS | Mod: S$GLB,,, | Performed by: PHYSICIAN ASSISTANT

## 2020-05-19 PROCEDURE — 3078F PR MOST RECENT DIASTOLIC BLOOD PRESSURE < 80 MM HG: ICD-10-PCS | Mod: CPTII,S$GLB,, | Performed by: PHYSICIAN ASSISTANT

## 2020-05-19 PROCEDURE — 3078F DIAST BP <80 MM HG: CPT | Mod: CPTII,95,, | Performed by: INTERNAL MEDICINE

## 2020-05-19 PROCEDURE — 3008F PR BODY MASS INDEX (BMI) DOCUMENTED: ICD-10-PCS | Mod: CPTII,S$GLB,, | Performed by: PHYSICIAN ASSISTANT

## 2020-05-19 PROCEDURE — 3008F BODY MASS INDEX DOCD: CPT | Mod: CPTII,S$GLB,, | Performed by: PHYSICIAN ASSISTANT

## 2020-05-19 PROCEDURE — 99213 OFFICE O/P EST LOW 20 MIN: CPT | Mod: S$GLB,,, | Performed by: PHYSICIAN ASSISTANT

## 2020-05-19 PROCEDURE — 99999 PR PBB SHADOW E&M-EST. PATIENT-LVL V: ICD-10-PCS | Mod: PBBFAC,,, | Performed by: PHYSICIAN ASSISTANT

## 2020-05-19 PROCEDURE — 3074F PR MOST RECENT SYSTOLIC BLOOD PRESSURE < 130 MM HG: ICD-10-PCS | Mod: CPTII,S$GLB,, | Performed by: PHYSICIAN ASSISTANT

## 2020-05-19 PROCEDURE — 3078F PR MOST RECENT DIASTOLIC BLOOD PRESSURE < 80 MM HG: ICD-10-PCS | Mod: CPTII,95,, | Performed by: INTERNAL MEDICINE

## 2020-05-19 RX ORDER — KETOCONAZOLE 20 MG/G
CREAM TOPICAL 2 TIMES DAILY
Qty: 60 G | Refills: 0 | Status: SHIPPED | OUTPATIENT
Start: 2020-05-19 | End: 2020-05-26

## 2020-05-19 RX ORDER — NYSTATIN 100000 [USP'U]/G
POWDER TOPICAL 2 TIMES DAILY
Qty: 60 G | Refills: 0 | Status: SHIPPED | OUTPATIENT
Start: 2020-05-19 | End: 2020-07-02 | Stop reason: SDUPTHER

## 2020-05-19 NOTE — PATIENT INSTRUCTIONS
Candida Skin Infection (Adult)  Candida is type of yeast. It grows naturally on the skin and in the mouth. If it grows out of control, it can cause an infection. Candida can cause infections in the genital area, skin folds, in the mouth, and under the breasts. Anyone can get this infection. It is more common in a person with a weak immune system, such as from diabetes, HIV, or cancer. Its also more common in someone who has been on antibiotic therapy. And its more common people who are overweight or who have incontinence. Wearing tight-fitting clothing and taking part in activities with lots of skin-to-skin contact can also put you at risk.  Candida causes the skin to become bright red and inflamed. The border of the infected part of the skin is often raised. The infection causes pain and itching. Sometimes the skin peels and bleeds. In the mouth, candida is called thrush, and may cause white thickened areas.  A Candida rash is most often treated with an antifungal cream or ointment. The rash will clear a few days after starting the medicine. Infections that dont go away may need a prescription medicine. In rare cases, a bacterial infection can also occur.  Home care  Your healthcare provider will recommend an antifungal cream or ointment for the rash. He or she may also prescribe a medicine for the itch. Follow all instructions for using these medicines. Dont use cornstarch powder. Cornstarch can cause the Candida infection to get worse.  General care:  · Keep your skin clean by washing the area twice a day.  · Use the cream as directed until your rash is gone. Once the skin has healed, keep it dry to prevent another infection.   · If you are overweight, talk with your healthcare provider about a plan to lose excess weight.  · Avoid clothes that fit tightly.  Follow-up care  Follow up with your healthcare provider, or as advised. Your rash will clear in 7 to 14 days. Call your healthcare provider if the rash  is not gone after 14 days.  When to seek medical advice  Call your healthcare provider right away if any of these occur:  · Pain or redness that gets worse or spreads  · Fluid coming from the skin  · Yellow crusts on the skin  · Fever of 100.4°F (38°C) or higher, or as directed by your healthcare provider  Date Last Reviewed: 9/1/2016  © 7008-4597 [employer] [address] [place] [address], [place] [address]. All rights reserved. This information is not intended as a substitute for professional medical care. Always follow your healthcare professional's instructions.

## 2020-05-19 NOTE — Clinical Note
Can you let chemo know if they can check her ambulatory pulse ox when she comes in to get Xgeva tomorrow. She may need home oxygen

## 2020-05-19 NOTE — PROGRESS NOTES
Subjective:       Patient ID: Alison Branch is a 64 y.o. female.    Chief Complaint: Rash (under both breasts)    Established pt of Mike Rodriguez Ii, MD     C/o rash under her breast, itchy and moist for the past 2 weeks. She tried one applicatoin of ketoconazole with mild improvement. Intertrigo rash has occurred in the past.     Rash   This is a new problem. The current episode started 1 to 4 weeks ago. The problem has been gradually worsening since onset. The affected locations include the chest. The rash is characterized by itchiness and scaling. Associated symptoms include coughing (chronic stable; lung cancer) and shortness of breath (chronic stable; lung cancer). Pertinent negatives include no fever or vomiting. Past treatments include moisturizer (topical antifunfal). The treatment provided mild relief.          Past Medical History:   Diagnosis Date    Allergy     Brain aneurysm     s/p coiling of one; another not coiled    Breast cyst     Depression 2019    Diabetes mellitus     Diabetes type 2, controlled     Fever blister     High cholesterol     History of Bell's palsy     HTN (hypertension) 2014    Recurrent upper respiratory infection (URI)      Social History     Tobacco Use    Smoking status: Former Smoker     Packs/day: 0.50     Years: 30.00     Pack years: 15.00     Last attempt to quit: 1999     Years since quittin.3    Smokeless tobacco: Never Used   Substance Use Topics    Alcohol use: No     Alcohol/week: 0.0 standard drinks    Drug use: No     Review of patient's allergies indicates:  No Known Allergies    Patient Active Problem List    Diagnosis Date Noted    Type 2 diabetes mellitus with hypoglycemia, with long-term current use of insulin 2020    Impaired functional mobility, balance, gait, and endurance 2020    Dysuria 2019    Chemotherapy-induced peripheral neuropathy 2019    Shortness of breath on exertion 2019     Memory deficit 11/07/2019    Major depressive disorder, recurrent, unspecified 11/05/2019    Type 2 diabetes mellitus with diabetic polyneuropathy, with long-term current use of insulin 08/29/2019    Adrenal cortical steroids causing adverse effect in therapeutic use 08/20/2019    Secondary malignant neoplasm of mediastinal lymph node 06/07/2019    Malignant neoplasm of upper lobe of left lung 05/23/2019    Vitamin D deficiency 05/10/2019    Obesity (BMI 30-39.9) 10/30/2018    Aneurysm of unspecified site 02/15/2018    Irritable bowel syndrome 04/18/2017    Hyperkeratosis of palms and soles 05/09/2016    Hypertension associated with diabetes 01/16/2015    Type 2 diabetes mellitus with hyperglycemia, with long-term current use of insulin 06/20/2013    Mixed hyperlipidemia due to type 2 diabetes mellitus 06/20/2013    Attention and concentration deficit 06/20/2013    Cerebral aneurysm, coiled in 2010 06/20/2013         Review of Systems   Constitutional: Negative for chills, diaphoresis and fever.   Respiratory: Positive for cough (chronic stable; lung cancer) and shortness of breath (chronic stable; lung cancer).    Cardiovascular: Negative for chest pain and leg swelling.   Gastrointestinal: Negative for nausea and vomiting.   Skin: Positive for rash.       Objective: /60   Pulse 80   Wt 95.3 kg (210 lb 1.6 oz)   SpO2 (!) 90%   BMI 31.03 kg/m²         Physical Exam   Constitutional: She appears well-developed and well-nourished. No distress.   Pulmonary/Chest: Effort normal and breath sounds normal. No respiratory distress.       Skin: Rash noted.       Assessment:       1. Candidal intertrigo        Plan:         Alison was seen today for rash.    Diagnoses and all orders for this visit:    Candidal intertrigo  -     nystatin (MYCOSTATIN) powder; Apply topically 2 (two) times daily.  -     ketoconazole (NIZORAL) 2 % cream; Apply topically 2 (two) times daily.      Bhavna Perez,  PA-MASOOD    Future Appointments   Date Time Provider Department Center   5/19/2020  2:40 PM Tara Belcher MD [facility] HEM ONC [facility]   5/20/2020  8:00 AM INJECTION, [facility] INFUSION [facility] CHEMO [facility]   5/25/2020  8:40 AM Rafia Loza MD [facility] DERM [place]   6/3/2020  8:30 AM Ken Chatterjee DPM [facility] POD [facility]   6/15/2020  7:30 AM LAB, SBP SBP LAB [facility]   6/17/2020 11:00 AM HOME STUDY, SLEEP [facility] SLEEP [facility]   6/22/2020  8:00 AM Ai Billingsley NP [facility]   8/14/2020 10:00 AM David Mo MD [facility] NEURO [facility]

## 2020-05-19 NOTE — PROGRESS NOTES
Established Patient - Audio Only Telehealth Visit     The patient location is: home  The chief complaint leading to consultation is: Lung cancer  Visit type: Virtual visit with audio only (telephone)  Total time spent with patient: 30 minutes       The reason for the audio only service rather than synchronous audio and video virtual visit was related to technical difficulties or patient preference/necessity.     Each patient to whom I provide medical services by telemedicine is:  (1) informed of the relationship between the physician and patient and the respective role of any other health care provider with respect to management of the patient; and (2) notified that they may decline to receive medical services by telemedicine and may withdraw from such care at any time. Patient verbally consented to receive this service via voice-only telephone call.     Ms. Branch is a 63-year-old female who smoked for about 30 years and quit 20 years ago, presented with cough end of last year, but worsened into January.  Since the cough persisted, she saw her PCP, underwent a chest x-ray on 05/10/2019, that revealed left upper lobe pneumonia and a repeat CT was done in the week after that on 05/17/2019 that showed left upper lobe   mass arising from the lateral pleural surface in the left upper lobe posterior subsegment measuring 2.6 x 3 cm.  There are satellite mass more medially near the aortic arch that is 2 cm, also spiculated and irregular as well as thickened interlobar septa in the left lung apex and anterior segment, prevascular lymph node lateral to the aortic arch, short axis measuring 9 mm.       She then underwent a bronchoscopy on 05/28/2019, and that revealed there was an infiltration obtained in the left apical posterior segment of the left upper lobe cytology brush was done.  Transbronchial biopsies of an area of infiltration were also performed in the apicoposterior segment of the left upper lobe.    Pathology  "revealed adenocarcinoma; however, the specimen was not adequate enough to send for molecular testing.       She underwent a PET scan on 06/06/2019.  that reveals significant hypermetabolic activity in the large irregular spiculated pulmonary mass in the lateral aspect of the left upper lobe consistent with the patient's known pulmonary adenocarcinoma.  There is also tracer avidity in the medial left upper lobe satellite lesion and diffusely throughout much of the anterior left upper lobe where there is prominent nodular paraseptal thickening.  There are some numerous scattered subcentimeter pulmonary nodules throughout the right lung, all of which are too small for detection by PET.  For example, there is a 0.4 cm nodule in the superior right lower lobe and a micro nodule in the posterior segment of the right upper lobe.  There is a 0.5 cm, normal size right   paratracheal lymph node with increased radiotracer uptake as well as hypermetabolic aortic pulmonary lymph node and a left hilar lymph node.  There is a focal hypermetabolic lesion in the left anterior superior iliac spine associated with well defined lytic lesion.  There is a hypermetabolic focus in the anterior right fifth rib with a possible associated lytic lesion.  SUVs as   below lateral:  Left upper lobe  SUV max 17.9, anterior left upper lobe satellite mass and septal thickening SUV max of 10.1, right paratracheal lymph node SUV max of 4.8, left AP window lymph node SUV max of 17.9, left anterior superior iliac spine SUV max of 3.9"     MRI pelvis from 6/10/19  reveals "Region of osseous is irregularity at the left anterior iliac spine likely related to bone graft harvest procedure.  See  discussion above.  No suspicious signal or enhancement to suggest active/malignant process"   MRI brain from 6/10//19 reveal No intracranial abnormality.     We discussed her case at [facility], and plan was to wait for GAURDANT and proceed with treatment " "accordingly  Her GAURDANT is negative for molecular markers.     She has completed 4 cycles of Carboplatin, Alimta and Keytruda as of 9/5/19. She is now on  ALimta and Keytruda maintenance.            She has been on maintenance Alimta and Keytruda     Her CT scans from 4/23/2020 revealed "In this patient with a known history of lung cancer, there has been marked interval detrimental change when compared to CT dated 12/20/2019 as follows. Persistent left upper lobe volume loss with worsening masslike consolidation and enlarging index and non index lesions. Increased number of innumerable bilateral metastatic solid pulmonary nodules. Interval increased size and number of multiple osteoblastic metastatic lesions throughout the visualized axial skeleton. Stable mediastinal lymph nodes, several of which were noted to be hypermetabolic on previous PET-CT.  No new lymphadenopathy. Stable subcentimeter hepatic and splenic hypodensities, too small to accurately characterize.  Path addendum from 5/2019 reveals ROS1      HPI: She comes in to start Entrectinib, she notes cough and shortness of breath, She saw her PCP today and her pulse ox was 90%      LABS:  WBC   Date Value Ref Range Status   05/18/2020 6.19 3.90 - 12.70 K/uL Final     Hemoglobin   Date Value Ref Range Status   05/18/2020 12.8 12.0 - 16.0 g/dL Final     Hematocrit   Date Value Ref Range Status   05/18/2020 42.3 37.0 - 48.5 % Final     Platelets   Date Value Ref Range Status   05/18/2020 198 150 - 350 K/uL Final     Gran # (ANC)   Date Value Ref Range Status   05/18/2020 3.5 1.8 - 7.7 K/uL Final     Comment:     The ANC is based on a white cell differential from an   automated cell counter. It has not been microscopically   reviewed for the presence of abnormal cells. Clinical   correlation is required.         Chemistry        Component Value Date/Time     05/18/2020 0944    K 4.3 05/18/2020 0944     05/18/2020 0944    CO2 27 05/18/2020 0944    " BUN 13 05/18/2020 0944    CREATININE 0.9 05/18/2020 0944     (H) 05/18/2020 0944        Component Value Date/Time    CALCIUM 10.2 05/18/2020 0944    ALKPHOS 69 05/18/2020 0944    AST 21 05/18/2020 0944    ALT 19 05/18/2020 0944    BILITOT 0.6 05/18/2020 0944    ESTGFRAFRICA >60.0 05/18/2020 0944    EGFRNONAA >60.0 05/18/2020 0944        COVID: negative       Assessment and plan:  Lung cancer with secondary nunez cancer, ROS 1 mutated: She will proceed with Entrectinib, side effects were discussed as well.  She will stop Lexapro. Will receive Xgeva tomorrow, check pulse ox in chemo unit to see if she qualifies for home oxygen         This service was not originating from a related E/M service provided within the previous 7 days nor will  to an E/M service or procedure within the next 24 hours or my soonest available appointment.  Prevailing standard of care was able to be met in this audio-only visit.

## 2020-05-19 NOTE — NURSING
Called to speak with pt for follow-up.  Spoke with pt and currently driving to appointment with daughter.  Doing well with increased SOB.  Does not sound short of breath while talking,  Appetite is fine with no nausea at present just after receiving chemo. Awaiting approval of new chemo agent (Entrectinib) that will help slow down the cancer that is progressing.      Oncology Navigation   Intake  Date of Diagnosis: 19  Cancer Type: Thoracic  MD Assigned: Ye  Internal / External Referral: Internal  Initial Nurse Navigator Contact: 19  Diagnosis to Initial Contact Timeline (days): 4 days  Contact Method: Individual basket  Date Worked: 19  First Appointment Available: 19  Appointment Date: 19  Schedule to Appointment Timeline (days): 4  First Available Date vs. Scheduled Date (days): 0  Multiple appointments: No  Start of Treatment: 19  Diagnosis to Treat Timeline (days): 25 days     Treatment  Current Status: Active (Progression of disease as note from Dr. Belcher )  Treatment Type(s): Chemotherapy  Chemotherapy Regimen: CARBOplatin/Pembrolizumab/PEMEtrexed  Deferred Reason Other: Progression of disease to bone    Procedures: CT;MRI  CT Schedule Date: 20  MRI Schedule Date: 20    Support Systems: Children;Family members     Acuity  Stage: 2  Systemic Treatment - predicted or initiated: Chemotherapy regimen with multiple drugs (+1)  Treatment Tolerability: 1  ECO  Comorbidities in Medical History: 1  Hospitalization Within the Past Month: 0   Needed: 0  Support: 0  Verbalizes Financial Concerns: 0  Transportation: 0  Mental Health: PHQ Score: 0  History of noncompliance/frequent no shows and cancellations: 0  Verbalizes the need for more education: 0  Other Factors (+1 for Each): 1  Navigation Acuity: 9     Follow Up  Follow up in about 3 months (around 2020) for Follow up Chemo Treatment 3months.

## 2020-05-19 NOTE — Clinical Note
Schedule CBC, CMP and virtual audio visit with me in 3 weeks. Also schedule next dose of Xgeva in 4 weeks from now

## 2020-05-20 ENCOUNTER — DOCUMENTATION ONLY (OUTPATIENT)
Dept: HEMATOLOGY/ONCOLOGY | Facility: CLINIC | Age: 65
End: 2020-05-20

## 2020-05-20 ENCOUNTER — HOSPITAL ENCOUNTER (INPATIENT)
Facility: HOSPITAL | Age: 65
LOS: 1 days | Discharge: HOME HEALTH | DRG: 176 | End: 2020-05-21
Attending: INTERNAL MEDICINE | Admitting: INTERNAL MEDICINE
Payer: MEDICARE

## 2020-05-20 ENCOUNTER — INFUSION (OUTPATIENT)
Dept: INFUSION THERAPY | Facility: HOSPITAL | Age: 65
DRG: 176 | End: 2020-05-20
Attending: INTERNAL MEDICINE
Payer: MEDICARE

## 2020-05-20 ENCOUNTER — OFFICE VISIT (OUTPATIENT)
Dept: HEMATOLOGY/ONCOLOGY | Facility: CLINIC | Age: 65
DRG: 176 | End: 2020-05-20
Payer: MEDICARE

## 2020-05-20 ENCOUNTER — TELEPHONE (OUTPATIENT)
Dept: HEMATOLOGY/ONCOLOGY | Facility: CLINIC | Age: 65
End: 2020-05-20

## 2020-05-20 ENCOUNTER — HOSPITAL ENCOUNTER (OUTPATIENT)
Dept: RADIOLOGY | Facility: HOSPITAL | Age: 65
Discharge: HOME | DRG: 176 | End: 2020-05-20
Attending: INTERNAL MEDICINE
Payer: MEDICARE

## 2020-05-20 VITALS
WEIGHT: 212.94 LBS | DIASTOLIC BLOOD PRESSURE: 63 MMHG | HEIGHT: 69 IN | HEART RATE: 82 BPM | SYSTOLIC BLOOD PRESSURE: 134 MMHG | BODY MASS INDEX: 31.54 KG/M2 | OXYGEN SATURATION: 94 %

## 2020-05-20 VITALS — HEART RATE: 98 BPM | OXYGEN SATURATION: 92 %

## 2020-05-20 DIAGNOSIS — I26.99 PULMONARY EMBOLISM, UNSPECIFIED CHRONICITY, UNSPECIFIED PULMONARY EMBOLISM TYPE, UNSPECIFIED WHETHER ACUTE COR PULMONALE PRESENT: ICD-10-CM

## 2020-05-20 DIAGNOSIS — C34.12 MALIGNANT NEOPLASM OF UPPER LOBE OF LEFT LUNG: ICD-10-CM

## 2020-05-20 DIAGNOSIS — E11.65 TYPE 2 DIABETES MELLITUS WITH HYPERGLYCEMIA, WITH LONG-TERM CURRENT USE OF INSULIN: ICD-10-CM

## 2020-05-20 DIAGNOSIS — I26.99 PULMONARY EMBOLISM, UNSPECIFIED CHRONICITY, UNSPECIFIED PULMONARY EMBOLISM TYPE, UNSPECIFIED WHETHER ACUTE COR PULMONALE PRESENT: Primary | ICD-10-CM

## 2020-05-20 DIAGNOSIS — Z79.4 TYPE 2 DIABETES MELLITUS WITH HYPERGLYCEMIA, WITH LONG-TERM CURRENT USE OF INSULIN: ICD-10-CM

## 2020-05-20 DIAGNOSIS — R07.9 CHEST PAIN: ICD-10-CM

## 2020-05-20 DIAGNOSIS — I26.99 ACUTE PULMONARY EMBOLISM WITHOUT ACUTE COR PULMONALE, UNSPECIFIED PULMONARY EMBOLISM TYPE: Primary | ICD-10-CM

## 2020-05-20 DIAGNOSIS — T45.1X5A CHEMOTHERAPY-INDUCED PERIPHERAL NEUROPATHY: ICD-10-CM

## 2020-05-20 DIAGNOSIS — F33.0 MILD EPISODE OF RECURRENT MAJOR DEPRESSIVE DISORDER: ICD-10-CM

## 2020-05-20 DIAGNOSIS — J96.01 ACUTE HYPOXEMIC RESPIRATORY FAILURE: ICD-10-CM

## 2020-05-20 DIAGNOSIS — R06.02 SHORTNESS OF BREATH: ICD-10-CM

## 2020-05-20 DIAGNOSIS — G62.0 CHEMOTHERAPY-INDUCED PERIPHERAL NEUROPATHY: ICD-10-CM

## 2020-05-20 DIAGNOSIS — J96.01 ACUTE HYPOXEMIC RESPIRATORY FAILURE: Primary | ICD-10-CM

## 2020-05-20 DIAGNOSIS — Z74.09 IMPAIRED FUNCTIONAL MOBILITY, BALANCE, GAIT, AND ENDURANCE: ICD-10-CM

## 2020-05-20 DIAGNOSIS — C77.1 SECONDARY MALIGNANT NEOPLASM OF MEDIASTINAL LYMPH NODE: Primary | ICD-10-CM

## 2020-05-20 DIAGNOSIS — E11.65 UNCONTROLLED TYPE 2 DIABETES MELLITUS WITH HYPERGLYCEMIA, WITHOUT LONG-TERM CURRENT USE OF INSULIN: ICD-10-CM

## 2020-05-20 DIAGNOSIS — C79.51 SECONDARY MALIGNANT NEOPLASM OF BONE: ICD-10-CM

## 2020-05-20 DIAGNOSIS — I26.99 PULMONARY EMBOLISM: ICD-10-CM

## 2020-05-20 LAB
ALBUMIN SERPL BCP-MCNC: 3.4 G/DL (ref 3.5–5.2)
ALP SERPL-CCNC: 57 U/L (ref 55–135)
ALT SERPL W/O P-5'-P-CCNC: 16 U/L (ref 10–44)
ANION GAP SERPL CALC-SCNC: 5 MMOL/L (ref 8–16)
ASCENDING AORTA: 2.73 CM
AST SERPL-CCNC: 17 U/L (ref 10–40)
AV INDEX (PROSTH): 0.94
AV MEAN GRADIENT: 2 MMHG
AV PEAK GRADIENT: 3 MMHG
AV VALVE AREA: 3.12 CM2
AV VELOCITY RATIO: 1.07
BASOPHILS # BLD AUTO: 0.04 K/UL (ref 0–0.2)
BASOPHILS NFR BLD: 0.8 % (ref 0–1.9)
BILIRUB SERPL-MCNC: 0.4 MG/DL (ref 0.1–1)
BNP SERPL-MCNC: 13 PG/ML (ref 0–99)
BSA FOR ECHO PROCEDURE: 2.17 M2
BUN SERPL-MCNC: 14 MG/DL (ref 8–23)
CALCIUM SERPL-MCNC: 9 MG/DL (ref 8.7–10.5)
CHLORIDE SERPL-SCNC: 104 MMOL/L (ref 95–110)
CO2 SERPL-SCNC: 30 MMOL/L (ref 23–29)
CREAT SERPL-MCNC: 0.8 MG/DL (ref 0.5–1.4)
CV ECHO LV RWT: 0.37 CM
DIFFERENTIAL METHOD: ABNORMAL
DOP CALC AO PEAK VEL: 0.89 M/S
DOP CALC AO VTI: 18.83 CM
DOP CALC LVOT AREA: 3.3 CM2
DOP CALC LVOT DIAMETER: 2.06 CM
DOP CALC LVOT PEAK VEL: 0.95 M/S
DOP CALC LVOT STROKE VOLUME: 58.7 CM3
DOP CALCLVOT PEAK VEL VTI: 17.62 CM
E WAVE DECELERATION TIME: 253.82 MSEC
E/A RATIO: 0.9
E/E' RATIO: 10.71 M/S
ECHO LV POSTERIOR WALL: 0.71 CM (ref 0.6–1.1)
EOSINOPHIL # BLD AUTO: 0.2 K/UL (ref 0–0.5)
EOSINOPHIL NFR BLD: 4.5 % (ref 0–8)
ERYTHROCYTE [DISTWIDTH] IN BLOOD BY AUTOMATED COUNT: 13.8 % (ref 11.5–14.5)
EST. GFR  (AFRICAN AMERICAN): >60 ML/MIN/1.73 M^2
EST. GFR  (NON AFRICAN AMERICAN): >60 ML/MIN/1.73 M^2
FACT X PPP CHRO-ACNC: 0.14 IU/ML (ref 0.3–0.7)
FRACTIONAL SHORTENING: 26 % (ref 28–44)
GLUCOSE SERPL-MCNC: 115 MG/DL (ref 70–110)
GLUCOSE SERPL-MCNC: 169 MG/DL (ref 70–110)
HCT VFR BLD AUTO: 36.4 % (ref 37–48.5)
HGB BLD-MCNC: 11.1 G/DL (ref 12–16)
IMM GRANULOCYTES # BLD AUTO: 0.02 K/UL (ref 0–0.04)
IMM GRANULOCYTES NFR BLD AUTO: 0.4 % (ref 0–0.5)
INR PPP: 1.1 (ref 0.8–1.2)
INTERVENTRICULAR SEPTUM: 0.61 CM (ref 0.6–1.1)
LA MAJOR: 3.69 CM
LA MINOR: 4.68 CM
LA WIDTH: 2.14 CM
LEFT ATRIUM SIZE: 3.09 CM
LEFT ATRIUM VOLUME INDEX: 10.9 ML/M2
LEFT ATRIUM VOLUME: 23.19 CM3
LEFT INTERNAL DIMENSION IN SYSTOLE: 2.85 CM (ref 2.1–4)
LEFT VENTRICLE DIASTOLIC VOLUME INDEX: 29.8 ML/M2
LEFT VENTRICLE DIASTOLIC VOLUME: 63.23 ML
LEFT VENTRICLE MASS INDEX: 32 G/M2
LEFT VENTRICLE SYSTOLIC VOLUME INDEX: 14.5 ML/M2
LEFT VENTRICLE SYSTOLIC VOLUME: 30.74 ML
LEFT VENTRICULAR INTERNAL DIMENSION IN DIASTOLE: 3.83 CM (ref 3.5–6)
LEFT VENTRICULAR MASS: 67.5 G
LV LATERAL E/E' RATIO: 10.71 M/S
LV SEPTAL E/E' RATIO: 10.71 M/S
LYMPHOCYTES # BLD AUTO: 1.3 K/UL (ref 1–4.8)
LYMPHOCYTES NFR BLD: 25.2 % (ref 18–48)
MAGNESIUM SERPL-MCNC: 1.9 MG/DL (ref 1.6–2.6)
MCH RBC QN AUTO: 28.3 PG (ref 27–31)
MCHC RBC AUTO-ENTMCNC: 30.5 G/DL (ref 32–36)
MCV RBC AUTO: 93 FL (ref 82–98)
MONOCYTES # BLD AUTO: 0.6 K/UL (ref 0.3–1)
MONOCYTES NFR BLD: 12 % (ref 4–15)
MV PEAK A VEL: 0.83 M/S
MV PEAK E VEL: 0.75 M/S
NEUTROPHILS # BLD AUTO: 3 K/UL (ref 1.8–7.7)
NEUTROPHILS NFR BLD: 57.1 % (ref 38–73)
NRBC BLD-RTO: 0 /100 WBC
PHOSPHATE SERPL-MCNC: 3.4 MG/DL (ref 2.7–4.5)
PLATELET # BLD AUTO: 176 K/UL (ref 150–350)
PMV BLD AUTO: 11 FL (ref 9.2–12.9)
POCT GLUCOSE: 115 MG/DL (ref 70–110)
POCT GLUCOSE: 122 MG/DL (ref 70–110)
POCT GLUCOSE: 143 MG/DL (ref 70–110)
POCT GLUCOSE: 57 MG/DL (ref 70–110)
POCT GLUCOSE: 67 MG/DL (ref 70–110)
POTASSIUM SERPL-SCNC: 3.9 MMOL/L (ref 3.5–5.1)
PROT SERPL-MCNC: 6.6 G/DL (ref 6–8.4)
PROTHROMBIN TIME: 11 SEC (ref 9–12.5)
RA MAJOR: 3.4 CM
RA WIDTH: 2.25 CM
RBC # BLD AUTO: 3.92 M/UL (ref 4–5.4)
RIGHT VENTRICULAR END-DIASTOLIC DIMENSION: 2.43 CM
RV TISSUE DOPPLER FREE WALL SYSTOLIC VELOCITY 1 (APICAL 4 CHAMBER VIEW): 14 CM/S
SINUS: 3.06 CM
SODIUM SERPL-SCNC: 139 MMOL/L (ref 136–145)
STJ: 2.68 CM
TDI LATERAL: 0.07 M/S
TDI SEPTAL: 0.07 M/S
TDI: 0.07 M/S
WBC # BLD AUTO: 5.32 K/UL (ref 3.9–12.7)

## 2020-05-20 PROCEDURE — 84100 ASSAY OF PHOSPHORUS: CPT

## 2020-05-20 PROCEDURE — 25000003 PHARM REV CODE 250: Performed by: INTERNAL MEDICINE

## 2020-05-20 PROCEDURE — 99499 UNLISTED E&M SERVICE: CPT | Mod: S$GLB,,, | Performed by: INTERNAL MEDICINE

## 2020-05-20 PROCEDURE — 90832 PR PSYCHOTHERAPY W/PATIENT, 30 MIN: ICD-10-PCS | Mod: ,,, | Performed by: PSYCHOLOGIST

## 2020-05-20 PROCEDURE — 3008F PR BODY MASS INDEX (BMI) DOCUMENTED: ICD-10-PCS | Mod: CPTII,S$GLB,, | Performed by: INTERNAL MEDICINE

## 2020-05-20 PROCEDURE — 63600175 PHARM REV CODE 636 W HCPCS: Performed by: INTERNAL MEDICINE

## 2020-05-20 PROCEDURE — 90832 PSYTX W PT 30 MINUTES: CPT | Mod: ,,, | Performed by: PSYCHOLOGIST

## 2020-05-20 PROCEDURE — 99215 PR OFFICE/OUTPT VISIT, EST, LEVL V, 40-54 MIN: ICD-10-PCS | Mod: S$GLB,,, | Performed by: INTERNAL MEDICINE

## 2020-05-20 PROCEDURE — 3078F PR MOST RECENT DIASTOLIC BLOOD PRESSURE < 80 MM HG: ICD-10-PCS | Mod: CPTII,S$GLB,, | Performed by: INTERNAL MEDICINE

## 2020-05-20 PROCEDURE — 99999 PR PBB SHADOW E&M-EST. PATIENT-LVL II: ICD-10-PCS | Mod: PBBFAC,,, | Performed by: INTERNAL MEDICINE

## 2020-05-20 PROCEDURE — 3075F SYST BP GE 130 - 139MM HG: CPT | Mod: CPTII,S$GLB,, | Performed by: INTERNAL MEDICINE

## 2020-05-20 PROCEDURE — 25000003 PHARM REV CODE 250: Performed by: STUDENT IN AN ORGANIZED HEALTH CARE EDUCATION/TRAINING PROGRAM

## 2020-05-20 PROCEDURE — 71275 CT ANGIOGRAPHY CHEST: CPT | Mod: 26,,, | Performed by: RADIOLOGY

## 2020-05-20 PROCEDURE — 93005 ELECTROCARDIOGRAM TRACING: CPT

## 2020-05-20 PROCEDURE — 63600175 PHARM REV CODE 636 W HCPCS: Performed by: STUDENT IN AN ORGANIZED HEALTH CARE EDUCATION/TRAINING PROGRAM

## 2020-05-20 PROCEDURE — 93010 EKG 12-LEAD: ICD-10-PCS | Mod: ,,, | Performed by: INTERNAL MEDICINE

## 2020-05-20 PROCEDURE — 99999 PR PBB SHADOW E&M-EST. PATIENT-LVL II: CPT | Mod: PBBFAC,,, | Performed by: INTERNAL MEDICINE

## 2020-05-20 PROCEDURE — 3078F DIAST BP <80 MM HG: CPT | Mod: CPTII,S$GLB,, | Performed by: INTERNAL MEDICINE

## 2020-05-20 PROCEDURE — 83735 ASSAY OF MAGNESIUM: CPT

## 2020-05-20 PROCEDURE — 99499 RISK ADDL DX/OHS AUDIT: ICD-10-PCS | Mod: S$GLB,,, | Performed by: INTERNAL MEDICINE

## 2020-05-20 PROCEDURE — 25500020 PHARM REV CODE 255: Performed by: INTERNAL MEDICINE

## 2020-05-20 PROCEDURE — 71275 CTA CHEST NON CORONARY: ICD-10-PCS | Mod: 26,,, | Performed by: RADIOLOGY

## 2020-05-20 PROCEDURE — 93010 ELECTROCARDIOGRAM REPORT: CPT | Mod: ,,, | Performed by: INTERNAL MEDICINE

## 2020-05-20 PROCEDURE — 3075F PR MOST RECENT SYSTOLIC BLOOD PRESS GE 130-139MM HG: ICD-10-PCS | Mod: CPTII,S$GLB,, | Performed by: INTERNAL MEDICINE

## 2020-05-20 PROCEDURE — 85610 PROTHROMBIN TIME: CPT

## 2020-05-20 PROCEDURE — 96372 THER/PROPH/DIAG INJ SC/IM: CPT | Mod: 59

## 2020-05-20 PROCEDURE — 83880 ASSAY OF NATRIURETIC PEPTIDE: CPT

## 2020-05-20 PROCEDURE — 20600001 HC STEP DOWN PRIVATE ROOM

## 2020-05-20 PROCEDURE — 85025 COMPLETE CBC W/AUTO DIFF WBC: CPT

## 2020-05-20 PROCEDURE — A4216 STERILE WATER/SALINE, 10 ML: HCPCS | Performed by: INTERNAL MEDICINE

## 2020-05-20 PROCEDURE — 3008F BODY MASS INDEX DOCD: CPT | Mod: CPTII,S$GLB,, | Performed by: INTERNAL MEDICINE

## 2020-05-20 PROCEDURE — 85520 HEPARIN ASSAY: CPT

## 2020-05-20 PROCEDURE — 80053 COMPREHEN METABOLIC PANEL: CPT

## 2020-05-20 PROCEDURE — 71275 CT ANGIOGRAPHY CHEST: CPT | Mod: TC

## 2020-05-20 PROCEDURE — 99215 OFFICE O/P EST HI 40 MIN: CPT | Mod: S$GLB,,, | Performed by: INTERNAL MEDICINE

## 2020-05-20 RX ORDER — FOLIC ACID 1 MG/1
1 TABLET ORAL DAILY
Status: DISCONTINUED | OUTPATIENT
Start: 2020-05-21 | End: 2020-05-21 | Stop reason: HOSPADM

## 2020-05-20 RX ORDER — ENOXAPARIN SODIUM 100 MG/ML
80 INJECTION SUBCUTANEOUS 2 TIMES DAILY
Qty: 60 SYRINGE | Refills: 0 | OUTPATIENT
Start: 2020-05-20

## 2020-05-20 RX ORDER — ENOXAPARIN SODIUM 100 MG/ML
100 INJECTION SUBCUTANEOUS 2 TIMES DAILY
Qty: 60 ML | Refills: 3 | Status: ON HOLD | OUTPATIENT
Start: 2020-05-20 | End: 2020-06-27 | Stop reason: HOSPADM

## 2020-05-20 RX ORDER — SODIUM CHLORIDE 0.9 % (FLUSH) 0.9 %
10 SYRINGE (ML) INJECTION
Status: DISCONTINUED | OUTPATIENT
Start: 2020-05-20 | End: 2020-05-21 | Stop reason: HOSPADM

## 2020-05-20 RX ORDER — GLUCAGON 1 MG
1 KIT INJECTION
Status: DISCONTINUED | OUTPATIENT
Start: 2020-05-20 | End: 2020-05-21 | Stop reason: HOSPADM

## 2020-05-20 RX ORDER — HEPARIN 100 UNIT/ML
500 SYRINGE INTRAVENOUS
Status: COMPLETED | OUTPATIENT
Start: 2020-05-20 | End: 2020-05-20

## 2020-05-20 RX ORDER — HEPARIN 100 UNIT/ML
500 SYRINGE INTRAVENOUS
Status: CANCELLED | OUTPATIENT
Start: 2020-05-20

## 2020-05-20 RX ORDER — ENOXAPARIN SODIUM 100 MG/ML
90 INJECTION SUBCUTANEOUS
Status: DISCONTINUED | OUTPATIENT
Start: 2020-05-20 | End: 2020-05-21 | Stop reason: HOSPADM

## 2020-05-20 RX ORDER — ENOXAPARIN SODIUM 150 MG/ML
1 INJECTION SUBCUTANEOUS
Status: DISCONTINUED | OUTPATIENT
Start: 2020-05-20 | End: 2020-05-20

## 2020-05-20 RX ORDER — GABAPENTIN 300 MG/1
300 CAPSULE ORAL NIGHTLY PRN
Status: DISCONTINUED | OUTPATIENT
Start: 2020-05-20 | End: 2020-05-21 | Stop reason: HOSPADM

## 2020-05-20 RX ORDER — INSULIN ASPART 100 [IU]/ML
1-10 INJECTION, SOLUTION INTRAVENOUS; SUBCUTANEOUS
Status: DISCONTINUED | OUTPATIENT
Start: 2020-05-20 | End: 2020-05-21 | Stop reason: HOSPADM

## 2020-05-20 RX ORDER — INSULIN ASPART 100 [IU]/ML
14 INJECTION, SOLUTION INTRAVENOUS; SUBCUTANEOUS
Status: DISCONTINUED | OUTPATIENT
Start: 2020-05-20 | End: 2020-05-20

## 2020-05-20 RX ORDER — ASPIRIN 81 MG/1
81 TABLET ORAL DAILY
Status: DISCONTINUED | OUTPATIENT
Start: 2020-05-21 | End: 2020-05-21 | Stop reason: HOSPADM

## 2020-05-20 RX ORDER — DIAZEPAM 5 MG/1
5 TABLET ORAL
Status: DISCONTINUED | OUTPATIENT
Start: 2020-05-20 | End: 2020-05-21 | Stop reason: HOSPADM

## 2020-05-20 RX ORDER — BISACODYL 5 MG
5 TABLET, DELAYED RELEASE (ENTERIC COATED) ORAL DAILY PRN
Status: DISCONTINUED | OUTPATIENT
Start: 2020-05-20 | End: 2020-05-21 | Stop reason: HOSPADM

## 2020-05-20 RX ORDER — IBUPROFEN 200 MG
24 TABLET ORAL
Status: DISCONTINUED | OUTPATIENT
Start: 2020-05-20 | End: 2020-05-21 | Stop reason: HOSPADM

## 2020-05-20 RX ORDER — OLMESARTAN MEDOXOMIL 20 MG/1
20 TABLET ORAL DAILY
Status: DISCONTINUED | OUTPATIENT
Start: 2020-05-21 | End: 2020-05-21 | Stop reason: HOSPADM

## 2020-05-20 RX ORDER — SODIUM CHLORIDE 0.9 % (FLUSH) 0.9 %
10 SYRINGE (ML) INJECTION
Status: COMPLETED | OUTPATIENT
Start: 2020-05-20 | End: 2020-05-20

## 2020-05-20 RX ORDER — IBUPROFEN 200 MG
16 TABLET ORAL
Status: DISCONTINUED | OUTPATIENT
Start: 2020-05-20 | End: 2020-05-21 | Stop reason: HOSPADM

## 2020-05-20 RX ORDER — SODIUM CHLORIDE 0.9 % (FLUSH) 0.9 %
10 SYRINGE (ML) INJECTION
Status: CANCELLED | OUTPATIENT
Start: 2020-05-20

## 2020-05-20 RX ORDER — BENZONATATE 100 MG/1
100 CAPSULE ORAL 3 TIMES DAILY PRN
Status: DISCONTINUED | OUTPATIENT
Start: 2020-05-20 | End: 2020-05-21 | Stop reason: HOSPADM

## 2020-05-20 RX ADMIN — BENZONATATE 100 MG: 100 CAPSULE, LIQUID FILLED ORAL at 09:05

## 2020-05-20 RX ADMIN — DENOSUMAB 120 MG: 120 INJECTION SUBCUTANEOUS at 08:05

## 2020-05-20 RX ADMIN — HEPARIN 500 UNITS: 100 SYRINGE at 09:05

## 2020-05-20 RX ADMIN — ENOXAPARIN SODIUM 90 MG: 100 INJECTION SUBCUTANEOUS at 04:05

## 2020-05-20 RX ADMIN — Medication 16 G: at 08:05

## 2020-05-20 RX ADMIN — IOHEXOL 100 ML: 350 INJECTION, SOLUTION INTRAVENOUS at 10:05

## 2020-05-20 RX ADMIN — Medication 10 ML: at 09:05

## 2020-05-20 RX ADMIN — INSULIN ASPART 14 UNITS: 100 INJECTION, SOLUTION INTRAVENOUS; SUBCUTANEOUS at 05:05

## 2020-05-20 NOTE — PROGRESS NOTES
Subjective:       Patient ID: Alison Branch is a 64 y.o. female.    Chief Complaint: Malignant neoplasm of upper lobe of left lung  Ms. Branch is a 63-year-old female who smoked for about 30 years and quit 20 years ago, presented with cough end of last year, but worsened into January.  Since the cough persisted, she saw her PCP, underwent a chest x-ray on 05/10/2019, that revealed left upper lobe pneumonia and a repeat CT was done in the week after that on 05/17/2019 that showed left upper lobe   mass arising from the lateral pleural surface in the left upper lobe posterior subsegment measuring 2.6 x 3 cm.  There are satellite mass more medially near the aortic arch that is 2 cm, also spiculated and irregular as well as thickened interlobar septa in the left lung apex and anterior segment, prevascular lymph node lateral to the aortic arch, short axis measuring 9 mm.       She then underwent a bronchoscopy on 05/28/2019, and that revealed there was an infiltration obtained in the left apical posterior segment of the left upper lobe cytology brush was done.  Transbronchial biopsies of an area of infiltration were also performed in the apicoposterior segment of the left upper lobe.    Pathology revealed adenocarcinoma; however, the specimen was not adequate enough to send for molecular testing.       She underwent a PET scan on 06/06/2019.  that reveals significant hypermetabolic activity in the large irregular spiculated pulmonary mass in the lateral aspect of the left upper lobe consistent with the patient's known pulmonary adenocarcinoma.  There is also tracer avidity in the medial left upper lobe satellite lesion and diffusely throughout much of the anterior left upper lobe where there is prominent nodular paraseptal thickening.  There are some numerous scattered subcentimeter pulmonary nodules throughout the right lung, all of which are too small for detection by PET.  For example, there is a 0.4 cm nodule in the  "superior right lower lobe and a micro nodule in the posterior segment of the right upper lobe.  There is a 0.5 cm, normal size right   paratracheal lymph node with increased radiotracer uptake as well as hypermetabolic aortic pulmonary lymph node and a left hilar lymph node.  There is a focal hypermetabolic lesion in the left anterior superior iliac spine associated with well defined lytic lesion.  There is a hypermetabolic focus in the anterior right fifth rib with a possible associated lytic lesion.  SUVs as   below lateral:  Left upper lobe  SUV max 17.9, anterior left upper lobe satellite mass and septal thickening SUV max of 10.1, right paratracheal lymph node SUV max of 4.8, left AP window lymph node SUV max of 17.9, left anterior superior iliac spine SUV max of 3.9"     MRI pelvis from 6/10/19  reveals "Region of osseous is irregularity at the left anterior iliac spine likely related to bone graft harvest procedure.  See  discussion above.  No suspicious signal or enhancement to suggest active/malignant process"   MRI brain from 6/10//19 reveal No intracranial abnormality.     We discussed her case at [facility], and plan was to wait for GAURDANT and proceed with treatment accordingly  Her GAURDANT is negative for molecular markers.     She has completed 4 cycles of Carboplatin, Alimta and Keytruda as of 9/5/19. She is now on  ALimta and Keytruda maintenance.            She has been on maintenance Alimta and Keytruda     Her CT scans from 4/23/2020 revealed "In this patient with a known history of lung cancer, there has been marked interval detrimental change when compared to CT dated 12/20/2019 as follows. Persistent left upper lobe volume loss with worsening masslike consolidation and enlarging index and non index lesions. Increased number of innumerable bilateral metastatic solid pulmonary nodules. Interval increased size and number of multiple osteoblastic metastatic lesions throughout the visualized " "axial skeleton. Stable mediastinal lymph nodes, several of which were noted to be hypermetabolic on previous PET-CT.  No new lymphadenopathy. Stable subcentimeter hepatic and splenic hypodensities, too small to accurately characterize.  Path addendum from 5/2019 reveals LESTER Hunt has been approved for Entrectinib.   She was noted to have shortness of breath yesterday and had a pulse ox of 90% in her PCP's office. She came in to receive Xgeva today and was very short of breath so she was sent for a stat CTA chest which reveals "Acute pulmonary thromboembolism involving the right upper lobe pulmonary artery. In this patient with known lung cancer, there is a heterogeneous large opacity with tumor and consolidation occupying much of the left upper lobe overall similar to prior exam.  It is difficult to measure tumor size and separate it from the associated consolidation. New multifocal ground-glass opacities in the right upper lobe as above may represent hemorrhage, edema, nonspecific pneumonitis, or other infectious or noninfectious inflammatory process. Innumerable bilateral solid pulmonary nodules overall similar compared to 04/23/2020, consistent with widespread metastatic disease"  I assessed her in the clinic today and her pulse ox is 84% and heart rate is 120 on minimal exertion. She notes shortness of breath.      Review of Systems   Constitutional: Positive for appetite change and fatigue. Negative for unexpected weight change.   HENT: Negative for mouth sores.    Eyes: Negative for visual disturbance.   Respiratory: Positive for chest tightness and shortness of breath. Negative for cough.    Cardiovascular: Negative for chest pain.   Gastrointestinal: Negative for abdominal pain and diarrhea.   Genitourinary: Negative for frequency.   Musculoskeletal: Negative for back pain.   Skin: Negative for rash.   Neurological: Negative for headaches.   Hematological: Negative for adenopathy. "   Psychiatric/Behavioral: The patient is not nervous/anxious.    All other systems reviewed and are negative.      Objective:      Physical Exam      LABS:  WBC   Date Value Ref Range Status   05/18/2020 6.19 3.90 - 12.70 K/uL Final     Hemoglobin   Date Value Ref Range Status   05/18/2020 12.8 12.0 - 16.0 g/dL Final     Hematocrit   Date Value Ref Range Status   05/18/2020 42.3 37.0 - 48.5 % Final     Platelets   Date Value Ref Range Status   05/18/2020 198 150 - 350 K/uL Final     Gran # (ANC)   Date Value Ref Range Status   05/18/2020 3.5 1.8 - 7.7 K/uL Final     Comment:     The ANC is based on a white cell differential from an   automated cell counter. It has not been microscopically   reviewed for the presence of abnormal cells. Clinical   correlation is required.         Chemistry        Component Value Date/Time     05/18/2020 0944    K 4.3 05/18/2020 0944     05/18/2020 0944    CO2 27 05/18/2020 0944    BUN 13 05/18/2020 0944    CREATININE 0.9 05/18/2020 0944     (H) 05/18/2020 0944        Component Value Date/Time    CALCIUM 10.2 05/18/2020 0944    ALKPHOS 69 05/18/2020 0944    AST 21 05/18/2020 0944    ALT 19 05/18/2020 0944    BILITOT 0.6 05/18/2020 0944    ESTGFRAFRICA >60.0 05/18/2020 0944    EGFRNONAA >60.0 05/18/2020 0944          Assessment:       1. Pulmonary embolism, unspecified chronicity, unspecified pulmonary embolism type, unspecified whether acute cor pulmonale present    2. Malignant neoplasm of upper lobe of left lung    3. Secondary malignant neoplasm of bone    4. Chemotherapy-induced peripheral neuropathy        Plan:        1. Admit to Hemo/onc for management of PE, recommend Lovenox 1 mg/kg bid dosing, check 2 D ECHO, Once cliniclaly stable she can be discharged home  2,3. Entrectinib has been approved, waiting on shipment  4,5. Taper lexapro to 5 mg this week and then stop, at the same time start Cymbalta 30 mg and increase to 60 mg in a week.    Above care plan  was discussed with patient and all questions were addressed to her satisfaction     Above plan also discussed with Dr. Mcqueen and Dr. Hernandez as well

## 2020-05-20 NOTE — NURSING
Pt return for port deaccess. Line was not used during CT, so hep locked previously performed. Needle removed without complications.   Pt to report to 3rd floor for provider visit.

## 2020-05-20 NOTE — SUBJECTIVE & OBJECTIVE
"Oncology Treatment Plan:   [No treatment plan]    Medications:  Continuous Infusions:  Scheduled Meds:   enoxaparin  90 mg Subcutaneous Q12H    insulin aspart U-100  14 Units Subcutaneous TIDWM    insulin detemir U-100  20 Units Subcutaneous QHS     PRN Meds:Dextrose 10% Bolus, Dextrose 10% Bolus, glucagon (human recombinant), glucose, glucose, insulin aspart U-100, sodium chloride 0.9%     Review of patient's allergies indicates:  No Known Allergies     Past Medical History:   Diagnosis Date    Allergy     Brain aneurysm 2010    s/p coiling of one; another not coiled    Breast cyst     Depression 9/19/2019    Diabetes mellitus     Diabetes type 2, controlled     Fever blister     High cholesterol     History of Bell's palsy     HTN (hypertension) 5/20/2014    Recurrent upper respiratory infection (URI)      Past Surgical History:   Procedure Laterality Date    BREAST SURGERY      BRONCHOSCOPY N/A 5/28/2019    Procedure: Bronchoscopy;  Surgeon: Iesha Diagnostic Provider;  Location: [facility] OR [address] [place] [address];  Service: Anesthesiology;  Laterality: N/A;    CERVICAL FUSION      COLONOSCOPY N/A 3/9/2016    Procedure: COLONOSCOPY;  Surgeon: Elliott Zimmerman MD;  Location: [facility] ENDO (4TH FLR);  Service: Endoscopy;  Laterality: N/A;    head surgery      stent and "curling" for aneurysm    HYSTERECTOMY      TVH secondary to SUF    INSERTION OF TUNNELED CENTRAL VENOUS CATHETER (CVC) WITH SUBCUTANEOUS PORT Right 7/8/2019    Procedure: INSERTION, PORT-A-CATH;  Surgeon: Cali Damico MD;  Location: [facility] CATH LAB;  Service: Radiology;  Laterality: Right;    MENISCECTOMY Left      Family History     Problem Relation (Age of Onset)    Allergies Daughter    Breast cancer Maternal Aunt    Cancer Mother (63), Maternal Aunt    Cataracts Father    Diabetes Father, Sister, Brother    Heart failure Father    Migraines Father    Ovarian cancer Cousin    Rheum arthritis Father    Stomach cancer Mother        Tobacco Use "    Smoking status: Former Smoker     Packs/day: 0.50     Years: 30.00     Pack years: 15.00     Last attempt to quit: 1999     Years since quittin.3    Smokeless tobacco: Never Used   Substance and Sexual Activity    Alcohol use: No     Alcohol/week: 0.0 standard drinks    Drug use: No    Sexual activity: Never     Partners: Female     Birth control/protection: Surgical       Review of Systems   Constitutional: Positive for fatigue. Negative for chills, diaphoresis and fever.   HENT: Negative for ear pain, hearing loss, postnasal drip, rhinorrhea, sore throat, tinnitus and trouble swallowing.    Eyes: Negative for visual disturbance.   Respiratory: Positive for cough and shortness of breath. Negative for chest tightness, wheezing and stridor.    Cardiovascular: Negative for chest pain, palpitations and leg swelling.   Gastrointestinal: Negative for abdominal distention, abdominal pain, constipation, diarrhea, nausea and vomiting.   Endocrine: Negative for cold intolerance and heat intolerance.   Genitourinary: Negative for difficulty urinating and dysuria.   Musculoskeletal: Negative for arthralgias and myalgias.   Skin: Negative for rash.   Neurological: Positive for weakness. Negative for headaches.   Hematological: Does not bruise/bleed easily.   Psychiatric/Behavioral: Negative for confusion.     Objective:     Vital Signs (Most Recent):  Temp: 98.3 °F (36.8 °C) (20 1253)  Pulse: 82 (20 1253)  Resp: (!) 22 (20 1253)  BP: 135/64 (20 1253)  SpO2: 96 % (20 1253) Vital Signs (24h Range):  Temp:  [98.3 °F (36.8 °C)] 98.3 °F (36.8 °C)  Pulse:  [] 82  Resp:  [22] 22  SpO2:  [86 %-96 %] 96 %  BP: (134-135)/(63-64) 135/64        Body mass index is 31.45 kg/m².  Body surface area is 2.17 meters squared.    No intake or output data in the 24 hours ending 20 1515    Physical Exam   Constitutional: She is oriented to person, place, and time. She appears well-developed  and well-nourished. She does not appear ill. No distress.   HENT:   Head: Normocephalic and atraumatic.   Right Ear: Hearing normal.   Left Ear: Hearing normal.   Eyes: Conjunctivae, EOM and lids are normal.   Neck: Trachea normal, full passive range of motion without pain and phonation normal. No JVD present. Carotid bruit is not present. No tracheal deviation present.   Cardiovascular: Regular rhythm, S1 normal, S2 normal, normal heart sounds and normal pulses. Tachycardia present. Exam reveals no gallop, no S3, no S4 and no friction rub.   No murmur heard.  Pulses:       Radial pulses are 2+ on the right side, and 2+ on the left side.   Pulmonary/Chest: Effort normal. No stridor. No respiratory distress. She has decreased breath sounds. She has no wheezes. She has no rhonchi. She has no rales.   Abdominal: Soft. Normal appearance and bowel sounds are normal. There is no tenderness.   Musculoskeletal: Normal range of motion.   Lymphadenopathy:     She has no cervical adenopathy.   Neurological: She is alert and oriented to person, place, and time. She has normal strength.   Skin: Skin is warm, dry and intact. No rash noted. She is not diaphoretic. No pallor.   Psychiatric: She has a normal mood and affect. Her speech is normal and behavior is normal. Judgment and thought content normal. Cognition and memory are normal. She is attentive.   Nursing note and vitals reviewed.      Significant Labs:   Recent Lab Results       05/20/20  1504        POCT Glucose 143            Diagnostic Results:  I have reviewed and interpreted all pertinent imaging results/findings within the past 24 hours.

## 2020-05-20 NOTE — PROGRESS NOTES
Pt. admitted to room 828 from admit office by wheelchair.  Pt. lying in bed AAOx4 with mild c/o SOB on exertion.  Pt. Notified daughter of admission to hospital.  Pt. oriented to room.  Pt. educated on safety measures to prevent injury to self.  Pt. with nonskid footwear on with bed in lowest position and locked.  Pt. instructed to call for assistance prior to getting OOB.  Call light within reach.  Pt. verbalized understanding.  Will continue to monitor pt.

## 2020-05-20 NOTE — PROGRESS NOTES
Received referral from the clinic that patient was in need of home oxygen. Patient has [employer] so referral was sent in there so they could authorize and assign provider.

## 2020-05-20 NOTE — H&P
"[facility]  Hematology/Oncology  H&P    Patient Name: Alison Branch  MRN: [id number]  Admission Date: 5/20/2020  Code Status: Full Code   Attending Provider: René Mcqueen MD  Primary Care Physician: Mike Rodriguez Ii, MD  Principal Problem:<principal problem not specified>    Subjective:     HPI: 65yo F with h/o pulmonary adenocarcinoma, IDDM Type 2, HTN, HLD, brain aneurysm s/p stent and coiling. Presents as a direct admit from Heme/onc clinic with Dr. Belcher for hypoxia. She was noted to have shortness of breath yesterday and had a pulse ox of 90% in her PCP's office. She had an appt today to receive Xgeva with Dr. Belcher and was found to be very short of breath with pulse ox of 84% and heart rate of 120 on minimal exertion. She was sent for a stat CTA chest which reveals "Acute pulmonary thromboembolism involving the right upper lobe pulmonary artery." Patient states she has felt SOB for the past month with no acute change in the past few days. She had a COVID-19 test performed 5/18 which was negative. She states that while she feels weak, fatigued, and SOB, this appears to be her baseline state of health for the past month.    Oncology History:  Ms. Branch is a 63-year-old female who smoked for about 30 years and quit 20 years ago, presented with cough end of last year, but worsened into January.  Since the cough persisted, she saw her PCP, underwent a chest x-ray on 05/10/2019, that revealed left upper lobe pneumonia and a repeat CT was done in the week after that on 05/17/2019 that showed left upper lobe   mass arising from the lateral pleural surface in the left upper lobe posterior subsegment measuring 2.6 x 3 cm.  There are satellite mass more medially near the aortic arch that is 2 cm, also spiculated and irregular as well as thickened interlobar septa in the left lung apex and anterior segment, prevascular lymph node lateral to the aortic arch, short axis measuring 9 mm.       She then " "underwent a bronchoscopy on 05/28/2019, and that revealed there was an infiltration obtained in the left apical posterior segment of the left upper lobe cytology brush was done.  Transbronchial biopsies of an area of infiltration were also performed in the apicoposterior segment of the left upper lobe.    Pathology revealed adenocarcinoma; however, the specimen was not adequate enough to send for molecular testing.       She underwent a PET scan on 06/06/2019.  that reveals significant hypermetabolic activity in the large irregular spiculated pulmonary mass in the lateral aspect of the left upper lobe consistent with the patient's known pulmonary adenocarcinoma.  There is also tracer avidity in the medial left upper lobe satellite lesion and diffusely throughout much of the anterior left upper lobe where there is prominent nodular paraseptal thickening.  There are some numerous scattered subcentimeter pulmonary nodules throughout the right lung, all of which are too small for detection by PET.  For example, there is a 0.4 cm nodule in the superior right lower lobe and a micro nodule in the posterior segment of the right upper lobe.  There is a 0.5 cm, normal size right   paratracheal lymph node with increased radiotracer uptake as well as hypermetabolic aortic pulmonary lymph node and a left hilar lymph node.  There is a focal hypermetabolic lesion in the left anterior superior iliac spine associated with well defined lytic lesion.  There is a hypermetabolic focus in the anterior right fifth rib with a possible associated lytic lesion.  SUVs as   below lateral:  Left upper lobe  SUV max 17.9, anterior left upper lobe satellite mass and septal thickening SUV max of 10.1, right paratracheal lymph node SUV max of 4.8, left AP window lymph node SUV max of 17.9, left anterior superior iliac spine SUV max of 3.9"     MRI pelvis from 6/10/19  reveals "Region of osseous is irregularity at the left anterior iliac spine " "likely related to bone graft harvest procedure.  See  discussion above.  No suspicious signal or enhancement to suggest active/malignant process"   MRI brain from 6/10//19 reveal No intracranial abnormality.     We discussed her case at [facility], and plan was to wait for GAURDANT and proceed with treatment accordingly  Her GAURDANT is negative for molecular markers.     She has completed 4 cycles of Carboplatin, Alimta and Keytruda as of 9/5/19. She is now on  ALimta and Keytruda maintenance.            She has been on maintenance Alimta and Keytruda     Her CT scans from 4/23/2020 revealed "In this patient with a known history of lung cancer, there has been marked interval detrimental change when compared to CT dated 12/20/2019 as follows. Persistent left upper lobe volume loss with worsening masslike consolidation and enlarging index and non index lesions. Increased number of innumerable bilateral metastatic solid pulmonary nodules. Interval increased size and number of multiple osteoblastic metastatic lesions throughout the visualized axial skeleton. Stable mediastinal lymph nodes, several of which were noted to be hypermetabolic on previous PET-CT.  No new lymphadenopathy. Stable subcentimeter hepatic and splenic hypodensities, too small to accurately characterize.  Path addendum from 5/2019 reveals ROS1      She has been approved for Entrectinib.     Oncology Treatment Plan:   [No treatment plan]    Medications:  Continuous Infusions:  Scheduled Meds:   enoxaparin  90 mg Subcutaneous Q12H    insulin aspart U-100  14 Units Subcutaneous TIDWM    insulin detemir U-100  20 Units Subcutaneous QHS     PRN Meds:Dextrose 10% Bolus, Dextrose 10% Bolus, glucagon (human recombinant), glucose, glucose, insulin aspart U-100, sodium chloride 0.9%     Review of patient's allergies indicates:  No Known Allergies     Past Medical History:   Diagnosis Date    Allergy     Brain aneurysm 2010    s/p coiling of one; " "another not coiled    Breast cyst     Depression 2019    Diabetes mellitus     Diabetes type 2, controlled     Fever blister     High cholesterol     History of Bell's palsy     HTN (hypertension) 2014    Recurrent upper respiratory infection (URI)      Past Surgical History:   Procedure Laterality Date    BREAST SURGERY      BRONCHOSCOPY N/A 2019    Procedure: Bronchoscopy;  Surgeon: Iesha Diagnostic Provider;  Location: [facility] OR 2ND FLR;  Service: Anesthesiology;  Laterality: N/A;    CERVICAL FUSION      COLONOSCOPY N/A 3/9/2016    Procedure: COLONOSCOPY;  Surgeon: Elliott Zimmerman MD;  Location: [facility] ENDO (4TH FLR);  Service: Endoscopy;  Laterality: N/A;    head surgery      stent and "curling" for aneurysm    HYSTERECTOMY      TVH secondary to SUF    INSERTION OF TUNNELED CENTRAL VENOUS CATHETER (CVC) WITH SUBCUTANEOUS PORT Right 2019    Procedure: INSERTION, PORT-A-CATH;  Surgeon: Cali Damico MD;  Location: [facility] CATH LAB;  Service: Radiology;  Laterality: Right;    MENISCECTOMY Left      Family History     Problem Relation (Age of Onset)    Allergies Daughter    Breast cancer Maternal Aunt    Cancer Mother (63), Maternal Aunt    Cataracts Father    Diabetes Father, Sister, Brother    Heart failure Father    Migraines Father    Ovarian cancer Cousin    Rheum arthritis Father    Stomach cancer Mother        Tobacco Use    Smoking status: Former Smoker     Packs/day: 0.50     Years: 30.00     Pack years: 15.00     Last attempt to quit: 1999     Years since quittin.3    Smokeless tobacco: Never Used   Substance and Sexual Activity    Alcohol use: No     Alcohol/week: 0.0 standard drinks    Drug use: No    Sexual activity: Never     Partners: Female     Birth control/protection: Surgical       Review of Systems   Constitutional: Positive for fatigue. Negative for chills, diaphoresis and fever.   HENT: Negative for ear pain, hearing loss, postnasal drip, " rhinorrhea, sore throat, tinnitus and trouble swallowing.    Eyes: Negative for visual disturbance.   Respiratory: Positive for cough and shortness of breath. Negative for chest tightness, wheezing and stridor.    Cardiovascular: Negative for chest pain, palpitations and leg swelling.   Gastrointestinal: Negative for abdominal distention, abdominal pain, constipation, diarrhea, nausea and vomiting.   Endocrine: Negative for cold intolerance and heat intolerance.   Genitourinary: Negative for difficulty urinating and dysuria.   Musculoskeletal: Negative for arthralgias and myalgias.   Skin: Negative for rash.   Neurological: Positive for weakness. Negative for headaches.   Hematological: Does not bruise/bleed easily.   Psychiatric/Behavioral: Negative for confusion.     Objective:     Vital Signs (Most Recent):  Temp: 98.3 °F (36.8 °C) (05/20/20 1253)  Pulse: 82 (05/20/20 1253)  Resp: (!) 22 (05/20/20 1253)  BP: 135/64 (05/20/20 1253)  SpO2: 96 % (05/20/20 1253) Vital Signs (24h Range):  Temp:  [98.3 °F (36.8 °C)] 98.3 °F (36.8 °C)  Pulse:  [] 82  Resp:  [22] 22  SpO2:  [86 %-96 %] 96 %  BP: (134-135)/(63-64) 135/64        Body mass index is 31.45 kg/m².  Body surface area is 2.17 meters squared.    No intake or output data in the 24 hours ending 05/20/20 1515    Physical Exam   Constitutional: She is oriented to person, place, and time. She appears well-developed and well-nourished. She does not appear ill. No distress.   HENT:   Head: Normocephalic and atraumatic.   Right Ear: Hearing normal.   Left Ear: Hearing normal.   Eyes: Conjunctivae, EOM and lids are normal.   Neck: Trachea normal, full passive range of motion without pain and phonation normal. No JVD present. Carotid bruit is not present. No tracheal deviation present.   Cardiovascular: Regular rhythm, S1 normal, S2 normal, normal heart sounds and normal pulses. Tachycardia present. Exam reveals no gallop, no S3, no S4 and no friction rub.   No  murmur heard.  Pulses:       Radial pulses are 2+ on the right side, and 2+ on the left side.   Pulmonary/Chest: Effort normal. No stridor. No respiratory distress. She has decreased breath sounds. She has no wheezes. She has no rhonchi. She has no rales.   Abdominal: Soft. Normal appearance and bowel sounds are normal. There is no tenderness.   Musculoskeletal: Normal range of motion.   Lymphadenopathy:     She has no cervical adenopathy.   Neurological: She is alert and oriented to person, place, and time. She has normal strength.   Skin: Skin is warm, dry and intact. No rash noted. She is not diaphoretic. No pallor.   Psychiatric: She has a normal mood and affect. Her speech is normal and behavior is normal. Judgment and thought content normal. Cognition and memory are normal. She is attentive.   Nursing note and vitals reviewed.      Significant Labs:   Recent Lab Results       05/20/20  1504        POCT Glucose 143            Diagnostic Results:  I have reviewed and interpreted all pertinent imaging results/findings within the past 24 hours.    Assessment/Plan:     Pulmonary embolism  Found to be hypoxic in clinic with O2sat 84%  Patient states she has been SOB for about a month  Currently on lovenox and aspirin at home  CTA Chest: Acute pulmonary thromboembolism involving the right upper lobe pulmonary artery. In this patient with known lung cancer, there is a heterogeneous large opacity with tumor and consolidation occupying much of the left upper lobe overall similar to prior exam.  It is difficult to measure tumor size and separate it from the associated consolidation. New multifocal ground-glass opacities in the right upper lobe as above may represent hemorrhage, edema, nonspecific pneumonitis, or other infectious or noninfectious inflammatory process. Innumerable bilateral solid pulmonary nodules overall similar compared to 04/23/2020, consistent with widespread metastatic disease. Small left-sided  pleural effusion. Stable minimally enlarged mediastinal lymph nodes as above. Multiple osteoblastic metastatic lesions throughout the axial skeleton similar compared to prior.    2D Echo (5/20/20):  · Normal left ventricular systolic function. The estimated ejection fraction is 60%.  · RV not well visualized.  · Normal LV diastolic function.  · No wall motion abnormalities.    Plan:  -Lovenox 1mg/kg BID  -BNP  -PT/INR qd    Impaired functional mobility, balance, gait, and endurance  PT/OT    Malignant neoplasm of upper lobe of left lung  See Oncology History     She has been approved for Entrectinib. She states she has not yet started this chemo yet. She is not currently on any regimen until she starts entrectinib    C/w valium PRN for anxiety  C/w gabapentin qHSPRN for sleep aid, neuropathic pain  C/w bisacodyl PRN for constipation  C/w folic acid qd    Hypertension associated with diabetes  C/w olmesartan    Mixed hyperlipidemia due to type 2 diabetes mellitus  Hold atorvastatin as patient said she was told to stop prior to starting her new chemo regimen    Type 2 diabetes mellitus with hyperglycemia, with long-term current use of insulin  Home regimen: detemir 26U qd, lispro 20-16-16     Plan:  -Detemir 20U qHS  -Aspart 14U TIDWM  -MDSSI  -POC Glu  -Diet: Diabetic Cardiac        Keaton Henry MD  Hematology/Oncology  [facility]

## 2020-05-20 NOTE — ASSESSMENT & PLAN NOTE
Found to be hypoxic in clinic with O2sat 84%  Patient states she has been SOB for about a month  Currently on lovenox and aspirin at home  CTA Chest: Acute pulmonary thromboembolism involving the right upper lobe pulmonary artery. In this patient with known lung cancer, there is a heterogeneous large opacity with tumor and consolidation occupying much of the left upper lobe overall similar to prior exam.  It is difficult to measure tumor size and separate it from the associated consolidation. New multifocal ground-glass opacities in the right upper lobe as above may represent hemorrhage, edema, nonspecific pneumonitis, or other infectious or noninfectious inflammatory process. Innumerable bilateral solid pulmonary nodules overall similar compared to 04/23/2020, consistent with widespread metastatic disease. Small left-sided pleural effusion. Stable minimally enlarged mediastinal lymph nodes as above. Multiple osteoblastic metastatic lesions throughout the axial skeleton similar compared to prior.    2D Echo (5/20/20):  · Normal left ventricular systolic function. The estimated ejection fraction is 60%.  · RV not well visualized.  · Normal LV diastolic function.  · No wall motion abnormalities.    Plan:  -Lovenox 1mg/kg BID  -BNP  -PT/INR qd

## 2020-05-20 NOTE — NURSING
0845 Pt arrived for Xgeva injection. Port accessed for CT scan this morning. Site flushed, blood return, hep locked. No jaw pain noted, calcium reviewed. Pt escorted to radiology for imaging.

## 2020-05-20 NOTE — HPI
"65yo F with h/o pulmonary adenocarcinoma, IDDM Type 2, HTN, HLD, brain aneurysm s/p stent and coiling. Presents as a direct admit from Heme/onc clinic with Dr. Belcher for hypoxia. She was noted to have shortness of breath yesterday and had a pulse ox of 90% in her PCP's office. She had an appt today to receive Xgeva with Dr. Belcher and was found to be very short of breath with pulse ox of 84% and heart rate of 120 on minimal exertion. She was sent for a stat CTA chest which reveals "Acute pulmonary thromboembolism involving the right upper lobe pulmonary artery." Patient states she has felt SOB for the past month with no acute change in the past few days. She had a COVID-19 test performed 5/18 which was negative. She states that while she feels weak, fatigued, and SOB, this appears to be her baseline state of health for the past month.    Oncology History:  Ms. Branch is a 63-year-old female who smoked for about 30 years and quit 20 years ago, presented with cough end of last year, but worsened into January.  Since the cough persisted, she saw her PCP, underwent a chest x-ray on 05/10/2019, that revealed left upper lobe pneumonia and a repeat CT was done in the week after that on 05/17/2019 that showed left upper lobe   mass arising from the lateral pleural surface in the left upper lobe posterior subsegment measuring 2.6 x 3 cm.  There are satellite mass more medially near the aortic arch that is 2 cm, also spiculated and irregular as well as thickened interlobar septa in the left lung apex and anterior segment, prevascular lymph node lateral to the aortic arch, short axis measuring 9 mm.       She then underwent a bronchoscopy on 05/28/2019, and that revealed there was an infiltration obtained in the left apical posterior segment of the left upper lobe cytology brush was done.  Transbronchial biopsies of an area of infiltration were also performed in the apicoposterior segment of the left upper lobe.    Pathology " "revealed adenocarcinoma; however, the specimen was not adequate enough to send for molecular testing.       She underwent a PET scan on 06/06/2019.  that reveals significant hypermetabolic activity in the large irregular spiculated pulmonary mass in the lateral aspect of the left upper lobe consistent with the patient's known pulmonary adenocarcinoma.  There is also tracer avidity in the medial left upper lobe satellite lesion and diffusely throughout much of the anterior left upper lobe where there is prominent nodular paraseptal thickening.  There are some numerous scattered subcentimeter pulmonary nodules throughout the right lung, all of which are too small for detection by PET.  For example, there is a 0.4 cm nodule in the superior right lower lobe and a micro nodule in the posterior segment of the right upper lobe.  There is a 0.5 cm, normal size right   paratracheal lymph node with increased radiotracer uptake as well as hypermetabolic aortic pulmonary lymph node and a left hilar lymph node.  There is a focal hypermetabolic lesion in the left anterior superior iliac spine associated with well defined lytic lesion.  There is a hypermetabolic focus in the anterior right fifth rib with a possible associated lytic lesion.  SUVs as   below lateral:  Left upper lobe  SUV max 17.9, anterior left upper lobe satellite mass and septal thickening SUV max of 10.1, right paratracheal lymph node SUV max of 4.8, left AP window lymph node SUV max of 17.9, left anterior superior iliac spine SUV max of 3.9"     MRI pelvis from 6/10/19  reveals "Region of osseous is irregularity at the left anterior iliac spine likely related to bone graft harvest procedure.  See  discussion above.  No suspicious signal or enhancement to suggest active/malignant process"   MRI brain from 6/10//19 reveal No intracranial abnormality.     We discussed her case at [facility], and plan was to wait for GAURDANT and proceed with treatment " "accordingly  Her GAURDANT is negative for molecular markers.     She has completed 4 cycles of Carboplatin, Alimta and Keytruda as of 9/5/19. She is now on  ALimta and Keytruda maintenance.            She has been on maintenance Alimta and Keytruda     Her CT scans from 4/23/2020 revealed "In this patient with a known history of lung cancer, there has been marked interval detrimental change when compared to CT dated 12/20/2019 as follows. Persistent left upper lobe volume loss with worsening masslike consolidation and enlarging index and non index lesions. Increased number of innumerable bilateral metastatic solid pulmonary nodules. Interval increased size and number of multiple osteoblastic metastatic lesions throughout the visualized axial skeleton. Stable mediastinal lymph nodes, several of which were noted to be hypermetabolic on previous PET-CT.  No new lymphadenopathy. Stable subcentimeter hepatic and splenic hypodensities, too small to accurately characterize.  Path addendum from 5/2019 reveals ROS1      She has been approved for Entrectinib.  "

## 2020-05-20 NOTE — ASSESSMENT & PLAN NOTE
Home regimen: detemir 26U qd, lispro 20-16-16 WM    Plan:  -Detemir 20U qHS  -Aspart 14U TIDWM  -MDSSI  -POC Glu  -Diet: Diabetic Cardiac

## 2020-05-20 NOTE — TELEPHONE ENCOUNTER
----- Message from Justin Meza RN sent at 5/20/2020  8:24 AM CDT -----  Walk test---  Dropped to 86% with minimal exertion  Very labored breathing  ~justin

## 2020-05-20 NOTE — ASSESSMENT & PLAN NOTE
See Oncology History     She has been approved for Entrectinib. She states she has not yet started this chemo yet. She is not currently on any regimen until she starts entrectinib    C/w valium PRN for anxiety  C/w gabapentin qHSPRN for sleep aid, neuropathic pain  C/w bisacodyl PRN for constipation  C/w folic acid qd

## 2020-05-21 ENCOUNTER — PATIENT MESSAGE (OUTPATIENT)
Dept: INTERNAL MEDICINE | Facility: CLINIC | Age: 65
End: 2020-05-21

## 2020-05-21 VITALS
TEMPERATURE: 99 F | RESPIRATION RATE: 18 BRPM | WEIGHT: 212.63 LBS | OXYGEN SATURATION: 98 % | HEART RATE: 78 BPM | DIASTOLIC BLOOD PRESSURE: 54 MMHG | HEIGHT: 69 IN | BODY MASS INDEX: 31.49 KG/M2 | SYSTOLIC BLOOD PRESSURE: 116 MMHG

## 2020-05-21 LAB
ANION GAP SERPL CALC-SCNC: 7 MMOL/L (ref 8–16)
BASOPHILS # BLD AUTO: 0.04 K/UL (ref 0–0.2)
BASOPHILS NFR BLD: 0.7 % (ref 0–1.9)
BUN SERPL-MCNC: 14 MG/DL (ref 8–23)
CALCIUM SERPL-MCNC: 8.7 MG/DL (ref 8.7–10.5)
CHLORIDE SERPL-SCNC: 105 MMOL/L (ref 95–110)
CO2 SERPL-SCNC: 26 MMOL/L (ref 23–29)
CREAT SERPL-MCNC: 0.8 MG/DL (ref 0.5–1.4)
DIFFERENTIAL METHOD: ABNORMAL
EOSINOPHIL # BLD AUTO: 0.3 K/UL (ref 0–0.5)
EOSINOPHIL NFR BLD: 5.1 % (ref 0–8)
ERYTHROCYTE [DISTWIDTH] IN BLOOD BY AUTOMATED COUNT: 13.8 % (ref 11.5–14.5)
EST. GFR  (AFRICAN AMERICAN): >60 ML/MIN/1.73 M^2
EST. GFR  (NON AFRICAN AMERICAN): >60 ML/MIN/1.73 M^2
GLUCOSE SERPL-MCNC: 133 MG/DL (ref 70–110)
HCT VFR BLD AUTO: 35.1 % (ref 37–48.5)
HGB BLD-MCNC: 10.8 G/DL (ref 12–16)
IMM GRANULOCYTES # BLD AUTO: 0.03 K/UL (ref 0–0.04)
IMM GRANULOCYTES NFR BLD AUTO: 0.5 % (ref 0–0.5)
INR PPP: 1 (ref 0.8–1.2)
LYMPHOCYTES # BLD AUTO: 1.4 K/UL (ref 1–4.8)
LYMPHOCYTES NFR BLD: 25.4 % (ref 18–48)
MAGNESIUM SERPL-MCNC: 1.9 MG/DL (ref 1.6–2.6)
MCH RBC QN AUTO: 28.6 PG (ref 27–31)
MCHC RBC AUTO-ENTMCNC: 30.8 G/DL (ref 32–36)
MCV RBC AUTO: 93 FL (ref 82–98)
MONOCYTES # BLD AUTO: 0.6 K/UL (ref 0.3–1)
MONOCYTES NFR BLD: 11.3 % (ref 4–15)
NEUTROPHILS # BLD AUTO: 3.1 K/UL (ref 1.8–7.7)
NEUTROPHILS NFR BLD: 57 % (ref 38–73)
NRBC BLD-RTO: 0 /100 WBC
PHOSPHATE SERPL-MCNC: 3.6 MG/DL (ref 2.7–4.5)
PLATELET # BLD AUTO: 176 K/UL (ref 150–350)
PMV BLD AUTO: 11.4 FL (ref 9.2–12.9)
POCT GLUCOSE: 120 MG/DL (ref 70–110)
POCT GLUCOSE: 133 MG/DL (ref 70–110)
POTASSIUM SERPL-SCNC: 3.8 MMOL/L (ref 3.5–5.1)
PROTHROMBIN TIME: 10.7 SEC (ref 9–12.5)
RBC # BLD AUTO: 3.78 M/UL (ref 4–5.4)
SODIUM SERPL-SCNC: 138 MMOL/L (ref 136–145)
WBC # BLD AUTO: 5.48 K/UL (ref 3.9–12.7)

## 2020-05-21 PROCEDURE — 85025 COMPLETE CBC W/AUTO DIFF WBC: CPT

## 2020-05-21 PROCEDURE — 63600175 PHARM REV CODE 636 W HCPCS: Performed by: STUDENT IN AN ORGANIZED HEALTH CARE EDUCATION/TRAINING PROGRAM

## 2020-05-21 PROCEDURE — 97165 OT EVAL LOW COMPLEX 30 MIN: CPT

## 2020-05-21 PROCEDURE — 25000003 PHARM REV CODE 250: Performed by: STUDENT IN AN ORGANIZED HEALTH CARE EDUCATION/TRAINING PROGRAM

## 2020-05-21 PROCEDURE — 99238 HOSP IP/OBS DSCHRG MGMT 30/<: CPT | Mod: GC,,, | Performed by: INTERNAL MEDICINE

## 2020-05-21 PROCEDURE — 84100 ASSAY OF PHOSPHORUS: CPT

## 2020-05-21 PROCEDURE — 83735 ASSAY OF MAGNESIUM: CPT

## 2020-05-21 PROCEDURE — 97162 PT EVAL MOD COMPLEX 30 MIN: CPT

## 2020-05-21 PROCEDURE — 94761 N-INVAS EAR/PLS OXIMETRY MLT: CPT

## 2020-05-21 PROCEDURE — 99238 PR HOSPITAL DISCHARGE DAY,<30 MIN: ICD-10-PCS | Mod: GC,,, | Performed by: INTERNAL MEDICINE

## 2020-05-21 PROCEDURE — 63600175 PHARM REV CODE 636 W HCPCS: Performed by: INTERNAL MEDICINE

## 2020-05-21 PROCEDURE — 80048 BASIC METABOLIC PNL TOTAL CA: CPT

## 2020-05-21 PROCEDURE — 85610 PROTHROMBIN TIME: CPT

## 2020-05-21 PROCEDURE — 97530 THERAPEUTIC ACTIVITIES: CPT

## 2020-05-21 PROCEDURE — 97535 SELF CARE MNGMENT TRAINING: CPT

## 2020-05-21 RX ORDER — HEPARIN 100 UNIT/ML
300 SYRINGE INTRAVENOUS
Status: DISCONTINUED | OUTPATIENT
Start: 2020-05-21 | End: 2020-05-21 | Stop reason: HOSPADM

## 2020-05-21 RX ORDER — INSULIN LISPRO 100 [IU]/ML
INJECTION, SOLUTION INTRAVENOUS; SUBCUTANEOUS
Qty: 45 ML | Refills: 11 | Status: ON HOLD | OUTPATIENT
Start: 2020-05-21 | End: 2020-06-27 | Stop reason: SDUPTHER

## 2020-05-21 RX ADMIN — HEPARIN 300 UNITS: 100 SYRINGE at 02:05

## 2020-05-21 RX ADMIN — OLMESARTAN MEDOXOMIL 20 MG: 20 TABLET, FILM COATED ORAL at 08:05

## 2020-05-21 RX ADMIN — ENOXAPARIN SODIUM 90 MG: 100 INJECTION SUBCUTANEOUS at 03:05

## 2020-05-21 RX ADMIN — FOLIC ACID 1 MG: 1 TABLET ORAL at 08:05

## 2020-05-21 RX ADMIN — ASPIRIN 81 MG: 81 TABLET, COATED ORAL at 08:05

## 2020-05-21 RX ADMIN — ENOXAPARIN SODIUM 90 MG: 100 INJECTION SUBCUTANEOUS at 02:05

## 2020-05-21 RX ADMIN — BENZONATATE 100 MG: 100 CAPSULE, LIQUID FILLED ORAL at 07:05

## 2020-05-21 NOTE — PROGRESS NOTES
05/21/20 0912   Vital Signs   SpO2 (!) 87 %   O2 Device (Oxygen Therapy) room air     Pt sats 87% on RA at rest; increased O2 to 2L nasal cannula and she recovered to 93%.

## 2020-05-21 NOTE — PROGRESS NOTES
Discharge instructions and prescriptions given and explained to pt. Pt. verbalized understanding with no further questions. Port deaccessed. VS WDL. Patient is awaiting ride home by daughter.    ROAD TEST  O=SpO2 94% on 2L O2 nasal cannula; pt being discharged with home oxygen.  A=Ambulates around room with standby assistance and uses cane.  D=Port deaccessed.  T=Tolerating diabetic/cardiac 2000 homar diet; good appetite.  E=Voids  S=Performs self care independently  T=N/a

## 2020-05-21 NOTE — SUBJECTIVE & OBJECTIVE
Therapeutic Intervention: Met with patient.  Outpatient - Supportive psychotherapy 30 min - CPT Code 32675    Chief Complaint/Reason for Encounter: depression     Interval History and Content of Current Session: Patient admitted to Med/Onc today for PE. Consulted with Dr. Belcher and met with patient for support. Patient stated that she is aware of her need to be in the hospital and will enjoy the quiet time. Patient denied depressed mood or anxiety, understanding her diagnosis and stated that she is able to manage her stress. Mood and protective strategies were discussed.     Risk Parameters:  Patient reports no suicidal ideation  Patient reports no homicidal ideation  Patient reports no self-injurious behavior  Patient reports no violent behavior    Verbal Deficits: None    Patient's response to intervention:  The patient's response to intervention is accepting.    Progress toward goals and other mental status changes:  The patient's progress toward goals is good.

## 2020-05-21 NOTE — PLAN OF CARE
Problem: Physical Therapy Goal  Goal: Physical Therapy Goal  Description  Goals to be met by: 2020     Patient will increase functional independence with mobility by performin. Supine to sit with Modified [place]  2. Sit to supine with Modified [place]  3. Sit to stand transfer with Supervision  4. Bed to chair transfer with Supervision using Single-point Cane   5. Gait  x 150 feet with Supervision using Single-point Cane .   6. Ascend/descend 12 stair with bilateral Handrails Contact Guard Assistance   Outcome: Ongoing, Progressing   Eval completed and POC established    Neptali Chaparro PT,DPT  2020

## 2020-05-21 NOTE — DISCHARGE SUMMARY
"[facility]  Hematology/Oncology  Discharge Summary      Patient Name: Alison Branch  MRN: [id number]  Admission Date: 5/20/2020  Hospital Length of Stay: 1 days  Discharge Date and Time:  05/21/2020 11:33 AM  Attending Physician: René Mcqueen MD   Discharging Provider: Devendra Galaviz MD  Primary Care Provider: Mike Rodriguez Ii, MD    HPI: 65yo F with h/o pulmonary adenocarcinoma, IDDM Type 2, HTN, HLD, brain aneurysm s/p stent and coiling. Presents as a direct admit from Heme/onc clinic with Dr. Belcher for hypoxia. She was noted to have shortness of breath yesterday and had a pulse ox of 90% in her PCP's office. She had an appt today to receive Xgeva with Dr. Belcher and was found to be very short of breath with pulse ox of 84% and heart rate of 120 on minimal exertion. She was sent for a stat CTA chest which reveals "Acute pulmonary thromboembolism involving the right upper lobe pulmonary artery." Patient states she has felt SOB for the past month with no acute change in the past few days. She had a COVID-19 test performed 5/18 which was negative. She states that while she feels weak, fatigued, and SOB, this appears to be her baseline state of health for the past month.    Oncology History:  Ms. Branch is a 63-year-old female who smoked for about 30 years and quit 20 years ago, presented with cough end of last year, but worsened into January.  Since the cough persisted, she saw her PCP, underwent a chest x-ray on 05/10/2019, that revealed left upper lobe pneumonia and a repeat CT was done in the week after that on 05/17/2019 that showed left upper lobe   mass arising from the lateral pleural surface in the left upper lobe posterior subsegment measuring 2.6 x 3 cm.  There are satellite mass more medially near the aortic arch that is 2 cm, also spiculated and irregular as well as thickened interlobar septa in the left lung apex and anterior segment, prevascular lymph node lateral to the aortic " "arch, short axis measuring 9 mm.       She then underwent a bronchoscopy on 05/28/2019, and that revealed there was an infiltration obtained in the left apical posterior segment of the left upper lobe cytology brush was done.  Transbronchial biopsies of an area of infiltration were also performed in the apicoposterior segment of the left upper lobe.    Pathology revealed adenocarcinoma; however, the specimen was not adequate enough to send for molecular testing.       She underwent a PET scan on 06/06/2019.  that reveals significant hypermetabolic activity in the large irregular spiculated pulmonary mass in the lateral aspect of the left upper lobe consistent with the patient's known pulmonary adenocarcinoma.  There is also tracer avidity in the medial left upper lobe satellite lesion and diffusely throughout much of the anterior left upper lobe where there is prominent nodular paraseptal thickening.  There are some numerous scattered subcentimeter pulmonary nodules throughout the right lung, all of which are too small for detection by PET.  For example, there is a 0.4 cm nodule in the superior right lower lobe and a micro nodule in the posterior segment of the right upper lobe.  There is a 0.5 cm, normal size right   paratracheal lymph node with increased radiotracer uptake as well as hypermetabolic aortic pulmonary lymph node and a left hilar lymph node.  There is a focal hypermetabolic lesion in the left anterior superior iliac spine associated with well defined lytic lesion.  There is a hypermetabolic focus in the anterior right fifth rib with a possible associated lytic lesion.  SUVs as   below lateral:  Left upper lobe  SUV max 17.9, anterior left upper lobe satellite mass and septal thickening SUV max of 10.1, right paratracheal lymph node SUV max of 4.8, left AP window lymph node SUV max of 17.9, left anterior superior iliac spine SUV max of 3.9"     MRI pelvis from 6/10/19  reveals "Region of osseous is " "irregularity at the left anterior iliac spine likely related to bone graft harvest procedure.  See  discussion above.  No suspicious signal or enhancement to suggest active/malignant process"   MRI brain from 6/10//19 reveal No intracranial abnormality.     We discussed her case at [facility], and plan was to wait for GAURDANT and proceed with treatment accordingly  Her GAURDANT is negative for molecular markers.     She has completed 4 cycles of Carboplatin, Alimta and Keytruda as of 9/5/19. She is now on  ALimta and Keytruda maintenance.            She has been on maintenance Alimta and Keytruda     Her CT scans from 4/23/2020 revealed "In this patient with a known history of lung cancer, there has been marked interval detrimental change when compared to CT dated 12/20/2019 as follows. Persistent left upper lobe volume loss with worsening masslike consolidation and enlarging index and non index lesions. Increased number of innumerable bilateral metastatic solid pulmonary nodules. Interval increased size and number of multiple osteoblastic metastatic lesions throughout the visualized axial skeleton. Stable mediastinal lymph nodes, several of which were noted to be hypermetabolic on previous PET-CT.  No new lymphadenopathy. Stable subcentimeter hepatic and splenic hypodensities, too small to accurately characterize.  Path addendum from 5/2019 reveals ROS1      She has been approved for Entrectinib.    * No surgery found *     Hospital Course: Patient directly admitted to Medical Oncology on 5/20 from Heme/Onc clinic for hypoxia with documented desaturation to 84% on room air with complaints of SOB following denosumab. She subsequently underwent STAT CTA chest whiched was notable for VTE involving right upper lobe pulmonary artery. She was started on Lovenox 1 mg/kg for treatment of of her new pulmonary embolism with improvement in SOB but still requiring O2 via nasal cannula to maintain saturations greater than " +88%. She is to be discharged on 5/21 with Lovenox, home health and home O2.     Consults: None    Interval History: Patient seen and examine this AM. No acute events overnight. Complaints of cough which were for which she Tessalon Perles with significant relief.  Otherwise slept well, maintaining saturations on O2 via nasal cannula. Reports improvement in SOB and MENDES this AM. Patient will be discharged on Lovenox for treatment of her PE at least during the acute phase in addition to home O2 and home health.     Oncology Treatment Plan:   [No treatment plan]    Medications:  Continuous Infusions:  Scheduled Meds:   aspirin  81 mg Oral Daily    enoxaparin  90 mg Subcutaneous Q12H    folic acid  1 mg Oral Daily    olmesartan  20 mg Oral Daily     PRN Meds:benzonatate, bisacodyL, Dextrose 10% Bolus, Dextrose 10% Bolus, diazePAM, gabapentin, glucagon (human recombinant), glucose, glucose, insulin aspart U-100, sodium chloride 0.9%     Review of Systems   Constitutional: Negative for chills, diaphoresis and fever.   Respiratory: Positive for cough and shortness of breath (improved from day prior). Negative for wheezing.    Cardiovascular: Negative for chest pain, palpitations and leg swelling.   Gastrointestinal: Negative for abdominal distention, abdominal pain, blood in stool, constipation, diarrhea, nausea and vomiting.   Genitourinary: Negative for difficulty urinating, dysuria and frequency.   Musculoskeletal: Negative for arthralgias and myalgias.   Neurological: Negative for dizziness, light-headedness and headaches.   Psychiatric/Behavioral: Negative for confusion and sleep disturbance.     Objective:     Vital Signs (Most Recent):  Temp: 98 °F (36.7 °C) (05/21/20 0737)  Pulse: 79 (05/21/20 0737)  Resp: 17 (05/21/20 0737)  BP: (!) 118/57 (05/21/20 0737)  SpO2: (!) 87 % (05/21/20 0912) Vital Signs (24h Range):  Temp:  [97.8 °F (36.6 °C)-99 °F (37.2 °C)] 98 °F (36.7 °C)  Pulse:  [77-92] 79  Resp:  [16-22]  17  SpO2:  [87 %-99 %] 87 %  BP: (115-135)/(46-64) 118/57     Weight: 96.5 kg (212 lb 10.1 oz)  Body mass index is 31.4 kg/m².  Body surface area is 2.17 meters squared.      Intake/Output Summary (Last 24 hours) at 5/21/2020 1057  Last data filed at 5/21/2020 0918  Gross per 24 hour   Intake 740 ml   Output 700 ml   Net 40 ml       Physical Exam   Constitutional: She is oriented to person, place, and time. She appears well-developed and well-nourished. No distress. Nasal cannula in place.   HENT:   Head: Normocephalic and atraumatic.   Eyes: EOM and lids are normal. No scleral icterus.   Neck: Normal range of motion. Neck supple. No JVD present. No tracheal deviation present.   Cardiovascular: Normal rate, intact distal pulses and normal pulses. Exam reveals no gallop and no friction rub.   No murmur heard.  Pulmonary/Chest: Effort normal. No respiratory distress. She has no wheezes. She has no rhonchi. She has no rales.   Right-sided Port-a-catheter to internal jugular.    Abdominal: Soft. Normal appearance and bowel sounds are normal. She exhibits no distension. There is no tenderness.   Musculoskeletal: She exhibits no edema.   Neurological: She is alert and oriented to person, place, and time. She has normal strength. She exhibits normal muscle tone.   Skin: Skin is warm, dry and intact. She is not diaphoretic.   Psychiatric: She has a normal mood and affect. Her speech is normal and behavior is normal. Cognition and memory are normal. She is attentive.   Nursing note and vitals reviewed.      Significant Labs:   Recent Lab Results       05/21/20  0841   05/21/20  0428   05/21/20  0405   05/20/20  2148   05/20/20  2148        Albumin               Alkaline Phosphatase               ALT               Anion Gap   7           Ascending aorta               Ao peak minerva               Ao VTI               AST               AV valve area               AV mean gradient               AV index (prosthetic)                AV peak gradient               AV Velocity Ratio               Baso #   0.04           Basophil%   0.7           BILIRUBIN TOTAL               BNP               BSA               BUN, Bld   14           Calcium   8.7           Chloride   105           CO2   26           Creatinine   0.8           Left Ventricle Relative Wall Thickness               Differential Method   Automated           E/A ratio               E/E' ratio               eGFR if    >60.0           eGFR if non    >60.0  Comment:  Calculation used to obtain the estimated glomerular filtration  rate (eGFR) is the CKD-EPI equation.              Eos #   0.3           Eosinophil%   5.1           E wave decelartion time               FS               Glucose   133           Gran # (ANC)   3.1           Gran%   57.0           Hematocrit   35.1           Hemoglobin   10.8           Heparin Anti-Xa               Immature Grans (Abs)   0.03  Comment:  Mild elevation in immature granulocytes is non specific and   can be seen in a variety of conditions including stress response,   acute inflammation, trauma and pregnancy. Correlation with other   laboratory and clinical findings is essential.             Immature Granulocytes   0.5           INR   1.0  Comment:  Coumadin Therapy:  2.0 - 3.0 for INR for all indicators except mechanical heart valves  and antiphospholipid syndromes which should use 2.5 - 3.5.             IVS               LA WIDTH               Left Atrium Major Axis               Left Atrium Minor Axis               LA size               LA volume               LA Volume Index               LVOT area               LV LATERAL E/E' RATIO               LV SEPTAL E/E' RATIO               LV Diastolic Volume               LV Diastolic Volume Index               LVIDD               LVIDS               LV mass               LV Mass Index               LVOT diameter               LVOT peak minerva               LVOT stroke  volume               LVOT peak VTI               LV Systolic Volume               LV Systolic Volume Index               Lymph #   1.4           Lymph%   25.4           Magnesium   1.9           MCH   28.6           MCHC   30.8           MCV   93           Mean e'               Mono #   0.6           Mono%   11.3           MPV   11.4           MV Peak A Nabeel               MV Peak E Nabeel               nRBC   0           Phosphorus   3.6           Platelets   176           POC Glucose         115     POCT Glucose 120   133 115       Potassium   3.8           PROTEIN TOTAL               Protime   10.7           PW               RA Major Axis               RA Width               RBC   3.78           RDW   13.8           RV S'               RVDD               Sinus               Sodium   138           STJ               TDI SEPTAL               TDI LATERAL               WBC   5.48                            05/20/20  2036   05/20/20 2007 05/20/20  1701   05/20/20  1543   05/20/20  1506        Albumin       3.4       Alkaline Phosphatase       57       ALT       16       Anion Gap       5       Ascending aorta         2.73     Ao peak nabeel         0.89     Ao VTI         18.83     AST       17       AV valve area         3.12     AV mean gradient         2     AV index (prosthetic)         0.94     AV peak gradient         3     AV Velocity Ratio         1.07     Baso #       0.04       Basophil%       0.8       BILIRUBIN TOTAL       0.4  Comment:  For infants and newborns, interpretation of results should be based  on gestational age, weight and in agreement with clinical  observations.  Premature Infant recommended reference ranges:  Up to 24 hours.............<8.0 mg/dL  Up to 48 hours............<12.0 mg/dL  3-5 days..................<15.0 mg/dL  6-29 days.................<15.0 mg/dL         BNP       13  Comment:  Values of less than 100 pg/ml are consistent with non-CHF populations.       BSA         2.17     BUN,  Bld       14       Calcium       9.0       Chloride       104       CO2       30       Creatinine       0.8       Left Ventricle Relative Wall Thickness         0.37     Differential Method       Automated       E/A ratio         0.90     E/E' ratio         10.71     eGFR if        >60.0       eGFR if non        >60.0  Comment:  Calculation used to obtain the estimated glomerular filtration  rate (eGFR) is the CKD-EPI equation.          Eos #       0.2       Eosinophil%       4.5       E wave decelartion time         253.82     FS         26     Glucose       169       Gran # (ANC)       3.0       Gran%       57.1       Hematocrit       36.4       Hemoglobin       11.1       Heparin Anti-Xa       0.14  Comment:  Expected therapeutic range for Unfractionated heparin (UFH)  is 0.3-0.7 IU/mL.  The therapeutic range for low molecular weight heparins   (LMWH) varies with the type and , but is   typically between 0.4 and 1.1 IU/mL.         Immature Grans (Abs)       0.02  Comment:  Mild elevation in immature granulocytes is non specific and   can be seen in a variety of conditions including stress response,   acute inflammation, trauma and pregnancy. Correlation with other   laboratory and clinical findings is essential.         Immature Granulocytes       0.4       INR       1.1  Comment:  Coumadin Therapy:  2.0 - 3.0 for INR for all indicators except mechanical heart valves  and antiphospholipid syndromes which should use 2.5 - 3.5.         IVS         0.61     LA WIDTH         2.14     Left Atrium Major Axis         3.69     Left Atrium Minor Axis         4.68     LA size         3.09     LA volume         23.19     LA Volume Index         10.9     LVOT area         3.3     LV LATERAL E/E' RATIO         10.71     LV SEPTAL E/E' RATIO         10.71     LV Diastolic Volume         63.23     LV Diastolic Volume Index         29.80     LVIDD         3.83     LVIDS         2.85      LV mass         67.50     LV Mass Index         32     LVOT diameter         2.06     LVOT peak nabeel         0.95     LVOT stroke volume         58.70     LVOT peak VTI         17.62     LV Systolic Volume         30.74     LV Systolic Volume Index         14.5     Lymph #       1.3       Lymph%       25.2       Magnesium       1.9       MCH       28.3       MCHC       30.5       MCV       93       Mean e'         0.07     Mono #       0.6       Mono%       12.0       MPV       11.0       MV Peak A Nabeel         0.83     MV Peak E Nabeel         0.75     nRBC       0       Phosphorus       3.4       Platelets       176       POC Glucose               POCT Glucose 67 57 122         Potassium       3.9       PROTEIN TOTAL       6.6       Protime       11.0       PW         0.71     RA Major Axis         3.40     RA Width         2.25     RBC       3.92       RDW       13.8       RV S'         14     RVDD         2.43     Sinus         3.06     Sodium       139       STJ         2.68     TDI SEPTAL         0.07     TDI LATERAL         0.07     WBC       5.32                        05/20/20  1504        Albumin       Alkaline Phosphatase       ALT       Anion Gap       Ascending aorta       Ao peak nabeel       Ao VTI       AST       AV valve area       AV mean gradient       AV index (prosthetic)       AV peak gradient       AV Velocity Ratio       Baso #       Basophil%       BILIRUBIN TOTAL       BNP       BSA       BUN, Bld       Calcium       Chloride       CO2       Creatinine       Left Ventricle Relative Wall Thickness       Differential Method       E/A ratio       E/E' ratio       eGFR if        eGFR if non        Eos #       Eosinophil%       E wave decelartion time       FS       Glucose       Gran # (ANC)       Gran%       Hematocrit       Hemoglobin       Heparin Anti-Xa       Immature Grans (Abs)       Immature Granulocytes       INR       IVS       LA WIDTH       Left Atrium  Major Bulverde       Left Atrium Minor Axis       LA size       LA volume       LA Volume Index       LVOT area       LV LATERAL E/E' RATIO       LV SEPTAL E/E' RATIO       LV Diastolic Volume       LV Diastolic Volume Index       LVIDD       LVIDS       LV mass       LV Mass Index       LVOT diameter       LVOT peak nabeel       LVOT stroke volume       LVOT peak VTI       LV Systolic Volume       LV Systolic Volume Index       Lymph #       Lymph%       Magnesium       MCH       MCHC       MCV       Mean e'       Mono #       Mono%       MPV       MV Peak A Nabeel       MV Peak E Nabeel       nRBC       Phosphorus       Platelets       POC Glucose       POCT Glucose 143     Potassium       PROTEIN TOTAL       Protime       PW       RA Major Axis       RA Width       RBC       RDW       RV S'       RVDD       Sinus       Sodium       STJ       TDI SEPTAL       TDI LATERAL       WBC             Diagnostic Results:  I have reviewed all pertinent imaging results/findings within the past 24 hours.      Significant Diagnostic Studies: Labs:   BMP:   Recent Labs   Lab 05/20/20  1543 05/21/20 0428   * 133*    138   K 3.9 3.8    105   CO2 30* 26   BUN 14 14   CREATININE 0.8 0.8   CALCIUM 9.0 8.7   MG 1.9 1.9   , CMP   Recent Labs   Lab 05/20/20  1543 05/21/20  0428    138   K 3.9 3.8    105   CO2 30* 26   * 133*   BUN 14 14   CREATININE 0.8 0.8   CALCIUM 9.0 8.7   PROT 6.6  --    ALBUMIN 3.4*  --    BILITOT 0.4  --    ALKPHOS 57  --    AST 17  --    ALT 16  --    ANIONGAP 5* 7*   ESTGFRAFRICA >60.0 >60.0   EGFRNONAA >60.0 >60.0   , CBC   Recent Labs   Lab 05/20/20  1543 05/21/20 0428   WBC 5.32 5.48   HGB 11.1* 10.8*   HCT 36.4* 35.1*    176   , INR   Lab Results   Component Value Date    INR 1.0 05/21/2020    INR 1.1 05/20/2020    INR 0.9 07/08/2019   , Lipid Panel   Lab Results   Component Value Date    CHOL 193 05/18/2020    HDL 59 05/18/2020    LDLCALC 115.6 05/18/2020    TRIG 92  05/18/2020    CHOLHDL 30.6 05/18/2020   and A1C:   Recent Labs   Lab 12/10/19  1043 02/17/20  0912 05/18/20  0944   HGBA1C 9.1* 8.7* 8.1*     Cardiac Graphics: Echocardiogram: Transthoracic echo (TTE) complete (Cupid Only):   Results for orders placed or performed during the hospital encounter of 05/20/20   Echo Color Flow Doppler? Yes   Result Value Ref Range    Ascending aorta 2.73 cm    STJ 2.68 cm    AV mean gradient 2 mmHg    Ao peak nabeel 0.89 m/s    Ao VTI 18.83 cm    IVS 0.61 0.6 - 1.1 cm    LA size 3.09 cm    Left Atrium Major Axis 3.69 cm    Left Atrium Minor Axis 4.68 cm    LVIDD 3.83 3.5 - 6.0 cm    LVIDS 2.85 2.1 - 4.0 cm    LVOT diameter 2.06 cm    LVOT peak VTI 17.62 cm    PW 0.71 0.6 - 1.1 cm    MV Peak A Nabeel 0.83 m/s    E wave decelartion time 253.82 msec    MV Peak E Nabeel 0.75 m/s    RA Major Axis 3.40 cm    RA Width 2.25 cm    RVDD 2.43 cm    Sinus 3.06 cm    TDI LATERAL 0.07 m/s    TDI SEPTAL 0.07 m/s    LA WIDTH 2.14 cm    LV Diastolic Volume 63.23 mL    LV Systolic Volume 30.74 mL    LVOT peak nabeel 0.95 m/s    LV LATERAL E/E' RATIO 10.71 m/s    LV SEPTAL E/E' RATIO 10.71 m/s    FS 26 %    LA volume 23.19 cm3    LV mass 67.50 g    Left Ventricle Relative Wall Thickness 0.37 cm    AV valve area 3.12 cm2    AV Velocity Ratio 1.07     AV index (prosthetic) 0.94     E/A ratio 0.90     Mean e' 0.07 m/s    LVOT area 3.3 cm2    LVOT stroke volume 58.70 cm3    AV peak gradient 3 mmHg    E/E' ratio 10.71 m/s    LV Systolic Volume Index 14.5 mL/m2    LV Diastolic Volume Index 29.80 mL/m2    LA Volume Index 10.9 mL/m2    LV Mass Index 32 g/m2    BSA 2.17 m2    RV S' 14 cm/s    Narrative    · Normal left ventricular systolic function. The estimated ejection   fraction is 60%.  · RV not well visualized.  · Normal LV diastolic function.  · No wall motion abnormalities.          Pending Diagnostic Studies:     None        Final Active Diagnoses:    Diagnosis Date Noted POA    PRINCIPAL PROBLEM:  Pulmonary  embolism [I26.99] 05/20/2020 Yes    Impaired functional mobility, balance, gait, and endurance [Z74.09] 01/21/2020 Yes    Malignant neoplasm of upper lobe of left lung [C34.12] 05/23/2019 Yes    Hypertension associated with diabetes [E11.59, I10] 01/16/2015 Yes    Mixed hyperlipidemia due to type 2 diabetes mellitus [E11.69, E78.2] 06/20/2013 Yes     Chronic    Type 2 diabetes mellitus with hyperglycemia, with long-term current use of insulin [E11.65, Z79.4] 06/20/2013 Not Applicable      Problems Resolved During this Admission:      Discharged Condition: good    Disposition: Home-Health Care [facility]    Follow Up: Oncology    Patient Instructions:   No discharge procedures on file.     Medications:  Reconciled Home Medications:      Medication List      CHANGE how you take these medications    insulin lispro 100 unit/mL pen  Commonly known as:  HumaLOG KwikPen Insulin  Inject subcutaneously 20-12-12 units plus sliding scale.  24 units on steroid days Max  units.  What changed:  additional instructions        CONTINUE taking these medications    aspirin 81 MG EC tablet  Commonly known as:  ECOTRIN  Take 1 tablet (81 mg total) by mouth once daily.     benzonatate 100 MG capsule  Commonly known as:  TESSALON  Take 1 capsule (100 mg total) by mouth 2 (two) times daily.     bisacodyL 5 mg EC tablet  Commonly known as:  DULCOLAX  Take 5 mg by mouth daily as needed for Constipation.     * blood sugar diagnostic Strp  To check BG 4 times daily, to use with insurance preferred meter     * blood sugar diagnostic Strp  To check BG 5 times per day     * blood-glucose meter kit  Use as instructed     * blood-glucose meter kit  To check BG 4 times daily, to use with insurance preferred meter     blood-glucose meter,continuous Misc  Commonly known as:  DEXCOM G6   1 Device by Misc.(Non-Drug; Combo Route) route once. for 1 dose     blood-glucose sensor Jami  Commonly known as:  DEXCOM G6 SENSOR  1 sensor every 10  days     blood-glucose transmitter Jami  Commonly known as:  DEXCOM G6 TRANSMITTER  1 transmitter every 3 months     carboxymethylcellulose 0.5 % Dpet  Commonly known as:  REFRESH PLUS  1 drop 3 (three) times daily as needed.     dexAMETHasone 6 MG tablet  Commonly known as:  DECADRON  Take 1 tablet by mouth two times a day with food the day before chemotherapy and for two days after chemotherapy. Do not take the day of chemotherapy.     * diazePAM 5 MG tablet  Commonly known as:  VALIUM  TAKE 1 TABLET BY MOUTH ONCE DAILY AS NEEDED FOR ANXIETY     * diazePAM 5 MG tablet  Commonly known as:  VALIUM  Take 1 tablet 30 minutes before MRI     enoxaparin 100 mg/mL Syrg  Commonly known as:  LOVENOX  Inject 1 mL (100 mg total) into the skin 2 (two) times a day.     entrectinib 200 mg capsule  Commonly known as:  ROZLYTREK  Take 3 capsules (600 mg total) by mouth once daily.     ergocalciferol 50,000 unit Cap  Commonly known as:  ERGOCALCIFEROL  TAKE 1 CAPSULE BY MOUTH EVERY 7 DAYS     fluticasone propionate 50 mcg/actuation nasal spray  Commonly known as:  FLONASE  USE TWO SPRAY(S) IN EACH NOSTRIL ONCE DAILY     folic acid 1 MG tablet  Commonly known as:  FOLVITE  Take 1 tablet (1 mg total) by mouth once daily. Start today     gabapentin 300 MG capsule  Commonly known as:  NEURONTIN  Take 1 capsule (300 mg total) by mouth nightly as needed (Take as needed at bed time for neuropathy pain and sleep).     hydroCHLOROthiazide 12.5 mg capsule  Commonly known as:  MICROZIDE  Take 1 capsule (12.5 mg total) by mouth daily as needed (for swelling).     insulin detemir U-100 100 unit/mL (3 mL) Inpn pen  Commonly known as:  LEVEMIR FLEXTOUCH  Steroid day: Inject 26-33 under the skin units daily. Non-steroid day: Inject 26 units daily     ketoconazole 2 % cream  Commonly known as:  NIZORAL  Apply topically 2 (two) times daily.     * lancets Misc  1 Device by Misc.(Non-Drug; Combo Route) route once daily. Heladio Result.  250.02.  Check  "Blood Sugar Twice Daily.     * lancets Misc  To check BG 4 times daily, to use with insurance preferred meter     lidocaine-prilocaine cream  Commonly known as:  EMLA  Apply to PORT one hour prior to chemo administration.     NYAMYC powder  Generic drug:  nystatin  Apply topically 2 (two) times daily.     olmesartan 20 MG tablet  Commonly known as:  BENICAR  Take 1 tablet (20 mg total) by mouth once daily.     ondansetron 8 MG tablet  Commonly known as:  ZOFRAN  Take 1 tablet (8 mg total) by mouth 4 (four) times daily as needed for Nausea.     * pen needle, diabetic 32 gauge x 5/32" Ndle  Commonly known as:  BD ULTRA-FINE BRYANT PEN NEEDLE  To use 5 times per day with insulin injections.     * pen needle, diabetic 33 gauge x 5/32" Ndle  1 each by Misc.(Non-Drug; Combo Route) route 4 (four) times daily with meals and nightly.     phenazopyridine 200 MG tablet  Commonly known as:  PYRIDIUM  Take 1 tablet (200 mg total) by mouth 3 (three) times daily as needed for Pain.     terconazole 0.4 % Crea  Commonly known as:  TERAZOL 7  INSERT 1 APPLICATORFUL VAGINALLY ONCE DAILY IN THE EVENING     walker Misc  Please provide rollator walker for this debilitated cancer patient.  Thank you.         * This list has 10 medication(s) that are the same as other medications prescribed for you. Read the directions carefully, and ask your doctor or other care provider to review them with you.            STOP taking these medications    atorvastatin 40 MG tablet  Commonly known as:  LIPITOR     insulin aspart U-100 100 unit/mL (3 mL) Inpn pen  Commonly known as:  NovoLOG     insulin regular 100 unit/mL injection            Devendra Galaviz MD  Hematology/Oncology  [facility]      "

## 2020-05-21 NOTE — PROGRESS NOTES
[facility]  Psychology  Progress Note  Individual Psychotherapy (PhD/LCSW)    Patient Name: Alison Branch  MRN: [id number]    Patient Class: IP- Inpatient  Admission Date: 5/20/2020  Hospital Length of Stay: 1 days  Attending Physician: René Mcqueen MD  Primary Care Provider: Mike Rodriguez Ii, MD    Therapeutic Intervention: Met with patient.  Outpatient - Supportive psychotherapy 30 min - CPT Code 24742    Chief Complaint/Reason for Encounter: depression     Interval History and Content of Current Session: Patient admitted to Med/Onc today for PE. Consulted with Dr. Belcher and met with patient for support. Patient stated that she is aware of her need to be in the hospital and will enjoy the quiet time. Patient denied depressed mood or anxiety, understanding her diagnosis and stated that she is able to manage her stress. Mood and protective strategies were discussed.     Risk Parameters:  Patient reports no suicidal ideation  Patient reports no homicidal ideation  Patient reports no self-injurious behavior  Patient reports no violent behavior    Verbal Deficits: None    Patient's response to intervention:  The patient's response to intervention is accepting.    Progress toward goals and other mental status changes:  The patient's progress toward goals is good.    Objective:The patient was fully cooperative throughout the session.  Appearance: age appropriate, appropriately  dressed, adequately  groomed  Behavior/Cooperation: friendly and cooperative  Speech: normal in rate, volume, and tone and appropriate quality, quantity and organization of sentences  Mood: euthymic  Affect: full range and appropriate  Thought Process: goal-directed, logical  Thought Content: normal,  No delusions or paranoia; did not appear to be responding to internal stimuli during the session  Orientation: grossly intact  Memory: Grossly intact  Attention Span/Concentration: Attends to session without distraction; reports  no difficulty  Fund of Knowledge: average  Estimate of Intelligence: average from verbal skills and history  Cognition: grossly intact  Insight: patient has awareness of illness; good insight into own behavior and behavior of others  Judgment: the patient's behavior is adequate to circumstances    Diagnostic Impression - Plan:     Alison Branch has adjusted to illness well primarily through active coping strategies, focus on alternative activities and focus on family. She has engaged in appropriate information gathering.  The patient has good family/friend support.  Her support system is coping well with the diagnosis/treatment/prognosis.  The patient has a good partnership with her [facility] oncology treatment team. The patient reports the following barriers to cancer care:insurance coverage.       Assessment - Diagnosis - Goals:       ICD-10-CM ICD-9-CM   1. Acute pulmonary embolism without acute cor pulmonale, unspecified pulmonary embolism type I26.99 415.19   2. Pulmonary embolism I26.99 415.19   3. Chest pain R07.9 786.50   4. Type 2 diabetes mellitus with hyperglycemia, with long-term current use of insulin E11.65 250.00    Z79.4 790.29     V58.67   5. Uncontrolled type 2 diabetes mellitus with hyperglycemia, without long-term current use of insulin E11.65 250.02   6. Malignant neoplasm of upper lobe of left lung C34.12 162.3   7. Impaired functional mobility, balance, gait, and endurance Z74.09 V49.89   8. Mild episode of recurrent major depressive disorder F33.0 296.31         Treatment Plan:  · Target symptoms: depression  · Why chosen therapy is appropriate versus another modality: relevant to diagnosis, patient responds to this modality, evidence based practice  · Outcome monitoring methods: self-report, observation, checklist/rating scale  · Therapeutic intervention type: supportive psychotherapy    Plan:  individual psychotherapy and medication management by physician    Length of Service (minutes):  30    Jagruti Garcia, PhD  Psychology  [facility]

## 2020-05-21 NOTE — PT/OT/SLP EVAL
"Physical Therapy Evaluation    Patient Name:  Alison Branch   MRN:  [id number]    Recommendations:     Discharge Recommendations:  home with home health   Discharge Equipment Recommendations: bedside commode   Barriers to discharge: decreased functional mobility    Assessment:     Alison Branch is a 64 y.o. female admitted with a medical diagnosis of Pulmonary embolism.  She presents with the following impairments/functional limitations:  weakness, impaired functional mobilty, gait instability, impaired endurance, impaired balance, impaired self care skills, decreased safety awareness, impaired cardiopulmonary response to activity.  Tolerated session c c/o fatigue.  Performed mobility c S-CGA.  Pt able to amb short household distance c SPC and demo decreased gait speed, narrow MINGO, and unsteadiness.  Pt c limited focus attributing to unsteadiness c gait and decreased safety awareness.  Pt reports frequently running into obstacles at baseline.  Pt safe to amb c assistance of 1x person + SPC.  Pt would benefit from continued skilled acute PT 4x/wk to improve functional mobility.  Recommending pt receive PT services in HH  setting following d/c from hospital once medically cleared.      Rehab Prognosis: Good; patient would benefit from acute skilled PT services to address these deficits and reach maximum level of function.    Recent Surgery: * No surgery found *      Plan:     During this hospitalization, patient to be seen 4 x/week to address the identified rehab impairments via gait training, therapeutic activities, therapeutic exercises, neuromuscular re-education and progress toward the following goals:    · Plan of Care Expires:  06/15/20    Subjective     Chief Complaint: fatigue  Patient/Family Comments/goals: "I run into things a lot at home but I've never fell."  Pain/Comfort:  · Pain Rating 1: 0/10    Patients cultural, spiritual, Episcopalian conflicts given the current situation: no    Living Environment:  Pt " "lives c daughter and 2 grandchildren in 2nd floor apartment c 12STE [address].  PTA driving and reports "I don't remember" when asked if pt has had any falls.  Prior to admission, patients level of function was mostly independent c ADLs but requiring increased time or assistance PRN and using SPC/rollator for mobility.  Equipment used at home: cane, straight, shower chair, rollator.  DME owned (not currently used): none.  Upon discharge, patient will have assistance from family.    Objective:     Communicated with RN and OT prior to session.  Patient found HOB elevated with peripheral IV, telemetry, oxygen  upon PT entry to room.    General Precautions: Standard, fall   Orthopedic Precautions:N/A   Braces: N/A     Exams:  · Cognitive Exam:  Patient is oriented to Person, Place, Time and Situation  · RLE ROM: WFL  · RLE Strength: WFL  · LLE ROM: WFL  · LLE Strength: WFL    Functional Mobility:  · Bed Mobility:     · Rolling Left:  supervision  · Scooting: supervision  · Supine to Sit: supervision  · Sit to Supine: supervision  · Transfers:     · Sit to Stand:  stand by assistance with straight cane  · Gait: 60ft c SPC CGA    · demo decreased gait speed, narrow MINGO, and unsteadiness  · Balance: sitting (S); standing (SBA-CGA)      Therapeutic Activities and Exercises:  Pt educated on: PT role/POC; safety c mobility; benefits of OOB activities; performing therex; d/c recs - v/u  -sat EOB x6mins  -sit<>stand from bed 4x  -therex (LAQ, hip flex, AP)      AM-PAC 6 CLICK MOBILITY  Total Score:18     Patient left HOB elevated with all lines intact, call button in reach and RN present.    GOALS:   Multidisciplinary Problems     Physical Therapy Goals        Problem: Physical Therapy Goal    Goal Priority Disciplines Outcome Goal Variances Interventions   Physical Therapy Goal     PT, PT/OT Ongoing, Progressing     Description:  Goals to be met by: 6/11/2020     Patient will increase functional independence with mobility by " "performin. Supine to sit with Modified [place]  2. Sit to supine with Modified [place]  3. Sit to stand transfer with Supervision  4. Bed to chair transfer with Supervision using Single-point Cane   5. Gait  x 150 feet with Supervision using Single-point Cane .   6. Ascend/descend 12 stair with bilateral Handrails Contact Guard Assistance                    History:     Past Medical History:   Diagnosis Date    Allergy     Brain aneurysm 2010    s/p coiling of one; another not coiled    Breast cyst     Depression 2019    Diabetes mellitus     Diabetes type 2, controlled     Fever blister     High cholesterol     History of Bell's palsy     HTN (hypertension) 2014    Recurrent upper respiratory infection (URI)        Past Surgical History:   Procedure Laterality Date    BREAST SURGERY      BRONCHOSCOPY N/A 2019    Procedure: Bronchoscopy;  Surgeon: [facility] Diagnostic Provider;  [facility];  Service: Anesthesiology;  Laterality: N/A;    CERVICAL FUSION      COLONOSCOPY N/A 3/9/2016    Procedure: COLONOSCOPY;  Surgeon: Elliott Zimmerman MD;  Location: [facility] ENDO (4TH FLR);  Service: Endoscopy;  Laterality: N/A;    head surgery      stent and "curling" for aneurysm    HYSTERECTOMY      TVH secondary to SUF    INSERTION OF TUNNELED CENTRAL VENOUS CATHETER (CVC) WITH SUBCUTANEOUS PORT Right 2019    Procedure: INSERTION, PORT-A-CATH;  Surgeon: Cali Damico MD;  Location: [facility] CATH LAB;  Service: Radiology;  Laterality: Right;    MENISCECTOMY Left        Time Tracking:     PT Received On: 20  PT Start Time: 0854     PT Stop Time: 0910  PT Total Time (min): 16 min     Billable Minutes: Evaluation 8 min and Therapeutic Activity 8 min      Neptali Chaparro, PT  2020  "

## 2020-05-21 NOTE — TELEPHONE ENCOUNTER
Patient is calling to speak with her PCP, as she was told she needs to have her Lipitor changed to an alternative.   Patient states she will call her PCP---

## 2020-05-21 NOTE — PT/OT/SLP EVAL
Occupational Therapy   Evaluation and Discharge Note    Name: Alison Branch  MRN: [id number]  Admitting Diagnosis:  Pulmonary embolism      Recommendations:     Discharge Recommendations: home with home health  Discharge Equipment Recommendations:  bedside commode  Barriers to discharge:  Inaccessible home environment    Assessment:     Alison Branch is a 64 y.o. female with a medical diagnosis of Pulmonary embolism. At this time, patient is functioning approximately at their prior level of function and does not require further acute OT services but will benefit from home health services to ensure home safety .     Plan:     During this hospitalization, patient does not require further acute OT services.  Please re-consult if situation changes.    · Plan of Care Reviewed with: patient    Subjective     Chief Complaint: decreased functional performance   Patient/Family Comments/goals: return to home     Occupational Profile:  Living Environment: Pt lives with daughter in 2nd floor apartment with fight of stairs to enter   Previous level of function: occasional asssit for self care completion,   Equipment Used at home:  cane, straight, shower chair, rollator  Assistance upon Discharge: family ingrid to assist     Pain/Comfort:  ·      Patients cultural, spiritual, Jew conflicts given the current situation: no    Objective:     Communicated with: RN prior to session.  Patient found supine with peripheral IV, telemetry, oxygen upon OT entry to room.    General Precautions: Standard, fall   Orthopedic Precautions:N/A   Braces: N/A     Occupational Performance:    Bed Mobility:    · Pt SBA for safe bed mobiilty     Functional Mobility/Transfers:  · Functional Mobility: Pt with cane and fair balance for in room and bathroom mobility     Activities of Daily Living:  · Pt with CGA for safe self care completion     Cognitive/Visual Perceptual:  Cognitive/Psychosocial Skills:     -       Oriented to: Person, Place, Time and  "Situation   -       Follows Commands/attention:Follows two-step commands  -       Communication: clear/fluent  -       Memory: No Deficits noted  -       Safety awareness/insight to disability: intact   -       Mood/Affect/Coping skills/emotional control: Appropriate to situation    Physical Exam:  Upper Extremity Range of Motion:     -       Right Upper Extremity: WFL  -       Left Upper Extremity: WFL  Upper Extremity Strength:    -       Right Upper Extremity: WFL  -       Left Upper Extremity: WFL    AMPAC 6 Click ADL:  AMPAC Total Score: 24    Treatment & Education:  Evaluation complete.  Pt educated on safety, role of OT, importance of increased participation in self care for gains , expectations for participation, expectations for gains, POC, energy conservation, caregiver strain. White board updated.   - ADL trianing   Education:    Patient left supine with all lines intact    GOALS:   Multidisciplinary Problems     Occupational Therapy Goals     Not on file                History:     Past Medical History:   Diagnosis Date    Allergy     Brain aneurysm 2010    s/p coiling of one; another not coiled    Breast cyst     Depression 9/19/2019    Diabetes mellitus     Diabetes type 2, controlled     Fever blister     High cholesterol     History of Bell's palsy     HTN (hypertension) 5/20/2014    Recurrent upper respiratory infection (URI)        Past Surgical History:   Procedure Laterality Date    BREAST SURGERY      BRONCHOSCOPY N/A 5/28/2019    Procedure: Bronchoscopy;  Surgeon: [facility] Provider;  Location: [facility] OR [address] [place] [address];  Service: Anesthesiology;  Laterality: N/A;    CERVICAL FUSION      COLONOSCOPY N/A 3/9/2016    Procedure: COLONOSCOPY;  Surgeon: Elliott Zimmerman MD;  Location: [facility] ENDO (4TH FLR);  Service: Endoscopy;  Laterality: N/A;    head surgery      stent and "curling" for aneurysm    HYSTERECTOMY      TVH secondary to SUF    INSERTION OF TUNNELED CENTRAL VENOUS CATHETER " (CVC) WITH SUBCUTANEOUS PORT Right 7/8/2019    Procedure: INSERTION, PORT-A-CATH;  Surgeon: Cali Damico MD;  Location: [facility] CATH LAB;  Service: Radiology;  Laterality: Right;    MENISCECTOMY Left        Time Tracking:     OT Date of Treatment: 05/21/20  OT Start Time: 0900  OT Stop Time: 0920  OT Total Time (min): 20 min    Billable Minutes:Evaluation 10  Self Care/Home Management 10    Tara Queen, OT  5/21/2020

## 2020-05-21 NOTE — HOSPITAL COURSE
Patient directly admitted to Medical Oncology on 5/20 from Heme/Onc clinic for hypoxia with documented desaturation to 84% on room air with complaints of SOB following denosumab. She subsequently underwent STAT CTA chest whiched was notable for VTE involving right upper lobe pulmonary artery. She was started on Lovenox 1 mg/kg for treatment of of her new pulmonary embolism with improvement in SOB but still requiring O2 via nasal cannula to maintain saturations greater than +88%. She is to be discharged on 5/21 with Lovenox, home health and home O2.

## 2020-05-21 NOTE — PROGRESS NOTES
Contacted [facility] to let them know that the patient was admitted to the hospital. I had sent referral for the home oxygen yesterday when patient was supposed to be discharged from her procedure. Authorization for oxygen was given to [facility]. Followed up with [facility] to make sure they had what they needed for fill the order. Required new order and testing which was completed. Patient also needed home health upon discharge and it was assigned to [facility]. Patient ready for discharge once portable tank arrives at the bedside.

## 2020-05-21 NOTE — PLAN OF CARE
Patient on lovenox bid for pe.  Spoke with Dr. Esquivel regarding low evening glucose reading (57) despite eating all of her dinner.  Insulin was discontinued and 16g oral glucose tablets given.  She also ate chicken noodle soup last night.  Placed on o2 3-4L due to spo2 reading of 88% and sob due to persistent productive cough with clear sputum .  Md aware and gave tessalon pearls.  Patient  states that she slept well overnight.  Initiated tele and continuous pulse ox monitoring.

## 2020-05-21 NOTE — PLAN OF CARE
[facility]    HOME HEALTH ORDERS  FACE TO FACE ENCOUNTER    Patient Name: Alison Branch  YOB: 1955    PCP: Mike Rodriguez Ii, MD   PC[facility] [address] [facility] [address]  PCP Phone Number: [phone]  PCP Fax: [phone]    Encounter Date: 05/21/2020    Admit to Home Health    Diagnoses:  Active Hospital Problems    Diagnosis  POA    *Pulmonary embolism [I26.99]  Yes    Impaired functional mobility, balance, gait, and endurance [Z74.09]  Yes    Malignant neoplasm of upper lobe of left lung [C34.12]  Yes     CARLOS nodules.  Will plan on outpatient bronchoscopy with TBBx, BAL and perhaps brush.          Hypertension associated with diabetes [E11.59, I10]  Yes    Mixed hyperlipidemia due to type 2 diabetes mellitus [E11.69, E78.2]  Yes     Chronic    Type 2 diabetes mellitus with hyperglycemia, with long-term current use of insulin [E11.65, Z79.4]  Not Applicable      Resolved Hospital Problems   No resolved problems to display.       Future Appointments   Date Time Provider Department Center   5/25/2020  8:40 AM Rafia Loza MD [facility] DERM [place]   6/3/2020  8:30 AM Ken Chatterjee DPM [facility] POD [address]   6/8/2020  7:30 AM COVID TESTING, [facility] HEMONC [facility] HEM ONC [facility]   6/8/2020  7:50 AM LAB, HEMONC CANCER BLDG [facility] HO [facility]   6/9/2020  3:00 PM Tara Belcher MD [facility] HEM ONC [facility]   6/10/2020  8:00 AM INJECTION, [facility] INFUSION [facility] CHEMO [facility]   6/15/2020  7:30 AM LAB, [facility]   6/17/2020 11:00 AM HOME STUDY, SLEEP BAP SLEEP [facility]   6/22/2020  8:00 AM Ai Billingsley NP [facility]   8/14/2020 10:00 AM David oM MD [facility] NEURO [address]           I have seen and examined this patient face to face today. My clinical findings that support the need for the home health skilled services and home bound status are the following:  Weakness/numbness causing balance and gait  disturbance due to Weakness/Debility and Malignancy/Cancer making it taxing to leave home.  Requiring assistive device to leave home due to unsteady gait caused by  Weakness/Debility and Malignancy/Cancer.  Patient with medication mismanagement issues requiring home bound status as evidenced by  Poor understanding of medication regimen/dosage and Uncontrolled hyperglycemic/hypoglycemic events.  Medical restrictions requiring assistance of another human to leave home due to  Dyspnea on exertion (SOB) and Home oxygen requirement.    Allergies:Review of patient's allergies indicates:  No Known Allergies    Diet: cardiac diet and diabetic diet: 2000 calorie    Activities: activity as tolerated    Nursing:   SN to complete comprehensive assessment including routine vital signs. Instruct on disease process and s/s of complications to report to MD. Review/verify medication list sent home with the patient at time of discharge  and instruct patient/caregiver as needed. Frequency may be adjusted depending on start of care date.    Notify MD if SBP > 160 or < 90; DBP > 90 or < 50; HR > 120 or < 50; Temp > 101    CONSULTS:    Physical Therapy to evaluate and treat. Evaluate for home safety and equipment needs; Establish/upgrade home exercise program. Perform / instruct on therapeutic exercises, gait training, transfer training, and Range of Motion.  Occupational Therapy to evaluate and treat. Evaluate home environment for safety and equipment needs. Perform/Instruct on transfers, ADL training, ROM, and therapeutic exercises.    MISCELLANEOUS CARE:  Diabetic Care:   SN to perform and educate Diabetic management with blood glucose monitoring:, Fingerstick blood sugar AC and HS and Report CBG < 60 or > 350 to physician.  Home Oxygen:  Oxygen at 2 L/min nasal canula to be used:  Continuously. and Assess oxygen saturation via pulse oximeter as needed for increase in SOB.    WOUND CARE ORDERS  n/a      Medications: Review discharge  medications with patient and family and provide education.      Current Discharge Medication List      CONTINUE these medications which have CHANGED    Details   insulin lispro (HUMALOG KWIKPEN INSULIN) 100 unit/mL pen Inject subcutaneously 20-12-12 units plus sliding scale.  24 units on steroid days Max  units.  Qty: 45 mL, Refills: 11    Associated Diagnoses: Type 2 diabetes mellitus with hyperglycemia, with long-term current use of insulin         CONTINUE these medications which have NOT CHANGED    Details   aspirin (ECOTRIN) 81 MG EC tablet Take 1 tablet (81 mg total) by mouth once daily.  Refills: 0      benzonatate (TESSALON PERLES) 100 MG capsule Take 1 capsule (100 mg total) by mouth 2 (two) times daily.  Qty: 60 capsule, Refills: 1    Associated Diagnoses: Malignant neoplasm of upper lobe of left lung      enoxaparin (LOVENOX) 100 mg/mL Syrg Inject 1 mL (100 mg total) into the skin 2 (two) times a day.  Qty: 60 mL, Refills: 3    Associated Diagnoses: Pulmonary embolism, unspecified chronicity, unspecified pulmonary embolism type, unspecified whether acute cor pulmonale present      ergocalciferol (ERGOCALCIFEROL) 50,000 unit Cap TAKE 1 CAPSULE BY MOUTH EVERY 7 DAYS  Qty: 12 capsule, Refills: 3    Comments: Please consider 90 day supplies to promote better adherence  Associated Diagnoses: Vitamin D deficiency      folic acid (FOLVITE) 1 MG tablet Take 1 tablet (1 mg total) by mouth once daily. Start today  Qty: 100 tablet, Refills: 3    Associated Diagnoses: Malignant neoplasm of upper lobe of left lung      gabapentin (NEURONTIN) 300 MG capsule Take 1 capsule (300 mg total) by mouth nightly as needed (Take as needed at bed time for neuropathy pain and sleep).  Qty: 90 capsule, Refills: 3    Associated Diagnoses: Memory difficulty; Chemotherapy-induced neuropathy; Balance disorder      insulin detemir U-100 (LEVEMIR FLEXTOUCH) 100 unit/mL (3 mL) SubQ InPn pen Steroid day: Inject 26-33 under the skin  units daily. Non-steroid day: Inject 26 units daily  Qty: 15 mL, Refills: 11    Associated Diagnoses: Type 2 diabetes mellitus with hyperglycemia, with long-term current use of insulin      nystatin (MYCOSTATIN) powder Apply topically 2 (two) times daily.  Qty: 60 g, Refills: 0    Associated Diagnoses: Candidal intertrigo      olmesartan (BENICAR) 20 MG tablet Take 1 tablet (20 mg total) by mouth once daily.  Qty: 90 tablet, Refills: 3    Associated Diagnoses: Hypertension associated with diabetes      bisacodyl (DULCOLAX) 5 mg EC tablet Take 5 mg by mouth daily as needed for Constipation.      !! blood sugar diagnostic Strp To check BG 4 times daily, to use with insurance preferred meter  Qty: 200 each, Refills: 11      !! blood sugar diagnostic Strp To check BG 5 times per day  Qty: 450 each, Refills: 2    Associated Diagnoses: Uncontrolled type 2 diabetes mellitus with hyperglycemia, without long-term current use of insulin      !! blood-glucose meter kit Use as instructed  Qty: 1 each, Refills: 0      !! blood-glucose meter kit To check BG 4 times daily, to use with insurance preferred meter  Qty: 1 each, Refills: 0      blood-glucose meter,continuous (DEXCOM G6 ) Misc 1 Device by Misc.(Non-Drug; Combo Route) route once. for 1 dose  Qty: 1 each, Refills: 0    Associated Diagnoses: Type 2 diabetes mellitus with hyperglycemia, with long-term current use of insulin      blood-glucose sensor (DEXCOM G6 SENSOR) Jami 1 sensor every 10 days  Qty: 3 Device, Refills: 11    Associated Diagnoses: Type 2 diabetes mellitus with hyperglycemia, with long-term current use of insulin      blood-glucose transmitter (DEXCOM G6 TRANSMITTER) Jami 1 transmitter every 3 months  Qty: 1 Device, Refills: 4    Associated Diagnoses: Type 2 diabetes mellitus with hyperglycemia, with long-term current use of insulin      carboxymethylcellulose (REFRESH PLUS) 0.5 % Dpet 1 drop 3 (three) times daily as needed.      dexAMETHasone  (DECADRON) 6 MG tablet Take 1 tablet by mouth two times a day with food the day before chemotherapy and for two days after chemotherapy. Do not take the day of chemotherapy.  Qty: 24 tablet, Refills: 4    Associated Diagnoses: Dysuria      !! diazePAM (VALIUM) 5 MG tablet TAKE 1 TABLET BY MOUTH ONCE DAILY AS NEEDED FOR ANXIETY  Refills: 2      !! diazePAM (VALIUM) 5 MG tablet Take 1 tablet 30 minutes before MRI  Qty: 1 tablet, Refills: 0    Associated Diagnoses: Malignant neoplasm of upper lobe of left lung; Claustrophobia      entrectinib (ROZLYTREK) 200 mg oral tablet Take 3 capsules (600 mg total) by mouth once daily.  Qty: 90 capsule, Refills: 2    Associated Diagnoses: Malignant neoplasm of upper lobe of left lung      fluticasone propionate (FLONASE) 50 mcg/actuation nasal spray USE TWO SPRAY(S) IN EACH NOSTRIL ONCE DAILY  Qty: 16 g, Refills: 3    Comments: Please consider 90 day supplies to promote better adherence  Associated Diagnoses: Chronic cough      hydroCHLOROthiazide (MICROZIDE) 12.5 mg capsule Take 1 capsule (12.5 mg total) by mouth daily as needed (for swelling).  Qty: 30 capsule, Refills: 1    Associated Diagnoses: Edema of both feet      ketoconazole (NIZORAL) 2 % cream Apply topically 2 (two) times daily.  Qty: 60 g, Refills: 0    Associated Diagnoses: Candidal intertrigo      !! lancets Misc 1 Device by Misc.(Non-Drug; Combo Route) route once daily. Heladio Result.  250.02.  Check Blood Sugar Twice Daily.  Qty: 200 each, Refills: 3    Associated Diagnoses: Diabetes mellitus, type II      !! lancets Misc To check BG 4 times daily, to use with insurance preferred meter  Qty: 200 each, Refills: 11      lidocaine-prilocaine (EMLA) cream Apply to PORT one hour prior to chemo administration.  Qty: 30 g, Refills: 0    Associated Diagnoses: Malignant neoplasm of upper lobe of left lung      ondansetron (ZOFRAN) 8 MG tablet Take 1 tablet (8 mg total) by mouth 4 (four) times daily as needed for Nausea.  Qty:  "60 tablet, Refills: 2    Associated Diagnoses: Malignant neoplasm of upper lobe of left lung      pen needle, diabetic (BD ULTRA-FINE BRYANT PEN NEEDLE) 32 gauge x 5/32" Ndle To use 5 times per day with insulin injections.  Qty: 150 each, Refills: 11    Associated Diagnoses: Uncontrolled type 2 diabetes mellitus with hyperglycemia, without long-term current use of insulin      pen needle, diabetic 33 gauge x 5/32" Ndle 1 each by Misc.(Non-Drug; Combo Route) route 4 (four) times daily with meals and nightly.  Qty: 100 each, Refills: 5    Associated Diagnoses: Uncontrolled type 2 diabetes mellitus with hyperglycemia, without long-term current use of insulin      phenazopyridine (PYRIDIUM) 200 MG tablet Take 1 tablet (200 mg total) by mouth 3 (three) times daily as needed for Pain.  Qty: 20 tablet, Refills: 0    Associated Diagnoses: Dysuria      terconazole (TERAZOL 7) 0.4 % Crea INSERT 1 APPLICATORFUL VAGINALLY ONCE DAILY IN THE EVENING  Qty: 45 g, Refills: 0    Comments: Please consider 90 day supplies to promote better adherence  Associated Diagnoses: Antibiotic-induced yeast infection      walker Misc Please provide rollator walker for this debilitated cancer patient.  Thank you.  Refills: 0    Associated Diagnoses: Debility; Malignant neoplasm of upper lobe of left lung       !! - Potential duplicate medications found. Please discuss with provider.      STOP taking these medications       atorvastatin (LIPITOR) 40 MG tablet Comments:   Reason for Stopping:         insulin aspart U-100 (NOVOLOG) 100 unit/mL (3 mL) InPn pen Comments:   Reason for Stopping:         insulin regular 100 unit/mL Inj injection Comments:   Reason for Stopping:               I certify that this patient is confined to her home and needs intermittent skilled nursing care, physical therapy and occupational therapy.      "

## 2020-05-21 NOTE — TELEPHONE ENCOUNTER
----- Message from Barry Marte sent at 5/21/2020  3:01 PM CDT -----  Contact: Patient  Patient Advice/Staff Message     Caller name: Pt    Reason for call: Calling to speak with Donna, in regards to a medication change    Do you feel you need to be seen today::No        Communication Preference: [phone]    Additional Information:

## 2020-05-21 NOTE — SUBJECTIVE & OBJECTIVE
Interval History: Patient seen and examine this AM. No acute events overnight. Complaints of cough which were for which she Tessalon Perles with significant relief.  Otherwise slept well, maintaining saturations on O2 via nasal cannula. Reports improvement in SOB and MENDES this AM. Patient will be discharged on Lovenox for treatment of her PE at least during the acute phase in addition to home O2 and home health.     Oncology Treatment Plan:   [No treatment plan]    Medications:  Continuous Infusions:  Scheduled Meds:   aspirin  81 mg Oral Daily    enoxaparin  90 mg Subcutaneous Q12H    folic acid  1 mg Oral Daily    olmesartan  20 mg Oral Daily     PRN Meds:benzonatate, bisacodyL, Dextrose 10% Bolus, Dextrose 10% Bolus, diazePAM, gabapentin, glucagon (human recombinant), glucose, glucose, insulin aspart U-100, sodium chloride 0.9%     Review of Systems   Constitutional: Negative for chills, diaphoresis and fever.   Respiratory: Positive for cough and shortness of breath (improved from day prior). Negative for wheezing.    Cardiovascular: Negative for chest pain, palpitations and leg swelling.   Gastrointestinal: Negative for abdominal distention, abdominal pain, blood in stool, constipation, diarrhea, nausea and vomiting.   Genitourinary: Negative for difficulty urinating, dysuria and frequency.   Musculoskeletal: Negative for arthralgias and myalgias.   Neurological: Negative for dizziness, light-headedness and headaches.   Psychiatric/Behavioral: Negative for confusion and sleep disturbance.     Objective:     Vital Signs (Most Recent):  Temp: 98 °F (36.7 °C) (05/21/20 0737)  Pulse: 79 (05/21/20 0737)  Resp: 17 (05/21/20 0737)  BP: (!) 118/57 (05/21/20 0737)  SpO2: (!) 87 % (05/21/20 0912) Vital Signs (24h Range):  Temp:  [97.8 °F (36.6 °C)-99 °F (37.2 °C)] 98 °F (36.7 °C)  Pulse:  [77-92] 79  Resp:  [16-22] 17  SpO2:  [87 %-99 %] 87 %  BP: (115-135)/(46-64) 118/57     Weight: 96.5 kg (212 lb 10.1 oz)  Body  mass index is 31.4 kg/m².  Body surface area is 2.17 meters squared.      Intake/Output Summary (Last 24 hours) at 5/21/2020 1057  Last data filed at 5/21/2020 0918  Gross per 24 hour   Intake 740 ml   Output 700 ml   Net 40 ml       Physical Exam   Constitutional: She is oriented to person, place, and time. She appears well-developed and well-nourished. No distress. Nasal cannula in place.   HENT:   Head: Normocephalic and atraumatic.   Eyes: EOM and lids are normal. No scleral icterus.   Neck: Normal range of motion. Neck supple. No JVD present. No tracheal deviation present.   Cardiovascular: Normal rate, intact distal pulses and normal pulses. Exam reveals no gallop and no friction rub.   No murmur heard.  Pulmonary/Chest: Effort normal. No respiratory distress. She has no wheezes. She has no rhonchi. She has no rales.   Right-sided Port-a-catheter to internal jugular.    Abdominal: Soft. Normal appearance and bowel sounds are normal. She exhibits no distension. There is no tenderness.   Musculoskeletal: She exhibits no edema.   Neurological: She is alert and oriented to person, place, and time. She has normal strength. She exhibits normal muscle tone.   Skin: Skin is warm, dry and intact. She is not diaphoretic.   Psychiatric: She has a normal mood and affect. Her speech is normal and behavior is normal. Cognition and memory are normal. She is attentive.   Nursing note and vitals reviewed.      Significant Labs:   Recent Lab Results       05/21/20  0841   05/21/20  0428   05/21/20  0405   05/20/20  2148   05/20/20  2148        Albumin               Alkaline Phosphatase               ALT               Anion Gap   7           Ascending aorta               Ao peak minerva               Ao VTI               AST               AV valve area               AV mean gradient               AV index (prosthetic)               AV peak gradient               AV Velocity Ratio               Baso #   0.04           Basophil%   0.7            BILIRUBIN TOTAL               BNP               BSA               BUN, Bld   14           Calcium   8.7           Chloride   105           CO2   26           Creatinine   0.8           Left Ventricle Relative Wall Thickness               Differential Method   Automated           E/A ratio               E/E' ratio               eGFR if    >60.0           eGFR if non    >60.0  Comment:  Calculation used to obtain the estimated glomerular filtration  rate (eGFR) is the CKD-EPI equation.              Eos #   0.3           Eosinophil%   5.1           E wave decelartion time               FS               Glucose   133           Gran # (ANC)   3.1           Gran%   57.0           Hematocrit   35.1           Hemoglobin   10.8           Heparin Anti-Xa               Immature Grans (Abs)   0.03  Comment:  Mild elevation in immature granulocytes is non specific and   can be seen in a variety of conditions including stress response,   acute inflammation, trauma and pregnancy. Correlation with other   laboratory and clinical findings is essential.             Immature Granulocytes   0.5           INR   1.0  Comment:  Coumadin Therapy:  2.0 - 3.0 for INR for all indicators except mechanical heart valves  and antiphospholipid syndromes which should use 2.5 - 3.5.             IVS               LA WIDTH               Left Atrium Major Axis               Left Atrium Minor Axis               LA size               LA volume               LA Volume Index               LVOT area               LV LATERAL E/E' RATIO               LV SEPTAL E/E' RATIO               LV Diastolic Volume               LV Diastolic Volume Index               LVIDD               LVIDS               LV mass               LV Mass Index               LVOT diameter               LVOT peak minerva               LVOT stroke volume               LVOT peak VTI               LV Systolic Volume               LV Systolic Volume Index                Lymph #   1.4           Lymph%   25.4           Magnesium   1.9           MCH   28.6           MCHC   30.8           MCV   93           Mean e'               Mono #   0.6           Mono%   11.3           MPV   11.4           MV Peak A Nabeel               MV Peak E Nabeel               nRBC   0           Phosphorus   3.6           Platelets   176           POC Glucose         115     POCT Glucose 120   133 115       Potassium   3.8           PROTEIN TOTAL               Protime   10.7           PW               RA Major Axis               RA Width               RBC   3.78           RDW   13.8           RV S'               RVDD               Sinus               Sodium   138           STJ               TDI SEPTAL               TDI LATERAL               WBC   5.48                            05/20/20  2036   05/20/20 2007 05/20/20  1701   05/20/20  1543   05/20/20  1506        Albumin       3.4       Alkaline Phosphatase       57       ALT       16       Anion Gap       5       Ascending aorta         2.73     Ao peak nabeel         0.89     Ao VTI         18.83     AST       17       AV valve area         3.12     AV mean gradient         2     AV index (prosthetic)         0.94     AV peak gradient         3     AV Velocity Ratio         1.07     Baso #       0.04       Basophil%       0.8       BILIRUBIN TOTAL       0.4  Comment:  For infants and newborns, interpretation of results should be based  on gestational age, weight and in agreement with clinical  observations.  Premature Infant recommended reference ranges:  Up to 24 hours.............<8.0 mg/dL  Up to 48 hours............<12.0 mg/dL  3-5 days..................<15.0 mg/dL  6-29 days.................<15.0 mg/dL         BNP       13  Comment:  Values of less than 100 pg/ml are consistent with non-CHF populations.       BSA         2.17     BUN, Bld       14       Calcium       9.0       Chloride       104       CO2       30       Creatinine       0.8        Left Ventricle Relative Wall Thickness         0.37     Differential Method       Automated       E/A ratio         0.90     E/E' ratio         10.71     eGFR if        >60.0       eGFR if non        >60.0  Comment:  Calculation used to obtain the estimated glomerular filtration  rate (eGFR) is the CKD-EPI equation.          Eos #       0.2       Eosinophil%       4.5       E wave decelartion time         253.82     FS         26     Glucose       169       Gran # (ANC)       3.0       Gran%       57.1       Hematocrit       36.4       Hemoglobin       11.1       Heparin Anti-Xa       0.14  Comment:  Expected therapeutic range for Unfractionated heparin (UFH)  is 0.3-0.7 IU/mL.  The therapeutic range for low molecular weight heparins   (LMWH) varies with the type and , but is   typically between 0.4 and 1.1 IU/mL.         Immature Grans (Abs)       0.02  Comment:  Mild elevation in immature granulocytes is non specific and   can be seen in a variety of conditions including stress response,   acute inflammation, trauma and pregnancy. Correlation with other   laboratory and clinical findings is essential.         Immature Granulocytes       0.4       INR       1.1  Comment:  Coumadin Therapy:  2.0 - 3.0 for INR for all indicators except mechanical heart valves  and antiphospholipid syndromes which should use 2.5 - 3.5.         IVS         0.61     LA WIDTH         2.14     Left Atrium Major Axis         3.69     Left Atrium Minor Axis         4.68     LA size         3.09     LA volume         23.19     LA Volume Index         10.9     LVOT area         3.3     LV LATERAL E/E' RATIO         10.71     LV SEPTAL E/E' RATIO         10.71     LV Diastolic Volume         63.23     LV Diastolic Volume Index         29.80     LVIDD         3.83     LVIDS         2.85     LV mass         67.50     LV Mass Index         32     LVOT diameter         2.06     LVOT peak minerva          0.95     LVOT stroke volume         58.70     LVOT peak VTI         17.62     LV Systolic Volume         30.74     LV Systolic Volume Index         14.5     Lymph #       1.3       Lymph%       25.2       Magnesium       1.9       MCH       28.3       MCHC       30.5       MCV       93       Mean e'         0.07     Mono #       0.6       Mono%       12.0       MPV       11.0       MV Peak A Nabeel         0.83     MV Peak E Nabeel         0.75     nRBC       0       Phosphorus       3.4       Platelets       176       POC Glucose               POCT Glucose 67 57 122         Potassium       3.9       PROTEIN TOTAL       6.6       Protime       11.0       PW         0.71     RA Major Axis         3.40     RA Width         2.25     RBC       3.92       RDW       13.8       RV S'         14     RVDD         2.43     Sinus         3.06     Sodium       139       STJ         2.68     TDI SEPTAL         0.07     TDI LATERAL         0.07     WBC       5.32                        05/20/20  1504        Albumin       Alkaline Phosphatase       ALT       Anion Gap       Ascending aorta       Ao peak nabeel       Ao VTI       AST       AV valve area       AV mean gradient       AV index (prosthetic)       AV peak gradient       AV Velocity Ratio       Baso #       Basophil%       BILIRUBIN TOTAL       BNP       BSA       BUN, Bld       Calcium       Chloride       CO2       Creatinine       Left Ventricle Relative Wall Thickness       Differential Method       E/A ratio       E/E' ratio       eGFR if        eGFR if non        Eos #       Eosinophil%       E wave decelartion time       FS       Glucose       Gran # (ANC)       Gran%       Hematocrit       Hemoglobin       Heparin Anti-Xa       Immature Grans (Abs)       Immature Granulocytes       INR       IVS       LA WIDTH       Left Atrium Major Axis       Left Atrium Minor Axis       LA size       LA volume       LA Volume Index       LVOT area        LV LATERAL E/E' RATIO       LV SEPTAL E/E' RATIO       LV Diastolic Volume       LV Diastolic Volume Index       LVIDD       LVIDS       LV mass       LV Mass Index       LVOT diameter       LVOT peak nabeel       LVOT stroke volume       LVOT peak VTI       LV Systolic Volume       LV Systolic Volume Index       Lymph #       Lymph%       Magnesium       MCH       MCHC       MCV       Mean e'       Mono #       Mono%       MPV       MV Peak A Nabeel       MV Peak E Nabeel       nRBC       Phosphorus       Platelets       POC Glucose       POCT Glucose 143     Potassium       PROTEIN TOTAL       Protime       PW       RA Major Axis       RA Width       RBC       RDW       RV S'       RVDD       Sinus       Sodium       STJ       TDI SEPTAL       TDI LATERAL       WBC             Diagnostic Results:  I have reviewed all pertinent imaging results/findings within the past 24 hours.

## 2020-05-22 ENCOUNTER — TELEPHONE (OUTPATIENT)
Dept: HEMATOLOGY/ONCOLOGY | Facility: CLINIC | Age: 65
End: 2020-05-22

## 2020-05-22 DIAGNOSIS — E55.9 VITAMIN D DEFICIENCY: ICD-10-CM

## 2020-05-22 PROCEDURE — G0180 PR HOME HEALTH MD CERTIFICATION: ICD-10-PCS | Mod: ,,, | Performed by: INTERNAL MEDICINE

## 2020-05-22 PROCEDURE — G0180 MD CERTIFICATION HHA PATIENT: HCPCS | Mod: ,,, | Performed by: INTERNAL MEDICINE

## 2020-05-22 RX ORDER — ERGOCALCIFEROL 1.25 MG/1
CAPSULE ORAL
Qty: 12 CAPSULE | Refills: 3 | Status: SHIPPED | OUTPATIENT
Start: 2020-05-22 | End: 2020-05-26

## 2020-05-22 NOTE — TELEPHONE ENCOUNTER
"----- Message from Liane Quiros sent at 5/22/2020  9:33 AM CDT -----  Patient Assist    Name of caller:  Alison   Provider name: Ye PAYTON MD   Contact Preference:  [phone]  Is this regarding current patient or new patient?: current   What is the nature of the call?    - pt got oral chemo and wants to know if she should   start it today. Please call and advise.    Additional Notes:   "Thank you for all that you do for our patients'"     "

## 2020-05-22 NOTE — TELEPHONE ENCOUNTER
So her lexapro is being tapered this week so she should start after she comes off of it. Schedule just  Virtual with me

## 2020-05-22 NOTE — TELEPHONE ENCOUNTER
Left vm for patient informing her nurse will contact her on Monday with appointments for appointment with dr chapin to get started on new medication, as she needs to come in for consent.      Message routed to dr chapin (when would you like to see her with cbc/cmp?)

## 2020-05-25 ENCOUNTER — OFFICE VISIT (OUTPATIENT)
Dept: DERMATOLOGY | Facility: CLINIC | Age: 65
End: 2020-05-25
Payer: MEDICARE

## 2020-05-25 VITALS — WEIGHT: 212 LBS | BODY MASS INDEX: 31.31 KG/M2

## 2020-05-25 DIAGNOSIS — L30.4 INTERTRIGO: ICD-10-CM

## 2020-05-25 DIAGNOSIS — L72.0 EPIDERMAL INCLUSION CYST: Primary | ICD-10-CM

## 2020-05-25 DIAGNOSIS — D22.9 BENIGN MOLE: ICD-10-CM

## 2020-05-25 PROCEDURE — 99202 PR OFFICE/OUTPT VISIT, NEW, LEVL II, 15-29 MIN: ICD-10-PCS | Mod: S$GLB,,, | Performed by: DERMATOLOGY

## 2020-05-25 PROCEDURE — 3008F BODY MASS INDEX DOCD: CPT | Mod: CPTII,S$GLB,, | Performed by: DERMATOLOGY

## 2020-05-25 PROCEDURE — 3008F PR BODY MASS INDEX (BMI) DOCUMENTED: ICD-10-PCS | Mod: CPTII,S$GLB,, | Performed by: DERMATOLOGY

## 2020-05-25 PROCEDURE — 99999 PR PBB SHADOW E&M-EST. PATIENT-LVL III: ICD-10-PCS | Mod: PBBFAC,,, | Performed by: DERMATOLOGY

## 2020-05-25 PROCEDURE — 99999 PR PBB SHADOW E&M-EST. PATIENT-LVL III: CPT | Mod: PBBFAC,,, | Performed by: DERMATOLOGY

## 2020-05-25 PROCEDURE — 99202 OFFICE O/P NEW SF 15 MIN: CPT | Mod: S$GLB,,, | Performed by: DERMATOLOGY

## 2020-05-25 NOTE — PROGRESS NOTES
Pt. Here today for mole on the back, several months, itching, also here today for dry skin all over body.

## 2020-05-25 NOTE — PROGRESS NOTES
Subjective:       Patient ID:  Alison Branch is a 64 y.o. female who presents for   Chief Complaint   Patient presents with    Mole     back, several months, raised    Dry Skin     all over the body      Spot on upper back for months not painful no tx. Also intertrigo is using nuystain powder and terconazole cream helps some.     Mole  - Initial  Affected locations: back and face  Signs / symptoms: dryness and itching  Aggravated by: nothing    Dry Skin         Review of Systems   Constitutional: Negative for fever, chills, weight loss, weight gain, fatigue, night sweats and malaise.   Skin: Positive for rash, dry skin and daily sunscreen use. Negative for activity-related sunscreen use and wears hat.   Hematologic/Lymphatic: Bruises/bleeds easily.        Objective:    Physical Exam   Constitutional: She appears well-developed and well-nourished. No distress.   Neurological: She is alert and oriented to person, place, and time. She is not disoriented.   Psychiatric: She has a normal mood and affect.   Skin:   Areas Examined (abnormalities noted in diagram):   Head / Face Inspection Performed  Neck Inspection Performed  Chest / Axilla Inspection Performed  Back Inspection Performed  RUE Inspected  LUE Inspection Performed              Diagram Legend     Erythematous scaling macule/papule c/w actinic keratosis       Vascular papule c/w angioma      Pigmented verrucoid papule/plaque c/w seborrheic keratosis      Yellow umbilicated papule c/w sebaceous hyperplasia      Irregularly shaped tan macule c/w lentigo     1-2 mm smooth white papules consistent with Milia      Movable subcutaneous cyst with punctum c/w epidermal inclusion cyst      Subcutaneous movable cyst c/w pilar cyst      Firm pink to brown papule c/w dermatofibroma      Pedunculated fleshy papule(s) c/w skin tag(s)      Evenly pigmented macule c/w junctional nevus     Mildly variegated pigmented, slightly irregular-bordered macule c/w mildly atypical  nevus      Flesh colored to evenly pigmented papule c/w intradermal nevus       Pink pearly papule/plaque c/w basal cell carcinoma      Erythematous hyperkeratotic cursted plaque c/w SCC      Surgical scar with no sign of skin cancer recurrence      Open and closed comedones      Inflammatory papules and pustules      Verrucoid papule consistent consistent with wart     Erythematous eczematous patches and plaques     Dystrophic onycholytic nail with subungual debris c/w onychomycosis     Umbilicated papule    Erythematous-base heme-crusted tan verrucoid plaque consistent with inflamed seborrheic keratosis     Erythematous Silvery Scaling Plaque c/w Psoriasis     See annotation      Assessment / Plan:        Epidermal inclusion cyst  reassurance  Call if desires removal    Intertrigo  Current tx, use dial soap    For dry skin no hot water, lubriderm lotion after baths             Follow up if symptoms worsen or fail to improve.

## 2020-05-25 NOTE — LETTER
May 25, 2020      Mike Rodriguez II, MD  [address]  [facility] [address]           [facility]  [address]  [place] [address]  Phone: [phone]  Fax: [phone]          Patient: Alison Branch   MR Number: [id number]   YOB: 1955   Date of Visit: 5/25/2020       Dear Dr. Mike Rodriguez II:    Thank you for referring Alison Branch to me for evaluation. Attached you will find relevant portions of my assessment and plan of care.    If you have questions, please do not hesitate to call me. I look forward to following Alison Branch along with you.    Sincerely,    Rafia Loza MD    Enclosure  CC:  No Recipients    If you would like to receive this communication electronically, please contact [email or web address] or [phone] to request more information on [employer] access.    For providers and/or their staff who would like to refer a patient to [facility], please contact us through our one-stop-shop provider referral line, [facility], at [phone].    If you feel you have received this communication in error or would no longer like to receive these types of communications, please e-mail [email or web address]

## 2020-05-26 ENCOUNTER — OFFICE VISIT (OUTPATIENT)
Dept: INTERNAL MEDICINE | Facility: CLINIC | Age: 65
End: 2020-05-26
Payer: MEDICARE

## 2020-05-26 VITALS
HEART RATE: 76 BPM | HEIGHT: 69 IN | TEMPERATURE: 98 F | BODY MASS INDEX: 31.32 KG/M2 | DIASTOLIC BLOOD PRESSURE: 60 MMHG | SYSTOLIC BLOOD PRESSURE: 110 MMHG | WEIGHT: 211.44 LBS

## 2020-05-26 DIAGNOSIS — Z79.4 TYPE 2 DIABETES MELLITUS WITH HYPERGLYCEMIA, WITH LONG-TERM CURRENT USE OF INSULIN: ICD-10-CM

## 2020-05-26 DIAGNOSIS — I26.99 PULMONARY EMBOLISM, UNSPECIFIED CHRONICITY, UNSPECIFIED PULMONARY EMBOLISM TYPE, UNSPECIFIED WHETHER ACUTE COR PULMONALE PRESENT: ICD-10-CM

## 2020-05-26 DIAGNOSIS — C77.1 SECONDARY MALIGNANT NEOPLASM OF MEDIASTINAL LYMPH NODE: ICD-10-CM

## 2020-05-26 DIAGNOSIS — E55.9 VITAMIN D DEFICIENCY: ICD-10-CM

## 2020-05-26 DIAGNOSIS — E11.59 HYPERTENSION ASSOCIATED WITH DIABETES: ICD-10-CM

## 2020-05-26 DIAGNOSIS — I15.2 HYPERTENSION ASSOCIATED WITH DIABETES: ICD-10-CM

## 2020-05-26 DIAGNOSIS — C34.12 MALIGNANT NEOPLASM OF UPPER LOBE OF LEFT LUNG: Primary | ICD-10-CM

## 2020-05-26 DIAGNOSIS — Z74.09 IMPAIRED FUNCTIONAL MOBILITY, BALANCE, GAIT, AND ENDURANCE: ICD-10-CM

## 2020-05-26 DIAGNOSIS — R06.02 SHORTNESS OF BREATH ON EXERTION: ICD-10-CM

## 2020-05-26 DIAGNOSIS — R05.3 CHRONIC COUGH: ICD-10-CM

## 2020-05-26 DIAGNOSIS — E11.65 TYPE 2 DIABETES MELLITUS WITH HYPERGLYCEMIA, WITH LONG-TERM CURRENT USE OF INSULIN: ICD-10-CM

## 2020-05-26 PROCEDURE — 3052F PR MOST RECENT HEMOGLOBIN A1C LEVEL 8.0 - < 9.0%: ICD-10-PCS | Mod: CPTII,S$GLB,, | Performed by: INTERNAL MEDICINE

## 2020-05-26 PROCEDURE — 99499 RISK ADDL DX/OHS AUDIT: ICD-10-PCS | Mod: S$GLB,,, | Performed by: INTERNAL MEDICINE

## 2020-05-26 PROCEDURE — 3008F BODY MASS INDEX DOCD: CPT | Mod: CPTII,S$GLB,, | Performed by: INTERNAL MEDICINE

## 2020-05-26 PROCEDURE — 99215 PR OFFICE/OUTPT VISIT, EST, LEVL V, 40-54 MIN: ICD-10-PCS | Mod: S$GLB,,, | Performed by: INTERNAL MEDICINE

## 2020-05-26 PROCEDURE — 3008F PR BODY MASS INDEX (BMI) DOCUMENTED: ICD-10-PCS | Mod: CPTII,S$GLB,, | Performed by: INTERNAL MEDICINE

## 2020-05-26 PROCEDURE — 3052F HG A1C>EQUAL 8.0%<EQUAL 9.0%: CPT | Mod: CPTII,S$GLB,, | Performed by: INTERNAL MEDICINE

## 2020-05-26 PROCEDURE — 3078F PR MOST RECENT DIASTOLIC BLOOD PRESSURE < 80 MM HG: ICD-10-PCS | Mod: CPTII,S$GLB,, | Performed by: INTERNAL MEDICINE

## 2020-05-26 PROCEDURE — 99999 PR PBB SHADOW E&M-EST. PATIENT-LVL III: CPT | Mod: PBBFAC,,, | Performed by: INTERNAL MEDICINE

## 2020-05-26 PROCEDURE — 99499 UNLISTED E&M SERVICE: CPT | Mod: S$GLB,,, | Performed by: INTERNAL MEDICINE

## 2020-05-26 PROCEDURE — 3078F DIAST BP <80 MM HG: CPT | Mod: CPTII,S$GLB,, | Performed by: INTERNAL MEDICINE

## 2020-05-26 PROCEDURE — 3074F SYST BP LT 130 MM HG: CPT | Mod: CPTII,S$GLB,, | Performed by: INTERNAL MEDICINE

## 2020-05-26 PROCEDURE — 99215 OFFICE O/P EST HI 40 MIN: CPT | Mod: S$GLB,,, | Performed by: INTERNAL MEDICINE

## 2020-05-26 PROCEDURE — 3074F PR MOST RECENT SYSTOLIC BLOOD PRESSURE < 130 MM HG: ICD-10-PCS | Mod: CPTII,S$GLB,, | Performed by: INTERNAL MEDICINE

## 2020-05-26 PROCEDURE — 99999 PR PBB SHADOW E&M-EST. PATIENT-LVL III: ICD-10-PCS | Mod: PBBFAC,,, | Performed by: INTERNAL MEDICINE

## 2020-05-26 RX ORDER — ERGOCALCIFEROL 1.25 MG/1
CAPSULE ORAL
Qty: 12 CAPSULE | Refills: 3 | Status: CANCELLED | OUTPATIENT
Start: 2020-05-26

## 2020-05-26 RX ORDER — HYDROCODONE BITARTRATE AND HOMATROPINE METHYLBROMIDE ORAL SOLUTION 5; 1.5 MG/5ML; MG/5ML
5 LIQUID ORAL EVERY 4 HOURS PRN
Qty: 120 ML | Refills: 0 | Status: SHIPPED | OUTPATIENT
Start: 2020-05-26 | End: 2020-09-03

## 2020-05-26 NOTE — PROGRESS NOTES
"Subjective:       Patient ID: Alison Branch is a 64 y.o. female.    Chief Complaint: Hospital Follow Up    HPI - In the ER for a PE on Weds/Thu.  Now on eliquis.  She's dyspneic today.  Came with portable oxygen, but the tank appear empty.  She is at 92% saturations, though.  She has had a cough off and on during her chemo treatments, but it is worse today and wonders if there's something we can do for it.  Last a1c was 8.1, which is improved.  Sugars at home are better.  She's here with a daughter who also has questions about insulin and eliquis injections (both need to continue for an indefinite period of time.  She's in good spirits, despite her clinical situation today, stating "I shall beat it all!"    PMH:  Metastatic adenocarcinoma lung, dx 2019, currently undergoing CTX  DM2, A1C over 8  HTN, at goal  Brain aneurysm s/p coiling  HLP, on a statin  ADD, not on a stimulant  Obesity     Meds:  Reviewed and reconciled in EPIC with patient during visit today    Review of Systems   Constitutional: Positive for fatigue. Negative for fever.   HENT: Negative for congestion.    Respiratory: Positive for cough and shortness of breath.    Cardiovascular: Positive for leg swelling. Negative for chest pain.   Gastrointestinal: Negative for constipation.   Genitourinary: Negative for difficulty urinating.   Musculoskeletal: Negative for arthralgias.   Skin: Negative for rash.   Neurological: Negative for headaches.   Psychiatric/Behavioral: Negative for sleep disturbance.       Objective:      Physical Exam   Constitutional: She is oriented to person, place, and time. She appears well-developed and well-nourished.   Fatigued-appearing, more disheveled than usual.  Coughing paroxysms during interview   HENT:   Head: Normocephalic and atraumatic.   Cardiovascular: Normal rate, regular rhythm and normal heart sounds. Exam reveals no gallop and no friction rub.   No murmur heard.  Pulmonary/Chest: Effort normal and breath " sounds normal. No stridor. No respiratory distress. She has no wheezes. She has no rales. She exhibits no tenderness.   Neurological: She is alert and oriented to person, place, and time.   Skin: Skin is warm and dry. No erythema.   Psychiatric: She has a normal mood and affect.   Nursing note and vitals reviewed.      Assessment:       1. 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 Type 2 diabetes mellitus with hyperglycemia, with long-term current use of insulin    4. Hypertension associated with diabetes    5. Pulmonary embolism, unspecified chronicity, unspecified pulmonary embolism type, unspecified whether acute cor pulmonale present    6. Chronic cough    7. Impaired functional mobility, balance, gait, and endurance    8. Shortness of breath on exertion        Plan:       Alison was seen today for hospital follow up.    Diagnoses and all orders for this visit:    Malignant neoplasm of upper lobe of left lung - per oncology notes and CT results, seems to be progressing despite current therapy.  Further management per onc    Secondary malignant neoplasm of mediastinal lymph node    Type 2 diabetes mellitus with hyperglycemia, with long-term current use of insulin -seems to be stable.  No need to be overly aggressive right now.  A1C of 8 is acceptable, though I'd like to see closer to 7.5    Hypertension associated with diabetes - at goal, stay the course    Pulmonary embolism, unspecified chronicity, unspecified pulmonary embolism type, unspecified whether acute cor pulmonale present -  New finding for me.  Emphasized the importance of eliquis    Chronic cough - will give another stronger cough syrup  -     hydrocodone-homatropine 5-1.5 mg/5 ml (HYCODAN) 5-1.5 mg/5 mL Syrp; Take 5 mLs by mouth every 4 (four) hours as needed.    Impaired functional mobility, balance, gait, and endurance    Shortness of breath on exertion - from multiple causes.   Needs to change out her oxygen tank today    rtc prn    G Tomeka Rodriguez MD MPH  Staff Internist

## 2020-05-26 NOTE — PHYSICIAN QUERY
PT Name: Alison Branch  MR #: [id number]     Physician Query Form - Documentation Clarification      CDS: Carlene Chacon RN     Contact information:[email or web address] or (cell) [phone]    This form is a permanent document in the medical record.     Query Date: May 26, 2020    By submitting this query, we are merely seeking further clarification of documentation. Please utilize your independent clinical judgment when addressing the question(s) below.    The Medical record reflects the following:    Supporting Clinical Findings Location in Medical Record   Hypertension associated with diabetes  C/w Olmesartan   H&P 5/20   Type 2 diabetes mellitus with hyperglycemia, with long-term current use of insulin  Home regimen: detemir 26U qd, lispro 20-16-16 WM     Plan:  -Detemir 20U qHS  -Aspart 14U TIDWM  -MDSSI  -POC Glu  -Diet: Diabetic Cardiac   H&P 5/20                                                                            Doctor, please confirm the meaning of hypertension associated with DM.    Provider Use Only        [  ] HTN is a manifestation of DM (Secondary HTN)      [x  ] HTN is not a manifestation of DM      [  ] Other: ______________________________                                                                                                             [  ] Clinically Undetermined

## 2020-05-27 ENCOUNTER — TELEPHONE (OUTPATIENT)
Dept: INTERNAL MEDICINE | Facility: CLINIC | Age: 65
End: 2020-05-27

## 2020-05-27 DIAGNOSIS — I26.99 PULMONARY EMBOLISM, UNSPECIFIED CHRONICITY, UNSPECIFIED PULMONARY EMBOLISM TYPE, UNSPECIFIED WHETHER ACUTE COR PULMONALE PRESENT: Primary | ICD-10-CM

## 2020-05-27 RX ORDER — ERGOCALCIFEROL 1.25 MG/1
CAPSULE ORAL
Qty: 12 CAPSULE | Refills: 3 | Status: ON HOLD | OUTPATIENT
Start: 2020-05-27 | End: 2021-05-26 | Stop reason: HOSPADM

## 2020-05-27 NOTE — TELEPHONE ENCOUNTER
Please call her back.  Her insurance is not covering the pulse oximeter, since she's not a COVID patient.  She'll have to purchase one without using insurance to pay for it.    Thanks.  D

## 2020-05-27 NOTE — TELEPHONE ENCOUNTER
----- Message from Carmelo Noel sent at 5/27/2020 10:51 AM CDT -----  Good morning,    Ms. Wilson called this morning inquiring about her pulse oximeter order or prescription that she said you would send to [facility]. I didn't see either in her chart, so I told her I will message you to remind to you put it in for her.     Thank you, have a great day...

## 2020-05-27 NOTE — TELEPHONE ENCOUNTER
Called pt and advised that pulse oxi is not covered by insurance and she will get it over the counter,  Her daughter will get it for her.    Celeste

## 2020-05-28 ENCOUNTER — TELEPHONE (OUTPATIENT)
Dept: HEMATOLOGY/ONCOLOGY | Facility: CLINIC | Age: 65
End: 2020-05-28

## 2020-05-29 ENCOUNTER — PATIENT MESSAGE (OUTPATIENT)
Dept: HEMATOLOGY/ONCOLOGY | Facility: CLINIC | Age: 65
End: 2020-05-29

## 2020-05-29 ENCOUNTER — OFFICE VISIT (OUTPATIENT)
Dept: HEMATOLOGY/ONCOLOGY | Facility: CLINIC | Age: 65
End: 2020-05-29
Payer: MEDICARE

## 2020-05-29 ENCOUNTER — TELEPHONE (OUTPATIENT)
Dept: HEMATOLOGY/ONCOLOGY | Facility: CLINIC | Age: 65
End: 2020-05-29

## 2020-05-29 DIAGNOSIS — E11.65 TYPE 2 DIABETES MELLITUS WITH HYPERGLYCEMIA, WITH LONG-TERM CURRENT USE OF INSULIN: ICD-10-CM

## 2020-05-29 DIAGNOSIS — C77.1 SECONDARY MALIGNANT NEOPLASM OF MEDIASTINAL LYMPH NODE: ICD-10-CM

## 2020-05-29 DIAGNOSIS — I26.99 PULMONARY EMBOLISM, UNSPECIFIED CHRONICITY, UNSPECIFIED PULMONARY EMBOLISM TYPE, UNSPECIFIED WHETHER ACUTE COR PULMONALE PRESENT: ICD-10-CM

## 2020-05-29 DIAGNOSIS — Z79.4 TYPE 2 DIABETES MELLITUS WITH HYPERGLYCEMIA, WITH LONG-TERM CURRENT USE OF INSULIN: ICD-10-CM

## 2020-05-29 DIAGNOSIS — C34.12 MALIGNANT NEOPLASM OF UPPER LOBE OF LEFT LUNG: Primary | ICD-10-CM

## 2020-05-29 PROCEDURE — 3052F HG A1C>EQUAL 8.0%<EQUAL 9.0%: CPT | Mod: CPTII,95,, | Performed by: INTERNAL MEDICINE

## 2020-05-29 PROCEDURE — 99215 PR OFFICE/OUTPT VISIT, EST, LEVL V, 40-54 MIN: ICD-10-PCS | Mod: 95,,, | Performed by: INTERNAL MEDICINE

## 2020-05-29 PROCEDURE — 99215 OFFICE O/P EST HI 40 MIN: CPT | Mod: 95,,, | Performed by: INTERNAL MEDICINE

## 2020-05-29 PROCEDURE — 99499 UNLISTED E&M SERVICE: CPT | Mod: 95,,, | Performed by: INTERNAL MEDICINE

## 2020-05-29 PROCEDURE — 99499 RISK ADDL DX/OHS AUDIT: ICD-10-PCS | Mod: 95,,, | Performed by: INTERNAL MEDICINE

## 2020-05-29 PROCEDURE — 3052F PR MOST RECENT HEMOGLOBIN A1C LEVEL 8.0 - < 9.0%: ICD-10-PCS | Mod: CPTII,95,, | Performed by: INTERNAL MEDICINE

## 2020-05-29 NOTE — PROGRESS NOTES
Subjective:      The patient location is: home  The chief complaint leading to consultation is: lung cancer    Visit type: audiovisual    Face to Face time with patient: 40 minutes of total time spent on the encounter, which includes face to face time and non-face to face time preparing to see the patient (eg, review of tests), Obtaining and/or reviewing separately obtained history, Documenting clinical information in the electronic or other health record, Independently interpreting results (not separately reported) and communicating results to the patient/family/caregiver, or Care coordination (not separately reported).         Each patient to whom he or she provides medical services by telemedicine is:  (1) informed of the relationship between the physician and patient and the respective role of any other health care provider with respect to management of the patient; and (2) notified that he or she may decline to receive medical services by telemedicine and may withdraw from such care at any time.    Notes:    Patient ID: Alison Branch is a 64 y.o. female.    Chief Complaint: Lung Cancer  Ms. Branch is a 63-year-old female who smoked for about 30 years and quit 20 years ago, presented with cough end of last year, but worsened into January.  Since the cough persisted, she saw her PCP, underwent a chest x-ray on 05/10/2019, that revealed left upper lobe pneumonia and a repeat CT was done in the week after that on 05/17/2019 that showed left upper lobe   mass arising from the lateral pleural surface in the left upper lobe posterior subsegment measuring 2.6 x 3 cm.  There are satellite mass more medially near the aortic arch that is 2 cm, also spiculated and irregular as well as thickened interlobar septa in the left lung apex and anterior segment, prevascular lymph node lateral to the aortic arch, short axis measuring 9 mm.       She then underwent a bronchoscopy on 05/28/2019, and that revealed there was an  "infiltration obtained in the left apical posterior segment of the left upper lobe cytology brush was done.  Transbronchial biopsies of an area of infiltration were also performed in the apicoposterior segment of the left upper lobe.    Pathology revealed adenocarcinoma; however, the specimen was not adequate enough to send for molecular testing.       She underwent a PET scan on 06/06/2019.  that reveals significant hypermetabolic activity in the large irregular spiculated pulmonary mass in the lateral aspect of the left upper lobe consistent with the patient's known pulmonary adenocarcinoma.  There is also tracer avidity in the medial left upper lobe satellite lesion and diffusely throughout much of the anterior left upper lobe where there is prominent nodular paraseptal thickening.  There are some numerous scattered subcentimeter pulmonary nodules throughout the right lung, all of which are too small for detection by PET.  For example, there is a 0.4 cm nodule in the superior right lower lobe and a micro nodule in the posterior segment of the right upper lobe.  There is a 0.5 cm, normal size right   paratracheal lymph node with increased radiotracer uptake as well as hypermetabolic aortic pulmonary lymph node and a left hilar lymph node.  There is a focal hypermetabolic lesion in the left anterior superior iliac spine associated with well defined lytic lesion.  There is a hypermetabolic focus in the anterior right fifth rib with a possible associated lytic lesion.  SUVs as   below lateral:  Left upper lobe  SUV max 17.9, anterior left upper lobe satellite mass and septal thickening SUV max of 10.1, right paratracheal lymph node SUV max of 4.8, left AP window lymph node SUV max of 17.9, left anterior superior iliac spine SUV max of 3.9"     MRI pelvis from 6/10/19  reveals "Region of osseous is irregularity at the left anterior iliac spine likely related to bone graft harvest procedure.  See  discussion above.  No " "suspicious signal or enhancement to suggest active/malignant process"   MRI brain from 6/10//19 reveal No intracranial abnormality.     We discussed her case at [facility], and plan was to wait for GAURDANT and proceed with treatment accordingly  Her GAURDANT is negative for molecular markers.     She has completed 4 cycles of Carboplatin, Alimta and Keytruda as of 9/5/19. She is now on  ALimta and Keytruda maintenance.            She has been on maintenance Alimta and Keytruda     Her CT scans from 4/23/2020 revealed "In this patient with a known history of lung cancer, there has been marked interval detrimental change when compared to CT dated 12/20/2019 as follows. Persistent left upper lobe volume loss with worsening masslike consolidation and enlarging index and non index lesions. Increased number of innumerable bilateral metastatic solid pulmonary nodules. Interval increased size and number of multiple osteoblastic metastatic lesions throughout the visualized axial skeleton. Stable mediastinal lymph nodes, several of which were noted to be hypermetabolic on previous PET-CT.  No new lymphadenopathy. Stable subcentimeter hepatic and splenic hypodensities, too small to accurately characterize.  Path addendum from 5/2019 reveals ROS1         HPI She has been approved for Entrectinib.   She is on Lovenox for Pulmonary embolism. Breathing is a little bit better, using home oxygen 2 liters nasal canula.  SHe notes cough with sputum production mostly in AM, denies any chest pain.  She has been off of Lexapro since 5/19/2020     Review of Systems   Constitutional: Negative for appetite change, fatigue and unexpected weight change.   HENT: Negative for mouth sores.    Eyes: Negative for visual disturbance.   Respiratory: Positive for cough and shortness of breath.    Cardiovascular: Negative for chest pain.   Gastrointestinal: Negative for abdominal pain and diarrhea.   Genitourinary: Negative for frequency. "   Musculoskeletal: Negative for back pain.   Skin: Negative for rash.   Neurological: Negative for headaches.   Hematological: Negative for adenopathy.   Psychiatric/Behavioral: The patient is not nervous/anxious.    All other systems reviewed and are negative.      Objective:      Physical Exam      LABS:  WBC   Date Value Ref Range Status   05/21/2020 5.48 3.90 - 12.70 K/uL Final     Hemoglobin   Date Value Ref Range Status   05/21/2020 10.8 (L) 12.0 - 16.0 g/dL Final     Hematocrit   Date Value Ref Range Status   05/21/2020 35.1 (L) 37.0 - 48.5 % Final     Platelets   Date Value Ref Range Status   05/21/2020 176 150 - 350 K/uL Final     Gran # (ANC)   Date Value Ref Range Status   05/21/2020 3.1 1.8 - 7.7 K/uL Final     Gran%   Date Value Ref Range Status   05/21/2020 57.0 38.0 - 73.0 % Final       Chemistry        Component Value Date/Time     05/21/2020 0428    K 3.8 05/21/2020 0428     05/21/2020 0428    CO2 26 05/21/2020 0428    BUN 14 05/21/2020 0428    CREATININE 0.8 05/21/2020 0428     (H) 05/21/2020 0428        Component Value Date/Time    CALCIUM 8.7 05/21/2020 0428    ALKPHOS 57 05/20/2020 1543    AST 17 05/20/2020 1543    ALT 16 05/20/2020 1543    BILITOT 0.4 05/20/2020 1543    ESTGFRAFRICA >60.0 05/21/2020 0428    EGFRNONAA >60.0 05/21/2020 0428          Assessment:       1. 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 Type 2 diabetes mellitus with hyperglycemia, with long-term current use of insulin    4. Pulmonary embolism, unspecified chronicity, unspecified pulmonary embolism type, unspecified whether acute cor pulmonale present        Plan:        1,2. She will proceed with Entrectinib chemo, side effects were discussed. She will return in 2 weeks for labs,  3. She will stay on Insulin  4. She will also continue with lovenox.     Above care plan was discussed with patient and all questions were addressed to  their satisfaction

## 2020-05-29 NOTE — TELEPHONE ENCOUNTER
----- Message from Kiara Curry sent at 5/29/2020  9:30 AM CDT -----  Contact: Pt  Pt called today and Matias Bejarano left a message saying if the virtual can be moved to 11;40 instead of 11     Pt said its fine she can do that     Pt can be reached at [phone]

## 2020-06-03 ENCOUNTER — OFFICE VISIT (OUTPATIENT)
Dept: PODIATRY | Facility: CLINIC | Age: 65
End: 2020-06-03
Payer: MEDICARE

## 2020-06-03 ENCOUNTER — TELEPHONE (OUTPATIENT)
Dept: HEMATOLOGY/ONCOLOGY | Facility: CLINIC | Age: 65
End: 2020-06-03

## 2020-06-03 VITALS
BODY MASS INDEX: 29.54 KG/M2 | HEART RATE: 83 BPM | DIASTOLIC BLOOD PRESSURE: 63 MMHG | WEIGHT: 211 LBS | HEIGHT: 71 IN | SYSTOLIC BLOOD PRESSURE: 104 MMHG

## 2020-06-03 DIAGNOSIS — M20.42 HAMMER TOES OF BOTH FEET: ICD-10-CM

## 2020-06-03 DIAGNOSIS — M20.41 HAMMER TOES OF BOTH FEET: ICD-10-CM

## 2020-06-03 DIAGNOSIS — Z79.4 TYPE 2 DIABETES MELLITUS WITH HYPERGLYCEMIA, WITH LONG-TERM CURRENT USE OF INSULIN: ICD-10-CM

## 2020-06-03 DIAGNOSIS — G62.0 CHEMOTHERAPY-INDUCED PERIPHERAL NEUROPATHY: Primary | ICD-10-CM

## 2020-06-03 DIAGNOSIS — E11.65 TYPE 2 DIABETES MELLITUS WITH HYPERGLYCEMIA, WITH LONG-TERM CURRENT USE OF INSULIN: ICD-10-CM

## 2020-06-03 DIAGNOSIS — T45.1X5A CHEMOTHERAPY-INDUCED PERIPHERAL NEUROPATHY: Primary | ICD-10-CM

## 2020-06-03 PROCEDURE — 99999 PR PBB SHADOW E&M-EST. PATIENT-LVL IV: ICD-10-PCS | Mod: PBBFAC,,, | Performed by: PODIATRIST

## 2020-06-03 PROCEDURE — 3008F PR BODY MASS INDEX (BMI) DOCUMENTED: ICD-10-PCS | Mod: CPTII,S$GLB,, | Performed by: PODIATRIST

## 2020-06-03 PROCEDURE — 99213 OFFICE O/P EST LOW 20 MIN: CPT | Mod: S$GLB,,, | Performed by: PODIATRIST

## 2020-06-03 PROCEDURE — 3008F BODY MASS INDEX DOCD: CPT | Mod: CPTII,S$GLB,, | Performed by: PODIATRIST

## 2020-06-03 PROCEDURE — 3078F DIAST BP <80 MM HG: CPT | Mod: CPTII,S$GLB,, | Performed by: PODIATRIST

## 2020-06-03 PROCEDURE — 3074F SYST BP LT 130 MM HG: CPT | Mod: CPTII,S$GLB,, | Performed by: PODIATRIST

## 2020-06-03 PROCEDURE — 99999 PR PBB SHADOW E&M-EST. PATIENT-LVL IV: CPT | Mod: PBBFAC,,, | Performed by: PODIATRIST

## 2020-06-03 PROCEDURE — 99213 PR OFFICE/OUTPT VISIT, EST, LEVL III, 20-29 MIN: ICD-10-PCS | Mod: S$GLB,,, | Performed by: PODIATRIST

## 2020-06-03 PROCEDURE — 3052F HG A1C>EQUAL 8.0%<EQUAL 9.0%: CPT | Mod: CPTII,S$GLB,, | Performed by: PODIATRIST

## 2020-06-03 PROCEDURE — 3052F PR MOST RECENT HEMOGLOBIN A1C LEVEL 8.0 - < 9.0%: ICD-10-PCS | Mod: CPTII,S$GLB,, | Performed by: PODIATRIST

## 2020-06-03 PROCEDURE — 3074F PR MOST RECENT SYSTOLIC BLOOD PRESSURE < 130 MM HG: ICD-10-PCS | Mod: CPTII,S$GLB,, | Performed by: PODIATRIST

## 2020-06-03 PROCEDURE — 3078F PR MOST RECENT DIASTOLIC BLOOD PRESSURE < 80 MM HG: ICD-10-PCS | Mod: CPTII,S$GLB,, | Performed by: PODIATRIST

## 2020-06-03 NOTE — TELEPHONE ENCOUNTER
Spoke with patient. She state her ankles were swollen for a little bit yesterday, as she had high intake of salt. She just wanted to let dr chapin know the swelling happened yesterday. Nurse thanked her for calling.

## 2020-06-03 NOTE — PROGRESS NOTES
Subjective:      Patient ID: Alison Branch is a 64 y.o. female.    Chief Complaint: Diabetes Mellitus (05/26/2020 dr mo rodriguez) and Diabetic Foot Exam    Alison is a 64 y.o. female who presents to the clinic for evaluation and treatment of high risk feet. Alison has a past medical history of Allergy, Brain aneurysm (2010), Breast cyst, Depression (9/19/2019), Diabetes mellitus, Diabetes type 2, controlled, Fever blister, High cholesterol, History of Bell's palsy, HTN (hypertension) (5/20/2014), and Recurrent upper respiratory infection (URI). The patient's chief complaint is diabetic foot exam and diabetic shoes. This patient has documented high risk feet requiring routine maintenance secondary to peripheral neuropathy.    PCP: Mo Rodriguez Ii, MD    Date Last Seen by PCP: 05/26/2020 dr mo rodriguez    Current shoe gear:  Affected Foot: Tennis shoes     Unaffected Foot: Tennis shoes    Hemoglobin A1C   Date Value Ref Range Status   05/18/2020 8.1 (H) 4.0 - 5.6 % Final     Comment:     ADA Screening Guidelines:  5.7-6.4%  Consistent with prediabetes  >or=6.5%  Consistent with diabetes  High levels of fetal hemoglobin interfere with the HbA1C  assay. Heterozygous hemoglobin variants (HbS, HgC, etc)do  not significantly interfere with this assay.   However, presence of multiple variants may affect accuracy.     02/17/2020 8.7 (H) 4.0 - 5.6 % Final     Comment:     ADA Screening Guidelines:  5.7-6.4%  Consistent with prediabetes  >or=6.5%  Consistent with diabetes  High levels of fetal hemoglobin interfere with the HbA1C  assay. Heterozygous hemoglobin variants (HbS, HgC, etc)do  not significantly interfere with this assay.   However, presence of multiple variants may affect accuracy.     12/10/2019 9.1 (H) 4.0 - 5.6 % Final     Comment:     ADA Screening Guidelines:  5.7-6.4%  Consistent with prediabetes  >or=6.5%  Consistent with diabetes  High levels of fetal hemoglobin interfere with the HbA1C  assay.  Heterozygous hemoglobin variants (HbS, HgC, etc)do  not significantly interfere with this assay.   However, presence of multiple variants may affect accuracy.         Review of Systems   Constitution: Negative for chills, fever and malaise/fatigue.   HENT: Negative for hearing loss.    Cardiovascular: Positive for leg swelling. Negative for claudication.   Respiratory: Negative for shortness of breath.    Skin: Positive for nail changes. Negative for flushing and rash.   Musculoskeletal: Positive for joint pain. Negative for myalgias.   Neurological: Positive for paresthesias and sensory change. Negative for loss of balance and numbness.   Psychiatric/Behavioral: Negative for altered mental status.           Objective:      Physical Exam   Constitutional: She appears well-developed and well-nourished. She is cooperative.   Cardiovascular:   Pulses:       Dorsalis pedis pulses are 1+ on the right side, and 1+ on the left side.        Posterior tibial pulses are 1+ on the right side, and 1+ on the left side.   Non pitting edema b/L   Musculoskeletal:        Right ankle: She exhibits decreased range of motion and abnormal pulse. Achilles tendon exhibits normal Beebe's test results.        Left ankle: She exhibits decreased range of motion and abnormal pulse. Achilles tendon exhibits normal Beebe's test results.        Right foot: There is decreased range of motion.        Left foot: There is decreased range of motion.   Hammertoes noted, digits 2-5 b/L    with adductovarus rotation of b/L fifth digit.       Feet:   Right Foot:   Protective Sensation: 5 sites tested. 2 sites sensed.   Left Foot:   Protective Sensation: 5 sites tested. 2 sites sensed.   Neurological: She is alert.   diminished sensation noted to b/L lower extremities   Skin: Skin is dry. Capillary refill takes more than 3 seconds.   Nails x10 are short and discolored   Psychiatric: Her behavior is normal.   Vitals reviewed.            Assessment:        Encounter Diagnoses   Name Primary?    Type 2 diabetes mellitus with hyperglycemia, with long-term current use of insulin     Chemotherapy-induced peripheral neuropathy Yes    Hammer toes of both feet          Plan:       Alison was seen today for diabetes mellitus and diabetic foot exam.    Diagnoses and all orders for this visit:    Chemotherapy-induced peripheral neuropathy    Type 2 diabetes mellitus with hyperglycemia, with long-term current use of insulin  -     Ambulatory referral/consult to Podiatry  -     DIABETIC SHOES FOR HOME USE    Hammer toes of both feet  -     DIABETIC SHOES FOR HOME USE      I counseled the patient on her conditions, their implications and medical management.        - Shoe inspection. Diabetic Foot Education. Patient reminded of the importance of good nutrition and blood sugar control to help prevent podiatric complications of diabetes. Patient instructed on proper foot hygeine. We discussed wearing proper shoe gear, daily foot inspections, never walking without protective shoe gear.   Rx diabetic shoes  RTC in 6 months or sooner if any new pedal problems should arise.

## 2020-06-03 NOTE — TELEPHONE ENCOUNTER
----- Message from Barry Mrate sent at 6/3/2020 11:32 AM CDT -----  Contact: Patient  Patient Advice/Staff Message     Caller name: Pt    Reason for call: Pt was told to call the office if she experiences any swelling in her ankle. Calling to speak with Dr Belcher to give an update that now she is.    Do you feel you need to be seen today:: No        Communication Preference: [phone]    Additional Information:

## 2020-06-03 NOTE — LETTER
Dana 3, 2020      Lisa Sandoval, JULIA  [address] [facility] [id number]           [facility] - Podiatry  [address]  [facility] [address]  Phone: [phone]          Patient: Alison Branch   MR Number: [id number]   YOB: 1955   Date of Visit: 6/3/2020       Dear Lisa Sandoval:    Thank you for referring Alison Branch to me for evaluation. Attached you will find relevant portions of my assessment and plan of care.    If you have questions, please do not hesitate to call me. I look forward to following Alison Branch along with you.    Sincerely,    Ken Chatterjee, OSCAR    Enclosure  CC:  No Recipients    If you would like to receive this communication electronically, please contact [email or web address] or [phone] to request more information on [employer] access.    For providers and/or their staff who would like to refer a patient to [facility], please contact us through our one-stop-shop provider referral line, [facility], at [phone].    If you feel you have received this communication in error or would no longer like to receive these types of communications, please e-mail [email or web address]

## 2020-06-05 ENCOUNTER — TELEPHONE (OUTPATIENT)
Dept: HEMATOLOGY/ONCOLOGY | Facility: CLINIC | Age: 65
End: 2020-06-05

## 2020-06-05 ENCOUNTER — DOCUMENTATION ONLY (OUTPATIENT)
Dept: REHABILITATION | Facility: HOSPITAL | Age: 65
End: 2020-06-05

## 2020-06-05 NOTE — PROGRESS NOTES
OUTPATIENT PHYSICAL THERAPY DISCHARGE SUMMARY     Name: Alison Branch  [facility] Number: [id number]    Therapy Diagnosis:        Encounter Diagnosis   Name Primary?    Impaired functional mobility, balance, gait, and endurance Yes      Physician: David Mo MD    Treatment Orders: PT Eval and Treat  Past Medical History:   Diagnosis Date    Allergy     Brain aneurysm 2010    s/p coiling of one; another not coiled    Breast cyst     Depression 9/19/2019    Diabetes mellitus     Diabetes type 2, controlled     Fever blister     High cholesterol     History of Bell's palsy     HTN (hypertension) 5/20/2014    Recurrent upper respiratory infection (URI)        Initial visit: 1/21/20  Date of Last visit: 3/9/20  Date of Discharge Note:  6/5/2020  Total Visits Received: 4  Total No Show: 4  Total Cancelled Visits: 0  ASSESSMENT   Status Towards Goals Met:     Goals:  Short Term Goals: 4 weeks   1. Pt will improve score on TUG to at least 10 sec in order to decrease risk for fall. MET 2/26/20  2. Pt will improve SSWS to at least 1.15 m/s to demonstrate improved mobility without AD. ongoing  3. Pt will improve 30 second chair rise score to at least 7 without UE support. MET 2/26/2020  4. Pt will report <40% limitation using FOTO impairment tool. ongoing     Long Term Goals: 8 weeks   1. Pt will improve score on FGA to at least 27/30 in order to demonstrate improved balance. Ongoing  2. Pt will improve 30 second chair rise score to at least 10 without UE support. Ongoing, improving  3. Pt will improve SLS to at least 30 sec B in order to decrease risk for fall. ongoing  4. Pt will report no falls and ambulation without rollator for at least two weeks. ongoing    Goals Not achieved and why: Pt's therapy was postponed due to COVID-19 pandemic. Upon call to return to therapy pt informs PT of hospital admission and that she is currently receiving  PT.         Discharge reason : Hospital admission    PLAN    This patient is discharged from Outpatient [facility] Therapy Services.     Matias Nunez, PT  06/05/2020

## 2020-06-08 ENCOUNTER — TELEPHONE (OUTPATIENT)
Dept: HEMATOLOGY/ONCOLOGY | Facility: CLINIC | Age: 65
End: 2020-06-08

## 2020-06-08 NOTE — TELEPHONE ENCOUNTER
----- Message from Adela Mars sent at 6/8/2020  8:32 AM CDT -----  Contact: PT   PT called to reschedule her lab work for today. She won't be able to make it in bc she doesn't have a ride.     Callback: [phone]

## 2020-06-09 ENCOUNTER — OFFICE VISIT (OUTPATIENT)
Dept: HEMATOLOGY/ONCOLOGY | Facility: CLINIC | Age: 65
End: 2020-06-09
Payer: MEDICARE

## 2020-06-09 DIAGNOSIS — C79.51 SECONDARY MALIGNANT NEOPLASM OF BONE: ICD-10-CM

## 2020-06-09 DIAGNOSIS — C34.12 MALIGNANT NEOPLASM OF UPPER LOBE OF LEFT LUNG: Primary | ICD-10-CM

## 2020-06-09 DIAGNOSIS — C77.1 SECONDARY MALIGNANT NEOPLASM OF MEDIASTINAL LYMPH NODE: ICD-10-CM

## 2020-06-09 DIAGNOSIS — E11.65 TYPE 2 DIABETES MELLITUS WITH HYPERGLYCEMIA, WITH LONG-TERM CURRENT USE OF INSULIN: ICD-10-CM

## 2020-06-09 DIAGNOSIS — I26.99 PULMONARY EMBOLISM, UNSPECIFIED CHRONICITY, UNSPECIFIED PULMONARY EMBOLISM TYPE, UNSPECIFIED WHETHER ACUTE COR PULMONALE PRESENT: ICD-10-CM

## 2020-06-09 DIAGNOSIS — Z79.4 TYPE 2 DIABETES MELLITUS WITH HYPERGLYCEMIA, WITH LONG-TERM CURRENT USE OF INSULIN: ICD-10-CM

## 2020-06-09 PROCEDURE — 99442 PR PHYSICIAN TELEPHONE EVALUATION 11-20 MIN: CPT | Mod: 95,,, | Performed by: INTERNAL MEDICINE

## 2020-06-09 PROCEDURE — 3052F HG A1C>EQUAL 8.0%<EQUAL 9.0%: CPT | Mod: CPTII,95,, | Performed by: INTERNAL MEDICINE

## 2020-06-09 PROCEDURE — 99442 PR PHYSICIAN TELEPHONE EVALUATION 11-20 MIN: ICD-10-PCS | Mod: 95,,, | Performed by: INTERNAL MEDICINE

## 2020-06-09 PROCEDURE — 3052F PR MOST RECENT HEMOGLOBIN A1C LEVEL 8.0 - < 9.0%: ICD-10-PCS | Mod: CPTII,95,, | Performed by: INTERNAL MEDICINE

## 2020-06-09 NOTE — Clinical Note
She needs a letter to [employer] saying that she has lung cancer and is on Oxygen. MAil the letter to her

## 2020-06-09 NOTE — PROGRESS NOTES
Established Patient - Audio Only Telehealth Visit     The patient location is: home  The chief complaint leading to consultation is: lung cancer  Visit type: Virtual visit with audio only (telephone)  Total time spent with patient: 20 minutes       The reason for the audio only service rather than synchronous audio and video virtual visit was related to technical difficulties or patient preference/necessity.     Each patient to whom I provide medical services by telemedicine is:  (1) informed of the relationship between the physician and patient and the respective role of any other health care provider with respect to management of the patient; and (2) notified that they may decline to receive medical services by telemedicine and may withdraw from such care at any time. Patient verbally consented to receive this service via voice-only telephone call.       Ms. Branch is a 63-year-old female who smoked for about 30 years and quit 20 years ago, presented with cough end of last year, but worsened into January.  Since the cough persisted, she saw her PCP, underwent a chest x-ray on 05/10/2019, that revealed left upper lobe pneumonia and a repeat CT was done in the week after that on 05/17/2019 that showed left upper lobe   mass arising from the lateral pleural surface in the left upper lobe posterior subsegment measuring 2.6 x 3 cm.  There are satellite mass more medially near the aortic arch that is 2 cm, also spiculated and irregular as well as thickened interlobar septa in the left lung apex and anterior segment, prevascular lymph node lateral to the aortic arch, short axis measuring 9 mm.       She then underwent a bronchoscopy on 05/28/2019, and that revealed there was an infiltration obtained in the left apical posterior segment of the left upper lobe cytology brush was done.  Transbronchial biopsies of an area of infiltration were also performed in the apicoposterior segment of the left upper lobe.    Pathology  "revealed adenocarcinoma; however, the specimen was not adequate enough to send for molecular testing.       She underwent a PET scan on 06/06/2019.  that reveals significant hypermetabolic activity in the large irregular spiculated pulmonary mass in the lateral aspect of the left upper lobe consistent with the patient's known pulmonary adenocarcinoma.  There is also tracer avidity in the medial left upper lobe satellite lesion and diffusely throughout much of the anterior left upper lobe where there is prominent nodular paraseptal thickening.  There are some numerous scattered subcentimeter pulmonary nodules throughout the right lung, all of which are too small for detection by PET.  For example, there is a 0.4 cm nodule in the superior right lower lobe and a micro nodule in the posterior segment of the right upper lobe.  There is a 0.5 cm, normal size right   paratracheal lymph node with increased radiotracer uptake as well as hypermetabolic aortic pulmonary lymph node and a left hilar lymph node.  There is a focal hypermetabolic lesion in the left anterior superior iliac spine associated with well defined lytic lesion.  There is a hypermetabolic focus in the anterior right fifth rib with a possible associated lytic lesion.  SUVs as   below lateral:  Left upper lobe  SUV max 17.9, anterior left upper lobe satellite mass and septal thickening SUV max of 10.1, right paratracheal lymph node SUV max of 4.8, left AP window lymph node SUV max of 17.9, left anterior superior iliac spine SUV max of 3.9"     MRI pelvis from 6/10/19  reveals "Region of osseous is irregularity at the left anterior iliac spine likely related to bone graft harvest procedure.  See  discussion above.  No suspicious signal or enhancement to suggest active/malignant process"   MRI brain from 6/10//19 reveal No intracranial abnormality.     We discussed her case at [facility], and plan was to wait for GAURDANT and proceed with treatment " "accordingly  Her GAURDANT is negative for molecular markers.     She has completed 4 cycles of Carboplatin, Alimta and Keytruda as of 9/5/19. She is now on  ALimta and Keytruda maintenance.            She has been on maintenance Alimta and Keytruda     Her CT scans from 4/23/2020 revealed "In this patient with a known history of lung cancer, there has been marked interval detrimental change when compared to CT dated 12/20/2019 as follows. Persistent left upper lobe volume loss with worsening masslike consolidation and enlarging index and non index lesions. Increased number of innumerable bilateral metastatic solid pulmonary nodules. Interval increased size and number of multiple osteoblastic metastatic lesions throughout the visualized axial skeleton. Stable mediastinal lymph nodes, several of which were noted to be hypermetabolic on previous PET-CT.  No new lymphadenopathy. Stable subcentimeter hepatic and splenic hypodensities, too small to accurately characterize.  Path addendum from 5/2019 reveals ROS1         HPI She has been on for Entrectinib on 6/6/2020. She notes fatigue since starting the chemo. She feels her shortness of breath is about the same but sometimes feels worse.     She denies any nausea, vomiting, diarrhea, constipation, abdominal pain, weight loss or loss of appetite, chest pain,  leg swelling, pain, headache, dizziness, or mood changes. Her ECOG PS is zero        LABS:  WBC   Date Value Ref Range Status   06/09/2020 3.60 (L) 3.90 - 12.70 K/uL Final     Hemoglobin   Date Value Ref Range Status   06/09/2020 11.9 (L) 12.0 - 16.0 g/dL Final     Hematocrit   Date Value Ref Range Status   06/09/2020 36.3 (L) 37.0 - 48.5 % Final     Platelets   Date Value Ref Range Status   06/09/2020 212 150 - 350 K/uL Final     Gran # (ANC)   Date Value Ref Range Status   06/09/2020 1.4 (L) 1.8 - 7.7 K/uL Final     Gran%   Date Value Ref Range Status   06/09/2020 37.5 (L) 38.0 - 73.0 % Final       Chemistry      "   Component Value Date/Time     06/09/2020 1027    K 4.1 06/09/2020 1027     (L) 06/09/2020 1027    CO2 30 (H) 06/09/2020 1027    BUN 19 06/09/2020 1027    CREATININE 1.2 06/09/2020 1027     (H) 06/09/2020 1027        Component Value Date/Time    CALCIUM 9.4 06/09/2020 1027    ALKPHOS 76 06/09/2020 1027    AST 32 06/09/2020 1027    ALT 41 06/09/2020 1027    BILITOT 0.6 06/09/2020 1027    ESTGFRAFRICA 56.0 (A) 06/09/2020 1027    EGFRNONAA 48.6 (A) 06/09/2020 1027          Assessment and plan:  She will continue with Entrectibninb, she will return in 1 week for live visit with labs prior.    .Above care plan was discussed with patient and all questions were addressed to her satisfaction                           This service was not originating from a related E/M service provided within the previous 7 days nor will  to an E/M service or procedure within the next 24 hours or my soonest available appointment.  Prevailing standard of care was able to be met in this audio-only visit.

## 2020-06-11 ENCOUNTER — EXTERNAL HOME HEALTH (OUTPATIENT)
Dept: HOME HEALTH SERVICES | Facility: HOSPITAL | Age: 65
End: 2020-06-11
Payer: MEDICARE

## 2020-06-11 ENCOUNTER — INFUSION (OUTPATIENT)
Dept: INFUSION THERAPY | Facility: HOSPITAL | Age: 65
End: 2020-06-11
Attending: INTERNAL MEDICINE
Payer: MEDICARE

## 2020-06-11 DIAGNOSIS — C77.1 SECONDARY MALIGNANT NEOPLASM OF MEDIASTINAL LYMPH NODE: Primary | ICD-10-CM

## 2020-06-11 DIAGNOSIS — C34.12 MALIGNANT NEOPLASM OF UPPER LOBE OF LEFT LUNG: ICD-10-CM

## 2020-06-16 ENCOUNTER — TELEPHONE (OUTPATIENT)
Dept: SLEEP MEDICINE | Facility: OTHER | Age: 65
End: 2020-06-16

## 2020-06-16 DIAGNOSIS — C34.12 MALIGNANT NEOPLASM OF UPPER LOBE OF LEFT LUNG: ICD-10-CM

## 2020-06-16 NOTE — TELEPHONE ENCOUNTER
Patient called last week to move appointment today, she needs to be seen here at main campus. Patient states that she is feeling weeks, sounds winded on the phone, she states that it is no worse then normal. Would like a refill on pain and nausea meds.

## 2020-06-16 NOTE — TELEPHONE ENCOUNTER
"----- Message from Liane Quiros sent at 6/16/2020  4:22 PM CDT -----  Patient Assist    Name of caller: Alison   Provider name: Ye PAYTON MD  Contact Preference:  [phone]  Is this regarding current patient or new patient?: current   What is the nature of the call?    - pt had appts that needed to be rescheduled and the location of them   changed. She was also expecting to speak with the RN regarding treatment.   Please call and assist pt, already called in a few times regarding this.     Additional Notes:   "Thank you for all that you do for our patients'"               "

## 2020-06-17 ENCOUNTER — TELEPHONE (OUTPATIENT)
Dept: HEMATOLOGY/ONCOLOGY | Facility: CLINIC | Age: 65
End: 2020-06-17

## 2020-06-17 ENCOUNTER — DOCUMENT SCAN (OUTPATIENT)
Dept: HOME HEALTH SERVICES | Facility: HOSPITAL | Age: 65
End: 2020-06-17
Payer: MEDICARE

## 2020-06-17 RX ORDER — ONDANSETRON HYDROCHLORIDE 8 MG/1
8 TABLET, FILM COATED ORAL 4 TIMES DAILY PRN
Qty: 60 TABLET | Refills: 2 | Status: SHIPPED | OUTPATIENT
Start: 2020-06-17 | End: 2020-09-14 | Stop reason: SDUPTHER

## 2020-06-17 NOTE — TELEPHONE ENCOUNTER
----- Message from Elroy Mustafa sent at 6/17/2020 12:59 PM CDT -----  Regarding: Injection  Contact: self  Att: Donna Hurley is requesting to schedule her injection (she didn't have the name at time of call) and a f/u visit w/      Pt can be reached at [phone]

## 2020-06-17 NOTE — TELEPHONE ENCOUNTER
Called patient to schedule follow up and labs with Dr Belcher. Scheduled lab at [facility] 6/17 and Dr Belcher on Thursday 6/18.

## 2020-06-17 NOTE — TELEPHONE ENCOUNTER
Spoke with patient.  Informed her dr chapin will discuss pain medications at her visit tomorrow.  She thanked nurse.  She has hycodan syrup from dr cannon.

## 2020-06-18 ENCOUNTER — TELEPHONE (OUTPATIENT)
Dept: HEMATOLOGY/ONCOLOGY | Facility: CLINIC | Age: 65
End: 2020-06-18

## 2020-06-18 ENCOUNTER — INFUSION (OUTPATIENT)
Dept: INFUSION THERAPY | Facility: HOSPITAL | Age: 65
DRG: 315 | End: 2020-06-18
Attending: INTERNAL MEDICINE
Payer: MEDICARE

## 2020-06-18 ENCOUNTER — OFFICE VISIT (OUTPATIENT)
Dept: HEMATOLOGY/ONCOLOGY | Facility: CLINIC | Age: 65
DRG: 315 | End: 2020-06-18
Payer: MEDICARE

## 2020-06-18 VITALS
SYSTOLIC BLOOD PRESSURE: 100 MMHG | DIASTOLIC BLOOD PRESSURE: 68 MMHG | TEMPERATURE: 98 F | OXYGEN SATURATION: 100 % | RESPIRATION RATE: 18 BRPM | HEART RATE: 78 BPM

## 2020-06-18 VITALS
TEMPERATURE: 98 F | SYSTOLIC BLOOD PRESSURE: 94 MMHG | DIASTOLIC BLOOD PRESSURE: 56 MMHG | BODY MASS INDEX: 31.34 KG/M2 | WEIGHT: 211.63 LBS | HEART RATE: 79 BPM | RESPIRATION RATE: 17 BRPM | OXYGEN SATURATION: 100 % | HEIGHT: 69 IN

## 2020-06-18 DIAGNOSIS — C34.12 MALIGNANT NEOPLASM OF UPPER LOBE OF LEFT LUNG: Primary | ICD-10-CM

## 2020-06-18 DIAGNOSIS — R63.0 ANOREXIA: ICD-10-CM

## 2020-06-18 DIAGNOSIS — R06.02 SHORTNESS OF BREATH ON EXERTION: ICD-10-CM

## 2020-06-18 DIAGNOSIS — Z79.4 TYPE 2 DIABETES MELLITUS WITH DIABETIC POLYNEUROPATHY, WITH LONG-TERM CURRENT USE OF INSULIN: ICD-10-CM

## 2020-06-18 DIAGNOSIS — C77.1 SECONDARY MALIGNANT NEOPLASM OF MEDIASTINAL LYMPH NODE: ICD-10-CM

## 2020-06-18 DIAGNOSIS — E11.42 TYPE 2 DIABETES MELLITUS WITH DIABETIC POLYNEUROPATHY, WITH LONG-TERM CURRENT USE OF INSULIN: ICD-10-CM

## 2020-06-18 DIAGNOSIS — C79.51 SECONDARY MALIGNANT NEOPLASM OF BONE: ICD-10-CM

## 2020-06-18 DIAGNOSIS — F33.0 MILD EPISODE OF RECURRENT MAJOR DEPRESSIVE DISORDER: ICD-10-CM

## 2020-06-18 DIAGNOSIS — C34.12 MALIGNANT NEOPLASM OF UPPER LOBE OF LEFT LUNG: ICD-10-CM

## 2020-06-18 DIAGNOSIS — I26.99 PULMONARY EMBOLISM, UNSPECIFIED CHRONICITY, UNSPECIFIED PULMONARY EMBOLISM TYPE, UNSPECIFIED WHETHER ACUTE COR PULMONALE PRESENT: ICD-10-CM

## 2020-06-18 DIAGNOSIS — R30.0 DYSURIA: Primary | ICD-10-CM

## 2020-06-18 PROCEDURE — 3052F HG A1C>EQUAL 8.0%<EQUAL 9.0%: CPT | Mod: CPTII,S$GLB,, | Performed by: INTERNAL MEDICINE

## 2020-06-18 PROCEDURE — 96361 HYDRATE IV INFUSION ADD-ON: CPT

## 2020-06-18 PROCEDURE — 25000003 PHARM REV CODE 250: Performed by: INTERNAL MEDICINE

## 2020-06-18 PROCEDURE — 3008F BODY MASS INDEX DOCD: CPT | Mod: CPTII,S$GLB,, | Performed by: INTERNAL MEDICINE

## 2020-06-18 PROCEDURE — 99215 OFFICE O/P EST HI 40 MIN: CPT | Mod: S$GLB,,, | Performed by: INTERNAL MEDICINE

## 2020-06-18 PROCEDURE — 63600175 PHARM REV CODE 636 W HCPCS: Mod: JG | Performed by: INTERNAL MEDICINE

## 2020-06-18 PROCEDURE — 3008F PR BODY MASS INDEX (BMI) DOCUMENTED: ICD-10-PCS | Mod: CPTII,S$GLB,, | Performed by: INTERNAL MEDICINE

## 2020-06-18 PROCEDURE — 3078F PR MOST RECENT DIASTOLIC BLOOD PRESSURE < 80 MM HG: ICD-10-PCS | Mod: CPTII,S$GLB,, | Performed by: INTERNAL MEDICINE

## 2020-06-18 PROCEDURE — 3078F DIAST BP <80 MM HG: CPT | Mod: CPTII,S$GLB,, | Performed by: INTERNAL MEDICINE

## 2020-06-18 PROCEDURE — 3074F PR MOST RECENT SYSTOLIC BLOOD PRESSURE < 130 MM HG: ICD-10-PCS | Mod: CPTII,S$GLB,, | Performed by: INTERNAL MEDICINE

## 2020-06-18 PROCEDURE — 3052F PR MOST RECENT HEMOGLOBIN A1C LEVEL 8.0 - < 9.0%: ICD-10-PCS | Mod: CPTII,S$GLB,, | Performed by: INTERNAL MEDICINE

## 2020-06-18 PROCEDURE — 3074F SYST BP LT 130 MM HG: CPT | Mod: CPTII,S$GLB,, | Performed by: INTERNAL MEDICINE

## 2020-06-18 PROCEDURE — 99215 PR OFFICE/OUTPT VISIT, EST, LEVL V, 40-54 MIN: ICD-10-PCS | Mod: S$GLB,,, | Performed by: INTERNAL MEDICINE

## 2020-06-18 PROCEDURE — 99999 PR PBB SHADOW E&M-EST. PATIENT-LVL IV: ICD-10-PCS | Mod: PBBFAC,,, | Performed by: INTERNAL MEDICINE

## 2020-06-18 PROCEDURE — 96360 HYDRATION IV INFUSION INIT: CPT

## 2020-06-18 PROCEDURE — 96372 THER/PROPH/DIAG INJ SC/IM: CPT

## 2020-06-18 PROCEDURE — 99999 PR PBB SHADOW E&M-EST. PATIENT-LVL IV: CPT | Mod: PBBFAC,,, | Performed by: INTERNAL MEDICINE

## 2020-06-18 RX ORDER — HEPARIN 100 UNIT/ML
500 SYRINGE INTRAVENOUS
Status: COMPLETED | OUTPATIENT
Start: 2020-06-18 | End: 2020-06-18

## 2020-06-18 RX ORDER — CYPROHEPTADINE HYDROCHLORIDE 4 MG/1
4 TABLET ORAL 4 TIMES DAILY
Qty: 120 TABLET | Refills: 5 | Status: SHIPPED | OUTPATIENT
Start: 2020-06-18 | End: 2020-07-01

## 2020-06-18 RX ADMIN — SODIUM CHLORIDE 2000 ML: 0.9 INJECTION, SOLUTION INTRAVENOUS at 12:06

## 2020-06-18 RX ADMIN — DENOSUMAB 120 MG: 120 INJECTION SUBCUTANEOUS at 02:06

## 2020-06-18 RX ADMIN — HEPARIN 500 UNITS: 100 SYRINGE at 02:06

## 2020-06-18 NOTE — PLAN OF CARE
Pt. Admitted for IVF/Xgeva SQ. Labs reviewed and need for hydration discussed. Fatigue addressed and need for increase in oral hydration.Pt received 2L IVF and SQ Xgeva, tolerated well. Pt given AVS and discharged @ 14:35

## 2020-06-18 NOTE — PROGRESS NOTES
Subjective:       Patient ID: Alison Branch is a 64 y.o. female.    Chief Complaint: Malignant neoplasm of upper lobe of left lung, Gait Problem, Fatigue, Nausea, and Shortness of Breath  Ms. Branch is a 63-year-old female who smoked for about 30 years and quit 20 years ago, presented with cough end of last year, but worsened into January.  Since the cough persisted, she saw her PCP, underwent a chest x-ray on 05/10/2019, that revealed left upper lobe pneumonia and a repeat CT was done in the week after that on 05/17/2019 that showed left upper lobe   mass arising from the lateral pleural surface in the left upper lobe posterior subsegment measuring 2.6 x 3 cm.  There are satellite mass more medially near the aortic arch that is 2 cm, also spiculated and irregular as well as thickened interlobar septa in the left lung apex and anterior segment, prevascular lymph node lateral to the aortic arch, short axis measuring 9 mm.       She then underwent a bronchoscopy on 05/28/2019, and that revealed there was an infiltration obtained in the left apical posterior segment of the left upper lobe cytology brush was done.  Transbronchial biopsies of an area of infiltration were also performed in the apicoposterior segment of the left upper lobe.    Pathology revealed adenocarcinoma; however, the specimen was not adequate enough to send for molecular testing.       She underwent a PET scan on 06/06/2019.  that reveals significant hypermetabolic activity in the large irregular spiculated pulmonary mass in the lateral aspect of the left upper lobe consistent with the patient's known pulmonary adenocarcinoma.  There is also tracer avidity in the medial left upper lobe satellite lesion and diffusely throughout much of the anterior left upper lobe where there is prominent nodular paraseptal thickening.  There are some numerous scattered subcentimeter pulmonary nodules throughout the right lung, all of which are too small for  "detection by PET.  For example, there is a 0.4 cm nodule in the superior right lower lobe and a micro nodule in the posterior segment of the right upper lobe.  There is a 0.5 cm, normal size right   paratracheal lymph node with increased radiotracer uptake as well as hypermetabolic aortic pulmonary lymph node and a left hilar lymph node.  There is a focal hypermetabolic lesion in the left anterior superior iliac spine associated with well defined lytic lesion.  There is a hypermetabolic focus in the anterior right fifth rib with a possible associated lytic lesion.  SUVs as   below lateral:  Left upper lobe  SUV max 17.9, anterior left upper lobe satellite mass and septal thickening SUV max of 10.1, right paratracheal lymph node SUV max of 4.8, left AP window lymph node SUV max of 17.9, left anterior superior iliac spine SUV max of 3.9"     MRI pelvis from 6/10/19  reveals "Region of osseous is irregularity at the left anterior iliac spine likely related to bone graft harvest procedure.  See  discussion above.  No suspicious signal or enhancement to suggest active/malignant process"   MRI brain from 6/10//19 reveal No intracranial abnormality.     We discussed her case at [facility], and plan was to wait for GAURDANT and proceed with treatment accordingly  Her GAURDANT is negative for molecular markers.     She has completed 4 cycles of Carboplatin, Alimta and Keytruda as of 9/5/19. She is now on  ALimta and Keytruda maintenance.            She has been on maintenance Alimta and Keytruda     Her CT scans from 4/23/2020 revealed "In this patient with a known history of lung cancer, there has been marked interval detrimental change when compared to CT dated 12/20/2019 as follows. Persistent left upper lobe volume loss with worsening masslike consolidation and enlarging index and non index lesions. Increased number of innumerable bilateral metastatic solid pulmonary nodules. Interval increased size and number of " multiple osteoblastic metastatic lesions throughout the visualized axial skeleton. Stable mediastinal lymph nodes, several of which were noted to be hypermetabolic on previous PET-CT.  No new lymphadenopathy. Stable subcentimeter hepatic and splenic hypodensities, too small to accurately characterize.  Path addendum from 5/2019 reveals ROS1          HPI She has been on Entrectinib since 6/6/2020.  She notes unsteady dy gait which she says since 5/20/2020 when she was hospitalized for PE, she was also started on lovenox.     She notes that she has been sleeping the whole day and sometime notes shortness of breath when she is lying down, Uses home oxygen on occasion of about 2 liters.  Decreased appetite, has not been drinking fluids, does not appear to be working with PT/OT      Review of Systems   Constitutional: Positive for appetite change, fatigue and unexpected weight change.   HENT: Negative for mouth sores.    Eyes: Negative for visual disturbance.   Respiratory: Negative for cough and shortness of breath.    Cardiovascular: Negative for chest pain.   Gastrointestinal: Negative for abdominal pain and diarrhea.   Genitourinary: Negative for frequency.   Musculoskeletal: Negative for back pain.   Integumentary:  Negative for rash.   Neurological: Negative for headaches.   Hematological: Negative for adenopathy.   Psychiatric/Behavioral: The patient is not nervous/anxious.    All other systems reviewed and are negative.        Objective:      Physical Exam  Vitals signs reviewed.   Constitutional:       Appearance: She is well-developed.   HENT:      Mouth/Throat:      Pharynx: No oropharyngeal exudate.   Cardiovascular:      Rate and Rhythm: Normal rate.      Heart sounds: Normal heart sounds.   Pulmonary:      Effort: Pulmonary effort is normal.      Breath sounds: Normal breath sounds. No wheezing.   Abdominal:      General: Bowel sounds are normal.      Palpations: Abdomen is soft.      Tenderness: There is  no abdominal tenderness.   Musculoskeletal:         General: No tenderness.   Lymphadenopathy:      Cervical: No cervical adenopathy.   Skin:     General: Skin is warm and dry.      Findings: No rash.   Neurological:      Mental Status: She is alert and oriented to person, place, and time.      Coordination: Coordination normal.   Psychiatric:         Thought Content: Thought content normal.         Judgment: Judgment normal.           LABS:  WBC   Date Value Ref Range Status   06/17/2020 3.30 (L) 3.90 - 12.70 K/uL Final     Hemoglobin   Date Value Ref Range Status   06/17/2020 10.4 (L) 12.0 - 16.0 g/dL Final     Hematocrit   Date Value Ref Range Status   06/17/2020 32.0 (L) 37.0 - 48.5 % Final     Platelets   Date Value Ref Range Status   06/17/2020 204 150 - 350 K/uL Final     Gran # (ANC)   Date Value Ref Range Status   06/17/2020 1.6 (L) 1.8 - 7.7 K/uL Final     Gran%   Date Value Ref Range Status   06/17/2020 47.7 38.0 - 73.0 % Final       Chemistry        Component Value Date/Time     06/17/2020 1602    K 5.1 06/17/2020 1602     06/17/2020 1602    CO2 28 06/17/2020 1602    BUN 26 (H) 06/17/2020 1602    CREATININE 1.7 (H) 06/17/2020 1602     (H) 06/17/2020 1602        Component Value Date/Time    CALCIUM 9.0 06/17/2020 1602    ALKPHOS 85 06/17/2020 1602    AST 39 06/17/2020 1602    ALT 52 06/17/2020 1602    BILITOT 0.5 06/17/2020 1602    ESTGFRAFRICA 36.7 (A) 06/17/2020 1602    EGFRNONAA 31.9 (A) 06/17/2020 1602          Assessment:       1. Malignant neoplasm of upper lobe of left lung    2. 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 Type 2 diabetes mellitus with diabetic polyneuropathy, with long-term current use of insulin    5. Pulmonary embolism, unspecified chronicity, unspecified pulmonary embolism type, unspecified whether acute cor pulmonale present    6. Anorexia    7. Mild episode of recurrent major depressive disorder         Plan:        1,2,3. She appears to be very depressed with decreased appetite, We discussed the pros and cons of continuing her chemo versus stopping it.  She notes that she would like to continue at this time.  She will receive IV fluids today and will have her return in 1 week for labs  4. She notes that she is compliant with meds  5. Notes that she is taking her Lovenox twice a day  6. Escribed periactin  7. Will have her see Dr. Garcia as well

## 2020-06-18 NOTE — TELEPHONE ENCOUNTER
Called patient left message to call the clinic to schedule consult  with Dr Garcia. Number was provided.          ----- Message from Maria G Lee sent at 6/18/2020  1:41 PM CDT -----  Regarding: Consult from Dr Belcher  Called patient left message to call the clinic to schedule with Dr Gacria. Number was provided.  ----- Message -----  From: Jagruti Garcia, PhD  Sent: 6/18/2020  11:26 AM CDT  To: Tara Belcher MD, Maria G Lee    Of course.    [place] can you get her schedule in my next available.  ----- Message -----  From: Tara Belcher MD  Sent: 6/18/2020  11:21 AM CDT  To: Jagruti Garcia, PhD    CAn you please see her, she is sleeping all day, concerned for depression

## 2020-06-18 NOTE — TELEPHONE ENCOUNTER
----- Message from Tara Belcher MD sent at 6/18/2020 11:20 AM CDT -----  Add 2 liters NS with Xgeva for today. Schedule CBC,CMP and see me in 1 week, live visit on main

## 2020-06-21 ENCOUNTER — HOSPITAL ENCOUNTER (INPATIENT)
Facility: HOSPITAL | Age: 65
LOS: 8 days | Discharge: HOME HEALTH | DRG: 315 | End: 2020-06-29
Attending: EMERGENCY MEDICINE | Admitting: INTERNAL MEDICINE
Payer: MEDICARE

## 2020-06-21 DIAGNOSIS — E11.59 HYPERTENSION ASSOCIATED WITH DIABETES: ICD-10-CM

## 2020-06-21 DIAGNOSIS — R57.9 SHOCK: ICD-10-CM

## 2020-06-21 DIAGNOSIS — E11.65 TYPE 2 DIABETES MELLITUS WITH HYPERGLYCEMIA, WITH LONG-TERM CURRENT USE OF INSULIN: ICD-10-CM

## 2020-06-21 DIAGNOSIS — I95.9 HYPOTENSION: ICD-10-CM

## 2020-06-21 DIAGNOSIS — R65.21 SEPTIC SHOCK: Primary | ICD-10-CM

## 2020-06-21 DIAGNOSIS — Z79.4 TYPE 2 DIABETES MELLITUS WITH HYPERGLYCEMIA, WITH LONG-TERM CURRENT USE OF INSULIN: ICD-10-CM

## 2020-06-21 DIAGNOSIS — C34.12 MALIGNANT NEOPLASM OF UPPER LOBE OF LEFT LUNG: ICD-10-CM

## 2020-06-21 DIAGNOSIS — I15.2 HYPERTENSION ASSOCIATED WITH DIABETES: ICD-10-CM

## 2020-06-21 DIAGNOSIS — A41.9 SEPTIC SHOCK: Primary | ICD-10-CM

## 2020-06-21 PROBLEM — R79.89 ELEVATED TROPONIN: Status: ACTIVE | Noted: 2020-06-21

## 2020-06-21 LAB
ABO + RH BLD: NORMAL
ALBUMIN SERPL BCP-MCNC: 3.2 G/DL (ref 3.5–5.2)
ALLENS TEST: ABNORMAL
ALLENS TEST: ABNORMAL
ALP SERPL-CCNC: 69 U/L (ref 55–135)
ALT SERPL W/O P-5'-P-CCNC: 70 U/L (ref 10–44)
AMORPH CRY UR QL COMP ASSIST: ABNORMAL
AMYLASE SERPL-CCNC: 58 U/L (ref 20–110)
ANION GAP SERPL CALC-SCNC: 10 MMOL/L (ref 8–16)
APTT BLDCRRT: 24.2 SEC (ref 21–32)
APTT BLDCRRT: 24.2 SEC (ref 21–32)
APTT BLDCRRT: >150 SEC (ref 21–32)
AST SERPL-CCNC: 104 U/L (ref 10–40)
B-OH-BUTYR BLD STRIP-SCNC: 0.2 MMOL/L (ref 0–0.5)
BACTERIA #/AREA URNS AUTO: ABNORMAL /HPF
BASOPHILS # BLD AUTO: 0.02 K/UL (ref 0–0.2)
BASOPHILS # BLD AUTO: 0.03 K/UL (ref 0–0.2)
BASOPHILS # BLD AUTO: 0.03 K/UL (ref 0–0.2)
BASOPHILS NFR BLD: 0.4 % (ref 0–1.9)
BASOPHILS NFR BLD: 0.5 % (ref 0–1.9)
BASOPHILS NFR BLD: 0.6 % (ref 0–1.9)
BILIRUB SERPL-MCNC: 0.7 MG/DL (ref 0.1–1)
BILIRUB UR QL STRIP: NEGATIVE
BLD GP AB SCN CELLS X3 SERPL QL: NORMAL
BNP SERPL-MCNC: 22 PG/ML (ref 0–99)
BNP SERPL-MCNC: 22 PG/ML (ref 0–99)
BUN SERPL-MCNC: 26 MG/DL (ref 8–23)
CALCIUM SERPL-MCNC: 7.9 MG/DL (ref 8.7–10.5)
CHLORIDE SERPL-SCNC: 108 MMOL/L (ref 95–110)
CLARITY UR REFRACT.AUTO: ABNORMAL
CO2 SERPL-SCNC: 22 MMOL/L (ref 23–29)
COLOR UR AUTO: YELLOW
CREAT SERPL-MCNC: 2.4 MG/DL (ref 0.5–1.4)
CREAT UR-MCNC: 59 MG/DL (ref 15–325)
DELSYS: ABNORMAL
DELSYS: ABNORMAL
DIFFERENTIAL METHOD: ABNORMAL
EOSINOPHIL # BLD AUTO: 0 K/UL (ref 0–0.5)
EOSINOPHIL NFR BLD: 0.2 % (ref 0–8)
EOSINOPHIL NFR BLD: 0.2 % (ref 0–8)
EOSINOPHIL NFR BLD: 0.4 % (ref 0–8)
ERYTHROCYTE [DISTWIDTH] IN BLOOD BY AUTOMATED COUNT: 15.5 % (ref 11.5–14.5)
ERYTHROCYTE [DISTWIDTH] IN BLOOD BY AUTOMATED COUNT: 15.6 % (ref 11.5–14.5)
ERYTHROCYTE [DISTWIDTH] IN BLOOD BY AUTOMATED COUNT: 15.7 % (ref 11.5–14.5)
EST. GFR  (AFRICAN AMERICAN): 24.2 ML/MIN/1.73 M^2
EST. GFR  (NON AFRICAN AMERICAN): 21 ML/MIN/1.73 M^2
ESTIMATED AVG GLUCOSE: 217 MG/DL (ref 68–131)
FLOW: 2
FLOW: 2
GLUCOSE SERPL-MCNC: 211 MG/DL (ref 70–110)
GLUCOSE UR QL STRIP: ABNORMAL
HBA1C MFR BLD HPLC: 9.2 % (ref 4–5.6)
HCO3 UR-SCNC: 22.1 MMOL/L (ref 24–28)
HCO3 UR-SCNC: 25.8 MMOL/L (ref 24–28)
HCT VFR BLD AUTO: 34.6 % (ref 37–48.5)
HCT VFR BLD AUTO: 35.1 % (ref 37–48.5)
HCT VFR BLD AUTO: 35.2 % (ref 37–48.5)
HGB BLD-MCNC: 10.4 G/DL (ref 12–16)
HGB BLD-MCNC: 10.5 G/DL (ref 12–16)
HGB BLD-MCNC: 10.6 G/DL (ref 12–16)
HGB UR QL STRIP: ABNORMAL
HYALINE CASTS UR QL AUTO: 5 /LPF
IMM GRANULOCYTES # BLD AUTO: 0.02 K/UL (ref 0–0.04)
IMM GRANULOCYTES # BLD AUTO: 0.04 K/UL (ref 0–0.04)
IMM GRANULOCYTES # BLD AUTO: 0.05 K/UL (ref 0–0.04)
IMM GRANULOCYTES NFR BLD AUTO: 0.4 % (ref 0–0.5)
IMM GRANULOCYTES NFR BLD AUTO: 0.6 % (ref 0–0.5)
IMM GRANULOCYTES NFR BLD AUTO: 1 % (ref 0–0.5)
INR PPP: 1.2 (ref 0.8–1.2)
INR PPP: 1.2 (ref 0.8–1.2)
IRON SERPL-MCNC: 21 UG/DL (ref 30–160)
KETONES UR QL STRIP: NEGATIVE
LACTATE SERPL-SCNC: 1.9 MMOL/L (ref 0.5–2.2)
LACTATE SERPL-SCNC: 2 MMOL/L (ref 0.5–2.2)
LACTATE SERPL-SCNC: 2.9 MMOL/L (ref 0.5–2.2)
LACTATE SERPL-SCNC: 3 MMOL/L (ref 0.5–2.2)
LEUKOCYTE ESTERASE UR QL STRIP: NEGATIVE
LIPASE SERPL-CCNC: 49 U/L (ref 4–60)
LYMPHOCYTES # BLD AUTO: 0.8 K/UL (ref 1–4.8)
LYMPHOCYTES # BLD AUTO: 0.9 K/UL (ref 1–4.8)
LYMPHOCYTES # BLD AUTO: 1.6 K/UL (ref 1–4.8)
LYMPHOCYTES NFR BLD: 18.1 % (ref 18–48)
LYMPHOCYTES NFR BLD: 18.1 % (ref 18–48)
LYMPHOCYTES NFR BLD: 24.7 % (ref 18–48)
MAGNESIUM SERPL-MCNC: 1.8 MG/DL (ref 1.6–2.6)
MCH RBC QN AUTO: 27.7 PG (ref 27–31)
MCH RBC QN AUTO: 28.3 PG (ref 27–31)
MCH RBC QN AUTO: 28.6 PG (ref 27–31)
MCHC RBC AUTO-ENTMCNC: 29.5 G/DL (ref 32–36)
MCHC RBC AUTO-ENTMCNC: 30.2 G/DL (ref 32–36)
MCHC RBC AUTO-ENTMCNC: 30.3 G/DL (ref 32–36)
MCV RBC AUTO: 91 FL (ref 82–98)
MCV RBC AUTO: 95 FL (ref 82–98)
MCV RBC AUTO: 96 FL (ref 82–98)
MICROSCOPIC COMMENT: ABNORMAL
MODE: ABNORMAL
MODE: ABNORMAL
MONOCYTES # BLD AUTO: 0.2 K/UL (ref 0.3–1)
MONOCYTES # BLD AUTO: 0.3 K/UL (ref 0.3–1)
MONOCYTES # BLD AUTO: 0.3 K/UL (ref 0.3–1)
MONOCYTES NFR BLD: 4.7 % (ref 4–15)
MONOCYTES NFR BLD: 4.8 % (ref 4–15)
MONOCYTES NFR BLD: 5.2 % (ref 4–15)
NEUTROPHILS # BLD AUTO: 3.5 K/UL (ref 1.8–7.7)
NEUTROPHILS # BLD AUTO: 3.8 K/UL (ref 1.8–7.7)
NEUTROPHILS # BLD AUTO: 4.5 K/UL (ref 1.8–7.7)
NEUTROPHILS NFR BLD: 69.3 % (ref 38–73)
NEUTROPHILS NFR BLD: 74.7 % (ref 38–73)
NEUTROPHILS NFR BLD: 76.1 % (ref 38–73)
NITRITE UR QL STRIP: NEGATIVE
NRBC BLD-RTO: 0 /100 WBC
PCO2 BLDA: 48.4 MMHG (ref 35–45)
PCO2 BLDA: 49 MMHG (ref 35–45)
PH SMN: 7.26 [PH] (ref 7.35–7.45)
PH SMN: 7.33 [PH] (ref 7.35–7.45)
PH UR STRIP: 5 [PH] (ref 5–8)
PHOSPHATE SERPL-MCNC: 2.7 MG/DL (ref 2.7–4.5)
PLATELET # BLD AUTO: 157 K/UL (ref 150–350)
PLATELET # BLD AUTO: 176 K/UL (ref 150–350)
PLATELET # BLD AUTO: 190 K/UL (ref 150–350)
PMV BLD AUTO: 12.1 FL (ref 9.2–12.9)
PMV BLD AUTO: 13 FL (ref 9.2–12.9)
PMV BLD AUTO: 13.3 FL (ref 9.2–12.9)
PO2 BLDA: 29 MMHG (ref 40–60)
PO2 BLDA: 31 MMHG (ref 40–60)
POC BE: -5 MMOL/L
POC BE: 0 MMOL/L
POC SATURATED O2: 49 % (ref 95–100)
POC SATURATED O2: 51 % (ref 95–100)
POC TCO2: 24 MMOL/L (ref 24–29)
POC TCO2: 27 MMOL/L (ref 24–29)
POCT GLUCOSE: 261 MG/DL (ref 70–110)
POCT GLUCOSE: 270 MG/DL (ref 70–110)
POTASSIUM SERPL-SCNC: 4.6 MMOL/L (ref 3.5–5.1)
PROCALCITONIN SERPL IA-MCNC: 1.39 NG/ML
PROT SERPL-MCNC: 5.9 G/DL (ref 6–8.4)
PROT UR QL STRIP: NEGATIVE
PROTHROMBIN TIME: 11.7 SEC (ref 9–12.5)
PROTHROMBIN TIME: 12.4 SEC (ref 9–12.5)
RBC # BLD AUTO: 3.68 M/UL (ref 4–5.4)
RBC # BLD AUTO: 3.7 M/UL (ref 4–5.4)
RBC # BLD AUTO: 3.79 M/UL (ref 4–5.4)
RBC #/AREA URNS AUTO: 1 /HPF (ref 0–4)
SAMPLE: ABNORMAL
SAMPLE: ABNORMAL
SARS-COV-2 RDRP RESP QL NAA+PROBE: NEGATIVE
SATURATED IRON: 13 % (ref 20–50)
SITE: ABNORMAL
SITE: ABNORMAL
SODIUM SERPL-SCNC: 140 MMOL/L (ref 136–145)
SODIUM UR-SCNC: 121 MMOL/L (ref 20–250)
SP GR UR STRIP: 1.01 (ref 1–1.03)
SP02: 96
SQUAMOUS #/AREA URNS AUTO: 0 /HPF
TOTAL IRON BINDING CAPACITY: 161 UG/DL (ref 250–450)
TRANSFERRIN SERPL-MCNC: 109 MG/DL (ref 200–375)
TROPONIN I SERPL DL<=0.01 NG/ML-MCNC: 0.06 NG/ML (ref 0–0.03)
TROPONIN I SERPL DL<=0.01 NG/ML-MCNC: 0.06 NG/ML (ref 0–0.03)
TROPONIN I SERPL DL<=0.01 NG/ML-MCNC: 0.07 NG/ML (ref 0–0.03)
TROPONIN I SERPL DL<=0.01 NG/ML-MCNC: 0.07 NG/ML (ref 0–0.03)
TROPONIN I SERPL DL<=0.01 NG/ML-MCNC: 0.09 NG/ML (ref 0–0.03)
URN SPEC COLLECT METH UR: ABNORMAL
UUN UR-MCNC: 278 MG/DL (ref 140–1050)
WBC # BLD AUTO: 4.63 K/UL (ref 3.9–12.7)
WBC # BLD AUTO: 5.02 K/UL (ref 3.9–12.7)
WBC # BLD AUTO: 6.44 K/UL (ref 3.9–12.7)
WBC #/AREA URNS AUTO: 3 /HPF (ref 0–5)

## 2020-06-21 PROCEDURE — 99291 PR CRITICAL CARE, E/M 30-74 MINUTES: ICD-10-PCS | Mod: ,,, | Performed by: EMERGENCY MEDICINE

## 2020-06-21 PROCEDURE — 25000242 PHARM REV CODE 250 ALT 637 W/ HCPCS: Performed by: STUDENT IN AN ORGANIZED HEALTH CARE EDUCATION/TRAINING PROGRAM

## 2020-06-21 PROCEDURE — 84484 ASSAY OF TROPONIN QUANT: CPT

## 2020-06-21 PROCEDURE — 63600175 PHARM REV CODE 636 W HCPCS: Performed by: EMERGENCY MEDICINE

## 2020-06-21 PROCEDURE — 93005 ELECTROCARDIOGRAM TRACING: CPT

## 2020-06-21 PROCEDURE — 25000003 PHARM REV CODE 250: Performed by: EMERGENCY MEDICINE

## 2020-06-21 PROCEDURE — 99223 1ST HOSP IP/OBS HIGH 75: CPT | Mod: GC,,, | Performed by: INTERNAL MEDICINE

## 2020-06-21 PROCEDURE — 81001 URINALYSIS AUTO W/SCOPE: CPT

## 2020-06-21 PROCEDURE — 27000221 HC OXYGEN, UP TO 24 HOURS

## 2020-06-21 PROCEDURE — 82570 ASSAY OF URINE CREATININE: CPT

## 2020-06-21 PROCEDURE — 84145 PROCALCITONIN (PCT): CPT

## 2020-06-21 PROCEDURE — 83605 ASSAY OF LACTIC ACID: CPT | Mod: 91

## 2020-06-21 PROCEDURE — 83036 HEMOGLOBIN GLYCOSYLATED A1C: CPT

## 2020-06-21 PROCEDURE — 84484 ASSAY OF TROPONIN QUANT: CPT | Mod: 91

## 2020-06-21 PROCEDURE — 82010 KETONE BODYS QUAN: CPT

## 2020-06-21 PROCEDURE — 94799 UNLISTED PULMONARY SVC/PX: CPT

## 2020-06-21 PROCEDURE — 63600175 PHARM REV CODE 636 W HCPCS: Performed by: STUDENT IN AN ORGANIZED HEALTH CARE EDUCATION/TRAINING PROGRAM

## 2020-06-21 PROCEDURE — 83540 ASSAY OF IRON: CPT

## 2020-06-21 PROCEDURE — 83880 ASSAY OF NATRIURETIC PEPTIDE: CPT | Mod: 91

## 2020-06-21 PROCEDURE — 25000003 PHARM REV CODE 250: Performed by: STUDENT IN AN ORGANIZED HEALTH CARE EDUCATION/TRAINING PROGRAM

## 2020-06-21 PROCEDURE — 99291 CRITICAL CARE FIRST HOUR: CPT | Mod: ,,, | Performed by: EMERGENCY MEDICINE

## 2020-06-21 PROCEDURE — 96365 THER/PROPH/DIAG IV INF INIT: CPT

## 2020-06-21 PROCEDURE — 99223 PR INITIAL HOSPITAL CARE,LEVL III: ICD-10-PCS | Mod: GC,,, | Performed by: INTERNAL MEDICINE

## 2020-06-21 PROCEDURE — 85730 THROMBOPLASTIN TIME PARTIAL: CPT

## 2020-06-21 PROCEDURE — C9399 UNCLASSIFIED DRUGS OR BIOLOG: HCPCS | Performed by: STUDENT IN AN ORGANIZED HEALTH CARE EDUCATION/TRAINING PROGRAM

## 2020-06-21 PROCEDURE — 94761 N-INVAS EAR/PLS OXIMETRY MLT: CPT

## 2020-06-21 PROCEDURE — 94640 AIRWAY INHALATION TREATMENT: CPT

## 2020-06-21 PROCEDURE — 87040 BLOOD CULTURE FOR BACTERIA: CPT | Mod: 59

## 2020-06-21 PROCEDURE — 80053 COMPREHEN METABOLIC PANEL: CPT

## 2020-06-21 PROCEDURE — 99900035 HC TECH TIME PER 15 MIN (STAT)

## 2020-06-21 PROCEDURE — 82803 BLOOD GASES ANY COMBINATION: CPT

## 2020-06-21 PROCEDURE — 20000000 HC ICU ROOM

## 2020-06-21 PROCEDURE — 83880 ASSAY OF NATRIURETIC PEPTIDE: CPT

## 2020-06-21 PROCEDURE — 83690 ASSAY OF LIPASE: CPT

## 2020-06-21 PROCEDURE — 84300 ASSAY OF URINE SODIUM: CPT

## 2020-06-21 PROCEDURE — 84100 ASSAY OF PHOSPHORUS: CPT

## 2020-06-21 PROCEDURE — 85730 THROMBOPLASTIN TIME PARTIAL: CPT | Mod: 91

## 2020-06-21 PROCEDURE — 85610 PROTHROMBIN TIME: CPT | Mod: 91

## 2020-06-21 PROCEDURE — 82150 ASSAY OF AMYLASE: CPT

## 2020-06-21 PROCEDURE — 51702 INSERT TEMP BLADDER CATH: CPT

## 2020-06-21 PROCEDURE — 86850 RBC ANTIBODY SCREEN: CPT

## 2020-06-21 PROCEDURE — 84540 ASSAY OF URINE/UREA-N: CPT

## 2020-06-21 PROCEDURE — 93010 EKG 12-LEAD: ICD-10-PCS | Mod: ,,, | Performed by: INTERNAL MEDICINE

## 2020-06-21 PROCEDURE — U0002 COVID-19 LAB TEST NON-CDC: HCPCS

## 2020-06-21 PROCEDURE — 83735 ASSAY OF MAGNESIUM: CPT

## 2020-06-21 PROCEDURE — 96361 HYDRATE IV INFUSION ADD-ON: CPT

## 2020-06-21 PROCEDURE — 99291 CRITICAL CARE FIRST HOUR: CPT | Mod: 25

## 2020-06-21 PROCEDURE — 93010 ELECTROCARDIOGRAM REPORT: CPT | Mod: ,,, | Performed by: INTERNAL MEDICINE

## 2020-06-21 PROCEDURE — 85025 COMPLETE CBC W/AUTO DIFF WBC: CPT | Mod: 91

## 2020-06-21 RX ORDER — ASPIRIN 81 MG/1
81 TABLET ORAL DAILY
Status: DISCONTINUED | OUTPATIENT
Start: 2020-06-22 | End: 2020-06-29 | Stop reason: HOSPADM

## 2020-06-21 RX ORDER — PROMETHAZINE HYDROCHLORIDE 25 MG/1
25 TABLET ORAL EVERY 6 HOURS PRN
Status: DISCONTINUED | OUTPATIENT
Start: 2020-06-21 | End: 2020-06-29 | Stop reason: HOSPADM

## 2020-06-21 RX ORDER — NOREPINEPHRINE BITARTRATE/D5W 4MG/250ML
0.05 PLASTIC BAG, INJECTION (ML) INTRAVENOUS CONTINUOUS
Status: DISCONTINUED | OUTPATIENT
Start: 2020-06-21 | End: 2020-06-24

## 2020-06-21 RX ORDER — FAMOTIDINE 10 MG/ML
20 INJECTION INTRAVENOUS DAILY
Status: DISCONTINUED | OUTPATIENT
Start: 2020-06-22 | End: 2020-06-23

## 2020-06-21 RX ORDER — GLUCAGON 1 MG
1 KIT INJECTION
Status: DISCONTINUED | OUTPATIENT
Start: 2020-06-21 | End: 2020-06-29 | Stop reason: HOSPADM

## 2020-06-21 RX ORDER — FOLIC ACID 1 MG/1
1 TABLET ORAL DAILY
Status: DISCONTINUED | OUTPATIENT
Start: 2020-06-22 | End: 2020-06-29 | Stop reason: HOSPADM

## 2020-06-21 RX ORDER — HEPARIN SODIUM,PORCINE/D5W 25000/250
18 INTRAVENOUS SOLUTION INTRAVENOUS CONTINUOUS
Status: DISCONTINUED | OUTPATIENT
Start: 2020-06-21 | End: 2020-06-21

## 2020-06-21 RX ORDER — IPRATROPIUM BROMIDE AND ALBUTEROL SULFATE 2.5; .5 MG/3ML; MG/3ML
3 SOLUTION RESPIRATORY (INHALATION) EVERY 4 HOURS PRN
Status: DISCONTINUED | OUTPATIENT
Start: 2020-06-21 | End: 2020-06-29 | Stop reason: HOSPADM

## 2020-06-21 RX ORDER — ACETAMINOPHEN 325 MG/1
650 TABLET ORAL EVERY 8 HOURS PRN
Status: DISCONTINUED | OUTPATIENT
Start: 2020-06-21 | End: 2020-06-29 | Stop reason: HOSPADM

## 2020-06-21 RX ORDER — IPRATROPIUM BROMIDE AND ALBUTEROL SULFATE 2.5; .5 MG/3ML; MG/3ML
3 SOLUTION RESPIRATORY (INHALATION) EVERY 4 HOURS
Status: DISCONTINUED | OUTPATIENT
Start: 2020-06-21 | End: 2020-06-27

## 2020-06-21 RX ORDER — INSULIN ASPART 100 [IU]/ML
0-5 INJECTION, SOLUTION INTRAVENOUS; SUBCUTANEOUS EVERY 6 HOURS PRN
Status: DISCONTINUED | OUTPATIENT
Start: 2020-06-21 | End: 2020-06-29 | Stop reason: HOSPADM

## 2020-06-21 RX ORDER — ONDANSETRON 2 MG/ML
4 INJECTION INTRAMUSCULAR; INTRAVENOUS EVERY 8 HOURS PRN
Status: DISCONTINUED | OUTPATIENT
Start: 2020-06-21 | End: 2020-06-29 | Stop reason: HOSPADM

## 2020-06-21 RX ORDER — HEPARIN SODIUM,PORCINE/D5W 25000/250
18 INTRAVENOUS SOLUTION INTRAVENOUS CONTINUOUS
Status: DISCONTINUED | OUTPATIENT
Start: 2020-06-21 | End: 2020-06-23

## 2020-06-21 RX ORDER — FLUTICASONE PROPIONATE 50 MCG
2 SPRAY, SUSPENSION (ML) NASAL DAILY
Status: DISCONTINUED | OUTPATIENT
Start: 2020-06-22 | End: 2020-06-29 | Stop reason: HOSPADM

## 2020-06-21 RX ORDER — SODIUM CHLORIDE 0.9 % (FLUSH) 0.9 %
10 SYRINGE (ML) INJECTION
Status: DISCONTINUED | OUTPATIENT
Start: 2020-06-21 | End: 2020-06-29 | Stop reason: HOSPADM

## 2020-06-21 RX ADMIN — SODIUM CHLORIDE 2880 ML: 0.9 INJECTION, SOLUTION INTRAVENOUS at 01:06

## 2020-06-21 RX ADMIN — VANCOMYCIN HYDROCHLORIDE 2000 MG: 100 INJECTION, POWDER, LYOPHILIZED, FOR SOLUTION INTRAVENOUS at 02:06

## 2020-06-21 RX ADMIN — IPRATROPIUM BROMIDE AND ALBUTEROL SULFATE 3 ML: .5; 3 SOLUTION RESPIRATORY (INHALATION) at 07:06

## 2020-06-21 RX ADMIN — HEPARIN SODIUM AND DEXTROSE 18 UNITS/KG/HR: 10000; 5 INJECTION INTRAVENOUS at 06:06

## 2020-06-21 RX ADMIN — PIPERACILLIN AND TAZOBACTAM 4.5 G: 4; .5 INJECTION, POWDER, FOR SOLUTION INTRAVENOUS at 08:06

## 2020-06-21 RX ADMIN — Medication 0.05 MCG/KG/MIN: at 05:06

## 2020-06-21 RX ADMIN — INSULIN DETEMIR 10 UNITS: 100 INJECTION, SOLUTION SUBCUTANEOUS at 09:06

## 2020-06-21 RX ADMIN — PIPERACILLIN AND TAZOBACTAM 4.5 G: 4; .5 INJECTION, POWDER, LYOPHILIZED, FOR SOLUTION INTRAVENOUS; PARENTERAL at 01:06

## 2020-06-21 NOTE — ED TRIAGE NOTES
Patient comes into ER with complaints of weakness, nausea, and diarrhea for two days. Patient denies vommiting.

## 2020-06-21 NOTE — ED NOTES
Patient looking to ID, not noted in patient room, registration states gave ID back to Ramin, Ramin called and will look into and call back.

## 2020-06-21 NOTE — ASSESSMENT & PLAN NOTE
Stage IV NSCLC with bone mets, recently opted for new po chemotherapy entrectinib with Dr. Belcher 4/23. Patient was given the option of stopping chemo treatments but opted to start new chemo.  Patient expresses full code status.    PLAN    - Consult Hem Onc in AM  - Consider palliative care consult if patient status deteriorates

## 2020-06-21 NOTE — ED NOTES
Hourly rounding complete. Patient resting in stretcher and is in NAD at this time. Pt is awake alert and oriented x4. Pt denies pain at this time. Pt updated on POC. Bed low and locked, SR up x2, call bell in patient reach. Pt remains on continuous cardiac monitor, continuous pulse ox, and auto BP cuff. Pt voices no needs at this time. Pure wic remains in place.

## 2020-06-21 NOTE — H&P
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 She was alert and oriented x4, conversational on initial presentation with SBP in 60s. She has started to require a rollator in setting of progressive weakness in the past few months. Patient was then noted to be weaker this morning and then had to use the bathroom. Family was helping her make it to the bathroom when she lost control of her bowels and bladder and then became even more weak. EMS called and patient brought to ED for eval.   Patient denies chest pain, shortness of breath, headaches, changes in vision, abdominal pain, vomiting. Patient reports history of urinary tract infections. Patient denies any joint pain. No neck stiffness. Of note Dr. Belcher recently provided option to patient to choose to try new chemotherapy or to stop, with the patient opting for new chemotherapy. The patient has not tolerated the chemotherapy greatly, experiencing nausea, nonbloody vomiting at times.       Hospital/ICU Course:  No notes on file     Past Medical History:   Diagnosis Date    Allergy     Brain aneurysm 2010    s/p coiling of one; another not coiled    Breast cyst     Depression 9/19/2019    Diabetes mellitus     Diabetes type 2, controlled     Fever blister     High cholesterol     History of Bell's palsy     HTN (hypertension) 5/20/2014    Recurrent upper respiratory infection (URI)   "      Past Surgical History:   Procedure Laterality Date    BREAST SURGERY      BRONCHOSCOPY N/A 2019    Procedure: Bronchoscopy;  Surgeon: Iesha Diagnostic Provider;  Location: [facility] OR 2ND FLR;  Service: Anesthesiology;  Laterality: N/A;    CERVICAL FUSION      COLONOSCOPY N/A 3/9/2016    Procedure: COLONOSCOPY;  Surgeon: Elliott Zimmerman MD;  Location: [facility] ENDO (4TH FLR);  Service: Endoscopy;  Laterality: N/A;    head surgery      stent and "curling" for aneurysm    HYSTERECTOMY      TVH secondary to SUF    INSERTION OF TUNNELED CENTRAL VENOUS CATHETER (CVC) WITH SUBCUTANEOUS PORT Right 2019    Procedure: INSERTION, PORT-A-CATH;  Surgeon: Cali Damico MD;  Location: [facility] CATH LAB;  Service: Radiology;  Laterality: Right;    MENISCECTOMY Left        Review of patient's allergies indicates:  No Known Allergies    Family History     Problem Relation (Age of Onset)    Allergies Daughter    Breast cancer Maternal Aunt    Cancer Mother (63), Maternal Aunt    Cataracts Father    Diabetes Father, Sister, Brother    Heart failure Father    Migraines Father    Ovarian cancer Cousin    Rheum arthritis Father    Stomach cancer Mother        Tobacco Use    Smoking status: Former Smoker     Packs/day: 0.50     Years: 30.00     Pack years: 15.00     Quit date: 1999     Years since quittin.4    Smokeless tobacco: Never Used   Substance and Sexual Activity    Alcohol use: No     Alcohol/week: 0.0 standard drinks    Drug use: No    Sexual activity: Never     Partners: Female     Birth control/protection: Surgical      Review of Systems   Constitutional: Positive for fatigue and fever.   HENT: Negative for sore throat and trouble swallowing.    Eyes: Negative for photophobia and visual disturbance.   Respiratory: Positive for cough (chronic intermittent). Negative for choking, chest tightness and shortness of breath.    Cardiovascular: Negative for chest pain, palpitations and leg swelling. "   Gastrointestinal: Positive for nausea (chronic). Negative for abdominal distention, abdominal pain, constipation, diarrhea and vomiting.   Genitourinary: Negative for difficulty urinating and flank pain.   Musculoskeletal: Negative for arthralgias and back pain.   Skin: Negative for rash and wound.   Neurological: Positive for dizziness, weakness and light-headedness. Negative for facial asymmetry, numbness and headaches.   Psychiatric/Behavioral: Negative for behavioral problems and confusion.     Objective:     Vital Signs (Most Recent):  Temp: (!) 100.4 °F (38 °C) (06/21/20 1211)  Pulse: 82 (06/21/20 1637)  Resp: 20 (06/21/20 1545)  BP: (!) 84/53 (06/21/20 1637)  SpO2: 97 % (06/21/20 1637) Vital Signs (24h Range):  Temp:  [100.4 °F (38 °C)] 100.4 °F (38 °C)  Pulse:  [80-90] 82  Resp:  [18-20] 20  SpO2:  [96 %-100 %] 97 %  BP: ()/(40-65) 84/53   Weight: 96 kg (211 lb 10.3 oz)  Body mass index is 30.81 kg/m².      Intake/Output Summary (Last 24 hours) at 6/21/2020 1703  Last data filed at 6/21/2020 1352  Gross per 24 hour   Intake 100 ml   Output --   Net 100 ml       Physical Exam  HENT:      Head: Normocephalic and atraumatic.      Right Ear: External ear normal.      Left Ear: External ear normal.      Nose: Nose normal. No congestion.      Mouth/Throat:      Mouth: Mucous membranes are moist.   Eyes:      Conjunctiva/sclera: Conjunctivae normal.      Pupils: Pupils are equal, round, and reactive to light.   Neck:      Musculoskeletal: Normal range of motion and neck supple.   Cardiovascular:      Rate and Rhythm: Normal rate and regular rhythm.      Heart sounds: No murmur.   Pulmonary:      Effort: Pulmonary effort is normal.      Breath sounds: No rales.      Comments: NC in place  Abdominal:      General: Abdomen is flat. Bowel sounds are normal.      Palpations: Abdomen is soft.   Musculoskeletal: Normal range of motion.         General: No swelling or tenderness.   Skin:     Findings: No erythema.       Comments: Cool extremities   Neurological:      General: No focal deficit present.      Mental Status: She is alert and oriented to person, place, and time.   Psychiatric:         Mood and Affect: Mood normal.         Behavior: Behavior normal.         Vents:     Lines/Drains/Airways     Central Venous Catheter Line                 PowerPort A Cath Single Lumen 07/08/19 1327 right internal jugular 349 days          Drain            Female External Urinary Catheter 06/21/20 1449 less than 1 day          Peripheral Intravenous Line                 Peripheral IV - Single Lumen 06/21/20 1602 22 G Left Wrist less than 1 day         Peripheral IV - Single Lumen 06/21/20 18 G Left Antecubital less than 1 day              Significant Labs:    CBC/Anemia Profile:  Recent Labs   Lab 06/21/20  1244   WBC 5.02   HGB 10.5*   HCT 34.6*      MCV 91   RDW 15.5*        Chemistries:  Recent Labs   Lab 06/21/20  1244      K 4.6      CO2 22*   BUN 26*   CREATININE 2.4*   CALCIUM 7.9*   ALBUMIN 3.2*   PROT 5.9*   BILITOT 0.7   ALKPHOS 69   ALT 70*   *   MG 1.8   PHOS 2.7       A1C:   Recent Labs   Lab 06/21/20  1708   HGBA1C 9.2*     ABGs:   Recent Labs   Lab 06/21/20  1720   PH 7.262*   PCO2 49.0*   HCO3 22.1*   POCSATURATED 49*   BE -5     Blood Culture: No results for input(s): LABBLOO in the last 48 hours.  BMP:   Recent Labs   Lab 06/21/20  1244   *      K 4.6      CO2 22*   BUN 26*   CREATININE 2.4*   CALCIUM 7.9*   MG 1.8     CMP:   Recent Labs   Lab 06/21/20  1244      K 4.6      CO2 22*   *   BUN 26*   CREATININE 2.4*   CALCIUM 7.9*   PROT 5.9*   ALBUMIN 3.2*   BILITOT 0.7   ALKPHOS 69   *   ALT 70*   ANIONGAP 10   EGFRNONAA 21.0*     Cardiac Markers: No results for input(s): CKMB, TROPONINT, MYOGLOBIN in the last 48 hours.  Coagulation: No results for input(s): PT, INR, APTT in the last 48 hours.  Lactic Acid:   Recent Labs   Lab 06/21/20  1244  06/21/20  1638 06/21/20  1708   LACTATE 3.0* 1.9 2.0     Lipid Panel: No results for input(s): CHOL, HDL, LDLCALC, TRIG, CHOLHDL in the last 48 hours.  POCT Glucose:   Recent Labs   Lab 06/21/20  1719   POCTGLUCOSE 270*     Prealbumin: No results for input(s): PREALBUMIN in the last 48 hours.  Respiratory Culture: No results for input(s): GSRESP, RESPIRATORYC in the last 48 hours.  Troponin:   Recent Labs   Lab 06/21/20  1244 06/21/20  1638   TROPONINI 0.062* 0.093*     Urine Culture: No results for input(s): LABURIN in the last 48 hours.  Urine Studies: No results for input(s): COLORU, APPEARANCEUA, PHUR, SPECGRAV, PROTEINUA, GLUCUA, KETONESU, BILIRUBINUA, OCCULTUA, NITRITE, UROBILINOGEN, LEUKOCYTESUR, RBCUA, WBCUA, BACTERIA, SQUAMEPITHEL, HYALINECASTS in the last 48 hours.    Invalid input(s): WRIGHTSUR    Significant Imaging: I have reviewed all pertinent imaging results/findings within the past 24 hours.    Assessment/Plan:     Neuro  Cerebral aneurysm, coiled in 2010  -continue ASA    Cardiac/Vascular  Elevated troponin  0.062 --> 0.093  - cont trend until peak  - patient without chest pain  - likely due to septic shock  - TTE ordered    Hypertension associated with diabetes  -hold olmesartan in setting of shock    Renal/  ROSEMARY (acute kidney injury)  - likely second to septic shock and decreased po intake  - 2.8L fluid given  - strict I/O  - avoid nephrotoxic meds  - renal US if renal function worsening  - BMP monitoring daily  - UA  - Urine Na, Cr, Urea ordered    ID  * Septic shock  The patient is a 64 year old female with stage IV metastatic NSCLC, HTN, DVT/PE, intermittent 2L NC at home, presents for worsening weakness, fevers over past 24 hours. SBP in ED in 60s. Patient reported had bowel movement and urinated on self in bathroom and extremely weak this morning and family called EMS. Denies acute changes in cough, SOB, abdominal pain, wounds, bowel or urinary habits. Lactate 3.0 and Procal 1.39 on  presentation, no urine collected, CXR with CARLOS opacity possibly obstructive pneumonia versus cancer. Etiology may be lower respiratory infection versus urinary source. Status post 2.8L fluids given in ED, MAPs in 60s, broad antibiotics started and levo ordered.      - daily CBC  - Vanc, Zosyn  - UA  - BCx, UCx  - RIP ordered  - levophed  - MAP goal >65  - Stool cultures, ova, C diff    Hematology  Pulmonary embolism  PE in 5/2020  -continue home lovenox    Oncology  Malignant neoplasm of upper lobe of left lung  Stage IV NSCLC with bone mets, recently opted for new po chemotherapy entrectinib with Dr. Belcher 4/23. Patient was given the option of stopping chemo treatments but opted to start new chemo.  Patient expresses full code status.    PLAN    - Consult Hem Onc in AM  - Consider palliative care consult if patient status deteriorates    Endocrine  Type 2 diabetes mellitus with hyperglycemia, with long-term current use of insulin  Patient reports Short acting insulin requirement TIDWM 20-16-16u. Reports long acting 20u levemir at night. Patient has had decreased appetite in past few days.    - Currently NPO  - 10u levemir nightly  - LDSSI  - titrate as needed          Critical Care Daily Checklist:    A: Awake: RASS Goal/Actual Goal:    Actual:     B: Spontaneous Breathing Trial Performed?     C: SAT & SBT Coordinated?  na                      D: Delirium: CAM-ICU     E: Early Mobility Performed? Yes   F: Feeding Goal:    Status:     Current Diet Order   Procedures    Diet NPO Except for: Medication, Sips with Medication     Order Specific Question:   Except for     Answer:   Medication     Order Specific Question:   Except for     Answer:   Sips with Medication      AS: Analgesia/Sedation na   T: Thromboembolic Prophylaxis lovenox   H: HOB > 300 Yes   U: Stress Ulcer Prophylaxis (if needed) na   G: Glucose Control levemir/SSI   B: Bowel Function     I: Indwelling Catheter (Lines & Amanda) Necessity    D:  De-escalation of Antimicrobials/Pharmacotherapies vanc/zosyn    Plan for the day/ETD     Code Status:  Family/Goals of Care: Full Code         Critical secondary to septic shock     Critical care was time spent personally by me on the following activities: development of treatment plan with patient or surrogate and bedside caregivers, discussions with consultants, evaluation of patient's response to treatment, examination of patient, ordering and performing treatments and interventions, ordering and review of laboratory studies, ordering and review of radiographic studies, pulse oximetry, re-evaluation of patient's condition. This critical care time did not overlap with that of any other provider or involve time for any procedures.     René Armstrong, DO  Critical Care Medicine  [facility]

## 2020-06-21 NOTE — ED NOTES
Spoke to umer with [facility], states has patient's ID and would like to bring to patient's house, patient okay with that, EMS provided patient's daughter's number with patient permission for drop off.

## 2020-06-21 NOTE — PROGRESS NOTES
Pharmacokinetic Initial Assessment: IV Vancomycin    Assessment/Plan:    Initiate intravenous vancomycin with loading dose of 2000 mg once followed by a maintenance dose of vancomycin 750mg IV every 24 hours  Desired empiric serum trough concentration is 10 to 20 mcg/mL  Draw vancomycin trough level 30 min prior to third dose on 6/23 at approximately 1130  Pharmacy will continue to follow and monitor vancomycin.      Please contact pharmacy at extension [phone] with any questions regarding this assessment.     Thank you for the consult,   Kimi Holloway       Patient brief summary:  Alison Branch is a 64 y.o. female initiated on antimicrobial therapy with IV Vancomycin for treatment of suspected bacteremia/fever    Drug Allergies:   Review of patient's allergies indicates:  No Known Allergies    Actual Body Weight:   96kg    Renal Function:   Estimated Creatinine Clearance: 30.2 mL/min (A) (based on SCr of 2.4 mg/dL (H)).,       CBC (last 72 hours):  Recent Labs   Lab Result Units 06/21/20  1244   WBC K/uL 5.02   Hemoglobin g/dL 10.5*   Hematocrit % 34.6*   Platelets K/uL 176   Gran% % 74.7*   Lymph% % 18.1   Mono% % 5.2   Eosinophil% % 0.4   Basophil% % 0.6   Differential Method  Automated       Metabolic Panel (last 72 hours):  Recent Labs   Lab Result Units 06/21/20  1244   Sodium mmol/L 140   Potassium mmol/L 4.6   Chloride mmol/L 108   CO2 mmol/L 22*   Glucose mg/dL 211*   BUN, Bld mg/dL 26*   Creatinine mg/dL 2.4*   Albumin g/dL 3.2*   Total Bilirubin mg/dL 0.7   Alkaline Phosphatase U/L 69   AST U/L 104*   ALT U/L 70*   Magnesium mg/dL 1.8   Phosphorus mg/dL 2.7       Drug levels (last 3 results):  No results for input(s): VANCOMYCINRA, VANCOMYCINPE, VANCOMYCINTR in the last 72 hours.    Microbiologic Results:  Microbiology Results (last 7 days)     Procedure Component Value Units Date/Time    Clostridium difficile EIA [[id number]]     Order Status: No result Specimen: Stool     Stool culture [[id number]]      Order Status: No result Specimen: Stool     Respiratory Infection Panel (PCR), Nasopharyngeal [[id number]]     Order Status: No result Specimen: Nasopharyngeal Swab     Culture, Respiratory with Gram Stain [[id number]]     Order Status: No result Specimen: Respiratory     Blood culture x two cultures. Draw prior to antibiotics. [[id number]] Collected: 06/21/20 1252    Order Status: Sent Specimen: Blood from Peripheral, Antecubital, Right Updated: 06/21/20 1321    Blood culture x two cultures. Draw prior to antibiotics. [[id number]] Collected: 06/21/20 1244    Order Status: Sent Specimen: Blood from Peripheral, Antecubital, Left Updated: 06/21/20 1321

## 2020-06-21 NOTE — ASSESSMENT & PLAN NOTE
0.062 --> 0.093  - cont trend until peak  - patient without chest pain  - likely due to septic shock  - TTE ordered

## 2020-06-21 NOTE — ED PROVIDER NOTES
Encounter Date: 6/21/2020       History     Chief Complaint   Patient presents with    Weakness     cancer pt w/ increasing fatigue and n/v. Fell off toilet  , unable to get up    Nausea    Vomiting     1:07 PM  Patient is a 64F with PMH of stage IV NSCLC adenocarcinoma with bone mets, on systemic therapy with daily PO Entrectinib since 6/6/2020, patient of Dr Belcher, DM2, HTN, and prior DVT/PE (takes therapeutic dose lovenox at home) who presents to [facility] with hypotension in the setting of recent fevers and weakness for the past 24h.    Patient is alert and oriented x4 and answering questions on arrival. Patient hypotensive to systolic 60s on arrival. Daughter assisting with providing history. States that patient has been running fevers since yesterday that have not improved with tylenol. Patient was then noted to be weak this AM and then had to use the bathroom. Family was helping her make it to the bathroom when she lost control of her bowels and bladder and then became even more weak. EMS called and patient brought to ED for eval.    Patient denies chest pain, shortness of breath, headaches, changes in vision, abdominal pain, vomiting. Patient reports history of urinary tract infections. Patient denies any joint pain. No neck stiffness.         Review of patient's allergies indicates:  No Known Allergies  Past Medical History:   Diagnosis Date    Allergy     Brain aneurysm 2010    s/p coiling of one; another not coiled    Breast cyst     Depression 9/19/2019    Diabetes mellitus     Diabetes type 2, controlled     Fever blister     High cholesterol     History of Bell's palsy     HTN (hypertension) 5/20/2014    Recurrent upper respiratory infection (URI)      Past Surgical History:   Procedure Laterality Date    BREAST SURGERY      BRONCHOSCOPY N/A 5/28/2019    Procedure: Bronchoscopy;  Surgeon: Dosc Diagnostic Provider;  Location: [facility] OR [address] [place] [address];  Service: Anesthesiology;  Laterality: N/A;     "CERVICAL FUSION      COLONOSCOPY N/A 3/9/2016    Procedure: COLONOSCOPY;  Surgeon: Elliott Zimmerman MD;  Location: [facility] ENDO (4TH FLR);  Service: Endoscopy;  Laterality: N/A;    head surgery      stent and "curling" for aneurysm    HYSTERECTOMY      TVH secondary to SUF    INSERTION OF TUNNELED CENTRAL VENOUS CATHETER (CVC) WITH SUBCUTANEOUS PORT Right 2019    Procedure: INSERTION, PORT-A-CATH;  Surgeon: Cali Damico MD;  Location: [facility] CATH LAB;  Service: Radiology;  Laterality: Right;    MENISCECTOMY Left      Family History   Problem Relation Age of Onset    Rheum arthritis Father     Diabetes Father     Heart failure Father     Migraines Father     Cataracts Father     Cancer Mother 63        pancreatic    Stomach cancer Mother     Breast cancer Maternal Aunt         50s    Ovarian cancer Cousin     Allergies Daughter     Diabetes Sister     Diabetes Brother     Cancer Maternal Aunt         Lung CA    Melanoma Neg Hx     Colon cancer Neg Hx     Amblyopia Neg Hx     Blindness Neg Hx     Glaucoma Neg Hx     Macular degeneration Neg Hx     Retinal detachment Neg Hx     Strabismus Neg Hx     Stroke Neg Hx     Thyroid disease Neg Hx     Allergic rhinitis Neg Hx     Angioedema Neg Hx     Asthma Neg Hx     Atopy Neg Hx     Eczema Neg Hx     Immunodeficiency Neg Hx     Rhinitis Neg Hx     Urticaria Neg Hx      Social History     Tobacco Use    Smoking status: Former Smoker     Packs/day: 0.50     Years: 30.00     Pack years: 15.00     Quit date: 1999     Years since quittin.4    Smokeless tobacco: Never Used   Substance Use Topics    Alcohol use: No     Alcohol/week: 0.0 standard drinks    Drug use: No     Review of Systems   Unable to perform ROS: Acuity of condition   Constitutional: Positive for fatigue and fever.   Respiratory: Negative for chest tightness and shortness of breath.    Cardiovascular: Negative for chest pain.   Gastrointestinal: Negative for " abdominal pain.       Physical Exam     Initial Vitals [06/21/20 1211]   BP Pulse Resp Temp SpO2   (!) 101/49 90 18 (!) 100.4 °F (38 °C) 100 %      MAP       --         Physical Exam    Constitutional: She appears distressed.   Hypotensive.    HENT:   Head: Atraumatic.   Eyes: No scleral icterus.   Neck: Neck supple.   Cardiovascular: Normal rate. Exam reveals no gallop and no friction rub.    No murmur heard.  Pulmonary/Chest: No respiratory distress. She has no wheezes. She has no rales.   Abdominal: Soft. She exhibits no distension. There is no abdominal tenderness.   Bumps and rashes noted on lower abdomen from lovenox shots.   Musculoskeletal: No edema.      Comments: No tenderness to palpation or warmth of elbows, knees, UEs, LEs.    Lymphadenopathy:     She has no cervical adenopathy.   Neurological: She is alert and oriented to person, place, and time. No sensory deficit.   Skin: Skin is dry.   Psychiatric: She has a normal mood and affect.         ED Course   Critical Care    Date/Time: 6/21/2020 4:00 PM  Performed by: Sue Rawls MD  Authorized by: Sue Rawls MD   Direct patient critical care time: 15 minutes  Additional history critical care time: 10 minutes  Ordering / reviewing critical care time: 10 minutes  Documentation critical care time: 10 minutes  Consulting other physicians critical care time: 5 minutes  Consult with family critical care time: 5 minutes  Total critical care time (exclusive of procedural time) : 55 minutes  Critical care time was exclusive of separately billable procedures and treating other patients and teaching time.  Critical care was necessary to treat or prevent imminent or life-threatening deterioration of the following conditions: sepsis and shock.  Critical care was time spent personally by me on the following activities: development of treatment plan with patient or surrogate, discussions with consultants, interpretation of cardiac output measurements,  evaluation of patient's response to treatment, examination of patient, obtaining history from patient or surrogate, ordering and performing treatments and interventions, ordering and review of laboratory studies, ordering and review of radiographic studies, pulse oximetry, re-evaluation of patient's condition and review of old charts.        Labs Reviewed   CBC W/ AUTO DIFFERENTIAL - Abnormal; Notable for the following components:       Result Value    RBC 3.79 (*)     Hemoglobin 10.5 (*)     Hematocrit 34.6 (*)     Mean Corpuscular Hemoglobin Conc 30.3 (*)     RDW 15.5 (*)     MPV 13.3 (*)     Immature Granulocytes 1.0 (*)     Immature Grans (Abs) 0.05 (*)     Lymph # 0.9 (*)     Gran% 74.7 (*)     All other components within normal limits   COMPREHENSIVE METABOLIC PANEL - Abnormal; Notable for the following components:    CO2 22 (*)     Glucose 211 (*)     BUN, Bld 26 (*)     Creatinine 2.4 (*)     Calcium 7.9 (*)     Total Protein 5.9 (*)     Albumin 3.2 (*)      (*)     ALT 70 (*)     eGFR if  24.2 (*)     eGFR if non  21.0 (*)     All other components within normal limits   LACTIC ACID, PLASMA - Abnormal; Notable for the following components:    Lactate (Lactic Acid) 3.0 (*)     All other components within normal limits   TROPONIN I - Abnormal; Notable for the following components:    Troponin I 0.062 (*)     All other components within normal limits   ISTAT PROCEDURE - Abnormal; Notable for the following components:    POC PH 7.335 (*)     POC PCO2 48.4 (*)     POC PO2 29 (*)     POC SATURATED O2 51 (*)     All other components within normal limits   CULTURE, BLOOD   CULTURE, BLOOD   MAGNESIUM   PHOSPHORUS   BETA - HYDROXYBUTYRATE, SERUM   PROCALCITONIN   B-TYPE NATRIURETIC PEPTIDE   MAGNESIUM   SARS-COV-2 RNA AMPLIFICATION, QUAL    Narrative:     Is the patient symptomatic?->No   URINALYSIS, REFLEX TO URINE CULTURE   PROCALCITONIN          Imaging Results          X-Ray  Chest AP Portable (Final result)  Result time 06/21/20 13:39:23    Final result by Tiffany Heath MD (06/21/20 13:39:23)                 Impression:      Streaky left upper lobe airspace opacity may be due to atelectasis versus infection versus related to known malignancy.  Small left pleural effusion.    Recommend 6-8 week imaging follow-up to ensure resolution.      Electronically signed by: Tiffany Heath  Date:    06/21/2020  Time:    13:39             Narrative:    EXAMINATION:  XR CHEST AP PORTABLE    CLINICAL HISTORY:  Sepsis;    TECHNIQUE:  Single view of the chest was obtained.    COMPARISON:  Multiple priors, most recent CT chest 05/20/2020    FINDINGS:  Right chest wall port catheter terminates likely location of the lower SVC.    Normal cardiomediastinal contour. Streaky left upper lobe airspace opacity.  Small left pleural effusion.  No pneumothorax.                                 Medical Decision Making:   History:   Old Medical Records: I decided to obtain old medical records.  Old Records Summarized: records from clinic visits and records from previous admission(s).       <> Summary of Records: Seen by oncology Dr. Belcher 6/18:  Left upper lobe lung cancer with metastases to bone, PE (on Lovenox b.i.d.), IDDM, poor appetite, presenting with weakness and fatigue with nausea and multiple episodes of diarrhea in the past day. Takes daily PO CTX.   Initial Assessment:   Patient with history of metastatic lung cancer on systemic therapy entrectinib presenting with hypotension and weakness in the setting of fever and diarrhea this AM. History of UTIs also. Concern for sepsis is high. Suspected source UTI. C diff diarrhea (although no reported recent ABX) and pneumonia (leanne with known lung cancer) are other possibilities. Initiating septic workup with cultures and labs. Will give 30cc/kg fluids.  Differential Diagnosis:   UTI vs pneumonia vs bacteremia vs C diff  Independently Interpreted Test(s):   I have  ordered and independently interpreted EKG Reading(s) - see summary below       <> Summary of EKG Reading(s): NSR, rate 80, normal intervals, no ischemic changes  Clinical Tests:   Lab Tests: Ordered and Reviewed  Radiological Study: Ordered and Reviewed  Medical Tests: Ordered and Reviewed  Sepsis Perfusion Assessment: I attest, a sepsis perfusion exam was performed within 6 hours of Septic Shock presentation, following fluid resuscitation.  ED Management:  2:12 PM  BP in the 80s systolic, fluids still running. Labs showing ROSEMARY with creatinine 2.4 and lactic 3, consistent with likely end organ damage from sepsis. Troponin elevated, will trend. Dispo pending completion of IV fluids and BP response: critical care vs floor admission.    3:55 PM  CXR with CARLOS opacity could be from known underlying malignancy vs postobstructive pneumonia. Patient unable to urinate prior to ABX administration. BP still low with systolics in the 90s after fluid resuscitation. Starting norepinephrine. Consulting critical care medicine.       APC / Resident Notes:   4:00 PM  At end of shift, patient case signed out to attending Dr Rawls. Plan to consult critical care medicine for admission for septic shock. Patient being started on pressors; repeat lactic pending.          Attending Attestation:   Physician Attestation Statement for Resident:  As the supervising MD   Physician Attestation Statement: I have personally seen and examined this patient.   I agree with the above history. -:   As the supervising MD I agree with the above PE.    As the supervising MD I agree with the above treatment, course, plan, and disposition.  I have reviewed and agree with the residents interpretation of the following: lab data and x-rays.                    ED Course as of Jun 21 1531   Sun Jun 21, 2020   1423 Lactate, Bud(!): 3.0 [JR]   1423 Likely demand   Troponin I(!): 0.062 [JR]   1423 Baseline anemia   CBC auto differential(!) [JR]   1424 New ROSEMARY    Creatinine(!): 2.4 [JR]   1424 AST(!): 104 [JR]   1424 New mild LFT elevations   ALT(!): 70 [JR]   1424 8.5 when corrected for albumin   Calcium(!): 7.9 [JR]   1425 Possible post-obstructive PNA   X-Ray Chest AP Portable [JR]      ED Course User Index  [JR] Sue Rawls MD                Clinical Impression:       ICD-10-CM ICD-9-CM   1. Septic shock  A41.9 038.9    R65.21 785.52     995.92   2. Hypotension  I95.9 458.9   3. Shock  R57.9 785.50         Disposition:   Disposition: Admitted  Condition: Critical                        Kenaa Wil Staton MD  Resident  06/21/20 1602       Sue Rawls MD  06/23/20 1032

## 2020-06-21 NOTE — ASSESSMENT & PLAN NOTE
The patient is a 64 year old female with stage IV metastatic NSCLC, HTN, DVT/PE, intermittent 2L NC at home, presents for worsening weakness, fevers over past 24 hours. SBP in ED in 60s. Patient reported had bowel movement and urinated on self in bathroom and extremely weak this morning and family called EMS. Denies acute changes in cough, SOB, abdominal pain, wounds, bowel or urinary habits. Lactate 3.0 and Procal 1.39 on presentation, no urine collected, CXR with CARLOS opacity possibly obstructive pneumonia versus cancer. Etiology may be lower respiratory infection versus urinary source. Status post 2.8L fluids given in ED, MAPs in 60s, broad antibiotics started and levo ordered.      - daily CBC  - Vanc, Zosyn  - UA  - BCx, UCx  - RIP ordered  - levophed  - MAP goal >65  - Stool cultures, ova, C diff

## 2020-06-21 NOTE — ASSESSMENT & PLAN NOTE
Patient reports Short acting insulin requirement TIDWM 20-16-16u. Reports long acting 20u levemir at night. Patient has had decreased appetite in past few days.    - Currently NPO  - 10u levemir nightly  - LDSSI  - titrate as needed

## 2020-06-21 NOTE — HPI
Alison Branch is a 64 year old female with stage IV NSCLC with bone mets, recently started on systemic therapy w/ daily PO Entrectinib since 6/6/2020, patient of Dr Belcher, DM2, HTN, and prior DVT/PE on lovenox, [facility] for weakness, fevers, for past 24 hours. She was alert and oriented x4, conversational on initial presentation with SBP in 60s. She has started to require a rollator in setting of progressive weakness in the past few months. Patient was then noted to be weaker this morning and then had to use the bathroom. Family was helping her make it to the bathroom when she lost control of her bowels and bladder and then became even more weak. EMS called and patient brought to ED for eval.   Patient denies chest pain, shortness of breath, headaches, changes in vision, abdominal pain, vomiting. Patient reports history of urinary tract infections. Patient denies any joint pain. No neck stiffness. Of note Dr. Belcher recently provided option to patient to choose to try new chemotherapy or to stop, with the patient opting for new chemotherapy. The patient has not tolerated the chemotherapy greatly, experiencing nausea, nonbloody vomiting at times.

## 2020-06-22 ENCOUNTER — NURSE TRIAGE (OUTPATIENT)
Dept: ADMINISTRATIVE | Facility: CLINIC | Age: 65
End: 2020-06-22

## 2020-06-22 LAB
ALBUMIN SERPL BCP-MCNC: 2.8 G/DL (ref 3.5–5.2)
ALLENS TEST: ABNORMAL
ALP SERPL-CCNC: 59 U/L (ref 55–135)
ALT SERPL W/O P-5'-P-CCNC: 63 U/L (ref 10–44)
ANION GAP SERPL CALC-SCNC: 10 MMOL/L (ref 8–16)
ANION GAP SERPL CALC-SCNC: 10 MMOL/L (ref 8–16)
ANISOCYTOSIS BLD QL SMEAR: SLIGHT
APTT BLDCRRT: 47.2 SEC (ref 21–32)
APTT BLDCRRT: 92.3 SEC (ref 21–32)
APTT BLDCRRT: 99.5 SEC (ref 21–32)
APTT BLDCRRT: 99.5 SEC (ref 21–32)
ASCENDING AORTA: 2.62 CM
AST SERPL-CCNC: 105 U/L (ref 10–40)
AV INDEX (PROSTH): 0.91
AV MEAN GRADIENT: 5 MMHG
AV PEAK GRADIENT: 8 MMHG
AV VALVE AREA: 3.04 CM2
AV VELOCITY RATIO: 0.94
BASOPHILS # BLD AUTO: 0.02 K/UL (ref 0–0.2)
BASOPHILS NFR BLD: 0.5 % (ref 0–1.9)
BILIRUB SERPL-MCNC: 0.6 MG/DL (ref 0.1–1)
BSA FOR ECHO PROCEDURE: 2.19 M2
BUN SERPL-MCNC: 24 MG/DL (ref 8–23)
BUN SERPL-MCNC: 26 MG/DL (ref 8–23)
CA-I BLDV-SCNC: 0.97 MMOL/L (ref 1.06–1.42)
CALCIUM SERPL-MCNC: 6.7 MG/DL (ref 8.7–10.5)
CALCIUM SERPL-MCNC: 6.9 MG/DL (ref 8.7–10.5)
CHLORIDE SERPL-SCNC: 107 MMOL/L (ref 95–110)
CHLORIDE SERPL-SCNC: 109 MMOL/L (ref 95–110)
CHLORIDE UR-SCNC: 88 MMOL/L (ref 25–200)
CO2 SERPL-SCNC: 18 MMOL/L (ref 23–29)
CO2 SERPL-SCNC: 18 MMOL/L (ref 23–29)
CREAT SERPL-MCNC: 2.1 MG/DL (ref 0.5–1.4)
CREAT SERPL-MCNC: 2.3 MG/DL (ref 0.5–1.4)
CV ECHO LV RWT: 0.34 CM
DELSYS: ABNORMAL
DIFFERENTIAL METHOD: ABNORMAL
DOP CALC AO PEAK VEL: 1.41 M/S
DOP CALC AO VTI: 27.66 CM
DOP CALC LVOT AREA: 3.3 CM2
DOP CALC LVOT DIAMETER: 2.06 CM
DOP CALC LVOT PEAK VEL: 1.32 M/S
DOP CALC LVOT STROKE VOLUME: 84.08 CM3
DOP CALCLVOT PEAK VEL VTI: 25.24 CM
E WAVE DECELERATION TIME: 208.04 MSEC
E/A RATIO: 1.21
E/E' RATIO: 13.06 M/S
ECHO LV POSTERIOR WALL: 0.72 CM (ref 0.6–1.1)
EOSINOPHIL # BLD AUTO: 0.1 K/UL (ref 0–0.5)
EOSINOPHIL NFR BLD: 1.2 % (ref 0–8)
ERYTHROCYTE [DISTWIDTH] IN BLOOD BY AUTOMATED COUNT: 15.6 % (ref 11.5–14.5)
EST. GFR  (AFRICAN AMERICAN): 25.5 ML/MIN/1.73 M^2
EST. GFR  (AFRICAN AMERICAN): 28.5 ML/MIN/1.73 M^2
EST. GFR  (NON AFRICAN AMERICAN): 22.1 ML/MIN/1.73 M^2
EST. GFR  (NON AFRICAN AMERICAN): 24.7 ML/MIN/1.73 M^2
FRACTIONAL SHORTENING: 36 % (ref 28–44)
GLUCOSE SERPL-MCNC: 195 MG/DL (ref 70–110)
GLUCOSE SERPL-MCNC: 311 MG/DL (ref 70–110)
HCO3 UR-SCNC: 19.7 MMOL/L (ref 24–28)
HCT VFR BLD AUTO: 32.2 % (ref 37–48.5)
HGB BLD-MCNC: 9.9 G/DL (ref 12–16)
IMM GRANULOCYTES # BLD AUTO: 0.03 K/UL (ref 0–0.04)
IMM GRANULOCYTES NFR BLD AUTO: 0.7 % (ref 0–0.5)
INTERVENTRICULAR SEPTUM: 0.61 CM (ref 0.6–1.1)
LA MAJOR: 3.87 CM
LA MINOR: 4.52 CM
LA WIDTH: 2.77 CM
LEFT ATRIUM SIZE: 2.2 CM
LEFT ATRIUM VOLUME INDEX: 10.1 ML/M2
LEFT ATRIUM VOLUME: 21.6 CM3
LEFT INTERNAL DIMENSION IN SYSTOLE: 2.69 CM (ref 2.1–4)
LEFT VENTRICLE DIASTOLIC VOLUME INDEX: 36.5 ML/M2
LEFT VENTRICLE DIASTOLIC VOLUME: 78.21 ML
LEFT VENTRICLE MASS INDEX: 37 G/M2
LEFT VENTRICLE SYSTOLIC VOLUME INDEX: 12.5 ML/M2
LEFT VENTRICLE SYSTOLIC VOLUME: 26.89 ML
LEFT VENTRICULAR INTERNAL DIMENSION IN DIASTOLE: 4.19 CM (ref 3.5–6)
LEFT VENTRICULAR MASS: 79.34 G
LV LATERAL E/E' RATIO: 10.09 M/S
LV SEPTAL E/E' RATIO: 18.5 M/S
LYMPHOCYTES # BLD AUTO: 1.2 K/UL (ref 1–4.8)
LYMPHOCYTES NFR BLD: 27.6 % (ref 18–48)
MAGNESIUM SERPL-MCNC: 1.7 MG/DL (ref 1.6–2.6)
MCH RBC QN AUTO: 27.7 PG (ref 27–31)
MCHC RBC AUTO-ENTMCNC: 30.7 G/DL (ref 32–36)
MCV RBC AUTO: 90 FL (ref 82–98)
MODE: ABNORMAL
MONOCYTES # BLD AUTO: 0.2 K/UL (ref 0.3–1)
MONOCYTES NFR BLD: 5 % (ref 4–15)
MV PEAK A VEL: 0.92 M/S
MV PEAK E VEL: 1.11 M/S
MV STENOSIS PRESSURE HALF TIME: 60.33 MS
MV VALVE AREA P 1/2 METHOD: 3.65 CM2
NEUTROPHILS # BLD AUTO: 2.7 K/UL (ref 1.8–7.7)
NEUTROPHILS NFR BLD: 65 % (ref 38–73)
NRBC BLD-RTO: 0 /100 WBC
PCO2 BLDA: 37.2 MMHG (ref 35–45)
PH SMN: 7.33 [PH] (ref 7.35–7.45)
PHOSPHATE SERPL-MCNC: 2.6 MG/DL (ref 2.7–4.5)
PISA TR MAX VEL: 2.42 M/S
PLATELET # BLD AUTO: 180 K/UL (ref 150–350)
PLATELET BLD QL SMEAR: ABNORMAL
PMV BLD AUTO: 13.4 FL (ref 9.2–12.9)
PO2 BLDA: 43 MMHG (ref 40–60)
POC BE: -6 MMOL/L
POC SATURATED O2: 76 % (ref 95–100)
POC TCO2: 21 MMOL/L (ref 24–29)
POCT GLUCOSE: 188 MG/DL (ref 70–110)
POCT GLUCOSE: 196 MG/DL (ref 70–110)
POCT GLUCOSE: 216 MG/DL (ref 70–110)
POCT GLUCOSE: 236 MG/DL (ref 70–110)
POTASSIUM SERPL-SCNC: 4.1 MMOL/L (ref 3.5–5.1)
POTASSIUM SERPL-SCNC: 4.5 MMOL/L (ref 3.5–5.1)
POTASSIUM UR-SCNC: 19 MMOL/L (ref 15–95)
PROT SERPL-MCNC: 5.4 G/DL (ref 6–8.4)
PULM VEIN S/D RATIO: 0.76
PV PEAK D VEL: 0.7 M/S
PV PEAK S VEL: 0.53 M/S
RA MAJOR: 3.93 CM
RA PRESSURE: 3 MMHG
RA WIDTH: 2.95 CM
RBC # BLD AUTO: 3.58 M/UL (ref 4–5.4)
RV TISSUE DOPPLER FREE WALL SYSTOLIC VELOCITY 1 (APICAL 4 CHAMBER VIEW): 14.14 CM/S
SAMPLE: ABNORMAL
SINUS: 3.01 CM
SITE: ABNORMAL
SODIUM SERPL-SCNC: 135 MMOL/L (ref 136–145)
SODIUM SERPL-SCNC: 137 MMOL/L (ref 136–145)
SODIUM UR-SCNC: 121 MMOL/L (ref 20–250)
STJ: 2.6 CM
TDI LATERAL: 0.11 M/S
TDI SEPTAL: 0.06 M/S
TDI: 0.09 M/S
TR MAX PG: 23 MMHG
TRICUSPID ANNULAR PLANE SYSTOLIC EXCURSION: 1.68 CM
TROPONIN I SERPL DL<=0.01 NG/ML-MCNC: 0.09 NG/ML (ref 0–0.03)
TROPONIN I SERPL DL<=0.01 NG/ML-MCNC: 0.1 NG/ML (ref 0–0.03)
TROPONIN I SERPL DL<=0.01 NG/ML-MCNC: 0.11 NG/ML (ref 0–0.03)
TV REST PULMONARY ARTERY PRESSURE: 26 MMHG
WBC # BLD AUTO: 4.16 K/UL (ref 3.9–12.7)

## 2020-06-22 PROCEDURE — 84133 ASSAY OF URINE POTASSIUM: CPT

## 2020-06-22 PROCEDURE — 94640 AIRWAY INHALATION TREATMENT: CPT

## 2020-06-22 PROCEDURE — 84484 ASSAY OF TROPONIN QUANT: CPT

## 2020-06-22 PROCEDURE — 99222 1ST HOSP IP/OBS MODERATE 55: CPT | Mod: GC,,, | Performed by: INTERNAL MEDICINE

## 2020-06-22 PROCEDURE — 25000003 PHARM REV CODE 250: Performed by: STUDENT IN AN ORGANIZED HEALTH CARE EDUCATION/TRAINING PROGRAM

## 2020-06-22 PROCEDURE — 99291 CRITICAL CARE FIRST HOUR: CPT | Mod: ,,, | Performed by: NURSE PRACTITIONER

## 2020-06-22 PROCEDURE — S0030 INJECTION, METRONIDAZOLE: HCPCS | Performed by: NURSE PRACTITIONER

## 2020-06-22 PROCEDURE — 80053 COMPREHEN METABOLIC PANEL: CPT

## 2020-06-22 PROCEDURE — 80048 BASIC METABOLIC PNL TOTAL CA: CPT

## 2020-06-22 PROCEDURE — 82330 ASSAY OF CALCIUM: CPT

## 2020-06-22 PROCEDURE — 99291 PR CRITICAL CARE, E/M 30-74 MINUTES: ICD-10-PCS | Mod: ,,, | Performed by: NURSE PRACTITIONER

## 2020-06-22 PROCEDURE — 27000221 HC OXYGEN, UP TO 24 HOURS

## 2020-06-22 PROCEDURE — 85730 THROMBOPLASTIN TIME PARTIAL: CPT

## 2020-06-22 PROCEDURE — 94761 N-INVAS EAR/PLS OXIMETRY MLT: CPT

## 2020-06-22 PROCEDURE — 87040 BLOOD CULTURE FOR BACTERIA: CPT

## 2020-06-22 PROCEDURE — 82803 BLOOD GASES ANY COMBINATION: CPT

## 2020-06-22 PROCEDURE — 84100 ASSAY OF PHOSPHORUS: CPT

## 2020-06-22 PROCEDURE — 92610 EVALUATE SWALLOWING FUNCTION: CPT

## 2020-06-22 PROCEDURE — 63600175 PHARM REV CODE 636 W HCPCS: Performed by: NURSE PRACTITIONER

## 2020-06-22 PROCEDURE — 85730 THROMBOPLASTIN TIME PARTIAL: CPT | Mod: 91

## 2020-06-22 PROCEDURE — 84484 ASSAY OF TROPONIN QUANT: CPT | Mod: 91

## 2020-06-22 PROCEDURE — 84300 ASSAY OF URINE SODIUM: CPT

## 2020-06-22 PROCEDURE — 83735 ASSAY OF MAGNESIUM: CPT

## 2020-06-22 PROCEDURE — 85025 COMPLETE CBC W/AUTO DIFF WBC: CPT

## 2020-06-22 PROCEDURE — 63600175 PHARM REV CODE 636 W HCPCS: Performed by: STUDENT IN AN ORGANIZED HEALTH CARE EDUCATION/TRAINING PROGRAM

## 2020-06-22 PROCEDURE — 25000003 PHARM REV CODE 250: Performed by: EMERGENCY MEDICINE

## 2020-06-22 PROCEDURE — 25000242 PHARM REV CODE 250 ALT 637 W/ HCPCS: Performed by: STUDENT IN AN ORGANIZED HEALTH CARE EDUCATION/TRAINING PROGRAM

## 2020-06-22 PROCEDURE — S0028 INJECTION, FAMOTIDINE, 20 MG: HCPCS | Performed by: STUDENT IN AN ORGANIZED HEALTH CARE EDUCATION/TRAINING PROGRAM

## 2020-06-22 PROCEDURE — 99900035 HC TECH TIME PER 15 MIN (STAT)

## 2020-06-22 PROCEDURE — 82436 ASSAY OF URINE CHLORIDE: CPT

## 2020-06-22 PROCEDURE — 63600175 PHARM REV CODE 636 W HCPCS: Performed by: INTERNAL MEDICINE

## 2020-06-22 PROCEDURE — 25000003 PHARM REV CODE 250: Performed by: NURSE PRACTITIONER

## 2020-06-22 PROCEDURE — 99222 PR INITIAL HOSPITAL CARE,LEVL II: ICD-10-PCS | Mod: GC,,, | Performed by: INTERNAL MEDICINE

## 2020-06-22 PROCEDURE — 20000000 HC ICU ROOM

## 2020-06-22 RX ORDER — METRONIDAZOLE 500 MG/100ML
500 INJECTION, SOLUTION INTRAVENOUS
Status: DISCONTINUED | OUTPATIENT
Start: 2020-06-22 | End: 2020-06-23

## 2020-06-22 RX ORDER — CEFEPIME HYDROCHLORIDE 2 G/1
2 INJECTION, POWDER, FOR SOLUTION INTRAVENOUS
Status: DISCONTINUED | OUTPATIENT
Start: 2020-06-22 | End: 2020-06-26

## 2020-06-22 RX ORDER — LIDOCAINE 50 MG/G
1 PATCH TOPICAL ONCE AS NEEDED
Status: DISCONTINUED | OUTPATIENT
Start: 2020-06-22 | End: 2020-06-26

## 2020-06-22 RX ADMIN — ACETAMINOPHEN 650 MG: 325 TABLET ORAL at 04:06

## 2020-06-22 RX ADMIN — INSULIN ASPART 2 UNITS: 100 INJECTION, SOLUTION INTRAVENOUS; SUBCUTANEOUS at 01:06

## 2020-06-22 RX ADMIN — ASPIRIN 81 MG: 81 TABLET, COATED ORAL at 09:06

## 2020-06-22 RX ADMIN — FLUTICASONE PROPIONATE 100 MCG: 50 SPRAY, METERED NASAL at 11:06

## 2020-06-22 RX ADMIN — VANCOMYCIN HYDROCHLORIDE 750 MG: 750 INJECTION, POWDER, LYOPHILIZED, FOR SOLUTION INTRAVENOUS at 01:06

## 2020-06-22 RX ADMIN — Medication 0.1 MCG/KG/MIN: at 08:06

## 2020-06-22 RX ADMIN — METRONIDAZOLE 500 MG: 500 INJECTION, SOLUTION INTRAVENOUS at 04:06

## 2020-06-22 RX ADMIN — IPRATROPIUM BROMIDE AND ALBUTEROL SULFATE 3 ML: .5; 3 SOLUTION RESPIRATORY (INHALATION) at 04:06

## 2020-06-22 RX ADMIN — IPRATROPIUM BROMIDE AND ALBUTEROL SULFATE 3 ML: .5; 3 SOLUTION RESPIRATORY (INHALATION) at 07:06

## 2020-06-22 RX ADMIN — IPRATROPIUM BROMIDE AND ALBUTEROL SULFATE 3 ML: .5; 3 SOLUTION RESPIRATORY (INHALATION) at 12:06

## 2020-06-22 RX ADMIN — ACETAMINOPHEN 650 MG: 325 TABLET ORAL at 01:06

## 2020-06-22 RX ADMIN — SODIUM CHLORIDE, SODIUM LACTATE, POTASSIUM CHLORIDE, AND CALCIUM CHLORIDE 500 ML: .6; .31; .03; .02 INJECTION, SOLUTION INTRAVENOUS at 11:06

## 2020-06-22 RX ADMIN — IPRATROPIUM BROMIDE AND ALBUTEROL SULFATE 3 ML: .5; 3 SOLUTION RESPIRATORY (INHALATION) at 03:06

## 2020-06-22 RX ADMIN — Medication 0.08 MCG/KG/MIN: at 06:06

## 2020-06-22 RX ADMIN — FOLIC ACID 1 MG: 1 TABLET ORAL at 09:06

## 2020-06-22 RX ADMIN — IPRATROPIUM BROMIDE AND ALBUTEROL SULFATE 3 ML: .5; 3 SOLUTION RESPIRATORY (INHALATION) at 11:06

## 2020-06-22 RX ADMIN — INSULIN ASPART 2 UNITS: 100 INJECTION, SOLUTION INTRAVENOUS; SUBCUTANEOUS at 06:06

## 2020-06-22 RX ADMIN — HEPARIN SODIUM AND DEXTROSE 14 UNITS/KG/HR: 10000; 5 INJECTION INTRAVENOUS at 12:06

## 2020-06-22 RX ADMIN — METRONIDAZOLE 500 MG: 500 INJECTION, SOLUTION INTRAVENOUS at 11:06

## 2020-06-22 RX ADMIN — CALCIUM GLUCONATE 1000 MG: 98 INJECTION, SOLUTION INTRAVENOUS at 02:06

## 2020-06-22 RX ADMIN — IPRATROPIUM BROMIDE AND ALBUTEROL SULFATE 3 ML: .5; 3 SOLUTION RESPIRATORY (INHALATION) at 08:06

## 2020-06-22 RX ADMIN — PIPERACILLIN AND TAZOBACTAM 4.5 G: 4; .5 INJECTION, POWDER, FOR SOLUTION INTRAVENOUS at 05:06

## 2020-06-22 RX ADMIN — FAMOTIDINE 20 MG: 10 INJECTION INTRAVENOUS at 09:06

## 2020-06-22 RX ADMIN — INSULIN DETEMIR 10 UNITS: 100 INJECTION, SOLUTION SUBCUTANEOUS at 08:06

## 2020-06-22 RX ADMIN — Medication 0.08 MCG/KG/MIN: at 11:06

## 2020-06-22 RX ADMIN — CEFEPIME 2 G: 2 INJECTION, POWDER, FOR SOLUTION INTRAVENOUS at 02:06

## 2020-06-22 NOTE — TELEPHONE ENCOUNTER
Pt called after info was placed on spok board from . Attempted to contact x1, LVM. Noted pt is currently admitted.     Reason for Disposition   Message left on identified voicemail    Additional Information   Negative: Caller has already spoken with the PCP (or office), and has no further questions   Negative: Caller has already spoken with another triager and has no further questions   Negative: Caller has already spoken with another triager or PCP (or office), and has further questions and triager able to answer questions.   Negative: Busy signal.  First attempt to contact caller.  Follow-up call scheduled within 15 minutes.   Negative: No answer.  First attempt to contact caller.  Follow-up call scheduled within 15 minutes.    Protocols used: NO CONTACT OR DUPLICATE CONTACT CALL-A-[place]

## 2020-06-22 NOTE — ASSESSMENT & PLAN NOTE
0.062 --> 0.093 -->0.109, EKG without ischemic changes.  Denies chest pain    - Suspect demand due to shock  - TTE ordered; previous TTE 5/2020 wnl

## 2020-06-22 NOTE — PROGRESS NOTES
[facility]  Critical Care Medicine  Progress Note    Patient Name: Alison Branch  MRN: [id number]  Admission Date: 6/21/2020  Hospital Length of Stay: 1 days  Code Status: Full Code  Attending Provider: Lucho Zavaleta MD  Primary Care Provider: Mike Rodriguez Ii, MD   Principal Problem: Septic shock    Subjective:     HPI:  The patient is a 64 year old female with stage IV NSCLC with bone mets, recently started on systemic therapy w/ daily PO Entrectinib since 6/6/2020, patient of Dr Belcher, DM2, HTN, and prior DVT/PE on lovenox, [place] [facility] [place], fevers, for past 24 hours. She was alert and oriented x4, conversational on initial presentation with SBP in 60s. She has started to require a rollator in setting of progressive weakness in the past few months. Patient was then noted to be weaker this morning and then had to use the bathroom. Family was helping her make it to the bathroom when she lost control of her bowels and bladder and then became even more weak. EMS called and patient brought to ED for eval.   Patient denies chest pain, shortness of breath, headaches, changes in vision, abdominal pain, vomiting. Patient reports history of urinary tract infections. Patient denies any joint pain. No neck stiffness. Of note Dr. Belcher recently provided option to patient to choose to try new chemotherapy or to stop, with the patient opting for new chemotherapy. The patient has not tolerated the chemotherapy greatly, experiencing nausea, nonbloody vomiting at times.       Hospital/ICU Course:  Ms. Branch was admitted to the ICU with [facility] for sepsis with borderline hypotension.  She was started on empiric antibiotics with pip/tazo and vancomycin along with pan cultures.  UA + bacteria, WBC 3.  Awaiting C diff and stool cultures.  Preliminary blood cultures 1/2 with GPC in cocci.  T max 102.  No leukocytosis/leukopenia. Lactate remains normal.  Urinary retention noted; hennessy placed.    Started on low dose norepinephrine 6/21 pm.    Interval History/Significant Events: Started on low dose norepinephrine. Tmax 102.  Urinary retention, hennessy placed. No bowel movements since admission.    Review of Systems   Respiratory: Negative for shortness of breath.    Cardiovascular: Negative for chest pain.   Gastrointestinal: Positive for nausea. Negative for abdominal pain and vomiting.     Objective:     Vital Signs (Most Recent):  Temp: 99.7 °F (37.6 °C) (06/22/20 0500)  Pulse: 83 (06/22/20 0852)  Resp: (!) 22 (06/22/20 0852)  BP: (!) 90/45 (06/22/20 0701)  SpO2: 96 % (06/22/20 0852) Vital Signs (24h Range):  Temp:  [98.1 °F (36.7 °C)-102.8 °F (39.3 °C)] 99.7 °F (37.6 °C)  Pulse:  [] 83  Resp:  [15-30] 22  SpO2:  [95 %-100 %] 96 %  BP: ()/() 90/45   Weight: 99.1 kg (218 lb 7.6 oz)  Body mass index is 31.8 kg/m².      Intake/Output Summary (Last 24 hours) at 6/22/2020 1006  Last data filed at 6/22/2020 0701  Gross per 24 hour   Intake 3791.53 ml   Output 1035 ml   Net 2756.53 ml       Physical Exam  Vitals signs and nursing note reviewed.   Constitutional:       General: She is not in acute distress.  HENT:      Head: Normocephalic.   Cardiovascular:      Rate and Rhythm: Normal rate and regular rhythm.      Heart sounds: No murmur.   Pulmonary:      Effort: Pulmonary effort is normal. No respiratory distress.      Breath sounds: Rales present. No wheezing or rhonchi.   Chest:      Chest wall: No tenderness.   Abdominal:      General: Bowel sounds are normal. There is no distension.      Palpations: Abdomen is soft.      Tenderness: There is no abdominal tenderness.   Skin:     General: Skin is warm and dry.   Neurological:      General: No focal deficit present.      Mental Status: She is alert.      Comments: Appropriate in conversation   Psychiatric:         Mood and Affect: Mood normal.         Behavior: Behavior normal.         Vents:  Oxygen Concentration (%): 28 (06/21/20  1940)  Lines/Drains/Airways     Drain                 Urethral Catheter 06/21/20 2013 Latex 16 Fr. less than 1 day          Peripheral Intravenous Line                 Peripheral IV - Single Lumen 06/21/20 18 G Left Antecubital 1 day         Peripheral IV - Single Lumen 06/21/20 1602 22 G Left Wrist less than 1 day              Significant Labs:    CBC/Anemia Profile:  Recent Labs   Lab 06/21/20  1708 06/21/20  2105 06/22/20  0324   WBC 4.63 6.44 4.16   HGB 10.4* 10.6* 9.9*   HCT 35.2* 35.1* 32.2*    190 180   MCV 96 95 90   RDW 15.6* 15.7* 15.6*   IRON 21*  --   --         Chemistries:  Recent Labs   Lab 06/21/20  1244 06/22/20  0324    137   K 4.6 4.5    109   CO2 22* 18*   BUN 26* 26*   CREATININE 2.4* 2.3*   CALCIUM 7.9* 6.9*   ALBUMIN 3.2* 2.8*   PROT 5.9* 5.4*   BILITOT 0.7 0.6   ALKPHOS 69 59   ALT 70* 63*   * 105*   MG 1.8 1.7   PHOS 2.7 2.6*       All pertinent labs within the past 24 hours have been reviewed.    Significant Imaging:  I have reviewed and interpreted all pertinent imaging results/findings within the past 24 hours.      ABG  Recent Labs   Lab 06/21/20  1720   PH 7.262*   PO2 31*   PCO2 49.0*   HCO3 22.1*   BE -5     Assessment/Plan:     Neuro  Cerebral aneurysm, coiled in 2010  - Continue ASA    Cardiac/Vascular  Elevated troponin  0.062 --> 0.093 -->0.109, EKG without ischemic changes.  Denies chest pain    - Suspect demand due to shock  - TTE ordered; previous TTE 5/2020 wnl    Hypertension associated with diabetes  - Hold olmesartan in setting of shock    Renal/  ROSEMARY (acute kidney injury)  Suspect secondary to dehydration from decreased oral intake and sepsis.  FeNA 3% with urinary retention overnight.  Now with urine catheter.  sCr has been uptrending over the past few weeks, baseline ~0.8.    - US retroperitoneal. to septic shock and decreased po intake  - Strict I/O, avoid nephrotoxic meds      ID  * Septic shock  Unclear source.  Preliminary blood cultures  with GPCs 1/2 sets.  UA + bacteria however WBC 3, UTI less likely.  Has reportedly difficulty with swallowing, possible aspiration.  Chest xray without significant changes from 12/2019, possible worsening infiltrate to RML. Diarrhea, nausea, vomiting since starting new chemotherapy agent.  Abdomen benign. Amylase/lipase wnl.   Lactate WNL    - Continue pip/tazo, vancomycin  - Follow up blood culture, respiratory infectious panel, stool culture and c diff.   - If repeat blood culture +, consider removing port.   - Wean norepinephrine for MAPs>65 mm hg  - Follow up KUB      Hematology  Pulmonary embolism  PE in 5/2020, on therapeutic enoxaparin at home    - Continue heparin infusion    Oncology  Malignant neoplasm of upper lobe of left lung  Stage IV NSCLC with bone mets, recently opted for new po chemotherapy entrectinib with Dr. Belcher, started 6/6/2020.  Was given the option of stopping chemo treatments but opted to start new chemo.  Patient expressed full code status on admission.    - Consult Oncology  - Consider palliative care consult     Endocrine  Type 2 diabetes mellitus with hyperglycemia, with long-term current use of insulin  Recent A1c 9.2. On short acting insulin TIDWM 20-16-16u and long acting 20u levemir at night. Decreased appetite in past few days.    - Currently NPO while awaiting swallow evaluation  - 10u levemir nightly  - LDSSI with frequent glucose checks    NAGMA  Recent diarrhea and vomiting.      - Send urine studies for UAG. Consider sodium bicarb supplementation  - Repeat BMP 1400      VTE Risk Mitigation (From admission, onward)         Ordered     heparin 25,000 units in dextrose 5% 250 mL (100 units/mL) infusion HIGH INTENSITY nomogram - [facility]  Continuous     Question:  Heparin Infusion Adjustment (DO NOT MODIFY ANSWER)  Answer:  [email or web address]\epic\Images\Pharmacy\HeparinInfusions\heparin HIGH INTENSITY nomogram for [facility] [id number]    06/21/20 1802     heparin 25,000 units in dextrose 5%  (100 units/ml) IV bolus from bag - ADDITIONAL PRN BOLUS - 60 units/kg  As needed (PRN)     Question:  Heparin Infusion Adjustment (DO NOT MODIFY ANSWER)  Answer:  [email or web address]\epic\Images\Pharmacy\HeparinInfusions\heparin HIGH INTENSITY nomogram for [facility] [id number].pdf    06/21/20 1802     heparin 25,000 units in dextrose 5% (100 units/ml) IV bolus from bag - ADDITIONAL PRN BOLUS - 30 units/kg  As needed (PRN)     Question:  Heparin Infusion Adjustment (DO NOT MODIFY ANSWER)  Answer:  \[email or web address]\epic\Images\Pharmacy\HeparinInfusions\heparin HIGH INTENSITY nomogram for [facility] [id number].pdf    06/21/20 1802     IP VTE HIGH RISK PATIENT  Once      06/21/20 1642     Place sequential compression device  Until discontinued      06/21/20 1642              Discussed plan with Dr. Zavaleta    Critical Care Time: 35 minutes  Critical secondary to Patient has a condition that poses threat to life and bodily function: Septic shock on norepinephrine.       Critical care was time spent personally by me on the following activities: development of treatment plan with patient or surrogate and bedside caregivers, discussions with consultants, evaluation of patient's response to treatment, examination of patient, ordering and performing treatments and interventions, ordering and review of laboratory studies, ordering and review of radiographic studies, pulse oximetry, re-evaluation of patient's condition. This critical care time did not overlap with that of any other provider or involve time for any procedures.     Yenny Castillo NP  Critical Care Medicine  [facility]

## 2020-06-22 NOTE — ASSESSMENT & PLAN NOTE
Suspect secondary to dehydration from decreased oral intake and sepsis.  FeNA 3% with urinary retention overnight.  Now with urine catheter.  sCr has been uptrending over the past few weeks, baseline ~0.8.    - US retroperitoneal. to septic shock and decreased po intake  - Strict I/O, avoid nephrotoxic meds

## 2020-06-22 NOTE — CONSULTS
Nutrition-Related Diabetes Education      Time Spent: 10 minutes     Learners: Patient    Current HbA1c: 9.2    Is patient aware of their A1c and their goal A1c? Yes    Nutrition Education with handouts: MyPlate, CHO counting    Comments: Provided and explained handout detailing sources of carbohydrates, appropriate serving sizes, and the plate method for meal planning. Pt voiced understanding. All questions and concerns answered.    Barriers to Learning: None identified     Follow up: Yes    Please consult as needed.    Thank you!  DARRYL Hampton, LDN

## 2020-06-22 NOTE — ED NOTES
Pt had 785 ml of urine in bladder per bladder scanner. I notified ICU team. Placed indwelling hennessy.

## 2020-06-22 NOTE — SUBJECTIVE & OBJECTIVE
Interval History/Significant Events: Started on low dose norepinephrine. Tmax 102.  Urinary retention, hennessy placed. No bowel movements since admission.    Review of Systems   Respiratory: Negative for shortness of breath.    Cardiovascular: Negative for chest pain.   Gastrointestinal: Positive for nausea. Negative for abdominal pain and vomiting.     Objective:     Vital Signs (Most Recent):  Temp: 99.7 °F (37.6 °C) (06/22/20 0500)  Pulse: 83 (06/22/20 0852)  Resp: (!) 22 (06/22/20 0852)  BP: (!) 90/45 (06/22/20 0701)  SpO2: 96 % (06/22/20 0852) Vital Signs (24h Range):  Temp:  [98.1 °F (36.7 °C)-102.8 °F (39.3 °C)] 99.7 °F (37.6 °C)  Pulse:  [] 83  Resp:  [15-30] 22  SpO2:  [95 %-100 %] 96 %  BP: ()/() 90/45   Weight: 99.1 kg (218 lb 7.6 oz)  Body mass index is 31.8 kg/m².      Intake/Output Summary (Last 24 hours) at 6/22/2020 1006  Last data filed at 6/22/2020 0701  Gross per 24 hour   Intake 3791.53 ml   Output 1035 ml   Net 2756.53 ml       Physical Exam  Vitals signs and nursing note reviewed.   Constitutional:       General: She is not in acute distress.  HENT:      Head: Normocephalic.   Cardiovascular:      Rate and Rhythm: Normal rate and regular rhythm.      Heart sounds: No murmur.   Pulmonary:      Effort: Pulmonary effort is normal. No respiratory distress.      Breath sounds: Rales present. No wheezing or rhonchi.   Chest:      Chest wall: No tenderness.   Abdominal:      General: Bowel sounds are normal. There is no distension.      Palpations: Abdomen is soft.      Tenderness: There is no abdominal tenderness.   Skin:     General: Skin is warm and dry.   Neurological:      General: No focal deficit present.      Mental Status: She is alert.      Comments: Appropriate in conversation   Psychiatric:         Mood and Affect: Mood normal.         Behavior: Behavior normal.         Vents:  Oxygen Concentration (%): 28 (06/21/20 1940)  Lines/Drains/Airways     Drain                  Urethral Catheter 06/21/20 2013 Latex 16 Fr. less than 1 day          Peripheral Intravenous Line                 Peripheral IV - Single Lumen 06/21/20 18 G Left Antecubital 1 day         Peripheral IV - Single Lumen 06/21/20 1602 22 G Left Wrist less than 1 day              Significant Labs:    CBC/Anemia Profile:  Recent Labs   Lab 06/21/20  1708 06/21/20  2105 06/22/20  0324   WBC 4.63 6.44 4.16   HGB 10.4* 10.6* 9.9*   HCT 35.2* 35.1* 32.2*    190 180   MCV 96 95 90   RDW 15.6* 15.7* 15.6*   IRON 21*  --   --         Chemistries:  Recent Labs   Lab 06/21/20  1244 06/22/20  0324    137   K 4.6 4.5    109   CO2 22* 18*   BUN 26* 26*   CREATININE 2.4* 2.3*   CALCIUM 7.9* 6.9*   ALBUMIN 3.2* 2.8*   PROT 5.9* 5.4*   BILITOT 0.7 0.6   ALKPHOS 69 59   ALT 70* 63*   * 105*   MG 1.8 1.7   PHOS 2.7 2.6*       All pertinent labs within the past 24 hours have been reviewed.    Significant Imaging:  I have reviewed and interpreted all pertinent imaging results/findings within the past 24 hours.

## 2020-06-22 NOTE — PLAN OF CARE
Mike Rodriguez Ii, MD  [address] / [place] [address]      [facility] - [address]  [address]  [facility] [address]  Phone: [phone] Fax: [phone]    [facility] - [place] - [address]  [address] [place] [address]  Phone: [phone] Fax: [phone]    Extended Emergency Contact Information  Primary Emergency Contact: Chito Woodruff   [facility]  Home Phone: [phone]  Mobile Phone: [phone]  Relation: Daughter  Secondary Emergency Contact: MELISSA AGUILAR  Mobile Phone: [phone]  Relation: Daughter    Future Appointments   Date Time Provider Department Center   6/22/2020  2:00 PM LAB, [facility]   6/22/2020  3:00 PM Tara Belcher MD [facility]   8/14/2020 10:00 AM David Mo MD [facility] EPI [facility]       Payor: [employer]GED MEDICARE / Plan: [employer] CHOICES 65 / Product Type: Medicare Advantage /          06/22/20 1220   Discharge Assessment   Assessment Type Discharge Planning Assessment   Confirmed/corrected address and phone number on facesheet? Yes   Assessment information obtained from? Patient   Prior to hospitilization cognitive status: Alert/Oriented   Prior to hospitalization functional status: Assistive Equipment;Independent   Current cognitive status: Alert/Oriented   Current Functional Status: Needs Assistance   Facility Arrived From: ED   Lives With child(debby), adult   Able to Return to Prior Arrangements other (see comments)  (TBD)   Is patient able to care for self after discharge? Unable to determine at this time (comments)   Who are your caregiver(s) and their phone number(s)? Chito Woodruff (dtr) [phone]; [phone]   Patient currently being followed by outpatient case management? Unable to determine (comments)   Equipment Currently Used at Home cane, straight;shower  chair;rollator;bedside commode   Do you have any problems affording any of your prescribed medications? Yes   If yes, what medications? States that her chemo medication (starts with a 'Z') she has to get thru the , costs $2000 and she only gets $1200/mt.  States that she needs to reorder because she only has 1 weeks supply left.   Is the patient taking medications as prescribed? yes   Does the patient have transportation home? Yes   Transportation Anticipated family or friend will provide   Discharge Plan A Home Health;Home with family   Discharge Plan B Skilled Nursing Facility   DME Needed Upon Discharge  other (see comments)  (TBD)       Wallace Ferraro RN MSN  Critical Care-   Ext. [phone]

## 2020-06-22 NOTE — ASSESSMENT & PLAN NOTE
Unclear source.  Preliminary blood cultures with GPCs 1/2 sets.  UA + bacteria however WBC 3, UTI less likely.  Has reportedly difficulty with swallowing, possible aspiration.  Chest xray. Diarrhea, nausea, vomiting since starting new chemotherapy agent.  Abdomen benign. Amylase/lipase wnl.       - Continue pip/tazo, vancomycin  - Follow up blood culture, respiratory infectious panel, stool culture and c diff.   - If repeat blood culture +, consider removing port.   - Wean norepinephrine for MAPs>65 mm hg  - Follow up KUB

## 2020-06-22 NOTE — ASSESSMENT & PLAN NOTE
Stage IV NSCLC with bone mets, recently opted for new po chemotherapy entrectinib with Dr. Belcher, started 6/6/2020.  Was given the option of stopping chemo treatments but opted to start new chemo.  Patient expressed full code status on admission.    - Consult Oncology  - Consider palliative care consult

## 2020-06-22 NOTE — ASSESSMENT & PLAN NOTE
Recent A1c 9.2. On short acting insulin TIDWM 20-16-16u and long acting 20u levemir at night. Decreased appetite in past few days.    - Currently NPO while awaiting swallow evaluation  - 10u levemir nightly  - LDSSI with frequent glucose checks

## 2020-06-22 NOTE — PLAN OF CARE
CMICU DAILY GOALS       A: Awake    RASS: Goal -  0  Actual -  0   Restraint necessity:  No  B: Breath   SBT: Not intubated   C: Coordinate A & B, analgesics/sedatives   Pain: managed    SAT: Not intubated  D: Delirium   CAM-ICU: Overall CAM-ICU: Negative  E: Early Mobility   MOVE Screen: Pass   Activity: Activity Management: activity adjusted per tolerance  FAS: Feeding/Nutrition   Diet order: Diet/Nutrition Received: NPO,   Fluid restriction:    T: Thrombus   DVT prophylaxis: VTE Required Core Measure: Pharmacological prophylaxis initiated/maintained  H: HOB Elevation   Head of Bed (HOB): HOB at 45 degrees  U: Ulcer Prophylaxis   GI: no  G: Glucose control   managed Glycemic Management: blood glucose monitoring, supplemental insulin given  S: Skin   Bundle compliance: yes   Bathing/Skin Care: bath, chlorhexidine, bath, complete, dressed/undressed, linen changed Date: 6/21 night shift  B: Bowel Function   no issues   I: Indwelling Catheters   Amanda necessity:      Urethral Catheter 06/21/20 2013 Latex 16 Fr.-Reason for Continuing Urinary Catheterization: Critically ill in ICU requiring intensive monitoring   CVC necessity: No   IPAD offered: Yes  D: De-escalation Antibx   Yes  Plan for the day   MAP >65, Maintain comfort, Monitor respiratory status  Family/Goals of care/Code Status   Code Status: Full Code     No acute events throughout day, VS and assessment per flow sheet, patient progressing towards goals as tolerated, plan of care reviewed with Alison Branch and family, all concerns addressed, will continue to monitor.

## 2020-06-22 NOTE — HOSPITAL COURSE
Ms. Branch was admitted to the ICU with [facility] for sepsis with borderline hypotension.  She was started on empiric antibiotics with pip/tazo and vancomycin along with pan cultures.  UA + bacteria, WBC 3.  Awaiting C diff and stool cultures.  Preliminary blood cultures 1/2 with GPC in cocci.  T max 102.  No leukocytosis/leukopenia. Lactate remains normal.  Urinary retention noted; hennessy placed.   Started on low dose norepinephrine 6/21 pm.    IR was consulted for port removal on 06/23/20. Weaned off norepinephrine infusion 6/24 am.  Discontinued metronidazole.  Hb noted 9.9-->8.2-->8.1 in the setting of supratherapeutic ptt levels. Heparin infusion held.  Serial CBC did not reveal further decline in hb.  No active signs of bleeding; heparin infusion restarted. Blood culture resulted with coag negative staph, likely a contaminate and all abx were discontinued 6/26. Repeat cultures have been negative. Patient stable for step down to oncology.

## 2020-06-22 NOTE — PLAN OF CARE
Problem: SLP Goal  Goal: SLP Goal  Description: Speech Language Pathology Goals  Goals expected to be met by 2020    1. Pt will participate in ongoing assessment of swallow function to determine safest, least restrictive means of nutrition/hydration  2. Educate Pt and family on aspiration precautions and SLP POC      Outcome: Ongoing, Progressing     SLP Bedside Swallow Study initiated. Pt presents with minimal PO acceptance 2/2 nausea and  appetite. No overt S/S aspiration with trials of thin liquids accepted; however, risk of aspiration remains 2/2 waxing/waning level of alertness.  REC: cautiously resume thin liquids provided Pt is awake, alert and upright at 90 degree angle, provided strict aspiration precautions and remains upright at 90 degree angle following all PO. ST to continue to continue to follow for ongoing swallow assessment.  Findings/recs reviewed with RN and MD team.     TIFFANIE Burciaga., [facility]-SLP  Speech-Language Pathology  Pager: [phone]  2020

## 2020-06-22 NOTE — CONSULTS
"Consult Note    Inpatient consult to Hematology/Oncology  Consult performed by: Cindy Gaitan MD  Consult ordered by: Yenny Castillo NP        SUBJECTIVE:     History of Present Illness:  Patient is a 64 y.o. female with history of metastatic non small cell lung cancer under the care of Dr. Belcher. She received 4 cycles of Carboplatin/Pemetrexed/Pembrolizumab followed by Pembrolizumab maintenance. She experienced disease progression in April 2020, her tumor was ROS1 positive so she was switched to Entrectinib. She was admitted yesterday to the medical ICU with septic shock requiring vasopressor support. She has mild encephalopathy. Currently receiving broad spectrum antibiotics.       Review of patient's allergies indicates:  No Known Allergies  Past Medical History:   Diagnosis Date    Allergy     Brain aneurysm 2010    s/p coiling of one; another not coiled    Breast cyst     Depression 9/19/2019    Diabetes mellitus     Diabetes type 2, controlled     Fever blister     High cholesterol     History of Bell's palsy     HTN (hypertension) 5/20/2014    Recurrent upper respiratory infection (URI)      Past Surgical History:   Procedure Laterality Date    BREAST SURGERY      BRONCHOSCOPY N/A 5/28/2019    Procedure: Bronchoscopy;  Surgeon: Iesha Diagnostic Provider;  Location: [facility] OR [address] [place] [address];  Service: Anesthesiology;  Laterality: N/A;    CERVICAL FUSION      COLONOSCOPY N/A 3/9/2016    Procedure: COLONOSCOPY;  Surgeon: Elliott Zimmerman MD;  Location: [facility] ENDO (4TH FLR);  Service: Endoscopy;  Laterality: N/A;    head surgery      stent and "curling" for aneurysm    HYSTERECTOMY      TVH secondary to SUF    INSERTION OF TUNNELED CENTRAL VENOUS CATHETER (CVC) WITH SUBCUTANEOUS PORT Right 7/8/2019    Procedure: INSERTION, PORT-A-CATH;  Surgeon: Cali Damico MD;  Location: [facility] [place] operated [facility] CATH LAB;  Service: Radiology;  Laterality: Right;    MENISCECTOMY Left      Family History   Problem Relation Age of " Onset    Rheum arthritis Father     Diabetes Father     Heart failure Father     Migraines Father     Cataracts Father     Cancer Mother 63        pancreatic    Stomach cancer Mother     Breast cancer Maternal Aunt         50s    Ovarian cancer Cousin     Allergies Daughter     Diabetes Sister     Diabetes Brother     Cancer Maternal Aunt         Lung CA    Melanoma Neg Hx     Colon cancer Neg Hx     Amblyopia Neg Hx     Blindness Neg Hx     Glaucoma Neg Hx     Macular degeneration Neg Hx     Retinal detachment Neg Hx     Strabismus Neg Hx     Stroke Neg Hx     Thyroid disease Neg Hx     Allergic rhinitis Neg Hx     Angioedema Neg Hx     Asthma Neg Hx     Atopy Neg Hx     Eczema Neg Hx     Immunodeficiency Neg Hx     Rhinitis Neg Hx     Urticaria Neg Hx      Social History     Tobacco Use    Smoking status: Former Smoker     Packs/day: 0.50     Years: 30.00     Pack years: 15.00     Quit date: 1999     Years since quittin.4    Smokeless tobacco: Never Used   Substance Use Topics    Alcohol use: No     Alcohol/week: 0.0 standard drinks    Drug use: No     Review of Systems   Unable to perform ROS: Mental status change     OBJECTIVE:     Vital Signs:  Temp:  [99.3 °F (37.4 °C)-101 °F (38.3 °C)]   Pulse:  [80-97]   Resp:  [15-27]   BP: ()/(45-76)   SpO2:  [93 %-100 %]     Physical Exam  Constitutional:       General: She is sleeping.   HENT:      Head: Normocephalic and atraumatic.   Eyes:      General: No scleral icterus.        Right eye: No discharge.         Left eye: No discharge.   Cardiovascular:      Rate and Rhythm: Normal rate and regular rhythm.   Pulmonary:      Effort: No respiratory distress.      Breath sounds: Rales present. No wheezing or rhonchi.   Abdominal:      General: There is no distension.      Tenderness: There is no abdominal tenderness.   Musculoskeletal:      Right lower leg: No edema.      Left lower leg: No edema.   Neurological:       Mental Status: She is easily aroused.       Laboratory:  CBC:   Recent Labs   Lab 06/22/20  0324   WBC 4.16   RBC 3.58*   HGB 9.9*   HCT 32.2*      MCV 90   MCH 27.7   MCHC 30.7*     CMP:   Recent Labs   Lab 06/22/20  0324   *   CALCIUM 6.9*   ALBUMIN 2.8*   PROT 5.4*      K 4.5   CO2 18*      BUN 26*   CREATININE 2.3*   ALKPHOS 59   ALT 63*   *   BILITOT 0.6           ASSESSMENT/PLAN:     # Septic shock   # Stage IV non small cell lung cancer    Agree with holding Entrectinib during her hospital stay given septic shock and tenuous clinical status. We would accept the patient on to our service once she is ready to be stepped down.     Management of septic shock per primary team.     GoC discussion only if patient fails to improve from an infection standpoint.       Patient was seen and discussed with attending Dr. Gaitan.

## 2020-06-22 NOTE — PT/OT/SLP EVAL
Speech Language Pathology Evaluation  Bedside Swallow    Patient Name:  Alison Branch   MRN:  [id number]  Admitting Diagnosis: Septic shock    Recommendations:                 General Recommendations: ST to continue to follow for ongoing swallow assessment  Diet recommendations: Pt ok for Thin Liquids provided strict aspiration precautions. ST to continue to follow for ongoing swallow assessment pending PO acceptance.  Aspiration Precautions:   - small bites/sips  - Only when awake, alert and attentive to all PO  -smaller, more frequent snacks t/o day advised 2/2 decreased endurance   - all PO intake at 90 degree angle  - remain upright 60 minutes+ following all PO intake  - remain elevated at 30 degree angle or higher when sleeping  - Continue to monitor for signs and symptoms of aspiration and discontinue oral feeding should you notice any of the following: watery eyes, reddened facial area, wet vocal quality, increased work of breathing, change in respiratory status, increased congestion, coughing, fever or change in level of alertness.   General Precautions: Standard, aspiration, fall, respiratory  Communication strategies:  provide increased time to answer and go to room if call light pushed    History:     Past Medical History:   Diagnosis Date    Allergy     Brain aneurysm 2010    s/p coiling of one; another not coiled    Breast cyst     Depression 9/19/2019    Diabetes mellitus     Diabetes type 2, controlled     Fever blister     High cholesterol     History of Bell's palsy     HTN (hypertension) 5/20/2014    Recurrent upper respiratory infection (URI)        Past Surgical History:   Procedure Laterality Date    BREAST SURGERY      BRONCHOSCOPY N/A 5/28/2019    Procedure: Bronchoscopy;  Surgeon: Iesha Diagnostic Provider;  Location: [facility] OR [address] [place] [address];  Service: Anesthesiology;  Laterality: N/A;    CERVICAL FUSION      COLONOSCOPY N/A 3/9/2016    Procedure: COLONOSCOPY;  Surgeon: Elliott Zimmerman  "MD;  Location: [facility] ENDO (4TH FLR);  Service: Endoscopy;  Laterality: N/A;    head surgery      stent and "curling" for aneurysm    HYSTERECTOMY      TVH secondary to SUF    INSERTION OF TUNNELED CENTRAL VENOUS CATHETER (CVC) WITH SUBCUTANEOUS PORT Right 7/8/2019    Procedure: INSERTION, PORT-A-CATH;  Surgeon: Cali Damico MD;  Location: [facility] CATH LAB;  Service: Radiology;  Laterality: Right;    MENISCECTOMY Left        Social/OCcupational History: Limited 2/2 Patient cognitive status, no family present at bedside     Prior Intubation HX:  None this admission     Modified Barium Swallow: Pt completed MBSS at this facility 1/24/2020 which revealed, "Oropharyngeal swallow within normal limits for all consistencies tested"    Chest X-Rays: 6/21/2020: Streaky left upper lobe airspace opacity may be due to atelectasis versus infection versus related to known malignancy.  Small left pleural effusion.     Prior diet: regular, thin       Subjective     SLP reviewed Pt with RN, RN cleared for tx  Pt presents lethargic, pleasantly confused  She states, "[facility]"   Patient goals: water    Pain/Comfort:  · Pain Rating 1: 0/10    Objective:     Oral Musculature Evaluation  · Oral Musculature: unable to assess due to poor participation/comprehension, general weakness  · Mucosal Quality: adequate  · Volitional Cough: present  · Volitional Swallow: elicited, timely  · Voice Prior to PO Intake: clear, adequate intensity    Bedside Swallow Eval:   Consistencies Assessed:  · Thin liquids : cup edge sips single x2, continuous x2   · Pt politely declined additional PO trials presented by SLP 2/2 nausea    Oral Phase:   · WNL    Pharyngeal Phase:   · no overt clinical signs/symptoms of aspiration   · SLP unable to rule out silent aspiration at the bedside    Compensatory Strategies  · pt cued to attend to bite size and pace     Treatment: Pt unavailable upon initial attempts (ECHO, MD team rounding.) Pt found asleep " in bed upon third attempt with peripheral IVs, catheter, and nasal canula in place. She was lethargic and easily distracted which required frequent verbal cues to direct and attend to PO ST. HOB elevated. Pt accepted thin liquid trials only and politely declined additional PO offerings 2/2 nausea and minimal appetite. No overt S/S aspiration noted with trials accepted. SLP unable to r/o silent aspiration at the bedside. Pt remains at risk of aspiration 2/2 waxing/waning alertness and strict aspiration precautions advised. SLP educated Pt on definition, risks and overt clinical signs of aspiration, implementation of standard aspiration precautions, and SLP POC.  Pt with minimal sustained attention as assessment progressed and not able to verbalize understanding of all education provided. No family present in room. No questions noted. White board updated. Patient remained upright in bed upon SLP exit from room. RN and MD team notified of findings/recommendations following session.     Assessment:     Alison Branch is a 64 y.o. female with an SLP diagnosis of Dysphagia.  She presents with risk of aspiration 2/2 waxing/waning level of alertness and generalized weakness. Pt with minimal PO acceptance 2/2 nausea upon initial assessment. ST to continue to follow for ongoing swallow assessment.    Goals:   Multidisciplinary Problems     SLP Goals        Problem: SLP Goal    Goal Priority Disciplines Outcome   SLP Goal     SLP Ongoing, Progressing   Description: Speech Language Pathology Goals  Goals expected to be met by 6/29/2020    1. Pt will participate in ongoing assessment of swallow function to determine safest, least restrictive means of nutrition/hydration  2. Educate Pt and family on aspiration precautions and SLP POC                       Plan:     · Patient to be seen:  5 x/week   · Plan of Care expires:  07/22/20  · Plan of Care reviewed with:  patient   · SLP Follow-Up:  Yes       Discharge recommendations:   other (see comments)(tb)     Time Tracking:     SLP Treatment Date:   06/22/20  Speech Start Time:  1510  Speech Stop Time:  1524     Speech Total Time (min):  14 min    Billable Minutes: Eval Swallow and Oral Function 14    HANNAH Burciaga, [facility]-SLP  Speech-Language Pathology  Pager: [phone]    06/22/2020

## 2020-06-23 ENCOUNTER — TELEPHONE (OUTPATIENT)
Dept: SLEEP MEDICINE | Facility: OTHER | Age: 65
End: 2020-06-23

## 2020-06-23 PROBLEM — E87.20 METABOLIC ACIDOSIS: Status: ACTIVE | Noted: 2020-06-23

## 2020-06-23 LAB
ALBUMIN SERPL BCP-MCNC: 2.8 G/DL (ref 3.5–5.2)
ALP SERPL-CCNC: 51 U/L (ref 55–135)
ALT SERPL W/O P-5'-P-CCNC: 66 U/L (ref 10–44)
ANION GAP SERPL CALC-SCNC: 10 MMOL/L (ref 8–16)
APTT BLDCRRT: 27.4 SEC (ref 21–32)
APTT BLDCRRT: 95 SEC (ref 21–32)
AST SERPL-CCNC: 133 U/L (ref 10–40)
BILIRUB SERPL-MCNC: 0.5 MG/DL (ref 0.1–1)
BUN SERPL-MCNC: 20 MG/DL (ref 8–23)
CALCIUM SERPL-MCNC: 7.1 MG/DL (ref 8.7–10.5)
CHLORIDE SERPL-SCNC: 108 MMOL/L (ref 95–110)
CO2 SERPL-SCNC: 18 MMOL/L (ref 23–29)
CREAT SERPL-MCNC: 1.8 MG/DL (ref 0.5–1.4)
EST. GFR  (AFRICAN AMERICAN): 34.3 ML/MIN/1.73 M^2
EST. GFR  (NON AFRICAN AMERICAN): 29.7 ML/MIN/1.73 M^2
GLUCOSE SERPL-MCNC: 175 MG/DL (ref 70–110)
INR PPP: 1.1 (ref 0.8–1.2)
MAGNESIUM SERPL-MCNC: 1.5 MG/DL (ref 1.6–2.6)
PHOSPHATE SERPL-MCNC: 2.1 MG/DL (ref 2.7–4.5)
POCT GLUCOSE: 128 MG/DL (ref 70–110)
POCT GLUCOSE: 141 MG/DL (ref 70–110)
POCT GLUCOSE: 159 MG/DL (ref 70–110)
POCT GLUCOSE: 163 MG/DL (ref 70–110)
POCT GLUCOSE: 232 MG/DL (ref 70–110)
POCT GLUCOSE: 273 MG/DL (ref 70–110)
POTASSIUM SERPL-SCNC: 4.2 MMOL/L (ref 3.5–5.1)
PROT SERPL-MCNC: 5.4 G/DL (ref 6–8.4)
PROTHROMBIN TIME: 11.1 SEC (ref 9–12.5)
SODIUM SERPL-SCNC: 136 MMOL/L (ref 136–145)
VANCOMYCIN TROUGH SERPL-MCNC: 10.2 UG/ML (ref 10–22)

## 2020-06-23 PROCEDURE — 94640 AIRWAY INHALATION TREATMENT: CPT

## 2020-06-23 PROCEDURE — 27000221 HC OXYGEN, UP TO 24 HOURS

## 2020-06-23 PROCEDURE — 25000003 PHARM REV CODE 250: Performed by: EMERGENCY MEDICINE

## 2020-06-23 PROCEDURE — 36415 COLL VENOUS BLD VENIPUNCTURE: CPT

## 2020-06-23 PROCEDURE — 99291 PR CRITICAL CARE, E/M 30-74 MINUTES: ICD-10-PCS | Mod: ,,, | Performed by: GENERAL ACUTE CARE HOSPITAL

## 2020-06-23 PROCEDURE — 80053 COMPREHEN METABOLIC PANEL: CPT

## 2020-06-23 PROCEDURE — 25000003 PHARM REV CODE 250: Performed by: GENERAL ACUTE CARE HOSPITAL

## 2020-06-23 PROCEDURE — 63600175 PHARM REV CODE 636 W HCPCS: Performed by: GENERAL ACUTE CARE HOSPITAL

## 2020-06-23 PROCEDURE — 94799 UNLISTED PULMONARY SVC/PX: CPT

## 2020-06-23 PROCEDURE — 20000000 HC ICU ROOM

## 2020-06-23 PROCEDURE — 85610 PROTHROMBIN TIME: CPT

## 2020-06-23 PROCEDURE — 63600175 PHARM REV CODE 636 W HCPCS: Performed by: NURSE PRACTITIONER

## 2020-06-23 PROCEDURE — 99291 CRITICAL CARE FIRST HOUR: CPT | Mod: ,,, | Performed by: GENERAL ACUTE CARE HOSPITAL

## 2020-06-23 PROCEDURE — 85730 THROMBOPLASTIN TIME PARTIAL: CPT | Mod: 91

## 2020-06-23 PROCEDURE — 25000003 PHARM REV CODE 250: Performed by: NURSE PRACTITIONER

## 2020-06-23 PROCEDURE — 83735 ASSAY OF MAGNESIUM: CPT

## 2020-06-23 PROCEDURE — 85730 THROMBOPLASTIN TIME PARTIAL: CPT

## 2020-06-23 PROCEDURE — 94761 N-INVAS EAR/PLS OXIMETRY MLT: CPT

## 2020-06-23 PROCEDURE — 63600175 PHARM REV CODE 636 W HCPCS: Performed by: INTERNAL MEDICINE

## 2020-06-23 PROCEDURE — 25000003 PHARM REV CODE 250: Performed by: STUDENT IN AN ORGANIZED HEALTH CARE EDUCATION/TRAINING PROGRAM

## 2020-06-23 PROCEDURE — 87070 CULTURE OTHR SPECIMN AEROBIC: CPT

## 2020-06-23 PROCEDURE — 80202 ASSAY OF VANCOMYCIN: CPT

## 2020-06-23 PROCEDURE — S0030 INJECTION, METRONIDAZOLE: HCPCS | Performed by: NURSE PRACTITIONER

## 2020-06-23 PROCEDURE — 25000003 PHARM REV CODE 250: Performed by: INTERNAL MEDICINE

## 2020-06-23 PROCEDURE — 25000242 PHARM REV CODE 250 ALT 637 W/ HCPCS: Performed by: STUDENT IN AN ORGANIZED HEALTH CARE EDUCATION/TRAINING PROGRAM

## 2020-06-23 PROCEDURE — 84100 ASSAY OF PHOSPHORUS: CPT

## 2020-06-23 PROCEDURE — S0028 INJECTION, FAMOTIDINE, 20 MG: HCPCS | Performed by: STUDENT IN AN ORGANIZED HEALTH CARE EDUCATION/TRAINING PROGRAM

## 2020-06-23 RX ORDER — HEPARIN SODIUM,PORCINE/D5W 25000/250
18 INTRAVENOUS SOLUTION INTRAVENOUS CONTINUOUS
Status: DISCONTINUED | OUTPATIENT
Start: 2020-06-23 | End: 2020-06-26

## 2020-06-23 RX ORDER — FAMOTIDINE 20 MG/1
20 TABLET, FILM COATED ORAL DAILY
Status: DISCONTINUED | OUTPATIENT
Start: 2020-06-24 | End: 2020-06-25

## 2020-06-23 RX ORDER — METRONIDAZOLE 500 MG/1
500 TABLET ORAL EVERY 8 HOURS
Status: DISCONTINUED | OUTPATIENT
Start: 2020-06-23 | End: 2020-06-24

## 2020-06-23 RX ORDER — MAGNESIUM SULFATE HEPTAHYDRATE 40 MG/ML
2 INJECTION, SOLUTION INTRAVENOUS ONCE
Status: COMPLETED | OUTPATIENT
Start: 2020-06-23 | End: 2020-06-23

## 2020-06-23 RX ORDER — SODIUM,POTASSIUM PHOSPHATES 280-250MG
2 POWDER IN PACKET (EA) ORAL EVERY 4 HOURS
Status: COMPLETED | OUTPATIENT
Start: 2020-06-23 | End: 2020-06-23

## 2020-06-23 RX ADMIN — METRONIDAZOLE 500 MG: 500 INJECTION, SOLUTION INTRAVENOUS at 06:06

## 2020-06-23 RX ADMIN — ASPIRIN 81 MG: 81 TABLET, COATED ORAL at 09:06

## 2020-06-23 RX ADMIN — METRONIDAZOLE 500 MG: 500 TABLET ORAL at 11:06

## 2020-06-23 RX ADMIN — FOLIC ACID 1 MG: 1 TABLET ORAL at 09:06

## 2020-06-23 RX ADMIN — IPRATROPIUM BROMIDE AND ALBUTEROL SULFATE 3 ML: .5; 3 SOLUTION RESPIRATORY (INHALATION) at 03:06

## 2020-06-23 RX ADMIN — IPRATROPIUM BROMIDE AND ALBUTEROL SULFATE 3 ML: .5; 3 SOLUTION RESPIRATORY (INHALATION) at 12:06

## 2020-06-23 RX ADMIN — POTASSIUM & SODIUM PHOSPHATES POWDER PACK 280-160-250 MG 2 PACKET: 280-160-250 PACK at 11:06

## 2020-06-23 RX ADMIN — IPRATROPIUM BROMIDE AND ALBUTEROL SULFATE 3 ML: .5; 3 SOLUTION RESPIRATORY (INHALATION) at 08:06

## 2020-06-23 RX ADMIN — FAMOTIDINE 20 MG: 10 INJECTION INTRAVENOUS at 09:06

## 2020-06-23 RX ADMIN — INSULIN DETEMIR 10 UNITS: 100 INJECTION, SOLUTION SUBCUTANEOUS at 09:06

## 2020-06-23 RX ADMIN — CEFEPIME 2 G: 2 INJECTION, POWDER, FOR SOLUTION INTRAVENOUS at 02:06

## 2020-06-23 RX ADMIN — IPRATROPIUM BROMIDE AND ALBUTEROL SULFATE 3 ML: .5; 3 SOLUTION RESPIRATORY (INHALATION) at 04:06

## 2020-06-23 RX ADMIN — METRONIDAZOLE 500 MG: 500 TABLET ORAL at 03:06

## 2020-06-23 RX ADMIN — HEPARIN SODIUM AND DEXTROSE 18 UNITS/KG/HR: 10000; 5 INJECTION INTRAVENOUS at 09:06

## 2020-06-23 RX ADMIN — INSULIN ASPART 1 UNITS: 100 INJECTION, SOLUTION INTRAVENOUS; SUBCUTANEOUS at 12:06

## 2020-06-23 RX ADMIN — ACETAMINOPHEN 650 MG: 325 TABLET ORAL at 11:06

## 2020-06-23 RX ADMIN — Medication 0.06 MCG/KG/MIN: at 06:06

## 2020-06-23 RX ADMIN — MAGNESIUM SULFATE IN WATER 2 G: 40 INJECTION, SOLUTION INTRAVENOUS at 11:06

## 2020-06-23 RX ADMIN — FLUTICASONE PROPIONATE 100 MCG: 50 SPRAY, METERED NASAL at 09:06

## 2020-06-23 RX ADMIN — CEFEPIME 2 G: 2 INJECTION, POWDER, FOR SOLUTION INTRAVENOUS at 03:06

## 2020-06-23 RX ADMIN — POTASSIUM & SODIUM PHOSPHATES POWDER PACK 280-160-250 MG 2 PACKET: 280-160-250 PACK at 09:06

## 2020-06-23 RX ADMIN — IPRATROPIUM BROMIDE AND ALBUTEROL SULFATE 3 ML: .5; 3 SOLUTION RESPIRATORY (INHALATION) at 07:06

## 2020-06-23 RX ADMIN — Medication 1250 MG: at 03:06

## 2020-06-23 NOTE — ASSESSMENT & PLAN NOTE
--Unclear source, however port infection highest on ddx.   --Consult IR to pull port today.  --Surveillance cultures NGTD.   --Continue cefepime + metronidazole + vancomycin for now.

## 2020-06-23 NOTE — SUBJECTIVE & OBJECTIVE
Interval History/Significant Events: Achy and febrile this morning. Ongoing shock requiring norepinephrine. IR consulted to removal port and heparin gtt held.     Review of Systems   Constitutional: Positive for appetite change, chills, fatigue and fever. Negative for diaphoresis.   HENT: Negative for postnasal drip.    Eyes: Negative for visual disturbance.   Respiratory: Negative for shortness of breath.    Cardiovascular: Negative for chest pain.   Gastrointestinal: Negative for abdominal pain and constipation.   Genitourinary: Negative for hematuria.   Musculoskeletal: Negative for myalgias.   Skin: Negative for rash.   Neurological: Negative for headaches.   Hematological: Negative for adenopathy.     Objective:     Vital Signs (Most Recent):  Temp: (!) 101.3 °F (38.5 °C) (06/23/20 0700)  Pulse: 98 (06/23/20 1000)  Resp: (!) 22 (06/23/20 1000)  BP: (!) 87/49 (06/23/20 1000)  SpO2: 100 % (06/23/20 1000) Vital Signs (24h Range):  Temp:  [98.8 °F (37.1 °C)-101.3 °F (38.5 °C)] 101.3 °F (38.5 °C)  Pulse:  [] 98  Resp:  [18-44] 22  SpO2:  [93 %-100 %] 100 %  BP: ()/(41-76) 87/49   Weight: 98.9 kg (218 lb)  Body mass index is 32.19 kg/m².      Intake/Output Summary (Last 24 hours) at 6/23/2020 1027  Last data filed at 6/23/2020 1000  Gross per 24 hour   Intake 2238.18 ml   Output 1780 ml   Net 458.18 ml       Physical Exam    Vents:  Oxygen Concentration (%): 28 (06/23/20 0719)  Lines/Drains/Airways     Drain                 Urethral Catheter 06/21/20 2013 Latex 16 Fr. 1 day          Peripheral Intravenous Line                 Peripheral IV - Single Lumen 06/21/20 18 G Left Antecubital 2 days         Peripheral IV - Single Lumen 06/21/20 1602 22 G Left Wrist 1 day         Peripheral IV - Single Lumen 06/22/20 1630 20 G Left Upper Arm less than 1 day              Significant Labs:    CBC/Anemia Profile:  Recent Labs   Lab 06/21/20  1708 06/21/20  2105 06/22/20  0324   WBC 4.63 6.44 4.16   HGB 10.4* 10.6*  9.9*   HCT 35.2* 35.1* 32.2*    190 180   MCV 96 95 90   RDW 15.6* 15.7* 15.6*   IRON 21*  --   --         Chemistries:  Recent Labs   Lab 06/21/20  1244 06/22/20  0324 06/22/20  1433 06/23/20  0330    137 135* 136   K 4.6 4.5 4.1 4.2    109 107 108   CO2 22* 18* 18* 18*   BUN 26* 26* 24* 20   CREATININE 2.4* 2.3* 2.1* 1.8*   CALCIUM 7.9* 6.9* 6.7* 7.1*   ALBUMIN 3.2* 2.8*  --  2.8*   PROT 5.9* 5.4*  --  5.4*   BILITOT 0.7 0.6  --  0.5   ALKPHOS 69 59  --  51*   ALT 70* 63*  --  66*   * 105*  --  133*   MG 1.8 1.7  --  1.5*   PHOS 2.7 2.6*  --  2.1*     Significant Imaging:  I have reviewed and interpreted all pertinent imaging results/findings within the past 24 hours.

## 2020-06-23 NOTE — NURSING
Pt tolerated port removal well. VSS. Pt to go back to ICU with ICU RN, report given in the procedure area.

## 2020-06-23 NOTE — PT/OT/SLP PROGRESS
Speech Language Pathology      Alison Branch  MRN: [id number]    Patient not seen today secondary to Pt NPO for procedure upon SLP attempt. ST to continue to monitor and follow-up 6/24/20 per ST POC.  Thank you.     TIFFANIE Burciaga., [facility]-SLP  Speech-Language Pathology  Pager: [phone]  6/23/2020

## 2020-06-23 NOTE — PLAN OF CARE
CMICU DAILY GOALS       A: Awake    RASS: Goal -  alert and calm  Actual -  alert and calm   Restraint necessity:  no  B: Breath   SBT: Not intubated   C: Coordinate A & B, analgesics/sedatives   Pain: managed    SAT: Not intubated  D: Delirium   CAM-ICU: Overall CAM-ICU: Negative  E: Early Mobility   MOVE Screen: Pass   Activity: Activity Management: activity adjusted per tolerance  FAS: Feeding/Nutrition   Diet order: Diet/Nutrition Received: full liquid,   Fluid restriction:    T: Thrombus   DVT prophylaxis: VTE Required Core Measure: Pharmacological prophylaxis initiated/maintained  H: HOB Elevation   Head of Bed (HOB): HOB at 30-45 degrees  U: Ulcer Prophylaxis   GI: no  G: Glucose control   managed Glycemic Management: blood glucose monitoring  S: Skin   Bundle compliance: yes   Bathing/Skin Care: bath, chlorhexidine, bath, complete, dressed/undressed, linen changed Date: 6/23 0600  B: Bowel Function   no issues   I: Indwelling Catheters   Amanda necessity:      Urethral Catheter 06/21/20 2013 Latex 16 Fr.-Reason for Continuing Urinary Catheterization: Critically ill in ICU requiring intensive monitoring   CVC necessity: Yes   IPAD offered: Yes  D: De-escalation Antibx   No  Plan for the day   Abx, monitor BP & wean levo.  Family/Goals of care/Code Status   Code Status: Full Code     No acute events throughout day, VS and assessment per flow sheet, patient progressing towards goals as tolerated, plan of care reviewed with Alison Branch and family, all concerns addressed, will continue to monitor.

## 2020-06-23 NOTE — PROCEDURES
Radiology Post-Procedure Note    Pre Op Diagnosis: stage IV NSCLC with concern for port infection    Post Op Diagnosis: Same    Procedure: PORT removal     Procedure performed by: Honorio Shelby MD; Ellen Cortes MD    Written Informed Consent Obtained: Yes    Specimen Removed: No    Estimated Blood Loss: Minimal    Findings:   Lidocaine was used as local anesthetic. A small incision was made and blunt dissection was used to remove the indwelling chest port.   There were no immediate complications.     Ellen Cortes, PGY-3

## 2020-06-23 NOTE — PROGRESS NOTES
Pharmacokinetic Assessment Follow Up: IV Vancomycin    Vancomycin serum concentration assessment(s):    Vancomycin trough level resulted at 10.2 mcg/mL approximately 22 hours after the previous dose. Goal level is 15 to 20 mcg/mL.     Drug levels (last 3 results):  Recent Labs   Lab Result Units 06/23/20  1103   Vancomycin-Trough ug/mL 10.2     Vancomycin Regimen Plan:    Increase to vancomycin 1250 mg IV every 24 hours with next serum trough concentration measured on 6/25 1430 prior to 3rd dose of new regimen.    Pharmacy will continue to follow and monitor vancomycin.    Please contact pharmacy at extension [phone] for questions regarding this assessment.    Thank you for the consult,   Kizzy Ruby, PharmD, BCCCP             Patient brief summary:  Alison Branch is a 64 y.o. female initiated on antimicrobial therapy with IV vancomycin for treatment of bacteremia    Drug Allergies:   Review of patient's allergies indicates:  No Known Allergies    Actual Body Weight:   98.9 kg     Renal Function:   Estimated Creatinine Clearance: 40.6 mL/min (A) (based on SCr of 1.8 mg/dL (H)).    Dialysis Method (if applicable):  N/A    CBC (last 72 hours):  Recent Labs   Lab Result Units 06/21/20  1244 06/21/20  1708 06/21/20  2105 06/22/20  0324   WBC K/uL 5.02 4.63 6.44 4.16   Hemoglobin g/dL 10.5* 10.4* 10.6* 9.9*   Hemoglobin A1C %  --  9.2*  --   --    Hematocrit % 34.6* 35.2* 35.1* 32.2*   Platelets K/uL 176 157 190 180   Gran% % 74.7* 76.1* 69.3 65.0   Lymph% % 18.1 18.1 24.7 27.6   Mono% % 5.2 4.8 4.7 5.0   Eosinophil% % 0.4 0.2 0.2 1.2   Basophil% % 0.6 0.4 0.5 0.5   Differential Method  Automated Automated Automated Automated       Metabolic Panel (last 72 hours):  Recent Labs   Lab Result Units 06/21/20  1244 06/21/20  2013 06/22/20  0324 06/22/20  1125 06/22/20  1433 06/23/20  0330   Sodium mmol/L 140  --  137  --  135* 136   Sodium Urine Random mmol/L  --  121  --  121  --   --    Potassium mmol/L 4.6  --  4.5  --   4.1 4.2   Potassium Urine Random mmol/L  --   --   --  19  --   --    Chloride mmol/L 108  --  109  --  107 108   Chloride, Rand Ur mmol/L  --   --   --  88  --   --    CO2 mmol/L 22*  --  18*  --  18* 18*   Glucose mg/dL 211*  --  195*  --  311* 175*   Glucose, UA   --  1+*  --   --   --   --    BUN, Bld mg/dL 26*  --  26*  --  24* 20   Creatinine mg/dL 2.4*  --  2.3*  --  2.1* 1.8*   Creatinine, Random Ur mg/dL  --  59.0  --   --   --   --    Albumin g/dL 3.2*  --  2.8*  --   --  2.8*   Total Bilirubin mg/dL 0.7  --  0.6  --   --  0.5   Alkaline Phosphatase U/L 69  --  59  --   --  51*   AST U/L 104*  --  105*  --   --  133*   ALT U/L 70*  --  63*  --   --  66*   Magnesium mg/dL 1.8  --  1.7  --   --  1.5*   Phosphorus mg/dL 2.7  --  2.6*  --   --  2.1*       Vancomycin Administrations:  vancomycin given in the last 96 hours                   vancomycin 750 mg in dextrose 5 % 250 mL IVPB (ready to mix system) (mg) 750 mg New Bag 06/22/20 1319    vancomycin 2 g in dextrose 5 % 500 mL IVPB (mg) 2,000 mg New Bag 06/21/20 1410                Microbiologic Results:  Microbiology Results (last 7 days)     Procedure Component Value Units Date/Time    Blood culture x two cultures. Draw prior to antibiotics. [[id number]]  (Abnormal) Collected: 06/21/20 1244    Order Status: Completed Specimen: Blood from Peripheral, Antecubital, Left Updated: 06/23/20 1351     Blood Culture, Routine Gram stain aer bottle: Gram positive cocci in clusters resembling Staph      Gram stain zoila bottle: Gram positive cocci in clusters resembling Staph       Results called to and read back by: Beth Benson 06/22/2020  08:36      COAGULASE-NEGATIVE STAPHYLOCOCCUS SPECIES  Organism is a probable contaminant      Narrative:      Aerobic and anaerobic    Blood culture [[id number]] Collected: 06/22/20 1428    Order Status: Completed Specimen: Blood from Peripheral, Upper Arm, Left Updated: 06/22/20 2145     Blood Culture, Routine No Growth to date     Narrative:      Blood cultures x 2 different sites. 4 bottles total. Please  draw cultures before administering antibiotics.    Blood culture [[id number]] Collected: 06/22/20 1427    Order Status: Completed Specimen: Blood from Peripheral, Forearm, Right Updated: 06/22/20 2145     Blood Culture, Routine No Growth to date    Narrative:      Blood cultures from 2 different sites. 4 bottles total.  Please draw before starting antibiotics.    Blood culture x two cultures. Draw prior to antibiotics. [[id number]] Collected: 06/21/20 1252    Order Status: Completed Specimen: Blood from Peripheral, Antecubital, Right Updated: 06/22/20 1612     Blood Culture, Routine No Growth to date      No Growth to date    Narrative:      Aerobic and anaerobic    Clostridium difficile EIA [[id number]]     Order Status: Canceled Specimen: Stool     Stool culture [[id number]]     Order Status: No result Specimen: Stool     Respiratory Infection Panel (PCR), Nasopharyngeal [[id number]]     Order Status: No result Specimen: Nasopharyngeal Swab     Culture, Respiratory with Gram Stain [[id number]]     Order Status: No result Specimen: Respiratory

## 2020-06-23 NOTE — PROGRESS NOTES
[facility]  Critical Care Medicine  Progress Note    Patient Name: Alison Branch  MRN: [id number]  Admission Date: 6/21/2020  Hospital Length of Stay: 2 days  Code Status: Full Code  Attending Provider: Lucho Zavaleta MD  Primary Care Provider: Mike Rodriguez Ii, MD   Principal Problem: Septic shock    Subjective:     HPI:  The patient is a 64 year old female with stage IV NSCLC with bone mets, recently started on systemic therapy w/ daily PO Entrectinib since 6/6/2020, patient of Dr Belcher, DM2, HTN, and prior DVT/PE on lovenox, presenting to [facility] for weakness, fevers, for past 24 hours. She was alert and oriented x4, conversational on initial presentation with SBP in 60s. She has started to require a rollator in setting of progressive weakness in the past few months. Patient was then noted to be weaker this morning and then had to use the bathroom. Family was helping her make it to the bathroom when she lost control of her bowels and bladder and then became even more weak. EMS called and patient brought to ED for eval.   Patient denies chest pain, shortness of breath, headaches, changes in vision, abdominal pain, vomiting. Patient reports history of urinary tract infections. Patient denies any joint pain. No neck stiffness. Of note Dr. Belcher recently provided option to patient to choose to try new chemotherapy or to stop, with the patient opting for new chemotherapy. The patient has not tolerated the chemotherapy greatly, experiencing nausea, nonbloody vomiting at times.       Hospital/ICU Course:  Ms. Branch was admitted to the ICU with [facility] for sepsis with borderline hypotension.  She was started on empiric antibiotics with pip/tazo and vancomycin along with pan cultures.  UA + bacteria, WBC 3.  Awaiting C diff and stool cultures.  Preliminary blood cultures 1/2 with GPC in cocci.  T max 102.  No leukocytosis/leukopenia. Lactate remains normal.  Urinary retention noted; hennessy placed.    Started on low dose norepinephrine 6/21 pm.    IR was consulted for port removal on 06/23/20.     Interval History/Significant Events: Achy and febrile this morning. Ongoing shock requiring norepinephrine. IR consulted to removal port and heparin gtt held.     Review of Systems   Constitutional: Positive for appetite change, chills, fatigue and fever. Negative for diaphoresis.   HENT: Negative for postnasal drip.    Eyes: Negative for visual disturbance.   Respiratory: Negative for shortness of breath.    Cardiovascular: Negative for chest pain.   Gastrointestinal: Negative for abdominal pain and constipation.   Genitourinary: Negative for hematuria.   Musculoskeletal: Negative for myalgias.   Skin: Negative for rash.   Neurological: Negative for headaches.   Hematological: Negative for adenopathy.     Objective:     Vital Signs (Most Recent):  Temp: (!) 101.3 °F (38.5 °C) (06/23/20 0700)  Pulse: 98 (06/23/20 1000)  Resp: (!) 22 (06/23/20 1000)  BP: (!) 87/49 (06/23/20 1000)  SpO2: 100 % (06/23/20 1000) Vital Signs (24h Range):  Temp:  [98.8 °F (37.1 °C)-101.3 °F (38.5 °C)] 101.3 °F (38.5 °C)  Pulse:  [] 98  Resp:  [18-44] 22  SpO2:  [93 %-100 %] 100 %  BP: ()/(41-76) 87/49   Weight: 98.9 kg (218 lb)  Body mass index is 32.19 kg/m².      Intake/Output Summary (Last 24 hours) at 6/23/2020 1027  Last data filed at 6/23/2020 1000  Gross per 24 hour   Intake 2238.18 ml   Output 1780 ml   Net 458.18 ml       Physical Exam    Vents:  Oxygen Concentration (%): 28 (06/23/20 0719)  Lines/Drains/Airways     Drain                 Urethral Catheter 06/21/20 2013 Latex 16 Fr. 1 day          Peripheral Intravenous Line                 Peripheral IV - Single Lumen 06/21/20 18 G Left Antecubital 2 days         Peripheral IV - Single Lumen 06/21/20 1602 22 G Left Wrist 1 day         Peripheral IV - Single Lumen 06/22/20 1630 20 G Left Upper Arm less than 1 day              Significant Labs:    CBC/Anemia Profile:  Recent  Labs   Lab 06/21/20  1708 06/21/20  2105 06/22/20  0324   WBC 4.63 6.44 4.16   HGB 10.4* 10.6* 9.9*   HCT 35.2* 35.1* 32.2*    190 180   MCV 96 95 90   RDW 15.6* 15.7* 15.6*   IRON 21*  --   --         Chemistries:  Recent Labs   Lab 06/21/20  1244 06/22/20  0324 06/22/20  1433 06/23/20  0330    137 135* 136   K 4.6 4.5 4.1 4.2    109 107 108   CO2 22* 18* 18* 18*   BUN 26* 26* 24* 20   CREATININE 2.4* 2.3* 2.1* 1.8*   CALCIUM 7.9* 6.9* 6.7* 7.1*   ALBUMIN 3.2* 2.8*  --  2.8*   PROT 5.9* 5.4*  --  5.4*   BILITOT 0.7 0.6  --  0.5   ALKPHOS 69 59  --  51*   ALT 70* 63*  --  66*   * 105*  --  133*   MG 1.8 1.7  --  1.5*   PHOS 2.7 2.6*  --  2.1*     Significant Imaging:  I have reviewed and interpreted all pertinent imaging results/findings within the past 24 hours.      ABG  Recent Labs   Lab 06/22/20  1440   PH 7.332*   PO2 43   PCO2 37.2   HCO3 19.7*   BE -6     Assessment/Plan:     Neuro  Cerebral aneurysm, coiled in 2010  - Continue ASA    Cardiac/Vascular  Elevated troponin  --Suspect demand ischemia.   --Echo within normal limits.     Hypertension associated with diabetes  - Hold olmesartan in setting of shock    Renal/  Metabolic acidosis  --2/2 ROSEMARY.  --Continue to monitor.     ROSEMARY (acute kidney injury)  Suspect secondary to dehydration from decreased oral intake and sepsis + ischemic ATN from shock..  FeNA 3% with urinary retention overnight.  Now with urine catheter.  sCr has been uptrending over the past few weeks, baseline ~0.8.    - US retroperitoneal. to septic shock and decreased po intake  - Strict I/O, avoid nephrotoxic meds      ID  * Septic shock  --Unclear source, however port infection highest on ddx.   --Consult IR to pull port today.  --Surveillance cultures NGTD.   --Continue cefepime + metronidazole + vancomycin for now.       Hematology  Pulmonary embolism  --PE in 5/2020, on therapeutic enoxaparin at home  --Hold heparin gtt awaiting port removal then restart.      Oncology  Malignant neoplasm of upper lobe of left lung  Stage IV NSCLC with bone mets, recently opted for new po chemotherapy entrectinib with Dr. Belcher, started 6/6/2020.  Was given the option of stopping chemo treatments but opted to start new chemo.  Patient expressed full code status on admission.    - Consult Oncology  - Consider palliative care consult     Endocrine  Type 2 diabetes mellitus with hyperglycemia, with long-term current use of insulin  Recent A1c 9.2. On short acting insulin TIDWM 20-16-16u and long acting 20u levemir at night. Decreased appetite in past few days.    - Currently NPO while awaiting swallow evaluation  - 10u levemir nightly  - LDSSI with frequent glucose checks          Critical Care Time: 60 minutes  Critical secondary to Patient has a condition that poses threat to life and bodily function: shock      Critical care was time spent personally by me on the following activities: development of treatment plan with patient or surrogate and bedside caregivers, discussions with consultants, evaluation of patient's response to treatment, examination of patient, ordering and performing treatments and interventions, ordering and review of laboratory studies, ordering and review of radiographic studies, pulse oximetry, re-evaluation of patient's condition. This critical care time did not overlap with that of any other provider or involve time for any procedures.     Sumit Saunders PA-C  Critical Care Medicine  [facility]

## 2020-06-23 NOTE — ASSESSMENT & PLAN NOTE
Suspect secondary to dehydration from decreased oral intake and sepsis + ischemic ATN from shock..  FeNA 3% with urinary retention overnight.  Now with urine catheter.  sCr has been uptrending over the past few weeks, baseline ~0.8.    - US retroperitoneal. to septic shock and decreased po intake  - Strict I/O, avoid nephrotoxic meds

## 2020-06-23 NOTE — ASSESSMENT & PLAN NOTE
--PE in 5/2020, on therapeutic enoxaparin at home  --Hold heparin gtt awaiting port removal then restart.

## 2020-06-23 NOTE — H&P
"Inpatient Radiology Pre-procedure Note    History of Present Illness:  Alison Branch is a 64 y.o. female who presents for port removal due to concern for infection.    Admission H&P reviewed.  Past Medical History:   Diagnosis Date    Allergy     Brain aneurysm 2010    s/p coiling of one; another not coiled    Breast cyst     Depression 9/19/2019    Diabetes mellitus     Diabetes type 2, controlled     Fever blister     High cholesterol     History of Bell's palsy     HTN (hypertension) 5/20/2014    Recurrent upper respiratory infection (URI)      Past Surgical History:   Procedure Laterality Date    BREAST SURGERY      BRONCHOSCOPY N/A 5/28/2019    Procedure: Bronchoscopy;  Surgeon: [facility] Diagnostic Provider;  [facility];  Service: Anesthesiology;  Laterality: N/A;    CERVICAL FUSION      COLONOSCOPY N/A 3/9/2016    Procedure: COLONOSCOPY;  Surgeon: Elliott Zimmerman MD;  Location: [facility] ENDO (4TH FLR);  Service: Endoscopy;  Laterality: N/A;    head surgery      stent and "curling" for aneurysm    HYSTERECTOMY      TVH secondary to SUF    INSERTION OF TUNNELED CENTRAL VENOUS CATHETER (CVC) WITH SUBCUTANEOUS PORT Right 7/8/2019    Procedure: INSERTION, PORT-A-CATH;  Surgeon: Cali Damico MD;  Location: [facility] CATH LAB;  Service: Radiology;  Laterality: Right;    MENISCECTOMY Left        Review of Systems:   As documented in primary team H&P    Home Meds:   Prior to Admission medications    Medication Sig Start Date End Date Taking? Authorizing Provider   aspirin (ECOTRIN) 81 MG EC tablet Take 1 tablet (81 mg total) by mouth once daily. 3/10/18   W James Min MD   benzonatate (TESSALON PERLES) 100 MG capsule Take 1 capsule (100 mg total) by mouth 2 (two) times daily. 3/26/20 3/26/21  Tara Belcher MD   blood sugar diagnostic Strp To check BG 4 times daily, to use with insurance preferred meter 2/27/20   Lisa Sandoavl NP   carboxymethylcellulose (REFRESH PLUS) 0.5 % Dpet 1 " drop 3 (three) times daily as needed.    iZon Provider, MD   cyproheptadine (PERIACTIN) 4 mg tablet Take 1 tablet (4 mg total) by mouth 4 (four) times daily. 6/18/20   Tara Belcher MD   enoxaparin (LOVENOX) 100 mg/mL Syrg Inject 1 mL (100 mg total) into the skin 2 (two) times a day. 5/20/20   Tara Belcher MD   entrectinib (ROZLYTREK) 200 mg oral tablet Take 3 capsules (600 mg total) by mouth once daily. 4/23/20   Tara Belcher MD   ergocalciferol (ERGOCALCIFEROL) 50,000 unit Cap TAKE 1 CAPSULE BY MOUTH EVERY 7 DAYS 5/27/20   Mike Rodriguez II, MD   fluticasone propionate (FLONASE) 50 mcg/actuation nasal spray USE TWO SPRAY(S) IN EACH NOSTRIL ONCE DAILY 4/3/20   Mike Rodriguez II, MD   folic acid (FOLVITE) 1 MG tablet Take 1 tablet (1 mg total) by mouth once daily. Start today 3/27/20 3/27/21  Tara Belcher MD   gabapentin (NEURONTIN) 300 MG capsule Take 1 capsule (300 mg total) by mouth nightly as needed (Take as needed at bed time for neuropathy pain and sleep). 3/20/20 3/20/21  David Mo MD   hydrocodone-homatropine 5-1.5 mg/5 ml (HYCODAN) 5-1.5 mg/5 mL Syrp Take 5 mLs by mouth every 4 (four) hours as needed. 5/26/20   Mike Rodriguez II, MD   insulin detemir U-100 (LEVEMIR FLEXTOUCH) 100 unit/mL (3 mL) SubQ InPn pen Steroid day: Inject 26-33 under the skin units daily. Non-steroid day: Inject 26 units daily 4/22/20   Ai Billingsley NP   insulin lispro (HUMALOG KWIKPEN INSULIN) 100 unit/mL pen Inject subcutaneously 20-12-12 units plus sliding scale.  24 units on steroid days Max  units. 5/21/20   Devendra Galaviz MD   lancets Misc 1 Device by Misc.(Non-Drug; Combo Route) route once daily. Heladio Result.  250.02.  Check Blood Sugar Twice Daily. 8/24/18   Mike Rodriguez II, MD   lidocaine-prilocaine (EMLA) cream Apply to PORT one hour prior to chemo administration. 4/24/20   Tara Belcher MD   nystatin (MYCOSTATIN) powder Apply topically 2 (two) times daily. 5/19/20   Bhavna KEYES  "TIGIST Perez   olmesartan (BENICAR) 20 MG tablet Take 1 tablet (20 mg total) by mouth once daily. 4/3/20   Mike Rodriguez II, MD   ondansetron (ZOFRAN) 8 MG tablet Take 1 tablet (8 mg total) by mouth 4 (four) times daily as needed for Nausea. 6/17/20 6/17/21  Tara Belcher MD   pen needle, diabetic 33 gauge x 5/32" Ndle 1 each by Misc.(Non-Drug; Combo Route) route 4 (four) times daily with meals and nightly. 2/19/20   Mike Rodriguez II, MD   phenazopyridine (PYRIDIUM) 200 MG tablet Take 1 tablet (200 mg total) by mouth 3 (three) times daily as needed for Pain. 3/27/20 3/27/21  Tara Belcher MD   terconazole (TERAZOL 7) 0.4 % Crea INSERT 1 APPLICATORFUL VAGINALLY ONCE DAILY IN THE EVENING 5/21/19   Mike Rodriguez II, MD   walker Misc Please provide rollator walker for this debilitated cancer patient.  Thank you. 9/6/19   Mike Rodriguez II, MD   insulin aspart U-100 (NOVOLOG) 100 unit/mL (3 mL) InPn pen Inject 12-20 units into the skin 3 times daily with meals plus correction scale. Max Total daily dose of 90 units 11/7/19 3/9/20  Ai Billingsley NP   insulin regular 100 unit/mL Inj injection Inject 14-10-10units subcutaneously before meals.  Patient taking differently: 20-16-16 plus sliding scale 3/9/20 3/12/20  iLsa Sandoval NP     Scheduled Meds:    albuterol-ipratropium  3 mL Nebulization Q4H    aspirin  81 mg Oral Daily    ceFEPime (MAXIPIME) IVPB  2 g Intravenous Q12H    [START ON 6/24/2020] famotidine  20 mg Oral Daily    fluticasone propionate  2 spray Each Nostril Daily    folic acid  1 mg Oral Daily    insulin detemir U-100  10 Units Subcutaneous QHS    magnesium sulfate IVPB  2 g Intravenous Once    metroNIDAZOLE  500 mg Oral Q8H    vancomycin (VANCOCIN) IVPB  750 mg Intravenous Q24H     Continuous Infusions:    norepinephrine bitartrate-D5W 0.04 mcg/kg/min (06/23/20 1100)     PRN Meds:acetaminophen, albuterol-ipratropium, Dextrose 10% Bolus, glucagon (human recombinant), insulin " aspart U-100, lidocaine, ondansetron, promethazine, sodium chloride 0.9%  Anticoagulants/Antiplatelets: Heparin gtt stopped, last dose aspirin this AM.    Allergies: Review of patient's allergies indicates:  No Known Allergies  Sedation Hx: have not been any systemic reactions    Labs:  Recent Labs   Lab 06/21/20  2105   INR 1.2       Recent Labs   Lab 06/22/20  0324   WBC 4.16   HGB 9.9*   HCT 32.2*   MCV 90         Recent Labs   Lab 06/23/20  0330   *      K 4.2      CO2 18*   BUN 20   CREATININE 1.8*   CALCIUM 7.1*   MG 1.5*   ALT 66*   *   ALBUMIN 2.8*   BILITOT 0.5         Vitals:  Temp: (!) 102.5 °F (39.2 °C) (06/23/20 1110)  Pulse: 99 (06/23/20 1100)  Resp: (!) 26 (06/23/20 1100)  BP: (!) 81/50 (06/23/20 1100)  SpO2: 100 % (06/23/20 1100)     Physical Exam:  ASA: II  Mallampati: III    General: no acute distress  Mental Status: Patient is oriented x3, and is slightly somnolent  HEENT: normocephalic, atraumatic  Chest: unlabored breathing  Heart: regular heart rate  Abdomen: nondistended  Extremity: moves all extremities    Plan: Port removal  Sedation Plan: Moderate    Dirk Pierce M.D.  PGY-3  Radiology

## 2020-06-23 NOTE — CONSULTS
"Radiology Consult    Alison Branch is a 64 y.o. female with a history of stage IV NSCLC with bone mets, recently started on systemic therapy w/ daily PO Entrectinib since 6/6/2020, patient of Dr Belcher, DM2, HTN, and prior DVT/PE on lovenox, presenting to [facility] for weakness, fevers, for past 24 hours. Patient has continued to spike fevers and remain hypotensive while on antibiotic therapy, so IR has been consulted for port removal for source control. Blood cultures drawn on 6/21/20 were positive for gram positive cocci in clusters. Patient is on pressors with low blood pressure in the ICU.     Past Medical History:   Diagnosis Date    Allergy     Brain aneurysm 2010    s/p coiling of one; another not coiled    Breast cyst     Depression 9/19/2019    Diabetes mellitus     Diabetes type 2, controlled     Fever blister     High cholesterol     History of Bell's palsy     HTN (hypertension) 5/20/2014    Recurrent upper respiratory infection (URI)      Past Surgical History:   Procedure Laterality Date    BREAST SURGERY      BRONCHOSCOPY N/A 5/28/2019    Procedure: Bronchoscopy;  Surgeon: [facility] Diagnostic Provider;  [facility];  Service: Anesthesiology;  Laterality: N/A;    CERVICAL FUSION      COLONOSCOPY N/A 3/9/2016    Procedure: COLONOSCOPY;  Surgeon: Elliott Zimmerman MD;  Location: [facility] ENDO (4TH FLR);  Service: Endoscopy;  Laterality: N/A;    head surgery      stent and "curling" for aneurysm    HYSTERECTOMY      TVH secondary to SUF    INSERTION OF TUNNELED CENTRAL VENOUS CATHETER (CVC) WITH SUBCUTANEOUS PORT Right 7/8/2019    Procedure: INSERTION, PORT-A-CATH;  Surgeon: Cali Damico MD;  Location: [facility] CATH LAB;  Service: Radiology;  Laterality: Right;    MENISCECTOMY Left        Imaging reviewed with Radiology staff, Dr. Shelby.     Procedure: Port removal    Scheduled Meds:    albuterol-ipratropium  3 mL Nebulization Q4H    aspirin  81 mg Oral Daily    ceFEPime " (MAXIPIME) IVPB  2 g Intravenous Q12H    [START ON 6/24/2020] famotidine  20 mg Oral Daily    fluticasone propionate  2 spray Each Nostril Daily    folic acid  1 mg Oral Daily    insulin detemir U-100  10 Units Subcutaneous QHS    magnesium sulfate IVPB  2 g Intravenous Once    metroNIDAZOLE  500 mg Oral Q8H    vancomycin (VANCOCIN) IVPB  750 mg Intravenous Q24H     Continuous Infusions:    norepinephrine bitartrate-D5W 0.04 mcg/kg/min (06/23/20 1100)     PRN Meds:acetaminophen, albuterol-ipratropium, Dextrose 10% Bolus, glucagon (human recombinant), insulin aspart U-100, lidocaine, ondansetron, promethazine, sodium chloride 0.9%    Allergies: Review of patient's allergies indicates:  No Known Allergies    Labs:  Recent Labs   Lab 06/21/20  2105   INR 1.2       Recent Labs   Lab 06/22/20  0324   WBC 4.16   HGB 9.9*   HCT 32.2*   MCV 90         Recent Labs   Lab 06/23/20  0330   *      K 4.2      CO2 18*   BUN 20   CREATININE 1.8*   CALCIUM 7.1*   MG 1.5*   ALT 66*   *   ALBUMIN 2.8*   BILITOT 0.5         Vitals (Most Recent):  Temp: (!) 102.5 °F (39.2 °C) (06/23/20 1110)  Pulse: 99 (06/23/20 1100)  Resp: (!) 26 (06/23/20 1100)  BP: (!) 81/50 (06/23/20 1100)  SpO2: 100 % (06/23/20 1100)    Plan:   1. Patient has been NPO since midnight.  2. Hold anticoagulants.  3. Will plan for port removal today, 6/23/20.    Dirk Pierce M.D.  PGY-3  Radiology

## 2020-06-23 NOTE — HPI
Alison Branch is a 64 year old woman with stage IV lung adenocarcinoma with metastasis to bone diagnosed in May 2019, on PO Entrectinib since 6/6/2020, IDDM2, HTN, HLD, DM, PE diagnosed May 2020 on enoxaparin, chronic hypoxic respiratory failure intermittently on 2L NC at home, history of recurrent UTIs, ADD not on a stimulant, and a history of brain aneurysm s/p coiling who [place] [facility] [place] for 1 day of fever and a few months of worsening generalized weakness which culminated in a loss of bowl and bladder control on the way to the bathroom the day of admission that prompted her to call EMS. At that time she denied any chest pain, SOB, LOC, headaches, vision changes, abdominal pain, joint pain, neck stiffness. She did admit to some nausea and non-bloody vomiting since starting daily PO Entrectinib causing overall decreased PO intake.    ED Course: SBPs in the 60s on arrival. Blood and urine cultures collected. Started on vancomycin and piperacillin-tazobactam. Lactate 3.0 and Procal 1.39. ROSEMARY with Cr 2.4 (baseline 1.2). CXR showed CARLOS opacity but unclear whether this was obstructive pneumonia vs cancer. Received 2.8L IV fluids in ED with MAPs still in low 60s. Was admitted to ICU and started on levophed for septic shock.      Oncology History (from Dr. Belcher): Smoked for 30 years, quit around year 2000. Started having cough in late 2018 however persisted into 2019. Saw her PCP who ordered CXR on 5/10/19, showed CARLOS PNA. Chest CT done on 5/17/19 showed CARLOS mass arising from the lateral pleural surface in the left upper lobe posterior subsegment measuring 2.6 x 3 cm. There were satellite mass more medially near the aortic arch that was 2 cm, also spiculated and irregular as well as thickened interlobar septa in the left lung apex and anterior segment, prevascular lymph node lateral to the aortic arch, short axis measuring 9 mm. She underwent bronchoscopy on 05/28/19, which revealed an infiltration in the left  "apical posterior segment of the left upper lobe, cytology brush was done. Transbronchial biopsies of an area of infiltration were also performed in the apicoposterior segment of the left upper lobe. Pathology revealed adenocarcinoma, however the specimen was not adequate enough to send for molecular testing.     PET scan on 6/6/19 showed significant hypermetabolic activity in the large irregular spiculated pulmonary mass in the lateral aspect of the left upper lobe consistent with the patient's known pulmonary adenocarcinoma. There was also tracer avidity in the medial left upper lobe satellite lesion and diffusely throughout much of the anterior left upper lobe where there was prominent nodular paraseptal thickening. There were some numerous scattered subcentimeter pulmonary nodules throughout the right lung, all of which are too small for detection by PET. There was a 0.5 cm, normal size right paratracheal lymph node with increased radiotracer uptake as well as hypermetabolic aortic pulmonary lymph node and a left hilar lymph node. There were focal hypermetabolic lesions in the left anterior superior iliac spine and anterior right fifth rib both associated with well defined lytic lesions. MRI pelvis from 6/10/19  revealed "region of osseous is irregularity at the left anterior iliac spine likely related to bone graft harvest procedure" and "no suspicious signal or enhancement to suggest active/malignant process". MRI brain from 6/10/19 revealed no intracranial abnormality.     Her GAURDANT was negative for molecular markers. Started Carboplatin, Alimta and Keytruda on 7/9/19 and completed 4 cycles, followed by Alimta and Keytruda maintenance. Her CT scans from 4/23/2020 revealed "In this patient with a known history of lung cancer, there has been marked interval detrimental change when compared to CT dated 12/20/2019 as follows. Persistent left upper lobe volume loss with worsening masslike consolidation and " enlarging index and non index lesions. Increased number of innumerable bilateral metastatic solid pulmonary nodules. Interval increased size and number of multiple osteoblastic metastatic lesions throughout the visualized axial skeleton. Stable mediastinal lymph nodes, several of which were noted to be hypermetabolic on previous PET-CT.  No new lymphadenopathy. Stable subcentimeter hepatic and splenic hypodensities, too small to accurately characterize. Path addendum from 5/2019 revealed ROS1. Patient was hospitalized in May 2019 and diagnosed with PE. Started on PO Entrectinib 600 mg qd on 6/6/2020.

## 2020-06-24 LAB
ALBUMIN SERPL BCP-MCNC: 2.8 G/DL (ref 3.5–5.2)
ALP SERPL-CCNC: 45 U/L (ref 55–135)
ALT SERPL W/O P-5'-P-CCNC: 64 U/L (ref 10–44)
ANION GAP SERPL CALC-SCNC: 10 MMOL/L (ref 8–16)
ANISOCYTOSIS BLD QL SMEAR: SLIGHT
ANISOCYTOSIS BLD QL SMEAR: SLIGHT
APTT BLDCRRT: 120 SEC (ref 21–32)
APTT BLDCRRT: 26.2 SEC (ref 21–32)
APTT BLDCRRT: 31.9 SEC (ref 21–32)
APTT BLDCRRT: >150 SEC (ref 21–32)
AST SERPL-CCNC: 137 U/L (ref 10–40)
BACTERIA BLD CULT: ABNORMAL
BASOPHILS # BLD AUTO: 0.01 K/UL (ref 0–0.2)
BASOPHILS # BLD AUTO: 0.03 K/UL (ref 0–0.2)
BASOPHILS # BLD AUTO: 0.03 K/UL (ref 0–0.2)
BASOPHILS NFR BLD: 0.2 % (ref 0–1.9)
BASOPHILS NFR BLD: 0.6 % (ref 0–1.9)
BASOPHILS NFR BLD: 0.6 % (ref 0–1.9)
BILIRUB SERPL-MCNC: 0.5 MG/DL (ref 0.1–1)
BUN SERPL-MCNC: 15 MG/DL (ref 8–23)
BURR CELLS BLD QL SMEAR: ABNORMAL
CALCIUM SERPL-MCNC: 7.1 MG/DL (ref 8.7–10.5)
CHLORIDE SERPL-SCNC: 107 MMOL/L (ref 95–110)
CO2 SERPL-SCNC: 20 MMOL/L (ref 23–29)
CREAT SERPL-MCNC: 1.5 MG/DL (ref 0.5–1.4)
DIFFERENTIAL METHOD: ABNORMAL
EOSINOPHIL # BLD AUTO: 0.1 K/UL (ref 0–0.5)
EOSINOPHIL NFR BLD: 1.3 % (ref 0–8)
EOSINOPHIL NFR BLD: 1.5 % (ref 0–8)
EOSINOPHIL NFR BLD: 1.8 % (ref 0–8)
ERYTHROCYTE [DISTWIDTH] IN BLOOD BY AUTOMATED COUNT: 15.5 % (ref 11.5–14.5)
ERYTHROCYTE [DISTWIDTH] IN BLOOD BY AUTOMATED COUNT: 15.7 % (ref 11.5–14.5)
ERYTHROCYTE [DISTWIDTH] IN BLOOD BY AUTOMATED COUNT: 15.7 % (ref 11.5–14.5)
EST. GFR  (AFRICAN AMERICAN): 42.7 ML/MIN/1.73 M^2
EST. GFR  (NON AFRICAN AMERICAN): 37.1 ML/MIN/1.73 M^2
GLUCOSE SERPL-MCNC: 126 MG/DL (ref 70–110)
HCT VFR BLD AUTO: 26.3 % (ref 37–48.5)
HCT VFR BLD AUTO: 26.3 % (ref 37–48.5)
HCT VFR BLD AUTO: 26.6 % (ref 37–48.5)
HGB BLD-MCNC: 8.1 G/DL (ref 12–16)
HGB BLD-MCNC: 8.1 G/DL (ref 12–16)
HGB BLD-MCNC: 8.2 G/DL (ref 12–16)
HYPOCHROMIA BLD QL SMEAR: ABNORMAL
IMM GRANULOCYTES # BLD AUTO: 0.04 K/UL (ref 0–0.04)
IMM GRANULOCYTES # BLD AUTO: 0.05 K/UL (ref 0–0.04)
IMM GRANULOCYTES # BLD AUTO: 0.08 K/UL (ref 0–0.04)
IMM GRANULOCYTES NFR BLD AUTO: 0.8 % (ref 0–0.5)
IMM GRANULOCYTES NFR BLD AUTO: 1 % (ref 0–0.5)
IMM GRANULOCYTES NFR BLD AUTO: 1.5 % (ref 0–0.5)
LYMPHOCYTES # BLD AUTO: 1.1 K/UL (ref 1–4.8)
LYMPHOCYTES # BLD AUTO: 1.4 K/UL (ref 1–4.8)
LYMPHOCYTES # BLD AUTO: 1.5 K/UL (ref 1–4.8)
LYMPHOCYTES NFR BLD: 21.9 % (ref 18–48)
LYMPHOCYTES NFR BLD: 28.3 % (ref 18–48)
LYMPHOCYTES NFR BLD: 28.8 % (ref 18–48)
MAGNESIUM SERPL-MCNC: 1.9 MG/DL (ref 1.6–2.6)
MCH RBC QN AUTO: 27.9 PG (ref 27–31)
MCH RBC QN AUTO: 27.9 PG (ref 27–31)
MCH RBC QN AUTO: 28.3 PG (ref 27–31)
MCHC RBC AUTO-ENTMCNC: 30.5 G/DL (ref 32–36)
MCHC RBC AUTO-ENTMCNC: 30.8 G/DL (ref 32–36)
MCHC RBC AUTO-ENTMCNC: 31.2 G/DL (ref 32–36)
MCV RBC AUTO: 91 FL (ref 82–98)
MCV RBC AUTO: 91 FL (ref 82–98)
MCV RBC AUTO: 92 FL (ref 82–98)
MONOCYTES # BLD AUTO: 0.3 K/UL (ref 0.3–1)
MONOCYTES NFR BLD: 5.8 % (ref 4–15)
MONOCYTES NFR BLD: 6.3 % (ref 4–15)
MONOCYTES NFR BLD: 6.3 % (ref 4–15)
NEUTROPHILS # BLD AUTO: 3 K/UL (ref 1.8–7.7)
NEUTROPHILS # BLD AUTO: 3.4 K/UL (ref 1.8–7.7)
NEUTROPHILS # BLD AUTO: 3.4 K/UL (ref 1.8–7.7)
NEUTROPHILS NFR BLD: 62 % (ref 38–73)
NEUTROPHILS NFR BLD: 62.4 % (ref 38–73)
NEUTROPHILS NFR BLD: 68.9 % (ref 38–73)
NRBC BLD-RTO: 0 /100 WBC
OVALOCYTES BLD QL SMEAR: ABNORMAL
PHOSPHATE SERPL-MCNC: 1.9 MG/DL (ref 2.7–4.5)
PLATELET # BLD AUTO: 133 K/UL (ref 150–350)
PLATELET # BLD AUTO: 137 K/UL (ref 150–350)
PLATELET # BLD AUTO: 143 K/UL (ref 150–350)
PLATELET BLD QL SMEAR: ABNORMAL
PLATELET BLD QL SMEAR: ABNORMAL
PMV BLD AUTO: 12.6 FL (ref 9.2–12.9)
PMV BLD AUTO: 13.2 FL (ref 9.2–12.9)
PMV BLD AUTO: 13.2 FL (ref 9.2–12.9)
POCT GLUCOSE: 101 MG/DL (ref 70–110)
POCT GLUCOSE: 138 MG/DL (ref 70–110)
POCT GLUCOSE: 139 MG/DL (ref 70–110)
POCT GLUCOSE: 147 MG/DL (ref 70–110)
POCT GLUCOSE: 221 MG/DL (ref 70–110)
POIKILOCYTOSIS BLD QL SMEAR: SLIGHT
POIKILOCYTOSIS BLD QL SMEAR: SLIGHT
POLYCHROMASIA BLD QL SMEAR: ABNORMAL
POTASSIUM SERPL-SCNC: 4.3 MMOL/L (ref 3.5–5.1)
PROT SERPL-MCNC: 5.5 G/DL (ref 6–8.4)
RBC # BLD AUTO: 2.9 M/UL (ref 4–5.4)
SODIUM SERPL-SCNC: 137 MMOL/L (ref 136–145)
WBC # BLD AUTO: 4.76 K/UL (ref 3.9–12.7)
WBC # BLD AUTO: 4.98 K/UL (ref 3.9–12.7)
WBC # BLD AUTO: 5.4 K/UL (ref 3.9–12.7)

## 2020-06-24 PROCEDURE — 25000003 PHARM REV CODE 250: Performed by: STUDENT IN AN ORGANIZED HEALTH CARE EDUCATION/TRAINING PROGRAM

## 2020-06-24 PROCEDURE — 85730 THROMBOPLASTIN TIME PARTIAL: CPT

## 2020-06-24 PROCEDURE — 94799 UNLISTED PULMONARY SVC/PX: CPT

## 2020-06-24 PROCEDURE — 83735 ASSAY OF MAGNESIUM: CPT

## 2020-06-24 PROCEDURE — 36415 COLL VENOUS BLD VENIPUNCTURE: CPT

## 2020-06-24 PROCEDURE — 99900035 HC TECH TIME PER 15 MIN (STAT)

## 2020-06-24 PROCEDURE — 63600175 PHARM REV CODE 636 W HCPCS: Performed by: INTERNAL MEDICINE

## 2020-06-24 PROCEDURE — 27000221 HC OXYGEN, UP TO 24 HOURS

## 2020-06-24 PROCEDURE — 20000000 HC ICU ROOM

## 2020-06-24 PROCEDURE — 94640 AIRWAY INHALATION TREATMENT: CPT

## 2020-06-24 PROCEDURE — 80053 COMPREHEN METABOLIC PANEL: CPT

## 2020-06-24 PROCEDURE — 97530 THERAPEUTIC ACTIVITIES: CPT

## 2020-06-24 PROCEDURE — 97166 OT EVAL MOD COMPLEX 45 MIN: CPT

## 2020-06-24 PROCEDURE — 25000003 PHARM REV CODE 250: Performed by: EMERGENCY MEDICINE

## 2020-06-24 PROCEDURE — 85730 THROMBOPLASTIN TIME PARTIAL: CPT | Mod: 91

## 2020-06-24 PROCEDURE — 25000003 PHARM REV CODE 250: Performed by: INTERNAL MEDICINE

## 2020-06-24 PROCEDURE — 99233 PR SUBSEQUENT HOSPITAL CARE,LEVL III: ICD-10-PCS | Mod: ,,, | Performed by: NURSE PRACTITIONER

## 2020-06-24 PROCEDURE — 85025 COMPLETE CBC W/AUTO DIFF WBC: CPT

## 2020-06-24 PROCEDURE — 25000003 PHARM REV CODE 250: Performed by: NURSE PRACTITIONER

## 2020-06-24 PROCEDURE — 25000242 PHARM REV CODE 250 ALT 637 W/ HCPCS: Performed by: STUDENT IN AN ORGANIZED HEALTH CARE EDUCATION/TRAINING PROGRAM

## 2020-06-24 PROCEDURE — 99233 SBSQ HOSP IP/OBS HIGH 50: CPT | Mod: ,,, | Performed by: NURSE PRACTITIONER

## 2020-06-24 PROCEDURE — 94761 N-INVAS EAR/PLS OXIMETRY MLT: CPT

## 2020-06-24 PROCEDURE — 63600175 PHARM REV CODE 636 W HCPCS: Performed by: NURSE PRACTITIONER

## 2020-06-24 PROCEDURE — 84100 ASSAY OF PHOSPHORUS: CPT

## 2020-06-24 PROCEDURE — 63600175 PHARM REV CODE 636 W HCPCS: Performed by: GENERAL ACUTE CARE HOSPITAL

## 2020-06-24 RX ADMIN — IPRATROPIUM BROMIDE AND ALBUTEROL SULFATE 3 ML: .5; 3 SOLUTION RESPIRATORY (INHALATION) at 12:06

## 2020-06-24 RX ADMIN — Medication 1250 MG: at 02:06

## 2020-06-24 RX ADMIN — SODIUM PHOSPHATE, MONOBASIC, MONOHYDRATE 30 MMOL: 276; 142 INJECTION, SOLUTION INTRAVENOUS at 09:06

## 2020-06-24 RX ADMIN — FLUTICASONE PROPIONATE 100 MCG: 50 SPRAY, METERED NASAL at 09:06

## 2020-06-24 RX ADMIN — CEFEPIME 2 G: 2 INJECTION, POWDER, FOR SOLUTION INTRAVENOUS at 02:06

## 2020-06-24 RX ADMIN — ACETAMINOPHEN 650 MG: 325 TABLET ORAL at 03:06

## 2020-06-24 RX ADMIN — CEFEPIME 2 G: 2 INJECTION, POWDER, FOR SOLUTION INTRAVENOUS at 03:06

## 2020-06-24 RX ADMIN — HEPARIN SODIUM AND DEXTROSE 14 UNITS/KG/HR: 10000; 5 INJECTION INTRAVENOUS at 07:06

## 2020-06-24 RX ADMIN — IPRATROPIUM BROMIDE AND ALBUTEROL SULFATE 3 ML: .5; 3 SOLUTION RESPIRATORY (INHALATION) at 04:06

## 2020-06-24 RX ADMIN — IPRATROPIUM BROMIDE AND ALBUTEROL SULFATE 3 ML: .5; 3 SOLUTION RESPIRATORY (INHALATION) at 08:06

## 2020-06-24 RX ADMIN — METRONIDAZOLE 500 MG: 500 TABLET ORAL at 05:06

## 2020-06-24 RX ADMIN — IPRATROPIUM BROMIDE AND ALBUTEROL SULFATE 3 ML: .5; 3 SOLUTION RESPIRATORY (INHALATION) at 03:06

## 2020-06-24 RX ADMIN — ASPIRIN 81 MG: 81 TABLET, COATED ORAL at 08:06

## 2020-06-24 RX ADMIN — Medication 0.05 MCG/KG/MIN: at 05:06

## 2020-06-24 RX ADMIN — FOLIC ACID 1 MG: 1 TABLET ORAL at 08:06

## 2020-06-24 RX ADMIN — FAMOTIDINE 20 MG: 20 TABLET, FILM COATED ORAL at 08:06

## 2020-06-24 RX ADMIN — INSULIN ASPART 2 UNITS: 100 INJECTION, SOLUTION INTRAVENOUS; SUBCUTANEOUS at 06:06

## 2020-06-24 NOTE — ASSESSMENT & PLAN NOTE
Suspect secondary to dehydration from decreased oral intake and sepsis + ischemic ATN from shock..  FeNA 3% with urinary retention overnight.  Now with urine catheter.  sCr has been uptrending over the past few weeks, baseline ~0.8. Scr improving.  Making adequate urine.     - US retroperitoneal with medical renal disease  - Strict I/O, avoid nephrotoxic meds  - D/c hennessy, bladder scan in 6 hours after removal.

## 2020-06-24 NOTE — NURSING
Returned from IR where port was removed dressing intact no blood or drainage. Daughter at bedside. Awake talking to daughter.

## 2020-06-24 NOTE — PT/OT/SLP PROGRESS
Speech Language Pathology      Alison Branch  MRN: [id number]    SLP attempted to see Patient for ongoing swallow assessment. Upon review of Pt with RN, RN Confirmed Pt advanced to regular diet with thin liquids and Patient with minimal PO intake 2/2 lethargy. Upon ST attempt, Pt declined PO trials and requested to rest. SLP unable to re-attempt later service day.  Aspiration precautions and recommendations for Pt to remain upright following all PO reviewed with RN.  Patient not seen today secondary to Pt fatigue/lethargy.  Will follow-up to re-attempt 6/25/2020. Thank you.     TIFFANIE Burciaga., [facility]-SLP  Speech-Language Pathology  Pager: [phone]  6/24/2020

## 2020-06-24 NOTE — PLAN OF CARE
Pt on CMICU due to Septic Shock.  Pt is stage 4 breast ca.  Pt currently receives chemo therapy.  SW received a transfer summary indicating that pt has [facility]  ([phone]).  Discharge planning needs are continued h/h vs snf.  Pt will probably step down to oncology once medical stable.      SW will continue to follow and assist has needed.     Taty Richards, YESI  [facility] -   Dept  [phone]

## 2020-06-24 NOTE — PT/OT/SLP EVAL
"Occupational Therapy   Evaluation    Name: Alison Branch  MRN: [id number]  Admitting Diagnosis:  Septic shock      Recommendations:     Discharge Recommendations: nursing facility, skilled  Discharge Equipment Recommendations:  none  Barriers to discharge:  None    Assessment:     Alison Branch is a 64 y.o. female with a medical diagnosis of Septic shock.  She presents with the following performance deficits affecting function: weakness, impaired balance, impaired endurance, impaired cardiopulmonary response to activity, impaired self care skills, impaired functional mobilty, decreased safety awareness. Pt tolerated assessment fair this date with debility being her main functional limitation. Pt lethargic throughout the session requiring min A for transfers and side steps EOB. OT intervention to address listed functional deficits, provide patient/caregiver education, reduce fall risk, and maximize (I) and safety with self care independence. Once medically stable, recommending pt discharge to skilled nursing facility. While in house, pt will benefit from skilled OT 4x/wk to address the deficits affecting his occupational performance.     Rehab Prognosis: Good; patient would benefit from acute skilled OT services to address these deficits and reach maximum level of function.       Plan:     Patient to be seen 4 x/week to address the above listed problems via self-care/home management, therapeutic activities, therapeutic exercises  · Plan of Care Expires: 07/23/20  · Plan of Care Reviewed with: patient    Subjective     Chief Complaint: Nausea and dizziness EOB  Comments: " I prefer to be at home."  Patient/Family Comments/goals: Pt agreeable to OT evaluation and OT POC.     Occupational Profile:  Living Environment: Pt lives with daughter in a 2nd floor apartment with a flight of CIRA. Bathroom set up: Step in tub with chair   Previous level of function: Pt recently required A with ADLs, A provided by home health [facility]. Pt " utilized the cane indoors and RW outdoors. 1 recent fall reported due to sudden onset of weakness.   Roles and Routines: Homemaker, mother, grandmother, enjoys shopping  Equipment Used at Home:  cane, straight, walker, rolling, shower chair, bedside commode  Assistance upon Discharge: Pt will have assistance from daughter upon discharge. Daughter works during the day.     Pain/Comfort:  · Pain Rating 1: 0/10  · Pain Rating Post-Intervention 1: 0/10    Patients cultural, spiritual, Taoist conflicts given the current situation: no    Objective:     Communicated with: RN prior to session.  Patient found HOB elevated with blood pressure cuff, peripheral IV, telemetry, oxygen, hennessy catheter upon OT entry to room.    General Precautions: Standard, fall, aspiration, respiratory   Orthopedic Precautions:N/A   Braces: N/A     Occupational Performance:    Bed Mobility:    · Patient completed Rolling/Turning to Left with  minimum assistance and with side rail  · Patient completed Scooting/Bridging with minimum assistance for anterior hip scooting   · Patient completed Supine to Sit with minimum assistance for trunk elevation   · Patient completed Sit to Supine with moderate assistance for BLE placement into bed     Functional Mobility/Transfers:  · Patient completed Sit <> Stand Transfer from bed with minimum assistance  with  hand-held assist   · Functional Mobility: 3 L lateral steps EOB with min A and B HHA. No LOB noted. No RBs required.     Activities of Daily Living:  · Feeding:  set up A to grasp cup and bring to mouth  with HOB elevated  · Lower Body Dressing: stand by assistance to don RLE sock EOB  · Toileting: dependence with use of hennessy for voiding    Cognitive/Visual Perceptual:  Cognitive/Psychosocial Skills:     -       Oriented to: Person, Place, Time and Situation   -       Follows Commands/attention:Follows multistep  commands  -       Communication: clear/fluent  -       Memory: No Deficits noted  -        Safety awareness/insight to disability: intact   -       Mood/Affect/Coping skills/emotional control: Lethargic  Visual/Perceptual:      -glasses donned during session     Physical Exam:  Balance:    -       EOB: 15 mins: SBA- min A  Postural examination/scapula alignment:    -       Rounded shoulders  Skin integrity: Dry  Edema:  Mild L hand due to IV  Sensation:    -       Intact  Upper Extremity Range of Motion:     -       Right Upper Extremity: WFL  -       Left Upper Extremity: WFL  Upper Extremity Strength:    -       Right Upper Extremity: 3+/5  -       Left Upper Extremity: 3+/5   Strength:    -       Right Upper Extremity: WFL  -       Left Upper Extremity: WFL    AMPAC 6 Click ADL:  AMPAC Total Score: 13    Treatment & Education:  - Role of OT/ OT POC  - Self care safety/ independence  - Functional transfer/ mobility safety  - Bed mobility safety  - Pursed lip breathing  - Importance of sitting UIC throughout the day as tolerated   - Energy conservation techniques such as taking rest breaks as needed  - Pt tolerated 15 minutes EOB for 15 minutes requiring SBA-min A. Pt c/o nausea and dizziness once EOB. Vitals monitored throughout the session. BP in supine: 95/57; BP EOB: 85/49; BP in supine: 94/55.   Education:    Patient left HOB elevated with all lines intact, call button in reach and RN notified    GOALS:   Multidisciplinary Problems     Occupational Therapy Goals        Problem: Occupational Therapy Goal    Goal Priority Disciplines Outcome Interventions   Occupational Therapy Goal     OT, PT/OT Ongoing, Progressing    Description: Goals to be met by: 7/4/20    Patient will increase functional independence with ADLs by performing:    UE Dressing with Stand-by Assistance.  LE Dressing with Stand-by Assistance.  Grooming while standing at sink with Contact Guard Assistance.  Toileting from toilet with Contact Guard Assistance for hygiene and clothing management.   Step transfer with Contact  "Guard Assistance and AD as needed to prepare for household mobility to complete occupations of choice  Toilet transfer to toilet with Contact Guard Assistance.  Upper extremity exercise program x15 reps per handout, with independence to build strength and endurance for functional tasks.                     History:     Past Medical History:   Diagnosis Date    Allergy     Brain aneurysm 2010    s/p coiling of one; another not coiled    Breast cyst     Depression 9/19/2019    Diabetes mellitus     Diabetes type 2, controlled     Fever blister     High cholesterol     History of Bell's palsy     HTN (hypertension) 5/20/2014    Recurrent upper respiratory infection (URI)        Past Surgical History:   Procedure Laterality Date    BREAST SURGERY      BRONCHOSCOPY N/A 5/28/2019    Procedure: Bronchoscopy;  [facility];  [facility];  Service: Anesthesiology;  Laterality: N/A;    CERVICAL FUSION      COLONOSCOPY N/A 3/9/2016    Procedure: COLONOSCOPY;  Surgeon: Elliott Zimmerman MD;  Location: [facility] ENDO (4TH FLR);  Service: Endoscopy;  Laterality: N/A;    head surgery      stent and "curling" for aneurysm    HYSTERECTOMY      TVH secondary to SUF    INSERTION OF TUNNELED CENTRAL VENOUS CATHETER (CVC) WITH SUBCUTANEOUS PORT Right 7/8/2019    Procedure: INSERTION, PORT-A-CATH;  Surgeon: Cali Damico MD;  Location: [facility] CATH LAB;  Service: Radiology;  Laterality: Right;    MENISCECTOMY Left        Time Tracking:     OT Date of Treatment: 06/24/20  OT Start Time: 0855  OT Stop Time: 0925  OT Total Time (min): 30 min    Billable Minutes:Evaluation 20  Therapeutic Activity 10    Bhavna Olguin, OT  6/24/2020    "

## 2020-06-24 NOTE — ASSESSMENT & PLAN NOTE
Recent A1c 9.2. On short acting insulin TIDWM 20-16-16u and long acting 20u levemir at night. Decreased appetite in past few days.    - LDSSI with frequent glucose checks  - Started on diet.  ST following.

## 2020-06-24 NOTE — PLAN OF CARE
Problem: Occupational Therapy Goal  Goal: Occupational Therapy Goal  Description: Goals to be met by: 7/4/20    Patient will increase functional independence with ADLs by performing:    UE Dressing with Stand-by Assistance.  LE Dressing with Stand-by Assistance.  Grooming while standing at sink with Contact Guard Assistance.  Toileting from toilet with Contact Guard Assistance for hygiene and clothing management.   Step transfer with Contact Guard Assistance and AD as needed to prepare for household mobility to complete occupations of choice  Toilet transfer to toilet with Contact Guard Assistance.  Upper extremity exercise program x15 reps per handout, with independence to build strength and endurance for functional tasks.    Outcome: Ongoing, Progressing     OT evaluation complete and POC established. See evaluation note for further details.   Disposition recommendation: Skilled nursing facility      Bhavna Olguin OTR/L  6/24/20

## 2020-06-24 NOTE — PROGRESS NOTES
[facility]  Critical Care Medicine  Progress Note    Patient Name: Alison Branch  MRN: [id number]  Admission Date: 6/21/2020  Hospital Length of Stay: 3 days  Code Status: Full Code  Attending Provider: Lucho Zavaleta MD  Primary Care Provider: Mike Rodriguez Ii, MD   Principal Problem: Septic shock    Subjective:     HPI:  The patient is a 64 year old female with stage IV NSCLC with bone mets, recently started on systemic therapy w/ daily PO Entrectinib since 6/6/2020, patient of Dr Belcher, DM2, HTN, and prior DVT/PE on lovenox, presenting to [facility] for weakness, fevers, for past 24 hours. She was alert and oriented x4, conversational on initial presentation with SBP in 60s. She has started to require a rollator in setting of progressive weakness in the past few months. Patient was then noted to be weaker this morning and then had to use the bathroom. Family was helping her make it to the bathroom when she lost control of her bowels and bladder and then became even more weak. EMS called and patient brought to ED for eval.   Patient denies chest pain, shortness of breath, headaches, changes in vision, abdominal pain, vomiting. Patient reports history of urinary tract infections. Patient denies any joint pain. No neck stiffness. Of note Dr. Belcher recently provided option to patient to choose to try new chemotherapy or to stop, with the patient opting for new chemotherapy. The patient has not tolerated the chemotherapy greatly, experiencing nausea, nonbloody vomiting at times.       Hospital/ICU Course:  Ms. Branch was admitted to the ICU with [facility] for sepsis with borderline hypotension.  She was started on empiric antibiotics with pip/tazo and vancomycin along with pan cultures.  UA + bacteria, WBC 3.  Awaiting C diff and stool cultures.  Preliminary blood cultures 1/2 with GPC in cocci.  T max 102.  No leukocytosis/leukopenia. Lactate remains normal.  Urinary retention noted; hennessy placed.    Started on low dose norepinephrine 6/21 pm.    IR was consulted for port removal on 06/23/20. Weaned off norepinephrine infusion 6/24 am.     Interval History/Significant Events: No acute events overnight.      Review of Systems   Respiratory: Negative for shortness of breath.    Cardiovascular: Negative for chest pain.   Gastrointestinal: Negative for abdominal pain, diarrhea, nausea and vomiting.     Objective:     Vital Signs (Most Recent):  Temp: 98.5 °F (36.9 °C) (06/24/20 0700)  Pulse: 96 (06/24/20 1000)  Resp: (!) 25 (06/24/20 1000)  BP: (!) 102/53 (06/24/20 1000)  SpO2: 96 % (06/24/20 1000) Vital Signs (24h Range):  Temp:  [98.5 °F (36.9 °C)-102 °F (38.9 °C)] 98.5 °F (36.9 °C)  Pulse:  [] 96  Resp:  [14-38] 25  SpO2:  [91 %-100 %] 96 %  BP: ()/(40-74) 102/53   Weight: 98.9 kg (218 lb)  Body mass index is 32.19 kg/m².      Intake/Output Summary (Last 24 hours) at 6/24/2020 1110  Last data filed at 6/24/2020 1008  Gross per 24 hour   Intake 1237.37 ml   Output 1290 ml   Net -52.63 ml       Physical Exam  Vitals signs and nursing note reviewed.   Constitutional:       General: She is not in acute distress.  HENT:      Head: Normocephalic.   Cardiovascular:      Rate and Rhythm: Normal rate and regular rhythm.      Heart sounds: No murmur.   Pulmonary:      Effort: Pulmonary effort is normal. No respiratory distress.      Breath sounds: No wheezing, rhonchi or rales.   Chest:      Chest wall: No tenderness.   Abdominal:      General: Bowel sounds are normal. There is no distension.      Palpations: Abdomen is soft.      Tenderness: There is no abdominal tenderness.   Skin:     General: Skin is warm and dry.   Neurological:      General: No focal deficit present.      Mental Status: She is alert.      Comments: Appropriate in conversation   Psychiatric:         Mood and Affect: Mood normal.         Behavior: Behavior normal.         Vents:  Oxygen Concentration (%): 28 (06/24/20  0459)  Lines/Drains/Airways     Drain            Female External Urinary Catheter 06/24/20 1009 less than 1 day          Peripheral Intravenous Line                 Peripheral IV - Single Lumen 06/21/20 18 G Left Antecubital 3 days         Peripheral IV - Single Lumen 06/21/20 1602 22 G Left Wrist 2 days         Peripheral IV - Single Lumen 06/22/20 1630 20 G Left Upper Arm 1 day              Significant Labs:    CBC/Anemia Profile:  Recent Labs   Lab 06/24/20  0748   WBC 5.40   HGB 8.2*   HCT 26.3*   *   MCV 91   RDW 15.7*        Chemistries:  Recent Labs   Lab 06/22/20  1433 06/23/20  0330 06/24/20  0349   * 136 137   K 4.1 4.2 4.3    108 107   CO2 18* 18* 20*   BUN 24* 20 15   CREATININE 2.1* 1.8* 1.5*   CALCIUM 6.7* 7.1* 7.1*   ALBUMIN  --  2.8* 2.8*   PROT  --  5.4* 5.5*   BILITOT  --  0.5 0.5   ALKPHOS  --  51* 45*   ALT  --  66* 64*   AST  --  133* 137*   MG  --  1.5* 1.9   PHOS  --  2.1* 1.9*       All pertinent labs within the past 24 hours have been reviewed.    Significant Imaging:  All pertinent imaging within the past 24 hours has been reviewed.       ABG  Recent Labs   Lab 06/22/20  1440   PH 7.332*   PO2 43   PCO2 37.2   HCO3 19.7*   BE -6     Assessment/Plan:     Neuro  Cerebral aneurysm, coiled in 2010  - Continue ASA    Cardiac/Vascular  Elevated troponin  Suspect demand ischemia.  Echo within normal limits.     Hypertension associated with diabetes  - Hold olmesartan in setting of shock    Renal/  Metabolic acidosis  NAGMA.  Improving. Urine anion gap + suggesting RTA.    --Continue to monitor.     ROSEMARY (acute kidney injury)  Suspect secondary to dehydration from decreased oral intake and sepsis + ischemic ATN from shock..  FeNA 3% with urinary retention overnight.  Now with urine catheter.  sCr has been uptrending over the past few weeks, baseline ~0.8. Scr improving.  Making adequate urine.     - US retroperitoneal with medical renal disease  - Strict I/O, avoid nephrotoxic  meds  - D/c hennessy, bladder scan in 6 hours after removal.      ID  * Septic shock  Unclear source, 1 set of blood cultures on 6/21 with Coag neg staph.  Port removed per IR on 6/23. Repeat blood cultures 6/22 NGTD. No further episodes of diarrhea.     - Follow up blood cultures  - D/c metronidazole.  Continue vancomycin and cefepime for now.   - Weaned off norepinephrine infusion ~ 8 am on 6/24.           Hematology  Pulmonary embolism  PE in 5/2020, on therapeutic enoxaparin at home    - Continue heparin gtt, titrating per protocol    Oncology  Malignant neoplasm of upper lobe of left lung  Stage IV NSCLC with bone mets, recently opted for new po chemotherapy entrectinib with Dr. Belcher, started 6/6/2020.  Was given the option of stopping chemo treatments but opted to start new chemo.  Patient expressed full code status on admission.    - Consult Oncology  - Consider palliative care consult     Endocrine  Type 2 diabetes mellitus with hyperglycemia, with long-term current use of insulin  Recent A1c 9.2. On short acting insulin TIDWM 20-16-16u and long acting 20u levemir at night. Decreased appetite in past few days.    - LDSSI with frequent glucose checks  - Started on diet.  ST following.    Normocytic Anemia  Hb downtrending on am CBC 9.9-->8.2. On heparin infusion.  No evidence of ongoing bleeding.    - Repeat CBC this afternoon    Reports of difficulty swallowing food at home.   - ST following   - Aspiration precautions      VTE Risk Mitigation (From admission, onward)         Ordered     heparin 25,000 units in dextrose 5% 250 mL (100 units/mL) infusion HIGH INTENSITY nomogram - [facility]  Continuous     Question:  Heparin Infusion Adjustment (DO NOT MODIFY ANSWER)  Answer:  [email or web address]\epic\Images\Pharmacy\HeparinInfusions\heparin HIGH INTENSITY nomogram for [facility] [id number]    06/23/20 1915     heparin 25,000 units in dextrose 5% (100 units/ml) IV bolus from bag - ADDITIONAL PRN BOLUS - 60 units/kg  As needed (PRN)      Question:  Heparin Infusion Adjustment (DO NOT MODIFY ANSWER)  Answer:  \\[email or web address]\epic\Images\Pharmacy\HeparinInfusions\heparin HIGH INTENSITY nomogram for [facility] [id number]    06/23/20 1915     heparin 25,000 units in dextrose 5% (100 units/ml) IV bolus from bag - ADDITIONAL PRN BOLUS - 30 units/kg  As needed (PRN)     Question:  Heparin Infusion Adjustment (DO NOT MODIFY ANSWER)  Answer:  \\[email or web address]\epic\Images\Pharmacy\HeparinInfusions\heparin HIGH INTENSITY nomogram for [facility] [id number]    06/23/20 1915     IP VTE HIGH RISK PATIENT  Once      06/21/20 1642     Place sequential compression device  Until discontinued      06/21/20 1642              PT/OT ordered    Possible transfer to Oncology this afternoon if remains normotensive off vasopressors.     Discussed plan with Dr. Waqar YEH spent >70 minutes reviewing patient records, examining, and counseling the patient with greater than 50% of the time spent with direct patient care and coordination.     Yenny Castillo NP  Critical Care Medicine  [facility]

## 2020-06-24 NOTE — ASSESSMENT & PLAN NOTE
Unclear source, 1 set of blood cultures on 6/21 with Coag neg staph.  Port removed per IR on 6/23. Repeat blood cultures 6/22 NGTD. No further episodes of diarrhea.     - Follow up blood cultures  - D/c metronidazole.  Continue vancomycin and cefepime for now.   - Weaned off norepinephrine infusion ~ 8 am on 6/24.

## 2020-06-24 NOTE — SUBJECTIVE & OBJECTIVE
Interval History/Significant Events: No acute events overnight.      Review of Systems   Respiratory: Negative for shortness of breath.    Cardiovascular: Negative for chest pain.   Gastrointestinal: Negative for abdominal pain, diarrhea, nausea and vomiting.     Objective:     Vital Signs (Most Recent):  Temp: 98.5 °F (36.9 °C) (06/24/20 0700)  Pulse: 96 (06/24/20 1000)  Resp: (!) 25 (06/24/20 1000)  BP: (!) 102/53 (06/24/20 1000)  SpO2: 96 % (06/24/20 1000) Vital Signs (24h Range):  Temp:  [98.5 °F (36.9 °C)-102 °F (38.9 °C)] 98.5 °F (36.9 °C)  Pulse:  [] 96  Resp:  [14-38] 25  SpO2:  [91 %-100 %] 96 %  BP: ()/(40-74) 102/53   Weight: 98.9 kg (218 lb)  Body mass index is 32.19 kg/m².      Intake/Output Summary (Last 24 hours) at 6/24/2020 1110  Last data filed at 6/24/2020 1008  Gross per 24 hour   Intake 1237.37 ml   Output 1290 ml   Net -52.63 ml       Physical Exam  Vitals signs and nursing note reviewed.   Constitutional:       General: She is not in acute distress.  HENT:      Head: Normocephalic.   Cardiovascular:      Rate and Rhythm: Normal rate and regular rhythm.      Heart sounds: No murmur.   Pulmonary:      Effort: Pulmonary effort is normal. No respiratory distress.      Breath sounds: No wheezing, rhonchi or rales.   Chest:      Chest wall: No tenderness.   Abdominal:      General: Bowel sounds are normal. There is no distension.      Palpations: Abdomen is soft.      Tenderness: There is no abdominal tenderness.   Skin:     General: Skin is warm and dry.   Neurological:      General: No focal deficit present.      Mental Status: She is alert.      Comments: Appropriate in conversation   Psychiatric:         Mood and Affect: Mood normal.         Behavior: Behavior normal.         Vents:  Oxygen Concentration (%): 28 (06/24/20 0459)  Lines/Drains/Airways     Drain            Female External Urinary Catheter 06/24/20 1009 less than 1 day          Peripheral Intravenous Line                  Peripheral IV - Single Lumen 06/21/20 18 G Left Antecubital 3 days         Peripheral IV - Single Lumen 06/21/20 1602 22 G Left Wrist 2 days         Peripheral IV - Single Lumen 06/22/20 1630 20 G Left Upper Arm 1 day              Significant Labs:    CBC/Anemia Profile:  Recent Labs   Lab 06/24/20  0748   WBC 5.40   HGB 8.2*   HCT 26.3*   *   MCV 91   RDW 15.7*        Chemistries:  Recent Labs   Lab 06/22/20  1433 06/23/20  0330 06/24/20  0349   * 136 137   K 4.1 4.2 4.3    108 107   CO2 18* 18* 20*   BUN 24* 20 15   CREATININE 2.1* 1.8* 1.5*   CALCIUM 6.7* 7.1* 7.1*   ALBUMIN  --  2.8* 2.8*   PROT  --  5.4* 5.5*   BILITOT  --  0.5 0.5   ALKPHOS  --  51* 45*   ALT  --  66* 64*   AST  --  133* 137*   MG  --  1.5* 1.9   PHOS  --  2.1* 1.9*       All pertinent labs within the past 24 hours have been reviewed.    Significant Imaging:  All pertinent imaging within the past 24 hours has been reviewed.

## 2020-06-25 PROBLEM — E83.42 HYPOMAGNESEMIA: Status: ACTIVE | Noted: 2020-06-25

## 2020-06-25 PROBLEM — R33.9 URINARY RETENTION: Status: ACTIVE | Noted: 2020-06-25

## 2020-06-25 PROBLEM — J96.11 CHRONIC RESPIRATORY FAILURE WITH HYPOXIA: Status: ACTIVE | Noted: 2020-06-25

## 2020-06-25 PROBLEM — R79.89 ELEVATED LFTS: Status: ACTIVE | Noted: 2020-06-25

## 2020-06-25 PROBLEM — A41.9 SEPSIS: Status: ACTIVE | Noted: 2020-06-25

## 2020-06-25 PROBLEM — D64.9 ANEMIA: Status: ACTIVE | Noted: 2020-06-25

## 2020-06-25 PROBLEM — E83.39 HYPOPHOSPHATEMIA: Status: ACTIVE | Noted: 2020-06-25

## 2020-06-25 PROBLEM — Z71.89 GOALS OF CARE, COUNSELING/DISCUSSION: Status: ACTIVE | Noted: 2020-06-25

## 2020-06-25 LAB
ALBUMIN SERPL BCP-MCNC: 2.7 G/DL (ref 3.5–5.2)
ALP SERPL-CCNC: 49 U/L (ref 55–135)
ALT SERPL W/O P-5'-P-CCNC: 72 U/L (ref 10–44)
ANION GAP SERPL CALC-SCNC: 10 MMOL/L (ref 8–16)
ANISOCYTOSIS BLD QL SMEAR: SLIGHT
ANISOCYTOSIS BLD QL SMEAR: SLIGHT
APTT BLDCRRT: 52.5 SEC (ref 21–32)
APTT BLDCRRT: 56.2 SEC (ref 21–32)
APTT BLDCRRT: >150 SEC (ref 21–32)
AST SERPL-CCNC: 148 U/L (ref 10–40)
BASOPHILS # BLD AUTO: 0.02 K/UL (ref 0–0.2)
BASOPHILS # BLD AUTO: 0.02 K/UL (ref 0–0.2)
BASOPHILS NFR BLD: 0.4 % (ref 0–1.9)
BASOPHILS NFR BLD: 0.4 % (ref 0–1.9)
BILIRUB SERPL-MCNC: 0.5 MG/DL (ref 0.1–1)
BUN SERPL-MCNC: 15 MG/DL (ref 8–23)
BURR CELLS BLD QL SMEAR: ABNORMAL
BURR CELLS BLD QL SMEAR: ABNORMAL
CALCIUM SERPL-MCNC: 7.4 MG/DL (ref 8.7–10.5)
CHLORIDE SERPL-SCNC: 108 MMOL/L (ref 95–110)
CO2 SERPL-SCNC: 21 MMOL/L (ref 23–29)
CREAT SERPL-MCNC: 1.3 MG/DL (ref 0.5–1.4)
DIFFERENTIAL METHOD: ABNORMAL
DIFFERENTIAL METHOD: ABNORMAL
EOSINOPHIL # BLD AUTO: 0.1 K/UL (ref 0–0.5)
EOSINOPHIL # BLD AUTO: 0.1 K/UL (ref 0–0.5)
EOSINOPHIL NFR BLD: 2.6 % (ref 0–8)
EOSINOPHIL NFR BLD: 2.6 % (ref 0–8)
ERYTHROCYTE [DISTWIDTH] IN BLOOD BY AUTOMATED COUNT: 15.6 % (ref 11.5–14.5)
ERYTHROCYTE [DISTWIDTH] IN BLOOD BY AUTOMATED COUNT: 15.6 % (ref 11.5–14.5)
EST. GFR  (AFRICAN AMERICAN): 50.8 ML/MIN/1.73 M^2
EST. GFR  (NON AFRICAN AMERICAN): 44.1 ML/MIN/1.73 M^2
GLUCOSE SERPL-MCNC: 140 MG/DL (ref 70–110)
HCT VFR BLD AUTO: 26.9 % (ref 37–48.5)
HCT VFR BLD AUTO: 26.9 % (ref 37–48.5)
HGB BLD-MCNC: 8.1 G/DL (ref 12–16)
HGB BLD-MCNC: 8.1 G/DL (ref 12–16)
HYPOCHROMIA BLD QL SMEAR: ABNORMAL
HYPOCHROMIA BLD QL SMEAR: ABNORMAL
IMM GRANULOCYTES # BLD AUTO: 0.04 K/UL (ref 0–0.04)
IMM GRANULOCYTES # BLD AUTO: 0.04 K/UL (ref 0–0.04)
IMM GRANULOCYTES NFR BLD AUTO: 0.8 % (ref 0–0.5)
IMM GRANULOCYTES NFR BLD AUTO: 0.8 % (ref 0–0.5)
LYMPHOCYTES # BLD AUTO: 1.7 K/UL (ref 1–4.8)
LYMPHOCYTES # BLD AUTO: 1.7 K/UL (ref 1–4.8)
LYMPHOCYTES NFR BLD: 32.3 % (ref 18–48)
LYMPHOCYTES NFR BLD: 32.3 % (ref 18–48)
MAGNESIUM SERPL-MCNC: 2.1 MG/DL (ref 1.6–2.6)
MCH RBC QN AUTO: 27.8 PG (ref 27–31)
MCH RBC QN AUTO: 27.8 PG (ref 27–31)
MCHC RBC AUTO-ENTMCNC: 30.1 G/DL (ref 32–36)
MCHC RBC AUTO-ENTMCNC: 30.1 G/DL (ref 32–36)
MCV RBC AUTO: 92 FL (ref 82–98)
MCV RBC AUTO: 92 FL (ref 82–98)
MONOCYTES # BLD AUTO: 0.4 K/UL (ref 0.3–1)
MONOCYTES # BLD AUTO: 0.4 K/UL (ref 0.3–1)
MONOCYTES NFR BLD: 7 % (ref 4–15)
MONOCYTES NFR BLD: 7 % (ref 4–15)
NEUTROPHILS # BLD AUTO: 3 K/UL (ref 1.8–7.7)
NEUTROPHILS # BLD AUTO: 3 K/UL (ref 1.8–7.7)
NEUTROPHILS NFR BLD: 56.9 % (ref 38–73)
NEUTROPHILS NFR BLD: 56.9 % (ref 38–73)
NRBC BLD-RTO: 0 /100 WBC
NRBC BLD-RTO: 0 /100 WBC
OB PNL STL: POSITIVE
OVALOCYTES BLD QL SMEAR: ABNORMAL
OVALOCYTES BLD QL SMEAR: ABNORMAL
PHOSPHATE SERPL-MCNC: 2.6 MG/DL (ref 2.7–4.5)
PLATELET # BLD AUTO: 142 K/UL (ref 150–350)
PLATELET # BLD AUTO: 142 K/UL (ref 150–350)
PMV BLD AUTO: 12.3 FL (ref 9.2–12.9)
PMV BLD AUTO: 12.3 FL (ref 9.2–12.9)
POCT GLUCOSE: 133 MG/DL (ref 70–110)
POCT GLUCOSE: 145 MG/DL (ref 70–110)
POCT GLUCOSE: 146 MG/DL (ref 70–110)
POCT GLUCOSE: 163 MG/DL (ref 70–110)
POCT GLUCOSE: 190 MG/DL (ref 70–110)
POIKILOCYTOSIS BLD QL SMEAR: SLIGHT
POIKILOCYTOSIS BLD QL SMEAR: SLIGHT
POLYCHROMASIA BLD QL SMEAR: ABNORMAL
POLYCHROMASIA BLD QL SMEAR: ABNORMAL
POTASSIUM SERPL-SCNC: 3.8 MMOL/L (ref 3.5–5.1)
PROT SERPL-MCNC: 5.6 G/DL (ref 6–8.4)
RBC # BLD AUTO: 2.91 M/UL (ref 4–5.4)
RBC # BLD AUTO: 2.91 M/UL (ref 4–5.4)
SCHISTOCYTES BLD QL SMEAR: ABNORMAL
SCHISTOCYTES BLD QL SMEAR: ABNORMAL
SODIUM SERPL-SCNC: 139 MMOL/L (ref 136–145)
VANCOMYCIN TROUGH SERPL-MCNC: 10.6 UG/ML (ref 10–22)
WBC # BLD AUTO: 5.32 K/UL (ref 3.9–12.7)
WBC # BLD AUTO: 5.32 K/UL (ref 3.9–12.7)
WBC #/AREA STL HPF: NORMAL /[HPF]

## 2020-06-25 PROCEDURE — 25000242 PHARM REV CODE 250 ALT 637 W/ HCPCS: Performed by: STUDENT IN AN ORGANIZED HEALTH CARE EDUCATION/TRAINING PROGRAM

## 2020-06-25 PROCEDURE — 99900035 HC TECH TIME PER 15 MIN (STAT)

## 2020-06-25 PROCEDURE — 99232 PR SUBSEQUENT HOSPITAL CARE,LEVL II: ICD-10-PCS | Mod: GC,,, | Performed by: INTERNAL MEDICINE

## 2020-06-25 PROCEDURE — 94640 AIRWAY INHALATION TREATMENT: CPT

## 2020-06-25 PROCEDURE — 87046 STOOL CULTR AEROBIC BACT EA: CPT | Mod: 59

## 2020-06-25 PROCEDURE — 85730 THROMBOPLASTIN TIME PARTIAL: CPT

## 2020-06-25 PROCEDURE — 97163 PT EVAL HIGH COMPLEX 45 MIN: CPT

## 2020-06-25 PROCEDURE — 80053 COMPREHEN METABOLIC PANEL: CPT

## 2020-06-25 PROCEDURE — 99233 PR SUBSEQUENT HOSPITAL CARE,LEVL III: ICD-10-PCS | Mod: ,,, | Performed by: NURSE PRACTITIONER

## 2020-06-25 PROCEDURE — 97535 SELF CARE MNGMENT TRAINING: CPT

## 2020-06-25 PROCEDURE — 20000000 HC ICU ROOM

## 2020-06-25 PROCEDURE — 99233 SBSQ HOSP IP/OBS HIGH 50: CPT | Mod: ,,, | Performed by: NURSE PRACTITIONER

## 2020-06-25 PROCEDURE — 80202 ASSAY OF VANCOMYCIN: CPT

## 2020-06-25 PROCEDURE — 92526 ORAL FUNCTION THERAPY: CPT

## 2020-06-25 PROCEDURE — 82272 OCCULT BLD FECES 1-3 TESTS: CPT

## 2020-06-25 PROCEDURE — 84100 ASSAY OF PHOSPHORUS: CPT

## 2020-06-25 PROCEDURE — 63600175 PHARM REV CODE 636 W HCPCS: Performed by: GENERAL ACUTE CARE HOSPITAL

## 2020-06-25 PROCEDURE — 63600175 PHARM REV CODE 636 W HCPCS: Performed by: STUDENT IN AN ORGANIZED HEALTH CARE EDUCATION/TRAINING PROGRAM

## 2020-06-25 PROCEDURE — 87209 SMEAR COMPLEX STAIN: CPT

## 2020-06-25 PROCEDURE — 25000003 PHARM REV CODE 250: Performed by: STUDENT IN AN ORGANIZED HEALTH CARE EDUCATION/TRAINING PROGRAM

## 2020-06-25 PROCEDURE — 27000221 HC OXYGEN, UP TO 24 HOURS

## 2020-06-25 PROCEDURE — 85025 COMPLETE CBC W/AUTO DIFF WBC: CPT

## 2020-06-25 PROCEDURE — 94761 N-INVAS EAR/PLS OXIMETRY MLT: CPT

## 2020-06-25 PROCEDURE — 89055 LEUKOCYTE ASSESSMENT FECAL: CPT

## 2020-06-25 PROCEDURE — 63600175 PHARM REV CODE 636 W HCPCS: Performed by: NURSE PRACTITIONER

## 2020-06-25 PROCEDURE — 25000003 PHARM REV CODE 250: Performed by: INTERNAL MEDICINE

## 2020-06-25 PROCEDURE — 99232 SBSQ HOSP IP/OBS MODERATE 35: CPT | Mod: GC,,, | Performed by: INTERNAL MEDICINE

## 2020-06-25 PROCEDURE — 97530 THERAPEUTIC ACTIVITIES: CPT

## 2020-06-25 PROCEDURE — 83735 ASSAY OF MAGNESIUM: CPT

## 2020-06-25 PROCEDURE — 87427 SHIGA-LIKE TOXIN AG IA: CPT | Mod: 59

## 2020-06-25 PROCEDURE — 87045 FECES CULTURE AEROBIC BACT: CPT

## 2020-06-25 PROCEDURE — 85730 THROMBOPLASTIN TIME PARTIAL: CPT | Mod: 91

## 2020-06-25 RX ADMIN — IPRATROPIUM BROMIDE AND ALBUTEROL SULFATE 3 ML: .5; 3 SOLUTION RESPIRATORY (INHALATION) at 08:06

## 2020-06-25 RX ADMIN — ASPIRIN 81 MG: 81 TABLET, COATED ORAL at 09:06

## 2020-06-25 RX ADMIN — CEFEPIME 2 G: 2 INJECTION, POWDER, FOR SOLUTION INTRAVENOUS at 01:06

## 2020-06-25 RX ADMIN — FAMOTIDINE 20 MG: 20 TABLET, FILM COATED ORAL at 09:06

## 2020-06-25 RX ADMIN — ONDANSETRON 4 MG: 2 INJECTION INTRAMUSCULAR; INTRAVENOUS at 11:06

## 2020-06-25 RX ADMIN — IPRATROPIUM BROMIDE AND ALBUTEROL SULFATE 3 ML: .5; 3 SOLUTION RESPIRATORY (INHALATION) at 12:06

## 2020-06-25 RX ADMIN — FOLIC ACID 1 MG: 1 TABLET ORAL at 09:06

## 2020-06-25 RX ADMIN — IPRATROPIUM BROMIDE AND ALBUTEROL SULFATE 3 ML: .5; 3 SOLUTION RESPIRATORY (INHALATION) at 04:06

## 2020-06-25 RX ADMIN — IPRATROPIUM BROMIDE AND ALBUTEROL SULFATE 3 ML: .5; 3 SOLUTION RESPIRATORY (INHALATION) at 07:06

## 2020-06-25 RX ADMIN — HEPARIN SODIUM AND DEXTROSE 14 UNITS/KG/HR: 10000; 5 INJECTION INTRAVENOUS at 09:06

## 2020-06-25 RX ADMIN — CEFEPIME 2 G: 2 INJECTION, POWDER, FOR SOLUTION INTRAVENOUS at 03:06

## 2020-06-25 RX ADMIN — FLUTICASONE PROPIONATE 100 MCG: 50 SPRAY, METERED NASAL at 09:06

## 2020-06-25 RX ADMIN — ACETAMINOPHEN 650 MG: 325 TABLET ORAL at 11:06

## 2020-06-25 NOTE — SUBJECTIVE & OBJECTIVE
Interval History/Significant Events: No acute events overnight. Hb remains unchanged.  Restarted on heparin infusion.  Remains off norepinephrine infusion.     Review of Systems   Respiratory: Negative for shortness of breath.    Cardiovascular: Negative for chest pain.   Gastrointestinal: Negative for abdominal pain and nausea.   Neurological: Negative for dizziness and headaches.     Objective:     Vital Signs (Most Recent):  Temp: 98.2 °F (36.8 °C) (06/25/20 0700)  Pulse: 87 (06/25/20 0700)  Resp: 18 (06/25/20 0700)  BP: (!) 96/54 (06/25/20 0700)  SpO2: 100 % (06/25/20 0700) Vital Signs (24h Range):  Temp:  [98.2 °F (36.8 °C)-99.2 °F (37.3 °C)] 98.2 °F (36.8 °C)  Pulse:  [] 87  Resp:  [12-32] 18  SpO2:  [91 %-100 %] 100 %  BP: ()/(49-59) 96/54   Weight: 98.9 kg (218 lb)  Body mass index is 32.19 kg/m².      Intake/Output Summary (Last 24 hours) at 6/25/2020 0748  Last data filed at 6/25/2020 0700  Gross per 24 hour   Intake 732.42 ml   Output 295 ml   Net 437.42 ml       Physical Exam  Vitals signs and nursing note reviewed.   Constitutional:       General: She is not in acute distress.  HENT:      Head: Normocephalic.   Cardiovascular:      Rate and Rhythm: Normal rate and regular rhythm.      Heart sounds: No murmur.   Pulmonary:      Effort: Pulmonary effort is normal. No respiratory distress.      Breath sounds: No wheezing, rhonchi or rales.   Chest:      Chest wall: No tenderness.   Abdominal:      General: Bowel sounds are normal. There is no distension.      Palpations: Abdomen is soft.      Tenderness: There is no abdominal tenderness.   Skin:     General: Skin is warm and dry.   Neurological:      General: No focal deficit present.      Mental Status: She is alert.      Comments: Appropriate in conversation   Psychiatric:         Mood and Affect: Mood normal.         Behavior: Behavior normal.         Vents:  Oxygen Concentration (%): 28 (06/25/20 0020)  Lines/Drains/Airways     Peripheral  Intravenous Line                 Peripheral IV - Single Lumen 06/21/20 18 G Left Antecubital 4 days         Peripheral IV - Single Lumen 06/21/20 1602 22 G Left Wrist 3 days              Significant Labs:    CBC/Anemia Profile:  Recent Labs   Lab 06/24/20  1322 06/24/20  1801 06/25/20  0547   WBC 4.76 4.98 5.32  5.32   HGB 8.1* 8.1* 8.1*  8.1*   HCT 26.3* 26.6* 26.9*  26.9*   * 133* 142*  142*   MCV 91 92 92  92   RDW 15.7* 15.5* 15.6*  15.6*        Chemistries:  Recent Labs   Lab 06/24/20  0349 06/25/20  0547    139   K 4.3 3.8    108   CO2 20* 21*   BUN 15 15   CREATININE 1.5* 1.3   CALCIUM 7.1* 7.4*   ALBUMIN 2.8* 2.7*   PROT 5.5* 5.6*   BILITOT 0.5 0.5   ALKPHOS 45* 49*   ALT 64* 72*   * 148*   MG 1.9 2.1   PHOS 1.9* 2.6*       All pertinent labs within the past 24 hours have been reviewed.    Significant Imaging:  I have reviewed and interpreted all pertinent imaging results/findings within the past 24 hours.

## 2020-06-25 NOTE — PT/OT/SLP PROGRESS
"Speech Language Pathology Treatment    Patient Name:  Alison Branch   MRN:  [id number]  Admitting Diagnosis: Septic shock    Recommendations:                 General Recommendations:  none  Diet recommendations:  Regular, Liquid Diet Level: Thin   Aspiration Precautions: HOB to 90 degrees, Monitor for s/s of aspiration, Remain upright 30 minutes post meal, Small bites/sips and Standard aspiration precautions   General Precautions: Standard, aspiration, fall  Communication strategies:  none    Subjective     " I have a dog named Sheela"  Patient goals: go home    Pain/Comfort:  · Pain Rating 1: 0/10  · Pain Rating Post-Intervention 1: 0/10    Objective:     Has the patient been evaluated by SLP for swallowing?   Yes  Keep patient NPO? No   Current Respiratory Status: nasal cannula      Pt seen bedside with no family present. Pt awake/alert. She reported not liking the food but denied any difficulty with swallowing. Observed pt swallowing a bottle of water, bite of grits and one bite of vargas. No overt s/s of aspiration noted. Education provided re: swallowing precautions and s/s of aspiration. Pt expressed good understanding of information. White board updated. No further speech tx recommended.     Assessment:     Alison Branch is a 64 y.o. female with an SLP diagnosis of swalowing wfl.  She presents with no further speech tx needs.    Goals:   Multidisciplinary Problems     SLP Goals     Not on file          Multidisciplinary Problems (Resolved)        Problem: SLP Goal    Goal Priority Disciplines Outcome   SLP Goal   (Resolved)     SLP Met   Description: Speech Language Pathology Goals  Goals expected to be met by 6/29/2020    1. Pt will participate in ongoing assessment of swallow function to determine safest, least restrictive means of nutrition/hydration  2. Educate Pt and family on aspiration precautions and SLP POC                       Plan:     · Patient to be seen:  5 x/week   · Plan of Care expires:  " 07/22/20  · Plan of Care reviewed with:  patient   · SLP Follow-Up:  No       Discharge recommendations:  home   Barriers to Discharge:  None    Time Tracking:     SLP Treatment Date:   06/25/20  Speech Start Time:  0845  Speech Stop Time:  0901     Speech Total Time (min):  16 min    Billable Minutes: Treatment Swallowing Dysfunction 8 and Seld Care/Home Management Training 8    ZENON Spear, CCC-SLP  06/25/2020

## 2020-06-25 NOTE — PT/OT/SLP EVAL
Physical Therapy Evaluation    Patient Name:  Alison Branch   MRN:  [id number]  Admit Date: 6/21/2020  Admitting Diagnosis:  Septic shock  Length of Stay: 4 days  Recent Surgery: * No surgery found *      Recommendations:     Discharge Recommendations:  home health PT   Discharge Equipment Recommendations: none     Assessment:     Alison Branch is a 64 y.o. female admitted with a medical diagnosis of Septic shock. Pt with stage IV non-small cell lung cancer with bone mets. Weakness most likely due to a combination of cancer and deconditioning. Pt unable to tolerate sitting EOB during session. MAP decreased from 69 to 61 to 57. MAP increased to 70 once returned to supine. MAP goal is 65. Pt with associated dizziness and postural sway. Due to pt's poor activity tolerance, recommend HHPT once medically stable to maximize functional mobility, ensure safety, and decrease caregiver burden at home.       Problem List: weakness, impaired endurance, impaired cardiopulmonary response to activity  Rehab Prognosis: Fair; patient would benefit from acute skilled PT services to address these deficits and reach maximum level of function.      Plan:     During this hospitalization, patient to be seen 3 x/week to address the identified rehab impairments via gait training, therapeutic activities, therapeutic exercises, neuromuscular re-education and progress towards the established goals.    · Plan of Care Expires:  07/24/20    Subjective   Communicated with RN prior to session.  Patient found HOB elevated upon PT entry to room, agreeable to evaluation. Alison Branch's alone present during session.    Chief Complaint: Weakness (cancer pt w/ increasing fatigue and n/v. Fell off toilet  , unable to get up), Nausea, and Vomiting    Patient/Family Comments/goals: to return home   Pain/Comfort:  · Pain Rating 1: 0/10  · Pain Rating Post-Intervention 1: 0/10    Living Environment:  Living Environment: Pt lives with daughter in a 2nd floor  "apartment with a flight of CIRA. Bathroom set up: Step in tub with chair   Previous level of function: Pt recently required A with ADLs, A provided by home health [facility]. Pt utilized the cane indoors and RW outdoors. 1 recent fall reported due to sudden onset of weakness.   Roles and Routines: Homemaker, mother, grandmother, enjoys shopping  Equipment Used at Home:  cane, straight, walker, rolling, shower chair, bedside commode  Assistance upon Discharge: Pt will have assistance from daughter upon discharge. Daughter works during the day.     Objective:   Patient found with: blood pressure cuff, telemetry, pulse ox (continuous), peripheral IV, oxygen     General Precautions: Standard, Cardiac fall   Orthopedic Precautions:N/A   Braces: N/A   Oxygen Device: Nasal Cannula   Vitals: BP (!) 90/54 (BP Location: Right arm, Patient Position: Lying)   Pulse 93   Temp 98.2 °F (36.8 °C) (Oral)   Resp (!) 21   Ht 5' 9" (1.753 m)   Wt 98.9 kg (218 lb)   SpO2 99%   Breastfeeding No   BMI 32.19 kg/m²     Exams:  · Cognition:   · Alert and Cooperative  · Ox4  · Command following: Follows multistep  commands  · Fluency: clear/fluent    · RLE ROM: WFL  · RLE Strength: grossly 4/5  · LLE ROM: WFL  · LLE Strength: grossly 4/5    Outcome Measures:  AM-PAC 6 CLICK MOBILITY  Turning over in bed (including adjusting bedclothes, sheets and blankets)?: 3  Sitting down on and standing up from a chair with arms (e.g., wheelchair, bedside commode, etc.): 1  Moving from lying on back to sitting on the side of the bed?: 3  Moving to and from a bed to a chair (including a wheelchair)?: 1  Need to walk in hospital room?: 1  Climbing 3-5 steps with a railing?: 1  Basic Mobility Total Score: 10     Functional Mobility:  Additional staff present: OT  Bed Mobility:  · Supine to Sit: contact guard assistance; HOB elevated  · Scooting anteriorly to EOB to have both feet planted on floor: minimum assistance  · Sit to Supine: contact guard assistance; " HOB elevated    Sitting Balance at Edge of Bed:   Assistance Level Required: Stand-by Assistance   Time: 10 minutes   Comments:   o Wash face with OT  o Pt returned to supine due to impaired activity tolerance (dropping BP)    Transfers:   Not performed due to low BP    Gait:   Not performed     Therapeutic Activities, Exercises, & Education:   Educated pt on PT role/POC  Educated pt on importance of OOB activity and daily ambulation   Educated pt to monitor how she feels with activity. If she feel dizziness and malaise, most likely BP dropping  Pt verbalized understanding    Rolling to L and R with min A to place bed pan under pt. Pt wanted to attempt to urinate     Patient left HOB elevated with all lines intact, call button in reach and RN notified.    GOALS:   Multidisciplinary Problems     Physical Therapy Goals        Problem: Physical Therapy Goal    Goal Priority Disciplines Outcome Goal Variances Interventions   Physical Therapy Goal     PT, PT/OT Ongoing, Progressing     Description: Goals to be met by: 2020    Patient will increase functional independence with mobility by performin. Supine to sit with Stand-by Assistance - not met  2. Sit to stand transfer with Minimal Assistance using LRAD - not met  3. Bed to chair transfer with Minimal Assistance using LRAD - not met  4. Gait  x 10 feet with Minimal Assistance using LRAD - not met  5. Sitting at edge of bed x10 minutes with Supervision with stable BP - not met  6. Lower extremity exercise program x15 reps per handout, with assistance as needed - not met                     History:     Past Medical History:   Diagnosis Date    Allergy     Brain aneurysm     s/p coiling of one; another not coiled    Breast cyst     Depression 2019    Diabetes mellitus     Diabetes type 2, controlled     Fever blister     High cholesterol     History of Bell's palsy     HTN (hypertension) 2014    Recurrent upper respiratory  "infection (URI)        Past Surgical History:   Procedure Laterality Date    BREAST SURGERY      BRONCHOSCOPY N/A 5/28/2019    Procedure: Bronchoscopy;  Surgeon: Iesha Diagnostic Provider;  Location: [facility] OR 2ND FLR;  Service: Anesthesiology;  Laterality: N/A;    CERVICAL FUSION      COLONOSCOPY N/A 3/9/2016    Procedure: COLONOSCOPY;  Surgeon: Elliott Zimmerman MD;  Location: [facility] ENDO (4TH FLR);  Service: Endoscopy;  Laterality: N/A;    head surgery      stent and "curling" for aneurysm    HYSTERECTOMY      TVH secondary to SUF    INSERTION OF TUNNELED CENTRAL VENOUS CATHETER (CVC) WITH SUBCUTANEOUS PORT Right 7/8/2019    Procedure: INSERTION, PORT-A-CATH;  Surgeon: Cali Damico MD;  Location: [facility] CATH LAB;  Service: Radiology;  Laterality: Right;    MENISCECTOMY Left        Time Tracking:     PT Received On: 06/25/20  PT Start Time: 0800     PT Stop Time: 0825  PT Total Time (min): 25 min     Billable Minutes: Evaluation 10 and Therapeutic Activity 10    Stephanie Arango, PT, DPT  6/25/2020  [phone]    "

## 2020-06-25 NOTE — PROGRESS NOTES
"Oncology Progress Note      SUBJECTIVE:     History of Present Illness:  Patient is a 64 y.o. female with history of metastatic non small cell lung cancer under the care of Dr. Belcher. She received 4 cycles of Carboplatin/Pemetrexed/Pembrolizumab followed by Pembrolizumab maintenance. She experienced disease progression in April 2020, her tumor was ROS1 positive so she was switched to Entrectinib. She was admitted yesterday to the medical ICU with septic shock requiring vasopressor support. She has mild encephalopathy. Currently receiving broad spectrum antibiotics.     Interval History:  Patient ahs been off pressors >24 hours. Mental status is much better and encephalopathy has resolved. Renal function has also normalized.     Review of patient's allergies indicates:   Allergen Reactions    Piperacillin-tazobactam Rash     Tolerated cefepime 06/2020     Past Medical History:   Diagnosis Date    Allergy     Brain aneurysm 2010    s/p coiling of one; another not coiled    Breast cyst     Depression 9/19/2019    Diabetes mellitus     Diabetes type 2, controlled     Fever blister     High cholesterol     History of Bell's palsy     HTN (hypertension) 5/20/2014    Recurrent upper respiratory infection (URI)      Past Surgical History:   Procedure Laterality Date    BREAST SURGERY      BRONCHOSCOPY N/A 5/28/2019    Procedure: Bronchoscopy;  Surgeon: Iesha Diagnostic Provider;  [facility];  Service: Anesthesiology;  Laterality: N/A;    CERVICAL FUSION      COLONOSCOPY N/A 3/9/2016    Procedure: COLONOSCOPY;  Surgeon: Elliott Zimmerman MD;  Location: [facility] ENDO (4TH FLR);  Service: Endoscopy;  Laterality: N/A;    head surgery      stent and "curling" for aneurysm    HYSTERECTOMY      TVH secondary to SUF    INSERTION OF TUNNELED CENTRAL VENOUS CATHETER (CVC) WITH SUBCUTANEOUS PORT Right 7/8/2019    Procedure: INSERTION, PORT-A-CATH;  Surgeon: Cali Damico MD;  Location: [facility] CATH LAB;  " Service: Radiology;  Laterality: Right;    MENISCECTOMY Left      Family History   Problem Relation Age of Onset    Rheum arthritis Father     Diabetes Father     Heart failure Father     Migraines Father     Cataracts Father     Cancer Mother 63        pancreatic    Stomach cancer Mother     Breast cancer Maternal Aunt         50s    Ovarian cancer Cousin     Allergies Daughter     Diabetes Sister     Diabetes Brother     Cancer Maternal Aunt         Lung CA    Melanoma Neg Hx     Colon cancer Neg Hx     Amblyopia Neg Hx     Blindness Neg Hx     Glaucoma Neg Hx     Macular degeneration Neg Hx     Retinal detachment Neg Hx     Strabismus Neg Hx     Stroke Neg Hx     Thyroid disease Neg Hx     Allergic rhinitis Neg Hx     Angioedema Neg Hx     Asthma Neg Hx     Atopy Neg Hx     Eczema Neg Hx     Immunodeficiency Neg Hx     Rhinitis Neg Hx     Urticaria Neg Hx      Social History     Tobacco Use    Smoking status: Former Smoker     Packs/day: 0.50     Years: 30.00     Pack years: 15.00     Quit date: 1999     Years since quittin.4    Smokeless tobacco: Never Used   Substance Use Topics    Alcohol use: No     Alcohol/week: 0.0 standard drinks    Drug use: No     Review of Systems   Unable to perform ROS: Mental status change   Constitutional: Positive for malaise/fatigue. Negative for fever.   Respiratory: Negative for cough and shortness of breath.    Cardiovascular: Negative for chest pain.   Gastrointestinal: Negative for abdominal pain, nausea and vomiting.   Skin: Positive for rash. Negative for itching.     OBJECTIVE:     Vital Signs:  Temp:  [98.2 °F (36.8 °C)-98.4 °F (36.9 °C)]   Pulse:  [87-96]   Resp:  [12-34]   BP: ()/(50-54)   SpO2:  [98 %-100 %]     Physical Exam  Constitutional:       General: She is sleeping.   HENT:      Head: Normocephalic and atraumatic.   Eyes:      General: No scleral icterus.        Right eye: No discharge.         Left eye: No  discharge.   Cardiovascular:      Rate and Rhythm: Normal rate and regular rhythm.   Pulmonary:      Effort: No respiratory distress.      Breath sounds: Rales present. No wheezing or rhonchi.   Abdominal:      General: There is no distension.      Tenderness: There is no abdominal tenderness.   Musculoskeletal:      Right lower leg: No edema.      Left lower leg: No edema.   Neurological:      Mental Status: She is easily aroused.       Laboratory:  CBC:   Recent Labs   Lab 06/25/20  0547   WBC 5.32  5.32   RBC 2.91*  2.91*   HGB 8.1*  8.1*   HCT 26.9*  26.9*   *  142*   MCV 92  92   MCH 27.8  27.8   MCHC 30.1*  30.1*     CMP:   Recent Labs   Lab 06/25/20  0547   *   CALCIUM 7.4*   ALBUMIN 2.7*   PROT 5.6*      K 3.8   CO2 21*      BUN 15   CREATININE 1.3   ALKPHOS 49*   ALT 72*   *   BILITOT 0.5           ASSESSMENT/PLAN:     # Shock   # Stage IV non small cell lung cancer    Patient is currently off pressors, no source of infection identified. GPC in 1/4 bottles of blood cultures was coagulase negative (likely a contaminant). With her lack of fevers there is suspicion that her hypotension might have been a reaction to Entrectinib (causes hypotension in 18% of patients).     She will be stepped down to medical oncology floor today. PT/OT resumed. Continue with Cefepime for now. Continue holding Entrectinib.       Patient was seen and discussed with attending Dr. Gaitan.

## 2020-06-25 NOTE — PT/OT/SLP PROGRESS
"Occupational Therapy   Treatment    Name: Alison Branch  MRN: [id number]  Admitting Diagnosis:  Septic shock      Pt with stage IV lung CA with mets.   Recommendations:     Discharge Recommendations: home health OT  Discharge Equipment Recommendations:  none      Assessment:     Alison Branch is a 64 y.o. female with a medical diagnosis of Septic shock.Performance deficits affecting function are weakness, impaired endurance, impaired self care skills, impaired functional mobilty, gait instability, impaired balance, decreased upper extremity function, decreased lower extremity function. Pt tolerated session fair. Pt with good motivation and effort; however, pt with declining BP as sitting EOB. Unable to progress with activity due to hypotension. However, OT anticipates pt will be ready for d/c home with fly support when medically stable.     Rehab Prognosis:  Fair; patient would benefit from acute skilled OT services to address these deficits and reach maximum level of function.       Plan:     Patient to be seen 4 x/week to address the above listed problems via self-care/home management, therapeutic activities, therapeutic exercises  · Plan of Care Expires: 07/23/20  · Plan of Care Reviewed with: patient    Subjective     "I want to get in that chair" pt reports.   Pain/Comfort:  · Pain Rating 1: 0/10    Objective:     Communicated with: nsg prior to session.  Pt found supine in bed with MAP 69  Upon sitting EOB, MAP 61 then 57. Thus, pt returned supine with MAP 70    Pt did have c/o of dizziness with sitting EOB which resolved once supine.     General Precautions: Standard, fall   Occupational Performance:     Bed Mobility:    Supine>sit with CGA      Activities of Daily Living:  · G/H: seated EOB and in supported sitting with set-up  · Feeding: set-up        AMPAC 6 Click ADL: 8    Treatment & Education:  Pt tolerated sitting EOB approx 8 min with declining BP and MAP; thus, pt returned supine.  Pt requesting to get " in chair with education provided re: hypotension and safety with functional mobility/ADl skills.  OT placed bed pan under patient when supine to allow for urination.  Education provided re; OT POC and importance to call for assist with all mobility.     Patient left supine with all lines intact, call button in reach and nsg notifiedEducation:      GOALS:   Multidisciplinary Problems     Occupational Therapy Goals        Problem: Occupational Therapy Goal    Goal Priority Disciplines Outcome Interventions   Occupational Therapy Goal     OT, PT/OT Ongoing, Progressing    Description: Goals to be met by: 7/4/20    Patient will increase functional independence with ADLs by performing:    UE Dressing with Stand-by Assistance.  LE Dressing with Stand-by Assistance.  Grooming while standing at sink with Contact Guard Assistance.  Toileting from toilet with Contact Guard Assistance for hygiene and clothing management.   Step transfer with Contact Guard Assistance and AD as needed to prepare for household mobility to complete occupations of choice  Toilet transfer to toilet with Contact Guard Assistance.  Upper extremity exercise program x15 reps per handout, with independence to build strength and endurance for functional tasks.                     Time Tracking:     OT Date of Treatment: 06/25/20  OT Start Time: 0800  OT Stop Time: 0826  OT Total Time (min): 26 min    Billable Minutes:Self Care/Home Management 14  Therapeutic Activity 12    TIFFANY Acosta  6/25/2020

## 2020-06-25 NOTE — ASSESSMENT & PLAN NOTE
Secondary malignant neoplasm of bone  Goals of care, counseling/discussion  Stage IV NSCLC with bone mets, recently opted for new PO chemotherapy entrectinib with Dr. Belcher, started 6/6/2020. Was given the option of stopping chemo treatments but opted to start new chemo.  Patient expressed full code status on admission.    - Will arrange outpatient follow up with Dr. Belcher regarding continuing Entrectinib (possibly at lower dose) versus transitioning to home hospice/comfort care

## 2020-06-25 NOTE — ASSESSMENT & PLAN NOTE
Normocytic Anemia, no signs of active blood loss.  Possibly dilutional component. Hb grossly unchanged over the last 24 hours.     - Monitor CBC

## 2020-06-25 NOTE — ASSESSMENT & PLAN NOTE
Recent A1c 9.2. On short acting insulin TIDWM 20-16-16u and long acting 20u levemir at night. Decreased appetite in past few days.    - LDSSI with frequent glucose checks  - Started on diet.  ST following. Aspiration precautions.

## 2020-06-25 NOTE — ASSESSMENT & PLAN NOTE
Troponin 0.062 on admission, peaked at 0.109 on 6/22. EKG unremarkable. TTE without focal wall motion abnormalities, valvular dysfunction or pericardial effusion, LVEF 55%. Likely 2/2 demand ischemia from hypotension.

## 2020-06-25 NOTE — ASSESSMENT & PLAN NOTE
Urinary retention  Cr 2.4 and FeNa 3% on admission. Cr was been uptrending over the past few weeks prior to admission, baseline ~0.8. Urinary retention overnight the day of admission, Amanda placed. Retroperitoneal U/S negative for obstruction, showed medical renal disease. ROSEMARY likely multifactorial from dehydration from decreased PO intake, poor renal perfusion from sepsis with possible ATN (amorphous casts on UA), and possibly increase in creatinine from Entrectonib. Cr improved throughout admission, Amanda removed 6/24.      - CMP daily  - Avoid nephrotoxic meds  - Strict intake and output  - Purewick prn

## 2020-06-25 NOTE — ASSESSMENT & PLAN NOTE
Possibly multifactorial from Entrectinib and poor perfusion to liver from septic shock. Were trending down with slight increase 5/26.    - Monitoring with CMP daily

## 2020-06-25 NOTE — ASSESSMENT & PLAN NOTE
Stage IV NSCLC with bone mets, recently opted for new po chemotherapy entrectinib with Dr. Belcher, started 6/6/2020.  Was given the option of stopping chemo treatments but opted to start new chemo.  Patient expressed full code status on admission.    - Oncology following  - Consider palliative care consult

## 2020-06-25 NOTE — PLAN OF CARE
Problem: SLP Goal  Goal: SLP Goal  Description: Speech Language Pathology Goals  Goals expected to be met by 6/29/2020    1. Pt will participate in ongoing assessment of swallow function to determine safest, least restrictive means of nutrition/hydration  2. Educate Pt and family on aspiration precautions and SLP POC      Outcome: Met     Pt tolerating regular diet and thin liquids. No further speech tx recommended.     ZENON Spear, CCC-SLP  6/25/2020

## 2020-06-25 NOTE — RESIDENT HANDOFF
ICU Transfer of Care Note  Critical Care Medicine    Admit Date: 6/21/2020  LOS: 4    CC: Septic shock    Code Status: Full Code       HPI and Hospital Course:     HPI:  Ms. Alison Branch is a 64 year old female with stage IV NSCLC with bone mets, recently started on systemic therapy w/ daily PO Entrectinib since 6/6/2020, patient of Dr Belcher, DM2, HTN, and prior DVT/PE on lovenox, [place] [facility] [place], fevers, for past 24 hours. She was alert and oriented x4, conversational on initial presentation with SBP in 60s. She has started to require a rollator in setting of progressive weakness in the past few months. Patient was then noted to be weaker this morning and then had to use the bathroom. Family was helping her make it to the bathroom when she lost control of her bowels and bladder and then became even more weak. EMS called and patient brought to ED for eval.   Patient denies chest pain, shortness of breath, headaches, changes in vision, abdominal pain, vomiting. Patient reports history of urinary tract infections. Patient denies any joint pain. No neck stiffness. Of note Dr. Belcher recently provided option to patient to choose to try new chemotherapy or to stop, with the patient opting for new chemotherapy. The patient has not tolerated the chemotherapy greatly, experiencing nausea, nonbloody vomiting at times.       Hospital/ICU Course:  Ms. Branch was admitted to the ICU with [facility] for sepsis with borderline hypotension.  She was started on empiric antibiotics with pip/tazo and vancomycin along with pan cultures.  UA + bacteria, WBC 3.  Awaiting C diff and stool cultures.  Preliminary blood cultures 1/2 with GPC in cocci.  T max 102.  No leukocytosis/leukopenia. Lactate remains normal.  Urinary retention noted; hennessy placed.   Started on low dose norepinephrine 6/21 pm.    IR was consulted for port removal on 06/23/20. Weaned off norepinephrine infusion 6/24 am.  Discontinued metronidazole.  Hb noted  9.9-->8.2-->8.1 in the setting of supratherapeutic ptt levels. Heparin infusion held.  Serial CBC did not reveal further decline in hb.  No active signs of bleeding; heparin infusion restarted.       To Follow Up:     1. Culture results; de-escalate antibiotics as appropriate  2. Urine output following removing of indwelling catheter  3. ST       Discharge Plan:     PT/OT recommending SNF    Call with questions.

## 2020-06-25 NOTE — ASSESSMENT & PLAN NOTE
On admission was complaining of diarrhea prior to arrival, no respiratory or urinary symptoms. In ED patient was febrile and hypotensive, resistant to approximately 3L IV fluids. Started on vancomycin and piperacillin-tazobactam in ED. Required admission to ICU for levophed, started on 6/21, and switched piperacillin-tazobactam to cefepime and metronidazole. Weaned off levophed 6/24. Discontinued metronidazole 6/24. Only had 1 BM throughout admission. CXR showing possible consolidation vs known malignancy. UA shows bacteria but no significant leukocytes or nitrites. Did not have any signs of infection at port, but blood cultures from 6/21 grew Staph in 1 bottle so Port-a-cath was removed by IR on 6/23. Initial culture grew CoNS and repeat blood cultures from 6/23 NGTD. Hypotension might have been at least in contribution from a reaction to Entrectinib (causes hypotension in 18% of patients).    - Critical care team discontinued antibiotics (vanc d/c'd 6/25, cefepime d/c'd 6/26) as the coag negative staph in one bottle was likely a contaminant, and we do not have a likely skin/soft tissue source, and her Port-a-cath which would be a source has been removed and catheter tip cultures have been negative. No symptoms to suggest pneumonia or UTI. Already received approximately 6 days of IV antibiotics while inpatient.   - MAP goal > 65  - Holding home antihypertensive

## 2020-06-25 NOTE — HOSPITAL COURSE
Ms. Branch was admitted to the ICU for septic shock. She was started on empiric antibiotics with pip/tazo and vancomycin along with pan cultures. Levophed started on admission. Patient's enoxaparin was replaced with heparin gtt in anticipation of procedure and down-trending Hgb. UA showed bacteria however patient did not have urinary symptoms. Urinary retention noted; hennessy placed. Blood cultures from 6/21 with coag-negative Staph growing in 1/2 bottles, repeat blood cultures on 6/22 NGTD. T max 102.8 on day of admission, but continued to be febrile throughout admission. IR was consulted for port removal on 06/23. Weaned off norepinephrine infusion 6/24 am. Discontinued metronidazole. Hb noted 9.9-->8.2-->8.1 in the setting of supratherapeutic ptt levels. Heparin infusion held. Serial CBC did not reveal further decline in hb. No active signs of bleeding; heparin infusion restarted. Only had 1 BM throughout admission despite history of diarrhea prior to admission. Discontinued vancomycin on 6/25. Discontinued cefepime on 6/26 and stepped down to medical oncology service. Heparin transitioned to Lovenox 100 mg BID. Got X-rays of spine once patient told the team she fell prior to admission, showed new T10 compression fracture, pain controlled with tylenol and PRN oxycodone. Patient told us that the enoxaparin has been over $250 per month at home and has not been able to afford it, and was not able to pay for her most recent prescription. She was switched to Eliquis on 6/28. Plan for discharge with  PT and OT, and CM to arrange follow up with Dr. Belcher in clinic within the next 1-2 weeks, with Entrectinib on hold until then.

## 2020-06-25 NOTE — ASSESSMENT & PLAN NOTE
Unclear source, 1 set of blood cultures on 6/21 with Coag neg staph.  Port removed per IR on 6/23. Repeat blood cultures 6/22 NGTD. Large, loose, brown bowel movement reported 6/24.  Afebrile.    - Follow up blood cultures  - Metronidazole d/c'd 6/24.  Continue vancomycin and cefepime for now.   - Weaned off norepinephrine infusion ~ 8 am on 6/24.

## 2020-06-25 NOTE — ASSESSMENT & PLAN NOTE
Suspect secondary to dehydration from decreased oral intake and sepsis + ischemic ATN from shock..  FeNA 3% with urinary retention overnight.  Now with urine catheter.  sCr has been uptrending over the past few weeks, baseline ~0.8. Scr continues to improve.  Making adequate urine.     - US retroperitoneal with medical renal disease  - Avoid nephrotoxic meds  - Amanda removed 6/24, follow up UOP.

## 2020-06-25 NOTE — ASSESSMENT & PLAN NOTE
A1c 9.2 on admission. At home patient takes short acting insulin TIDWM 20-16-16u and long acting 20u levemir at night. On admission patient was started on 10 units detemir qHS with LDISS, however the detemir was discontinued on 6/24 after a fasting . Since then has only required a few units a day with sliding scale.    - Diabetic diet  - POCT glucose checks qAC and qHS with LDISS  - Will need to decrease home insulin dose on discharge significantly

## 2020-06-25 NOTE — ASSESSMENT & PLAN NOTE
Iron studies on admission c/w ACD from malignancy/chemotherapy-related with possible iron deficiency component. No known source of bleeding.    - CBC daily  - Transfuse for Hgb < 7, symptomatic anemia, or active bleeding

## 2020-06-25 NOTE — PLAN OF CARE
CMICU DAILY GOALS       A: Awake    RASS: Goal - RASS Goal: 0-->alert and calm  Actual - RASS (Stewart Agitation-Sedation Scale): 0-->alert and calm   Restraint necessity:    B: Breath   SBT: Not intubated   C: Coordinate A & B, analgesics/sedatives   Pain: managed    SAT: Not intubated  D: Delirium   CAM-ICU: Overall CAM-ICU: Negative  E: Early Mobility   MOVE Screen: Fail   Activity: Activity Management: activity adjusted per tolerance  FAS: Feeding/Nutrition   Diet order: Diet/Nutrition Received: regular,   Fluid restriction:    T: Thrombus   DVT prophylaxis: VTE Required Core Measure: Pharmacological prophylaxis initiated/maintained  H: HOB Elevation   Head of Bed (HOB): HOB at 30-45 degrees  U: Ulcer Prophylaxis   GI: yes  G: Glucose control   managed Glycemic Management: blood glucose monitoring  S: Skin   Bundle compliance: yes   Bathing/Skin Care: bath, chlorhexidine, linen changed, incontinence care Date: <No height available>-1  B: Bowel Function   no issues   I: Indwelling Catheters   Amanda necessity: [REMOVED]      Urethral Catheter 06/21/20 2013 Latex 16 Fr.-Reason for Continuing Urinary Catheterization: Critically ill in ICU requiring intensive monitoring   CVC necessity: No   IPAD offered: Not appropriate  D: De-escalation Antibx   No  Plan for the day   Stepdown orders in, awaiting room assignment  Family/Goals of care/Code Status   Code Status: Full Code     No acute events throughout day, VS and assessment per flow sheet, patient progressing towards goals as tolerated, plan of care reviewed with Alison Branch and family, all concerns addressed, will continue to monitor.

## 2020-06-25 NOTE — PLAN OF CARE
Problem: Occupational Therapy Goal  Goal: Occupational Therapy Goal  Description: Goals to be met by: 7/4/20    Patient will increase functional independence with ADLs by performing:    UE Dressing with Stand-by Assistance.  LE Dressing with Stand-by Assistance.  Grooming while standing at sink with Contact Guard Assistance.  Toileting from toilet with Contact Guard Assistance for hygiene and clothing management.   Step transfer with Contact Guard Assistance and AD as needed to prepare for household mobility to complete occupations of choice  Toilet transfer to toilet with Contact Guard Assistance.  Upper extremity exercise program x15 reps per handout, with independence to build strength and endurance for functional tasks.    Outcome: Ongoing, Progressing

## 2020-06-25 NOTE — ASSESSMENT & PLAN NOTE
Chronic respiratory failure with hypoxia  PE diagnosed May 2020, on enoxaparin 100 mg BID at home. Since then has been on supplemental oxygen at home, 2L via nasal cannula intermittently with activity. Started on heparin gtt on admission due to shock, down-trending Hgb, and possible anticipated procedure (port removal). Paused on 6/23 for Port-a-cath removal with IR, and paused again on 6/24 for supra-therapeutic PTT.    - Discontinue heparin gtt and restart therapeutic enoxaparin injections at 100 mg BID  - Continue supplemental O2 PRN with goal O2 saturation ? 92 %

## 2020-06-25 NOTE — PLAN OF CARE
Problem: Physical Therapy Goal  Goal: Physical Therapy Goal  Description: Goals to be met by: 2020    Patient will increase functional independence with mobility by performin. Supine to sit with Stand-by Assistance - not met  2. Sit to stand transfer with Minimal Assistance using LRAD - not met  3. Bed to chair transfer with Minimal Assistance using LRAD - not met  4. Gait  x 10 feet with Minimal Assistance using LRAD - not met  5. Sitting at edge of bed x10 minutes with Supervision with stable BP - not met  6. Lower extremity exercise program x15 reps per handout, with assistance as needed - not met    Outcome: Ongoing, Progressing     Eval competed and goals appropriate

## 2020-06-25 NOTE — CARE UPDATE
Daily update provided to patient's daughter, Suellen.  Discussed plans to transfer to floor with Oncology.  All questions answered.     KENTON OLIVER, ACNP-BC  Critical Care Medicine      
Macular rash noted on bilateral arms and inner thighs.  Face appears slightly edematous.  No tongue or lip swelling, difficulty swallowing, or breathing.  Rash non pruritic rash.    On vancomycin (last dose 6/21 at 1400) and pip/tazo (last dose 0523).     -Monitor rash closely  -D/c pip/tazo.  Start cefepime and metronidazole.  -Check vancomycin level.    BENNIE Adams-BC  Critical Care Medicine      
Updated patient and her daughter at bedside. All questions answered.     KENTON OLIVER, [facility]  Critical Care Medicine      
Updated patient's daughter, Suellen, at bedside on patient's iphone on speaker mode.  All questions answered.     KENTON OLIVER, ACNP-BC  Critical Care Medicine      
home after voiding

## 2020-06-25 NOTE — PROGRESS NOTES
Therapy with vancomycin complete and/or consult discontinued by provider.  Pharmacy will sign off, please re-consult as needed.    Anahy Meyers, PharmD, BCCCP  [phone]

## 2020-06-25 NOTE — PLAN OF CARE
CMICU DAILY GOALS       A: Awake    RASS: Goal - RASS Goal: 0-->alert and calm  Actual - RASS (Stewart Agitation-Sedation Scale): 0-->alert and calm   Restraint necessity:    B: Breath   SBT: Not intubated   C: Coordinate A & B, analgesics/sedatives   Pain: managed    SAT: Not intubated  D: Delirium   CAM-ICU: Overall CAM-ICU: Negative  E: Early Mobility   MOVE Screen: Pass   Activity: Activity Management: activity adjusted per tolerance  FAS: Feeding/Nutrition   Diet order: Diet/Nutrition Received: regular,   Fluid restriction:    T: Thrombus   DVT prophylaxis: VTE Required Core Measure: Pharmacological prophylaxis initiated/maintained  H: HOB Elevation   Head of Bed (HOB): HOB at 30-45 degrees  U: Ulcer Prophylaxis   GI: yes  G: Glucose control   managed Glycemic Management: blood glucose monitoring  S: Skin   Bundle compliance: yes   Bathing/Skin Care: bath, chlorhexidine, linen changed Date: 6/24/20  B: Bowel Function   no issues   I: Indwelling Catheters   Amanda necessity: [REMOVED]      Urethral Catheter 06/21/20 2013 Latex 16 Fr.-Reason for Continuing Urinary Catheterization: Critically ill in ICU requiring intensive monitoring   CVC necessity: No   IPAD offered: No  D: De-escalation Antibx   Yes  Plan for the day   Step down to oncology floor  Family/Goals of care/Code Status   Code Status: Full Code     No acute events throughout day, VS and assessment per flow sheet, patient progressing towards goals as tolerated, plan of care reviewed with Alison Branch and family, all concerns addressed, will continue to monitor.

## 2020-06-25 NOTE — PROGRESS NOTES
[facility]  Critical Care Medicine  Progress Note    Patient Name: Alison Branch  MRN: [id number]  Admission Date: 6/21/2020  Hospital Length of Stay: 4 days  Code Status: Full Code  Attending Provider: Lucho Zavaleta MD  Primary Care Provider: Mike Rodriguez Ii, MD   Principal Problem: Septic shock    Subjective:     HPI:  Alison Branch is a 64 year old female with stage IV NSCLC with bone mets, recently started on systemic therapy w/ daily PO Entrectinib since 6/6/2020, patient of Dr Belcher, DM2, HTN, and prior DVT/PE on lovenox, presenting to [facility] for [place], fevers, for past 24 hours. She was alert and oriented x4, conversational on initial presentation with SBP in 60s. She has started to require a rollator in setting of progressive weakness in the past few months. Patient was then noted to be weaker this morning and then had to use the bathroom. Family was helping her make it to the bathroom when she lost control of her bowels and bladder and then became even more weak. EMS called and patient brought to ED for eval.   Patient denies chest pain, shortness of breath, headaches, changes in vision, abdominal pain, vomiting. Patient reports history of urinary tract infections. Patient denies any joint pain. No neck stiffness. Of note Dr. Belcher recently provided option to patient to choose to try new chemotherapy or to stop, with the patient opting for new chemotherapy. The patient has not tolerated the chemotherapy greatly, experiencing nausea, nonbloody vomiting at times.       Hospital/ICU Course:  Ms. Branch was admitted to the ICU with [facility] for sepsis with borderline hypotension.  She was started on empiric antibiotics with pip/tazo and vancomycin along with pan cultures.  UA + bacteria, WBC 3.  Awaiting C diff and stool cultures.  Preliminary blood cultures 1/2 with GPC in cocci.  T max 102.  No leukocytosis/leukopenia. Lactate remains normal.  Urinary retention noted; hennessy placed.    Started on low dose norepinephrine 6/21 pm.    IR was consulted for port removal on 06/23/20. Weaned off norepinephrine infusion 6/24 am.  Discontinued metronidazole.  Hb noted 9.9-->8.2-->8.1 in the setting of supratherapeutic ptt levels. Heparin infusion held.  Serial CBC did not reveal further decline in hb.  No active signs of bleeding; heparin infusion restarted    Interval History/Significant Events: No acute events overnight. Hb remains unchanged.  Restarted on heparin infusion.  Remains off norepinephrine infusion.     Review of Systems   Respiratory: Negative for shortness of breath.    Cardiovascular: Negative for chest pain.   Gastrointestinal: Negative for abdominal pain and nausea.   Neurological: Negative for dizziness and headaches.     Objective:     Vital Signs (Most Recent):  Temp: 98.2 °F (36.8 °C) (06/25/20 0700)  Pulse: 87 (06/25/20 0700)  Resp: 18 (06/25/20 0700)  BP: (!) 96/54 (06/25/20 0700)  SpO2: 100 % (06/25/20 0700) Vital Signs (24h Range):  Temp:  [98.2 °F (36.8 °C)-99.2 °F (37.3 °C)] 98.2 °F (36.8 °C)  Pulse:  [] 87  Resp:  [12-32] 18  SpO2:  [91 %-100 %] 100 %  BP: ()/(49-59) 96/54   Weight: 98.9 kg (218 lb)  Body mass index is 32.19 kg/m².      Intake/Output Summary (Last 24 hours) at 6/25/2020 0748  Last data filed at 6/25/2020 0700  Gross per 24 hour   Intake 732.42 ml   Output 295 ml   Net 437.42 ml       Physical Exam  Vitals signs and nursing note reviewed.   Constitutional:       General: She is not in acute distress.  HENT:      Head: Normocephalic.   Cardiovascular:      Rate and Rhythm: Normal rate and regular rhythm.      Heart sounds: No murmur.   Pulmonary:      Effort: Pulmonary effort is normal. No respiratory distress.      Breath sounds: No wheezing, rhonchi or rales.   Chest:      Chest wall: No tenderness.   Abdominal:      General: Bowel sounds are normal. There is no distension.      Palpations: Abdomen is soft.      Tenderness: There is no abdominal  tenderness.   Skin:     General: Skin is warm and dry.   Neurological:      General: No focal deficit present.      Mental Status: She is alert.      Comments: Appropriate in conversation   Psychiatric:         Mood and Affect: Mood normal.         Behavior: Behavior normal.         Vents:  Oxygen Concentration (%): 28 (06/25/20 0020)  Lines/Drains/Airways     Peripheral Intravenous Line                 Peripheral IV - Single Lumen 06/21/20 18 G Left Antecubital 4 days         Peripheral IV - Single Lumen 06/21/20 1602 22 G Left Wrist 3 days              Significant Labs:    CBC/Anemia Profile:  Recent Labs   Lab 06/24/20  1322 06/24/20  1801 06/25/20  0547   WBC 4.76 4.98 5.32  5.32   HGB 8.1* 8.1* 8.1*  8.1*   HCT 26.3* 26.6* 26.9*  26.9*   * 133* 142*  142*   MCV 91 92 92  92   RDW 15.7* 15.5* 15.6*  15.6*        Chemistries:  Recent Labs   Lab 06/24/20  0349 06/25/20  0547    139   K 4.3 3.8    108   CO2 20* 21*   BUN 15 15   CREATININE 1.5* 1.3   CALCIUM 7.1* 7.4*   ALBUMIN 2.8* 2.7*   PROT 5.5* 5.6*   BILITOT 0.5 0.5   ALKPHOS 45* 49*   ALT 64* 72*   * 148*   MG 1.9 2.1   PHOS 1.9* 2.6*       All pertinent labs within the past 24 hours have been reviewed.    Significant Imaging:  I have reviewed and interpreted all pertinent imaging results/findings within the past 24 hours.      ABG  Recent Labs   Lab 06/22/20  1440   PH 7.332*   PO2 43   PCO2 37.2   HCO3 19.7*   BE -6     Assessment/Plan:     Neuro  Cerebral aneurysm, coiled in 2010  - Continue ASA    Cardiac/Vascular  Elevated troponin  Suspect demand ischemia.  Echo within normal limits.     Hypertension associated with diabetes  - Hold olmesartan in setting of recent shock    Renal/  Metabolic acidosis  NAGMA.  Improving. Urine anion gap + suggesting RTA.    --Continue to monitor.     ROSEMARY (acute kidney injury)  Suspect secondary to dehydration from decreased oral intake and sepsis + ischemic ATN from shock..  FeNA 3% with  urinary retention overnight.  Now with urine catheter.  sCr has been uptrending over the past few weeks, baseline ~0.8. Scr continues to improve.  Making adequate urine.     - US retroperitoneal with medical renal disease  - Avoid nephrotoxic meds  - Amanda removed 6/24, follow up UOP.      ID  * Septic shock  Unclear source, 1 set of blood cultures on 6/21 with Coag neg staph.  Port removed per IR on 6/23. Repeat blood cultures 6/22 NGTD. Large, loose, brown bowel movement reported 6/24.  Afebrile.    - Follow up blood cultures  - Metronidazole d/c'd 6/24.  Continue vancomycin and cefepime for now.   - Weaned off norepinephrine infusion ~ 8 am on 6/24.           Hematology  Pulmonary embolism  PE in 5/2020, on therapeutic enoxaparin at home    - Continue heparin gtt, titrating per protocol    Oncology  Anemia  Normocytic Anemia, no signs of active blood loss.  Possibly dilutional component. Hb grossly unchanged over the last 24 hours.     - Monitor CBC    Malignant neoplasm of upper lobe of left lung  Stage IV NSCLC with bone mets, recently opted for new po chemotherapy entrectinib with Dr. Belcher, started 6/6/2020.  Was given the option of stopping chemo treatments but opted to start new chemo.  Patient expressed full code status on admission.    - Oncology following  - Consider palliative care consult     Endocrine  Type 2 diabetes mellitus with hyperglycemia, with long-term current use of insulin  Recent A1c 9.2. On short acting insulin TIDWM 20-16-16u and long acting 20u levemir at night. Decreased appetite in past few days.    - LDSSI with frequent glucose checks  - Started on diet.  ST following. Aspiration precautions.          VTE Risk Mitigation (From admission, onward)         Ordered     heparin 25,000 units in dextrose 5% 250 mL (100 units/mL) infusion HIGH INTENSITY nomogram - [facility]  Continuous     Question:  Heparin Infusion Adjustment (DO NOT MODIFY ANSWER)  Answer:   \\[email or web address]\epic\Images\Pharmacy\HeparinInfusions\heparin HIGH INTENSITY nomogram for [facility] [id number]    06/23/20 1915     heparin 25,000 units in dextrose 5% (100 units/ml) IV bolus from bag - ADDITIONAL PRN BOLUS - 60 units/kg  As needed (PRN)     Question:  Heparin Infusion Adjustment (DO NOT MODIFY ANSWER)  Answer:  \\[email or web address]\epic\Images\Pharmacy\HeparinInfusions\heparin HIGH INTENSITY nomogram for [facility] [id number]    06/23/20 1915     heparin 25,000 units in dextrose 5% (100 units/ml) IV bolus from bag - ADDITIONAL PRN BOLUS - 30 units/kg  As needed (PRN)     Question:  Heparin Infusion Adjustment (DO NOT MODIFY ANSWER)  Answer:  [email or web address]\epic\Images\Pharmacy\HeparinInfusions\heparin HIGH INTENSITY nomogram for [facility] [id number]    06/23/20 1915     IP VTE HIGH RISK PATIENT  Once      06/21/20 1642     Place sequential compression device  Until discontinued      06/21/20 1642              PT/OT: Recommending SNF at discharge    Transfer to floor with Medical hematology/oncology.     Discussed with Dr. Waqar YEH spent >70 minutes reviewing patient records, examining, and counseling the patient with greater than 50% of the time spent with direct patient care and coordination.        Yenny Castillo NP  Critical Care Medicine  [facility]

## 2020-06-26 LAB
ALBUMIN SERPL BCP-MCNC: 2.8 G/DL (ref 3.5–5.2)
ALP SERPL-CCNC: 58 U/L (ref 55–135)
ALT SERPL W/O P-5'-P-CCNC: 86 U/L (ref 10–44)
ANION GAP SERPL CALC-SCNC: 8 MMOL/L (ref 8–16)
ANISOCYTOSIS BLD QL SMEAR: SLIGHT
APTT BLDCRRT: 92.3 SEC (ref 21–32)
AST SERPL-CCNC: 160 U/L (ref 10–40)
BACTERIA BLD CULT: NORMAL
BACTERIA CATH TIP CULT: NO GROWTH
BASOPHILS # BLD AUTO: 0.03 K/UL (ref 0–0.2)
BASOPHILS NFR BLD: 0.5 % (ref 0–1.9)
BILIRUB SERPL-MCNC: 0.5 MG/DL (ref 0.1–1)
BUN SERPL-MCNC: 17 MG/DL (ref 8–23)
BURR CELLS BLD QL SMEAR: ABNORMAL
CALCIUM SERPL-MCNC: 7.5 MG/DL (ref 8.7–10.5)
CHLORIDE SERPL-SCNC: 110 MMOL/L (ref 95–110)
CO2 SERPL-SCNC: 22 MMOL/L (ref 23–29)
CREAT SERPL-MCNC: 1.4 MG/DL (ref 0.5–1.4)
DIFFERENTIAL METHOD: ABNORMAL
EOSINOPHIL # BLD AUTO: 0.2 K/UL (ref 0–0.5)
EOSINOPHIL NFR BLD: 3.5 % (ref 0–8)
ERYTHROCYTE [DISTWIDTH] IN BLOOD BY AUTOMATED COUNT: 15.9 % (ref 11.5–14.5)
EST. GFR  (AFRICAN AMERICAN): 46.5 ML/MIN/1.73 M^2
EST. GFR  (NON AFRICAN AMERICAN): 40.3 ML/MIN/1.73 M^2
GLUCOSE SERPL-MCNC: 182 MG/DL (ref 70–110)
HCT VFR BLD AUTO: 25 % (ref 37–48.5)
HGB BLD-MCNC: 7.6 G/DL (ref 12–16)
HYPOCHROMIA BLD QL SMEAR: ABNORMAL
IMM GRANULOCYTES # BLD AUTO: 0.1 K/UL (ref 0–0.04)
IMM GRANULOCYTES NFR BLD AUTO: 1.7 % (ref 0–0.5)
LYMPHOCYTES # BLD AUTO: 1.8 K/UL (ref 1–4.8)
LYMPHOCYTES NFR BLD: 30 % (ref 18–48)
MAGNESIUM SERPL-MCNC: 2.2 MG/DL (ref 1.6–2.6)
MCH RBC QN AUTO: 28.3 PG (ref 27–31)
MCHC RBC AUTO-ENTMCNC: 30.4 G/DL (ref 32–36)
MCV RBC AUTO: 93 FL (ref 82–98)
MONOCYTES # BLD AUTO: 0.5 K/UL (ref 0.3–1)
MONOCYTES NFR BLD: 8.9 % (ref 4–15)
NEUTROPHILS # BLD AUTO: 3.4 K/UL (ref 1.8–7.7)
NEUTROPHILS NFR BLD: 55.4 % (ref 38–73)
NRBC BLD-RTO: 0 /100 WBC
PHOSPHATE SERPL-MCNC: 2.4 MG/DL (ref 2.7–4.5)
PLATELET # BLD AUTO: 175 K/UL (ref 150–350)
PLATELET BLD QL SMEAR: ABNORMAL
PMV BLD AUTO: 12.8 FL (ref 9.2–12.9)
POCT GLUCOSE: 149 MG/DL (ref 70–110)
POCT GLUCOSE: 186 MG/DL (ref 70–110)
POCT GLUCOSE: 200 MG/DL (ref 70–110)
POIKILOCYTOSIS BLD QL SMEAR: SLIGHT
POLYCHROMASIA BLD QL SMEAR: ABNORMAL
POTASSIUM SERPL-SCNC: 4 MMOL/L (ref 3.5–5.1)
PROT SERPL-MCNC: 5.7 G/DL (ref 6–8.4)
RBC # BLD AUTO: 2.69 M/UL (ref 4–5.4)
SODIUM SERPL-SCNC: 140 MMOL/L (ref 136–145)
WBC # BLD AUTO: 6.04 K/UL (ref 3.9–12.7)

## 2020-06-26 PROCEDURE — 99900035 HC TECH TIME PER 15 MIN (STAT)

## 2020-06-26 PROCEDURE — 25000242 PHARM REV CODE 250 ALT 637 W/ HCPCS: Performed by: STUDENT IN AN ORGANIZED HEALTH CARE EDUCATION/TRAINING PROGRAM

## 2020-06-26 PROCEDURE — 85025 COMPLETE CBC W/AUTO DIFF WBC: CPT

## 2020-06-26 PROCEDURE — 25000003 PHARM REV CODE 250: Performed by: STUDENT IN AN ORGANIZED HEALTH CARE EDUCATION/TRAINING PROGRAM

## 2020-06-26 PROCEDURE — 85730 THROMBOPLASTIN TIME PARTIAL: CPT

## 2020-06-26 PROCEDURE — 63600175 PHARM REV CODE 636 W HCPCS: Performed by: GENERAL ACUTE CARE HOSPITAL

## 2020-06-26 PROCEDURE — 94640 AIRWAY INHALATION TREATMENT: CPT

## 2020-06-26 PROCEDURE — 20600001 HC STEP DOWN PRIVATE ROOM

## 2020-06-26 PROCEDURE — 84100 ASSAY OF PHOSPHORUS: CPT

## 2020-06-26 PROCEDURE — 25000003 PHARM REV CODE 250: Performed by: PHYSICIAN ASSISTANT

## 2020-06-26 PROCEDURE — 27000221 HC OXYGEN, UP TO 24 HOURS

## 2020-06-26 PROCEDURE — 63600175 PHARM REV CODE 636 W HCPCS: Performed by: NURSE PRACTITIONER

## 2020-06-26 PROCEDURE — 94761 N-INVAS EAR/PLS OXIMETRY MLT: CPT

## 2020-06-26 PROCEDURE — 99233 SBSQ HOSP IP/OBS HIGH 50: CPT | Mod: ,,, | Performed by: PHYSICIAN ASSISTANT

## 2020-06-26 PROCEDURE — 83735 ASSAY OF MAGNESIUM: CPT

## 2020-06-26 PROCEDURE — 63600175 PHARM REV CODE 636 W HCPCS: Performed by: STUDENT IN AN ORGANIZED HEALTH CARE EDUCATION/TRAINING PROGRAM

## 2020-06-26 PROCEDURE — 80053 COMPREHEN METABOLIC PANEL: CPT

## 2020-06-26 PROCEDURE — 99233 PR SUBSEQUENT HOSPITAL CARE,LEVL III: ICD-10-PCS | Mod: ,,, | Performed by: PHYSICIAN ASSISTANT

## 2020-06-26 RX ORDER — OXYCODONE HYDROCHLORIDE 5 MG/1
5 TABLET ORAL EVERY 6 HOURS PRN
Status: DISCONTINUED | OUTPATIENT
Start: 2020-06-26 | End: 2020-06-29 | Stop reason: HOSPADM

## 2020-06-26 RX ORDER — ENOXAPARIN SODIUM 100 MG/ML
100 INJECTION SUBCUTANEOUS
Status: DISCONTINUED | OUTPATIENT
Start: 2020-06-26 | End: 2020-06-27

## 2020-06-26 RX ORDER — ENOXAPARIN SODIUM 100 MG/ML
1 INJECTION SUBCUTANEOUS
Status: CANCELLED | OUTPATIENT
Start: 2020-06-26

## 2020-06-26 RX ORDER — SODIUM,POTASSIUM PHOSPHATES 280-250MG
2 POWDER IN PACKET (EA) ORAL EVERY 4 HOURS PRN
Status: DISCONTINUED | OUTPATIENT
Start: 2020-06-26 | End: 2020-06-26

## 2020-06-26 RX ORDER — ENOXAPARIN SODIUM 100 MG/ML
100 INJECTION SUBCUTANEOUS 2 TIMES DAILY
Status: CANCELLED | OUTPATIENT
Start: 2020-06-26

## 2020-06-26 RX ADMIN — IPRATROPIUM BROMIDE AND ALBUTEROL SULFATE 3 ML: .5; 3 SOLUTION RESPIRATORY (INHALATION) at 08:06

## 2020-06-26 RX ADMIN — FOLIC ACID 1 MG: 1 TABLET ORAL at 08:06

## 2020-06-26 RX ADMIN — OXYCODONE HYDROCHLORIDE 5 MG: 5 TABLET ORAL at 03:06

## 2020-06-26 RX ADMIN — DEXTROSE MONOHYDRATE 30 MMOL: 50 INJECTION, SOLUTION INTRAVENOUS at 04:06

## 2020-06-26 RX ADMIN — HEPARIN SODIUM AND DEXTROSE 10 UNITS/KG/HR: 10000; 5 INJECTION INTRAVENOUS at 10:06

## 2020-06-26 RX ADMIN — ASPIRIN 81 MG: 81 TABLET, COATED ORAL at 08:06

## 2020-06-26 RX ADMIN — CEFEPIME 2 G: 2 INJECTION, POWDER, FOR SOLUTION INTRAVENOUS at 02:06

## 2020-06-26 RX ADMIN — FLUTICASONE PROPIONATE 100 MCG: 50 SPRAY, METERED NASAL at 08:06

## 2020-06-26 RX ADMIN — ENOXAPARIN SODIUM 100 MG: 100 INJECTION SUBCUTANEOUS at 04:06

## 2020-06-26 RX ADMIN — POTASSIUM & SODIUM PHOSPHATES POWDER PACK 280-160-250 MG 2 PACKET: 280-160-250 PACK at 08:06

## 2020-06-26 RX ADMIN — IPRATROPIUM BROMIDE AND ALBUTEROL SULFATE 3 ML: .5; 3 SOLUTION RESPIRATORY (INHALATION) at 03:06

## 2020-06-26 RX ADMIN — OXYCODONE HYDROCHLORIDE 5 MG: 5 TABLET ORAL at 11:06

## 2020-06-26 RX ADMIN — ACETAMINOPHEN 650 MG: 325 TABLET ORAL at 08:06

## 2020-06-26 NOTE — PROGRESS NOTES
[facility]  Hematology/Oncology  Progress Note    Patient Name: Alison Branch  Admission Date: 6/21/2020  Hospital Length of Stay: 5 days  Code Status: Full Code     Subjective:     HPI:  Alison Branch is a 64 year old woman with stage IV lung adenocarcinoma with metastasis to bone diagnosed in May 2019, on PO Entrectinib since 6/6/2020, IDDM2, HTN, HLD, DM, PE diagnosed May 2020 on enoxaparin, chronic hypoxic respiratory failure intermittently on 2L NC at home, history of recurrent UTIs, ADD not on a stimulant, and a history of brain aneurysm s/p coiling who presented to [facility] on 6/21 for 1 day of fever and a few months of worsening generalized weakness which culminated in a loss of bowl and bladder control on the way to the bathroom the day of admission that prompted her to call EMS. At that time she denied any chest pain, SOB, LOC, headaches, vision changes, abdominal pain, joint pain, neck stiffness. She did admit to some nausea and non-bloody vomiting since starting daily PO Entrectinib causing overall decreased PO intake.    ED Course: SBPs in the 60s on arrival. Blood and urine cultures collected. Started on vancomycin and piperacillin-tazobactam. Lactate 3.0 and Procal 1.39. ROSEMARY with Cr 2.4 (baseline 1.2). CXR showed CARLOS opacity but unclear whether this was obstructive pneumonia vs cancer. Received 2.8L IV fluids in ED with MAPs still in low 60s. Was admitted to ICU and started on levophed for septic shock.      Oncology History (from Dr. Belcher): Smoked for 30 years, quit around year 2000. Started having cough in late 2018 however persisted into 2019. Saw her PCP who ordered CXR on 5/10/19, showed CARLOS PNA. Chest CT done on 5/17/19 showed CARLOS mass arising from the lateral pleural surface in the left upper lobe posterior subsegment measuring 2.6 x 3 cm. There were satellite mass more medially near the aortic arch that was 2 cm, also spiculated and irregular as well as thickened interlobar septa  "in the left lung apex and anterior segment, prevascular lymph node lateral to the aortic arch, short axis measuring 9 mm. She underwent bronchoscopy on 05/28/19, which revealed an infiltration in the left apical posterior segment of the left upper lobe, cytology brush was done. Transbronchial biopsies of an area of infiltration were also performed in the apicoposterior segment of the left upper lobe. Pathology revealed adenocarcinoma, however the specimen was not adequate enough to send for molecular testing.     PET scan on 6/6/19 showed significant hypermetabolic activity in the large irregular spiculated pulmonary mass in the lateral aspect of the left upper lobe consistent with the patient's known pulmonary adenocarcinoma. There was also tracer avidity in the medial left upper lobe satellite lesion and diffusely throughout much of the anterior left upper lobe where there was prominent nodular paraseptal thickening. There were some numerous scattered subcentimeter pulmonary nodules throughout the right lung, all of which are too small for detection by PET. There was a 0.5 cm, normal size right paratracheal lymph node with increased radiotracer uptake as well as hypermetabolic aortic pulmonary lymph node and a left hilar lymph node. There were focal hypermetabolic lesions in the left anterior superior iliac spine and anterior right fifth rib both associated with well defined lytic lesions. MRI pelvis from 6/10/19  revealed "region of osseous is irregularity at the left anterior iliac spine likely related to bone graft harvest procedure" and "no suspicious signal or enhancement to suggest active/malignant process". MRI brain from 6/10/19 revealed no intracranial abnormality.     Her GAURDANT was negative for molecular markers. Started Carboplatin, Alimta and Keytruda on 7/9/19 and completed 4 cycles, followed by Alimta and Keytruda maintenance. Her CT scans from 4/23/2020 revealed "In this patient with a known " history of lung cancer, there has been marked interval detrimental change when compared to CT dated 12/20/2019 as follows. Persistent left upper lobe volume loss with worsening masslike consolidation and enlarging index and non index lesions. Increased number of innumerable bilateral metastatic solid pulmonary nodules. Interval increased size and number of multiple osteoblastic metastatic lesions throughout the visualized axial skeleton. Stable mediastinal lymph nodes, several of which were noted to be hypermetabolic on previous PET-CT.  No new lymphadenopathy. Stable subcentimeter hepatic and splenic hypodensities, too small to accurately characterize. Path addendum from 5/2019 revealed ROS1. Patient was hospitalized in May 2019 and diagnosed with PE. Started on PO Entrectinib 600 mg qd on 6/6/2020.    Interval History: Doing OK, complaining of back pain. States she fell at home and did not tell anyone. Lovenox re-started.     Oncology Treatment Plan:   [No treatment plan]    Medications:  Continuous Infusions:  Scheduled Meds:   albuterol-ipratropium  3 mL Nebulization Q4H    aspirin  81 mg Oral Daily    enoxaparin  100 mg Subcutaneous Q12H    fluticasone propionate  2 spray Each Nostril Daily    folic acid  1 mg Oral Daily     PRN Meds:acetaminophen, albuterol-ipratropium, Dextrose 10% Bolus, glucagon (human recombinant), insulin aspart U-100, ondansetron, promethazine, sodium chloride 0.9%     Review of Systems   Constitutional: Positive for fatigue. Negative for chills and fever.   HENT: Negative for congestion.    Respiratory: Negative for shortness of breath and wheezing.    Cardiovascular: Negative for chest pain.   Gastrointestinal: Negative for abdominal pain.   Genitourinary: Negative for dysuria.   Musculoskeletal: Positive for back pain and gait problem.   Allergic/Immunologic: Positive for immunocompromised state.   Neurological: Negative for tremors, syncope and speech difficulty.    Psychiatric/Behavioral: Negative for confusion.     Objective:     Vital Signs (Most Recent):  Temp: 97.5 °F (36.4 °C) (06/26/20 1406)  Pulse: 86 (06/26/20 1406)  Resp: 16 (06/26/20 1406)  BP: (!) 98/59 (06/26/20 1406)  SpO2: 96 % (06/26/20 1406) Vital Signs (24h Range):  Temp:  [97.3 °F (36.3 °C)-98.6 °F (37 °C)] 97.5 °F (36.4 °C)  Pulse:  [] 86  Resp:  [13-33] 16  SpO2:  [94 %-100 %] 96 %  BP: ()/(52-76) 98/59     Weight: 98.9 kg (218 lb)  Body mass index is 32.19 kg/m².  Body surface area is 2.19 meters squared.      Intake/Output Summary (Last 24 hours) at 6/26/2020 1443  Last data filed at 6/26/2020 1300  Gross per 24 hour   Intake 282.14 ml   Output 100 ml   Net 182.14 ml       Physical Exam  Constitutional:       General: She is awake. She is not in acute distress.     Appearance: She is well-developed. She is not diaphoretic.      Interventions: Nasal cannula in place.   HENT:      Head:      Comments: Sloughing, peeling of skin over face diffusely   Eyes:      Extraocular Movements: Extraocular movements intact.      Pupils: Pupils are equal, round, and reactive to light.   Neck:      Musculoskeletal: Normal range of motion and neck supple.      Thyroid: No thyromegaly.      Trachea: No tracheal deviation.   Cardiovascular:      Rate and Rhythm: Normal rate and regular rhythm.      Heart sounds: Normal heart sounds.   Pulmonary:      Effort: Pulmonary effort is normal. No tachypnea or accessory muscle usage.      Breath sounds: Normal breath sounds. No wheezing.   Abdominal:      General: Bowel sounds are normal.      Palpations: Abdomen is soft.      Tenderness: There is no abdominal tenderness.   Musculoskeletal: Normal range of motion.         General: No swelling.   Skin:     General: Skin is warm and dry.   Neurological:      Mental Status: She is alert and oriented to person, place, and time.      Sensory: No sensory deficit.   Psychiatric:         Behavior: Behavior is cooperative.          Significant Labs:   CBC:   Recent Labs   Lab 06/24/20  1801 06/25/20  0547 06/26/20  0248   WBC 4.98 5.32  5.32 6.04   HGB 8.1* 8.1*  8.1* 7.6*   HCT 26.6* 26.9*  26.9* 25.0*   * 142*  142* 175    and CMP:   Recent Labs   Lab 06/25/20  0547 06/26/20  0248    140   K 3.8 4.0    110   CO2 21* 22*   * 182*   BUN 15 17   CREATININE 1.3 1.4   CALCIUM 7.4* 7.5*   PROT 5.6* 5.7*   ALBUMIN 2.7* 2.8*   BILITOT 0.5 0.5   ALKPHOS 49* 58   * 160*   ALT 72* 86*   ANIONGAP 10 8   EGFRNONAA 44.1* 40.3*       Diagnostic Results:  I have reviewed and interpreted all pertinent imaging results/findings within the past 24 hours.    Assessment/Plan:     * Septic shock  On admission was complaining of diarrhea prior to arrival, no respiratory or urinary symptoms. In ED patient was febrile and hypotensive, resistant to approximately 3L IV fluids. Started on vancomycin and piperacillin-tazobactam in ED. Required admission to ICU for levophed, started on 6/21, and switched piperacillin-tazobactam to cefepime and metronidazole. Weaned off levophed 6/24. Discontinued metronidazole 6/24. Only had 1 BM throughout admission. CXR showing possible consolidation vs known malignancy. UA shows bacteria but no significant leukocytes or nitrites. Did not have any signs of infection at port, but blood cultures from 6/21 grew Staph in 1 bottle so Port-a-cath was removed by IR on 6/23. Initial culture grew CoNS and repeat blood cultures from 6/23 NGTD. Hypotension might have been at least in contribution from a reaction to Entrectinib (causes hypotension in 18% of patients).    - Critical care team discontinued antibiotics (vanc d/c'd 6/25, cefepime d/c'd 6/26) as the coag negative staph in one bottle was likely a contaminant, and we do not have a likely skin/soft tissue source, and her Port-a-cath which would be a source has been removed and catheter tip cultures have been negative. No symptoms to suggest  pneumonia or UTI. Already received approximately 6 days of IV antibiotics while inpatient.   - MAP goal > 65  - Holding home antihypertensive    Hypomagnesemia  - Mg level daily  - Replacing as needed      Elevated LFTs  Possibly multifactorial from Entrectinib and poor perfusion to liver from septic shock. Were trending down with slight increase 5/26.    - Monitoring with CMP daily      Hypophosphatemia  - Phos level daily  - Replacing as needed      Anemia  Iron studies on admission c/w ACD from malignancy/chemotherapy-related with possible iron deficiency component. No known source of bleeding.    - CBC daily  - Transfuse for Hgb < 7, symptomatic anemia, or active bleeding      Elevated troponin  Troponin 0.062 on admission, peaked at 0.109 on 6/22. EKG unremarkable. TTE without focal wall motion abnormalities, valvular dysfunction or pericardial effusion, LVEF 55%. Likely 2/2 demand ischemia from hypotension.      Pulmonary embolism  Chronic respiratory failure with hypoxia  PE diagnosed May 2020, on enoxaparin 100 mg BID at home. Since then has been on supplemental oxygen at home, 2L via nasal cannula intermittently with activity. Started on heparin gtt on admission due to shock, down-trending Hgb, and possible anticipated procedure (port removal). Paused on 6/23 for Port-a-cath removal with IR, and paused again on 6/24 for supra-therapeutic PTT.    - Discontinue heparin gtt and restart therapeutic enoxaparin injections at 100 mg BID  - Continue supplemental O2 PRN with goal O2 saturation ? 92 %      ROSEMARY (acute kidney injury)  Urinary retention  Cr 2.4 and FeNa 3% on admission. Cr was been uptrending over the past few weeks prior to admission, baseline ~0.8. Urinary retention overnight the day of admission, Amanda placed. Retroperitoneal U/S negative for obstruction, showed medical renal disease. ROSEMARY likely multifactorial from dehydration from decreased PO intake, poor renal perfusion from sepsis with possible  ATN (amorphous casts on UA), and possibly increase in creatinine from Entrectonib. Cr improved throughout admission, Amanda removed 6/24.      - CMP daily  - Avoid nephrotoxic meds  - Strict intake and output  - Purewick prn      Malignant neoplasm of upper lobe of left lung  Secondary malignant neoplasm of bone  Goals of care, counseling/discussion  Stage IV NSCLC with bone mets, recently opted for new PO chemotherapy entrectinib with Dr. Belcher, started 6/6/2020. Was given the option of stopping chemo treatments but opted to start new chemo.  Patient expressed full code status on admission.    - Will arrange outpatient follow up with Dr. Belcher regarding continuing Entrectinib (possibly at lower dose) versus transitioning to home hospice/comfort care      Hypertension associated with diabetes  - Holding home olmesartan given continued hypotension      Type 2 diabetes mellitus with hyperglycemia, with long-term current use of insulin  A1c 9.2 on admission. At home patient takes short acting insulin TIDWM 20-16-16u and long acting 20u levemir at night. On admission patient was started on 10 units detemir qHS with LDISS, however the detemir was discontinued on 6/24 after a fasting . Since then has only required a few units a day with sliding scale.    - Diabetic diet  - POCT glucose checks Q4 with LDISS  - Will need to decrease home insulin dose on discharge significantly             Abeba Sheppard MD  Hematology/Oncology  [facility]

## 2020-06-26 NOTE — ASSESSMENT & PLAN NOTE
Unclear source, 1 set of blood cultures on 6/21 with Coag neg staph.  Port removed per IR on 6/23. Repeat blood cultures 6/22 NGTD. Large, loose, brown bowel movement reported 6/24.  Afebrile.    - Follow up blood cultures  - Metronidazole d/c'd 6/24.    - Discontinued cefepime and vancomycin 6/26 due to BCx with coag negative staph, likely contaminate.   - Weaned off norepinephrine infusion ~ 8 am on 6/24.     RESOLVED

## 2020-06-26 NOTE — SUBJECTIVE & OBJECTIVE
Interval History: Doing OK, complaining of back pain. States she fell at home and did not tell anyone. Lovenox re-started.     Oncology Treatment Plan:   [No treatment plan]    Medications:  Continuous Infusions:  Scheduled Meds:   albuterol-ipratropium  3 mL Nebulization Q4H    aspirin  81 mg Oral Daily    enoxaparin  100 mg Subcutaneous Q12H    fluticasone propionate  2 spray Each Nostril Daily    folic acid  1 mg Oral Daily     PRN Meds:acetaminophen, albuterol-ipratropium, Dextrose 10% Bolus, glucagon (human recombinant), insulin aspart U-100, ondansetron, promethazine, sodium chloride 0.9%     Review of Systems   Constitutional: Positive for fatigue. Negative for chills and fever.   HENT: Negative for congestion.    Respiratory: Negative for shortness of breath and wheezing.    Cardiovascular: Negative for chest pain.   Gastrointestinal: Negative for abdominal pain.   Genitourinary: Negative for dysuria.   Musculoskeletal: Positive for back pain and gait problem.   Allergic/Immunologic: Positive for immunocompromised state.   Neurological: Negative for tremors, syncope and speech difficulty.   Psychiatric/Behavioral: Negative for confusion.     Objective:     Vital Signs (Most Recent):  Temp: 97.5 °F (36.4 °C) (06/26/20 1406)  Pulse: 86 (06/26/20 1406)  Resp: 16 (06/26/20 1406)  BP: (!) 98/59 (06/26/20 1406)  SpO2: 96 % (06/26/20 1406) Vital Signs (24h Range):  Temp:  [97.3 °F (36.3 °C)-98.6 °F (37 °C)] 97.5 °F (36.4 °C)  Pulse:  [] 86  Resp:  [13-33] 16  SpO2:  [94 %-100 %] 96 %  BP: ()/(52-76) 98/59     Weight: 98.9 kg (218 lb)  Body mass index is 32.19 kg/m².  Body surface area is 2.19 meters squared.      Intake/Output Summary (Last 24 hours) at 6/26/2020 1443  Last data filed at 6/26/2020 1300  Gross per 24 hour   Intake 282.14 ml   Output 100 ml   Net 182.14 ml       Physical Exam  Constitutional:       General: She is awake. She is not in acute distress.     Appearance: She is  well-developed. She is not diaphoretic.      Interventions: Nasal cannula in place.   HENT:      Head:      Comments: Sloughing, peeling of skin over face diffusely   Eyes:      Extraocular Movements: Extraocular movements intact.      Pupils: Pupils are equal, round, and reactive to light.   Neck:      Musculoskeletal: Normal range of motion and neck supple.      Thyroid: No thyromegaly.      Trachea: No tracheal deviation.   Cardiovascular:      Rate and Rhythm: Normal rate and regular rhythm.      Heart sounds: Normal heart sounds.   Pulmonary:      Effort: Pulmonary effort is normal. No tachypnea or accessory muscle usage.      Breath sounds: Normal breath sounds. No wheezing.   Abdominal:      General: Bowel sounds are normal.      Palpations: Abdomen is soft.      Tenderness: There is no abdominal tenderness.   Musculoskeletal: Normal range of motion.         General: No swelling.   Skin:     General: Skin is warm and dry.   Neurological:      Mental Status: She is alert and oriented to person, place, and time.      Sensory: No sensory deficit.   Psychiatric:         Behavior: Behavior is cooperative.         Significant Labs:   CBC:   Recent Labs   Lab 06/24/20  1801 06/25/20  0547 06/26/20  0248   WBC 4.98 5.32  5.32 6.04   HGB 8.1* 8.1*  8.1* 7.6*   HCT 26.6* 26.9*  26.9* 25.0*   * 142*  142* 175    and CMP:   Recent Labs   Lab 06/25/20  0547 06/26/20  0248    140   K 3.8 4.0    110   CO2 21* 22*   * 182*   BUN 15 17   CREATININE 1.3 1.4   CALCIUM 7.4* 7.5*   PROT 5.6* 5.7*   ALBUMIN 2.7* 2.8*   BILITOT 0.5 0.5   ALKPHOS 49* 58   * 160*   ALT 72* 86*   ANIONGAP 10 8   EGFRNONAA 44.1* 40.3*       Diagnostic Results:  I have reviewed and interpreted all pertinent imaging results/findings within the past 24 hours.

## 2020-06-26 NOTE — PLAN OF CARE
Patient is a transfer from ICU via hospital bed. AAOx$, chairfast, fall precautions maintained. VSS. Afebrile. Patient instructed on how to use call light and to call for assistance when needed. Purewick in place. Patient c/o back pain, PRN oxycodone given with moderate relief. Safety  precautions in place. Bed locked in lowest position. Side rails up x2. Non skid footwear on. Patient stable. Will continue to monitor.

## 2020-06-26 NOTE — PROGRESS NOTES
[facility]  Critical Care Medicine  Progress Note    Patient Name: Alison Branch  MRN: [id number]  Admission Date: 6/21/2020  Hospital Length of Stay: 5 days  Code Status: Full Code  Attending Provider: Lucho Zavaleta MD  Primary Care Provider: Mike Rodriguez Ii, MD   Principal Problem: Septic shock    Subjective:     HPI:  Alison Branch is a 64 year old female with stage IV NSCLC with bone mets, recently started on systemic therapy w/ daily PO Entrectinib since 6/6/2020, patient of Dr Belcher, DM2, HTN, and prior DVT/PE on lovenox, presenting to [facility] for weakness, fevers, for past 24 hours. She was alert and oriented x4, conversational on initial presentation with SBP in 60s. She has started to require a rollator in setting of progressive weakness in the past few months. Patient was then noted to be weaker this morning and then had to use the bathroom. Family was helping her make it to the bathroom when she lost control of her bowels and bladder and then became even more weak. EMS called and patient brought to ED for eval.   Patient denies chest pain, shortness of breath, headaches, changes in vision, abdominal pain, vomiting. Patient reports history of urinary tract infections. Patient denies any joint pain. No neck stiffness. Of note Dr. Belcher recently provided option to patient to choose to try new chemotherapy or to stop, with the patient opting for new chemotherapy. The patient has not tolerated the chemotherapy greatly, experiencing nausea, nonbloody vomiting at times.       Hospital/ICU Course:  Ms. Branch was admitted to the ICU with [facility] for sepsis with borderline hypotension.  She was started on empiric antibiotics with pip/tazo and vancomycin along with pan cultures.  UA + bacteria, WBC 3.  Awaiting C diff and stool cultures.  Preliminary blood cultures 1/2 with GPC in cocci.  T max 102.  No leukocytosis/leukopenia. Lactate remains normal.  Urinary retention noted; hennessy placed.    Started on low dose norepinephrine 6/21 pm.    IR was consulted for port removal on 06/23/20. Weaned off norepinephrine infusion 6/24 am.  Discontinued metronidazole.  Hb noted 9.9-->8.2-->8.1 in the setting of supratherapeutic ptt levels. Heparin infusion held.  Serial CBC did not reveal further decline in hb.  No active signs of bleeding; heparin infusion restarted. Blood culture resulted with coag negative staph, likely a contaminate and all abx were discontinued 6/26. Repeat cultures have been negative. Patient stable for step down to oncology.     Interval History/Significant Events: No acute events overnight. Remains on home 2L NC with oxygen saturations 100%.     Review of Systems   Constitutional: Negative for chills and fever.   HENT: Positive for nosebleeds (related to dryness). Negative for congestion.    Respiratory: Negative for cough and shortness of breath.    Cardiovascular: Negative for chest pain and leg swelling.   Gastrointestinal: Negative for abdominal pain, nausea and vomiting.   Genitourinary: Negative for difficulty urinating and dysuria.   Musculoskeletal: Positive for back pain.   Skin: Negative for rash.   Neurological: Negative for light-headedness and numbness.   Psychiatric/Behavioral: Negative for agitation and confusion.     Objective:     Vital Signs (Most Recent):  Temp: 97.3 °F (36.3 °C) (06/26/20 1100)  Pulse: 82 (06/26/20 1200)  Resp: 17 (06/26/20 1200)  BP: (!) 109/53 (06/26/20 1200)  SpO2: 100 % (06/26/20 1200) Vital Signs (24h Range):  Temp:  [97.3 °F (36.3 °C)-98.6 °F (37 °C)] 97.3 °F (36.3 °C)  Pulse:  [] 82  Resp:  [13-33] 17  SpO2:  [94 %-100 %] 100 %  BP: ()/(52-76) 109/53   Weight: 98.9 kg (218 lb)  Body mass index is 32.19 kg/m².      Intake/Output Summary (Last 24 hours) at 6/26/2020 1247  Last data filed at 6/26/2020 1200  Gross per 24 hour   Intake 301.44 ml   Output 100 ml   Net 201.44 ml       Physical Exam  Constitutional:       General: She is awake.       Appearance: Normal appearance. She is well-developed.      Interventions: Nasal cannula in place.   HENT:      Head: Normocephalic and atraumatic.   Eyes:      Pupils: Pupils are equal, round, and reactive to light.   Neck:      Thyroid: No thyromegaly.      Trachea: No tracheal deviation.   Cardiovascular:      Rate and Rhythm: Normal rate and regular rhythm.      Heart sounds: Normal heart sounds. No murmur. No friction rub. No gallop.    Pulmonary:      Effort: Pulmonary effort is normal. No tachypnea or accessory muscle usage.      Breath sounds: No wheezing, rhonchi or rales.   Abdominal:      General: Bowel sounds are normal.      Palpations: Abdomen is soft.      Tenderness: There is no abdominal tenderness.   Genitourinary:     Comments: Female external catheter in place.   Musculoskeletal: Normal range of motion.   Skin:     General: Skin is warm and dry.   Neurological:      Mental Status: She is alert and oriented to person, place, and time.      Sensory: No sensory deficit.   Psychiatric:         Behavior: Behavior is cooperative.         Vents:  Oxygen Concentration (%): 28 (06/25/20 0020)  Lines/Drains/Airways     Drain            Female External Urinary Catheter 06/26/20 0100 less than 1 day          Peripheral Intravenous Line                 Peripheral IV - Single Lumen 06/21/20 18 G Left Antecubital 5 days         Peripheral IV - Single Lumen 06/25/20 1530 20 G;1 3/4 in Right Forearm less than 1 day              Significant Labs:    CBC/Anemia Profile:  Recent Labs   Lab 06/24/20  1801 06/25/20  0547 06/25/20  1836 06/26/20  0248   WBC 4.98 5.32  5.32  --  6.04   HGB 8.1* 8.1*  8.1*  --  7.6*   HCT 26.6* 26.9*  26.9*  --  25.0*   * 142*  142*  --  175   MCV 92 92  92  --  93   RDW 15.5* 15.6*  15.6*  --  15.9*   OCCULTBLOOD  --   --  Positive*  --         Chemistries:  Recent Labs   Lab 06/25/20  0547 06/26/20  0248    140   K 3.8 4.0    110   CO2 21* 22*   BUN 15 17    CREATININE 1.3 1.4   CALCIUM 7.4* 7.5*   ALBUMIN 2.7* 2.8*   PROT 5.6* 5.7*   BILITOT 0.5 0.5   ALKPHOS 49* 58   ALT 72* 86*   * 160*   MG 2.1 2.2   PHOS 2.6* 2.4*       All pertinent labs within the past 24 hours have been reviewed.    Significant Imaging:  I have reviewed and interpreted all pertinent imaging results/findings within the past 24 hours.      ABG  Recent Labs   Lab 06/22/20  1440   PH 7.332*   PO2 43   PCO2 37.2   HCO3 19.7*   BE -6     Assessment/Plan:     Neuro  Cerebral aneurysm, coiled in 2010  - Continue ASA    Cardiac/Vascular  Elevated troponin  Suspect demand ischemia.  Echo within normal limits.     Hypertension associated with diabetes  - Hold olmesartan in setting of normotension    Renal/  Metabolic acidosis  NAGMA.  Improving. Urine anion gap + suggesting RTA.    --Continue to monitor.     RESOLVED    ROSEMARY (acute kidney injury)  Suspect secondary to dehydration from decreased oral intake and sepsis + ischemic ATN from shock..  FeNA 3% with urinary retention overnight.  Now with urine catheter.  sCr has been uptrending over the past few weeks, baseline ~0.8. Scr continues to improve.  Making adequate urine.     - US retroperitoneal with medical renal disease  - Avoid nephrotoxic meds  - Amanda removed 6/24, follow up UOP.      ID  * Septic shock  Unclear source, 1 set of blood cultures on 6/21 with Coag neg staph.  Port removed per IR on 6/23. Repeat blood cultures 6/22 NGTD. Large, loose, brown bowel movement reported 6/24.  Afebrile.    - Follow up blood cultures  - Metronidazole d/c'd 6/24.    - Discontinued cefepime and vancomycin 6/26 due to BCx with coag negative staph, likely contaminate.   - Weaned off norepinephrine infusion ~ 8 am on 6/24.     RESOLVED    Hematology  Pulmonary embolism  PE in 5/2020, on therapeutic enoxaparin at home    - Continue heparin gtt, titrating per protocol    Oncology  Anemia  Normocytic Anemia, no signs of active blood loss.  Possibly  dilutional component. Hb grossly unchanged over the last 24 hours.     - Monitor CBC    Malignant neoplasm of upper lobe of left lung  Stage IV NSCLC with bone mets, recently opted for new po chemotherapy entrectinib with Dr. Belcher, started 6/6/2020.  Was given the option of stopping chemo treatments but opted to start new chemo.  Patient expressed full code status on admission.    - Oncology following  - Consider palliative care consult     Endocrine  Type 2 diabetes mellitus with hyperglycemia, with long-term current use of insulin  Recent A1c 9.2. On short acting insulin TIDWM 20-16-16u and long acting 20u levemir at night. Decreased appetite in past few days.    - LDSSI with frequent glucose checks  - Started on diet.  ST following. Aspiration precautions.    Discussed with Dr. Zavaleta.     I have spent 35 min with this patient, with over 50% of this time spent coordinating care and speaking with the family     Chelsey Ye PA-C  Critical Care Medicine  [facility]

## 2020-06-26 NOTE — SUBJECTIVE & OBJECTIVE
Interval History/Significant Events: No acute events overnight. Remains on home 2L NC with oxygen saturations 100%.     Review of Systems   Constitutional: Negative for chills and fever.   HENT: Positive for nosebleeds (related to dryness). Negative for congestion.    Respiratory: Negative for cough and shortness of breath.    Cardiovascular: Negative for chest pain and leg swelling.   Gastrointestinal: Negative for abdominal pain, nausea and vomiting.   Genitourinary: Negative for difficulty urinating and dysuria.   Musculoskeletal: Positive for back pain.   Skin: Negative for rash.   Neurological: Negative for light-headedness and numbness.   Psychiatric/Behavioral: Negative for agitation and confusion.     Objective:     Vital Signs (Most Recent):  Temp: 97.3 °F (36.3 °C) (06/26/20 1100)  Pulse: 82 (06/26/20 1200)  Resp: 17 (06/26/20 1200)  BP: (!) 109/53 (06/26/20 1200)  SpO2: 100 % (06/26/20 1200) Vital Signs (24h Range):  Temp:  [97.3 °F (36.3 °C)-98.6 °F (37 °C)] 97.3 °F (36.3 °C)  Pulse:  [] 82  Resp:  [13-33] 17  SpO2:  [94 %-100 %] 100 %  BP: ()/(52-76) 109/53   Weight: 98.9 kg (218 lb)  Body mass index is 32.19 kg/m².      Intake/Output Summary (Last 24 hours) at 6/26/2020 1247  Last data filed at 6/26/2020 1200  Gross per 24 hour   Intake 301.44 ml   Output 100 ml   Net 201.44 ml       Physical Exam  Constitutional:       General: She is awake.      Appearance: Normal appearance. She is well-developed.      Interventions: Nasal cannula in place.   HENT:      Head: Normocephalic and atraumatic.   Eyes:      Pupils: Pupils are equal, round, and reactive to light.   Neck:      Thyroid: No thyromegaly.      Trachea: No tracheal deviation.   Cardiovascular:      Rate and Rhythm: Normal rate and regular rhythm.      Heart sounds: Normal heart sounds. No murmur. No friction rub. No gallop.    Pulmonary:      Effort: Pulmonary effort is normal. No tachypnea or accessory muscle usage.      Breath  sounds: No wheezing, rhonchi or rales.   Abdominal:      General: Bowel sounds are normal.      Palpations: Abdomen is soft.      Tenderness: There is no abdominal tenderness.   Genitourinary:     Comments: Female external catheter in place.   Musculoskeletal: Normal range of motion.   Skin:     General: Skin is warm and dry.   Neurological:      Mental Status: She is alert and oriented to person, place, and time.      Sensory: No sensory deficit.   Psychiatric:         Behavior: Behavior is cooperative.         Vents:  Oxygen Concentration (%): 28 (06/25/20 0020)  Lines/Drains/Airways     Drain            Female External Urinary Catheter 06/26/20 0100 less than 1 day          Peripheral Intravenous Line                 Peripheral IV - Single Lumen 06/21/20 18 G Left Antecubital 5 days         Peripheral IV - Single Lumen 06/25/20 1530 20 G;1 3/4 in Right Forearm less than 1 day              Significant Labs:    CBC/Anemia Profile:  Recent Labs   Lab 06/24/20  1801 06/25/20  0547 06/25/20  1836 06/26/20  0248   WBC 4.98 5.32  5.32  --  6.04   HGB 8.1* 8.1*  8.1*  --  7.6*   HCT 26.6* 26.9*  26.9*  --  25.0*   * 142*  142*  --  175   MCV 92 92  92  --  93   RDW 15.5* 15.6*  15.6*  --  15.9*   OCCULTBLOOD  --   --  Positive*  --         Chemistries:  Recent Labs   Lab 06/25/20  0547 06/26/20  0248    140   K 3.8 4.0    110   CO2 21* 22*   BUN 15 17   CREATININE 1.3 1.4   CALCIUM 7.4* 7.5*   ALBUMIN 2.7* 2.8*   PROT 5.6* 5.7*   BILITOT 0.5 0.5   ALKPHOS 49* 58   ALT 72* 86*   * 160*   MG 2.1 2.2   PHOS 2.6* 2.4*       All pertinent labs within the past 24 hours have been reviewed.    Significant Imaging:  I have reviewed and interpreted all pertinent imaging results/findings within the past 24 hours.

## 2020-06-27 PROBLEM — S22.070A COMPRESSION FRACTURE OF T10 VERTEBRA: Status: ACTIVE | Noted: 2020-06-27

## 2020-06-27 LAB
ALBUMIN SERPL BCP-MCNC: 3 G/DL (ref 3.5–5.2)
ALP SERPL-CCNC: 74 U/L (ref 55–135)
ALT SERPL W/O P-5'-P-CCNC: 122 U/L (ref 10–44)
ANION GAP SERPL CALC-SCNC: 10 MMOL/L (ref 8–16)
ANISOCYTOSIS BLD QL SMEAR: SLIGHT
AST SERPL-CCNC: 204 U/L (ref 10–40)
BACTERIA BLD CULT: NORMAL
BACTERIA BLD CULT: NORMAL
BASOPHILS # BLD AUTO: ABNORMAL K/UL (ref 0–0.2)
BASOPHILS NFR BLD: 0 % (ref 0–1.9)
BILIRUB SERPL-MCNC: 0.4 MG/DL (ref 0.1–1)
BUN SERPL-MCNC: 14 MG/DL (ref 8–23)
BURR CELLS BLD QL SMEAR: ABNORMAL
CALCIUM SERPL-MCNC: 7.6 MG/DL (ref 8.7–10.5)
CHLORIDE SERPL-SCNC: 107 MMOL/L (ref 95–110)
CO2 SERPL-SCNC: 23 MMOL/L (ref 23–29)
CREAT SERPL-MCNC: 1.2 MG/DL (ref 0.5–1.4)
DIFFERENTIAL METHOD: ABNORMAL
EOSINOPHIL # BLD AUTO: ABNORMAL K/UL (ref 0–0.5)
EOSINOPHIL NFR BLD: 2 % (ref 0–8)
ERYTHROCYTE [DISTWIDTH] IN BLOOD BY AUTOMATED COUNT: 16 % (ref 11.5–14.5)
EST. GFR  (AFRICAN AMERICAN): 56 ML/MIN/1.73 M^2
EST. GFR  (NON AFRICAN AMERICAN): 48.6 ML/MIN/1.73 M^2
GLUCOSE SERPL-MCNC: 175 MG/DL (ref 70–110)
HCT VFR BLD AUTO: 27.8 % (ref 37–48.5)
HGB BLD-MCNC: 8.2 G/DL (ref 12–16)
HYPOCHROMIA BLD QL SMEAR: ABNORMAL
IMM GRANULOCYTES # BLD AUTO: ABNORMAL K/UL (ref 0–0.04)
IMM GRANULOCYTES NFR BLD AUTO: ABNORMAL % (ref 0–0.5)
LYMPHOCYTES # BLD AUTO: ABNORMAL K/UL (ref 1–4.8)
LYMPHOCYTES NFR BLD: 26 % (ref 18–48)
MAGNESIUM SERPL-MCNC: 2.1 MG/DL (ref 1.6–2.6)
MCH RBC QN AUTO: 28.2 PG (ref 27–31)
MCHC RBC AUTO-ENTMCNC: 29.5 G/DL (ref 32–36)
MCV RBC AUTO: 96 FL (ref 82–98)
MONOCYTES # BLD AUTO: ABNORMAL K/UL (ref 0.3–1)
MONOCYTES NFR BLD: 12 % (ref 4–15)
NEUTROPHILS NFR BLD: 60 % (ref 38–73)
NRBC BLD-RTO: 1 /100 WBC
OVALOCYTES BLD QL SMEAR: ABNORMAL
PHOSPHATE SERPL-MCNC: 3 MG/DL (ref 2.7–4.5)
PLATELET # BLD AUTO: 252 K/UL (ref 150–350)
PLATELET BLD QL SMEAR: ABNORMAL
PMV BLD AUTO: 12.4 FL (ref 9.2–12.9)
POCT GLUCOSE: 167 MG/DL (ref 70–110)
POCT GLUCOSE: 177 MG/DL (ref 70–110)
POCT GLUCOSE: 189 MG/DL (ref 70–110)
POIKILOCYTOSIS BLD QL SMEAR: SLIGHT
POLYCHROMASIA BLD QL SMEAR: ABNORMAL
POTASSIUM SERPL-SCNC: 4.3 MMOL/L (ref 3.5–5.1)
PROT SERPL-MCNC: 6.2 G/DL (ref 6–8.4)
RBC # BLD AUTO: 2.91 M/UL (ref 4–5.4)
SODIUM SERPL-SCNC: 140 MMOL/L (ref 136–145)
WBC # BLD AUTO: 6.02 K/UL (ref 3.9–12.7)

## 2020-06-27 PROCEDURE — 20600001 HC STEP DOWN PRIVATE ROOM

## 2020-06-27 PROCEDURE — 99233 SBSQ HOSP IP/OBS HIGH 50: CPT | Mod: ,,, | Performed by: INTERNAL MEDICINE

## 2020-06-27 PROCEDURE — 84100 ASSAY OF PHOSPHORUS: CPT

## 2020-06-27 PROCEDURE — 94640 AIRWAY INHALATION TREATMENT: CPT

## 2020-06-27 PROCEDURE — 25000242 PHARM REV CODE 250 ALT 637 W/ HCPCS: Performed by: STUDENT IN AN ORGANIZED HEALTH CARE EDUCATION/TRAINING PROGRAM

## 2020-06-27 PROCEDURE — 99900035 HC TECH TIME PER 15 MIN (STAT)

## 2020-06-27 PROCEDURE — 25000003 PHARM REV CODE 250: Performed by: STUDENT IN AN ORGANIZED HEALTH CARE EDUCATION/TRAINING PROGRAM

## 2020-06-27 PROCEDURE — 27000221 HC OXYGEN, UP TO 24 HOURS

## 2020-06-27 PROCEDURE — 63600175 PHARM REV CODE 636 W HCPCS: Performed by: STUDENT IN AN ORGANIZED HEALTH CARE EDUCATION/TRAINING PROGRAM

## 2020-06-27 PROCEDURE — 80053 COMPREHEN METABOLIC PANEL: CPT

## 2020-06-27 PROCEDURE — 85007 BL SMEAR W/DIFF WBC COUNT: CPT

## 2020-06-27 PROCEDURE — 99233 PR SUBSEQUENT HOSPITAL CARE,LEVL III: ICD-10-PCS | Mod: ,,, | Performed by: INTERNAL MEDICINE

## 2020-06-27 PROCEDURE — 85027 COMPLETE CBC AUTOMATED: CPT

## 2020-06-27 PROCEDURE — 94761 N-INVAS EAR/PLS OXIMETRY MLT: CPT

## 2020-06-27 PROCEDURE — 36415 COLL VENOUS BLD VENIPUNCTURE: CPT

## 2020-06-27 PROCEDURE — 83735 ASSAY OF MAGNESIUM: CPT

## 2020-06-27 RX ORDER — CYPROHEPTADINE HYDROCHLORIDE 4 MG/1
4 TABLET ORAL 4 TIMES DAILY
Status: DISCONTINUED | OUTPATIENT
Start: 2020-06-27 | End: 2020-06-29 | Stop reason: HOSPADM

## 2020-06-27 RX ORDER — BENZONATATE 100 MG/1
100 CAPSULE ORAL 3 TIMES DAILY PRN
Status: DISCONTINUED | OUTPATIENT
Start: 2020-06-27 | End: 2020-06-29 | Stop reason: HOSPADM

## 2020-06-27 RX ORDER — GABAPENTIN 300 MG/1
300 CAPSULE ORAL NIGHTLY PRN
Status: DISCONTINUED | OUTPATIENT
Start: 2020-06-27 | End: 2020-06-29 | Stop reason: HOSPADM

## 2020-06-27 RX ORDER — OLMESARTAN MEDOXOMIL 20 MG/1
20 TABLET ORAL DAILY
Qty: 90 TABLET | Refills: 3 | Status: SHIPPED | OUTPATIENT
Start: 2020-06-27 | End: 2020-07-01

## 2020-06-27 RX ORDER — AMOXICILLIN 250 MG
1 CAPSULE ORAL 2 TIMES DAILY
Status: DISCONTINUED | OUTPATIENT
Start: 2020-06-27 | End: 2020-06-29 | Stop reason: HOSPADM

## 2020-06-27 RX ORDER — LIDOCAINE 50 MG/G
1 PATCH TOPICAL
Status: DISCONTINUED | OUTPATIENT
Start: 2020-06-27 | End: 2020-06-29 | Stop reason: HOSPADM

## 2020-06-27 RX ORDER — INSULIN LISPRO 100 [IU]/ML
INJECTION, SOLUTION INTRAVENOUS; SUBCUTANEOUS
Qty: 45 ML | Refills: 11 | Status: SHIPPED | OUTPATIENT
Start: 2020-06-27 | End: 2020-07-16

## 2020-06-27 RX ORDER — ENOXAPARIN SODIUM 100 MG/ML
100 INJECTION SUBCUTANEOUS 2 TIMES DAILY
Status: DISCONTINUED | OUTPATIENT
Start: 2020-06-27 | End: 2020-06-28

## 2020-06-27 RX ORDER — ACETAMINOPHEN 325 MG/1
650 TABLET ORAL EVERY 6 HOURS PRN
Status: DISCONTINUED | OUTPATIENT
Start: 2020-06-27 | End: 2020-06-27

## 2020-06-27 RX ADMIN — DOCUSATE SODIUM 50 MG AND SENNOSIDES 8.6 MG 1 TABLET: 8.6; 5 TABLET, FILM COATED ORAL at 10:06

## 2020-06-27 RX ADMIN — CYPROHEPTADINE HYDROCHLORIDE 4 MG: 4 TABLET ORAL at 05:06

## 2020-06-27 RX ADMIN — CYPROHEPTADINE HYDROCHLORIDE 4 MG: 4 TABLET ORAL at 02:06

## 2020-06-27 RX ADMIN — IPRATROPIUM BROMIDE AND ALBUTEROL SULFATE 3 ML: .5; 3 SOLUTION RESPIRATORY (INHALATION) at 01:06

## 2020-06-27 RX ADMIN — OXYCODONE HYDROCHLORIDE 5 MG: 5 TABLET ORAL at 08:06

## 2020-06-27 RX ADMIN — ENOXAPARIN SODIUM 100 MG: 100 INJECTION SUBCUTANEOUS at 03:06

## 2020-06-27 RX ADMIN — ENOXAPARIN SODIUM 100 MG: 100 INJECTION SUBCUTANEOUS at 08:06

## 2020-06-27 RX ADMIN — IPRATROPIUM BROMIDE AND ALBUTEROL SULFATE 3 ML: .5; 3 SOLUTION RESPIRATORY (INHALATION) at 04:06

## 2020-06-27 RX ADMIN — CYPROHEPTADINE HYDROCHLORIDE 4 MG: 4 TABLET ORAL at 08:06

## 2020-06-27 RX ADMIN — ASPIRIN 81 MG: 81 TABLET, COATED ORAL at 08:06

## 2020-06-27 RX ADMIN — LIDOCAINE 1 PATCH: 50 PATCH TOPICAL at 10:06

## 2020-06-27 RX ADMIN — FOLIC ACID 1 MG: 1 TABLET ORAL at 08:06

## 2020-06-27 RX ADMIN — FLUTICASONE PROPIONATE 100 MCG: 50 SPRAY, METERED NASAL at 08:06

## 2020-06-27 NOTE — PROGRESS NOTES
[facility]  Hematology/Oncology  Progress Note    Patient Name: Alison Branch  Admission Date: 6/21/2020  Hospital Length of Stay: 6 days  Code Status: Full Code     Subjective:     HPI:  Alison Branch is a 64 year old woman with stage IV lung adenocarcinoma with metastasis to bone diagnosed in May 2019, on PO Entrectinib since 6/6/2020, IDDM2, HTN, HLD, DM, PE diagnosed May 2020 on enoxaparin, chronic hypoxic respiratory failure intermittently on 2L NC at home, history of recurrent UTIs, ADD not on a stimulant, and a history of brain aneurysm s/p coiling who presented to [facility] [place] [facility] on 6/21 for 1 day of fever and a few months of worsening generalized weakness which culminated in a loss of bowl and bladder control on the way to the bathroom the day of admission that prompted her to call EMS. At that time she denied any chest pain, SOB, LOC, headaches, vision changes, abdominal pain, joint pain, neck stiffness. She did admit to some nausea and non-bloody vomiting since starting daily PO Entrectinib causing overall decreased PO intake.    ED Course: SBPs in the 60s on arrival. Blood and urine cultures collected. Started on vancomycin and piperacillin-tazobactam. Lactate 3.0 and Procal 1.39. ROSEMARY with Cr 2.4 (baseline 1.2). CXR showed CARLOS opacity but unclear whether this was obstructive pneumonia vs cancer. Received 2.8L IV fluids in ED with MAPs still in low 60s. Was admitted to ICU and started on levophed for septic shock.      Oncology History (from Dr. Belcher): Smoked for 30 years, quit around year 2000. Started having cough in late 2018 however persisted into 2019. Saw her PCP who ordered CXR on 5/10/19, showed CARLOS PNA. Chest CT done on 5/17/19 showed CARLOS mass arising from the lateral pleural surface in the left upper lobe posterior subsegment measuring 2.6 x 3 cm. There were satellite mass more medially near the aortic arch that was 2 cm, also spiculated and irregular as well as thickened interlobar septa  "in the left lung apex and anterior segment, prevascular lymph node lateral to the aortic arch, short axis measuring 9 mm. She underwent bronchoscopy on 05/28/19, which revealed an infiltration in the left apical posterior segment of the left upper lobe, cytology brush was done. Transbronchial biopsies of an area of infiltration were also performed in the apicoposterior segment of the left upper lobe. Pathology revealed adenocarcinoma, however the specimen was not adequate enough to send for molecular testing.     PET scan on 6/6/19 showed significant hypermetabolic activity in the large irregular spiculated pulmonary mass in the lateral aspect of the left upper lobe consistent with the patient's known pulmonary adenocarcinoma. There was also tracer avidity in the medial left upper lobe satellite lesion and diffusely throughout much of the anterior left upper lobe where there was prominent nodular paraseptal thickening. There were some numerous scattered subcentimeter pulmonary nodules throughout the right lung, all of which are too small for detection by PET. There was a 0.5 cm, normal size right paratracheal lymph node with increased radiotracer uptake as well as hypermetabolic aortic pulmonary lymph node and a left hilar lymph node. There were focal hypermetabolic lesions in the left anterior superior iliac spine and anterior right fifth rib both associated with well defined lytic lesions. MRI pelvis from 6/10/19  revealed "region of osseous is irregularity at the left anterior iliac spine likely related to bone graft harvest procedure" and "no suspicious signal or enhancement to suggest active/malignant process". MRI brain from 6/10/19 revealed no intracranial abnormality.     Her GAURDANT was negative for molecular markers. Started Carboplatin, Alimta and Keytruda on 7/9/19 and completed 4 cycles, followed by Alimta and Keytruda maintenance. Her CT scans from 4/23/2020 revealed "In this patient with a known " history of lung cancer, there has been marked interval detrimental change when compared to CT dated 12/20/2019 as follows. Persistent left upper lobe volume loss with worsening masslike consolidation and enlarging index and non index lesions. Increased number of innumerable bilateral metastatic solid pulmonary nodules. Interval increased size and number of multiple osteoblastic metastatic lesions throughout the visualized axial skeleton. Stable mediastinal lymph nodes, several of which were noted to be hypermetabolic on previous PET-CT.  No new lymphadenopathy. Stable subcentimeter hepatic and splenic hypodensities, too small to accurately characterize. Path addendum from 5/2019 revealed ROS1. Patient was hospitalized in May 2019 and diagnosed with PE. Started on PO Entrectinib 600 mg qd on 6/6/2020.     Hospital Course: Ms. Branch was admitted to the ICU for septic shock. She was started on empiric antibiotics with pip/tazo and vancomycin along with pan cultures. Levophed started on admission. Patient's enoxaparin was replaced with heparin gtt in anticipation of procedure and down-trending Hgb. UA showed bacteria however patient did not have urinary symptoms. Urinary retention noted; hennessy placed. Blood cultures from 6/21 with coag-negative Staph growing in 1/2 bottles, repeat blood cultures on 6/22 NGTD. T max 102.8 on day of admission, but continued to be febrile throughout admission. IR was consulted for port removal on 06/23. Weaned off norepinephrine infusion 6/24 am. Discontinued metronidazole. Hb noted 9.9-->8.2-->8.1 in the setting of supratherapeutic ptt levels. Heparin infusion held. Serial CBC did not reveal further decline in hb. No active signs of bleeding; heparin infusion restarted. Only had 1 BM throughout admission despite history of diarrhea prior to admission. Discontinued vancomycin on 6/25. Discontinued cefepime on 6/26 and stepped down to medical oncology service. Heparin transitioned to  Lovenox 100 mg BID. Got X-rays of spine once patient told the team she fell prior to admission, showed new T10 compression fracture.    Interval History: Still complaining of occasional sharp back pain when moving, points to the area where she has the compression fracture. Having BMs (last one yesterday) but they are scant and she feels constipated.    Oncology Treatment Plan:   [No treatment plan]    Medications:  Continuous Infusions:  Scheduled Meds:   albuterol-ipratropium  3 mL Nebulization Q4H    aspirin  81 mg Oral Daily    cyproheptadine  4 mg Oral QID    enoxaparin  100 mg Subcutaneous BID    fluticasone propionate  2 spray Each Nostril Daily    folic acid  1 mg Oral Daily     PRN Meds:acetaminophen, albuterol-ipratropium, benzonatate, Dextrose 10% Bolus, gabapentin, glucagon (human recombinant), insulin aspart U-100, ondansetron, oxyCODONE, promethazine, sodium chloride 0.9%     Review of Systems   Constitutional: Positive for fatigue. Negative for chills and fever.   HENT: Negative for congestion.    Respiratory: Negative for shortness of breath and wheezing.    Cardiovascular: Negative for chest pain.   Gastrointestinal: Negative for abdominal pain.   Genitourinary: Negative for dysuria.   Musculoskeletal: Positive for back pain and gait problem.   Allergic/Immunologic: Positive for immunocompromised state.   Neurological: Negative for tremors, syncope and speech difficulty.   Psychiatric/Behavioral: Negative for confusion.     Objective:     Vital Signs (Most Recent):  Temp: 98.4 °F (36.9 °C) (06/27/20 0741)  Pulse: 99 (06/27/20 0741)  Resp: 18 (06/27/20 0741)  BP: 100/65 (06/27/20 0741)  SpO2: 96 % (06/27/20 0741) Vital Signs (24h Range):  Temp:  [97.3 °F (36.3 °C)-98.4 °F (36.9 °C)] 98.4 °F (36.9 °C)  Pulse:  [] 99  Resp:  [13-25] 18  SpO2:  [94 %-100 %] 96 %  BP: ()/(53-85) 100/65     Weight: 98.9 kg (218 lb)  Body mass index is 32.19 kg/m².  Body surface area is 2.19 meters  squared.      Intake/Output Summary (Last 24 hours) at 6/27/2020 0815  Last data filed at 6/26/2020 1616  Gross per 24 hour   Intake 564.5 ml   Output 100 ml   Net 464.5 ml       Physical Exam  Constitutional:       General: She is awake. She is not in acute distress.     Appearance: She is well-developed. She is not diaphoretic.      Interventions: Nasal cannula in place.   HENT:      Head:      Comments: Sloughing, peeling of skin over face diffusely   Eyes:      Extraocular Movements: Extraocular movements intact.      Pupils: Pupils are equal, round, and reactive to light.   Neck:      Musculoskeletal: Normal range of motion and neck supple.      Thyroid: No thyromegaly.      Trachea: No tracheal deviation.   Cardiovascular:      Rate and Rhythm: Normal rate and regular rhythm.      Heart sounds: Normal heart sounds.   Pulmonary:      Effort: Pulmonary effort is normal. No tachypnea or accessory muscle usage.      Breath sounds: Normal breath sounds. No wheezing.   Abdominal:      General: Bowel sounds are normal.      Palpations: Abdomen is soft.      Tenderness: There is no abdominal tenderness.   Musculoskeletal: Normal range of motion.         General: No swelling.   Skin:     General: Skin is warm and dry.   Neurological:      Mental Status: She is alert and oriented to person, place, and time.      Sensory: No sensory deficit.   Psychiatric:         Behavior: Behavior is cooperative.         Significant Labs:   CBC:   Recent Labs   Lab 06/26/20  0248 06/27/20  0433   WBC 6.04 6.02   HGB 7.6* 8.2*   HCT 25.0* 27.8*    252   , CMP:   Recent Labs   Lab 06/26/20  0248 06/27/20  0433    140   K 4.0 4.3    107   CO2 22* 23   * 175*   BUN 17 14   CREATININE 1.4 1.2   CALCIUM 7.5* 7.6*   PROT 5.7* 6.2   ALBUMIN 2.8* 3.0*   BILITOT 0.5 0.4   ALKPHOS 58 74   * 204*   ALT 86* 122*   ANIONGAP 8 10   EGFRNONAA 40.3* 48.6*    and All pertinent labs from the last 24 hours have been  reviewed.    Diagnostic Results:  I have reviewed all pertinent imaging results/findings within the past 24 hours.    Assessment/Plan:     * Septic shock  On admission was complaining of diarrhea prior to arrival, no respiratory or urinary symptoms. In ED patient was febrile and hypotensive, resistant to approximately 3L IV fluids. Started on vancomycin and piperacillin-tazobactam in ED. Required admission to ICU for levophed, started on 6/21, and switched piperacillin-tazobactam to cefepime and metronidazole. Weaned off levophed 6/24. Discontinued metronidazole 6/24. Only had 1 BM throughout admission. CXR showing possible consolidation vs known malignancy. UA shows bacteria but no significant leukocytes or nitrites. Did not have any signs of infection at port, but blood cultures from 6/21 grew Staph in 1 bottle so Port-a-cath was removed by IR on 6/23. Initial culture grew CoNS and repeat blood cultures from 6/23 NGTD. Hypotension might have been at least in contribution from a reaction to Entrectinib (causes hypotension in 18% of patients).    - Critical care team discontinued antibiotics (vanc d/c'd 6/25, cefepime d/c'd 6/26) as the coag negative staph in one bottle was likely a contaminant, and we do not have a likely skin/soft tissue source, and her Port-a-cath which would be a source has been removed and catheter tip cultures have been negative. No symptoms to suggest pneumonia or UTI. Already received approximately 6 days of IV antibiotics while inpatient.   - MAP goal > 65  - Holding home antihypertensive    Compression fracture of T10 vertebra  - Continue oxycodone 5 mg q6 PRN. Only taking this 2-3 times per day. Can increase dose/frequency if needed  - Tylenol PRN  - lidocaine patch to T10  - Continue working with PT/OT, recommending HH    Hypomagnesemia  - Mg level daily  - Replacing as needed    Elevated LFTs  Possibly multifactorial from Entrectinib and poor perfusion to liver from septic shock. Were  trending down with slight increase 5/26.    - Monitoring with CMP daily    Hypophosphatemia  - Phos level daily  - Replacing as needed    Anemia  Iron studies on admission c/w ACD from malignancy/chemotherapy-related with possible iron deficiency component. No known source of bleeding.    - CBC daily  - Transfuse for Hgb < 7, symptomatic anemia, or active bleeding    Elevated troponin  Troponin 0.062 on admission, peaked at 0.109 on 6/22. EKG unremarkable. TTE without focal wall motion abnormalities, valvular dysfunction or pericardial effusion, LVEF 55%. Likely 2/2 demand ischemia from hypotension.    Pulmonary embolism  Chronic respiratory failure with hypoxia  PE diagnosed May 2020, on enoxaparin 100 mg BID at home. Since then has been on supplemental oxygen at home, 2L via nasal cannula intermittently with activity. Started on heparin gtt on admission due to shock, down-trending Hgb, and possible anticipated procedure (port removal). Paused on 6/23 for Port-a-cath removal with IR, and paused again on 6/24 for supra-therapeutic PTT.    - Discontinue heparin gtt and restart therapeutic enoxaparin injections at 100 mg BID  - Continue supplemental O2 PRN with goal O2 saturation ? 92 %    ROSEMARY (acute kidney injury)  Urinary retention  Cr 2.4 and FeNa 3% on admission. Cr was been uptrending over the past few weeks prior to admission, baseline ~0.8. Urinary retention overnight the day of admission, Amanda placed. Retroperitoneal U/S negative for obstruction, showed medical renal disease. ROSEMARY likely multifactorial from dehydration from decreased PO intake, poor renal perfusion from sepsis with possible ATN (amorphous casts on UA), and possibly increase in creatinine from Entrectonib. Cr improved throughout admission, Amanda removed 6/24.      - CMP daily  - Avoid nephrotoxic meds  - Strict intake and output  - Purewick prn    Malignant neoplasm of upper lobe of left lung  Secondary malignant neoplasm of bone  Goals of  care, counseling/discussion  Stage IV NSCLC with bone mets, recently opted for new PO chemotherapy entrectinib with Dr. Belcher, started 6/6/2020. Was given the option of stopping chemo treatments but opted to start new chemo.  Patient expressed full code status on admission.    - Will arrange outpatient follow up with Dr. Belcher regarding continuing Entrectinib (possibly at lower dose) versus transitioning to home hospice/comfort care    Hypertension associated with diabetes  - Holding home olmesartan given continued hypotension    Type 2 diabetes mellitus with hyperglycemia, with long-term current use of insulin  A1c 9.2 on admission. At home patient takes short acting insulin TIDWM 20-16-16u and long acting 20u levemir at night. On admission patient was started on 10 units detemir qHS with LDISS, however the detemir was discontinued on 6/24 after a fasting . Since then has only required a few units a day with sliding scale.    - Diabetic diet  - POCT glucose checks qAC and qHS with LDISS  - Will need to decrease home insulin dose on discharge significantly           Rayshawn Todd DO  Hematology/Oncology  [facility]      ATTENDING NOTE, ONCOLOGY INPATIENT TEAM    As above; events of last 24 hours noted.  Patient seen and examined, chart reviewed.  Appears comfortable at rest, in NAD.  Lungs with scattered rhonchi.  Abdomen is soft, nontender.  Labs reviewed.  Elevated AST and ALT noted.  Her bilirubin is 0.4 mg/dL.  The x-rays with the thoracic spine reveal a mild compression of T10.    PLAN  Follow LFTs daily.  Follow-up blood cultures.  That have been negative so far.  PT OT to evaluate  Continue therapeutic doses of enoxaparin for her PE.  We will ask the  to investigate the possible use of Eliquis instead of enoxaparin.  Continue Oxycodone p.r.n. for pain.  We will follow.    Omar Evans MD

## 2020-06-27 NOTE — PLAN OF CARE
Plan of care reviewed with the patient at the beginning of shift. The patient is alert and oriented. GCS 15. Denying complaints at this time. The patient complained of pain several times during the night. Well controlled with PRN medications. The patient is on a purewick. O2 remains at 2lpm. Remained free from falls and injuries throughout shift. VSS. Bed in low locked position. Call bell and personal items within reach. Will continue to monitor.

## 2020-06-27 NOTE — ASSESSMENT & PLAN NOTE
- Continue oxycodone 5 mg q6 PRN. Only taking this 2-3 times per day. Can increase dose/frequency if needed  - Tylenol PRN  - lidocaine patch to T10  - Continue working with PT/OT, recommending HH

## 2020-06-27 NOTE — PLAN OF CARE
[facility]    HOME HEALTH ORDERS  FACE TO FACE ENCOUNTER    Patient Name: Alison Branch  YOB: 1955    PCP: Mike Rodriguez Ii, MD   PCP Address: [address] / [place] [address]  PCP Phone Number: [phone]  PCP Fax: [phone]    Encounter Date: 06/29/2020    Admit to Home Health    Diagnoses:  Active Hospital Problems    Diagnosis  POA    *Septic shock [A41.9, R65.21]  Yes    Compression fracture of T10 vertebra [S22.070A]  Yes    Anemia [D64.9]  Yes    Hypophosphatemia [E83.39]  Yes    Chronic respiratory failure with hypoxia [J96.11]  Yes    Urinary retention [R33.9]  Yes    Goals of care, counseling/discussion [Z71.89]  Not Applicable    Elevated LFTs [R94.5]  Yes    Hypomagnesemia [E83.42]  No    Metabolic acidosis [E87.2]  Yes    Elevated troponin [R79.89]  Yes    Pulmonary embolism [I26.99]  Yes    Secondary malignant neoplasm of bone [C79.51]  Yes    ROSEMARY (acute kidney injury) [N17.9]  Yes    Malignant neoplasm of upper lobe of left lung [C34.12]  Yes     CARLOS nodules.  Will plan on outpatient bronchoscopy with TBBx, BAL and perhaps brush.          Hypertension associated with diabetes [E11.59, I10]  Yes    Type 2 diabetes mellitus with hyperglycemia, with long-term current use of insulin [E11.65, Z79.4]  Not Applicable      Resolved Hospital Problems   No resolved problems to display.       Future Appointments   Date Time Provider Department Center   8/14/2020 10:00 AM David Mo MD [facility]           I have seen and examined this patient face to face today. My clinical findings that support the need for the home health skilled services and home bound status are the following:  Weakness/numbness causing balance and gait disturbance due to Weakness/Debility, Anemia and Malignancy/Cancer making it taxing to leave home.  Requiring assistive device to leave home due to unsteady gait caused by  Weakness/Debility, Anemia and  Malignancy/Cancer.    Allergies:  Review of patient's allergies indicates:   Allergen Reactions    Piperacillin-tazobactam Rash     Tolerated cefepime 06/2020       Diet: diabetic diet: 2000 calorie    Activities: activity as tolerated    Nursing:   SN to complete comprehensive assessment including routine vital signs. Instruct on disease process and s/s of complications to report to MD. Review/verify medication list sent home with the patient at time of discharge  and instruct patient/caregiver as needed. Frequency may be adjusted depending on start of care date.    Notify MD if SBP > 160 or < 90; DBP > 90 or < 50; HR > 120 or < 50; Temp > 101      CONSULTS:    Physical Therapy to evaluate and treat. Evaluate for home safety and equipment needs; Establish/upgrade home exercise program. Perform / instruct on therapeutic exercises, gait training, transfer training, and Range of Motion.  Occupational Therapy to evaluate and treat. Evaluate home environment for safety and equipment needs. Perform/Instruct on transfers, ADL training, ROM, and therapeutic exercises.    MISCELLANEOUS CARE:  Home Oxygen:  Oxygen at 2 L/min nasal canula to be used:  As needed for SOB. This is not a new prescription.        Medications: Review discharge medications with patient and family and provide education.      Current Discharge Medication List      START taking these medications    Details   apixaban (ELIQUIS) 5 mg Tab Take 1 tablet (5 mg total) by mouth 2 (two) times daily.  Qty: 60 tablet, Refills: 0      oxyCODONE (ROXICODONE) 10 mg Tab immediate release tablet Take 0.5 tablets (5 mg total) by mouth every 6 (six) hours as needed for Pain.  Qty: 20 tablet, Refills: 0    Comments: Quantity prescribed more than 7 day supply? Yes, quantity medically necessary         CONTINUE these medications which have CHANGED    Details   entrectinib (ROZLYTREK) 200 mg oral tablet Take 3 capsules (600 mg total) by mouth once daily. DO NOT TAKE THIS  UNTIL TOLD TO RESTART BY DR. MOTTA  Qty: 90 capsule, Refills: 2    Associated Diagnoses: Malignant neoplasm of upper lobe of left lung      insulin detemir U-100 (LEVEMIR FLEXTOUCH) 100 unit/mL (3 mL) SubQ InPn pen Inject 4 Units into the skin once daily.  Qty: 15 mL, Refills: 11    Associated Diagnoses: Type 2 diabetes mellitus with hyperglycemia, with long-term current use of insulin      insulin lispro (HUMALOG KWIKPEN INSULIN) 100 unit/mL pen Inject subcutaneously 20-12-12 units plus sliding scale.  24 units on steroid days Max  units.  DO NOT TAKE THIS UNTIL TOLD TO RESTART BY PHYSICIAN  Qty: 45 mL, Refills: 11    Associated Diagnoses: Type 2 diabetes mellitus with hyperglycemia, with long-term current use of insulin      olmesartan (BENICAR) 20 MG tablet Take 1 tablet (20 mg total) by mouth once daily. DO NOT TAKE THIS UNTIL TOLD TO RESTART BY PHYSICIAN  Qty: 90 tablet, Refills: 3    Associated Diagnoses: Hypertension associated with diabetes         CONTINUE these medications which have NOT CHANGED    Details   aspirin (ECOTRIN) 81 MG EC tablet Take 1 tablet (81 mg total) by mouth once daily.  Refills: 0      benzonatate (TESSALON PERLES) 100 MG capsule Take 1 capsule (100 mg total) by mouth 2 (two) times daily.  Qty: 60 capsule, Refills: 1    Associated Diagnoses: Malignant neoplasm of upper lobe of left lung      blood sugar diagnostic Strp To check BG 4 times daily, to use with insurance preferred meter  Qty: 200 each, Refills: 11      carboxymethylcellulose (REFRESH PLUS) 0.5 % Dpet 1 drop 3 (three) times daily as needed.      cyproheptadine (PERIACTIN) 4 mg tablet Take 1 tablet (4 mg total) by mouth 4 (four) times daily.  Qty: 120 tablet, Refills: 5    Associated Diagnoses: Anorexia      ergocalciferol (ERGOCALCIFEROL) 50,000 unit Cap TAKE 1 CAPSULE BY MOUTH EVERY 7 DAYS  Qty: 12 capsule, Refills: 3    Comments: Please consider 90 day supplies to promote better adherence  Associated Diagnoses:  "Vitamin D deficiency      fluticasone propionate (FLONASE) 50 mcg/actuation nasal spray USE TWO SPRAY(S) IN EACH NOSTRIL ONCE DAILY  Qty: 16 g, Refills: 3    Comments: Please consider 90 day supplies to promote better adherence  Associated Diagnoses: Chronic cough      gabapentin (NEURONTIN) 300 MG capsule Take 1 capsule (300 mg total) by mouth nightly as needed (Take as needed at bed time for neuropathy pain and sleep).  Qty: 90 capsule, Refills: 3    Associated Diagnoses: Memory difficulty; Chemotherapy-induced neuropathy; Balance disorder      hydrocodone-homatropine 5-1.5 mg/5 ml (HYCODAN) 5-1.5 mg/5 mL Syrp Take 5 mLs by mouth every 4 (four) hours as needed.  Qty: 120 mL, Refills: 0    Comments: Quantity prescribed more than 7 day supply? Yes, quantity medically necessary  Associated Diagnoses: Chronic cough      lancets Misc 1 Device by Misc.(Non-Drug; Combo Route) route once daily. Heladio Result.  250.02.  Check Blood Sugar Twice Daily.  Qty: 200 each, Refills: 3    Associated Diagnoses: Diabetes mellitus, type II      lidocaine-prilocaine (EMLA) cream Apply to PORT one hour prior to chemo administration.  Qty: 30 g, Refills: 0    Associated Diagnoses: Malignant neoplasm of upper lobe of left lung      nystatin (MYCOSTATIN) powder Apply topically 2 (two) times daily.  Qty: 60 g, Refills: 0    Associated Diagnoses: Candidal intertrigo      ondansetron (ZOFRAN) 8 MG tablet Take 1 tablet (8 mg total) by mouth 4 (four) times daily as needed for Nausea.  Qty: 60 tablet, Refills: 2    Associated Diagnoses: Malignant neoplasm of upper lobe of left lung      pen needle, diabetic 33 gauge x 5/32" Ndle 1 each by Misc.(Non-Drug; Combo Route) route 4 (four) times daily with meals and nightly.  Qty: 100 each, Refills: 5    Associated Diagnoses: Uncontrolled type 2 diabetes mellitus with hyperglycemia, without long-term current use of insulin      phenazopyridine (PYRIDIUM) 200 MG tablet Take 1 tablet (200 mg total) by mouth " 3 (three) times daily as needed for Pain.  Qty: 20 tablet, Refills: 0    Associated Diagnoses: Dysuria      terconazole (TERAZOL 7) 0.4 % Crea INSERT 1 APPLICATORFUL VAGINALLY ONCE DAILY IN THE EVENING  Qty: 45 g, Refills: 0    Comments: Please consider 90 day supplies to promote better adherence  Associated Diagnoses: Antibiotic-induced yeast infection      walker Misc Please provide rollator walker for this debilitated cancer patient.  Thank you.  Refills: 0    Associated Diagnoses: Debility; Malignant neoplasm of upper lobe of left lung         STOP taking these medications       folic acid (FOLVITE) 1 MG tablet Comments:   Reason for Stopping:         enoxaparin (LOVENOX) 100 mg/mL Syrg Comments:   Reason for Stopping:         insulin aspart U-100 (NOVOLOG) 100 unit/mL (3 mL) InPn pen Comments:   Reason for Stopping:         insulin regular 100 unit/mL Inj injection Comments:   Reason for Stopping:               I certify that this patient is confined to her home and needs physical therapy and occupational therapy.

## 2020-06-27 NOTE — PLAN OF CARE
Problem: Adult Inpatient Plan of Care  Goal: Plan of Care Review  Outcome: Ongoing, Progressing  Flowsheets (Taken 6/27/2020 1518)  Plan of Care Reviewed With: patient  Patient remains free from falls and injury this shift. Bed in low, locked position with call light in reach. Plan of care reviewed with pt.  VSS.  Back pain relieved with PRN Oxy.  Blood sugar monitored prior to meals.  Inc x2, BM today.  Purewick in place, reinforcement needed. Patient encouraged to call for assistance when getting out of bed.  Patient verbalized understanding. All belongings within reach.  Will continue to monitor.

## 2020-06-27 NOTE — SUBJECTIVE & OBJECTIVE
Interval History: Still complaining of occasional sharp back pain when moving, points to the area where she has the compression fracture. Having BMs (last one yesterday) but they are scant and she feels constipated.    Oncology Treatment Plan:   [No treatment plan]    Medications:  Continuous Infusions:  Scheduled Meds:   albuterol-ipratropium  3 mL Nebulization Q4H    aspirin  81 mg Oral Daily    cyproheptadine  4 mg Oral QID    enoxaparin  100 mg Subcutaneous BID    fluticasone propionate  2 spray Each Nostril Daily    folic acid  1 mg Oral Daily     PRN Meds:acetaminophen, albuterol-ipratropium, benzonatate, Dextrose 10% Bolus, gabapentin, glucagon (human recombinant), insulin aspart U-100, ondansetron, oxyCODONE, promethazine, sodium chloride 0.9%     Review of Systems   Constitutional: Positive for fatigue. Negative for chills and fever.   HENT: Negative for congestion.    Respiratory: Negative for shortness of breath and wheezing.    Cardiovascular: Negative for chest pain.   Gastrointestinal: Negative for abdominal pain.   Genitourinary: Negative for dysuria.   Musculoskeletal: Positive for back pain and gait problem.   Allergic/Immunologic: Positive for immunocompromised state.   Neurological: Negative for tremors, syncope and speech difficulty.   Psychiatric/Behavioral: Negative for confusion.     Objective:     Vital Signs (Most Recent):  Temp: 98.4 °F (36.9 °C) (06/27/20 0741)  Pulse: 99 (06/27/20 0741)  Resp: 18 (06/27/20 0741)  BP: 100/65 (06/27/20 0741)  SpO2: 96 % (06/27/20 0741) Vital Signs (24h Range):  Temp:  [97.3 °F (36.3 °C)-98.4 °F (36.9 °C)] 98.4 °F (36.9 °C)  Pulse:  [] 99  Resp:  [13-25] 18  SpO2:  [94 %-100 %] 96 %  BP: ()/(53-85) 100/65     Weight: 98.9 kg (218 lb)  Body mass index is 32.19 kg/m².  Body surface area is 2.19 meters squared.      Intake/Output Summary (Last 24 hours) at 6/27/2020 0815  Last data filed at 6/26/2020 1616  Gross per 24 hour   Intake 564.5 ml    Output 100 ml   Net 464.5 ml       Physical Exam  Constitutional:       General: She is awake. She is not in acute distress.     Appearance: She is well-developed. She is not diaphoretic.      Interventions: Nasal cannula in place.   HENT:      Head:      Comments: Sloughing, peeling of skin over face diffusely   Eyes:      Extraocular Movements: Extraocular movements intact.      Pupils: Pupils are equal, round, and reactive to light.   Neck:      Musculoskeletal: Normal range of motion and neck supple.      Thyroid: No thyromegaly.      Trachea: No tracheal deviation.   Cardiovascular:      Rate and Rhythm: Normal rate and regular rhythm.      Heart sounds: Normal heart sounds.   Pulmonary:      Effort: Pulmonary effort is normal. No tachypnea or accessory muscle usage.      Breath sounds: Normal breath sounds. No wheezing.   Abdominal:      General: Bowel sounds are normal.      Palpations: Abdomen is soft.      Tenderness: There is no abdominal tenderness.   Musculoskeletal: Normal range of motion.         General: No swelling.   Skin:     General: Skin is warm and dry.   Neurological:      Mental Status: She is alert and oriented to person, place, and time.      Sensory: No sensory deficit.   Psychiatric:         Behavior: Behavior is cooperative.         Significant Labs:   CBC:   Recent Labs   Lab 06/26/20  0248 06/27/20  0433   WBC 6.04 6.02   HGB 7.6* 8.2*   HCT 25.0* 27.8*    252   , CMP:   Recent Labs   Lab 06/26/20 0248 06/27/20  0433    140   K 4.0 4.3    107   CO2 22* 23   * 175*   BUN 17 14   CREATININE 1.4 1.2   CALCIUM 7.5* 7.6*   PROT 5.7* 6.2   ALBUMIN 2.8* 3.0*   BILITOT 0.5 0.4   ALKPHOS 58 74   * 204*   ALT 86* 122*   ANIONGAP 8 10   EGFRNONAA 40.3* 48.6*    and All pertinent labs from the last 24 hours have been reviewed.    Diagnostic Results:  I have reviewed all pertinent imaging results/findings within the past 24 hours.

## 2020-06-28 LAB
ALBUMIN SERPL BCP-MCNC: 2.9 G/DL (ref 3.5–5.2)
ALP SERPL-CCNC: 83 U/L (ref 55–135)
ALT SERPL W/O P-5'-P-CCNC: 111 U/L (ref 10–44)
ANION GAP SERPL CALC-SCNC: 9 MMOL/L (ref 8–16)
ANISOCYTOSIS BLD QL SMEAR: SLIGHT
AST SERPL-CCNC: 130 U/L (ref 10–40)
BASOPHILS # BLD AUTO: ABNORMAL K/UL (ref 0–0.2)
BASOPHILS NFR BLD: 0 % (ref 0–1.9)
BILIRUB SERPL-MCNC: 0.4 MG/DL (ref 0.1–1)
BUN SERPL-MCNC: 18 MG/DL (ref 8–23)
CALCIUM SERPL-MCNC: 8.3 MG/DL (ref 8.7–10.5)
CHLORIDE SERPL-SCNC: 109 MMOL/L (ref 95–110)
CO2 SERPL-SCNC: 23 MMOL/L (ref 23–29)
CREAT SERPL-MCNC: 1.3 MG/DL (ref 0.5–1.4)
DIFFERENTIAL METHOD: ABNORMAL
EOSINOPHIL # BLD AUTO: ABNORMAL K/UL (ref 0–0.5)
EOSINOPHIL NFR BLD: 1 % (ref 0–8)
ERYTHROCYTE [DISTWIDTH] IN BLOOD BY AUTOMATED COUNT: 16.3 % (ref 11.5–14.5)
EST. GFR  (AFRICAN AMERICAN): 50.8 ML/MIN/1.73 M^2
EST. GFR  (NON AFRICAN AMERICAN): 44.1 ML/MIN/1.73 M^2
GLUCOSE SERPL-MCNC: 157 MG/DL (ref 70–110)
HCT VFR BLD AUTO: 31.6 % (ref 37–48.5)
HGB BLD-MCNC: 8.9 G/DL (ref 12–16)
HYPOCHROMIA BLD QL SMEAR: ABNORMAL
IMM GRANULOCYTES # BLD AUTO: ABNORMAL K/UL (ref 0–0.04)
IMM GRANULOCYTES NFR BLD AUTO: ABNORMAL % (ref 0–0.5)
LYMPHOCYTES # BLD AUTO: ABNORMAL K/UL (ref 1–4.8)
LYMPHOCYTES NFR BLD: 24 % (ref 18–48)
MAGNESIUM SERPL-MCNC: 2.3 MG/DL (ref 1.6–2.6)
MCH RBC QN AUTO: 27.3 PG (ref 27–31)
MCHC RBC AUTO-ENTMCNC: 28.2 G/DL (ref 32–36)
MCV RBC AUTO: 97 FL (ref 82–98)
METAMYELOCYTES NFR BLD MANUAL: 2 %
MONOCYTES # BLD AUTO: ABNORMAL K/UL (ref 0.3–1)
MONOCYTES NFR BLD: 5 % (ref 4–15)
MYELOCYTES NFR BLD MANUAL: 1 %
NEUTROPHILS NFR BLD: 65 % (ref 38–73)
NEUTS BAND NFR BLD MANUAL: 2 %
NRBC BLD-RTO: 1 /100 WBC
OVALOCYTES BLD QL SMEAR: ABNORMAL
PHOSPHATE SERPL-MCNC: 3.4 MG/DL (ref 2.7–4.5)
PLATELET # BLD AUTO: 285 K/UL (ref 150–350)
PMV BLD AUTO: 12 FL (ref 9.2–12.9)
POCT GLUCOSE: 132 MG/DL (ref 70–110)
POCT GLUCOSE: 154 MG/DL (ref 70–110)
POCT GLUCOSE: 187 MG/DL (ref 70–110)
POCT GLUCOSE: 220 MG/DL (ref 70–110)
POCT GLUCOSE: 227 MG/DL (ref 70–110)
POIKILOCYTOSIS BLD QL SMEAR: SLIGHT
POLYCHROMASIA BLD QL SMEAR: ABNORMAL
POTASSIUM SERPL-SCNC: 4.8 MMOL/L (ref 3.5–5.1)
PROT SERPL-MCNC: 6.4 G/DL (ref 6–8.4)
RBC # BLD AUTO: 3.26 M/UL (ref 4–5.4)
SCHISTOCYTES BLD QL SMEAR: ABNORMAL
SODIUM SERPL-SCNC: 141 MMOL/L (ref 136–145)
WBC # BLD AUTO: 5.75 K/UL (ref 3.9–12.7)

## 2020-06-28 PROCEDURE — 80053 COMPREHEN METABOLIC PANEL: CPT

## 2020-06-28 PROCEDURE — 99233 PR SUBSEQUENT HOSPITAL CARE,LEVL III: ICD-10-PCS | Mod: ,,, | Performed by: INTERNAL MEDICINE

## 2020-06-28 PROCEDURE — 25000003 PHARM REV CODE 250: Performed by: STUDENT IN AN ORGANIZED HEALTH CARE EDUCATION/TRAINING PROGRAM

## 2020-06-28 PROCEDURE — 27000221 HC OXYGEN, UP TO 24 HOURS

## 2020-06-28 PROCEDURE — 63600175 PHARM REV CODE 636 W HCPCS: Performed by: STUDENT IN AN ORGANIZED HEALTH CARE EDUCATION/TRAINING PROGRAM

## 2020-06-28 PROCEDURE — 84100 ASSAY OF PHOSPHORUS: CPT

## 2020-06-28 PROCEDURE — 20600001 HC STEP DOWN PRIVATE ROOM

## 2020-06-28 PROCEDURE — 36415 COLL VENOUS BLD VENIPUNCTURE: CPT

## 2020-06-28 PROCEDURE — 85027 COMPLETE CBC AUTOMATED: CPT

## 2020-06-28 PROCEDURE — 85007 BL SMEAR W/DIFF WBC COUNT: CPT

## 2020-06-28 PROCEDURE — 94761 N-INVAS EAR/PLS OXIMETRY MLT: CPT

## 2020-06-28 PROCEDURE — 83735 ASSAY OF MAGNESIUM: CPT

## 2020-06-28 PROCEDURE — 99233 SBSQ HOSP IP/OBS HIGH 50: CPT | Mod: ,,, | Performed by: INTERNAL MEDICINE

## 2020-06-28 RX ORDER — OXYCODONE HYDROCHLORIDE 5 MG/1
5 TABLET ORAL EVERY 6 HOURS PRN
Qty: 30 TABLET | Refills: 0 | Status: SHIPPED | OUTPATIENT
Start: 2020-06-28 | End: 2020-06-28 | Stop reason: HOSPADM

## 2020-06-28 RX ORDER — OXYCODONE HYDROCHLORIDE 10 MG/1
5 TABLET ORAL EVERY 6 HOURS PRN
Qty: 20 TABLET | Refills: 0 | Status: SHIPPED | OUTPATIENT
Start: 2020-06-28 | End: 2020-09-03

## 2020-06-28 RX ADMIN — CYPROHEPTADINE HYDROCHLORIDE 4 MG: 4 TABLET ORAL at 08:06

## 2020-06-28 RX ADMIN — CYPROHEPTADINE HYDROCHLORIDE 4 MG: 4 TABLET ORAL at 01:06

## 2020-06-28 RX ADMIN — APIXABAN 5 MG: 5 TABLET, FILM COATED ORAL at 08:06

## 2020-06-28 RX ADMIN — CYPROHEPTADINE HYDROCHLORIDE 4 MG: 4 TABLET ORAL at 05:06

## 2020-06-28 RX ADMIN — INSULIN ASPART 2 UNITS: 100 INJECTION, SOLUTION INTRAVENOUS; SUBCUTANEOUS at 05:06

## 2020-06-28 RX ADMIN — ASPIRIN 81 MG: 81 TABLET, COATED ORAL at 08:06

## 2020-06-28 RX ADMIN — FOLIC ACID 1 MG: 1 TABLET ORAL at 08:06

## 2020-06-28 RX ADMIN — ENOXAPARIN SODIUM 100 MG: 100 INJECTION SUBCUTANEOUS at 08:06

## 2020-06-28 RX ADMIN — LIDOCAINE 1 PATCH: 50 PATCH TOPICAL at 08:06

## 2020-06-28 RX ADMIN — INSULIN ASPART 1 UNITS: 100 INJECTION, SOLUTION INTRAVENOUS; SUBCUTANEOUS at 11:06

## 2020-06-28 NOTE — PLAN OF CARE
Plan of care reviewed with the patient at the beginning of shift. The patient is alert and oriented. GCS 15. Denying complaints at this time. NAEON. Sat in chair for several hours. Remained free from falls and injuries throughout shift. VSS. Bed in low locked position. Call bell and personal items within reach. Will continue to monitor.

## 2020-06-28 NOTE — PROGRESS NOTES
[facility]  Hematology/Oncology  Progress Note    Patient Name: Alison Branch  Admission Date: 6/21/2020  Hospital Length of Stay: 7 days  Code Status: Full Code     Subjective:     HPI:  Alison Branch is a 64 year old woman with stage IV lung adenocarcinoma with metastasis to bone diagnosed in May 2019, on PO Entrectinib since 6/6/2020, IDDM2, HTN, HLD, DM, PE diagnosed May 2020 on enoxaparin, chronic hypoxic respiratory failure intermittently on 2L NC at home, history of recurrent UTIs, ADD not on a stimulant, and a history of brain aneurysm s/p coiling who presented to [facility] on 6/21 for 1 day of fever and a few months of worsening generalized weakness which culminated in a loss of bowl and bladder control on the way to the bathroom the day of admission that prompted her to call EMS. At that time she denied any chest pain, SOB, LOC, headaches, vision changes, abdominal pain, joint pain, neck stiffness. She did admit to some nausea and non-bloody vomiting since starting daily PO Entrectinib causing overall decreased PO intake.    ED Course: SBPs in the 60s on arrival. Blood and urine cultures collected. Started on vancomycin and piperacillin-tazobactam. Lactate 3.0 and Procal 1.39. ROSEMARY with Cr 2.4 (baseline 1.2). CXR showed CARLOS opacity but unclear whether this was obstructive pneumonia vs cancer. Received 2.8L IV fluids in ED with MAPs still in low 60s. Was admitted to ICU and started on levophed for septic shock.      Oncology History (from Dr. Belcher): Smoked for 30 years, quit around year 2000. Started having cough in late 2018 however persisted into 2019. Saw her PCP who ordered CXR on 5/10/19, showed CARLOS PNA. Chest CT done on 5/17/19 showed CARLOS mass arising from the lateral pleural surface in the left upper lobe posterior subsegment measuring 2.6 x 3 cm. There were satellite mass more medially near the aortic arch that was 2 cm, also spiculated and irregular as well as thickened interlobar septa  "in the left lung apex and anterior segment, prevascular lymph node lateral to the aortic arch, short axis measuring 9 mm. She underwent bronchoscopy on 05/28/19, which revealed an infiltration in the left apical posterior segment of the left upper lobe, cytology brush was done. Transbronchial biopsies of an area of infiltration were also performed in the apicoposterior segment of the left upper lobe. Pathology revealed adenocarcinoma, however the specimen was not adequate enough to send for molecular testing.     PET scan on 6/6/19 showed significant hypermetabolic activity in the large irregular spiculated pulmonary mass in the lateral aspect of the left upper lobe consistent with the patient's known pulmonary adenocarcinoma. There was also tracer avidity in the medial left upper lobe satellite lesion and diffusely throughout much of the anterior left upper lobe where there was prominent nodular paraseptal thickening. There were some numerous scattered subcentimeter pulmonary nodules throughout the right lung, all of which are too small for detection by PET. There was a 0.5 cm, normal size right paratracheal lymph node with increased radiotracer uptake as well as hypermetabolic aortic pulmonary lymph node and a left hilar lymph node. There were focal hypermetabolic lesions in the left anterior superior iliac spine and anterior right fifth rib both associated with well defined lytic lesions. MRI pelvis from 6/10/19  revealed "region of osseous is irregularity at the left anterior iliac spine likely related to bone graft harvest procedure" and "no suspicious signal or enhancement to suggest active/malignant process". MRI brain from 6/10/19 revealed no intracranial abnormality.     Her GAURDANT was negative for molecular markers. Started Carboplatin, Alimta and Keytruda on 7/9/19 and completed 4 cycles, followed by Alimta and Keytruda maintenance. Her CT scans from 4/23/2020 revealed "In this patient with a known " history of lung cancer, there has been marked interval detrimental change when compared to CT dated 12/20/2019 as follows. Persistent left upper lobe volume loss with worsening masslike consolidation and enlarging index and non index lesions. Increased number of innumerable bilateral metastatic solid pulmonary nodules. Interval increased size and number of multiple osteoblastic metastatic lesions throughout the visualized axial skeleton. Stable mediastinal lymph nodes, several of which were noted to be hypermetabolic on previous PET-CT.  No new lymphadenopathy. Stable subcentimeter hepatic and splenic hypodensities, too small to accurately characterize. Path addendum from 5/2019 revealed ROS1. Patient was hospitalized in May 2019 and diagnosed with PE. Started on PO Entrectinib 600 mg qd on 6/6/2020.    Interval History: No acute events overnight. Patient spent majority of day yesterday up in chair which she states felt great. This morning feeling hesitant to go home due to weakness and the fact that her daughter whom she lives with will be at work.    Oncology Treatment Plan:   [No treatment plan]    Medications:  Continuous Infusions:  Scheduled Meds:   apixaban  5 mg Oral BID    aspirin  81 mg Oral Daily    cyproheptadine  4 mg Oral QID    fluticasone propionate  2 spray Each Nostril Daily    folic acid  1 mg Oral Daily    lidocaine  1 patch Transdermal Q24H    senna-docusate 8.6-50 mg  1 tablet Oral BID     PRN Meds:acetaminophen, albuterol-ipratropium, benzonatate, Dextrose 10% Bolus, gabapentin, glucagon (human recombinant), insulin aspart U-100, ondansetron, oxyCODONE, promethazine, sodium chloride 0.9%     Review of Systems   Constitutional: Positive for fatigue. Negative for chills and fever.   HENT: Negative for congestion.    Respiratory: Negative for shortness of breath and wheezing.    Cardiovascular: Negative for chest pain.   Gastrointestinal: Negative for abdominal pain.   Genitourinary:  Negative for dysuria.   Musculoskeletal: Positive for back pain and gait problem.   Allergic/Immunologic: Positive for immunocompromised state.   Neurological: Negative for tremors, syncope and speech difficulty.   Psychiatric/Behavioral: Negative for confusion.     Objective:     Vital Signs (Most Recent):  Temp: 98.1 °F (36.7 °C) (06/28/20 0736)  Pulse: 82 (06/28/20 0736)  Resp: 14 (06/28/20 0736)  BP: (!) 99/51 (06/28/20 0736)  SpO2: 100 % (06/28/20 0736) Vital Signs (24h Range):  Temp:  [97.9 °F (36.6 °C)-98.2 °F (36.8 °C)] 98.1 °F (36.7 °C)  Pulse:  [80-97] 82  Resp:  [14-19] 14  SpO2:  [91 %-100 %] 100 %  BP: ()/(51-65) 99/51     Weight: 98.9 kg (218 lb)  Body mass index is 32.19 kg/m².  Body surface area is 2.19 meters squared.      Intake/Output Summary (Last 24 hours) at 6/28/2020 1021  Last data filed at 6/28/2020 0736  Gross per 24 hour   Intake 350 ml   Output 550 ml   Net -200 ml       Physical Exam  Constitutional:       General: She is awake. She is not in acute distress.     Appearance: She is well-developed. She is not diaphoretic.      Interventions: Nasal cannula in place.   HENT:      Head:      Comments: Sloughing, peeling of skin over face diffusely (improved)  Eyes:      Extraocular Movements: Extraocular movements intact.      Pupils: Pupils are equal, round, and reactive to light.   Neck:      Musculoskeletal: Normal range of motion and neck supple.      Thyroid: No thyromegaly.      Trachea: No tracheal deviation.   Cardiovascular:      Rate and Rhythm: Normal rate and regular rhythm.      Heart sounds: Normal heart sounds.   Pulmonary:      Effort: Pulmonary effort is normal. No tachypnea or accessory muscle usage.      Breath sounds: Normal breath sounds. No wheezing.   Abdominal:      General: Bowel sounds are normal.      Palpations: Abdomen is soft.      Tenderness: There is no abdominal tenderness.   Musculoskeletal: Normal range of motion.         General: No swelling.    Skin:     General: Skin is warm and dry.   Neurological:      Mental Status: She is alert and oriented to person, place, and time.      Sensory: No sensory deficit.   Psychiatric:         Behavior: Behavior is cooperative.         Significant Labs:   CBC:   Recent Labs   Lab 06/27/20 0433 06/28/20  0452   WBC 6.02 5.75   HGB 8.2* 8.9*   HCT 27.8* 31.6*    285   , CMP:   Recent Labs   Lab 06/27/20 0433 06/28/20  0452    141   K 4.3 4.8    109   CO2 23 23   * 157*   BUN 14 18   CREATININE 1.2 1.3   CALCIUM 7.6* 8.3*   PROT 6.2 6.4   ALBUMIN 3.0* 2.9*   BILITOT 0.4 0.4   ALKPHOS 74 83   * 130*   * 111*   ANIONGAP 10 9   EGFRNONAA 48.6* 44.1*    and All pertinent labs from the last 24 hours have been reviewed.    Diagnostic Results:  I have reviewed all pertinent imaging results/findings within the past 24 hours.    Assessment/Plan:     * Septic shock  On admission was complaining of diarrhea prior to arrival, no respiratory or urinary symptoms. In ED patient was febrile and hypotensive, resistant to approximately 3L IV fluids. Started on vancomycin and piperacillin-tazobactam in ED. Required admission to ICU for levophed, started on 6/21, and switched piperacillin-tazobactam to cefepime and metronidazole. Weaned off levophed 6/24. Discontinued metronidazole 6/24. Only had 1 BM throughout admission. CXR showing possible consolidation vs known malignancy. UA shows bacteria but no significant leukocytes or nitrites. Did not have any signs of infection at port, but blood cultures from 6/21 grew Staph in 1 bottle so Port-a-cath was removed by IR on 6/23. Initial culture grew CoNS and repeat blood cultures from 6/23 NGTD. Hypotension might have been at least in contribution from a reaction to Entrectinib (causes hypotension in 18% of patients).    - Critical care team discontinued antibiotics (vanc d/c'd 6/25, cefepime d/c'd 6/26) as the coag negative staph in one bottle was likely  a contaminant, and we do not have a likely skin/soft tissue source, and her Port-a-cath which would be a source has been removed and catheter tip cultures have been negative. No symptoms to suggest pneumonia or UTI. Already received approximately 6 days of IV antibiotics while inpatient.   - MAP goal > 65  - Holding home antihypertensive    Compression fracture of T10 vertebra  - Continue oxycodone 5 mg q6 PRN. Only taking this 2-3 times per day. Can increase dose/frequency if needed  - Tylenol PRN  - lidocaine patch to T10  - Continue working with PT/OT, recommending HH    Hypomagnesemia  - Mg level daily  - Replacing as needed    Elevated LFTs  Possibly multifactorial from Entrectinib and poor perfusion to liver from septic shock. Were trending down with slight increase 5/26.    - Monitoring with CMP daily    Hypophosphatemia  - Phos level daily  - Replacing as needed    Anemia  Iron studies on admission c/w ACD from malignancy/chemotherapy-related with possible iron deficiency component. No known source of bleeding.    - CBC daily  - Transfuse for Hgb < 7, symptomatic anemia, or active bleeding    Elevated troponin  Troponin 0.062 on admission, peaked at 0.109 on 6/22. EKG unremarkable. TTE without focal wall motion abnormalities, valvular dysfunction or pericardial effusion, LVEF 55%. Likely 2/2 demand ischemia from hypotension.    Pulmonary embolism  Chronic respiratory failure with hypoxia  PE diagnosed May 2020, on enoxaparin 100 mg BID at home. Since then has been on supplemental oxygen at home, 2L via nasal cannula intermittently with activity. Started on heparin gtt on admission due to shock, down-trending Hgb, and possible anticipated procedure (port removal). Paused on 6/23 for Port-a-cath removal with IR, and paused again on 6/24 for supra-therapeutic PTT. Once stepped down to med onc, discontinued heparin gtt and restarted therapeutic enoxaparin injections at 100 mg BID.    - Patient informed us that  her enoxaparin was unaffordable and she was not able to pay for her most recent refill. Eliquis price checked with pharmacy -- still expensive but half the price of enoxaparin so she preferred this. We transitioned her to Eliquis 5 mg BID on 6/28  - Continue supplemental O2 PRN with goal O2 saturation ? 92 %    ROSEMARY (acute kidney injury)  Urinary retention  Cr 2.4 and FeNa 3% on admission. Cr was been uptrending over the past few weeks prior to admission, baseline ~0.8. Urinary retention overnight the day of admission, Amanda placed. Retroperitoneal U/S negative for obstruction, showed medical renal disease. ROSEMARY likely multifactorial from dehydration from decreased PO intake, poor renal perfusion from sepsis with possible ATN (amorphous casts on UA), and possibly increase in creatinine from Entrectonib. Cr improved throughout admission, Amanda removed 6/24.      - CMP daily, Cr now at baseline  - Avoid nephrotoxic meds  - Strict intake and output  - Purewick prn    Malignant neoplasm of upper lobe of left lung  Secondary malignant neoplasm of bone  Goals of care, counseling/discussion  Stage IV NSCLC with bone mets, recently opted for new PO chemotherapy entrectinib with Dr. Belcher, started 6/6/2020. Was given the option of stopping chemo treatments but opted to start new chemo.  Patient expressed full code status on admission.    - Will arrange outpatient follow up with Dr. Belcher regarding continuing Entrectinib (possibly at lower dose) versus transitioning to home hospice/comfort care    Hypertension associated with diabetes  - Holding home olmesartan given continued hypotension    Type 2 diabetes mellitus with hyperglycemia, with long-term current use of insulin  A1c 9.2 on admission. At home patient takes short acting insulin TIDWM 20-16-16u and long acting 20u levemir at night. On admission patient was started on 10 units detemir qHS with LDISS, however the detemir was discontinued on 6/24 after a fasting .  Since then has only required a few units a day with sliding scale.    - Diabetic diet  - POCT glucose checks qAC and qHS with LDISS  - Will need to decrease home insulin dose on discharge significantly          Rayshawn Todd DO  Hematology/Oncology  [facility]      ATTENDING NOTE, ONCOLOGY INPATIENT TEAM    Patient seen and examined.  Please refer to my note of same day for our assessment and plan.     Omar Evans MD

## 2020-06-28 NOTE — DISCHARGE SUMMARY
[facility]  Hematology/Oncology  Discharge Summary      Patient Name: Alison Branch  MRN: [id number]  Admission Date: 6/21/2020  Hospital Length of Stay: 8 days  Discharge Date and Time:  06/29/2020 6:32 AM  Attending Physician: Omar Evans MD   Discharging Provider: Rayshawn Todd DO  Primary Care Provider: Mike Rodriguez Ii, MD    HPI: Alison Branch is a 64 year old woman with stage IV lung adenocarcinoma with metastasis to bone diagnosed in May 2019, on PO Entrectinib since 6/6/2020, IDDM2, HTN, HLD, PE diagnosed May 2020 on enoxaparin, chronic hypoxic respiratory failure intermittently on 2L NC at home, history of recurrent UTIs, ADD not on a stimulant, and a history of brain aneurysm s/p coiling who presented to [facility] on 6/21 for 1 day of fever and a few months of worsening generalized weakness which culminated in a loss of bowl and bladder control on the way to the bathroom the day of admission that prompted her to call EMS. At that time she denied any chest pain, SOB, LOC, headaches, vision changes, abdominal pain, joint pain, neck stiffness. She did admit to some nausea and non-bloody vomiting since starting daily PO Entrectinib causing overall decreased PO intake.    ED Course: SBPs in the 60s on arrival. Blood and urine cultures collected. Started on vancomycin and piperacillin-tazobactam. Lactate 3.0 and Procal 1.39. ROSEMARY with Cr 2.4 (baseline 1.2). CXR showed CARLOS opacity but unclear whether this was obstructive pneumonia vs cancer. Received 2.8L IV fluids in ED with MAPs still in low 60s. Was admitted to ICU and started on levophed for septic shock.      Oncology History (from Dr. Belcher): Smoked for 30 years, quit around year 2000. Started having cough in late 2018 however persisted into 2019. Saw her PCP who ordered CXR on 5/10/19, showed CARLOS PNA. Chest CT done on 5/17/19 showed CARLOS mass arising from the lateral pleural surface in the left upper lobe posterior subsegment measuring  "2.6 x 3 cm. There were satellite mass more medially near the aortic arch that was 2 cm, also spiculated and irregular as well as thickened interlobar septa in the left lung apex and anterior segment, prevascular lymph node lateral to the aortic arch, short axis measuring 9 mm. She underwent bronchoscopy on 05/28/19, which revealed an infiltration in the left apical posterior segment of the left upper lobe, cytology brush was done. Transbronchial biopsies of an area of infiltration were also performed in the apicoposterior segment of the left upper lobe. Pathology revealed adenocarcinoma, however the specimen was not adequate enough to send for molecular testing.     PET scan on 6/6/19 showed significant hypermetabolic activity in the large irregular spiculated pulmonary mass in the lateral aspect of the left upper lobe consistent with the patient's known pulmonary adenocarcinoma. There was also tracer avidity in the medial left upper lobe satellite lesion and diffusely throughout much of the anterior left upper lobe where there was prominent nodular paraseptal thickening. There were some numerous scattered subcentimeter pulmonary nodules throughout the right lung, all of which are too small for detection by PET. There was a 0.5 cm, normal size right paratracheal lymph node with increased radiotracer uptake as well as hypermetabolic aortic pulmonary lymph node and a left hilar lymph node. There were focal hypermetabolic lesions in the left anterior superior iliac spine and anterior right fifth rib both associated with well defined lytic lesions. MRI pelvis from 6/10/19  revealed "region of osseous is irregularity at the left anterior iliac spine likely related to bone graft harvest procedure" and "no suspicious signal or enhancement to suggest active/malignant process". MRI brain from 6/10/19 revealed no intracranial abnormality.     Her GAURDANT was negative for molecular markers. Started Carboplatin, Alimta and " "Keytruda on 7/9/19 and completed 4 cycles, followed by Alimta and Keytruda maintenance. Her CT scans from 4/23/2020 revealed "In this patient with a known history of lung cancer, there has been marked interval detrimental change when compared to CT dated 12/20/2019 as follows. Persistent left upper lobe volume loss with worsening masslike consolidation and enlarging index and non index lesions. Increased number of innumerable bilateral metastatic solid pulmonary nodules. Interval increased size and number of multiple osteoblastic metastatic lesions throughout the visualized axial skeleton. Stable mediastinal lymph nodes, several of which were noted to be hypermetabolic on previous PET-CT.  No new lymphadenopathy. Stable subcentimeter hepatic and splenic hypodensities, too small to accurately characterize. Path addendum from 5/2019 revealed ROS1. Patient was hospitalized in May 2019 and diagnosed with PE. Started on PO Entrectinib 600 mg qd on 6/6/2020.    * No surgery found *     Hospital Course: Ms. Branch was admitted to the ICU for septic shock. She was started on empiric antibiotics with pip/tazo and vancomycin along with pan cultures. Levophed started on admission. Patient's enoxaparin was replaced with heparin gtt in anticipation of procedure and down-trending Hgb. UA showed bacteria however patient did not have urinary symptoms. Urinary retention noted; hennessy placed. Blood cultures from 6/21 with coag-negative Staph growing in 1/2 bottles, repeat blood cultures on 6/22 NGTD. T max 102.8 on day of admission, but continued to be febrile throughout admission. IR was consulted for port removal on 06/23. Weaned off norepinephrine infusion 6/24 am. Discontinued metronidazole. Hb noted 9.9-->8.2-->8.1 in the setting of supratherapeutic ptt levels. Heparin infusion held. Serial CBC did not reveal further decline in hb. No active signs of bleeding; heparin infusion restarted. Only had 1 BM throughout admission despite " history of diarrhea prior to admission. Discontinued vancomycin on 6/25. Discontinued cefepime on 6/26 and stepped down to medical oncology service. Heparin transitioned to Lovenox 100 mg BID. Got X-rays of spine once patient told the team she fell prior to admission, showed new T10 compression fracture, pain controlled with tylenol and PRN oxycodone. Patient told us that the enoxaparin has been over $250 per month at home and has not been able to afford it, and was not able to pay for her most recent prescription. She was switched to Eliquis on discharge. Insulin dose drastically decreased to only detemir 4 units daily, no short-acting insulin. Discharged with  PT and OT, and CM to arrange follow up with Dr. Belcher in clinic within the next 1-2 weeks, with Entrectinib on hold until then.    Consults:   Consults (From admission, onward)        Status Ordering Provider     Inpatient consult to Critical Care Medicine  Once     Provider:  (Not yet assigned)    Completed DANEILE GOMEZ     Inpatient consult to Hematology/Oncology  Once     Provider:  (Not yet assigned)    Completed NAVA VANCE     Inpatient consult to Interventional Radiology  Once     Provider:  (Not yet assigned)    Completed AMEE BARRIOS     Inpatient consult to Midline team  Once     Provider:  (Not yet assigned)    Completed ALEJANDRA CHERY     Inpatient consult to Registered Dietitian/Nutritionist  Once     Provider:  (Not yet assigned)    Completed JEROMY GLASS        Vitals:    06/28/20 1531 06/28/20 1917 06/28/20 2333 06/29/20 0500   BP: (!) 107/53 111/62 121/60 (!) 116/53   BP Location: Left arm Left arm Left arm Left arm   Patient Position: Sitting Lying Lying Lying   Pulse: 91 92 89 90   Resp: 19 18 17 16   Temp: 97.9 °F (36.6 °C) 97.7 °F (36.5 °C) 97.9 °F (36.6 °C) 98.2 °F (36.8 °C)   TempSrc: Oral Oral Oral Oral   SpO2: 100% 99% 100% (!) 93%   Weight:       Height:         Physical Exam  Constitutional:        General: She is awake. She is not in acute distress.     Appearance: She is well-developed. She is not diaphoretic.      Interventions: Nasal cannula in place.   HENT:      Head:      Comments: Sloughing, peeling of skin over face diffusely, much improved   Eyes:      Extraocular Movements: Extraocular movements intact.      Pupils: Pupils are equal, round, and reactive to light.   Neck:      Musculoskeletal: Normal range of motion and neck supple.      Thyroid: No thyromegaly.      Trachea: No tracheal deviation.   Cardiovascular:      Rate and Rhythm: Normal rate and regular rhythm.      Heart sounds: Normal heart sounds.   Pulmonary:      Effort: Pulmonary effort is normal. No tachypnea or accessory muscle usage.      Breath sounds: Normal breath sounds. No wheezing.   Abdominal:      General: Bowel sounds are normal.      Palpations: Abdomen is soft.      Tenderness: There is no abdominal tenderness.   Musculoskeletal: Normal range of motion.         General: No swelling.   Skin:     General: Skin is warm and dry.   Neurological:      Mental Status: She is alert and oriented to person, place, and time.      Sensory: No sensory deficit.   Psychiatric:         Behavior: Behavior is cooperative      Significant Diagnostic Studies: Labs:   CMP   Recent Labs   Lab 06/27/20  0433 06/28/20  0452    141   K 4.3 4.8    109   CO2 23 23   * 157*   BUN 14 18   CREATININE 1.2 1.3   CALCIUM 7.6* 8.3*   PROT 6.2 6.4   ALBUMIN 3.0* 2.9*   BILITOT 0.4 0.4   ALKPHOS 74 83   * 130*   * 111*   ANIONGAP 10 9   ESTGFRAFRICA 56.0* 50.8*   EGFRNONAA 48.6* 44.1*   , CBC   Recent Labs   Lab 06/27/20  0433 06/28/20  0452   WBC 6.02 5.75   HGB 8.2* 8.9*   HCT 27.8* 31.6*    285    and All labs within the past 24 hours have been reviewed    Pending Diagnostic Studies:     Procedure Component Value Units Date/Time    APTT [[id number]] Collected: 06/23/20 1103    Order Status: Sent Lab Status: In process  Updated: 06/23/20 1104    Specimen: Blood     Stool Exam-Ova,Cysts,Parasites [[id number]] Collected: 06/25/20 1836    Order Status: Sent Lab Status: In process Updated: 06/25/20 1906    Specimen: Stool         Final Active Diagnoses:    Diagnosis Date Noted POA    PRINCIPAL PROBLEM:  Septic shock [A41.9, R65.21] 06/21/2020 Yes    Compression fracture of T10 vertebra [S22.070A] 06/27/2020 Unknown    Anemia [D64.9] 06/25/2020 Yes    Hypophosphatemia [E83.39] 06/25/2020 Yes    Chronic respiratory failure with hypoxia [J96.11] 06/25/2020 Yes    Urinary retention [R33.9] 06/25/2020 Yes    Goals of care, counseling/discussion [Z71.89] 06/25/2020 Not Applicable    Elevated LFTs [R94.5] 06/25/2020 Yes    Hypomagnesemia [E83.42] 06/25/2020 No    Metabolic acidosis [E87.2] 06/23/2020 Yes    Elevated troponin [R79.89] 06/21/2020 Yes    Pulmonary embolism [I26.99] 05/20/2020 Yes    Secondary malignant neoplasm of bone [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] 05/20/2020 Yes    ROSEMARY (acute kidney injury) [N17.9] 08/20/2019 Yes    Malignant neoplasm of upper lobe of left lung [C34.12] 05/23/2019 Yes    Hypertension associated with diabetes [E11.59, I10] 01/16/2015 Yes    Type 2 diabetes mellitus with hyperglycemia, with long-term current use of insulin [E11.65, Z79.4] 06/20/2013 Not Applicable      Problems Resolved During this Admission:      Discharged Condition: fair    Disposition: Home-Health Care [facility]    Medications:  Reconciled Home Medications:      Medication List      START taking these medications    apixaban 5 mg Tab  Commonly known as: ELIQUIS  Take 1 tablet (5 mg total) by mouth 2 (two) times daily.     insulin detemir U-100 100 unit/mL (3 mL) Inpn pen  Commonly known as: LEVEMIR FLEXTOUCH  Inject 4 Units into the skin once daily.     oxyCODONE 10 mg Tab immediate release tablet  Commonly known as: ROXICODONE  Take 0.5 tablets (5 mg total) by mouth every 6 (six) hours as needed for Pain.        CHANGE how you take these medications     entrectinib 200 mg capsule  Commonly known as: ROZLYTREK  Take 3 capsules (600 mg total) by mouth once daily. DO NOT TAKE THIS UNTIL TOLD TO RESTART BY DR. MOTTA  What changed: additional instructions     insulin lispro 100 unit/mL pen  Commonly known as: HumaLOG KwikPen Insulin  Inject subcutaneously 20-12-12 units plus sliding scale.  24 units on steroid days Max  units.  DO NOT TAKE THIS UNTIL TOLD TO RESTART BY PHYSICIAN  What changed: additional instructions     olmesartan 20 MG tablet  Commonly known as: BENICAR  Take 1 tablet (20 mg total) by mouth once daily. DO NOT TAKE THIS UNTIL TOLD TO RESTART BY PHYSICIAN  What changed: additional instructions        CONTINUE taking these medications    aspirin 81 MG EC tablet  Commonly known as: ECOTRIN  Take 1 tablet (81 mg total) by mouth once daily.     benzonatate 100 MG capsule  Commonly known as: TESSALON PERLES  Take 1 capsule (100 mg total) by mouth 2 (two) times daily.     blood sugar diagnostic Strp  To check BG 4 times daily, to use with insurance preferred meter     carboxymethylcellulose 0.5 % Dpet  Commonly known as: REFRESH PLUS  1 drop 3 (three) times daily as needed.     cyproheptadine 4 mg tablet  Commonly known as: PERIACTIN  Take 1 tablet (4 mg total) by mouth 4 (four) times daily.     fluticasone propionate 50 mcg/actuation nasal spray  Commonly known as: FLONASE  USE TWO SPRAY(S) IN EACH NOSTRIL ONCE DAILY     folic acid 1 MG tablet  Commonly known as: FOLVITE  Take 1 tablet (1 mg total) by mouth once daily. Start today     gabapentin 300 MG capsule  Commonly known as: NEURONTIN  Take 1 capsule (300 mg total) by mouth nightly as needed (Take as needed at bed time for neuropathy pain and sleep).     hydrocodone-homatropine 5-1.5 mg/5 ml 5-1.5 mg/5 mL Syrp  Commonly known as: HYCODAN  Take 5 mLs by mouth every 4 (four) hours as needed.     lancets Misc  1 Device by Misc.(Non-Drug; Combo Route) route once daily. Heladio Result.  250.02.  Check  "Blood Sugar Twice Daily.     lidocaine-prilocaine cream  Commonly known as: EMLA  Apply to PORT one hour prior to chemo administration.     NYAMYC powder  Generic drug: nystatin  Apply topically 2 (two) times daily.     ondansetron 8 MG tablet  Commonly known as: ZOFRAN  Take 1 tablet (8 mg total) by mouth 4 (four) times daily as needed for Nausea.     pen needle, diabetic 33 gauge x 5/32" Ndle  1 each by Misc.(Non-Drug; Combo Route) route 4 (four) times daily with meals and nightly.     phenazopyridine 200 MG tablet  Commonly known as: PYRIDIUM  Take 1 tablet (200 mg total) by mouth 3 (three) times daily as needed for Pain.     terconazole 0.4 % Crea  Commonly known as: TERAZOL 7  INSERT 1 APPLICATORFUL VAGINALLY ONCE DAILY IN THE EVENING     VITAMIN D2 50,000 unit Cap  Generic drug: ergocalciferol  TAKE 1 CAPSULE BY MOUTH EVERY 7 DAYS     walker Misc  Please provide rollator walker for this debilitated cancer patient.  Thank you.        STOP taking these medications    enoxaparin 100 mg/mL Syrg  Commonly known as: LOVENOX     insulin aspart U-100 100 unit/mL (3 mL) Inpn pen  Commonly known as: NovoLOG     insulin regular 100 unit/mL injection            Rayshawn Todd DO  Hematology/Oncology  [facility]      ATTENDING NOTE, ONCOLOGY INPATIENT TEAM    As above; events of last 24 hours noted.  Patient seen and examined, chart reviewed.  Appears comfortable, in NAD.  Lungs are clear to auscultation.  Abdomen is soft, nontender.    PLAN    She will be discharged home today and she will follow-up with Dr. Belcher later this week.  Her multiple questions were answered to her satisfaction.      Omar Evans MD        "

## 2020-06-28 NOTE — ASSESSMENT & PLAN NOTE
Urinary retention  Cr 2.4 and FeNa 3% on admission. Cr was been uptrending over the past few weeks prior to admission, baseline ~0.8. Urinary retention overnight the day of admission, Amanda placed. Retroperitoneal U/S negative for obstruction, showed medical renal disease. ROSEMARY likely multifactorial from dehydration from decreased PO intake, poor renal perfusion from sepsis with possible ATN (amorphous casts on UA), and possibly increase in creatinine from Entrectonib. Cr improved throughout admission, Amanda removed 6/24.      - CMP daily, Cr now at baseline  - Avoid nephrotoxic meds  - Strict intake and output  - Purewick prn

## 2020-06-28 NOTE — SUBJECTIVE & OBJECTIVE
Interval History: No acute events overnight. Patient spent majority of day yesterday up in chair which she states felt great. This morning feeling hesitant to go home due to weakness and the fact that her daughter whom she lives with will be at work.    Oncology Treatment Plan:   [No treatment plan]    Medications:  Continuous Infusions:  Scheduled Meds:   apixaban  5 mg Oral BID    aspirin  81 mg Oral Daily    cyproheptadine  4 mg Oral QID    fluticasone propionate  2 spray Each Nostril Daily    folic acid  1 mg Oral Daily    lidocaine  1 patch Transdermal Q24H    senna-docusate 8.6-50 mg  1 tablet Oral BID     PRN Meds:acetaminophen, albuterol-ipratropium, benzonatate, Dextrose 10% Bolus, gabapentin, glucagon (human recombinant), insulin aspart U-100, ondansetron, oxyCODONE, promethazine, sodium chloride 0.9%     Review of Systems   Constitutional: Positive for fatigue. Negative for chills and fever.   HENT: Negative for congestion.    Respiratory: Negative for shortness of breath and wheezing.    Cardiovascular: Negative for chest pain.   Gastrointestinal: Negative for abdominal pain.   Genitourinary: Negative for dysuria.   Musculoskeletal: Positive for back pain and gait problem.   Allergic/Immunologic: Positive for immunocompromised state.   Neurological: Negative for tremors, syncope and speech difficulty.   Psychiatric/Behavioral: Negative for confusion.     Objective:     Vital Signs (Most Recent):  Temp: 98.1 °F (36.7 °C) (06/28/20 0736)  Pulse: 82 (06/28/20 0736)  Resp: 14 (06/28/20 0736)  BP: (!) 99/51 (06/28/20 0736)  SpO2: 100 % (06/28/20 0736) Vital Signs (24h Range):  Temp:  [97.9 °F (36.6 °C)-98.2 °F (36.8 °C)] 98.1 °F (36.7 °C)  Pulse:  [80-97] 82  Resp:  [14-19] 14  SpO2:  [91 %-100 %] 100 %  BP: ()/(51-65) 99/51     Weight: 98.9 kg (218 lb)  Body mass index is 32.19 kg/m².  Body surface area is 2.19 meters squared.      Intake/Output Summary (Last 24 hours) at 6/28/2020 1021  Last  data filed at 6/28/2020 0736  Gross per 24 hour   Intake 350 ml   Output 550 ml   Net -200 ml       Physical Exam  Constitutional:       General: She is awake. She is not in acute distress.     Appearance: She is well-developed. She is not diaphoretic.      Interventions: Nasal cannula in place.   HENT:      Head:      Comments: Sloughing, peeling of skin over face diffusely (improved)  Eyes:      Extraocular Movements: Extraocular movements intact.      Pupils: Pupils are equal, round, and reactive to light.   Neck:      Musculoskeletal: Normal range of motion and neck supple.      Thyroid: No thyromegaly.      Trachea: No tracheal deviation.   Cardiovascular:      Rate and Rhythm: Normal rate and regular rhythm.      Heart sounds: Normal heart sounds.   Pulmonary:      Effort: Pulmonary effort is normal. No tachypnea or accessory muscle usage.      Breath sounds: Normal breath sounds. No wheezing.   Abdominal:      General: Bowel sounds are normal.      Palpations: Abdomen is soft.      Tenderness: There is no abdominal tenderness.   Musculoskeletal: Normal range of motion.         General: No swelling.   Skin:     General: Skin is warm and dry.   Neurological:      Mental Status: She is alert and oriented to person, place, and time.      Sensory: No sensory deficit.   Psychiatric:         Behavior: Behavior is cooperative.         Significant Labs:   CBC:   Recent Labs   Lab 06/27/20  0433 06/28/20  0452   WBC 6.02 5.75   HGB 8.2* 8.9*   HCT 27.8* 31.6*    285   , CMP:   Recent Labs   Lab 06/27/20  0433 06/28/20  0452    141   K 4.3 4.8    109   CO2 23 23   * 157*   BUN 14 18   CREATININE 1.2 1.3   CALCIUM 7.6* 8.3*   PROT 6.2 6.4   ALBUMIN 3.0* 2.9*   BILITOT 0.4 0.4   ALKPHOS 74 83   * 130*   * 111*   ANIONGAP 10 9   EGFRNONAA 48.6* 44.1*    and All pertinent labs from the last 24 hours have been reviewed.    Diagnostic Results:  I have reviewed all pertinent imaging  results/findings within the past 24 hours.

## 2020-06-28 NOTE — PROGRESS NOTES
ATTENDING NOTE, ONCOLOGY INPATIENT TEAM    As above; events of last 24 hours noted.  Patient seen and examined, chart reviewed.  Appears comfortable, in NAD.  Lungs with scattered rhonchi.    Abdomen is soft, nontender.  Labs reviewed.    PLAN  Labs Q 48 hr from now  DC telemetry.  Change enoxaparin to apixaban 5 mg b.i.d..  PT/ OT.  If she continues to improve we will discharge home.  Her multiple questions were answered to her satisfaction.      Omar Evans MD

## 2020-06-28 NOTE — PLAN OF CARE
Problem: Adult Inpatient Plan of Care  Goal: Plan of Care Review  Outcome: Ongoing, Progressing  Flowsheets (Taken 6/28/2020 1405)  Plan of Care Reviewed With: patient     Patient remains free from falls and injury this shift. Bed in low, locked position with call light in reach. Plan of care reviewed with pt.  VSS.   Blood sugar monitored prior to meals.  Purewick in place, reinforcement needed. Patient encouraged to call for assistance when getting out of bed.  Patient verbalized understanding. All belongings within reach.  Will continue to monitor.

## 2020-06-28 NOTE — PROGRESS NOTES
Pt being discharged today with HH needs.  TOM called [facility] -[phone]) for authorization of HH.  She said [facility] would resume with pt.  Faxed orders and discharge summary to [facility] ([phone]).  Tom received a message from [facility] resident that pt DC will be tomorrow.  TOM alerted [facility] who will alert [facility].

## 2020-06-28 NOTE — ASSESSMENT & PLAN NOTE
Chronic respiratory failure with hypoxia  PE diagnosed May 2020, on enoxaparin 100 mg BID at home. Since then has been on supplemental oxygen at home, 2L via nasal cannula intermittently with activity. Started on heparin gtt on admission due to shock, down-trending Hgb, and possible anticipated procedure (port removal). Paused on 6/23 for Port-a-cath removal with IR, and paused again on 6/24 for supra-therapeutic PTT. Once stepped down to med onc, discontinued heparin gtt and restarted therapeutic enoxaparin injections at 100 mg BID.    - Patient informed us that her enoxaparin was unaffordable and she was not able to pay for her most recent refill. Eliquis price checked with pharmacy -- still expensive but half the price of enoxaparin so she preferred this. We transitioned her to Eliquis 5 mg BID on 6/28  - Continue supplemental O2 PRN with goal O2 saturation ? 92 %

## 2020-06-29 ENCOUNTER — TELEPHONE (OUTPATIENT)
Dept: HEMATOLOGY/ONCOLOGY | Facility: CLINIC | Age: 65
End: 2020-06-29

## 2020-06-29 ENCOUNTER — DOCUMENT SCAN (OUTPATIENT)
Dept: HOME HEALTH SERVICES | Facility: HOSPITAL | Age: 65
End: 2020-06-29
Payer: MEDICARE

## 2020-06-29 VITALS
SYSTOLIC BLOOD PRESSURE: 109 MMHG | WEIGHT: 218 LBS | BODY MASS INDEX: 32.29 KG/M2 | HEIGHT: 69 IN | DIASTOLIC BLOOD PRESSURE: 53 MMHG | HEART RATE: 93 BPM | RESPIRATION RATE: 18 BRPM | TEMPERATURE: 98 F | OXYGEN SATURATION: 98 %

## 2020-06-29 LAB
BACTERIA STL CULT: NORMAL
BACTERIA STL CULT: NORMAL
O+P STL MICRO: NORMAL
POCT GLUCOSE: 192 MG/DL (ref 70–110)

## 2020-06-29 PROCEDURE — 99238 PR HOSPITAL DISCHARGE DAY,<30 MIN: ICD-10-PCS | Mod: ,,, | Performed by: INTERNAL MEDICINE

## 2020-06-29 PROCEDURE — 99238 HOSP IP/OBS DSCHRG MGMT 30/<: CPT | Mod: ,,, | Performed by: INTERNAL MEDICINE

## 2020-06-29 PROCEDURE — 97530 THERAPEUTIC ACTIVITIES: CPT

## 2020-06-29 PROCEDURE — 25000003 PHARM REV CODE 250: Performed by: STUDENT IN AN ORGANIZED HEALTH CARE EDUCATION/TRAINING PROGRAM

## 2020-06-29 PROCEDURE — 97116 GAIT TRAINING THERAPY: CPT

## 2020-06-29 PROCEDURE — 97535 SELF CARE MNGMENT TRAINING: CPT

## 2020-06-29 PROCEDURE — 94761 N-INVAS EAR/PLS OXIMETRY MLT: CPT

## 2020-06-29 PROCEDURE — 27000221 HC OXYGEN, UP TO 24 HOURS

## 2020-06-29 RX ADMIN — APIXABAN 5 MG: 5 TABLET, FILM COATED ORAL at 08:06

## 2020-06-29 RX ADMIN — ASPIRIN 81 MG: 81 TABLET, COATED ORAL at 08:06

## 2020-06-29 RX ADMIN — LIDOCAINE 1 PATCH: 50 PATCH TOPICAL at 08:06

## 2020-06-29 RX ADMIN — FOLIC ACID 1 MG: 1 TABLET ORAL at 09:06

## 2020-06-29 RX ADMIN — DOCUSATE SODIUM 50 MG AND SENNOSIDES 8.6 MG 1 TABLET: 8.6; 5 TABLET, FILM COATED ORAL at 08:06

## 2020-06-29 RX ADMIN — FLUTICASONE PROPIONATE 100 MCG: 50 SPRAY, METERED NASAL at 08:06

## 2020-06-29 RX ADMIN — CYPROHEPTADINE HYDROCHLORIDE 4 MG: 4 TABLET ORAL at 08:06

## 2020-06-29 NOTE — PT/OT/SLP PROGRESS
Physical Therapy Treatment    Patient Name:  Alison Branch   MRN:  [id number]    Recommendations:     Discharge Recommendations:  home health PT   Discharge Equipment Recommendations: none   Barriers to discharge: Decreased caregiver support- PT educated patient that she would benefit from 24 hour assistance at home    Assessment:     Alison Branch is a 64 y.o. female admitted with a medical diagnosis of Septic shock.  She presents with the following impairments/functional limitations:  weakness, impaired endurance, impaired functional mobilty, gait instability, impaired balance, impaired self care skills, decreased lower extremity function, decreased safety awareness, impaired cardiopulmonary response to activity. Patient tolerated PT session fairly. She ambulated 50ft and 100ft with seated rest break between trials, RW, and CGA. She required constant verbal/tactile cues for safety with ambulation and will be a high fall risk at home. PT educated patient that she needs to have 24 hour help at home.      Rehab Prognosis: Good; patient would benefit from acute skilled PT services to address these deficits and reach maximum level of function.    Recent Surgery: * No surgery found *      Plan:     During this hospitalization, patient to be seen 3 x/week to address the identified rehab impairments via gait training, therapeutic activities, therapeutic exercises and progress toward the following goals:    · Plan of Care Expires:  07/24/20    Subjective     Chief Complaint: Ready to go home.   Patient/Family Comments/goals: To be able to walk when she gets home.   Pain/Comfort:  Pain Rating 1: 0/10      Objective:     Communicated with RN prior to session.  Patient found up in chair with telemetry, oxygen, peripheral IV upon PT entry to room.     General Precautions: Standard, fall   Orthopedic Precautions:spinal precautions(T 10 compression fracture due to fall at home. No ortho consult while in the hospital.)   Braces: N/A      Functional Mobility:  · Transfers:     · Sit to Stand:  contact guard assistance with rolling walker x5 trials from bedside chair with verbal cues for hand placement and safety   · Gait: Patient ambulated 50ft, 100ft, and 30ft with seated rest break between trials, 2L NC O2, Rolling Walker and CGA. Patient demonstrated decreased saritha and increased time in double stance during gait due to impaired balance, decreased strength and impaired endurance. She required constant verbal/tactile cues for safety with ambulation and pursed to lip breathing to recover during rest breaks.       AM-PAC 6 CLICK MOBILITY  Turning over in bed (including adjusting bedclothes, sheets and blankets)?: 3  Sitting down on and standing up from a chair with arms (e.g., wheelchair, bedside commode, etc.): 3  Moving from lying on back to sitting on the side of the bed?: 3  Moving to and from a bed to a chair (including a wheelchair)?: 3  Need to walk in hospital room?: 3  Climbing 3-5 steps with a railing?: 1  Basic Mobility Total Score: 16       Therapeutic Activities and Exercises:  Patient insistent on getting dressed to go home prior to walking in the hallway. She was SBA for UB dressing and CGA for LB dressing. She performed 2 standing trials to pull her pants up and on the 1st trial she lost her balance and sat back in the chair. She required increased time to perform LB dressing due to SOB. She required verbal cues for pursed lip breathing to recover.       Patient educated in:  -PT role and POC  -safety with transfers including hand placement  -gait sequencing and RW management  -OOB activity to maximize recovery with family assistance and RW      Patient left up in chair with all lines intact, call button in reach and RN present.    GOALS:   Multidisciplinary Problems     Physical Therapy Goals        Problem: Physical Therapy Goal    Goal Priority Disciplines Outcome Goal Variances Interventions   Physical Therapy Goal     PT,  PT/OT Ongoing, Progressing     Description: Goals to be met by: 2020    Patient will increase functional independence with mobility by performin. Supine to sit with Stand-by Assistance - not met  2. Sit to stand transfer with Minimal Assistance using LRAD -  Met  2a. Sit to stand transfer with stand by assistance and RW.   3. Bed to chair transfer with Minimal Assistance using LRAD - not met  4. Gait  x 10 feet with Minimal Assistance using LRAD -  Met  4a. Gait x150 feet with stand by assistance and RW.   5. Sitting at edge of bed x10 minutes with Supervision with stable BP - not met  6. Lower extremity exercise program x15 reps per handout, with assistance as needed - not met                     Time Tracking:     PT Received On: 20  PT Start Time: 900     PT Stop Time: 927  PT Total Time (min): 27 min     Billable Minutes: Gait Training 15 and Therapeutic Activity 12    Treatment Type: Treatment  PT/PTA: PT       Jaleesa Bobo, PT  2020

## 2020-06-29 NOTE — TELEPHONE ENCOUNTER
----- Message from Lisa Perez RN sent at 6/29/2020  9:12 AM CDT -----  Regarding: FW: MRN:  [id number]  Apologies, the team would like her to follow this week if possible. Thank you.  ----- Message -----  From: Lisa Perez RN  Sent: 6/29/2020   7:55 AM CDT  To: Ye OQUENDO Staff  Subject: MRN:  [id number]                                    Good morning. This patient is discharging home today and the team has requested that she follow-up with Dr. Belcher in the clinic in 1-2 weeks. Can we get her scheduled please? Thank you.

## 2020-06-29 NOTE — PHYSICIAN QUERY
PT Name: Alison Branch  MR #: [id number]     CDS: Lillian Call RN, CCDS         Contact information :ext [phone] [email or web address]     This form is a permanent document in the medical record.    Query Date: June 29, 2020    Neurological Condition Clarification    By submitting this query, we are merely seeking further clarification of documentation to reflect the severity of illness of your patient. Please utilize your independent clinical judgment when addressing the question(s) below.    The Medical record reflects the following:     Indicators   Supporting Clinical Findings Location in Medical Record   x AMS, Confusion,  LOC, etc.  She has mild encephalopathy  Positive for dizziness, weakness and light-headedness  Patient was then noted to be weaker this morning and then had to use the bathroom. Family was helping her make it to the bathroom when she lost control of her bowels and bladder and then became even more weak Hematology/Oncology Consult 6/22/20    H&P 6/22/20   x Acute/Chronic Illness Septic shock  ROSEMARY (acute kidney injury)  - likely second to septic shock and decreased po intake  She was admitted yesterday to the medical ICU with septic shock requiring vasopressor support. H&P 6/22/20        Hematology/Oncology Consult 6/22/20    Radiology Findings     x Electrolyte Imbalance Calcium 7.9 Lab 6/21/20   x Medication Vanc, Zosyn  - levophed H&P 6/22/20   x Treatment         2.8L fluid given H&P 6/22/20   x Other Creatinine 2.4 Lab 6/21/20     Encephalopathy- is a general term for any diffuse disease of the brain that alters brain function or structure. Treatment of the cognitive dysfunction varies but is ultimately dependent on the treatment of the underlying condition.  Major Symptoms of Encephalopathy - Decreased level of consciousness, fluctuating alertness/concentration, confusion, agitation, lethargy, somnolence, drowsiness, obtundation, stupor, or coma.  References: National Institutes of  Healths ([facility]) National [place] of Neurological Disorders and Strokes;  HCPro 2016; Advisory Board       The noted clinical guidelines are only system guidelines and do not replace the providers clinical judgment.  Provider, please specify the diagnosis or diagnoses associated with above clinical findings.  Please specify encephalopathy diagnosis  [   ] Metabolic Encephalopathy - Due to electrolye imbalance, metabolic derangements, or infectious processes, includes Septic Encephalopathy, Uremic Encephalopathy   [   ] Encephalopathy, unspecified      [+   ] Other Encephalopathy (please specify): _No encephalopathy___________________   [   ] Other Neurological Condition- Includes Post-ictal altered mental status (please specify condition): _________   [   ]  Clinically Undetermined       Present on admission (POA) status:   [    ] Yes (Y)                          [    ] Clinically Undetermined (W)                                                          [    ] No (N)                            [    ] Documentation insufficient to determine if condition is POA (U)       Please document in your progress notes daily for the duration of treatment until resolved, and include in your discharge summary.

## 2020-06-29 NOTE — DISCHARGE INSTRUCTIONS
We have stopped your short acting insulin (lispro/aspart) and your blood pressure medication (olmesartan) and you will follow up in clinic about when it is safe to restart this. We have decreased the dose of your long-acting insulin (detemir) to only 4 units once a day. You will stop taking your oral chemotherapy pill (Entrectinib) until you see Dr. Belcher in clinic and she says it's OK to continue it.

## 2020-06-29 NOTE — PLAN OF CARE
Patient discharging home today.  sent a message to Dr. Belcher's clinic and requested a follow-up appointment in the clinic for this week.    Lisa Perez RN, BSN, CM  Utilization Management  [facility]

## 2020-06-29 NOTE — PLAN OF CARE
Patient tolerated PT session fairly. She ambulated 50ft and 100ft with seated rest break between trials, RW, and CGA. She required constant verbal/tactile cues for safety with ambulation and will be a high fall risk at home. PT educated patient that she needs to have 24 hour help at home.      Problem: Physical Therapy Goal  Goal: Physical Therapy Goal  Description: Goals to be met by: 2020    Patient will increase functional independence with mobility by performin. Supine to sit with Stand-by Assistance - not met  2. Sit to stand transfer with Minimal Assistance using LRAD - met  2a. Sit to stand transfer with stand by assistance and RW.   3. Bed to chair transfer with Minimal Assistance using LRAD - not met  4. Gait  x 10 feet with Minimal Assistance using LRAD -  met  4a. Gait x150 feet with stand by assistance and RW.   5. Sitting at edge of bed x10 minutes with Supervision with stable BP - not met  6. Lower extremity exercise program x15 reps per handout, with assistance as needed - not met    Outcome: Ongoing, Progressing

## 2020-06-29 NOTE — PLAN OF CARE
Plan of care reviewed with the patient at the beginning of shift. The patient is alert and oriented. GCS 15. Denying complaints at this time. NAEON. Remained free from falls and injuries throughout shift. VSS. Sliding scale coverage provided. Bed in low locked position. Call bell and personal items within reach. Will continue to monitor.

## 2020-06-29 NOTE — PHYSICIAN QUERY
PT Name: Alison Branch  MR #: [id number]     Diagnosis Clarification      CDS: Lillian Call RN, CCDS         Contact information :ext [phone] [email or web address]       This form is a permanent document in the medical record.     Query Date: June 29, 2020    Dear Provider,  By submitting this query, we are merely seeking further clarification of documentation.  Please utilize your independent clinical judgment when addressing the question(s) below.     The medical record contains the following:    Supporting Clinical Information Location in Medical Record     ROSEMARY (acute kidney injury)  - likely second to septic shock and decreased po intake    Hyaline casts 5  BUN 26, creatinine 2.4, GFR 24.2  BUN 18, creatinine 1.3, GFR 50.8      ROSEMARY  Suspect secondary to dehydration from decreased oral intake and sepsis + ischemic ATN from shock.. FeNA 3% with urinary retention overnight. Now with urine catheter. sCr has been uptrending over the past few weeks, baseline ~0.8.     H&P 6/22/20      Lab 6/21/20  Lab 6/21/20  Lab 6/28/20      Critical Care PN 6/23/20     Please clarify if the ATN  diagnosis has been:    [   ] Ruled In   [   ] Ruled In, Now Resolved   [ +  ] Ruled Out   [   ] Other/Clarification of findings (please specify)_______________    [   ] Clinically undetermined       Present on admission (POA) status:   [    ] Yes (Y)                          [  ] Clinically Undetermined (W)  [    ] No (N)                            [   ] Documentation insufficient to determine if condition is POA (U)       Please document in your progress notes daily for the duration of treatment, until resolved, and include in your discharge summary.

## 2020-06-29 NOTE — PROGRESS NOTES
"Road Test  Oxygen-Patient going home on oxygen.  Ambulation-Patient up with no assistance.  Devices-Patient going home with no devices.  Tolerating-Regular diet.  Elimination-Patient voiding without difficulty.  Self Care-Performs self care without assistance.  Teaching-Verbal and written discharge teaching given.    Patient going home on oxygen, ambulates with no assistance, regular diet, voiding without difficulty, and is going home with no devices. Peripheral IV removed, catheter intact, dressing dry gauze and coban. No bleeding present. Patient going home. Medication delivered bedside: Eliquis, Benicar, and oxycodone. Patient refused insulin determir and short acting insulin. Patient stated, "I have plenty of insulin at home." Discharge paperwork discussed and medications reviewed with patient and daughter at bedside. Patient has all belongings and no questions at this time. Awaiting transport at this time.     "

## 2020-06-29 NOTE — PT/OT/SLP PROGRESS
Occupational Therapy   Treatment    Name: Alison Branch  MRN: [id number]  Admitting Diagnosis:  Septic shock       Recommendations:     Discharge Recommendations: home with home health  Discharge Equipment Recommendations:  none  Barriers to discharge:       Assessment:     Alison Branch is a 64 y.o. female with a medical diagnosis of Septic shock.  She presents supine and willing to participate.  Pt preparing for DC this date.  ADL trianing for safe toileting . Performance deficits affecting function are  .     Rehab Prognosis:  Good; patient would benefit from acute skilled OT services to address these deficits and reach maximum level of function.       Plan:     Patient to be seen 4 x/week to address the above listed problems via self-care/home management, therapeutic activities, therapeutic exercises  · Plan of Care Expires: 07/23/20  · Plan of Care Reviewed with: patient    Subjective     Pain/Comfort:  ·      Objective:     Communicated with: RN prior to session.  Patient found supine with telemetry, oxygen, peripheral IV upon OT entry to room.    General Precautions: Standard, fall   Orthopedic Precautions:spinal precautions   Braces: N/A     Occupational Performance:     Bed Mobility:    · Patient completed Rolling/Turning to Left with  minimum assistance  · Patient completed Rolling/Turning to Right with minimum assistance  · Patient completed Scooting/Bridging with minimum assistance   ·     Activities of Daily Living:  · Grooming: contact guard assistance    · Bathing: minimum assistance and moderate assistance    · Toileting: moderate assistance        [facility] 6 Click ADL: 18    Treatment & Education:  Pt educated on safety, role of OT, importance of increased participation in self care for gains , expectations for participation, expectations for gains, POC, energy conservation, caregiver strain. White board updated.   - ADL training     Patient left supine with all lines intactEducation:      GOALS:    Multidisciplinary Problems     Occupational Therapy Goals        Problem: Occupational Therapy Goal    Goal Priority Disciplines Outcome Interventions   Occupational Therapy Goal     OT, PT/OT Ongoing, Progressing    Description: Goals to be met by: 7/4/20    Patient will increase functional independence with ADLs by performing:    UE Dressing with Stand-by Assistance.  LE Dressing with Stand-by Assistance.  Grooming while standing at sink with Contact Guard Assistance.  Toileting from toilet with Contact Guard Assistance for hygiene and clothing management.   Step transfer with Contact Guard Assistance and AD as needed to prepare for household mobility to complete occupations of choice  Toilet transfer to toilet with Contact Guard Assistance.  Upper extremity exercise program x15 reps per handout, with independence to build strength and endurance for functional tasks.                     Time Tracking:     OT Date of Treatment: 06/29/20  OT Start Time: 0800  OT Stop Time: 0825  OT Total Time (min): 25 min    Billable Minutes:Self Care/Home Management 15  Therapeutic Activity 10    Tara Queen, OT  6/29/2020

## 2020-06-29 NOTE — PHYSICIAN QUERY
PT Name: Alison Branch  MR #: [id number]     Physician Query Form - Documentation Clarification      CDS: Lillian Call RN, CCDS         Contact information :ext [phone] [email or web address]       This form is a permanent document in the medical record.     Query Date: June 29, 2020    By submitting this query, we are merely seeking further clarification of documentation. Please utilize your independent clinical judgment when addressing the question(s) below.    The Medical record reflects the following:    Supporting Clinical Findings Location in Medical Record     Principal Problem: Septic shock  ROSEMARY (acute kidney injury)  - likely second to septic shock and decreased po intake  Vital Signs (24h Range):  Temp:  [100.4 °F (38 °C)] 100.4 °F (38 °C)  Pulse:  [80-90] 82  Resp:  [18-20] 20  SpO2:  [96 %-100 %] 97 %  BP: ()/(40-65) 84/53  Lactate 3.0 and Procal 1.39 on presentation, no urine collected, CXR with CARLOS opacity possibly obstructive pneumonia versus cancer.    Blood culture  COAGULASE-NEGATIVE STAPHYLOCOCCUS SPECIES   Organism is a probable contaminant       Septic shock   admitted to the ICU for septic shock. She was started on empiric antibiotics with pip/tazo and vancomycin along with pan cultures. Levophed started on admission.     Blood cultures from 6/21 with coag-negative Staph growing in 1/2 bottles, repeat blood cultures on 6/22 NGTD. T max 102.8 on day of admission, but continued to be febrile throughout admission. IR was consulted for port removal on 06/23    Discontinued vancomycin on 6/25. Discontinued cefepime on 6/26 and stepped down to medical oncology service     H&P 6/22/20                              Lab 6/21/20            Discharge summary 6/29/20                                                                                Doctor, Please specify diagnosis or diagnoses associated with above clinical findings.  Please clarify sepsis diagnosis and known or suspected  source    Provider Use Only        [     ] Sepsis due to infected port          [     ] Sepsis due to pneumonia    [     ] Sepsis due to other or unknown source, please specify,_____    [ +    ] Sepsis ruled out, please clarify etiology of shock, undetermined hypotension  [     ] Other clarification, __________                                                                                                               [  ] Clinically Undetermined

## 2020-06-30 ENCOUNTER — TELEPHONE (OUTPATIENT)
Dept: PHARMACY | Facility: CLINIC | Age: 65
End: 2020-06-30

## 2020-06-30 ENCOUNTER — TELEPHONE (OUTPATIENT)
Dept: HEMATOLOGY/ONCOLOGY | Facility: CLINIC | Age: 65
End: 2020-06-30

## 2020-06-30 DIAGNOSIS — C34.12 MALIGNANT NEOPLASM OF UPPER LOBE OF LEFT LUNG: Primary | ICD-10-CM

## 2020-06-30 DIAGNOSIS — C79.51 SECONDARY MALIGNANT NEOPLASM OF BONE: ICD-10-CM

## 2020-06-30 NOTE — TELEPHONE ENCOUNTER
----- Message from Ashley ANDERSON Rodri sent at 6/30/2020  2:48 PM CDT -----  Regarding: Medication Advice  Contact: PT  PT called to verify medication - hydrochlorothiaziade 12.5mg  PT has swollen ankles and wants to make sure she can take this and it'll be alright.  PT also started her chemo medication this morning.    Callback; [phone]

## 2020-06-30 NOTE — TELEPHONE ENCOUNTER
----- Message from Bijal Mejia sent at 6/30/2020 11:58 AM CDT -----  Regarding: Rozlytrek  Good afternoon,    Patient was approved to receive her Rozlytrek from [employer] through 5/12/21 with $0.00 copay. Please a new prescription for Rozlytrek to [phone] so the patient can schedule a shipment. Patient has been informed and provided with information needed to schedule a shipment and request refills.    Thank you,  Bijal

## 2020-06-30 NOTE — TELEPHONE ENCOUNTER
Donna,  I tried printing script and it is not letting me. Can you try from your computer and get it me

## 2020-06-30 NOTE — TELEPHONE ENCOUNTER
Patient was approved to receive her Rozlytrek from [employer] [facility] through 5/12/21 with $0.00 copay. Please a new prescription for Rozlytrek to [phone] so the patient can schedule a shipment. Patient has been informed and provided with information needed to schedule a shipment and request refills. Sending a staff message to Dr Todd and Dr Belcher regarding patients [facility] enrollment for Rozlytrek.

## 2020-07-01 ENCOUNTER — LAB VISIT (OUTPATIENT)
Dept: LAB | Facility: HOSPITAL | Age: 65
End: 2020-07-01
Attending: INTERNAL MEDICINE
Payer: MEDICARE

## 2020-07-01 ENCOUNTER — OFFICE VISIT (OUTPATIENT)
Dept: HEMATOLOGY/ONCOLOGY | Facility: CLINIC | Age: 65
End: 2020-07-01
Payer: MEDICARE

## 2020-07-01 VITALS
HEART RATE: 89 BPM | WEIGHT: 220.88 LBS | BODY MASS INDEX: 32.72 KG/M2 | SYSTOLIC BLOOD PRESSURE: 129 MMHG | DIASTOLIC BLOOD PRESSURE: 70 MMHG | HEIGHT: 69 IN | OXYGEN SATURATION: 100 % | RESPIRATION RATE: 24 BRPM

## 2020-07-01 DIAGNOSIS — C34.12 MALIGNANT NEOPLASM OF UPPER LOBE OF LEFT LUNG: ICD-10-CM

## 2020-07-01 DIAGNOSIS — C79.51 SECONDARY MALIGNANT NEOPLASM OF BONE: ICD-10-CM

## 2020-07-01 DIAGNOSIS — R60.9 EDEMA, UNSPECIFIED TYPE: ICD-10-CM

## 2020-07-01 DIAGNOSIS — R63.0 ANOREXIA: ICD-10-CM

## 2020-07-01 DIAGNOSIS — C34.12 MALIGNANT NEOPLASM OF UPPER LOBE OF LEFT LUNG: Primary | ICD-10-CM

## 2020-07-01 DIAGNOSIS — Z79.4 TYPE 2 DIABETES MELLITUS WITH DIABETIC POLYNEUROPATHY, WITH LONG-TERM CURRENT USE OF INSULIN: ICD-10-CM

## 2020-07-01 DIAGNOSIS — C77.1 SECONDARY MALIGNANT NEOPLASM OF MEDIASTINAL LYMPH NODE: ICD-10-CM

## 2020-07-01 DIAGNOSIS — E11.42 TYPE 2 DIABETES MELLITUS WITH DIABETIC POLYNEUROPATHY, WITH LONG-TERM CURRENT USE OF INSULIN: ICD-10-CM

## 2020-07-01 LAB
ALBUMIN SERPL BCP-MCNC: 3 G/DL (ref 3.5–5.2)
ALP SERPL-CCNC: 91 U/L (ref 55–135)
ALT SERPL W/O P-5'-P-CCNC: 82 U/L (ref 10–44)
ANION GAP SERPL CALC-SCNC: 7 MMOL/L (ref 8–16)
AST SERPL-CCNC: 75 U/L (ref 10–40)
BILIRUB SERPL-MCNC: 0.4 MG/DL (ref 0.1–1)
BUN SERPL-MCNC: 16 MG/DL (ref 8–23)
CALCIUM SERPL-MCNC: 9.9 MG/DL (ref 8.7–10.5)
CHLORIDE SERPL-SCNC: 109 MMOL/L (ref 95–110)
CO2 SERPL-SCNC: 27 MMOL/L (ref 23–29)
CREAT SERPL-MCNC: 1.4 MG/DL (ref 0.5–1.4)
ERYTHROCYTE [DISTWIDTH] IN BLOOD BY AUTOMATED COUNT: 17.1 % (ref 11.5–14.5)
EST. GFR  (AFRICAN AMERICAN): 46.5 ML/MIN/1.73 M^2
EST. GFR  (NON AFRICAN AMERICAN): 40.3 ML/MIN/1.73 M^2
GLUCOSE SERPL-MCNC: 345 MG/DL (ref 70–110)
HCT VFR BLD AUTO: 30.5 % (ref 37–48.5)
HGB BLD-MCNC: 8.9 G/DL (ref 12–16)
IMM GRANULOCYTES # BLD AUTO: 0.21 K/UL (ref 0–0.04)
MCH RBC QN AUTO: 28.6 PG (ref 27–31)
MCHC RBC AUTO-ENTMCNC: 29.2 G/DL (ref 32–36)
MCV RBC AUTO: 98 FL (ref 82–98)
NEUTROPHILS # BLD AUTO: 1.3 K/UL (ref 1.8–7.7)
PLATELET # BLD AUTO: 266 K/UL (ref 150–350)
PMV BLD AUTO: 11.1 FL (ref 9.2–12.9)
POTASSIUM SERPL-SCNC: 4.3 MMOL/L (ref 3.5–5.1)
PROT SERPL-MCNC: 6.7 G/DL (ref 6–8.4)
RBC # BLD AUTO: 3.11 M/UL (ref 4–5.4)
SODIUM SERPL-SCNC: 143 MMOL/L (ref 136–145)
WBC # BLD AUTO: 5.2 K/UL (ref 3.9–12.7)

## 2020-07-01 PROCEDURE — 85027 COMPLETE CBC AUTOMATED: CPT

## 2020-07-01 PROCEDURE — 3046F PR MOST RECENT HEMOGLOBIN A1C LEVEL > 9.0%: ICD-10-PCS | Mod: CPTII,S$GLB,, | Performed by: INTERNAL MEDICINE

## 2020-07-01 PROCEDURE — 3008F BODY MASS INDEX DOCD: CPT | Mod: CPTII,S$GLB,, | Performed by: INTERNAL MEDICINE

## 2020-07-01 PROCEDURE — 36415 COLL VENOUS BLD VENIPUNCTURE: CPT

## 2020-07-01 PROCEDURE — 3074F SYST BP LT 130 MM HG: CPT | Mod: CPTII,S$GLB,, | Performed by: INTERNAL MEDICINE

## 2020-07-01 PROCEDURE — 99215 PR OFFICE/OUTPT VISIT, EST, LEVL V, 40-54 MIN: ICD-10-PCS | Mod: S$GLB,,, | Performed by: INTERNAL MEDICINE

## 2020-07-01 PROCEDURE — 3008F PR BODY MASS INDEX (BMI) DOCUMENTED: ICD-10-PCS | Mod: CPTII,S$GLB,, | Performed by: INTERNAL MEDICINE

## 2020-07-01 PROCEDURE — 3074F PR MOST RECENT SYSTOLIC BLOOD PRESSURE < 130 MM HG: ICD-10-PCS | Mod: CPTII,S$GLB,, | Performed by: INTERNAL MEDICINE

## 2020-07-01 PROCEDURE — 3046F HEMOGLOBIN A1C LEVEL >9.0%: CPT | Mod: CPTII,S$GLB,, | Performed by: INTERNAL MEDICINE

## 2020-07-01 PROCEDURE — 80053 COMPREHEN METABOLIC PANEL: CPT

## 2020-07-01 PROCEDURE — 99999 PR PBB SHADOW E&M-EST. PATIENT-LVL IV: CPT | Mod: PBBFAC,,, | Performed by: INTERNAL MEDICINE

## 2020-07-01 PROCEDURE — 3078F PR MOST RECENT DIASTOLIC BLOOD PRESSURE < 80 MM HG: ICD-10-PCS | Mod: CPTII,S$GLB,, | Performed by: INTERNAL MEDICINE

## 2020-07-01 PROCEDURE — 99215 OFFICE O/P EST HI 40 MIN: CPT | Mod: S$GLB,,, | Performed by: INTERNAL MEDICINE

## 2020-07-01 PROCEDURE — 99499 UNLISTED E&M SERVICE: CPT | Mod: S$GLB,,, | Performed by: INTERNAL MEDICINE

## 2020-07-01 PROCEDURE — 99499 RISK ADDL DX/OHS AUDIT: ICD-10-PCS | Mod: S$GLB,,, | Performed by: INTERNAL MEDICINE

## 2020-07-01 PROCEDURE — 99999 PR PBB SHADOW E&M-EST. PATIENT-LVL IV: ICD-10-PCS | Mod: PBBFAC,,, | Performed by: INTERNAL MEDICINE

## 2020-07-01 PROCEDURE — 3078F DIAST BP <80 MM HG: CPT | Mod: CPTII,S$GLB,, | Performed by: INTERNAL MEDICINE

## 2020-07-01 RX ORDER — CYPROHEPTADINE HYDROCHLORIDE 4 MG/1
4 TABLET ORAL 4 TIMES DAILY
Qty: 120 TABLET | Refills: 5 | Status: SHIPPED | OUTPATIENT
Start: 2020-07-01 | End: 2021-05-13

## 2020-07-01 RX ORDER — FUROSEMIDE 20 MG/1
20 TABLET ORAL DAILY
Qty: 30 TABLET | Refills: 11 | Status: SHIPPED | OUTPATIENT
Start: 2020-07-01 | End: 2020-07-01

## 2020-07-01 RX ORDER — FUROSEMIDE 20 MG/1
20 TABLET ORAL DAILY
Qty: 30 TABLET | Refills: 11 | Status: SHIPPED | OUTPATIENT
Start: 2020-07-01 | End: 2020-08-07 | Stop reason: SDUPTHER

## 2020-07-01 NOTE — PROGRESS NOTES
Subjective:       Patient ID: Alison Branch is a 64 y.o. female.    Chief Complaint: Lung Cancer  Ms. Branch is a 63-year-old female who smoked for about 30 years and quit 20 years ago, presented with cough end of last year, but worsened into January.  Since the cough persisted, she saw her PCP, underwent a chest x-ray on 05/10/2019, that revealed left upper lobe pneumonia and a repeat CT was done in the week after that on 05/17/2019 that showed left upper lobe   mass arising from the lateral pleural surface in the left upper lobe posterior subsegment measuring 2.6 x 3 cm.  There are satellite mass more medially near the aortic arch that is 2 cm, also spiculated and irregular as well as thickened interlobar septa in the left lung apex and anterior segment, prevascular lymph node lateral to the aortic arch, short axis measuring 9 mm.       She then underwent a bronchoscopy on 05/28/2019, and that revealed there was an infiltration obtained in the left apical posterior segment of the left upper lobe cytology brush was done.  Transbronchial biopsies of an area of infiltration were also performed in the apicoposterior segment of the left upper lobe.    Pathology revealed adenocarcinoma; however, the specimen was not adequate enough to send for molecular testing.       She underwent a PET scan on 06/06/2019.  that reveals significant hypermetabolic activity in the large irregular spiculated pulmonary mass in the lateral aspect of the left upper lobe consistent with the patient's known pulmonary adenocarcinoma.  There is also tracer avidity in the medial left upper lobe satellite lesion and diffusely throughout much of the anterior left upper lobe where there is prominent nodular paraseptal thickening.  There are some numerous scattered subcentimeter pulmonary nodules throughout the right lung, all of which are too small for detection by PET.  For example, there is a 0.4 cm nodule in the superior right lower lobe and a  "micro nodule in the posterior segment of the right upper lobe.  There is a 0.5 cm, normal size right   paratracheal lymph node with increased radiotracer uptake as well as hypermetabolic aortic pulmonary lymph node and a left hilar lymph node.  There is a focal hypermetabolic lesion in the left anterior superior iliac spine associated with well defined lytic lesion.  There is a hypermetabolic focus in the anterior right fifth rib with a possible associated lytic lesion.  SUVs as   below lateral:  Left upper lobe  SUV max 17.9, anterior left upper lobe satellite mass and septal thickening SUV max of 10.1, right paratracheal lymph node SUV max of 4.8, left AP window lymph node SUV max of 17.9, left anterior superior iliac spine SUV max of 3.9"     MRI pelvis from 6/10/19  reveals "Region of osseous is irregularity at the left anterior iliac spine likely related to bone graft harvest procedure.  See  discussion above.  No suspicious signal or enhancement to suggest active/malignant process"   MRI brain from 6/10//19 reveal No intracranial abnormality.     We discussed her case at [facility], and plan was to wait for GAURDANT and proceed with treatment accordingly  Her GAURDANT is negative for molecular markers.     She has completed 4 cycles of Carboplatin, Alimta and Keytruda as of 9/5/19. She is now on  ALimta and Keytruda maintenance.            She has been on maintenance Alimta and Keytruda     Her CT scans from 4/23/2020 revealed "In this patient with a known history of lung cancer, there has been marked interval detrimental change when compared to CT dated 12/20/2019 as follows. Persistent left upper lobe volume loss with worsening masslike consolidation and enlarging index and non index lesions. Increased number of innumerable bilateral metastatic solid pulmonary nodules. Interval increased size and number of multiple osteoblastic metastatic lesions throughout the visualized axial skeleton. Stable mediastinal " "lymph nodes, several of which were noted to be hypermetabolic on previous PET-CT.  No new lymphadenopathy. Stable subcentimeter hepatic and splenic hypodensities, too small to accurately characterize.  Path addendum from 5/2019 reveals ROS1          HPI She has been on Entrectinib since 6/6/2020.  She was hospitalized between 6/21-6/29/2020, she was "admitted to the ICU for septic shock. She was started on empiric antibiotics with pip/tazo and vancomycin along with pan cultures. Levophed started on admission. Patient's enoxaparin was replaced with heparin gtt in anticipation of procedure and down-trending Hgb. UA showed bacteria however patient did not have urinary symptoms. Urinary retention noted; hennessy placed. Blood cultures from 6/21 with coag-negative Staph growing in 1/2 bottles, repeat blood cultures on 6/22 NGTD. T max 102.8 on day of admission, but continued to be febrile throughout admission. IR was consulted for port removal on 06/23. Weaned off norepinephrine infusion 6/24 am. Discontinued metronidazole. Hb noted 9.9-->8.2-->8.1 in the setting of supratherapeutic ptt levels. Heparin infusion held. Serial CBC did not reveal further decline in hb. No active signs of bleeding; heparin infusion restarted. Only had 1 BM throughout admission despite history of diarrhea prior to admission. Discontinued vancomycin on 6/25. Discontinued cefepime on 6/26 and stepped down to medical oncology service. Heparin transitioned to Lovenox 100 mg BID. Got X-rays of spine once patient told the team she fell prior to admission, showed new T10 compression fracture, pain controlled with tylenol and PRN oxycodone. She was switched to Eliquis on discharge. Insulin dose drastically decreased to only detemir 4 units daily, no short-acting insulin. Discharged with HH PT and OT"    Marilee comes in as a hospital follow-up   She feels well now. She has stopped her blood pressure meds. She is using her rollator. HH PT/OT has been " working with her.  Appetite is still poor, notes leg swelling.  Denies shortness of breath.      Review of Systems   Constitutional: Positive for appetite change, fatigue and unexpected weight change.   HENT: Negative for mouth sores.    Eyes: Negative for visual disturbance.   Respiratory: Negative for cough and shortness of breath.    Cardiovascular: Positive for leg swelling. Negative for chest pain.   Gastrointestinal: Negative for abdominal pain and diarrhea.   Genitourinary: Negative for frequency.   Musculoskeletal: Negative for back pain.   Integumentary:  Negative for rash.   Neurological: Negative for headaches.   Hematological: Negative for adenopathy.   Psychiatric/Behavioral: The patient is not nervous/anxious.    All other systems reviewed and are negative.        Objective:      Physical Exam  Vitals signs reviewed.   Constitutional:       Appearance: She is well-developed.   HENT:      Mouth/Throat:      Pharynx: No oropharyngeal exudate.   Cardiovascular:      Rate and Rhythm: Normal rate.      Heart sounds: Normal heart sounds.   Pulmonary:      Effort: Pulmonary effort is normal.      Breath sounds: Normal breath sounds. No wheezing.   Abdominal:      General: Bowel sounds are normal.      Palpations: Abdomen is soft.      Tenderness: There is no abdominal tenderness.   Musculoskeletal:         General: Swelling present. No tenderness.   Lymphadenopathy:      Cervical: No cervical adenopathy.   Skin:     General: Skin is warm and dry.      Findings: No rash.   Neurological:      Mental Status: She is alert and oriented to person, place, and time.      Coordination: Coordination normal.   Psychiatric:         Thought Content: Thought content normal.         Judgment: Judgment normal.           LABS:  WBC   Date Value Ref Range Status   07/01/2020 5.20 3.90 - 12.70 K/uL Final     Hemoglobin   Date Value Ref Range Status   07/01/2020 8.9 (L) 12.0 - 16.0 g/dL Final     Hematocrit   Date Value Ref  Range Status   07/01/2020 30.5 (L) 37.0 - 48.5 % Final     Platelets   Date Value Ref Range Status   07/01/2020 266 150 - 350 K/uL Final     Gran # (ANC)   Date Value Ref Range Status   07/01/2020 1.3 (L) 1.8 - 7.7 K/uL Final     Comment:     The ANC is based on a white cell differential from an   automated cell counter. It has not been microscopically   reviewed for the presence of abnormal cells. Clinical   correlation is required.         Chemistry        Component Value Date/Time     07/01/2020 0801    K 4.3 07/01/2020 0801     07/01/2020 0801    CO2 27 07/01/2020 0801    BUN 16 07/01/2020 0801    CREATININE 1.4 07/01/2020 0801     (H) 07/01/2020 0801        Component Value Date/Time    CALCIUM 9.9 07/01/2020 0801    ALKPHOS 91 07/01/2020 0801    AST 75 (H) 07/01/2020 0801    ALT 82 (H) 07/01/2020 0801    BILITOT 0.4 07/01/2020 0801    ESTGFRAFRICA 46.5 (A) 07/01/2020 0801    EGFRNONAA 40.3 (A) 07/01/2020 0801          Assessment:       1. Malignant neoplasm of upper lobe of left lung    2. 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 Type 2 diabetes mellitus with diabetic polyneuropathy, with long-term current use of insulin    5. Anorexia    6. Edema, unspecified type        Plan:         1,2,3. She will restart Entrectinib but at a dose of 400 mg daily, weill reassess in 1 weeks  4. Stable on meds  5. Escribed periactin  6. Lasix 20 mg as needed    Above care plan was discussed with patient and accompanying daughter and all questions were addressed to their satisfaction

## 2020-07-02 DIAGNOSIS — B37.2 CANDIDAL INTERTRIGO: ICD-10-CM

## 2020-07-02 RX ORDER — NYSTATIN 100000 [USP'U]/G
POWDER TOPICAL 2 TIMES DAILY
Qty: 60 G | Refills: 0 | Status: ON HOLD | OUTPATIENT
Start: 2020-07-02 | End: 2021-05-26 | Stop reason: HOSPADM

## 2020-07-02 RX ORDER — NYSTATIN 100000 [USP'U]/G
POWDER TOPICAL 2 TIMES DAILY
Qty: 60 G | Refills: 0 | Status: CANCELLED | OUTPATIENT
Start: 2020-07-02

## 2020-07-06 ENCOUNTER — TELEPHONE (OUTPATIENT)
Dept: HEMATOLOGY/ONCOLOGY | Facility: CLINIC | Age: 65
End: 2020-07-06

## 2020-07-06 ENCOUNTER — TELEPHONE (OUTPATIENT)
Dept: DIABETES | Facility: CLINIC | Age: 65
End: 2020-07-06

## 2020-07-06 NOTE — TELEPHONE ENCOUNTER
"----- Message from Liane Quiros sent at 7/6/2020 10:40 AM CDT -----  Patient Assist    Name of caller: Alison   Provider name: Ye PAYTON MD   Contact Preference:  [phone]  Is this regarding current patient or new patient?: current   What is the nature of the call?    - pt called in hopes that the f/u appt could be on Wed along with   the lab appt. Please call and advise if able to make the change.     Additional Notes:   "Thank you for all that you do for our patients'"          "

## 2020-07-06 NOTE — TELEPHONE ENCOUNTER
Dr. Belcher, you are needing to see her, correct?  Can it be the beginning of next week?  ~Donna

## 2020-07-06 NOTE — TELEPHONE ENCOUNTER
----- Message from José Antonio Rowland sent at 7/6/2020 10:15 AM CDT -----  Patient called to speak w/ someone regarding rescheduling her appt that she was a no show for on 6/22 due to her being admitted to the hospital, requesting callback     Callback: [phone]

## 2020-07-07 ENCOUNTER — TELEPHONE (OUTPATIENT)
Dept: HEMATOLOGY/ONCOLOGY | Facility: CLINIC | Age: 65
End: 2020-07-07

## 2020-07-07 NOTE — TELEPHONE ENCOUNTER
"----- Message from Liane Quiros sent at 7/7/2020  1:34 PM CDT -----  Regarding: Appt time change requested  Patient Assist    Name of caller:   Provider name:  Ye PAYTON MD   Contact Preference:  [phone]  Is this regarding current patient or new patient?: current   What is the nature of the call?    - pt called and asked that ALL future appts be scheduled in   consideration for her limited mobility. It takes time for her to   get prepared in the morning. Please change time of 7/15 appts   and others to later, closer to 11 am per her request. Contact   her to confirm changes.     Additional Notes:   "Thank you for all that you do for our patients'"          "

## 2020-07-09 ENCOUNTER — TELEPHONE (OUTPATIENT)
Dept: INTERNAL MEDICINE | Facility: CLINIC | Age: 65
End: 2020-07-09

## 2020-07-09 NOTE — TELEPHONE ENCOUNTER
Pt called and requesting another appt. Missed a hospital follow up appt today with Dr Rodriguez. Virtual appt made with Missy Horner NP.    Celeste Alonso

## 2020-07-09 NOTE — TELEPHONE ENCOUNTER
----- Message from Alfredo Ortiz sent at 7/9/2020  2:02 PM CDT -----  Regarding: Patient  Contact: Patient [phone]  Patient would like to get medical advice.    Comments: Patient calling stating missed the appt for today, due to Rx that takes at night, makes over sleep, want to know if can be fitted in today any time prior to closing, call to inform. Hospital follow up appt.    Please call an advise  Thank you

## 2020-07-10 ENCOUNTER — NURSE TRIAGE (OUTPATIENT)
Dept: ADMINISTRATIVE | Facility: CLINIC | Age: 65
End: 2020-07-10

## 2020-07-10 ENCOUNTER — HOSPITAL ENCOUNTER (EMERGENCY)
Facility: HOSPITAL | Age: 65
Discharge: HOME | End: 2020-07-11
Attending: EMERGENCY MEDICINE
Payer: MEDICARE

## 2020-07-10 ENCOUNTER — TELEPHONE (OUTPATIENT)
Dept: HEMATOLOGY/ONCOLOGY | Facility: CLINIC | Age: 65
End: 2020-07-10

## 2020-07-10 DIAGNOSIS — C50.912 MALIGNANT NEOPLASM OF LEFT FEMALE BREAST, UNSPECIFIED ESTROGEN RECEPTOR STATUS, UNSPECIFIED SITE OF BREAST: ICD-10-CM

## 2020-07-10 DIAGNOSIS — R42 LIGHTHEADEDNESS: Primary | ICD-10-CM

## 2020-07-10 PROCEDURE — 99285 PR EMERGENCY DEPT VISIT,LEVEL V: ICD-10-PCS | Mod: ,,, | Performed by: EMERGENCY MEDICINE

## 2020-07-10 PROCEDURE — 36000 PLACE NEEDLE IN VEIN: CPT

## 2020-07-10 PROCEDURE — 83735 ASSAY OF MAGNESIUM: CPT

## 2020-07-10 PROCEDURE — 99285 EMERGENCY DEPT VISIT HI MDM: CPT | Mod: ,,, | Performed by: EMERGENCY MEDICINE

## 2020-07-10 PROCEDURE — 80053 COMPREHEN METABOLIC PANEL: CPT

## 2020-07-10 PROCEDURE — 99284 EMERGENCY DEPT VISIT MOD MDM: CPT | Mod: 25

## 2020-07-10 NOTE — TELEPHONE ENCOUNTER
----- Message from Liane Quiros sent at 7/10/2020  4:10 PM CDT -----  Pt called to advise that her blood pressure was dropping   and asked to be called.  [phone]

## 2020-07-10 NOTE — TELEPHONE ENCOUNTER
"Pt reports she has had some Low BP readings, 104/54 and 95/51, 98/53. Discharged from hospital on 6/29 for septic shock. She was told her diastolic # should not be below 60. Pt's HR is 85-89. Pt reports feeling lightheaded and sluggish/slower than normal. RN advised pt to go to the ER. She VU. She will call her daughter to bring her. RN advised pt to rest and drink some fluids while she waited for her daughter. Advised pt to call back with any concerns. She VU.    Reason for Disposition   Patient sounds very sick or weak to the triager    Additional Information   Negative: Started suddenly after an allergic medicine, an allergic food, or bee sting   Negative: Shock suspected (e.g., cold/pale/clammy skin, too weak to stand, low BP, rapid pulse)   Negative: Difficult to awaken or acting confused  (e.g., disoriented, slurred speech)   Negative: Fainted   Negative: Systolic BP < 90 and feeling dizzy, lightheaded, or weak   Negative: Chest pain   Negative: Bleeding (e.g., vomiting blood, rectal bleeding or tarry stools, severe vaginal bleeding)   Negative: Extra heart beats or heart is beating fast  (i.e., "palpitations")   Negative: Sounds like a life-threatening emergency to the triager   Negative: Fall in systolic BP > 20 mm Hg from normal and feeling dizzy, lightheaded, or weak    Protocols used: LOW BLOOD PRESSURE-A-OH      "

## 2020-07-10 NOTE — TELEPHONE ENCOUNTER
Spoke with patient.  She is calling to let dr chapin know she is proceeding to the ED, as per her PCP's team. She is experiencing hypotension.  She will be heading to main campus shortly. Her children will be bringing her.

## 2020-07-11 ENCOUNTER — TELEPHONE (OUTPATIENT)
Dept: SLEEP MEDICINE | Facility: OTHER | Age: 65
End: 2020-07-11

## 2020-07-11 VITALS
HEIGHT: 69 IN | BODY MASS INDEX: 31.4 KG/M2 | OXYGEN SATURATION: 100 % | RESPIRATION RATE: 19 BRPM | HEART RATE: 86 BPM | DIASTOLIC BLOOD PRESSURE: 83 MMHG | TEMPERATURE: 98 F | SYSTOLIC BLOOD PRESSURE: 156 MMHG | WEIGHT: 212 LBS

## 2020-07-11 LAB
ALBUMIN SERPL BCP-MCNC: 3.2 G/DL (ref 3.5–5.2)
ALP SERPL-CCNC: 77 U/L (ref 55–135)
ALT SERPL W/O P-5'-P-CCNC: 68 U/L (ref 10–44)
ANION GAP SERPL CALC-SCNC: 10 MMOL/L (ref 8–16)
ANISOCYTOSIS BLD QL SMEAR: SLIGHT
AST SERPL-CCNC: 149 U/L (ref 10–40)
BACTERIA #/AREA URNS AUTO: NORMAL /HPF
BASOPHILS # BLD AUTO: ABNORMAL K/UL (ref 0–0.2)
BASOPHILS NFR BLD: 0 % (ref 0–1.9)
BILIRUB SERPL-MCNC: 0.4 MG/DL (ref 0.1–1)
BILIRUB UR QL STRIP: NEGATIVE
BUN SERPL-MCNC: 13 MG/DL (ref 8–23)
BURR CELLS BLD QL SMEAR: ABNORMAL
CALCIUM SERPL-MCNC: 8.8 MG/DL (ref 8.7–10.5)
CHLORIDE SERPL-SCNC: 104 MMOL/L (ref 95–110)
CLARITY UR REFRACT.AUTO: CLEAR
CO2 SERPL-SCNC: 24 MMOL/L (ref 23–29)
COLOR UR AUTO: YELLOW
CREAT SERPL-MCNC: 1.4 MG/DL (ref 0.5–1.4)
DIFFERENTIAL METHOD: ABNORMAL
EOSINOPHIL # BLD AUTO: ABNORMAL K/UL (ref 0–0.5)
EOSINOPHIL NFR BLD: 3 % (ref 0–8)
ERYTHROCYTE [DISTWIDTH] IN BLOOD BY AUTOMATED COUNT: 16.8 % (ref 11.5–14.5)
EST. GFR  (AFRICAN AMERICAN): 46.5 ML/MIN/1.73 M^2
EST. GFR  (NON AFRICAN AMERICAN): 40.3 ML/MIN/1.73 M^2
GLUCOSE SERPL-MCNC: 234 MG/DL (ref 70–110)
GLUCOSE UR QL STRIP: ABNORMAL
HCT VFR BLD AUTO: 30.2 % (ref 37–48.5)
HGB BLD-MCNC: 9.3 G/DL (ref 12–16)
HGB UR QL STRIP: ABNORMAL
HYPOCHROMIA BLD QL SMEAR: ABNORMAL
IMM GRANULOCYTES # BLD AUTO: ABNORMAL K/UL (ref 0–0.04)
IMM GRANULOCYTES NFR BLD AUTO: ABNORMAL % (ref 0–0.5)
KETONES UR QL STRIP: NEGATIVE
LACTATE SERPL-SCNC: 1.7 MMOL/L (ref 0.5–2.2)
LEUKOCYTE ESTERASE UR QL STRIP: NEGATIVE
LYMPHOCYTES # BLD AUTO: ABNORMAL K/UL (ref 1–4.8)
LYMPHOCYTES NFR BLD: 51 % (ref 18–48)
MAGNESIUM SERPL-MCNC: 1.5 MG/DL (ref 1.6–2.6)
MCH RBC QN AUTO: 29.4 PG (ref 27–31)
MCHC RBC AUTO-ENTMCNC: 30.8 G/DL (ref 32–36)
MCV RBC AUTO: 96 FL (ref 82–98)
METAMYELOCYTES NFR BLD MANUAL: 2 %
MICROSCOPIC COMMENT: NORMAL
MONOCYTES # BLD AUTO: ABNORMAL K/UL (ref 0.3–1)
MONOCYTES NFR BLD: 10 % (ref 4–15)
NEUTROPHILS NFR BLD: 32 % (ref 38–73)
NEUTS BAND NFR BLD MANUAL: 2 %
NITRITE UR QL STRIP: NEGATIVE
NRBC BLD-RTO: 0 /100 WBC
OVALOCYTES BLD QL SMEAR: ABNORMAL
PH UR STRIP: 6 [PH] (ref 5–8)
PLATELET # BLD AUTO: 214 K/UL (ref 150–350)
PLATELET BLD QL SMEAR: ABNORMAL
PMV BLD AUTO: 12.4 FL (ref 9.2–12.9)
POIKILOCYTOSIS BLD QL SMEAR: SLIGHT
POLYCHROMASIA BLD QL SMEAR: ABNORMAL
POTASSIUM SERPL-SCNC: 5.2 MMOL/L (ref 3.5–5.1)
PROCALCITONIN SERPL IA-MCNC: 0.16 NG/ML
PROT SERPL-MCNC: 7.8 G/DL (ref 6–8.4)
PROT UR QL STRIP: NEGATIVE
RBC # BLD AUTO: 3.16 M/UL (ref 4–5.4)
RBC #/AREA URNS AUTO: 1 /HPF (ref 0–4)
SCHISTOCYTES BLD QL SMEAR: ABNORMAL
SCHISTOCYTES BLD QL SMEAR: PRESENT
SODIUM SERPL-SCNC: 138 MMOL/L (ref 136–145)
SP GR UR STRIP: 1.01 (ref 1–1.03)
SQUAMOUS #/AREA URNS AUTO: 1 /HPF
URN SPEC COLLECT METH UR: ABNORMAL
WBC # BLD AUTO: 3.28 K/UL (ref 3.9–12.7)
WBC #/AREA URNS AUTO: 2 /HPF (ref 0–5)
YEAST UR QL AUTO: NORMAL

## 2020-07-11 PROCEDURE — 87040 BLOOD CULTURE FOR BACTERIA: CPT

## 2020-07-11 PROCEDURE — 84145 PROCALCITONIN (PCT): CPT

## 2020-07-11 PROCEDURE — 25000003 PHARM REV CODE 250: Performed by: PHYSICIAN ASSISTANT

## 2020-07-11 PROCEDURE — 85007 BL SMEAR W/DIFF WBC COUNT: CPT

## 2020-07-11 PROCEDURE — 85027 COMPLETE CBC AUTOMATED: CPT

## 2020-07-11 PROCEDURE — 83605 ASSAY OF LACTIC ACID: CPT

## 2020-07-11 PROCEDURE — 81001 URINALYSIS AUTO W/SCOPE: CPT

## 2020-07-11 RX ADMIN — SODIUM CHLORIDE 1000 ML: 0.9 INJECTION, SOLUTION INTRAVENOUS at 01:07

## 2020-07-11 NOTE — ED NOTES
Patient got up to use the bed pan and accidentally yanked her IV out of her arm. Patient is okay and was not hurt.

## 2020-07-11 NOTE — ED PROVIDER NOTES
Source of History:  Patient    Chief complaint:  Hypotension (BP at home 101/52, hr was 81, weakness today, discharged, hx of lung ca, on oral chemo, denies fevers or chills at home)      HPI:  Alison Branch is a 64 y.o. female with history of L breast cancer with metastasis to bone and mediastinal lymph node, hypertension, diabetes, hyperlipidemia, previous pulmonary embolism on Eliquis, chronic respiratory failure on home 2L NC presenting to emergency department with complaint of hypotension. Patient reports generalized weakness today that worsens with standing up. She has associated lightheadedness. She noticed lower BP readings today 104/54 and 95/51, 98/53. She expresses concern for reoccurrence of infection as she was recently discharged on 6/29/2020 after admission for septic shock and CARLOS pneumonia. She states she feels similiarly now prior to the admission. She denies fever, chest pain, SOB, cough, abdominal pain, nausea, vomiting, urinary or bowel movement changes. She is not on BP medications.     ROS: As per HPI and below:  Review of Systems   Constitutional: Negative for chills and fever.   HENT: Negative for congestion and sore throat.    Eyes: Negative for pain.   Respiratory: Negative for cough and shortness of breath.    Cardiovascular: Negative for chest pain.   Gastrointestinal: Negative for abdominal pain, nausea and vomiting.   Genitourinary: Negative for dysuria.   Musculoskeletal: Negative for back pain.   Skin: Negative for rash.   Neurological: Positive for weakness. Negative for headaches.       Review of patient's allergies indicates:   Allergen Reactions    Piperacillin-tazobactam Rash     Tolerated cefepime 06/2020       No current facility-administered medications on file prior to encounter.      Current Outpatient Medications on File Prior to Encounter   Medication Sig Dispense Refill    apixaban (ELIQUIS) 5 mg Tab Take 1 tablet (5 mg total) by mouth 2 (two) times daily. 60 tablet 0     aspirin (ECOTRIN) 81 MG EC tablet Take 1 tablet (81 mg total) by mouth once daily.  0    benzonatate (TESSALON PERLES) 100 MG capsule Take 1 capsule (100 mg total) by mouth 2 (two) times daily. 60 capsule 1    blood sugar diagnostic Strp To check BG 4 times daily, to use with insurance preferred meter 200 each 11    carboxymethylcellulose (REFRESH PLUS) 0.5 % Dpet 1 drop 3 (three) times daily as needed.      cyproheptadine (PERIACTIN) 4 mg tablet Take 1 tablet (4 mg total) by mouth 4 (four) times daily. 120 tablet 5    entrectinib (ROZLYTREK) 200 mg oral tablet Take 3 capsules (600 mg total) by mouth once daily. 90 capsule 3    ergocalciferol (ERGOCALCIFEROL) 50,000 unit Cap TAKE 1 CAPSULE BY MOUTH EVERY 7 DAYS 12 capsule 3    fluticasone propionate (FLONASE) 50 mcg/actuation nasal spray USE TWO SPRAY(S) IN EACH NOSTRIL ONCE DAILY 16 g 3    furosemide (LASIX) 20 MG tablet Take 1 tablet (20 mg total) by mouth once daily. 30 tablet 11    gabapentin (NEURONTIN) 300 MG capsule Take 1 capsule (300 mg total) by mouth nightly as needed (Take as needed at bed time for neuropathy pain and sleep). 90 capsule 3    hydrocodone-homatropine 5-1.5 mg/5 ml (HYCODAN) 5-1.5 mg/5 mL Syrp Take 5 mLs by mouth every 4 (four) hours as needed. 120 mL 0    insulin detemir U-100 (LEVEMIR FLEXTOUCH) 100 unit/mL (3 mL) SubQ InPn pen Inject 4 Units into the skin once daily. 15 mL 11    insulin lispro (HUMALOG KWIKPEN INSULIN) 100 unit/mL pen Inject subcutaneously 20-12-12 units plus sliding scale.  24 units on steroid days Max  units.  DO NOT TAKE THIS UNTIL TOLD TO RESTART BY PHYSICIAN 45 mL 11    lancets Misc 1 Device by Misc.(Non-Drug; Combo Route) route once daily. Heladio Result.  250.02.  Check Blood Sugar Twice Daily. 200 each 3    lidocaine-prilocaine (EMLA) cream Apply to PORT one hour prior to chemo administration. 30 g 0    nystatin (MYCOSTATIN) powder Apply topically 2 (two) times daily. 60 g 0    ondansetron  "(ZOFRAN) 8 MG tablet Take 1 tablet (8 mg total) by mouth 4 (four) times daily as needed for Nausea. 60 tablet 2    oxyCODONE (ROXICODONE) 10 mg Tab immediate release tablet Take 0.5 tablets (5 mg total) by mouth every 6 (six) hours as needed for Pain. 20 tablet 0    pen needle, diabetic 33 gauge x 5/32" Ndle 1 each by Misc.(Non-Drug; Combo Route) route 4 (four) times daily with meals and nightly. 100 each 5    phenazopyridine (PYRIDIUM) 200 MG tablet Take 1 tablet (200 mg total) by mouth 3 (three) times daily as needed for Pain. (Patient not taking: Reported on 7/1/2020) 20 tablet 0    terconazole (TERAZOL 7) 0.4 % Crea INSERT 1 APPLICATORFUL VAGINALLY ONCE DAILY IN THE EVENING 45 g 0    walker Misc Please provide rollator walker for this debilitated cancer patient.  Thank you.  0    [DISCONTINUED] insulin aspart U-100 (NOVOLOG) 100 unit/mL (3 mL) InPn pen Inject 12-20 units into the skin 3 times daily with meals plus correction scale. Max Total daily dose of 90 units 3 Box 6    [DISCONTINUED] insulin regular 100 unit/mL Inj injection Inject 14-10-10units subcutaneously before meals. (Patient taking differently: 20-16-16 plus sliding scale) 30 mL 12       PMH:  As per HPI and below:  Past Medical History:   Diagnosis Date    Allergy     Brain aneurysm 2010    s/p coiling of one; another not coiled    Breast cyst     Depression 9/19/2019    Diabetes mellitus     Diabetes type 2, controlled     Fever blister     High cholesterol     History of Bell's palsy     HTN (hypertension) 5/20/2014    Recurrent upper respiratory infection (URI)      Past Surgical History:   Procedure Laterality Date    BREAST SURGERY      BRONCHOSCOPY N/A 5/28/2019    Procedure: Bronchoscopy;  [facility];  Location: [facility] OR [address] [place] [address];  Service: Anesthesiology;  Laterality: N/A;    CERVICAL FUSION      COLONOSCOPY N/A 3/9/2016    Procedure: COLONOSCOPY;  Surgeon: Elliott Zimmerman MD;  Location: [facility]" "ENDO (4TH FLR);  Service: Endoscopy;  Laterality: N/A;    head surgery      stent and "curling" for aneurysm    HYSTERECTOMY      TVH secondary to SUF    INSERTION OF TUNNELED CENTRAL VENOUS CATHETER (CVC) WITH SUBCUTANEOUS PORT Right 2019    Procedure: INSERTION, PORT-A-CATH;  Surgeon: Cali Damico MD;  Location: [facility] CATH LAB;  Service: Radiology;  Laterality: Right;    MENISCECTOMY Left        Social History     Socioeconomic History    Marital status: Single     Spouse name: Not on file    Number of children: Not on file    Years of education: Not on file    Highest education level: Not on file   Occupational History    Occupation: Student     Comment: Unemployed   Social Needs    Financial resource strain: Not on file    Food insecurity     Worry: Not on file     Inability: Not on file    Transportation needs     Medical: Not on file     Non-medical: Not on file   Tobacco Use    Smoking status: Former Smoker     Packs/day: 0.50     Years: 30.00     Pack years: 15.00     Quit date: 1999     Years since quittin.5    Smokeless tobacco: Never Used   Substance and Sexual Activity    Alcohol use: No     Alcohol/week: 0.0 standard drinks    Drug use: No    Sexual activity: Never     Partners: Female     Birth control/protection: Surgical   Lifestyle    Physical activity     Days per week: 3 days     Minutes per session: 20 min    Stress: Not on file   Relationships    Social connections     Talks on phone: More than three times a week     Gets together: More than three times a week     Attends Nondenominational service: Not on file     Active member of club or organization: No     Attends meetings of clubs or organizations: Never     Relationship status: Patient refused   Other Topics Concern    Are you pregnant or think you may be? No    Breast-feeding No   Social History Narrative    Not on file       Family History   Problem Relation Age of Onset    Rheum arthritis Father     " Diabetes Father     Heart failure Father     Migraines Father     Cataracts Father     Cancer Mother 63        pancreatic    Stomach cancer Mother     Breast cancer Maternal Aunt         50s    Ovarian cancer Cousin     Allergies Daughter     Diabetes Sister     Diabetes Brother     Cancer Maternal Aunt         Lung CA    Melanoma Neg Hx     Colon cancer Neg Hx     Amblyopia Neg Hx     Blindness Neg Hx     Glaucoma Neg Hx     Macular degeneration Neg Hx     Retinal detachment Neg Hx     Strabismus Neg Hx     Stroke Neg Hx     Thyroid disease Neg Hx     Allergic rhinitis Neg Hx     Angioedema Neg Hx     Asthma Neg Hx     Atopy Neg Hx     Eczema Neg Hx     Immunodeficiency Neg Hx     Rhinitis Neg Hx     Urticaria Neg Hx        Physical Exam:    Physical Exam  Constitutional:       General: She is not in acute distress.     Appearance: Normal appearance. She is not ill-appearing.   HENT:      Head: Normocephalic and atraumatic.      Right Ear: Ear canal normal.      Left Ear: Ear canal normal.      Nose: Nose normal.      Mouth/Throat:      Mouth: Mucous membranes are moist.   Eyes:      Extraocular Movements: Extraocular movements intact.      Pupils: Pupils are equal, round, and reactive to light.   Neck:      Musculoskeletal: Normal range of motion and neck supple. No neck rigidity.   Cardiovascular:      Rate and Rhythm: Normal rate and regular rhythm.      Comments: +1 trace pitting edema BLE  Pulmonary:      Effort: No respiratory distress.      Breath sounds: No wheezing.      Comments: 2L NC O2  Abdominal:      General: There is no distension.      Palpations: Abdomen is soft.      Tenderness: There is no abdominal tenderness.   Musculoskeletal: Normal range of motion.         General: No tenderness or deformity.   Lymphadenopathy:      Cervical: No cervical adenopathy.   Skin:     General: Skin is warm and dry.   Neurological:      General: No focal deficit present.      Mental  Status: She is alert and oriented to person, place, and time. Mental status is at baseline.       Vitals:    07/11/20 0327   BP: (!) 156/83   Pulse: 86   Resp: 19   Temp:          Laboratory Studies:  Labs Reviewed   CBC W/ AUTO DIFFERENTIAL - Abnormal; Notable for the following components:       Result Value    WBC 3.28 (*)     RBC 3.16 (*)     Hemoglobin 9.3 (*)     Hematocrit 30.2 (*)     Mean Corpuscular Hemoglobin Conc 30.8 (*)     RDW 16.8 (*)     Gran% 32.0 (*)     Lymph% 51.0 (*)     All other components within normal limits   COMPREHENSIVE METABOLIC PANEL - Abnormal; Notable for the following components:    Potassium 5.2 (*)     Glucose 234 (*)     Albumin 3.2 (*)      (*)     ALT 68 (*)     eGFR if  46.5 (*)     eGFR if non  40.3 (*)     All other components within normal limits   URINALYSIS, REFLEX TO URINE CULTURE - Abnormal; Notable for the following components:    Glucose, UA 3+ (*)     Occult Blood UA 1+ (*)     All other components within normal limits    Narrative:     Specimen Source->Urine   MAGNESIUM - Abnormal; Notable for the following components:    Magnesium 1.5 (*)     All other components within normal limits   CULTURE, BLOOD   CULTURE, BLOOD   PROCALCITONIN   LACTIC ACID, PLASMA   URINALYSIS MICROSCOPIC    Narrative:     Specimen Source->Urine       X-rays (independently interpreted by me): No significant changes from previous, stable CARLOS mass. Small pleural effusions    Chart reviewed.     Imaging Results          X-Ray Chest PA And Lateral (Final result)  Result time 07/11/20 00:54:16    Final result by Gregory Jin MD (07/11/20 00:54:16)                 Impression:      No detrimental change when compared with 06/21/2020.    Left upper lobe mass with numerous small bilateral pulmonary nodules consistent with known malignancy, better characterized on prior chest CT dated 05/20/2020.    Small pleural effusions.      Electronically signed  "by: Gregory Jin MD  Date:    07/11/2020  Time:    00:54             Narrative:    EXAMINATION:  XR CHEST PA AND LATERAL    CLINICAL HISTORY:  Provided history is "  Dizziness and giddiness".    TECHNIQUE:  Frontal and lateral views of the chest were performed.    COMPARISON:  Chest radiograph, 06/21/2020.  Chest CTA, 05/20/2020.    FINDINGS:  Cardiac silhouette is stable in size.  Atherosclerotic calcifications overlie the aortic arch.  Mild diffuse prominent interstitial lung markings with numerous small nodular opacities bilaterally.  Larger nodular opacity in the left upper lobe/suprahilar region again noted consistent with the patient's history of lung malignancy.  Small bilateral pleural effusions, left side greater than right.  Aeration overall appears similar to the prior study dated 06/21/2020.                                Medications Given:  Medications   sodium chloride 0.9% bolus 1,000 mL (0 mLs Intravenous Stopped 7/11/20 0210)       MDM:    64 y.o. female with L breast cancer with metastasis to bone and mediastinal lymph node, hypertension, diabetes, hyperlipidemia, previous pulmonary embolism on Eliquis, chronic respiratory failure on home 2L NC presenting to the ED with the chief complaint of generalized weakness for 1 day. Reports hypotension at home 104/54 and 95/51, 98/53. She was admitted to septic shock a few weeks ago and expresses concern that she has return of infection.    Differential diagnosis including but not limited to: Symptomatic hypotension, orthostasis, dehydration, malignancy, bacteremia, pneumonia, UTI, medication side effect.     No emergent findings on ED work-up. No episodes of hypotension during ED stay. Orthostatics negative. Labs show mild hyperkalemia, but suspect this to be 2/2 visible hemolysis. Pancytopenia at baseline. Do not suspect emergent processes at this time. Advised patient to follow-up with her PCP and Oncologist for further management. Patient expresses " understanding and agreeable to the plan. Return to ED precautions given for new, worsening, or concerning symptoms. I have discussed the care of this patient with my supervising physician.     Diagnostic Impression:    1. Lightheadedness    2. Malignant neoplasm of left female breast, unspecified estrogen receptor status, unspecified site of breast         ED Disposition Condition    Discharge Stable        ED Prescriptions     None        Follow-up Information     Follow up With Specialties Details Why Contact Info    iMke Rodriguez II, MD Internal Medicine Schedule an appointment as soon as possible for a visit in 1 week  [address]  [place] [address]  [phone]                          Patient and/or family understands the plan and is in agreement, verbalized understanding, questions answered        Lex Culver PA-C  07/11/20 0932    Attending Note:    Physician Attestation Statement: I have personally seen and examined this patient. As the supervising MD I agree with the above history. As the supervising MD I agree with the above PE. As the supervising MD I agree with the above treatment, course, plan, and disposition.     MD Steffanie Barlow MD  07/11/20 0593

## 2020-07-11 NOTE — DISCHARGE INSTRUCTIONS
Tests today showed:   Labs Reviewed   CBC W/ AUTO DIFFERENTIAL - Abnormal; Notable for the following components:       Result Value    WBC 3.28 (*)     RBC 3.16 (*)     Hemoglobin 9.3 (*)     Hematocrit 30.2 (*)     Mean Corpuscular Hemoglobin Conc 30.8 (*)     RDW 16.8 (*)     Gran% 32.0 (*)     Lymph% 51.0 (*)     All other components within normal limits   COMPREHENSIVE METABOLIC PANEL - Abnormal; Notable for the following components:    Potassium 5.2 (*)     Glucose 234 (*)     Albumin 3.2 (*)      (*)     ALT 68 (*)     eGFR if  46.5 (*)     eGFR if non  40.3 (*)     All other components within normal limits   MAGNESIUM - Abnormal; Notable for the following components:    Magnesium 1.5 (*)     All other components within normal limits   CULTURE, BLOOD   CULTURE, BLOOD   PROCALCITONIN   LACTIC ACID, PLASMA   URINALYSIS, REFLEX TO URINE CULTURE     X-Ray Chest PA And Lateral   Final Result      No detrimental change when compared with 06/21/2020.      Left upper lobe mass with numerous small bilateral pulmonary nodules consistent with known malignancy, better characterized on prior chest CT dated 05/20/2020.      Small pleural effusions.         Electronically signed by: Gregory Jin MD   Date:    07/11/2020   Time:    00:54          Treatments you had today:   Medications   sodium chloride 0.9% bolus 1,000 mL (1,000 mLs Intravenous New Bag 7/11/20 0140)       Follow-Up Plan:  - Follow-up with primary care doctor and oncologist within 3 - 5 days  - Additional testing and/or evaluation as directed by your primary doctor    Return to the Emergency Department for symptoms including but not limited to: worsening symptoms, shortness of breath or chest pain, vomiting with inability to hold down fluids, fevers greater than 100.4°F, passing out/fainting/unconsciousness, or other concerning symptoms.

## 2020-07-15 ENCOUNTER — OFFICE VISIT (OUTPATIENT)
Dept: HEMATOLOGY/ONCOLOGY | Facility: CLINIC | Age: 65
End: 2020-07-15
Payer: MEDICARE

## 2020-07-15 ENCOUNTER — TELEPHONE (OUTPATIENT)
Dept: SLEEP MEDICINE | Facility: OTHER | Age: 65
End: 2020-07-15

## 2020-07-15 ENCOUNTER — INFUSION (OUTPATIENT)
Dept: INFUSION THERAPY | Facility: HOSPITAL | Age: 65
End: 2020-07-15
Attending: INTERNAL MEDICINE
Payer: MEDICARE

## 2020-07-15 VITALS
HEIGHT: 69 IN | HEART RATE: 86 BPM | BODY MASS INDEX: 32.95 KG/M2 | SYSTOLIC BLOOD PRESSURE: 113 MMHG | DIASTOLIC BLOOD PRESSURE: 64 MMHG | OXYGEN SATURATION: 98 % | WEIGHT: 222.44 LBS | RESPIRATION RATE: 24 BRPM

## 2020-07-15 DIAGNOSIS — C77.1 SECONDARY MALIGNANT NEOPLASM OF MEDIASTINAL LYMPH NODE: ICD-10-CM

## 2020-07-15 DIAGNOSIS — Z79.4 TYPE 2 DIABETES MELLITUS WITH HYPOGLYCEMIA WITHOUT COMA, WITH LONG-TERM CURRENT USE OF INSULIN: ICD-10-CM

## 2020-07-15 DIAGNOSIS — C34.12 MALIGNANT NEOPLASM OF UPPER LOBE OF LEFT LUNG: ICD-10-CM

## 2020-07-15 DIAGNOSIS — C77.1 SECONDARY MALIGNANT NEOPLASM OF MEDIASTINAL LYMPH NODE: Primary | ICD-10-CM

## 2020-07-15 DIAGNOSIS — C34.12 MALIGNANT NEOPLASM OF UPPER LOBE OF LEFT LUNG: Primary | ICD-10-CM

## 2020-07-15 DIAGNOSIS — C79.51 SECONDARY MALIGNANT NEOPLASM OF BONE: ICD-10-CM

## 2020-07-15 DIAGNOSIS — E11.649 TYPE 2 DIABETES MELLITUS WITH HYPOGLYCEMIA WITHOUT COMA, WITH LONG-TERM CURRENT USE OF INSULIN: ICD-10-CM

## 2020-07-15 PROCEDURE — 3074F PR MOST RECENT SYSTOLIC BLOOD PRESSURE < 130 MM HG: ICD-10-PCS | Mod: CPTII,S$GLB,, | Performed by: INTERNAL MEDICINE

## 2020-07-15 PROCEDURE — 3078F DIAST BP <80 MM HG: CPT | Mod: CPTII,S$GLB,, | Performed by: INTERNAL MEDICINE

## 2020-07-15 PROCEDURE — 99499 RISK ADDL DX/OHS AUDIT: ICD-10-PCS | Mod: S$GLB,,, | Performed by: INTERNAL MEDICINE

## 2020-07-15 PROCEDURE — 3074F SYST BP LT 130 MM HG: CPT | Mod: CPTII,S$GLB,, | Performed by: INTERNAL MEDICINE

## 2020-07-15 PROCEDURE — 99214 OFFICE O/P EST MOD 30 MIN: CPT | Mod: S$GLB,,, | Performed by: INTERNAL MEDICINE

## 2020-07-15 PROCEDURE — 99214 PR OFFICE/OUTPT VISIT, EST, LEVL IV, 30-39 MIN: ICD-10-PCS | Mod: S$GLB,,, | Performed by: INTERNAL MEDICINE

## 2020-07-15 PROCEDURE — 63600175 PHARM REV CODE 636 W HCPCS: Mod: JG | Performed by: INTERNAL MEDICINE

## 2020-07-15 PROCEDURE — 3008F BODY MASS INDEX DOCD: CPT | Mod: CPTII,S$GLB,, | Performed by: INTERNAL MEDICINE

## 2020-07-15 PROCEDURE — 3046F PR MOST RECENT HEMOGLOBIN A1C LEVEL > 9.0%: ICD-10-PCS | Mod: CPTII,S$GLB,, | Performed by: INTERNAL MEDICINE

## 2020-07-15 PROCEDURE — 3008F PR BODY MASS INDEX (BMI) DOCUMENTED: ICD-10-PCS | Mod: CPTII,S$GLB,, | Performed by: INTERNAL MEDICINE

## 2020-07-15 PROCEDURE — 3078F PR MOST RECENT DIASTOLIC BLOOD PRESSURE < 80 MM HG: ICD-10-PCS | Mod: CPTII,S$GLB,, | Performed by: INTERNAL MEDICINE

## 2020-07-15 PROCEDURE — 99999 PR PBB SHADOW E&M-EST. PATIENT-LVL V: CPT | Mod: PBBFAC,,, | Performed by: INTERNAL MEDICINE

## 2020-07-15 PROCEDURE — 96372 THER/PROPH/DIAG INJ SC/IM: CPT

## 2020-07-15 PROCEDURE — 99499 UNLISTED E&M SERVICE: CPT | Mod: S$GLB,,, | Performed by: INTERNAL MEDICINE

## 2020-07-15 PROCEDURE — 3046F HEMOGLOBIN A1C LEVEL >9.0%: CPT | Mod: CPTII,S$GLB,, | Performed by: INTERNAL MEDICINE

## 2020-07-15 PROCEDURE — 99999 PR PBB SHADOW E&M-EST. PATIENT-LVL V: ICD-10-PCS | Mod: PBBFAC,,, | Performed by: INTERNAL MEDICINE

## 2020-07-15 RX ADMIN — DENOSUMAB 120 MG: 120 INJECTION SUBCUTANEOUS at 01:07

## 2020-07-15 NOTE — PROGRESS NOTES
Subjective:       Patient ID: Alison Branch is a 64 y.o. female.    Chief Complaint: Malignant neoplasm of upper lobe of left lung  Ms. Branch is a 63-year-old female who smoked for about 30 years and quit 20 years ago, presented with cough end of last year, but worsened into January.  Since the cough persisted, she saw her PCP, underwent a chest x-ray on 05/10/2019, that revealed left upper lobe pneumonia and a repeat CT was done in the week after that on 05/17/2019 that showed left upper lobe   mass arising from the lateral pleural surface in the left upper lobe posterior subsegment measuring 2.6 x 3 cm.  There are satellite mass more medially near the aortic arch that is 2 cm, also spiculated and irregular as well as thickened interlobar septa in the left lung apex and anterior segment, prevascular lymph node lateral to the aortic arch, short axis measuring 9 mm.       She then underwent a bronchoscopy on 05/28/2019, and that revealed there was an infiltration obtained in the left apical posterior segment of the left upper lobe cytology brush was done.  Transbronchial biopsies of an area of infiltration were also performed in the apicoposterior segment of the left upper lobe.    Pathology revealed adenocarcinoma; however, the specimen was not adequate enough to send for molecular testing.       She underwent a PET scan on 06/06/2019.  that reveals significant hypermetabolic activity in the large irregular spiculated pulmonary mass in the lateral aspect of the left upper lobe consistent with the patient's known pulmonary adenocarcinoma.  There is also tracer avidity in the medial left upper lobe satellite lesion and diffusely throughout much of the anterior left upper lobe where there is prominent nodular paraseptal thickening.  There are some numerous scattered subcentimeter pulmonary nodules throughout the right lung, all of which are too small for detection by PET.  For example, there is a 0.4 cm nodule in the  "superior right lower lobe and a micro nodule in the posterior segment of the right upper lobe.  There is a 0.5 cm, normal size right   paratracheal lymph node with increased radiotracer uptake as well as hypermetabolic aortic pulmonary lymph node and a left hilar lymph node.  There is a focal hypermetabolic lesion in the left anterior superior iliac spine associated with well defined lytic lesion.  There is a hypermetabolic focus in the anterior right fifth rib with a possible associated lytic lesion.  SUVs as   below lateral:  Left upper lobe  SUV max 17.9, anterior left upper lobe satellite mass and septal thickening SUV max of 10.1, right paratracheal lymph node SUV max of 4.8, left AP window lymph node SUV max of 17.9, left anterior superior iliac spine SUV max of 3.9"     MRI pelvis from 6/10/19  reveals "Region of osseous is irregularity at the left anterior iliac spine likely related to bone graft harvest procedure.  See  discussion above.  No suspicious signal or enhancement to suggest active/malignant process"   MRI brain from 6/10//19 reveal No intracranial abnormality.     We discussed her case at [facility], and plan was to wait for GAURDANT and proceed with treatment accordingly  Her GAURDANT is negative for molecular markers.     She has completed 4 cycles of Carboplatin, Alimta and Keytruda as of 9/5/19. She is now on  ALimta and Keytruda maintenance.            She has been on maintenance Alimta and Keytruda     Her CT scans from 4/23/2020 revealed "In this patient with a known history of lung cancer, there has been marked interval detrimental change when compared to CT dated 12/20/2019 as follows. Persistent left upper lobe volume loss with worsening masslike consolidation and enlarging index and non index lesions. Increased number of innumerable bilateral metastatic solid pulmonary nodules. Interval increased size and number of multiple osteoblastic metastatic lesions throughout the visualized " axial skeleton. Stable mediastinal lymph nodes, several of which were noted to be hypermetabolic on previous PET-CT.  No new lymphadenopathy. Stable subcentimeter hepatic and splenic hypodensities, too small to accurately characterize.  Path addendum from 5/2019 reveals LESTER Hunt is now on Entrectinib at 400 mg dose on 7/1/2020. She noted dizziness and went to the ED on 7/11/2020, BP was normal there and she was discharged.      Review of Systems   Constitutional: Negative for appetite change, fatigue and unexpected weight change.   HENT: Negative for mouth sores.    Eyes: Negative for visual disturbance.   Respiratory: Positive for shortness of breath. Negative for cough.    Cardiovascular: Negative for chest pain.   Gastrointestinal: Negative for abdominal pain and diarrhea.   Genitourinary: Negative for frequency.   Musculoskeletal: Negative for back pain.   Integumentary:  Negative for rash.   Neurological: Positive for dizziness and weakness. Negative for headaches.   Hematological: Negative for adenopathy.   Psychiatric/Behavioral: The patient is not nervous/anxious.    All other systems reviewed and are negative.        Objective:      Physical Exam  Vitals signs reviewed.   Constitutional:       Appearance: She is well-developed.   HENT:      Mouth/Throat:      Pharynx: No oropharyngeal exudate.   Cardiovascular:      Rate and Rhythm: Normal rate.      Heart sounds: Normal heart sounds.   Pulmonary:      Effort: Pulmonary effort is normal.      Breath sounds: Normal breath sounds. No wheezing.   Abdominal:      General: Bowel sounds are normal.      Palpations: Abdomen is soft.      Tenderness: There is no abdominal tenderness.   Musculoskeletal:         General: No tenderness.   Lymphadenopathy:      Cervical: No cervical adenopathy.   Skin:     General: Skin is warm and dry.      Findings: No rash.   Neurological:      Mental Status: She is alert and oriented to person, place, and time.       Coordination: Coordination normal.   Psychiatric:         Thought Content: Thought content normal.         Judgment: Judgment normal.           LABs:  WBC   Date Value Ref Range Status   07/15/2020 4.76 3.90 - 12.70 K/uL Final     Hemoglobin   Date Value Ref Range Status   07/15/2020 10.0 (L) 12.0 - 16.0 g/dL Final     Hematocrit   Date Value Ref Range Status   07/15/2020 33.7 (L) 37.0 - 48.5 % Final     Platelets   Date Value Ref Range Status   07/15/2020 296 150 - 350 K/uL Final     Gran # (ANC)   Date Value Ref Range Status   07/15/2020 1.3 (L) 1.8 - 7.7 K/uL Final     Comment:     The ANC is based on a white cell differential from an   automated cell counter. It has not been microscopically   reviewed for the presence of abnormal cells. Clinical   correlation is required.         Chemistry        Component Value Date/Time     07/15/2020 1133    K 4.8 07/15/2020 1133     07/15/2020 1133    CO2 32 (H) 07/15/2020 1133    BUN 15 07/15/2020 1133    CREATININE 1.5 (H) 07/15/2020 1133     (H) 07/15/2020 1133        Component Value Date/Time    CALCIUM 9.5 07/15/2020 1133    ALKPHOS 79 07/15/2020 1133     (H) 07/15/2020 1133    ALT 84 (H) 07/15/2020 1133    BILITOT 0.4 07/15/2020 1133    ESTGFRAFRICA 42.7 (A) 07/15/2020 1133    EGFRNONAA 37.1 (A) 07/15/2020 1133          Assessment:       1. Malignant neoplasm of upper lobe of left lung    2. 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 Type 2 diabetes mellitus with hypoglycemia without coma, with long-term current use of insulin        Plan:        1,2,3. She will continue with Entrectinib and will return in 3 weeks with restaging CT scans  4. Stable on meds.    Above care plan was discussed with patient  and all questions were addressed to her satisfaction

## 2020-07-16 ENCOUNTER — TELEPHONE (OUTPATIENT)
Dept: INFUSION THERAPY | Facility: HOSPITAL | Age: 65
End: 2020-07-16

## 2020-07-16 ENCOUNTER — TELEPHONE (OUTPATIENT)
Dept: DIABETES | Facility: CLINIC | Age: 65
End: 2020-07-16

## 2020-07-16 DIAGNOSIS — E11.65 TYPE 2 DIABETES MELLITUS WITH HYPERGLYCEMIA, WITH LONG-TERM CURRENT USE OF INSULIN: ICD-10-CM

## 2020-07-16 DIAGNOSIS — Z79.4 TYPE 2 DIABETES MELLITUS WITH HYPERGLYCEMIA, WITH LONG-TERM CURRENT USE OF INSULIN: ICD-10-CM

## 2020-07-16 LAB — BACTERIA BLD CULT: NORMAL

## 2020-07-16 RX ORDER — INSULIN LISPRO 100 [IU]/ML
INJECTION, SOLUTION INTRAVENOUS; SUBCUTANEOUS
Qty: 4 BOX | Refills: 2
Start: 2020-07-16 | End: 2020-09-03

## 2020-07-16 NOTE — TELEPHONE ENCOUNTER
----- Message from Zoe Wu MA sent at 7/16/2020  2:59 PM CDT -----  Contact: Tawnya with [facility] phone [phone]    ----- Message -----  From: Daysi Haile  Sent: 7/16/2020   2:47 PM CDT  To: Analilia Cloud Staff    Tawnya with [facility] phone [phone], Calling to inform you her glucose was 397 and 392 non fasting. Please advise. Thanks.

## 2020-07-16 NOTE — TELEPHONE ENCOUNTER
Spoke with home health nurse regarding patient's blood sugar readings.  She was supposed to have a virtual visit with me this afternoon but never logged in.  Home health nurse reports that she had issues with her password and is now able to log in.  Blood sugar readings are in the high 300s--home health nurse reports that patient is not taking any insulin at all..  Will call patient to clarify what is going on and reschedule a follow-up visit.

## 2020-07-16 NOTE — TELEPHONE ENCOUNTER
Calling patient to clarify   BG readings in the high 300's   We were suppose to have a virtual visit today--and she had technology issues.  Admitted to the hospital with septic shock (6/21/2020) and she reports that they stopped all her medications including insulin.  She reports that her chemo was changed to Rozlytrek  200 mg and no longer on Steroids-- dexamethasone.   Off insulin since she was out of the hospital.       Restart insulin:  Will weight base insulin at 0.5 units/kg per day total daily dose.  Insulin needs may be decreased now that patient is no longer on steroids.  Start Levemir 26 units nightly   Start Humalog 9 units before each meal + correction scale goal 150, +1     She will Send me logs in 1 week.   Vital visit in 2 weeks.

## 2020-07-17 ENCOUNTER — TELEPHONE (OUTPATIENT)
Dept: DIABETES | Facility: CLINIC | Age: 65
End: 2020-07-17

## 2020-07-17 NOTE — TELEPHONE ENCOUNTER
----- Message from Emilia Epps sent at 7/17/2020  4:39 PM CDT -----  Contact: [facility] [phone] Sima Gao called to follow up on yesterday visit and states the glucose sent is $350 and would like to find out what doses the patient is on her insulin . Please call and advise

## 2020-07-17 NOTE — TELEPHONE ENCOUNTER
Spoke with Sima from Home Health.   Clarified insulin doses with home health nurse.   Patient has not taken insulin today and not eaten.   Patient got upset with her home health nurse and has discharged herself from home health.

## 2020-07-21 ENCOUNTER — TELEPHONE (OUTPATIENT)
Dept: SLEEP MEDICINE | Facility: OTHER | Age: 65
End: 2020-07-21

## 2020-07-23 ENCOUNTER — DOCUMENT SCAN (OUTPATIENT)
Dept: HOME HEALTH SERVICES | Facility: HOSPITAL | Age: 65
End: 2020-07-23
Payer: MEDICARE

## 2020-07-27 ENCOUNTER — TELEPHONE (OUTPATIENT)
Dept: DIABETES | Facility: CLINIC | Age: 65
End: 2020-07-27

## 2020-07-27 ENCOUNTER — DOCUMENT SCAN (OUTPATIENT)
Dept: HOME HEALTH SERVICES | Facility: HOSPITAL | Age: 65
End: 2020-07-27
Payer: MEDICARE

## 2020-07-29 ENCOUNTER — DOCUMENT SCAN (OUTPATIENT)
Dept: HOME HEALTH SERVICES | Facility: HOSPITAL | Age: 65
End: 2020-07-29
Payer: MEDICARE

## 2020-08-03 ENCOUNTER — DOCUMENT SCAN (OUTPATIENT)
Dept: HOME HEALTH SERVICES | Facility: HOSPITAL | Age: 65
End: 2020-08-03
Payer: MEDICARE

## 2020-08-04 ENCOUNTER — HOSPITAL ENCOUNTER (OUTPATIENT)
Dept: RADIOLOGY | Facility: HOSPITAL | Age: 65
Discharge: HOME | End: 2020-08-04
Attending: INTERNAL MEDICINE
Payer: MEDICARE

## 2020-08-04 DIAGNOSIS — C34.12 MALIGNANT NEOPLASM OF UPPER LOBE OF LEFT LUNG: ICD-10-CM

## 2020-08-04 PROCEDURE — 74177 CT CHEST ABDOMEN PELVIS WITH CONTRAST (XPD): ICD-10-PCS | Mod: 26,,, | Performed by: RADIOLOGY

## 2020-08-04 PROCEDURE — 25500020 PHARM REV CODE 255: Performed by: INTERNAL MEDICINE

## 2020-08-04 PROCEDURE — 71260 CT CHEST ABDOMEN PELVIS WITH CONTRAST (XPD): ICD-10-PCS | Mod: 26,,, | Performed by: RADIOLOGY

## 2020-08-04 PROCEDURE — 74177 CT ABD & PELVIS W/CONTRAST: CPT | Mod: TC

## 2020-08-04 PROCEDURE — 71260 CT THORAX DX C+: CPT | Mod: 26,,, | Performed by: RADIOLOGY

## 2020-08-04 PROCEDURE — 74177 CT ABD & PELVIS W/CONTRAST: CPT | Mod: 26,,, | Performed by: RADIOLOGY

## 2020-08-04 RX ADMIN — IOHEXOL 15 ML: 350 INJECTION, SOLUTION INTRAVENOUS at 02:08

## 2020-08-04 RX ADMIN — IOHEXOL 100 ML: 350 INJECTION, SOLUTION INTRAVENOUS at 03:08

## 2020-08-04 RX ADMIN — IOHEXOL 15 ML: 350 INJECTION, SOLUTION INTRAVENOUS at 01:08

## 2020-08-06 ENCOUNTER — TELEPHONE (OUTPATIENT)
Dept: HEMATOLOGY/ONCOLOGY | Facility: CLINIC | Age: 65
End: 2020-08-06

## 2020-08-06 NOTE — TELEPHONE ENCOUNTER
Spoke with patient.  Switched her to a virtual visit today, as her car is broken and has no one to bring her in today.

## 2020-08-06 NOTE — TELEPHONE ENCOUNTER
"----- Message from Liane Quiros sent at 8/6/2020 12:08 PM CDT -----  Patient Assist    Name of caller:  Alison   Provider name: Ye PAYTON MD  Contact Preference:   [phone]  Current patient or new patient?: current   Does Patient feel the need to see the MD today? No   What is the nature of the call?    -  pt would like a virtual visit, she is experiencing car issues,   unable to come in. Please call her.   Also she asked about the oxygen tanks. Its really hard for her to do   anything with it, because of its size/weight and would like to get the   mobile one that she can carry on her shoulder.     Additional Notes:   "Thank you for all that you do for our patients'"          "

## 2020-08-07 ENCOUNTER — OFFICE VISIT (OUTPATIENT)
Dept: HEMATOLOGY/ONCOLOGY | Facility: CLINIC | Age: 65
End: 2020-08-07
Payer: MEDICARE

## 2020-08-07 DIAGNOSIS — T45.1X5A CHEMOTHERAPY INDUCED NEUTROPENIA: ICD-10-CM

## 2020-08-07 DIAGNOSIS — C34.12 MALIGNANT NEOPLASM OF UPPER LOBE OF LEFT LUNG: Primary | ICD-10-CM

## 2020-08-07 DIAGNOSIS — C79.51 SECONDARY MALIGNANT NEOPLASM OF BONE: ICD-10-CM

## 2020-08-07 DIAGNOSIS — E11.42 TYPE 2 DIABETES MELLITUS WITH DIABETIC POLYNEUROPATHY, WITH LONG-TERM CURRENT USE OF INSULIN: ICD-10-CM

## 2020-08-07 DIAGNOSIS — Z79.4 TYPE 2 DIABETES MELLITUS WITH DIABETIC POLYNEUROPATHY, WITH LONG-TERM CURRENT USE OF INSULIN: ICD-10-CM

## 2020-08-07 DIAGNOSIS — D70.1 CHEMOTHERAPY INDUCED NEUTROPENIA: ICD-10-CM

## 2020-08-07 DIAGNOSIS — R30.0 DYSURIA: ICD-10-CM

## 2020-08-07 DIAGNOSIS — R60.9 EDEMA, UNSPECIFIED TYPE: ICD-10-CM

## 2020-08-07 DIAGNOSIS — C77.1 SECONDARY MALIGNANT NEOPLASM OF MEDIASTINAL LYMPH NODE: ICD-10-CM

## 2020-08-07 PROCEDURE — 3046F HEMOGLOBIN A1C LEVEL >9.0%: CPT | Mod: CPTII,95,, | Performed by: INTERNAL MEDICINE

## 2020-08-07 PROCEDURE — 99499 RISK ADDL DX/OHS AUDIT: ICD-10-PCS | Mod: 95,,, | Performed by: INTERNAL MEDICINE

## 2020-08-07 PROCEDURE — 99499 UNLISTED E&M SERVICE: CPT | Mod: 95,,, | Performed by: INTERNAL MEDICINE

## 2020-08-07 PROCEDURE — 99215 OFFICE O/P EST HI 40 MIN: CPT | Mod: 95,,, | Performed by: INTERNAL MEDICINE

## 2020-08-07 PROCEDURE — 3046F PR MOST RECENT HEMOGLOBIN A1C LEVEL > 9.0%: ICD-10-PCS | Mod: CPTII,95,, | Performed by: INTERNAL MEDICINE

## 2020-08-07 PROCEDURE — 99215 PR OFFICE/OUTPT VISIT, EST, LEVL V, 40-54 MIN: ICD-10-PCS | Mod: 95,,, | Performed by: INTERNAL MEDICINE

## 2020-08-07 RX ORDER — FUROSEMIDE 20 MG/1
40 TABLET ORAL DAILY
Qty: 60 TABLET | Refills: 11 | Status: ON HOLD | OUTPATIENT
Start: 2020-08-07 | End: 2021-05-26 | Stop reason: HOSPADM

## 2020-08-07 NOTE — PROGRESS NOTES
Subjective:    The patient location is: home  The chief complaint leading to consultation is: lung cancer  Visit type: audiovisual    Face to Face time with patient: 40 minutes of total time spent on the encounter, which includes face to face time and non-face to face time preparing to see the patient (eg, review of tests), Obtaining and/or reviewing separately obtained history, Documenting clinical information in the electronic or other health record, Independently interpreting results (not separately reported) and communicating results to the patient/family/caregiver, or Care coordination (not separately reported).         Each patient to whom he or she provides medical services by telemedicine is:  (1) informed of the relationship between the physician and patient and the respective role of any other health care provider with respect to management of the patient; and (2) notified that he or she may decline to receive medical services by telemedicine and may withdraw from such care at any time.    Notes:    Patient ID: Alison Branch is a 64 y.o. female.    Chief Complaint: Lung Cancer  Ms. Branch is a 63-year-old female who smoked for about 30 years and quit 20 years ago, presented with cough end of last year, but worsened into January.  Since the cough persisted, she saw her PCP, underwent a chest x-ray on 05/10/2019, that revealed left upper lobe pneumonia and a repeat CT was done in the week after that on 05/17/2019 that showed left upper lobe   mass arising from the lateral pleural surface in the left upper lobe posterior subsegment measuring 2.6 x 3 cm.  There are satellite mass more medially near the aortic arch that is 2 cm, also spiculated and irregular as well as thickened interlobar septa in the left lung apex and anterior segment, prevascular lymph node lateral to the aortic arch, short axis measuring 9 mm.       She then underwent a bronchoscopy on 05/28/2019, and that revealed there was an  "infiltration obtained in the left apical posterior segment of the left upper lobe cytology brush was done.  Transbronchial biopsies of an area of infiltration were also performed in the apicoposterior segment of the left upper lobe.    Pathology revealed adenocarcinoma; however, the specimen was not adequate enough to send for molecular testing.       She underwent a PET scan on 06/06/2019.  that reveals significant hypermetabolic activity in the large irregular spiculated pulmonary mass in the lateral aspect of the left upper lobe consistent with the patient's known pulmonary adenocarcinoma.  There is also tracer avidity in the medial left upper lobe satellite lesion and diffusely throughout much of the anterior left upper lobe where there is prominent nodular paraseptal thickening.  There are some numerous scattered subcentimeter pulmonary nodules throughout the right lung, all of which are too small for detection by PET.  For example, there is a 0.4 cm nodule in the superior right lower lobe and a micro nodule in the posterior segment of the right upper lobe.  There is a 0.5 cm, normal size right   paratracheal lymph node with increased radiotracer uptake as well as hypermetabolic aortic pulmonary lymph node and a left hilar lymph node.  There is a focal hypermetabolic lesion in the left anterior superior iliac spine associated with well defined lytic lesion.  There is a hypermetabolic focus in the anterior right fifth rib with a possible associated lytic lesion.  SUVs as   below lateral:  Left upper lobe  SUV max 17.9, anterior left upper lobe satellite mass and septal thickening SUV max of 10.1, right paratracheal lymph node SUV max of 4.8, left AP window lymph node SUV max of 17.9, left anterior superior iliac spine SUV max of 3.9"     MRI pelvis from 6/10/19  reveals "Region of osseous is irregularity at the left anterior iliac spine likely related to bone graft harvest procedure.  See  discussion above.  No " "suspicious signal or enhancement to suggest active/malignant process"   MRI brain from 6/10//19 reveal No intracranial abnormality.     We discussed her case at [facility], and plan was to wait for GAURDANT and proceed with treatment accordingly  Her GAURDANT is negative for molecular markers.     She has completed 4 cycles of Carboplatin, Alimta and Keytruda as of 9/5/19. She is now on  ALimta and Keytruda maintenance.            She has been on maintenance Alimta and Keytruda     Her CT scans from 4/23/2020 revealed "In this patient with a known history of lung cancer, there has been marked interval detrimental change when compared to CT dated 12/20/2019 as follows. Persistent left upper lobe volume loss with worsening masslike consolidation and enlarging index and non index lesions. Increased number of innumerable bilateral metastatic solid pulmonary nodules. Interval increased size and number of multiple osteoblastic metastatic lesions throughout the visualized axial skeleton. Stable mediastinal lymph nodes, several of which were noted to be hypermetabolic on previous PET-CT.  No new lymphadenopathy. Stable subcentimeter hepatic and splenic hypodensities, too small to accurately characterize.  Path addendum from 5/2019 reveals ROS1      HPI She is now on Entrectinib at 400 mg dose on 7/1/2020  Her restaging CT scans reveal "In this patient with known lung cancer there is a heterogeneous large opacity with tumor and consolidation occupying much of the left upper lobe demonstrating interval improvement in size compared to previous exam.  Interval improvement in size and number of bilateral lung nodules consistent with metastatic disease.  Small left pleural effusion. Multiple osteoblastic metastatic lesions throughout the skeleton greater in size which may represent healing versus worsening disease.  No new osteoblastic lesions identified.  Hyperattenuating material within the gallbladder possibly representing " "sludge without evidence of cholecystitis.  RECIST SUMMARY: Date of prior examination for comparison: CT chest 04/23/2020 Lesion 1: Left upper lobe.  1.9 cm. Series 2 image 28.  Prior measurement 3.6 cm. Note - blastic bone lesions and subcentimeter lung nodules do not qualify for recist summary"  She is feeling well and notes that her breathing and dizziness in better.      Review of Systems   Constitutional: Negative for appetite change, fatigue and unexpected weight change.   HENT: Negative for mouth sores.    Eyes: Negative for visual disturbance.   Respiratory: Negative for cough and shortness of breath.    Cardiovascular: Negative for chest pain.   Gastrointestinal: Negative for abdominal pain and diarrhea.   Genitourinary: Negative for frequency.   Musculoskeletal: Negative for back pain.   Integumentary:  Negative for rash.   Neurological: Negative for headaches.   Hematological: Negative for adenopathy.   Psychiatric/Behavioral: The patient is not nervous/anxious.    All other systems reviewed and are negative.        Objective:      Physical Exam      LABS:  WBC   Date Value Ref Range Status   08/04/2020 3.82 (L) 3.90 - 12.70 K/uL Final     Hemoglobin   Date Value Ref Range Status   08/04/2020 10.0 (L) 12.0 - 16.0 g/dL Final     Hematocrit   Date Value Ref Range Status   08/04/2020 34.5 (L) 37.0 - 48.5 % Final     Platelets   Date Value Ref Range Status   08/04/2020 220 150 - 350 K/uL Final     Gran # (ANC)   Date Value Ref Range Status   08/04/2020 1.1 (L) 1.8 - 7.7 K/uL Final     Comment:     The ANC is based on a white cell differential from an   automated cell counter. It has not been microscopically   reviewed for the presence of abnormal cells. Clinical   correlation is required.         Chemistry        Component Value Date/Time     08/04/2020 1517    K 4.1 08/04/2020 1517     08/04/2020 1517    CO2 29 08/04/2020 1517    BUN 11 08/04/2020 1517    CREATININE 1.0 08/04/2020 1517     " (H) 08/04/2020 1517        Component Value Date/Time    CALCIUM 9.0 08/04/2020 1517    ALKPHOS 65 08/04/2020 1517    AST 89 (H) 08/04/2020 1517    ALT 64 (H) 08/04/2020 1517    BILITOT 0.4 08/04/2020 1517    ESTGFRAFRICA >60.0 08/04/2020 1517    EGFRNONAA >60.0 08/04/2020 1517          Assessment:       1. Malignant neoplasm of upper lobe of left lung    2. 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 Chemotherapy induced neutropenia    5. Edema, unspecified type    6. Dysuria    7. Type 2 diabetes mellitus with diabetic polyneuropathy, with long-term current use of insulin        Plan:        1,2,3,4. She is doing well now clinically, CT scans done 3 weeks after starting treatment are already showing a partial response. So will continue Entrectinib. Her ANC is about 1000, so will continue with 400 mg dosing at this time. Will recheck labs in 2 weeks  5. Increase lasix to 40 mg daily. Most likely related to chemo. She knows to call me if symptoms worsen. Denies shortness of breath  6. She has a UA cup ordered by her PCP which she will drop off in lab in [facility] for UA and culture  7. Stable on insulin. Continue to monitor.    Above care plan was discussed with patient and all questions were addressed to her satisfaction

## 2020-08-08 NOTE — TELEPHONE ENCOUNTER
Dr. Rodriguez,    Please see the attached refill request for one of your patients. Unsure why the refill request came to me. I took care of her on the inpatient side a couple months back.    Justo,  Rayshawn

## 2020-08-10 ENCOUNTER — TELEPHONE (OUTPATIENT)
Dept: HEMATOLOGY/ONCOLOGY | Facility: CLINIC | Age: 65
End: 2020-08-10

## 2020-08-10 ENCOUNTER — HOSPITAL ENCOUNTER (OUTPATIENT)
Dept: RADIOLOGY | Facility: HOSPITAL | Age: 65
Discharge: HOME | End: 2020-08-10
Attending: INTERNAL MEDICINE
Payer: MEDICARE

## 2020-08-10 DIAGNOSIS — C34.12 MALIGNANT NEOPLASM OF UPPER LOBE OF LEFT LUNG: ICD-10-CM

## 2020-08-10 DIAGNOSIS — N30.00 ACUTE CYSTITIS WITHOUT HEMATURIA: Primary | ICD-10-CM

## 2020-08-10 PROCEDURE — 70553 MRI BRAIN STEM W/O & W/DYE: CPT | Mod: 26,,, | Performed by: RADIOLOGY

## 2020-08-10 PROCEDURE — A9585 GADOBUTROL INJECTION: HCPCS | Performed by: INTERNAL MEDICINE

## 2020-08-10 PROCEDURE — 70553 MRI BRAIN STEM W/O & W/DYE: CPT | Mod: TC

## 2020-08-10 PROCEDURE — 70553 MRI BRAIN W WO CONTRAST: ICD-10-PCS | Mod: 26,,, | Performed by: RADIOLOGY

## 2020-08-10 PROCEDURE — 25500020 PHARM REV CODE 255: Performed by: INTERNAL MEDICINE

## 2020-08-10 RX ORDER — CIPROFLOXACIN 500 MG/1
500 TABLET ORAL EVERY 12 HOURS
Qty: 10 TABLET | Refills: 0 | Status: SHIPPED | OUTPATIENT
Start: 2020-08-10 | End: 2020-08-15

## 2020-08-10 RX ORDER — GADOBUTROL 604.72 MG/ML
10 INJECTION INTRAVENOUS
Status: COMPLETED | OUTPATIENT
Start: 2020-08-10 | End: 2020-08-10

## 2020-08-10 RX ADMIN — GADOBUTROL 10 ML: 604.72 INJECTION INTRAVENOUS at 09:08

## 2020-08-10 NOTE — TELEPHONE ENCOUNTER
Informed patient her urine shows infection. cipro sent. She thanked nurse.  She is requesting refills on folic acid and something else to be prescribed instead of eliquis, as she is in her gap right now. eliquis is a little over $100 monthly, and she can't afford that.      Message routed to dr chapin/jacob (jacob, anything we send over will be the same, since she is in the gap. Can we help pay for the eliquis for her?)

## 2020-08-10 NOTE — TELEPHONE ENCOUNTER
----- Message from Tara Belcher MD sent at 8/10/2020  2:18 PM CDT -----  She has E.coli, sensitive to Cipro. I escribed it. Please let her know

## 2020-08-11 ENCOUNTER — TELEPHONE (OUTPATIENT)
Dept: INTERNAL MEDICINE | Facility: CLINIC | Age: 65
End: 2020-08-11

## 2020-08-11 ENCOUNTER — DOCUMENTATION ONLY (OUTPATIENT)
Dept: HEMATOLOGY/ONCOLOGY | Facility: CLINIC | Age: 65
End: 2020-08-11

## 2020-08-11 RX ORDER — FOLIC ACID 1 MG/1
1 TABLET ORAL DAILY
Qty: 30 TABLET | Refills: 3 | OUTPATIENT
Start: 2020-08-11 | End: 2021-08-11

## 2020-08-11 NOTE — TELEPHONE ENCOUNTER
Spoke with patient. Informed her patient assistance was able to cover her eliquis and folic acid. She thanked nurse.

## 2020-08-11 NOTE — TELEPHONE ENCOUNTER
----- Message from Dorothea Agustin sent at 8/11/2020 12:46 PM CDT -----  Regarding: Information  Pt is calling to speak with Ms. 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  She is seeking a new doctor and would like to know more about them, or be referred to a doctor.    She can be reached at [phone].    Thank you.

## 2020-08-11 NOTE — PROGRESS NOTES
Request sent from [facility] ([facility]) requesting assistance with co-pay for Eloquis. Cost is $123.78. Cost transfer form sent to Outpatient pharmacy. No other needs identified.

## 2020-08-12 ENCOUNTER — TELEPHONE (OUTPATIENT)
Dept: SLEEP MEDICINE | Facility: OTHER | Age: 65
End: 2020-08-12

## 2020-08-12 ENCOUNTER — DOCUMENTATION ONLY (OUTPATIENT)
Dept: HEMATOLOGY/ONCOLOGY | Facility: CLINIC | Age: 65
End: 2020-08-12

## 2020-08-12 NOTE — TELEPHONE ENCOUNTER
Left messaged,talked to patient and sent out a message through [employer] to schedule the home sleep study.  No response.

## 2020-08-12 NOTE — PROGRESS NOTES
Spoke with pt. This am and informed her that cost for Jos has been covered thru [facility] patient assistance fund ($123.78). Pt. States that she has had a difficult  time paying for medication due to limited income. Spoke with pt about social security extra help program, she states that she applied but was told she does not qualify. Pt. Encouraged to speak with [facility] outpatient pharmacy to apply for patient assistance program. Pt. Expresses understanding and states that she will f/u. No other needs identified.

## 2020-08-13 ENCOUNTER — TELEPHONE (OUTPATIENT)
Dept: INFUSION THERAPY | Facility: HOSPITAL | Age: 65
End: 2020-08-13

## 2020-08-18 ENCOUNTER — TELEPHONE (OUTPATIENT)
Dept: HEMATOLOGY/ONCOLOGY | Facility: CLINIC | Age: 65
End: 2020-08-18

## 2020-08-18 NOTE — TELEPHONE ENCOUNTER
"----- Message from Liane Quiros sent at 8/18/2020  8:43 AM CDT -----  Patient Assist    Name of caller:  Alison   Provider name: Ye Pacheco MD   Contact Preference:  [phone]  Current patient or new patient?: current   Does Patient feel the need to see the MD today? No   What is the nature of the call?    - Please contact pt because she was requesting folic acid   to be sent to the pharmacy listed. She hasn't been feeling well   and she believes its because she has been without it for a while already,     Pharmacy:  [facility]   [address] [facility] [address] [address]   [phone]    Additional Notes:   "Thank you for all that you do for our patients'"          "

## 2020-08-18 NOTE — TELEPHONE ENCOUNTER
Spoke with patient.  Explained to her folic acid was taken with alimta. She does not need folic acid with the new chemo she is on.  She confirmed appointment with dr chapin this Thursday.

## 2020-08-19 ENCOUNTER — TELEPHONE (OUTPATIENT)
Dept: HEMATOLOGY/ONCOLOGY | Facility: CLINIC | Age: 65
End: 2020-08-19

## 2020-08-19 NOTE — NURSING
Returned call to pt, requesting assistance with transportation for her next chemo treatment.  Will forward message to DEBORAH.

## 2020-08-20 ENCOUNTER — LAB VISIT (OUTPATIENT)
Dept: LAB | Facility: HOSPITAL | Age: 65
End: 2020-08-20
Attending: INTERNAL MEDICINE
Payer: MEDICARE

## 2020-08-20 ENCOUNTER — OFFICE VISIT (OUTPATIENT)
Dept: HEMATOLOGY/ONCOLOGY | Facility: CLINIC | Age: 65
End: 2020-08-20
Payer: MEDICARE

## 2020-08-20 ENCOUNTER — TELEPHONE (OUTPATIENT)
Dept: PULMONOLOGY | Facility: CLINIC | Age: 65
End: 2020-08-20

## 2020-08-20 VITALS
BODY MASS INDEX: 32.39 KG/M2 | DIASTOLIC BLOOD PRESSURE: 61 MMHG | HEART RATE: 87 BPM | TEMPERATURE: 98 F | SYSTOLIC BLOOD PRESSURE: 130 MMHG | WEIGHT: 218.69 LBS | RESPIRATION RATE: 16 BRPM | OXYGEN SATURATION: 98 % | HEIGHT: 69 IN

## 2020-08-20 DIAGNOSIS — C34.12 MALIGNANT NEOPLASM OF UPPER LOBE OF LEFT LUNG: ICD-10-CM

## 2020-08-20 DIAGNOSIS — E11.42 TYPE 2 DIABETES MELLITUS WITH DIABETIC POLYNEUROPATHY, WITH LONG-TERM CURRENT USE OF INSULIN: ICD-10-CM

## 2020-08-20 DIAGNOSIS — Z79.4 TYPE 2 DIABETES MELLITUS WITH DIABETIC POLYNEUROPATHY, WITH LONG-TERM CURRENT USE OF INSULIN: ICD-10-CM

## 2020-08-20 DIAGNOSIS — C77.1 SECONDARY MALIGNANT NEOPLASM OF MEDIASTINAL LYMPH NODE: ICD-10-CM

## 2020-08-20 DIAGNOSIS — N17.9 AKI (ACUTE KIDNEY INJURY): ICD-10-CM

## 2020-08-20 DIAGNOSIS — C34.12 MALIGNANT NEOPLASM OF UPPER LOBE OF LEFT LUNG: Primary | ICD-10-CM

## 2020-08-20 DIAGNOSIS — C79.51 SECONDARY MALIGNANT NEOPLASM OF BONE: ICD-10-CM

## 2020-08-20 LAB
ALBUMIN SERPL BCP-MCNC: 3.7 G/DL (ref 3.5–5.2)
ALP SERPL-CCNC: 53 U/L (ref 55–135)
ALT SERPL W/O P-5'-P-CCNC: 25 U/L (ref 10–44)
ANION GAP SERPL CALC-SCNC: 10 MMOL/L (ref 8–16)
AST SERPL-CCNC: 36 U/L (ref 10–40)
BILIRUB SERPL-MCNC: 0.3 MG/DL (ref 0.1–1)
BUN SERPL-MCNC: 27 MG/DL (ref 8–23)
CALCIUM SERPL-MCNC: 9.8 MG/DL (ref 8.7–10.5)
CHLORIDE SERPL-SCNC: 105 MMOL/L (ref 95–110)
CO2 SERPL-SCNC: 27 MMOL/L (ref 23–29)
CREAT SERPL-MCNC: 1.7 MG/DL (ref 0.5–1.4)
ERYTHROCYTE [DISTWIDTH] IN BLOOD BY AUTOMATED COUNT: 15.5 % (ref 11.5–14.5)
EST. GFR  (AFRICAN AMERICAN): 36.7 ML/MIN/1.73 M^2
EST. GFR  (NON AFRICAN AMERICAN): 31.9 ML/MIN/1.73 M^2
GLUCOSE SERPL-MCNC: 244 MG/DL (ref 70–110)
HCT VFR BLD AUTO: 36.4 % (ref 37–48.5)
HGB BLD-MCNC: 10.9 G/DL (ref 12–16)
IMM GRANULOCYTES # BLD AUTO: 0.01 K/UL (ref 0–0.04)
MCH RBC QN AUTO: 26.8 PG (ref 27–31)
MCHC RBC AUTO-ENTMCNC: 29.9 G/DL (ref 32–36)
MCV RBC AUTO: 89 FL (ref 82–98)
NEUTROPHILS # BLD AUTO: 1.4 K/UL (ref 1.8–7.7)
PLATELET # BLD AUTO: 221 K/UL (ref 150–350)
PMV BLD AUTO: 12.6 FL (ref 9.2–12.9)
POTASSIUM SERPL-SCNC: 4.3 MMOL/L (ref 3.5–5.1)
PROT SERPL-MCNC: 7.2 G/DL (ref 6–8.4)
RBC # BLD AUTO: 4.07 M/UL (ref 4–5.4)
SODIUM SERPL-SCNC: 142 MMOL/L (ref 136–145)
WBC # BLD AUTO: 3.84 K/UL (ref 3.9–12.7)

## 2020-08-20 PROCEDURE — 99499 UNLISTED E&M SERVICE: CPT | Mod: S$GLB,,, | Performed by: INTERNAL MEDICINE

## 2020-08-20 PROCEDURE — 3046F PR MOST RECENT HEMOGLOBIN A1C LEVEL > 9.0%: ICD-10-PCS | Mod: CPTII,S$GLB,, | Performed by: INTERNAL MEDICINE

## 2020-08-20 PROCEDURE — 85027 COMPLETE CBC AUTOMATED: CPT

## 2020-08-20 PROCEDURE — 3075F SYST BP GE 130 - 139MM HG: CPT | Mod: CPTII,S$GLB,, | Performed by: INTERNAL MEDICINE

## 2020-08-20 PROCEDURE — 99215 PR OFFICE/OUTPT VISIT, EST, LEVL V, 40-54 MIN: ICD-10-PCS | Mod: S$GLB,,, | Performed by: INTERNAL MEDICINE

## 2020-08-20 PROCEDURE — 99999 PR PBB SHADOW E&M-EST. PATIENT-LVL V: CPT | Mod: PBBFAC,,, | Performed by: INTERNAL MEDICINE

## 2020-08-20 PROCEDURE — 99499 RISK ADDL DX/OHS AUDIT: ICD-10-PCS | Mod: S$GLB,,, | Performed by: INTERNAL MEDICINE

## 2020-08-20 PROCEDURE — 3008F PR BODY MASS INDEX (BMI) DOCUMENTED: ICD-10-PCS | Mod: CPTII,S$GLB,, | Performed by: INTERNAL MEDICINE

## 2020-08-20 PROCEDURE — 3078F DIAST BP <80 MM HG: CPT | Mod: CPTII,S$GLB,, | Performed by: INTERNAL MEDICINE

## 2020-08-20 PROCEDURE — 80053 COMPREHEN METABOLIC PANEL: CPT

## 2020-08-20 PROCEDURE — 36415 COLL VENOUS BLD VENIPUNCTURE: CPT

## 2020-08-20 PROCEDURE — 3075F PR MOST RECENT SYSTOLIC BLOOD PRESS GE 130-139MM HG: ICD-10-PCS | Mod: CPTII,S$GLB,, | Performed by: INTERNAL MEDICINE

## 2020-08-20 PROCEDURE — 99215 OFFICE O/P EST HI 40 MIN: CPT | Mod: S$GLB,,, | Performed by: INTERNAL MEDICINE

## 2020-08-20 PROCEDURE — 3008F BODY MASS INDEX DOCD: CPT | Mod: CPTII,S$GLB,, | Performed by: INTERNAL MEDICINE

## 2020-08-20 PROCEDURE — 3046F HEMOGLOBIN A1C LEVEL >9.0%: CPT | Mod: CPTII,S$GLB,, | Performed by: INTERNAL MEDICINE

## 2020-08-20 PROCEDURE — 3078F PR MOST RECENT DIASTOLIC BLOOD PRESSURE < 80 MM HG: ICD-10-PCS | Mod: CPTII,S$GLB,, | Performed by: INTERNAL MEDICINE

## 2020-08-20 PROCEDURE — 99999 PR PBB SHADOW E&M-EST. PATIENT-LVL V: ICD-10-PCS | Mod: PBBFAC,,, | Performed by: INTERNAL MEDICINE

## 2020-08-20 NOTE — PROGRESS NOTES
Subjective:       Patient ID: Alison Branch is a 64 y.o. female.    Chief Complaint: Malignant neoplasm of upper lobe of left lung  Ms. Branch is a 63-year-old female who smoked for about 30 years and quit 20 years ago, presented with cough end of last year, but worsened into January.  Since the cough persisted, she saw her PCP, underwent a chest x-ray on 05/10/2019, that revealed left upper lobe pneumonia and a repeat CT was done in the week after that on 05/17/2019 that showed left upper lobe   mass arising from the lateral pleural surface in the left upper lobe posterior subsegment measuring 2.6 x 3 cm.  There are satellite mass more medially near the aortic arch that is 2 cm, also spiculated and irregular as well as thickened interlobar septa in the left lung apex and anterior segment, prevascular lymph node lateral to the aortic arch, short axis measuring 9 mm.       She then underwent a bronchoscopy on 05/28/2019, and that revealed there was an infiltration obtained in the left apical posterior segment of the left upper lobe cytology brush was done.  Transbronchial biopsies of an area of infiltration were also performed in the apicoposterior segment of the left upper lobe.    Pathology revealed adenocarcinoma; however, the specimen was not adequate enough to send for molecular testing.       She underwent a PET scan on 06/06/2019.  that reveals significant hypermetabolic activity in the large irregular spiculated pulmonary mass in the lateral aspect of the left upper lobe consistent with the patient's known pulmonary adenocarcinoma.  There is also tracer avidity in the medial left upper lobe satellite lesion and diffusely throughout much of the anterior left upper lobe where there is prominent nodular paraseptal thickening.  There are some numerous scattered subcentimeter pulmonary nodules throughout the right lung, all of which are too small for detection by PET.  For example, there is a 0.4 cm nodule in the  "superior right lower lobe and a micro nodule in the posterior segment of the right upper lobe.  There is a 0.5 cm, normal size right   paratracheal lymph node with increased radiotracer uptake as well as hypermetabolic aortic pulmonary lymph node and a left hilar lymph node.  There is a focal hypermetabolic lesion in the left anterior superior iliac spine associated with well defined lytic lesion.  There is a hypermetabolic focus in the anterior right fifth rib with a possible associated lytic lesion.  SUVs as   below lateral:  Left upper lobe  SUV max 17.9, anterior left upper lobe satellite mass and septal thickening SUV max of 10.1, right paratracheal lymph node SUV max of 4.8, left AP window lymph node SUV max of 17.9, left anterior superior iliac spine SUV max of 3.9"     MRI pelvis from 6/10/19  reveals "Region of osseous is irregularity at the left anterior iliac spine likely related to bone graft harvest procedure.  See  discussion above.  No suspicious signal or enhancement to suggest active/malignant process"   MRI brain from 6/10//19 reveal No intracranial abnormality.     We discussed her case at [facility], and plan was to wait for GAURDANT and proceed with treatment accordingly  Her GAURDANT is negative for molecular markers.     She has completed 4 cycles of Carboplatin, Alimta and Keytruda as of 9/5/19. She is now on  ALimta and Keytruda maintenance.            She has been on maintenance Alimta and Keytruda     Her CT scans from 4/23/2020 revealed "In this patient with a known history of lung cancer, there has been marked interval detrimental change when compared to CT dated 12/20/2019 as follows. Persistent left upper lobe volume loss with worsening masslike consolidation and enlarging index and non index lesions. Increased number of innumerable bilateral metastatic solid pulmonary nodules. Interval increased size and number of multiple osteoblastic metastatic lesions throughout the visualized " axial skeleton. Stable mediastinal lymph nodes, several of which were noted to be hypermetabolic on previous PET-CT.  No new lymphadenopathy. Stable subcentimeter hepatic and splenic hypodensities, too small to accurately characterize.  Path addendum from 5/2019 reveals ROS1      She is now on Entrectinib at 400 mg dose on 7/1/2020    HPI She notes some rash and lower extremity swelling since 2 weeks. She notes that her breathing is much better and is not on home Oxygen. Her CT scans 4 weeks after starting Entrectinib revealed partial response,.      Review of Systems   Constitutional: Negative for appetite change, fatigue and unexpected weight change.   HENT: Negative for mouth sores.    Eyes: Negative for visual disturbance.   Respiratory: Negative for cough and shortness of breath.    Cardiovascular: Positive for leg swelling. Negative for chest pain.   Gastrointestinal: Negative for abdominal pain and diarrhea.   Genitourinary: Negative for frequency.   Musculoskeletal: Negative for back pain.   Integumentary:  Positive for rash.   Neurological: Negative for headaches.   Hematological: Negative for adenopathy.   Psychiatric/Behavioral: The patient is not nervous/anxious.    All other systems reviewed and are negative.        Objective:      Physical Exam  Vitals signs reviewed.   Constitutional:       Appearance: She is well-developed.   HENT:      Mouth/Throat:      Pharynx: No oropharyngeal exudate.   Cardiovascular:      Rate and Rhythm: Normal rate.      Heart sounds: Normal heart sounds.   Pulmonary:      Effort: Pulmonary effort is normal.      Breath sounds: Normal breath sounds. No wheezing.   Abdominal:      General: Bowel sounds are normal.      Palpations: Abdomen is soft.      Tenderness: There is no abdominal tenderness.   Musculoskeletal:         General: No tenderness.   Lymphadenopathy:      Cervical: No cervical adenopathy.   Skin:     General: Skin is warm and dry.      Findings: No rash.    Neurological:      Mental Status: She is alert and oriented to person, place, and time.      Coordination: Coordination normal.   Psychiatric:         Thought Content: Thought content normal.         Judgment: Judgment normal.           LABS:  WBC   Date Value Ref Range Status   08/20/2020 3.84 (L) 3.90 - 12.70 K/uL Final     Hemoglobin   Date Value Ref Range Status   08/20/2020 10.9 (L) 12.0 - 16.0 g/dL Final     Hematocrit   Date Value Ref Range Status   08/20/2020 36.4 (L) 37.0 - 48.5 % Final     Platelets   Date Value Ref Range Status   08/20/2020 221 150 - 350 K/uL Final     Gran # (ANC)   Date Value Ref Range Status   08/20/2020 1.4 (L) 1.8 - 7.7 K/uL Final     Comment:     The ANC is based on a white cell differential from an   automated cell counter. It has not been microscopically   reviewed for the presence of abnormal cells. Clinical   correlation is required.         Chemistry        Component Value Date/Time     08/20/2020 1255    K 4.3 08/20/2020 1255     08/20/2020 1255    CO2 27 08/20/2020 1255    BUN 27 (H) 08/20/2020 1255    CREATININE 1.7 (H) 08/20/2020 1255     (H) 08/20/2020 1255        Component Value Date/Time    CALCIUM 9.8 08/20/2020 1255    ALKPHOS 53 (L) 08/20/2020 1255    AST 36 08/20/2020 1255    ALT 25 08/20/2020 1255    BILITOT 0.3 08/20/2020 1255    ESTGFRAFRICA 36.7 (A) 08/20/2020 1255    EGFRNONAA 31.9 (A) 08/20/2020 1255          Assessment:       1. Malignant neoplasm of upper lobe of left lung    2. 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 Type 2 diabetes mellitus with diabetic polyneuropathy, with long-term current use of insulin    5. ROSEMARY (acute kidney injury)        Plan:         1,2. She is doing much better clinically, on Entrectinib. Will plan on CT scans in early October 2020,   Mild side effects like peripheral edema, mild rash, ANC 1400.  Xgeva this week.  4. She is compliant with insulin  but not diet compliant  5. Advised her to increase oral hydration      Above care plan was discussed with patient and accompanying daughter and all questions were addressed to their satisfaction

## 2020-08-21 ENCOUNTER — DOCUMENTATION ONLY (OUTPATIENT)
Dept: HEMATOLOGY/ONCOLOGY | Facility: CLINIC | Age: 65
End: 2020-08-21

## 2020-08-21 ENCOUNTER — TELEPHONE (OUTPATIENT)
Dept: HEMATOLOGY/ONCOLOGY | Facility: CLINIC | Age: 65
End: 2020-08-21

## 2020-08-21 NOTE — TELEPHONE ENCOUNTER
Spoke with patient. Informed her she does not need vit b12, as she is not getting alimta anymore. She voiced understanding.

## 2020-08-21 NOTE — TELEPHONE ENCOUNTER
"----- Message from Liane Quiros sent at 8/21/2020  1:31 PM CDT -----  Regarding: B12  Patient Assist    Name of caller:  Alison   Provider name: Ye PAYTON MD   Contact Preference: [phone]  Current patient or new patient?: current   Does Patient feel the need to see the MD today? No   What is the nature of the call?    - would like to schedule next b12 injection. Please call and assist.     Additional Notes:   "Thank you for all that you do for our patients'"          "

## 2020-08-21 NOTE — PROGRESS NOTES
Met with patient and her daughter in clinic yesterday. She expressed need for assistance with medication in particular her eliquiost. She has already picked up the prescription for this month. Discussed the resources available through [employer] and potentially patient assistance. She is agreeable to meet at next appointment to complete application. She was unable to stay to complete it today. Inquired about transportation since I received another referral message about this for her. She reports that she may need assistance with transport at times. Explained available resources. I provided her with my direct office number to contact me.

## 2020-09-03 ENCOUNTER — OFFICE VISIT (OUTPATIENT)
Dept: HEMATOLOGY/ONCOLOGY | Facility: CLINIC | Age: 65
End: 2020-09-03
Payer: MEDICARE

## 2020-09-03 ENCOUNTER — OFFICE VISIT (OUTPATIENT)
Dept: DIABETES | Facility: CLINIC | Age: 65
End: 2020-09-03
Payer: MEDICARE

## 2020-09-03 ENCOUNTER — LAB VISIT (OUTPATIENT)
Dept: LAB | Facility: HOSPITAL | Age: 65
End: 2020-09-03
Attending: INTERNAL MEDICINE
Payer: MEDICARE

## 2020-09-03 VITALS
OXYGEN SATURATION: 94 % | HEIGHT: 69 IN | RESPIRATION RATE: 18 BRPM | WEIGHT: 222.25 LBS | BODY MASS INDEX: 32.92 KG/M2 | DIASTOLIC BLOOD PRESSURE: 66 MMHG | HEART RATE: 85 BPM | TEMPERATURE: 98 F | SYSTOLIC BLOOD PRESSURE: 149 MMHG

## 2020-09-03 DIAGNOSIS — R60.9 EDEMA, UNSPECIFIED TYPE: ICD-10-CM

## 2020-09-03 DIAGNOSIS — C77.1 SECONDARY MALIGNANT NEOPLASM OF MEDIASTINAL LYMPH NODE: ICD-10-CM

## 2020-09-03 DIAGNOSIS — E11.42 TYPE 2 DIABETES MELLITUS WITH DIABETIC POLYNEUROPATHY, WITH LONG-TERM CURRENT USE OF INSULIN: ICD-10-CM

## 2020-09-03 DIAGNOSIS — N17.9 AKI (ACUTE KIDNEY INJURY): ICD-10-CM

## 2020-09-03 DIAGNOSIS — C79.51 SECONDARY MALIGNANT NEOPLASM OF BONE: ICD-10-CM

## 2020-09-03 DIAGNOSIS — Z79.4 TYPE 2 DIABETES MELLITUS WITH DIABETIC POLYNEUROPATHY, WITH LONG-TERM CURRENT USE OF INSULIN: ICD-10-CM

## 2020-09-03 DIAGNOSIS — Z91.199 NONCOMPLIANCE WITH DIABETES TREATMENT: ICD-10-CM

## 2020-09-03 DIAGNOSIS — Z71.9 HEALTH EDUCATION/COUNSELING: ICD-10-CM

## 2020-09-03 DIAGNOSIS — K59.00 CONSTIPATION, UNSPECIFIED CONSTIPATION TYPE: ICD-10-CM

## 2020-09-03 DIAGNOSIS — C34.12 MALIGNANT NEOPLASM OF UPPER LOBE OF LEFT LUNG: ICD-10-CM

## 2020-09-03 DIAGNOSIS — Z79.4 TYPE 2 DIABETES MELLITUS WITH HYPERGLYCEMIA, WITH LONG-TERM CURRENT USE OF INSULIN: Primary | ICD-10-CM

## 2020-09-03 DIAGNOSIS — E11.65 TYPE 2 DIABETES MELLITUS WITH HYPERGLYCEMIA, WITH LONG-TERM CURRENT USE OF INSULIN: Primary | ICD-10-CM

## 2020-09-03 DIAGNOSIS — N39.45 CONTINUOUS LEAKAGE OF URINE: ICD-10-CM

## 2020-09-03 DIAGNOSIS — E11.649 TYPE 2 DIABETES MELLITUS WITH HYPOGLYCEMIA WITHOUT COMA, WITH LONG-TERM CURRENT USE OF INSULIN: ICD-10-CM

## 2020-09-03 DIAGNOSIS — Z59.9 FINANCIAL DIFFICULTIES: ICD-10-CM

## 2020-09-03 DIAGNOSIS — Z79.4 TYPE 2 DIABETES MELLITUS WITH HYPOGLYCEMIA WITHOUT COMA, WITH LONG-TERM CURRENT USE OF INSULIN: ICD-10-CM

## 2020-09-03 LAB
ALBUMIN SERPL BCP-MCNC: 3.9 G/DL (ref 3.5–5.2)
ALP SERPL-CCNC: 58 U/L (ref 55–135)
ALT SERPL W/O P-5'-P-CCNC: 20 U/L (ref 10–44)
ANION GAP SERPL CALC-SCNC: 5 MMOL/L (ref 8–16)
AST SERPL-CCNC: 26 U/L (ref 10–40)
BILIRUB SERPL-MCNC: 0.3 MG/DL (ref 0.1–1)
BUN SERPL-MCNC: 17 MG/DL (ref 8–23)
CALCIUM SERPL-MCNC: 9.2 MG/DL (ref 8.7–10.5)
CHLORIDE SERPL-SCNC: 107 MMOL/L (ref 95–110)
CO2 SERPL-SCNC: 30 MMOL/L (ref 23–29)
CREAT SERPL-MCNC: 1.3 MG/DL (ref 0.5–1.4)
ERYTHROCYTE [DISTWIDTH] IN BLOOD BY AUTOMATED COUNT: 15.7 % (ref 11.5–14.5)
EST. GFR  (AFRICAN AMERICAN): 50.8 ML/MIN/1.73 M^2
EST. GFR  (NON AFRICAN AMERICAN): 44.1 ML/MIN/1.73 M^2
GLUCOSE SERPL-MCNC: 173 MG/DL (ref 70–110)
HCT VFR BLD AUTO: 36.9 % (ref 37–48.5)
HGB BLD-MCNC: 11.2 G/DL (ref 12–16)
IMM GRANULOCYTES # BLD AUTO: 0.02 K/UL (ref 0–0.04)
MCH RBC QN AUTO: 27 PG (ref 27–31)
MCHC RBC AUTO-ENTMCNC: 30.4 G/DL (ref 32–36)
MCV RBC AUTO: 89 FL (ref 82–98)
NEUTROPHILS # BLD AUTO: 2.2 K/UL (ref 1.8–7.7)
PLATELET # BLD AUTO: 211 K/UL (ref 150–350)
PMV BLD AUTO: 12.6 FL (ref 9.2–12.9)
POTASSIUM SERPL-SCNC: 4.9 MMOL/L (ref 3.5–5.1)
PROT SERPL-MCNC: 7.5 G/DL (ref 6–8.4)
RBC # BLD AUTO: 4.15 M/UL (ref 4–5.4)
SODIUM SERPL-SCNC: 142 MMOL/L (ref 136–145)
WBC # BLD AUTO: 4.53 K/UL (ref 3.9–12.7)

## 2020-09-03 PROCEDURE — 3078F DIAST BP <80 MM HG: CPT | Mod: CPTII,95,, | Performed by: NURSE PRACTITIONER

## 2020-09-03 PROCEDURE — 3008F PR BODY MASS INDEX (BMI) DOCUMENTED: ICD-10-PCS | Mod: CPTII,S$GLB,, | Performed by: PHYSICIAN ASSISTANT

## 2020-09-03 PROCEDURE — 3046F PR MOST RECENT HEMOGLOBIN A1C LEVEL > 9.0%: ICD-10-PCS | Mod: CPTII,S$GLB,, | Performed by: PHYSICIAN ASSISTANT

## 2020-09-03 PROCEDURE — 3046F PR MOST RECENT HEMOGLOBIN A1C LEVEL > 9.0%: ICD-10-PCS | Mod: CPTII,95,, | Performed by: NURSE PRACTITIONER

## 2020-09-03 PROCEDURE — 99214 PR OFFICE/OUTPT VISIT, EST, LEVL IV, 30-39 MIN: ICD-10-PCS | Mod: S$GLB,,, | Performed by: PHYSICIAN ASSISTANT

## 2020-09-03 PROCEDURE — 3077F SYST BP >= 140 MM HG: CPT | Mod: CPTII,S$GLB,, | Performed by: PHYSICIAN ASSISTANT

## 2020-09-03 PROCEDURE — 3008F BODY MASS INDEX DOCD: CPT | Mod: CPTII,S$GLB,, | Performed by: PHYSICIAN ASSISTANT

## 2020-09-03 PROCEDURE — 99999 PR PBB SHADOW E&M-EST. PATIENT-LVL V: CPT | Mod: PBBFAC,,, | Performed by: PHYSICIAN ASSISTANT

## 2020-09-03 PROCEDURE — 99499 UNLISTED E&M SERVICE: CPT | Mod: S$GLB,,, | Performed by: PHYSICIAN ASSISTANT

## 2020-09-03 PROCEDURE — 80053 COMPREHEN METABOLIC PANEL: CPT

## 2020-09-03 PROCEDURE — 3077F SYST BP >= 140 MM HG: CPT | Mod: CPTII,95,, | Performed by: NURSE PRACTITIONER

## 2020-09-03 PROCEDURE — 3046F HEMOGLOBIN A1C LEVEL >9.0%: CPT | Mod: CPTII,95,, | Performed by: NURSE PRACTITIONER

## 2020-09-03 PROCEDURE — 99214 PR OFFICE/OUTPT VISIT, EST, LEVL IV, 30-39 MIN: ICD-10-PCS | Mod: 95,,, | Performed by: NURSE PRACTITIONER

## 2020-09-03 PROCEDURE — 3078F PR MOST RECENT DIASTOLIC BLOOD PRESSURE < 80 MM HG: ICD-10-PCS | Mod: CPTII,95,, | Performed by: NURSE PRACTITIONER

## 2020-09-03 PROCEDURE — 3046F HEMOGLOBIN A1C LEVEL >9.0%: CPT | Mod: CPTII,S$GLB,, | Performed by: PHYSICIAN ASSISTANT

## 2020-09-03 PROCEDURE — 99499 RISK ADDL DX/OHS AUDIT: ICD-10-PCS | Mod: S$GLB,,, | Performed by: PHYSICIAN ASSISTANT

## 2020-09-03 PROCEDURE — 99999 PR PBB SHADOW E&M-EST. PATIENT-LVL V: ICD-10-PCS | Mod: PBBFAC,,, | Performed by: PHYSICIAN ASSISTANT

## 2020-09-03 PROCEDURE — 3077F PR MOST RECENT SYSTOLIC BLOOD PRESSURE >= 140 MM HG: ICD-10-PCS | Mod: CPTII,S$GLB,, | Performed by: PHYSICIAN ASSISTANT

## 2020-09-03 PROCEDURE — 99214 OFFICE O/P EST MOD 30 MIN: CPT | Mod: S$GLB,,, | Performed by: PHYSICIAN ASSISTANT

## 2020-09-03 PROCEDURE — 99214 OFFICE O/P EST MOD 30 MIN: CPT | Mod: 95,,, | Performed by: NURSE PRACTITIONER

## 2020-09-03 PROCEDURE — 3077F PR MOST RECENT SYSTOLIC BLOOD PRESSURE >= 140 MM HG: ICD-10-PCS | Mod: CPTII,95,, | Performed by: NURSE PRACTITIONER

## 2020-09-03 PROCEDURE — 85027 COMPLETE CBC AUTOMATED: CPT

## 2020-09-03 PROCEDURE — 36415 COLL VENOUS BLD VENIPUNCTURE: CPT

## 2020-09-03 PROCEDURE — 3078F DIAST BP <80 MM HG: CPT | Mod: CPTII,S$GLB,, | Performed by: PHYSICIAN ASSISTANT

## 2020-09-03 PROCEDURE — 3078F PR MOST RECENT DIASTOLIC BLOOD PRESSURE < 80 MM HG: ICD-10-PCS | Mod: CPTII,S$GLB,, | Performed by: PHYSICIAN ASSISTANT

## 2020-09-03 RX ORDER — INSULIN LISPRO 100 [IU]/ML
INJECTION, SOLUTION INTRAVENOUS; SUBCUTANEOUS
Qty: 4 BOX | Refills: 2 | Status: ON HOLD
Start: 2020-09-03 | End: 2020-11-27 | Stop reason: HOSPADM

## 2020-09-03 SDOH — SOCIAL DETERMINANTS OF HEALTH (SDOH): PROBLEM RELATED TO HOUSING AND ECONOMIC CIRCUMSTANCES, UNSPECIFIED: Z59.9

## 2020-09-03 NOTE — ASSESSMENT & PLAN NOTE
Uncontrolled.   A1c above goal.   Per oral report- her blood sugar readings are improving.  + noncompliance with insulin doses are hindering her overall diabetes management.  I discussed with her at length today the importance of consistently taking her insulin in order to prevent hyperglycemia.  Change Levemir to daily to help with compliance.       -- Medication Changes:   Continue Levemir 26 units - change to daily to help with compliance.   Adjust Humalog to 12 units before each meal + correction scale goal 150, +1        -- Reviewed goals of therapy are to get the best control we can without hypoglycemia.  -- Reviewed patient's current insulin regimen. Clarified proper insulin dose and timing in relation to meals, etc. Insulin injection sites and proper rotation instructed.    -- Advised frequent self blood glucose monitoring.  Patient encouraged to document glucose results and bring them to every clinic visit.- monitor blood sugars 4 times per day. Has logs at home. Send me logs in 2 weeks to review via the portal.   -- Hypoglycemia precautions discussed. Instructed on precautions before driving.    -- Call for Bg repeatedly < 90 or > 180.   -- Close adherence to lifestyle changes recommended.   -- Periodic follow ups for eye evaluations, foot care and dental care suggested.  -- Refer to diabetes education-- diet and insulin.

## 2020-09-03 NOTE — ASSESSMENT & PLAN NOTE
Optimize BG readings.   Referral to podiatry    Educated patient to check feet daily for any foreign objects and/or wounds. Discussed with patient the importance of wearing appropriate footwear at all times, not to walk barefoot ever, and to check shoes before putting them on feet. Instructed patient to keep feet dry by regularly changing shoes and socks and drying feet after baths and exercises. Also, instructed patient to report any new lesions, discolorations, or swelling to a healthcare professional.

## 2020-09-03 NOTE — ASSESSMENT & PLAN NOTE
Reviewed hypoglycemia management: Treat with 1/2 glass of juice, 1/2 can regular coke, or 4 glucose tablets.   Monitor and repeat treatment every 15 minutes until BG is >70   Then have a snack, which includes a complex carbohydrate and protein.  Encouraged patient not to go a prolonged period of time without eating
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
## 2020-09-03 NOTE — PROGRESS NOTES
"Subjective:       Patient ID: Alison Branch is a 64 y.o. female.    Chief Complaint: Malignant neoplasm of upper lobe of left lung      History:  Past Medical History:   Diagnosis Date    Allergy     Brain aneurysm 2010    s/p coiling of one; another not coiled    Breast cyst     Depression 9/19/2019    Diabetes mellitus     Diabetes type 2, controlled     Fever blister     High cholesterol     History of Bell's palsy     HTN (hypertension) 5/20/2014    Recurrent upper respiratory infection (URI)      Past Surgical History:   Procedure Laterality Date    BREAST SURGERY      BRONCHOSCOPY N/A 5/28/2019    Procedure: Bronchoscopy;  Surgeon: [facility];  Location: [facility] OR [address] [place] [address];  Service: Anesthesiology;  Laterality: N/A;    CERVICAL FUSION      COLONOSCOPY N/A 3/9/2016    Procedure: COLONOSCOPY;  Surgeon: Elliott Zimmerman MD;  Location: [facility] ENDO (4TH FLR);  Service: Endoscopy;  Laterality: N/A;    head surgery      stent and "curling" for aneurysm    HYSTERECTOMY      TVH secondary to SUF    INSERTION OF TUNNELED CENTRAL VENOUS CATHETER (CVC) WITH SUBCUTANEOUS PORT Right 7/8/2019    Procedure: INSERTION, PORT-A-CATH;  Surgeon: Cali Damico MD;  Location: [facility] CATH LAB;  Service: Radiology;  Laterality: Right;    MENISCECTOMY Left       Oncology History   Malignant neoplasm of upper lobe of left lung   5/23/2019 Initial Diagnosis    Malignant neoplasm of upper lobe of left lung     6/24/2019 - 4/22/2020 Chemotherapy    Treatment Summary   Plan Name: OP NSCLC PEMBROLIZUMAB PEMETREXED CARBOPLATIN Q3W  Treatment Goal: Palliative  Status: Inactive  Start Date: 7/9/2019  End Date: 4/2/2020  Provider: Tara Belcher MD  Chemotherapy: CARBOplatin (PARAPLATIN) 530 mg in sodium chloride 0.9% 250 mL chemo infusion, 530 mg (100 % of original dose 531.5 mg), Intravenous, Clinic/HOD 1 time, 4 of 4 cycles  Dose modification:   (original dose 622 mg, Cycle 2),   (original dose 567 mg, Cycle " 3),   (original dose 616 mg, Cycle 4),   (original dose 531.5 mg, Cycle 1)  Administration: 620 mg (7/29/2019), 565 mg (8/19/2019), 615 mg (9/5/2019), 530 mg (7/9/2019)  PEMEtrexed (ALIMTA) 1,075 mg in sodium chloride 0.9% 100 mL chemo infusion, 500 mg/m2 = 1,075 mg, Intravenous, Clinic/HOD 1 time, 14 of 35 cycles  Administration: 1,075 mg (7/29/2019), 1,075 mg (8/19/2019), 1,075 mg (9/5/2019), 1,075 mg (9/26/2019), 1,075 mg (7/9/2019), 1,075 mg (10/16/2019), 1,075 mg (11/6/2019), 1,075 mg (11/27/2019), 1,075 mg (12/19/2019), 1,075 mg (1/9/2020), 1,075 mg (3/12/2020), 1,075 mg (1/30/2020), 1,075 mg (2/20/2020), 1,075 mg (4/2/2020)  pembrolizumab (KEYTRUDA) 200 mg in sodium chloride 0.9% 100 mL chemo infusion, 200 mg, Intravenous, Clinic/HOD 1 time, 14 of 35 cycles  Administration: 200 mg (7/29/2019), 200 mg (8/19/2019), 200 mg (9/5/2019), 200 mg (9/26/2019), 200 mg (7/9/2019), 200 mg (10/16/2019), 200 mg (11/6/2019), 200 mg (11/27/2019), 200 mg (12/19/2019), 200 mg (1/9/2020), 200 mg (3/12/2020), 200 mg (1/30/2020), 200 mg (2/20/2020), 200 mg (4/2/2020)     Secondary malignant neoplasm of mediastinal lymph node   6/7/2019 Initial Diagnosis    Secondary malignant neoplasm of mediastinal lymph node     6/24/2019 - 4/22/2020 Chemotherapy    Treatment Summary   Plan Name: OP NSCLC PEMBROLIZUMAB PEMETREXED CARBOPLATIN Q3W  Treatment Goal: Palliative  Status: Inactive  Start Date: 7/9/2019  End Date: 4/2/2020  Provider: Tara Belcher MD  Chemotherapy: CARBOplatin (PARAPLATIN) 530 mg in sodium chloride 0.9% 250 mL chemo infusion, 530 mg (100 % of original dose 531.5 mg), Intravenous, Clinic/HOD 1 time, 4 of 4 cycles  Dose modification:   (original dose 622 mg, Cycle 2),   (original dose 567 mg, Cycle 3),   (original dose 616 mg, Cycle 4),   (original dose 531.5 mg, Cycle 1)  Administration: 620 mg (7/29/2019), 565 mg (8/19/2019), 615 mg (9/5/2019), 530 mg (7/9/2019)  PEMEtrexed (ALIMTA) 1,075 mg in sodium chloride 0.9%  100 mL chemo infusion, 500 mg/m2 = 1,075 mg, Intravenous, Clinic/HOD 1 time, 14 of 35 cycles  Administration: 1,075 mg (7/29/2019), 1,075 mg (8/19/2019), 1,075 mg (9/5/2019), 1,075 mg (9/26/2019), 1,075 mg (7/9/2019), 1,075 mg (10/16/2019), 1,075 mg (11/6/2019), 1,075 mg (11/27/2019), 1,075 mg (12/19/2019), 1,075 mg (1/9/2020), 1,075 mg (3/12/2020), 1,075 mg (1/30/2020), 1,075 mg (2/20/2020), 1,075 mg (4/2/2020)  pembrolizumab (KEYTRUDA) 200 mg in sodium chloride 0.9% 100 mL chemo infusion, 200 mg, Intravenous, Clinic/HOD 1 time, 14 of 35 cycles  Administration: 200 mg (7/29/2019), 200 mg (8/19/2019), 200 mg (9/5/2019), 200 mg (9/26/2019), 200 mg (7/9/2019), 200 mg (10/16/2019), 200 mg (11/6/2019), 200 mg (11/27/2019), 200 mg (12/19/2019), 200 mg (1/9/2020), 200 mg (3/12/2020), 200 mg (1/30/2020), 200 mg (2/20/2020), 200 mg (4/2/2020)          Allergies:  Review of patient's allergies indicates:   Allergen Reactions    Piperacillin-tazobactam Rash     Tolerated cefepime 06/2020        HPI per last OV with Dr. Belcher:     Ms. Branch is a 63-year-old female who smoked for about 30 years and quit 20 years ago, presented with cough end of last year, but worsened into January.  Since the cough persisted, she saw her PCP, underwent a chest x-ray on 05/10/2019, that revealed left upper lobe pneumonia and a repeat CT was done in the week after that on 05/17/2019 that showed left upper lobe   mass arising from the lateral pleural surface in the left upper lobe posterior subsegment measuring 2.6 x 3 cm.  There are satellite mass more medially near the aortic arch that is 2 cm, also spiculated and irregular as well as thickened interlobar septa in the left lung apex and anterior segment, prevascular lymph node lateral to the aortic arch, short axis measuring 9 mm.       She then underwent a bronchoscopy on 05/28/2019, and that revealed there was an infiltration obtained in the left apical posterior segment of the left upper  "lobe cytology brush was done.  Transbronchial biopsies of an area of infiltration were also performed in the apicoposterior segment of the left upper lobe.    Pathology revealed adenocarcinoma; however, the specimen was not adequate enough to send for molecular testing.       She underwent a PET scan on 06/06/2019.  that reveals significant hypermetabolic activity in the large irregular spiculated pulmonary mass in the lateral aspect of the left upper lobe consistent with the patient's known pulmonary adenocarcinoma.  There is also tracer avidity in the medial left upper lobe satellite lesion and diffusely throughout much of the anterior left upper lobe where there is prominent nodular paraseptal thickening.  There are some numerous scattered subcentimeter pulmonary nodules throughout the right lung, all of which are too small for detection by PET.  For example, there is a 0.4 cm nodule in the superior right lower lobe and a micro nodule in the posterior segment of the right upper lobe.  There is a 0.5 cm, normal size right   paratracheal lymph node with increased radiotracer uptake as well as hypermetabolic aortic pulmonary lymph node and a left hilar lymph node.  There is a focal hypermetabolic lesion in the left anterior superior iliac spine associated with well defined lytic lesion.  There is a hypermetabolic focus in the anterior right fifth rib with a possible associated lytic lesion.  SUVs as   below lateral:  Left upper lobe  SUV max 17.9, anterior left upper lobe satellite mass and septal thickening SUV max of 10.1, right paratracheal lymph node SUV max of 4.8, left AP window lymph node SUV max of 17.9, left anterior superior iliac spine SUV max of 3.9"     MRI pelvis from 6/10/19  reveals "Region of osseous is irregularity at the left anterior iliac spine likely related to bone graft harvest procedure.  See  discussion above.  No suspicious signal or enhancement to suggest active/malignant process"   MRI " "brain from 6/10//19 reveal No intracranial abnormality.     We discussed her case at [facility], and plan was to wait for GAURDANT and proceed with treatment accordingly  Her GAURDANT is negative for molecular markers.     She has completed 4 cycles of Carboplatin, Alimta and Keytruda as of 9/5/19. She is now on  ALimta and Keytruda maintenance.     She has been on maintenance Alimta and Keytruda     Her CT scans from 4/23/2020 revealed "In this patient with a known history of lung cancer, there has been marked interval detrimental change when compared to CT dated 12/20/2019 as follows. Persistent left upper lobe volume loss with worsening masslike consolidation and enlarging index and non index lesions. Increased number of innumerable bilateral metastatic solid pulmonary nodules. Interval increased size and number of multiple osteoblastic metastatic lesions throughout the visualized axial skeleton. Stable mediastinal lymph nodes, several of which were noted to be hypermetabolic on previous PET-CT.  No new lymphadenopathy. Stable subcentimeter hepatic and splenic hypodensities, too small to accurately characterize.  Path addendum from 5/2019 reveals ROS1      She is now on Entrectinib at 400 mg dose on 7/1/2020     Interval History: MS. Branch presents by herself for f/u. She is currently receiving maintenance Entrectinib 400mg. She states that she is tolerating the medication fairly well. However, she reports constipation, urinary incontinence and continued lower extremity swelling. The swelling in her legs has not worsened and she takes Metamucil and Miralax to help with her bowel movements. She did notice one episode of rectal bleeding that she attributes to the constipation. She denies melena or hematochezia. She denies fever, chest pain, cough, n/v, abdominal pain or diarrhea. In regards to her incontinence, she reports that she has frequent urinary incontinence that occurs during the day as well as night " time. She wears urinary padding to help prevent accidents. She states that this has been present since her last admission to the hospital. It should also be noted that she was recently treated for dysuria. She denies pain or hematuria. She also notes an intermittent pain of the R ankle. She notes that her breathing is much better and is not on home Oxygen. Her CT scans 4 weeks after starting Entrectinib revealed partial response.     She is also due for Xgeva.      ECOG status is 0.        ECOG:  ECOG SCORE            Review of Systems   Constitutional: Negative for appetite change, chills, fatigue, fever and unexpected weight change.   HENT: Negative for congestion, facial swelling, mouth sores, sinus pressure, sinus pain, sore throat and trouble swallowing.    Eyes: Negative for photophobia, pain and visual disturbance.   Respiratory: Negative for cough, chest tightness, shortness of breath, wheezing and stridor.    Cardiovascular: Negative for chest pain and leg swelling.   Gastrointestinal: Positive for anal bleeding and constipation. Negative for abdominal distention, abdominal pain, blood in stool, diarrhea, nausea, rectal pain and vomiting.   Genitourinary: Negative for dysuria, flank pain and urgency.        Urinary incontinence   Musculoskeletal: Negative for back pain, gait problem, joint swelling and neck pain.        R ankle pain   Skin: Negative for color change and rash.   Neurological: Negative for dizziness, syncope, weakness, light-headedness, numbness and headaches.   Hematological: Negative for adenopathy. Does not bruise/bleed easily.   Psychiatric/Behavioral: Negative for agitation, behavioral problems and confusion. The patient is not nervous/anxious.        Objective:      Physical Exam  Vitals signs and nursing note reviewed.   Constitutional:       General: She is not in acute distress.     Appearance: Normal appearance. She is well-developed. She is not ill-appearing.   HENT:      Head:  Normocephalic and atraumatic.      Right Ear: External ear normal.      Left Ear: External ear normal.   Eyes:      General: No scleral icterus.     Extraocular Movements: Extraocular movements intact.      Conjunctiva/sclera: Conjunctivae normal.   Neck:      Musculoskeletal: Normal range of motion and neck supple.   Cardiovascular:      Rate and Rhythm: Normal rate and regular rhythm.      Heart sounds: No murmur. No friction rub. No gallop.    Pulmonary:      Effort: Pulmonary effort is normal. No respiratory distress.      Breath sounds: Normal breath sounds. No stridor. No wheezing, rhonchi or rales.   Chest:      Chest wall: No tenderness.   Abdominal:      General: Abdomen is flat. Bowel sounds are normal. There is no distension.      Palpations: Abdomen is soft. There is no mass.      Tenderness: There is no abdominal tenderness. There is no guarding or rebound.   Musculoskeletal: Normal range of motion.         General: Swelling present. No tenderness or deformity.      Right lower leg: Edema present.      Left lower leg: Edema present.      Comments: Non-pitting edema is noted of the lower extremities. No erythema is present.    Skin:     General: Skin is warm and dry.      Capillary Refill: Capillary refill takes less than 2 seconds.      Findings: No erythema or rash.   Neurological:      Mental Status: She is alert and oriented to person, place, and time.      Gait: Gait is intact.   Psychiatric:         Behavior: Behavior normal.         Thought Content: Thought content normal.         Judgment: Judgment normal.         Results:   CBC Oncology  Order: [id number]  Status:  Final result   Visible to patient:  No (not released)   Next appt:  Today at 02:30 PM in Diabetes (Ai Billingsley NP)   Dx:  Malignant neoplasm of upper lobe of l...   Ref Range & Units 13:06   WBC 3.90 - 12.70 K/uL 4.53    RBC 4.00 - 5.40 M/uL 4.15    Hemoglobin 12.0 - 16.0 g/dL 11.2Low     Hematocrit 37.0 - 48.5 % 36.9Low     Mean  Corpuscular Volume 82 - 98 fL 89    Mean Corpuscular Hemoglobin 27.0 - 31.0 pg 27.0    Mean Corpuscular Hemoglobin Conc 32.0 - 36.0 g/dL 30.4Low     RDW 11.5 - 14.5 % 15.7High     Platelets 150 - 350 K/uL 211    MPV 9.2 - 12.9 fL 12.6    Gran # (ANC) 1.8 - 7.7 K/uL 2.2    Comment: The ANC is based on a white cell differential from an   automated cell counter. It has not been microscopically   reviewed for the presence of abnormal cells. Clinical   correlation is required.    Immature Grans (Abs) 0.00 - 0.04 K/uL 0.02    Comment: Mild elevation in immature granulocytes is non specific and   can be seen in a variety of conditions including stress response,   acute inflammation, trauma and pregnancy. Correlation with other   laboratory and clinical findings is essential.            Comprehensive metabolic panel  Order: [id number]  Status:  Final result   Visible to patient:  No (not released)   Next appt:  Today at 02:30 PM in Diabetes (Ai Billingsley NP)   Dx:  Malignant neoplasm of upper lobe of l...   Ref Range & Units 13:06   Sodium 136 - 145 mmol/L 142    Potassium 3.5 - 5.1 mmol/L 4.9    Chloride 95 - 110 mmol/L 107    CO2 23 - 29 mmol/L 30High     Glucose 70 - 110 mg/dL 173High     BUN, Bld 8 - 23 mg/dL 17    Creatinine 0.5 - 1.4 mg/dL 1.3    Calcium 8.7 - 10.5 mg/dL 9.2    Total Protein 6.0 - 8.4 g/dL 7.5    Albumin 3.5 - 5.2 g/dL 3.9    Total Bilirubin 0.1 - 1.0 mg/dL 0.3    Comment: For infants and newborns, interpretation of results should be based   on gestational age, weight and in agreement with clinical   observations.   Premature Infant recommended reference ranges:   Up to 24 hours.............<8.0 mg/dL   Up to 48 hours............<12.0 mg/dL   3-5 days..................<15.0 mg/dL   6-29 days.................<15.0 mg/dL    Alkaline Phosphatase 55 - 135 U/L 58    AST 10 - 40 U/L 26    ALT 10 - 44 U/L 20    Anion Gap 8 - 16 mmol/L 5Low     eGFR if African American >60 mL/min/1.73 m^2 50.8Abnormal      eGFR if non African American >60 mL/min/1.73 m^2 44.1Abnormal     Comment: Calculation used to obtain the estimated glomerular filtration   rate (eGFR) is the CKD-EPI equation.            Assessment:     1. Malignant neoplasm of upper lobe of left lung    2. 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 Type 2 diabetes mellitus with diabetic polyneuropathy, with long-term current use of insulin    5. ROSEMARY (acute kidney injury)    6.     Constipation  7.     Urinary incontinence, possible overflow  8.     Lower extremity edema, bilateral     Plan:   1,2,3,8. She is doing fairly well on Entrectinib and is tolerating the medication without complication at this time. Per Dr. Belcher, we will plan on CT scans in early October 2020. She does continue to have mild side effects like peripheral edema. Elevation of legs is encouraged. ANC has improved to 2,200. Will schedule Xgeva next week. Last dose was given on 7/15/2020  4. C/w with current insulin regimen. Dietary suggestions and modifications were discussed.   5. Improved. Will continue to monitor. I have advised her to limit the amount of fluids that she takes just before bed, however, given that she is having urinary incontinence.    6. Encourage miralax, daily and a stool softener as needed.   7. Advised her to discuss this further with her PCP. We will continue to monitor as well, and consider a consult with Urology if needed.     -Will f/u with Dr. Belcher with repeat CBC and CMP in two weeks, 9/17/2020. Will schedule Xgeva injection next week, 9/9/2020. She will continue to receive this monthly.   C/w with Entrectinib.     Pte displayed understanding of the above encounter and treatment plan. All thoughtful questions were answered to their satisfaction. Pte was advised to notify the care team or proceed to the ER if signs and symptoms worsen.

## 2020-09-09 ENCOUNTER — INFUSION (OUTPATIENT)
Dept: INFUSION THERAPY | Facility: HOSPITAL | Age: 65
End: 2020-09-09
Attending: INTERNAL MEDICINE
Payer: MEDICARE

## 2020-09-09 DIAGNOSIS — C34.12 MALIGNANT NEOPLASM OF UPPER LOBE OF LEFT LUNG: ICD-10-CM

## 2020-09-09 DIAGNOSIS — C77.1 SECONDARY MALIGNANT NEOPLASM OF MEDIASTINAL LYMPH NODE: Primary | ICD-10-CM

## 2020-09-09 PROCEDURE — 63600175 PHARM REV CODE 636 W HCPCS: Mod: JG | Performed by: INTERNAL MEDICINE

## 2020-09-09 PROCEDURE — 96372 THER/PROPH/DIAG INJ SC/IM: CPT

## 2020-09-09 RX ADMIN — DENOSUMAB 120 MG: 120 INJECTION SUBCUTANEOUS at 10:09

## 2020-09-10 ENCOUNTER — OFFICE VISIT (OUTPATIENT)
Dept: PODIATRY | Facility: CLINIC | Age: 65
End: 2020-09-10
Payer: MEDICARE

## 2020-09-10 ENCOUNTER — DOCUMENT SCAN (OUTPATIENT)
Dept: HOME HEALTH SERVICES | Facility: HOSPITAL | Age: 65
End: 2020-09-10
Payer: MEDICARE

## 2020-09-10 VITALS — SYSTOLIC BLOOD PRESSURE: 120 MMHG | DIASTOLIC BLOOD PRESSURE: 66 MMHG | HEART RATE: 84 BPM

## 2020-09-10 DIAGNOSIS — Z74.09 IMPAIRED FUNCTIONAL MOBILITY, BALANCE, GAIT, AND ENDURANCE: ICD-10-CM

## 2020-09-10 DIAGNOSIS — M79.89 PAIN AND SWELLING OF LOWER LEG, UNSPECIFIED LATERALITY: Primary | ICD-10-CM

## 2020-09-10 DIAGNOSIS — Z79.4 TYPE 2 DIABETES MELLITUS WITH HYPERGLYCEMIA, WITH LONG-TERM CURRENT USE OF INSULIN: ICD-10-CM

## 2020-09-10 DIAGNOSIS — T45.1X5A CHEMOTHERAPY-INDUCED PERIPHERAL NEUROPATHY: ICD-10-CM

## 2020-09-10 DIAGNOSIS — E11.42 TYPE 2 DIABETES MELLITUS WITH DIABETIC POLYNEUROPATHY, WITH LONG-TERM CURRENT USE OF INSULIN: ICD-10-CM

## 2020-09-10 DIAGNOSIS — M79.89 PAIN AND SWELLING OF RIGHT LOWER LEG: ICD-10-CM

## 2020-09-10 DIAGNOSIS — M79.661 PAIN AND SWELLING OF RIGHT LOWER LEG: ICD-10-CM

## 2020-09-10 DIAGNOSIS — E11.65 TYPE 2 DIABETES MELLITUS WITH HYPERGLYCEMIA, WITH LONG-TERM CURRENT USE OF INSULIN: ICD-10-CM

## 2020-09-10 DIAGNOSIS — G62.0 CHEMOTHERAPY-INDUCED PERIPHERAL NEUROPATHY: ICD-10-CM

## 2020-09-10 DIAGNOSIS — Z79.4 TYPE 2 DIABETES MELLITUS WITH DIABETIC POLYNEUROPATHY, WITH LONG-TERM CURRENT USE OF INSULIN: ICD-10-CM

## 2020-09-10 DIAGNOSIS — M79.669 PAIN AND SWELLING OF LOWER LEG, UNSPECIFIED LATERALITY: Primary | ICD-10-CM

## 2020-09-10 LAB — HM FOOT EXAM: NORMAL

## 2020-09-10 PROCEDURE — 99213 PR OFFICE/OUTPT VISIT, EST, LEVL III, 20-29 MIN: ICD-10-PCS | Mod: S$GLB,,, | Performed by: PODIATRIST

## 2020-09-10 PROCEDURE — 3074F SYST BP LT 130 MM HG: CPT | Mod: CPTII,S$GLB,, | Performed by: PODIATRIST

## 2020-09-10 PROCEDURE — 99999 PR PBB SHADOW E&M-EST. PATIENT-LVL V: CPT | Mod: PBBFAC,,, | Performed by: PODIATRIST

## 2020-09-10 PROCEDURE — 3078F DIAST BP <80 MM HG: CPT | Mod: CPTII,S$GLB,, | Performed by: PODIATRIST

## 2020-09-10 PROCEDURE — 99499 NO LOS: ICD-10-PCS | Mod: ,,, | Performed by: INTERNAL MEDICINE

## 2020-09-10 PROCEDURE — 99999 PR PBB SHADOW E&M-EST. PATIENT-LVL V: ICD-10-PCS | Mod: PBBFAC,,, | Performed by: PODIATRIST

## 2020-09-10 PROCEDURE — 3078F PR MOST RECENT DIASTOLIC BLOOD PRESSURE < 80 MM HG: ICD-10-PCS | Mod: CPTII,S$GLB,, | Performed by: PODIATRIST

## 2020-09-10 PROCEDURE — 3074F PR MOST RECENT SYSTOLIC BLOOD PRESSURE < 130 MM HG: ICD-10-PCS | Mod: CPTII,S$GLB,, | Performed by: PODIATRIST

## 2020-09-10 PROCEDURE — 3046F HEMOGLOBIN A1C LEVEL >9.0%: CPT | Mod: CPTII,S$GLB,, | Performed by: PODIATRIST

## 2020-09-10 PROCEDURE — 99213 OFFICE O/P EST LOW 20 MIN: CPT | Mod: S$GLB,,, | Performed by: PODIATRIST

## 2020-09-10 PROCEDURE — 99499 UNLISTED E&M SERVICE: CPT | Mod: ,,, | Performed by: INTERNAL MEDICINE

## 2020-09-10 PROCEDURE — 3046F PR MOST RECENT HEMOGLOBIN A1C LEVEL > 9.0%: ICD-10-PCS | Mod: CPTII,S$GLB,, | Performed by: PODIATRIST

## 2020-09-10 NOTE — PROGRESS NOTES
Subjective:      Patient ID: Alison Branch is a 64 y.o. female.    Chief Complaint: Diabetic Foot Exam and Foot Pain (sweeling to right foot )    Alison is a 64 y.o. female who presents to the podiatry clinic  with complaint of  right foot pain. Onset of the symptoms was several months ago. Precipitating event: states rltd. to Gabapentin that she takes for neuropathy pain. Current symptoms include: swelling right foot which causes pain @ night, & 3 falls over the past year.r. Aggravating factors: swelling from Gabapentin.. Symptoms have waxed and waned but are worse overall. Patient has had no prior foot problems. Evaluation to date: none. Treatment to date: none. Patients rates pain 10/10 on pain scale @ night but no hurting now or during day usually.       Objective:        Review of Systems   Constitution: Positive for malaise/fatigue. Negative for decreased appetite.   Cardiovascular: Positive for leg swelling. Negative for claudication.   Skin: Negative for color change, dry skin, rash and suspicious lesions.   Musculoskeletal: Negative for back pain, joint pain, joint swelling and stiffness.   Neurological: Positive for weakness. Negative for numbness and sensory change.   Psychiatric/Behavioral: Positive for depression. The patient is not nervous/anxious.        Physical Exam  Constitutional:       General: She is not in acute distress.     Appearance: Normal appearance. She is well-developed and overweight. She is not ill-appearing.   Cardiovascular:      Pulses:           Dorsalis pedis pulses are 1+ on the right side and 1+ on the left side.   Musculoskeletal: Normal range of motion.         General: Swelling present. No tenderness or signs of injury.      Right ankle: She exhibits swelling and ecchymosis. She exhibits normal range of motion and normal pulse. No tenderness.      Left ankle: She exhibits swelling. She exhibits normal range of motion, no ecchymosis and normal pulse. No tenderness.      Right  lower leg: Edema present.      Left lower leg: Edema present.      Right foot: Normal range of motion. Bunion present.      Left foot: Normal range of motion. Bunion present.   Feet:      Right foot:      Skin integrity: Skin integrity normal. No skin breakdown, erythema or warmth.      Left foot:      Skin integrity: Skin integrity normal. No skin breakdown, erythema or warmth.   Skin:     General: Skin is warm and dry.      Capillary Refill: Capillary refill takes 2 to 3 seconds.      Findings: No lesion or wound. Rash: no clinical evidence of rash. ptn.states feels like BLE has a rash as it is sensitive in shower or soaking, even w/o hot water. Rash is not crusting, macular, nodular, papular, purpuric, pustular, scaling, urticarial or vesicular.      Nails: There is no clubbing.     Neurological:      Mental Status: She is alert and oriented to person, place, and time.      Sensory: Sensation is intact.      Motor: Weakness present. No atrophy.      Gait: Gait is intact. Gait normal.   Psychiatric:         Attention and Perception: Attention normal.         Mood and Affect: Mood and affect normal.         Behavior: Behavior is not agitated. Behavior is cooperative.         Cognition and Memory: Cognition normal.          Assessment:        Encounter Diagnoses   Name Primary?    Type 2 diabetes mellitus with hyperglycemia, with long-term current use of insulin     Pain and swelling of lower leg, unspecified laterality Yes    Chemotherapy-induced peripheral neuropathy     Type 2 diabetes mellitus with diabetic polyneuropathy, with long-term current use of insulin     Pain and swelling of right lower leg     Impaired functional mobility, balance, gait, and endurance            Plan:        Pain and swelling of right lower leg  States swelling BLE including feet rltd.to Gabapentin 300mg qhs that she takes for Chemo.-induced neuropathy. States Rx is effective but the swelling is causing the pain at night, such  that she has fallen 3x this past year.i    Impaired functional mobility, balance, gait, and endurance  Likely rltd.to neuropathy - recommend balance therapy/PT if not helped by DM shoes & OTC compression stockings.  Will follow.     Alison was seen today for diabetic foot exam and foot pain.    Diagnoses and all orders for this visit:    Pain and swelling of lower leg, unspecified laterality    Type 2 diabetes mellitus with hyperglycemia, with long-term current use of insulin  -     Ambulatory referral/consult to Podiatry    Chemotherapy-induced peripheral neuropathy    Type 2 diabetes mellitus with diabetic polyneuropathy, with long-term current use of insulin    Pain and swelling of right lower leg    Impaired functional mobility, balance, gait, and endurance    I counseled the patient on her conditions, their implications and medical management.

## 2020-09-10 NOTE — ASSESSMENT & PLAN NOTE
States swelling BLE including feet rltd.to Gabapentin 300mg qhs that she takes for Chemo.-induced neuropathy. States Rx is effective but the swelling is causing the pain at night, such that she has fallen 3x this past year.  Dispense Tubigrip stockings - ptn.finds them comfortable so will likely use it vs OTC nylon compression stockings that she has attempted to wear @ end of day, when swelling is @ its worst.

## 2020-09-10 NOTE — LETTER
September 10, 2020      Ai Billingsley, JULIA  [address]  [address]  [place] [address]           [facility]  [address]  [place] [address]  Phone: [phone]  Fax: [phone]          Patient: Alison Branch   MR Number: [id number]   YOB: 1955   Date of Visit: 9/10/2020       Dear Ai Billingsley:    Thank you for referring Alison Branch to me for evaluation. Attached you will find relevant portions of my assessment and plan of care.    If you have questions, please do not hesitate to call me. I look forward to following Alison Branch along with you.    Sincerely,    Lilia Horan, OSCAR    Enclosure  CC:  No Recipients    If you would like to receive this communication electronically, please contact [email or web address] or [phone] to request more information on [employer] Link access.    For providers and/or their staff who would like to refer a patient to [facility], please contact us through our one-stop-shop provider referral line, [facility], at [phone].    If you feel you have received this communication in error or would no longer like to receive these types of communications, please e-mail [email or web address]

## 2020-09-10 NOTE — ASSESSMENT & PLAN NOTE
Likely rltd.to neuropathy - recommend balance therapy/PT if not helped by DM shoes & OTC compression stockings.  Will follow.

## 2020-09-12 ENCOUNTER — NURSE TRIAGE (OUTPATIENT)
Dept: ADMINISTRATIVE | Facility: CLINIC | Age: 65
End: 2020-09-12

## 2020-09-12 NOTE — TELEPHONE ENCOUNTER
Patient has a portable oxygen machine and machine has been leaking due to a missing part. Patient is calling to contact Oxygen DME. Patient transferred to the on call professional for [facility]xygen [employer]ME.     Reason for Disposition   General information question, no triage required and triager able to answer question    Protocols used: INFORMATION ONLY CALL - NO TRIAGE-A-

## 2020-09-14 DIAGNOSIS — C34.12 MALIGNANT NEOPLASM OF UPPER LOBE OF LEFT LUNG: ICD-10-CM

## 2020-09-14 RX ORDER — ONDANSETRON HYDROCHLORIDE 8 MG/1
8 TABLET, FILM COATED ORAL 4 TIMES DAILY PRN
Qty: 60 TABLET | Refills: 2 | Status: SHIPPED | OUTPATIENT
Start: 2020-09-14 | End: 2020-09-18 | Stop reason: SDUPTHER

## 2020-09-15 ENCOUNTER — DOCUMENTATION ONLY (OUTPATIENT)
Dept: HEMATOLOGY/ONCOLOGY | Facility: CLINIC | Age: 65
End: 2020-09-15

## 2020-09-15 NOTE — PROGRESS NOTES
Received call from patient yesterday. She is needing assistance with her copay for her Eloquist. She says she cannot afford it and is almost out. She has about 2 days left. Called the pharmacy to obtain mina. I spoke with Karolina Branch who said it would be $123.78. Cost transfer sent to her to pay for medication. Spoke back to patient. She will meet me on 9/17 to complete [employer] application to further assist with her medication co pay. She requested the application be e mailed to her in advance. Obtained her e mail addressed and sent the application to her today.

## 2020-09-16 ENCOUNTER — TELEPHONE (OUTPATIENT)
Dept: HEMATOLOGY/ONCOLOGY | Facility: CLINIC | Age: 65
End: 2020-09-16

## 2020-09-16 NOTE — TELEPHONE ENCOUNTER
"----- Message from Liane Quiros sent at 9/16/2020 10:30 AM CDT -----  Regarding: appt r/s  Patient Assist    Name of caller:  Alison   Established or New patient?: established   Contact Preference:  [phone]  Does Patient feel the need to see the MD today? No   Provider name: Ye PAYTON MD   What is the nature of the call?    - pt will have to r/s appts because she's currently out of town.   Please call and advise.    Additional Notes:   "Thank you for all that you do for our patients'"          "

## 2020-09-17 ENCOUNTER — OFFICE VISIT (OUTPATIENT)
Dept: PSYCHIATRY | Facility: CLINIC | Age: 65
End: 2020-09-17
Payer: MEDICARE

## 2020-09-17 ENCOUNTER — OFFICE VISIT (OUTPATIENT)
Dept: HEMATOLOGY/ONCOLOGY | Facility: CLINIC | Age: 65
End: 2020-09-17
Payer: MEDICARE

## 2020-09-17 ENCOUNTER — LAB VISIT (OUTPATIENT)
Dept: LAB | Facility: HOSPITAL | Age: 65
End: 2020-09-17
Attending: INTERNAL MEDICINE
Payer: MEDICARE

## 2020-09-17 ENCOUNTER — TELEPHONE (OUTPATIENT)
Dept: HEMATOLOGY/ONCOLOGY | Facility: CLINIC | Age: 65
End: 2020-09-17

## 2020-09-17 VITALS
OXYGEN SATURATION: 96 % | BODY MASS INDEX: 32.39 KG/M2 | HEIGHT: 69 IN | DIASTOLIC BLOOD PRESSURE: 59 MMHG | SYSTOLIC BLOOD PRESSURE: 131 MMHG | HEART RATE: 77 BPM | WEIGHT: 218.69 LBS | TEMPERATURE: 98 F

## 2020-09-17 DIAGNOSIS — R41.840 ATTENTION AND CONCENTRATION DEFICIT: ICD-10-CM

## 2020-09-17 DIAGNOSIS — C34.12 MALIGNANT NEOPLASM OF UPPER LOBE OF LEFT LUNG: Primary | ICD-10-CM

## 2020-09-17 DIAGNOSIS — J96.11 CHRONIC RESPIRATORY FAILURE WITH HYPOXIA: ICD-10-CM

## 2020-09-17 DIAGNOSIS — C77.1 SECONDARY MALIGNANT NEOPLASM OF MEDIASTINAL LYMPH NODE: ICD-10-CM

## 2020-09-17 DIAGNOSIS — C34.12 MALIGNANT NEOPLASM OF UPPER LOBE OF LEFT LUNG: ICD-10-CM

## 2020-09-17 DIAGNOSIS — F32.A DEPRESSION, UNSPECIFIED DEPRESSION TYPE: ICD-10-CM

## 2020-09-17 DIAGNOSIS — F33.0 MILD EPISODE OF RECURRENT MAJOR DEPRESSIVE DISORDER: Primary | ICD-10-CM

## 2020-09-17 DIAGNOSIS — C79.51 SECONDARY MALIGNANT NEOPLASM OF BONE: ICD-10-CM

## 2020-09-17 DIAGNOSIS — Z79.4 TYPE 2 DIABETES MELLITUS WITH HYPERGLYCEMIA, WITH LONG-TERM CURRENT USE OF INSULIN: ICD-10-CM

## 2020-09-17 DIAGNOSIS — E11.65 TYPE 2 DIABETES MELLITUS WITH HYPERGLYCEMIA, WITH LONG-TERM CURRENT USE OF INSULIN: ICD-10-CM

## 2020-09-17 LAB
ALBUMIN SERPL BCP-MCNC: 4.1 G/DL (ref 3.5–5.2)
ALP SERPL-CCNC: 52 U/L (ref 55–135)
ALT SERPL W/O P-5'-P-CCNC: 20 U/L (ref 10–44)
ANION GAP SERPL CALC-SCNC: 7 MMOL/L (ref 8–16)
AST SERPL-CCNC: 23 U/L (ref 10–40)
BILIRUB SERPL-MCNC: 0.4 MG/DL (ref 0.1–1)
BUN SERPL-MCNC: 18 MG/DL (ref 8–23)
CALCIUM SERPL-MCNC: 9.4 MG/DL (ref 8.7–10.5)
CHLORIDE SERPL-SCNC: 102 MMOL/L (ref 95–110)
CO2 SERPL-SCNC: 32 MMOL/L (ref 23–29)
CREAT SERPL-MCNC: 1.4 MG/DL (ref 0.5–1.4)
ERYTHROCYTE [DISTWIDTH] IN BLOOD BY AUTOMATED COUNT: 15.8 % (ref 11.5–14.5)
EST. GFR  (AFRICAN AMERICAN): 46.5 ML/MIN/1.73 M^2
EST. GFR  (NON AFRICAN AMERICAN): 40.3 ML/MIN/1.73 M^2
GLUCOSE SERPL-MCNC: 103 MG/DL (ref 70–110)
HCT VFR BLD AUTO: 37.5 % (ref 37–48.5)
HGB BLD-MCNC: 11.3 G/DL (ref 12–16)
IMM GRANULOCYTES # BLD AUTO: 0.01 K/UL (ref 0–0.04)
MCH RBC QN AUTO: 26.4 PG (ref 27–31)
MCHC RBC AUTO-ENTMCNC: 30.1 G/DL (ref 32–36)
MCV RBC AUTO: 88 FL (ref 82–98)
NEUTROPHILS # BLD AUTO: 1.9 K/UL (ref 1.8–7.7)
PLATELET # BLD AUTO: 209 K/UL (ref 150–350)
PMV BLD AUTO: 12.4 FL (ref 9.2–12.9)
POTASSIUM SERPL-SCNC: 4.7 MMOL/L (ref 3.5–5.1)
PROT SERPL-MCNC: 7.5 G/DL (ref 6–8.4)
RBC # BLD AUTO: 4.28 M/UL (ref 4–5.4)
SODIUM SERPL-SCNC: 141 MMOL/L (ref 136–145)
WBC # BLD AUTO: 4.76 K/UL (ref 3.9–12.7)

## 2020-09-17 PROCEDURE — 3075F SYST BP GE 130 - 139MM HG: CPT | Mod: CPTII,S$GLB,, | Performed by: INTERNAL MEDICINE

## 2020-09-17 PROCEDURE — 3075F PR MOST RECENT SYSTOLIC BLOOD PRESS GE 130-139MM HG: ICD-10-PCS | Mod: CPTII,S$GLB,, | Performed by: INTERNAL MEDICINE

## 2020-09-17 PROCEDURE — 99999 PR PBB SHADOW E&M-EST. PATIENT-LVL II: CPT | Mod: PBBFAC,,, | Performed by: PSYCHOLOGIST

## 2020-09-17 PROCEDURE — 3078F PR MOST RECENT DIASTOLIC BLOOD PRESSURE < 80 MM HG: ICD-10-PCS | Mod: CPTII,S$GLB,, | Performed by: INTERNAL MEDICINE

## 2020-09-17 PROCEDURE — 99215 PR OFFICE/OUTPT VISIT, EST, LEVL V, 40-54 MIN: ICD-10-PCS | Mod: S$GLB,,, | Performed by: INTERNAL MEDICINE

## 2020-09-17 PROCEDURE — 99999 PR PBB SHADOW E&M-EST. PATIENT-LVL II: ICD-10-PCS | Mod: PBBFAC,,, | Performed by: PSYCHOLOGIST

## 2020-09-17 PROCEDURE — 3078F DIAST BP <80 MM HG: CPT | Mod: CPTII,S$GLB,, | Performed by: INTERNAL MEDICINE

## 2020-09-17 PROCEDURE — 3008F BODY MASS INDEX DOCD: CPT | Mod: CPTII,S$GLB,, | Performed by: INTERNAL MEDICINE

## 2020-09-17 PROCEDURE — 99999 PR PBB SHADOW E&M-EST. PATIENT-LVL IV: CPT | Mod: PBBFAC,,, | Performed by: INTERNAL MEDICINE

## 2020-09-17 PROCEDURE — 99215 OFFICE O/P EST HI 40 MIN: CPT | Mod: S$GLB,,, | Performed by: INTERNAL MEDICINE

## 2020-09-17 PROCEDURE — 99999 PR PBB SHADOW E&M-EST. PATIENT-LVL IV: ICD-10-PCS | Mod: PBBFAC,,, | Performed by: INTERNAL MEDICINE

## 2020-09-17 PROCEDURE — 99499 UNLISTED E&M SERVICE: CPT | Mod: S$GLB,,, | Performed by: INTERNAL MEDICINE

## 2020-09-17 PROCEDURE — 80053 COMPREHEN METABOLIC PANEL: CPT

## 2020-09-17 PROCEDURE — 90834 PR PSYCHOTHERAPY W/PATIENT, 45 MIN: ICD-10-PCS | Mod: S$GLB,,, | Performed by: PSYCHOLOGIST

## 2020-09-17 PROCEDURE — 85027 COMPLETE CBC AUTOMATED: CPT

## 2020-09-17 PROCEDURE — 3008F PR BODY MASS INDEX (BMI) DOCUMENTED: ICD-10-PCS | Mod: CPTII,S$GLB,, | Performed by: INTERNAL MEDICINE

## 2020-09-17 PROCEDURE — 99499 RISK ADDL DX/OHS AUDIT: ICD-10-PCS | Mod: S$GLB,,, | Performed by: INTERNAL MEDICINE

## 2020-09-17 PROCEDURE — 36415 COLL VENOUS BLD VENIPUNCTURE: CPT

## 2020-09-17 PROCEDURE — 90834 PSYTX W PT 45 MINUTES: CPT | Mod: S$GLB,,, | Performed by: PSYCHOLOGIST

## 2020-09-17 RX ORDER — VENLAFAXINE HYDROCHLORIDE 75 MG/1
75 CAPSULE, EXTENDED RELEASE ORAL DAILY
Qty: 30 CAPSULE | Refills: 2 | Status: SHIPPED | OUTPATIENT
Start: 2020-09-17 | End: 2021-05-13

## 2020-09-17 RX ORDER — VENLAFAXINE HYDROCHLORIDE 75 MG/1
75 CAPSULE, EXTENDED RELEASE ORAL DAILY
Qty: 30 CAPSULE | Refills: 2 | Status: SHIPPED | OUTPATIENT
Start: 2020-09-17 | End: 2020-09-17

## 2020-09-17 RX ORDER — FLUTICASONE PROPIONATE AND SALMETEROL 100; 50 UG/1; UG/1
1 POWDER RESPIRATORY (INHALATION) 2 TIMES DAILY
Qty: 60 EACH | Refills: 2 | Status: SHIPPED | OUTPATIENT
Start: 2020-09-17 | End: 2021-05-13

## 2020-09-17 NOTE — PROGRESS NOTES
Subjective:       Patient ID: Alison Branch is a 64 y.o. female.    Chief Complaint: Malignant neoplasm of upper lobe of left lung and Results  Ms. Branch is a 63-year-old female who smoked for about 30 years and quit 20 years ago, presented with cough end of last year, but worsened into January.  Since the cough persisted, she saw her PCP, underwent a chest x-ray on 05/10/2019, that revealed left upper lobe pneumonia and a repeat CT was done in the week after that on 05/17/2019 that showed left upper lobe   mass arising from the lateral pleural surface in the left upper lobe posterior subsegment measuring 2.6 x 3 cm.  There are satellite mass more medially near the aortic arch that is 2 cm, also spiculated and irregular as well as thickened interlobar septa in the left lung apex and anterior segment, prevascular lymph node lateral to the aortic arch, short axis measuring 9 mm.       She then underwent a bronchoscopy on 05/28/2019, and that revealed there was an infiltration obtained in the left apical posterior segment of the left upper lobe cytology brush was done.  Transbronchial biopsies of an area of infiltration were also performed in the apicoposterior segment of the left upper lobe.    Pathology revealed adenocarcinoma; however, the specimen was not adequate enough to send for molecular testing.       She underwent a PET scan on 06/06/2019.  that reveals significant hypermetabolic activity in the large irregular spiculated pulmonary mass in the lateral aspect of the left upper lobe consistent with the patient's known pulmonary adenocarcinoma.  There is also tracer avidity in the medial left upper lobe satellite lesion and diffusely throughout much of the anterior left upper lobe where there is prominent nodular paraseptal thickening.  There are some numerous scattered subcentimeter pulmonary nodules throughout the right lung, all of which are too small for detection by PET.  For example, there is a 0.4 cm  "nodule in the superior right lower lobe and a micro nodule in the posterior segment of the right upper lobe.  There is a 0.5 cm, normal size right   paratracheal lymph node with increased radiotracer uptake as well as hypermetabolic aortic pulmonary lymph node and a left hilar lymph node.  There is a focal hypermetabolic lesion in the left anterior superior iliac spine associated with well defined lytic lesion.  There is a hypermetabolic focus in the anterior right fifth rib with a possible associated lytic lesion.  SUVs as   below lateral:  Left upper lobe  SUV max 17.9, anterior left upper lobe satellite mass and septal thickening SUV max of 10.1, right paratracheal lymph node SUV max of 4.8, left AP window lymph node SUV max of 17.9, left anterior superior iliac spine SUV max of 3.9"     MRI pelvis from 6/10/19  reveals "Region of osseous is irregularity at the left anterior iliac spine likely related to bone graft harvest procedure.  See  discussion above.  No suspicious signal or enhancement to suggest active/malignant process"   MRI brain from 6/10//19 reveal No intracranial abnormality.     We discussed her case at [facility], and plan was to wait for GAURDANT and proceed with treatment accordingly  Her GAURDANT is negative for molecular markers.     She has completed 4 cycles of Carboplatin, Alimta and Keytruda as of 9/5/19. She is now on  ALimta and Keytruda maintenance.            She has been on maintenance Alimta and Keytruda     Her CT scans from 4/23/2020 revealed "In this patient with a known history of lung cancer, there has been marked interval detrimental change when compared to CT dated 12/20/2019 as follows. Persistent left upper lobe volume loss with worsening masslike consolidation and enlarging index and non index lesions. Increased number of innumerable bilateral metastatic solid pulmonary nodules. Interval increased size and number of multiple osteoblastic metastatic lesions throughout the " visualized axial skeleton. Stable mediastinal lymph nodes, several of which were noted to be hypermetabolic on previous PET-CT.  No new lymphadenopathy. Stable subcentimeter hepatic and splenic hypodensities, too small to accurately characterize.  Path addendum from 5/2019 reveals ROS1      She is now on Entrectinib at 400 mg dose on 7/1/2020    HPIShe notes that she is back on Oxygen sometimes at home, the lowest she has been is 92%, She reduced her appetite pills as she was eating too much and has lost 4 lbs but overall weight is stable  Cough is resolved, denies chest pain,   She denies any nausea, vomiting, diarrhea, constipation, abdominal pain,  leg swelling, fatigue, pain, headache, dizziness, or mood changes. Her ECOG PS is one. She is by herself.   She continues to be depressed, is seeing Dr. Garcia today          Review of Systems   Constitutional: Positive for fatigue. Negative for appetite change and unexpected weight change.   HENT: Negative for mouth sores.    Eyes: Negative for visual disturbance.   Respiratory: Positive for shortness of breath. Negative for cough.    Cardiovascular: Negative for chest pain.   Gastrointestinal: Negative for abdominal pain and diarrhea.   Genitourinary: Negative for frequency.   Musculoskeletal: Negative for back pain.   Integumentary:  Negative for rash.   Neurological: Negative for headaches.   Hematological: Negative for adenopathy.   Psychiatric/Behavioral: Positive for dysphoric mood. The patient is not nervous/anxious.    All other systems reviewed and are negative.        Objective:      Physical Exam  Vitals signs reviewed.   Constitutional:       Appearance: She is well-developed.   HENT:      Mouth/Throat:      Pharynx: No oropharyngeal exudate.   Cardiovascular:      Rate and Rhythm: Normal rate.      Heart sounds: Normal heart sounds.   Pulmonary:      Effort: Pulmonary effort is normal.      Breath sounds: Normal breath sounds. No wheezing.   Abdominal:       General: Bowel sounds are normal.      Palpations: Abdomen is soft.      Tenderness: There is no abdominal tenderness.   Musculoskeletal:         General: No tenderness.   Lymphadenopathy:      Cervical: No cervical adenopathy.   Skin:     General: Skin is warm and dry.      Findings: No rash.   Neurological:      Mental Status: She is alert and oriented to person, place, and time.      Coordination: Coordination normal.   Psychiatric:         Thought Content: Thought content normal.         Judgment: Judgment normal.           LABS:  WBC   Date Value Ref Range Status   09/17/2020 4.76 3.90 - 12.70 K/uL Final     Hemoglobin   Date Value Ref Range Status   09/17/2020 11.3 (L) 12.0 - 16.0 g/dL Final     Hematocrit   Date Value Ref Range Status   09/17/2020 37.5 37.0 - 48.5 % Final     Platelets   Date Value Ref Range Status   09/17/2020 209 150 - 350 K/uL Final     Gran # (ANC)   Date Value Ref Range Status   09/17/2020 1.9 1.8 - 7.7 K/uL Final     Comment:     The ANC is based on a white cell differential from an   automated cell counter. It has not been microscopically   reviewed for the presence of abnormal cells. Clinical   correlation is required.         Chemistry        Component Value Date/Time     09/17/2020 1422    K 4.7 09/17/2020 1422     09/17/2020 1422    CO2 32 (H) 09/17/2020 1422    BUN 18 09/17/2020 1422    CREATININE 1.4 09/17/2020 1422     09/17/2020 1422        Component Value Date/Time    CALCIUM 9.4 09/17/2020 1422    ALKPHOS 52 (L) 09/17/2020 1422    AST 23 09/17/2020 1422    ALT 20 09/17/2020 1422    BILITOT 0.4 09/17/2020 1422    ESTGFRAFRICA 46.5 (A) 09/17/2020 1422    EGFRNONAA 40.3 (A) 09/17/2020 1422           Assessment:       1. Malignant neoplasm of upper lobe of left lung    2. 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 Chronic respiratory failure with hypoxia    5. Depression, unspecified depression type         Plan:        1,2,3. She will continue on Entrectinib and will return in 3 weeks with restaging CT scans and labs.  She will also be due for Xgeva at that time  4.Escribed Advair. Will obtain PFT's  5. Escribed Effexor, after discussing with Dr. Garcia     Above care plan was discussed with patient  and all questions were addressed to her satisfaction

## 2020-09-17 NOTE — PROGRESS NOTES
INFORMED CONSENT: Alison Branch   is known to this provider and identity was confirmed via NAME and .  The patient has been informed of the risks and benefits associated with engaging in psychotherapy, the handling of protected health information, the rights of privacy and the limits of confidentiality. The patient has also been informed of the importance of reporting any suicidal or homicidal ideation to this or any provider to ensure safety of all parties, and the Alison Branch expressed understanding. The patient was agreeable to these terms and freely participates in individual psychotherapy.    PSYCHO-ONCOLOGY NOTE/ Individual Psychotherapy     Date: 2020   [facility]        Therapeutic Intervention: Met with patient.  Outpatient - Behavior modifying psychotherapy 45 min - CPT code 02108      Patient was last seen by me on 3/30/2020    Problem list  Patient Active Problem List   Diagnosis    Type 2 diabetes mellitus with hyperglycemia, with long-term current use of insulin    Mixed hyperlipidemia due to type 2 diabetes mellitus    Attention and concentration deficit    Cerebral aneurysm, coiled in     Hypertension associated with diabetes    Hyperkeratosis of palms and soles    Irritable bowel syndrome    Aneurysm of unspecified site    Obesity (BMI 30-39.9)    Vitamin D deficiency    Malignant neoplasm of upper lobe of left lung    Secondary malignant neoplasm of mediastinal lymph node    ROSEMARY (acute kidney injury)    Adrenal cortical steroids causing adverse effect in therapeutic use    Type 2 diabetes mellitus with diabetic polyneuropathy, with long-term current use of insulin    Major depressive disorder, recurrent, unspecified    Memory deficit    Dysuria    Chemotherapy-induced peripheral neuropathy    Shortness of breath on exertion    Impaired functional mobility, balance, gait, and endurance    Type 2 diabetes mellitus with hypoglycemia, with long-term current  "use of insulin    Pulmonary embolism    Secondary malignant neoplasm of bone    Septic shock    Elevated troponin    Metabolic acidosis    Anemia    Hypophosphatemia    Chronic respiratory failure with hypoxia    Urinary retention    Goals of care, counseling/discussion    Elevated LFTs    Hypomagnesemia    Compression fracture of T10 vertebra    Edema    Continuous leakage of urine    Constipation    Pain and swelling of lower leg       Chief complaint/reason for encounter: depression   Met with patient to evaluate psychosocial adaptation to diagnosis/treatment/survivorship of Lung Cancer    Current Medications  Current Outpatient Medications   Medication    apixaban (ELIQUIS) 5 mg Tab    aspirin (ECOTRIN) 81 MG EC tablet    blood sugar diagnostic Strp    carboxymethylcellulose (REFRESH PLUS) 0.5 % Dpet    cyproheptadine (PERIACTIN) 4 mg tablet    entrectinib (ROZLYTREK) 200 mg oral tablet    ergocalciferol (ERGOCALCIFEROL) 50,000 unit Cap    fluticasone propionate (FLONASE) 50 mcg/actuation nasal spray    fluticasone-salmeterol diskus inhaler 100-50 mcg    furosemide (LASIX) 20 MG tablet    gabapentin (NEURONTIN) 300 MG capsule    insulin detemir U-100 (LEVEMIR FLEXTOUCH) 100 unit/mL (3 mL) SubQ InPn pen    insulin lispro (HUMALOG KWIKPEN INSULIN) 100 unit/mL pen    lancets Misc    nystatin (MYCOSTATIN) powder    ondansetron (ZOFRAN) 8 MG tablet    pen needle, diabetic 33 gauge x 5/32" Ndle    walker Misc     No current facility-administered medications for this visit.        Objective:  Alison Branch arrived promptly for the session.  Ms. Branch was independently ambulatory at the time of session. The patient was fully cooperative throughout the session.  Appearance: age appropriate, appropriately  dressed, adequately  groomed  Behavior/Cooperation: friendly and cooperative  Speech: normal in rate, volume, and tone and appropriate quality, quantity and organization of " sentences  Mood: sad  Affect: full range  Thought Process: goal-directed, logical  Thought Content: normal,  No delusions or paranoia; did not appear to be responding to internal stimuli during the session  Orientation: grossly intact  Memory: Grossly intact  Attention Span/Concentration: Attends to session without distraction; reports no difficulty  Fund of Knowledge: average  Estimate of Intelligence: average from verbal skills and history  Cognition: grossly intact  Insight: patient has awareness of illness; good insight into own behavior and behavior of others  Judgment: the patient's behavior is adequate to circumstances    Interval history and content of current session: Patient reported increased stress related to not living in her own home, Patient feels like a burden to her daughter. At present, patient has mold in her house and cannot live there. She feels like she is losing her independence with all her restrictions for her diet.  Discussed current adaptation to disease and treatment status. Reports to be coping with great difficulty. Discussed her cognitive error of emotional reasoning. Dicussed ways to increase independence and time with friends. Evaluated cognitive response, paying particular attention to negative intrusive thoughts of a persistent and detrimental nature. Thoughts of this type are in evidence with severe distress. Provided cognitive behavioral therapy to address negative cognitions. Identified and evaluated psychosocial and environmental stressors secondary to diagnosis and treatment.  Examined proactive behaviors that may be implemented to minimize or ameliorate psychosocial stressors secondary to diagnosis and treatment.     Risk parameters:   Patient reports no suicidal ideation  Patient reports no homicidal ideation  Patient reports no self-injurious behavior  Patient reports no violent behavior   Safety needs:  None at this time      Verbal deficits: None     Patient's response to  intervention:The patient's response to intervention is accepting.     Progress toward goals and other mental status changes:  The patient's progress toward goals is good.      Progress to date:Progress as Expected      Goals from last visit: Met     Patient reported outcomes:    Distress Thermometer: 10     Client Strengths: verbal, intelligent, successful, good social support, good insight, commitment to wellness, strong shannon, strong cultural traditions     Diagnosis:     ICD-10-CM ICD-9-CM   1. Mild episode of recurrent major depressive disorder  F33.0 296.31   2. Attention and concentration deficit  R41.840 799.51   3. Malignant neoplasm of upper lobe of left lung  C34.12 162.3   4. Type 2 diabetes mellitus with hyperglycemia, with long-term current use of insulin  E11.65 250.00    Z79.4 790.29     V58.67     Treatment Plan:individual psychotherapy and medication management by physician  · Target symptoms: depression, anxiety   · Why chosen therapy is appropriate versus another modality: relevant to diagnosis, evidence based practice  · Outcome monitoring methods: self-report, observation, checklist/rating scale  · Therapeutic intervention type: behavior modifying psychotherapy  · Prognosis: Good      Behavioral goals:    Exercise:   Stress management:   Social engagement:   Nutrition:   Smoking Cessation:   Therapy: Note was sent to Dr. Belcher to coordinate antidepressant medication give lexapro's interaction with new chemo. Will refer to Dr. Sanchez as needed.      Return to clinic: 2 weeks       Length of Service (minutes direct face-to-face contact): 45    Jagruti Garcia, PhD  [place] License #[id number]

## 2020-09-18 DIAGNOSIS — C34.12 MALIGNANT NEOPLASM OF UPPER LOBE OF LEFT LUNG: ICD-10-CM

## 2020-09-18 RX ORDER — ONDANSETRON HYDROCHLORIDE 8 MG/1
8 TABLET, FILM COATED ORAL 4 TIMES DAILY PRN
Qty: 60 TABLET | Refills: 2 | Status: SHIPPED | OUTPATIENT
Start: 2020-09-18 | End: 2021-09-18

## 2020-09-21 ENCOUNTER — TELEPHONE (OUTPATIENT)
Dept: HEMATOLOGY/ONCOLOGY | Facility: CLINIC | Age: 65
End: 2020-09-21

## 2020-09-21 NOTE — TELEPHONE ENCOUNTER
----- Message from Nehal Hoff RN sent at 9/21/2020  2:44 PM CDT -----    ----- Message -----  From: Jeannette Montague  Sent: 9/21/2020   2:36 PM CDT  To: [facility] Staff    Patient called to reschedule an appointment specifically with Dr Rojas  And wishes to speak with a nurse regarding this matter.          can be reached at [phone]    Thanks  KB

## 2020-09-21 NOTE — TELEPHONE ENCOUNTER
----- Message from Adela Mars sent at 9/21/2020  2:53 PM CDT -----  Regarding: PT  Contact: PT  Pt called to say she was at the clinic today to see Kati Crystal but she had an accident and had to leave. She wanted to reschedule her appointments for today along with getting an appointment with a urologist. Please call back     Callback: [phone]

## 2020-09-25 ENCOUNTER — OFFICE VISIT (OUTPATIENT)
Dept: UROLOGY | Facility: CLINIC | Age: 65
DRG: 064 | End: 2020-09-25
Payer: MEDICARE

## 2020-09-25 ENCOUNTER — TELEPHONE (OUTPATIENT)
Dept: UROLOGY | Facility: CLINIC | Age: 65
End: 2020-09-25

## 2020-09-25 VITALS
HEART RATE: 75 BPM | DIASTOLIC BLOOD PRESSURE: 73 MMHG | WEIGHT: 219.56 LBS | BODY MASS INDEX: 32.43 KG/M2 | SYSTOLIC BLOOD PRESSURE: 127 MMHG

## 2020-09-25 DIAGNOSIS — R33.9 INCOMPLETE BLADDER EMPTYING: ICD-10-CM

## 2020-09-25 DIAGNOSIS — N39.3 STRESS INCONTINENCE: Primary | ICD-10-CM

## 2020-09-25 PROCEDURE — 3008F BODY MASS INDEX DOCD: CPT | Mod: CPTII,S$GLB,, | Performed by: PHYSICIAN ASSISTANT

## 2020-09-25 PROCEDURE — 99999 PR PBB SHADOW E&M-EST. PATIENT-LVL IV: ICD-10-PCS | Mod: PBBFAC,,, | Performed by: PHYSICIAN ASSISTANT

## 2020-09-25 PROCEDURE — 87086 URINE CULTURE/COLONY COUNT: CPT

## 2020-09-25 PROCEDURE — 3074F PR MOST RECENT SYSTOLIC BLOOD PRESSURE < 130 MM HG: ICD-10-PCS | Mod: CPTII,S$GLB,, | Performed by: PHYSICIAN ASSISTANT

## 2020-09-25 PROCEDURE — 99214 OFFICE O/P EST MOD 30 MIN: CPT | Mod: 25,S$GLB,, | Performed by: PHYSICIAN ASSISTANT

## 2020-09-25 PROCEDURE — 3078F PR MOST RECENT DIASTOLIC BLOOD PRESSURE < 80 MM HG: ICD-10-PCS | Mod: CPTII,S$GLB,, | Performed by: PHYSICIAN ASSISTANT

## 2020-09-25 PROCEDURE — 51701 PR INSERTION OF NON-INDWELLING BLADDER CATHETERIZATION FOR RESIDUAL UR: ICD-10-PCS | Mod: S$GLB,,, | Performed by: PHYSICIAN ASSISTANT

## 2020-09-25 PROCEDURE — 99214 PR OFFICE/OUTPT VISIT, EST, LEVL IV, 30-39 MIN: ICD-10-PCS | Mod: 25,S$GLB,, | Performed by: PHYSICIAN ASSISTANT

## 2020-09-25 PROCEDURE — 3078F DIAST BP <80 MM HG: CPT | Mod: CPTII,S$GLB,, | Performed by: PHYSICIAN ASSISTANT

## 2020-09-25 PROCEDURE — 99999 PR PBB SHADOW E&M-EST. PATIENT-LVL IV: CPT | Mod: PBBFAC,,, | Performed by: PHYSICIAN ASSISTANT

## 2020-09-25 PROCEDURE — 3008F PR BODY MASS INDEX (BMI) DOCUMENTED: ICD-10-PCS | Mod: CPTII,S$GLB,, | Performed by: PHYSICIAN ASSISTANT

## 2020-09-25 PROCEDURE — 51701 INSERT BLADDER CATHETER: CPT | Mod: S$GLB,,, | Performed by: PHYSICIAN ASSISTANT

## 2020-09-25 PROCEDURE — 3074F SYST BP LT 130 MM HG: CPT | Mod: CPTII,S$GLB,, | Performed by: PHYSICIAN ASSISTANT

## 2020-09-25 NOTE — PROGRESS NOTES
CHIEF COMPLAINT:    Mrs. Branch is a 64 y.o. female presenting for urinary incontinence.  PRESENTING ILLNESS:    Alison Branch is a 64 y.o. female with a PMH of HTN, DM, HLD, lung cancer who presents for urinary incontinence.   She reports incontinence during the day and night.  She wears 2 pads at night.  She wakes up and her bed is soaked. She does not feel the urge to void while she is sleeping.  She goes through 4-6 pads a day.  She reports incontinence with leaning over, upon standing, walking or getting out of the bed.  She denies having the urge to void before an incontinent episode.   She reports frequency.  She denies dysuria.  She reports having to rush to get the restroom.  She reports rushing because of urine leakage not from a strong urge to void.   She only drinks water.    This has been going on for about 1.5 months.  She was treated with cipro in August for an infection.  Her symptoms did not resolve with treatment.      REVIEW OF SYSTEMS:      Constitutional: Negative for fever and chills.   HENT: Negative for hearing loss.   Eyes: Negative for visual disturbance.   Respiratory: Negative for shortness of breath.   Cardiovascular: Negative for chest pain.   Gastrointestinal: Negative for vomiting, and constipation.   Genitourinary:  See HPI  Neurological: Negative for dizziness.   Hematological: Does not bruise/bleed easily.   Psychiatric/Behavioral: Negative for confusion.     PATIENT HISTORY:    Past Medical History:   Diagnosis Date    Allergy     Brain aneurysm 2010    s/p coiling of one; another not coiled    Breast cyst     Depression 9/19/2019    Diabetes mellitus     Diabetes type 2, controlled     Fever blister     High cholesterol     History of Bell's palsy     HTN (hypertension) 5/20/2014    Recurrent upper respiratory infection (URI)        Past Surgical History:   Procedure Laterality Date    BREAST SURGERY      BRONCHOSCOPY N/A 5/28/2019    Procedure: Bronchoscopy;   "Surgeon: [facility] Diagnostic Provider;  Location: [facility] OR 2ND FLR;  Service: Anesthesiology;  Laterality: N/A;    CERVICAL FUSION      COLONOSCOPY N/A 3/9/2016    Procedure: COLONOSCOPY;  Surgeon: Elliott Zimmerman MD;  Location: [facility] ENDO (4TH FLR);  Service: Endoscopy;  Laterality: N/A;    head surgery      stent and "curling" for aneurysm    HYSTERECTOMY      TVH secondary to SUF    INSERTION OF TUNNELED CENTRAL VENOUS CATHETER (CVC) WITH SUBCUTANEOUS PORT Right 7/8/2019    Procedure: INSERTION, PORT-A-CATH;  Surgeon: Cali Damico MD;  Location: [facility] CATH LAB;  Service: Radiology;  Laterality: Right;    MENISCECTOMY Left        Family History   Problem Relation Age of Onset    Rheum arthritis Father     Diabetes Father     Heart failure Father     Migraines Father     Cataracts Father     Cancer Mother 63        pancreatic    Stomach cancer Mother     Breast cancer Maternal Aunt         50s    Ovarian cancer Cousin     Allergies Daughter     Diabetes Sister     Diabetes Brother     Cancer Maternal Aunt         Lung CA    Melanoma Neg Hx     Colon cancer Neg Hx     Amblyopia Neg Hx     Blindness Neg Hx     Glaucoma Neg Hx     Macular degeneration Neg Hx     Retinal detachment Neg Hx     Strabismus Neg Hx     Stroke Neg Hx     Thyroid disease Neg Hx     Allergic rhinitis Neg Hx     Angioedema Neg Hx     Asthma Neg Hx     Atopy Neg Hx     Eczema Neg Hx     Immunodeficiency Neg Hx     Rhinitis Neg Hx     Urticaria Neg Hx        Social History     Socioeconomic History    Marital status: Single     Spouse name: Not on file    Number of children: Not on file    Years of education: Not on file    Highest education level: Not on file   Occupational History    Occupation: Student     Comment: Unemployed   Social Needs    Financial resource strain: Not on file    Food insecurity     Worry: Not on file     Inability: Not on file    Transportation needs     Medical: Not on file "     Non-medical: Not on file   Tobacco Use    Smoking status: Former Smoker     Packs/day: 0.50     Years: 30.00     Pack years: 15.00     Quit date: 1999     Years since quittin.7    Smokeless tobacco: Never Used   Substance and Sexual Activity    Alcohol use: No     Alcohol/week: 0.0 standard drinks    Drug use: No    Sexual activity: Never     Partners: Female     Birth control/protection: Surgical   Lifestyle    Physical activity     Days per week: 3 days     Minutes per session: 20 min    Stress: Not on file   Relationships    Social connections     Talks on phone: More than three times a week     Gets together: More than three times a week     Attends Spiritism service: Not on file     Active member of club or organization: No     Attends meetings of clubs or organizations: Never     Relationship status: Patient refused   Other Topics Concern    Are you pregnant or think you may be? No    Breast-feeding No   Social History Narrative    Not on file       Allergies:  Piperacillin-tazobactam    Medications:    Current Outpatient Medications:     apixaban (ELIQUIS) 5 mg Tab, Take 1 tablet (5 mg total) by mouth 2 (two) times daily., Disp: 180 tablet, Rfl: 3    aspirin (ECOTRIN) 81 MG EC tablet, Take 1 tablet (81 mg total) by mouth once daily., Disp: , Rfl: 0    blood sugar diagnostic Strp, To check BG 4 times daily, to use with insurance preferred meter, Disp: 200 each, Rfl: 11    carboxymethylcellulose (REFRESH PLUS) 0.5 % Dpet, 1 drop 3 (three) times daily as needed., Disp: , Rfl:     cyproheptadine (PERIACTIN) 4 mg tablet, Take 1 tablet (4 mg total) by mouth 4 (four) times daily., Disp: 120 tablet, Rfl: 5    entrectinib (ROZLYTREK) 200 mg oral tablet, Take 3 capsules (600 mg total) by mouth once daily., Disp: 90 capsule, Rfl: 3    ergocalciferol (ERGOCALCIFEROL) 50,000 unit Cap, TAKE 1 CAPSULE BY MOUTH EVERY 7 DAYS, Disp: 12 capsule, Rfl: 3    fluticasone propionate (FLONASE) 50  "mcg/actuation nasal spray, USE TWO SPRAY(S) IN EACH NOSTRIL ONCE DAILY, Disp: 16 g, Rfl: 3    fluticasone-salmeterol diskus inhaler 100-50 mcg, Inhale 1 puff into the lungs 2 (two) times daily. Controller, Disp: 60 each, Rfl: 2    furosemide (LASIX) 20 MG tablet, Take 2 tablets (40 mg total) by mouth once daily., Disp: 60 tablet, Rfl: 11    gabapentin (NEURONTIN) 300 MG capsule, Take 1 capsule (300 mg total) by mouth nightly as needed (Take as needed at bed time for neuropathy pain and sleep)., Disp: 90 capsule, Rfl: 3    insulin detemir U-100 (LEVEMIR FLEXTOUCH) 100 unit/mL (3 mL) SubQ InPn pen, Inject 26 Units into the skin once daily., Disp: 2 Box, Rfl: 2    insulin lispro (HUMALOG KWIKPEN INSULIN) 100 unit/mL pen, Inject 12 units TID AC + correction scale. Max TDD of 57 units, Disp: 4 Box, Rfl: 2    lancets Misc, 1 Device by Misc.(Non-Drug; Combo Route) route once daily. Heladio Result.  250.02.  Check Blood Sugar Twice Daily., Disp: 200 each, Rfl: 3    nystatin (MYCOSTATIN) powder, Apply topically 2 (two) times daily., Disp: 60 g, Rfl: 0    ondansetron (ZOFRAN) 8 MG tablet, Take 1 tablet (8 mg total) by mouth 4 (four) times daily as needed for Nausea., Disp: 60 tablet, Rfl: 2    pen needle, diabetic 33 gauge x 5/32" Ndle, 1 each by Misc.(Non-Drug; Combo Route) route 4 (four) times daily with meals and nightly., Disp: 100 each, Rfl: 5    venlafaxine (EFFEXOR XR) 75 MG 24 hr capsule, Take 1 capsule (75 mg total) by mouth once daily., Disp: 30 capsule, Rfl: 2    walker Misc, Please provide rollator walker for this debilitated cancer patient.  Thank you., Disp: , Rfl: 0    PHYSICAL EXAMINATION:    Constitutional: She appears well-developed and well-nourished.  She is in no apparent distress.    Eyes: No scleral icterus noted bilaterally. No discharge bilaterally.    Nose: No rhinorrhea    Cardiovascular: Normal rate.      Pulmonary/Chest: Effort normal. No respiratory distress.     Abdominal:  She exhibits " no distension.      Neurological: She is alert and oriented to person, place, and time.     Skin: Skin is warm and dry.     Psych: Cooperative with normal affect.    Genitourinary: Normal external female genitalia  Urethral meatus is normal    Consent verbally obtained.  Betadine prep was applied to the urethral meatus. An in and out cath was performed after voiding.  The PVR was 260 ml.      Physical Exam      LABS:    U/a: 1.005, pH 5, negative.      IMPRESSION:    Encounter Diagnoses   Name Primary?    Stress incontinence Yes    Incomplete bladder emptying        PLAN:    Urine culture.  Will make sure the infection has resolved.   If culture is positive will treat with antibiotics.   Discussed flomax for incomplete bladder emptying.  Will start if urine culture is negative.  Recommend time voiding for incomplete bladder emptying.    Follow up based on culture.    I spent 25 minutes with the patient of which more than half was spent in direct consultation with the patient in regards to our treatment and plan.             Nubia Saleh PA-C

## 2020-09-25 NOTE — TELEPHONE ENCOUNTER
----- Message from Gricel Medina sent at 9/25/2020  2:16 PM CDT -----  Regarding: Pt  Reason: Pt in route to appt she maybe 10 min late.     Communication: [phone]

## 2020-09-26 LAB — BACTERIA UR CULT: NO GROWTH

## 2020-09-28 ENCOUNTER — TELEPHONE (OUTPATIENT)
Dept: UROLOGY | Facility: CLINIC | Age: 65
End: 2020-09-28

## 2020-09-28 ENCOUNTER — HOSPITAL ENCOUNTER (INPATIENT)
Facility: HOSPITAL | Age: 65
LOS: 7 days | Discharge: NURSING, REHAB OR LONG-TERM CARE | DRG: 064 | End: 2020-10-05
Attending: EMERGENCY MEDICINE | Admitting: PSYCHIATRY & NEUROLOGY
Payer: MEDICARE

## 2020-09-28 ENCOUNTER — HOSPITAL ENCOUNTER (OUTPATIENT)
Dept: PULMONOLOGY | Facility: CLINIC | Age: 65
Discharge: HOME | DRG: 064 | End: 2020-09-28
Payer: MEDICARE

## 2020-09-28 ENCOUNTER — CLINICAL SUPPORT (OUTPATIENT)
Dept: HEMATOLOGY/ONCOLOGY | Facility: CLINIC | Age: 65
DRG: 064 | End: 2020-09-28
Payer: MEDICARE

## 2020-09-28 DIAGNOSIS — C34.12 MALIGNANT NEOPLASM OF UPPER LOBE OF LEFT LUNG: ICD-10-CM

## 2020-09-28 DIAGNOSIS — I63.9 STROKE: ICD-10-CM

## 2020-09-28 DIAGNOSIS — R29.898 WEAKNESS OF RIGHT LOWER EXTREMITY: ICD-10-CM

## 2020-09-28 DIAGNOSIS — E11.65 UNCONTROLLED TYPE 2 DIABETES MELLITUS WITH HYPERGLYCEMIA, WITHOUT LONG-TERM CURRENT USE OF INSULIN: ICD-10-CM

## 2020-09-28 DIAGNOSIS — U07.1 COVID-19: Primary | ICD-10-CM

## 2020-09-28 DIAGNOSIS — J96.11 CHRONIC RESPIRATORY FAILURE WITH HYPOXIA: ICD-10-CM

## 2020-09-28 DIAGNOSIS — Z13.9 SCREENING FOR CONDITION: ICD-10-CM

## 2020-09-28 DIAGNOSIS — R33.9 URINARY RETENTION: ICD-10-CM

## 2020-09-28 DIAGNOSIS — I63.89 ACUTE ISCHEMIC MULTIFOCAL MULTIPLE VASCULAR TERRITORIES STROKE: Primary | ICD-10-CM

## 2020-09-28 LAB
ALBUMIN SERPL BCP-MCNC: 4 G/DL (ref 3.5–5.2)
ALP SERPL-CCNC: 46 U/L (ref 55–135)
ALT SERPL W/O P-5'-P-CCNC: 15 U/L (ref 10–44)
ANION GAP SERPL CALC-SCNC: 10 MMOL/L (ref 8–16)
AST SERPL-CCNC: 26 U/L (ref 10–40)
BASOPHILS # BLD AUTO: 0.03 K/UL (ref 0–0.2)
BASOPHILS NFR BLD: 0.7 % (ref 0–1.9)
BILIRUB SERPL-MCNC: 0.4 MG/DL (ref 0.1–1)
BUN SERPL-MCNC: 15 MG/DL (ref 8–23)
CALCIUM SERPL-MCNC: 9.3 MG/DL (ref 8.7–10.5)
CHLORIDE SERPL-SCNC: 106 MMOL/L (ref 95–110)
CHOLEST SERPL-MCNC: 242 MG/DL (ref 120–199)
CHOLEST/HDLC SERPL: 3.9 {RATIO} (ref 2–5)
CO2 SERPL-SCNC: 25 MMOL/L (ref 23–29)
CREAT SERPL-MCNC: 1.3 MG/DL (ref 0.5–1.4)
CREAT SERPL-MCNC: 1.3 MG/DL (ref 0.5–1.4)
DIFFERENTIAL METHOD: ABNORMAL
EOSINOPHIL # BLD AUTO: 0.2 K/UL (ref 0–0.5)
EOSINOPHIL NFR BLD: 3.7 % (ref 0–8)
ERYTHROCYTE [DISTWIDTH] IN BLOOD BY AUTOMATED COUNT: 15.6 % (ref 11.5–14.5)
EST. GFR  (AFRICAN AMERICAN): 50.8 ML/MIN/1.73 M^2
EST. GFR  (NON AFRICAN AMERICAN): 44.1 ML/MIN/1.73 M^2
ESTIMATED AVG GLUCOSE: 220 MG/DL (ref 68–131)
GLUCOSE SERPL-MCNC: 126 MG/DL (ref 70–110)
HBA1C MFR BLD HPLC: 9.3 % (ref 4–5.6)
HCT VFR BLD AUTO: 35.1 % (ref 37–48.5)
HDLC SERPL-MCNC: 62 MG/DL (ref 40–75)
HDLC SERPL: 25.6 % (ref 20–50)
HGB BLD-MCNC: 11 G/DL (ref 12–16)
IMM GRANULOCYTES # BLD AUTO: 0.01 K/UL (ref 0–0.04)
IMM GRANULOCYTES NFR BLD AUTO: 0.2 % (ref 0–0.5)
INR PPP: 1 (ref 0.8–1.2)
LDLC SERPL CALC-MCNC: 147 MG/DL (ref 63–159)
LYMPHOCYTES # BLD AUTO: 2.2 K/UL (ref 1–4.8)
LYMPHOCYTES NFR BLD: 47.7 % (ref 18–48)
MCH RBC QN AUTO: 27 PG (ref 27–31)
MCHC RBC AUTO-ENTMCNC: 31.3 G/DL (ref 32–36)
MCV RBC AUTO: 86 FL (ref 82–98)
MONOCYTES # BLD AUTO: 0.5 K/UL (ref 0.3–1)
MONOCYTES NFR BLD: 10.2 % (ref 4–15)
NEUTROPHILS # BLD AUTO: 1.7 K/UL (ref 1.8–7.7)
NEUTROPHILS NFR BLD: 37.5 % (ref 38–73)
NONHDLC SERPL-MCNC: 180 MG/DL
NRBC BLD-RTO: 0 /100 WBC
PLATELET # BLD AUTO: 197 K/UL (ref 150–350)
PMV BLD AUTO: 12.2 FL (ref 9.2–12.9)
POC PTINR: 1 (ref 0.9–1.2)
POC PTWBT: 11.6 SEC (ref 9.7–14.3)
POCT GLUCOSE: 136 MG/DL (ref 70–110)
POTASSIUM SERPL-SCNC: 4.7 MMOL/L (ref 3.5–5.1)
PROT SERPL-MCNC: 7.3 G/DL (ref 6–8.4)
PROTHROMBIN TIME: 11.5 SEC (ref 9–12.5)
RBC # BLD AUTO: 4.07 M/UL (ref 4–5.4)
SAMPLE: NORMAL
SAMPLE: NORMAL
SARS-COV-2 RDRP RESP QL NAA+PROBE: NEGATIVE
SODIUM SERPL-SCNC: 141 MMOL/L (ref 136–145)
TRIGL SERPL-MCNC: 165 MG/DL (ref 30–150)
TSH SERPL DL<=0.005 MIU/L-ACNC: 0.68 UIU/ML (ref 0.4–4)
WBC # BLD AUTO: 4.59 K/UL (ref 3.9–12.7)

## 2020-09-28 PROCEDURE — 36415 COLL VENOUS BLD VENIPUNCTURE: CPT

## 2020-09-28 PROCEDURE — 25500020 PHARM REV CODE 255: Performed by: EMERGENCY MEDICINE

## 2020-09-28 PROCEDURE — 94060 PR EVAL OF BRONCHOSPASM: ICD-10-PCS | Mod: S$GLB,,, | Performed by: INTERNAL MEDICINE

## 2020-09-28 PROCEDURE — 99999 PR PBB SHADOW E&M-EST. PATIENT-LVL I: ICD-10-PCS | Mod: PBBFAC,,, | Performed by: NURSE PRACTITIONER

## 2020-09-28 PROCEDURE — 82962 GLUCOSE BLOOD TEST: CPT

## 2020-09-28 PROCEDURE — 83036 HEMOGLOBIN GLYCOSYLATED A1C: CPT

## 2020-09-28 PROCEDURE — 99900035 HC TECH TIME PER 15 MIN (STAT)

## 2020-09-28 PROCEDURE — 99223 PR INITIAL HOSPITAL CARE,LEVL III: ICD-10-PCS | Mod: ,,, | Performed by: PSYCHIATRY & NEUROLOGY

## 2020-09-28 PROCEDURE — 94727 PR PULM FUNCTION TEST BY GAS: ICD-10-PCS | Mod: S$GLB,,, | Performed by: INTERNAL MEDICINE

## 2020-09-28 PROCEDURE — 99291 CRITICAL CARE FIRST HOUR: CPT | Mod: ,,, | Performed by: EMERGENCY MEDICINE

## 2020-09-28 PROCEDURE — 94727 GAS DIL/WSHOT DETER LNG VOL: CPT | Mod: S$GLB,,, | Performed by: INTERNAL MEDICINE

## 2020-09-28 PROCEDURE — 94729 DIFFUSING CAPACITY: CPT | Mod: S$GLB,,, | Performed by: INTERNAL MEDICINE

## 2020-09-28 PROCEDURE — A9585 GADOBUTROL INJECTION: HCPCS | Performed by: EMERGENCY MEDICINE

## 2020-09-28 PROCEDURE — 85610 PROTHROMBIN TIME: CPT

## 2020-09-28 PROCEDURE — U0002 COVID-19 LAB TEST NON-CDC: HCPCS

## 2020-09-28 PROCEDURE — 93010 ELECTROCARDIOGRAM REPORT: CPT | Mod: ,,, | Performed by: INTERNAL MEDICINE

## 2020-09-28 PROCEDURE — 80053 COMPREHEN METABOLIC PANEL: CPT

## 2020-09-28 PROCEDURE — 82565 ASSAY OF CREATININE: CPT

## 2020-09-28 PROCEDURE — 94060 EVALUATION OF WHEEZING: CPT | Mod: S$GLB,,, | Performed by: INTERNAL MEDICINE

## 2020-09-28 PROCEDURE — 93005 ELECTROCARDIOGRAM TRACING: CPT

## 2020-09-28 PROCEDURE — 99999 PR PBB SHADOW E&M-EST. PATIENT-LVL I: CPT | Mod: PBBFAC,,, | Performed by: NURSE PRACTITIONER

## 2020-09-28 PROCEDURE — 85025 COMPLETE CBC W/AUTO DIFF WBC: CPT

## 2020-09-28 PROCEDURE — 80061 LIPID PANEL: CPT

## 2020-09-28 PROCEDURE — 93010 EKG 12-LEAD: ICD-10-PCS | Mod: ,,, | Performed by: INTERNAL MEDICINE

## 2020-09-28 PROCEDURE — 99223 1ST HOSP IP/OBS HIGH 75: CPT | Mod: ,,, | Performed by: PSYCHIATRY & NEUROLOGY

## 2020-09-28 PROCEDURE — 11000001 HC ACUTE MED/SURG PRIVATE ROOM

## 2020-09-28 PROCEDURE — 94729 PR C02/MEMBANE DIFFUSE CAPACITY: ICD-10-PCS | Mod: S$GLB,,, | Performed by: INTERNAL MEDICINE

## 2020-09-28 PROCEDURE — 84443 ASSAY THYROID STIM HORMONE: CPT

## 2020-09-28 PROCEDURE — 99285 EMERGENCY DEPT VISIT HI MDM: CPT | Mod: 25

## 2020-09-28 PROCEDURE — 99291 PR CRITICAL CARE, E/M 30-74 MINUTES: ICD-10-PCS | Mod: ,,, | Performed by: EMERGENCY MEDICINE

## 2020-09-28 RX ORDER — TAMSULOSIN HYDROCHLORIDE 0.4 MG/1
0.4 CAPSULE ORAL NIGHTLY
Qty: 30 CAPSULE | Refills: 11 | Status: ON HOLD | OUTPATIENT
Start: 2020-09-28 | End: 2021-05-26 | Stop reason: HOSPADM

## 2020-09-28 RX ORDER — LABETALOL HCL 20 MG/4 ML
10 SYRINGE (ML) INTRAVENOUS
Status: DISCONTINUED | OUTPATIENT
Start: 2020-09-28 | End: 2020-10-05 | Stop reason: HOSPADM

## 2020-09-28 RX ORDER — ATORVASTATIN CALCIUM 20 MG/1
40 TABLET, FILM COATED ORAL DAILY
Status: DISCONTINUED | OUTPATIENT
Start: 2020-09-29 | End: 2020-09-29

## 2020-09-28 RX ORDER — ONDANSETRON 8 MG/1
8 TABLET, ORALLY DISINTEGRATING ORAL EVERY 8 HOURS PRN
Status: DISCONTINUED | OUTPATIENT
Start: 2020-09-28 | End: 2020-10-05 | Stop reason: HOSPADM

## 2020-09-28 RX ORDER — GADOBUTROL 604.72 MG/ML
10 INJECTION INTRAVENOUS
Status: COMPLETED | OUTPATIENT
Start: 2020-09-28 | End: 2020-09-28

## 2020-09-28 RX ORDER — SODIUM CHLORIDE 0.9 % (FLUSH) 0.9 %
10 SYRINGE (ML) INJECTION
Status: DISCONTINUED | OUTPATIENT
Start: 2020-09-28 | End: 2020-10-05 | Stop reason: HOSPADM

## 2020-09-28 RX ADMIN — GADOBUTROL 10 ML: 604.72 INJECTION INTRAVENOUS at 07:09

## 2020-09-28 NOTE — ASSESSMENT & PLAN NOTE
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 Denies previous unilateral LE weakness, but does report progressive weakness of b/l LE for the past 2 weeks and also urinary symptoms that are new.   Currently concerned for metastatic disease causing patient's symptoms. She also reports groin pain x 5 days, which is limiting her motor exam.     CTH unremarkable. She is not a tPA candidate as she is on chronic AC w/ Eliquis for DVTs.   Patient's symptoms are not consistent w/ a LVO.     Pending MRI T/L spine that was ordered by ED as well as MRI Brain.   Unlikely that this is a vascular process but we will follow up w/ the patient and the scan once it becomes available.   Continue Eliquis as prescribed.     Appreciate consult.   Case discussed w/ Vascular Neurology staff.

## 2020-09-28 NOTE — ED PROVIDER NOTES
"Encounter Date: 9/28/2020       History     Chief Complaint   Patient presents with    Multiple complaints     r leg was shaking and fell, headache, sugar was 131, + drift to r leg     63 yo f, h/o lung cancer with diffuse mets, brain aneurysm, DM, HTN, on eliquis (for "blood clots"), c/o R leg weakness, onset at 3:40 pm today.  Pt was walking to car after pulm appointment here, felt that her R leg began to shake and then felt weak.  Since then has had persistent R LE weakness.  No UE weakness. No numbness.  Denies back pain.  Reports that both legs have been weak since her lung CA dx but that over the past week, legs have felt weaker.  In addition, she's had increasing difficulty getting urine out.  This has been present for months but has also been worsening lately.      Last chemo Xgeva on 9/9    The history is provided by the patient.     Review of patient's allergies indicates:   Allergen Reactions    Piperacillin-tazobactam Rash     Tolerated cefepime 06/2020     Past Medical History:   Diagnosis Date    Allergy     Brain aneurysm 2010    s/p coiling of one; another not coiled    Breast cyst     Depression 9/19/2019    Diabetes mellitus     Diabetes type 2, controlled     Fever blister     High cholesterol     History of Bell's palsy     HTN (hypertension) 5/20/2014    Recurrent upper respiratory infection (URI)      Past Surgical History:   Procedure Laterality Date    BREAST SURGERY      BRONCHOSCOPY N/A 5/28/2019    Procedure: Bronchoscopy;  Surgeon: Iesha Diagnostic Provider;  Location: [facility] OR [address] [place] [address];  Service: Anesthesiology;  Laterality: N/A;    CERVICAL FUSION      COLONOSCOPY N/A 3/9/2016    Procedure: COLONOSCOPY;  Surgeon: Elliott Zimmerman MD;  Location: [facility] (4TH FLR);  Service: Endoscopy;  Laterality: N/A;    head surgery      stent and "curling" for aneurysm    HYSTERECTOMY      TVH secondary to SUF    INSERTION OF TUNNELED CENTRAL VENOUS CATHETER (CVC) WITH SUBCUTANEOUS " PORT Right 2019    Procedure: INSERTION, PORT-A-CATH;  Surgeon: Cali Damico MD;  Location: [facility] CATH LAB;  Service: Radiology;  Laterality: Right;    MENISCECTOMY Left      Family History   Problem Relation Age of Onset    Rheum arthritis Father     Diabetes Father     Heart failure Father     Migraines Father     Cataracts Father     Cancer Mother 63        pancreatic    Stomach cancer Mother     Breast cancer Maternal Aunt         50s    Ovarian cancer Cousin     Allergies Daughter     Diabetes Sister     Diabetes Brother     Cancer Maternal Aunt         Lung CA    Melanoma Neg Hx     Colon cancer Neg Hx     Amblyopia Neg Hx     Blindness Neg Hx     Glaucoma Neg Hx     Macular degeneration Neg Hx     Retinal detachment Neg Hx     Strabismus Neg Hx     Stroke Neg Hx     Thyroid disease Neg Hx     Allergic rhinitis Neg Hx     Angioedema Neg Hx     Asthma Neg Hx     Atopy Neg Hx     Eczema Neg Hx     Immunodeficiency Neg Hx     Rhinitis Neg Hx     Urticaria Neg Hx      Social History     Tobacco Use    Smoking status: Former Smoker     Packs/day: 0.50     Years: 30.00     Pack years: 15.00     Quit date: 1999     Years since quittin.7    Smokeless tobacco: Never Used   Substance Use Topics    Alcohol use: No     Alcohol/week: 0.0 standard drinks    Drug use: No     Review of Systems   Unable to perform ROS: Acuity of condition       Physical Exam     Initial Vitals [20 1623]   BP Pulse Resp Temp SpO2   123/60 95 18 98.1 °F (36.7 °C) 95 %      MAP       --         Physical Exam    Nursing note and vitals reviewed.  Constitutional: Vital signs are normal. She appears well-developed and well-nourished. She is not diaphoretic.  Non-toxic appearance. She does not appear ill. No distress.   HENT:   Head: Normocephalic and atraumatic.   Mouth/Throat: Oropharynx is clear and moist and mucous membranes are normal. Mucous membranes are not dry.   Eyes: Conjunctivae  and lids are normal. Pupils are equal, round, and reactive to light.   Neck: Normal range of motion. Neck supple.   Cardiovascular: Normal rate and regular rhythm.   Pulmonary/Chest: No respiratory distress.   Abdominal: Soft. She exhibits no distension. There is no abdominal tenderness.   Musculoskeletal: No edema.   Neurological: She is alert and oriented to person, place, and time. No cranial nerve deficit or sensory deficit. GCS eye subscore is 4. GCS verbal subscore is 5. GCS motor subscore is 6.   R LE strength is 3/5  L LE strength is 5/5    Unable to obtain reflexes in either leg    Speech normal   Skin: Skin is warm, dry and intact. No pallor.   Psychiatric: She has a normal mood and affect. Her speech is normal and behavior is normal.         ED Course   Procedures  Labs Reviewed   CBC W/ AUTO DIFFERENTIAL - Abnormal; Notable for the following components:       Result Value    Hemoglobin 11.0 (*)     Hematocrit 35.1 (*)     Mean Corpuscular Hemoglobin Conc 31.3 (*)     RDW 15.6 (*)     Gran # (ANC) 1.7 (*)     Gran% 37.5 (*)     All other components within normal limits   COMPREHENSIVE METABOLIC PANEL - Abnormal; Notable for the following components:    Glucose 126 (*)     Alkaline Phosphatase 46 (*)     eGFR if  50.8 (*)     eGFR if non  44.1 (*)     All other components within normal limits   LIPID PANEL - Abnormal; Notable for the following components:    Cholesterol 242 (*)     Triglycerides 165 (*)     All other components within normal limits   POCT GLUCOSE - Abnormal; Notable for the following components:    POCT Glucose 136 (*)     All other components within normal limits   PROTIME-INR   TSH   POCT GLUCOSE, HAND-HELD DEVICE   ISTAT PROCEDURE   ISTAT CREATININE     EKG Readings: (Independently Interpreted)   Initial Reading: No STEMI. Rhythm: Normal Sinus Rhythm. Ectopy: No Ectopy. Conduction: Normal. ST Segments: Normal ST Segments. T Waves: Normal.     ECG Results           ECG 12 lead (In process)  Result time 09/29/20 08:30:00    In process by Interface, Lab In [facility] (09/29/20 08:30:00)                 Narrative:    Test Reason : I63.9,    Vent. Rate : 074 BPM     Atrial Rate : 074 BPM     P-R Int : 152 ms          QRS Dur : 078 ms      QT Int : 398 ms       P-R-T Axes : 041 040 041 degrees     QTc Int : 441 ms    Normal sinus rhythm  Normal ECG  When compared with ECG of 21-JUN-2020 12:43,  No significant change was found    Referred By: [email or web address]   SELF           Confirmed By:                             Imaging Results           MRI Thoracic Spine W WO Cont (Final result)  Result time 09/28/20 21:23:07   Procedure changed from MRI Thoracic Spine Without Contrast     Final result by Yordy Baires MD (09/28/20 21:23:07)                 Impression:      Multifocal osseous metastasis involving thoracolumbar spine and sacrum, overall similar in distribution as compared to most recent CT chest/abdomen/pelvis from 08/04/2020.  No involvement of spinal canal.  Normal cord signal.    Mild thoracolumbar spondylosis, as above, most prominent L4-5.    Large dependent left pleural effusion.    Mild bilateral hydronephrosis.    This report was flagged in Epic as abnormal.    Electronically signed by resident: Derrick Medrano  Date:    09/28/2020  Time:    20:24    Electronically signed by: Yordy Baires MD  Date:    09/28/2020  Time:    21:23             Narrative:    EXAMINATION:  MRI THORACIC SPINE W WO CONTRAST; MRI LUMBAR SPINE W WO CONTRAST    CLINICAL HISTORY:  Metastatic disease evaluation;; Back pain, cauda equina syndrome suspected;    TECHNIQUE:  Multiplanar, multisequence MR images were acquired of the thoracic and lumbar spine before and after administration of 10 cc IV Gadavist.    COMPARISON:  CT chest/abdomen/pelvis from 08/04/2020.  CTA chest from 05/20/2020.  CT chest/abdomen/pelvis from 04/23/2020.  Multiple additional prior  examinations.    FINDINGS:  Thoracic spine:    Alignment: Normal.    Vertebrae: Multifocal low signal T1 lesions with minimal postcontrast enhancement throughout the thoracic vertebral bodies and posterior elements.  Findings are consistent with osseous metastasis.  Overall distribution of disease is similar as compared to CT chest/abdomen/pelvis from 08/04/2020.  No significant expansile components or involvement of thoracic spinal canal.    Discs: Mild degenerative disc disease.  No significant disc space height loss.  Stable prominent Schmorl's node involving the superior endplate of T10.    Cord: Normal cord signal.  No abnormal enhancement.    Minimal thoracic spondylosis.  No significant spinal canal stenosis or neural foraminal narrowing.    Lumbar spine:    Alignment: Normal.    Vertebrae: Multifocal low signal T1 lesions with minimal post-contrast enhancement throughout the lumbar in partially visualized sacral vertebral bodies.  Findings are consistent with osseous metastasis.  Overall distribution of disease is similar compared to CT chest/abdomen/pelvis from 08/04/2020.  No significant expansile components or involvement of lumbar spinal canal.    Discs: Mild degenerative disc disease most prominent at L4-L5 with minimal disc space narrowing.    Cord: Terminates at the level of the L1 vertebral body.  No abnormal signal or enhancement.    Mild lumbar spondylosis.  Findings are most prominent at L4-L5 with broad-based posterior disc bulge, mild facet hypertrophy, and ligamentum flavum hypertrophy resulting in mild spinal canal stenosis and mild bilateral neural foraminal narrowing.    Incidental findings: Paraspinal musculature appears unremarkable.  Mild bilateral hydronephrosis.  Large dependent left pleural effusion.                                MRI Lumbar Spine W WO Cont (Final result)  Result time 09/28/20 21:23:07   Procedure changed from MRI Lumbar Spine Without Contrast     Final result by  Yordy Baires MD (09/28/20 21:23:07)                 Impression:      Multifocal osseous metastasis involving thoracolumbar spine and sacrum, overall similar in distribution as compared to most recent CT chest/abdomen/pelvis from 08/04/2020.  No involvement of spinal canal.  Normal cord signal.    Mild thoracolumbar spondylosis, as above, most prominent L4-5.    Large dependent left pleural effusion.    Mild bilateral hydronephrosis.    This report was flagged in Epic as abnormal.    Electronically signed by resident: Derrick Medrano  Date:    09/28/2020  Time:    20:24    Electronically signed by: Yodry Baires MD  Date:    09/28/2020  Time:    21:23             Narrative:    EXAMINATION:  MRI THORACIC SPINE W WO CONTRAST; MRI LUMBAR SPINE W WO CONTRAST    CLINICAL HISTORY:  Metastatic disease evaluation;; Back pain, cauda equina syndrome suspected;    TECHNIQUE:  Multiplanar, multisequence MR images were acquired of the thoracic and lumbar spine before and after administration of 10 cc IV Gadavist.    COMPARISON:  CT chest/abdomen/pelvis from 08/04/2020.  CTA chest from 05/20/2020.  CT chest/abdomen/pelvis from 04/23/2020.  Multiple additional prior examinations.    FINDINGS:  Thoracic spine:    Alignment: Normal.    Vertebrae: Multifocal low signal T1 lesions with minimal postcontrast enhancement throughout the thoracic vertebral bodies and posterior elements.  Findings are consistent with osseous metastasis.  Overall distribution of disease is similar as compared to CT chest/abdomen/pelvis from 08/04/2020.  No significant expansile components or involvement of thoracic spinal canal.    Discs: Mild degenerative disc disease.  No significant disc space height loss.  Stable prominent Schmorl's node involving the superior endplate of T10.    Cord: Normal cord signal.  No abnormal enhancement.    Minimal thoracic spondylosis.  No significant spinal canal stenosis or neural foraminal narrowing.    Lumbar  spine:    Alignment: Normal.    Vertebrae: Multifocal low signal T1 lesions with minimal post-contrast enhancement throughout the lumbar in partially visualized sacral vertebral bodies.  Findings are consistent with osseous metastasis.  Overall distribution of disease is similar compared to CT chest/abdomen/pelvis from 08/04/2020.  No significant expansile components or involvement of lumbar spinal canal.    Discs: Mild degenerative disc disease most prominent at L4-L5 with minimal disc space narrowing.    Cord: Terminates at the level of the L1 vertebral body.  No abnormal signal or enhancement.    Mild lumbar spondylosis.  Findings are most prominent at L4-L5 with broad-based posterior disc bulge, mild facet hypertrophy, and ligamentum flavum hypertrophy resulting in mild spinal canal stenosis and mild bilateral neural foraminal narrowing.    Incidental findings: Paraspinal musculature appears unremarkable.  Mild bilateral hydronephrosis.  Large dependent left pleural effusion.                                MRI Brain W WO Contrast (Final result)  Result time 09/28/20 20:30:30   Procedure changed from MRI Brain Without Contrast     Final result by Jossue Gomez MD (09/28/20 20:30:30)                 Impression:      There are few high T2 small foci bilaterally.  A small lesion in the right centrum semiovale and left corpus callosum are diffusion positive only.  There are other similar small foci which are diffusion positive with associated enhancement.  Chart review indicates metastatic disease.  These findings could represent metastatic disease also.  Few small foci of superimposed acute/subacute lacunar infarction cannot be excluded.  Recommend follow-up.    This report was flagged in Epic as abnormal.      Electronically signed by: Jossue Gomez  Date:    09/28/2020  Time:    20:30             Narrative:    EXAMINATION:  MRI BRAIN W WO CONTRAST    CLINICAL HISTORY:  Stroke, follow  up;    TECHNIQUE:  Multiplanar multisequence MR imaging of the brain was performed before and after the administration of 10 mL Gadavist intravenous contrast.    COMPARISON:  MRI 08/10/2020, CT 09/28/2020    FINDINGS:  Diffusion-weighted imaging demonstrates a tiny focus of increased diffusion signal in the right centrum semiovale suggesting small acute/subacute lacunar infarction.  There is a 2nd small focus of increased diffusion signal in the left mid corpus callosum.  This could represent small acute/subacute lacunar infarction.  No significant enhancement in these areas.  Small metastatic foci are not completely excluded.    There is a very small focus of increased T2 and FLAIR signal in the left parietal subcortical white matter.  Faint increased diffusion signal in this lesion.  There is associated enhancement in this lesion.  Additionally there are small enhancing lesions in the cerebellum bilaterally also.  These lesions in the cerebellum are diffusion positive also.    There is no significant edema associated with the lesions.    Findings could represent small foci of metastatic disease.    No midline shift or hydrocephalus.  No evidence of hemorrhage.                               CT Head Without Contrast (Final result)  Result time 09/28/20 16:59:16    Final result by Jossue Gomez MD (09/28/20 16:59:16)                 Impression:      1. No acute intracranial process.  2. Stable aneurysm coils on the right.      Electronically signed by: Jossue Gomez  Date:    09/28/2020  Time:    16:59             Narrative:    EXAMINATION:  CT HEAD WITHOUT CONTRAST    CLINICAL HISTORY:  Stroke, follow up;    TECHNIQUE:  Low dose axial CT images obtained throughout the head without intravenous contrast. Sagittal and coronal reconstructions were performed.    COMPARISON:  MRI 08/10/2020, CT 05/20/2014    FINDINGS:  Intracranial compartment:    Ventricles and sulci are normal in size for age without evidence of  hydrocephalus. No extra-axial blood or fluid collections.    The brain parenchyma appears normal. No parenchymal mass, hemorrhage, edema or major vascular distribution infarct.    Skull/extracranial contents (limited evaluation): No fracture. Mastoid air cells and paranasal sinuses are essentially clear.    Aneurysm coils near the right skull base with mild metallic artifact.                                 Medical Decision Making:   History:   Old Medical Records: I decided to obtain old medical records.  Old Records Summarized: records from clinic visits and records from previous admission(s).       <> Summary of Records: CT  Bones: Multiple osteoblastic metastatic lesions throughout the vertebrae, sternum, pelvis, femurs.  The osteoblastic lesions appearing greater in size which may represent either bone healing or worsening disease.  No new identified skeletal lesions.  No destructive process or acute fracture.    Impression:  In this patient with known lung cancer there is a heterogeneous large opacity with tumor and consolidation occupying much of the left upper lobe demonstrating interval improvement in size compared to previous exam.  Interval improvement in size and number of bilateral lung nodules consistent with metastatic disease.  Small left pleural effusion.  Multiple osteoblastic metastatic lesions throughout the skeleton greater in size which may represent healing versus worsening disease.  No new osteoblastic lesions identified.  Hyperattenuating material within the gallbladder possibly representing sludge without evidence of cholecystitis.  Initial Assessment:   63 yo f, complex PMH as above, now here with acute R LE weakness    VSS  On neuro exam, pt does have significant weakness to R LE but no other deficits present  Differential Diagnosis:   Acute stroke possible, particularly given age/risk factors, though pt on anticoagulation which is an absolute contraindication for TPA.  In addition, I am  equally worried about spinal cord disease causing cord compression vs radiculopathy vs cauda equina given pt's known widely metastatic disease that involves her spine, worsening urinary sx  Clinical Tests:   Lab Tests: Ordered and Reviewed  Radiological Study: Ordered and Reviewed  Medical Tests: Ordered and Reviewed  ED Management:  Glucose normal  Will get MRI brain and T/L spine to evaluate further  Stroke code initiated on arrival - they have evaluated the pt and are in agreement with the plan  Anticipate admission               Attending Attestation:         Attending Critical Care:   Critical Care Times:   ==============================================================  · Total Critical Care Time - exclusive of procedural time: 35 minutes.  ==============================================================  Critical care was necessary to treat or prevent imminent or life-threatening deterioration of the following conditions: CNS failure and stroke.   Critical care was time spent personally by me on the following activities: obtaining history from patient or relative, examination of patient, review of x-rays / CT sent with the patient, review of old charts, development of treatment plan with patient or relative, ordering lab, x-rays, and/or EKG, ordering and performing treatments and interventions, evaluation of patient's response to treatment, discussion with consultants and re-evaluation of patient's conition.   Critical Care Condition: critical               ED Course as of Sep 29 1055   Mon Sep 28, 2020   2053 MRI shows small strokes vs metastatic disease.  T and L spine MRI still pending.  Discussed with vascular neurology.  They will admit to their service.  Pt not a candidate for interventional procedures as stroke not c/w LVO.    [AS]      ED Course User Index  [AS] Steffanie Vaughn MD            Clinical Impression:       ICD-10-CM ICD-9-CM   1. Stroke  I63.9 434.91   2. Acute ischemic multifocal multiple  vascular territories stroke  I63.89 434.91                          ED Disposition Condition    Admit                             Steffanie Vaughn MD  09/29/20 1052

## 2020-09-28 NOTE — ASSESSMENT & PLAN NOTE
-As noted on previous imaging studies  -Concern for bony metastases causing the RLE weakness as well as urinary symptoms

## 2020-09-28 NOTE — SUBJECTIVE & OBJECTIVE
"    Past Medical History:   Diagnosis Date    Allergy     Brain aneurysm 2010    s/p coiling of one; another not coiled    Breast cyst     Depression 9/19/2019    Diabetes mellitus     Diabetes type 2, controlled     Fever blister     High cholesterol     History of Bell's palsy     HTN (hypertension) 5/20/2014    Recurrent upper respiratory infection (URI)      Past Surgical History:   Procedure Laterality Date    BREAST SURGERY      BRONCHOSCOPY N/A 5/28/2019    Procedure: Bronchoscopy;  Surgeon: [facility] Diagnostic Provider;  [facility];  Service: Anesthesiology;  Laterality: N/A;    CERVICAL FUSION      COLONOSCOPY N/A 3/9/2016    Procedure: COLONOSCOPY;  Surgeon: Elliott Zimmerman MD;  Location: [facility] ENDO (4TH FLR);  Service: Endoscopy;  Laterality: N/A;    head surgery      stent and "curling" for aneurysm    HYSTERECTOMY      TVH secondary to SUF    INSERTION OF TUNNELED CENTRAL VENOUS CATHETER (CVC) WITH SUBCUTANEOUS PORT Right 7/8/2019    Procedure: INSERTION, PORT-A-CATH;  Surgeon: Cali Damico MD;  Location: [facility] CATH LAB;  Service: Radiology;  Laterality: Right;    MENISCECTOMY Left      Family History   Problem Relation Age of Onset    Rheum arthritis Father     Diabetes Father     Heart failure Father     Migraines Father     Cataracts Father     Cancer Mother 63        pancreatic    Stomach cancer Mother     Breast cancer Maternal Aunt         50s    Ovarian cancer Cousin     Allergies Daughter     Diabetes Sister     Diabetes Brother     Cancer Maternal Aunt         Lung CA    Melanoma Neg Hx     Colon cancer Neg Hx     Amblyopia Neg Hx     Blindness Neg Hx     Glaucoma Neg Hx     Macular degeneration Neg Hx     Retinal detachment Neg Hx     Strabismus Neg Hx     Stroke Neg Hx     Thyroid disease Neg Hx     Allergic rhinitis Neg Hx     Angioedema Neg Hx     Asthma Neg Hx     Atopy Neg Hx     Eczema Neg Hx     Immunodeficiency Neg Hx  "    Rhinitis Neg Hx     Urticaria Neg Hx      Social History     Tobacco Use    Smoking status: Former Smoker     Packs/day: 0.50     Years: 30.00     Pack years: 15.00     Quit date: 1999     Years since quittin.7    Smokeless tobacco: Never Used   Substance Use Topics    Alcohol use: No     Alcohol/week: 0.0 standard drinks    Drug use: No     Review of patient's allergies indicates:   Allergen Reactions    Piperacillin-tazobactam Rash     Tolerated cefepime 2020       Medications: I have reviewed the current medication administration record.    (Not in a hospital admission)      Review of Systems   Constitutional: Positive for fatigue. Negative for fever.   HENT: Negative.    Eyes: Negative.    Respiratory: Positive for shortness of breath.    Cardiovascular: Negative.    Gastrointestinal: Negative.  Negative for nausea and vomiting.   Endocrine: Negative.    Genitourinary: Positive for difficulty urinating.   Musculoskeletal: Positive for gait problem.   Skin: Negative.    Allergic/Immunologic: Negative.    Neurological: Positive for weakness.   Hematological: Negative.    Psychiatric/Behavioral: Negative.      Objective:     Vital Signs (Most Recent):  Temp: 98.1 °F (36.7 °C) (20 1623)  Pulse: 76 (20 1708)  Resp: 20 (20 1708)  BP: (!) 126/58 (20 1708)  SpO2: 97 % (20 170)    Vital Signs Range (Last 24H):  Temp:  [98.1 °F (36.7 °C)]   Pulse:  [76-95]   Resp:  [18-20]   BP: (123-126)/(58-60)   SpO2:  [95 %-97 %]     Physical Exam  Vitals signs and nursing note reviewed.   Constitutional:       Appearance: Normal appearance.   HENT:      Head: Normocephalic and atraumatic.      Nose: Nose normal.   Eyes:      Extraocular Movements: Extraocular movements intact.      Pupils: Pupils are equal, round, and reactive to light.   Neck:      Musculoskeletal: Normal range of motion.   Cardiovascular:      Rate and Rhythm: Normal rate.   Skin:     General: Skin is warm and  dry.   Neurological:      Mental Status: She is oriented to person, place, and time.      Cranial Nerves: No cranial nerve deficit.   Psychiatric:         Mood and Affect: Mood normal.         Neurological Exam:   LOC: alert  Attention Span: Good   Language: No aphasia  Articulation: No dysarthria  Orientation: Person, Place, Time   Visual Fields: Full  EOM (CN III, IV, VI): Full/intact  Pupils (CN II, III): PERRL  Facial Sensation (CN V): Normal  Facial Movement (CN VII): Symmetric facial expression    Motor: Arm left  Normal 5/5  Leg left  Normal 5/5  Arm right  Normal 5/5  Leg right Paresis: 4/5  Cebellar: No evidence of appendicular or axial ataxia  Sensation: Intact to light touch, temperature and vibration  Tone: Normal tone throughout     Weakness in the RLE is primarily proximal, 5/5 w/ dorsi and plantarflexion       Laboratory:  CMP: No results for input(s): GLUCOSE, CALCIUM, ALBUMIN, PROT, NA, K, CO2, CL, BUN, CREATININE, ALKPHOS, ALT, AST, BILITOT in the last 24 hours.  CBC:   Recent Labs   Lab 09/28/20  1654   WBC 4.59   RBC 4.07   HGB 11.0*   HCT 35.1*      MCV 86   MCH 27.0   MCHC 31.3*     Lipid Panel: No results for input(s): CHOL, LDLCALC, HDL, TRIG in the last 168 hours.  Coagulation:   Recent Labs   Lab 09/28/20  1654   INR 1.0     Hgb A1C: No results for input(s): HGBA1C in the last 168 hours.  TSH: No results for input(s): TSH in the last 168 hours.    Diagnostic Results:      Brain imaging:  CTH w.o contrast (09/28/2020)    1. No acute intracranial process.  2. Stable aneurysm coils on the right.  Vessel Imaging:  N/A    Cardiac Evaluation:   ECHO ( 6/22/2020)  · Normal left ventricular systolic function. The estimated ejection fraction is 55%.  · Normal LV diastolic function.  · No wall motion abnormalities.  · Mildly reduced right ventricular systolic function.  · Normal central venous pressure (3 mmHg).  · The estimated PA systolic pressure is 26 mmHg.

## 2020-09-28 NOTE — TELEPHONE ENCOUNTER
Pt identified herself on her VM so a detailed message was left.  She was notified that her urine culture did not show an infection.  Flomax was sent to [facility].  Instructions were given on how to take flomax.   Instructed patient to take 1st dose at night when in bed due to potential 1st dose syncope episode.  Follow up in 6 weeks.

## 2020-09-28 NOTE — HPI
Patient is a 63 yo female w/ past medical history significant for pulmonary malignancy (adenocarcinoma, currently receiving Xgeva as Tx, follows w/ Dr. 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 Pt states she was walking to car after PFT testing and felt that her R leg began to shake and then felt weak. States that since the initial onset of symptoms, they have started to gradually resolve, but have not resolved all together.   She denies sensory changes, other focal weakness, denies visual changes.   States that both of her LE have been weaker since the diagnosis of cancer, but noted more weakness in the last 2 weeks. She reports urinary hesitancy as well. Also describes R groin pain, which limits the examination of her RLE.  Vascular Neurology consulted for an acute stroke code.

## 2020-09-28 NOTE — CONSULTS
[facility]  Vascular Neurology  Comprehensive Stroke Center  Consult Note    Inpatient consult to Vascular (Stroke) Neurology  Consult performed by: Kiara Méndez MD  Consult ordered by: Steffanie Vaughn MD        Assessment/Plan:     Patient is a 64 y.o. year old female with:    * Weakness of right lower extremity  Patient is a 65 yo female w/ known history of pulmonary malignancy w/ metastases to bone, HTN, HLD, Tobacco abuse, Hx of PE/DVT, DM who currently is undergoing chemotherapy presents to the ED from the [facility] parking lot w/ new onset RLE weakness w/o sensory deficits. Denies previous unilateral LE weakness, but does report progressive weakness of b/l LE for the past 2 weeks and also urinary symptoms that are new.   Currently concerned for metastatic disease causing patient's symptoms. She also reports groin pain x 5 days, which is limiting her motor exam.     CTH unremarkable. She is not a tPA candidate as she is on chronic AC w/ Eliquis for DVTs.   Patient's symptoms are not consistent w/ a LVO.     Pending MRI T/L spine that was ordered by ED as well as MRI Brain.   Unlikely that this is a vascular process but we will follow up w/ the patient and the scan once it becomes available.   Continue Eliquis as prescribed.     Appreciate consult.   Case discussed w/ Vascular Neurology staff.     Secondary malignant neoplasm of bone  -As noted on previous imaging studies  -Concern for bony metastases causing the RLE weakness as well as urinary symptoms     Malignant neoplasm of upper lobe of left lung  - Stroke RF due to hypercoagulable state  - Follows w/ Dr. Belcher  - Last chemo on 09/09    Hypertension associated with diabetes  - Stroke RF  - SBP goal <130 once acute infarct has been r/o w/ MRI     Cerebral aneurysm, coiled in 2010  - As reported by patient  - Coil noted on CTH    Mixed hyperlipidemia due to type 2 diabetes mellitus  - Stroke RF   - HbA1c 9.2 3 months prior  - Consider  repeating  - -180  - DM diet     Type 2 diabetes mellitus with hyperglycemia, with long-term current use of insulin  - Insulin dependent  - DM diet         STROKE DOCUMENTATION     Acute Stroke Times   Last Known Normal Date: 09/28/20  Last Known Normal Time: 1540  Symptom Onset Date: 09/28/20  Symptom Onset Time: 1540  Stroke Team Called Date: 09/28/20  Stroke Team Called Time: 1630  Stroke Team Arrival Date: 09/28/20  Stroke Team Arrival Time: 1631  CT Interpretation Time: 1645  Decision to Treat Time for Alteplase: (Not a candidate, on Eliquis )  Decision to Treat Time for IR: (No )    NIH Scale:  1a. Level of Consciousness: 0-->Alert, keenly responsive  1b. LOC Questions: 0-->Answers both questions correctly  1c. LOC Commands: 0-->Performs both tasks correctly  2. Best Gaze: 0-->Normal  3. Visual: 0-->No visual loss  4. Facial Palsy: 0-->Normal symmetrical movements  5a. Motor Arm, Left: 0-->No drift, limb holds 90 (or 45) degrees for full 10 secs  5b. Motor Arm, Right: 0-->No drift, limb holds 90 (or 45) degrees for full 10 secs  6a. Motor Leg, Left: 0-->No drift, leg holds 30 degree position for full 5 secs  6b. Motor Leg, Right: 1-->Drift, leg falls by the end of the 5-sec period but does not hit bed  7. Limb Ataxia: 0-->Absent  8. Sensory: 0-->Normal, no sensory loss  9. Best Language: 0-->No aphasia, normal  10. Dysarthria: 0-->Normal  11. Extinction and Inattention (formerly Neglect): 0-->No abnormality  Total (NIH Stroke Scale): 1    Modified Noah Score: 0  Dang Coma Scale:15   ABCD2 Score:    [id number] Score:   HAS -BLED Score:   ICH Score:   Hunt & Sutton Classification:       Thrombolysis Candidate? No, Current use of direct thrombin inhibitors (dabigatran) or direct factor Xa inhibitors (rivaroxaban, apixaban, edoxaban) with elevated sensitive laboratory tests     Delays to Thrombolysis?  No    Interventional Revascularization Candidate?   Is the patient eligible for mechanical  endovascular reperfusion (MAC)?  Yes      Hemorrhagic change of an Ischemic Stroke: Does this patient have an ischemic stroke with hemorrhagic changes? No     Subjective:     History of Present Illness:  Patient is a 65 yo female w/ past medical history significant for pulmonary malignancy (adenocarcinoma, currently receiving Xgeva as Tx, follows w/ Dr. 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 Pt states she was walking to car after PFT testing and felt that her R leg began to shake and then felt weak. States that since the initial onset of symptoms, they have started to gradually resolve, but have not resolved all together.   She denies sensory changes, other focal weakness, denies visual changes.   States that both of her LE have been weaker since the diagnosis of cancer, but noted more weakness in the last 2 weeks. She reports urinary hesitancy as well. Also describes R groin pain, which limits the examination of her RLE.  Vascular Neurology consulted for an acute stroke code.         Past Medical History:   Diagnosis Date    Allergy     Brain aneurysm 2010    s/p coiling of one; another not coiled    Breast cyst     Depression 9/19/2019    Diabetes mellitus     Diabetes type 2, controlled     Fever blister     High cholesterol     History of Bell's palsy     HTN (hypertension) 5/20/2014    Recurrent upper respiratory infection (URI)      Past Surgical History:   Procedure Laterality Date    BREAST SURGERY      BRONCHOSCOPY N/A 5/28/2019    Procedure: Bronchoscopy;  Surgeon: [facility] Diagnostic Provider;  Location: [facility] OR [address] [place] [address];  Service: Anesthesiology;  Laterality: N/A;    CERVICAL FUSION      COLONOSCOPY N/A 3/9/2016    Procedure: COLONOSCOPY;   "Surgeon: Elliott Zimmerman MD;  Location: [facility] ENDO (4TH FLR);  Service: Endoscopy;  Laterality: N/A;    head surgery      stent and "curling" for aneurysm    HYSTERECTOMY      TVH secondary to SUF    INSERTION OF TUNNELED CENTRAL VENOUS CATHETER (CVC) WITH SUBCUTANEOUS PORT Right 2019    Procedure: INSERTION, PORT-A-CATH;  Surgeon: Cali Damico MD;  Location: [facility] CATH LAB;  Service: Radiology;  Laterality: Right;    MENISCECTOMY Left      Family History   Problem Relation Age of Onset    Rheum arthritis Father     Diabetes Father     Heart failure Father     Migraines Father     Cataracts Father     Cancer Mother 63        pancreatic    Stomach cancer Mother     Breast cancer Maternal Aunt         50s    Ovarian cancer Cousin     Allergies Daughter     Diabetes Sister     Diabetes Brother     Cancer Maternal Aunt         Lung CA    Melanoma Neg Hx     Colon cancer Neg Hx     Amblyopia Neg Hx     Blindness Neg Hx     Glaucoma Neg Hx     Macular degeneration Neg Hx     Retinal detachment Neg Hx     Strabismus Neg Hx     Stroke Neg Hx     Thyroid disease Neg Hx     Allergic rhinitis Neg Hx     Angioedema Neg Hx     Asthma Neg Hx     Atopy Neg Hx     Eczema Neg Hx     Immunodeficiency Neg Hx     Rhinitis Neg Hx     Urticaria Neg Hx      Social History     Tobacco Use    Smoking status: Former Smoker     Packs/day: 0.50     Years: 30.00     Pack years: 15.00     Quit date: 1999     Years since quittin.7    Smokeless tobacco: Never Used   Substance Use Topics    Alcohol use: No     Alcohol/week: 0.0 standard drinks    Drug use: No     Review of patient's allergies indicates:   Allergen Reactions    Piperacillin-tazobactam Rash     Tolerated cefepime 2020       Medications: I have reviewed the current medication administration record.    (Not in a hospital admission)      Review of Systems   Constitutional: Positive for fatigue. Negative for fever.   HENT: " Negative.    Eyes: Negative.    Respiratory: Positive for shortness of breath.    Cardiovascular: Negative.    Gastrointestinal: Negative.  Negative for nausea and vomiting.   Endocrine: Negative.    Genitourinary: Positive for difficulty urinating.   Musculoskeletal: Positive for gait problem.   Skin: Negative.    Allergic/Immunologic: Negative.    Neurological: Positive for weakness.   Hematological: Negative.    Psychiatric/Behavioral: Negative.      Objective:     Vital Signs (Most Recent):  Temp: 98.1 °F (36.7 °C) (09/28/20 1623)  Pulse: 76 (09/28/20 1708)  Resp: 20 (09/28/20 1708)  BP: (!) 126/58 (09/28/20 1708)  SpO2: 97 % (09/28/20 1708)    Vital Signs Range (Last 24H):  Temp:  [98.1 °F (36.7 °C)]   Pulse:  [76-95]   Resp:  [18-20]   BP: (123-126)/(58-60)   SpO2:  [95 %-97 %]     Physical Exam  Vitals signs and nursing note reviewed.   Constitutional:       Appearance: Normal appearance.   HENT:      Head: Normocephalic and atraumatic.      Nose: Nose normal.   Eyes:      Extraocular Movements: Extraocular movements intact.      Pupils: Pupils are equal, round, and reactive to light.   Neck:      Musculoskeletal: Normal range of motion.   Cardiovascular:      Rate and Rhythm: Normal rate.   Skin:     General: Skin is warm and dry.   Neurological:      Mental Status: She is oriented to person, place, and time.      Cranial Nerves: No cranial nerve deficit.   Psychiatric:         Mood and Affect: Mood normal.         Neurological Exam:   LOC: alert  Attention Span: Good   Language: No aphasia  Articulation: No dysarthria  Orientation: Person, Place, Time   Visual Fields: Full  EOM (CN III, IV, VI): Full/intact  Pupils (CN II, III): PERRL  Facial Sensation (CN V): Normal  Facial Movement (CN VII): Symmetric facial expression    Motor: Arm left  Normal 5/5  Leg left  Normal 5/5  Arm right  Normal 5/5  Leg right Paresis: 4/5  Cebellar: No evidence of appendicular or axial ataxia  Sensation: Intact to light touch,  temperature and vibration  Tone: Normal tone throughout     Weakness in the RLE is primarily proximal, 5/5 w/ dorsi and plantarflexion       Laboratory:  CMP: No results for input(s): GLUCOSE, CALCIUM, ALBUMIN, PROT, NA, K, CO2, CL, BUN, CREATININE, ALKPHOS, ALT, AST, BILITOT in the last 24 hours.  CBC:   Recent Labs   Lab 09/28/20  1654   WBC 4.59   RBC 4.07   HGB 11.0*   HCT 35.1*      MCV 86   MCH 27.0   MCHC 31.3*     Lipid Panel: No results for input(s): CHOL, LDLCALC, HDL, TRIG in the last 168 hours.  Coagulation:   Recent Labs   Lab 09/28/20  1654   INR 1.0     Hgb A1C: No results for input(s): HGBA1C in the last 168 hours.  TSH: No results for input(s): TSH in the last 168 hours.    Diagnostic Results:      Brain imaging:  CTH w.o contrast (09/28/2020)    1. No acute intracranial process.  2. Stable aneurysm coils on the right.  Vessel Imaging:  N/A    Cardiac Evaluation:   ECHO ( 6/22/2020)  · Normal left ventricular systolic function. The estimated ejection fraction is 55%.  · Normal LV diastolic function.  · No wall motion abnormalities.  · Mildly reduced right ventricular systolic function.  · Normal central venous pressure (3 mmHg).  · The estimated PA systolic pressure is 26 mmHg.      Kiara Méndez MD  Comprehensive Stroke Center  Department of Vascular Neurology   [facility]

## 2020-09-29 PROBLEM — I63.40: Status: ACTIVE | Noted: 2020-09-28

## 2020-09-29 LAB
ALBUMIN SERPL BCP-MCNC: 3.7 G/DL (ref 3.5–5.2)
ALP SERPL-CCNC: 44 U/L (ref 55–135)
ALT SERPL W/O P-5'-P-CCNC: 16 U/L (ref 10–44)
ANION GAP SERPL CALC-SCNC: 9 MMOL/L (ref 8–16)
APTT BLDCRRT: 24.9 SEC (ref 21–32)
AST SERPL-CCNC: 21 U/L (ref 10–40)
BASOPHILS # BLD AUTO: 0.02 K/UL (ref 0–0.2)
BASOPHILS NFR BLD: 0.4 % (ref 0–1.9)
BILIRUB SERPL-MCNC: 0.5 MG/DL (ref 0.1–1)
BUN SERPL-MCNC: 15 MG/DL (ref 8–23)
CALCIUM SERPL-MCNC: 9.5 MG/DL (ref 8.7–10.5)
CHLORIDE SERPL-SCNC: 104 MMOL/L (ref 95–110)
CK MB SERPL-MCNC: 1.3 NG/ML (ref 0.1–6.5)
CK MB SERPL-RTO: 0.9 % (ref 0–5)
CK SERPL-CCNC: 150 U/L (ref 20–180)
CO2 SERPL-SCNC: 30 MMOL/L (ref 23–29)
CREAT SERPL-MCNC: 1.3 MG/DL (ref 0.5–1.4)
DIFFERENTIAL METHOD: ABNORMAL
EOSINOPHIL # BLD AUTO: 0.1 K/UL (ref 0–0.5)
EOSINOPHIL NFR BLD: 2.9 % (ref 0–8)
ERYTHROCYTE [DISTWIDTH] IN BLOOD BY AUTOMATED COUNT: 15.9 % (ref 11.5–14.5)
EST. GFR  (AFRICAN AMERICAN): 50.8 ML/MIN/1.73 M^2
EST. GFR  (NON AFRICAN AMERICAN): 44.1 ML/MIN/1.73 M^2
GLUCOSE SERPL-MCNC: 168 MG/DL (ref 70–110)
HCT VFR BLD AUTO: 35.8 % (ref 37–48.5)
HGB BLD-MCNC: 11 G/DL (ref 12–16)
IMM GRANULOCYTES # BLD AUTO: 0.01 K/UL (ref 0–0.04)
IMM GRANULOCYTES NFR BLD AUTO: 0.2 % (ref 0–0.5)
INR PPP: 1 (ref 0.8–1.2)
LYMPHOCYTES # BLD AUTO: 1.4 K/UL (ref 1–4.8)
LYMPHOCYTES NFR BLD: 28.8 % (ref 18–48)
MAGNESIUM SERPL-MCNC: 1.7 MG/DL (ref 1.6–2.6)
MCH RBC QN AUTO: 26.8 PG (ref 27–31)
MCHC RBC AUTO-ENTMCNC: 30.7 G/DL (ref 32–36)
MCV RBC AUTO: 87 FL (ref 82–98)
MONOCYTES # BLD AUTO: 0.7 K/UL (ref 0.3–1)
MONOCYTES NFR BLD: 13.8 % (ref 4–15)
NEUTROPHILS # BLD AUTO: 2.6 K/UL (ref 1.8–7.7)
NEUTROPHILS NFR BLD: 53.9 % (ref 38–73)
NRBC BLD-RTO: 0 /100 WBC
PHOSPHATE SERPL-MCNC: 4.1 MG/DL (ref 2.7–4.5)
PLATELET # BLD AUTO: 191 K/UL (ref 150–350)
PMV BLD AUTO: 12.3 FL (ref 9.2–12.9)
POCT GLUCOSE: 124 MG/DL (ref 70–110)
POCT GLUCOSE: 138 MG/DL (ref 70–110)
POCT GLUCOSE: 140 MG/DL (ref 70–110)
POCT GLUCOSE: 206 MG/DL (ref 70–110)
POTASSIUM SERPL-SCNC: 4.3 MMOL/L (ref 3.5–5.1)
PROT SERPL-MCNC: 6.7 G/DL (ref 6–8.4)
PROTHROMBIN TIME: 11.5 SEC (ref 9–12.5)
RBC # BLD AUTO: 4.1 M/UL (ref 4–5.4)
SODIUM SERPL-SCNC: 143 MMOL/L (ref 136–145)
TROPONIN I SERPL DL<=0.01 NG/ML-MCNC: 0.2 NG/ML (ref 0–0.03)
TROPONIN I SERPL DL<=0.01 NG/ML-MCNC: 0.33 NG/ML (ref 0–0.03)
WBC # BLD AUTO: 4.86 K/UL (ref 3.9–12.7)

## 2020-09-29 PROCEDURE — 99233 SBSQ HOSP IP/OBS HIGH 50: CPT | Mod: GC,,, | Performed by: PSYCHIATRY & NEUROLOGY

## 2020-09-29 PROCEDURE — 85730 THROMBOPLASTIN TIME PARTIAL: CPT

## 2020-09-29 PROCEDURE — 92610 EVALUATE SWALLOWING FUNCTION: CPT

## 2020-09-29 PROCEDURE — 92523 SPEECH SOUND LANG COMPREHEN: CPT

## 2020-09-29 PROCEDURE — 11000001 HC ACUTE MED/SURG PRIVATE ROOM

## 2020-09-29 PROCEDURE — 99233 PR SUBSEQUENT HOSPITAL CARE,LEVL III: ICD-10-PCS | Mod: GC,,, | Performed by: PSYCHIATRY & NEUROLOGY

## 2020-09-29 PROCEDURE — 85025 COMPLETE CBC W/AUTO DIFF WBC: CPT

## 2020-09-29 PROCEDURE — 97165 OT EVAL LOW COMPLEX 30 MIN: CPT

## 2020-09-29 PROCEDURE — C9399 UNCLASSIFIED DRUGS OR BIOLOG: HCPCS | Performed by: NURSE PRACTITIONER

## 2020-09-29 PROCEDURE — 84100 ASSAY OF PHOSPHORUS: CPT

## 2020-09-29 PROCEDURE — 99222 PR INITIAL HOSPITAL CARE,LEVL II: ICD-10-PCS | Mod: ,,, | Performed by: INTERNAL MEDICINE

## 2020-09-29 PROCEDURE — 99222 1ST HOSP IP/OBS MODERATE 55: CPT | Mod: ,,, | Performed by: INTERNAL MEDICINE

## 2020-09-29 PROCEDURE — 97535 SELF CARE MNGMENT TRAINING: CPT

## 2020-09-29 PROCEDURE — 85610 PROTHROMBIN TIME: CPT

## 2020-09-29 PROCEDURE — 84484 ASSAY OF TROPONIN QUANT: CPT

## 2020-09-29 PROCEDURE — 25000003 PHARM REV CODE 250: Performed by: FAMILY MEDICINE

## 2020-09-29 PROCEDURE — 84484 ASSAY OF TROPONIN QUANT: CPT | Mod: 91

## 2020-09-29 PROCEDURE — 97802 MEDICAL NUTRITION INDIV IN: CPT

## 2020-09-29 PROCEDURE — 82553 CREATINE MB FRACTION: CPT

## 2020-09-29 PROCEDURE — 82550 ASSAY OF CK (CPK): CPT

## 2020-09-29 PROCEDURE — 25000003 PHARM REV CODE 250: Performed by: NURSE PRACTITIONER

## 2020-09-29 PROCEDURE — 36415 COLL VENOUS BLD VENIPUNCTURE: CPT

## 2020-09-29 PROCEDURE — 83735 ASSAY OF MAGNESIUM: CPT

## 2020-09-29 PROCEDURE — 97530 THERAPEUTIC ACTIVITIES: CPT

## 2020-09-29 PROCEDURE — 97161 PT EVAL LOW COMPLEX 20 MIN: CPT

## 2020-09-29 PROCEDURE — 80053 COMPREHEN METABOLIC PANEL: CPT

## 2020-09-29 RX ORDER — IBUPROFEN 200 MG
16 TABLET ORAL
Status: DISCONTINUED | OUTPATIENT
Start: 2020-09-29 | End: 2020-10-05 | Stop reason: HOSPADM

## 2020-09-29 RX ORDER — IBUPROFEN 200 MG
24 TABLET ORAL
Status: DISCONTINUED | OUTPATIENT
Start: 2020-09-29 | End: 2020-10-05 | Stop reason: HOSPADM

## 2020-09-29 RX ORDER — ERGOCALCIFEROL 1.25 MG/1
50000 CAPSULE ORAL
Status: DISCONTINUED | OUTPATIENT
Start: 2020-09-29 | End: 2020-10-05 | Stop reason: HOSPADM

## 2020-09-29 RX ORDER — INSULIN ASPART 100 [IU]/ML
0-5 INJECTION, SOLUTION INTRAVENOUS; SUBCUTANEOUS
Status: DISCONTINUED | OUTPATIENT
Start: 2020-09-29 | End: 2020-10-05 | Stop reason: HOSPADM

## 2020-09-29 RX ORDER — VENLAFAXINE HYDROCHLORIDE 37.5 MG/1
75 CAPSULE, EXTENDED RELEASE ORAL DAILY
Status: DISCONTINUED | OUTPATIENT
Start: 2020-09-29 | End: 2020-10-05 | Stop reason: HOSPADM

## 2020-09-29 RX ORDER — GABAPENTIN 100 MG/1
300 CAPSULE ORAL NIGHTLY
Status: DISCONTINUED | OUTPATIENT
Start: 2020-09-29 | End: 2020-10-05 | Stop reason: HOSPADM

## 2020-09-29 RX ORDER — GLUCAGON 1 MG
1 KIT INJECTION
Status: DISCONTINUED | OUTPATIENT
Start: 2020-09-29 | End: 2020-10-05 | Stop reason: HOSPADM

## 2020-09-29 RX ORDER — ACETAMINOPHEN 325 MG/1
650 TABLET ORAL EVERY 6 HOURS PRN
Status: DISCONTINUED | OUTPATIENT
Start: 2020-09-29 | End: 2020-10-05 | Stop reason: HOSPADM

## 2020-09-29 RX ADMIN — INSULIN DETEMIR 10 UNITS: 100 INJECTION, SOLUTION SUBCUTANEOUS at 09:09

## 2020-09-29 RX ADMIN — ACETAMINOPHEN 650 MG: 325 TABLET ORAL at 01:09

## 2020-09-29 RX ADMIN — VENLAFAXINE HYDROCHLORIDE 75 MG: 37.5 CAPSULE, EXTENDED RELEASE ORAL at 03:09

## 2020-09-29 RX ADMIN — GABAPENTIN 200 MG: 100 CAPSULE ORAL at 08:09

## 2020-09-29 RX ADMIN — APIXABAN 5 MG: 5 TABLET, FILM COATED ORAL at 01:09

## 2020-09-29 RX ADMIN — APIXABAN 5 MG: 5 TABLET, FILM COATED ORAL at 09:09

## 2020-09-29 RX ADMIN — APIXABAN 5 MG: 5 TABLET, FILM COATED ORAL at 08:09

## 2020-09-29 RX ADMIN — ERGOCALCIFEROL 50000 UNITS: 1.25 CAPSULE ORAL at 08:09

## 2020-09-29 NOTE — PROGRESS NOTES
[facility]  Vascular Neurology  Comprehensive Stroke Center  Progress Note    Assessment/Plan:     Acute ischemic multifocal multiple vascular territories stroke  Patient is a 63 yo female w/ known history of pulmonary malignancy w/ metastases to bone, HTN, HLD, Tobacco abuse, Hx of PE/DVT, DM who currently is undergoing chemotherapy presents to the ED from the [facility] parking lot w/ new onset RLE weakness w/o sensory deficits. Denies previous unilateral LE weakness, but does report progressive weakness of b/l LE for the past 2 weeks and also urinary symptoms that are new.     She was not a tPA candidate as she is on chronic AC w/ Eliquis for DVTs.     MRI brain was completed demonstrating small lesion in the right centrum semiovale and left corpus callosum, diffusion positive only. Lesions are not enhancing and no significant vasogenic edema surrounding lesions. Suspect lesions are likely late subacute ischemic infarcts. Etiology likely hypercoaguable state related to cancer.    Antithrombotics for secondary stroke prevention:  Continue Eliquis 5 mg BID for now; hem/onc consulted     Statins for secondary stroke prevention and hyperlipidemia, if present:   Per patient she can not take statin with oral chemo agent    Aggressive risk factor modification: DM, HLD, Lung cancer      Rehab efforts: The patient has been evaluated by a stroke team provider and the therapy needs have been fully considered based off the presenting complaints and exam findings. The following therapy evaluations are needed: PT evaluate and treat, OT evaluate and treat, SLP evaluate and treat, PM&R evaluate for appropriate placement    Diagnostics ordered/pending: None     VTE prophylaxis: None: Reason for No Pharmacological VTE Prophylaxis: Currently on anticoagulation    BP parameters: Infarct: No intervention, SBP <220        Secondary malignant neoplasm of bone  -As noted on previous imaging studies  -hem/onc  consulted    Malignant neoplasm of upper lobe of left lung  - Stroke RF due to hypercoagulable state  - Follows w/ Dr. Belcher  - Last chemo on 09/09  - Hem/onc consulted for further assistance     Hypertension associated with diabetes  - Stroke RF  - SBP goal <220    Cerebral aneurysm, coiled in 2010  - As reported by patient  - Coil noted on CTH    Mixed hyperlipidemia due to type 2 diabetes mellitus  - Stroke risk factor   - Patient reports statin can not be taken with her oral chemo agent   -     Type 2 diabetes mellitus with hyperglycemia, with long-term current use of insulin  - Insulin dependent  - Hgb 9.3  - SSI while inpatient  - Detemir 10 u nightly          MRI brain completed with small acute infarcts throughout the cerebral hemisphere. Continuing Eliquis 5 mg BID at this time. Hem/Onc consulted regarding chemo and anticoagulation recommendations. Therapy recs pending.     STROKE DOCUMENTATION   Acute Stroke Times   Last Known Normal Date: 09/28/20  Last Known Normal Time: 1540  Symptom Onset Date: 09/28/20  Symptom Onset Time: 1540  Stroke Team Called Date: 09/28/20  Stroke Team Called Time: 1630  Stroke Team Arrival Date: 09/28/20  Stroke Team Arrival Time: 1631  CT Interpretation Time: 1645  Decision to Treat Time for Alteplase: (Not a candidate, on Eliquis )  Decision to Treat Time for IR: (No )    NIH Scale:  1a. Level of Consciousness: 0-->Alert, keenly responsive  1b. LOC Questions: 0-->Answers both questions correctly  1c. LOC Commands: 0-->Performs both tasks correctly  2. Best Gaze: 0-->Normal  3. Visual: 0-->No visual loss  4. Facial Palsy: 0-->Normal symmetrical movements  5a. Motor Arm, Left: 0-->No drift, limb holds 90 (or 45) degrees for full 10 secs  5b. Motor Arm, Right: 0-->No drift, limb holds 90 (or 45) degrees for full 10 secs  6a. Motor Leg, Left: 1-->Drift, leg falls by the end of the 5-sec period but does not hit bed  6b. Motor Leg, Right: 2-->Some effort against gravity, leg  falls to bed by 5 secs, but has some effort against gravity  7. Limb Ataxia: 0-->Absent  8. Sensory: 0-->Normal, no sensory loss  9. Best Language: 0-->No aphasia, normal  10. Dysarthria: 0-->Normal  11. Extinction and Inattention (formerly Neglect): 0-->No abnormality  Total (NIH Stroke Scale): 3       Modified Gilpin Score: 0  [place] Coma Scale:    ABCD2 Score:    [id number] Score:   HAS -BLED Score:   ICH Score:   Hunt & Sutton Classification:      Hemorrhagic change of an Ischemic Stroke: Does this patient have an ischemic stroke with hemorrhagic changes? No     Neurologic Chief Complaint: RLE weakness     Subjective:     Interval History: Patient is seen for follow-up neurological assessment and treatment recommendations:     MRI brain completed with small acute infarcts throughout the cerebral hemisphere. Continuing Eliquis 5 mg BID at this time. Hem/Onc consulted regarding chemo and anticoagulation recommendations. Therapy recs pending.     HPI, Past Medical, Family, and Social History remains the same as documented in the initial encounter.     Review of Systems   Constitutional: Positive for fatigue. Negative for fever.   Eyes: Negative for visual disturbance.   Musculoskeletal: Positive for gait problem.   Neurological: Positive for weakness. Negative for facial asymmetry and speech difficulty.   Psychiatric/Behavioral: Negative for agitation and confusion.     Scheduled Meds:   apixaban  5 mg Oral BID    ergocalciferol  50,000 Units Oral Q7 Days    gabapentin  300 mg Oral QHS    venlafaxine  75 mg Oral Daily     Continuous Infusions:   sodium chloride 0.9%       PRN Meds:acetaminophen, labetaloL, ondansetron, sodium chloride 0.9%, sodium chloride 0.9%    Objective:     Vital Signs (Most Recent):  Temp: 97.6 °F (36.4 °C) (09/29/20 0752)  Pulse: 66 (09/29/20 0752)  Resp: 18 (09/29/20 0752)  BP: (!) 123/57 (09/29/20 0752)  SpO2: 99 % (09/29/20 0752)  BP Location: Right arm    Vital Signs Range (Last  24H):  Temp:  [97.6 °F (36.4 °C)-98.2 °F (36.8 °C)]   Pulse:  [66-95]   Resp:  [14-20]   BP: (122-137)/(57-75)   SpO2:  [95 %-99 %]   BP Location: Right arm    Physical Exam  Vitals signs reviewed.   HENT:      Head: Normocephalic.   Cardiovascular:      Rate and Rhythm: Normal rate.   Pulmonary:      Effort: Pulmonary effort is normal.   Abdominal:      General: Abdomen is flat.   Skin:     Capillary Refill: Capillary refill takes less than 2 seconds.   Neurological:      Mental Status: She is alert.      Motor: Weakness present.         Neurological Exam:   LOC: alert  Attention Span: Good   Language: No aphasia  Articulation: No dysarthria  Orientation: Person, Place, Time   Visual Fields: Full  EOM (CN III, IV, VI): Full/intact  Pupils (CN II, III): PERRL  Facial Movement (CN VII): Symmetric facial expression    Motor: Arm left  Normal 5/5  Leg left  Paresis: 4/5  Arm right  Normal 5/5  Leg right Paresis: 3/5  Cebellar: No evidence of appendicular or axial ataxia  Sensation: Intact to light touch, temperature and vibration    Laboratory:  CMP:   Recent Labs   Lab 09/29/20  0415   CALCIUM 9.5   ALBUMIN 3.7   PROT 6.7      K 4.3   CO2 30*      BUN 15   CREATININE 1.3   ALKPHOS 44*   ALT 16   AST 21   BILITOT 0.5     BMP:   Recent Labs   Lab 09/29/20  0415      K 4.3      CO2 30*   BUN 15   CREATININE 1.3   CALCIUM 9.5     CBC:   Recent Labs   Lab 09/29/20  0415   WBC 4.86   RBC 4.10   HGB 11.0*   HCT 35.8*      MCV 87   MCH 26.8*   MCHC 30.7*     Lipid Panel:   Recent Labs   Lab 09/28/20  1654   CHOL 242*   LDLCALC 147.0   HDL 62   TRIG 165*     Coagulation:   Recent Labs   Lab 09/29/20  0415   INR 1.0   APTT 24.9     Platelet Aggregation Study: No results for input(s): PLTAGG, PLTAGINTERP, PLTAGREGLACO, ADPPLTAGGREG in the last 168 hours.  Hgb A1C:   Recent Labs   Lab 09/28/20  2311   HGBA1C 9.3*     TSH:   Recent Labs   Lab 09/28/20  1654   TSH 0.675       Diagnostic Results      Brain Imaging   MRI brain 9/28  There are few high T2 small foci bilaterally.  A small lesion in the right centrum semiovale and left corpus callosum are diffusion positive only.  There are other similar small foci which are diffusion positive with associated enhancement.  Chart review indicates metastatic disease.  These findings could represent metastatic disease also.  Few small foci of superimposed acute/subacute lacunar infarction cannot be excluded.  Recommend follow-up.    Vessel Imaging   US carotid bilateral   No evidence of a hemodynamically significant carotid bifurcation stenosis.  1- 39% stenosis of the ICAs bilaterally.    Cardiac Imaging   TTE 6/22/20  · Normal left ventricular systolic function. The estimated ejection fraction is 55%.  · Normal LV diastolic function.  · No wall motion abnormalities.  · Mildly reduced right ventricular systolic function.  · Normal central venous pressure (3 mmHg).  · The estimated PA systolic pressure is 26 mmHg.      Marielle Mendoza NP  Comprehensive Stroke Center  Department of Vascular Neurology   [facility]

## 2020-09-29 NOTE — PLAN OF CARE
Problem: Occupational Therapy Goal  Goal: Occupational Therapy Goal  Description: Goals to be met by: 10/13/20     Patient will increase functional independence with ADLs by performing:    UE Dressing with [place].  LE Dressing with [place].  Grooming while standing at sink with [place].  Toileting from toilet with [place] for hygiene and clothing management.   Bathing from  shower chair/bench with [place].  Toilet transfer to toilet with [place].  Increased functional strength to WFL for B UE.  Upper extremity exercise program x15 reps per handout, with independence.  Pt will be Ox4.  Pt will increased LTM/STM from fair to good.  Pt will follow multistep commands c 100% accuracy.  Outcome: Ongoing, Progressing

## 2020-09-29 NOTE — PT/OT/SLP EVAL
Physical Therapy Evaluation    Patient Name:  Alison Branch   MRN:  [id number]  Admit Date: 9/28/2020  Admitting Diagnosis:  <principal problem not specified>  Length of Stay: 1 days  Recent Surgery: * No surgery found *      Recommendations:     Discharge Recommendations:  home health PT(24hr supervision)   Discharge Equipment Recommendations: wheelchair   Barriers to discharge: Decreased caregiver support    Assessment:     Alison Branch is a 64 y.o. female admitted with a medical diagnosis of <principal problem not specified>.  She presents with the following impairments/functional limitations: decreased functional mobility and balance. Pt reports increasing strength of RLE, minimal deficit noted. Pt requires vc's to slow mobility and safety. Pt will require supervision at home due to decreased safety awareness. Alison Branch's deficits effect their ability to safely and independently participate in self-care tasks and functional mobility which increases their caregiver burden. Due to her physical therapy diagnosis of debility and deconditioning, they continue to benefit from acute PT services to address the following limitations: high fall risk, weakness, instability, and the need for supervised instruction of exercise. These deficits effect their roles and responsibilities in which they were able to complete prior to admit. Pt would benefit from an intensive and collaborative PT/OT/SLP therapy program to improve quality of life and focus on recovery of impairments.     Problem List: weakness, impaired endurance, impaired sensation, impaired self care skills, impaired functional mobilty, decreased safety awareness, impaired balance, gait instability, impaired cardiopulmonary response to activity  Rehab Prognosis: Good; patient would benefit from acute skilled PT services to address these deficits and reach maximum level of function.      Plan:     During this hospitalization, patient to be seen 3 x/week to address  "the identified rehab impairments via gait training, therapeutic activities, therapeutic exercises, neuromuscular re-education and progress towards the established goals.    · Plan of Care Expires:  10/28/20    Subjective   Communicated with RN prior to session.  Patient found up in chair upon PT entry to room, agreeable to evaluation. Alison Branch's alone present during session.    Chief Complaint: Multiple complaints (r leg was shaking and fell, headache, sugar was 131, + drift to r leg)    Patient/Family Comments/goals: "I don't like when my kids try to tell me what to do."  Pain/Comfort:  · Pain Rating 1: (c/o R groin pain, did not grade)  · Pain Addressed 1: Reposition, Distraction    Living Environment:  Patient lives with daughter and 15y/o granddaughter, 7 y/o grandson in a single family home with no CIRA.   Prior Level of Function:   Patient reports being independent with mobility with SPC & with ADLs. Hand Dominance: right. Patient uses DME as follows: bedside commode, walker, rolling, shower chair, cane, straight. DME owned (not currently used): none.  Roles/Repsonsibilities:   Work: Unemployed. Drive: minimally. Managing Medicines/Managing Home: yes. Hobbies: being with friends.    Patient reports they will have assistance from family upon discharge.    Objective:   Patient found with: telemetry     General Precautions: Standard, Cardiac aspiration, fall   Orthopedic Precautions:N/A   Braces: N/A   Oxygen Device: Room Air  Vitals: BP (!) 108/57 (BP Location: Right arm, Patient Position: Sitting)   Pulse 75   Temp 96.6 °F (35.9 °C) (Oral)   Resp 18   Ht 5' 9" (1.753 m)   Wt 99.3 kg (218 lb 14.7 oz)   SpO2 98%   Breastfeeding No   BMI 32.33 kg/m²     Exams:  · Cognition:   · Alert and Cooperative  · AxOx3  · Command following: Follows one-step commands, easily distracted  · Fluency: clear/fluent  · Hearing: Intact  · Vision:  Intact visual fields and wears glasses      Left LE Right LE   Edema " absent absent   ROM AROM WFL AROM WFL   Modified Kimber Scale 0: No increase in muscle tone 0: No increase in muscle tone   Strength 4/5 4/5,  -   Sensation intact to light touch intact to light touch   Coordination normal normal     Outcome Measures:  AM-PAC 6 CLICK MOBILITY  Turning over in bed (including adjusting bedclothes, sheets and blankets)?: 3  Sitting down on and standing up from a chair with arms (e.g., wheelchair, bedside commode, etc.): 3  Moving from lying on back to sitting on the side of the bed?: 3  Moving to and from a bed to a chair (including a wheelchair)?: 3  Need to walk in hospital room?: 3  Climbing 3-5 steps with a railing?: 2  Basic Mobility Total Score: 17     Functional Mobility:  Additional staff present: N/A  Bed Mobility:  · Pt found/returned to bedside chair    Transfers:   · Sit <> Stand Transfer: minimum assistance with hand-held assist   · Stand <> Sit Transfer: minimum assistance with hand-held assist   · [id number] from bedside chair  · Pt requires increased time to come to full erect posture, relying on BUE for support.  · Pt able to perform perineal hygiene in standing      Gait:   · Patient ambulated: 4-5 steps at EOB   · Patient required: minimal assist  · Patient used: hand-held assist  · Gait Pattern observed: reciprocal gait  · Gait Deviation(s): decreased saritha  · Comments: pt focused on cleaning up due to urinating while in chair.    Therapeutic Activities & Exercises:   *ordered RW and BSC for in-room use to increase safety.    Education:  Patient provided with daily orientation and goals of this PT session. Patient agreed to participate in session. They were educated to transfer to bedside chair/bedside commode/bathroom with RN/PCT present. Patient educated on Fall risk, home safety, Home exercise program and plan of care by explanation.  Patient was receptive to education and verbalizes understanding. Encouraged patient to perform daily exercises & mobility to  increase endurance and decrease effects of bedrest. Time provided for therapeutic counseling and discussion of current health disposition. All questions answered to patient's and family's satisfaction, within scope of PT practice; voiced no other concerns. White board updated in patient's room, RN notified of session.    Patient left up in chair, with head in midline, neutral pelvis & heels floated for skin protection with all lines intact, call button in reach and RN notified.    GOALS:   Multidisciplinary Problems     Physical Therapy Goals        Problem: Physical Therapy Goal    Goal Priority Disciplines Outcome Goal Variances Interventions   Physical Therapy Goal     PT, PT/OT Ongoing, Progressing     Description: Goals to be met by: 10/7/2020    Patient will increase functional independence with mobility by performin. Supine <> sit with Supervision.  2. Sit <> stand transfer with Supervision using Rolling Walker.  3. Bed <> chair transfer via Stand Pivot with Supervision using Rolling Walker.  4. Gait  x 100 feet with Contact Guard Assistance using Rolling Walker to prepare for community ambulation and endurance activities.  5. Able to tolerate exercise for 15-20 reps with independence.                         History:     Past Medical History:   Diagnosis Date    Allergy     Brain aneurysm     s/p coiling of one; another not coiled    Breast cyst     Depression 2019    Diabetes mellitus     Diabetes type 2, controlled     Fever blister     High cholesterol     History of Bell's palsy     HTN (hypertension) 2014    Recurrent upper respiratory infection (URI)        Past Surgical History:   Procedure Laterality Date    BREAST SURGERY      BRONCHOSCOPY N/A 2019    Procedure: Bronchoscopy;  Surgeon: Iesha Diagnostic Provider;  Location: [facility] OR [address] [place] [address];  Service: Anesthesiology;  Laterality: N/A;    CERVICAL FUSION      COLONOSCOPY N/A 3/9/2016    Procedure: COLONOSCOPY;   "Surgeon: Elliott Zimmerman MD;  Location: [facility] ENDO (4TH FLR);  Service: Endoscopy;  Laterality: N/A;    head surgery      stent and "curling" for aneurysm    HYSTERECTOMY      TVH secondary to SUF    INSERTION OF TUNNELED CENTRAL VENOUS CATHETER (CVC) WITH SUBCUTANEOUS PORT Right 7/8/2019    Procedure: INSERTION, PORT-A-CATH;  Surgeon: Cali Damico MD;  Location: [facility] CATH LAB;  Service: Radiology;  Laterality: Right;    MENISCECTOMY Left        Time Tracking:     PT Received On: 09/29/20  PT Start Time: 1412     PT Stop Time: 1445  PT Total Time (min): 33 min     Billable Minutes: Evaluation 15 and Therapeutic Activity 15    Bessy Lopez PT, DPT  9/29/2020  Pager: [phone]    "

## 2020-09-29 NOTE — CONSULTS
Inpatient consult to Physical Medicine Rehab  Consult performed by: Jenny Falk NP  Consult ordered by: Arvin Farah NP  Reason for consult: assess rehab needs        Reviewed patient history and current admission.  Rehab team following.  Full consult to follow.    MELODY Suarez, FNP-C  Physical Medicine & Rehabilitation   09/29/2020

## 2020-09-29 NOTE — PLAN OF CARE
Plan of Care:  Discharge Recommendation: HHPT with 24hr supervision/assist.  Please continue Progressive Mobility Protocol as appropriate.  Appropriate transfer level with nursing staff: Safe to transfer to bedside chair/bedside commode: stand pivot with 1 person with RW.    Bessy Lopez PT, DPT  2020  Pager: [phone]    Physical Therapy Treatment Goals:  Multidisciplinary Problems       Physical Therapy Goals          Problem: Physical Therapy Goal    Goal Priority Disciplines Outcome Goal Variances Interventions   Physical Therapy Goal     PT, PT/OT Ongoing, Progressing     Description: Goals to be met by: 10/7/2020    Patient will increase functional independence with mobility by performin. Supine <> sit with Supervision.  2. Sit <> stand transfer with Supervision using Rolling Walker.  3. Bed <> chair transfer via Stand Pivot with Supervision using Rolling Walker.  4. Gait  x 100 feet with Contact Guard Assistance using Rolling Walker to prepare for community ambulation and endurance activities.  5. Able to tolerate exercise for 15-20 reps with independence.

## 2020-09-29 NOTE — PT/OT/SLP EVAL
"Speech Language Pathology Evaluation  Cognitive/Bedside Swallow    Patient Name:  Alison Branch   MRN:  [id number]  Admitting Diagnosis: Weakness of right lower extremity    Recommendations:                  General Recommendations:  Dysphagia therapy and Cognitive-linguistic therapy  Diet recommendations:  Regular, Liquid Diet Level: Thin   Aspiration Precautions: Avoid talking while eating, Feed only when awake/alert, HOB to 90 degrees and Standard aspiration precautions   General Precautions: Standard, aspiration  Communication strategies:  none    History:     Past Medical History:   Diagnosis Date    Allergy     Brain aneurysm 2010    s/p coiling of one; another not coiled    Breast cyst     Depression 9/19/2019    Diabetes mellitus     Diabetes type 2, controlled     Fever blister     High cholesterol     History of Bell's palsy     HTN (hypertension) 5/20/2014    Recurrent upper respiratory infection (URI)        Past Surgical History:   Procedure Laterality Date    BREAST SURGERY      BRONCHOSCOPY N/A 5/28/2019    Procedure: Bronchoscopy;  [facility];  Location: [facility] OR [address] [place] [address];  Service: Anesthesiology;  Laterality: N/A;    CERVICAL FUSION      COLONOSCOPY N/A 3/9/2016    Procedure: COLONOSCOPY;  Surgeon: Elliott Zimmerman MD;  Location: [facility] ENDO (4TH FLR);  Service: Endoscopy;  Laterality: N/A;    head surgery      stent and "curling" for aneurysm    HYSTERECTOMY      TVH secondary to SUF    INSERTION OF TUNNELED CENTRAL VENOUS CATHETER (CVC) WITH SUBCUTANEOUS PORT Right 7/8/2019    Procedure: INSERTION, PORT-A-CATH;  Surgeon: Cali Damico MD;  Location: [facility] CATH LAB;  Service: Radiology;  Laterality: Right;    MENISCECTOMY Left        Social History: Patient lives with daughter in [place]    Prior Intubation HX: none noted    Modified Barium Swallow: none noted    Chest X-Rays: none this admission    MRI: 9/28/2020 FINDINGS:  Diffusion-weighted imaging " demonstrates a tiny focus of increased diffusion signal in the right centrum semiovale suggesting small acute/subacute lacunar infarction.  There is a 2nd small focus of increased diffusion signal in the left mid corpus callosum.  This could represent small acute/subacute lacunar infarction.  No significant enhancement in these areas.  Small metastatic foci are not completely excluded.     There is a very small focus of increased T2 and FLAIR signal in the left parietal subcortical white matter.  Faint increased diffusion signal in this lesion.  There is associated enhancement in this lesion.  Additionally there are small enhancing lesions in the cerebellum bilaterally also.  These lesions in the cerebellum are diffusion positive also.     There is no significant edema associated with the lesions.     Findings could represent small foci of metastatic disease.     No midline shift or hydrocephalus.  No evidence of hemorrhage.    Prior diet: Regular, Thin    Occupation/hobbies/homemaking: Retired, worked in customer service. Daughter aids in some ADLs at home.    Subjective     Pt fatigued     Pain/Comfort:  · Pain Rating 1: 0/10    Objective:     Cognitive Status:    Arousal/Alertness Appropriate response to stimuli  Attention No obvious deficits observed    Orientation Oriented x4  Memory Immediate Recall Recalled 7/7 numbers independently and Delayed Recalled 3/3 unrelated items post 5-minute delay independently  Problem Solving Solutions Provided 4/4 solutions independently  Simple calculation answered time questions 3/3 given visual cue (clock in room)      Receptive Language:   Comprehension:      WFL  Questions Simple yes/no answered 4/4 independently  Complex yes/no 7/7 independently  Open ended questions WNL  Commands  two step basic commands 5/5 independently    Pragmatics:    Pt with appropriate turn taking skills and eye contact throughout evaluation    Expressive Language:  Verbal:    Naming Divergent  responsive Pt named 15 items in a concrete category independently and Single word responsive naming 6/6 independently  Conversational speech Pt with adequate expressive language abilities to participate in structured and spontaneous speech tasks      Motor Speech:  WFL    Voice:   WFL    Visual-Spatial:  TBA    Reading:   TBA     Written Expression:   TBA    Oral Musculature Evaluation  · Oral Musculature: WNL  · Dentition: present and adequate  · Secretion Management: adequate  · Mucosal Quality: adequate  · Mandibular Strength and Mobility: WNL  · Oral Labial Strength and Mobility: WNL  · Lingual Strength and Mobility: WNL  · Velar Elevation: WNL  · Buccal Strength and Mobility: WNL  · Volitional Cough: Elicited and adequate  · Volitional Swallow: Elicited and adequate  · Voice Prior to PO Intake: (Mildly hoarse with reduced intensity)    Bedside Swallow Eval:   Consistencies Assessed:  · Thin liquids cyclical sip x1  · Solids small bites x1     Oral Phase:   · WNL    Pharyngeal Phase:   · no overt clinical signs/symptoms of aspiration  · no overt clinical signs/symptoms of pharyngeal dysphagia    Compensatory Strategies  · None    Treatment:   Pt seen for swallowing and [id number] evaluation. Pt found awake in bed upon SLP entry to room. Pt presented with fatigue throughout session, but participated in all tasks. SLP educated Pt on SLP role and POC. Pt remained in bed upon SLP exit from room. SLP to evaluate math, reading, writing, and visualspatial skills in next session.    Assessment:     Alison Branch is a 64 y.o. female with intact oral feeding and swallowing skills. Appearing at or near baseline cognitive-linguistic skills; however, additional assessment of math, reading, and writing skills warranted in future sessions.    Goals:   Multidisciplinary Problems     SLP Goals        Problem: SLP Goal    Goal Priority Disciplines Outcome   SLP Goal     SLP Ongoing, Progressing   Description: SLP  Goals to be Met 10/6/2020    1. Pt will tolerate Regular texture diet with Thin liquids with no overt signs of airway obstruction independently  2. Pt will participate in further evaluation of math, visual-spatial, reading, and writing skills                     Plan:     · Patient to be seen:  3 x/week   · Plan of Care expires:  10/29/20  · Plan of Care reviewed with:  patient   · SLP Follow-Up:  Yes       Discharge recommendations:  Discharge Facility/Level of Care Needs: other (see comments)   Barriers to Discharge:  None    Time Tracking:     SLP Treatment Date:   09/29/20  Speech Start Time:  1034  Speech Stop Time:  1050     Speech Total Time (min):  16 min    Billable Minutes: Eval 8  and Eval Swallow and Oral Function 8    GILBERTO Marsh  09/29/2020

## 2020-09-29 NOTE — PLAN OF CARE
CM met with patient in room for Discharge Planning Assessment.  Per patient,  patient lives with daughter Suellen in a(n) house with ? steps to enter.   Per patient, patient was independent with ADLS and used rollator for ambulation.  Per patient, the patient will have assistance from family upon discharge.  Discharge Plan A IRF and Discharge Plan B SNF.  Discharge Planning Booklet given to patient/family and discussed.  All questions addressed.     PCP:  Mike Rodriguez Ii, MD      Pharmacy:    [facility] - [address]  [address] [place] [address]  [address]  [facility] [address]  Phone: [phone] Fax: [phone]    [facility] - [place] - [address]  [address] [place] [address]  Phone: [phone] Fax: [phone]        Emergency Contacts:  Extended Emergency Contact Information  Primary Emergency Contact: Chito Woodruff   [place]  Work Phone: [phone]  Mobile Phone: [phone]  Relation: Daughter  Secondary Emergency Contact: KENIA AGUILAR  Mobile Phone: [phone]  Relation: Daughter      Insurance:    Payor: [employer] / Plan: [employer] 65 / Product Type: Medicare Advantage /        09/29/20 1422   Discharge Assessment   Assessment Type Discharge Planning Assessment   Confirmed/corrected address and phone number on facesheet? Yes   Assessment information obtained from? Patient   Expected Length of Stay (days) 5   Communicated expected length of stay with patient/caregiver yes   Prior to hospitilization cognitive status: Alert/Oriented;No Deficits   Prior to hospitalization functional status: Independent   Current cognitive status: Alert/Oriented   Current Functional Status: Assistive Equipment;Needs Assistance   Lives With child(debby), adult   Able to Return to Prior Arrangements other (see comments)  (tbd)   Is patient able to care for self after discharge? Unable to  determine at this time (comments)   Who are your caregiver(s) and their phone number(s)? Chito Woodruff daughter [phone], [phone]; Min alcocer [phone]   Patient's perception of discharge disposition rehab facility   Readmission Within the Last 30 Days no previous admission in last 30 days   Patient currently being followed by outpatient case management? No   Patient currently receives any other outside agency services? No   Equipment Currently Used at Home bedside commode;glucometer;shower chair;cane, straight   Do you have any problems affording any of your prescribed medications? TBD   Is the patient taking medications as prescribed? yes   Does the patient have transportation home? Yes   Transportation Anticipated family or friend will provide  (daughter)   Dialysis Name and Scheduled days na   Does the patient receive services at the Coumadin Clinic? No   Discharge Plan A Rehab   Discharge Plan B Skilled Nursing Facility   DME Needed Upon Discharge  other (see comments)  (tbd)   Patient/Family in Agreement with Plan yes       Sabina Adler RN  Case Management  Ext: [phone]

## 2020-09-29 NOTE — H&P
See consult note dated 9/28/2020 for full H&P.    Amarilis Aleman DNP  [facility]  Department of Vascular Neurology   [facility]  [phone]

## 2020-09-29 NOTE — CONSULTS
"PATIENT: Alison Branch  MRN: [id number]  DATE: 9/29/2020      Diagnosis:   1. Stroke    2. Acute ischemic multifocal multiple vascular territories stroke    3. Weakness of right lower extremity        Chief Complaint: Multiple complaints (r leg was shaking and fell, headache, sugar was 131, + drift to r leg)      Subjective:    Initial History: Ms. Branch is a 64 y.o. 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 Admitted with stroke like symptoms including RLE weakness and some confusion. Currently feels symptoms have improved but has not returned to baseline.    Past Medical History:   Past Medical History:   Diagnosis Date    Allergy     Brain aneurysm 2010    s/p coiling of one; another not coiled    Breast cyst     Depression 9/19/2019    Diabetes mellitus     Diabetes type 2, controlled     Fever blister     High cholesterol     History of Bell's palsy     HTN (hypertension) 5/20/2014    Recurrent upper respiratory infection (URI)        Past Surgical HIstory:   Past Surgical History:   Procedure Laterality Date    BREAST SURGERY      BRONCHOSCOPY N/A 5/28/2019    Procedure: Bronchoscopy;  Surgeon: [facility] Diagnostic Provider;  Location: [facility] OR [address] [place] [address];  Service: Anesthesiology;  Laterality: N/A;    CERVICAL FUSION      COLONOSCOPY N/A 3/9/2016    Procedure: COLONOSCOPY;  Surgeon: Elliott Zimmerman MD;  Location: [facility] ENDO (4TH FLR);  Service: Endoscopy;  Laterality: N/A;    head surgery      stent and "curling" for aneurysm    HYSTERECTOMY      TVH secondary to SUF    INSERTION OF TUNNELED CENTRAL VENOUS CATHETER (CVC) WITH SUBCUTANEOUS PORT Right 7/8/2019    Procedure: INSERTION, PORT-A-CATH;  Surgeon: Cali Damico MD;  Location: [facility] [place] C[facility] LAB;  Service: " Radiology;  Laterality: Right;    MENISCECTOMY Left        Family History:   Family History   Problem Relation Age of Onset    Rheum arthritis Father     Diabetes Father     Heart failure Father     Migraines Father     Cataracts Father     Cancer Mother 63        pancreatic    Stomach cancer Mother     Breast cancer Maternal Aunt         50s    Ovarian cancer Cousin     Allergies Daughter     Diabetes Sister     Diabetes Brother     Cancer Maternal Aunt         Lung CA    Melanoma Neg Hx     Colon cancer Neg Hx     Amblyopia Neg Hx     Blindness Neg Hx     Glaucoma Neg Hx     Macular degeneration Neg Hx     Retinal detachment Neg Hx     Strabismus Neg Hx     Stroke Neg Hx     Thyroid disease Neg Hx     Allergic rhinitis Neg Hx     Angioedema Neg Hx     Asthma Neg Hx     Atopy Neg Hx     Eczema Neg Hx     Immunodeficiency Neg Hx     Rhinitis Neg Hx     Urticaria Neg Hx        Social History:  reports that she quit smoking about 21 years ago. She has a 15.00 pack-year smoking history. She has never used smokeless tobacco. She reports that she does not drink alcohol or use drugs.    Allergies:  Review of patient's allergies indicates:   Allergen Reactions    Piperacillin-tazobactam Rash     Tolerated cefepime 06/2020       Medications:  Current Facility-Administered Medications   Medication Dose Route Frequency Provider Last Rate Last Dose    acetaminophen tablet 650 mg  650 mg Oral Q6H PRN Amarilis Aleman DNP, NP   650 mg at 09/29/20 0111    apixaban tablet 5 mg  5 mg Oral BID Arvin Farah NP   5 mg at 09/29/20 0902    dextrose 50% injection 12.5 g  12.5 g Intravenous PRN Marielle Mendoza NP        dextrose 50% injection 25 g  25 g Intravenous PRN Marielle Mendoza NP        ergocalciferol capsule 50,000 Units  50,000 Units Oral Q7 Days Marielle Mendoza NP        gabapentin capsule 300 mg  300 mg Oral QHS Marielle Mendoza NP        glucagon (human recombinant) injection 1 mg   1 mg Intramuscular PRN Marielle Mendoza, NP        glucose chewable tablet 16 g  16 g Oral PRN Marielle Mendoza, NP        glucose chewable tablet 24 g  24 g Oral PRN Marielle Mendoza, NP        insulin aspart U-100 pen 0-5 Units  0-5 Units Subcutaneous QID (AC + HS) PRN Marielle Mendoza, NP        insulin detemir U-100 pen 10 Units  10 Units Subcutaneous QHS Marielle Mendoza, JULIA        labetalol 20 mg/4 mL (5 mg/mL) IV syring  10 mg Intravenous Q15 Min PRN Arvin M. Luter, NP        ondansetron disintegrating tablet 8 mg  8 mg Oral Q8H PRN Arvin M. Luter, NP        sodium chloride 0.9% bolus 500 mL  500 mL Intravenous Continuous PRN Arvin M. Luter, NP        sodium chloride 0.9% flush 10 mL  10 mL Intravenous PRN Arvin M. Luter, NP        venlafaxine 24 hr capsule 75 mg  75 mg Oral Daily Marielle Mendoza, NP   75 mg at 09/29/20 1556       Review of Systems   Constitutional: Negative for appetite change, chills, fatigue and fever.   HENT: Negative for nosebleeds and sore throat.    Eyes: Negative for photophobia and visual disturbance.   Respiratory: Negative for cough and shortness of breath.    Cardiovascular: Negative for chest pain and palpitations.   Gastrointestinal: Negative for abdominal pain, diarrhea, nausea and vomiting.   Endocrine: Negative for cold intolerance, heat intolerance, polydipsia and polyuria.   Genitourinary: Negative for dysuria and urgency.   Musculoskeletal: Negative for arthralgias and myalgias.   Skin: Negative for rash and wound.   Neurological: Positive for weakness (RLE). Negative for dizziness and headaches.   Hematological: Negative for adenopathy. Does not bruise/bleed easily.   Psychiatric/Behavioral: Negative for confusion. The patient is not nervous/anxious.          Objective:      Vitals:   Vitals:    09/29/20 0752 09/29/20 1259 09/29/20 1539 09/29/20 1700   BP: (!) 123/57 (!) 108/57  108/62   BP Location:  Right arm  Right arm   Patient Position: Lying Sitting     Pulse: 66  74 75 80   Resp: 18 18  18   Temp: 97.6 °F (36.4 °C) 96.6 °F (35.9 °C)  96.6 °F (35.9 °C)   TempSrc: Oral Oral  Oral   SpO2: 99% 98%  98%   Weight:       Height:           Physical Exam  Vitals signs reviewed.   Constitutional:       Appearance: Normal appearance.   HENT:      Head: Normocephalic and atraumatic.      Mouth/Throat:      Mouth: Mucous membranes are moist.   Eyes:      Extraocular Movements: Extraocular movements intact.      Conjunctiva/sclera: Conjunctivae normal.   Neck:      Musculoskeletal: Normal range of motion and neck supple.   Cardiovascular:      Rate and Rhythm: Normal rate and regular rhythm.   Pulmonary:      Effort: Pulmonary effort is normal.      Breath sounds: Normal breath sounds.   Abdominal:      General: Bowel sounds are normal.      Palpations: Abdomen is soft.   Skin:     General: Skin is warm and dry.   Neurological:      Mental Status: She is alert and oriented to person, place, and time.   Psychiatric:         Mood and Affect: Affect normal.         Behavior: Behavior is cooperative.         Laboratory Data:  Labs reviewed    Imaging:   MRI brain  Impression:     There are few high T2 small foci bilaterally.  A small lesion in the right centrum semiovale and left corpus callosum are diffusion positive only.  There are other similar small foci which are diffusion positive with associated enhancement.  Chart review indicates metastatic disease.  These findings could represent metastatic disease also.  Few small foci of superimposed acute/subacute lacunar infarction cannot be excluded.  Recommend follow-up.    MRI sprine  Impression:     Multifocal osseous metastasis involving thoracolumbar spine and sacrum, overall similar in distribution as compared to most recent CT chest/abdomen/pelvis from 08/04/2020.  No involvement of spinal canal.  Normal cord signal.     Mild thoracolumbar spondylosis, as above, most prominent L4-5.     Large dependent left pleural effusion.     Mild  bilateral hydronephrosis.    US carotid  Impression:     No evidence of a hemodynamically significant carotid bifurcation stenosis.  1- 39% stenosis of the ICAs bilaterally.    Assessment:       1. Stroke    2. Acute ischemic multifocal multiple vascular territories stroke    3. Weakness of right lower extremity           Plan:     Adenocarcinoma of left lung  Acute ischemic multifocal vascular territories stroke  -- Entrectinib has not been demonstrated to contribute to stroke/stroke like events  -- Can continue Apixaban for now, will further discuss anticoagulation tomorrow  -- Rest of management per primary team    Bogdan Thomas, PGY- IV  Hematology/Oncology Fellow       STAFF NOTE:  I have personally reviewed the past notes, images, labs and other provoider's notes and taken the history and examined this patient and agree with Dr. Thomas's Note as stated above.    Tara Belcher MD

## 2020-09-29 NOTE — SUBJECTIVE & OBJECTIVE
Neurologic Chief Complaint: RLE weakness     Subjective:     Interval History: Patient is seen for follow-up neurological assessment and treatment recommendations:     MRI brain completed with small acute infarcts throughout the cerebral hemisphere. Continuing Eliquis 5 mg BID at this time. Hem/Onc consulted regarding chemo and anticoagulation recommendations. Therapy recs pending.     HPI, Past Medical, Family, and Social History remains the same as documented in the initial encounter.     Review of Systems   Constitutional: Positive for fatigue. Negative for fever.   Eyes: Negative for visual disturbance.   Musculoskeletal: Positive for gait problem.   Neurological: Positive for weakness. Negative for facial asymmetry and speech difficulty.   Psychiatric/Behavioral: Negative for agitation and confusion.     Scheduled Meds:   apixaban  5 mg Oral BID    ergocalciferol  50,000 Units Oral Q7 Days    gabapentin  300 mg Oral QHS    venlafaxine  75 mg Oral Daily     Continuous Infusions:   sodium chloride 0.9%       PRN Meds:acetaminophen, labetaloL, ondansetron, sodium chloride 0.9%, sodium chloride 0.9%    Objective:     Vital Signs (Most Recent):  Temp: 97.6 °F (36.4 °C) (09/29/20 0752)  Pulse: 66 (09/29/20 0752)  Resp: 18 (09/29/20 0752)  BP: (!) 123/57 (09/29/20 0752)  SpO2: 99 % (09/29/20 0752)  BP Location: Right arm    Vital Signs Range (Last 24H):  Temp:  [97.6 °F (36.4 °C)-98.2 °F (36.8 °C)]   Pulse:  [66-95]   Resp:  [14-20]   BP: (122-137)/(57-75)   SpO2:  [95 %-99 %]   BP Location: Right arm    Physical Exam  Vitals signs reviewed.   HENT:      Head: Normocephalic.   Cardiovascular:      Rate and Rhythm: Normal rate.   Pulmonary:      Effort: Pulmonary effort is normal.   Abdominal:      General: Abdomen is flat.   Skin:     Capillary Refill: Capillary refill takes less than 2 seconds.   Neurological:      Mental Status: She is alert.      Motor: Weakness present.         Neurological Exam:   LOC:  alert  Attention Span: Good   Language: No aphasia  Articulation: No dysarthria  Orientation: Person, Place, Time   Visual Fields: Full  EOM (CN III, IV, VI): Full/intact  Pupils (CN II, III): PERRL  Facial Movement (CN VII): Symmetric facial expression    Motor: Arm left  Normal 5/5  Leg left  Paresis: 4/5  Arm right  Normal 5/5  Leg right Paresis: 3/5  Cebellar: No evidence of appendicular or axial ataxia  Sensation: Intact to light touch, temperature and vibration    Laboratory:  CMP:   Recent Labs   Lab 09/29/20  0415   CALCIUM 9.5   ALBUMIN 3.7   PROT 6.7      K 4.3   CO2 30*      BUN 15   CREATININE 1.3   ALKPHOS 44*   ALT 16   AST 21   BILITOT 0.5     BMP:   Recent Labs   Lab 09/29/20  0415      K 4.3      CO2 30*   BUN 15   CREATININE 1.3   CALCIUM 9.5     CBC:   Recent Labs   Lab 09/29/20 0415   WBC 4.86   RBC 4.10   HGB 11.0*   HCT 35.8*      MCV 87   MCH 26.8*   MCHC 30.7*     Lipid Panel:   Recent Labs   Lab 09/28/20  1654   CHOL 242*   LDLCALC 147.0   HDL 62   TRIG 165*     Coagulation:   Recent Labs   Lab 09/29/20 0415   INR 1.0   APTT 24.9     Platelet Aggregation Study: No results for input(s): PLTAGG, PLTAGINTERP, PLTAGREGLACO, ADPPLTAGGREG in the last 168 hours.  Hgb A1C:   Recent Labs   Lab 09/28/20  2311   HGBA1C 9.3*     TSH:   Recent Labs   Lab 09/28/20  1654   TSH 0.675       Diagnostic Results     Brain Imaging   MRI brain 9/28  There are few high T2 small foci bilaterally.  A small lesion in the right centrum semiovale and left corpus callosum are diffusion positive only.  There are other similar small foci which are diffusion positive with associated enhancement.  Chart review indicates metastatic disease.  These findings could represent metastatic disease also.  Few small foci of superimposed acute/subacute lacunar infarction cannot be excluded.  Recommend follow-up.    Vessel Imaging   US carotid bilateral   No evidence of a hemodynamically significant  carotid bifurcation stenosis.  1- 39% stenosis of the ICAs bilaterally.    Cardiac Imaging   TTE 6/22/20  · Normal left ventricular systolic function. The estimated ejection fraction is 55%.  · Normal LV diastolic function.  · No wall motion abnormalities.  · Mildly reduced right ventricular systolic function.  · Normal central venous pressure (3 mmHg).  · The estimated PA systolic pressure is 26 mmHg.

## 2020-09-29 NOTE — ASSESSMENT & PLAN NOTE
- Stroke RF due to hypercoagulable state  - Follows w/ Dr. Belcher  - Last chemo on 09/09  - Hem/onc consulted for further assistance
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
## 2020-09-29 NOTE — PLAN OF CARE
POC reviewed with patient. Pt verbalized understanding. Questions and concerns addressed. No acute events today. Pt up in chair majority of day. Patient voiding per purewick. No bm today. Patient denies any pain or discomfort. VSS. Blood sugars stable. Pt progressing toward goals. Will continue to monitor. See flowsheets for full assessments and VS info.

## 2020-09-29 NOTE — ASSESSMENT & PLAN NOTE
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 Denies previous unilateral LE weakness, but does report progressive weakness of b/l LE for the past 2 weeks and also urinary symptoms that are new.     She was not a tPA candidate as she is on chronic AC w/ Eliquis for DVTs.     MRI brain was completed demonstrating small lesion in the right centrum semiovale and left corpus callosum, diffusion positive only. Lesions are not enhancing and no significant vasogenic edema surrounding lesions. Suspect lesions are likely late subacute ischemic infarcts. Etiology likely hypercoaguable state related to cancer.

## 2020-09-29 NOTE — PLAN OF CARE
Problem: Fall Injury Risk  Goal: Absence of Fall and Fall-Related Injury  Outcome: Ongoing, Progressing     Problem: Adjustment to Illness (Stroke, Ischemic/Transient Ischemic Attack)  Goal: Optimal Coping  Outcome: Ongoing, Progressing

## 2020-09-29 NOTE — ASSESSMENT & PLAN NOTE
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 Denies previous unilateral LE weakness, but does report progressive weakness of b/l LE for the past 2 weeks and also urinary symptoms that are new.     She was not a tPA candidate as she is on chronic AC w/ Eliquis for DVTs.     MRI brain was completed demonstrating small lesion in the right centrum semiovale and left corpus callosum, diffusion positive only. Lesions are not enhancing and no significant vasogenic edema surrounding lesions. Suspect lesions are likely late subacute ischemic infarcts. Etiology likely hypercoaguable state related to cancer.    Antithrombotics for secondary stroke prevention:  Continue Eliquis 5 mg BID for now; hem/onc consulted     Statins for secondary stroke prevention and hyperlipidemia, if present:   Per patient she can not take statin with oral chemo agent    Aggressive risk factor modification: DM, HLD, Lung cancer      Rehab efforts: The patient has been evaluated by a stroke team provider and the therapy needs have been fully considered based off the presenting complaints and exam findings. The following therapy evaluations are needed: PT evaluate and treat, OT evaluate and treat, SLP evaluate and treat, PM&R evaluate for appropriate placement    Diagnostics ordered/pending: None     VTE prophylaxis: None: Reason for No Pharmacological VTE Prophylaxis: Currently on anticoagulation    BP parameters: Infarct: No intervention, SBP <220
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
## 2020-09-29 NOTE — PLAN OF CARE
Problem: SLP Goal  Goal: SLP Goal  Description: SLP Goals to be Met 10/6/2020    1. Pt will tolerate Regular texture diet with Thin liquids with no overt signs of airway obstruction independently  2. Pt will participate in further evaluation of math, visual-spatial, reading, and writing skills    Outcome: Ongoing, Progressing     Pt seen for swallowing and [id number] assessment. RECS: Continue Regular texture diet with Thin liquids. ST to continue to follow.    GILBERTO Marsh  9/29/2020

## 2020-09-30 ENCOUNTER — PATIENT OUTREACH (OUTPATIENT)
Dept: ADMINISTRATIVE | Facility: OTHER | Age: 65
End: 2020-09-30

## 2020-09-30 PROBLEM — G93.6 CYTOTOXIC CEREBRAL EDEMA: Status: ACTIVE | Noted: 2020-09-30

## 2020-09-30 LAB
ALBUMIN SERPL BCP-MCNC: 3.6 G/DL (ref 3.5–5.2)
ALP SERPL-CCNC: 42 U/L (ref 55–135)
ALT SERPL W/O P-5'-P-CCNC: 13 U/L (ref 10–44)
ANION GAP SERPL CALC-SCNC: 9 MMOL/L (ref 8–16)
AST SERPL-CCNC: 19 U/L (ref 10–40)
BASOPHILS # BLD AUTO: 0.02 K/UL (ref 0–0.2)
BASOPHILS NFR BLD: 0.4 % (ref 0–1.9)
BILIRUB SERPL-MCNC: 0.4 MG/DL (ref 0.1–1)
BUN SERPL-MCNC: 19 MG/DL (ref 8–23)
CALCIUM SERPL-MCNC: 8.9 MG/DL (ref 8.7–10.5)
CHLORIDE SERPL-SCNC: 106 MMOL/L (ref 95–110)
CO2 SERPL-SCNC: 28 MMOL/L (ref 23–29)
CREAT SERPL-MCNC: 1.2 MG/DL (ref 0.5–1.4)
DIFFERENTIAL METHOD: ABNORMAL
EOSINOPHIL # BLD AUTO: 0.1 K/UL (ref 0–0.5)
EOSINOPHIL NFR BLD: 2.9 % (ref 0–8)
ERYTHROCYTE [DISTWIDTH] IN BLOOD BY AUTOMATED COUNT: 15.7 % (ref 11.5–14.5)
EST. GFR  (AFRICAN AMERICAN): 56 ML/MIN/1.73 M^2
EST. GFR  (NON AFRICAN AMERICAN): 48.6 ML/MIN/1.73 M^2
GLUCOSE SERPL-MCNC: 139 MG/DL (ref 70–110)
HCT VFR BLD AUTO: 34.7 % (ref 37–48.5)
HGB BLD-MCNC: 10.5 G/DL (ref 12–16)
IMM GRANULOCYTES # BLD AUTO: 0.01 K/UL (ref 0–0.04)
IMM GRANULOCYTES NFR BLD AUTO: 0.2 % (ref 0–0.5)
LYMPHOCYTES # BLD AUTO: 1.7 K/UL (ref 1–4.8)
LYMPHOCYTES NFR BLD: 38.7 % (ref 18–48)
MCH RBC QN AUTO: 26.6 PG (ref 27–31)
MCHC RBC AUTO-ENTMCNC: 30.3 G/DL (ref 32–36)
MCV RBC AUTO: 88 FL (ref 82–98)
MONOCYTES # BLD AUTO: 0.5 K/UL (ref 0.3–1)
MONOCYTES NFR BLD: 10.1 % (ref 4–15)
NEUTROPHILS # BLD AUTO: 2.1 K/UL (ref 1.8–7.7)
NEUTROPHILS NFR BLD: 47.7 % (ref 38–73)
NRBC BLD-RTO: 0 /100 WBC
PLATELET # BLD AUTO: 187 K/UL (ref 150–350)
PMV BLD AUTO: 12.8 FL (ref 9.2–12.9)
POCT GLUCOSE: 102 MG/DL (ref 70–110)
POCT GLUCOSE: 130 MG/DL (ref 70–110)
POCT GLUCOSE: 159 MG/DL (ref 70–110)
POCT GLUCOSE: 168 MG/DL (ref 70–110)
POTASSIUM SERPL-SCNC: 4.6 MMOL/L (ref 3.5–5.1)
PROT SERPL-MCNC: 6.6 G/DL (ref 6–8.4)
RBC # BLD AUTO: 3.94 M/UL (ref 4–5.4)
SODIUM SERPL-SCNC: 143 MMOL/L (ref 136–145)
WBC # BLD AUTO: 4.45 K/UL (ref 3.9–12.7)

## 2020-09-30 PROCEDURE — 36415 COLL VENOUS BLD VENIPUNCTURE: CPT

## 2020-09-30 PROCEDURE — C9399 UNCLASSIFIED DRUGS OR BIOLOG: HCPCS | Performed by: NURSE PRACTITIONER

## 2020-09-30 PROCEDURE — 99222 1ST HOSP IP/OBS MODERATE 55: CPT | Mod: ,,, | Performed by: NURSE PRACTITIONER

## 2020-09-30 PROCEDURE — 80053 COMPREHEN METABOLIC PANEL: CPT

## 2020-09-30 PROCEDURE — 97112 NEUROMUSCULAR REEDUCATION: CPT

## 2020-09-30 PROCEDURE — 99222 PR INITIAL HOSPITAL CARE,LEVL II: ICD-10-PCS | Mod: ,,, | Performed by: NURSE PRACTITIONER

## 2020-09-30 PROCEDURE — 97116 GAIT TRAINING THERAPY: CPT

## 2020-09-30 PROCEDURE — 99233 PR SUBSEQUENT HOSPITAL CARE,LEVL III: ICD-10-PCS | Mod: GC,,, | Performed by: PSYCHIATRY & NEUROLOGY

## 2020-09-30 PROCEDURE — 97530 THERAPEUTIC ACTIVITIES: CPT

## 2020-09-30 PROCEDURE — 85025 COMPLETE CBC W/AUTO DIFF WBC: CPT

## 2020-09-30 PROCEDURE — 97535 SELF CARE MNGMENT TRAINING: CPT

## 2020-09-30 PROCEDURE — 99233 SBSQ HOSP IP/OBS HIGH 50: CPT | Mod: GC,,, | Performed by: PSYCHIATRY & NEUROLOGY

## 2020-09-30 PROCEDURE — 11000001 HC ACUTE MED/SURG PRIVATE ROOM

## 2020-09-30 PROCEDURE — 25000003 PHARM REV CODE 250: Performed by: NURSE PRACTITIONER

## 2020-09-30 PROCEDURE — 25000003 PHARM REV CODE 250: Performed by: FAMILY MEDICINE

## 2020-09-30 RX ADMIN — VENLAFAXINE HYDROCHLORIDE 75 MG: 37.5 CAPSULE, EXTENDED RELEASE ORAL at 08:09

## 2020-09-30 RX ADMIN — GABAPENTIN 300 MG: 100 CAPSULE ORAL at 09:09

## 2020-09-30 RX ADMIN — INSULIN DETEMIR 10 UNITS: 100 INJECTION, SOLUTION SUBCUTANEOUS at 09:09

## 2020-09-30 RX ADMIN — APIXABAN 5 MG: 5 TABLET, FILM COATED ORAL at 08:09

## 2020-09-30 RX ADMIN — APIXABAN 5 MG: 5 TABLET, FILM COATED ORAL at 09:09

## 2020-09-30 NOTE — PT/OT/SLP PROGRESS
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 female admitted with a medical diagnosis of Acute ischemic multifocal multiple vascular territories stroke.   Patient is making progress towards goals, participating well in this session. Pt continues to require significant assist with safety with use of RW. Pt with continued right sided weakness. Education was provided to patient regarding importance of continued participation in therapy, patient's progress and discharge disposition. Pt continues to benefit from a collaborative PT/OT/SLP program to improve quality of life and focus on recovery of impairments.     Problem List: weakness, impaired endurance, impaired self care skills, impaired functional mobilty, gait instability, impaired balance, decreased upper extremity function, decreased lower extremity function, impaired cardiopulmonary response to activity.  Rehab Prognosis: Good     GOALS:   Multidisciplinary Problems     Physical Therapy Goals        Problem: Physical Therapy Goal    Goal Priority Disciplines Outcome Goal Variances Interventions   Physical Therapy Goal     PT, PT/OT Ongoing, Progressing     Description: Goals to be met by: 10/7/2020    Patient will increase functional independence with mobility by  "performin. Supine <> sit with Supervision.  2. Sit <> stand transfer with Supervision using Rolling Walker.  3. Bed <> chair transfer via Stand Pivot with Supervision using Rolling Walker.  4. Gait  x 100 feet with Contact Guard Assistance using Rolling Walker to prepare for community ambulation and endurance activities.  5. Able to tolerate exercise for 15-20 reps with independence.                         Subjective   Communicated with RN prior to session.  Patient found supine upon PT entry to room, agreeable to evaluation. Alison Branch's alone present during session.    Patient/Family Comments/goals: "I just took the a little nap. I'm ready." "Last night, it happened again. My right leg just went out. My nurse was here to help me."  Pain/Comfort:  · Pain Rating 1: 0/10  · Pain Rating Post-Intervention 1: 0/10    Objective:   Patient found with: telemetry, PureWick, bed alarm   General Precautions: Standard, Cardiac aspiration, fall   Orthopedic Precautions:N/A   Braces: N/A   Oxygen Device: Room Air  Vitals: BP (!) 123/57   Pulse 73   Temp 97.5 °F (36.4 °C)   Resp 18   Ht 5' 9" (1.753 m)   Wt 99.3 kg (218 lb 14.7 oz)   SpO2 96%   Breastfeeding No   BMI 32.33 kg/m²     Outcome Measures:  AM-PAC 6 CLICK MOBILITY  Turning over in bed (including adjusting bedclothes, sheets and blankets)?: 3  Sitting down on and standing up from a chair with arms (e.g., wheelchair, bedside commode, etc.): 3  Moving from lying on back to sitting on the side of the bed?: 3  Moving to and from a bed to a chair (including a wheelchair)?: 3  Need to walk in hospital room?: 3  Climbing 3-5 steps with a railing?: 2  Basic Mobility Total Score: 17     Cognition:   · Alert and Cooperative  · AxOx4  · Command following: Follows multistep  commands  · Fluency: clear/fluent    Functional Mobility:  Additional staff present: N/A  Bed Mobility:    Supine to Sit: minimum assistance; HOB flat and from left side of bed   Scooting " anteriorly to EOB to have both feet planted on floor: contact guard assistance    Transfers:    Sit <> Stand Transfer: minimum assistance with rolling walker    Stand <> Sit Transfer: minimum assistance with rolling walker   o [id number] from EOB and [id number] from bedside chair  o vc's for hand placement on RW and upright posture. Pt requires increased time to come to full erect standing.      Gait:  · Patient ambulated: 40ft   · Patient required: minimal assist  · Patient used: rolling walker and wheelchair follow  · Gait Pattern observed: 3-point gait  · Gait Deviation(s): decreased step length, narrow base of support and decreased saritha  · Impairments due to: impaired balance, decreased strength and decreased endurance  · Comments: pt required vc's for RW management and safety. Pt required cues for step length, able to correct.    Therapeutic Activities & Exercises:   Education:  Patient provided with daily orientation and goals of this PT session. Patient agreed to participate in session. Patient aware of patient's deficits and therapy progression. They were educated to transfer to bedside chair/bedside commode/bathroom with RN/PCT present. Patient educated on Fall risk, home safety, Home exercise program and plan of care by explanation. Patient was receptive to education and verbalizes understanding. Encouraged patient to perform daily exercises & mobility to increase endurance and decrease effects of bedrest.Time provided for therapeutic counseling and discussion of health disposition. All questions answered to patient's satisfaction, within scope of PT practice; voiced no other concerns. White board updated in patient's room, RN notified of session.    Patient left up in chair (with chair alarm), with head in midline, neutral pelvis & heels floated for skin protection with all lines intact, call button in reach and RN notified  Time Tracking:     PT Received On: 09/30/20  PT Start Time: 1411     PT Stop  Time: 1444  PT Total Time (min): 33 min     Billable Minutes:   · Gait Training 15 and Therapeutic Activity 15    Treatment Type: Treatment  PT/PTA: PT       Bessy Lopez PT, DPT  09/30/2020  Pager: [phone]

## 2020-09-30 NOTE — PT/OT/SLP PROGRESS
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 female with a medical diagnosis of Acute ischemic multifocal multiple vascular territories stroke.  She presents with performance deficits affecting function are weakness, impaired endurance, impaired sensation, decreased coordination, impaired coordination, decreased upper extremity function, impaired self care skills, impaired functional mobilty, decreased lower extremity function, decreased safety awareness, gait instability, impaired balance.     Rehab Prognosis:  Good; patient would benefit from acute skilled OT services to address these deficits and reach maximum level of function.       Plan:     Patient to be seen 4 x/week to address the above listed problems via self-care/home management, therapeutic activities, therapeutic exercises, neuromuscular re-education, sensory integration  · Plan of Care Expires: 10/27/20  · Plan of Care Reviewed with: patient    Subjective   Patient:  "My right leg was showing off last night.  I walked to the bathroom with the nurse and when I got off the commode, my right leg was shaking.  I had to get on the floor because I was closer to the floor than the toilet.  My arms are good.  My right leg is giving me the most trouble.  I live with my dtg who works so during the day my 16 year old grand dtg will be there because she does school at home and I have an 8 year old grandson.  I don't want to be home with those children.  I think I need to go somewhere to get stronger.  I have a lot of stairs to go up, like 24." "I am not giving up without a fight."  Pain/Comfort:  · Pain Rating 1: " 0/10  · Pain Rating Post-Intervention 1: 0/10    Objective:     Communicated with: Nurse prior to session.  Patient found supine with peripheral IV, PureWick, oxygen, bed alarm upon OT entry to room.  Family not present.  General Precautions: Standard, aspiration, fall   Orthopedic Precautions:N/A   Braces: N/A     Occupational Performance:     Bed Mobility:    · Patient completed Rolling/Turning to Right with supervision  · Patient completed Supine to Sit with stand by assistance  · Patient completed Sit to Supine with stand by assistance     Functional Mobility/Transfers:  · Patient completed Sit <> Stand Transfer with contact guard assistance  with  no assistive device   · Patient completed Bed <> Chair Transfer using Stand Pivot technique with minimum assistance with no assistive device    Activities of Daily Living:  · Grooming: minimum assistance while standing  · Upper Body Dressing: minimum assistance while seated EOB; weight shift posteriorly and to the left.  · Lower Body Dressing: moderate assistance while seated EOB with assistance for sitting and standing balance.    [facility] 6 Click ADL: 16    Treatment & Education:  Patient education provided on role of OT and need for rehab upon discharge.  Patient education provided on fall prevention during ADLs.  Continued education, patient/ family training recommended.  Patient alert and oriented x 3; able to follow 4/4 one step commands.  Patient attentive and interactive throughout the session.  Min assist with dynamic sitting balance 2* increased weight shift to the left and posteriorly. Patient's functional status and disposition recommendation discussed with stroke team in daily rounds.  White board updated in patient's room.  OT asked if there were any other questions; patient/ family had no further questions.    Patient left supine with all lines intact, call button in reach and bed alarm onEducation:      GOALS:   Multidisciplinary Problems     Occupational  Therapy Goals        Problem: Occupational Therapy Goal    Goal Priority Disciplines Outcome Interventions   Occupational Therapy Goal     OT, PT/OT Ongoing, Progressing    Description: Goals set 9/30 be addressed for 14 days with expiration date, 10/14:  Patient will increase functional independence with ADLs by performing:    Patient will demonstrate rolling to the right with modified independence  Not met   Patient will demonstrate rolling to the left with modified independence.   Not met  Patient will demonstrate supine -sit with modified independence.   Not met  Patient will demonstrate stand pivot transfers with SBA.   Not met  Patient will demonstrate grooming while standing with SBA.   Not met  Patient will demonstrate upper body dressing with SBA while seated EOB.   Not met  Patient will demonstrate lower body dressing with SBA while seated EOB.   Not met  Patient will demonstrate toileting with SBA.   Not met  Patient will demonstrate bathing while seated EOB with SBA.   Not met  Patient's family / caregiver will demonstrate independence and safety with assisting patient with self-care skills and functional mobility.     Not met                           Time Tracking:     OT Date of Treatment: 09/30/20  OT Start Time: 0645  OT Stop Time: 0739  OT Total Time (min): 54 min    Billable Minutes:Self Care/Home Management 25  Therapeutic Activity 9  Neuromuscular Re-education 20    TIFFANY Velez  9/30/2020

## 2020-09-30 NOTE — SUBJECTIVE & OBJECTIVE
Neurologic Chief Complaint: RLE weakness     Subjective:     Interval History: Patient is seen for follow-up neurological assessment and treatment recommendations:     Therapy recs changed to inpatient rehab. Per hem/onc patient missed doses of Eliquis so keeping patient on eliquis is okay from there perspective. Continuing to discuss with hem/onc team when to resume oral chemo.    HPI, Past Medical, Family, and Social History remains the same as documented in the initial encounter.     Review of Systems   Constitutional: Positive for fatigue. Negative for fever.   Eyes: Negative for visual disturbance.   Musculoskeletal: Positive for gait problem.   Neurological: Positive for weakness. Negative for facial asymmetry and speech difficulty.   Psychiatric/Behavioral: Negative for agitation and confusion.     Scheduled Meds:   apixaban  5 mg Oral BID    ergocalciferol  50,000 Units Oral Q7 Days    gabapentin  300 mg Oral QHS    insulin detemir U-100  10 Units Subcutaneous QHS    venlafaxine  75 mg Oral Daily     Continuous Infusions:   sodium chloride 0.9%       PRN Meds:acetaminophen, dextrose 50%, dextrose 50%, glucagon (human recombinant), glucose, glucose, insulin aspart U-100, labetaloL, ondansetron, sodium chloride 0.9%, sodium chloride 0.9%    Objective:     Vital Signs (Most Recent):  Temp: 97.7 °F (36.5 °C) (09/30/20 1115)  Pulse: 69 (09/30/20 1115)  Resp: 18 (09/30/20 1115)  BP: (!) 124/56 (09/30/20 1115)  SpO2: 96 % (09/30/20 1115)  BP Location: Right arm    Vital Signs Range (Last 24H):  Temp:  [96.6 °F (35.9 °C)-98.1 °F (36.7 °C)]   Pulse:  [69-96]   Resp:  [16-18]   BP: (104-124)/(51-64)   SpO2:  [93 %-98 %]   BP Location: Right arm    Physical Exam  Vitals signs reviewed.   HENT:      Head: Normocephalic.   Cardiovascular:      Rate and Rhythm: Normal rate.   Pulmonary:      Effort: Pulmonary effort is normal.   Abdominal:      General: Abdomen is flat.   Skin:     Capillary Refill: Capillary refill  takes less than 2 seconds.   Neurological:      Mental Status: She is alert.      Motor: Weakness present.         Neurological Exam:   LOC: alert  Attention Span: Good   Language: No aphasia  Articulation: No dysarthria  Orientation: Person, Place, Time   Visual Fields: Full  EOM (CN III, IV, VI): Full/intact  Pupils (CN II, III): PERRL  Facial Movement (CN VII): Symmetric facial expression    Motor: Arm left  Normal 5/5  Leg left  Paresis: 4/5  Arm right  Normal 5/5  Leg right Paresis: 2/5  Cebellar: No evidence of appendicular or axial ataxia  Sensation: Intact to light touch, temperature and vibration    Laboratory:  CMP:   Recent Labs   Lab 09/30/20  0359   CALCIUM 8.9   ALBUMIN 3.6   PROT 6.6      K 4.6   CO2 28      BUN 19   CREATININE 1.2   ALKPHOS 42*   ALT 13   AST 19   BILITOT 0.4     BMP:   Recent Labs   Lab 09/30/20  0359      K 4.6      CO2 28   BUN 19   CREATININE 1.2   CALCIUM 8.9     CBC:   Recent Labs   Lab 09/30/20  0359   WBC 4.45   RBC 3.94*   HGB 10.5*   HCT 34.7*      MCV 88   MCH 26.6*   MCHC 30.3*     Lipid Panel:   Recent Labs   Lab 09/28/20  1654   CHOL 242*   LDLCALC 147.0   HDL 62   TRIG 165*     Coagulation:   Recent Labs   Lab 09/29/20  0415   INR 1.0   APTT 24.9     Platelet Aggregation Study: No results for input(s): PLTAGG, PLTAGINTERP, PLTAGREGLACO, ADPPLTAGGREG in the last 168 hours.  Hgb A1C:   Recent Labs   Lab 09/28/20  2311   HGBA1C 9.3*     TSH:   Recent Labs   Lab 09/28/20  1654   TSH 0.675       Diagnostic Results     Brain Imaging   MRI brain 9/28  There are few high T2 small foci bilaterally.  A small lesion in the right centrum semiovale and left corpus callosum are diffusion positive only.  There are other similar small foci which are diffusion positive with associated enhancement.  Chart review indicates metastatic disease.  These findings could represent metastatic disease also.  Few small foci of superimposed acute/subacute lacunar  infarction cannot be excluded.  Recommend follow-up.    Vessel Imaging   US carotid bilateral   No evidence of a hemodynamically significant carotid bifurcation stenosis.  1- 39% stenosis of the ICAs bilaterally.    Cardiac Imaging   TTE 6/22/20  · Normal left ventricular systolic function. The estimated ejection fraction is 55%.  · Normal LV diastolic function.  · No wall motion abnormalities.  · Mildly reduced right ventricular systolic function.  · Normal central venous pressure (3 mmHg).  · The estimated PA systolic pressure is 26 mmHg.
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
## 2020-09-30 NOTE — HOSPITAL COURSE
9/29/20: Evaluated by PT. Sit to stand Marci. Ambulated 4-5 steps Marci.   9/30/20: Participated w/ OT. Bed mobility SBA- SV. Sit to stand CGA. Grooming & UBD Marci. LBD modA.

## 2020-09-30 NOTE — PLAN OF CARE
Problem: Adjustment to Illness (Stroke, Ischemic/Transient Ischemic Attack)  Goal: Optimal Coping  Outcome: Ongoing, Progressing  Intervention: Support Patient/Family Psychosocial Response to Stroke  Flowsheets (Taken 9/30/2020 0322)  Supportive Measures:   active listening utilized   positive reinforcement provided   verbalization of feelings encouraged  Family/Support System Care: support provided     Problem: Cerebral Tissue Perfusion Risk (Stroke, Ischemic/Transient Ischemic Attack)  Goal: Optimal Cerebral Tissue Perfusion  Outcome: Ongoing, Progressing  Intervention: Protect and Optimize Cerebral Perfusion  Flowsheets (Taken 9/30/2020 0322)  Sensory Stimulation Regulation: care clustered  Cerebral Perfusion Promotion: blood pressure monitored     Problem: Functional Ability Impaired (Stroke, Ischemic/Transient Ischemic Attack)  Goal: Optimal Functional Ability  Outcome: Ongoing, Progressing  Intervention: Optimize Functional Ability  Flowsheets (Taken 9/30/2020 0322)  Self-Care Promotion:   independence encouraged   BADL personal objects within reach   BADL personal routines maintained   safe use of adaptive equipment encouraged  Activity Management: ambulated to bathroom - L4  Adaptive Equipment Use: use encouraged       POC reviewed with patient. Pt verbalized understanding. Questions and concerns addressed. No acute events today. Walked pt to bathroom x2 with BM noted. Patient denies any pain or discomfort. Some weakness noted to L leg. VSS. Blood sugars stable. Pt progressing toward goals. Will continue to monitor. See flowsheets for full assessments and VS info.
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
## 2020-09-30 NOTE — PHYSICIAN QUERY
PT Name: Alison Branch  MR #: [id number]    Hypertensive Condition Clarification      CDS/: Giuliana Barkley RN, CDS               Contact information:  [email or web address]    This form is a permanent document in the medical record.     Query Date: September 30, 2020    By submitting this query, we are merely seeking further clarification of documentation. Please utilize your independent clinical judgment when addressing the question(s) below.    The Medical Record contains the following:   Indicators   Supporting Clinical Findings Location in Medical Record   x Hypertension associated with diabetes documented --Hypertension associated with diabetes Vas neuro Note 9/30   x Chronic condition(s) --Mixed hyperlipidemia due to type 2 diabetes mellitus  --Type 2 diabetes mellitus with hyperglycemia, with long-term current use of insulin          -Insulin dependent         - Hgb 9.3             Vas neuro Note 9/30    Vital signs     x Treatment/Medication  - SSI while inpatient Vas neuro Note 9/30      Other         Provider, please clarify the relationship between the Hypertension and Diabetes Mellitus    [   ] Hypertension is a manifestation of Diabetes Mellitus (Secondary Hypertension)     [ x  ]  Hypertension is not a manifestation of Diabetes Mellitus (Essential Hypertension)     [   ] Other Clarification (please specify): ____________     [  ] Clinically Undetermined       Please document in your progress notes daily for the duration of treatment, until resolved, and include in your discharge summary.
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
## 2020-09-30 NOTE — PLAN OF CARE
Plan of Care:  Discharge Recommendation: Rehab.  Please continue Progressive Mobility Protocol as appropriate.  Appropriate transfer level with nursing staff: Safe to transfer to bedside chair/bedside commode: stand pivot with 1 person with RW.    Bessy Lopez PT, DPT  2020  Pager: [phone]    Physical Therapy Treatment Goals:  Multidisciplinary Problems       Physical Therapy Goals          Problem: Physical Therapy Goal    Goal Priority Disciplines Outcome Goal Variances Interventions   Physical Therapy Goal     PT, PT/OT Ongoing, Progressing     Description: Goals to be met by: 10/7/2020    Patient will increase functional independence with mobility by performin. Supine <> sit with Supervision.  2. Sit <> stand transfer with Supervision using Rolling Walker.  3. Bed <> chair transfer via Stand Pivot with Supervision using Rolling Walker.  4. Gait  x 100 feet with Contact Guard Assistance using Rolling Walker to prepare for community ambulation and endurance activities.  5. Able to tolerate exercise for 15-20 reps with independence.

## 2020-09-30 NOTE — PT/OT/SLP PROGRESS
Speech Language Pathology    Alison Branch  MRN: [id number]    Patient not seen today secondary to Patient unavailable upon ST attempts (PT working with Pt.) SLP unable to make third attempt later service day. ST to continue to follow per POC.     TIFFANIE Burciaga., [facility]-SLP  Speech-Language Pathology  Pager: [phone]  9/30/2020

## 2020-09-30 NOTE — PROGRESS NOTES
LINKS immunization registry updated  Care Everywhere updated  Health Maintenance updated  DIS/Chart reviewed for overdue Proactive [facility] ([facility]) health maintenance testing (CRS, Breast Ca, Diabetic Eye Exam)   Orders entered:N/A  Mammogram orders are not being placed for [facility] patients at this time.

## 2020-09-30 NOTE — ASSESSMENT & PLAN NOTE
- Stroke RF due to hypercoagulable state  - Follows w/ Dr. Belcher  - Last chemo on 09/09  - Hem/onc consulted for further assistance   - Discussing with hem/onc when patient should resume oral chemo

## 2020-09-30 NOTE — ASSESSMENT & PLAN NOTE
- Stroke risk factor   - Patient reports statin can not be taken with her oral chemo agent   -   - Encouraging diet modification

## 2020-09-30 NOTE — ASSESSMENT & PLAN NOTE
Area of cytotoxic cerebral edema identified when reviewing brain imaging throughout the cerebral hemisphere. There is not mass effect associated with it. We will continue to monitor the patients clinical exam for any worsening of symptoms which may indicate expansion of the stroke or the area of the edema resulting in the clinical change. The pattern is suggestive of hypercoaguable etiology

## 2020-09-30 NOTE — PLAN OF CARE
Goals revised.  Recommending rehab.  TIFFANY Velez  9/30/2020    Problem: Occupational Therapy Goal  Goal: Occupational Therapy Goal  Description: Goals set 9/30 be addressed for 14 days with expiration date, 10/14:  Patient will increase functional independence with ADLs by performing:    Patient will demonstrate rolling to the right with modified independence  Not met   Patient will demonstrate rolling to the left with modified independence.   Not met  Patient will demonstrate supine -sit with modified independence.   Not met  Patient will demonstrate stand pivot transfers with SBA.   Not met  Patient will demonstrate grooming while standing with SBA.   Not met  Patient will demonstrate upper body dressing with SBA while seated EOB.   Not met  Patient will demonstrate lower body dressing with SBA while seated EOB.   Not met  Patient will demonstrate toileting with SBA.   Not met  Patient will demonstrate bathing while seated EOB with SBA.   Not met  Patient's family / caregiver will demonstrate independence and safety with assisting patient with self-care skills and functional mobility.     Not met

## 2020-09-30 NOTE — ASSESSMENT & PLAN NOTE
- Per VN etiology likely hypercoag state related to cancer    - Related to prolonged/acute hospital course.     Recommendations  -  Encourage mobility, OOB in chair at least 3 hours per day, and early ambulation as appropriate  -  PT/OT evaluate and treat  -  Pain management  -  Monitor for and prevent skin breakdown and pressure ulcers  · Early mobility, repositioning/weight shifting every 20-30 minutes when sitting, turn patient every 2 hours, proper mattress/overlay and chair cushioning, pressure relief/heel protector boots  -  DVT prophylaxis    -  Reviewed discharge options (IP rehab, SNF, HH therapy, and OP therapy)

## 2020-10-01 DIAGNOSIS — C34.12 MALIGNANT NEOPLASM OF UPPER LOBE OF LEFT LUNG: ICD-10-CM

## 2020-10-01 LAB
BILIRUB UR QL STRIP: NEGATIVE
CLARITY UR REFRACT.AUTO: CLEAR
COLOR UR AUTO: NORMAL
GLUCOSE UR QL STRIP: NEGATIVE
HGB UR QL STRIP: NEGATIVE
KETONES UR QL STRIP: NEGATIVE
LEUKOCYTE ESTERASE UR QL STRIP: NEGATIVE
NITRITE UR QL STRIP: NEGATIVE
PH UR STRIP: 5 [PH] (ref 5–8)
POCT GLUCOSE: 129 MG/DL (ref 70–110)
POCT GLUCOSE: 137 MG/DL (ref 70–110)
POCT GLUCOSE: 158 MG/DL (ref 70–110)
POCT GLUCOSE: 262 MG/DL (ref 70–110)
PROT UR QL STRIP: NEGATIVE
SP GR UR STRIP: 1.01 (ref 1–1.03)
URN SPEC COLLECT METH UR: NORMAL

## 2020-10-01 PROCEDURE — 63600175 PHARM REV CODE 636 W HCPCS: Performed by: NURSE PRACTITIONER

## 2020-10-01 PROCEDURE — 11000001 HC ACUTE MED/SURG PRIVATE ROOM

## 2020-10-01 PROCEDURE — 92507 TX SP LANG VOICE COMM INDIV: CPT

## 2020-10-01 PROCEDURE — 25000003 PHARM REV CODE 250: Performed by: NURSE PRACTITIONER

## 2020-10-01 PROCEDURE — 97112 NEUROMUSCULAR REEDUCATION: CPT

## 2020-10-01 PROCEDURE — 25000003 PHARM REV CODE 250: Performed by: PHYSICIAN ASSISTANT

## 2020-10-01 PROCEDURE — 81003 URINALYSIS AUTO W/O SCOPE: CPT

## 2020-10-01 PROCEDURE — 99232 SBSQ HOSP IP/OBS MODERATE 35: CPT | Mod: ,,, | Performed by: NURSE PRACTITIONER

## 2020-10-01 PROCEDURE — 99232 PR SUBSEQUENT HOSPITAL CARE,LEVL II: ICD-10-PCS | Mod: ,,, | Performed by: NURSE PRACTITIONER

## 2020-10-01 PROCEDURE — 99233 PR SUBSEQUENT HOSPITAL CARE,LEVL III: ICD-10-PCS | Mod: GC,,, | Performed by: PSYCHIATRY & NEUROLOGY

## 2020-10-01 PROCEDURE — 94761 N-INVAS EAR/PLS OXIMETRY MLT: CPT

## 2020-10-01 PROCEDURE — 97535 SELF CARE MNGMENT TRAINING: CPT

## 2020-10-01 PROCEDURE — 25000003 PHARM REV CODE 250: Performed by: FAMILY MEDICINE

## 2020-10-01 PROCEDURE — 99233 SBSQ HOSP IP/OBS HIGH 50: CPT | Mod: GC,,, | Performed by: PSYCHIATRY & NEUROLOGY

## 2020-10-01 RX ORDER — TAMSULOSIN HYDROCHLORIDE 0.4 MG/1
0.4 CAPSULE ORAL DAILY
Status: DISCONTINUED | OUTPATIENT
Start: 2020-10-01 | End: 2020-10-05 | Stop reason: HOSPADM

## 2020-10-01 RX ORDER — INSULIN ASPART 100 [IU]/ML
0-5 INJECTION, SOLUTION INTRAVENOUS; SUBCUTANEOUS
Refills: 0 | Status: ON HOLD
Start: 2020-10-01 | End: 2020-11-27 | Stop reason: HOSPADM

## 2020-10-01 RX ORDER — LIDOCAINE AND PRILOCAINE 25; 25 MG/G; MG/G
CREAM TOPICAL
Qty: 30 G | Refills: 0 | Status: CANCELLED | OUTPATIENT
Start: 2020-10-01

## 2020-10-01 RX ADMIN — INSULIN ASPART 3 UNITS: 100 INJECTION, SOLUTION INTRAVENOUS; SUBCUTANEOUS at 05:10

## 2020-10-01 RX ADMIN — APIXABAN 5 MG: 5 TABLET, FILM COATED ORAL at 09:10

## 2020-10-01 RX ADMIN — ONDANSETRON 8 MG: 8 TABLET, ORALLY DISINTEGRATING ORAL at 11:10

## 2020-10-01 RX ADMIN — GABAPENTIN 300 MG: 100 CAPSULE ORAL at 09:10

## 2020-10-01 RX ADMIN — APIXABAN 5 MG: 5 TABLET, FILM COATED ORAL at 08:10

## 2020-10-01 RX ADMIN — VENLAFAXINE HYDROCHLORIDE 75 MG: 37.5 CAPSULE, EXTENDED RELEASE ORAL at 08:10

## 2020-10-01 RX ADMIN — INSULIN DETEMIR 10 UNITS: 100 INJECTION, SOLUTION SUBCUTANEOUS at 09:10

## 2020-10-01 RX ADMIN — TAMSULOSIN HYDROCHLORIDE 0.4 MG: 0.4 CAPSULE ORAL at 03:10

## 2020-10-01 NOTE — PLAN OF CARE
9/30 - Therapy recs changed to inpatient rehab. Per hem/onc patient missed doses of Eliquis so keeping patient on eliquis is okay from there perspective. Continuing to discuss with hem/onc team when to resume oral chemo.        10/01/20 1323   Discharge Reassessment   Assessment Type Discharge Planning Reassessment   Provided patient/caregiver education on the expected discharge date and the discharge plan Yes   Do you have any problems affording any of your prescribed medications? TBD   Discharge Plan A Rehab   Discharge Plan B Skilled Nursing Facility   DME Needed Upon Discharge  other (see comments)  (tbd)   Anticipated Discharge Disposition Rehab   Can the patient/caregiver answer the patient profile reliably? Yes, cognitively intact   How does the patient rate their overall health at the present time? Fair   Describe the patient's ability to walk at the present time. Walks with the help of equipment   How often would a person be available to care for the patient? Whenever needed   Number of comorbid conditions (as recorded on the chart) Five or more   Post-Acute Status   Post-Acute Authorization Placement   Post-Acute Placement Status Referrals Sent   Discharge Delays None known at this time     Sabina Adler RN  Case Management  Ext: [phone]

## 2020-10-01 NOTE — ASSESSMENT & PLAN NOTE
- stroke risk factor  - Insulin dependent  - Hgb 9.3  - SSI while inpatient  - Detemir 10 u nightly

## 2020-10-01 NOTE — ASSESSMENT & PLAN NOTE
Seen in urology clinic 9/25 for incomplete bladder emptying  Recent urine culture on 9/25 negative  Repeat UA with reflex to culture  Starting flomax  Renal US

## 2020-10-01 NOTE — PLAN OF CARE
Problem: Adjustment to Illness (Stroke, Ischemic/Transient Ischemic Attack)  Goal: Optimal Coping  Intervention: Support Patient/Family Psychosocial Response to Stroke  Flowsheets (Taken 10/1/2020 0438)  Supportive Measures:   active listening utilized   verbalization of feelings encouraged  Family/Support System Care:   support provided   self-care encouraged     Problem: Adult Inpatient Plan of Care  Goal: Absence of Hospital-Acquired Illness or Injury  Intervention: Prevent VTE (venous thromboembolism)  Flowsheets (Taken 10/1/2020 0438)  VTE Prevention/Management:   remove, assess skin and reapply sequential compression device   bleeding precautions maintained     Problem: Glycemic Control Impaired (Sepsis/Septic Shock)  Goal: Blood Glucose Level Within Desired Range  Intervention: Optimize Glycemic Control  Flowsheets (Taken 10/1/2020 0438)  Glycemic Management: blood glucose monitoring     Problem: Fall Injury Risk  Goal: Absence of Fall and Fall-Related Injury  Intervention: Identify and Manage Contributors to Fall Injury Risk  Flowsheets (Taken 10/1/2020 0438)  Self-Care Promotion:   independence encouraged   BADL personal objects within reach   BADL personal routines maintained   safe use of adaptive equipment encouraged  Medication Review/Management: medications reviewed  Intervention: Promote Injury-Free Environment  Flowsheets (Taken 10/1/2020 0438)  Environmental Safety Modification:   assistive device/personal items within reach   clutter free environment maintained   POC reviewed with patient, voiced understanding. Pt denied any pain within shift; VSS. Reported loose stool X1 yesterday. Pt transferred from chair-to commode chair and to bed with one assist using a walker. No acute events overnight; tele monitor in place and CBG levels wnl, scheduled insulin administered. Fall precautions maintained; bed locked and in lowest position. Bed alarm activated and audible. All questions and concerns  addressed; COLLEEN.

## 2020-10-01 NOTE — SUBJECTIVE & OBJECTIVE
Interval History 10/1/2020:  Patient is seen for follow-up PM&R evaluation and recommendations: participating with therapy.     HPI, Past Medical, Family, and Social History remains the same as documented in the initial encounter.    Scheduled Medications:    apixaban  5 mg Oral BID    ergocalciferol  50,000 Units Oral Q7 Days    gabapentin  300 mg Oral QHS    insulin detemir U-100  10 Units Subcutaneous QHS    NON FORMULARY MEDICATION 600 mg  600 mg Oral Daily    venlafaxine  75 mg Oral Daily       Diagnostic Results: Labs: Reviewed  ECG: Reviewed  CT: Reviewed    PRN Medications: acetaminophen, dextrose 50%, dextrose 50%, glucagon (human recombinant), glucose, glucose, insulin aspart U-100, labetaloL, ondansetron, sodium chloride 0.9%, sodium chloride 0.9%    Review of Systems   Constitutional: Positive for activity change and fatigue. Negative for fever.   HENT: Negative for sore throat and trouble swallowing.    Eyes: Negative for visual disturbance.   Respiratory: Negative for cough and shortness of breath.    Cardiovascular: Negative for chest pain and leg swelling.   Gastrointestinal: Negative for abdominal distention and abdominal pain.   Genitourinary: Negative for difficulty urinating.   Musculoskeletal: Positive for gait problem. Negative for back pain.   Skin: Negative for color change.   Neurological: Positive for weakness. Negative for dizziness, light-headedness and headaches.   Psychiatric/Behavioral: Negative for agitation and confusion.     Objective:     Vital Signs (Most Recent):  Temp: 98.4 °F (36.9 °C) (10/01/20 1122)  Pulse: 79 (10/01/20 1125)  Resp: 17 (10/01/20 1122)  BP: 129/63 (10/01/20 1122)  SpO2: (!) 17 % (10/01/20 1122)    Vital Signs (24h Range):  Temp:  [97.5 °F (36.4 °C)-98.8 °F (37.1 °C)] 98.4 °F (36.9 °C)  Pulse:  [73-88] 79  Resp:  [17-18] 17  SpO2:  [17 %-100 %] 17 %  BP: (111-137)/(55-65) 129/63     Physical Exam  Vitals signs and nursing note reviewed.   Constitutional:        Appearance: Normal appearance. She is well-developed.      Comments: Fatigue present easily arousable and oriented    HENT:      Head: Normocephalic and atraumatic.      Mouth/Throat:      Mouth: Mucous membranes are moist.   Eyes:      Extraocular Movements: Extraocular movements intact.      Pupils: Pupils are equal, round, and reactive to light.   Neck:      Musculoskeletal: Normal range of motion.   Pulmonary:      Effort: Pulmonary effort is normal.      Breath sounds: Normal breath sounds.   Musculoskeletal:      Comments: R Leg weakness present    Skin:     General: Skin is warm and dry.   Neurological:      Mental Status: She is oriented to person, place, and time.   Psychiatric:         Mood and Affect: Mood normal.         Behavior: Behavior normal.       NEUROLOGICAL EXAMINATION:     MENTAL STATUS   Oriented to person, place, and time.     CRANIAL NERVES     CN III, IV, VI   Pupils are equal, round, and reactive to light.

## 2020-10-01 NOTE — SUBJECTIVE & OBJECTIVE
Neurologic Chief Complaint: RLE weakness     Subjective:     Interval History: Patient is seen for follow-up neurological assessment and treatment recommendations: no new complaints, continuing home eliquis and oral chemo, waiting rehab placement    HPI, Past Medical, Family, and Social History remains the same as documented in the initial encounter.     Review of Systems   Constitutional: Negative for chills and fever.   Eyes: Negative for visual disturbance.   Neurological: Positive for weakness and numbness. Negative for facial asymmetry and speech difficulty.   Psychiatric/Behavioral: Negative for agitation and confusion.     Scheduled Meds:   apixaban  5 mg Oral BID    ergocalciferol  50,000 Units Oral Q7 Days    gabapentin  300 mg Oral QHS    insulin detemir U-100  10 Units Subcutaneous QHS    venlafaxine  75 mg Oral Daily     Continuous Infusions:   sodium chloride 0.9%       PRN Meds:acetaminophen, dextrose 50%, dextrose 50%, glucagon (human recombinant), glucose, glucose, insulin aspart U-100, labetaloL, ondansetron, sodium chloride 0.9%, sodium chloride 0.9%    Objective:     Vital Signs (Most Recent):  Temp: 98.4 °F (36.9 °C) (10/01/20 1122)  Pulse: 79 (10/01/20 1125)  Resp: 17 (10/01/20 1122)  BP: 129/63 (10/01/20 1122)  SpO2: 97 % (10/01/20 1122)  BP Location: Left arm    Vital Signs Range (Last 24H):  Temp:  [97.5 °F (36.4 °C)-98.8 °F (37.1 °C)]   Pulse:  [73-88]   Resp:  [17-18]   BP: (111-137)/(55-65)   SpO2:  [92 %-100 %]   BP Location: Left arm    Physical Exam  Vitals signs reviewed.   Constitutional:       General: She is not in acute distress.  HENT:      Head: Normocephalic and atraumatic.   Cardiovascular:      Rate and Rhythm: Normal rate.   Pulmonary:      Effort: Pulmonary effort is normal. No respiratory distress.   Skin:     General: Skin is warm and dry.   Neurological:      Mental Status: She is alert.         Neurological Exam:   LOC: alert  Attention Span: Good   Language: No  aphasia  Articulation: No dysarthria  Orientation: Person, Place, Time   Visual Fields: Full  EOM (CN III, IV, VI): Full/intact  Pupils (CN II, III): PERRL  Facial Movement (CN VII): Symmetric facial expression    Motor: Arm left  Normal 5/5  Leg left  Paresis: 5/5  Arm right  Normal 5/5  Leg right Paresis: 4/5  Cebellar: No evidence of appendicular or axial ataxia  Sensation: decrease sensation in RLE    Laboratory:  CMP:   No results for input(s): GLUCOSE, CALCIUM, ALBUMIN, PROT, NA, K, CO2, CL, BUN, CREATININE, ALKPHOS, ALT, AST, BILITOT in the last 24 hours.  BMP:   Recent Labs   Lab 09/30/20  0359      K 4.6      CO2 28   BUN 19   CREATININE 1.2   CALCIUM 8.9     CBC:   Recent Labs   Lab 09/30/20  0359   WBC 4.45   RBC 3.94*   HGB 10.5*   HCT 34.7*      MCV 88   MCH 26.6*   MCHC 30.3*     Lipid Panel:   Recent Labs   Lab 09/28/20  1654   CHOL 242*   LDLCALC 147.0   HDL 62   TRIG 165*     Coagulation:   Recent Labs   Lab 09/29/20  0415   INR 1.0   APTT 24.9     Platelet Aggregation Study: No results for input(s): PLTAGG, PLTAGINTERP, PLTAGREGLACO, ADPPLTAGGREG in the last 168 hours.  Hgb A1C:   Recent Labs   Lab 09/28/20  2311   HGBA1C 9.3*     TSH:   Recent Labs   Lab 09/28/20  1654   TSH 0.675       Diagnostic Results     Brain Imaging   MRI brain 9/28  There are few high T2 small foci bilaterally.  A small lesion in the right centrum semiovale and left corpus callosum are diffusion positive only.  There are other similar small foci which are diffusion positive with associated enhancement.  Chart review indicates metastatic disease.  These findings could represent metastatic disease also.  Few small foci of superimposed acute/subacute lacunar infarction cannot be excluded.  Recommend follow-up.    Vessel Imaging   US carotid bilateral   No evidence of a hemodynamically significant carotid bifurcation stenosis.  1- 39% stenosis of the ICAs bilaterally.    Cardiac Imaging   TTE  6/22/20  · Normal left ventricular systolic function. The estimated ejection fraction is 55%.  · Normal LV diastolic function.  · No wall motion abnormalities.  · Mildly reduced right ventricular systolic function.  · Normal central venous pressure (3 mmHg).  · The estimated PA systolic pressure is 26 mmHg.

## 2020-10-01 NOTE — PLAN OF CARE
Goals remain appropriate.  Verenice SalTIFFANY  10/1/2020    Problem: Occupational Therapy Goal  Goal: Occupational Therapy Goal  Description: Goals set 9/30 be addressed for 14 days with expiration date, 10/14:  Patient will increase functional independence with ADLs by performing:    Patient will demonstrate rolling to the right with modified independence  Not met   Patient will demonstrate rolling to the left with modified independence.   Not met  Patient will demonstrate supine -sit with modified independence.   Not met  Patient will demonstrate stand pivot transfers with SBA.   Not met  Patient will demonstrate grooming while standing with SBA.   Not met  Patient will demonstrate upper body dressing with SBA while seated EOB.   Not met  Patient will demonstrate lower body dressing with SBA while seated EOB.   Not met  Patient will demonstrate toileting with SBA.   Not met  Patient will demonstrate bathing while seated EOB with SBA.   Not met  Patient's family / caregiver will demonstrate independence and safety with assisting patient with self-care skills and functional mobility.     Not met          Outcome: Ongoing, Progressing

## 2020-10-01 NOTE — PT/OT/SLP PROGRESS
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  Pt explains, "Sometimes my mind goes off" and "sometimes my mouth gets in the way"    Pain/Comfort:  · Pain Rating 1: 0/10    Objective:     Has the patient been evaluated by SLP for swallowing?   Yes  Keep patient NPO? No   Current Respiratory Status: room air      Pt seen for cognitive-linguistic therapy. Pt found lethargic partially upright in bed with remains of lunch meal tray on bedside table in front of her. Pt initially fatigued, but became more attentive as session progressed.  Pt participated in further assessment of reading, writing, math, and visual-spatial skills. Pt read and finished sentence completion activities 5/5 independently. Pt wrote name, address date of birth, signature and dictated sentence x2 independently. Pt completed clock drawing task independently. Pt completed functional math problems for time management with 100% accuracy independently. Pt completed convergent naming task for Fo3 with 100% accuracy mod I. Pt reported some word finding difficulties and reduced attention span. Pt seen with small bites of solids x5 with no overt S/S of aspiration. SLP provided Pt education on SLP role and POC and compensatory attention strategies. Whiteboard updated. Pt remained in bed with call light within reach upon SLP exit from room.    Assessment: "     Alison Branch is a 64 y.o. female with an SLP diagnosis of Cognitive-Linguistic Impairment.  She presents mildly reduced attention span.    Goals:   Multidisciplinary Problems     SLP Goals        Problem: SLP Goal    Goal Priority Disciplines Outcome   SLP Goal     SLP Ongoing, Progressing   Description: SLP Goals to be Met 10/6/2020    1. Pt will tolerate Regular texture diet with Thin liquids with no overt signs of airway obstruction independently  2. Pt will participate in further evaluation of math, visual-spatial, reading, and writing skills Met  3. Pt will complete higher level word finding tasks with 90% accuracy independently  4. Pt will complete divided attention tasks with 90% accuracy mod I  5. Educate Pt on compensatory strategies for attention and word finding                     Plan:     · Patient to be seen:  3 x/week   · Plan of Care expires:  10/29/20  · Plan of Care reviewed with:  patient   · SLP Follow-Up:  Yes       Discharge recommendations:  rehabilitation facility     Time Tracking:     SLP Treatment Date:   10/01/20  Speech Start Time:  1255  Speech Stop Time:  1323     Speech Total Time (min):  28 min    Billable Minutes: Speech Therapy Individual 14 and Seld Care/Home Management Training 14    GILBERTO Marsh  10/01/2020

## 2020-10-01 NOTE — PT/OT/SLP PROGRESS
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 female with a medical diagnosis of Acute ischemic multifocal multiple vascular territories stroke.  She presents with performance deficits affecting function are weakness, impaired endurance, impaired sensation, decreased coordination, decreased upper extremity function, impaired functional mobilty, gait instability, decreased lower extremity function, impaired balance, impaired self care skills, decreased safety awareness. Improvements noted with sitting balance EOB during ADLs.     Rehab Prognosis:  Good; patient would benefit from acute skilled OT services to address these deficits and reach maximum level of function.       Plan:     Patient to be seen 4 x/week to address the above listed problems via self-care/home management, therapeutic activities, therapeutic exercises, neuromuscular re-education, cognitive retraining, sensory integration  · Plan of Care Expires: 10/27/20  · Plan of Care Reviewed with: patient    Subjective   Patient:  "Sometimes my right leg goes to acting up."  Pain/Comfort:  · Pain Rating 1: 0/10  · Pain Rating Post-Intervention 1: 0/10    Objective:     Communicated with: Nurse prior to session.  Patient found supine with telemetry, peripheral IV, PureWick, bed alarm upon OT entry to room.    General Precautions: Standard, aspiration, fall   Orthopedic Precautions:N/A   Braces: N/A     Occupational Performance:     Bed Mobility:    · Patient completed Rolling/Turning to Left with  supervision  · Patient completed Rolling/Turning to Right with supervision  · Patient completed Supine to Sit with supervision  · Patient completed Sit to " Supine with supervision     Functional Mobility/Transfers:  · Patient completed Sit <> Stand Transfer with stand by assistance  with  no assistive device   · Patient completed Bed <> Chair Transfer using Stand Pivot technique with contact guard assistance with no assistive device    Activities of Daily Living:  · Grooming: contact guard assistance while standing  · Upper Body Dressing: contact guard assistance while standing to gather clothing  · Lower Body Dressing: minimum assistance for standing balance      [facility] 6 Click ADL: 18    Treatment & Education:  Patient education provided on role of OT and need for rehab upon discharge.  Patient education provided on fall prevention during ADLs.  Continued education, patient/ family training recommended.  Patient alert and oriented x 3; able to follow 4/4 one step commands.  Patient attentive and interactive throughout the session.  SBA with dynamic sitting balance during ADLs. Patient's functional status and disposition recommendation discussed with stroke team in daily rounds.  White board updated in patient's room.  OT asked if there were any other questions; patient/ family had no further questions.    Patient left supine with all lines intact, call button in reach and bed alarm onEducation:      GOALS:   Multidisciplinary Problems     Occupational Therapy Goals        Problem: Occupational Therapy Goal    Goal Priority Disciplines Outcome Interventions   Occupational Therapy Goal     OT, PT/OT Ongoing, Progressing    Description: Goals set 9/30 be addressed for 14 days with expiration date, 10/14:  Patient will increase functional independence with ADLs by performing:    Patient will demonstrate rolling to the right with modified independence  Not met   Patient will demonstrate rolling to the left with modified independence.   Not met  Patient will demonstrate supine -sit with modified independence.   Not met  Patient will demonstrate stand pivot transfers with  SBA.   Not met  Patient will demonstrate grooming while standing with SBA.   Not met  Patient will demonstrate upper body dressing with SBA while seated EOB.   Not met  Patient will demonstrate lower body dressing with SBA while seated EOB.   Not met  Patient will demonstrate toileting with SBA.   Not met  Patient will demonstrate bathing while seated EOB with SBA.   Not met  Patient's family / caregiver will demonstrate independence and safety with assisting patient with self-care skills and functional mobility.     Not met                           Time Tracking:     OT Date of Treatment: 10/01/20  OT Start Time: 0649  OT Stop Time: 0714  OT Total Time (min): 25 min    Billable Minutes:Self Care/Home Management 13  Neuromuscular Re-education 12    TIFFANY Velez  10/1/2020

## 2020-10-01 NOTE — PLAN OF CARE
[facility]    FACILITY TRANSFER ORDERS      Patient Name: Alison Branch  YOB: 1955    PCP: Mike Rodriguez Ii, MD   PCP Address: [facility] [facility] / [place] [address]  PCP Phone Number: [phone]  PCP Fax: [phone]    Encounter Date: 10/05/2020    Admit to: inpatient rehab    Vital Signs:  Routine    Diagnoses:   Active Hospital Problems    Diagnosis  POA    *Acute ischemic multifocal multiple vascular territories stroke [I63.89]  Yes     Priority: 1 - High    Cytotoxic cerebral edema [G93.6]  Yes    Urinary retention [R33.9]  Yes    Secondary malignant neoplasm of bone [C79.51]  Yes    Malignant neoplasm of upper lobe of left lung [C34.12]  Yes     CARLOS nodules.  Will plan on outpatient bronchoscopy with TBBx, BAL and perhaps brush.          Hypertension associated with diabetes [E11.59, I10]  Yes    Mixed hyperlipidemia due to type 2 diabetes mellitus [E11.69, E78.2]  Yes     Chronic    Type 2 diabetes mellitus with hyperglycemia, with long-term current use of insulin [E11.65, Z79.4]  Not Applicable    Cerebral aneurysm, coiled in 2010 [I67.1]  Yes      Resolved Hospital Problems   No resolved problems to display.       Allergies:  Review of patient's allergies indicates:   Allergen Reactions    Piperacillin-tazobactam Rash     Tolerated cefepime 06/2020       Diet: cardiac diet    Activities: Activity as tolerated    Nursing: per facility protocol     Labs: per facility protocol    CONSULTS:    Physical Therapy to evaluate and treat. , Occupational Therapy to evaluate and treat. and Speech Therapy to evaluate and treat for Language, Swallowing and Cognition.    MISCELLANEOUS CARE:  Amanda Care: Empty Amanda bag every shift. Change Amanda every month., Routine Skin for Bedridden Patients: Apply moisture barrier cream to all skin folds and wet areas in perineal area daily and after baths and all bowel movements. and Diabetes Care:   SN to perform and educate Diabetic  management with blood glucose monitoring:, Fingerstick blood sugar AC and HS and Report CBG < 60 or > 350 to physician.       Please assist patient with the below follow up appointments:  Follow-up With  Details  Why  Contact Info   Mike Rodriguez II, MD  In 1 week  Please schedule an appointment with your primary care provider due to recent hospitalization  [address]  [facility] [id number] [phone]   [facility] VASCULAR NEUROLOGY  In 4 weeks  Someone will contact you to schedule your stroke follow up appointment. Please contact phone number listed if you do not receive a phone call within 1 week  [address]  [facility] [id number] [phone]   Nubia Saleh PA-C    Please schedule an appointment with urology due to recent urinary retention  [address] [facility] [id number] [phone]       WOUND CARE ORDERS  None    Medications: Review discharge medications with patient and family and provide education.      Continue home entrectinib dose as is per heme/onc. Patient will need to supply this medication.    Current Discharge Medication List      START taking these medications    Details   melatonin (MELATIN) 3 mg tablet Take 2 tablets (6 mg total) by mouth nightly as needed for Insomnia.  Qty:  , Refills: 0      phenazopyridine (PYRIDIUM) 100 MG tablet Take 1 tablet (100 mg total) by mouth 3 (three) times daily with meals. for 10 days  Qty: 30 tablet, Refills: 0         CONTINUE these medications which have CHANGED    Details   insulin aspart U-100 (NOVOLOG) 100 unit/mL (3 mL) InPn pen Inject 0-5 Units into the skin before meals and at bedtime as needed (Hyperglycemia).  Qty:  , Refills: 0         CONTINUE these medications which have NOT CHANGED    Details   apixaban (ELIQUIS) 5 mg Tab Take 1 tablet (5 mg total) by mouth 2 (two) times daily.  Qty: 180 tablet, Refills: 3      blood sugar diagnostic Strp To check BG 4 times daily, to use with insurance preferred  meter  Qty: 200 each, Refills: 11      carboxymethylcellulose (REFRESH PLUS) 0.5 % Dpet 1 drop 3 (three) times daily as needed.      cyproheptadine (PERIACTIN) 4 mg tablet Take 1 tablet (4 mg total) by mouth 4 (four) times daily.  Qty: 120 tablet, Refills: 5    Associated Diagnoses: Anorexia      entrectinib (ROZLYTREK) 200 mg oral tablet Take 3 capsules (600 mg total) by mouth once daily.  Qty: 90 capsule, Refills: 3    Associated Diagnoses: Malignant neoplasm of upper lobe of left lung; Secondary malignant neoplasm of bone      ergocalciferol (ERGOCALCIFEROL) 50,000 unit Cap TAKE 1 CAPSULE BY MOUTH EVERY 7 DAYS  Qty: 12 capsule, Refills: 3    Comments: Please consider 90 day supplies to promote better adherence  Associated Diagnoses: Vitamin D deficiency      fluticasone propionate (FLONASE) 50 mcg/actuation nasal spray USE TWO SPRAY(S) IN EACH NOSTRIL ONCE DAILY  Qty: 16 g, Refills: 3    Comments: Please consider 90 day supplies to promote better adherence  Associated Diagnoses: Chronic cough      fluticasone-salmeterol diskus inhaler 100-50 mcg Inhale 1 puff into the lungs 2 (two) times daily. Controller  Qty: 60 each, Refills: 2    Associated Diagnoses: Chronic respiratory failure with hypoxia      furosemide (LASIX) 20 MG tablet Take 2 tablets (40 mg total) by mouth once daily.  Qty: 60 tablet, Refills: 11    Associated Diagnoses: Edema, unspecified type      gabapentin (NEURONTIN) 300 MG capsule Take 1 capsule (300 mg total) by mouth nightly as needed (Take as needed at bed time for neuropathy pain and sleep).  Qty: 90 capsule, Refills: 3    Associated Diagnoses: Memory difficulty; Chemotherapy-induced neuropathy; Balance disorder      insulin detemir U-100 (LEVEMIR FLEXTOUCH) 100 unit/mL (3 mL) SubQ InPn pen Inject 26 Units into the skin once daily.  Qty: 2 Box, Refills: 2    Associated Diagnoses: Type 2 diabetes mellitus with hyperglycemia, with long-term current use of insulin      insulin lispro (HUMALOG  "KWIKPEN INSULIN) 100 unit/mL pen Inject 12 units TID AC + correction scale. Max TDD of 57 units  Qty: 4 Box, Refills: 2    Associated Diagnoses: Type 2 diabetes mellitus with hyperglycemia, with long-term current use of insulin      lancets Misc 1 Device by Misc.(Non-Drug; Combo Route) route once daily. Heladio Result.  250.02.  Check Blood Sugar Twice Daily.  Qty: 200 each, Refills: 3    Associated Diagnoses: Diabetes mellitus, type II      nystatin (MYCOSTATIN) powder Apply topically 2 (two) times daily.  Qty: 60 g, Refills: 0    Associated Diagnoses: Candidal intertrigo      ondansetron (ZOFRAN) 8 MG tablet Take 1 tablet (8 mg total) by mouth 4 (four) times daily as needed for Nausea.  Qty: 60 tablet, Refills: 2    Associated Diagnoses: Malignant neoplasm of upper lobe of left lung      pen needle, diabetic 33 gauge x 5/32" Ndle 1 each by Misc.(Non-Drug; Combo Route) route 4 (four) times daily with meals and nightly.  Qty: 100 each, Refills: 5    Associated Diagnoses: Uncontrolled type 2 diabetes mellitus with hyperglycemia, without long-term current use of insulin      tamsulosin (FLOMAX) 0.4 mg Cap Take 1 capsule (0.4 mg total) by mouth every evening.  Qty: 30 capsule, Refills: 11      venlafaxine (EFFEXOR XR) 75 MG 24 hr capsule Take 1 capsule (75 mg total) by mouth once daily.  Qty: 30 capsule, Refills: 2    Associated Diagnoses: Depression, unspecified depression type      walker Misc Please provide rollator walker for this debilitated cancer patient.  Thank you.  Refills: 0    Associated Diagnoses: Debility; Malignant neoplasm of upper lobe of left lung         STOP taking these medications       aspirin (ECOTRIN) 81 MG EC tablet Comments:   Reason for Stopping:         insulin regular 100 unit/mL Inj injection Comments:   Reason for Stopping:                    Angelica Travis PA-C  10/01/2020        "

## 2020-10-01 NOTE — PROGRESS NOTES
[facility]  Physical Medicine & Rehab  Progress Note    Patient Name: Alison Branch  MRN: [id number]  Admission Date: 9/28/2020  Length of Stay: 3 days  Attending Physician: Partha Thomson MD    Subjective:     Principal Problem:Acute ischemic multifocal multiple vascular territories stroke    Hospital Course:   9/29/20: Evaluated by PT. Sit to stand Marci. Ambulated 4-5 steps Marci.   9/30/20: Participated w/ OT. Bed mobility SBA- SV. Sit to stand CGA. Grooming & UBD Marci. LBD modA.     Interval History 10/1/2020:  Patient is seen for follow-up PM&R evaluation and recommendations: participating with therapy.     HPI, Past Medical, Family, and Social History remains the same as documented in the initial encounter.    Scheduled Medications:    apixaban  5 mg Oral BID    ergocalciferol  50,000 Units Oral Q7 Days    gabapentin  300 mg Oral QHS    insulin detemir U-100  10 Units Subcutaneous QHS    NON FORMULARY MEDICATION 600 mg  600 mg Oral Daily    venlafaxine  75 mg Oral Daily       Diagnostic Results: Labs: Reviewed  ECG: Reviewed  CT: Reviewed    PRN Medications: acetaminophen, dextrose 50%, dextrose 50%, glucagon (human recombinant), glucose, glucose, insulin aspart U-100, labetaloL, ondansetron, sodium chloride 0.9%, sodium chloride 0.9%    Review of Systems   Constitutional: Positive for activity change and fatigue. Negative for fever.   HENT: Negative for sore throat and trouble swallowing.    Eyes: Negative for visual disturbance.   Respiratory: Negative for cough and shortness of breath.    Cardiovascular: Negative for chest pain and leg swelling.   Gastrointestinal: Negative for abdominal distention and abdominal pain.   Genitourinary: Negative for difficulty urinating.   Musculoskeletal: Positive for gait problem. Negative for back pain.   Skin: Negative for color change.   Neurological: Positive for weakness. Negative for dizziness, light-headedness and headaches.    Psychiatric/Behavioral: Negative for agitation and confusion.     Objective:     Vital Signs (Most Recent):  Temp: 98.4 °F (36.9 °C) (10/01/20 1122)  Pulse: 79 (10/01/20 1125)  Resp: 17 (10/01/20 1122)  BP: 129/63 (10/01/20 1122)  SpO2: (!) 17 % (10/01/20 1122)    Vital Signs (24h Range):  Temp:  [97.5 °F (36.4 °C)-98.8 °F (37.1 °C)] 98.4 °F (36.9 °C)  Pulse:  [73-88] 79  Resp:  [17-18] 17  SpO2:  [17 %-100 %] 17 %  BP: (111-137)/(55-65) 129/63     Physical Exam  Vitals signs and nursing note reviewed.   Constitutional:       Appearance: Normal appearance. She is well-developed.      Comments: Fatigue present easily arousable and oriented    HENT:      Head: Normocephalic and atraumatic.      Mouth/Throat:      Mouth: Mucous membranes are moist.   Eyes:      Extraocular Movements: Extraocular movements intact.      Pupils: Pupils are equal, round, and reactive to light.   Neck:      Musculoskeletal: Normal range of motion.   Pulmonary:      Effort: Pulmonary effort is normal.      Breath sounds: Normal breath sounds.   Musculoskeletal:      Comments: R Leg weakness present    Skin:     General: Skin is warm and dry.   Neurological:      Mental Status: She is oriented to person, place, and time.   Psychiatric:         Mood and Affect: Mood normal.         Behavior: Behavior normal.       Assessment/Plan:      * Acute ischemic multifocal multiple vascular territories stroke  - Per VN etiology likely hypercoag state related to cancer    - Related to prolonged/acute hospital course.     Recommendations  -  Encourage mobility, OOB in chair at least 3 hours per day, and early ambulation as appropriate  -  PT/OT evaluate and treat  -  Pain management  -  Monitor for and prevent skin breakdown and pressure ulcers  · Early mobility, repositioning/weight shifting every 20-30 minutes when sitting, turn patient every 2 hours, proper mattress/overlay and chair cushioning, pressure relief/heel protector boots  -  DVT  prophylaxis    -  Reviewed discharge options (IP rehab, SNF, HH therapy, and OP therapy)    Malignant neoplasm of upper lobe of left lung  - Follows up with Dr. Belcher and was undergoing chemo      Recommend Inpatient Rehab.      Angelica Faust NP  Department of Physical Medicine & Rehab   [facility]

## 2020-10-01 NOTE — ASSESSMENT & PLAN NOTE
- Stroke RF due to hypercoagulable state  - Follows w/ Dr. Belcher  - Last chemo on 09/09  - Hem/onc consulted for further assistance   - resuming home chemo

## 2020-10-01 NOTE — PLAN OF CARE
Problem: SLP Goal  Goal: SLP Goal  Description: SLP Goals to be Met 10/6/2020    1. Pt will tolerate Regular texture diet with Thin liquids with no overt signs of airway obstruction independently  2. Pt will participate in further evaluation of math, visual-spatial, reading, and writing skills Met  3. Pt will complete higher level word finding tasks with 90% accuracy independently  4. Pt will complete divided attention tasks with 90% accuracy mod I  5. Educate Pt on compensatory strategies for attention and word finding    Outcome: Ongoing, Progressing    Pt seen for cognitive-linguistic therapy. Pt with good participation and insight into deficits. Goals updated. ST to continue to follow.    GILBERTO Marsh  10/01/2020

## 2020-10-02 PROBLEM — R42 FEELING FAINT: Status: ACTIVE | Noted: 2020-10-02

## 2020-10-02 LAB
BASOPHILS # BLD AUTO: 0.02 K/UL (ref 0–0.2)
BASOPHILS NFR BLD: 0.3 % (ref 0–1.9)
DIFFERENTIAL METHOD: ABNORMAL
EOSINOPHIL # BLD AUTO: 0.1 K/UL (ref 0–0.5)
EOSINOPHIL NFR BLD: 1.2 % (ref 0–8)
ERYTHROCYTE [DISTWIDTH] IN BLOOD BY AUTOMATED COUNT: 15.4 % (ref 11.5–14.5)
HCT VFR BLD AUTO: 35 % (ref 37–48.5)
HGB BLD-MCNC: 10.7 G/DL (ref 12–16)
IMM GRANULOCYTES # BLD AUTO: 0.02 K/UL (ref 0–0.04)
IMM GRANULOCYTES NFR BLD AUTO: 0.3 % (ref 0–0.5)
LYMPHOCYTES # BLD AUTO: 1.4 K/UL (ref 1–4.8)
LYMPHOCYTES NFR BLD: 20.9 % (ref 18–48)
MCH RBC QN AUTO: 26.6 PG (ref 27–31)
MCHC RBC AUTO-ENTMCNC: 30.6 G/DL (ref 32–36)
MCV RBC AUTO: 87 FL (ref 82–98)
MONOCYTES # BLD AUTO: 0.6 K/UL (ref 0.3–1)
MONOCYTES NFR BLD: 9 % (ref 4–15)
NEUTROPHILS # BLD AUTO: 4.6 K/UL (ref 1.8–7.7)
NEUTROPHILS NFR BLD: 68.3 % (ref 38–73)
NRBC BLD-RTO: 0 /100 WBC
PLATELET # BLD AUTO: 196 K/UL (ref 150–350)
PMV BLD AUTO: 12.4 FL (ref 9.2–12.9)
POCT GLUCOSE: 182 MG/DL (ref 70–110)
POCT GLUCOSE: 208 MG/DL (ref 70–110)
POCT GLUCOSE: 223 MG/DL (ref 70–110)
POCT GLUCOSE: 231 MG/DL (ref 70–110)
POCT GLUCOSE: 234 MG/DL (ref 70–110)
RBC # BLD AUTO: 4.02 M/UL (ref 4–5.4)
WBC # BLD AUTO: 6.7 K/UL (ref 3.9–12.7)

## 2020-10-02 PROCEDURE — 11000001 HC ACUTE MED/SURG PRIVATE ROOM

## 2020-10-02 PROCEDURE — 99233 SBSQ HOSP IP/OBS HIGH 50: CPT | Mod: ,,, | Performed by: PSYCHIATRY & NEUROLOGY

## 2020-10-02 PROCEDURE — 25000003 PHARM REV CODE 250: Performed by: PHYSICIAN ASSISTANT

## 2020-10-02 PROCEDURE — 36415 COLL VENOUS BLD VENIPUNCTURE: CPT

## 2020-10-02 PROCEDURE — 97535 SELF CARE MNGMENT TRAINING: CPT

## 2020-10-02 PROCEDURE — 25000003 PHARM REV CODE 250: Performed by: NURSE PRACTITIONER

## 2020-10-02 PROCEDURE — 25000003 PHARM REV CODE 250: Performed by: FAMILY MEDICINE

## 2020-10-02 PROCEDURE — 99233 PR SUBSEQUENT HOSPITAL CARE,LEVL III: ICD-10-PCS | Mod: ,,, | Performed by: PSYCHIATRY & NEUROLOGY

## 2020-10-02 PROCEDURE — 85025 COMPLETE CBC W/AUTO DIFF WBC: CPT

## 2020-10-02 RX ADMIN — TAMSULOSIN HYDROCHLORIDE 0.4 MG: 0.4 CAPSULE ORAL at 09:10

## 2020-10-02 RX ADMIN — INSULIN ASPART 2 UNITS: 100 INJECTION, SOLUTION INTRAVENOUS; SUBCUTANEOUS at 09:10

## 2020-10-02 RX ADMIN — APIXABAN 5 MG: 5 TABLET, FILM COATED ORAL at 09:10

## 2020-10-02 RX ADMIN — INSULIN ASPART 2 UNITS: 100 INJECTION, SOLUTION INTRAVENOUS; SUBCUTANEOUS at 04:10

## 2020-10-02 RX ADMIN — ONDANSETRON 8 MG: 8 TABLET, ORALLY DISINTEGRATING ORAL at 09:10

## 2020-10-02 RX ADMIN — INSULIN ASPART 2 UNITS: 100 INJECTION, SOLUTION INTRAVENOUS; SUBCUTANEOUS at 12:10

## 2020-10-02 RX ADMIN — ACETAMINOPHEN 650 MG: 325 TABLET ORAL at 09:10

## 2020-10-02 RX ADMIN — GABAPENTIN 300 MG: 100 CAPSULE ORAL at 09:10

## 2020-10-02 NOTE — PLAN OF CARE
POC and education reviewed with patient, no questions or concerns at this time. VSS on room air. Patient up in chair and ambulating in room with walker and stand by assistance. Tolerating diet, nausea only after chemo medication, BG monitored and covered as needed. Fall precautions maintained; bed locked and in lowest position. Bed alarm activated and audible. No fall or injuries this shift. Urinary retention continues this shift, continue to bladder scan and straight cath, MD notified, UA collected, awaiting renal US. WCTM    Problem: Fall Injury Risk  Goal: Absence of Fall and Fall-Related Injury  Outcome: Ongoing, Progressing     Problem: Adjustment to Illness (Stroke, Ischemic/Transient Ischemic Attack)  Goal: Optimal Coping  Outcome: Ongoing, Progressing     Problem: Communication Impairment (Stroke, Ischemic/Transient Ischemic Attack)  Goal: Improved Communication Skills  Outcome: Ongoing, Progressing     Problem: Eating/Swallowing Impairment (Stroke, Ischemic/Transient Ischemic Attack)  Goal: Oral Intake without Aspiration  Outcome: Ongoing, Progressing     Problem: Pain (Stroke, Ischemic/Transient Ischemic Attack)  Goal: Acceptable Pain Control  Outcome: Ongoing, Progressing     Problem: Urinary Elimination Impaired (Stroke, Ischemic/Transient Ischemic Attack)  Goal: Effective Urinary Elimination  Outcome: Ongoing, Not Progressing     Problem: Adult Inpatient Plan of Care  Goal: Plan of Care Review  Outcome: Ongoing, Progressing
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
## 2020-10-02 NOTE — PLAN OF CARE
Problem: SLP Goal  Goal: SLP Goal  Description: SLP Goals to be Met 10/6/2020    1. Pt will tolerate Regular texture diet with Thin liquids with no overt signs of airway obstruction independently  2. Pt will participate in further evaluation of math, visual-spatial, reading, and writing skills Met  3. Pt will complete higher level word finding tasks with 90% accuracy independently  4. Pt will complete divided attention tasks with 90% accuracy mod I  5. Educate Pt on compensatory strategies for attention and word finding    Outcome: Ongoing, Progressing     Pt seen for cognitive therapy. Patient with headache and pain noted to impact sustained attention. Current goals remain ongoing.     TIFFANIE Burciaga., [facility]-SLP  Speech-Language Pathology  Pager: [phone]  10/2/2020

## 2020-10-02 NOTE — PT/OT/SLP PROGRESS
"Speech Language Pathology Treatment    Patient Name:  Alison Branch   MRN:  [id number]  Admitting Diagnosis: Acute ischemic multifocal multiple vascular territories stroke    Recommendations:                 General Recommendations:  Cognitive-linguistic therapy  Diet recommendations:  Regular, Liquid Diet Level: Thin   Aspiration Precautions: Meds whole 1 at a time and Standard aspiration precautions   General Precautions: Standard, aspiration, fall  Communication strategies:  go to room if call light pushed    Subjective     SLP reviewed Pt with RN, RN requested Pt to return later service day   Pt presents lethargic  She explains "I had another episode, my right side gets weak"   Patient goals: rehab    Pain/Comfort:  · Pain Rating 1: 6/10  · Location - Orientation 1: generalized  · Location 1: head  · Pain Addressed 1: Distraction, Cessation of Activity, Nurse notified  · Pain Rating Post-Intervention 1: 6/10    Objective:     Has the patient been evaluated by SLP for swallowing?   Yes  Keep patient NPO? No   Current Respiratory Status: room air      Upon initial attempt, Pt on bedside commode in room and requested for ST to return later. Upon second attempt, Pt found in bed with RN at bedside, Rn requested for ST to return later service day. Upon third attempt, Pt found awake in bed with call light in reach. She endorsed a moderate headache and asked for medicine, RN notified and conformed understanding of headache pain. Pt asked SLP questions re: therapy offerings in IRF and SLP educated Pt on ST POC and ST offerings in IRF. As SLP attempted to initiate problem solving tasks, Pt with decreased sustained eye contact as she endorsed increased headache pain.  Pain noted to impact attention and endurance with conversational tasks. RN aware. Session discontinued 2/2 pain and request to rest. Call light in reach upon SLP exit from room.     Assessment:     Alison Branch is a 64 y.o. female with an SLP diagnosis of " Cognitive-Linguistic Impairment.  She presents with decreased sustained attention 2/2 pain this service day. ST to continue to follow.     Goals:   Multidisciplinary Problems     SLP Goals        Problem: SLP Goal    Goal Priority Disciplines Outcome   SLP Goal     SLP Ongoing, Progressing   Description: SLP Goals to be Met 10/6/2020    1. Pt will tolerate Regular texture diet with Thin liquids with no overt signs of airway obstruction independently  2. Pt will participate in further evaluation of math, visual-spatial, reading, and writing skills Met  3. Pt will complete higher level word finding tasks with 90% accuracy independently  4. Pt will complete divided attention tasks with 90% accuracy mod I  5. Educate Pt on compensatory strategies for attention and word finding                     Plan:     · Patient to be seen:  3 x/week   · Plan of Care expires:  10/29/20  · Plan of Care reviewed with:  patient   · SLP Follow-Up:  Yes       Discharge recommendations:  rehabilitation facility     Time Tracking:     SLP Treatment Date:   10/02/20  Speech Start Time:  1424  Speech Stop Time:  1434     Speech Total Time (min):  10 min    Billable Minutes: Seld Care/Home Management Training 8    HANNAH Burciaga, [facility]-SLP  Speech-Language Pathology  Pager: [phone]    10/02/2020
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
## 2020-10-02 NOTE — ASSESSMENT & PLAN NOTE
10/2: Patient got up to commode with nurse. Nurse reports pt felt acutely faint. Denies LOC. Orthostatics WNL. CBC stable. Nursing also reported possible GI bleed. After further assessment determined hematuria. Advised to change positions slowly.

## 2020-10-02 NOTE — ASSESSMENT & PLAN NOTE
Seen in urology clinic 9/25 for incomplete bladder emptying  Recent urine culture on 9/25 negative  Repeat UA with reflex to culture  Starting flomax  Renal US - mild/moderate bilateral hydronephrosis.   Hematuria, post voiding retention  Curbside urology who recommend placing hennessy, f/u outpatient w/ urology STEFANY in 1 week

## 2020-10-02 NOTE — SUBJECTIVE & OBJECTIVE
Neurologic Chief Complaint: RLE weakness     Subjective:     Interval History: Patient is seen for follow-up neurological assessment and treatment recommendations: Renal US mild/moderate b/l hydronephrosis, hematuria, urinary retention post voiding, curbside urology recommend placing hennessy and f/u in 1 week. CBC stable. Rehab dispo pending.    HPI, Past Medical, Family, and Social History remains the same as documented in the initial encounter.     Review of Systems   Constitutional: Negative for chills and fever.   Eyes: Negative for visual disturbance.   Neurological: Positive for weakness and numbness. Negative for facial asymmetry and speech difficulty.   Psychiatric/Behavioral: Negative for agitation and confusion.     Scheduled Meds:   apixaban  5 mg Oral BID    ergocalciferol  50,000 Units Oral Q7 Days    gabapentin  300 mg Oral QHS    insulin detemir U-100  10 Units Subcutaneous QHS    tamsulosin  0.4 mg Oral Daily    venlafaxine  75 mg Oral Daily     Continuous Infusions:   sodium chloride 0.9%       PRN Meds:acetaminophen, dextrose 50%, dextrose 50%, glucagon (human recombinant), glucose, glucose, insulin aspart U-100, labetaloL, ondansetron, sodium chloride 0.9%, sodium chloride 0.9%    Objective:     Vital Signs (Most Recent):  Temp: 98.3 °F (36.8 °C) (10/02/20 1603)  Pulse: 89 (10/02/20 1603)  Resp: 17 (10/02/20 1603)  BP: 134/73 (10/02/20 1603)  SpO2: 100 % (10/02/20 1603)  BP Location: Right arm    Vital Signs Range (Last 24H):  Temp:  [96.6 °F (35.9 °C)-98.3 °F (36.8 °C)]   Pulse:  [69-89]   Resp:  [16-18]   BP: (109-148)/(60-73)   SpO2:  [92 %-100 %]   BP Location: Right arm    Physical Exam  Vitals signs reviewed.   Constitutional:       General: She is not in acute distress.  HENT:      Head: Normocephalic and atraumatic.   Cardiovascular:      Rate and Rhythm: Normal rate.   Pulmonary:      Effort: Pulmonary effort is normal. No respiratory distress.   Skin:     General: Skin is warm and  dry.   Neurological:      Mental Status: She is alert and oriented to person, place, and time.      Motor: Weakness present.         Neurological Exam:   LOC: alert  Attention Span: Good   Language: No aphasia  Articulation: No dysarthria  Orientation: Person, Place, Time   Visual Fields: Full  EOM (CN III, IV, VI): Full/intact  Pupils (CN II, III): PERRL  Facial Movement (CN VII): Symmetric facial expression    Motor: Arm left  Normal 5/5  Leg left  Paresis: 5/5  Arm right  Normal 5/5  Leg right Paresis: 4/5  Cebellar: No evidence of appendicular or axial ataxia  Sensation: decrease sensation in RLE    Laboratory:  CMP:   No results for input(s): GLUCOSE, CALCIUM, ALBUMIN, PROT, NA, K, CO2, CL, BUN, CREATININE, ALKPHOS, ALT, AST, BILITOT in the last 24 hours.  BMP:   Recent Labs   Lab 09/30/20  0359      K 4.6      CO2 28   BUN 19   CREATININE 1.2   CALCIUM 8.9     CBC:   Recent Labs   Lab 10/02/20  1342   WBC 6.70   RBC 4.02   HGB 10.7*   HCT 35.0*      MCV 87   MCH 26.6*   MCHC 30.6*     Lipid Panel:   Recent Labs   Lab 09/28/20  1654   CHOL 242*   LDLCALC 147.0   HDL 62   TRIG 165*     Coagulation:   Recent Labs   Lab 09/29/20  0415   INR 1.0   APTT 24.9     Platelet Aggregation Study: No results for input(s): PLTAGG, PLTAGINTERP, PLTAGREGLACO, ADPPLTAGGREG in the last 168 hours.  Hgb A1C:   Recent Labs   Lab 09/28/20  2311   HGBA1C 9.3*     TSH:   Recent Labs   Lab 09/28/20  1654   TSH 0.675       Diagnostic Results     Brain Imaging   MRI brain 9/28  There are few high T2 small foci bilaterally.  A small lesion in the right centrum semiovale and left corpus callosum are diffusion positive only.  There are other similar small foci which are diffusion positive with associated enhancement.  Chart review indicates metastatic disease.  These findings could represent metastatic disease also.  Few small foci of superimposed acute/subacute lacunar infarction cannot be excluded.  Recommend  follow-up.    Vessel Imaging   US carotid bilateral   No evidence of a hemodynamically significant carotid bifurcation stenosis.  1- 39% stenosis of the ICAs bilaterally.    Cardiac Imaging   TTE 6/22/20  · Normal left ventricular systolic function. The estimated ejection fraction is 55%.  · Normal LV diastolic function.  · No wall motion abnormalities.  · Mildly reduced right ventricular systolic function.  · Normal central venous pressure (3 mmHg).  · The estimated PA systolic pressure is 26 mmHg.

## 2020-10-03 LAB
ALBUMIN SERPL BCP-MCNC: 2.9 G/DL (ref 3.5–5.2)
ALBUMIN SERPL BCP-MCNC: 3.2 G/DL (ref 3.5–5.2)
ALP SERPL-CCNC: 39 U/L (ref 55–135)
ALP SERPL-CCNC: 41 U/L (ref 55–135)
ALT SERPL W/O P-5'-P-CCNC: 10 U/L (ref 10–44)
ALT SERPL W/O P-5'-P-CCNC: 9 U/L (ref 10–44)
ANION GAP SERPL CALC-SCNC: 10 MMOL/L (ref 8–16)
ANION GAP SERPL CALC-SCNC: 8 MMOL/L (ref 8–16)
AST SERPL-CCNC: 13 U/L (ref 10–40)
AST SERPL-CCNC: 13 U/L (ref 10–40)
BASOPHILS # BLD AUTO: 0.03 K/UL (ref 0–0.2)
BASOPHILS # BLD AUTO: 0.03 K/UL (ref 0–0.2)
BASOPHILS # BLD AUTO: 0.04 K/UL (ref 0–0.2)
BASOPHILS NFR BLD: 0.5 % (ref 0–1.9)
BASOPHILS NFR BLD: 0.6 % (ref 0–1.9)
BASOPHILS NFR BLD: 0.7 % (ref 0–1.9)
BILIRUB SERPL-MCNC: 0.3 MG/DL (ref 0.1–1)
BILIRUB SERPL-MCNC: 0.3 MG/DL (ref 0.1–1)
BUN SERPL-MCNC: 21 MG/DL (ref 8–23)
BUN SERPL-MCNC: 21 MG/DL (ref 8–23)
CALCIUM SERPL-MCNC: 8.2 MG/DL (ref 8.7–10.5)
CALCIUM SERPL-MCNC: 8.3 MG/DL (ref 8.7–10.5)
CHLORIDE SERPL-SCNC: 104 MMOL/L (ref 95–110)
CHLORIDE SERPL-SCNC: 105 MMOL/L (ref 95–110)
CO2 SERPL-SCNC: 25 MMOL/L (ref 23–29)
CO2 SERPL-SCNC: 27 MMOL/L (ref 23–29)
CREAT SERPL-MCNC: 1.3 MG/DL (ref 0.5–1.4)
CREAT SERPL-MCNC: 1.3 MG/DL (ref 0.5–1.4)
DIFFERENTIAL METHOD: ABNORMAL
EOSINOPHIL # BLD AUTO: 0.1 K/UL (ref 0–0.5)
EOSINOPHIL NFR BLD: 2 % (ref 0–8)
EOSINOPHIL NFR BLD: 2 % (ref 0–8)
EOSINOPHIL NFR BLD: 2.7 % (ref 0–8)
ERYTHROCYTE [DISTWIDTH] IN BLOOD BY AUTOMATED COUNT: 15.4 % (ref 11.5–14.5)
ERYTHROCYTE [DISTWIDTH] IN BLOOD BY AUTOMATED COUNT: 15.4 % (ref 11.5–14.5)
ERYTHROCYTE [DISTWIDTH] IN BLOOD BY AUTOMATED COUNT: 15.5 % (ref 11.5–14.5)
EST. GFR  (AFRICAN AMERICAN): 50.8 ML/MIN/1.73 M^2
EST. GFR  (AFRICAN AMERICAN): 50.8 ML/MIN/1.73 M^2
EST. GFR  (NON AFRICAN AMERICAN): 44.1 ML/MIN/1.73 M^2
EST. GFR  (NON AFRICAN AMERICAN): 44.1 ML/MIN/1.73 M^2
GLUCOSE SERPL-MCNC: 151 MG/DL (ref 70–110)
GLUCOSE SERPL-MCNC: 151 MG/DL (ref 70–110)
HCT VFR BLD AUTO: 30.9 % (ref 37–48.5)
HCT VFR BLD AUTO: 31 % (ref 37–48.5)
HCT VFR BLD AUTO: 31.5 % (ref 37–48.5)
HGB BLD-MCNC: 9.4 G/DL (ref 12–16)
HGB BLD-MCNC: 9.5 G/DL (ref 12–16)
HGB BLD-MCNC: 9.7 G/DL (ref 12–16)
IMM GRANULOCYTES # BLD AUTO: 0.01 K/UL (ref 0–0.04)
IMM GRANULOCYTES # BLD AUTO: 0.02 K/UL (ref 0–0.04)
IMM GRANULOCYTES # BLD AUTO: 0.02 K/UL (ref 0–0.04)
IMM GRANULOCYTES NFR BLD AUTO: 0.2 % (ref 0–0.5)
IMM GRANULOCYTES NFR BLD AUTO: 0.3 % (ref 0–0.5)
IMM GRANULOCYTES NFR BLD AUTO: 0.3 % (ref 0–0.5)
LYMPHOCYTES # BLD AUTO: 1.9 K/UL (ref 1–4.8)
LYMPHOCYTES # BLD AUTO: 2 K/UL (ref 1–4.8)
LYMPHOCYTES # BLD AUTO: 2.1 K/UL (ref 1–4.8)
LYMPHOCYTES NFR BLD: 34 % (ref 18–48)
LYMPHOCYTES NFR BLD: 34.3 % (ref 18–48)
LYMPHOCYTES NFR BLD: 37.3 % (ref 18–48)
MCH RBC QN AUTO: 26.5 PG (ref 27–31)
MCH RBC QN AUTO: 26.5 PG (ref 27–31)
MCH RBC QN AUTO: 27.4 PG (ref 27–31)
MCHC RBC AUTO-ENTMCNC: 30.2 G/DL (ref 32–36)
MCHC RBC AUTO-ENTMCNC: 30.3 G/DL (ref 32–36)
MCHC RBC AUTO-ENTMCNC: 31.4 G/DL (ref 32–36)
MCV RBC AUTO: 87 FL (ref 82–98)
MCV RBC AUTO: 87 FL (ref 82–98)
MCV RBC AUTO: 88 FL (ref 82–98)
MONOCYTES # BLD AUTO: 0.5 K/UL (ref 0.3–1)
MONOCYTES # BLD AUTO: 0.5 K/UL (ref 0.3–1)
MONOCYTES # BLD AUTO: 0.6 K/UL (ref 0.3–1)
MONOCYTES NFR BLD: 10.2 % (ref 4–15)
MONOCYTES NFR BLD: 8.5 % (ref 4–15)
MONOCYTES NFR BLD: 9.5 % (ref 4–15)
NEUTROPHILS # BLD AUTO: 2.5 K/UL (ref 1.8–7.7)
NEUTROPHILS # BLD AUTO: 3.2 K/UL (ref 1.8–7.7)
NEUTROPHILS # BLD AUTO: 3.3 K/UL (ref 1.8–7.7)
NEUTROPHILS NFR BLD: 49 % (ref 38–73)
NEUTROPHILS NFR BLD: 53.7 % (ref 38–73)
NEUTROPHILS NFR BLD: 54.2 % (ref 38–73)
NRBC BLD-RTO: 0 /100 WBC
NRBC BLD-RTO: 0 /100 WBC
NRBC BLD-RTO: 1 /100 WBC
PLATELET # BLD AUTO: 175 K/UL (ref 150–350)
PLATELET # BLD AUTO: 177 K/UL (ref 150–350)
PLATELET # BLD AUTO: 192 K/UL (ref 150–350)
PMV BLD AUTO: 11.8 FL (ref 9.2–12.9)
PMV BLD AUTO: 12.2 FL (ref 9.2–12.9)
PMV BLD AUTO: 12.6 FL (ref 9.2–12.9)
POCT GLUCOSE: 129 MG/DL (ref 70–110)
POCT GLUCOSE: 147 MG/DL (ref 70–110)
POCT GLUCOSE: 210 MG/DL (ref 70–110)
POTASSIUM SERPL-SCNC: 4.1 MMOL/L (ref 3.5–5.1)
POTASSIUM SERPL-SCNC: 4.1 MMOL/L (ref 3.5–5.1)
PROT SERPL-MCNC: 6.2 G/DL (ref 6–8.4)
PROT SERPL-MCNC: 6.3 G/DL (ref 6–8.4)
RBC # BLD AUTO: 3.54 M/UL (ref 4–5.4)
RBC # BLD AUTO: 3.55 M/UL (ref 4–5.4)
RBC # BLD AUTO: 3.59 M/UL (ref 4–5.4)
SODIUM SERPL-SCNC: 139 MMOL/L (ref 136–145)
SODIUM SERPL-SCNC: 140 MMOL/L (ref 136–145)
WBC # BLD AUTO: 5.1 K/UL (ref 3.9–12.7)
WBC # BLD AUTO: 5.97 K/UL (ref 3.9–12.7)
WBC # BLD AUTO: 6.15 K/UL (ref 3.9–12.7)

## 2020-10-03 PROCEDURE — 99233 PR SUBSEQUENT HOSPITAL CARE,LEVL III: ICD-10-PCS | Mod: ,,, | Performed by: PSYCHIATRY & NEUROLOGY

## 2020-10-03 PROCEDURE — 25000003 PHARM REV CODE 250: Performed by: PHYSICIAN ASSISTANT

## 2020-10-03 PROCEDURE — 99222 1ST HOSP IP/OBS MODERATE 55: CPT | Mod: ,,, | Performed by: UROLOGY

## 2020-10-03 PROCEDURE — 36415 COLL VENOUS BLD VENIPUNCTURE: CPT

## 2020-10-03 PROCEDURE — 99233 SBSQ HOSP IP/OBS HIGH 50: CPT | Mod: ,,, | Performed by: PSYCHIATRY & NEUROLOGY

## 2020-10-03 PROCEDURE — 85025 COMPLETE CBC W/AUTO DIFF WBC: CPT | Mod: 91

## 2020-10-03 PROCEDURE — 80053 COMPREHEN METABOLIC PANEL: CPT | Mod: 91

## 2020-10-03 PROCEDURE — 11000001 HC ACUTE MED/SURG PRIVATE ROOM

## 2020-10-03 PROCEDURE — 25000003 PHARM REV CODE 250: Performed by: NURSE PRACTITIONER

## 2020-10-03 PROCEDURE — 99222 PR INITIAL HOSPITAL CARE,LEVL II: ICD-10-PCS | Mod: ,,, | Performed by: UROLOGY

## 2020-10-03 RX ADMIN — INSULIN ASPART 1 UNITS: 100 INJECTION, SOLUTION INTRAVENOUS; SUBCUTANEOUS at 09:10

## 2020-10-03 RX ADMIN — GABAPENTIN 300 MG: 100 CAPSULE ORAL at 09:10

## 2020-10-03 RX ADMIN — TAMSULOSIN HYDROCHLORIDE 0.4 MG: 0.4 CAPSULE ORAL at 08:10

## 2020-10-03 NOTE — PLAN OF CARE
Problem: Adult Inpatient Plan of Care  Goal: Plan of Care Review  Flowsheets (Taken 10/3/2020 0335)  Plan of Care Reviewed With: patient     Problem: Urinary Elimination Impaired (Stroke, Ischemic/Transient Ischemic Attack)  Goal: Effective Urinary Elimination  Intervention: Promote Effective Bladder Elimination  Flowsheets (Taken 10/3/2020 0335)  Urinary Elimination Promotion: catheter patency maintained     Problem: Pain (Stroke, Ischemic/Transient Ischemic Attack)  Goal: Acceptable Pain Control  Intervention: Monitor and Manage Pain  Flowsheets (Taken 10/3/2020 0335)  Pain Management Interventions:   pain management plan reviewed with patient/caregiver   position adjusted   relaxation techniques promoted     Problem: Functional Ability Impaired (Stroke, Ischemic/Transient Ischemic Attack)  Goal: Optimal Functional Ability  Intervention: Optimize Functional Ability  Flowsheets (Taken 10/3/2020 0335)  Self-Care Promotion:   independence encouraged   BADL personal objects within reach   BADL personal routines maintained   safe use of adaptive equipment encouraged     Problem: Fall Injury Risk  Goal: Absence of Fall and Fall-Related Injury  Intervention: Identify and Manage Contributors to Fall Injury Risk  Flowsheets (Taken 10/3/2020 0335)  Self-Care Promotion:   independence encouraged   BADL personal objects within reach   BADL personal routines maintained   safe use of adaptive equipment encouraged  Medication Review/Management: medications reviewed  Intervention: Promote Injury-Free Environment  Flowsheets (Taken 10/3/2020 0335)  Safety Promotion/Fall Prevention:   bed alarm set   assistive device/personal item within reach   Fall Risk reviewed with patient/family   nonskid shoes/socks when out of bed   side rails raised x 2   instructed to call staff for mobility  Environmental Safety Modification:   assistive device/personal items within reach   clutter free environment maintained     Problem: Diabetes  Comorbidity  Goal: Blood Glucose Level Within Desired Range  Intervention: Maintain Glycemic Control  Flowsheets (Taken 10/3/2020 0335)  Glycemic Management: blood glucose monitoring     Problem: Bleeding (Sepsis/Septic Shock)  Goal: Absence of Bleeding  Intervention: Minimize Bleeding Risk  Flowsheets (Taken 10/3/2020 0335)  Bleeding Precautions:   blood pressure closely monitored   monitored for signs of bleeding   POC reviewed with patient, voiced understanding. Pt c/o bladder spasms and pressure post hennessy catheter insertion d/t urinary retention. Hennessy draining bright red bloody output.   Stroke team notified; Amarilis Aleman NP present at bedside to assess patient. New order placed for CBC, CMP, and STAT XR-abd. Diabetic education performed, requires frequent reinforcement. Fall precaution maintained, bed locked and in lowest position. Bed alarm on and call light and personal belongings within reach. Will continue to monitor.
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
## 2020-10-03 NOTE — SUBJECTIVE & OBJECTIVE
Neurologic Chief Complaint: RLE weakness     Subjective:     Interval History: Patient is seen for follow-up neurological assessment and treatment recommendations:   bright red hematuria s/p PEG, CT abd/pelvis ordered, H/H 9.8/31, consult urology, hold apixaban for now.     HPI, Past Medical, Family, and Social History remains the same as documented in the initial encounter.     Review of Systems   Constitutional: Negative for chills and fever.   Eyes: Negative for visual disturbance.   Genitourinary: Positive for hematuria and pelvic pain.   Neurological: Positive for weakness and numbness. Negative for facial asymmetry and speech difficulty.   Psychiatric/Behavioral: Negative for agitation and confusion.     Scheduled Meds:   ergocalciferol  50,000 Units Oral Q7 Days    gabapentin  300 mg Oral QHS    insulin detemir U-100  15 Units Subcutaneous QHS    tamsulosin  0.4 mg Oral Daily    venlafaxine  75 mg Oral Daily     Continuous Infusions:   sodium chloride 0.9%       PRN Meds:acetaminophen, dextrose 50%, dextrose 50%, glucagon (human recombinant), glucose, glucose, insulin aspart U-100, labetaloL, ondansetron, sodium chloride 0.9%, sodium chloride 0.9%    Objective:     Vital Signs (Most Recent):  Temp: 97.7 °F (36.5 °C) (10/03/20 1627)  Pulse: 69 (10/03/20 1627)  Resp: 18 (10/03/20 1627)  BP: (!) 114/54 (10/03/20 1627)  SpO2: (!) 94 % (10/03/20 1627)  BP Location: Left arm    Vital Signs Range (Last 24H):  Temp:  [97.3 °F (36.3 °C)-98.6 °F (37 °C)]   Pulse:  [69-97]   Resp:  [15-18]   BP: (111-129)/(54-60)   SpO2:  [91 %-98 %]   BP Location: Left arm    Physical Exam  Vitals signs reviewed.   Constitutional:       General: She is not in acute distress.  HENT:      Head: Normocephalic and atraumatic.   Cardiovascular:      Rate and Rhythm: Normal rate.   Pulmonary:      Effort: Pulmonary effort is normal. No respiratory distress.   Skin:     General: Skin is warm and dry.   Neurological:      Mental Status:  She is alert and oriented to person, place, and time.      Motor: Weakness present.         Neurological Exam:   LOC: alert  Attention Span: Good   Language: No aphasia  Articulation: No dysarthria  Orientation: Person, Place, Time   Visual Fields: Full  EOM (CN III, IV, VI): Full/intact  Pupils (CN II, III): PERRL  Facial Movement (CN VII): Symmetric facial expression    Motor: Arm left  Normal 5/5  Leg left  Paresis: 5/5  Arm right  Normal 5/5  Leg right Paresis: 4/5  Cebellar: No evidence of appendicular or axial ataxia  Sensation: decrease sensation in RLE    Laboratory:  CMP:   Recent Labs   Lab 10/03/20  0410   CALCIUM 8.2*  8.3*   ALBUMIN 3.2*  2.9*   PROT 6.3  6.2     140   K 4.1  4.1   CO2 25  27     105   BUN 21  21   CREATININE 1.3  1.3   ALKPHOS 39*  41*   ALT 10  9*   AST 13  13   BILITOT 0.3  0.3     BMP:   Recent Labs   Lab 10/03/20  0410     140   K 4.1  4.1     105   CO2 25  27   BUN 21  21   CREATININE 1.3  1.3   CALCIUM 8.2*  8.3*     CBC:   Recent Labs   Lab 10/03/20  1618   WBC 5.10   RBC 3.59*   HGB 9.5*   HCT 31.5*      MCV 88   MCH 26.5*   MCHC 30.2*     Lipid Panel:   Recent Labs   Lab 09/28/20  1654   CHOL 242*   LDLCALC 147.0   HDL 62   TRIG 165*     Coagulation:   Recent Labs   Lab 09/29/20  0415   INR 1.0   APTT 24.9     Platelet Aggregation Study: No results for input(s): PLTAGG, PLTAGINTERP, PLTAGREGLACO, ADPPLTAGGREG in the last 168 hours.  Hgb A1C:   Recent Labs   Lab 09/28/20  2311   HGBA1C 9.3*     TSH:   Recent Labs   Lab 09/28/20  1654   TSH 0.675       Diagnostic Results     Brain Imaging   MRI brain 9/28  There are few high T2 small foci bilaterally.  A small lesion in the right centrum semiovale and left corpus callosum are diffusion positive only.  There are other similar small foci which are diffusion positive with associated enhancement.  Chart review indicates metastatic disease.  These findings could represent metastatic  disease also.  Few small foci of superimposed acute/subacute lacunar infarction cannot be excluded.  Recommend follow-up.    Vessel Imaging   US carotid bilateral   No evidence of a hemodynamically significant carotid bifurcation stenosis.  1- 39% stenosis of the ICAs bilaterally.    Cardiac Imaging   TTE 6/22/20  · Normal left ventricular systolic function. The estimated ejection fraction is 55%.  · Normal LV diastolic function.  · No wall motion abnormalities.  · Mildly reduced right ventricular systolic function.  · Normal central venous pressure (3 mmHg).  · The estimated PA systolic pressure is 26 mmHg.

## 2020-10-03 NOTE — ASSESSMENT & PLAN NOTE
Seen in urology clinic 9/25 for incomplete bladder emptying  Recent urine culture on 9/25 negative  Repeat UA with reflex to culture  Starting flomax  Renal US - mild/moderate bilateral hydronephrosis.   Hematuria, post voiding retention  Curbside urology who recommend placing hennessy, f/u outpatient w/ urology STEFANY in 1 week     10/3: bright red hematuria s/p hennessy. H/H 9.4/31, CT abdomen/pelvis ordered, urology consulted. Per urology concerns for neurogenic bladder.
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
## 2020-10-03 NOTE — HPI
Alison Branch is a 64 y.o. 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 Urology was consulted due to urinary retention and gross hematuria. She was in-and-out catheterized twice on 10/1 with return of 1000 mL and subsequently 650 mL. She states that she did not feel the urge to void prior to these catheterizations and that she had the onset of gross hematuria afterwards. Renal US from 10/1/20 demonstrated bilateral hydroureteronephrosis and a distended bladder. Her Cr is 1.3 (baseline 1.3-1.4). Catheterized UA from 10/1 was negative for all tested components. She had a Amanda placed on 10/2. She recently saw urology in clinic on 9/25/20 due to urinary incontinence. She reports near continuous incontinence during the day and night over the past 2 months. She goes through 4-6 pads a day.  She reports incontinence with leaning over, upon standing, walking or getting out of the bed. She denies having the urge to void before an incontinence episode. She wears 2 pads at night.  She wakes up and her bed is soaked. She does not feel the urge to void while she is sleeping. She denies prior gross hematuria. Only pelvic surgery was a hysterectomy for fibroids. Per my read, CT c/a/p w/o contrast today demonstrated no left hydroureteronephrosis and very mild right hydronephrosis. Bladder decompressed around Amanda.

## 2020-10-03 NOTE — PLAN OF CARE
CM contacted Neurology resident and informed the service that [facility] will not be accepting patient today.

## 2020-10-03 NOTE — NURSING
[facility] Stroke team, spoke with JULIA Montaño. Reported patient has over 500cc of bloody bright red output post hennessy catheter insertion earlier today. Patient also complaining of bladder spasm and pressure. Warm pack applied over lower abdomen; pt states it's comforting. NP to come assess patient at bedside; will continue to monitor.

## 2020-10-03 NOTE — NURSING
Called to room by PT- patient was on bedside commode and felt faint-assisted to bed no LOC noted - vss bs 284- np/stroke notified-orthostatics assessed wnl- will continue to monitor
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
## 2020-10-03 NOTE — ASSESSMENT & PLAN NOTE
Alison Branch is a 64 y.o. 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 Urology was consulted due to urinary retention and gross hematuria.     - Recommend leaving Amanda in place on discharge. Patient's clinical picture is concerning for neurogenic bladder with possible overflow incontinence. This is especially likely given lack of sensation in the setting of spinal mets and history of poorly controlled diabetes. Patient is not amenable to clean intermittent catheterization.  - Patient's degree of gross hematuria is very mild and is unlikely to account for her drop in hemoglobin.  - Follow-up official radiology read of CT c/a/p.  - Will arrange for outpatient urology follow-up in 1 month for Amanda exchange. If patient is unable to make appointment, this can be performed by home health.
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
## 2020-10-04 LAB
ALBUMIN SERPL BCP-MCNC: 3.2 G/DL (ref 3.5–5.2)
ALP SERPL-CCNC: 37 U/L (ref 55–135)
ALT SERPL W/O P-5'-P-CCNC: 11 U/L (ref 10–44)
ANION GAP SERPL CALC-SCNC: 8 MMOL/L (ref 8–16)
AST SERPL-CCNC: 15 U/L (ref 10–40)
BASOPHILS # BLD AUTO: 0.02 K/UL (ref 0–0.2)
BASOPHILS # BLD AUTO: 0.03 K/UL (ref 0–0.2)
BASOPHILS NFR BLD: 0.4 % (ref 0–1.9)
BASOPHILS NFR BLD: 0.7 % (ref 0–1.9)
BILIRUB SERPL-MCNC: 0.3 MG/DL (ref 0.1–1)
BUN SERPL-MCNC: 16 MG/DL (ref 8–23)
CALCIUM SERPL-MCNC: 8.4 MG/DL (ref 8.7–10.5)
CHLORIDE SERPL-SCNC: 107 MMOL/L (ref 95–110)
CO2 SERPL-SCNC: 26 MMOL/L (ref 23–29)
CREAT SERPL-MCNC: 1 MG/DL (ref 0.5–1.4)
DIFFERENTIAL METHOD: ABNORMAL
DIFFERENTIAL METHOD: ABNORMAL
EOSINOPHIL # BLD AUTO: 0.2 K/UL (ref 0–0.5)
EOSINOPHIL # BLD AUTO: 0.2 K/UL (ref 0–0.5)
EOSINOPHIL NFR BLD: 4.1 % (ref 0–8)
EOSINOPHIL NFR BLD: 4.3 % (ref 0–8)
ERYTHROCYTE [DISTWIDTH] IN BLOOD BY AUTOMATED COUNT: 15.2 % (ref 11.5–14.5)
ERYTHROCYTE [DISTWIDTH] IN BLOOD BY AUTOMATED COUNT: 15.4 % (ref 11.5–14.5)
EST. GFR  (AFRICAN AMERICAN): >60 ML/MIN/1.73 M^2
EST. GFR  (NON AFRICAN AMERICAN): >60 ML/MIN/1.73 M^2
GLUCOSE SERPL-MCNC: 111 MG/DL (ref 70–110)
HCT VFR BLD AUTO: 31.7 % (ref 37–48.5)
HCT VFR BLD AUTO: 31.9 % (ref 37–48.5)
HGB BLD-MCNC: 9.6 G/DL (ref 12–16)
HGB BLD-MCNC: 9.7 G/DL (ref 12–16)
IMM GRANULOCYTES # BLD AUTO: 0.01 K/UL (ref 0–0.04)
IMM GRANULOCYTES # BLD AUTO: 0.01 K/UL (ref 0–0.04)
IMM GRANULOCYTES NFR BLD AUTO: 0.2 % (ref 0–0.5)
IMM GRANULOCYTES NFR BLD AUTO: 0.2 % (ref 0–0.5)
LYMPHOCYTES # BLD AUTO: 1.6 K/UL (ref 1–4.8)
LYMPHOCYTES # BLD AUTO: 1.9 K/UL (ref 1–4.8)
LYMPHOCYTES NFR BLD: 33.5 % (ref 18–48)
LYMPHOCYTES NFR BLD: 42.9 % (ref 18–48)
MCH RBC QN AUTO: 26.8 PG (ref 27–31)
MCH RBC QN AUTO: 27 PG (ref 27–31)
MCHC RBC AUTO-ENTMCNC: 30.1 G/DL (ref 32–36)
MCHC RBC AUTO-ENTMCNC: 30.6 G/DL (ref 32–36)
MCV RBC AUTO: 88 FL (ref 82–98)
MCV RBC AUTO: 89 FL (ref 82–98)
MONOCYTES # BLD AUTO: 0.5 K/UL (ref 0.3–1)
MONOCYTES # BLD AUTO: 0.5 K/UL (ref 0.3–1)
MONOCYTES NFR BLD: 10.5 % (ref 4–15)
MONOCYTES NFR BLD: 10.6 % (ref 4–15)
NEUTROPHILS # BLD AUTO: 1.8 K/UL (ref 1.8–7.7)
NEUTROPHILS # BLD AUTO: 2.4 K/UL (ref 1.8–7.7)
NEUTROPHILS NFR BLD: 41.5 % (ref 38–73)
NEUTROPHILS NFR BLD: 51.1 % (ref 38–73)
NRBC BLD-RTO: 0 /100 WBC
NRBC BLD-RTO: 0 /100 WBC
PLATELET # BLD AUTO: 197 K/UL (ref 150–350)
PLATELET # BLD AUTO: 199 K/UL (ref 150–350)
PMV BLD AUTO: 12.1 FL (ref 9.2–12.9)
PMV BLD AUTO: 12.4 FL (ref 9.2–12.9)
POCT GLUCOSE: 135 MG/DL (ref 70–110)
POCT GLUCOSE: 137 MG/DL (ref 70–110)
POCT GLUCOSE: 156 MG/DL (ref 70–110)
POTASSIUM SERPL-SCNC: 4.3 MMOL/L (ref 3.5–5.1)
PROT SERPL-MCNC: 6.2 G/DL (ref 6–8.4)
RBC # BLD AUTO: 3.58 M/UL (ref 4–5.4)
RBC # BLD AUTO: 3.59 M/UL (ref 4–5.4)
SODIUM SERPL-SCNC: 141 MMOL/L (ref 136–145)
WBC # BLD AUTO: 4.36 K/UL (ref 3.9–12.7)
WBC # BLD AUTO: 4.68 K/UL (ref 3.9–12.7)

## 2020-10-04 PROCEDURE — 85025 COMPLETE CBC W/AUTO DIFF WBC: CPT

## 2020-10-04 PROCEDURE — 80053 COMPREHEN METABOLIC PANEL: CPT

## 2020-10-04 PROCEDURE — 25000003 PHARM REV CODE 250: Performed by: FAMILY MEDICINE

## 2020-10-04 PROCEDURE — 94761 N-INVAS EAR/PLS OXIMETRY MLT: CPT

## 2020-10-04 PROCEDURE — 99233 SBSQ HOSP IP/OBS HIGH 50: CPT | Mod: ,,, | Performed by: PSYCHIATRY & NEUROLOGY

## 2020-10-04 PROCEDURE — 25000003 PHARM REV CODE 250: Performed by: NURSE PRACTITIONER

## 2020-10-04 PROCEDURE — 99233 PR SUBSEQUENT HOSPITAL CARE,LEVL III: ICD-10-PCS | Mod: ,,, | Performed by: PSYCHIATRY & NEUROLOGY

## 2020-10-04 PROCEDURE — 25000003 PHARM REV CODE 250: Performed by: PHYSICIAN ASSISTANT

## 2020-10-04 PROCEDURE — 11000001 HC ACUTE MED/SURG PRIVATE ROOM

## 2020-10-04 PROCEDURE — 97535 SELF CARE MNGMENT TRAINING: CPT

## 2020-10-04 PROCEDURE — 36415 COLL VENOUS BLD VENIPUNCTURE: CPT

## 2020-10-04 RX ORDER — TALC
6 POWDER (GRAM) TOPICAL NIGHTLY PRN
Status: DISCONTINUED | OUTPATIENT
Start: 2020-10-04 | End: 2020-10-05 | Stop reason: HOSPADM

## 2020-10-04 RX ADMIN — GABAPENTIN 300 MG: 100 CAPSULE ORAL at 10:10

## 2020-10-04 RX ADMIN — TAMSULOSIN HYDROCHLORIDE 0.4 MG: 0.4 CAPSULE ORAL at 09:10

## 2020-10-04 RX ADMIN — APIXABAN 5 MG: 5 TABLET, FILM COATED ORAL at 10:10

## 2020-10-04 RX ADMIN — APIXABAN 5 MG: 5 TABLET, FILM COATED ORAL at 12:10

## 2020-10-04 RX ADMIN — VENLAFAXINE HYDROCHLORIDE 75 MG: 37.5 CAPSULE, EXTENDED RELEASE ORAL at 09:10
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
## 2020-10-04 NOTE — PLAN OF CARE
DEBORAH left message for [facility] to see if they ae able to accept patient today. SW waiting to hear back.    Per Regine with [facility]. Pt is on their list to be accepted tomorrow. DEBORAH informed team of this information.      Hailey Masters, YESI  [facility]

## 2020-10-04 NOTE — ASSESSMENT & PLAN NOTE
Seen in urology clinic 9/25 for incomplete bladder emptying  Recent urine culture on 9/25 negative  Repeat UA with reflex to culture  Starting flomax  Renal US - mild/moderate bilateral hydronephrosis.   Hematuria, post voiding retention  Curbside urology who recommend placing hennessy, f/u outpatient w/ urology STEFANY in 1 week     10/3: bright red hematuria s/p hennessy. H/H 9.4/31, CT abdomen/pelvis ordered, urology consulted. Per urology concerns for neurogenic bladder.   10/4: light pink hematuria w/ few clots, H/H stable.

## 2020-10-04 NOTE — PT/OT/SLP PROGRESS
"Occupational Therapy   Treatment    Name: Alison Branch  MRN: [id number]  Admitting Diagnosis:  Acute ischemic multifocal multiple vascular territories stroke       Recommendations:     Discharge Recommendations: rehabilitation facility  Discharge Equipment Recommendations:  bath bench, bedside commode, wheelchair  Barriers to discharge:  None    Assessment:     Alison Branch is a 64 y.o. female with a medical diagnosis of Acute ischemic multifocal multiple vascular territories stroke.  She presents with performance deficits affecting function are weakness, gait instability, impaired endurance, impaired balance, impaired sensation, impaired self care skills, impaired functional mobilty, decreased coordination, impaired cognition, decreased safety awareness, decreased lower extremity function, impaired coordination, impaired fine motor, decreased upper extremity function.     Rehab Prognosis:  Good; patient would benefit from acute skilled OT services to address these deficits and reach maximum level of function.       Plan:     Patient to be seen 4 x/week to address the above listed problems via self-care/home management, sensory integration, therapeutic exercises, therapeutic activities, neuromuscular re-education  · Plan of Care Expires: 10/27/20  · Plan of Care Reviewed with: patient    Subjective   Patient:  "I didn't go to rehab b/c I had blood in my urine.  I hope to go tomorrow."  Pain/Comfort:  · Pain Rating 1: 0/10  · Pain Rating Post-Intervention 1: 0/10    Objective:     Communicated with: Nurse prior to session.  Patient found supine with peripheral IV, telemetry, bed alarm, hennessy catheter upon OT entry to room.    General Precautions: Standard, aspiration, fall   Orthopedic Precautions:N/A   Braces: N/A     Occupational Performance:     Bed Mobility:    · Patient completed Rolling/Turning to Left with  stand by assistance  · Patient completed Supine to Sit with stand by assistance  · Patient completed Sit " to Supine with stand by assistance     Functional Mobility/Transfers:  · Patient completed Sit <> Stand Transfer with stand by assistance  with  no assistive device   · Patient completed Bed <> Chair Transfer using Stand Pivot technique with contact guard assistance with no assistive device    Activities of Daily Living:  · Grooming: contact guard assistance while standing  · Upper Body Dressing: minimum assistance while standing to retrieve clothing  · Lower Body Dressing: minimum assistance while standing to manage clothing    AMPA 6 Click ADL: 18    Treatment & Education:  Patient education provided on role of OT and need for rehab upon discharge.  Patient education provided on fall prevention during ADLs.  Continued education, patient/ family training recommended.  Patient alert and oriented x 3; able to follow 4/4 one step commands.  Patient attentive and interactive throughout the session.  SBA with dynamic sitting balance during ADLs. Patient's functional status and disposition recommendation discussed with stroke team in daily rounds.  White board updated in patient's room.  OT asked if there were any other questions; patient/ family had no further questions.     Patient left supine with all lines intact, call button in reach and bed alarm onEducation:      GOALS:   Multidisciplinary Problems     Occupational Therapy Goals        Problem: Occupational Therapy Goal    Goal Priority Disciplines Outcome Interventions   Occupational Therapy Goal     OT, PT/OT Ongoing, Progressing    Description: Goals set 9/30 be addressed for 14 days with expiration date, 10/14:  Patient will increase functional independence with ADLs by performing:    Patient will demonstrate rolling to the right with modified independence  Not met   Patient will demonstrate rolling to the left with modified independence.   Not met  Patient will demonstrate supine -sit with modified independence.   Not met  Patient will demonstrate stand  pivot transfers with SBA.   Not met  Patient will demonstrate grooming while standing with SBA.   Not met  Patient will demonstrate upper body dressing with SBA while seated EOB.   Not met  Patient will demonstrate lower body dressing with SBA while seated EOB.   Not met  Patient will demonstrate toileting with SBA.   Not met  Patient will demonstrate bathing while seated EOB with SBA.   Not met  Patient's family / caregiver will demonstrate independence and safety with assisting patient with self-care skills and functional mobility.     Not met                           Time Tracking:     OT Date of Treatment: 10/04/20  OT Start Time: 0833  OT Stop Time: 0900  OT Total Time (min): 27 min    Billable Minutes:Self Care/Home Management 27    TIFFANY Velez  10/4/2020

## 2020-10-04 NOTE — PLAN OF CARE
POC reviewed with patient. VS stable overnight.  Neuro status unchanged.  Patient is worried about placement/living once discharged from [facility].  Social work consulted.  Patient able to stand and use BSC.  No complaints of pain.  NAD noted.

## 2020-10-04 NOTE — PLAN OF CARE
Problem: Fall Injury Risk  Goal: Absence of Fall and Fall-Related Injury  Outcome: Ongoing, Progressing    Pt progressing well.  Up to bedside commode with stand by assist.  Bloody urine in hennessy at beginning of shift.  Resolved to light pink at end.  MD aware.  Maintained PT safety.
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
## 2020-10-05 VITALS
BODY MASS INDEX: 32.43 KG/M2 | RESPIRATION RATE: 18 BRPM | OXYGEN SATURATION: 96 % | DIASTOLIC BLOOD PRESSURE: 42 MMHG | HEIGHT: 69 IN | TEMPERATURE: 98 F | WEIGHT: 218.94 LBS | HEART RATE: 85 BPM | SYSTOLIC BLOOD PRESSURE: 132 MMHG

## 2020-10-05 LAB
BASOPHILS # BLD AUTO: 0.03 K/UL (ref 0–0.2)
BASOPHILS NFR BLD: 0.8 % (ref 0–1.9)
DIFFERENTIAL METHOD: ABNORMAL
EOSINOPHIL # BLD AUTO: 0.2 K/UL (ref 0–0.5)
EOSINOPHIL NFR BLD: 4.6 % (ref 0–8)
ERYTHROCYTE [DISTWIDTH] IN BLOOD BY AUTOMATED COUNT: 15.3 % (ref 11.5–14.5)
HCT VFR BLD AUTO: 32 % (ref 37–48.5)
HGB BLD-MCNC: 10.1 G/DL (ref 12–16)
IMM GRANULOCYTES # BLD AUTO: 0.01 K/UL (ref 0–0.04)
IMM GRANULOCYTES NFR BLD AUTO: 0.3 % (ref 0–0.5)
LYMPHOCYTES # BLD AUTO: 1.8 K/UL (ref 1–4.8)
LYMPHOCYTES NFR BLD: 46.5 % (ref 18–48)
MCH RBC QN AUTO: 27.2 PG (ref 27–31)
MCHC RBC AUTO-ENTMCNC: 31.6 G/DL (ref 32–36)
MCV RBC AUTO: 86 FL (ref 82–98)
MONOCYTES # BLD AUTO: 0.4 K/UL (ref 0.3–1)
MONOCYTES NFR BLD: 10.8 % (ref 4–15)
NEUTROPHILS # BLD AUTO: 1.4 K/UL (ref 1.8–7.7)
NEUTROPHILS NFR BLD: 37 % (ref 38–73)
NRBC BLD-RTO: 0 /100 WBC
PLATELET # BLD AUTO: 207 K/UL (ref 150–350)
PMV BLD AUTO: 12.9 FL (ref 9.2–12.9)
POCT GLUCOSE: 103 MG/DL (ref 70–110)
RBC # BLD AUTO: 3.71 M/UL (ref 4–5.4)
WBC # BLD AUTO: 3.89 K/UL (ref 3.9–12.7)

## 2020-10-05 PROCEDURE — 85025 COMPLETE CBC W/AUTO DIFF WBC: CPT

## 2020-10-05 PROCEDURE — 25000003 PHARM REV CODE 250: Performed by: PHYSICIAN ASSISTANT

## 2020-10-05 PROCEDURE — 99239 PR HOSPITAL DISCHARGE DAY,>30 MIN: ICD-10-PCS | Mod: ,,, | Performed by: PSYCHIATRY & NEUROLOGY

## 2020-10-05 PROCEDURE — 36415 COLL VENOUS BLD VENIPUNCTURE: CPT

## 2020-10-05 PROCEDURE — 92507 TX SP LANG VOICE COMM INDIV: CPT

## 2020-10-05 PROCEDURE — 25000003 PHARM REV CODE 250: Performed by: FAMILY MEDICINE

## 2020-10-05 PROCEDURE — 97535 SELF CARE MNGMENT TRAINING: CPT

## 2020-10-05 PROCEDURE — 99239 HOSP IP/OBS DSCHRG MGMT >30: CPT | Mod: ,,, | Performed by: PSYCHIATRY & NEUROLOGY

## 2020-10-05 RX ORDER — TALC
6 POWDER (GRAM) TOPICAL NIGHTLY PRN
Refills: 0
Start: 2020-10-05 | End: 2021-05-13

## 2020-10-05 RX ORDER — PHENAZOPYRIDINE HYDROCHLORIDE 100 MG/1
100 TABLET, FILM COATED ORAL
Status: DISCONTINUED | OUTPATIENT
Start: 2020-10-05 | End: 2020-10-05

## 2020-10-05 RX ORDER — PHENAZOPYRIDINE HYDROCHLORIDE 100 MG/1
100 TABLET, FILM COATED ORAL
Qty: 30 TABLET | Refills: 0 | Status: ON HOLD
Start: 2020-10-05 | End: 2020-10-20 | Stop reason: HOSPADM

## 2020-10-05 RX ORDER — PHENAZOPYRIDINE HYDROCHLORIDE 100 MG/1
100 TABLET, FILM COATED ORAL
Status: DISCONTINUED | OUTPATIENT
Start: 2020-10-05 | End: 2020-10-05 | Stop reason: HOSPADM

## 2020-10-05 RX ADMIN — TAMSULOSIN HYDROCHLORIDE 0.4 MG: 0.4 CAPSULE ORAL at 09:10

## 2020-10-05 RX ADMIN — APIXABAN 5 MG: 5 TABLET, FILM COATED ORAL at 09:10

## 2020-10-05 NOTE — SUBJECTIVE & OBJECTIVE
Neurologic Chief Complaint: RLE weakness     Subjective:     Interval History: Patient is seen for follow-up neurological assessment and treatment recommendations: urine yellow and clear, complaining of urethral pain with movement - pyridium ordered, plans for discharge to rehab today    HPI, Past Medical, Family, and Social History remains the same as documented in the initial encounter.     Review of Systems   Constitutional: Negative for chills and fever.   Eyes: Negative for visual disturbance.   Genitourinary: Negative for hematuria.        Urethral pain     Neurological: Positive for weakness and numbness. Negative for facial asymmetry and speech difficulty.   Psychiatric/Behavioral: Negative for agitation and confusion.     Scheduled Meds:   apixaban  5 mg Oral BID    ergocalciferol  50,000 Units Oral Q7 Days    gabapentin  300 mg Oral QHS    insulin detemir U-100  15 Units Subcutaneous QHS    phenazopyridine  100 mg Oral TID WM    tamsulosin  0.4 mg Oral Daily    venlafaxine  75 mg Oral Daily     Continuous Infusions:   sodium chloride 0.9%       PRN Meds:acetaminophen, dextrose 50%, dextrose 50%, glucagon (human recombinant), glucose, glucose, insulin aspart U-100, labetaloL, melatonin, ondansetron, sodium chloride 0.9%, sodium chloride 0.9%    Objective:     Vital Signs (Most Recent):  Temp: 98.3 °F (36.8 °C) (10/05/20 0738)  Pulse: 72 (10/05/20 0738)  Resp: 18 (10/05/20 0738)  BP: (!) 123/49 (10/05/20 0738)  SpO2: 95 % (10/05/20 0738)  BP Location: Left arm    Vital Signs Range (Last 24H):  Temp:  [97.6 °F (36.4 °C)-99 °F (37.2 °C)]   Pulse:  [72-84]   Resp:  [18]   BP: (114-140)/(49-66)   SpO2:  [93 %-96 %]   BP Location: Left arm    Physical Exam  Vitals signs reviewed.   Constitutional:       General: She is not in acute distress.  HENT:      Head: Normocephalic and atraumatic.   Cardiovascular:      Rate and Rhythm: Normal rate.   Pulmonary:      Effort: Pulmonary effort is normal. No  respiratory distress.   Skin:     General: Skin is warm and dry.   Neurological:      Mental Status: She is alert and oriented to person, place, and time.      Motor: Weakness present.         Neurological Exam:   LOC: alert  Attention Span: Good   Language: No aphasia  Articulation: No dysarthria  Orientation: Person, Place, Time   Visual Fields: Full  EOM (CN III, IV, VI): Full/intact  Pupils (CN II, III): PERRL  Facial Movement (CN VII): Symmetric facial expression    Motor: Arm left  Normal 5/5  Leg left  Paresis: 5/5  Arm right  Normal 5/5  Leg right Paresis: 4/5  Cebellar: No evidence of appendicular or axial ataxia  Sensation: sensation intact bilaterally to light touch    Laboratory:  CMP:   No results for input(s): GLUCOSE, CALCIUM, ALBUMIN, PROT, NA, K, CO2, CL, BUN, CREATININE, ALKPHOS, ALT, AST, BILITOT in the last 24 hours.  BMP:   Recent Labs   Lab 10/04/20  0640      K 4.3      CO2 26   BUN 16   CREATININE 1.0   CALCIUM 8.4*     CBC:   Recent Labs   Lab 10/04/20  1933   WBC 4.68   RBC 3.58*   HGB 9.6*   HCT 31.9*      MCV 89   MCH 26.8*   MCHC 30.1*     Lipid Panel:   Recent Labs   Lab 09/28/20  1654   CHOL 242*   LDLCALC 147.0   HDL 62   TRIG 165*     Coagulation:   Recent Labs   Lab 09/29/20  0415   INR 1.0   APTT 24.9     Platelet Aggregation Study: No results for input(s): PLTAGG, PLTAGINTERP, PLTAGREGLACO, ADPPLTAGGREG in the last 168 hours.  Hgb A1C:   Recent Labs   Lab 09/28/20  2311   HGBA1C 9.3*     TSH:   Recent Labs   Lab 09/28/20  1654   TSH 0.675       Diagnostic Results     Brain Imaging   MRI brain 9/28  There are few high T2 small foci bilaterally.  A small lesion in the right centrum semiovale and left corpus callosum are diffusion positive only.  There are other similar small foci which are diffusion positive with associated enhancement.  Chart review indicates metastatic disease.  These findings could represent metastatic disease also.  Few small foci of  superimposed acute/subacute lacunar infarction cannot be excluded.  Recommend follow-up.    Vessel Imaging   US carotid bilateral   No evidence of a hemodynamically significant carotid bifurcation stenosis.  1- 39% stenosis of the ICAs bilaterally.    Cardiac Imaging   TTE 6/22/20  · Normal left ventricular systolic function. The estimated ejection fraction is 55%.  · Normal LV diastolic function.  · No wall motion abnormalities.  · Mildly reduced right ventricular systolic function.  · Normal central venous pressure (3 mmHg).  · The estimated PA systolic pressure is 26 mmHg.

## 2020-10-05 NOTE — PT/OT/SLP PROGRESS
"Occupational Therapy   Treatment    Name: Alison Branch  MRN: [id number]  Admitting Diagnosis:  Acute ischemic multifocal multiple vascular territories stroke       Recommendations:     Discharge Recommendations: rehabilitation facility  Discharge Equipment Recommendations:  bath bench, wheelchair, bedside commode  Barriers to discharge:  None    Assessment:     Alison Branch is a 64 y.o. female with a medical diagnosis of Acute ischemic multifocal multiple vascular territories stroke.  She presents with performance deficits affecting function are weakness, impaired self care skills, impaired functional mobilty, gait instability, decreased upper extremity function, decreased lower extremity function, impaired balance, decreased safety awareness.     Rehab Prognosis:  Good; patient would benefit from acute skilled OT services to address these deficits and reach maximum level of function.       Plan:     Patient to be seen 4 x/week to address the above listed problems via self-care/home management, therapeutic activities, therapeutic exercises, neuromuscular re-education, cognitive retraining  · Plan of Care Expires: 10/27/20  · Plan of Care Reviewed with: patient    Subjective   Patient:  "I have to be able to do this myself." "I want to do this on my own because I have to independent."   Pain/Comfort:  · Pain Rating 1: 0/10  · Pain Rating Post-Intervention 1: 0/10    Objective:     Communicated with: Nurse prior to session.  Patient found supine with bed alarm, hennessy catheter, telemetry, peripheral IV upon OT entry to room.    General Precautions: Standard, aspiration, fall   Orthopedic Precautions:N/A   Braces: N/A     Occupational Performance:     Bed Mobility:    · Patient completed Rolling/Turning to Right with stand by assistance  · Patient completed Supine to Sit with stand by assistance  · Patient completed Sit to Supine with stand by assistance     Functional Mobility/Transfers:  · Patient completed Sit <> " Stand Transfer with stand by assistance  with  no assistive device   · Patient completed Bed <> Chair Transfer using Stand Pivot technique with contact guard assistance with no assistive device    Activities of Daily Living:  · Lower Body Dressing: contact guard assistance for standing balance    [facility] 6 Click ADL: 18    Treatment & Education:  Patient education provided on role of OT and need for rehab upon discharge.  Patient education provided on fall prevention during ADLs.  Continued education, patient/ family training recommended.  Patient alert and oriented x 3; able to follow 4/4 one step commands.  Patient attentive and interactive throughout the session.  SBA with dynamic sitting balance during ADLs. Patient's functional status and disposition recommendation discussed with stroke team in daily rounds.  White board updated in patient's room.  OT asked if there were any other questions; patient had no further questions.     Patient left supine with all lines intact, call button in reach and bed alarm onEducation:      GOALS:   Multidisciplinary Problems     Occupational Therapy Goals        Problem: Occupational Therapy Goal    Goal Priority Disciplines Outcome Interventions   Occupational Therapy Goal     OT, PT/OT Ongoing, Progressing    Description: Goals set 9/30 be addressed for 14 days with expiration date, 10/14:  Patient will increase functional independence with ADLs by performing:    Patient will demonstrate rolling to the right with modified independence  Not met   Patient will demonstrate rolling to the left with modified independence.   Not met  Patient will demonstrate supine -sit with modified independence.   Not met  Patient will demonstrate stand pivot transfers with SBA.   Not met  Patient will demonstrate grooming while standing with SBA.   Not met  Patient will demonstrate upper body dressing with SBA while seated EOB.   Not met  Patient will demonstrate lower body dressing with SBA while  seated EOB.   Not met  Patient will demonstrate toileting with SBA.   Not met  Patient will demonstrate bathing while seated EOB with SBA.   Not met  Patient's family / caregiver will demonstrate independence and safety with assisting patient with self-care skills and functional mobility.     Not met                           Time Tracking:     OT Date of Treatment: 10/05/20  OT Start Time: 0915  OT Stop Time: 0938  OT Total Time (min): 23 min    Billable Minutes:Self Care/Home Management 23    TIFFANY Velez  10/5/2020

## 2020-10-05 NOTE — NURSING
PT discharged via transport company by wheelchair.  Pt sent with hennessy cath in place per MD order with bag empty.  IV removed, Guy well. Tele removed, personal belongings gathered and bagged.  Report called to Hemant at [facility]. Pt. Verbally expressed please with care and looking forward to rehab and getting stronger.

## 2020-10-05 NOTE — ASSESSMENT & PLAN NOTE
10/2: Patient got up to commode with nurse. Nurse reports pt felt acutely faint. Denies LOC. Orthostatics WNL. CBC stable. Nursing also reported possible GI bleed. After further assessment determined hematuria. Advised to change positions slowly.   H&H stable

## 2020-10-05 NOTE — PT/OT/SLP PROGRESS
"Speech Language Pathology Treatment    Patient Name:  Alison Branch   MRN:  [id number]  Admitting Diagnosis: Acute ischemic multifocal multiple vascular territories stroke    Recommendations:                 General Recommendations:  Follow-up not indicated  Diet recommendations:  Regular, Liquid Diet Level: Thin   Aspiration Precautions: Meds whole 1 at a time and Standard aspiration precautions   General Precautions: Standard, fall  Communication strategies:  go to room if call light pushed    Subjective     Pt alert and awake upon SLP entry.   "I didn't sleep last night."    Objective:     Has the patient been evaluated by SLP for swallowing?   Yes  Keep patient NPO? No   Current Respiratory Status: room air      Pt seen for ongoing cognitive-linguistic therapy. Alertness maintained this service date with appropriate attention to tasks, no cueing required despite pt report of being tired. Pt demonstrated 100% acc independently with divergent naming task by naming 5 items for given abstract category x3. Problem solving questions for safety awareness answered with 100% acc independently. Education provided regarding role of SLP, pt progress, plan to transition to rehab, and ongoing ST plan of care. Pt verbalized understanding and agreement. ST to continue to monitor.     Assessment:     Alison Branch is a 64 y.o. female with an SLP diagnosis of mild Cognitive-Linguistic Impairment.     Goals:   Multidisciplinary Problems     SLP Goals        Problem: SLP Goal    Goal Priority Disciplines Outcome   SLP Goal     SLP Ongoing, Progressing   Description: SLP Goals to be Met 10/6/2020    1. Pt will tolerate Regular texture diet with Thin liquids with no overt signs of airway obstruction independently  2. Pt will participate in further evaluation of math, visual-spatial, reading, and writing skills Met  3. Pt will complete higher level word finding tasks with 90% accuracy independently  4. Pt will complete divided attention " tasks with 90% accuracy mod I  5. Educate Pt on compensatory strategies for attention and word finding                   Plan:     · Patient to be seen:  3 x/week   · Plan of Care expires:  10/29/20  · Plan of Care reviewed with:  patient   · SLP Follow-Up:  Yes       Discharge recommendations:  rehabilitation facility   Barriers to Discharge:  None    Time Tracking:     SLP Treatment Date:   10/05/20  Speech Start Time:  1013  Speech Stop Time:  1021     Speech Total Time (min):  8 min    Billable Minutes: Speech Therapy Individual 8    GILBERTO Sue  10/05/2020
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
## 2020-10-05 NOTE — PLAN OF CARE
Goals remain appropriate.  Verenice SalTIFFANY  10/5/2020    Problem: Occupational Therapy Goal  Goal: Occupational Therapy Goal  Description: Goals set 9/30 be addressed for 14 days with expiration date, 10/14:  Patient will increase functional independence with ADLs by performing:    Patient will demonstrate rolling to the right with modified independence  Not met   Patient will demonstrate rolling to the left with modified independence.   Not met  Patient will demonstrate supine -sit with modified independence.   Not met  Patient will demonstrate stand pivot transfers with SBA.   Not met  Patient will demonstrate grooming while standing with SBA.   Not met  Patient will demonstrate upper body dressing with SBA while seated EOB.   Not met  Patient will demonstrate lower body dressing with SBA while seated EOB.   Not met  Patient will demonstrate toileting with SBA.   Not met  Patient will demonstrate bathing while seated EOB with SBA.   Not met  Patient's family / caregiver will demonstrate independence and safety with assisting patient with self-care skills and functional mobility.     Not met          Outcome: Ongoing, Progressing

## 2020-10-05 NOTE — PLAN OF CARE
Problem: SLP Goal  Goal: SLP Goal  Description: SLP Goals to be Met 10/6/2020  1. Pt will tolerate Regular texture diet with Thin liquids with no overt signs of airway obstruction independently  2. Pt will participate in further evaluation of math, visual-spatial, reading, and writing skills Met  3. Pt will complete higher level word finding tasks with 90% accuracy independently  4. Pt will complete divided attention tasks with 90% accuracy mod I  5. Educate Pt on compensatory strategies for attention and word finding    Outcome: Ongoing, Progressing     Goals remain appropriate. Continue diet of regular solids and thin liquids. ST to continue to monitor.    GILBERTO Sue  10/5/2020
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
## 2020-10-05 NOTE — PLAN OF CARE
10/05/20 1027   Post-Acute Status   Post-Acute Authorization Placement   Post-Acute Placement Status Set-up Complete       Pt has been accepted by [facility]. Nurse can call report to [phone]. Transport setup with W/C for 12:30.  SW in contact with CM and Medical staff. Will continue to follow and offer support as needed.     Ted Melendrez, YESI  [facility]   Ext. [phone]

## 2020-10-05 NOTE — ASSESSMENT & PLAN NOTE
Seen in urology clinic 9/25 for incomplete bladder emptying  Recent urine culture on 9/25 negative  Repeat UA with reflex to culture  Starting flomax  Renal US - mild/moderate bilateral hydronephrosis.   Hematuria, post voiding retention  Curbside urology who recommend placing hennessy, f/u outpatient w/ urology STEFANY in 1 week     10/3: bright red hematuria s/p hennessy. H/H 9.4/31, CT abdomen/pelvis ordered, urology consulted. Per urology concerns for neurogenic bladder.   10/4: light pink hematuria w/ few clots, H/H stable.  10/5: complaining of urethral pain, pyridium ordered, urine yellow and clear

## 2020-10-05 NOTE — NURSING
"Informed JULIA Sheridan with stroke team of patient's complaint of pain to abdomen/urinary catheter-"inside area". She was made aware x2 earlier today and spoke with patient.  New orders obtained. Patient refused pain medication. Day shift RN reported she attempted to deflate bulb and reposition catheter-but catheter would not advance and the perineal area was not red or inflamed.  Firm recommendation that Amanda is to remain in place d/t neurogenic bladder per Arvin.  Will inform patient of the above recommendation and plan.  "

## 2020-10-05 NOTE — PLAN OF CARE
Patient medically ready for discharge to [facility]. Any necessary transport setup by . This CM scheduled or requested necessary follow-up appointments. Family/patient aware of discharge.    Future Appointments   Date Time Provider Department Center   10/6/2020 12:30 PM LAB, HEMONC CANCER BLDG [facility]   10/6/2020  2:00 PM NOM BCC CT1 [facility] CT [facility]   10/6/2020  3:00 PM Jagruti Garcia, PhD [facility] CAN PSY [facility]   10/7/2020  3:20 PM Tara Belcher MD [facility] HEMONC3 [facility]   10/7/2020  4:00 PM INJECTION, [facility] INFUSION [facility] CHEMO [facility]   10/8/2020 10:00 AM Lilia Horan DPM [facility] POD [facility]   10/9/2020  9:15 AM LAB, [facility] LAB [facility]   10/16/2020 11:30 AM Ai Billingsley NP [facility] DIMGNT [facility]   11/7/2020 10:30 AM Adrianna Chan MD [facility] IM [address] [facility]   11/9/2020  2:40 PM Nubia Saleh PA-C [facility] UROLOGY [address]        10/05/20 1041   Final Note   Assessment Type Final Discharge Note   Anticipated Discharge Disposition Rehab   Hospital Follow Up  Appt(s) scheduled? No   Discharge plans and expectations educations in teach back method with documentation complete? Yes   Right Care Referral Info   Post Acute Recommendation IRF   Facility Name [facility]   Post-Acute Status   Post-Acute Authorization Placement   Post-Acute Placement Status Set-up Complete   Discharge Delays None known at this time       Sabina Adler RN  Case Management  Ext: [phone]  10/05/2020
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
## 2020-10-05 NOTE — PT/OT/SLP DISCHARGE
Occupational Therapy Discharge Summary    Alison Branch  MRN: [id number]   Principal Problem: Acute ischemic multifocal multiple vascular territories stroke      Patient Discharged from acute Occupational Therapy on 10/5.    Assessment:      Patient appropriate for care in another setting.    Objective:     GOALS:   Multidisciplinary Problems     Occupational Therapy Goals        Problem: Occupational Therapy Goal    Goal Priority Disciplines Outcome Interventions   Occupational Therapy Goal     OT, PT/OT Ongoing, Progressing    Description: Goals set 9/30 be addressed for 14 days with expiration date, 10/14:  Patient will increase functional independence with ADLs by performing:    Patient will demonstrate rolling to the right with modified independence  Not met   Patient will demonstrate rolling to the left with modified independence.   Not met  Patient will demonstrate supine -sit with modified independence.   Not met  Patient will demonstrate stand pivot transfers with SBA.   Not met  Patient will demonstrate grooming while standing with SBA.   Not met  Patient will demonstrate upper body dressing with SBA while seated EOB.   Not met  Patient will demonstrate lower body dressing with SBA while seated EOB.   Not met  Patient will demonstrate toileting with SBA.   Not met  Patient will demonstrate bathing while seated EOB with SBA.   Not met  Patient's family / caregiver will demonstrate independence and safety with assisting patient with self-care skills and functional mobility.     Not met                           Reasons for Discontinuation of Therapy Services  Transfer to alternate level of care.      Plan:     Patient Discharged to: Inpatient Rehab    TIFFANY Velez  10/5/2020

## 2020-10-06 ENCOUNTER — TELEPHONE (OUTPATIENT)
Dept: HEMATOLOGY/ONCOLOGY | Facility: CLINIC | Age: 65
End: 2020-10-06

## 2020-10-06 NOTE — TELEPHONE ENCOUNTER
Spoke with rehab nurse.  Patient admitted to [facility]---should be DC'd in several days max.  She will advise patient to call nurse to r/s her missed appointments.    Message routed to dr dolan and dr chapin

## 2020-10-06 NOTE — TELEPHONE ENCOUNTER
"----- Message from Liane Quiros sent at 10/6/2020  1:16 PM CDT -----  Internal Assist    Name of caller:  [facility]  Contact Preference:  [phone]  Does Patient feel the need to see the MD today?  NA  Physician: Ye PAYTON MD   What is the nature of the call?    - not allowing pts to leave the facility, there's appt scheduled for   today that the pt will not make it too and has questions regarding   the ct scan and injection appts. Please call and assist.     Additional Notes:   "Thank you for all that you do for our patients'"          "

## 2020-10-08 ENCOUNTER — TELEPHONE (OUTPATIENT)
Dept: NEUROLOGY | Facility: CLINIC | Age: 65
End: 2020-10-08

## 2020-10-08 NOTE — TELEPHONE ENCOUNTER
Called and scheduled appt with pt. Pt confirmed appt. Also called Angelica at [facility] and given appt info.

## 2020-10-09 DIAGNOSIS — C34.12 MALIGNANT NEOPLASM OF UPPER LOBE OF LEFT LUNG: ICD-10-CM

## 2020-10-09 RX ORDER — LIDOCAINE AND PRILOCAINE 25; 25 MG/G; MG/G
CREAM TOPICAL
Qty: 30 G | Refills: 0 | Status: CANCELLED | OUTPATIENT
Start: 2020-10-09

## 2020-10-12 ENCOUNTER — TELEPHONE (OUTPATIENT)
Dept: INFUSION THERAPY | Facility: HOSPITAL | Age: 65
End: 2020-10-12

## 2020-10-12 NOTE — TELEPHONE ENCOUNTER
"I spoke with pt today. She is still in "Rehab", says she is suppose to be discharged on this Thursday. I scheduled her on Monday 10/19 @ 1pm for a virtual. She says she is not sure if her daughter would be off from work to bring her to the appt. She says she really need to speak with you & Dr. Belcher.  I suggested to her to do the virtual so she can speak with you asap.  "

## 2020-10-13 ENCOUNTER — HOSPITAL ENCOUNTER (INPATIENT)
Facility: HOSPITAL | Age: 65
LOS: 7 days | Discharge: NURSING, REHAB OR LONG-TERM CARE | DRG: 064 | End: 2020-10-20
Attending: EMERGENCY MEDICINE | Admitting: PSYCHIATRY & NEUROLOGY
Payer: MEDICARE

## 2020-10-13 ENCOUNTER — TELEPHONE (OUTPATIENT)
Dept: HEMATOLOGY/ONCOLOGY | Facility: CLINIC | Age: 65
End: 2020-10-13

## 2020-10-13 DIAGNOSIS — J18.9 PNEUMONIA: ICD-10-CM

## 2020-10-13 DIAGNOSIS — I63.511 ACUTE ISCHEMIC RIGHT MIDDLE CEREBRAL ARTERY (MCA) STROKE: ICD-10-CM

## 2020-10-13 DIAGNOSIS — I63.9 STROKE: ICD-10-CM

## 2020-10-13 DIAGNOSIS — I63.411 EMBOLIC STROKE INVOLVING RIGHT MIDDLE CEREBRAL ARTERY: ICD-10-CM

## 2020-10-13 DIAGNOSIS — Z51.5 PALLIATIVE CARE ENCOUNTER: ICD-10-CM

## 2020-10-13 DIAGNOSIS — R13.12 OROPHARYNGEAL DYSPHAGIA: ICD-10-CM

## 2020-10-13 LAB
ALBUMIN SERPL BCP-MCNC: 4.3 G/DL (ref 3.5–5.2)
ALP SERPL-CCNC: 43 U/L (ref 55–135)
ALT SERPL W/O P-5'-P-CCNC: 15 U/L (ref 10–44)
ANION GAP SERPL CALC-SCNC: 8 MMOL/L (ref 8–16)
AST SERPL-CCNC: 25 U/L (ref 10–40)
BASOPHILS # BLD AUTO: 0.04 K/UL (ref 0–0.2)
BASOPHILS NFR BLD: 1.1 % (ref 0–1.9)
BILIRUB SERPL-MCNC: 0.3 MG/DL (ref 0.1–1)
BUN SERPL-MCNC: 22 MG/DL (ref 8–23)
BUN SERPL-MCNC: 25 MG/DL (ref 6–30)
CALCIUM SERPL-MCNC: 9.9 MG/DL (ref 8.7–10.5)
CHLORIDE SERPL-SCNC: 102 MMOL/L (ref 95–110)
CHLORIDE SERPL-SCNC: 105 MMOL/L (ref 95–110)
CHOLEST SERPL-MCNC: 252 MG/DL (ref 120–199)
CHOLEST/HDLC SERPL: 3.7 {RATIO} (ref 2–5)
CO2 SERPL-SCNC: 29 MMOL/L (ref 23–29)
CREAT SERPL-MCNC: 1.4 MG/DL (ref 0.5–1.4)
CTP QC/QA: YES
DIFFERENTIAL METHOD: ABNORMAL
EOSINOPHIL # BLD AUTO: 0.1 K/UL (ref 0–0.5)
EOSINOPHIL NFR BLD: 3.1 % (ref 0–8)
ERYTHROCYTE [DISTWIDTH] IN BLOOD BY AUTOMATED COUNT: 15.9 % (ref 11.5–14.5)
EST. GFR  (AFRICAN AMERICAN): 46.5 ML/MIN/1.73 M^2
EST. GFR  (NON AFRICAN AMERICAN): 40.3 ML/MIN/1.73 M^2
GLUCOSE SERPL-MCNC: 119 MG/DL (ref 70–110)
GLUCOSE SERPL-MCNC: 122 MG/DL (ref 70–110)
HCT VFR BLD AUTO: 35.4 % (ref 37–48.5)
HCT VFR BLD CALC: 36 %PCV (ref 36–54)
HDLC SERPL-MCNC: 68 MG/DL (ref 40–75)
HDLC SERPL: 27 % (ref 20–50)
HGB BLD-MCNC: 10.9 G/DL (ref 12–16)
IMM GRANULOCYTES # BLD AUTO: 0.01 K/UL (ref 0–0.04)
IMM GRANULOCYTES NFR BLD AUTO: 0.3 % (ref 0–0.5)
INR PPP: 1 (ref 0.8–1.2)
LDLC SERPL CALC-MCNC: 163.4 MG/DL (ref 63–159)
LYMPHOCYTES # BLD AUTO: 1.9 K/UL (ref 1–4.8)
LYMPHOCYTES NFR BLD: 52.7 % (ref 18–48)
MCH RBC QN AUTO: 26.8 PG (ref 27–31)
MCHC RBC AUTO-ENTMCNC: 30.8 G/DL (ref 32–36)
MCV RBC AUTO: 87 FL (ref 82–98)
MONOCYTES # BLD AUTO: 0.4 K/UL (ref 0.3–1)
MONOCYTES NFR BLD: 11.3 % (ref 4–15)
NEUTROPHILS # BLD AUTO: 1.1 K/UL (ref 1.8–7.7)
NEUTROPHILS NFR BLD: 31.5 % (ref 38–73)
NONHDLC SERPL-MCNC: 184 MG/DL
NRBC BLD-RTO: 0 /100 WBC
PLATELET # BLD AUTO: 249 K/UL (ref 150–350)
PMV BLD AUTO: 12.5 FL (ref 9.2–12.9)
POC IONIZED CALCIUM: 1.26 MMOL/L (ref 1.06–1.42)
POC PTINR: 1.3 (ref 0.9–1.2)
POC PTWBT: 15.7 SEC (ref 9.7–14.3)
POC TCO2 (MEASURED): 30 MMOL/L (ref 23–29)
POCT GLUCOSE: 92 MG/DL (ref 70–110)
POTASSIUM BLD-SCNC: 4.2 MMOL/L (ref 3.5–5.1)
POTASSIUM SERPL-SCNC: 4.3 MMOL/L (ref 3.5–5.1)
PROT SERPL-MCNC: 7.9 G/DL (ref 6–8.4)
PROTHROMBIN TIME: 11.4 SEC (ref 9–12.5)
RBC # BLD AUTO: 4.06 M/UL (ref 4–5.4)
SAMPLE: ABNORMAL
SAMPLE: ABNORMAL
SAMPLE: NORMAL
SARS-COV-2 RDRP RESP QL NAA+PROBE: NEGATIVE
SODIUM BLD-SCNC: 143 MMOL/L (ref 136–145)
SODIUM SERPL-SCNC: 142 MMOL/L (ref 136–145)
T4 FREE SERPL-MCNC: 0.98 NG/DL (ref 0.71–1.51)
TRIGL SERPL-MCNC: 103 MG/DL (ref 30–150)
TROPONIN I SERPL DL<=0.01 NG/ML-MCNC: 0.08 NG/ML (ref 0–0.03)
TSH SERPL DL<=0.005 MIU/L-ACNC: 0.4 UIU/ML (ref 0.4–4)
WBC # BLD AUTO: 3.55 K/UL (ref 3.9–12.7)

## 2020-10-13 PROCEDURE — 99499 UNLISTED E&M SERVICE: CPT | Mod: ,,, | Performed by: PHYSICAL MEDICINE & REHABILITATION

## 2020-10-13 PROCEDURE — 85610 PROTHROMBIN TIME: CPT

## 2020-10-13 PROCEDURE — 99291 PR CRITICAL CARE, E/M 30-74 MINUTES: ICD-10-PCS | Mod: ,,, | Performed by: EMERGENCY MEDICINE

## 2020-10-13 PROCEDURE — 99223 PR INITIAL HOSPITAL CARE,LEVL III: ICD-10-PCS | Mod: AI,GC,, | Performed by: PSYCHIATRY & NEUROLOGY

## 2020-10-13 PROCEDURE — 93010 ELECTROCARDIOGRAM REPORT: CPT | Mod: ,,, | Performed by: INTERNAL MEDICINE

## 2020-10-13 PROCEDURE — 99291 CRITICAL CARE FIRST HOUR: CPT | Mod: ,,, | Performed by: EMERGENCY MEDICINE

## 2020-10-13 PROCEDURE — 99285 EMERGENCY DEPT VISIT HI MDM: CPT | Mod: 25

## 2020-10-13 PROCEDURE — 99499 NO LOS: ICD-10-PCS | Mod: ,,, | Performed by: PHYSICAL MEDICINE & REHABILITATION

## 2020-10-13 PROCEDURE — 85025 COMPLETE CBC W/AUTO DIFF WBC: CPT

## 2020-10-13 PROCEDURE — 99900035 HC TECH TIME PER 15 MIN (STAT)

## 2020-10-13 PROCEDURE — 11000001 HC ACUTE MED/SURG PRIVATE ROOM

## 2020-10-13 PROCEDURE — 25500020 PHARM REV CODE 255: Performed by: EMERGENCY MEDICINE

## 2020-10-13 PROCEDURE — 82565 ASSAY OF CREATININE: CPT

## 2020-10-13 PROCEDURE — 80053 COMPREHEN METABOLIC PANEL: CPT

## 2020-10-13 PROCEDURE — U0002 COVID-19 LAB TEST NON-CDC: HCPCS | Performed by: EMERGENCY MEDICINE

## 2020-10-13 PROCEDURE — 84484 ASSAY OF TROPONIN QUANT: CPT

## 2020-10-13 PROCEDURE — 99223 1ST HOSP IP/OBS HIGH 75: CPT | Mod: AI,GC,, | Performed by: PSYCHIATRY & NEUROLOGY

## 2020-10-13 PROCEDURE — 93005 ELECTROCARDIOGRAM TRACING: CPT

## 2020-10-13 PROCEDURE — 93010 EKG 12-LEAD: ICD-10-PCS | Mod: ,,, | Performed by: INTERNAL MEDICINE

## 2020-10-13 PROCEDURE — 84439 ASSAY OF FREE THYROXINE: CPT

## 2020-10-13 PROCEDURE — 80061 LIPID PANEL: CPT

## 2020-10-13 PROCEDURE — 84443 ASSAY THYROID STIM HORMONE: CPT

## 2020-10-13 RX ORDER — INSULIN ASPART 100 [IU]/ML
1-10 INJECTION, SOLUTION INTRAVENOUS; SUBCUTANEOUS EVERY 6 HOURS PRN
Status: DISCONTINUED | OUTPATIENT
Start: 2020-10-13 | End: 2020-10-20 | Stop reason: HOSPADM

## 2020-10-13 RX ORDER — CYPROHEPTADINE HYDROCHLORIDE 4 MG/1
4 TABLET ORAL 4 TIMES DAILY
Status: DISCONTINUED | OUTPATIENT
Start: 2020-10-13 | End: 2020-10-13

## 2020-10-13 RX ORDER — ATORVASTATIN CALCIUM 10 MG/1
40 TABLET, FILM COATED ORAL DAILY
Status: DISCONTINUED | OUTPATIENT
Start: 2020-10-14 | End: 2020-10-13

## 2020-10-13 RX ORDER — ATORVASTATIN CALCIUM 10 MG/1
40 TABLET, FILM COATED ORAL DAILY
Status: DISCONTINUED | OUTPATIENT
Start: 2020-10-13 | End: 2020-10-13

## 2020-10-13 RX ORDER — CYPROHEPTADINE HYDROCHLORIDE 4 MG/1
4 TABLET ORAL 4 TIMES DAILY
Status: DISCONTINUED | OUTPATIENT
Start: 2020-10-14 | End: 2020-10-20 | Stop reason: HOSPADM

## 2020-10-13 RX ORDER — SODIUM CHLORIDE 0.9 % (FLUSH) 0.9 %
10 SYRINGE (ML) INJECTION
Status: DISCONTINUED | OUTPATIENT
Start: 2020-10-13 | End: 2020-10-20 | Stop reason: HOSPADM

## 2020-10-13 RX ORDER — LABETALOL HCL 20 MG/4 ML
10 SYRINGE (ML) INTRAVENOUS EVERY 6 HOURS PRN
Status: DISCONTINUED | OUTPATIENT
Start: 2020-10-13 | End: 2020-10-20 | Stop reason: HOSPADM

## 2020-10-13 RX ORDER — GLUCAGON 1 MG
1 KIT INJECTION
Status: DISCONTINUED | OUTPATIENT
Start: 2020-10-13 | End: 2020-10-20 | Stop reason: HOSPADM

## 2020-10-13 RX ADMIN — IOHEXOL 100 ML: 350 INJECTION, SOLUTION INTRAVENOUS at 01:10

## 2020-10-13 NOTE — ED PROVIDER NOTES
Encounter Date: 10/13/2020       History     Chief Complaint   Patient presents with    Aphasia     about 30 mins ago at Rehab. Previous stroke with right leg weakness. Moving all extremeties at this time. AAOx4. Delayed but clear speech.       Alison Branch is a 65 yo female with PMHx of non-small cell lung cancer actively receiving chemo, previous stroke with resulting right-sided lower extremity weakness, history of brain aneurysm status post coiling, and DMHTN, chronic anticoagulation on Eliquis who presents the ED for stroke evaluation.  Of note patient was recently discharged on 10/05 embolic stroke secondary to hypercoagulable state due to malignancy.  Patient was at rehab facility when she woke up at 11:30 a.m. with gurgling and difficulty with speech.  It was noted that she had left-sided facial droop and new left-sided weakness.  Last known normal was 10:30 a.m. in the ED she states she is having slurred speech yet otherwise has no complaints.    Denies:  Chest pain, headache, abdominal pain, nausea/vomiting, diarrhea, fever, recent falls or trauma        Review of patient's allergies indicates:   Allergen Reactions    Piperacillin-tazobactam Rash     Tolerated cefepime 06/2020     Past Medical History:   Diagnosis Date    Allergy     Brain aneurysm 2010    s/p coiling of one; another not coiled    Breast cyst     Depression 9/19/2019    Diabetes mellitus     Diabetes type 2, controlled     Fever blister     High cholesterol     History of Bell's palsy     HTN (hypertension) 5/20/2014    Recurrent upper respiratory infection (URI)      Past Surgical History:   Procedure Laterality Date    BREAST SURGERY      BRONCHOSCOPY N/A 5/28/2019    Procedure: Bronchoscopy;  Surgeon: Iesha Diagnostic Provider;  Location: [facility] OR [address] [place] [address];  Service: Anesthesiology;  Laterality: N/A;    CERVICAL FUSION      COLONOSCOPY N/A 3/9/2016    Procedure: COLONOSCOPY;  Surgeon: Elliott Zimmerman MD;  Location: [facility]  "ENDO (4TH FLR);  Service: Endoscopy;  Laterality: N/A;    head surgery      stent and "curling" for aneurysm    HYSTERECTOMY      TVH secondary to SUF    INSERTION OF TUNNELED CENTRAL VENOUS CATHETER (CVC) WITH SUBCUTANEOUS PORT Right 2019    Procedure: INSERTION, PORT-A-CATH;  Surgeon: Cali Damico MD;  Location: [facility] CATH LAB;  Service: Radiology;  Laterality: Right;    MENISCECTOMY Left      Family History   Problem Relation Age of Onset    Rheum arthritis Father     Diabetes Father     Heart failure Father     Migraines Father     Cataracts Father     Cancer Mother 63        pancreatic    Stomach cancer Mother     Breast cancer Maternal Aunt         50s    Ovarian cancer Cousin     Allergies Daughter     Diabetes Sister     Diabetes Brother     Cancer Maternal Aunt         Lung CA    Melanoma Neg Hx     Colon cancer Neg Hx     Amblyopia Neg Hx     Blindness Neg Hx     Glaucoma Neg Hx     Macular degeneration Neg Hx     Retinal detachment Neg Hx     Strabismus Neg Hx     Stroke Neg Hx     Thyroid disease Neg Hx     Allergic rhinitis Neg Hx     Angioedema Neg Hx     Asthma Neg Hx     Atopy Neg Hx     Eczema Neg Hx     Immunodeficiency Neg Hx     Rhinitis Neg Hx     Urticaria Neg Hx      Social History     Tobacco Use    Smoking status: Former Smoker     Packs/day: 0.50     Years: 30.00     Pack years: 15.00     Quit date: 1999     Years since quittin.7    Smokeless tobacco: Never Used   Substance Use Topics    Alcohol use: No     Alcohol/week: 0.0 standard drinks    Drug use: No     Review of Systems   Constitutional: Negative for activity change, appetite change, chills, fever and unexpected weight change.   HENT: Positive for drooling. Negative for sore throat, trouble swallowing and voice change.    Eyes: Negative for visual disturbance.   Respiratory: Negative for cough, chest tightness, shortness of breath and wheezing.    Cardiovascular: Negative for " chest pain, palpitations and leg swelling.   Gastrointestinal: Negative for abdominal distention, abdominal pain, blood in stool, constipation, diarrhea, nausea, rectal pain and vomiting.   Genitourinary: Negative for difficulty urinating, dysuria and hematuria.   Musculoskeletal: Negative for back pain, gait problem, neck pain and neck stiffness.   Skin: Negative for color change and rash.   Neurological: Positive for facial asymmetry, speech difficulty and weakness (Left-sided upper extremity weakness). Negative for dizziness, seizures, syncope, light-headedness, numbness and headaches.       Physical Exam     Initial Vitals [10/13/20 1202]   BP Pulse Resp Temp SpO2   128/74 76 16 98.5 °F (36.9 °C) 98 %      MAP       --         Physical Exam    Nursing note and vitals reviewed.  Constitutional: She appears well-developed and well-nourished. She is not diaphoretic. No distress.   Well developed, nontoxic adult female with slurred speech. No respiratory distress noted.    HENT:   Head: Normocephalic and atraumatic.   Mouth/Throat: Oropharynx is clear and moist.   Eyes: Conjunctivae and EOM are normal. Pupils are equal, round, and reactive to light. Right eye exhibits no discharge. Left eye exhibits no discharge.   Neck: Trachea normal and normal range of motion. Neck supple. No thyroid mass present. No tracheal deviation present. No JVD present.   Cardiovascular: Normal rate, regular rhythm, normal heart sounds, intact distal pulses and normal pulses. Exam reveals no gallop and no friction rub.    No murmur heard.  Pulmonary/Chest: Breath sounds normal. No respiratory distress. She has no wheezes. She has no rhonchi.   Abdominal: Soft. Normal appearance and bowel sounds are normal. There is no abdominal tenderness. There is no guarding.   Musculoskeletal: Normal range of motion. No tenderness or edema.   Neurological: She is alert and oriented to person, place, and time. No cranial nerve deficit or sensory deficit.  GCS eye subscore is 4. GCS verbal subscore is 5. GCS motor subscore is 6.   New left upper extremity weakness 4/5, LUE drift noted.   L. Sided facial droop with slurred speech   Old RLE weakness 4/5     Skin: Skin is warm, dry and intact. No abscess noted. No erythema.   Psychiatric: She has a normal mood and affect. Her speech is normal and behavior is normal.         ED Course   Procedures  Labs Reviewed   CBC W/ AUTO DIFFERENTIAL - Abnormal; Notable for the following components:       Result Value    WBC 3.55 (*)     Hemoglobin 10.9 (*)     Hematocrit 35.4 (*)     Mean Corpuscular Hemoglobin 26.8 (*)     Mean Corpuscular Hemoglobin Conc 30.8 (*)     RDW 15.9 (*)     Gran # (ANC) 1.1 (*)     Gran% 31.5 (*)     Lymph% 52.7 (*)     All other components within normal limits   COMPREHENSIVE METABOLIC PANEL - Abnormal; Notable for the following components:    Glucose 119 (*)     Alkaline Phosphatase 43 (*)     eGFR if  46.5 (*)     eGFR if non  40.3 (*)     All other components within normal limits   TSH - Abnormal; Notable for the following components:    TSH 0.397 (*)     All other components within normal limits   LIPID PANEL - Abnormal; Notable for the following components:    Cholesterol 252 (*)     LDL Cholesterol 163.4 (*)     All other components within normal limits   TROPONIN I - Abnormal; Notable for the following components:    Troponin I 0.078 (*)     All other components within normal limits   ISTAT PROCEDURE - Abnormal; Notable for the following components:    POC Glucose 122 (*)     POC TCO2 (MEASURED) 30 (*)     All other components within normal limits   ISTAT PROCEDURE - Abnormal; Notable for the following components:    POC PTWBT 15.7 (*)     POC PTINR 1.3 (*)     All other components within normal limits   PROTIME-INR   T4, FREE   URINALYSIS, REFLEX TO URINE CULTURE   SARS-COV-2 RDRP GENE   POCT GLUCOSE, HAND-HELD DEVICE   ISTAT CREATININE   POCT GLUCOSE   POCT  GLUCOSE MONITORING CONTINUOUS     EKG Readings: (Independently Interpreted)   Initial Reading: No STEMI. Rhythm: Normal Sinus Rhythm. Ectopy: No Ectopy. Conduction: Normal. ST Segments: Normal ST Segments. T Waves: Normal. Clinical Impression: Normal Sinus Rhythm     EKG on October 13, 2020 shows normal sinus rhythm with ventricular rate of 73 beats per minute.  Narrow QRS.  No ST segment elevations depressions.  Intervals within normal limits.  No STEMI.  Similar to previous EKG.     ECG Results          ECG 12 lead (Final result)  Result time 10/13/20 14:19:13    Final result by Interface, Lab In [facility] (10/13/20 14:19:13)                 Narrative:    Test Reason : I63.9,    Vent. Rate : 073 BPM     Atrial Rate : 073 BPM     P-R Int : 140 ms          QRS Dur : 072 ms      QT Int : 410 ms       P-R-T Axes : 072 054 055 degrees     QTc Int : 451 ms    Normal sinus rhythm  Normal ECG  When compared with ECG of 28-SEP-2020 17:04,  No significant change was found  Confirmed by MEGHAN SPENCER MD ([id number]) on 10/13/2020 2:19:06 PM    Referred By: [email or web address]   SELF           Confirmed By:MEGHAN SPENCER MD                            Imaging Results          MRI Brain Without Contrast (In process)  Result time 10/13/20 18:13:56               X-Ray Chest AP Portable (Final result)  Result time 10/13/20 15:35:03    Final result by Julienne Ferguson MD (10/13/20 15:35:03)                 Impression:      Abnormal chest radiograph.  Bandlike opacity in the left upper lung zone is similar to multiple prior studies and likely represents the patient's known non-small cell lung cancer.    However there is a new opacity in the inferior aspect of the left hemithorax.  This may represent the patient's known left pleural fluid but partial atelectasis of the left lower lobe cannot be excluded.      Electronically signed by: Julienne Ferguson MD  Date:    10/13/2020  Time:    15:35             Narrative:    EXAMINATION:  XR CHEST AP  PORTABLE    CLINICAL HISTORY:  Pneumonia, unspecified organism    TECHNIQUE:  Single frontal view of the chest was performed.    COMPARISON:  CT of the chest, abdomen and pelvis: 10/03/2020.  08/04/2020.    Chest radiographs: 07/11/2020.  06/21/2020.  12/13/2019.  12/03/2019.    FINDINGS:  Additional history: Non-small cell lung cancer.    Recent CT of the chest, abdomen and pelvis obtained 10/03/2020 and earlier CT of August 2020 revealed partial volume loss of the left upper lobe with areas of soft tissue attenuation in this patient with a history of non-small cell lung cancer.  Current and prior chest radiographs have revealed an area of parenchymal opacity in the left upper lobe plus partial volume loss in that lobe, accounting for the ill-defined region of bandlike opacity observed in the left upper lung zone on today's study, as well as superior elevation of the left hilum.    Today's study also reveals increased attenuation in the base of the left hemithorax with obscuration of the left hemidiaphragm perhaps due to the patient's known dependent left pleural fluid.  However partial atelectasis of the left lower lobe cannot be excluded.    On today's study I detect no right pulmonary or pleural disease and no cardiac decompensation.    There is no pneumothorax, pneumomediastinum, pneumoperitoneum or significant osseous abnormality.                               CTA STROKE MULTI-PHASE (Final result)  Result time 10/13/20 14:49:34    Final result by Jaquan El MD (10/13/20 14:49:34)                 Impression:      No acute intracranial pathology.  No evidence for intracranial hemorrhage or CT evidence of major vascular distribution infarct.    No intracranially large vessel occlusion.  Possible right M3 vessel stenosis or subtotal occlusion as described above.  Clinical correlation with symptoms is recommended.  Can consider MRI with diffusion-weighted imaging if concern for acute ischemia.    Postoperative  change of stent assisted coiling of ICA aneurysm, likely ophthalmic segment, with possible small amount of residual aneurysm unable to be completely excluded.    No evidence of high-grade stenosis or large vessel occlusion.    Numerous sclerotic lesions involving the vertebral bodies and remaining visualized axial skeleton, in keeping with patient's history of metastatic lung cancer.    Moderate left pleural effusion with patchy areas of consolidation in the left upper lobe may relate to pneumonia.    Other findings as above.    COMMUNICATION  This critical result was discovered/received at 14:00.  The critical information above was relayed directly by me by telephone to Dr. Gamez with emergency medicine on 10/13/2020 at 14:30.    Electronically signed by resident: Dirk Pierce  Date:    10/13/2020  Time:    13:29    Electronically signed by: Jaquan El MD  Date:    10/13/2020  Time:    14:49             Narrative:    EXAMINATION:  CTA STROKE MULTI-PHASE    CLINICAL HISTORY:  Neuro deficit, acute, stroke suspected;    TECHNIQUE:  Non contrast low dose axial images were obtained thought the head. CT angiogram was performed from the level of the irving to the top of the head following the IV administration of 100mL of Omnipaque 350.   Sagittal and coronal reconstructions and maximum intensity projection reconstructions were performed. Arterial stenosis percentages are based on NASCET measurement criteria.  Two additional phases of immediate post-contrast CTA images were performed through the head alone.    COMPARISON:  CT head without contrast 10/13/2020.    FINDINGS:  Intracranial Compartment:    Ventricles and sulci are normal in size for age without evidence of hydrocephalus. No extra-axial blood or fluid collections.    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  No parenchymal  mass, hemorrhage, edema, or CT evidence major vascular distribution infarct.    Skull/Extracranial Contents (limited evaluation): No fracture. Small mucous retention cyst in the right maxillary sinus as well as the left maxillary sinus.  Small osseous density in the right frontal sinus measuring approximately 5 mm, which may represent an osteoma.    Globes are symmetric.    Lung apices: Moderate left pleural effusion.  Patchy heterogeneous opacities noted in the left upper lobe and lingula as well as few, scattered heterogeneous opacities in the right lung apex.    There is a 9 mm right thyroid lobe nodule (axial series 301, image 140).    Parotid and submandibular glands are symmetric.    No significant cervical chain lymphadenopathy.    Remaining soft tissue structures of the neck are unremarkable.    Aorta: Normal 3 vessel arch.    Extracranial carotid circulation: No hemodynamically significant stenosis, aneurysmal dilatation, or dissection.    Extracranial vertebral circulation: Calcific and atheromatous plaque at the origin of the right vertebral artery (axial series 30, image 44). No hemodynamically significant stenosis, aneurysmal dilatation, or dissection.    Intracranial Arteries: No large vessel occlusion.  There is abrupt tapering of a right M3 vessels (axial series 301, image 418), which may represent area of stenosis or subtotal occlusion.  This fills on delayed sequences.    Venous structures (limited evaluation): Normal.    There are sclerotic lesions involving C6 and T3 vertebral bodies, as well as other scattered lesions visualized in the manubrium and in partially visualized ribs, in keeping with patient's known metastatic lung cancer.                               CT Head Without Contrast (Final result)  Result time 10/13/20 12:37:56    Final result by Dillon Contreras MD (10/13/20 12:37:56)                 Impression:      1. No findings to suggest acute major vascular territory infarct or  hemorrhage noting stable focus of low attenuation within the left cerebellum and stable right ICA region coil pack.  2. Sinus disease.      Electronically signed by: Dillon Contreras MD  Date:    10/13/2020  Time:    12:37             Narrative:    EXAMINATION:  CT HEAD WITHOUT CONTRAST    CLINICAL HISTORY:  Neuro deficit, acute, stroke suspected;    TECHNIQUE:  Low dose axial images were obtained through the head.  Coronal and sagittal reformations were also performed. Contrast was not administered.    COMPARISON:  MRI 09/28/2020, CT 09/28/2020    FINDINGS:  There is bandlike low attenuation involving the periphery of the left cerebellum, stable.  Artifact from right ICA region coil pack limits evaluation of the brain parenchyma in this location.  There is no evidence of acute major vascular territory infarct, hemorrhage, or mass.  There is no hydrocephalus.  There are no abnormal extra-axial fluid collections.  There is a mucous retention cyst or polyp within the anterior aspect of the right maxillary sinus.  There is mild mucous membrane thickening of the left maxillary sinus, otherwise the visualized paranasal sinuses and mastoid air cells are clear, and there is no evidence of calvarial fracture.  The visualized soft tissues are unremarkable.                              X-Rays:   Independently Interpreted Readings:   Other Readings:    Chest x-ray shows opacities most notable in the left upper lung, similar to previous imaging, patient has history of lung cancer    Medical Decision Making:   History:   I obtained history from: EMS provider and someone other than patient.       <> Summary of History:   Patient noted to be aphasic with slurred speech from nursing home with a recent stroke a month ago with resultant right lower extremity weakness.  Old Medical Records: I decided to obtain old medical records.  Old Records Summarized: records from previous admission(s).       <> Summary of Records:   Patient  discharged on 10/05/2020 after CVA presumed to be secondary to an embolic stroke in the setting of hypercoagulable state due to lung cancer  Initial Assessment:    64-year-old female presents stroke code to the ED with slurred speech, left-sided facial droop on arrival she is hemodynamically stable and afebrile.  Code stroke called on arrival.  Denies chest pain, shortness of breath fever.  Differential Diagnosis:     Stroke verses metabolic derangements verses doubt sepsis as patient is afebrile with no signs /symptoms of infection  Independently Interpreted Test(s):   I have ordered and independently interpreted X-rays - see prior notes.  I have ordered and independently interpreted EKG Reading(s) - see prior notes  Clinical Tests:   Lab Tests: Ordered and Reviewed  Radiological Study: Ordered and Reviewed  Medical Tests: Ordered and Reviewed  ED Management:   Patient presented as a code stroke.  She was noted to have left-sided facial droop, left upper extremity weakness, slurring of speech and drooling on arrival.Patient deemed not to be a candidate for tPA as she is actively receiving chemotherapy for non small cell lung cancer.    Noncontrast head CT showed no acute major vascular territory infarction hemorrhage.  Vascular neurology evaluated the patient and had concern for large vessel occlusion thus a CTA stroke multiphase was ordered.  CTA showed possible right M3 vessel stenosis yet this was not consistent with patient's symptomatology.    EKG and troponins not consistent with ACS CMP grossly unrevealing.  After further discussion with Neurology was agreed that the patient is to be admitted for an MRI.  Patient and her 2 daughters were made aware of plan for admission with Neurology in were in agreement.  All questions were answered at time of admission to Neurology and she was admitted with pending chest x-ray.   Other:   I have discussed this case with another health care provider.       <> Summary of  the Discussion:  Vascular neurology saw the patient and recommended multiphase Head CTA and admission              Attending Attestation:   Physician Attestation Statement for Resident:  As the supervising MD   Physician Attestation Statement: I have personally seen and examined this patient.   I agree with the above history. -: 64-year-old female history above transferred from [facility] for acute neurologic change.  Code stroke activated on arrival   As the supervising MD I agree with the above PE.   -: Neuro:  Inattentive, left-sided facial droop, left upper extremity weakness, + slurred speech, - visual field deficit   As the supervising MD I agree with the above treatment, course, plan, and disposition.   -: Vascular Neurology will admit for further treatment and evaluation        Attending Critical Care:   Critical Care Times:   ==============================================================  · Total Critical Care Time - exclusive of procedural time: 35 minutes.  ==============================================================  Critical care reasons: Stroke Code.   Critical care was time spent personally by me on the following activities: review of old charts, examination of patient, evaluation of patient's response to treatment, discussion with consultants and re-evaluation of patient's conition.   Critical Care Condition: potentially life-threatening               ED Course as of Oct 13 1817   Tue Oct 13, 2020   1218  Spoke with vascular neurology who states there are on the way to see pt     []   1243 Vascular Neurology recommending CTA muliphase.  Order placed.         [GM]   1338 WBC(!): 3.55 [GM]   1338 Hemoglobin(!): 10.9 [GM]   1338 Hematocrit(!): 35.4 [GM]   1339 Baseline .196   Troponin I(!): 0.078 [GM]   1417 Pt Evaluated in room in no distress.  No current complaints. Exam unchanged.    []      ED Course User Index  [] Leelee Gamez MD  [] Bety Fung MD            Clinical Impression:        ICD-10-CM ICD-9-CM   1. Stroke  I63.9 434.91   2. Pneumonia  J18.9 486   3. Acute ischemic right middle cerebral artery (MCA) stroke  I63.511 434.91                      Disposition:   Disposition: Admitted  Condition: Serious     ED Disposition Condition    Admit                             Bety Fung MD  Resident  10/13/20 1817       Leeele Gamez MD  10/14/20 212

## 2020-10-13 NOTE — H&P
[facility]  Vascular Neurology  Comprehensive Stroke Center  History & Physical    Inpatient consult to Vascular (Stroke) Neurology  Consult performed by: Abe Ewing MD  Consult ordered by: Bety Fung MD        Assessment/Plan:     Patient is a 64 y.o. 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 tPA was not given as patient was on eliquis. CTA did not show any LVO or high grade stenosis thus, no intervention done. CTH did not show any infarct. Patient was recently discharged from our service for multiple infarcts in bilateral territories thought to be due to her hypercoagulable state (b/c of her malignancy).     Will admit to our vascular neurology service due to this left facial droop and dysarthia. Pending MRI to look for any new infarct. Would require an SLP evaluation as there is pooling of saliva with difficulty swallowing.        Antithrombotics for secondary stroke prevention: Anticoagulants: Apixaban 5 mg BID - holding apixiban until MRI brain is complete in case there is a larger infarct (concern about hemorrhagic transformation)    Statins for secondary stroke prevention and hyperlipidemia, if present:   Cannot be on statin with current chemo regimen     Aggressive risk factor modification: HTN, Smoking, DM, HLD, Malignancy     Rehab efforts: The patient has been evaluated by a stroke team provider and the therapy needs have been fully considered based off the presenting complaints and exam findings. The following therapy evaluations are needed: PT evaluate and treat, OT evaluate and treat, SLP evaluate and treat    Diagnostics ordered/pending:  MRI head without contrast to assess brain parenchyma    VTE prophylaxis: None: Reason for No Pharmacological VTE Prophylaxis: Currently on anticoagulation    BP parameters: Infarct: No intervention, SBP <220        Urinary retention  On periactin for urethral pain and retention. Was seen by urology last admission        Hypertension associated with diabetes  Allowing for permissive hypertension for the first 24 hours in case this is a new infarct      Cerebral aneurysm, coiled in 2010  Aneurysm coil in 2010     Mixed hyperlipidemia due to type 2 diabetes mellitus  LDL at 142  Cannot be on statin with current chemotherapeutic agent     Type 2 diabetes mellitus with hyperglycemia, with long-term current use of insulin  Currently NPO  MDSS         STROKE DOCUMENTATION     Acute Stroke Times   Last Known Normal Date: 10/13/20  Last Known Normal Time: 1030  Symptom Onset Date: 10/13/20  Symptom Onset Time: 1130  Stroke Team Called Date: 10/13/20  Stroke Team Called Time: 1220  Stroke Team Arrival Date: 10/13/20  Stroke Team Arrival Time: 1227    NIH Scale:  1a. Level of Consciousness: 0-->Alert, keenly responsive  1b. LOC Questions: 0-->Answers both questions correctly  1c. LOC Commands: 0-->Performs both tasks correctly  2. Best Gaze: 0-->Normal  3. Visual: 1-->Partial hemianopia(Left superior upper quadrant)  4. Facial Palsy: 2-->Partial paralysis (total or near-total paralysis of lower face)  5a. Motor Arm, Left: 1-->Drift, limb holds 90 (or 45) degrees, but drifts down before full 10 seconds, does not hit bed or other support  5b. Motor Arm, Right: 0-->No drift, limb holds 90 (or 45) degrees for full 10 secs  6a. Motor Leg, Left: 0-->No drift, leg holds 30 degree position for full 5 secs  6b. Motor Leg, Right: 0-->No drift, leg holds 30 degree position for full 5 secs  7. Limb Ataxia: 0-->Absent  8. Sensory: 0-->Normal, no sensory loss  9. Best Language: 0-->No aphasia, normal  10. Dysarthria: 1-->Mild-to-moderate  "dysarthria, patient slurs at least some words and, at worst, can be understood with some difficulty  11. 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 Patient's last known normal was 10:30 AM before she took a nap. She woke up and noticed slurring and left facial droop. Stroke code called at 12 PM.     Patient took a nap around 10:30 AM. About an hour later, she woke up "choking on her own spit". She was noted to have slurred speech and left sided weakness. On arrival of the vascular neurology team, there was left facial droop, dysarthia and left sided weakness. CTH did not show any infarct or hemorrhage. CTA MP does not show any LVO or high grade stenosis. There is mention of  possible right M3 vessel stenosis or subtotal occlusion however, it is not seen when imaging was personally reviewed.        Patient was recently discharged from our service on 10/5/20. She was initially admitted for acute onset of RLE shaking and weakness. As she was on eliquis for, tPA was not offered. MRI brain done in previous admission revealed infarct in the right centrum semiovale, left corpus collosum. Etiology most likely due to a hypercoagulable state. Her hospitalization was complicated by urinary rentention and hematuria. Patient was to follow up with urology in a month. NIHSS 1 at discharge. RLE deficits persistent           Past Medical History:   Diagnosis Date    Allergy     Brain " "aneurysm 2010    s/p coiling of one; another not coiled    Breast cyst     Depression 9/19/2019    Diabetes mellitus     Diabetes type 2, controlled     Fever blister     High cholesterol     History of Bell's palsy     HTN (hypertension) 5/20/2014    Recurrent upper respiratory infection (URI)      Past Surgical History:   Procedure Laterality Date    BREAST SURGERY      BRONCHOSCOPY N/A 5/28/2019    Procedure: Bronchoscopy;  Surgeon: [facility] Diagnostic Provider;  [facility];  Service: Anesthesiology;  Laterality: N/A;    CERVICAL FUSION      COLONOSCOPY N/A 3/9/2016    Procedure: COLONOSCOPY;  Surgeon: Elliott Zimmerman MD;  Location: [facility] ENDO (4TH FLR);  Service: Endoscopy;  Laterality: N/A;    head surgery      stent and "curling" for aneurysm    HYSTERECTOMY      TVH secondary to SUF    INSERTION OF TUNNELED CENTRAL VENOUS CATHETER (CVC) WITH SUBCUTANEOUS PORT Right 7/8/2019    Procedure: INSERTION, PORT-A-CATH;  Surgeon: Cali Damico MD;  Location: [facility] CATH LAB;  Service: Radiology;  Laterality: Right;    MENISCECTOMY Left      Family History   Problem Relation Age of Onset    Rheum arthritis Father     Diabetes Father     Heart failure Father     Migraines Father     Cataracts Father     Cancer Mother 63        pancreatic    Stomach cancer Mother     Breast cancer Maternal Aunt         50s    Ovarian cancer Cousin     Allergies Daughter     Diabetes Sister     Diabetes Brother     Cancer Maternal Aunt         Lung CA    Melanoma Neg Hx     Colon cancer Neg Hx     Amblyopia Neg Hx     Blindness Neg Hx     Glaucoma Neg Hx     Macular degeneration Neg Hx     Retinal detachment Neg Hx     Strabismus Neg Hx     Stroke Neg Hx     Thyroid disease Neg Hx     Allergic rhinitis Neg Hx     Angioedema Neg Hx     Asthma Neg Hx     Atopy Neg Hx     Eczema Neg Hx     Immunodeficiency Neg Hx     Rhinitis Neg Hx     Urticaria Neg Hx      Social History "     Tobacco Use    Smoking status: Former Smoker     Packs/day: 0.50     Years: 30.00     Pack years: 15.00     Quit date: 1999     Years since quittin.7    Smokeless tobacco: Never Used   Substance Use Topics    Alcohol use: No     Alcohol/week: 0.0 standard drinks    Drug use: No     Review of patient's allergies indicates:   Allergen Reactions    Piperacillin-tazobactam Rash     Tolerated cefepime 2020       Medications: I have reviewed the current medication administration record.    (Not in a hospital admission)      Review of Systems   Constitutional: Negative for activity change, appetite change, chills and fever.   HENT: Positive for trouble swallowing. Negative for voice change.    Respiratory: Negative for apnea, cough, choking and chest tightness.    Gastrointestinal: Negative for abdominal distention and abdominal pain.   Musculoskeletal: Positive for gait problem. Negative for arthralgias, back pain, joint swelling and myalgias.   Skin: Negative for color change, pallor, rash and wound.   Neurological: Positive for speech difficulty and weakness. Negative for tremors, seizures, syncope, facial asymmetry, light-headedness, numbness and headaches.   Psychiatric/Behavioral: Positive for decreased concentration. Negative for agitation and confusion.     Objective:     Vital Signs (Most Recent):  Temp: 98.5 °F (36.9 °C) (10/13/20 1202)  Pulse: 72 (10/13/20 1227)  Resp: 16 (10/13/20 1202)  BP: (!) 119/57 (10/13/20 1227)  SpO2: 96 % (10/13/20 1227)    Vital Signs Range (Last 24H):  Temp:  [98.5 °F (36.9 °C)]   Pulse:  [72-76]   Resp:  [16]   BP: (119-128)/(57-74)   SpO2:  [96 %-98 %]     Physical Exam  Constitutional:       Appearance: Normal appearance.   HENT:      Head: Normocephalic and atraumatic.   Musculoskeletal:         General: No swelling.   Neurological:      Mental Status: She is alert and oriented to person, place, and time.         Neurological Exam:   LOC: alert  Attention  Span: Good   Language: No aphasia  Articulation: Dysarthria  Orientation: Person, Place, Time   Visual Fields: Superior quadrantanopsia: left  EOM (CN III, IV, VI): Full/intact  Pupils (CN II, III): PERRL  Facial Sensation (CN V): Normal  Facial Movement (CN VII): Symmetric facial expression    Gag Reflex: present  Reflexes: 2+ throughout  Motor: Arm left  Paresis: 4/5  Leg left  Paresis: 4/5  Arm right  Paresis: 4/5  Leg right Paresis: 4/5  Cebellar: Lower Extremity Appendicular Ataxia (Heel to Shin)  Right  Sensation: Intact to light touch, temperature and vibration  Tone: Normal tone throughout      Laboratory:  CMP:   Recent Labs   Lab 10/13/20  1226   CALCIUM 9.9   ALBUMIN 4.3   PROT 7.9      K 4.3   CO2 29      BUN 22   CREATININE 1.4   ALKPHOS 43*   ALT 15   AST 25   BILITOT 0.3     CBC:   Recent Labs   Lab 10/13/20  1226   WBC 3.55*   RBC 4.06   HGB 10.9*   HCT 35.4*      MCV 87   MCH 26.8*   MCHC 30.8*     Lipid Panel:   Recent Labs   Lab 10/13/20  1226   CHOL 252*   LDLCALC 163.4*   HDL 68   TRIG 103     Coagulation:   Recent Labs   Lab 10/13/20  1226   INR 1.0     Hgb A1C: No results for input(s): HGBA1C in the last 168 hours.  TSH:   Recent Labs   Lab 10/13/20  1226   TSH 0.397*       Diagnostic Results:      Brain imaging:  CTH Without Contrast:No findings to suggest acute major vascular territory infarct or hemorrhage noting stable focus of low attenuation within the left cerebellum and stable right ICA region coil pack    MRI Brain w/o contrast: pending     Vessel Imaging:  CTA MP:  States that there is a possible M3 vessel stenosis or occlusion. However, on evaluation of imaging it does not seem that way. Please note that patient has multiple branches from RMCA     Cardiac Evaluation:   TTE:   · Normal left ventricular systolic function. The estimated ejection fraction is 55%.  · Normal LV diastolic function.  · No wall motion abnormalities.  · Mildly reduced right ventricular  systolic function.  · Normal central venous pressure (3 mmHg).  · The estimated PA systolic pressure is 26 mmHg        Abe Ewing MD  [facility] Stroke Center  Department of Vascular Neurology   [facility]

## 2020-10-13 NOTE — ED NOTES
Alison Branch, a 64 y.o. female presents to the ED via EMS from [facility] with CC drooling, slurred speech, and left sided weakness beginning around 10:30 this morning.      Patient identifiers verified verbally with patient and correct for Alison Branch.    LOC/ APPEARANCE: The patient is AAOx4. Pt is sspeech is slurred, pt. Appears to be aphasic, and pt. Is drooling. Pt changed into hospital gown. Continuous cardiac monitor, cont pulse ox, and auto BP cuff applied to patient. Pt is clean and well groomed. No JVD visible. Pt reports pain level of 0. Pt updated on POC. Bed low and locked with side rails up x2, call bell in pt reach.  SKIN: Skin is warm dry and intact, and color is consistent with ethnicity. Capillary refill <3 seconds. No breakdown or brusing visible. Mucus membranes moist, acyanotic.  RESPIRATORY: Airway is open and patent. Respirations-spontaneous, unlabored, regular rate, equal bilaterally on inspiration and expiration. No accessory muscle use noted. Lungs clear to auscultation in all fields bilaterally anterior and posterior.   CARDIAC: Patient has regular heart rate.  No peripheral edema noted, and patient has no c/o chest pain. Peripheral pulses present equal and strong throughout.  ABDOMEN: Soft and non-tender to palpation with no distention noted. Normoactive bowel sounds x4 quadrants. Pt has no complaints of abnormal bowel movements, denies blood in stool. Pt reports normal appetite.   NEUROLOGIC: Eyes open spontaneously. Pt behavior appropriate to situation, and pt follows commands. Pt reports sensation present in all extremities when touched with a finger, denies any numbness or tingling. Pt. Has left sided facial droop and left sided weakness. Pt. Left arm drift.  PERRLA  MUSCULOSKELETAL: Spontaneous movement noted to all extremities.  : No complaints of frequency, burning, urgency or blood in the urine. No complaints of incontinence.

## 2020-10-13 NOTE — TELEPHONE ENCOUNTER
"----- Message from Obed Chan sent at 10/13/2020  8:15 AM CDT -----  Reschedule Existing Appointment    Appt Date: 10/7/20  Type of appt :  ESTABLISHED PATIENT [[id number]]  Physician: Dr Belcher     Reason for rescheduling? Please contact yosvany Kathleen at [facility] to get patient's appointment reschedule getting discharged today    Caller: Frannie  Contact Preference: [phone]    Additional Information:  "Thank you for all that you do for our patients'"       "

## 2020-10-13 NOTE — ED TRIAGE NOTES
Pt. Is a 64 y.o. female coming into the ED via EMS from [facility] reporting that she took a nap around 10:30 a.m. She woke up and reports she was drooling, slurred speech and had left sided weakness.

## 2020-10-13 NOTE — NURSING
Report received from [facility] ED made aware of the patient order for straight cath and urine lab. She stated she will attempt to get it done.

## 2020-10-13 NOTE — ASSESSMENT & PLAN NOTE
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 tPA was not given as patient was on eliquis. CTA did not show any LVO or high grade stenosis thus, no intervention done. CTH did not show any infarct. Patient was recently discharged from our service for multiple infarcts in bilateral territories thought to be due to her hypercoagulable state (b/c of her malignancy).     Will admit to our vascular neurology service due to this left facial droop and dysarthia. Pending MRI to look for any new infarct. Would require an SLP evaluation as there is pooling of saliva with difficulty swallowing.        Antithrombotics for secondary stroke prevention: Anticoagulants: Apixaban 5 mg BID - holding apixiban until MRI brain is complete in case there is a larger infarct (concern about hemorrhagic transformation)    Statins for secondary stroke prevention and hyperlipidemia, if present:   Cannot be on statin with current chemo regimen     Aggressive risk factor modification: HTN, Smoking, DM, HLD, Malignancy     Rehab efforts: The patient has been evaluated by a stroke team provider and the therapy needs have been fully considered based off the presenting complaints and exam findings. The following therapy evaluations are needed: PT evaluate and treat, OT evaluate and treat, SLP evaluate and treat    Diagnostics ordered/pending: MRI head without contrast to assess brain parenchyma    VTE prophylaxis: None: Reason for No Pharmacological VTE Prophylaxis: Currently on anticoagulation    BP parameters: Infarct: No intervention, SBP <220

## 2020-10-13 NOTE — ED NOTES
Bed: 06  Expected date: 10/13/20  Expected time: 11:55 AM  Means of arrival:   Comments:  EMS stroke

## 2020-10-13 NOTE — ED NOTES
Lyla RN accepted report, nurse aware of pt status and last set of vitals. MD cleared patient for admission, pt stable for transport. Nurse informed of orders completed, outstanding orders, and orders unable to be completed in the ED. Unable to straight cath due to patient in hallway and lack of privacy. RN informed if any isolation precautions are required. RN agreeable with report and agrees to accept patient. Pt belongings and valuables remain with patient for transport.

## 2020-10-13 NOTE — SUBJECTIVE & OBJECTIVE
"    Past Medical History:   Diagnosis Date    Allergy     Brain aneurysm 2010    s/p coiling of one; another not coiled    Breast cyst     Depression 9/19/2019    Diabetes mellitus     Diabetes type 2, controlled     Fever blister     High cholesterol     History of Bell's palsy     HTN (hypertension) 5/20/2014    Recurrent upper respiratory infection (URI)      Past Surgical History:   Procedure Laterality Date    BREAST SURGERY      BRONCHOSCOPY N/A 5/28/2019    Procedure: Bronchoscopy;  Surgeon: [facility] Diagnostic Provider;  [facility];  Service: Anesthesiology;  Laterality: N/A;    CERVICAL FUSION      COLONOSCOPY N/A 3/9/2016    Procedure: COLONOSCOPY;  Surgeon: Elliott Zimmerman MD;  Location: [facility] ENDO (4TH FLR);  Service: Endoscopy;  Laterality: N/A;    head surgery      stent and "curling" for aneurysm    HYSTERECTOMY      TVH secondary to SUF    INSERTION OF TUNNELED CENTRAL VENOUS CATHETER (CVC) WITH SUBCUTANEOUS PORT Right 7/8/2019    Procedure: INSERTION, PORT-A-CATH;  Surgeon: Cali Damico MD;  Location: [facility] CATH LAB;  Service: Radiology;  Laterality: Right;    MENISCECTOMY Left      Family History   Problem Relation Age of Onset    Rheum arthritis Father     Diabetes Father     Heart failure Father     Migraines Father     Cataracts Father     Cancer Mother 63        pancreatic    Stomach cancer Mother     Breast cancer Maternal Aunt         50s    Ovarian cancer Cousin     Allergies Daughter     Diabetes Sister     Diabetes Brother     Cancer Maternal Aunt         Lung CA    Melanoma Neg Hx     Colon cancer Neg Hx     Amblyopia Neg Hx     Blindness Neg Hx     Glaucoma Neg Hx     Macular degeneration Neg Hx     Retinal detachment Neg Hx     Strabismus Neg Hx     Stroke Neg Hx     Thyroid disease Neg Hx     Allergic rhinitis Neg Hx     Angioedema Neg Hx     Asthma Neg Hx     Atopy Neg Hx     Eczema Neg Hx     Immunodeficiency Neg Hx  "    Rhinitis Neg Hx     Urticaria Neg Hx      Social History     Tobacco Use    Smoking status: Former Smoker     Packs/day: 0.50     Years: 30.00     Pack years: 15.00     Quit date: 1999     Years since quittin.7    Smokeless tobacco: Never Used   Substance Use Topics    Alcohol use: No     Alcohol/week: 0.0 standard drinks    Drug use: No     Review of patient's allergies indicates:   Allergen Reactions    Piperacillin-tazobactam Rash     Tolerated cefepime 2020       Medications: I have reviewed the current medication administration record.    (Not in a hospital admission)      Review of Systems   Constitutional: Negative for activity change, appetite change, chills and fever.   HENT: Positive for trouble swallowing. Negative for voice change.    Respiratory: Negative for apnea, cough, choking and chest tightness.    Gastrointestinal: Negative for abdominal distention and abdominal pain.   Musculoskeletal: Positive for gait problem. Negative for arthralgias, back pain, joint swelling and myalgias.   Skin: Negative for color change, pallor, rash and wound.   Neurological: Positive for speech difficulty and weakness. Negative for tremors, seizures, syncope, facial asymmetry, light-headedness, numbness and headaches.   Psychiatric/Behavioral: Positive for decreased concentration. Negative for agitation and confusion.     Objective:     Vital Signs (Most Recent):  Temp: 98.5 °F (36.9 °C) (10/13/20 1202)  Pulse: 72 (10/13/20 1227)  Resp: 16 (10/13/20 1202)  BP: (!) 119/57 (10/13/20 1227)  SpO2: 96 % (10/13/20 1227)    Vital Signs Range (Last 24H):  Temp:  [98.5 °F (36.9 °C)]   Pulse:  [72-76]   Resp:  [16]   BP: (119-128)/(57-74)   SpO2:  [96 %-98 %]     Physical Exam  Constitutional:       Appearance: Normal appearance.   HENT:      Head: Normocephalic and atraumatic.   Musculoskeletal:         General: No swelling.   Neurological:      Mental Status: She is alert and oriented to person, place, and  time.         Neurological Exam:   LOC: alert  Attention Span: Good   Language: No aphasia  Articulation: Dysarthria  Orientation: Person, Place, Time   Visual Fields: Superior quadrantanopsia: left  EOM (CN III, IV, VI): Full/intact  Pupils (CN II, III): PERRL  Facial Sensation (CN V): Normal  Facial Movement (CN VII): Symmetric facial expression    Gag Reflex: present  Reflexes: 2+ throughout  Motor: Arm left  Paresis: 4/5  Leg left  Paresis: 4/5  Arm right  Paresis: 4/5  Leg right Paresis: 4/5  Cebellar: Lower Extremity Appendicular Ataxia (Heel to Shin)  Right  Sensation: Intact to light touch, temperature and vibration  Tone: Normal tone throughout      Laboratory:  CMP:   Recent Labs   Lab 10/13/20  1226   CALCIUM 9.9   ALBUMIN 4.3   PROT 7.9      K 4.3   CO2 29      BUN 22   CREATININE 1.4   ALKPHOS 43*   ALT 15   AST 25   BILITOT 0.3     CBC:   Recent Labs   Lab 10/13/20  1226   WBC 3.55*   RBC 4.06   HGB 10.9*   HCT 35.4*      MCV 87   MCH 26.8*   MCHC 30.8*     Lipid Panel:   Recent Labs   Lab 10/13/20  1226   CHOL 252*   LDLCALC 163.4*   HDL 68   TRIG 103     Coagulation:   Recent Labs   Lab 10/13/20  1226   INR 1.0     Hgb A1C: No results for input(s): HGBA1C in the last 168 hours.  TSH:   Recent Labs   Lab 10/13/20  1226   TSH 0.397*       Diagnostic Results:      Brain imaging:  CTH Without Contrast:No findings to suggest acute major vascular territory infarct or hemorrhage noting stable focus of low attenuation within the left cerebellum and stable right ICA region coil pack    MRI Brain w/o contrast: pending     Vessel Imaging:  CTA MP:  States that there is a possible M3 vessel stenosis or occlusion. However, on evaluation of imaging it does not seem that way. Please note that patient has multiple branches from RMCA     Cardiac Evaluation:   TTE:   · Normal left ventricular systolic function. The estimated ejection fraction is 55%.  · Normal LV diastolic function.  · No wall  motion abnormalities.  · Mildly reduced right ventricular systolic function.  · Normal central venous pressure (3 mmHg).  · The estimated PA systolic pressure is 26 mmHg

## 2020-10-13 NOTE — HOSPITAL COURSE
Ms. Alison Branch was admitted to Vascular Neurology for acute stroke workup. Stroke etiology suspected to be embolism at this time; switched Eliquis to therapeutic Lovenox [please see plan section below for further details.]  Patient discharged with recommendations for admission to [facility] inpatient rehab. Family by phone amenable to plan.     Patient with improvement in stroke symptoms since admission. Inpatient acute stroke work up completed and patient stable for discharge. Please see all appropriate medication changes, imaging results, and necessary follow-up below.

## 2020-10-13 NOTE — HPI
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 Patient's last known normal was 10:30 AM before she took a nap. She woke up and noticed slurring and left facial droop. Stroke code called at 12 PM.     Patient took a nap around 10:30 AM. About an hour later, she woke up "choking on her own spit". She was noted to have slurred speech and left sided weakness. On arrival of the vascular neurology team, there was left facial droop, dysarthia and left sided weakness. CTH did not show any infarct or hemorrhage. CTA MP does not show any LVO or high grade stenosis. There is mention of  possible right M3 vessel stenosis or subtotal occlusion however, it is not seen when imaging was personally reviewed.        Patient was recently discharged from our service on 10/5/20. She was initially admitted for acute onset of RLE shaking and weakness. As she was on eliquis for, tPA was not offered. MRI brain done in previous admission revealed infarct in the right centrum semiovale, left corpus collosum. Etiology most likely due to a hypercoagulable state. Her hospitalization was complicated by urinary rentention and hematuria. Patient was to follow up with urology in a month. NIHSS 1 at discharge. RLE deficits persistent     "

## 2020-10-14 PROBLEM — I63.411 EMBOLIC STROKE INVOLVING RIGHT MIDDLE CEREBRAL ARTERY: Status: ACTIVE | Noted: 2020-10-13

## 2020-10-14 PROBLEM — Z51.5 PALLIATIVE CARE ENCOUNTER: Status: ACTIVE | Noted: 2020-06-25

## 2020-10-14 PROBLEM — R13.12 OROPHARYNGEAL DYSPHAGIA: Status: ACTIVE | Noted: 2020-10-14

## 2020-10-14 LAB
ALBUMIN SERPL BCP-MCNC: 4.1 G/DL (ref 3.5–5.2)
ALP SERPL-CCNC: 40 U/L (ref 55–135)
ALT SERPL W/O P-5'-P-CCNC: 13 U/L (ref 10–44)
ANION GAP SERPL CALC-SCNC: 10 MMOL/L (ref 8–16)
APTT BLDCRRT: 26.8 SEC (ref 21–32)
ASCENDING AORTA: 2.55 CM
AST SERPL-CCNC: 22 U/L (ref 10–40)
AV INDEX (PROSTH): 0.71
AV MEAN GRADIENT: 2 MMHG
AV PEAK GRADIENT: 4 MMHG
AV VALVE AREA: 2.19 CM2
AV VELOCITY RATIO: 0.82
BASOPHILS # BLD AUTO: 0.03 K/UL (ref 0–0.2)
BASOPHILS NFR BLD: 0.7 % (ref 0–1.9)
BILIRUB SERPL-MCNC: 0.4 MG/DL (ref 0.1–1)
BSA FOR ECHO PROCEDURE: 2.17 M2
BUN SERPL-MCNC: 18 MG/DL (ref 8–23)
CALCIUM SERPL-MCNC: 9.4 MG/DL (ref 8.7–10.5)
CHLORIDE SERPL-SCNC: 107 MMOL/L (ref 95–110)
CK MB SERPL-MCNC: 1.2 NG/ML (ref 0.1–6.5)
CK MB SERPL-RTO: 1 % (ref 0–5)
CK SERPL-CCNC: 117 U/L (ref 20–180)
CO2 SERPL-SCNC: 28 MMOL/L (ref 23–29)
CREAT SERPL-MCNC: 1.3 MG/DL (ref 0.5–1.4)
CV ECHO LV RWT: 0.38 CM
DIFFERENTIAL METHOD: ABNORMAL
DOP CALC AO PEAK VEL: 0.94 M/S
DOP CALC AO VTI: 18.33 CM
DOP CALC LVOT AREA: 3.1 CM2
DOP CALC LVOT DIAMETER: 1.98 CM
DOP CALC LVOT PEAK VEL: 0.77 M/S
DOP CALC LVOT STROKE VOLUME: 40.07 CM3
DOP CALCLVOT PEAK VEL VTI: 13.02 CM
E WAVE DECELERATION TIME: 329.61 MSEC
E/A RATIO: 0.67
E/E' RATIO: 7.29 M/S
ECHO LV POSTERIOR WALL: 0.78 CM (ref 0.6–1.1)
EOSINOPHIL # BLD AUTO: 0.1 K/UL (ref 0–0.5)
EOSINOPHIL NFR BLD: 2.4 % (ref 0–8)
ERYTHROCYTE [DISTWIDTH] IN BLOOD BY AUTOMATED COUNT: 16 % (ref 11.5–14.5)
EST. GFR  (AFRICAN AMERICAN): 50.8 ML/MIN/1.73 M^2
EST. GFR  (NON AFRICAN AMERICAN): 44.1 ML/MIN/1.73 M^2
FRACTIONAL SHORTENING: 29 % (ref 28–44)
GLUCOSE SERPL-MCNC: 81 MG/DL (ref 70–110)
HCT VFR BLD AUTO: 35.5 % (ref 37–48.5)
HGB BLD-MCNC: 10.7 G/DL (ref 12–16)
IMM GRANULOCYTES # BLD AUTO: 0.01 K/UL (ref 0–0.04)
IMM GRANULOCYTES NFR BLD AUTO: 0.2 % (ref 0–0.5)
INR PPP: 1 (ref 0.8–1.2)
INTERVENTRICULAR SEPTUM: 0.69 CM (ref 0.6–1.1)
IVRT: 94.2 MSEC
LA MAJOR: 4.07 CM
LA MINOR: 4.21 CM
LA WIDTH: 3.35 CM
LEFT ATRIUM SIZE: 2.43 CM
LEFT ATRIUM VOLUME INDEX: 13.5 ML/M2
LEFT ATRIUM VOLUME: 28.64 CM3
LEFT INTERNAL DIMENSION IN SYSTOLE: 2.87 CM (ref 2.1–4)
LEFT VENTRICLE DIASTOLIC VOLUME INDEX: 34.25 ML/M2
LEFT VENTRICLE DIASTOLIC VOLUME: 72.66 ML
LEFT VENTRICLE MASS INDEX: 40 G/M2
LEFT VENTRICLE SYSTOLIC VOLUME INDEX: 14.8 ML/M2
LEFT VENTRICLE SYSTOLIC VOLUME: 31.44 ML
LEFT VENTRICULAR INTERNAL DIMENSION IN DIASTOLE: 4.06 CM (ref 3.5–6)
LEFT VENTRICULAR MASS: 85.62 G
LV LATERAL E/E' RATIO: 6.89 M/S
LV SEPTAL E/E' RATIO: 7.75 M/S
LYMPHOCYTES # BLD AUTO: 1.6 K/UL (ref 1–4.8)
LYMPHOCYTES NFR BLD: 34.5 % (ref 18–48)
MAGNESIUM SERPL-MCNC: 2.1 MG/DL (ref 1.6–2.6)
MCH RBC QN AUTO: 26.3 PG (ref 27–31)
MCHC RBC AUTO-ENTMCNC: 30.1 G/DL (ref 32–36)
MCV RBC AUTO: 87 FL (ref 82–98)
MONOCYTES # BLD AUTO: 0.5 K/UL (ref 0.3–1)
MONOCYTES NFR BLD: 11.9 % (ref 4–15)
MV PEAK A VEL: 0.93 M/S
MV PEAK E VEL: 0.62 M/S
MV STENOSIS PRESSURE HALF TIME: 95.59 MS
MV VALVE AREA P 1/2 METHOD: 2.3 CM2
NEUTROPHILS # BLD AUTO: 2.3 K/UL (ref 1.8–7.7)
NEUTROPHILS NFR BLD: 50.3 % (ref 38–73)
NRBC BLD-RTO: 0 /100 WBC
PHOSPHATE SERPL-MCNC: 3.2 MG/DL (ref 2.7–4.5)
PISA TR MAX VEL: 2.49 M/S
PLATELET # BLD AUTO: 237 K/UL (ref 150–350)
PMV BLD AUTO: 12.1 FL (ref 9.2–12.9)
POCT GLUCOSE: 139 MG/DL (ref 70–110)
POCT GLUCOSE: 88 MG/DL (ref 70–110)
POCT GLUCOSE: 88 MG/DL (ref 70–110)
POCT GLUCOSE: 93 MG/DL (ref 70–110)
POTASSIUM SERPL-SCNC: 4.1 MMOL/L (ref 3.5–5.1)
PROT SERPL-MCNC: 7.1 G/DL (ref 6–8.4)
PROTHROMBIN TIME: 11.5 SEC (ref 9–12.5)
PULM VEIN S/D RATIO: 1.62
PV PEAK D VEL: 0.34 M/S
PV PEAK S VEL: 0.55 M/S
RA MAJOR: 4.11 CM
RA WIDTH: 3.47 CM
RBC # BLD AUTO: 4.07 M/UL (ref 4–5.4)
RETIRED EF AND QEF - SEE NOTES: 52 %
RIGHT VENTRICULAR END-DIASTOLIC DIMENSION: 3.37 CM
RV TISSUE DOPPLER FREE WALL SYSTOLIC VELOCITY 1 (APICAL 4 CHAMBER VIEW): 14.03 CM/S
SINUS: 3.3 CM
SODIUM SERPL-SCNC: 145 MMOL/L (ref 136–145)
STJ: 2.8 CM
TDI LATERAL: 0.09 M/S
TDI SEPTAL: 0.08 M/S
TDI: 0.09 M/S
TR MAX PG: 25 MMHG
TRICUSPID ANNULAR PLANE SYSTOLIC EXCURSION: 2.33 CM
TROPONIN I SERPL DL<=0.01 NG/ML-MCNC: 0.06 NG/ML (ref 0–0.03)
WBC # BLD AUTO: 4.55 K/UL (ref 3.9–12.7)

## 2020-10-14 PROCEDURE — 36415 COLL VENOUS BLD VENIPUNCTURE: CPT

## 2020-10-14 PROCEDURE — 25000003 PHARM REV CODE 250: Performed by: FAMILY MEDICINE

## 2020-10-14 PROCEDURE — 92610 EVALUATE SWALLOWING FUNCTION: CPT

## 2020-10-14 PROCEDURE — 11000001 HC ACUTE MED/SURG PRIVATE ROOM

## 2020-10-14 PROCEDURE — 82550 ASSAY OF CK (CPK): CPT

## 2020-10-14 PROCEDURE — 80053 COMPREHEN METABOLIC PANEL: CPT

## 2020-10-14 PROCEDURE — 99233 SBSQ HOSP IP/OBS HIGH 50: CPT | Mod: ,,, | Performed by: PSYCHIATRY & NEUROLOGY

## 2020-10-14 PROCEDURE — 25000003 PHARM REV CODE 250: Performed by: EMERGENCY MEDICINE

## 2020-10-14 PROCEDURE — 84484 ASSAY OF TROPONIN QUANT: CPT

## 2020-10-14 PROCEDURE — 85730 THROMBOPLASTIN TIME PARTIAL: CPT

## 2020-10-14 PROCEDURE — 83735 ASSAY OF MAGNESIUM: CPT

## 2020-10-14 PROCEDURE — 97535 SELF CARE MNGMENT TRAINING: CPT

## 2020-10-14 PROCEDURE — 99233 PR SUBSEQUENT HOSPITAL CARE,LEVL III: ICD-10-PCS | Mod: ,,, | Performed by: PSYCHIATRY & NEUROLOGY

## 2020-10-14 PROCEDURE — 84100 ASSAY OF PHOSPHORUS: CPT

## 2020-10-14 PROCEDURE — 92523 SPEECH SOUND LANG COMPREHEN: CPT

## 2020-10-14 PROCEDURE — 85025 COMPLETE CBC W/AUTO DIFF WBC: CPT

## 2020-10-14 PROCEDURE — 82553 CREATINE MB FRACTION: CPT

## 2020-10-14 PROCEDURE — 85610 PROTHROMBIN TIME: CPT

## 2020-10-14 RX ORDER — TAMSULOSIN HYDROCHLORIDE 0.4 MG/1
0.4 CAPSULE ORAL DAILY
Status: DISCONTINUED | OUTPATIENT
Start: 2020-10-14 | End: 2020-10-20 | Stop reason: HOSPADM

## 2020-10-14 RX ORDER — LIDOCAINE 50 MG/G
OINTMENT TOPICAL
Status: DISCONTINUED | OUTPATIENT
Start: 2020-10-14 | End: 2020-10-20 | Stop reason: HOSPADM

## 2020-10-14 RX ORDER — VENLAFAXINE HYDROCHLORIDE 37.5 MG/1
75 CAPSULE, EXTENDED RELEASE ORAL DAILY
Status: DISCONTINUED | OUTPATIENT
Start: 2020-10-14 | End: 2020-10-20 | Stop reason: HOSPADM

## 2020-10-14 RX ADMIN — CYPROHEPTADINE HYDROCHLORIDE 4 MG: 4 TABLET ORAL at 05:10

## 2020-10-14 RX ADMIN — TAMSULOSIN HYDROCHLORIDE 0.4 MG: 0.4 CAPSULE ORAL at 12:10

## 2020-10-14 RX ADMIN — APIXABAN 5 MG: 5 TABLET, FILM COATED ORAL at 12:10

## 2020-10-14 RX ADMIN — VENLAFAXINE HYDROCHLORIDE 75 MG: 37.5 CAPSULE, EXTENDED RELEASE ORAL at 12:10

## 2020-10-14 RX ADMIN — CYPROHEPTADINE HYDROCHLORIDE 4 MG: 4 TABLET ORAL at 12:10

## 2020-10-14 RX ADMIN — CYPROHEPTADINE HYDROCHLORIDE 4 MG: 4 TABLET ORAL at 09:10

## 2020-10-14 RX ADMIN — APIXABAN 5 MG: 5 TABLET, FILM COATED ORAL at 09:10

## 2020-10-14 NOTE — PT/OT/SLP PROGRESS
Physical Therapy      Patient Name:  Alison Branch   MRN:  [id number]    Patient not seen today secondary to with ST in am and leaving to go to ECHO in pm.    Celsa Sy, PT 10/14/20

## 2020-10-14 NOTE — PLAN OF CARE
CM met with patient in room for Discharge Planning Assessment, she was eating her meal and requested I use my last assessment to complete today's assessment.  Per MR,  patient lives with daughter Min in a(n) shotgun double with 4 steps to enter.   Per patient, patient was independent with ADLS and used rollator for ambulation.  Per patient/MR, the patient will have assistance from daughters upon discharge.  Discharge Plan A IRF and Discharge Plan B Home with family and HH.  Discharge Planning Booklet given to patient/family and discussed.  All questions addressed.     PCP:  Mike Rodriguez Ii, MD      Pharmacy:    [facility] - [address]  [address] [facility]  [address]  [facility] [address]  Phone: [phone] Fax: [phone]    [facility] [address] [place], [place] [address] - [address]  [address] [place] [address]  Phone: [phone] Fax: [phone]        Emergency Contacts:  Extended Emergency Contact Information  Primary Emergency Contact: Chito Woodruff   [place]  Work Phone: [phone]  Mobile Phone: [phone]  Relation: Daughter  Secondary Emergency Contact: KENIA AGUILAR  Mobile Phone: [phone]  Relation: Daughter      Insurance:    Payor: [employer] MEDICARE / Plan: [employer] 67 / Product Type: Medicare Advantage /        10/14/20 1344   Discharge Assessment   Assessment Type Discharge Planning Assessment   Confirmed/corrected address and phone number on facesheet? Yes   Assessment information obtained from? Patient;Medical Record   Expected Length of Stay (days) 4   Communicated expected length of stay with patient/caregiver yes   Prior to hospitilization cognitive status: Alert/Oriented   Prior to hospitalization functional status: Needs Assistance;Assistive Equipment   Current cognitive status: Alert/Oriented   Current Functional Status: Assistive Equipment;Needs Assistance    Lives With child(debby), adult   Able to Return to Prior Arrangements other (see comments)  (tbd)   Is patient able to care for self after discharge? Unable to determine at this time (comments)   Who are your caregiver(s) and their phone number(s)? Chito Woodruff daughter m [phone], w [phone]; Jodi barrow daugther [phone]; Min Barrow daughter (lives with this daughter) [phone]   Patient's perception of discharge disposition rehab facility   Readmission Within the Last 30 Days current reason for admission unrelated to previous admission   If yes, most recent facility name: [facility]   Patient currently being followed by outpatient case management? No   Patient currently receives any other outside agency services? No   Equipment Currently Used at Home bedside commode;glucometer;shower chair;cane, straight   Do you have any problems affording any of your prescribed medications? TBD   Is the patient taking medications as prescribed? yes   Does the patient have transportation home? Yes   Transportation Anticipated family or friend will provide  (daughters)   Dialysis Name and Scheduled days na   Does the patient receive services at the Coumadin Clinic? No   Discharge Plan A Rehab   Discharge Plan B Home with family;Home Health   DME Needed Upon Discharge  other (see comments)  (tbd)   Patient/Family in Agreement with Plan yes   Readmission Questionnaire   At the time of your discharge, did someone talk to you about what your health problems were? Yes   At the time of discharge, did someone talk to you about what to watch out for regarding worsening of your health problem? Yes   At the time of discharge, did someone talk to you about what to do if you experienced worsening of your health problem? Yes   At the time of discharge, did someone talk to you about which medication to take when you left the hospital and which ones to stop taking? Yes   At the time of discharge, did someone talk to you  about when and where to follow up with a doctor after you left the hospital? Yes   What do you believe caused you to be sick enough to be re-admitted? had another stroke   How often do you need to have someone help you when you read instructions, pamphlets, or other written material from your doctor or pharmacy? Often   Do you have problems taking your medications as prescribed? Yes   Do you have any problems affording any of  your prescribed medications? To be determined   Do you have problems obtaining/receiving your medications? No   Does the patient have transportation to healthcare appointments? Yes   Living Arrangements house   Does the patient have family/friends to help with healtcare needs after discharge? yes   Does your caregiver provide all the help you need? Yes   Are you currently feeling confused?   (olga)   Are you currently having problems thinking?   (olga)   Are you currently having memory problems?   (olga)   Have you felt down, depressed, or hopeless? Unable to Assess   Have you felt little interest or pleasure in doing things? Unable to assess   In the last 7 days, my sleep quality was: alyse Adler RN  Case Management  Ext: [phone]

## 2020-10-14 NOTE — PLAN OF CARE
Problem: SLP Goal  Goal: SLP Goal  Description: Speech Language Pathology Goals  Goals expected to be met by 10/21  1. Pt will tolerate diet of puree and thin liquids with no overt signs of airway compromise.  2. Pt will participate in ongoing swallow assessment to determine safest and least restrictive diet.  3. Pt will recall simple speech strategies with 90% acc given min A.  4. Pt will utilize simple speech strategies during structured tasks and conversation with 90% acc given min A.  5. Pt will participate in ongoing reading, writing, and visual-spatial assessment to determine purpose for therapeutic targets.    Outcome: Ongoing, Progressing     Bedside swallow and cognitive evaluation completed and POC initiated. ST initially recommended mechanical soft solids however RN with concern regarding pt pocketing material with inability to clear later this service date. Pt safe for diet of puree and thin liquids with strict aspiration precautions. Medications to be crushed in puree. ST to continue to monitor.    GILBERTO Sue  10/14/2020

## 2020-10-14 NOTE — PROGRESS NOTES
[facility]  Vascular Neurology  Comprehensive Stroke Center  Progress Note    Assessment/Plan:     * Embolic stroke involving right middle cerebral artery  66 y/o woman with a medical history of pulmonary adenocarcinoma with mets to the spine, previous brain aneurysm s/p coiling, previous DVTS (on eliquis) presented directly from rehab when she was found to have new onset left facial droop, with slurred speech. LKN was at 10:30 AM before she took a nap and woke up with these symptoms. tPA was not given as patient was on eliquis. CTA did not show any LVO or high grade stenosis thus, no intervention done. CTH did not show any infarct. Patient was recently discharged from our service for multiple infarcts in bilateral territories thought to be due to her hypercoagulable state (b/c of her malignancy).     MRI brain shows acute R MCA infarct. Echo EF 55%, no LAE. Etiology likely hypercoagulable state. Staff will reach out to patient's oncologist Dr. Belcher regarding possibly switching to Lovenox, prognosis and inability to crush chemo drug.      Antithrombotics for secondary stroke prevention: Anticoagulants: Apixaban 5 mg BID      Statins for secondary stroke prevention and hyperlipidemia, if present:   Cannot be on statin with current chemo regimen     Aggressive risk factor modification: HTN, Smoking, DM, HLD, Malignancy     Rehab efforts: The patient has been evaluated by a stroke team provider and the therapy needs have been fully considered based off the presenting complaints and exam findings. The following therapy evaluations are needed: PT evaluate and treat, OT evaluate and treat, SLP evaluate and treat    Diagnostics ordered/pending: None     VTE prophylaxis: None: Reason for No Pharmacological VTE Prophylaxis: Currently on anticoagulation, SCDs    BP parameters: Infarct: No intervention, SBP <220        Oropharyngeal dysphagia  2/2 stroke  SLP eval and treat   Puree, thin diet. Meds  crushed in pudding    Cytotoxic cerebral edema  Area of cytotoxic cerebral edema identified when reviewing brain imaging in the territory of the right middle cerebral artery. There is no mass effect associated with it. We will continue to monitor the patients clinical exam for any worsening of symptoms which may indicate expansion of the stroke or the area of the edema resulting in the clinical change. The pattern is suggestive of hypercoaguable etiology.        Palliative care encounter  Patient c/o lab draws/difficult stick, ordered topical lidocaine, consult palliative for pain management.     Urinary retention  - On periactin for urethral pain and retention. Was seen by urology last admission  - Seen in urology clinic 9/25 for incomplete bladder emptying; hennessy removed on 10/6. While at rehab UA with positive nitrate, 2+ leukocytes, and many bacteria (10/7). Was on Cipro.   - Repeat UA.    Hypertension associated with diabetes  Stroke risk factor   SBP < 220 for now          Cerebral aneurysm, coiled in 2010  Aneurysm coil in 2010     Mixed hyperlipidemia due to type 2 diabetes mellitus  Stroke risk factor   LDL at 163  Cannot be on statin with current chemotherapeutic agent     Type 2 diabetes mellitus with hyperglycemia, with long-term current use of insulin  Stroke risk factor   A1C 9.3  MDSS   BG goal while inpatient 140-180         10/13: Admit to vascular neurology for workup of new onset of slurred speech  10/14: MRI brain acute R MCA infarct. SLP cleared for puree, thin diet, meds crushed. Order TTE. Patient c/o lab draws/difficult stick, ordered topical lidocaine, consult palliative for pain management. Need to reach out to oncology regarding chemo management (unable to crush entrectinib), prognosis, possibly switching to Lovenox.      STROKE DOCUMENTATION   Acute Stroke Times   Last Known Normal Date: 10/13/20  Last Known Normal Time: 1030  Symptom Onset Date: 10/13/20  Symptom Onset Time:  1130  Stroke Team Called Date: 10/13/20  Stroke Team Called Time: 1220  Stroke Team Arrival Date: 10/13/20  Stroke Team Arrival Time: 1227    NIH Scale:  1a. Level of Consciousness: 0-->Alert, keenly responsive  1b. LOC Questions: 0-->Answers both questions correctly  1c. LOC Commands: 0-->Performs both tasks correctly  2. Best Gaze: 0-->Normal  3. Visual: 0-->No visual loss  4. Facial Palsy: 2-->Partial paralysis (total or near-total paralysis of lower face)  5a. Motor Arm, Left: 1-->Drift, limb holds 90 (or 45) degrees, but drifts down before full 10 seconds, does not hit bed or other support  5b. Motor Arm, Right: 0-->No drift, limb holds 90 (or 45) degrees for full 10 secs  6a. Motor Leg, Left: 0-->No drift, leg holds 30 degree position for full 5 secs  6b. Motor Leg, Right: 0-->No drift, leg holds 30 degree position for full 5 secs  7. Limb Ataxia: 0-->Absent  8. Sensory: 1-->Mild-to-moderate sensory loss, patient feels pinprick is less sharp or is dull on the affected side, or there is a loss of superficial pain with pinprick, but patient is aware of being touched  9. Best Language: 0-->No aphasia, normal  10. Dysarthria: 1-->Mild-to-moderate dysarthria, patient slurs at least some words and, at worst, can be understood with some difficulty  11. Extinction and Inattention (formerly Neglect): 0-->No abnormality  Total (NIH Stroke Scale): 5       Modified Noah    Marana Coma Scale:    ABCD2 Score:    [id number] Score:   HAS -BLED Score:   ICH Score:   Hunt & Sutton Classification:      Hemorrhagic change of an Ischemic Stroke: Does this patient have an ischemic stroke with hemorrhagic changes? No     Neurologic Chief Complaint: L facial droop, slurred speech, Left side weakness    Subjective:     Interval History: Patient is seen for follow-up neurological assessment and treatment recommendations:      MRI brain acute R MCA infarct. SLP cleared for puree, thin diet, meds crushed. Order TTE. Patient c/o lab  draws/difficult stick, ordered topical lidocaine, consult palliative for pain management. Need to reach out to oncology regarding chemo management (unable to crush entrectinib), prognosis, possibly switching to Lovenox.      HPI, Past Medical, Family, and Social History remains the same as documented in the initial encounter.     Review of Systems   Constitutional: Negative for fever.   Respiratory: Negative for shortness of breath.    Cardiovascular: Negative for chest pain.   Gastrointestinal: Negative for diarrhea and vomiting.   Neurological: Positive for facial asymmetry, speech difficulty, weakness and numbness. Negative for headaches.     Scheduled Meds:   apixaban  5 mg Oral BID    cyproheptadine  4 mg Oral QID    tamsulosin  0.4 mg Oral Daily    venlafaxine  75 mg Oral Daily     Continuous Infusions:  PRN Meds:dextrose 50%, glucagon (human recombinant), influenza, insulin aspart U-100, labetalol, lidocaine, sodium chloride 0.9%    Objective:     Vital Signs (Most Recent):  Temp: 98.6 °F (37 °C) (10/14/20 1554)  Pulse: 70 (10/14/20 1554)  Resp: 18 (10/14/20 1554)  BP: (!) 132/59 (10/14/20 1554)  SpO2: 97 % (10/14/20 1554)  BP Location: Right arm    Vital Signs Range (Last 24H):  Temp:  [97.8 °F (36.6 °C)-98.6 °F (37 °C)]   Pulse:  [64-97]   Resp:  [10-18]   BP: (113-135)/(46-68)   SpO2:  [90 %-99 %]   BP Location: Right arm    Physical Exam  Vitals signs reviewed.   HENT:      Head: Normocephalic.   Eyes:      Extraocular Movements: Extraocular movements intact.   Cardiovascular:      Rate and Rhythm: Normal rate.   Pulmonary:      Effort: Pulmonary effort is normal.   Skin:     General: Skin is warm and dry.   Neurological:      Mental Status: She is alert and oriented to person, place, and time.      GCS: GCS eye subscore is 4. GCS verbal subscore is 5. GCS motor subscore is 6.      Cranial Nerves: Dysarthria and facial asymmetry present.      Motor: Weakness present.   Psychiatric:         Mood and  Affect: Mood normal.         Speech: Speech is slurred.         Neurological Exam:   LOC: alert  Attention Span: Good   Language: No aphasia  Articulation: Dysarthria  Orientation: Person, Place, Time   Visual Fields: Full  EOM (CN III, IV, VI): Full/intact  Facial Movement (CN VII): Lower facial weakness on the Left  Motor: Arm left  Paresis: 3/5  Leg left  Paresis: proximal 4/5; distal 5/5  Arm right  Normal 5/5  Leg right Normal 5/5  Sensation: Andres-hypoesthesia left  Tone: Normal tone throughout    Laboratory:  CMP:   Recent Labs   Lab 10/14/20  0822   CALCIUM 9.4   ALBUMIN 4.1   PROT 7.1      K 4.1   CO2 28      BUN 18   CREATININE 1.3   ALKPHOS 40*   ALT 13   AST 22   BILITOT 0.4     CBC:   Recent Labs   Lab 10/14/20  0822   WBC 4.55   RBC 4.07   HGB 10.7*   HCT 35.5*      MCV 87   MCH 26.3*   MCHC 30.1*     Lipid Panel:   Recent Labs   Lab 10/13/20  1226   CHOL 252*   LDLCALC 163.4*   HDL 68   TRIG 103     Hgb A1C: No results for input(s): HGBA1C in the last 168 hours.  TSH:   Recent Labs   Lab 10/13/20  1226   TSH 0.397*       Diagnostic Results     Brain imaging:  MRI Brain w/o contrast 10/13/2020:     Current MR imaging confirms suspicion of foci of acute infarctions in the right MCA distribution.  Three additional punctate foci of restricted diffusion involving the left parietal lobe, possibly emboli.     Small subacute infarction involving the left cerebellum, similar to prior examination dated 09/28/2020.     Well rounded T2/FLAIR hyperintense focus within the left lateral aspect of the body of the corpus callosum, likely related to evolving infarct as further discussed above.  If there is concerning for underlying intracranial metastasis in the setting of known metastatic lung cancer, further evaluation may be obtained with contrast enhanced MRI of the brain.       CTH Without Contrast 10/13/2020:  No findings to suggest acute major vascular territory infarct or hemorrhage noting  stable focus of low attenuation within the left cerebellum and stable right ICA region coil pack     Vessel Imaging:  CTA MP 10/14/2020:  States that there is a possible M3 vessel stenosis or occlusion. However, on evaluation of imaging it does not seem that way. Please note that patient has multiple branches from RMCA      Cardiac Evaluation:   TTE 10/14/2020:  · With normal systolic function. The estimated ejection fraction is 55%.  · The quantitatively dervived ejection fraction is 52%.  · Normal left ventricular diastolic function.  · Normal right ventricular systolic function.  · Mild tricuspid regurgitation.   · The left atrial volume index is normal.     TTE June 2020:   · Normal left ventricular systolic function. The estimated ejection fraction is 55%.  · Normal LV diastolic function.  · No wall motion abnormalities.  · Mildly reduced right ventricular systolic function.  · Normal central venous pressure (3 mmHg).  · The estimated PA systolic pressure is 26 mmHg         Arvin Farah NP  [facility]  Department of Vascular Neurology   [facility]

## 2020-10-14 NOTE — ASSESSMENT & PLAN NOTE
Area of cytotoxic cerebral edema identified when reviewing brain imaging in the territory of the right middle cerebral artery. There is no mass effect associated with it. We will continue to monitor the patients clinical exam for any worsening of symptoms which may indicate expansion of the stroke or the area of the edema resulting in the clinical change. The pattern is suggestive of hypercoaguable etiology.

## 2020-10-14 NOTE — SUBJECTIVE & OBJECTIVE
Neurologic Chief Complaint: L facial droop, slurred speech, Left side weakness    Subjective:     Interval History: Patient is seen for follow-up neurological assessment and treatment recommendations:      MRI brain acute R MCA infarct. SLP cleared for puree, thin diet, meds crushed. Order TTE. Patient c/o lab draws/difficult stick, ordered topical lidocaine, consult palliative for pain management. Need to reach out to oncology regarding chemo management (unable to crush entrectinib), prognosis, possibly switching to Lovenox.      HPI, Past Medical, Family, and Social History remains the same as documented in the initial encounter.     Review of Systems   Constitutional: Negative for fever.   Respiratory: Negative for shortness of breath.    Cardiovascular: Negative for chest pain.   Gastrointestinal: Negative for diarrhea and vomiting.   Neurological: Positive for facial asymmetry, speech difficulty, weakness and numbness. Negative for headaches.     Scheduled Meds:   apixaban  5 mg Oral BID    cyproheptadine  4 mg Oral QID    tamsulosin  0.4 mg Oral Daily    venlafaxine  75 mg Oral Daily     Continuous Infusions:  PRN Meds:dextrose 50%, glucagon (human recombinant), influenza, insulin aspart U-100, labetalol, lidocaine, sodium chloride 0.9%    Objective:     Vital Signs (Most Recent):  Temp: 98.6 °F (37 °C) (10/14/20 1554)  Pulse: 70 (10/14/20 1554)  Resp: 18 (10/14/20 1554)  BP: (!) 132/59 (10/14/20 1554)  SpO2: 97 % (10/14/20 1554)  BP Location: Right arm    Vital Signs Range (Last 24H):  Temp:  [97.8 °F (36.6 °C)-98.6 °F (37 °C)]   Pulse:  [64-97]   Resp:  [10-18]   BP: (113-135)/(46-68)   SpO2:  [90 %-99 %]   BP Location: Right arm    Physical Exam  Vitals signs reviewed.   HENT:      Head: Normocephalic.   Eyes:      Extraocular Movements: Extraocular movements intact.   Cardiovascular:      Rate and Rhythm: Normal rate.   Pulmonary:      Effort: Pulmonary effort is normal.   Skin:     General: Skin is  warm and dry.   Neurological:      Mental Status: She is alert and oriented to person, place, and time.      GCS: GCS eye subscore is 4. GCS verbal subscore is 5. GCS motor subscore is 6.      Cranial Nerves: Dysarthria and facial asymmetry present.      Motor: Weakness present.   Psychiatric:         Mood and Affect: Mood normal.         Speech: Speech is slurred.         Neurological Exam:   LOC: alert  Attention Span: Good   Language: No aphasia  Articulation: Dysarthria  Orientation: Person, Place, Time   Visual Fields: Full  EOM (CN III, IV, VI): Full/intact  Facial Movement (CN VII): Lower facial weakness on the Left  Motor: Arm left  Paresis: 3/5  Leg left  Paresis: proximal 4/5; distal 5/5  Arm right  Normal 5/5  Leg right Normal 5/5  Sensation: Andres-hypoesthesia left  Tone: Normal tone throughout    Laboratory:  CMP:   Recent Labs   Lab 10/14/20  0822   CALCIUM 9.4   ALBUMIN 4.1   PROT 7.1      K 4.1   CO2 28      BUN 18   CREATININE 1.3   ALKPHOS 40*   ALT 13   AST 22   BILITOT 0.4     CBC:   Recent Labs   Lab 10/14/20  0822   WBC 4.55   RBC 4.07   HGB 10.7*   HCT 35.5*      MCV 87   MCH 26.3*   MCHC 30.1*     Lipid Panel:   Recent Labs   Lab 10/13/20  1226   CHOL 252*   LDLCALC 163.4*   HDL 68   TRIG 103     Hgb A1C: No results for input(s): HGBA1C in the last 168 hours.  TSH:   Recent Labs   Lab 10/13/20  1226   TSH 0.397*       Diagnostic Results     Brain imaging:  MRI Brain w/o contrast 10/13/2020:     Current MR imaging confirms suspicion of foci of acute infarctions in the right MCA distribution.  Three additional punctate foci of restricted diffusion involving the left parietal lobe, possibly emboli.     Small subacute infarction involving the left cerebellum, similar to prior examination dated 09/28/2020.     Well rounded T2/FLAIR hyperintense focus within the left lateral aspect of the body of the corpus callosum, likely related to evolving infarct as further discussed above.   If there is concerning for underlying intracranial metastasis in the setting of known metastatic lung cancer, further evaluation may be obtained with contrast enhanced MRI of the brain.       CTH Without Contrast 10/13/2020:  No findings to suggest acute major vascular territory infarct or hemorrhage noting stable focus of low attenuation within the left cerebellum and stable right ICA region coil pack     Vessel Imaging:  CTA MP 10/14/2020:  States that there is a possible M3 vessel stenosis or occlusion. However, on evaluation of imaging it does not seem that way. Please note that patient has multiple branches from RMCA      Cardiac Evaluation:   TTE 10/14/2020:  · With normal systolic function. The estimated ejection fraction is 55%.  · The quantitatively dervived ejection fraction is 52%.  · Normal left ventricular diastolic function.  · Normal right ventricular systolic function.  · Mild tricuspid regurgitation.   · The left atrial volume index is normal.     TTE June 2020:   · Normal left ventricular systolic function. The estimated ejection fraction is 55%.  · Normal LV diastolic function.  · No wall motion abnormalities.  · Mildly reduced right ventricular systolic function.  · Normal central venous pressure (3 mmHg).  · The estimated PA systolic pressure is 26 mmHg

## 2020-10-14 NOTE — ASSESSMENT & PLAN NOTE
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 LKN was at 10:30 AM before she took a nap and woke up with these symptoms. tPA was not given as patient was on eliquis. CTA did not show any LVO or high grade stenosis thus, no intervention done. CTH did not show any infarct. Patient was recently discharged from our service for multiple infarcts in bilateral territories thought to be due to her hypercoagulable state (b/c of her malignancy).     MRI brain shows acute R MCA infarct. Echo EF 55%, no LAE. Etiology likely hypercoagulable state. Staff will reach out to patient's oncologist Dr. Belcher regarding possibly switching to Lovenox, prognosis and inability to crush chemo drug.      Antithrombotics for secondary stroke prevention: Anticoagulants: Apixaban 5 mg BID      Statins for secondary stroke prevention and hyperlipidemia, if present:   Cannot be on statin with current chemo regimen     Aggressive risk factor modification: HTN, Smoking, DM, HLD, Malignancy     Rehab efforts: The patient has been evaluated by a stroke team provider and the therapy needs have been fully considered based off the presenting complaints and exam findings. The following therapy evaluations are needed: PT evaluate and treat, OT evaluate and treat, SLP evaluate and treat    Diagnostics ordered/pending: None     VTE prophylaxis: None: Reason for No Pharmacological VTE Prophylaxis: Currently on anticoagulation, SCDs    BP parameters: Infarct: No intervention, SBP <220

## 2020-10-14 NOTE — PT/OT/SLP EVAL
"Speech Language Pathology Evaluation  Cognitive/Bedside Swallow    Patient Name:  Alison Branch   MRN:  [id number]  Admitting Diagnosis: Acute right MCA stroke    Recommendations:                  General Recommendations:  Dysphagia therapy and Speech/language therapy  Diet recommendations:  Puree, Thin   Aspiration Precautions: 1 bite/sip at a time, Alternating bites/sips, Avoid talking while eating, Check for pocketing/oral residue, Feed only when awake/alert, HOB to 90 degrees, Meds crushed in puree, Remain upright 30 minutes post meal and Small bites/sips   General Precautions: Standard, fall, aspiration  Communication strategies:  provide increased time to answer and go to room if call light pushed    History:     Past Medical History:   Diagnosis Date    Allergy     Brain aneurysm 2010    s/p coiling of one; another not coiled    Breast cyst     Depression 9/19/2019    Diabetes mellitus     Diabetes type 2, controlled     Fever blister     High cholesterol     History of Bell's palsy     HTN (hypertension) 5/20/2014    Recurrent upper respiratory infection (URI)        Past Surgical History:   Procedure Laterality Date    BREAST SURGERY      BRONCHOSCOPY N/A 5/28/2019    Procedure: Bronchoscopy;  Surgeon: [facility] Diagnostic Provider;  [facility];  Service: Anesthesiology;  Laterality: N/A;    CERVICAL FUSION      COLONOSCOPY N/A 3/9/2016    Procedure: COLONOSCOPY;  Surgeon: Elliott Zimmerman MD;  Location: [facility] ENDO (4TH FLR);  Service: Endoscopy;  Laterality: N/A;    head surgery      stent and "curling" for aneurysm    HYSTERECTOMY      TVH secondary to SUF    INSERTION OF TUNNELED CENTRAL VENOUS CATHETER (CVC) WITH SUBCUTANEOUS PORT Right 7/8/2019    Procedure: INSERTION, PORT-A-CATH;  Surgeon: Cali Damico MD;  Location: [facility] CATH LAB;  Service: Radiology;  Laterality: Right;    MENISCECTOMY Left        Social History: Pt previously lived with daughter and has 3 children. " "Pt in inpatient rehab prior to current admit. Pt well-known to ST and received services during previous admit (d/c 10/5).    Prior diet: reg/thin, pt reports diabetic diet.    Modified Barium Swallow: 1/2020. Indicated diet of regular solids and thin liquids.    Subjective     Pt alert and awake upon SLP entry.   "I'm so happy yall came to see me, I'm starving"    Objective:     Cognitive Status:    Arousal/Alertness Appropriate response to stimuli  Orientation Oriented x4  Memory Immediate Recall 100% acc and Delayed 100% acc after 1 min delay  Problem Solving Sequencing 100% acc, Solutions 100% acc and Compare/contrast 100% acc with min cueing      Receptive Language:   Comprehension:   Questions Complex yes/no 75% acc initially, increased to 100% acc with repetition  Commands  two step basic commands 2/3 indpendently, increased to 3/3 with repetition  multistep basic commands 4/4    Expressive Language:  Verbal:    Naming Convergent 100% acc, Divergent responsive 13 items in 1 min (15 normal) and Single word responsive naming 100% acc      Motor Speech:  Dysarthria c/b imprecise articulation. Pt intelligible in known contexts, intelligibility decreases in unknown contexts and with increased rate of speech. Pt reports slurred speech to be biggest difference with this stroke, not at her baseline.    Voice:   WFL    Visual-Spatial:  tba    Reading:   tba     Written Expression:   tba    Oral Musculature Evaluation  Oral Musculature: facial asymmetry present  Dentition: present and adequate  Secretion Management: left corner drooling  Mucosal Quality: adequate  Mandibular Strength and Mobility: WFL  Oral Labial Strength and Mobility: WFL  Lingual Strength and Mobility: WFL  Velar Elevation: WFL  Buccal Strength and Mobility: WFL  Volitional Cough: WFL  Volitional Swallow: WFL  Voice Prior to PO Intake: clear    Bedside Swallow Eval:   Consistencies Assessed:  · Thin liquids via cup x2, via straw x3  · Puree via tsp " x3  · Solids via 1/4 marilyn cracker x1     Oral Phase:   · Left anterior loss noted with puree mixed with saliva. Drooling of saliva also observed t/o session.  · Prolonged mastication with solid.   · Pt unaware of solid material sitting on labial surface. Required min cueing to clear.    Pharyngeal Phase:   · no overt clinical signs/symptoms of aspiration  · no overt clinical signs/symptoms of pharyngeal dysphagia    Compensatory Strategies  · None    Treatment: ST initially with recs for diet of mechanical soft solids and thin liquids. However, pt reported to pocket food during meal with difficulty clearing oral cavity per RN, not evident during bedside evaluation. Pt safe for diet of puree and thin liquids at this time. Education proved regarding role of SLP, simple speech strategies, diet recommendations, and ongoing ST plan of care. Pt verbalized understanding and is in agreement with plan. ST to continue to monitor.    Assessment:     Alison Branch is a 64 y.o. female with an SLP diagnosis of mild oral Dysphagia and Dysarthria.  Pt with history of previous stroke 9/28.    Goals:   Multidisciplinary Problems     SLP Goals        Problem: SLP Goal    Goal Priority Disciplines Outcome   SLP Goal     SLP Ongoing, Progressing   Description: Speech Language Pathology Goals  Goals expected to be met by 10/21  1. Pt will tolerate diet of puree and thin liquids with no overt signs of airway compromise.  2. Pt will participate in ongoing swallow assessment to determine safest and least restrictive diet.  3. Pt will recall simple speech strategies with 90% acc given min A.  4. Pt will utilize simple speech strategies during structured tasks and conversation with 90% acc given min A.  5. Pt will participate in ongoing reading, writing, and visual-spatial assessment to determine purpose for therapeutic targets.                 Plan:     · Patient to be seen:  4 x/week   · Plan of Care expires:  11/13/20  · Plan of Care  reviewed with:  patient   · SLP Follow-Up:  Yes       Discharge recommendations:  Discharge Facility/Level of Care Needs: other (see comments)(tbd)   Barriers to Discharge:  Level of Skilled Assistance Needed      Time Tracking:     SLP Treatment Date:   10/14/20  Speech Start Time:  0848  Speech Stop Time:  0913     Speech Total Time (min):  25 min    Billable Minutes: Eval 8 , Eval Swallow and Oral Function 8 and Seld Care/Home Management Training 9    GILBERTO Sue  10/14/2020

## 2020-10-14 NOTE — PT/OT/SLP PROGRESS
Occupational Therapy      Patient Name:  Alison Branch   MRN:  [id number]    Patient not seen today secondary to Unavailable:  Pt with ST upon first attempt, patient EDIN for CT upon second attempt.  Still an eval. Will follow-up 10/15.    Mercedes Box, OT  10/14/2020

## 2020-10-14 NOTE — ASSESSMENT & PLAN NOTE
Patient c/o lab draws/difficult stick, ordered topical lidocaine, consult palliative for pain management.

## 2020-10-14 NOTE — ASSESSMENT & PLAN NOTE
- On periactin for urethral pain and retention. Was seen by urology last admission  - Seen in urology clinic 9/25 for incomplete bladder emptying; hennessy removed on 10/6. While at rehab UA with positive nitrate, 2+ leukocytes, and many bacteria (10/7). Was on Cipro.   - Repeat UA.

## 2020-10-14 NOTE — PLAN OF CARE
Plan of care reviewed with pt. Pt voiced understanding. Pt AAOx3. Pt denies any c/o during the shift. Pt refused labs ,No apparent distress noted. Fall precautions maintained. Bed in lowest position, and locked. Call light within reach and advised to call for assistance. Side rails x 2 and slip resistant socks on at this time.

## 2020-10-15 ENCOUNTER — PATIENT MESSAGE (OUTPATIENT)
Dept: DIABETES | Facility: CLINIC | Age: 65
End: 2020-10-15

## 2020-10-15 DIAGNOSIS — E11.9 TYPE 2 DIABETES MELLITUS WITHOUT COMPLICATION, UNSPECIFIED WHETHER LONG TERM INSULIN USE: ICD-10-CM

## 2020-10-15 PROBLEM — R53.1 LEFT-SIDED WEAKNESS: Status: ACTIVE | Noted: 2020-10-15

## 2020-10-15 LAB
ALBUMIN SERPL BCP-MCNC: 4.1 G/DL (ref 3.5–5.2)
ALP SERPL-CCNC: 43 U/L (ref 55–135)
ALT SERPL W/O P-5'-P-CCNC: 15 U/L (ref 10–44)
ANION GAP SERPL CALC-SCNC: 10 MMOL/L (ref 8–16)
AST SERPL-CCNC: 21 U/L (ref 10–40)
BACTERIA #/AREA URNS AUTO: NORMAL /HPF
BASOPHILS # BLD AUTO: 0.02 K/UL (ref 0–0.2)
BASOPHILS NFR BLD: 0.4 % (ref 0–1.9)
BILIRUB SERPL-MCNC: 0.5 MG/DL (ref 0.1–1)
BILIRUB UR QL STRIP: NEGATIVE
BUN SERPL-MCNC: 18 MG/DL (ref 8–23)
CALCIUM SERPL-MCNC: 9.5 MG/DL (ref 8.7–10.5)
CHLORIDE SERPL-SCNC: 107 MMOL/L (ref 95–110)
CLARITY UR REFRACT.AUTO: CLEAR
CO2 SERPL-SCNC: 25 MMOL/L (ref 23–29)
COLOR UR AUTO: ABNORMAL
CREAT SERPL-MCNC: 1.3 MG/DL (ref 0.5–1.4)
DIFFERENTIAL METHOD: ABNORMAL
EOSINOPHIL # BLD AUTO: 0.1 K/UL (ref 0–0.5)
EOSINOPHIL NFR BLD: 1.5 % (ref 0–8)
ERYTHROCYTE [DISTWIDTH] IN BLOOD BY AUTOMATED COUNT: 15.6 % (ref 11.5–14.5)
EST. GFR  (AFRICAN AMERICAN): 50.5 ML/MIN/1.73 M^2
EST. GFR  (NON AFRICAN AMERICAN): 43.8 ML/MIN/1.73 M^2
GLUCOSE SERPL-MCNC: 107 MG/DL (ref 70–110)
GLUCOSE UR QL STRIP: NEGATIVE
HCT VFR BLD AUTO: 34.1 % (ref 37–48.5)
HGB BLD-MCNC: 10.8 G/DL (ref 12–16)
HGB UR QL STRIP: NEGATIVE
IMM GRANULOCYTES # BLD AUTO: 0.02 K/UL (ref 0–0.04)
IMM GRANULOCYTES NFR BLD AUTO: 0.4 % (ref 0–0.5)
KETONES UR QL STRIP: NEGATIVE
LEUKOCYTE ESTERASE UR QL STRIP: NEGATIVE
LYMPHOCYTES # BLD AUTO: 1.7 K/UL (ref 1–4.8)
LYMPHOCYTES NFR BLD: 31.6 % (ref 18–48)
MCH RBC QN AUTO: 27.1 PG (ref 27–31)
MCHC RBC AUTO-ENTMCNC: 31.7 G/DL (ref 32–36)
MCV RBC AUTO: 86 FL (ref 82–98)
MICROSCOPIC COMMENT: NORMAL
MONOCYTES # BLD AUTO: 0.6 K/UL (ref 0.3–1)
MONOCYTES NFR BLD: 11.3 % (ref 4–15)
NEUTROPHILS # BLD AUTO: 3 K/UL (ref 1.8–7.7)
NEUTROPHILS NFR BLD: 54.8 % (ref 38–73)
NITRITE UR QL STRIP: POSITIVE
NRBC BLD-RTO: 0 /100 WBC
PH UR STRIP: 6 [PH] (ref 5–8)
PLATELET # BLD AUTO: 217 K/UL (ref 150–350)
PMV BLD AUTO: 12.3 FL (ref 9.2–12.9)
POCT GLUCOSE: 107 MG/DL (ref 70–110)
POCT GLUCOSE: 163 MG/DL (ref 70–110)
POCT GLUCOSE: 79 MG/DL (ref 70–110)
POCT GLUCOSE: 99 MG/DL (ref 70–110)
POTASSIUM SERPL-SCNC: 4.6 MMOL/L (ref 3.5–5.1)
PROT SERPL-MCNC: 7.1 G/DL (ref 6–8.4)
PROT UR QL STRIP: NEGATIVE
RBC # BLD AUTO: 3.99 M/UL (ref 4–5.4)
RBC #/AREA URNS AUTO: 1 /HPF (ref 0–4)
SODIUM SERPL-SCNC: 142 MMOL/L (ref 136–145)
SP GR UR STRIP: 1.02 (ref 1–1.03)
SQUAMOUS #/AREA URNS AUTO: 0 /HPF
URN SPEC COLLECT METH UR: ABNORMAL
WBC # BLD AUTO: 5.51 K/UL (ref 3.9–12.7)
WBC #/AREA URNS AUTO: 3 /HPF (ref 0–5)

## 2020-10-15 PROCEDURE — 97535 SELF CARE MNGMENT TRAINING: CPT

## 2020-10-15 PROCEDURE — 92526 ORAL FUNCTION THERAPY: CPT

## 2020-10-15 PROCEDURE — 36415 COLL VENOUS BLD VENIPUNCTURE: CPT

## 2020-10-15 PROCEDURE — 25000003 PHARM REV CODE 250: Performed by: PHYSICIAN ASSISTANT

## 2020-10-15 PROCEDURE — 80053 COMPREHEN METABOLIC PANEL: CPT

## 2020-10-15 PROCEDURE — 11000001 HC ACUTE MED/SURG PRIVATE ROOM

## 2020-10-15 PROCEDURE — 97166 OT EVAL MOD COMPLEX 45 MIN: CPT

## 2020-10-15 PROCEDURE — 99222 1ST HOSP IP/OBS MODERATE 55: CPT | Mod: GC,,, | Performed by: INTERNAL MEDICINE

## 2020-10-15 PROCEDURE — 99222 PR INITIAL HOSPITAL CARE,LEVL II: ICD-10-PCS | Mod: ,,, | Performed by: NURSE PRACTITIONER

## 2020-10-15 PROCEDURE — 92507 TX SP LANG VOICE COMM INDIV: CPT

## 2020-10-15 PROCEDURE — 25000003 PHARM REV CODE 250: Performed by: FAMILY MEDICINE

## 2020-10-15 PROCEDURE — 97162 PT EVAL MOD COMPLEX 30 MIN: CPT

## 2020-10-15 PROCEDURE — 99222 1ST HOSP IP/OBS MODERATE 55: CPT | Mod: ,,, | Performed by: NURSE PRACTITIONER

## 2020-10-15 PROCEDURE — 85025 COMPLETE CBC W/AUTO DIFF WBC: CPT

## 2020-10-15 PROCEDURE — 99233 PR SUBSEQUENT HOSPITAL CARE,LEVL III: ICD-10-PCS | Mod: ,,, | Performed by: PSYCHIATRY & NEUROLOGY

## 2020-10-15 PROCEDURE — 97802 MEDICAL NUTRITION INDIV IN: CPT

## 2020-10-15 PROCEDURE — 25000003 PHARM REV CODE 250: Performed by: EMERGENCY MEDICINE

## 2020-10-15 PROCEDURE — 63600175 PHARM REV CODE 636 W HCPCS: Performed by: PHYSICIAN ASSISTANT

## 2020-10-15 PROCEDURE — 81001 URINALYSIS AUTO W/SCOPE: CPT

## 2020-10-15 PROCEDURE — 97530 THERAPEUTIC ACTIVITIES: CPT

## 2020-10-15 PROCEDURE — 99222 PR INITIAL HOSPITAL CARE,LEVL II: ICD-10-PCS | Mod: GC,,, | Performed by: INTERNAL MEDICINE

## 2020-10-15 PROCEDURE — 99233 SBSQ HOSP IP/OBS HIGH 50: CPT | Mod: ,,, | Performed by: PSYCHIATRY & NEUROLOGY

## 2020-10-15 RX ORDER — EZETIMIBE 10 MG/1
10 TABLET ORAL NIGHTLY
Status: DISCONTINUED | OUTPATIENT
Start: 2020-10-15 | End: 2020-10-20 | Stop reason: HOSPADM

## 2020-10-15 RX ORDER — HEPARIN SODIUM 5000 [USP'U]/ML
5000 INJECTION, SOLUTION INTRAVENOUS; SUBCUTANEOUS EVERY 8 HOURS
Status: DISCONTINUED | OUTPATIENT
Start: 2020-10-15 | End: 2020-10-16

## 2020-10-15 RX ADMIN — TAMSULOSIN HYDROCHLORIDE 0.4 MG: 0.4 CAPSULE ORAL at 09:10

## 2020-10-15 RX ADMIN — APIXABAN 5 MG: 5 TABLET, FILM COATED ORAL at 09:10

## 2020-10-15 RX ADMIN — HEPARIN SODIUM 5000 UNITS: 5000 INJECTION INTRAVENOUS; SUBCUTANEOUS at 09:10

## 2020-10-15 RX ADMIN — VENLAFAXINE HYDROCHLORIDE 75 MG: 37.5 CAPSULE, EXTENDED RELEASE ORAL at 09:10

## 2020-10-15 RX ADMIN — CYPROHEPTADINE HYDROCHLORIDE 4 MG: 4 TABLET ORAL at 05:10

## 2020-10-15 RX ADMIN — CYPROHEPTADINE HYDROCHLORIDE 4 MG: 4 TABLET ORAL at 01:10

## 2020-10-15 RX ADMIN — CYPROHEPTADINE HYDROCHLORIDE 4 MG: 4 TABLET ORAL at 09:10

## 2020-10-15 RX ADMIN — EZETIMIBE 10 MG: 10 TABLET ORAL at 09:10

## 2020-10-15 NOTE — PT/OT/SLP EVAL
"Occupational Therapy   Evaluation and Treatment    Name: Alison Branch  MRN: [id number]  Admitting Diagnosis:  Embolic stroke involving right middle cerebral artery      Recommendations:     Discharge Recommendations: rehabilitation facility  Discharge Equipment Recommendations:  (TBD)  Barriers to discharge:  Other (Comment)(Pt requires increased assistance at current functional level)    Assessment:     Alison Branch is a 65 y.o. female with a medical diagnosis of Embolic stroke involving right middle cerebral artery.  She presents with performance deficits affecting function: weakness, impaired endurance, impaired self care skills, impaired functional mobilty, impaired balance, gait instability, decreased upper extremity function, decreased lower extremity function, decreased coordination, impaired cognition, decreased safety awareness, impaired fine motor, decreased ROM, impaired coordination.  Pt tolerated session well and highly motivated to participate in therapy session. Pt however is not functioning at PLOF and requires increased assistance at current functional level for all mobility and transfers. Pt would greatly benefit from IP Rehab in order to improver return to PLOF and facilitate safe return back to the home environment.     Rehab Prognosis: Good; patient would benefit from acute skilled OT services to address these deficits and reach maximum level of function.       Plan:     Patient to be seen 4 x/week to address the above listed problems via self-care/home management, therapeutic activities, therapeutic exercises, neuromuscular re-education, cognitive retraining, sensory integration  · Plan of Care Expires: 11/13/20  · Plan of Care Reviewed with: patient    Subjective     Pt stated, " Where's my birthday cake boy."   Chief Complaint: hates pureed food   Patient/Family Comments/goals: to get better and return home     Occupational Profile:  Living Environment: Pt lives with her daughter in a 2nd " floor apartment with a flight of stairs. Pt admitted to [facility] from IP Rehab. Prior to September, pt was (I) with ADLs and mod (I) for functional mobility using SPC. Pt has a tub/shower combo with shower chair.   Roles and Routines: mother, grandmother, enjoys times with her family/friends   Equipment Used at Home:  bedside commode, rollator, cane, straight, shower chair  Assistance upon Discharge: Pt will have assistance from family upon d/c.     Pain/Comfort:  · Pain Rating 1: 0/10  · Pain Rating Post-Intervention 1: 0/10    Patients cultural, spiritual, Confucianist conflicts given the current situation: no    Objective:     Communicated with: RN prior to session.  Patient found HOB elevated with telemetry, bed alarm upon OT entry to room. Pt agreeable to therapy session.     General Precautions: Standard, fall, aspiration   Orthopedic Precautions:N/A   Braces: N/A     Occupational Performance:    Bed Mobility:    · Patient completed Scooting/Bridging with contact guard assistance and for supine scooting towards HOB and min A for anterior scooting with assistance for L hip   · Patient completed Supine to Sit with moderate assistance for trunk elevation and L LE management  · Patient completed Sit to Supine with minimum assistance for L LE management    Functional Mobility/Transfers:  · Patient completed Sit <> Stand Transfer with maximal assistance and of 2 persons  with  hand-held assist    · L lateral lean   · L knee blocking   · Facilitation provided at hips for hip extension   · Verbal cues for upright posutre and forward gaze   · Pt tolerated standing for ~20 secs  · Functional Mobility: not performed this date due to increased assistance for standing balance     Activities of Daily Living:  · Feeding:  minimum assistance    · Pt set-up for lunch while sitting in chair position in bed   · Pt able to bring drink to mouth using R UE and take sips through straw   · Pt able to scoop pureed food using spoon in R hand  and bring to mouth   · Pt noted to drop food on self and difficulty manipulating food in mouth   · Lower Body Dressing: total assistance donning B  socks     Cognitive/Visual Perceptual:  Cognitive/Psychosocial Skills:     -       Oriented to: Person, Place, Time and Situation   -       Follows Commands/attention:Easily distracted and Follows two-step commands  -       Communication: dysarthria, L facial droop  -       Memory: No Deficits noted  -       Safety awareness/insight to disability: impaired   -       Mood/Affect/Coping skills/emotional control: Appropriate to situation  Visual/Perceptual:      -Impaired  L inattention vs hemianopsia       Physical Exam:  Balance:     Static sit: max A with posterior and L lateral lean and briefs bouts of mod A    Dynamic sit: max A    Static standing: max A x 2 person     Postural examination/scapula alignment:    -       Rounded shoulders  -       Forward head  Skin integrity: Visible skin intact  Edema:  None noted  Sensation:    -       Intact  light/touch BUEs   Dominant hand:    -       right   Upper Extremity Range of Motion:     -       Right Upper Extremity: WFL  -       Left Upper Extremity: WFL for PROM  Upper Extremity Strength:    -       Right Upper Extremity: WFL  -       Left Upper Extremity: Deficits: shoulder: 3-/5, biceps: 2/5, triceps, 2-/5, wrist flexion/extension: 2-/5, grasp 1/5    AMPAC 6 Click ADL:  AMPAC Total Score: 11    Treatment & Education:   Pt educated on role of OT, POC, and goals for therapy.     POC was dicussed with patient/caregiver, who was included in its development and is in agreement with the identified goals and treatment plan.    Potential visual impairment noted to L side    Pt educated on elevated L UE on pillow while sitting in bed in order to prevent subluxation and maintain joint integrity.    Time provided for therapeutic counseling and discussion of health disposition.    Educated on importance of EOB/OOB  mobility, maintaining routine, sitting up in chair, and maximizing independence with ADLs during admission    Pt completed ADLs and functional mobility for treatment session as noted above    Pt/caregiver verbalized understanding and expressed no further concerns/questions.   Updated communication board   Education:    Patient left with bed in chair position with all lines intact, call button in reach, bed alarm on and RN notified    GOALS:   Multidisciplinary Problems     Occupational Therapy Goals        Problem: Occupational Therapy Goal    Goal Priority Disciplines Outcome Interventions   Occupational Therapy Goal     OT, PT/OT Ongoing, Progressing    Description: Goals to be met by: 10/27/2020     Patient will increase functional independence with ADLs by performing:    Feeding with Minimal Assistance while sitting EOB  Grooming while EOB with Minimal Assistance.  Toileting from bedside commode with Moderate Assistance for hygiene and clothing management.   Sitting at edge of bed x10 minutes with Minimal Assistance.  Stand pivot transfers with Minimal Assistance.  Toilet transfer to bedside commode with Minimal Assistance.                     History:     Past Medical History:   Diagnosis Date    Allergy     Brain aneurysm 2010    s/p coiling of one; another not coiled    Breast cyst     Depression 9/19/2019    Diabetes mellitus     Diabetes type 2, controlled     Fever blister     High cholesterol     History of Bell's palsy     HTN (hypertension) 5/20/2014    Recurrent upper respiratory infection (URI)        Past Surgical History:   Procedure Laterality Date    BREAST SURGERY      BRONCHOSCOPY N/A 5/28/2019    Procedure: Bronchoscopy;  Surgeon: [facility] Diagnostic Provider;  Location: [facility] OR [address] [place] [address];  Service: Anesthesiology;  Laterality: N/A;    CERVICAL FUSION      COLONOSCOPY N/A 3/9/2016    Procedure: COLONOSCOPY;  Surgeon: Elliott Zimmerman MD;  Location: [facility] ENDO (4TH FLR);  Service:  "Endoscopy;  Laterality: N/A;    head surgery      stent and "curling" for aneurysm    HYSTERECTOMY      TVH secondary to SUF    INSERTION OF TUNNELED CENTRAL VENOUS CATHETER (CVC) WITH SUBCUTANEOUS PORT Right 7/8/2019    Procedure: INSERTION, PORT-A-CATH;  Surgeon: Cali Damico MD;  Location: [facility] CATH LAB;  Service: Radiology;  Laterality: Right;    MENISCECTOMY Left        Time Tracking:     OT Date of Treatment: 10/15/20  OT Start Time: 1115  OT Stop Time: 1143  OT Total Time (min): 28 min    Billable Minutes:Evaluation 12  Self Care/Home Management 12    Chely M Mensi, OT  10/15/2020    "

## 2020-10-15 NOTE — ASSESSMENT & PLAN NOTE
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 She is on Eliquis.  Of note, patient was recently discharged from stroke service for multiple infarcts in bilateral territories thought to be due to her hypercoagulable state in setting of malignancy.      MRI Brain consistent with  acute infarct in the R MCA, L MCA, and L cerebellar territories. Per stroke team etiology regmains unclear. Oncology has been consulted     Recommendations:   1. Agree with switch to subcutaneous Lovenox 1 mg/kg every 12 hours over Eliquis  2. Agree with holding entrectinib while inpatient & follow up with Dr. Belcher to discuss continuation  3. Prognosis is difficult to determine at this point. Will have to see how patient does with Rehab.

## 2020-10-15 NOTE — PLAN OF CARE
10/14: MRI brain acute R MCA infarct. SLP cleared for puree, thin diet, meds crushed. Order TTE. Patient c/o lab draws/difficult stick, ordered topical lidocaine, consult palliative for pain management. Need to reach out to oncology regarding chemo management (unable to crush entrectinib), prognosis, possibly switching to Lovenox.         10/15/20 1531   Discharge Reassessment   Assessment Type Discharge Planning Reassessment   Provided patient/caregiver education on the expected discharge date and the discharge plan Yes   Do you have any problems affording any of your prescribed medications? TBD   Discharge Plan A Rehab   Discharge Plan B Home with family;Home Health   DME Needed Upon Discharge  other (see comments)  (tbd)   Patient choice form signed by patient/caregiver N/A   Anticipated Discharge Disposition Rehab   Can the patient/caregiver answer the patient profile reliably? Yes, cognitively intact   How does the patient rate their overall health at the present time? Fair   Describe the patient's ability to walk at the present time. Walks with the help of equipment   How often would a person be available to care for the patient? Occasionally   Number of comorbid conditions (as recorded on the chart) Five or more   During the past month, has the patient often been bothered by feeling down, depressed or hopeless? No   During the past month, has the patient often been bothered by little interest or pleasure in doing things? No   Post-Acute Status   Post-Acute Authorization Placement   Post-Acute Placement Status Awaiting Internal Medical Clearance   Discharge Delays (!) Diet Not Ready for Discharge     Sabina Adler RN  Case Management  Ext: [phone]

## 2020-10-15 NOTE — PT/OT/SLP EVAL
"Physical Therapy Evaluation    Patient Name:  Alison Branch   MRN:  [id number]    Recommendations:     Discharge Recommendations:  rehabilitation facility   Discharge Equipment Recommendations: other (see comments)(TBD by next level of care)   Barriers to discharge: Inaccessible home and Decreased caregiver support    Assessment:     Alison Branch is a 65 y.o. female admitted with a medical diagnosis of Embolic stroke involving right middle cerebral artery.  She presents with the following impairments/functional limitations:  weakness, impaired endurance, impaired self care skills, impaired functional mobilty, gait instability, impaired balance, visual deficits, impaired cognition, decreased upper extremity function, decreased lower extremity function, decreased safety awareness, impaired coordination, impaired fine motor. PT recommending inpatient rehab upon DC form hospital.    Rehab Prognosis: Good; patient would benefit from acute skilled PT services to address these deficits and reach maximum level of function.    Recent Surgery: * No surgery found *      Plan:     During this hospitalization, patient to be seen 4 x/week to address the identified rehab impairments via gait training, therapeutic activities, therapeutic exercises, neuromuscular re-education and progress toward the following goals:    · Plan of Care Expires:  11/14/20    Subjective     Chief Complaint: hates pureed food  Patient/Family Comments/goals: return home and improve functional mobility  "no, let me do it"  Pain/Comfort:  · Pain Rating 1: 0/10  · Pain Rating Post-Intervention 1: 0/10    Patients cultural, spiritual, Mormon conflicts given the current situation: no    Living Environment: Pt lives with her daughter in a 2nd floor apartment with a flight of stairs. Pt admitted to [facility] from IP Rehab. Prior to September, pt was (I) with ADLs and mod (I) for functional mobility using SPC. Pt has a tub/shower combo with shower chair.   Roles and " Routines: mother, grandmother, enjoys times with her family/friends   Equipment Used at Home:  bedside commode, rollator, cane, straight, shower chair  Assistance upon Discharge: Pt will have assistance from family upon d/c.     Objective:     Communicated with RN prior to session.  Patient found HOB elevated with telemetry, bed alarm  upon PT entry to room.    Additional staffing present: OT    General Precautions: Standard, fall, aspiration   Orthopedic Precautions:N/A   Braces: N/A     Exams:  · Cognitive Exam:  Patient is oriented to Person, Place, Time and Situation  · Fine Motor Coordination:  BLE, heel/shin, decreased  · Gross Motor Coordination:  Impaired/decreased, L trunk lean  · Postural Exam:  Patient presented with the following abnormalities:    · -       Rounded shoulders  · -       Forward head  · RLE ROM: WFL  · RLE Strength: Deficits: R hip flexion 3+/5, knee extension 4/5, knee flexion 4/5, DF 3+/5  · LLE ROM: WFL  · LLE Strength: Deficits: hip flexion 2/5, knee extension 3+/5, knee flexion  3+/5, DF 0/5    Functional Mobility:  · Bed Mobility:     · Scooting to HOB in supine: contact guard assistance   · Scooting anteriorly to EOB: Marci, assistance needed for L hip  · Bridging: contact guard assistance  · Supine to Sit: moderate assistance  · Sit to Supine: minimum assistance  · Transfers:     · Sit to Stand:  maximal assistance and of 2 persons with no AD and hand-held assist, L lateral lean, L knee blocked, verbal and tactile cuing for upright posture, ~20 seconds  · Gait: No appropriate this date   · Balance:   · Static sitting: mod A to max A, L posterolateral lean, RUE on bed rail and LUE hand placed on bed  · Dynamic Sitting: max A  · Static Standing: max A x2, L lean and L knee blocked, Verbal and tactile cuing for posture  · Dynamic Standing: N/T    Therapeutic Activities and Exercises:  Patient educated on role of therapy, goals of session, and benefits of mobilizing.   Discussed PT plan  of care during hospitalization.   Patient educated on calling for assistance.   Patient educated on how their diagnosis impacts their mobility within PT scope of practice.   Communication board up to date.  All questions answered within PT scope of practice.      AM-PAC 6 CLICK MOBILITY  Total Score:9     Patient left with bed in chair position with all lines intact, call button in reach, bed alarm on and RN notified.    GOALS:   Multidisciplinary Problems     Physical Therapy Goals        Problem: Physical Therapy Goal    Goal Priority Disciplines Outcome Goal Variances Interventions   Physical Therapy Goal     PT, PT/OT Ongoing, Progressing     Description: Goals to be met by: 10/29/2020    Patient will increase functional independence with mobility by performin. Pt will perform bed mobility (rolling L/R, scooting, and bridging) with modified supervision.  2. Pt will perform supine to/from sit with supervision.  3. Pt will sit EOB x 10 mins with no UE support with supervision.  4. Pt will perform sit to stand with mod A and LRAD.  5. Pt will ambulate 10 feet with mod assistance and LRAD.  6. Pt will perform there-ex from handout x 15 reps to improve strength for functional mobility.  7. Pt will perform squat pivot t/f to bedside chair with modA.                         History:     Past Medical History:   Diagnosis Date    Allergy     Brain aneurysm     s/p coiling of one; another not coiled    Breast cyst     Depression 2019    Diabetes mellitus     Diabetes type 2, controlled     Fever blister     High cholesterol     History of Bell's palsy     HTN (hypertension) 2014    Recurrent upper respiratory infection (URI)        Past Surgical History:   Procedure Laterality Date    BREAST SURGERY      BRONCHOSCOPY N/A 2019    Procedure: Bronchoscopy;  [facility];  Location: [facility] OR [address] [place] [address];  Service: Anesthesiology;  Laterality: N/A;    CERVICAL FUSION    "   COLONOSCOPY N/A 3/9/2016    Procedure: COLONOSCOPY;  Surgeon: Elliott Zimmerman MD;  Location: [facility] ENDO ([address] [place] [address]);  Service: Endoscopy;  Laterality: N/A;    head surgery      stent and "curling" for aneurysm    HYSTERECTOMY      TVH secondary to SUF    INSERTION OF TUNNELED CENTRAL VENOUS CATHETER (CVC) WITH SUBCUTANEOUS PORT Right 7/8/2019    Procedure: INSERTION, PORT-A-CATH;  Surgeon: Cali Damico MD;  Location: [facility] CATH LAB;  Service: Radiology;  Laterality: Right;    MENISCECTOMY Left        Time Tracking:     PT Received On: 10/15/20  PT Start Time: 1115     PT Stop Time: 1135  PT Total Time (min): 20 min     Billable Minutes: Evaluation 10 and Therapeutic Activity 10      Reina Mendoza, PT  10/15/2020  "

## 2020-10-15 NOTE — PROGRESS NOTES
[facility]  Vascular Neurology  Comprehensive Stroke Center  Progress Note    Assessment/Plan:     * Embolic stroke involving right middle cerebral artery  66 y/o woman with a medical history of pulmonary adenocarcinoma with mets to the spine, previous brain aneurysm s/p coiling, previous DVTs (on eliquis) presented from rehab when she was found to have new-onset left facial droop, with slurred speech. No tPA due to Eliquis. No large vessel occlusion so no IR intervention. Of note, patient was recently discharged from our service for multiple infarcts in bilateral territories thought to be due to her hypercoagulable state (malignancy.)    MRI Brain shows acute infarct in the R MCA, L MCA, and L cerebellar territories. Echo EF 55%, no LAE. Note that new strokes were in the setting of compliance with Eliquis. Stroke etiology regmains unclear - possible hypoperfusion though no vessel stenosis seen or signs of dehydration at presentation. Otherwise, concern for hypercoagulable state. Consulted Oncology 10/15 for anticoagulation recs (re: consider switch to Lovenox?)        Antithrombotics for secondary stroke prevention: Anticoagulants: Apixaban 5 mg BID    Statins for secondary stroke prevention and hyperlipidemia, if present:   Cannot be on statin with current chemo regimen   Aggressive risk factor modification: HTN, Smoking, DM, HLD, Malignancy  Rehab efforts: The patient has been evaluated by a stroke team provider and the therapy needs have been fully considered based off the presenting complaints and exam findings. The following therapy evaluations are needed: PT evaluate and treat, OT evaluate and treat, SLP evaluate and treat, PM&R evaluate for appropriate placement - dispo inpatient rehab  Diagnostics ordered/pending: None   VTE prophylaxis: None: Reason for No Pharmacological VTE Prophylaxis: Currently on anticoagulation, SCDs  BP parameters: Infarct: No intervention, SBP  <220    Oropharyngeal dysphagia  2/2 stroke  SLP eval and treat -- Recommending puree diet, thin liquids with frequent nursing checks for any pocketing. Meds crushed in pudding though per SLP, able to bury chemo med in puree to avoid crushing.  Pt states she does not want a feeding tube 10/15, will try her best to keep swallowing the ways that SLP has instructed her. Added Boost to regimen as well.     Urinary retention  - On periactin at home for urethral pain and retention. Was seen by urology last admission  - Seen in urology clinic 9/25 for incomplete bladder emptying; hennessy removed on 10/6. While at rehab, UA with positive nitrate, 2+ leukocytes, and many bacteria (10/7). Pt was on Cipro.   - Repeat UA pending; requested nurse to collect on 10/15    Malignant neoplasm of upper lobe of left lung  Reportedly metastatic to involve the spine.  Stroke risk factor - suspect contributing to an underlying hypercoagulability  Consulted Oncology on 10/15 regarding cancer prognostication, ordering pt's chemo drug while inpatient, and anticoagulation recs. Appreciate assistance    Hypertension associated with diabetes  Stroke risk factor   SBP < 220 acutely  Home Lasix held for now    Left-sided weakness  2/2 stroke  PT, OT eval & treat - dispo rehab    Cytotoxic cerebral edema  Area of cytotoxic cerebral edema identified when reviewing brain imaging in the territory of the right middle cerebral artery. There is no mass effect associated with it. We will continue to monitor the patients clinical exam for any worsening of symptoms which may indicate expansion of the stroke or the area of the edema resulting in the clinical change. The pattern is suggestive of embolic etiology related to hypercoagulable state.    Mixed hyperlipidemia due to type 2 diabetes mellitus  Stroke risk factor   LDL at 163  Cannot be on statin with current chemo drug; Will discuss alternative agents with staff.    Type 2 diabetes mellitus with  hyperglycemia, with long-term current use of insulin  Stroke risk factor   A1C 9.3%  BG goal while inpatient 140-180  SSI while inpatient  Diabetic puree diet    Palliative care encounter  Had considered Palliative Medicine consult symptom management (pain) however discussed with staff and plans to consult Heme/Onc team instead. Will re-consider Palliative consult if recommended by Onc    Cerebral aneurysm, coiled in 2010  Aneurysm coil in 2010        10/13: Admit to vascular neurology for workup of new onset of slurred speech  10/14: MRI brain acute R MCA infarct. SLP cleared for puree, thin diet, meds crushed. Order TTE. Patient c/o lab draws/difficult stick, ordered topical lidocaine, consult palliative for pain management. Need to reach out to oncology regarding chemo management (unable to crush entrectinib), prognosis, possibly switching to Lovenox.    10/15: Consulted Oncology regarding cancer prognostication, chemo drug, and anticoagulation recs. SLP ok to give PO chemo buried in puree; nurse to frequently check for pocketing. Added Boost to regimen per nutrition recs. UA pending.    STROKE DOCUMENTATION   Acute Stroke Times   Last Known Normal Date: 10/13/20  Last Known Normal Time: 1030  Symptom Onset Date: 10/13/20  Symptom Onset Time: 1130  Stroke Team Called Date: 10/13/20  Stroke Team Called Time: 1220  Stroke Team Arrival Date: 10/13/20  Stroke Team Arrival Time: 1227    NIH Scale:  1a. Level of Consciousness: 1-->Not alert, but arousable by minor stimulation to obey, answer, or respond  1b. LOC Questions: 0-->Answers both questions correctly  1c. LOC Commands: 0-->Performs both tasks correctly  2. Best Gaze: 0-->Normal  3. Visual: 0-->No visual loss  4. Facial Palsy: 2-->Partial paralysis (total or near-total paralysis of lower face)  5a. Motor Arm, Left: 4-->No movement  5b. Motor Arm, Right: 0-->No drift, limb holds 90 (or 45) degrees for full 10 secs  6a. Motor Leg, Left: 2-->Some effort against  gravity, leg falls to bed by 5 secs, but has some effort against gravity  6b. Motor Leg, Right: 0-->No drift, leg holds 30 degree position for full 5 secs  7. Limb Ataxia: 0-->Absent  8. Sensory: 1-->Mild-to-moderate sensory loss, patient feels pinprick is less sharp or is dull on the affected side, or there is a loss of superficial pain with pinprick, but patient is aware of being touched  9. Best Language: 0-->No aphasia, normal  10. Dysarthria: 2-->Severe dysarthria, patients speech is so slurred as to be unintelligible in the absence of or out of proportion to any dysphasia, or is mute/anarthric  11. Extinction and Inattention (formerly Neglect): 0-->No abnormality  Total (NIH Stroke Scale): 12       Modified Noah    Dang Coma Scale:    ABCD2 Score:    [id number] Score:   HAS -BLED Score:   ICH Score:   Hunt & Sutton Classification:      Hemorrhagic change of an Ischemic Stroke: Does this patient have an ischemic stroke with hemorrhagic changes? No     Neurologic Chief Complaint: L facial droop, slurred speech, Left side weakness    Subjective:     Interval History: Patient is seen for follow-up neurological assessment and treatment recommendations:      KEHINDE. Consulted Oncology regarding cancer prognostication, chemo drug, and anticoagulation recs. SLP ok to give PO chemo buried in puree; nurse to frequently check for pocketing. Added Boost to regimen per nutrition recs. UA pending.    HPI, Past Medical, Family, and Social History remains the same as documented in the initial encounter.     Review of Systems   Constitutional: Positive for fatigue. Negative for fever.   Eyes: Negative for discharge and visual disturbance.   Neurological: Positive for facial asymmetry, speech difficulty, weakness and numbness. Negative for headaches.   Psychiatric/Behavioral: Positive for decreased concentration. Negative for confusion.     Scheduled Meds:   apixaban  5 mg Oral BID    cyproheptadine  4 mg Oral QID     tamsulosin  0.4 mg Oral Daily    venlafaxine  75 mg Oral Daily     Continuous Infusions:  PRN Meds:dextrose 50%, glucagon (human recombinant), influenza, insulin aspart U-100, labetalol, lidocaine, sodium chloride 0.9%    Objective:     Vital Signs (Most Recent):  Temp: 98 °F (36.7 °C) (10/15/20 1013)  Pulse: 70 (10/15/20 1437)  Resp: 12 (10/15/20 1013)  BP: (!) 159/84 (10/15/20 1013)  SpO2: 99 % (10/15/20 1013)  BP Location: Right arm    Vital Signs Range (Last 24H):  Temp:  [97.1 °F (36.2 °C)-98.6 °F (37 °C)]   Pulse:  [63-99]   Resp:  [12-18]   BP: (127-167)/(57-84)   SpO2:  [97 %-100 %]   BP Location: Right arm    Physical Exam  Vitals signs reviewed.   Constitutional:       General: She is not in acute distress.     Appearance: She is not toxic-appearing.   HENT:      Head: Normocephalic and atraumatic.   Eyes:      Extraocular Movements: Extraocular movements intact.      Conjunctiva/sclera: Conjunctivae normal.   Cardiovascular:      Rate and Rhythm: Normal rate.   Pulmonary:      Effort: Pulmonary effort is normal. No respiratory distress.   Musculoskeletal:         General: No tenderness or deformity.   Skin:     General: Skin is warm and dry.   Neurological:      Mental Status: She is oriented to person, place, and time.      Cranial Nerves: Dysarthria and facial asymmetry present.      Motor: Weakness present.   Psychiatric:         Mood and Affect: Mood normal.         Speech: Speech is slurred.         Cognition and Memory: Cognition is not impaired.         Neurological Exam:   LOC: Drowsy  Attention Span: Good, at times requires redirection 2/2 drowsiness  Language: No aphasia  Articulation: Dysarthria  Orientation: Person, Place, Time   Visual Fields: Full  EOM (CN III, IV, VI): Full/intact  Facial Movement (CN VII): Lower facial weakness on the Left  Motor: Arm left  Paresis: 1/5  Leg left  Paresis: proximal 2/5; distal 3/5  Arm right  Normal 5/5  Leg right Normal 5/5  Sensation: Andres-hypoesthesia  left      Laboratory:  CMP:   Recent Labs   Lab 10/15/20  0344   CALCIUM 9.5   ALBUMIN 4.1   PROT 7.1      K 4.6   CO2 25      BUN 18   CREATININE 1.3   ALKPHOS 43*   ALT 15   AST 21   BILITOT 0.5     CBC:   Recent Labs   Lab 10/15/20  0344   WBC 5.51   RBC 3.99*   HGB 10.8*   HCT 34.1*      MCV 86   MCH 27.1   MCHC 31.7*     Lipid Panel:   Recent Labs   Lab 10/13/20  1226   CHOL 252*   LDLCALC 163.4*   HDL 68   TRIG 103     Hgb A1C: No results for input(s): HGBA1C in the last 168 hours.  TSH:   Recent Labs   Lab 10/13/20  1226   TSH 0.397*       Diagnostic Results     Brain imaging:    MRI Brain w/o contrast 10/13/2020:     Current MR imaging confirms suspicion of foci of acute infarctions in the right MCA distribution.  Three additional punctate foci of restricted diffusion involving the left parietal lobe, possibly emboli.  Small subacute infarction involving the left cerebellum, similar to prior examination dated 09/28/2020.  Well rounded T2/FLAIR hyperintense focus within the left lateral aspect of the body of the corpus callosum, likely related to evolving infarct as further discussed above.  If there is concerning for underlying intracranial metastasis in the setting of known metastatic lung cancer, further evaluation may be obtained with contrast enhanced MRI of the brain.    CT Head Without Contrast 10/13/2020:  No findings to suggest acute major vascular territory infarct or hemorrhage noting stable focus of low attenuation within the left cerebellum and stable right ICA region coil pack       Vessel Imaging:    CTA Multiphase 10/14/2020:  States that there is a possible M3 vessel stenosis or occlusion. However, on evaluation of imaging it does not seem that way. Please note that patient has multiple branches from R MCA   FORMAL READ: No acute intracranial pathology.  No evidence for intracranial hemorrhage or CT evidence of major vascular distribution infarct.  No intracranially large  vessel occlusion.  Possible right M3 vessel stenosis or subtotal occlusion as described above.  Clinical correlation with symptoms is recommended.  Can consider MRI with diffusion-weighted imaging if concern for acute ischemia.  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  No evidence of high-grade stenosis or large vessel occlusion.  Numerous sclerotic lesions involving the vertebral bodies and remaining visualized axial skeleton, in keeping with patient's history of metastatic lung cancer.  Moderate left pleural effusion with patchy areas of consolidation in the left upper lobe may relate to pneumonia.       Cardiac Evaluation:     TTE 10/14/2020:  · With normal systolic function. The estimated ejection fraction is 55%.  · The quantitatively dervived ejection fraction is 52%.  · Normal left ventricular diastolic function.  · Normal right ventricular systolic function.  · Mild tricuspid regurgitation.   · The left atrial volume index is normal.     TTE June 2020:   · Normal left ventricular systolic function. The estimated ejection fraction is 55%.  · Normal LV diastolic function.  · No wall motion abnormalities.  · Mildly reduced right ventricular systolic function.  · Normal central venous pressure (3 mmHg).  · The estimated PA systolic pressure is 26 mmHg         Mckayla Euceda PA-C  Comprehensive Stroke Center  Department of Vascular Neurology   [facility]

## 2020-10-15 NOTE — ASSESSMENT & PLAN NOTE
Stroke risk factor   LDL at 163  Cannot be on statin with current chemo drug; Will discuss alternative agents with staff.

## 2020-10-15 NOTE — ASSESSMENT & PLAN NOTE
2/2 stroke  SLP eval and treat -- Recommending puree diet, thin liquids with frequent nursing checks for any pocketing. Meds crushed in pudding though per SLP, able to bury chemo med in puree to avoid crushing.  Pt states she does not want a feeding tube 10/15, will try her best to keep swallowing the ways that SLP has instructed her. Added Boost to regimen as well.

## 2020-10-15 NOTE — PLAN OF CARE
Problem: SLP Goal  Goal: SLP Goal  Description: Speech Language Pathology Goals  Goals expected to be met by 10/21  1. Pt will tolerate diet of puree and thin liquids with no overt signs of airway compromise.  2. Pt will participate in ongoing swallow assessment to determine safest and least restrictive diet.  3. Pt will recall simple speech strategies with 90% acc given min A.  4. Pt will utilize simple speech strategies during structured tasks and conversation with 90% acc given min A.  5. Pt will participate in ongoing reading, writing, and visual-spatial assessment to determine purpose for therapeutic targets.    Outcome: Ongoing, Progressing     Goals remain appropriate. Continue diet of puree and thin liquids. ST to continue to monitor.    GILBERTO Sue  10/15/2020

## 2020-10-15 NOTE — ASSESSMENT & PLAN NOTE
- On periactin at home for urethral pain and retention. Was seen by urology last admission  - Seen in urology clinic 9/25 for incomplete bladder emptying; hennessy removed on 10/6. While at rehab, UA with positive nitrate, 2+ leukocytes, and many bacteria (10/7). Pt was on Cipro.   - Repeat UA pending; requested nurse to collect on 10/15

## 2020-10-15 NOTE — ASSESSMENT & PLAN NOTE
Had considered Palliative Medicine consult symptom management (pain) however discussed with staff and plans to consult Heme/Onc team instead. Will re-consider Palliative consult if recommended by Onc

## 2020-10-15 NOTE — PLAN OF CARE
Recommendations    Recommendation:  1. Continue pureed diet, ADAT to diabetic diet texture per SLP   2. Add boost glucose BID to increase intake   3. RD to monitor and follow up    Goals: pt to tolerate >85% of EEN/EPN by RD follow up  Nutrition Goal Status: new  Communication of RD Recs: other (comment)(POC)

## 2020-10-15 NOTE — ASSESSMENT & PLAN NOTE
Stroke risk factor   A1C 9.3%  BG goal while inpatient 140-180  SSI while inpatient  Diabetic puree diet

## 2020-10-15 NOTE — CONSULTS
Inpatient consult to Physical Medicine Rehab  Consult performed by: Angelica Faust NP  Consult ordered by: Bijal Paulino MD  Reason for consult: Assess rehab needs      Reviewed patient history and current admission.  Rehab team following.  Full consult to follow.    MELODY Trujillo, FNP-C  Physical Medicine & Rehabilitation   10/15/2020  Spectralink: [id number]

## 2020-10-15 NOTE — ASSESSMENT & PLAN NOTE
Reportedly metastatic to involve the spine.  Stroke risk factor - suspect contributing to an underlying hypercoagulability  Consulted Oncology on 10/15 regarding cancer prognostication, ordering pt's chemo drug while inpatient, and anticoagulation recs. Appreciate assistance

## 2020-10-15 NOTE — CONSULTS
"  [facility]  Adult Nutrition  Consult Note    SUMMARY     Recommendations    Recommendation:  1. Continue pureed diet, ADAT to diabetic diet texture per SLP   2. Add boost glucose BID to increase intake   3. RD to monitor and follow up    Goals: pt to tolerate >85% of EEN/EPN by RD follow up  Nutrition Goal Status: new  Communication of RD Recs: other (comment)(POC)    Reason for Assessment    Reason For Assessment: consult  Diagnosis: (stroke involving right middle cerebral artery)  Relevant Medical History: DM2; high cholesterol; HTN  Interdisciplinary Rounds: did not attend  General Information Comments: Pt resting in bed, bfst at bedside. Pt dislikes pureed meals, explained to pt reason for pureed diet. Encouraged ONS use to increase intake. PTA good PO intake at home and no recent wt loss reported. UBW ~215 lbs per pt. NFPE not indicated at this time, pt with no s/s of malnutrition at this time.  Nutrition Discharge Planning: adequate intake via DM diet    Nutrition Risk Screen    Nutrition Risk Screen: dysphagia or difficulty swallowing    Nutrition/Diet History    Spiritual, Cultural Beliefs, Zoroastrian Practices, Values that Affect Care: no  Food Allergies: NKFA  Factors Affecting Nutritional Intake: None identified at this time    Anthropometrics    Temp: 98 °F (36.7 °C)  Height Method: Stated  Height: 5' 9" (175.3 cm)  Height (inches): 69 in  Weight Method: Bed Scale  Weight: 96.6 kg (213 lb)  Weight (lb): 213 lb  Ideal Body Weight (IBW), Female: 145 lb  % Ideal Body Weight, Female (lb): 146.9 %  BMI (Calculated): 31.4  BMI Grade: 30 - 34.9- obesity - grade I    Lab/Procedures/Meds    Pertinent Labs Reviewed: reviewed  Pertinent Labs Comments: WNL  Pertinent Medications Reviewed: reviewed  Pertinent Medications Comments: tamsulosin     Estimated/Assessed Needs    Weight Used For Calorie Calculations: 96.6 kg (212 lb 15.4 oz)  Energy Calorie Requirements (kcal): 1585 kcal/d  Energy Need " Method: Nehemias Tao  Protein Requirements: 77-96 g/day(0.8-1.0 g/kg)  Weight Used For Protein Calculations: 96.6 kg (212 lb 15.4 oz)  Fluid Requirements (mL): 1 mL/kcal or per MD  RDA Method (mL): 1585  CHO Requirement: 198    Nutrition Prescription Ordered    Current Diet Order: pureed DM diet    Evaluation of Received Nutrient/Fluid Intake    Energy Calories Required: meeting needs  Protein Required: meeting needs  Fluid Required: meeting needs  Comments: LBM 10/12  Tolerance: tolerating  % Intake of Estimated Energy Needs: 25 - 50 %  % Meal Intake: 25 - 50 %    Nutrition Risk    Level of Risk/Frequency of Follow-up: low     Assessment and Plan    Nutrition Problem  inadequate energy intake    Related to (etiology):   Decreased appetite    Signs and Symptoms (as evidenced by):   PO <75% of EEN/EPN due to swallowing difficulty      Interventions (treatment strategy):  Collaboration of care with other providers  Commercial beverage    Nutrition Diagnosis Status:   New    Monitor and Evaluation    Food and Nutrient Intake: energy intake, food and beverage intake  Food and Nutrient Adminstration: diet order  Knowledge/Beliefs/Attitudes: food and nutrition knowledge/skill  Anthropometric Measurements: weight, weight change  Biochemical Data, Medical Tests and Procedures: electrolyte and renal panel, gastrointestinal profile, glucose/endocrine profile, inflammatory profile, lipid profile  Nutrition-Focused Physical Findings: overall appearance     Nutrition Follow-Up    RD Follow-up?: Yes

## 2020-10-15 NOTE — ASSESSMENT & PLAN NOTE
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 No tPA due to Eliquis. No large vessel occlusion so no IR intervention. Of note, patient was recently discharged from our service for multiple infarcts in bilateral territories thought to be due to her hypercoagulable state (malignancy.)    MRI Brain shows acute infarct in the R MCA, L MCA, and L cerebellar territories. Echo EF 55%, no LAE. Note that new strokes were in the setting of compliance with Eliquis. Stroke etiology regmains unclear - possible hypoperfusion though no vessel stenosis seen or signs of dehydration at presentation. Otherwise, concern for hypercoagulable state. Consulted Oncology 10/15 for anticoagulation recs (re: consider switch to Lovenox?)        Antithrombotics for secondary stroke prevention: Anticoagulants: Apixaban 5 mg BID    Statins for secondary stroke prevention and hyperlipidemia, if present:   Cannot be on statin with current chemo regimen   Aggressive risk factor modification: HTN, Smoking, DM, HLD, Malignancy  Rehab efforts: The patient has been evaluated by a stroke team provider and the therapy needs have been fully considered based off the presenting complaints and exam findings. The following therapy evaluations are needed: PT evaluate and treat, OT evaluate and treat, SLP evaluate and treat, PM&R evaluate for appropriate placement - dispo inpatient rehab  Diagnostics ordered/pending: None   VTE prophylaxis: None: Reason for No Pharmacological VTE Prophylaxis: Currently on anticoagulation, SCDs  BP parameters: Infarct: No intervention, SBP <220

## 2020-10-15 NOTE — SUBJECTIVE & OBJECTIVE
Neurologic Chief Complaint: L facial droop, slurred speech, Left side weakness    Subjective:     Interval History: Patient is seen for follow-up neurological assessment and treatment recommendations:      KEHINDE. Consulted Oncology regarding cancer prognostication, chemo drug, and anticoagulation recs. SLP ok to give PO chemo buried in puree; nurse to frequently check for pocketing. Added Boost to regimen per nutrition recs. UA pending.    HPI, Past Medical, Family, and Social History remains the same as documented in the initial encounter.     Review of Systems   Constitutional: Positive for fatigue. Negative for fever.   Eyes: Negative for discharge and visual disturbance.   Neurological: Positive for facial asymmetry, speech difficulty, weakness and numbness. Negative for headaches.   Psychiatric/Behavioral: Positive for decreased concentration. Negative for confusion.     Scheduled Meds:   apixaban  5 mg Oral BID    cyproheptadine  4 mg Oral QID    tamsulosin  0.4 mg Oral Daily    venlafaxine  75 mg Oral Daily     Continuous Infusions:  PRN Meds:dextrose 50%, glucagon (human recombinant), influenza, insulin aspart U-100, labetalol, lidocaine, sodium chloride 0.9%    Objective:     Vital Signs (Most Recent):  Temp: 98 °F (36.7 °C) (10/15/20 1013)  Pulse: 70 (10/15/20 1437)  Resp: 12 (10/15/20 1013)  BP: (!) 159/84 (10/15/20 1013)  SpO2: 99 % (10/15/20 1013)  BP Location: Right arm    Vital Signs Range (Last 24H):  Temp:  [97.1 °F (36.2 °C)-98.6 °F (37 °C)]   Pulse:  [63-99]   Resp:  [12-18]   BP: (127-167)/(57-84)   SpO2:  [97 %-100 %]   BP Location: Right arm    Physical Exam  Vitals signs reviewed.   Constitutional:       General: She is not in acute distress.     Appearance: She is not toxic-appearing.   HENT:      Head: Normocephalic and atraumatic.   Eyes:      Extraocular Movements: Extraocular movements intact.      Conjunctiva/sclera: Conjunctivae normal.   Cardiovascular:      Rate and Rhythm: Normal  rate.   Pulmonary:      Effort: Pulmonary effort is normal. No respiratory distress.   Musculoskeletal:         General: No tenderness or deformity.   Skin:     General: Skin is warm and dry.   Neurological:      Mental Status: She is oriented to person, place, and time.      Cranial Nerves: Dysarthria and facial asymmetry present.      Motor: Weakness present.   Psychiatric:         Mood and Affect: Mood normal.         Speech: Speech is slurred.         Cognition and Memory: Cognition is not impaired.         Neurological Exam:   LOC: Drowsy  Attention Span: Good, at times requires redirection 2/2 drowsiness  Language: No aphasia  Articulation: Dysarthria  Orientation: Person, Place, Time   Visual Fields: Full  EOM (CN III, IV, VI): Full/intact  Facial Movement (CN VII): Lower facial weakness on the Left  Motor: Arm left  Paresis: 1/5  Leg left  Paresis: proximal 2/5; distal 3/5  Arm right  Normal 5/5  Leg right Normal 5/5  Sensation: Andres-hypoesthesia left      Laboratory:  CMP:   Recent Labs   Lab 10/15/20  0344   CALCIUM 9.5   ALBUMIN 4.1   PROT 7.1      K 4.6   CO2 25      BUN 18   CREATININE 1.3   ALKPHOS 43*   ALT 15   AST 21   BILITOT 0.5     CBC:   Recent Labs   Lab 10/15/20  0344   WBC 5.51   RBC 3.99*   HGB 10.8*   HCT 34.1*      MCV 86   MCH 27.1   MCHC 31.7*     Lipid Panel:   Recent Labs   Lab 10/13/20  1226   CHOL 252*   LDLCALC 163.4*   HDL 68   TRIG 103     Hgb A1C: No results for input(s): HGBA1C in the last 168 hours.  TSH:   Recent Labs   Lab 10/13/20  1226   TSH 0.397*       Diagnostic Results     Brain imaging:    MRI Brain w/o contrast 10/13/2020:     Current MR imaging confirms suspicion of foci of acute infarctions in the right MCA distribution.  Three additional punctate foci of restricted diffusion involving the left parietal lobe, possibly emboli.  Small subacute infarction involving the left cerebellum, similar to prior examination dated 09/28/2020.  Well rounded  T2/FLAIR hyperintense focus within the left lateral aspect of the body of the corpus callosum, likely related to evolving infarct as further discussed above.  If there is concerning for underlying intracranial metastasis in the setting of known metastatic lung cancer, further evaluation may be obtained with contrast enhanced MRI of the brain.    CT Head Without Contrast 10/13/2020:  No findings to suggest acute major vascular territory infarct or hemorrhage noting stable focus of low attenuation within the left cerebellum and stable right ICA region coil pack       Vessel Imaging:    CTA Multiphase 10/14/2020:  States that there is a possible M3 vessel stenosis or occlusion. However, on evaluation of imaging it does not seem that way. Please note that patient has multiple branches from R MCA   FORMAL READ: No acute intracranial pathology.  No evidence for intracranial hemorrhage or CT evidence of major vascular distribution infarct.  No intracranially large vessel occlusion.  Possible right M3 vessel stenosis or subtotal occlusion as described above.  Clinical correlation with symptoms is recommended.  Can consider MRI with diffusion-weighted imaging if concern for acute ischemia.  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  No evidence of high-grade stenosis or large vessel occlusion.  Numerous sclerotic lesions involving the vertebral bodies and remaining visualized axial skeleton, in keeping with patient's history of metastatic lung cancer.  Moderate left pleural effusion with patchy areas of consolidation in the left upper lobe may relate to pneumonia.       Cardiac Evaluation:     TTE 10/14/2020:  · With normal systolic function. The estimated ejection fraction is 55%.  · The quantitatively dervived ejection fraction is 52%.  · Normal left ventricular diastolic function.  · Normal  right ventricular systolic function.  · Mild tricuspid regurgitation.   · The left atrial volume index is normal.     TTE June 2020:   · Normal left ventricular systolic function. The estimated ejection fraction is 55%.  · Normal LV diastolic function.  · No wall motion abnormalities.  · Mildly reduced right ventricular systolic function.  · Normal central venous pressure (3 mmHg).  · The estimated PA systolic pressure is 26 mmHg

## 2020-10-15 NOTE — PLAN OF CARE
PT Eval complete and POC established.    Reina Mendoza, PT, DPT  10/15/2020      Problem: Physical Therapy Goal  Goal: Physical Therapy Goal  Description: Goals to be met by: 10/29/2020    Patient will increase functional independence with mobility by performin. Pt will perform bed mobility (rolling L/R, scooting, and bridging) with modified supervision.  2. Pt will perform supine to/from sit with supervision.  3. Pt will sit EOB x 10 mins with no UE support with supervision.  4. Pt will perform sit to stand with mod A and LRAD.  5. Pt will ambulate 10 feet with mod assistance and LRAD.  6. Pt will perform there-ex from handout x 15 reps to improve strength for functional mobility.  7. Pt will perform squat pivot t/f to bedside chair with modA.        Outcome: Ongoing, Progressing

## 2020-10-15 NOTE — HPI
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 Belcher and was undergoing chemo), recent admission 9/28 for RLE weakness. MRI brain revealed small lesion in the right centrum semiovale and left corpus callosum. Per VN etiology likely hypercoag state related to cancer. [facility]'d to [facility] and readmitted 10/13 for new onset facial droop and slurred speech. CTA did not show any LVO or high grade stenosis thus, no intervention done. CTH did not show any infarct. MRI brain acute R MCA infarct and small subacute infarct involving the left cerebellum, similar to prior examination dated 09/28. Paliative and Oncology consults pending.      Functional History: Patient lives with daughter in a single story home with 0 steps to enter.  Prior to admission, I. DME: [employer]ator.

## 2020-10-15 NOTE — PT/OT/SLP PROGRESS
"Speech Language Pathology Treatment    Patient Name:  Alison Branch   MRN:  [id number]  Admitting Diagnosis: Embolic stroke involving right middle cerebral artery    Recommendations:                 General Recommendations:  Dysphagia therapy and Speech/language therapy  Diet recommendations:  Puree, Liquid Diet Level: Thin   Aspiration Precautions: 1 bite/sip at a time, Alternating bites/sips, Avoid talking while eating, Check for pocketing/oral residue, Eliminate distractions, Feed only when awake/alert, Frequent oral care, HOB to 90 degrees, Meds crushed in puree, Remain upright 30 minutes post meal, Small bites/sips and Standard aspiration precautions   General Precautions: Standard, fall, aspiration  Communication strategies:  provide increased time to answer and go to room if call light pushed    Subjective     Pt asleep upon SLP entry, aroused to mod stim.  "There is nothing in my mouth" re: oral stasis    Objective:     Has the patient been evaluated by SLP for swallowing?   Yes  Keep patient NPO? No   Current Respiratory Status: room air      Pt seen for ongoing speech/language and dysphagia therapy. Alertness inconsistent t/o session, requiring repetitions and redirection to task. Participation decreased as session progressed. RN reported night RN observing pt with anterior loss of medications and drooling. HOB elevated for PO trials. Pt with talking prior to swallowing t/o all trials. SLP instructed pt to swallow before talking, requiring max repetition and cueing for carryover. Mild left anterior loss and drooling appreciated with puree via tsp x3. Lingual stasis of puree trials evident, cleared with double swallow. Pt expressed distaste for pureed foods and requested solids. SLP presented solid trial with whole marilyn cracker and observed pt self-feed. Pt observed to take 4 bites prior to swallowing initial solid bolus. Pocketing of solid bolus appreciated in left lateral sulcus. Max encouragement and " assistance required for pt to allow SLP clear oral cavity, with pt denying any presence of oral stasis. Left lateral sulcus cleared with swab, revealing pocketed stasis from yesterday's mechanical soft meal. Pt with good tolerance of thin liquids via straw x6. No overt signs of airway compromise appreciated c/b no throat clear, cough, or wet vocal quality. However, pt with increased time to siphon thin liquid from straw, requiring SLP to place straw in right side of oral cavity. Pt safe to continue diet of puree and thin liquids 2/2 pt with pocketing of solid material and max A to clean oral cavity. Education provided regarding role of SLP, swallow precautions, s/s aspiration, diet recommendations, speech strategies, and ongoing ST plan of care. Pt with verbalized understanding, though minimal carryover appreciated. Education to be ongoing. ST to continue to monitor.    Assessment:     Alison Branch is a 65 y.o. female with an SLP diagnosis of oral Dysphagia and Dysarthria.      Goals:   Multidisciplinary Problems     SLP Goals        Problem: SLP Goal    Goal Priority Disciplines Outcome   SLP Goal     SLP Ongoing, Progressing   Description: Speech Language Pathology Goals  Goals expected to be met by 10/21  1. Pt will tolerate diet of puree and thin liquids with no overt signs of airway compromise.  2. Pt will participate in ongoing swallow assessment to determine safest and least restrictive diet.  3. Pt will recall simple speech strategies with 90% acc given min A.  4. Pt will utilize simple speech strategies during structured tasks and conversation with 90% acc given min A.  5. Pt will participate in ongoing reading, writing, and visual-spatial assessment to determine purpose for therapeutic targets.                 Plan:     · Patient to be seen:  4 x/week   · Plan of Care expires:  11/13/20  · Plan of Care reviewed with:  patient   · SLP Follow-Up:  Yes       Discharge recommendations:  other (see  comments)(tbd)   Barriers to Discharge:  Level of Skilled Assistance Needed      Time Tracking:     SLP Treatment Date:   10/15/20  Speech Start Time:  0725  Speech Stop Time:  0800     Speech Total Time (min):  35 min    Billable Minutes: Speech Therapy Individual 11, Treatment Swallowing Dysfunction 12 and Seld Care/Home Management Training 12    GILBERTO Sue  10/15/2020

## 2020-10-15 NOTE — SUBJECTIVE & OBJECTIVE
"Oncology Treatment Plan:   [No treatment plan]    Medications:  Continuous Infusions:  Scheduled Meds:   apixaban  5 mg Oral BID    cyproheptadine  4 mg Oral QID    tamsulosin  0.4 mg Oral Daily    venlafaxine  75 mg Oral Daily     PRN Meds:dextrose 50%, glucagon (human recombinant), influenza, insulin aspart U-100, labetalol, lidocaine, sodium chloride 0.9%     Review of patient's allergies indicates:   Allergen Reactions    Piperacillin-tazobactam Rash     Tolerated cefepime 06/2020        Past Medical History:   Diagnosis Date    Allergy     Brain aneurysm 2010    s/p coiling of one; another not coiled    Breast cyst     Depression 9/19/2019    Diabetes mellitus     Diabetes type 2, controlled     Fever blister     High cholesterol     History of Bell's palsy     HTN (hypertension) 5/20/2014    Recurrent upper respiratory infection (URI)      Past Surgical History:   Procedure Laterality Date    BREAST SURGERY      BRONCHOSCOPY N/A 5/28/2019    Procedure: Bronchoscopy;  Surgeon: Iesha Diagnostic Provider;  [facility];  Service: Anesthesiology;  Laterality: N/A;    CERVICAL FUSION      COLONOSCOPY N/A 3/9/2016    Procedure: COLONOSCOPY;  Surgeon: Elliott Zimmerman MD;  Location: [facility] ENDO (4TH FLR);  Service: Endoscopy;  Laterality: N/A;    head surgery      stent and "curling" for aneurysm    HYSTERECTOMY      TVH secondary to SUF    INSERTION OF TUNNELED CENTRAL VENOUS CATHETER (CVC) WITH SUBCUTANEOUS PORT Right 7/8/2019    Procedure: INSERTION, PORT-A-CATH;  Surgeon: Cali Damico MD;  Location: [facility] CATH LAB;  Service: Radiology;  Laterality: Right;    MENISCECTOMY Left      Family History     Problem Relation (Age of Onset)    Allergies Daughter    Breast cancer Maternal Aunt    Cancer Mother (63), Maternal Aunt    Cataracts Father    Diabetes Father, Sister, Brother    Heart failure Father    Migraines Father    Ovarian cancer Cousin    Rheum arthritis Father    " Stomach cancer Mother        Tobacco Use    Smoking status: Former Smoker     Packs/day: 0.50     Years: 30.00     Pack years: 15.00     Quit date: 1999     Years since quittin.8    Smokeless tobacco: Never Used   Substance and Sexual Activity    Alcohol use: No     Alcohol/week: 0.0 standard drinks    Drug use: No    Sexual activity: Never     Partners: Female     Birth control/protection: Surgical       Review of Systems   Constitutional: Positive for activity change. Negative for chills, fatigue, fever and unexpected weight change.   HENT: Positive for trouble swallowing. Negative for congestion.    Eyes: Negative for visual disturbance.   Respiratory: Negative for cough, choking, chest tightness, shortness of breath, wheezing and stridor.    Cardiovascular: Negative for chest pain, palpitations and leg swelling.   Gastrointestinal: Negative for abdominal distention, abdominal pain, blood in stool, constipation, diarrhea, nausea and vomiting.   Endocrine: Negative for polyuria.   Genitourinary: Negative for difficulty urinating, dysuria, flank pain, frequency, urgency and vaginal discharge.   Musculoskeletal: Positive for gait problem. Negative for arthralgias.   Neurological: Positive for facial asymmetry. Negative for dizziness, tremors, seizures, weakness, numbness and headaches.   Psychiatric/Behavioral: Negative for agitation, behavioral problems, confusion and decreased concentration.     Objective:     Vital Signs (Most Recent):  Temp: 98 °F (36.7 °C) (10/15/20 1013)  Pulse: 70 (10/15/20 1437)  Resp: 12 (10/15/20 1013)  BP: (!) 159/84 (10/15/20 1013)  SpO2: 99 % (10/15/20 1013) Vital Signs (24h Range):  Temp:  [97.1 °F (36.2 °C)-98.6 °F (37 °C)] 98 °F (36.7 °C)  Pulse:  [63-99] 70  Resp:  [12-18] 12  SpO2:  [97 %-100 %] 99 %  BP: (127-167)/(57-84) 159/84     Weight: 96.6 kg (213 lb)  Body mass index is 31.45 kg/m².  Body surface area is 2.17 meters squared.    No intake or output data in the  24 hours ending 10/15/20 1549    Physical Exam  Constitutional:       General: She is not in acute distress.     Appearance: She is well-developed. She is not diaphoretic.      Comments: Slowed speech   HENT:      Head: Normocephalic and atraumatic.   Eyes:      Pupils: Pupils are equal, round, and reactive to light.   Neck:      Musculoskeletal: Normal range of motion and neck supple.      Thyroid: No thyromegaly.      Vascular: No JVD.   Cardiovascular:      Rate and Rhythm: Normal rate and regular rhythm.      Heart sounds: Normal heart sounds. No murmur. No friction rub. No gallop.    Pulmonary:      Effort: Pulmonary effort is normal. No respiratory distress.      Breath sounds: Normal breath sounds. No stridor. No wheezing or rales.   Chest:      Chest wall: No tenderness.   Abdominal:      General: Bowel sounds are normal. There is no distension.      Palpations: Abdomen is soft.      Tenderness: There is no abdominal tenderness. There is no guarding.   Musculoskeletal: Normal range of motion.   Skin:     General: Skin is warm and dry.      Capillary Refill: Capillary refill takes less than 2 seconds.   Neurological:      Mental Status: She is alert and oriented to person, place, and time.      Cranial Nerves: Cranial nerve deficit present.      Motor: No abnormal muscle tone.      Coordination: Coordination normal.      Comments: Left sided facial drooping   Psychiatric:         Behavior: Behavior normal.         Thought Content: Thought content normal.         Judgment: Judgment normal.         Significant Labs:   CBC:   Recent Labs   Lab 10/14/20  0822 10/15/20  0344   WBC 4.55 5.51   HGB 10.7* 10.8*   HCT 35.5* 34.1*    217   , CMP:   Recent Labs   Lab 10/14/20  0822 10/15/20  0344    142   K 4.1 4.6    107   CO2 28 25   GLU 81 107   BUN 18 18   CREATININE 1.3 1.3   CALCIUM 9.4 9.5   PROT 7.1 7.1   ALBUMIN 4.1 4.1   BILITOT 0.4 0.5   ALKPHOS 40* 43*   AST 22 21   ALT 13 15   ANIONGAP 10  10   EGFRNONAA 44.1* 43.8*   , Coagulation:   Recent Labs   Lab 10/14/20  0822   INR 1.0   APTT 26.8   , LFTs:   Recent Labs   Lab 10/14/20  0822 10/15/20  0344   ALT 13 15   AST 22 21   ALKPHOS 40* 43*   BILITOT 0.4 0.5   PROT 7.1 7.1   ALBUMIN 4.1 4.1   , Tumor Markers: No results for input(s): PSA, CEA, , AFPTM, BM4590,  in the last 48 hours.    Invalid input(s): ALGTM and All pertinent labs from the last 24 hours have been reviewed.    Diagnostic Results:  I have reviewed all pertinent imaging results/findings within the past 24 hours.

## 2020-10-15 NOTE — PHYSICIAN QUERY
PT Name: Alison Branch  MR #: [id number]    Hypertensive Condition Clarification      CDS/: Giuliana Barkley RN, CDS               Contact information: [email or web address]    This form is a permanent document in the medical record.     Query Date: October 15, 2020    By submitting this query, we are merely seeking further clarification of documentation. Please utilize your independent clinical judgment when addressing the question(s) below.    The Medical Record contains the following:   Indicators   Supporting Clinical Findings Location in Medical Record   x Hypertension associated with diabetes documented --Hypertension associated with diabetes Vas neuro H&P-> Note 10/14   x Chronic condition(s) --Type 2 diabetes mellitus with hyperglycemia, with long-term current use of insulin  --Mixed hyperlipidemia due to type 2 diabetes mellitus Vas neuro H&P-> Note 10/14    Vital signs     x Treatment/Medication --long-term current use of insulin Vas neuro H&P-> Note 10/14    Other       Provider, please clarify the relationship between the Hypertension and Diabetes Mellitus    [   ] Hypertension is a manifestation of Diabetes Mellitus (Secondary Hypertension)   [ x  ]  Hypertension is not a manifestation of Diabetes Mellitus (Essential Hypertension)   [   ] Other Clarification (please specify): ____________   [  ] Clinically Undetermined       Please document in your progress notes daily for the duration of treatment, until resolved, and include in your discharge summary.

## 2020-10-15 NOTE — PLAN OF CARE
Problem: Occupational Therapy Goal  Goal: Occupational Therapy Goal  Description: Goals to be met by: 10/27/2020     Patient will increase functional independence with ADLs by performing:    Feeding with Minimal Assistance while sitting EOB  Grooming while EOB with Minimal Assistance.  Toileting from bedside commode with Moderate Assistance for hygiene and clothing management.   Sitting at edge of bed x10 minutes with Minimal Assistance.  Stand pivot transfers with Minimal Assistance.  Toilet transfer to bedside commode with Minimal Assistance.    Outcome: Ongoing, Progressing     Initial eval completed   POC implemented and goals established     Chely Santana OTR/L  [phone]  10/15/2020

## 2020-10-15 NOTE — ASSESSMENT & PLAN NOTE
Area of cytotoxic cerebral edema identified when reviewing brain imaging in the territory of the right middle cerebral artery. There is no mass effect associated with it. We will continue to monitor the patients clinical exam for any worsening of symptoms which may indicate expansion of the stroke or the area of the edema resulting in the clinical change. The pattern is suggestive of embolic etiology related to hypercoagulable state.

## 2020-10-15 NOTE — SUBJECTIVE & OBJECTIVE
"Past Medical History:   Diagnosis Date    Allergy     Brain aneurysm 2010    s/p coiling of one; another not coiled    Breast cyst     Depression 9/19/2019    Diabetes mellitus     Diabetes type 2, controlled     Fever blister     High cholesterol     History of Bell's palsy     HTN (hypertension) 5/20/2014    Recurrent upper respiratory infection (URI)      Past Surgical History:   Procedure Laterality Date    BREAST SURGERY      BRONCHOSCOPY N/A 5/28/2019    Procedure: Bronchoscopy;  Surgeon: [facility] Diagnostic Provider;  Location: [facility] OR [facility] FLR;  Service: Anesthesiology;  Laterality: N/A;    CERVICAL FUSION      COLONOSCOPY N/A 3/9/2016    Procedure: COLONOSCOPY;  Surgeon: Elliott Zimmerman MD;  Location: [facility] ENDO (4TH FLR);  Service: Endoscopy;  Laterality: N/A;    head surgery      stent and "curling" for aneurysm    HYSTERECTOMY      TVH secondary to SUF    INSERTION OF TUNNELED CENTRAL VENOUS CATHETER (CVC) WITH SUBCUTANEOUS PORT Right 7/8/2019    Procedure: INSERTION, PORT-A-CATH;  Surgeon: Cali Damico MD;  Location: [facility] CATH LAB;  Service: Radiology;  Laterality: Right;    MENISCECTOMY Left      Review of patient's allergies indicates:   Allergen Reactions    Piperacillin-tazobactam Rash     Tolerated cefepime 06/2020       Scheduled Medications:    apixaban  5 mg Oral BID    cyproheptadine  4 mg Oral QID    tamsulosin  0.4 mg Oral Daily    venlafaxine  75 mg Oral Daily       PRN Medications: dextrose 50%, glucagon (human recombinant), influenza, insulin aspart U-100, labetalol, lidocaine, sodium chloride 0.9%    Family History     Problem Relation (Age of Onset)    Allergies Daughter    Breast cancer Maternal Aunt    Cancer Mother (63), Maternal Aunt    Cataracts Father    Diabetes Father, Sister, Brother    Heart failure Father    Migraines Father    Ovarian cancer Cousin    Rheum arthritis Father    Stomach cancer Mother        Tobacco Use    Smoking status: Former Smoker "     Packs/day: 0.50     Years: 30.00     Pack years: 15.00     Quit date: 1999     Years since quittin.8    Smokeless tobacco: Never Used   Substance and Sexual Activity    Alcohol use: No     Alcohol/week: 0.0 standard drinks    Drug use: No    Sexual activity: Never     Partners: Female     Birth control/protection: Surgical     Review of Systems   Constitutional: Positive for activity change and fatigue. Negative for fever.   HENT: Positive for trouble swallowing. Negative for sore throat.    Eyes: Negative for visual disturbance.   Respiratory: Negative for cough and shortness of breath.    Cardiovascular: Negative for chest pain and leg swelling.   Gastrointestinal: Negative for abdominal distention and abdominal pain.   Genitourinary: Negative for difficulty urinating.   Musculoskeletal: Positive for gait problem. Negative for back pain.   Skin: Negative for color change.   Neurological: Positive for speech difficulty and weakness. Negative for light-headedness and headaches.   Psychiatric/Behavioral: Positive for confusion. Negative for agitation.     Objective:     Vital Signs (Most Recent):  Temp: 98 °F (36.7 °C) (10/15/20 1013)  Pulse: 99 (10/15/20 1013)  Resp: 12 (10/15/20 1013)  BP: (!) 159/84 (10/15/20 1013)  SpO2: 99 % (10/15/20 1013)    Vital Signs (24h Range):  Temp:  [97.1 °F (36.2 °C)-98.6 °F (37 °C)] 98 °F (36.7 °C)  Pulse:  [63-99] 99  Resp:  [12-18] 12  SpO2:  [97 %-100 %] 99 %  BP: (127-167)/(57-84) 159/84     Body mass index is 31.45 kg/m².    Physical Exam  Vitals signs and nursing note reviewed.   Constitutional:       Appearance: Normal appearance. She is well-developed.      Comments: Fatigue present   HENT:      Head: Normocephalic.      Nose: Nose normal.      Mouth/Throat:      Mouth: Mucous membranes are moist.   Eyes:      Pupils: Pupils are equal, round, and reactive to light.   Neck:      Musculoskeletal: Normal range of motion and neck supple.   Pulmonary:      Effort:  Pulmonary effort is normal.      Breath sounds: Normal breath sounds.   Musculoskeletal:      Comments: L sided weakness    Skin:     General: Skin is warm and dry.   Neurological:      Comments: Dysarthria   Confusion present  Following commands   Psychiatric:         Mood and Affect: Mood normal.         Behavior: Behavior normal.       NEUROLOGICAL EXAMINATION:     CRANIAL NERVES     CN III, IV, VI   Pupils are equal, round, and reactive to light.      Diagnostic Results: Labs: Reviewed  ECG: Reviewed  CT: Reviewed  MRI: Reviewed

## 2020-10-15 NOTE — CONSULTS
[facility]  Physical Medicine & Rehab  Consult Note    Patient Name: Alison Branch  MRN: [id number]  Admission Date: 10/13/2020  Hospital Length of Stay: 2 days  Attending Physician: Bijal Paulino MD     Inpatient consult to Physical Medicine & Rehabilitation  Consult performed by: Angelica Faust NP  Consult requested by:  Bijal Paulino MD    Collaborating Physician: Shavon Garcia MD  Reason for Consult:  Assess rehabilitation needs     Consults  Subjective:     Principal Problem: Embolic stroke involving right middle cerebral artery    HPI: Alison Branch is a 64-year-old female with PMHx of HTN, PE/DVT (Eliquis), DM, 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 Belcher and was undergoing chemo), recent admission 9/28 for RLE weakness. MRI brain revealed small lesion in the right centrum semiovale and left corpus callosum. Per VN etiology likely hypercoag state related to cancer. D/c'd to [facility] and readmitted 10/13 for new onset facial droop and slurred speech. CTA did not show any LVO or high grade stenosis thus, no intervention done. CTH did not show any infarct. MRI brain acute R MCA infarct and small subacute infarct involving the left cerebellum, similar to prior examination dated 09/28. Paliative and Oncology consults pending.      Functional History: Patient lives with daughter in a single story home with 0 steps to enter.  Prior to admission, I. DME: rollator.    Hospital Course: 10/14/20: SLP Puree and thin.   OT and PT evals pending    Past Medical History:   Diagnosis Date    Allergy     Brain aneurysm 2010    s/p coiling of one; another not coiled    Breast cyst     Depression 9/19/2019    Diabetes mellitus     Diabetes type 2, controlled     Fever blister     High cholesterol     History of Bell's palsy     HTN (hypertension) 5/20/2014    Recurrent upper respiratory infection (URI)      Past Surgical History:   Procedure Laterality Date     "BREAST SURGERY      BRONCHOSCOPY N/A 2019    Procedure: Bronchoscopy;  Surgeon: Iesha Diagnostic Provider;  Location: [facility] OR 2ND FLR;  Service: Anesthesiology;  Laterality: N/A;    CERVICAL FUSION      COLONOSCOPY N/A 3/9/2016    Procedure: COLONOSCOPY;  Surgeon: Elliott Zimmerman MD;  Location: [facility] ENDO (4TH FLR);  Service: Endoscopy;  Laterality: N/A;    head surgery      stent and "curling" for aneurysm    HYSTERECTOMY      TVH secondary to SUF    INSERTION OF TUNNELED CENTRAL VENOUS CATHETER (CVC) WITH SUBCUTANEOUS PORT Right 2019    Procedure: INSERTION, PORT-A-CATH;  Surgeon: Cali Damico MD;  Location: [facility] CATH LAB;  Service: Radiology;  Laterality: Right;    MENISCECTOMY Left      Review of patient's allergies indicates:   Allergen Reactions    Piperacillin-tazobactam Rash     Tolerated cefepime 2020       Scheduled Medications:    apixaban  5 mg Oral BID    cyproheptadine  4 mg Oral QID    tamsulosin  0.4 mg Oral Daily    venlafaxine  75 mg Oral Daily       PRN Medications: dextrose 50%, glucagon (human recombinant), influenza, insulin aspart U-100, labetalol, lidocaine, sodium chloride 0.9%    Family History     Problem Relation (Age of Onset)    Allergies Daughter    Breast cancer Maternal Aunt    Cancer Mother (63), Maternal Aunt    Cataracts Father    Diabetes Father, Sister, Brother    Heart failure Father    Migraines Father    Ovarian cancer Cousin    Rheum arthritis Father    Stomach cancer Mother        Tobacco Use    Smoking status: Former Smoker     Packs/day: 0.50     Years: 30.00     Pack years: 15.00     Quit date: 1999     Years since quittin.8    Smokeless tobacco: Never Used   Substance and Sexual Activity    Alcohol use: No     Alcohol/week: 0.0 standard drinks    Drug use: No    Sexual activity: Never     Partners: Female     Birth control/protection: Surgical     Review of Systems   Constitutional: Positive for activity change and fatigue. " Negative for fever.   HENT: Positive for trouble swallowing. Negative for sore throat.    Eyes: Negative for visual disturbance.   Respiratory: Negative for cough and shortness of breath.    Cardiovascular: Negative for chest pain and leg swelling.   Gastrointestinal: Negative for abdominal distention and abdominal pain.   Genitourinary: Negative for difficulty urinating.   Musculoskeletal: Positive for gait problem. Negative for back pain.   Skin: Negative for color change.   Neurological: Positive for speech difficulty and weakness. Negative for light-headedness and headaches.   Psychiatric/Behavioral: Positive for confusion. Negative for agitation.     Objective:     Vital Signs (Most Recent):  Temp: 98 °F (36.7 °C) (10/15/20 1013)  Pulse: 99 (10/15/20 1013)  Resp: 12 (10/15/20 1013)  BP: (!) 159/84 (10/15/20 1013)  SpO2: 99 % (10/15/20 1013)    Vital Signs (24h Range):  Temp:  [97.1 °F (36.2 °C)-98.6 °F (37 °C)] 98 °F (36.7 °C)  Pulse:  [63-99] 99  Resp:  [12-18] 12  SpO2:  [97 %-100 %] 99 %  BP: (127-167)/(57-84) 159/84     Body mass index is 31.45 kg/m².    Physical Exam  Vitals signs and nursing note reviewed.   Constitutional:       Appearance: Normal appearance. She is well-developed.      Comments: Fatigue present   HENT:      Head: Normocephalic.      Nose: Nose normal.      Mouth/Throat:      Mouth: Mucous membranes are moist.   Eyes:      Pupils: Pupils are equal, round, and reactive to light.   Neck:      Musculoskeletal: Normal range of motion and neck supple.   Pulmonary:      Effort: Pulmonary effort is normal.      Breath sounds: Normal breath sounds.   Musculoskeletal:      Comments: L sided weakness    Skin:     General: Skin is warm and dry.   Neurological:      Comments: Dysarthria   Confusion present  Following commands   Psychiatric:         Mood and Affect: Mood normal.         Behavior: Behavior normal.         Diagnostic Results:   Labs: Reviewed  ECG: Reviewed  CT: Reviewed  MRI:  Reviewed    Assessment/Plan:     * Embolic stroke involving right middle cerebral artery  - Related to prolonged/acute hospital course.     Recommendations  -  Encourage mobility, OOB in chair at least 3 hours per day, and early ambulation as appropriate  -  PT/OT evaluate and treat  -  Pain management  -  Monitor for and prevent skin breakdown and pressure ulcers  · Early mobility, repositioning/weight shifting every 20-30 minutes when sitting, turn patient every 2 hours, proper mattress/overlay and chair cushioning, pressure relief/heel protector boots  -  DVT prophylaxis    -  Reviewed discharge options (IP rehab, SNF, HH therapy, and OP therapy)    Oropharyngeal dysphagia  - passed for puree and thin     Palliative care encounter  - consult pending     Recommend Inpatient Rehab once medically stable.      Thank you for your consult.     Angelica Faust NP  Department of Physical Medicine & Rehab  [facility]

## 2020-10-15 NOTE — CONSULTS
[facility]  Hematology/Oncology  Consult Note    Patient Name: Alison Branch  MRN: [id number]  Admission Date: 10/13/2020  Hospital Length of Stay: 2 days  Code Status: Full Code   Attending Provider: Bijal Paulino MD  Consulting Provider: Olga Murcia MD  Primary Care Physician: Mike Rodriguez Ii, MD  Principal Problem:Embolic stroke involving right middle cerebral artery    Inpatient consult to Hematology/Oncology  Consult performed by: Olga Murcia MD  Consult ordered by: Mckayla Euceda PA-C        Subjective:     HPI:  Ms. Branch is a 63 yo woman with PMH of Lung adenocarcinoma with mets (on entrectinib), brain aneurysm s/p coiling, chronic anticoagulation (on eliquis for previous DVTs) presented to the ED on 10/13/20 directly from rehab for a possible stroke with left sided facial drooping.     Oncology is consulted for recommendations regarding anticoagulation (switching from Eliquis to Lovenox). Per HPI she was at rehab and woke up with slurred speeh & left sided weakness. CTH did not show any infarct or hemorrhage and CTA MP does not show any LVO or high grade stenosis.     Of note, patient was recently discharged from our service on 10/5/20. She was initially admitted for acute onset of RLE shaking and weakness. MRI brain done in previous admission revealed infarct in the right centrum semiovale, left corpus collosum. Stroke team deemed etiology most likely due to a hypercoagulable state. She was on Eliquis and was discharged to rehab. It is thought that patient may have missed some doses of her Eliquis that may have contributed to today's acute focal deficit with regards to her hypercoagulable state secondary to her malignancy.       Oncologic History:   Ms. Branch is a 63-year-old female who smoked for about 30 years and quit 20 years ago, presented with cough end of last year, but worsened into January.  Since the cough persisted, she saw her PCP, underwent a chest x-ray on  05/10/2019, that revealed left upper lobe pneumonia and a repeat CT was done in the week after that on 05/17/2019 that showed left upper lobe mass arising from the lateral pleural surface in the left upper lobe posterior subsegment measuring 2.6 x 3 cm.  There are satellite mass more medially near the aortic arch that is 2 cm, also spiculated and irregular as well as thickened interlobar septa in the left lung apex and anterior segment, prevascular lymph node lateral to the aortic arch, short axis measuring 9 mm.       She then underwent a bronchoscopy on 05/28/2019, and that revealed there was an infiltration obtained in the left apical posterior segment of the left upper lobe cytology brush was done.  Transbronchial biopsies of an area of infiltration were also performed in the apicoposterior segment of the left upper lobe.    Pathology revealed adenocarcinoma; however, the specimen was not adequate enough to send for molecular testing.       She underwent a PET scan on 06/06/2019.  that reveals significant hypermetabolic activity in the large irregular spiculated pulmonary mass in the lateral aspect of the left upper lobe consistent with the patient's known pulmonary adenocarcinoma.  There is also tracer avidity in the medial left upper lobe satellite lesion and diffusely throughout much of the anterior left upper lobe where there is prominent nodular paraseptal thickening.  There are some numerous scattered subcentimeter pulmonary nodules throughout the right lung, all of which are too small for detection by PET.  For example, there is a 0.4 cm nodule in the superior right lower lobe and a micro nodule in the posterior segment of the right upper lobe.  There is a 0.5 cm, normal size right   paratracheal lymph node with increased radiotracer uptake as well as hypermetabolic aortic pulmonary lymph node and a left hilar lymph node.  There is a focal hypermetabolic lesion in the left anterior superior iliac spine  "associated with well defined lytic lesion.  There is a hypermetabolic focus in the anterior right fifth rib with a possible associated lytic lesion.  SUVs as   below lateral:  Left upper lobe  SUV max 17.9, anterior left upper lobe satellite mass and septal thickening SUV max of 10.1, right paratracheal lymph node SUV max of 4.8, left AP window lymph node SUV max of 17.9, left anterior superior iliac spine SUV max of 3.9"     MRI pelvis from 6/10/19  reveals "Region of osseous is irregularity at the left anterior iliac spine likely related to bone graft harvest procedure.  See  discussion above.  No suspicious signal or enhancement to suggest active/malignant process"   MRI brain from 6/10//19 reveal No intracranial abnormality.     We discussed her case at [facility], and plan was to wait for GAURDANT and proceed with treatment accordingly  Her GAURDANT is negative for molecular markers.     She has completed 4 cycles of Carboplatin, Alimta and Keytruda as of 9/5/19. She is now on  ALimta and Keytruda maintenance.    She has been on maintenance Alimta and Keytruda     Her CT scans from 4/23/2020 revealed "In this patient with a known history of lung cancer, there has been marked interval detrimental change when compared to CT dated 12/20/2019 as follows. Persistent left upper lobe volume loss with worsening masslike consolidation and enlarging index and non index lesions. Increased number of innumerable bilateral metastatic solid pulmonary nodules. Interval increased size and number of multiple osteoblastic metastatic lesions throughout the visualized axial skeleton. Stable mediastinal lymph nodes, several of which were noted to be hypermetabolic on previous PET-CT.  No new lymphadenopathy. Stable subcentimeter hepatic and splenic hypodensities, too small to accurately characterize.  Path addendum from 5/2019 reveals ROS1      She is now on Entrectinib at 400 mg dose on 7/1/2020    Oncology Treatment Plan:   [No " "treatment plan]    Medications:  Continuous Infusions:  Scheduled Meds:   apixaban  5 mg Oral BID    cyproheptadine  4 mg Oral QID    tamsulosin  0.4 mg Oral Daily    venlafaxine  75 mg Oral Daily     PRN Meds:dextrose 50%, glucagon (human recombinant), influenza, insulin aspart U-100, labetalol, lidocaine, sodium chloride 0.9%     Review of patient's allergies indicates:   Allergen Reactions    Piperacillin-tazobactam Rash     Tolerated cefepime 06/2020        Past Medical History:   Diagnosis Date    Allergy     Brain aneurysm 2010    s/p coiling of one; another not coiled    Breast cyst     Depression 9/19/2019    Diabetes mellitus     Diabetes type 2, controlled     Fever blister     High cholesterol     History of Bell's palsy     HTN (hypertension) 5/20/2014    Recurrent upper respiratory infection (URI)      Past Surgical History:   Procedure Laterality Date    BREAST SURGERY      BRONCHOSCOPY N/A 5/28/2019    Procedure: Bronchoscopy;  Surgeon: Iesha Diagnostic Provider;  [facility];  Service: Anesthesiology;  Laterality: N/A;    CERVICAL FUSION      COLONOSCOPY N/A 3/9/2016    Procedure: COLONOSCOPY;  Surgeon: Elliott Zimmerman MD;  Location: [facility] ENDO (4TH FLR);  Service: Endoscopy;  Laterality: N/A;    head surgery      stent and "curling" for aneurysm    HYSTERECTOMY      TVH secondary to SUF    INSERTION OF TUNNELED CENTRAL VENOUS CATHETER (CVC) WITH SUBCUTANEOUS PORT Right 7/8/2019    Procedure: INSERTION, PORT-A-CATH;  Surgeon: Cali Damico MD;  Location: [facility] CATH LAB;  Service: Radiology;  Laterality: Right;    MENISCECTOMY Left      Family History     Problem Relation (Age of Onset)    Allergies Daughter    Breast cancer Maternal Aunt    Cancer Mother (63), Maternal Aunt    Cataracts Father    Diabetes Father, Sister, Brother    Heart failure Father    Migraines Father    Ovarian cancer Cousin    Rheum arthritis Father    Stomach cancer Mother    "     Tobacco Use    Smoking status: Former Smoker     Packs/day: 0.50     Years: 30.00     Pack years: 15.00     Quit date: 1999     Years since quittin.8    Smokeless tobacco: Never Used   Substance and Sexual Activity    Alcohol use: No     Alcohol/week: 0.0 standard drinks    Drug use: No    Sexual activity: Never     Partners: Female     Birth control/protection: Surgical       Review of Systems   Constitutional: Positive for activity change. Negative for chills, fatigue, fever and unexpected weight change.   HENT: Positive for trouble swallowing. Negative for congestion.    Eyes: Negative for visual disturbance.   Respiratory: Negative for cough, choking, chest tightness, shortness of breath, wheezing and stridor.    Cardiovascular: Negative for chest pain, palpitations and leg swelling.   Gastrointestinal: Negative for abdominal distention, abdominal pain, blood in stool, constipation, diarrhea, nausea and vomiting.   Endocrine: Negative for polyuria.   Genitourinary: Negative for difficulty urinating, dysuria, flank pain, frequency, urgency and vaginal discharge.   Musculoskeletal: Positive for gait problem. Negative for arthralgias.   Neurological: Positive for facial asymmetry. Negative for dizziness, tremors, seizures, weakness, numbness and headaches.   Psychiatric/Behavioral: Negative for agitation, behavioral problems, confusion and decreased concentration.     Objective:     Vital Signs (Most Recent):  Temp: 98 °F (36.7 °C) (10/15/20 1013)  Pulse: 70 (10/15/20 1437)  Resp: 12 (10/15/20 1013)  BP: (!) 159/84 (10/15/20 1013)  SpO2: 99 % (10/15/20 1013) Vital Signs (24h Range):  Temp:  [97.1 °F (36.2 °C)-98.6 °F (37 °C)] 98 °F (36.7 °C)  Pulse:  [63-99] 70  Resp:  [12-18] 12  SpO2:  [97 %-100 %] 99 %  BP: (127-167)/(57-84) 159/84     Weight: 96.6 kg (213 lb)  Body mass index is 31.45 kg/m².  Body surface area is 2.17 meters squared.    No intake or output data in the 24 hours ending 10/15/20  1549    Physical Exam  Constitutional:       General: She is not in acute distress.     Appearance: She is well-developed. She is not diaphoretic.      Comments: Slowed speech   HENT:      Head: Normocephalic and atraumatic.   Eyes:      Pupils: Pupils are equal, round, and reactive to light.   Neck:      Musculoskeletal: Normal range of motion and neck supple.      Thyroid: No thyromegaly.      Vascular: No JVD.   Cardiovascular:      Rate and Rhythm: Normal rate and regular rhythm.      Heart sounds: Normal heart sounds. No murmur. No friction rub. No gallop.    Pulmonary:      Effort: Pulmonary effort is normal. No respiratory distress.      Breath sounds: Normal breath sounds. No stridor. No wheezing or rales.   Chest:      Chest wall: No tenderness.   Abdominal:      General: Bowel sounds are normal. There is no distension.      Palpations: Abdomen is soft.      Tenderness: There is no abdominal tenderness. There is no guarding.   Musculoskeletal: Normal range of motion.   Skin:     General: Skin is warm and dry.      Capillary Refill: Capillary refill takes less than 2 seconds.   Neurological:      Mental Status: She is alert and oriented to person, place, and time.      Cranial Nerves: Cranial nerve deficit present.      Motor: No abnormal muscle tone.      Coordination: Coordination normal.      Comments: Left sided facial drooping   Psychiatric:         Behavior: Behavior normal.         Thought Content: Thought content normal.         Judgment: Judgment normal.         Significant Labs:   CBC:   Recent Labs   Lab 10/14/20  0822 10/15/20  0344   WBC 4.55 5.51   HGB 10.7* 10.8*   HCT 35.5* 34.1*    217   , CMP:   Recent Labs   Lab 10/14/20  0822 10/15/20  0344    142   K 4.1 4.6    107   CO2 28 25   GLU 81 107   BUN 18 18   CREATININE 1.3 1.3   CALCIUM 9.4 9.5   PROT 7.1 7.1   ALBUMIN 4.1 4.1   BILITOT 0.4 0.5   ALKPHOS 40* 43*   AST 22 21   ALT 13 15   ANIONGAP 10 10   EGFRNONAA 44.1*  43.8*   , Coagulation:   Recent Labs   Lab 10/14/20  0822   INR 1.0   APTT 26.8   , LFTs:   Recent Labs   Lab 10/14/20  0822 10/15/20  0344   ALT 13 15   AST 22 21   ALKPHOS 40* 43*   BILITOT 0.4 0.5   PROT 7.1 7.1   ALBUMIN 4.1 4.1   , Tumor Markers: No results for input(s): PSA, CEA, , AFPTM, JO3803,  in the last 48 hours.    Invalid input(s): ALGTM and All pertinent labs from the last 24 hours have been reviewed.    Diagnostic Results:  I have reviewed all pertinent imaging results/findings within the past 24 hours.    Assessment/Plan:     Malignant neoplasm of upper lobe of left lung  64 y/o woman with a medical history of lung adenocarcinoma with mets to the spine, previous brain aneurysm s/p coiling, previous DVTs (on eliquis) presented from rehab when she was found to have new-onset left facial droop, with slurred speech. She is on Eliquis.  Of note, patient was recently discharged from stroke service for multiple infarcts in bilateral territories thought to be due to her hypercoagulable state in setting of malignancy.      MRI Brain consistent with  acute infarct in the R MCA, L MCA, and L cerebellar territories. Per stroke team etiology regmains unclear. Oncology has been consulted     Recommendations:   1. Agree with switch to subcutaneous Lovenox 1 mg/kg every 12 hours over Eliquis  2. Agree with holding entrectinib while inpatient & follow up with Dr. Belcher to discuss continuation  3. Prognosis is difficult to determine at this point. Will have to see how patient does with Rehab.         Thank you for your consult. I will follow-up with patient. Please contact us if you have any additional questions.    Olga Murcia MD  Hematology/Oncology  [facility]

## 2020-10-15 NOTE — HPI
Ms. Branch is a 65 yo woman with PMH of Lung adenocarcinoma with mets (on entrectinib), brain aneurysm s/p coiling, chronic anticoagulation (on eliquis for previous DVTs) presented to the ED on 10/13/20 directly from rehab for a possible stroke with left sided facial drooping.     Oncology is consulted for recommendations regarding anticoagulation (switching from Eliquis to Lovenox). Per HPI she was at rehab and woke up with slurred speeh & left sided weakness. CTH did not show any infarct or hemorrhage and CTA MP does not show any LVO or high grade stenosis.     Of note, patient was recently discharged from our service on 10/5/20. She was initially admitted for acute onset of RLE shaking and weakness. MRI brain done in previous admission revealed infarct in the right centrum semiovale, left corpus collosum. Stroke team deemed etiology most likely due to a hypercoagulable state. She was on Eliquis and was discharged to rehab. It is thought that patient may have missed some doses of her Eliquis that may have contributed to today's acute focal deficit with regards to her hypercoagulable state secondary to her malignancy.       Oncologic History:   Ms. Branch is a 63-year-old female who smoked for about 30 years and quit 20 years ago, presented with cough end of last year, but worsened into January.  Since the cough persisted, she saw her PCP, underwent a chest x-ray on 05/10/2019, that revealed left upper lobe pneumonia and a repeat CT was done in the week after that on 05/17/2019 that showed left upper lobe mass arising from the lateral pleural surface in the left upper lobe posterior subsegment measuring 2.6 x 3 cm.  There are satellite mass more medially near the aortic arch that is 2 cm, also spiculated and irregular as well as thickened interlobar septa in the left lung apex and anterior segment, prevascular lymph node lateral to the aortic arch, short axis measuring 9 mm.       She then underwent a bronchoscopy  "on 05/28/2019, and that revealed there was an infiltration obtained in the left apical posterior segment of the left upper lobe cytology brush was done.  Transbronchial biopsies of an area of infiltration were also performed in the apicoposterior segment of the left upper lobe.    Pathology revealed adenocarcinoma; however, the specimen was not adequate enough to send for molecular testing.       She underwent a PET scan on 06/06/2019.  that reveals significant hypermetabolic activity in the large irregular spiculated pulmonary mass in the lateral aspect of the left upper lobe consistent with the patient's known pulmonary adenocarcinoma.  There is also tracer avidity in the medial left upper lobe satellite lesion and diffusely throughout much of the anterior left upper lobe where there is prominent nodular paraseptal thickening.  There are some numerous scattered subcentimeter pulmonary nodules throughout the right lung, all of which are too small for detection by PET.  For example, there is a 0.4 cm nodule in the superior right lower lobe and a micro nodule in the posterior segment of the right upper lobe.  There is a 0.5 cm, normal size right   paratracheal lymph node with increased radiotracer uptake as well as hypermetabolic aortic pulmonary lymph node and a left hilar lymph node.  There is a focal hypermetabolic lesion in the left anterior superior iliac spine associated with well defined lytic lesion.  There is a hypermetabolic focus in the anterior right fifth rib with a possible associated lytic lesion.  SUVs as   below lateral:  Left upper lobe  SUV max 17.9, anterior left upper lobe satellite mass and septal thickening SUV max of 10.1, right paratracheal lymph node SUV max of 4.8, left AP window lymph node SUV max of 17.9, left anterior superior iliac spine SUV max of 3.9"     MRI pelvis from 6/10/19  reveals "Region of osseous is irregularity at the left anterior iliac spine likely related to bone graft " "harvest procedure.  See  discussion above.  No suspicious signal or enhancement to suggest active/malignant process"   MRI brain from 6/10//19 reveal No intracranial abnormality.     We discussed her case at [facility], and plan was to wait for GAURDANT and proceed with treatment accordingly  Her GAURDANT is negative for molecular markers.     She has completed 4 cycles of Carboplatin, Alimta and Keytruda as of 9/5/19. She is now on  ALimta and Keytruda maintenance.    She has been on maintenance Alimta and Keytruda     Her CT scans from 4/23/2020 revealed "In this patient with a known history of lung cancer, there has been marked interval detrimental change when compared to CT dated 12/20/2019 as follows. Persistent left upper lobe volume loss with worsening masslike consolidation and enlarging index and non index lesions. Increased number of innumerable bilateral metastatic solid pulmonary nodules. Interval increased size and number of multiple osteoblastic metastatic lesions throughout the visualized axial skeleton. Stable mediastinal lymph nodes, several of which were noted to be hypermetabolic on previous PET-CT.  No new lymphadenopathy. Stable subcentimeter hepatic and splenic hypodensities, too small to accurately characterize.  Path addendum from 5/2019 reveals ROS1      She is now on Entrectinib at 400 mg dose on 7/1/2020  "

## 2020-10-16 LAB
POCT GLUCOSE: 112 MG/DL (ref 70–110)
POCT GLUCOSE: 119 MG/DL (ref 70–110)
POCT GLUCOSE: 81 MG/DL (ref 70–110)
POCT GLUCOSE: 95 MG/DL (ref 70–110)

## 2020-10-16 PROCEDURE — 25000003 PHARM REV CODE 250: Performed by: FAMILY MEDICINE

## 2020-10-16 PROCEDURE — 25000003 PHARM REV CODE 250: Performed by: EMERGENCY MEDICINE

## 2020-10-16 PROCEDURE — 99233 SBSQ HOSP IP/OBS HIGH 50: CPT | Mod: GC,,, | Performed by: PSYCHIATRY & NEUROLOGY

## 2020-10-16 PROCEDURE — 25000003 PHARM REV CODE 250: Performed by: PHYSICIAN ASSISTANT

## 2020-10-16 PROCEDURE — 63600175 PHARM REV CODE 636 W HCPCS: Performed by: PHYSICIAN ASSISTANT

## 2020-10-16 PROCEDURE — 11000001 HC ACUTE MED/SURG PRIVATE ROOM

## 2020-10-16 PROCEDURE — 92526 ORAL FUNCTION THERAPY: CPT

## 2020-10-16 PROCEDURE — 99233 PR SUBSEQUENT HOSPITAL CARE,LEVL III: ICD-10-PCS | Mod: GC,,, | Performed by: PSYCHIATRY & NEUROLOGY

## 2020-10-16 RX ORDER — ENOXAPARIN SODIUM 100 MG/ML
100 INJECTION SUBCUTANEOUS
Status: DISCONTINUED | OUTPATIENT
Start: 2020-10-16 | End: 2020-10-20 | Stop reason: HOSPADM

## 2020-10-16 RX ADMIN — CYPROHEPTADINE HYDROCHLORIDE 4 MG: 4 TABLET ORAL at 09:10

## 2020-10-16 RX ADMIN — ENOXAPARIN SODIUM 100 MG: 100 INJECTION SUBCUTANEOUS at 09:10

## 2020-10-16 RX ADMIN — TAMSULOSIN HYDROCHLORIDE 0.4 MG: 0.4 CAPSULE ORAL at 09:10

## 2020-10-16 RX ADMIN — CYPROHEPTADINE HYDROCHLORIDE 4 MG: 4 TABLET ORAL at 06:10

## 2020-10-16 RX ADMIN — ENOXAPARIN SODIUM 100 MG: 100 INJECTION SUBCUTANEOUS at 11:10

## 2020-10-16 RX ADMIN — CYPROHEPTADINE HYDROCHLORIDE 4 MG: 4 TABLET ORAL at 02:10

## 2020-10-16 RX ADMIN — HEPARIN SODIUM 5000 UNITS: 5000 INJECTION INTRAVENOUS; SUBCUTANEOUS at 05:10

## 2020-10-16 RX ADMIN — VENLAFAXINE HYDROCHLORIDE 75 MG: 37.5 CAPSULE, EXTENDED RELEASE ORAL at 09:10

## 2020-10-16 RX ADMIN — EZETIMIBE 10 MG: 10 TABLET ORAL at 09:10

## 2020-10-16 NOTE — PLAN OF CARE
Problem: Fall Injury Risk  Goal: Absence of Fall and Fall-Related Injury  Outcome: Ongoing, Progressing     Problem: Adult Inpatient Plan of Care  Goal: Plan of Care Review  Outcome: Ongoing, Progressing  Goal: Readiness for Transition of Care  Outcome: Ongoing, Progressing     Problem: Infection  Goal: Infection Symptom Resolution  Outcome: Ongoing, Progressing     Problem: Adjustment to Illness (Stroke, Ischemic/Transient Ischemic Attack)  Goal: Optimal Coping  Outcome: Ongoing, Progressing  Intervention: Support Patient/Family Psychosocial Response to Stroke  Flowsheets (Taken 10/16/2020 0406)  Supportive Measures: active listening utilized  Family/Support System Care: self-care encouraged     Problem: Cerebral Tissue Perfusion Risk (Stroke, Ischemic/Transient Ischemic Attack)  Goal: Optimal Cerebral Tissue Perfusion  Outcome: Ongoing, Progressing  Intervention: Protect and Optimize Cerebral Perfusion  Flowsheets (Taken 10/16/2020 0406)  Sensory Stimulation Regulation: care clustered  Cerebral Perfusion Promotion: blood pressure monitored     Problem: Eating/Swallowing Impairment (Stroke, Ischemic/Transient Ischemic Attack)  Goal: Oral Intake without Aspiration  Outcome: Ongoing, Progressing  Intervention: Optimize Eating and Swallowing  Flowsheets (Taken 10/16/2020 0406)  Aspiration Precautions: awake/alert before oral intake       POC reviewed with patient. Fall precautions reviewed with patient. All questions and concerns addressed and answered. Medications reviewed with patient. Pt ate some food and ice cream; handled well. NAEO. VSS. See flowsheets for further assessment data. Pt call light in reach, bed alarm on, and belongings at bedside. TM

## 2020-10-16 NOTE — ASSESSMENT & PLAN NOTE
2/2 stroke  SLP eval and treat -- Recommending puree diet, thin liquids with frequent nursing checks for any pocketing. Meds crushed in pudding (per SLP, could bury chemo med in puree to avoid crushing.)  Pt states she does not want a feeding tube 10/15, will try her best to keep swallowing the ways that SLP has instructed her. Added Boost to regimen as well.

## 2020-10-16 NOTE — SUBJECTIVE & OBJECTIVE
Neurologic Chief Complaint: L facial droop, slurred speech, Left side weakness    Subjective:     Interval History: Patient is seen for follow-up neurological assessment and treatment recommendations:      KEHINDE. Patient more alert today, states she does not like the puree diet but she is still in good spirits. Started therapeutic Lovenox. Holding chemo agent per Onc recs.     HPI, Past Medical, Family, and Social History remains the same as documented in the initial encounter.     Review of Systems   Constitutional: Negative for fatigue and fever.   Eyes: Negative for discharge and visual disturbance.   Neurological: Positive for facial asymmetry, speech difficulty, weakness and numbness. Negative for headaches.   Psychiatric/Behavioral: Negative for agitation and confusion.     Scheduled Meds:   cyproheptadine  4 mg Oral QID    enoxaparin  100 mg Subcutaneous Q12H    ezetimibe  10 mg Oral QHS    tamsulosin  0.4 mg Oral Daily    venlafaxine  75 mg Oral Daily     Continuous Infusions:  PRN Meds:dextrose 50%, glucagon (human recombinant), influenza, insulin aspart U-100, labetalol, lidocaine, sodium chloride 0.9%    Objective:     Vital Signs (Most Recent):  Temp: 98.5 °F (36.9 °C) (10/16/20 1558)  Pulse: 70 (10/16/20 0445)  Resp: 14 (10/16/20 0445)  BP: 139/79 (10/16/20 0445)  SpO2: 97 % (10/16/20 0445)  BP Location: Right arm    Vital Signs Range (Last 24H):  Temp:  [96.2 °F (35.7 °C)-98.7 °F (37.1 °C)]   Pulse:  [63-70]   Resp:  [14-18]   BP: (124-139)/(58-79)   SpO2:  [97 %-98 %]   BP Location: Right arm    Physical Exam  Vitals signs reviewed.   Constitutional:       General: She is not in acute distress.     Appearance: She is not toxic-appearing.   HENT:      Head: Normocephalic and atraumatic.   Eyes:      Extraocular Movements: Extraocular movements intact.      Conjunctiva/sclera: Conjunctivae normal.   Cardiovascular:      Rate and Rhythm: Normal rate.   Pulmonary:      Effort: Pulmonary effort is  normal. No respiratory distress.   Musculoskeletal:         General: No tenderness or deformity.   Skin:     General: Skin is warm and dry.   Neurological:      Mental Status: She is oriented to person, place, and time.      Cranial Nerves: Dysarthria and facial asymmetry present.      Motor: Weakness present.   Psychiatric:         Mood and Affect: Mood normal.         Speech: Speech is slurred.         Cognition and Memory: Cognition is not impaired.         Neurological Exam:   LOC: Alert  Attention Span: Good  Language: No aphasia  Articulation: Dysarthria  Orientation: Person, Place, Time   Visual Fields: Full  EOM (CN III, IV, VI): Full/intact  Facial Movement (CN VII): Lower facial weakness on the Left  Motor: Arm left  Paresis: 1/5  Leg left  Paresis: proximal 2/5; distal 3/5  Arm right  Normal 5/5  Leg right Normal 5/5  Sensation: Andres-hypoesthesia left      Laboratory:  CMP:   No results for input(s): GLUCOSE, CALCIUM, ALBUMIN, PROT, NA, K, CO2, CL, BUN, CREATININE, ALKPHOS, ALT, AST, BILITOT in the last 24 hours.  CBC:   Recent Labs   Lab 10/15/20  0344   WBC 5.51   RBC 3.99*   HGB 10.8*   HCT 34.1*      MCV 86   MCH 27.1   MCHC 31.7*     Lipid Panel:   Recent Labs   Lab 10/13/20  1226   CHOL 252*   LDLCALC 163.4*   HDL 68   TRIG 103     Hgb A1C: No results for input(s): HGBA1C in the last 168 hours.  TSH:   Recent Labs   Lab 10/13/20  1226   TSH 0.397*       Diagnostic Results     Brain imaging:    MRI Brain w/o contrast 10/13/2020:     Current MR imaging confirms suspicion of foci of acute infarctions in the right MCA distribution.  Three additional punctate foci of restricted diffusion involving the left parietal lobe, possibly emboli.  Small subacute infarction involving the left cerebellum, similar to prior examination dated 09/28/2020.  Well rounded T2/FLAIR hyperintense focus within the left lateral aspect of the body of the corpus callosum, likely related to evolving infarct as further  discussed above.  If there is concerning for underlying intracranial metastasis in the setting of known metastatic lung cancer, further evaluation may be obtained with contrast enhanced MRI of the brain.    CT Head Without Contrast 10/13/2020:  No findings to suggest acute major vascular territory infarct or hemorrhage noting stable focus of low attenuation within the left cerebellum and stable right ICA region coil pack       Vessel Imaging:    CTA Multiphase 10/14/2020:  States that there is a possible M3 vessel stenosis or occlusion. However, on evaluation of imaging it does not seem that way. Please note that patient has multiple branches from R MCA   FORMAL READ: No acute intracranial pathology.  No evidence for intracranial hemorrhage or CT evidence of major vascular distribution infarct.  No intracranially large vessel occlusion.  Possible right M3 vessel stenosis or subtotal occlusion as described above.  Clinical correlation with symptoms is recommended.  Can consider MRI with diffusion-weighted imaging if concern for acute ischemia.  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  No evidence of high-grade stenosis or large vessel occlusion.  Numerous sclerotic lesions involving the vertebral bodies and remaining visualized axial skeleton, in keeping with patient's history of metastatic lung cancer.  Moderate left pleural effusion with patchy areas of consolidation in the left upper lobe may relate to pneumonia.       Cardiac Evaluation:     TTE 10/14/2020:  · With normal systolic function. The estimated ejection fraction is 55%.  · The quantitatively dervived ejection fraction is 52%.  · Normal left ventricular diastolic function.  · Normal right ventricular systolic function.  · Mild tricuspid regurgitation.   · The left atrial volume index is normal.     TTE June 2020:   · Normal  left ventricular systolic function. The estimated ejection fraction is 55%.  · Normal LV diastolic function.  · No wall motion abnormalities.  · Mildly reduced right ventricular systolic function.  · Normal central venous pressure (3 mmHg).  · The estimated PA systolic pressure is 26 mmHg

## 2020-10-16 NOTE — PROGRESS NOTES
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 No tPA due to Eliquis. No large vessel occlusion so no IR intervention pursued. Of note, patient was recently discharged from our service for multiple infarcts in BERTIN territories thought to be due to her hypercoagulable state (from malignancy.)    MRI Brain shows acute infarct in the R MCA, L MCA, and L cerebellar territories. Echo EF 55%, no L atrial enlargement. Note that new strokes were in the setting of compliance with Eliquis. Stroke etiology remains unclear - possible hypoperfusion though no vessel stenosis was seen nor signs of dehydration at presentation. Otherwise, concern remains for hypercoagulable state.        Antithrombotics for secondary stroke prevention: Anticoagulants: Enoxaparin injection 1mg/kg (100mg) q12 hrs   Statins for secondary stroke prevention and hyperlipidemia, if present: Zetia 10mg Nightly (Cannot be on statin with current chemo regimen)  Aggressive risk factor modification: HTN, Smoking, DM, HLD, Malignancy  Rehab efforts: The patient has been evaluated by a stroke team provider and the therapy needs have been fully considered based off the presenting complaints and exam findings. The following therapy evaluations are needed: PT evaluate and treat, OT evaluate and treat, SLP evaluate and treat, PM&R evaluate for appropriate placement - dispo inpatient rehab  Diagnostics ordered/pending: None   VTE prophylaxis: None: Reason for No Pharmacological VTE Prophylaxis: Currently on anticoagulation, SCDs  BP parameters: Infarct: No intervention, SBP <220    Oropharyngeal dysphagia  2/2  stroke  SLP eval and treat -- Recommending puree diet, thin liquids with frequent nursing checks for any pocketing. Meds crushed in pudding (per SLP, could bury chemo med in puree to avoid crushing.)  Pt states she does not want a feeding tube 10/15, will try her best to keep swallowing the ways that SLP has instructed her. Added Boost to regimen as well.     Malignant neoplasm of upper lobe of left lung  Reportedly metastatic to involve the spine.  Stroke risk factor - suspect contributing to an underlying hypercoagulability  Consulted Oncology on 10/15; Appreciate assistance  They agree with transition to therapeutic Lovenox for AC at this time. Recommend holding chemo agent until pt completes rehab and they are able to reassess then plan; discussed this with patient on 10/16.    Hypertension associated with diabetes  Stroke risk factor   SBP < 220 acutely  Home Lasix held for now    Left-sided weakness  2/2 stroke  PT, OT eval & treat - dispo rehab    Cytotoxic cerebral edema  Area of cytotoxic cerebral edema identified when reviewing brain imaging in the territory of the right middle cerebral artery. There is no mass effect associated with it. We will continue to monitor the patients clinical exam for any worsening of symptoms which may indicate expansion of the stroke or the area of the edema resulting in the clinical change. The pattern is suggestive of embolic etiology related to hypercoagulable state.    Mixed hyperlipidemia due to type 2 diabetes mellitus  Stroke risk factor   LDL at 163  Cannot be on statin with current chemo drug; started Zetia 10/15.    Type 2 diabetes mellitus with hyperglycemia, with long-term current use of insulin  Stroke risk factor   A1C 9.3%  BG goal while inpatient 140-180  SSI while inpatient  Diabetic puree diet    Palliative care encounter  Had considered Palliative Medicine consult for symptom management (pain) however discussed with staff and plans to consult Heme/Onc team  instead. Will re-consider Palliative consult if recommended by Onc. No plans for consultation at this time.    Urinary retention  - On periactin at home for urethral pain and retention. Was seen by urology last admission  - Seen in urology clinic 9/25 for incomplete bladder emptying; hennessy removed on 10/6. While at rehab, UA with positive nitrate, 2+ leukocytes, and many bacteria (10/7). Pt was on Cipro.   - Repeat UA 10/15 with nitrites only. Will defer further abx treatment for now, monitor pt for any symptoms.    Cerebral aneurysm, coiled in 2010  Aneurysm coil in 2010          10/13: Admit to vascular neurology for workup of new onset of slurred speech  10/14: MRI brain acute R MCA infarct. SLP cleared for puree, thin diet, meds crushed. Order TTE. Patient c/o lab draws/difficult stick, ordered topical lidocaine, consult palliative for pain management. Need to reach out to oncology regarding chemo management (unable to crush entrectinib), prognosis, possibly switching to Lovenox.    10/15: Consulted Oncology regarding cancer prognostication, chemo drug, and anticoagulation recs. SLP ok to give PO chemo buried in puree; nurse to frequently check for pocketing. Added Boost to regimen per nutrition recs. UA pending.  10/16: Patient more alert today, states she does not like the puree diet but she is still in good spirits. Started therapeutic Lovenox. Holding chemo agent per Onc recs.     STROKE DOCUMENTATION   Acute Stroke Times   Last Known Normal Date: 10/13/20  Last Known Normal Time: 1030  Symptom Onset Date: 10/13/20  Symptom Onset Time: 1130  Stroke Team Called Date: 10/13/20  Stroke Team Called Time: 1220  Stroke Team Arrival Date: 10/13/20  Stroke Team Arrival Time: 1227    NIH Scale:  1a. Level of Consciousness: 0-->Alert, keenly responsive  1b. LOC Questions: 0-->Answers both questions correctly  1c. LOC Commands: 0-->Performs both tasks correctly  2. Best Gaze: 0-->Normal  3. Visual: 0-->No visual  loss  4. Facial Palsy: 2-->Partial paralysis (total or near-total paralysis of lower face)  5a. Motor Arm, Left: 3-->No effort against gravity, limb falls  5b. Motor Arm, Right: 0-->No drift, limb holds 90 (or 45) degrees for full 10 secs  6a. Motor Leg, Left: 2-->Some effort against gravity, leg falls to bed by 5 secs, but has some effort against gravity  6b. Motor Leg, Right: 0-->No drift, leg holds 30 degree position for full 5 secs  7. Limb Ataxia: 0-->Absent  8. Sensory: 1-->Mild-to-moderate sensory loss, patient feels pinprick is less sharp or is dull on the affected side, or there is a loss of superficial pain with pinprick, but patient is aware of being touched  9. Best Language: 0-->No aphasia, normal  10. Dysarthria: 2-->Severe dysarthria, patients speech is so slurred as to be unintelligible in the absence of or out of proportion to any dysphasia, or is mute/anarthric  11. Extinction and Inattention (formerly Neglect): 0-->No abnormality  Total (NIH Stroke Scale): 10       Modified [place]    Dang Coma Scale:    ABCD2 Score:    [id number] Score:   HAS -BLED Score:   ICH Score:   Hunt & Sutton Classification:      Hemorrhagic change of an Ischemic Stroke: Does this patient have an ischemic stroke with hemorrhagic changes? No     Neurologic Chief Complaint: L facial droop, slurred speech, Left side weakness    Subjective:     Interval History: Patient is seen for follow-up neurological assessment and treatment recommendations:      KEHINDE. Patient more alert today, states she does not like the puree diet but she is still in good spirits. Started therapeutic Lovenox. Holding chemo agent per Onc recs.     HPI, Past Medical, Family, and Social History remains the same as documented in the initial encounter.     Review of Systems   Constitutional: Negative for fatigue and fever.   Eyes: Negative for discharge and visual disturbance.   Neurological: Positive for facial asymmetry, speech difficulty, weakness and  numbness. Negative for headaches.   Psychiatric/Behavioral: Negative for agitation and confusion.     Scheduled Meds:   cyproheptadine  4 mg Oral QID    enoxaparin  100 mg Subcutaneous Q12H    ezetimibe  10 mg Oral QHS    tamsulosin  0.4 mg Oral Daily    venlafaxine  75 mg Oral Daily     Continuous Infusions:  PRN Meds:dextrose 50%, glucagon (human recombinant), influenza, insulin aspart U-100, labetalol, lidocaine, sodium chloride 0.9%    Objective:     Vital Signs (Most Recent):  Temp: 98.5 °F (36.9 °C) (10/16/20 1558)  Pulse: 70 (10/16/20 0445)  Resp: 14 (10/16/20 0445)  BP: 139/79 (10/16/20 0445)  SpO2: 97 % (10/16/20 0445)  BP Location: Right arm    Vital Signs Range (Last 24H):  Temp:  [96.2 °F (35.7 °C)-98.7 °F (37.1 °C)]   Pulse:  [63-70]   Resp:  [14-18]   BP: (124-139)/(58-79)   SpO2:  [97 %-98 %]   BP Location: Right arm    Physical Exam  Vitals signs reviewed.   Constitutional:       General: She is not in acute distress.     Appearance: She is not toxic-appearing.   HENT:      Head: Normocephalic and atraumatic.   Eyes:      Extraocular Movements: Extraocular movements intact.      Conjunctiva/sclera: Conjunctivae normal.   Cardiovascular:      Rate and Rhythm: Normal rate.   Pulmonary:      Effort: Pulmonary effort is normal. No respiratory distress.   Musculoskeletal:         General: No tenderness or deformity.   Skin:     General: Skin is warm and dry.   Neurological:      Mental Status: She is oriented to person, place, and time.      Cranial Nerves: Dysarthria and facial asymmetry present.      Motor: Weakness present.   Psychiatric:         Mood and Affect: Mood normal.         Speech: Speech is slurred.         Cognition and Memory: Cognition is not impaired.         Neurological Exam:   LOC: Alert  Attention Span: Good  Language: No aphasia  Articulation: Dysarthria  Orientation: Person, Place, Time   Visual Fields: Full  EOM (CN III, IV, VI): Full/intact  Facial Movement (CN VII): Lower  facial weakness on the Left  Motor: Arm left  Paresis: 1/5  Leg left  Paresis: proximal 2/5; distal 3/5  Arm right  Normal 5/5  Leg right Normal 5/5  Sensation: Andres-hypoesthesia left      Laboratory:  CMP:   No results for input(s): GLUCOSE, CALCIUM, ALBUMIN, PROT, NA, K, CO2, CL, BUN, CREATININE, ALKPHOS, ALT, AST, BILITOT in the last 24 hours.  CBC:   Recent Labs   Lab 10/15/20  0344   WBC 5.51   RBC 3.99*   HGB 10.8*   HCT 34.1*      MCV 86   MCH 27.1   MCHC 31.7*     Lipid Panel:   Recent Labs   Lab 10/13/20  1226   CHOL 252*   LDLCALC 163.4*   HDL 68   TRIG 103     Hgb A1C: No results for input(s): HGBA1C in the last 168 hours.  TSH:   Recent Labs   Lab 10/13/20  1226   TSH 0.397*       Diagnostic Results     Brain imaging:    MRI Brain w/o contrast 10/13/2020:     Current MR imaging confirms suspicion of foci of acute infarctions in the right MCA distribution.  Three additional punctate foci of restricted diffusion involving the left parietal lobe, possibly emboli.  Small subacute infarction involving the left cerebellum, similar to prior examination dated 09/28/2020.  Well rounded T2/FLAIR hyperintense focus within the left lateral aspect of the body of the corpus callosum, likely related to evolving infarct as further discussed above.  If there is concerning for underlying intracranial metastasis in the setting of known metastatic lung cancer, further evaluation may be obtained with contrast enhanced MRI of the brain.    CT Head Without Contrast 10/13/2020:  No findings to suggest acute major vascular territory infarct or hemorrhage noting stable focus of low attenuation within the left cerebellum and stable right ICA region coil pack       Vessel Imaging:    CTA Multiphase 10/14/2020:  States that there is a possible M3 vessel stenosis or occlusion. However, on evaluation of imaging it does not seem that way. Please note that patient has multiple branches from R MCA   FORMAL READ: No acute  intracranial pathology.  No evidence for intracranial hemorrhage or CT evidence of major vascular distribution infarct.  No intracranially large vessel occlusion.  Possible right M3 vessel stenosis or subtotal occlusion as described above.  Clinical correlation with symptoms is recommended.  Can consider MRI with diffusion-weighted imaging if concern for acute ischemia.  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  No evidence of high-grade stenosis or large vessel occlusion.  Numerous sclerotic lesions involving the vertebral bodies and remaining visualized axial skeleton, in keeping with patient's history of metastatic lung cancer.  Moderate left pleural effusion with patchy areas of consolidation in the left upper lobe may relate to pneumonia.       Cardiac Evaluation:     TTE 10/14/2020:  · With normal systolic function. The estimated ejection fraction is 55%.  · The quantitatively dervived ejection fraction is 52%.  · Normal left ventricular diastolic function.  · Normal right ventricular systolic function.  · Mild tricuspid regurgitation.   · The left atrial volume index is normal.     TTE June 2020:   · Normal left ventricular systolic function. The estimated ejection fraction is 55%.  · Normal LV diastolic function.  · No wall motion abnormalities.  · Mildly reduced right ventricular systolic function.  · Normal central venous pressure (3 mmHg).  · The estimated PA systolic pressure is 26 mmHg         Mckayla Euceda PA-C  Comprehensive Stroke Center  Department of Vascular Neurology   [facility]

## 2020-10-16 NOTE — PLAN OF CARE
Problem: SLP Goal  Goal: SLP Goal  Description: Speech Language Pathology Goals  Goals expected to be met by 10/21  1. Pt will tolerate diet of puree and thin liquids with no overt signs of airway compromise.  2. Pt will participate in ongoing swallow assessment to determine safest and least restrictive diet.  3. Pt will recall simple speech strategies with 90% acc given min A.  4. Pt will utilize simple speech strategies during structured tasks and conversation with 90% acc given min A.  5. Pt will participate in ongoing reading, writing, and visual-spatial assessment to determine purpose for therapeutic targets.    Outcome: Ongoing, Progressing     Goals remain appropriate. Continue diet of puree and thin liquids. ST to continue to monitor.    GILBERTO Sue   10/16/2020

## 2020-10-16 NOTE — ASSESSMENT & PLAN NOTE
Stroke risk factor   LDL at 163  Cannot be on statin with current chemo drug; started Zetia 10/15.

## 2020-10-16 NOTE — ASSESSMENT & PLAN NOTE
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 No tPA due to Eliquis. No large vessel occlusion so no IR intervention pursued. Of note, patient was recently discharged from our service for multiple infarcts in BERTIN territories thought to be due to her hypercoagulable state (from malignancy.)    MRI Brain shows acute infarct in the R MCA, L MCA, and L cerebellar territories. Echo EF 55%, no L atrial enlargement. Note that new strokes were in the setting of compliance with Eliquis. Stroke etiology remains unclear - possible hypoperfusion though no vessel stenosis was seen nor signs of dehydration at presentation. Otherwise, concern remains for hypercoagulable state.        Antithrombotics for secondary stroke prevention: Anticoagulants: Enoxaparin injection 1mg/kg (100mg) q12 hrs   Statins for secondary stroke prevention and hyperlipidemia, if present: Zetia 10mg Nightly (Cannot be on statin with current chemo regimen)  Aggressive risk factor modification: HTN, Smoking, DM, HLD, Malignancy  Rehab efforts: The patient has been evaluated by a stroke team provider and the therapy needs have been fully considered based off the presenting complaints and exam findings. The following therapy evaluations are needed: PT evaluate and treat, OT evaluate and treat, SLP evaluate and treat, PM&R evaluate for appropriate placement - dispo inpatient rehab  Diagnostics ordered/pending: None   VTE prophylaxis: None: Reason for No Pharmacological VTE Prophylaxis: Currently on anticoagulation, SCDs  BP parameters: Infarct: No intervention, SBP <220

## 2020-10-16 NOTE — ASSESSMENT & PLAN NOTE
Had considered Palliative Medicine consult for symptom management (pain) however discussed with staff and plans to consult Heme/Onc team instead. Will re-consider Palliative consult if recommended by Onc. No plans for consultation at this time.

## 2020-10-16 NOTE — CONSULTS
Inpatient consult to Physical Medicine Rehab  Consult performed by: Jenny Falk NP  Consult ordered by: Nazario Liz MD  Reason for consult: assess rehab needs        Additional consult. Please see consult note from 10/15.     MELODY Suarez, FNP-C  Physical Medicine & Rehabilitation   10/16/2020

## 2020-10-16 NOTE — PT/OT/SLP PROGRESS
"Speech Language Pathology Treatment    Patient Name:  Alison Branch   MRN:  [id number]  Admitting Diagnosis: Embolic stroke involving right middle cerebral artery    Recommendations:                 General Recommendations:  Dysphagia therapy and Speech/language therapy  Diet recommendations:  Puree, Liquid Diet Level: Thin   Aspiration Precautions: 1 bite/sip at a time, Avoid talking while eating, Check for pocketing/oral residue, Feed only when awake/alert, Frequent oral care, HOB to 90 degrees, Meds crushed in puree, Remain upright 30 minutes post meal and Small bites/sips   General Precautions: Standard, fall, aspiration  Communication strategies:  provide increased time to answer and go to room if call light pushed    Subjective     Pt awake and alert upon SLP entry.  "I need to rinse my mouth"  "There's food in my mouth"    Objective:     Has the patient been evaluated by SLP for swallowing?   Yes  Keep patient NPO? No   Current Respiratory Status: room air      Pt seen for ongoing dysphagia and speech/language therapies. Improved participation noted this service date, though remains lethargic. Food in plastic container appearing to be ground chicken and sy greens observed on bedside table despite recommended diet of puree and thin liquids. Per pt, food brought by family member yesterday. Prior to trials, pt requested to rinse mouth with water and expectorated partially masticated solid stasis from regular solid meal. Swab utilized to clear remaining stasis from oral cavity. Pocketed solid stasis appreciated in left lateral sulcus, also mildly dispersed throughout oral cavity. Pt then accepted puree via tsp x1 with adequate oral transfer and clearance. Mild puree residue appreciated, cleared with thin liquid wash. Thin liquids via straw x4 tolerated with no overt signs of airway compromise. No throat clear, cough, or wet vocal quality appreciated. Pt accurately recalled 2/3 speech strategies independently, " increased to 3/3 given min cueing. Carryover appreciated in conversational speech, though minimal. Education provided regarding appropriate pureed consistencies, pocketed regular solids, purpose for puree at this time, swallow precautions and safety awareness, and ongoing ST plan of care. Pt verbalized understanding and repeated diet recommendations back to SLP, though education to be ongoing. Session ended 2/2 SLP received page for scheduled MBS. ST to continue to monitor.    Assessment:     Alison Branch is a 65 y.o. female with an SLP diagnosis of Dysphagia and Dysarthria.     Goals:   Multidisciplinary Problems     SLP Goals        Problem: SLP Goal    Goal Priority Disciplines Outcome   SLP Goal     SLP Ongoing, Progressing   Description: Speech Language Pathology Goals  Goals expected to be met by 10/21  1. Pt will tolerate diet of puree and thin liquids with no overt signs of airway compromise.  2. Pt will participate in ongoing swallow assessment to determine safest and least restrictive diet.  3. Pt will recall simple speech strategies with 90% acc given min A.  4. Pt will utilize simple speech strategies during structured tasks and conversation with 90% acc given min A.  5. Pt will participate in ongoing reading, writing, and visual-spatial assessment to determine purpose for therapeutic targets.                 Plan:     · Patient to be seen:  4 x/week   · Plan of Care expires:  11/13/20  · Plan of Care reviewed with:  patient   · SLP Follow-Up:  Yes       Discharge recommendations:  rehabilitation facility   Barriers to Discharge:  Level of Skilled Assistance Needed      Time Tracking:     SLP Treatment Date:   10/16/20  Speech Start Time:  0842  Speech Stop Time:  0852     Speech Total Time (min):  10 min    Billable Minutes: Treatment Swallowing Dysfunction 10    GILBERTO Sue  10/16/2020

## 2020-10-16 NOTE — ASSESSMENT & PLAN NOTE
Reportedly metastatic to involve the spine.  Stroke risk factor - suspect contributing to an underlying hypercoagulability  Consulted Oncology on 10/15; Appreciate assistance  They agree with transition to therapeutic Lovenox for AC at this time. Recommend holding chemo agent until pt completes rehab and they are able to reassess then plan; discussed this with patient on 10/16.

## 2020-10-17 LAB
POCT GLUCOSE: 101 MG/DL (ref 70–110)
POCT GLUCOSE: 103 MG/DL (ref 70–110)
POCT GLUCOSE: 152 MG/DL (ref 70–110)
POCT GLUCOSE: 163 MG/DL (ref 70–110)
POCT GLUCOSE: 93 MG/DL (ref 70–110)

## 2020-10-17 PROCEDURE — 99233 SBSQ HOSP IP/OBS HIGH 50: CPT | Mod: GC,,, | Performed by: PSYCHIATRY & NEUROLOGY

## 2020-10-17 PROCEDURE — 99233 PR SUBSEQUENT HOSPITAL CARE,LEVL III: ICD-10-PCS | Mod: GC,,, | Performed by: PSYCHIATRY & NEUROLOGY

## 2020-10-17 PROCEDURE — 63600175 PHARM REV CODE 636 W HCPCS: Performed by: PHYSICIAN ASSISTANT

## 2020-10-17 PROCEDURE — 25000003 PHARM REV CODE 250: Performed by: PHYSICIAN ASSISTANT

## 2020-10-17 PROCEDURE — 25000003 PHARM REV CODE 250: Performed by: FAMILY MEDICINE

## 2020-10-17 PROCEDURE — 25000003 PHARM REV CODE 250: Performed by: EMERGENCY MEDICINE

## 2020-10-17 PROCEDURE — 11000001 HC ACUTE MED/SURG PRIVATE ROOM

## 2020-10-17 PROCEDURE — 63600175 PHARM REV CODE 636 W HCPCS: Performed by: STUDENT IN AN ORGANIZED HEALTH CARE EDUCATION/TRAINING PROGRAM

## 2020-10-17 RX ADMIN — CYPROHEPTADINE HYDROCHLORIDE 4 MG: 4 TABLET ORAL at 08:10

## 2020-10-17 RX ADMIN — ENOXAPARIN SODIUM 100 MG: 100 INJECTION SUBCUTANEOUS at 09:10

## 2020-10-17 RX ADMIN — CYPROHEPTADINE HYDROCHLORIDE 4 MG: 4 TABLET ORAL at 09:10

## 2020-10-17 RX ADMIN — VENLAFAXINE HYDROCHLORIDE 75 MG: 37.5 CAPSULE, EXTENDED RELEASE ORAL at 08:10

## 2020-10-17 RX ADMIN — INSULIN ASPART 1 UNITS: 100 INJECTION, SOLUTION INTRAVENOUS; SUBCUTANEOUS at 11:10

## 2020-10-17 RX ADMIN — CYPROHEPTADINE HYDROCHLORIDE 4 MG: 4 TABLET ORAL at 01:10

## 2020-10-17 RX ADMIN — TAMSULOSIN HYDROCHLORIDE 0.4 MG: 0.4 CAPSULE ORAL at 08:10

## 2020-10-17 RX ADMIN — EZETIMIBE 10 MG: 10 TABLET ORAL at 09:10

## 2020-10-17 RX ADMIN — CYPROHEPTADINE HYDROCHLORIDE 4 MG: 4 TABLET ORAL at 04:10

## 2020-10-17 NOTE — PLAN OF CARE
Problem: Fall Injury Risk  Goal: Absence of Fall and Fall-Related Injury  Outcome: Ongoing, Progressing  Intervention: Identify and Manage Contributors to Fall Injury Risk  Flowsheets (Taken 10/17/2020 0527)  Self-Care Promotion:   independence encouraged   BADL personal objects within reach  Medication Review/Management: medications reviewed  Intervention: Promote Injury-Free Environment  Flowsheets (Taken 10/17/2020 0527)  Safety Promotion/Fall Prevention:   assistive device/personal item within reach   bed alarm set   Fall Risk signage in place   Fall Risk reviewed with patient/family   side rails raised x 3   instructed to call staff for mobility  Environmental Safety Modification:   assistive device/personal items within reach   clutter free environment maintained   lighting adjusted     Problem: Adult Inpatient Plan of Care  Goal: Optimal Comfort and Wellbeing  Outcome: Ongoing, Progressing  Intervention: Provide Person-Centered Care  Flowsheets (Taken 10/17/2020 0527)  Trust Relationship/Rapport:   care explained   choices provided   emotional support provided   empathic listening provided   questions answered   questions encouraged   reassurance provided   thoughts/feelings acknowledged     Problem: Adjustment to Illness (Stroke, Ischemic/Transient Ischemic Attack)  Goal: Optimal Coping  Outcome: Ongoing, Progressing  Intervention: Support Patient/Family Psychosocial Response to Stroke  Flowsheets (Taken 10/17/2020 0527)  Supportive Measures: verbalization of feelings encouraged  Family/Support System Care: self-care encouraged     Problem: Cerebral Tissue Perfusion Risk (Stroke, Ischemic/Transient Ischemic Attack)  Goal: Optimal Cerebral Tissue Perfusion  Outcome: Ongoing, Progressing  Intervention: Protect and Optimize Cerebral Perfusion  Flowsheets (Taken 10/17/2020 0527)  Sensory Stimulation Regulation:   music/television provided for relaxation   care clustered   lighting decreased   quiet environment  promoted  Cerebral Perfusion Promotion:   blood pressure monitored   normothermia promoted  Intervention: Optimize Oxygenation and Ventilation  Flowsheets (Taken 10/17/2020 0527)  Head of Bed (HOB): HOB at 30 degrees     Problem: Eating/Swallowing Impairment (Stroke, Ischemic/Transient Ischemic Attack)  Goal: Oral Intake without Aspiration  Outcome: Ongoing, Progressing  Intervention: Optimize Eating and Swallowing  Flowsheets (Taken 10/17/2020 0527)  Aspiration Precautions:   awake/alert before oral intake   upright posture maintained   small bites/sips encouraged  Swallowing Techniques: Dysphagia: appropriate positioning encouraged     Problem: Pain (Stroke, Ischemic/Transient Ischemic Attack)  Goal: Acceptable Pain Control  Outcome: Ongoing, Progressing  Intervention: Monitor and Manage Pain  Flowsheets (Taken 10/17/2020 0527)  Pain Management Interventions:   care clustered   diversional activity provided   pillow support provided   position adjusted     Problem: Urinary Elimination Impaired (Stroke, Ischemic/Transient Ischemic Attack)  Goal: Effective Urinary Elimination  Outcome: Ongoing, Progressing  Intervention: Promote Effective Bladder Elimination  Flowsheets (Taken 10/17/2020 0527)  Urinary Elimination Promotion: absorbent pad/diaper use encouraged     Problem: Diabetes Comorbidity  Goal: Blood Glucose Level Within Desired Range  Outcome: Ongoing, Progressing  Intervention: Maintain Glycemic Control  Flowsheets (Taken 10/17/2020 0527)  Glycemic Management: blood glucose monitoring     Problem: Adjustment to Illness (Sepsis/Septic Shock)  Goal: Optimal Coping  Outcome: Ongoing, Progressing  Intervention: Optimize Psychosocial Adjustment to Illness  Flowsheets (Taken 10/17/2020 0527)  Supportive Measures: verbalization of feelings encouraged  Family/Support System Care: self-care encouraged     Problem: Skin Injury Risk Increased  Goal: Skin Health and Integrity  Outcome: Ongoing,  Progressing  Intervention: Optimize Skin Protection  Flowsheets (Taken 10/17/2020 0527)  Pressure Reduction Techniques:   frequent weight shift encouraged   weight shift assistance provided  Head of Bed (HOB): HOB at 30 degrees  VSS. BG WDL overnight.  Pt affect pleasant and pt very cooperative.  Increased time for verbal responses due to slight delay with responses.  Pt verbalization continues to be slurred at times and pt showing side of left sided neglect e/b difficulty maintaining upright posture without pillow supports.  Mild drooling and and facial droop noted.  Pt bed alarms remain on and audible.  Bed in low position with wheels locked for safety, call bell and personal items within reach.  Frequent checks to assure continued safety.  Lighting adjusted for comfort and optimal rest periods enhanced by clustered care.  Frequent weight shift and repositioning to decrease skin breakdown.  Instructed pt to call for any needs.  No complaints of pain overnight and no s/s of distress observed.

## 2020-10-17 NOTE — SUBJECTIVE & OBJECTIVE
Neurologic Chief Complaint: L facial droop, slurred speech, Left side weakness    Subjective:     Interval History: Patient is seen for follow-up neurological assessment and treatment recommendations:      No acute events overnight. Goals of care conversation initiated.      HPI, Past Medical, Family, and Social History remains the same as documented in the initial encounter.     Review of Systems   Constitutional: Negative for fatigue and fever.   Eyes: Negative for discharge and visual disturbance.   Neurological: Positive for facial asymmetry, speech difficulty, weakness and numbness. Negative for headaches.   Psychiatric/Behavioral: Negative for agitation and confusion.     Scheduled Meds:   cyproheptadine  4 mg Oral QID    enoxaparin  100 mg Subcutaneous Q12H    ezetimibe  10 mg Oral QHS    tamsulosin  0.4 mg Oral Daily    venlafaxine  75 mg Oral Daily     Continuous Infusions:  PRN Meds:dextrose 50%, glucagon (human recombinant), influenza, insulin aspart U-100, labetalol, lidocaine, sodium chloride 0.9%    Objective:     Vital Signs (Most Recent):  Temp: 98.5 °F (36.9 °C) (10/17/20 1613)  Pulse: 80 (10/17/20 1613)  Resp: 11 (10/17/20 1613)  BP: (!) 175/107 (10/17/20 1613)  SpO2: (!) 93 % (10/17/20 1613)  BP Location: Right arm    Vital Signs Range (Last 24H):  Temp:  [97.9 °F (36.6 °C)-98.5 °F (36.9 °C)]   Pulse:  [64-81]   Resp:  [9-18]   BP: (124-175)/()   SpO2:  [93 %-99 %]   BP Location: Right arm    Physical Exam  Vitals signs reviewed.   Constitutional:       General: She is not in acute distress.     Appearance: She is not toxic-appearing.   HENT:      Head: Normocephalic and atraumatic.   Eyes:      Extraocular Movements: Extraocular movements intact.      Conjunctiva/sclera: Conjunctivae normal.   Cardiovascular:      Rate and Rhythm: Normal rate.   Pulmonary:      Effort: Pulmonary effort is normal. No respiratory distress.   Musculoskeletal:         General: No tenderness or deformity.    Skin:     General: Skin is warm and dry.   Neurological:      Mental Status: She is oriented to person, place, and time.      Cranial Nerves: Dysarthria and facial asymmetry present.      Motor: Weakness present.   Psychiatric:         Mood and Affect: Mood normal.         Speech: Speech is slurred.         Cognition and Memory: Cognition is not impaired.         Neurological Exam:   LOC: Alert  Attention Span: Good  Language: No aphasia  Articulation: Dysarthria  Orientation: Person, Place, Time   Visual Fields: Full  EOM (CN III, IV, VI): Full/intact  Facial Movement (CN VII): Lower facial weakness on the Left  Motor: Arm left  Paresis: 1/5  Leg left  Paresis: proximal 2/5; distal 3/5  Arm right  Normal 5/5  Leg right Normal 5/5  Sensation: Andres-hypoesthesia left      Laboratory:  CMP:   No results for input(s): GLUCOSE, CALCIUM, ALBUMIN, PROT, NA, K, CO2, CL, BUN, CREATININE, ALKPHOS, ALT, AST, BILITOT in the last 24 hours.  CBC:   Recent Labs   Lab 10/15/20  0344   WBC 5.51   RBC 3.99*   HGB 10.8*   HCT 34.1*      MCV 86   MCH 27.1   MCHC 31.7*     Lipid Panel:   Recent Labs   Lab 10/13/20  1226   CHOL 252*   LDLCALC 163.4*   HDL 68   TRIG 103     Hgb A1C: No results for input(s): HGBA1C in the last 168 hours.  TSH:   Recent Labs   Lab 10/13/20  1226   TSH 0.397*       Diagnostic Results     Brain imaging:    MRI Brain w/o contrast 10/13/2020:     Current MR imaging confirms suspicion of foci of acute infarctions in the right MCA distribution.  Three additional punctate foci of restricted diffusion involving the left parietal lobe, possibly emboli.  Small subacute infarction involving the left cerebellum, similar to prior examination dated 09/28/2020.  Well rounded T2/FLAIR hyperintense focus within the left lateral aspect of the body of the corpus callosum, likely related to evolving infarct as further discussed above.  If there is concerning for underlying intracranial metastasis in the setting of  known metastatic lung cancer, further evaluation may be obtained with contrast enhanced MRI of the brain.    CT Head Without Contrast 10/13/2020:  No findings to suggest acute major vascular territory infarct or hemorrhage noting stable focus of low attenuation within the left cerebellum and stable right ICA region coil pack       Vessel Imaging:    CTA Multiphase 10/14/2020:  States that there is a possible M3 vessel stenosis or occlusion. However, on evaluation of imaging it does not seem that way. Please note that patient has multiple branches from R MCA   FORMAL READ: No acute intracranial pathology.  No evidence for intracranial hemorrhage or CT evidence of major vascular distribution infarct.  No intracranially large vessel occlusion.  Possible right M3 vessel stenosis or subtotal occlusion as described above.  Clinical correlation with symptoms is recommended.  Can consider MRI with diffusion-weighted imaging if concern for acute ischemia.  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  No evidence of high-grade stenosis or large vessel occlusion.  Numerous sclerotic lesions involving the vertebral bodies and remaining visualized axial skeleton, in keeping with patient's history of metastatic lung cancer.  Moderate left pleural effusion with patchy areas of consolidation in the left upper lobe may relate to pneumonia.       Cardiac Evaluation:     TTE 10/14/2020:  · With normal systolic function. The estimated ejection fraction is 55%.  · The quantitatively dervived ejection fraction is 52%.  · Normal left ventricular diastolic function.  · Normal right ventricular systolic function.  · Mild tricuspid regurgitation.   · The left atrial volume index is normal.     TTE June 2020:   · Normal left ventricular systolic function. The estimated ejection fraction is 55%.  · Normal LV  diastolic function.  · No wall motion abnormalities.  · Mildly reduced right ventricular systolic function.  · Normal central venous pressure (3 mmHg).  · The estimated PA systolic pressure is 26 mmHg

## 2020-10-17 NOTE — PLAN OF CARE
Problem: Adult Inpatient Plan of Care  Goal: Plan of Care Review  10/16/2020 2008 by Ansley Jarrett RN  Outcome: Ongoing, Progressing  10/16/2020 2008 by Ansley Jarrett RN  Outcome: Ongoing, Progressing  Goal: Patient-Specific Goal (Individualization)  10/16/2020 2008 by Ansley Jarrett RN  Outcome: Ongoing, Progressing  10/16/2020 2008 by Ansley Jarrett RN  Outcome: Ongoing, Progressing  Goal: Absence of Hospital-Acquired Illness or Injury  10/16/2020 2008 by Ansley Jarrett RN  Outcome: Ongoing, Progressing  10/16/2020 2008 by Ansley Jarrett RN  Outcome: Ongoing, Progressing  Goal: Optimal Comfort and Wellbeing  10/16/2020 2008 by Ansley Jarrett RN  Outcome: Ongoing, Progressing  10/16/2020 2008 by Ansley Jarrett RN  Outcome: Ongoing, Progressing  Goal: Readiness for Transition of Care  10/16/2020 2008 by Ansley Jarrett RN  Outcome: Ongoing, Progressing  10/16/2020 2008 by Ansley Jarrett RN  Outcome: Ongoing, Progressing  Goal: Rounds/Family Conference  10/16/2020 2008 by Ansley Jarrett RN  Outcome: Ongoing, Progressing  10/16/2020 2008 by Ansley Jarrett RN  Outcome: Ongoing, Progressing   Patient remains free from injury or fall. No neuro changes noted or reported from initial assessment. Cardiac rhythm and blood glucose monitored. Will continue to monitor.

## 2020-10-17 NOTE — ASSESSMENT & PLAN NOTE
- On periactin at home for urethral pain and retention. Was seen by urology last admission  - Seen in urology clinic 9/25 for incomplete bladder emptying; hennessy removed on 10/6. While at rehab, UA with positive nitrate, 2+ leukocytes, and many bacteria (10/7). Pt was on Cipro.   - Repeat UA 10/15 with nitrites only. Will defer further abx treatment for now, monitor pt for any symptoms.

## 2020-10-17 NOTE — ASSESSMENT & PLAN NOTE
"Had considered Palliative Medicine consult for symptom management (pain) however discussed with staff and plans to consult Heme/Onc team instead. Will re-consider Palliative consult if recommended by Onc. No plans for consultation at this time.    10/17: goals of care discussion had with patient with daughter on facetime. Patient states she would "go on with her life" and mentions she would like to continue all treatment options  "

## 2020-10-17 NOTE — ASSESSMENT & PLAN NOTE
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 No tPA due to Eliquis. No large vessel occlusion so no IR intervention pursued. Of note, patient was recently discharged from our service for multiple infarcts in BERTIN territories thought to be due to her hypercoagulable state (from malignancy.)    MRI Brain shows acute infarct in the R MCA, L MCA, and L cerebellar territories. Echo EF 55%, no L atrial enlargement. Note that new strokes were in the setting of compliance with Eliquis. Stroke etiology remains unclear - possible hypoperfusion though no vessel stenosis was seen nor signs of dehydration at presentation. Otherwise, concern remains for hypercoagulable state.        Antithrombotics for secondary stroke prevention: Anticoagulants: Enoxaparin injection 1mg/kg (100mg) q12 hrs   Statins for secondary stroke prevention and hyperlipidemia, if present: Zetia 10mg Nightly (Cannot be on statin with current chemo regimen)  Aggressive risk factor modification: HTN, Smoking, DM, HLD, Malignancy  Rehab efforts: The patient has been evaluated by a stroke team provider and the therapy needs have been fully considered based off the presenting complaints and exam findings. The following therapy evaluations are needed: PT evaluate and treat, OT evaluate and treat, SLP evaluate and treat, PM&R evaluate for appropriate placement - dispo inpatient rehab  Diagnostics ordered/pending: None   VTE prophylaxis: None: Reason for No Pharmacological VTE Prophylaxis: Currently on anticoagulation, SCDs  BP parameters: Infarct: No intervention, SBP <220

## 2020-10-17 NOTE — PLAN OF CARE
Problem: Adjustment to Illness (Stroke, Ischemic/Transient Ischemic Attack)  Goal: Optimal Coping  Outcome: Ongoing, Progressing     Problem: Cerebral Tissue Perfusion Risk (Stroke, Ischemic/Transient Ischemic Attack)  Goal: Optimal Cerebral Tissue Perfusion  Outcome: Ongoing, Progressing     Problem: Eating/Swallowing Impairment (Stroke, Ischemic/Transient Ischemic Attack)  Goal: Oral Intake without Aspiration  Outcome: Ongoing, Progressing    Problem: Glycemic Control Impaired (Sepsis/Septic Shock)  Goal: Blood Glucose Level Within Desired Range  Outcome: Ongoing, Progressing       Patient and family instructed on Stroke s/s, management and DM s/s to report.  Both verbalized the above POC.  Patient with out complaints this am.   Vitals stable, patient denies pain, SOB or distress.    WNL.  Patient alert and oriented x4, able to make needs known.  Will cont POC and monitor pain, SOB, distress, stroke s/s and DM.

## 2020-10-17 NOTE — PROGRESS NOTES
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 No tPA due to Eliquis. No large vessel occlusion so no IR intervention pursued. Of note, patient was recently discharged from our service for multiple infarcts in BERTIN territories thought to be due to her hypercoagulable state (from malignancy.)    MRI Brain shows acute infarct in the R MCA, L MCA, and L cerebellar territories. Echo EF 55%, no L atrial enlargement. Note that new strokes were in the setting of compliance with Eliquis. Stroke etiology remains unclear - possible hypoperfusion though no vessel stenosis was seen nor signs of dehydration at presentation. Otherwise, concern remains for hypercoagulable state.        Antithrombotics for secondary stroke prevention: Anticoagulants: Enoxaparin injection 1mg/kg (100mg) q12 hrs   Statins for secondary stroke prevention and hyperlipidemia, if present: Zetia 10mg Nightly (Cannot be on statin with current chemo regimen)  Aggressive risk factor modification: HTN, Smoking, DM, HLD, Malignancy  Rehab efforts: The patient has been evaluated by a stroke team provider and the therapy needs have been fully considered based off the presenting complaints and exam findings. The following therapy evaluations are needed: PT evaluate and treat, OT evaluate and treat, SLP evaluate and treat, PM&R evaluate for appropriate placement - dispo inpatient rehab  Diagnostics ordered/pending: None   VTE prophylaxis: None: Reason for No Pharmacological VTE Prophylaxis: Currently on anticoagulation, SCDs  BP parameters: Infarct: No intervention, SBP <220    Left-sided weakness  2/2 stroke  PT,  "OT eval & treat - dispo rehab    Oropharyngeal dysphagia  2/2 stroke  SLP eval and treat -- Recommending puree diet, thin liquids with frequent nursing checks for any pocketing. Meds crushed in pudding (per SLP, could bury chemo med in puree to avoid crushing.)  Pt states she does not want a feeding tube 10/15, will try her best to keep swallowing the ways that SLP has instructed her. Added Boost to regimen as well.     Cytotoxic cerebral edema  Area of cytotoxic cerebral edema identified when reviewing brain imaging in the territory of the right middle cerebral artery. There is no mass effect associated with it. We will continue to monitor the patients clinical exam for any worsening of symptoms which may indicate expansion of the stroke or the area of the edema resulting in the clinical change. The pattern is suggestive of embolic etiology related to hypercoagulable state.    Palliative care encounter  Had considered Palliative Medicine consult for symptom management (pain) however discussed with staff and plans to consult Heme/Onc team instead. Will re-consider Palliative consult if recommended by Onc. No plans for consultation at this time.    10/17: goals of care discussion had with patient with daughter on facetime. Patient states she would "go on with her life" and mentions she would like to continue all treatment options    Urinary retention  - On periactin at home for urethral pain and retention. Was seen by urology last admission  - Seen in urology clinic 9/25 for incomplete bladder emptying; hennessy removed on 10/6. While at rehab, UA with positive nitrate, 2+ leukocytes, and many bacteria (10/7). Pt was on Cipro.   - Repeat UA 10/15 with nitrites only. Will defer further abx treatment for now, monitor pt for any symptoms.    Malignant neoplasm of upper lobe of left lung  Reportedly metastatic to involve the spine.  Stroke risk factor - suspect contributing to an underlying hypercoagulability  Consulted " Oncology on 10/15; Appreciate assistance  They agree with transition to therapeutic Lovenox for AC at this time. Recommend holding chemo agent until pt completes rehab and they are able to reassess then plan; discussed this with patient on 10/16.    Hypertension associated with diabetes  Stroke risk factor   SBP < 220 acutely  Home Lasix held for now    Cerebral aneurysm, coiled in 2010  Aneurysm coil in 2010     Mixed hyperlipidemia due to type 2 diabetes mellitus  Stroke risk factor   LDL at 163  Cannot be on statin with current chemo drug; started Zetia 10/15.    Type 2 diabetes mellitus with hyperglycemia, with long-term current use of insulin  Stroke risk factor   A1C 9.3%  BG goal while inpatient 140-180  SSI while inpatient  Diabetic puree diet         10/13: Admit to vascular neurology for workup of new onset of slurred speech  10/14: MRI brain acute R MCA infarct. SLP cleared for puree, thin diet, meds crushed. Order TTE. Patient c/o lab draws/difficult stick, ordered topical lidocaine, consult palliative for pain management. Need to reach out to oncology regarding chemo management (unable to crush entrectinib), prognosis, possibly switching to Lovenox.    10/15: Consulted Oncology regarding cancer prognostication, chemo drug, and anticoagulation recs. SLP ok to give PO chemo buried in puree; nurse to frequently check for pocketing. Added Boost to regimen per nutrition recs. UA pending.  10/16: Patient more alert today, states she does not like the puree diet but she is still in good spirits. Started therapeutic Lovenox. Holding chemo agent per Onc recs.   10/17: No acute events overnight. Goals of care conversation initiated.      STROKE DOCUMENTATION   Acute Stroke Times   Last Known Normal Date: 10/13/20  Last Known Normal Time: 1030  Symptom Onset Date: 10/13/20  Symptom Onset Time: 1130  Stroke Team Called Date: 10/13/20  Stroke Team Called Time: 1220  Stroke Team Arrival Date: 10/13/20  Stroke Team  Arrival Time: 1227    NIH Scale:  1a. Level of Consciousness: 0-->Alert, keenly responsive  1b. LOC Questions: 0-->Answers both questions correctly  1c. LOC Commands: 0-->Performs both tasks correctly  2. Best Gaze: 0-->Normal  3. Visual: 0-->No visual loss  4. Facial Palsy: 2-->Partial paralysis (total or near-total paralysis of lower face)  5a. Motor Arm, Left: 3-->No effort against gravity, limb falls  5b. Motor Arm, Right: 0-->No drift, limb holds 90 (or 45) degrees for full 10 secs  6a. Motor Leg, Left: 2-->Some effort against gravity, leg falls to bed by 5 secs, but has some effort against gravity  6b. Motor Leg, Right: 0-->No drift, leg holds 30 degree position for full 5 secs  7. Limb Ataxia: 0-->Absent  8. Sensory: 1-->Mild-to-moderate sensory loss, patient feels pinprick is less sharp or is dull on the affected side, or there is a loss of superficial pain with pinprick, but patient is aware of being touched  9. Best Language: 0-->No aphasia, normal  10. Dysarthria: 2-->Severe dysarthria, patients speech is so slurred as to be unintelligible in the absence of or out of proportion to any dysphasia, or is mute/anarthric  11. Extinction and Inattention (formerly Neglect): 0-->No abnormality  Total (NIH Stroke Scale): 10       Modified Burleigh    Ecru Coma Scale:    ABCD2 Score:    [id number] Score:   HAS -BLED Score:   ICH Score:   Hunt & Sutton Classification:      Hemorrhagic change of an Ischemic Stroke: Does this patient have an ischemic stroke with hemorrhagic changes? No     Neurologic Chief Complaint: L facial droop, slurred speech, Left side weakness    Subjective:     Interval History: Patient is seen for follow-up neurological assessment and treatment recommendations:      No acute events overnight. Goals of care conversation initiated.      HPI, Past Medical, Family, and Social History remains the same as documented in the initial encounter.     Review of Systems   Constitutional: Negative for  fatigue and fever.   Eyes: Negative for discharge and visual disturbance.   Neurological: Positive for facial asymmetry, speech difficulty, weakness and numbness. Negative for headaches.   Psychiatric/Behavioral: Negative for agitation and confusion.     Scheduled Meds:   cyproheptadine  4 mg Oral QID    enoxaparin  100 mg Subcutaneous Q12H    ezetimibe  10 mg Oral QHS    tamsulosin  0.4 mg Oral Daily    venlafaxine  75 mg Oral Daily     Continuous Infusions:  PRN Meds:dextrose 50%, glucagon (human recombinant), influenza, insulin aspart U-100, labetalol, lidocaine, sodium chloride 0.9%    Objective:     Vital Signs (Most Recent):  Temp: 98.5 °F (36.9 °C) (10/17/20 1613)  Pulse: 80 (10/17/20 1613)  Resp: 11 (10/17/20 1613)  BP: (!) 175/107 (10/17/20 1613)  SpO2: (!) 93 % (10/17/20 1613)  BP Location: Right arm    Vital Signs Range (Last 24H):  Temp:  [97.9 °F (36.6 °C)-98.5 °F (36.9 °C)]   Pulse:  [64-81]   Resp:  [9-18]   BP: (124-175)/()   SpO2:  [93 %-99 %]   BP Location: Right arm    Physical Exam  Vitals signs reviewed.   Constitutional:       General: She is not in acute distress.     Appearance: She is not toxic-appearing.   HENT:      Head: Normocephalic and atraumatic.   Eyes:      Extraocular Movements: Extraocular movements intact.      Conjunctiva/sclera: Conjunctivae normal.   Cardiovascular:      Rate and Rhythm: Normal rate.   Pulmonary:      Effort: Pulmonary effort is normal. No respiratory distress.   Musculoskeletal:         General: No tenderness or deformity.   Skin:     General: Skin is warm and dry.   Neurological:      Mental Status: She is oriented to person, place, and time.      Cranial Nerves: Dysarthria and facial asymmetry present.      Motor: Weakness present.   Psychiatric:         Mood and Affect: Mood normal.         Speech: Speech is slurred.         Cognition and Memory: Cognition is not impaired.         Neurological Exam:   LOC: Alert  Attention Span: Good  Language:  No aphasia  Articulation: Dysarthria  Orientation: Person, Place, Time   Visual Fields: Full  EOM (CN III, IV, VI): Full/intact  Facial Movement (CN VII): Lower facial weakness on the Left  Motor: Arm left  Paresis: 1/5  Leg left  Paresis: proximal 2/5; distal 3/5  Arm right  Normal 5/5  Leg right Normal 5/5  Sensation: Andres-hypoesthesia left      Laboratory:  CMP:   No results for input(s): GLUCOSE, CALCIUM, ALBUMIN, PROT, NA, K, CO2, CL, BUN, CREATININE, ALKPHOS, ALT, AST, BILITOT in the last 24 hours.  CBC:   Recent Labs   Lab 10/15/20  0344   WBC 5.51   RBC 3.99*   HGB 10.8*   HCT 34.1*      MCV 86   MCH 27.1   MCHC 31.7*     Lipid Panel:   Recent Labs   Lab 10/13/20  1226   CHOL 252*   LDLCALC 163.4*   HDL 68   TRIG 103     Hgb A1C: No results for input(s): HGBA1C in the last 168 hours.  TSH:   Recent Labs   Lab 10/13/20  1226   TSH 0.397*       Diagnostic Results     Brain imaging:    MRI Brain w/o contrast 10/13/2020:     Current MR imaging confirms suspicion of foci of acute infarctions in the right MCA distribution.  Three additional punctate foci of restricted diffusion involving the left parietal lobe, possibly emboli.  Small subacute infarction involving the left cerebellum, similar to prior examination dated 09/28/2020.  Well rounded T2/FLAIR hyperintense focus within the left lateral aspect of the body of the corpus callosum, likely related to evolving infarct as further discussed above.  If there is concerning for underlying intracranial metastasis in the setting of known metastatic lung cancer, further evaluation may be obtained with contrast enhanced MRI of the brain.    CT Head Without Contrast 10/13/2020:  No findings to suggest acute major vascular territory infarct or hemorrhage noting stable focus of low attenuation within the left cerebellum and stable right ICA region coil pack       Vessel Imaging:    CTA Multiphase 10/14/2020:  States that there is a possible M3 vessel stenosis or  occlusion. However, on evaluation of imaging it does not seem that way. Please note that patient has multiple branches from R MCA   FORMAL READ: No acute intracranial pathology.  No evidence for intracranial hemorrhage or CT evidence of major vascular distribution infarct.  No intracranially large vessel occlusion.  Possible right M3 vessel stenosis or subtotal occlusion as described above.  Clinical correlation with symptoms is recommended.  Can consider MRI with diffusion-weighted imaging if concern for acute ischemia.  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  No evidence of high-grade stenosis or large vessel occlusion.  Numerous sclerotic lesions involving the vertebral bodies and remaining visualized axial skeleton, in keeping with patient's history of metastatic lung cancer.  Moderate left pleural effusion with patchy areas of consolidation in the left upper lobe may relate to pneumonia.       Cardiac Evaluation:     TTE 10/14/2020:  · With normal systolic function. The estimated ejection fraction is 55%.  · The quantitatively dervived ejection fraction is 52%.  · Normal left ventricular diastolic function.  · Normal right ventricular systolic function.  · Mild tricuspid regurgitation.   · The left atrial volume index is normal.     TTE June 2020:   · Normal left ventricular systolic function. The estimated ejection fraction is 55%.  · Normal LV diastolic function.  · No wall motion abnormalities.  · Mildly reduced right ventricular systolic function.  · Normal central venous pressure (3 mmHg).  · The estimated PA systolic pressure is 26 mmHg         Kade Gillis NP  Comprehensive Stroke Center  Department of Vascular Neurology   [facility]

## 2020-10-18 LAB
ALBUMIN SERPL BCP-MCNC: 3.7 G/DL (ref 3.5–5.2)
ALP SERPL-CCNC: 41 U/L (ref 55–135)
ALT SERPL W/O P-5'-P-CCNC: 13 U/L (ref 10–44)
ANION GAP SERPL CALC-SCNC: 9 MMOL/L (ref 8–16)
AST SERPL-CCNC: 19 U/L (ref 10–40)
BILIRUB SERPL-MCNC: 0.4 MG/DL (ref 0.1–1)
BUN SERPL-MCNC: 11 MG/DL (ref 8–23)
CALCIUM SERPL-MCNC: 8.8 MG/DL (ref 8.7–10.5)
CHLORIDE SERPL-SCNC: 107 MMOL/L (ref 95–110)
CO2 SERPL-SCNC: 24 MMOL/L (ref 23–29)
CREAT SERPL-MCNC: 1.1 MG/DL (ref 0.5–1.4)
ERYTHROCYTE [DISTWIDTH] IN BLOOD BY AUTOMATED COUNT: 15.2 % (ref 11.5–14.5)
EST. GFR  (AFRICAN AMERICAN): >60 ML/MIN/1.73 M^2
EST. GFR  (NON AFRICAN AMERICAN): 53.6 ML/MIN/1.73 M^2
GLUCOSE SERPL-MCNC: 101 MG/DL (ref 70–110)
HCT VFR BLD AUTO: 34.9 % (ref 37–48.5)
HGB BLD-MCNC: 10.5 G/DL (ref 12–16)
MCH RBC QN AUTO: 26.7 PG (ref 27–31)
MCHC RBC AUTO-ENTMCNC: 30.1 G/DL (ref 32–36)
MCV RBC AUTO: 89 FL (ref 82–98)
PLATELET # BLD AUTO: 214 K/UL (ref 150–350)
PMV BLD AUTO: 12.6 FL (ref 9.2–12.9)
POCT GLUCOSE: 104 MG/DL (ref 70–110)
POCT GLUCOSE: 114 MG/DL (ref 70–110)
POCT GLUCOSE: 87 MG/DL (ref 70–110)
POCT GLUCOSE: 99 MG/DL (ref 70–110)
POTASSIUM SERPL-SCNC: 4.2 MMOL/L (ref 3.5–5.1)
PROT SERPL-MCNC: 6.8 G/DL (ref 6–8.4)
RBC # BLD AUTO: 3.93 M/UL (ref 4–5.4)
SODIUM SERPL-SCNC: 140 MMOL/L (ref 136–145)
WBC # BLD AUTO: 4.08 K/UL (ref 3.9–12.7)

## 2020-10-18 PROCEDURE — 25000003 PHARM REV CODE 250: Performed by: PHYSICIAN ASSISTANT

## 2020-10-18 PROCEDURE — 80053 COMPREHEN METABOLIC PANEL: CPT

## 2020-10-18 PROCEDURE — 63600175 PHARM REV CODE 636 W HCPCS: Performed by: PHYSICIAN ASSISTANT

## 2020-10-18 PROCEDURE — 99233 PR SUBSEQUENT HOSPITAL CARE,LEVL III: ICD-10-PCS | Mod: GC,,, | Performed by: PSYCHIATRY & NEUROLOGY

## 2020-10-18 PROCEDURE — 11000001 HC ACUTE MED/SURG PRIVATE ROOM

## 2020-10-18 PROCEDURE — 99233 SBSQ HOSP IP/OBS HIGH 50: CPT | Mod: GC,,, | Performed by: PSYCHIATRY & NEUROLOGY

## 2020-10-18 PROCEDURE — 36415 COLL VENOUS BLD VENIPUNCTURE: CPT

## 2020-10-18 PROCEDURE — 25000003 PHARM REV CODE 250: Performed by: EMERGENCY MEDICINE

## 2020-10-18 PROCEDURE — 25000003 PHARM REV CODE 250: Performed by: FAMILY MEDICINE

## 2020-10-18 PROCEDURE — 85027 COMPLETE CBC AUTOMATED: CPT

## 2020-10-18 RX ADMIN — CYPROHEPTADINE HYDROCHLORIDE 4 MG: 4 TABLET ORAL at 09:10

## 2020-10-18 RX ADMIN — TAMSULOSIN HYDROCHLORIDE 0.4 MG: 0.4 CAPSULE ORAL at 09:10

## 2020-10-18 RX ADMIN — CYPROHEPTADINE HYDROCHLORIDE 4 MG: 4 TABLET ORAL at 04:10

## 2020-10-18 RX ADMIN — EZETIMIBE 10 MG: 10 TABLET ORAL at 09:10

## 2020-10-18 RX ADMIN — CYPROHEPTADINE HYDROCHLORIDE 4 MG: 4 TABLET ORAL at 01:10

## 2020-10-18 RX ADMIN — VENLAFAXINE HYDROCHLORIDE 75 MG: 37.5 CAPSULE, EXTENDED RELEASE ORAL at 09:10

## 2020-10-18 RX ADMIN — ENOXAPARIN SODIUM 100 MG: 100 INJECTION SUBCUTANEOUS at 09:10

## 2020-10-18 NOTE — PLAN OF CARE
Problem: Fall Injury Risk  Goal: Absence of Fall and Fall-Related Injury  Outcome: Ongoing, Progressing  Intervention: Identify and Manage Contributors to Fall Injury Risk  Flowsheets (Taken 10/18/2020 0310)  Self-Care Promotion: BADL personal objects within reach  Medication Review/Management: medications reviewed  Intervention: Promote Injury-Free Environment  Flowsheets (Taken 10/18/2020 0310)  Safety Promotion/Fall Prevention:   assistive device/personal item within reach   bed alarm set   Fall Risk signage in place   Fall Risk reviewed with patient/family   side rails raised x 3  Environmental Safety Modification:   assistive device/personal items within reach   clutter free environment maintained   lighting adjusted     Problem: Adult Inpatient Plan of Care  Goal: Rounds/Family Conference  Outcome: Ongoing, Progressing     Problem: Adjustment to Illness (Stroke, Ischemic/Transient Ischemic Attack)  Goal: Optimal Coping  Outcome: Ongoing, Progressing  Intervention: Support Patient/Family Psychosocial Response to Stroke  Flowsheets (Taken 10/18/2020 0310)  Supportive Measures:   active listening utilized   counseling provided   verbalization of feelings encouraged  Family/Support System Care: (Phone conversation with pt's daughter Rayna)   support provided   other (see comments)     Problem: Cerebral Tissue Perfusion Risk (Stroke, Ischemic/Transient Ischemic Attack)  Goal: Optimal Cerebral Tissue Perfusion  Outcome: Ongoing, Progressing  Intervention: Protect and Optimize Cerebral Perfusion  Flowsheets (Taken 10/18/2020 0310)  Sensory Stimulation Regulation:   care clustered   lighting decreased   quiet environment promoted  Cerebral Perfusion Promotion:   blood pressure monitored   normothermia promoted  Intervention: Optimize Oxygenation and Ventilation  Flowsheets (Taken 10/18/2020 0310)  Airway/Ventilation Management: calming measures promoted  Head of Bed (HOB): HOB elevated     Problem: Communication  Impairment (Stroke, Ischemic/Transient Ischemic Attack)  Goal: Improved Communication Skills  Outcome: Ongoing, Progressing  Intervention: Optimize Cognitive and Communication Skills  Flowsheets (Taken 10/18/2020 0310)  Reorientation Measures:   calendar in view   clock in view  Communication Enhancement Strategies:   call light answered in person   extra time allowed for response   VSS. Encouraged verbalization of concerns, questions, and empathetic listening to discussion of Hospice/palliative care options.  Patient asked me to call her daughter and explain Palliative care versus Hospice Care.  Encouraged daughter and patient to revisit the issue with the physician team to make an informed decision on plan of treatment going forward that is in line with the goals of care that the patient wishes to receive.  Safety maintained through out overnight shift as well as patient comfort.  BG elevated slightly at midnight and patient received one unit of coverage per SS order.  Comfort, dignity, and rest periods for the patient maintained overnight.

## 2020-10-18 NOTE — ASSESSMENT & PLAN NOTE
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 No tPA due to Eliquis. No large vessel occlusion so no IR intervention pursued. Of note, patient was recently discharged from our service for multiple infarcts in BERTIN territories thought to be due to her hypercoagulable state (from malignancy.)    MRI Brain shows acute infarct in the R MCA, L MCA, and L cerebellar territories. Echo EF 55%, no L atrial enlargement. Note that new strokes were in the setting of compliance with Eliquis. Stroke etiology remains unclear - possible hypoperfusion though no vessel stenosis was seen nor signs of dehydration at presentation. Otherwise, concern remains for hypercoagulable state.      Patient in good spirits today.  Dysarthria and left upper extremity weakness slightly improved on today's exam.    Antithrombotics for secondary stroke prevention: Anticoagulants: Enoxaparin injection 1mg/kg (100mg) q12 hrs   Statins for secondary stroke prevention and hyperlipidemia, if present: Zetia 10mg Nightly (Cannot be on statin with current chemo regimen)  Aggressive risk factor modification: HTN, Smoking, DM, HLD, Malignancy  Rehab efforts: The patient has been evaluated by a stroke team provider and the therapy needs have been fully considered based off the presenting complaints and exam findings. The following therapy evaluations are needed: PT evaluate and treat, OT evaluate and treat, SLP evaluate and treat, PM&R evaluate for appropriate placement - dispo inpatient rehab  Diagnostics ordered/pending: None   VTE prophylaxis: None: Reason for No Pharmacological VTE Prophylaxis: Currently on anticoagulation, SCDs  BP parameters: Infarct: No intervention, SBP <220

## 2020-10-18 NOTE — SUBJECTIVE & OBJECTIVE
Neurologic Chief Complaint: L facial droop, slurred speech, Left side weakness    Subjective:     Interval History: Patient is seen for follow-up neurological assessment and treatment recommendations:      Patient in good spirits today.  Dysarthria and left upper extremity weakness slightly improved on today's exam.    HPI, Past Medical, Family, and Social History remains the same as documented in the initial encounter.     Review of Systems   Constitutional: Negative for fatigue and fever.   Eyes: Negative for discharge and visual disturbance.   Neurological: Positive for facial asymmetry, speech difficulty, weakness and numbness. Negative for headaches.   Psychiatric/Behavioral: Negative for agitation and confusion.     Scheduled Meds:   cyproheptadine  4 mg Oral QID    enoxaparin  100 mg Subcutaneous Q12H    ezetimibe  10 mg Oral QHS    tamsulosin  0.4 mg Oral Daily    venlafaxine  75 mg Oral Daily     Continuous Infusions:  PRN Meds:dextrose 50%, glucagon (human recombinant), influenza, insulin aspart U-100, labetalol, lidocaine, sodium chloride 0.9%    Objective:     Vital Signs (Most Recent):  Temp: 98.7 °F (37.1 °C) (10/18/20 1136)  Pulse: 69 (10/18/20 1136)  Resp: 11 (10/18/20 1136)  BP: (!) 144/80 (10/18/20 1136)  SpO2: 96 % (10/18/20 1136)  BP Location: Right arm    Vital Signs Range (Last 24H):  Temp:  [97.6 °F (36.4 °C)-98.9 °F (37.2 °C)]   Pulse:  [69-80]   Resp:  [11-14]   BP: (140-184)/()   SpO2:  [93 %-98 %]   BP Location: Right arm    Physical Exam  Vitals signs reviewed.   Constitutional:       General: She is not in acute distress.     Appearance: She is not toxic-appearing.   HENT:      Head: Normocephalic and atraumatic.   Eyes:      Extraocular Movements: Extraocular movements intact.      Conjunctiva/sclera: Conjunctivae normal.   Cardiovascular:      Rate and Rhythm: Normal rate.   Pulmonary:      Effort: Pulmonary effort is normal. No respiratory distress.   Musculoskeletal:          General: No tenderness or deformity.   Skin:     General: Skin is warm and dry.   Neurological:      Mental Status: She is oriented to person, place, and time.      Cranial Nerves: Dysarthria and facial asymmetry present.      Motor: Weakness present.   Psychiatric:         Mood and Affect: Mood normal.         Speech: Speech is slurred.         Cognition and Memory: Cognition is not impaired.         Neurological Exam:   LOC: Alert  Attention Span: Good  Language: No aphasia  Articulation: Dysarthria  Orientation: Person, Place, Time   Visual Fields: Full  EOM (CN III, IV, VI): Full/intact  Facial Movement (CN VII): Lower facial weakness on the Left  Motor: Arm left  Paresis: 2/5  Leg left  Paresis: proximal 2/5; distal 3/5  Arm right  Normal 5/5  Leg right Normal 5/5  Sensation: Andres-hypoesthesia left      Laboratory:  CMP:   Recent Labs   Lab 10/18/20  0948   CALCIUM 8.8   ALBUMIN 3.7   PROT 6.8      K 4.2   CO2 24      BUN 11   CREATININE 1.1   ALKPHOS 41*   ALT 13   AST 19   BILITOT 0.4     CBC:   Recent Labs   Lab 10/18/20  0403   WBC 4.08   RBC 3.93*   HGB 10.5*   HCT 34.9*      MCV 89   MCH 26.7*   MCHC 30.1*     Lipid Panel:   Recent Labs   Lab 10/13/20  1226   CHOL 252*   LDLCALC 163.4*   HDL 68   TRIG 103     Hgb A1C: No results for input(s): HGBA1C in the last 168 hours.  TSH:   Recent Labs   Lab 10/13/20  1226   TSH 0.397*       Diagnostic Results     Brain imaging:    MRI Brain w/o contrast 10/13/2020:     Current MR imaging confirms suspicion of foci of acute infarctions in the right MCA distribution.  Three additional punctate foci of restricted diffusion involving the left parietal lobe, possibly emboli.  Small subacute infarction involving the left cerebellum, similar to prior examination dated 09/28/2020.  Well rounded T2/FLAIR hyperintense focus within the left lateral aspect of the body of the corpus callosum, likely related to evolving infarct as further discussed above.   If there is concerning for underlying intracranial metastasis in the setting of known metastatic lung cancer, further evaluation may be obtained with contrast enhanced MRI of the brain.    CT Head Without Contrast 10/13/2020:  No findings to suggest acute major vascular territory infarct or hemorrhage noting stable focus of low attenuation within the left cerebellum and stable right ICA region coil pack       Vessel Imaging:    CTA Multiphase 10/14/2020:  States that there is a possible M3 vessel stenosis or occlusion. However, on evaluation of imaging it does not seem that way. Please note that patient has multiple branches from R MCA   FORMAL READ: No acute intracranial pathology.  No evidence for intracranial hemorrhage or CT evidence of major vascular distribution infarct.  No intracranially large vessel occlusion.  Possible right M3 vessel stenosis or subtotal occlusion as described above.  Clinical correlation with symptoms is recommended.  Can consider MRI with diffusion-weighted imaging if concern for acute ischemia.  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  No evidence of high-grade stenosis or large vessel occlusion.  Numerous sclerotic lesions involving the vertebral bodies and remaining visualized axial skeleton, in keeping with patient's history of metastatic lung cancer.  Moderate left pleural effusion with patchy areas of consolidation in the left upper lobe may relate to pneumonia.       Cardiac Evaluation:     TTE 10/14/2020:  · With normal systolic function. The estimated ejection fraction is 55%.  · The quantitatively dervived ejection fraction is 52%.  · Normal left ventricular diastolic function.  · Normal right ventricular systolic function.  · Mild tricuspid regurgitation.   · The left atrial volume index is normal.     TTE June 2020:   · Normal left ventricular  systolic function. The estimated ejection fraction is 55%.  · Normal LV diastolic function.  · No wall motion abnormalities.  · Mildly reduced right ventricular systolic function.  · Normal central venous pressure (3 mmHg).  · The estimated PA systolic pressure is 26 mmHg

## 2020-10-18 NOTE — PROGRESS NOTES
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 No tPA due to Eliquis. No large vessel occlusion so no IR intervention pursued. Of note, patient was recently discharged from our service for multiple infarcts in BERTIN territories thought to be due to her hypercoagulable state (from malignancy.)    MRI Brain shows acute infarct in the R MCA, L MCA, and L cerebellar territories. Echo EF 55%, no L atrial enlargement. Note that new strokes were in the setting of compliance with Eliquis. Stroke etiology remains unclear - possible hypoperfusion though no vessel stenosis was seen nor signs of dehydration at presentation. Otherwise, concern remains for hypercoagulable state.      Patient in good spirits today.  Dysarthria and left upper extremity weakness slightly improved on today's exam.    Antithrombotics for secondary stroke prevention: Anticoagulants: Enoxaparin injection 1mg/kg (100mg) q12 hrs   Statins for secondary stroke prevention and hyperlipidemia, if present: Zetia 10mg Nightly (Cannot be on statin with current chemo regimen)  Aggressive risk factor modification: HTN, Smoking, DM, HLD, Malignancy  Rehab efforts: The patient has been evaluated by a stroke team provider and the therapy needs have been fully considered based off the presenting complaints and exam findings. The following therapy evaluations are needed: PT evaluate and treat, OT evaluate and treat, SLP evaluate and treat, PM&R evaluate for appropriate placement - dispo inpatient rehab  Diagnostics ordered/pending: None   VTE prophylaxis: None: Reason for No Pharmacological VTE Prophylaxis: Currently on  "anticoagulation, SCDs  BP parameters: Infarct: No intervention, SBP <220    Left-sided weakness  2/2 stroke  PT, OT eval & treat - dispo rehab    Oropharyngeal dysphagia  2/2 stroke  SLP eval and treat -- Recommending puree diet, thin liquids with frequent nursing checks for any pocketing. Meds crushed in pudding (per SLP, could bury chemo med in puree to avoid crushing.)  Pt states she does not want a feeding tube 10/15, will try her best to keep swallowing the ways that SLP has instructed her. Added Boost to regimen as well.     Cytotoxic cerebral edema  Area of cytotoxic cerebral edema identified when reviewing brain imaging in the territory of the right middle cerebral artery. There is no mass effect associated with it. We will continue to monitor the patients clinical exam for any worsening of symptoms which may indicate expansion of the stroke or the area of the edema resulting in the clinical change. The pattern is suggestive of embolic etiology related to hypercoagulable state.    Palliative care encounter  Had considered Palliative Medicine consult for symptom management (pain) however discussed with staff and plans to consult Heme/Onc team instead. Will re-consider Palliative consult if recommended by Onc. No plans for consultation at this time.    10/17: goals of care discussion had with patient with daughter on facetime. Patient states she would "go on with her life" and mentions she would like to continue all treatment options    Urinary retention  - On periactin at home for urethral pain and retention. Was seen by urology last admission  - Seen in urology clinic 9/25 for incomplete bladder emptying; hennessy removed on 10/6. While at rehab, UA with positive nitrate, 2+ leukocytes, and many bacteria (10/7). Pt was on Cipro.   - Repeat UA 10/15 with nitrites only. Will defer further abx treatment for now, monitor pt for any symptoms.    Malignant neoplasm of upper lobe of left lung  Reportedly metastatic " to involve the spine.  Stroke risk factor - suspect contributing to an underlying hypercoagulability  Consulted Oncology on 10/15; Appreciate assistance  They agree with transition to therapeutic Lovenox for AC at this time. Recommend holding chemo agent until pt completes rehab and they are able to reassess then plan; discussed this with patient on 10/16.    Hypertension associated with diabetes  Stroke risk factor   SBP < 220 acutely  Home Lasix held for now    Cerebral aneurysm, coiled in 2010  Aneurysm coil in 2010     Mixed hyperlipidemia due to type 2 diabetes mellitus  Stroke risk factor   LDL at 163  Cannot be on statin with current chemo drug; started Zetia 10/15.    Type 2 diabetes mellitus with hyperglycemia, with long-term current use of insulin  Stroke risk factor   A1C 9.3%  BG goal while inpatient 140-180  SSI while inpatient  Diabetic puree diet         10/13: Admit to vascular neurology for workup of new onset of slurred speech  10/14: MRI brain acute R MCA infarct. SLP cleared for puree, thin diet, meds crushed. Order TTE. Patient c/o lab draws/difficult stick, ordered topical lidocaine, consult palliative for pain management. Need to reach out to oncology regarding chemo management (unable to crush entrectinib), prognosis, possibly switching to Lovenox.    10/15: Consulted Oncology regarding cancer prognostication, chemo drug, and anticoagulation recs. SLP ok to give PO chemo buried in puree; nurse to frequently check for pocketing. Added Boost to regimen per nutrition recs. UA pending.  10/16: Patient more alert today, states she does not like the puree diet but she is still in good spirits. Started therapeutic Lovenox. Holding chemo agent per Onc recs.   10/17: No acute events overnight. Goals of care conversation initiated.    10/18: Patient in good spirits today.  Dysarthria and left upper extremity weakness slightly improved on today's exam.    STROKE DOCUMENTATION   Acute Stroke Times   Last  Known Normal Date: 10/13/20  Last Known Normal Time: 1030  Symptom Onset Date: 10/13/20  Symptom Onset Time: 1130  Stroke Team Called Date: 10/13/20  Stroke Team Called Time: 1220  Stroke Team Arrival Date: 10/13/20  Stroke Team Arrival Time: 1227    NIH Scale:  1a. Level of Consciousness: 0-->Alert, keenly responsive  1b. LOC Questions: 0-->Answers both questions correctly  1c. LOC Commands: 0-->Performs both tasks correctly  2. Best Gaze: 0-->Normal  3. Visual: 0-->No visual loss  4. Facial Palsy: 2-->Partial paralysis (total or near-total paralysis of lower face)  5a. Motor Arm, Left: 2-->Some effort against gravity, limb cannot get to or maintain (if cued) 90 (or 45) degrees, drifts down to bed, but has some effort against gravity  5b. Motor Arm, Right: 0-->No drift, limb holds 90 (or 45) degrees for full 10 secs  6a. Motor Leg, Left: 2-->Some effort against gravity, leg falls to bed by 5 secs, but has some effort against gravity  6b. Motor Leg, Right: 0-->No drift, leg holds 30 degree position for full 5 secs  7. Limb Ataxia: 0-->Absent  8. Sensory: 1-->Mild-to-moderate sensory loss, patient feels pinprick is less sharp or is dull on the affected side, or there is a loss of superficial pain with pinprick, but patient is aware of being touched  9. Best Language: 0-->No aphasia, normal  10. Dysarthria: 2-->Severe dysarthria, patients speech is so slurred as to be unintelligible in the absence of or out of proportion to any dysphasia, or is mute/anarthric  11. Extinction and Inattention (formerly Neglect): 0-->No abnormality  Total (NIH Stroke Scale): 9       Modified Centre    [place] Coma Scale:    ABCD2 Score:    [id number] Score:   HAS -BLED Score:   ICH Score:   Hunt & Sutton Classification:      Hemorrhagic change of an Ischemic Stroke: Does this patient have an ischemic stroke with hemorrhagic changes? No     Neurologic Chief Complaint: L facial droop, slurred speech, Left side weakness    Subjective:      Interval History: Patient is seen for follow-up neurological assessment and treatment recommendations:      Patient in good spirits today.  Dysarthria and left upper extremity weakness slightly improved on today's exam.    HPI, Past Medical, Family, and Social History remains the same as documented in the initial encounter.     Review of Systems   Constitutional: Negative for fatigue and fever.   Eyes: Negative for discharge and visual disturbance.   Neurological: Positive for facial asymmetry, speech difficulty, weakness and numbness. Negative for headaches.   Psychiatric/Behavioral: Negative for agitation and confusion.     Scheduled Meds:   cyproheptadine  4 mg Oral QID    enoxaparin  100 mg Subcutaneous Q12H    ezetimibe  10 mg Oral QHS    tamsulosin  0.4 mg Oral Daily    venlafaxine  75 mg Oral Daily     Continuous Infusions:  PRN Meds:dextrose 50%, glucagon (human recombinant), influenza, insulin aspart U-100, labetalol, lidocaine, sodium chloride 0.9%    Objective:     Vital Signs (Most Recent):  Temp: 98.7 °F (37.1 °C) (10/18/20 1136)  Pulse: 69 (10/18/20 1136)  Resp: 11 (10/18/20 1136)  BP: (!) 144/80 (10/18/20 1136)  SpO2: 96 % (10/18/20 1136)  BP Location: Right arm    Vital Signs Range (Last 24H):  Temp:  [97.6 °F (36.4 °C)-98.9 °F (37.2 °C)]   Pulse:  [69-80]   Resp:  [11-14]   BP: (140-184)/()   SpO2:  [93 %-98 %]   BP Location: Right arm    Physical Exam  Vitals signs reviewed.   Constitutional:       General: She is not in acute distress.     Appearance: She is not toxic-appearing.   HENT:      Head: Normocephalic and atraumatic.   Eyes:      Extraocular Movements: Extraocular movements intact.      Conjunctiva/sclera: Conjunctivae normal.   Cardiovascular:      Rate and Rhythm: Normal rate.   Pulmonary:      Effort: Pulmonary effort is normal. No respiratory distress.   Musculoskeletal:         General: No tenderness or deformity.   Skin:     General: Skin is warm and dry.    Neurological:      Mental Status: She is oriented to person, place, and time.      Cranial Nerves: Dysarthria and facial asymmetry present.      Motor: Weakness present.   Psychiatric:         Mood and Affect: Mood normal.         Speech: Speech is slurred.         Cognition and Memory: Cognition is not impaired.         Neurological Exam:   LOC: Alert  Attention Span: Good  Language: No aphasia  Articulation: Dysarthria  Orientation: Person, Place, Time   Visual Fields: Full  EOM (CN III, IV, VI): Full/intact  Facial Movement (CN VII): Lower facial weakness on the Left  Motor: Arm left  Paresis: 2/5  Leg left  Paresis: proximal 2/5; distal 3/5  Arm right  Normal 5/5  Leg right Normal 5/5  Sensation: Andres-hypoesthesia left      Laboratory:  CMP:   Recent Labs   Lab 10/18/20  0948   CALCIUM 8.8   ALBUMIN 3.7   PROT 6.8      K 4.2   CO2 24      BUN 11   CREATININE 1.1   ALKPHOS 41*   ALT 13   AST 19   BILITOT 0.4     CBC:   Recent Labs   Lab 10/18/20  0403   WBC 4.08   RBC 3.93*   HGB 10.5*   HCT 34.9*      MCV 89   MCH 26.7*   MCHC 30.1*     Lipid Panel:   Recent Labs   Lab 10/13/20  1226   CHOL 252*   LDLCALC 163.4*   HDL 68   TRIG 103     Hgb A1C: No results for input(s): HGBA1C in the last 168 hours.  TSH:   Recent Labs   Lab 10/13/20  1226   TSH 0.397*       Diagnostic Results     Brain imaging:    MRI Brain w/o contrast 10/13/2020:     Current MR imaging confirms suspicion of foci of acute infarctions in the right MCA distribution.  Three additional punctate foci of restricted diffusion involving the left parietal lobe, possibly emboli.  Small subacute infarction involving the left cerebellum, similar to prior examination dated 09/28/2020.  Well rounded T2/FLAIR hyperintense focus within the left lateral aspect of the body of the corpus callosum, likely related to evolving infarct as further discussed above.  If there is concerning for underlying intracranial metastasis in the setting of  known metastatic lung cancer, further evaluation may be obtained with contrast enhanced MRI of the brain.    CT Head Without Contrast 10/13/2020:  No findings to suggest acute major vascular territory infarct or hemorrhage noting stable focus of low attenuation within the left cerebellum and stable right ICA region coil pack       Vessel Imaging:    CTA Multiphase 10/14/2020:  States that there is a possible M3 vessel stenosis or occlusion. However, on evaluation of imaging it does not seem that way. Please note that patient has multiple branches from R MCA   FORMAL READ: No acute intracranial pathology.  No evidence for intracranial hemorrhage or CT evidence of major vascular distribution infarct.  No intracranially large vessel occlusion.  Possible right M3 vessel stenosis or subtotal occlusion as described above.  Clinical correlation with symptoms is recommended.  Can consider MRI with diffusion-weighted imaging if concern for acute ischemia.  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  No evidence of high-grade stenosis or large vessel occlusion.  Numerous sclerotic lesions involving the vertebral bodies and remaining visualized axial skeleton, in keeping with patient's history of metastatic lung cancer.  Moderate left pleural effusion with patchy areas of consolidation in the left upper lobe may relate to pneumonia.       Cardiac Evaluation:     TTE 10/14/2020:  · With normal systolic function. The estimated ejection fraction is 55%.  · The quantitatively dervived ejection fraction is 52%.  · Normal left ventricular diastolic function.  · Normal right ventricular systolic function.  · Mild tricuspid regurgitation.   · The left atrial volume index is normal.     TTE June 2020:   · Normal left ventricular systolic function. The estimated ejection fraction is 55%.  · Normal LV  diastolic function.  · No wall motion abnormalities.  · Mildly reduced right ventricular systolic function.  · Normal central venous pressure (3 mmHg).  · The estimated PA systolic pressure is 26 mmHg         Kade Gillis NP  [facility]  Department of Vascular Neurology   [facility]

## 2020-10-18 NOTE — PLAN OF CARE
Problem: Cerebral Tissue Perfusion Risk (Stroke, Ischemic/Transient Ischemic Attack)  Goal: Optimal Cerebral Tissue Perfusion  Outcome: Ongoing, Progressing     Problem: Eating/Swallowing Impairment (Stroke, Ischemic/Transient Ischemic Attack)  Goal: Oral Intake without Aspiration  Outcome: Ongoing, Progressing     Problem: Diabetes Comorbidity  Goal: Blood Glucose Level Within Desired Range  Outcome: Ongoing, Progressing     Patient and family educated on the above POC and s/s to report.   Both verbalized understanding.   Patient awake and oriented x 4 with some confusion at times.  Patient denies pain, SOB or distress.  Patient eating better today, but still expresses poor appetite. Stroke eduction completed and booklet at bedside.  Will cont POC and treatment for Stroke and DM.

## 2020-10-19 LAB
POCT GLUCOSE: 101 MG/DL (ref 70–110)
POCT GLUCOSE: 89 MG/DL (ref 70–110)
POCT GLUCOSE: 91 MG/DL (ref 70–110)

## 2020-10-19 PROCEDURE — 99233 SBSQ HOSP IP/OBS HIGH 50: CPT | Mod: GC,,, | Performed by: PSYCHIATRY & NEUROLOGY

## 2020-10-19 PROCEDURE — 92526 ORAL FUNCTION THERAPY: CPT

## 2020-10-19 PROCEDURE — 11000001 HC ACUTE MED/SURG PRIVATE ROOM

## 2020-10-19 PROCEDURE — 97112 NEUROMUSCULAR REEDUCATION: CPT

## 2020-10-19 PROCEDURE — 25000003 PHARM REV CODE 250: Performed by: EMERGENCY MEDICINE

## 2020-10-19 PROCEDURE — 92507 TX SP LANG VOICE COMM INDIV: CPT

## 2020-10-19 PROCEDURE — 25000003 PHARM REV CODE 250: Performed by: PHYSICIAN ASSISTANT

## 2020-10-19 PROCEDURE — 63600175 PHARM REV CODE 636 W HCPCS: Performed by: PHYSICIAN ASSISTANT

## 2020-10-19 PROCEDURE — 97530 THERAPEUTIC ACTIVITIES: CPT

## 2020-10-19 PROCEDURE — 97535 SELF CARE MNGMENT TRAINING: CPT

## 2020-10-19 PROCEDURE — 25000003 PHARM REV CODE 250: Performed by: FAMILY MEDICINE

## 2020-10-19 PROCEDURE — 99233 PR SUBSEQUENT HOSPITAL CARE,LEVL III: ICD-10-PCS | Mod: GC,,, | Performed by: PSYCHIATRY & NEUROLOGY

## 2020-10-19 RX ADMIN — CYPROHEPTADINE HYDROCHLORIDE 4 MG: 4 TABLET ORAL at 09:10

## 2020-10-19 RX ADMIN — CYPROHEPTADINE HYDROCHLORIDE 4 MG: 4 TABLET ORAL at 12:10

## 2020-10-19 RX ADMIN — CYPROHEPTADINE HYDROCHLORIDE 4 MG: 4 TABLET ORAL at 08:10

## 2020-10-19 RX ADMIN — CYPROHEPTADINE HYDROCHLORIDE 4 MG: 4 TABLET ORAL at 05:10

## 2020-10-19 RX ADMIN — VENLAFAXINE HYDROCHLORIDE 75 MG: 37.5 CAPSULE, EXTENDED RELEASE ORAL at 08:10

## 2020-10-19 RX ADMIN — ENOXAPARIN SODIUM 100 MG: 100 INJECTION SUBCUTANEOUS at 09:10

## 2020-10-19 RX ADMIN — TAMSULOSIN HYDROCHLORIDE 0.4 MG: 0.4 CAPSULE ORAL at 08:10

## 2020-10-19 RX ADMIN — ENOXAPARIN SODIUM 100 MG: 100 INJECTION SUBCUTANEOUS at 10:10

## 2020-10-19 RX ADMIN — EZETIMIBE 10 MG: 10 TABLET ORAL at 09:10

## 2020-10-19 NOTE — PLAN OF CARE
Problem: Adjustment to Illness (Stroke, Ischemic/Transient Ischemic Attack)  Goal: Optimal Coping  Outcome: Ongoing, Progressing     Problem: Cerebral Tissue Perfusion Risk (Stroke, Ischemic/Transient Ischemic Attack)  Goal: Optimal Cerebral Tissue Perfusion  Outcome: Ongoing, Progressing     Problem: Diabetes Comorbidity  Goal: Blood Glucose Level Within Desired Range  Outcome: Ongoing, Progressing        Patient and family educated on the above POC both verbalized.  Patient denies pain, SOB or distress.  Patient up to side of bed with PT and per well.   Vitals stable.  Patient with out complaints. Will cont POC and treatment.

## 2020-10-19 NOTE — SUBJECTIVE & OBJECTIVE
Neurologic Chief Complaint: L facial droop, slurred speech, Left side weakness    Subjective:     Interval History: Patient is seen for follow-up neurological assessment and treatment recommendations:      NAEON. Diet upgraded per SLP recs. Pt c/o some mild acid reflux; no other complaints currently. Pending placement.    HPI, Past Medical, Family, and Social History remains the same as documented in the initial encounter.     Review of Systems   Constitutional: Negative for fatigue and fever.   Eyes: Negative for discharge and visual disturbance.   Neurological: Positive for facial asymmetry, speech difficulty, weakness and numbness. Negative for headaches.   Psychiatric/Behavioral: Negative for agitation and confusion.     Scheduled Meds:   cyproheptadine  4 mg Oral QID    enoxaparin  100 mg Subcutaneous Q12H    ezetimibe  10 mg Oral QHS    tamsulosin  0.4 mg Oral Daily    venlafaxine  75 mg Oral Daily     Continuous Infusions:  PRN Meds:dextrose 50%, glucagon (human recombinant), influenza, insulin aspart U-100, labetalol, lidocaine, sodium chloride 0.9%    Objective:     Vital Signs (Most Recent):  Temp: 98 °F (36.7 °C) (10/19/20 0904)  Pulse: 78 (10/19/20 0914)  Resp: 14 (10/19/20 0904)  BP: 123/63 (10/19/20 0904)  SpO2: 96 % (10/19/20 0904)  BP Location: Right arm    Vital Signs Range (Last 24H):  Temp:  [97.8 °F (36.6 °C)-99.5 °F (37.5 °C)]   Pulse:  [68-86]   Resp:  [11-21]   BP: (123-153)/(63-85)   SpO2:  [96 %-99 %]   BP Location: Right arm    Physical Exam  Vitals signs reviewed.   Constitutional:       General: She is not in acute distress.     Appearance: She is not toxic-appearing.   HENT:      Head: Normocephalic and atraumatic.   Eyes:      Extraocular Movements: Extraocular movements intact.      Conjunctiva/sclera: Conjunctivae normal.   Cardiovascular:      Rate and Rhythm: Normal rate.   Pulmonary:      Effort: Pulmonary effort is normal. No respiratory distress.   Musculoskeletal:          General: No tenderness or deformity.   Skin:     General: Skin is warm and dry.   Neurological:      Mental Status: She is oriented to person, place, and time.      Cranial Nerves: Dysarthria and facial asymmetry present.      Motor: Weakness present.   Psychiatric:         Mood and Affect: Mood normal.         Speech: Speech is slurred.         Cognition and Memory: Cognition is not impaired.         Neurological Exam:   LOC: Alert  Attention Span: Good  Language: No aphasia  Articulation: Dysarthria   Orientation: Person, Place, Time   Visual Fields: Full  EOM (CN III, IV, VI): Full/intact  Facial Movement (CN VII): Lower facial weakness on the Left  Motor: Arm left  Paresis: 1/5  Leg left  Paresis: proximal 1/5; distal 2/5  Arm right  Normal 5/5  Leg right Normal 5/5  Sensation: Andres-hypoesthesia left      Laboratory:  CMP:   No results for input(s): GLUCOSE, CALCIUM, ALBUMIN, PROT, NA, K, CO2, CL, BUN, CREATININE, ALKPHOS, ALT, AST, BILITOT in the last 24 hours.  CBC:   Recent Labs   Lab 10/18/20  0403   WBC 4.08   RBC 3.93*   HGB 10.5*   HCT 34.9*      MCV 89   MCH 26.7*   MCHC 30.1*     Lipid Panel:   Recent Labs   Lab 10/13/20  1226   CHOL 252*   LDLCALC 163.4*   HDL 68   TRIG 103     Hgb A1C: No results for input(s): HGBA1C in the last 168 hours.  TSH:   Recent Labs   Lab 10/13/20  1226   TSH 0.397*       Diagnostic Results     Brain imaging:    MRI Brain w/o contrast 10/13/2020:     Current MR imaging confirms suspicion of foci of acute infarctions in the right MCA distribution.  Three additional punctate foci of restricted diffusion involving the left parietal lobe, possibly emboli.  Small subacute infarction involving the left cerebellum, similar to prior examination dated 09/28/2020.  Well rounded T2/FLAIR hyperintense focus within the left lateral aspect of the body of the corpus callosum, likely related to evolving infarct as further discussed above.  If there is concerning for underlying  intracranial metastasis in the setting of known metastatic lung cancer, further evaluation may be obtained with contrast enhanced MRI of the brain.    CT Head Without Contrast 10/13/2020:  No findings to suggest acute major vascular territory infarct or hemorrhage noting stable focus of low attenuation within the left cerebellum and stable right ICA region coil pack       Vessel Imaging:    CTA Multiphase 10/14/2020:  States that there is a possible M3 vessel stenosis or occlusion. However, on evaluation of imaging it does not seem that way. Please note that patient has multiple branches from R MCA   FORMAL READ: No acute intracranial pathology.  No evidence for intracranial hemorrhage or CT evidence of major vascular distribution infarct.  No intracranially large vessel occlusion.  Possible right M3 vessel stenosis or subtotal occlusion as described above.  Clinical correlation with symptoms is recommended.  Can consider MRI with diffusion-weighted imaging if concern for acute ischemia.  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  No evidence of high-grade stenosis or large vessel occlusion.  Numerous sclerotic lesions involving the vertebral bodies and remaining visualized axial skeleton, in keeping with patient's history of metastatic lung cancer.  Moderate left pleural effusion with patchy areas of consolidation in the left upper lobe may relate to pneumonia.       Cardiac Evaluation:     TTE 10/14/2020:  · With normal systolic function. The estimated ejection fraction is 55%.  · The quantitatively dervived ejection fraction is 52%.  · Normal left ventricular diastolic function.  · Normal right ventricular systolic function.  · Mild tricuspid regurgitation.   · The left atrial volume index is normal.     TTE June 2020:   · Normal left ventricular systolic function. The estimated  ejection fraction is 55%.  · Normal LV diastolic function.  · No wall motion abnormalities.  · Mildly reduced right ventricular systolic function.  · Normal central venous pressure (3 mmHg).  · The estimated PA systolic pressure is 26 mmHg

## 2020-10-19 NOTE — ASSESSMENT & PLAN NOTE
Stroke risk factor   A1C 9.3%  BG goal while inpatient 140-180  SSI while inpatient  Diabetic diet

## 2020-10-19 NOTE — PLAN OF CARE
Problem: SLP Goal  Goal: SLP Goal  Description: Speech Language Pathology Goals  Goals expected to be met by 10/26  1. Pt will tolerate diet of mechanical soft solids and thin liquids with no overt signs of airway compromise.  2. Pt will participate in ongoing swallow assessment to determine safest and least restrictive diet.  3. Pt will recall simple speech strategies with 90% acc given min A.  4. Pt will utilize simple speech strategies during structured tasks and conversation with 90% acc given min A.  5. Pt will complete OMEs x10 per session with min A.  6. Pt will participate in ongoing reading, writing, and visual-spatial assessment to determine purpose for therapeutic targets.    Outcome: Ongoing, Progressing     POC updated. Pt safe to advance diet to mechanical soft solids and thin liquids. ST to continue to monitor.    GILBERTO Sue  10/19/2020

## 2020-10-19 NOTE — PLAN OF CARE
Problem: Fall Injury Risk  Goal: Absence of Fall and Fall-Related Injury  Outcome: Ongoing, Progressing  Intervention: Identify and Manage Contributors to Fall Injury Risk  Flowsheets (Taken 10/19/2020 0610)  Self-Care Promotion:   independence encouraged   BADL personal objects within reach  Medication Review/Management:   medications reviewed   dosing adjusted   high risk medications identified  Intervention: Promote Injury-Free Environment  Flowsheets (Taken 10/19/2020 0610)  Safety Promotion/Fall Prevention:   assistive device/personal item within reach   bed alarm set   Fall Risk signage in place   Fall Risk reviewed with patient/family   side rails raised x 3   instructed to call staff for mobility  Environmental Safety Modification:   assistive device/personal items within reach   clutter free environment maintained   lighting adjusted     Problem: Adult Inpatient Plan of Care  Goal: Optimal Comfort and Wellbeing  Outcome: Ongoing, Progressing  Intervention: Provide Person-Centered Care  Flowsheets (Taken 10/19/2020 0610)  Trust Relationship/Rapport:   care explained   choices provided   emotional support provided   empathic listening provided   thoughts/feelings acknowledged   reassurance provided   questions encouraged   VSS and BG stable as well.  Pt rested comfortably through out the night without complaints of pain and no nonverbal s/s of distress noted.

## 2020-10-19 NOTE — ASSESSMENT & PLAN NOTE
"Had considered Palliative Medicine consult for symptom management (pain) however discussed with staff and plans to consult Heme/Onc team instead. Will re-consider Palliative consult if recommended by Onc. No plans for consultation at this time.    Goals of care discussion introduced with patient and daughter via FaceTime on 10/17. Patient states she would "go on with her life" and mentions she would like to continue all treatment options.  Family advised on 10/19 about Heme/Onc plans to hold chemotherapy until pt able to follow up in clinic after rehab to reassess treatment plan.  "

## 2020-10-19 NOTE — PT/OT/SLP PROGRESS
Physical Therapy Treatment    Patient Name:  Alison Branch   MRN:  [id number]    Recommendations:     Discharge Recommendations:  rehabilitation facility   Discharge Equipment Recommendations: other (see comments)(TBD by next level of care)   Barriers to discharge: Inaccessible home and Decreased caregiver support    Assessment:     Alison Branch is a 65 y.o. female admitted with a medical diagnosis of Embolic stroke involving right middle cerebral artery.  She presents with the following impairments/functional limitations:  weakness, impaired endurance, impaired self care skills, impaired functional mobilty, gait instability, impaired balance, decreased upper extremity function, decreased lower extremity function, decreased safety awareness, impaired coordination, impaired fine motor, decreased coordination. PT recommending inpatient rehab upon DC from hospital.    Rehab Prognosis: Good; patient would benefit from acute skilled PT services to address these deficits and reach maximum level of function.    Recent Surgery: * No surgery found *      Plan:     During this hospitalization, patient to be seen 4 x/week to address the identified rehab impairments via gait training, therapeutic activities, therapeutic exercises, neuromuscular re-education and progress toward the following goals:    · Plan of Care Expires:  11/14/20    Subjective     Chief Complaint: Pt stating she smells bad and PCT is coming to bathe her.  Patient/Family Comments/goals: maximize functional mobility  Pain/Comfort:  · Pain Rating 1: 0/10  · Pain Rating Post-Intervention 1: 0/10      Objective:     Communicated with Rn prior to session.  Patient found HOB elevated with telemetry, PureWick, bed alarm upon PT entry to room.   Additional staffing present: OT     General Precautions: Standard, aspiration, fall   Orthopedic Precautions:N/A   Braces: N/A     Functional Mobility:  · Bed Mobility:     · Rolling Left:  moderate assistance  · Scooting:  maximal assistance for scooting L hip forward and trunk control  · Bridging: moderate assistance, BLE placement and bracing  · Supine to Sit: moderate assistance  · Sit to Supine: moderate assistance  · Transfers:  Sit to Stand:  maximal assistance and of 2 persons with no AD and hand-held assist, L trunk lean and L knee blocked to prevent buckling; x 2 trials  · Gait: not appropriate this date 2/2 L trunk lean and L knee buckling  · PT addressing postural control with physical assistance as well as tactile and VCing while OT performing functional/ADL task with UEs.   · EOB~ 20 minutes, pt able to initiate self correction to midline, but overshot midline ~50% of the time. Pt demonstrating posterior, anterior and L/R trunk leans, but L trunk lean most severe with functional activities.  · Balance:   · Static Sitting: Marci to modA  · Dynamic Sitting: max A      AM-PAC 6 CLICK MOBILITY  Turning over in bed (including adjusting bedclothes, sheets and blankets)?: 2  Sitting down on and standing up from a chair with arms (e.g., wheelchair, bedside commode, etc.): 2  Moving from lying on back to sitting on the side of the bed?: 2  Moving to and from a bed to a chair (including a wheelchair)?: 1  Need to walk in hospital room?: 1  Climbing 3-5 steps with a railing?: 1  Basic Mobility Total Score: 9       Therapeutic Activities and Exercises:  Patient educated on role of therapy, goals of session, and benefits of mobilizing.   Discussed PT plan of care during hospitalization.   Patient educated on calling for assistance.   Patient educated on how their diagnosis impacts their mobility within PT scope of practice.   Communication board up to date.  All questions answered within PT scope of practice.      Patient left HOB elevated with all lines intact, call button in reach and bed alarm on.    GOALS:   Multidisciplinary Problems     Physical Therapy Goals        Problem: Physical Therapy Goal    Goal Priority Disciplines  Outcome Goal Variances Interventions   Physical Therapy Goal     PT, PT/OT Ongoing, Progressing     Description: Goals to be met by: 10/29/2020    Patient will increase functional independence with mobility by performin. Pt will perform bed mobility (rolling L/R, scooting, and bridging) with modified supervision.  2. Pt will perform supine to/from sit with supervision.  3. Pt will sit EOB x 10 mins with no UE support with supervision.  4. Pt will perform sit to stand with mod A and LRAD.  5. Pt will ambulate 10 feet with mod assistance and LRAD.  6. Pt will perform there-ex from handout x 15 reps to improve strength for functional mobility.  7. Pt will perform squat pivot t/f to bedside chair with modA.                         Time Tracking:     PT Received On: 10/19/20  PT Start Time: 1106     PT Stop Time: 1132  PT Total Time (min): 26 min     Billable Minutes: Therapeutic Activity 11 and Neuromuscular Re-education 15    Treatment Type: Treatment  PT/PTA: PT     PTA Visit Number: 0     Reina Mendoza PT  10/19/2020

## 2020-10-19 NOTE — PLAN OF CARE
Problem: Occupational Therapy Goal  Goal: Occupational Therapy Goal  Description: Goals to be met by: 10/27/2020     Patient will increase functional independence with ADLs by performing:    Feeding with Minimal Assistance while sitting EOB  Grooming while EOB with Minimal Assistance.  Toileting from bedside commode with Moderate Assistance for hygiene and clothing management.   Sitting at edge of bed x10 minutes with Minimal Assistance.  Stand pivot transfers with Minimal Assistance.  Toilet transfer to bedside commode with Minimal Assistance.    Outcome: Ongoing, Progressing     Pt progressing well towards OT goals. OT POC remains appropriate for patient on this date. Pt will continue to benefit from skilled OT to address the deficits affecting her occupational performance.       Bhavna Olguin OTR/L  10/19/20

## 2020-10-19 NOTE — PROGRESS NOTES
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 No tPA due to Eliquis. No large vessel occlusion so no IR intervention pursued. Of note, patient was recently discharged from our service for multiple infarcts in BERTIN territories thought to be due to her hypercoagulable state (from malignancy.)    MRI Brain shows acute infarct in the R MCA, L MCA, and L cerebellar territories. Echo EF 55%, no L atrial enlargement. Note that new strokes were in the setting of compliance with Eliquis. Stroke etiology remains unclear - possible hypoperfusion though no vessel stenosis was seen nor signs of dehydration at presentation. Otherwise, concern remains for hypercoagulable state.        Antithrombotics for secondary stroke prevention: Anticoagulants: Enoxaparin injection 1mg/kg (100mg) q12 hrs   Statins for secondary stroke prevention and hyperlipidemia, if present: Zetia 10mg Nightly (Cannot be on statin with current chemo regimen)  Aggressive risk factor modification: HTN, Smoking, DM, HLD, Malignancy  Rehab efforts: The patient has been evaluated by a stroke team provider and the therapy needs have been fully considered based off the presenting complaints and exam findings. The following therapy evaluations are needed: PT evaluate and treat, OT evaluate and treat, SLP evaluate and treat, PM&R evaluate for appropriate placement - dispo inpatient rehab  Diagnostics ordered/pending: None   VTE prophylaxis: None: Reason for No Pharmacological VTE Prophylaxis: Currently on anticoagulation, SCDs  BP parameters: Infarct: No intervention, SBP < 160    Oropharyngeal dysphagia  2/2  stroke  SLP eval and treat -- Recommending frequent nursing checks for any pocketing. Meds crushed in pudding (per SLP, could bury chemo med in puree to avoid crushing.) Added Boost to regimen as well.   Diet upgraded per SLP recs - now Mecahnical soft diet, Thin liquids.   Pt c/o some mild acid reflux on 10/19 but states she would like to try the upgraded diet first to see if that makes any difference first before starting any additional medication; will follow up.     Malignant neoplasm of upper lobe of left lung  Reportedly metastatic to involve the spine.  Stroke risk factor - suspect contributing to an underlying hypercoagulability  Consulted Oncology on 10/15; Appreciate assistance  They agree with transition to therapeutic Lovenox for AC at this time. Recommend holding chemo agent until pt completes rehab and they are able to reassess then plan; discussed this with patient on 10/16 and reinforced with daughter via phone on 10/19.    Hypertension associated with diabetes  Stroke risk factor   SBP < 160 acutely  Currently at goal with holding home Lasix; will continue monitoring.    Left-sided weakness  2/2 stroke  PT, OT eval & treat - dispo rehab    Cytotoxic cerebral edema  Area of cytotoxic cerebral edema identified when reviewing brain imaging in the territory of the right middle cerebral artery. There is no mass effect associated with it. We will continue to monitor the patients clinical exam for any worsening of symptoms which may indicate expansion of the stroke or the area of the edema resulting in the clinical change. The pattern is suggestive of embolic etiology related to hypercoagulable state.    Mixed hyperlipidemia due to type 2 diabetes mellitus  Stroke risk factor   LDL at 163  Cannot be on statin with current chemo drug; started Zetia 10/15.    Type 2 diabetes mellitus with hyperglycemia, with long-term current use of insulin  Stroke risk factor   A1C 9.3%  BG goal while inpatient  "140-180  SSI while inpatient  Diabetic diet    Palliative care encounter  Had considered Palliative Medicine consult for symptom management (pain) however discussed with staff and plans to consult Heme/Onc team instead. Will re-consider Palliative consult if recommended by Onc. No plans for consultation at this time.    Goals of care discussion introduced with patient and daughter via FaceTime on 10/17. Patient states she would "go on with her life" and mentions she would like to continue all treatment options.  Family advised on 10/19 about Heme/Onc plans to hold chemotherapy until pt able to follow up in clinic after rehab to reassess treatment plan.    Urinary retention  - On periactin at home for urethral pain and retention. Was seen by urology last admission  - Seen in urology clinic 9/25 for incomplete bladder emptying; hennessy removed on 10/6. While at rehab, UA with positive nitrate, 2+ leukocytes, and many bacteria (10/7). Pt was on Cipro.   - Repeat UA 10/15 with nitrites only. Will defer further abx treatment for now, monitor pt for any symptoms.    Cerebral aneurysm, coiled in 2010  Aneurysm coil in 2010          10/13: Admit to vascular neurology for workup of new onset of slurred speech  10/14: MRI brain acute R MCA infarct. SLP cleared for puree, thin diet, meds crushed. Order TTE. Patient c/o lab draws/difficult stick, ordered topical lidocaine, consult palliative for pain management. Need to reach out to oncology regarding chemo management (unable to crush entrectinib), prognosis, possibly switching to Lovenox.    10/15: Consulted Oncology regarding cancer prognostication, chemo drug, and anticoagulation recs. SLP ok to give PO chemo buried in puree; nurse to frequently check for pocketing. Added Boost to regimen per nutrition recs. UA pending.  10/16: Patient more alert today, states she does not like the puree diet but she is still in good spirits. Started therapeutic Lovenox. Holding chemo agent " per Onc recs.   10/17: No acute events overnight. Goals of care conversation initiated.    10/18: Patient in good spirits today.  Dysarthria and left upper extremity weakness slightly improved on today's exam.  10/19: Diet upgraded per SLP recs. Pt c/o some mild acid reflux; no other complaints currently. Pending placement.    STROKE DOCUMENTATION   Acute Stroke Times   Last Known Normal Date: 10/13/20  Last Known Normal Time: 1030  Symptom Onset Date: 10/13/20  Symptom Onset Time: 1130  Stroke Team Called Date: 10/13/20  Stroke Team Called Time: 1220  Stroke Team Arrival Date: 10/13/20  Stroke Team Arrival Time: 1227    NIH Scale:  1a. Level of Consciousness: 0-->Alert, keenly responsive  1b. LOC Questions: 0-->Answers both questions correctly  1c. LOC Commands: 0-->Performs both tasks correctly  2. Best Gaze: 0-->Normal  3. Visual: 0-->No visual loss  4. Facial Palsy: 2-->Partial paralysis (total or near-total paralysis of lower face)  5a. Motor Arm, Left: 3-->No effort against gravity, limb falls  5b. Motor Arm, Right: 0-->No drift, limb holds 90 (or 45) degrees for full 10 secs  6a. Motor Leg, Left: 3-->No effort against gravity, leg falls to bed immediately  6b. Motor Leg, Right: 0-->No drift, leg holds 30 degree position for full 5 secs  7. Limb Ataxia: 0-->Absent  8. Sensory: 1-->Mild-to-moderate sensory loss, patient feels pinprick is less sharp or is dull on the affected side, or there is a loss of superficial pain with pinprick, but patient is aware of being touched  9. Best Language: 0-->No aphasia, normal  10. Dysarthria: 1-->Mild-to-moderate dysarthria, patient slurs at least some words and, at worst, can be understood with some difficulty  11. Extinction and Inattention (formerly Neglect): 0-->No abnormality  Total (NIH Stroke Scale): 10       Modified [place]    [place] Coma Scale:    ABCD2 Score:    [id number] Score:   HAS -BLED Score:   ICH Score:   Hunt & Sutton Classification:      Hemorrhagic  change of an Ischemic Stroke: Does this patient have an ischemic stroke with hemorrhagic changes? No     Neurologic Chief Complaint: L facial droop, slurred speech, Left side weakness    Subjective:     Interval History: Patient is seen for follow-up neurological assessment and treatment recommendations:      NAEON. Diet upgraded per SLP recs. Pt c/o some mild acid reflux; no other complaints currently. Pending placement.    HPI, Past Medical, Family, and Social History remains the same as documented in the initial encounter.     Review of Systems   Constitutional: Negative for fatigue and fever.   Eyes: Negative for discharge and visual disturbance.   Neurological: Positive for facial asymmetry, speech difficulty, weakness and numbness. Negative for headaches.   Psychiatric/Behavioral: Negative for agitation and confusion.     Scheduled Meds:   cyproheptadine  4 mg Oral QID    enoxaparin  100 mg Subcutaneous Q12H    ezetimibe  10 mg Oral QHS    tamsulosin  0.4 mg Oral Daily    venlafaxine  75 mg Oral Daily     Continuous Infusions:  PRN Meds:dextrose 50%, glucagon (human recombinant), influenza, insulin aspart U-100, labetalol, lidocaine, sodium chloride 0.9%    Objective:     Vital Signs (Most Recent):  Temp: 98 °F (36.7 °C) (10/19/20 0904)  Pulse: 78 (10/19/20 0914)  Resp: 14 (10/19/20 0904)  BP: 123/63 (10/19/20 0904)  SpO2: 96 % (10/19/20 0904)  BP Location: Right arm    Vital Signs Range (Last 24H):  Temp:  [97.8 °F (36.6 °C)-99.5 °F (37.5 °C)]   Pulse:  [68-86]   Resp:  [11-21]   BP: (123-153)/(63-85)   SpO2:  [96 %-99 %]   BP Location: Right arm    Physical Exam  Vitals signs reviewed.   Constitutional:       General: She is not in acute distress.     Appearance: She is not toxic-appearing.   HENT:      Head: Normocephalic and atraumatic.   Eyes:      Extraocular Movements: Extraocular movements intact.      Conjunctiva/sclera: Conjunctivae normal.   Cardiovascular:      Rate and Rhythm: Normal rate.    Pulmonary:      Effort: Pulmonary effort is normal. No respiratory distress.   Musculoskeletal:         General: No tenderness or deformity.   Skin:     General: Skin is warm and dry.   Neurological:      Mental Status: She is oriented to person, place, and time.      Cranial Nerves: Dysarthria and facial asymmetry present.      Motor: Weakness present.   Psychiatric:         Mood and Affect: Mood normal.         Speech: Speech is slurred.         Cognition and Memory: Cognition is not impaired.         Neurological Exam:   LOC: Alert  Attention Span: Good  Language: No aphasia  Articulation: Dysarthria   Orientation: Person, Place, Time   Visual Fields: Full  EOM (CN III, IV, VI): Full/intact  Facial Movement (CN VII): Lower facial weakness on the Left  Motor: Arm left  Paresis: 1/5  Leg left  Paresis: proximal 1/5; distal 2/5  Arm right  Normal 5/5  Leg right Normal 5/5  Sensation: Andres-hypoesthesia left      Laboratory:  CMP:   No results for input(s): GLUCOSE, CALCIUM, ALBUMIN, PROT, NA, K, CO2, CL, BUN, CREATININE, ALKPHOS, ALT, AST, BILITOT in the last 24 hours.  CBC:   Recent Labs   Lab 10/18/20  0403   WBC 4.08   RBC 3.93*   HGB 10.5*   HCT 34.9*      MCV 89   MCH 26.7*   MCHC 30.1*     Lipid Panel:   Recent Labs   Lab 10/13/20  1226   CHOL 252*   LDLCALC 163.4*   HDL 68   TRIG 103     Hgb A1C: No results for input(s): HGBA1C in the last 168 hours.  TSH:   Recent Labs   Lab 10/13/20  1226   TSH 0.397*       Diagnostic Results     Brain imaging:    MRI Brain w/o contrast 10/13/2020:     Current MR imaging confirms suspicion of foci of acute infarctions in the right MCA distribution.  Three additional punctate foci of restricted diffusion involving the left parietal lobe, possibly emboli.  Small subacute infarction involving the left cerebellum, similar to prior examination dated 09/28/2020.  Well rounded T2/FLAIR hyperintense focus within the left lateral aspect of the body of the corpus callosum,  likely related to evolving infarct as further discussed above.  If there is concerning for underlying intracranial metastasis in the setting of known metastatic lung cancer, further evaluation may be obtained with contrast enhanced MRI of the brain.    CT Head Without Contrast 10/13/2020:  No findings to suggest acute major vascular territory infarct or hemorrhage noting stable focus of low attenuation within the left cerebellum and stable right ICA region coil pack       Vessel Imaging:    CTA Multiphase 10/14/2020:  States that there is a possible M3 vessel stenosis or occlusion. However, on evaluation of imaging it does not seem that way. Please note that patient has multiple branches from R MCA   FORMAL READ: No acute intracranial pathology.  No evidence for intracranial hemorrhage or CT evidence of major vascular distribution infarct.  No intracranially large vessel occlusion.  Possible right M3 vessel stenosis or subtotal occlusion as described above.  Clinical correlation with symptoms is recommended.  Can consider MRI with diffusion-weighted imaging if concern for acute ischemia.  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  No evidence of high-grade stenosis or large vessel occlusion.  Numerous sclerotic lesions involving the vertebral bodies and remaining visualized axial skeleton, in keeping with patient's history of metastatic lung cancer.  Moderate left pleural effusion with patchy areas of consolidation in the left upper lobe may relate to pneumonia.       Cardiac Evaluation:     TTE 10/14/2020:  · With normal systolic function. The estimated ejection fraction is 55%.  · The quantitatively dervived ejection fraction is 52%.  · Normal left ventricular diastolic function.  · Normal right ventricular systolic function.  · Mild tricuspid regurgitation.   · The left atrial volume  index is normal.     TTE June 2020:   · Normal left ventricular systolic function. The estimated ejection fraction is 55%.  · Normal LV diastolic function.  · No wall motion abnormalities.  · Mildly reduced right ventricular systolic function.  · Normal central venous pressure (3 mmHg).  · The estimated PA systolic pressure is 26 mmHg         Mckayla Euceda PA-C  [facility]  Department of Vascular Neurology   [facility]

## 2020-10-19 NOTE — PT/OT/SLP PROGRESS
"Occupational Therapy   Co-Treatment    Name: Alison Branch  MRN: [id number]  Admitting Diagnosis:  Embolic stroke involving right middle cerebral artery       Recommendations:     Discharge Recommendations: rehabilitation facility  Discharge Equipment Recommendations:  (TBD pending progress)  Barriers to discharge:   Not functioning at baseline; increased A for functional tasks    Assessment:     Alison Branch is a 65 y.o. female with a medical diagnosis of Embolic stroke involving right middle cerebral artery.  She presents with the following performance deficits affecting function: weakness, impaired endurance, impaired self care skills, impaired functional mobilty, impaired balance, decreased safety awareness, decreased upper extremity function, decreased lower extremity function, impaired fine motor, impaired coordination. Pt tolerated session well this date with impaired postural stability, LUE hemiparesis, and generalized deconditioning being her main functional limitations. Pt tolerated EOB fairly well, requiring physical and verbal cues for postural control. Pt required A with bimanual tasks EOB demonstrating good use of RUE during functional tasks. Pt advanced to completing multiple sit<>stands with mod A of 2 persons with HHA. Pt progressing well. OT POC remains appropriate for patient on this date. Pt will continue to benefit from skilled OT to address the deficits affecting her occupational performance.    Rehab Prognosis:  Good; patient would benefit from acute skilled OT services to address these deficits and reach maximum level of function.       Plan:     Patient to be seen 4 x/week to address the above listed problems via self-care/home management, therapeutic activities, therapeutic exercises, neuromuscular re-education  · Plan of Care Expires: 11/13/20  · Plan of Care Reviewed with: patient     Subjective     Pain/Comfort:  · Pain Rating 1: 0/10  · Pain Rating Post-Intervention 1: 0/10     "Is it red " "beans and rice today."    Objective:     Communicated with: RN prior to session.  Patient found HOB elevated with telemetry, bed alarm, PureWick, SCD upon OT entry to room.    General Precautions: Standard, fall, aspiration   Orthopedic Precautions:N/A   Braces: N/A     Occupational Performance:     Bed Mobility:    · Patient completed Rolling/Turning to Left with  moderate assistance and with side rail  · Verbal cues for reaching of rail to assist with rolling   · Patient completed Scooting/Bridging with maximal assistance and 2 persons for posterior positioning in bed via drawhseet  · Patient completed Supine to Sit with moderate assistance for trunk elevation and LLE guidance over bed   · Patient completed Sit to Supine with moderate assistance for BLE placement into bed; CGA for trunk guidance     Functional Mobility/Transfers:  · Patient completed Sit <> Stand Transfer from bed x2 trials with moderate assistance and of 2 persons  with  hand-held assist   · Cues for hip extension for standing  · Increased L lateral leaning with difficulty shifting weight to RLE  · Functional Mobility: NT this date; pt with difficulty advancing BLEs towards the L for posterior positioning in bed     Activities of Daily Living:  · Grooming: minimum assistance for dental hygiene seated EOB  · Lower Body Dressing: moderate assistance for task overall in long sitting with A bringing socks over toes due to impaired LUE functioning  · Once sock over toes, pt performed figure 4 technique and brought socks over ankle with RUE  · Toileting: dependence with use of purewick for voiding  · Pt able to bring cloth to rosalva-care in sitting position for skin integrity; mod A for balance due to excessive anterior leaning with difficulty re-positioning to neutral position once completed with task      [facility] 6 Click ADL: 14    Treatment & Education:  - Role of OT/ OT POC  - Self care safety/ independence  - Functional transfer/ mobility safety  - " Bed mobility safety  - ADL re-training while seated EOB to increase self care independence  - Transfer training to prepare for functional mobility and ADLs in standing   - Postural control addressed seated EOB while completing self care routine EOB   - Pt required min-max A for balance while EOB. Pt required max A for balance during self care completion.     Patient left HOB elevated with all lines intact, call button in reach, bed alarm on and RN notifiedEducation:      GOALS:   Multidisciplinary Problems     Occupational Therapy Goals        Problem: Occupational Therapy Goal    Goal Priority Disciplines Outcome Interventions   Occupational Therapy Goal     OT, PT/OT Ongoing, Progressing    Description: Goals to be met by: 10/27/2020     Patient will increase functional independence with ADLs by performing:    Feeding with Minimal Assistance while sitting EOB  Grooming while EOB with Minimal Assistance.  Toileting from bedside commode with Moderate Assistance for hygiene and clothing management.   Sitting at edge of bed x10 minutes with Minimal Assistance.  Stand pivot transfers with Minimal Assistance.  Toilet transfer to bedside commode with Minimal Assistance.                     Time Tracking:     OT Date of Treatment: 10/19/20  OT Start Time: 1105  OT Stop Time: 1131  OT Total Time (min): 26 min co tx with PT due to pt requiring 2 skilled therapist for transfer training and postural control while OT focuses on self care     Billable Minutes:Self Care/Home Management 13  Therapeutic Activity 13    Bhavna Olguin OT  10/19/2020

## 2020-10-19 NOTE — ASSESSMENT & PLAN NOTE
Stroke risk factor   SBP < 160 acutely  Currently at goal with holding home Lasix; will continue monitoring.

## 2020-10-19 NOTE — PLAN OF CARE
Pt is progressing towards goals as expected. Goals remain appropriate continue with current POC.     Reina Mendoza, PT, DPT  10/19/2020      Problem: Physical Therapy Goal  Goal: Physical Therapy Goal  Description: Goals to be met by: 10/29/2020    Patient will increase functional independence with mobility by performin. Pt will perform bed mobility (rolling L/R, scooting, and bridging) with modified supervision.  2. Pt will perform supine to/from sit with supervision.  3. Pt will sit EOB x 10 mins with no UE support with supervision.  4. Pt will perform sit to stand with mod A and LRAD.  5. Pt will ambulate 10 feet with mod assistance and LRAD.  6. Pt will perform there-ex from handout x 15 reps to improve strength for functional mobility.  7. Pt will perform squat pivot t/f to bedside chair with modA.        Outcome: Ongoing, Progressing

## 2020-10-19 NOTE — PT/OT/SLP PROGRESS
"Speech Language Pathology Treatment    Patient Name:  Alison Branch   MRN:  [id number]  Admitting Diagnosis: Embolic stroke involving right middle cerebral artery    Recommendations:                 General Recommendations:  Dysphagia therapy and Speech/language therapy  Diet recommendations:  Mechanical soft, Liquid Diet Level: Thin   Aspiration Precautions: 1 bite/sip at a time, Avoid talking while eating, Check for pocketing/oral residue, Feed only when awake/alert, Frequent oral care, HOB to 90 degrees, Meds crushed in puree, Remain upright 30 minutes post meal and Small bites/sips   General Precautions: Standard, aspiration, fall  Communication strategies:  provide increased time to answer and go to room if call light pushed    Subjective     Pt awake and alert upon SLP entry.  "I hate this puree food"    Objective:     Has the patient been evaluated by SLP for swallowing?   Yes  Keep patient NPO? No   Current Respiratory Status: room air      Pt seen for ongoing dysphagia and speech/language therapies. Alertness maintained throughout session with no cueing required. Pt with improved intelligibility this service date, though dysarthria still evident. HOB elevated for PO trials. Pt with prolonged mastication of solids via cracker x2. Mild left anterior loss and stasis present, though independently cleared by pt. Stasis in left lateral sulcus cleared with thin liquid wash. Pt tolerated thin liquids via straw x3 with no overt signs of airway compromise. No throat clear, cough, or wet vocal quality appreciated. Pt completed labial retraction and protrusion oral motor exercises x3 with model. Education provided regarding role of SLP, diet advancement, safe consistencies, swallow precautions, purpose and frequency of OMEs, and ongoing ST plan of care. Pt verbalized understanding and is in agreement with plan. ST to continue to monitor.    Assessment:     Alison Branch is a 65 y.o. female with an SLP diagnosis of " Dysphagia and Dysarthria.     Goals:   Multidisciplinary Problems     SLP Goals        Problem: SLP Goal    Goal Priority Disciplines Outcome   SLP Goal     SLP Ongoing, Progressing   Description: Speech Language Pathology Goals  Goals expected to be met by 10/26  1. Pt will tolerate diet of mechanical soft solids and thin liquids with no overt signs of airway compromise.  2. Pt will participate in ongoing swallow assessment to determine safest and least restrictive diet.  3. Pt will recall simple speech strategies with 90% acc given min A.  4. Pt will utilize simple speech strategies during structured tasks and conversation with 90% acc given min A.  5. Pt will complete OMEs x10 per session with min A.  6. Pt will participate in ongoing reading, writing, and visual-spatial assessment to determine purpose for therapeutic targets.                 Plan:     · Patient to be seen:  4 x/week   · Plan of Care expires:  11/13/20  · Plan of Care reviewed with:  patient   · SLP Follow-Up:  Yes       Discharge recommendations:  rehabilitation facility   Barriers to Discharge:  Level of Skilled Assistance Needed      Time Tracking:     SLP Treatment Date:   10/19/20  Speech Start Time:  0929  Speech Stop Time:  0941     Speech Total Time (min):  12 min    Billable Minutes: Speech Therapy Individual 6 and Treatment Swallowing Dysfunction 6    GILBERTO Sue  10/19/2020

## 2020-10-19 NOTE — ASSESSMENT & PLAN NOTE
Reportedly metastatic to involve the spine.  Stroke risk factor - suspect contributing to an underlying hypercoagulability  Consulted Oncology on 10/15; Appreciate assistance  They agree with transition to therapeutic Lovenox for AC at this time. Recommend holding chemo agent until pt completes rehab and they are able to reassess then plan; discussed this with patient on 10/16 and reinforced with daughter via phone on 10/19.

## 2020-10-19 NOTE — ASSESSMENT & PLAN NOTE
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 No tPA due to Eliquis. No large vessel occlusion so no IR intervention pursued. Of note, patient was recently discharged from our service for multiple infarcts in BERTIN territories thought to be due to her hypercoagulable state (from malignancy.)    MRI Brain shows acute infarct in the R MCA, L MCA, and L cerebellar territories. Echo EF 55%, no L atrial enlargement. Note that new strokes were in the setting of compliance with Eliquis. Stroke etiology remains unclear - possible hypoperfusion though no vessel stenosis was seen nor signs of dehydration at presentation. Otherwise, concern remains for hypercoagulable state.        Antithrombotics for secondary stroke prevention: Anticoagulants: Enoxaparin injection 1mg/kg (100mg) q12 hrs   Statins for secondary stroke prevention and hyperlipidemia, if present: Zetia 10mg Nightly (Cannot be on statin with current chemo regimen)  Aggressive risk factor modification: HTN, Smoking, DM, HLD, Malignancy  Rehab efforts: The patient has been evaluated by a stroke team provider and the therapy needs have been fully considered based off the presenting complaints and exam findings. The following therapy evaluations are needed: PT evaluate and treat, OT evaluate and treat, SLP evaluate and treat, PM&R evaluate for appropriate placement - dispo inpatient rehab  Diagnostics ordered/pending: None   VTE prophylaxis: None: Reason for No Pharmacological VTE Prophylaxis: Currently on anticoagulation, SCDs  BP parameters: Infarct: No intervention, SBP < 160

## 2020-10-19 NOTE — PLAN OF CARE
10/19: Diet upgraded per SLP recs. Pt c/o some mild acid reflux; no other complaints currently. Pending placement.    Accepted at [facility] pending insurance authorization     10/19/20 1443   Discharge Reassessment   Assessment Type Discharge Planning Reassessment   Provided patient/caregiver education on the expected discharge date and the discharge plan Yes   Do you have any problems affording any of your prescribed medications? TBD   Discharge Plan A Rehab   Discharge Plan B Home with family;Home Health   DME Needed Upon Discharge  other (see comments)  (tbd)   Patient choice form signed by patient/caregiver N/A   Anticipated Discharge Disposition Rehab   Can the patient/caregiver answer the patient profile reliably? Yes, cognitively intact   How does the patient rate their overall health at the present time? Fair   Describe the patient's ability to walk at the present time. Walks with the help of equipment   How often would a person be available to care for the patient? Occasionally   Number of comorbid conditions (as recorded on the chart) Five or more   During the past month, has the patient often been bothered by feeling down, depressed or hopeless? No   During the past month, has the patient often been bothered by little interest or pleasure in doing things? No   Post-Acute Status   Post-Acute Authorization Placement   Post-Acute Placement Status Pending Payor Review   Discharge Delays None known at this time       Sabina Adler RN  Case Management  Ext: [phone]

## 2020-10-20 ENCOUNTER — DOCUMENTATION ONLY (OUTPATIENT)
Dept: HEMATOLOGY/ONCOLOGY | Facility: CLINIC | Age: 65
End: 2020-10-20

## 2020-10-20 ENCOUNTER — TELEPHONE (OUTPATIENT)
Dept: HEMATOLOGY/ONCOLOGY | Facility: CLINIC | Age: 65
End: 2020-10-20

## 2020-10-20 VITALS
BODY MASS INDEX: 31.55 KG/M2 | OXYGEN SATURATION: 99 % | HEIGHT: 69 IN | WEIGHT: 213 LBS | HEART RATE: 77 BPM | SYSTOLIC BLOOD PRESSURE: 148 MMHG | RESPIRATION RATE: 13 BRPM | DIASTOLIC BLOOD PRESSURE: 72 MMHG | TEMPERATURE: 98 F

## 2020-10-20 LAB
POCT GLUCOSE: 116 MG/DL (ref 70–110)
POCT GLUCOSE: 142 MG/DL (ref 70–110)
POCT GLUCOSE: 294 MG/DL (ref 70–110)

## 2020-10-20 PROCEDURE — 25000003 PHARM REV CODE 250: Performed by: EMERGENCY MEDICINE

## 2020-10-20 PROCEDURE — 25000003 PHARM REV CODE 250: Performed by: FAMILY MEDICINE

## 2020-10-20 PROCEDURE — 97535 SELF CARE MNGMENT TRAINING: CPT

## 2020-10-20 PROCEDURE — 99233 PR SUBSEQUENT HOSPITAL CARE,LEVL III: ICD-10-PCS | Mod: GC,,, | Performed by: PSYCHIATRY & NEUROLOGY

## 2020-10-20 PROCEDURE — 97530 THERAPEUTIC ACTIVITIES: CPT

## 2020-10-20 PROCEDURE — 63600175 PHARM REV CODE 636 W HCPCS: Performed by: PHYSICIAN ASSISTANT

## 2020-10-20 PROCEDURE — 99233 SBSQ HOSP IP/OBS HIGH 50: CPT | Mod: GC,,, | Performed by: PSYCHIATRY & NEUROLOGY

## 2020-10-20 RX ORDER — EZETIMIBE 10 MG/1
10 TABLET ORAL NIGHTLY
Qty: 90 TABLET | Refills: 3
Start: 2020-10-20 | End: 2020-12-01 | Stop reason: SDUPTHER

## 2020-10-20 RX ORDER — LIDOCAINE 50 MG/G
OINTMENT TOPICAL
Status: ON HOLD
Start: 2020-10-20 | End: 2021-05-26 | Stop reason: HOSPADM

## 2020-10-20 RX ORDER — ENOXAPARIN SODIUM 100 MG/ML
100 INJECTION SUBCUTANEOUS EVERY 12 HOURS
Start: 2020-10-20 | End: 2020-11-24

## 2020-10-20 RX ADMIN — ENOXAPARIN SODIUM 100 MG: 100 INJECTION SUBCUTANEOUS at 09:10

## 2020-10-20 RX ADMIN — CYPROHEPTADINE HYDROCHLORIDE 4 MG: 4 TABLET ORAL at 01:10

## 2020-10-20 RX ADMIN — VENLAFAXINE HYDROCHLORIDE 75 MG: 37.5 CAPSULE, EXTENDED RELEASE ORAL at 09:10

## 2020-10-20 RX ADMIN — TAMSULOSIN HYDROCHLORIDE 0.4 MG: 0.4 CAPSULE ORAL at 09:10

## 2020-10-20 RX ADMIN — CYPROHEPTADINE HYDROCHLORIDE 4 MG: 4 TABLET ORAL at 09:10

## 2020-10-20 NOTE — PLAN OF CARE
Problem: Fall Injury Risk  Goal: Absence of Fall and Fall-Related Injury  Outcome: Ongoing, Progressing  Intervention: Identify and Manage Contributors to Fall Injury Risk  Flowsheets (Taken 10/20/2020 0406)  Self-Care Promotion:   independence encouraged   BADL personal objects within reach  Medication Review/Management: medications reviewed  Intervention: Promote Injury-Free Environment  Flowsheets (Taken 10/20/2020 0406)  Safety Promotion/Fall Prevention:   assistive device/personal item within reach   Fall Risk reviewed with patient/family   Fall Risk signage in place   side rails raised x 3   instructed to call staff for mobility  Environmental Safety Modification:   assistive device/personal items within reach   clutter free environment maintained   lighting adjusted     Problem: Adult Inpatient Plan of Care  Goal: Plan of Care Review  Outcome: Ongoing, Progressing  Flowsheets (Taken 10/20/2020 0406)  Plan of Care Reviewed With: patient  Goal: Patient-Specific Goal (Individualization)  Outcome: Ongoing, Progressing  Goal: Absence of Hospital-Acquired Illness or Injury  Outcome: Ongoing, Progressing  Intervention: Identify and Manage Fall Risk  Flowsheets (Taken 10/20/2020 0406)  Safety Promotion/Fall Prevention:   assistive device/personal item within reach   Fall Risk reviewed with patient/family   Fall Risk signage in place   side rails raised x 3   instructed to call staff for mobility  Intervention: Prevent VTE (venous thromboembolism)  Flowsheets (Taken 10/20/2020 0406)  VTE Prevention/Management:   remove, assess skin and reapply sequential compression device   ROM (active) performed  Goal: Optimal Comfort and Wellbeing  Outcome: Ongoing, Progressing  Intervention: Provide Person-Centered Care  Flowsheets (Taken 10/20/2020 0406)  Trust Relationship/Rapport:   care explained   choices provided   emotional support provided   empathic listening provided   questions answered   questions encouraged    reassurance provided   thoughts/feelings acknowledged  Goal: Readiness for Transition of Care  Outcome: Ongoing, Progressing  Intervention: Mutually Develop Transition Plan  Flowsheets (Taken 10/20/2020 0406)  Patient/Family in Agreement with Plan: yes  Able to Return to Prior Arrangements: yes  Patient's perception of discharge disposition: rehab facility  Is patient able to care for self after discharge?: No  Goal: Rounds/Family Conference  Outcome: Ongoing, Progressing     Problem: Infection  Goal: Infection Symptom Resolution  Outcome: Ongoing, Progressing     Problem: Adjustment to Illness (Stroke, Ischemic/Transient Ischemic Attack)  Goal: Optimal Coping  Outcome: Ongoing, Progressing     Problem: Bowel Elimination Impaired (Stroke, Ischemic/Transient Ischemic Attack)  Goal: Effective Bowel Elimination  Outcome: Ongoing, Progressing  Intervention: Promote Effective Bowel Elimination  Flowsheets (Taken 10/20/2020 0406)  Bowel Dysfunction Management:   abdomen massaged   relaxation techniques promoted   sitting position facilitated  Bowel Program: maintenance program followed     Problem: Cerebral Tissue Perfusion Risk (Stroke, Ischemic/Transient Ischemic Attack)  Goal: Optimal Cerebral Tissue Perfusion  Outcome: Ongoing, Progressing  Intervention: Protect and Optimize Cerebral Perfusion  Flowsheets (Taken 10/20/2020 0406)  Cerebral Perfusion Promotion:   blood pressure monitored   normothermia promoted  Intervention: Optimize Oxygenation and Ventilation  Flowsheets (Taken 10/20/2020 0406)  Head of Bed (HOB): HOB at 30-45 degrees     Problem: Communication Impairment (Stroke, Ischemic/Transient Ischemic Attack)  Goal: Improved Communication Skills  Outcome: Ongoing, Progressing     Problem: Eating/Swallowing Impairment (Stroke, Ischemic/Transient Ischemic Attack)  Goal: Oral Intake without Aspiration  Outcome: Ongoing, Progressing  Intervention: Optimize Eating and Swallowing  Flowsheets (Taken 10/20/2020  0406)  Aspiration Precautions:   awake/alert before oral intake   upright posture maintained     Problem: Functional Ability Impaired (Stroke, Ischemic/Transient Ischemic Attack)  Goal: Optimal Functional Ability  Outcome: Ongoing, Progressing     Problem: Hemodynamic Instability (Stroke, Ischemic/Transient Ischemic Attack)  Goal: Vital Signs Remain in Desired Range  Outcome: Ongoing, Progressing     Problem: Pain (Stroke, Ischemic/Transient Ischemic Attack)  Goal: Acceptable Pain Control  Outcome: Ongoing, Progressing  Intervention: Monitor and Manage Pain  Flowsheets (Taken 10/20/2020 0406)  Pain Management Interventions:   care clustered   pillow support provided   position adjusted     Problem: Sensorimotor Impairment (Stroke, Ischemic/Transient Ischemic Attack)  Goal: Improved Sensorimotor Function  Outcome: Ongoing, Progressing     Problem: Urinary Elimination Impaired (Stroke, Ischemic/Transient Ischemic Attack)  Goal: Effective Urinary Elimination  Outcome: Ongoing, Progressing  Intervention: Promote Effective Bladder Elimination  Flowsheets (Taken 10/20/2020 0406)  Urinary Elimination Promotion: absorbent pad/diaper use encouraged     Problem: Diabetes Comorbidity  Goal: Blood Glucose Level Within Desired Range  Outcome: Ongoing, Progressing  Intervention: Maintain Glycemic Control  Flowsheets (Taken 10/20/2020 0406)  Glycemic Management: blood glucose monitoring     Problem: Adjustment to Illness (Sepsis/Septic Shock)  Goal: Optimal Coping  Outcome: Ongoing, Progressing  Intervention: Optimize Psychosocial Adjustment to Illness  Flowsheets (Taken 10/20/2020 0406)  Supportive Measures:   active listening utilized   self-care encouraged   verbalization of feelings encouraged     Problem: Bleeding (Sepsis/Septic Shock)  Goal: Absence of Bleeding  Outcome: Ongoing, Progressing     Problem: Glycemic Control Impaired (Sepsis/Septic Shock)  Goal: Blood Glucose Level Within Desired Range  Outcome: Ongoing,  Progressing     Problem: Hemodynamic Instability (Sepsis/Septic Shock)  Goal: Effective Tissue Perfusion  Outcome: Ongoing, Progressing     Problem: Infection (Sepsis/Septic Shock)  Goal: Absence of Infection Signs/Symptoms  Outcome: Ongoing, Progressing     Problem: Nutrition Impaired (Sepsis/Septic Shock)  Goal: Optimal Nutrition Intake  Outcome: Ongoing, Progressing     Problem: Respiratory Compromise (Sepsis/Septic Shock)  Goal: Effective Oxygenation and Ventilation  Outcome: Ongoing, Progressing     Problem: Electrolyte Imbalance (Acute Kidney Injury/Impairment)  Goal: Serum Electrolyte Balance  Outcome: Ongoing, Progressing     Problem: Fluid Imbalance (Acute Kidney Injury/Impairment)  Goal: Optimal Fluid Balance  Outcome: Ongoing, Progressing     Problem: Hematologic Alteration (Acute Kidney Injury/Impairment)  Goal: Hemoglobin, Hematocrit and Platelets Within Normal Range  Outcome: Ongoing, Progressing     Problem: Oral Intake Inadequate (Acute Kidney Injury/Impairment)  Goal: Optimal Nutrition Intake  Outcome: Ongoing, Progressing     Problem: Renal Function Impairment (Acute Kidney Injury/Impairment)  Goal: Effective Renal Function  Outcome: Ongoing, Progressing     Problem: Skin Injury Risk Increased  Goal: Skin Health and Integrity  Outcome: Ongoing, Progressing     Problem: Coping Ineffective  Goal: Effective Coping  Outcome: Ongoing, Progressing  VSS.  BG well controlled.  Pt had a very large BM on overnight shift. Stroke deficits have improved greatly as has swallowing.  The main deficit patient has yet to overcome is the inability to use LUE.  Patient remains in good spirits and very pleasant.  Pateint safety and comfort maintained overnight with no complaints of pain and no s/s distress noted.

## 2020-10-20 NOTE — ASSESSMENT & PLAN NOTE
Stroke risk factor   A1C 9.3%  BG goal while inpatient 140-180  SSI while inpatient; restarted home regimen at discharge.  Diabetic diet  PCP follow-up

## 2020-10-20 NOTE — PT/OT/SLP PROGRESS
Occupational Therapy   Treatment    Name: Alison Branch  MRN: [id number]  Admitting Diagnosis:  Embolic stroke involving right middle cerebral artery       Recommendations:     Discharge Recommendations: rehabilitation facility  Discharge Equipment Recommendations:  (TBD pending progress)  Barriers to discharge:  Other (Comment)(Pt requires increased assistance at current functional level)    Assessment:     Alison Branch is a 65 y.o. female with a medical diagnosis of Embolic stroke involving right middle cerebral artery.  She presents with performance deficits affecting function are weakness, impaired endurance, impaired self care skills, impaired functional mobilty, gait instability, impaired balance, decreased lower extremity function, decreased coordination, decreased upper extremity function, impaired cardiopulmonary response to activity, decreased safety awareness, impaired coordination, impaired fine motor, impaired cognition. Pt tolerated session well and is an excellent candidate for IP Rehab. Pt demonstrated improved activity tolerance and overall good progression towards goals. Pt is not functioning at PLOF and is not safe to return to the home environment. Pt would benefit from continued skilled acute OT services in order to maximize independence and safety with ADLs and functional mobility to ensure safe return to PLOF in the least restrictive environment. OT recommending IP Rehab once pt is medically appropriate for d/c.       Rehab Prognosis:  Good; patient would benefit from acute skilled OT services to address these deficits and reach maximum level of function.       Plan:     Patient to be seen 4 x/week to address the above listed problems via self-care/home management, therapeutic activities, therapeutic exercises, neuromuscular re-education, cognitive retraining  · Plan of Care Expires: 11/13/20  · Plan of Care Reviewed with: patient    Subjective     Pain/Comfort:  · Pain Rating 1: 0/10  · Pain  "Rating Post-Intervention 1: 0/10    Objective:     Communicated with: RN prior to session.  Patient found HOB elevated with telemetry, PureWick, bed alarm upon OT entry to room. Pt agreeable to therapy session.     Pt stated, " Oh lord, I'm gonna pass out. I stink so bad."     General Precautions: Standard, aspiration, fall   Orthopedic Precautions:N/A   Braces: N/A     Occupational Performance:     Bed Mobility:    · Patient completed Rolling/Turning to Left with  moderate assistance and with side rail  · Patient completed Rolling/Turning to Right with maximal assistance  · Patient completed Scooting/Bridging with moderate assistance and for anterior scooting towards EOB to plant B feet on floor and mod A for supine scooting towards HOB with pt using bedrail overhead with R UE.   · Patient completed Supine to Sit with moderate assistance, with side rail x 2 persons with assistance for trunk elevation and L LE management   · Patient completed Sit to Supine with moderate assistance for L LE management and trunk descent     Functional Mobility/Transfers:  · Patient completed x 2 reps Sit <> Stand Transfer from EOB with maximal assistance and of 2 persons  with  hand-held assist  · L knee blocked   · Forward flexed posture   · Verbal and tactile cues for upright posture     EOB sitting/stnading balance:   · Static balance: pt tolerated sitting EOB 20 min with CGA <>mod A  · Posterior lean and L lateral lean   · Verbal and tactile cues for anterior weight-shift   · Min self corrections but pt overall required cueing from therapist for postural corrections   · Pillow positioned under L UE for support   · Static standing balance: x 2 trials (1st trial pt tolerated standing ~1 mins and 2nd trial ~30 secs)   · L lateral lean   · Forward flexed posture   · Posterior lean   · L knee blocked     Activities of Daily Living:  · Grooming: moderate assistance    · Pt completed oral care and washed face while sitting EOB "   · Assistance provided for FM component   · Upper Body Dressing: moderate assistance   · Steele gown in front and donning clean gown while sitting EOB   · Education provided for theresa-dressing technique   · Toileting: maximal assistance  · Pt assisted with hygiene in standing after being found soiled   · Pt placed on bed pan after therapy session due to urgency to have BM      AMPAC 6 Click ADL: 13    Treatment & Education:   Pt educated on role of OT, POC, and goals for therapy.     POC was dicussed with patient/caregiver, who was included in its development and is in agreement with the identified goals and treatment plan.    Increased time provided for blue pads to be changed and hygiene provided    Time provided for therapeutic counseling and discussion of health disposition.    Educated on importance of EOB/OOB mobility, maintaining routine, sitting up in chair, and maximizing independence with ADLs during admission    Pt completed ADLs and functional mobility for treatment session as noted above    Pt/caregiver verbalized understanding and expressed no further concerns/questions   L UE elevated on pillow for support    Updated communication board with level of assist required (mod A x 2 person assistance)       Patient left HOB elevated with all lines intact, call button in reach and RN notifiedEducation:      GOALS:   Multidisciplinary Problems     Occupational Therapy Goals        Problem: Occupational Therapy Goal    Goal Priority Disciplines Outcome Interventions   Occupational Therapy Goal     OT, PT/OT Ongoing, Progressing    Description: Goals to be met by: 10/27/2020     Patient will increase functional independence with ADLs by performing:    Feeding with Minimal Assistance while sitting EOB  Grooming while EOB with Minimal Assistance.  Toileting from bedside commode with Moderate Assistance for hygiene and clothing management.   Sitting at edge of bed x10 minutes with Minimal  Assistance.  Stand pivot transfers with Minimal Assistance.  Toilet transfer to bedside commode with Minimal Assistance.                     Time Tracking:     OT Date of Treatment: 10/20/20  OT Start Time: 0958  OT Stop Time: 1040  OT Total Time (min): 42 min    Billable Minutes:Self Care/Home Management 23  Therapeutic Activity 19    Chely Santana, OT  10/20/2020

## 2020-10-20 NOTE — PROGRESS NOTES
"Received referral from the clinic that patient's daughter, Chito [phone], would like to speak with me. I contacted her back. She stated that after completing rehab she will be moving her mother to [place] with her. She had questions about how to go about getting her insurance changed. She stated that she called but had a hard time understanding the process. Encouraged her to contact Monica Solorio [phone] to discuss with her the options her mother has. She currently has [employer] that will not transfer to [place]. Also had questions about what the "big picture" looks like. Encouraged her to focus on her mom going to rehab to get stronger. Also encouraged her to either be with mother or ask to be on speaker phone when her mother follows up with her oncologist, Sr. Belcher, to get her questions answered and ask questions. Provided her with my direct number for any further questions or comments.     "

## 2020-10-20 NOTE — ASSESSMENT & PLAN NOTE
Stroke risk factor   SBP < 160 acutely  Currently at goal with holding home Lasix; restarted at discharge.

## 2020-10-20 NOTE — ASSESSMENT & PLAN NOTE
Stroke risk factor   SBP < 160 acutely  Currently at goal with holding home Lasix; restarted at discharge.  PCP follow-up

## 2020-10-20 NOTE — PLAN OF CARE
Patient medically ready for discharge to [facility]. Any necessary transport setup by . This CM scheduled or requested necessary follow-up appointments. Family/patient aware of discharge.    Future Appointments   Date Time Provider Department Center   10/21/2020  8:40 AM LAB, [facility] CANCER BLDG [facility]   10/21/2020  9:40 AM Tara Belcher MD [facility] HEMONC3 [facility]   10/21/2020 10:30 AM [facility]JECTION, [facility]   10/23/2020  9:20 AM Mike Rodriguez II, MD [facility] IM [facility]   11/7/2020 10:30 AM Adrianna Chan MD [facility] IM [facility]   11/9/2020  2:40 PM Nubia Saleh PA-C [facility] UROLOGY [facility]   11/12/2020  9:40 AM Bijal Paulino MD [facility] STROKE [facility]        10/20/20 1124   Final Note   Assessment Type Final Discharge Note   Anticipated Discharge Disposition Rehab   Hospital Follow Up  Appt(s) scheduled? No   Discharge plans and expectations educations in teach back method with documentation complete? Yes   Right Care Referral Info   Post Acute Recommendation IR[facility]   Facility Name [facility]   Post-Acute Status   Post-Acute Authorization Placement   Post-Acute Placement Status Set-up Complete   Discharge Delays None known at this time       Sabina Adler RN  Case Management  Ext: [phone]  10/20/2020

## 2020-10-20 NOTE — PLAN OF CARE
10/20/20 1121   Post-Acute Status   Post-Acute Authorization Placement   Post-Acute Placement Status Set-up Complete       Pt has been accepted by [facility].  Nurse can call report to [phone]. Transport setup by EMS for 1:30pm.  SW in contact with CM and Medical staff. Will continue to follow and offer support as needed.     Tde Melendrez, YESI  [facility]   Ext. [phone]

## 2020-10-20 NOTE — DISCHARGE SUMMARY
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 Patient's last known normal was 10:30 AM before she took a nap. She woke up and noticed slurring and left facial droop. Stroke code called at 12 PM.     Patient took a nap around 10:30 AM. About an hour later, she woke up "choking on her own spit". She was noted to have slurred speech and left sided weakness. On arrival of the vascular neurology team, there was left facial droop, dysarthia and left sided weakness. CTH did not show any infarct or hemorrhage. CTA MP does not show any LVO or high grade stenosis. There is mention of  possible right M3 vessel stenosis or subtotal occlusion however, it is not seen when imaging was personally reviewed.        Patient was recently discharged from our service on 10/5/20. She was initially admitted for acute onset of RLE shaking and weakness. As she was on eliquis for, tPA was not offered. MRI brain done in previous admission revealed infarct in the right centrum semiovale, left corpus collosum. Etiology most likely due to a hypercoagulable state. Her hospitalization was complicated by urinary rentention and hematuria. Patient was to follow up with urology in a month. NIHSS 1 at discharge. RLE deficits persistent       Hospital Course (synopsis of major diagnoses, care, treatment, and services provided during the course of the hospital stay): Ms. Alison Branch was admitted " to Vascular Neurology for acute stroke workup. Stroke etiology suspected to be embolism at this time; switched Eliquis to therapeutic Lovenox [please see plan section below for further details.]  Patient discharged with recommendations for admission to [facility] inpatient rehab. Family by phone amenable to plan.     Patient with improvement in stroke symptoms since admission. Inpatient acute stroke work up completed and patient stable for discharge. Please see all appropriate medication changes, imaging results, and necessary follow-up below.    Stroke Etiology: Other/Uncommon Causes: Probable Hypercoagulable State: Acquired Hypercoagulable State of Malignancy (w/ or w/o confirmed NBTE)     STROKE DOCUMENTATION   Acute Stroke Times   Last Known Normal Date: 10/13/20  Last Known Normal Time: 1030  Symptom Onset Date: 10/13/20  Symptom Onset Time: 1130  Stroke Team Called Date: 10/13/20  Stroke Team Called Time: 1220  Stroke Team Arrival Date: 10/13/20  Stroke Team Arrival Time: 1227     NIH Scale:  1a. Level of Consciousness: 0-->Alert, keenly responsive  1b. LOC Questions: 0-->Answers both questions correctly  1c. LOC Commands: 0-->Performs both tasks correctly  2. Best Gaze: 0-->Normal  3. Visual: 0-->No visual loss  4. Facial Palsy: 2-->Partial paralysis (total or near-total paralysis of lower face)  5a. Motor Arm, Left: 3-->No effort against gravity, limb falls  5b. Motor Arm, Right: 0-->No drift, limb holds 90 (or 45) degrees for full 10 secs  6a. Motor Leg, Left: 3-->No effort against gravity, leg falls to bed immediately  6b. Motor Leg, Right: 0-->No drift, leg holds 30 degree position for full 5 secs  7. Limb Ataxia: 0-->Absent  8. Sensory: 1-->Mild-to-moderate sensory loss, patient feels pinprick is less sharp or is dull on the affected side, or there is a loss of superficial pain with pinprick, but patient is aware of being touched  9. Best Language: 0-->No aphasia, normal  10. Dysarthria: 1-->Mild-to-moderate  dysarthria, patient slurs at least some words and, at worst, can be understood with some difficulty  11. Extinction and Inattention (formerly Neglect): 0-->No abnormality  Total (NIH Stroke Scale): 10        Modified Noah Score: 4  Cardinal Coma Scale:    ABCD2 Score:    [id number] Score:   HAS -BLED Score:   ICH Score:   Hunt & Sutton Classification:       Assessment/Plan:     Diagnostic Results:    Brain Imaging:     MRI Brain w/o contrast 10/13/2020:     Current MR imaging confirms suspicion of foci of acute infarctions in the right MCA distribution.  Three additional punctate foci of restricted diffusion involving the left parietal lobe, possibly emboli.  Small subacute infarction involving the left cerebellum, similar to prior examination dated 09/28/2020.  Well rounded T2/FLAIR hyperintense focus within the left lateral aspect of the body of the corpus callosum, likely related to evolving infarct as further discussed above.  If there is concerning for underlying intracranial metastasis in the setting of known metastatic lung cancer, further evaluation may be obtained with contrast enhanced MRI of the brain.     CT Head Without Contrast 10/13/2020:  No findings to suggest acute major vascular territory infarct or hemorrhage noting stable focus of low attenuation within the left cerebellum and stable right ICA region coil pack        Vessel Imaging:     CTA Multiphase 10/14/2020:  States that there is a possible M3 vessel stenosis or occlusion. However, on evaluation of imaging it does not seem that way. Please note that patient has multiple branches from R MCA   FORMAL READ: No acute intracranial pathology.  No evidence for intracranial hemorrhage or CT evidence of major vascular distribution infarct.  No intracranially large vessel occlusion.  Possible right M3 vessel stenosis or subtotal occlusion as described above. Clinical correlation with symptoms is recommended.  Can consider MRI with diffusion-weighted  imaging if concern for acute ischemia.  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  No evidence of high-grade stenosis or large vessel occlusion.  Numerous sclerotic lesions involving the vertebral bodies and remaining visualized axial skeleton, in keeping with patient's history of metastatic lung cancer.  Moderate left pleural effusion with patchy areas of consolidation in the left upper lobe may relate to pneumonia.        Cardiac Evaluation:      TTE 10/14/2020:  · With normal systolic function. The estimated ejection fraction is 55%.  · The quantitatively dervived ejection fraction is 52%.  · Normal left ventricular diastolic function.  · Normal right ventricular systolic function.  · Mild tricuspid regurgitation.   · The left atrial volume index is normal.      TTE June 2020:   · Normal left ventricular systolic function. The estimated ejection fraction is 55%.  · Normal LV diastolic function.  · No wall motion abnormalities.  · Mildly reduced right ventricular systolic function.  · Normal central venous pressure (3 mmHg).  · The estimated PA systolic pressure is 26 mmHg      Interventions: None    Complications: None    Disposition: Rehab Facility - [facility]    Final Active Diagnoses:    Diagnosis Date Noted POA    PRINCIPAL PROBLEM:  Embolic stroke involving right middle cerebral artery [I63.411] 10/13/2020 Yes    Oropharyngeal dysphagia [R13.12] 10/14/2020 Yes    Malignant neoplasm of upper lobe of left lung [C34.12] 05/23/2019 Yes    Hypertension associated with diabetes [E11.59, I10] 01/16/2015 Yes    Left-sided weakness [R53.1] 10/15/2020 Yes    Cytotoxic cerebral edema [G93.6] 09/30/2020 Yes    Mixed hyperlipidemia due to type 2 diabetes mellitus [E11.69, E78.2] 06/20/2013 Yes     Chronic    Type 2 diabetes mellitus with hyperglycemia, with long-term current use of  insulin [E11.65, Z79.4] 06/20/2013 Not Applicable    Palliative care encounter [Z51.5] 06/25/2020 Not Applicable    Urinary retention [R33.9] 06/25/2020 Yes    Cerebral aneurysm, coiled in 2010 [I67.1] 06/20/2013 Yes      Problems Resolved During this Admission:     * Embolic stroke involving right middle cerebral artery  66 y/o woman with PMHx pulmonary adenocarcinoma with mets to the spine, previous brain aneurysm s/p coiling, previous DVTs (on eliquis) presented from rehab when she was found to have new-onset L facial droop, slurred speech. No tPA due to Eliquis. No large vessel occlusion so no IR intervention pursued. Of note, patient was recently discharged from our service for multiple infarcts in BERTIN territories thought to be due to her hypercoagulable state (from malignancy.)    MRI Brain shows acute infarct in the R MCA, L MCA, and L cerebellar territories. Echo EF 55%, no L atrial enlargement. Note that new strokes were in the setting of compliance with Eliquis. Stroke etiology remains unclear - possible hypoperfusion though no vessel stenosis was seen nor signs of dehydration at presentation. Otherwise, concern remains for hypercoagulable state as well. Discussed with Heme/Onc team - they agreed with switching DOAC to therapeutic Lovenox for this patient.       Antithrombotics for secondary stroke prevention: Anticoagulants: Enoxaparin injection 1mg/kg (100mg) q12 hrs   Statins for secondary stroke prevention and hyperlipidemia, if present: Zetia 10mg Nightly (Cannot be on statin with current chemo regimen)  Aggressive risk factor modification: HTN, Smoking, DM, HLD, Malignancy  Rehab efforts: [facility] inpatient rehab  BP parameters: Infarct: No intervention, SBP < 160; long-term SBP goal < 140    Oropharyngeal dysphagia  2/2 stroke  SLP eval and treat -- Recommending frequent nursing checks for any pocketing. Meds crushed in pudding (per SLP, could bury chemo med in puree to avoid crushing.) Added Boost to  regimen as well.   Diet upgraded per SLP recs - now Mecahnical soft diet, Thin liquids.   Dispo inpatient rehab    Malignant neoplasm of upper lobe of left lung  Reportedly metastatic to involve the spine.  Stroke risk factor - suspect contributing to an underlying hypercoagulability  Consulted Oncology on 10/15; Appreciate assistance  They agree with transition to therapeutic Lovenox for AC at this time. Recommend holding chemo agent until pt completes rehab and they are able to reassess then plan; discussed this with patient on 10/16 and reinforced with daughter via phone on 10/19.  Established clinic follow-up (Dr. Belcher)    Hypertension associated with diabetes  Stroke risk factor   SBP < 160 acutely  Currently at goal with holding home Lasix; restarted at discharge.  PCP follow-up    Left-sided weakness  2/2 stroke  PT, OT eval & treat - dispo rehab    Cytotoxic cerebral edema  Area of cytotoxic cerebral edema identified when reviewing brain imaging in the territory of the right middle cerebral artery. There is no mass effect associated with it.   The patients clinical exam remained without any worsening of symptoms which may indicate expansion of the stroke or the area of the edema resulting in the clinical change. The pattern is suggestive of embolic etiology related to hypercoagulable state.    Mixed hyperlipidemia due to type 2 diabetes mellitus  Stroke risk factor   LDL at 163  Cannot be on statin with current chemo drug; started Zetia.    Type 2 diabetes mellitus with hyperglycemia, with long-term current use of insulin  Stroke risk factor   A1C 9.3%  BG goal while inpatient 140-180  SSI while inpatient; restarted home regimen at discharge.  Diabetic diet  PCP follow-up    Palliative care encounter  Had considered Palliative Medicine consult for symptom management (pain) however discussed with staff and plans to consult Heme/Onc team instead. Will re-consider Palliative consult if recommended by Onc. No  "plans for consultation at this time.    Goals of care discussion introduced with patient and daughter via FaceTime on 10/17. Patient states she would "go on with her life" and mentions she would like to continue all treatment options.  Family advised on 10/19 about Heme/Onc plans to hold chemotherapy until pt able to follow up in clinic after rehab to reassess treatment plan.    Urinary retention  - On periactin at home for urethral pain and retention. Was seen by urology last admission  - Seen in urology clinic 9/25 for incomplete bladder emptying; hennessy removed on 10/6. While at rehab, UA with positive nitrate, 2+ leukocytes, and many bacteria (10/7). Pt was on Cipro.   - Repeat UA 10/15 with nitrites only. Defer further abx treatment for now. To be further monitored for any symptoms at rehab.        Recommendations:     Post-discharge complication risks: Falls, Pneumonia, Skin breakdown, Urinary tract infections    Stroke Education given to: patient and family    Follow-up in Stroke Clinic in 4-8 weeks.     Discharge Plan:  Statin: None. Contraindicated due to chemo agent; on Zetia alternatively.  Anticoagulant: Lovenox  Aggresive risk factor modification:  Hypertension  Smoking  Diabetes  High Cholesterol  Diet  Exercise  Obesity  Malignancy     Follow Up:  Follow-up Information     Adrianna Chan MD On 11/7/2020.    Specialty: Internal Medicine  Why: Please keep your appt for hospital follow-up / to establish care.  Contact information:  [address]  [place] [id number] [phone]             Bijal Paulino MD On 11/12/2020.    Specialty: Neurology  Why: Please keep your established appt for stroke follow-up.  Contact information:  [address] [facility] [id number] [phone]             Tara Belcher MD In 2 weeks.    Specialties: Hematology and Oncology, Hematology  Why: Need to call to reschedule a follow-up appt after rehab discharge and discuss restarting chemo.  Contact " information:  [address]  [facility] [address]  [phone]                   Patient Instructions:      Diet Cardiac   Order Comments: See Stroke Patient Education Guide Booklet for details.     Call 911 for any of the following:   Order Comments: Call 911  right away if any of the following warning signs come on suddenly, even if the symptoms only last for a few minutes. With stroke, timing is very important.   - Warning Signs of Stroke:  - Weakness: You may feel a sudden weakness, tingling or loss of feeling on one side of your face or body.  - Vision Problems: You may have sudden double vision or trouble seeing in one or both eyes.  - Speech Problems: You may have sudden trouble talking, slured speech, or problems understanding others.  - Headache: You may have sudden, severe headache.  - Movement Problems: You may experience dizziness, a feeling of spinning, a loss of balance, a feeling of falling or blackouts.       Medications:  Reconciled Home Medications:      Medication List      START taking these medications    enoxaparin 100 mg/mL Syrg  Commonly known as: LOVENOX  Inject 1 mL (100 mg total) into the skin every 12 (twelve) hours.     ezetimibe 10 mg tablet  Commonly known as: ZETIA  Take 1 tablet (10 mg total) by mouth every evening.     lidocaine 5 % Oint ointment  Commonly known as: XYLOCAINE  Apply topically as needed (For lab draws).        CONTINUE taking these medications    blood sugar diagnostic Strp  To check BG 4 times daily, to use with insurance preferred meter     carboxymethylcellulose 0.5 % Dpet  Commonly known as: REFRESH PLUS  1 drop 3 (three) times daily as needed.     cyproheptadine 4 mg tablet  Commonly known as: PERIACTIN  Take 1 tablet (4 mg total) by mouth 4 (four) times daily.     fluticasone propionate 50 mcg/actuation nasal spray  Commonly known as: FLONASE  USE TWO SPRAY(S) IN EACH NOSTRIL ONCE DAILY     furosemide 20 MG tablet  Commonly known as: LASIX  Take 2 tablets  "(40 mg total) by mouth once daily.     gabapentin 300 MG capsule  Commonly known as: NEURONTIN  Take 1 capsule (300 mg total) by mouth nightly as needed (Take as needed at bed time for neuropathy pain and sleep).     insulin aspart U-100 100 unit/mL (3 mL) Inpn pen  Commonly known as: NovoLOG  Inject 0-5 Units into the skin before meals and at bedtime as needed (Hyperglycemia).     insulin detemir U-100 100 unit/mL (3 mL) Inpn pen  Commonly known as: LEVEMIR FLEXTOUCH  Inject 26 Units into the skin once daily.     insulin lispro 100 unit/mL pen  Commonly known as: HumaLOG KwikPen Insulin  Inject 12 units TID AC + correction scale. Max TDD of 57 units     lancets Misc  1 Device by Misc.(Non-Drug; Combo Route) route once daily. Heladio Result.  250.02.  Check Blood Sugar Twice Daily.     melatonin 3 mg tablet  Commonly known as: MELATIN  Take 2 tablets (6 mg total) by mouth nightly as needed for Insomnia.     NYAMYC powder  Generic drug: nystatin  Apply topically 2 (two) times daily.     ondansetron 8 MG tablet  Commonly known as: ZOFRAN  Take 1 tablet (8 mg total) by mouth 4 (four) times daily as needed for Nausea.     pen needle, diabetic 33 gauge x 5/32" Ndle  1 each by Misc.(Non-Drug; Combo Route) route 4 (four) times daily with meals and nightly.     tamsulosin 0.4 mg Cap  Commonly known as: FLOMAX  Take 1 capsule (0.4 mg total) by mouth every evening.     venlafaxine 75 MG 24 hr capsule  Commonly known as: EFFEXOR XR  Take 1 capsule (75 mg total) by mouth once daily.     VITAMIN D2 50,000 unit Cap  Generic drug: ergocalciferol  TAKE 1 CAPSULE BY MOUTH EVERY 7 DAYS     walker Misc  Please provide rollator walker for this debilitated cancer patient.  Thank you.     WIXELA INHUB 100-50 mcg/dose diskus inhaler  Generic drug: fluticasone-salmeterol 100-50 mcg/dose  Inhale 1 puff into the lungs 2 (two) times daily. Controller        STOP taking these medications    ELIQUIS 5 mg Tab  Generic drug: apixaban     entrectinib " 200 mg capsule  Commonly known as: ROZLYTREK     insulin regular 100 unit/mL injection     phenazopyridine 100 MG tablet  Commonly known as: PYRIDIUM            Mckayla Euceda PA-C  [facility]  Department of Vascular Neurology   [facility]

## 2020-10-20 NOTE — ASSESSMENT & PLAN NOTE
2/2 stroke  SLP eval and treat -- Recommending frequent nursing checks for any pocketing. Meds crushed in pudding (per SLP, could bury chemo med in puree to avoid crushing.) Added Boost to regimen as well.   Diet upgraded per SLP recs - now Mecahnical soft diet, Thin liquids.   Dispo inpatient rehab

## 2020-10-20 NOTE — PLAN OF CARE
Patient requires EMS transportation to inpatient rehab facility due to poor trunk control as a result of acute stroke.      Mckayla Euceda PA-C  [facility] [place] - [phone]  Department of Vascular Neurology   [facility]

## 2020-10-20 NOTE — ASSESSMENT & PLAN NOTE
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 No tPA due to Eliquis. No large vessel occlusion so no IR intervention pursued. Of note, patient was recently discharged from our service for multiple infarcts in BERTIN territories thought to be due to her hypercoagulable state (from malignancy.)    MRI Brain shows acute infarct in the R MCA, L MCA, and L cerebellar territories. Echo EF 55%, no L atrial enlargement. Note that new strokes were in the setting of compliance with Eliquis. Stroke etiology remains unclear - possible hypoperfusion though no vessel stenosis was seen nor signs of dehydration at presentation. Otherwise, concern remains for hypercoagulable state as well. Discussed with Heme/Onc team - they agreed with switching DOAC to therapeutic Lovenox for this patient.       Antithrombotics for secondary stroke prevention: Anticoagulants: Enoxaparin injection 1mg/kg (100mg) q12 hrs   Statins for secondary stroke prevention and hyperlipidemia, if present: Zetia 10mg Nightly (Cannot be on statin with current chemo regimen)  Aggressive risk factor modification: HTN, Smoking, DM, HLD, Malignancy  Rehab efforts: [facility] inpatient rehab  BP parameters: Infarct: No intervention, SBP < 160; long-term SBP goal < 140

## 2020-10-20 NOTE — ASSESSMENT & PLAN NOTE
Area of cytotoxic cerebral edema identified when reviewing brain imaging in the territory of the right middle cerebral artery. There is no mass effect associated with it.   The patients clinical exam remained without any worsening of symptoms which may indicate expansion of the stroke or the area of the edema resulting in the clinical change. The pattern is suggestive of embolic etiology related to hypercoagulable state.

## 2020-10-20 NOTE — PLAN OF CARE
[facility]    FACILITY TRANSFER ORDERS      Patient Name: Alison Branch  YOB: 1955    PCP: Mike Rodriguez Ii, MD   PCP Address: [facility] / [place] [address]  PCP Phone Number: [phone]  PCP Fax: [phone]    Encounter Date: 10/20/2020    Admit to:  [facility] inpatient rehab    Vital Signs:  Routine    Diagnoses:   Active Hospital Problems    Diagnosis  POA    *Embolic stroke involving right middle cerebral artery [I63.411]  Yes     Priority: 1 - High    Oropharyngeal dysphagia [R13.12]  Yes     Priority: 2     Malignant neoplasm of upper lobe of left lung [C34.12]  Yes     Priority: 3      CARLOS nodules.  Will plan on outpatient bronchoscopy with TBBx, BAL and perhaps brush.          Hypertension associated with diabetes [E11.59, I10]  Yes     Priority: 3     Left-sided weakness [R53.1]  Yes     Priority: 4     Cytotoxic cerebral edema [G93.6]  Yes     Priority: 4     Mixed hyperlipidemia due to type 2 diabetes mellitus [E11.69, E78.2]  Yes     Priority: 4      Chronic    Type 2 diabetes mellitus with hyperglycemia, with long-term current use of insulin [E11.65, Z79.4]  Not Applicable     Priority: 4     Palliative care encounter [Z51.5]  Not Applicable     Priority: 5     Urinary retention [R33.9]  Yes     Priority: 6     Cerebral aneurysm, coiled in 2010 [I67.1]  Yes     Priority: 6       Resolved Hospital Problems   No resolved problems to display.       Allergies:  Review of patient's allergies indicates:   Allergen Reactions    Piperacillin-tazobactam Rash     Tolerated cefepime 06/2020       Diet: Mechanical soft cardiac/diabetic diet, Thin liquids    Activities: Activity as tolerated    Nursing: Per facility protocol     Labs:  Per facility protocol    CONSULTS:    Physical Therapy to evaluate and treat. , Occupational Therapy to evaluate and treat., Speech Therapy to evaluate and treat for Language, Swallowing and Cognition. and  to evaluate  for community resources/long-range planning.    MISCELLANEOUS CARE:  Routine Skin for Bedridden Patients: Apply moisture barrier cream to all skin folds and wet areas in perineal area daily and after baths and all bowel movements. and Diabetes Care:   SN to perform and educate Diabetic management with blood glucose monitoring:, Fingerstick blood sugar AC and HS and Report CBG < 60 or > 350 to physician.    WOUND CARE ORDERS  None    Medications: Review discharge medications with patient and family and provide education.      Current Discharge Medication List      START taking these medications    Details   enoxaparin (LOVENOX) 100 mg/mL Syrg Inject 1 mL (100 mg total) into the skin every 12 (twelve) hours.  Qty:        ezetimibe (ZETIA) 10 mg tablet Take 1 tablet (10 mg total) by mouth every evening.  Qty: 90 tablet, Refills: 3      lidocaine (XYLOCAINE) 5 % Oint ointment Apply topically as needed (For lab draws).  Qty:           CONTINUE these medications which have NOT CHANGED    Details   blood sugar diagnostic Strp To check BG 4 times daily, to use with insurance preferred meter  Qty: 200 each, Refills: 11      carboxymethylcellulose (REFRESH PLUS) 0.5 % Dpet 1 drop 3 (three) times daily as needed.      cyproheptadine (PERIACTIN) 4 mg tablet Take 1 tablet (4 mg total) by mouth 4 (four) times daily.  Qty: 120 tablet, Refills: 5    Associated Diagnoses: Anorexia      ergocalciferol (ERGOCALCIFEROL) 50,000 unit Cap TAKE 1 CAPSULE BY MOUTH EVERY 7 DAYS  Qty: 12 capsule, Refills: 3    Comments: Please consider 90 day supplies to promote better adherence  Associated Diagnoses: Vitamin D deficiency      fluticasone propionate (FLONASE) 50 mcg/actuation nasal spray USE TWO SPRAY(S) IN EACH NOSTRIL ONCE DAILY  Qty: 16 g, Refills: 3    Comments: Please consider 90 day supplies to promote better adherence  Associated Diagnoses: Chronic cough      fluticasone-salmeterol diskus inhaler 100-50 mcg Inhale 1 puff into the lungs  2 (two) times daily. Controller  Qty: 60 each, Refills: 2    Associated Diagnoses: Chronic respiratory failure with hypoxia      furosemide (LASIX) 20 MG tablet Take 2 tablets (40 mg total) by mouth once daily.  Qty: 60 tablet, Refills: 11    Associated Diagnoses: Edema, unspecified type      gabapentin (NEURONTIN) 300 MG capsule Take 1 capsule (300 mg total) by mouth nightly as needed (Take as needed at bed time for neuropathy pain and sleep).  Qty: 90 capsule, Refills: 3    Associated Diagnoses: Memory difficulty; Chemotherapy-induced neuropathy; Balance disorder      insulin aspart U-100 (NOVOLOG) 100 unit/mL (3 mL) InPn pen Inject 0-5 Units into the skin before meals and at bedtime as needed (Hyperglycemia).  Qty:  , Refills: 0      insulin detemir U-100 (LEVEMIR FLEXTOUCH) 100 unit/mL (3 mL) SubQ InPn pen Inject 26 Units into the skin once daily.  Qty: 2 Box, Refills: 2    Associated Diagnoses: Type 2 diabetes mellitus with hyperglycemia, with long-term current use of insulin      insulin lispro (HUMALOG KWIKPEN INSULIN) 100 unit/mL pen Inject 12 units TID AC + correction scale. Max TDD of 57 units  Qty: 4 Box, Refills: 2    Associated Diagnoses: Type 2 diabetes mellitus with hyperglycemia, with long-term current use of insulin      lancets Misc 1 Device by Misc.(Non-Drug; Combo Route) route once daily. Heladio Result.  250.02.  Check Blood Sugar Twice Daily.  Qty: 200 each, Refills: 3    Associated Diagnoses: Diabetes mellitus, type II      melatonin (MELATIN) 3 mg tablet Take 2 tablets (6 mg total) by mouth nightly as needed for Insomnia.  Qty:  , Refills: 0      nystatin (MYCOSTATIN) powder Apply topically 2 (two) times daily.  Qty: 60 g, Refills: 0    Associated Diagnoses: Candidal intertrigo      ondansetron (ZOFRAN) 8 MG tablet Take 1 tablet (8 mg total) by mouth 4 (four) times daily as needed for Nausea.  Qty: 60 tablet, Refills: 2    Associated Diagnoses: Malignant neoplasm of upper lobe of left lung     "  pen needle, diabetic 33 gauge x 5/32" Ndle 1 each by Misc.(Non-Drug; Combo Route) route 4 (four) times daily with meals and nightly.  Qty: 100 each, Refills: 5    Associated Diagnoses: Uncontrolled type 2 diabetes mellitus with hyperglycemia, without long-term current use of insulin      tamsulosin (FLOMAX) 0.4 mg Cap Take 1 capsule (0.4 mg total) by mouth every evening.  Qty: 30 capsule, Refills: 11      venlafaxine (EFFEXOR XR) 75 MG 24 hr capsule Take 1 capsule (75 mg total) by mouth once daily.  Qty: 30 capsule, Refills: 2    Associated Diagnoses: Depression, unspecified depression type      walker Misc Please provide rollator walker for this debilitated cancer patient.  Thank you.  Refills: 0    Associated Diagnoses: Debility; Malignant neoplasm of upper lobe of left lung         STOP taking these medications       apixaban (ELIQUIS) 5 mg Tab Comments:   Reason for Stopping:         entrectinib (ROZLYTREK) 200 mg oral tablet Comments:   Reason for Stopping:         insulin regular 100 unit/mL Inj injection Comments:   Reason for Stopping:         phenazopyridine (PYRIDIUM) 100 MG tablet Comments:   Reason for Stopping:                Follow-up With    Details  Why  Contact Info   Adrianna Chan MD    On 11/7/2020  Please keep your appt for hospital follow-up / to establish care.  [address]  [facility] [id number] [phone]   Bijal Paulino MD  On 11/12/2020  Please keep your established appt for stroke follow-up.    [address]  [facility] [id number] [phone]   Tara Belcher MD  In 2 weeks  Need to call to reschedule a follow-up appt after rehab discharge and discuss restarting chemo.  [address] [facility] [id number] [phone]               _________________________________  Mckayla Euceda PA-C  10/20/2020  "

## 2020-10-20 NOTE — TELEPHONE ENCOUNTER
----- Message from Ana Hayes sent at 10/20/2020  8:06 AM CDT -----  Contact: Chito Branch @ [phone]  Pt daughter Chito Branch stating that pt was admitted due to stroke and her cancer meds were placed on hold. Daughter would like to know if pt will continue to due chemo while in rehab or if she has to finish rehab first. Daughter wants to eventually move patient to [place] and would like to know what exactly she needs to work on in order to make that possible while continuing her treatment. Also wanted to know who exactly her  is? States she has lots of questions and does not know where to begin.     Call back: [phone]

## 2020-10-20 NOTE — PLAN OF CARE
Problem: Occupational Therapy Goal  Goal: Occupational Therapy Goal  Description: Goals to be met by: 10/27/2020     Patient will increase functional independence with ADLs by performing:    Feeding with Minimal Assistance while sitting EOB  Grooming while EOB with Minimal Assistance.  Toileting from bedside commode with Moderate Assistance for hygiene and clothing management.   Sitting at edge of bed x10 minutes with Minimal Assistance.  Stand pivot transfers with Minimal Assistance.  Toilet transfer to bedside commode with Minimal Assistance.    Outcome: Ongoing, Progressing    Chely Santana, OTR/L  Pager: [phone]  10/20/2020

## 2020-10-20 NOTE — PT/OT/SLP PROGRESS
Physical Therapy Treatment    Patient Name:  Alison Branch   MRN:  [id number]    Recommendations:     Discharge Recommendations:  rehabilitation facility   Discharge Equipment Recommendations: other (see comments)(TBD)   Barriers to discharge: Inaccessible home and Decreased caregiver support    Assessment:     Alison Branch is a 65 y.o. female admitted with a medical diagnosis of Embolic stroke involving right middle cerebral artery.  She presents with the following impairments/functional limitations:  weakness, impaired endurance, impaired self care skills, impaired functional mobilty, gait instability, impaired balance, decreased upper extremity function, decreased lower extremity function, decreased safety awareness, decreased coordination, impaired coordination, impaired fine motor. PT recommending inpatient rehab upon DC from hospital.    Rehab Prognosis: Good; patient would benefit from acute skilled PT services to address these deficits and reach maximum level of function.    Recent Surgery: * No surgery found *      Plan:     During this hospitalization, patient to be seen 4 x/week to address the identified rehab impairments via gait training, therapeutic activities, therapeutic exercises, neuromuscular re-education and progress toward the following goals:    · Plan of Care Expires:  11/14/20    Subjective     Pain/Comfort:  · Pain Rating 1: 0/10  · Pain Rating Post-Intervention 1: 0/10      Objective:     Communicated with RN prior to session.  Patient found seated EOB with OT with telemetry, bed alarm, PureWick upon PT entry to room.     General Precautions: Standard, aspiration, fall   Orthopedic Precautions:N/A   Braces: N/A     Functional Mobility:  · Bed Mobility:     · Rolling Left:  moderate assistance using bed rail  · Scooting to HOB via drawsheet: total assistance and of 2 persons  · Sit to Supine: moderate assistance  · Transfers:     · Sit to Stand:  maximal assistance and of 2 persons with no AD  and hand-held assist, x 2 trials  · L knee blocked and manual facilitation of hip extension  · Verbal and tactile cuing for upright posture and forward gaze      AM-PAC 6 CLICK MOBILITY  Turning over in bed (including adjusting bedclothes, sheets and blankets)?: 2  Sitting down on and standing up from a chair with arms (e.g., wheelchair, bedside commode, etc.): 2  Moving from lying on back to sitting on the side of the bed?: 2  Moving to and from a bed to a chair (including a wheelchair)?: 1  Need to walk in hospital room?: 1  Climbing 3-5 steps with a railing?: 1  Basic Mobility Total Score: 9       Therapeutic Activities and Exercises:  Patient educated on role of therapy, goals of session, and benefits of mobilizing.   Discussed PT plan of care during hospitalization.   Patient educated on calling for assistance.   Patient educated on how their diagnosis impacts their mobility within PT scope of practice.   Communication board up to date.  All questions answered within PT scope of practice.    Patient left HOB elevated with all lines intact, call button in reach, bed alarm on and RN notified..    GOALS:   Multidisciplinary Problems     Physical Therapy Goals        Problem: Physical Therapy Goal    Goal Priority Disciplines Outcome Goal Variances Interventions   Physical Therapy Goal     PT, PT/OT Ongoing, Progressing     Description: Goals to be met by: 10/29/2020    Patient will increase functional independence with mobility by performin. Pt will perform bed mobility (rolling L/R, scooting, and bridging) with modified supervision.  2. Pt will perform supine to/from sit with supervision.  3. Pt will sit EOB x 10 mins with no UE support with supervision.  4. Pt will perform sit to stand with mod A and LRAD.  5. Pt will ambulate 10 feet with mod assistance and LRAD.  6. Pt will perform there-ex from handout x 15 reps to improve strength for functional mobility.  7. Pt will perform squat pivot t/f to bedside  chair with modA.                         Time Tracking:     PT Received On: 10/20/20  PT Start Time: 1020     PT Stop Time: 1039  PT Total Time (min): 19 min     Billable Minutes: Therapeutic Activity 15    Treatment Type: Treatment  PT/PTA: PT     PTA Visit Number: 0     Reina Mendoza, PT  10/20/2020

## 2020-10-20 NOTE — ASSESSMENT & PLAN NOTE
Stroke risk factor   A1C 9.3%  BG goal while inpatient 140-180  SSI while inpatient; restarted home regimen at discharge.  Diabetic diet

## 2020-10-20 NOTE — TELEPHONE ENCOUNTER
Spoke with daughterkeith at [phone]. She is calling to state her mom is admitted for multiple strokes---and will be DC'd to rehab for approximately 6 weeks. Upon rehab DC, the patient wants to see dr chapin---prior to moving to [place] with her. Daughter knows to contact clinic upon DC, so appointments can be scheduled. In the interim she is requesting to speak with dr chapin's  about how to get her mom set up with insurance so she can move to [place].    Message routed to elton

## 2020-10-20 NOTE — SUBJECTIVE & OBJECTIVE
Neurologic Chief Complaint: L facial droop, slurred speech, Left side weakness    Subjective:     Interval History: Patient is seen for follow-up neurological assessment and treatment recommendations:      KEHINDE. Patient had a large BM overnight. Stable for discharge to inpatient rehab today.    HPI, Past Medical, Family, and Social History remains the same as documented in the initial encounter.     Review of Systems   Constitutional: Positive for fatigue. Negative for fever.   Eyes: Negative for discharge and visual disturbance.   Neurological: Positive for facial asymmetry, speech difficulty, weakness and numbness. Negative for headaches.   Psychiatric/Behavioral: Negative for agitation and confusion.     Scheduled Meds:   cyproheptadine  4 mg Oral QID    enoxaparin  100 mg Subcutaneous Q12H    ezetimibe  10 mg Oral QHS    tamsulosin  0.4 mg Oral Daily    venlafaxine  75 mg Oral Daily     Continuous Infusions:  PRN Meds:dextrose 50%, glucagon (human recombinant), influenza, insulin aspart U-100, labetalol, lidocaine, sodium chloride 0.9%    Objective:     Vital Signs (Most Recent):  Temp: 98.3 °F (36.8 °C) (10/20/20 1130)  Pulse: 77 (10/20/20 1130)  Resp: 13 (10/20/20 0810)  BP: (!) 148/72 (10/20/20 1130)  SpO2: 99 % (10/20/20 1130)  BP Location: Right arm    Vital Signs Range (Last 24H):  Temp:  [98.2 °F (36.8 °C)-98.7 °F (37.1 °C)]   Pulse:  [73-93]   Resp:  [9-15]   BP: (130-148)/(66-75)   SpO2:  [98 %-99 %]   BP Location: Right arm    Physical Exam  Vitals signs reviewed.   Constitutional:       General: She is not in acute distress.     Appearance: She is not toxic-appearing.   HENT:      Head: Normocephalic and atraumatic.   Eyes:      Extraocular Movements: Extraocular movements intact.      Conjunctiva/sclera: Conjunctivae normal.   Cardiovascular:      Rate and Rhythm: Normal rate.   Pulmonary:      Effort: Pulmonary effort is normal. No respiratory distress.   Musculoskeletal:         General: No  tenderness or deformity.   Skin:     General: Skin is warm and dry.   Neurological:      Mental Status: She is oriented to person, place, and time.      Cranial Nerves: Dysarthria and facial asymmetry present.      Motor: Weakness present.   Psychiatric:         Mood and Affect: Mood normal.         Speech: Speech is slurred.         Cognition and Memory: Cognition is not impaired.         Neurological Exam:   LOC: Drowsy but easily arousable  Attention Span: Good  Language: No aphasia  Articulation: Dysarthria   Orientation: Person, Place, Time   Visual Fields: Full  EOM (CN III, IV, VI): Full/intact  Facial Movement (CN VII): Lower facial weakness on the Left  Motor: Arm left  Paresis: 1/5  Leg left  Paresis: proximal 1/5; distal 2/5  Arm right  Normal 5/5  Leg right Normal 5/5  Sensation: Andres-hypoesthesia left      Laboratory:  CMP:   No results for input(s): GLUCOSE, CALCIUM, ALBUMIN, PROT, NA, K, CO2, CL, BUN, CREATININE, ALKPHOS, ALT, AST, BILITOT in the last 24 hours.  CBC:   Recent Labs   Lab 10/18/20  0403   WBC 4.08   RBC 3.93*   HGB 10.5*   HCT 34.9*      MCV 89   MCH 26.7*   MCHC 30.1*     Lipid Panel:   No results for input(s): CHOL, LDLCALC, HDL, TRIG in the last 168 hours.  Hgb A1C: No results for input(s): HGBA1C in the last 168 hours.  TSH:   No results for input(s): TSH in the last 168 hours.      Diagnostic Results     Brain imaging:    MRI Brain w/o contrast 10/13/2020:     Current MR imaging confirms suspicion of foci of acute infarctions in the right MCA distribution.  Three additional punctate foci of restricted diffusion involving the left parietal lobe, possibly emboli.  Small subacute infarction involving the left cerebellum, similar to prior examination dated 09/28/2020.  Well rounded T2/FLAIR hyperintense focus within the left lateral aspect of the body of the corpus callosum, likely related to evolving infarct as further discussed above.  If there is concerning for underlying  intracranial metastasis in the setting of known metastatic lung cancer, further evaluation may be obtained with contrast enhanced MRI of the brain.    CT Head Without Contrast 10/13/2020:  No findings to suggest acute major vascular territory infarct or hemorrhage noting stable focus of low attenuation within the left cerebellum and stable right ICA region coil pack       Vessel Imaging:    CTA Multiphase 10/14/2020:  States that there is a possible M3 vessel stenosis or occlusion. However, on evaluation of imaging it does not seem that way. Please note that patient has multiple branches from R MCA   FORMAL READ: No acute intracranial pathology.  No evidence for intracranial hemorrhage or CT evidence of major vascular distribution infarct.  No intracranially large vessel occlusion.  Possible right M3 vessel stenosis or subtotal occlusion as described above.  Clinical correlation with symptoms is recommended.  Can consider MRI with diffusion-weighted imaging if concern for acute ischemia.  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  No evidence of high-grade stenosis or large vessel occlusion.  Numerous sclerotic lesions involving the vertebral bodies and remaining visualized axial skeleton, in keeping with patient's history of metastatic lung cancer.  Moderate left pleural effusion with patchy areas of consolidation in the left upper lobe may relate to pneumonia.       Cardiac Evaluation:     TTE 10/14/2020:  · With normal systolic function. The estimated ejection fraction is 55%.  · The quantitatively dervived ejection fraction is 52%.  · Normal left ventricular diastolic function.  · Normal right ventricular systolic function.  · Mild tricuspid regurgitation.   · The left atrial volume index is normal.     TTE June 2020:   · Normal left ventricular systolic function. The estimated  ejection fraction is 55%.  · Normal LV diastolic function.  · No wall motion abnormalities.  · Mildly reduced right ventricular systolic function.  · Normal central venous pressure (3 mmHg).  · The estimated PA systolic pressure is 26 mmHg

## 2020-10-20 NOTE — ASSESSMENT & PLAN NOTE
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 No tPA due to Eliquis. No large vessel occlusion so no IR intervention pursued. Of note, patient was recently discharged from our service for multiple infarcts in BERTIN territories thought to be due to her hypercoagulable state (from malignancy.)    MRI Brain shows acute infarct in the R MCA, L MCA, and L cerebellar territories. Echo EF 55%, no L atrial enlargement. Note that new strokes were in the setting of compliance with Eliquis. Stroke etiology remains unclear - possible hypoperfusion though no vessel stenosis was seen nor signs of dehydration at presentation. Otherwise, concern remains for hypercoagulable state.     Patient stable for discharge to inpatient rehab.       Antithrombotics for secondary stroke prevention: Anticoagulants: Enoxaparin injection 1mg/kg (100mg) q12 hrs   Statins for secondary stroke prevention and hyperlipidemia, if present: Zetia 10mg Nightly (Cannot be on statin with current chemo regimen)  Aggressive risk factor modification: HTN, Smoking, DM, HLD, Malignancy  Rehab efforts: The patient has been evaluated by a stroke team provider and the therapy needs have been fully considered based off the presenting complaints and exam findings. The following therapy evaluations are needed: PT evaluate and treat, OT evaluate and treat, SLP evaluate and treat, PM&R evaluate for appropriate placement - dispo inpatient rehab  Diagnostics ordered/pending: None   VTE prophylaxis: None: Reason for No Pharmacological VTE Prophylaxis: Currently on anticoagulation, SCDs  BP parameters: Infarct: No intervention, SBP < 160

## 2020-10-20 NOTE — PROGRESS NOTES
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 No tPA due to Eliquis. No large vessel occlusion so no IR intervention pursued. Of note, patient was recently discharged from our service for multiple infarcts in BERTIN territories thought to be due to her hypercoagulable state (from malignancy.)    MRI Brain shows acute infarct in the R MCA, L MCA, and L cerebellar territories. Echo EF 55%, no L atrial enlargement. Note that new strokes were in the setting of compliance with Eliquis. Stroke etiology remains unclear - possible hypoperfusion though no vessel stenosis was seen nor signs of dehydration at presentation. Otherwise, concern remains for hypercoagulable state.     Patient stable for discharge to inpatient rehab.       Antithrombotics for secondary stroke prevention: Anticoagulants: Enoxaparin injection 1mg/kg (100mg) q12 hrs   Statins for secondary stroke prevention and hyperlipidemia, if present: Zetia 10mg Nightly (Cannot be on statin with current chemo regimen)  Aggressive risk factor modification: HTN, Smoking, DM, HLD, Malignancy  Rehab efforts: The patient has been evaluated by a stroke team provider and the therapy needs have been fully considered based off the presenting complaints and exam findings. The following therapy evaluations are needed: PT evaluate and treat, OT evaluate and treat, SLP evaluate and treat, PM&R evaluate for appropriate placement - dispo inpatient rehab  Diagnostics ordered/pending: None   VTE prophylaxis: None: Reason for No Pharmacological VTE Prophylaxis: Currently on anticoagulation, SCDs  BP parameters: Infarct: No  intervention, SBP < 160    Oropharyngeal dysphagia  2/2 stroke  SLP eval and treat -- Recommending frequent nursing checks for any pocketing. Meds crushed in pudding (per SLP, could bury chemo med in puree to avoid crushing.) Added Boost to regimen as well.   Diet upgraded per SLP recs - now Mecahnical soft diet, Thin liquids.   Dispo inpatient rehab    Malignant neoplasm of upper lobe of left lung  Reportedly metastatic to involve the spine.  Stroke risk factor - suspect contributing to an underlying hypercoagulability  Consulted Oncology on 10/15; Appreciate assistance  They agree with transition to therapeutic Lovenox for AC at this time. Recommend holding chemo agent until pt completes rehab and they are able to reassess then plan; discussed this with patient on 10/16 and reinforced with daughter via phone on 10/19.    Hypertension associated with diabetes  Stroke risk factor   SBP < 160 acutely  Currently at goal with holding home Lasix; restarted at discharge.    Left-sided weakness  2/2 stroke  PT, OT eval & treat - dispo rehab    Cytotoxic cerebral edema  Area of cytotoxic cerebral edema identified when reviewing brain imaging in the territory of the right middle cerebral artery. There is no mass effect associated with it. We will continue to monitor the patients clinical exam for any worsening of symptoms which may indicate expansion of the stroke or the area of the edema resulting in the clinical change. The pattern is suggestive of embolic etiology related to hypercoagulable state.    Mixed hyperlipidemia due to type 2 diabetes mellitus  Stroke risk factor   LDL at 163  Cannot be on statin with current chemo drug; started Zetia 10/15.    Type 2 diabetes mellitus with hyperglycemia, with long-term current use of insulin  Stroke risk factor   A1C 9.3%  BG goal while inpatient 140-180  SSI while inpatient; restarted home regimen at discharge.  Diabetic diet    Palliative care encounter  Had considered  "Palliative Medicine consult for symptom management (pain) however discussed with staff and plans to consult Heme/Onc team instead. Will re-consider Palliative consult if recommended by Onc. No plans for consultation at this time.    Goals of care discussion introduced with patient and daughter via FaceTime on 10/17. Patient states she would "go on with her life" and mentions she would like to continue all treatment options.  Family advised on 10/19 about Heme/Onc plans to hold chemotherapy until pt able to follow up in clinic after rehab to reassess treatment plan.    Urinary retention  - On periactin at home for urethral pain and retention. Was seen by urology last admission  - Seen in urology clinic 9/25 for incomplete bladder emptying; hennessy removed on 10/6. While at rehab, UA with positive nitrate, 2+ leukocytes, and many bacteria (10/7). Pt was on Cipro.   - Repeat UA 10/15 with nitrites only. Will defer further abx treatment for now, monitor pt for any symptoms.    Cerebral aneurysm, coiled in 2010  Aneurysm coil in 2010          10/13: Admit to vascular neurology for workup of new onset of slurred speech  10/14: MRI brain acute R MCA infarct. SLP cleared for puree, thin diet, meds crushed. Order TTE. Patient c/o lab draws/difficult stick, ordered topical lidocaine, consult palliative for pain management. Need to reach out to oncology regarding chemo management (unable to crush entrectinib), prognosis, possibly switching to Lovenox.    10/15: Consulted Oncology regarding cancer prognostication, chemo drug, and anticoagulation recs. SLP ok to give PO chemo buried in puree; nurse to frequently check for pocketing. Added Boost to regimen per nutrition recs. UA pending.  10/16: Patient more alert today, states she does not like the puree diet but she is still in good spirits. Started therapeutic Lovenox. Holding chemo agent per Onc recs.   10/17: No acute events overnight. Goals of care conversation initiated.  "   10/18: Patient in good spirits today.  Dysarthria and left upper extremity weakness slightly improved on today's exam.  10/19: Diet upgraded per SLP recs. Pt c/o some mild acid reflux; no other complaints currently. Pending placement.  10/20: Patient had a large BM overnight. Stable for discharge to inpatient rehab today.    STROKE DOCUMENTATION   Acute Stroke Times   Last Known Normal Date: 10/13/20  Last Known Normal Time: 1030  Symptom Onset Date: 10/13/20  Symptom Onset Time: 1130  Stroke Team Called Date: 10/13/20  Stroke Team Called Time: 1220  Stroke Team Arrival Date: 10/13/20  Stroke Team Arrival Time: 1227    NIH Scale:  1a. Level of Consciousness: 1-->Not alert, but arousable by minor stimulation to obey, answer, or respond  1b. LOC Questions: 0-->Answers both questions correctly  1c. LOC Commands: 0-->Performs both tasks correctly  2. Best Gaze: 0-->Normal  3. Visual: 0-->No visual loss  4. Facial Palsy: 2-->Partial paralysis (total or near-total paralysis of lower face)  5a. Motor Arm, Left: 3-->No effort against gravity, limb falls  5b. Motor Arm, Right: 0-->No drift, limb holds 90 (or 45) degrees for full 10 secs  6a. Motor Leg, Left: 3-->No effort against gravity, leg falls to bed immediately  6b. Motor Leg, Right: 0-->No drift, leg holds 30 degree position for full 5 secs  7. Limb Ataxia: 0-->Absent  8. Sensory: 1-->Mild-to-moderate sensory loss, patient feels pinprick is less sharp or is dull on the affected side, or there is a loss of superficial pain with pinprick, but patient is aware of being touched  9. Best Language: 0-->No aphasia, normal  10. Dysarthria: 1-->Mild-to-moderate dysarthria, patient slurs at least some words and, at worst, can be understood with some difficulty  11. Extinction and Inattention (formerly Neglect): 0-->No abnormality  Total (NIH Stroke Scale): 11       Modified Noah    Dang Coma Scale:    ABCD2 Score:    [id number] Score:   HAS -BLED Score:   ICH Score:    Berry & Sutton Classification:      Hemorrhagic change of an Ischemic Stroke: Does this patient have an ischemic stroke with hemorrhagic changes? No     Neurologic Chief Complaint: L facial droop, slurred speech, Left side weakness    Subjective:     Interval History: Patient is seen for follow-up neurological assessment and treatment recommendations:      KEHINDE. Patient had a large BM overnight. Stable for discharge to inpatient rehab today.    HPI, Past Medical, Family, and Social History remains the same as documented in the initial encounter.     Review of Systems   Constitutional: Positive for fatigue. Negative for fever.   Eyes: Negative for discharge and visual disturbance.   Neurological: Positive for facial asymmetry, speech difficulty, weakness and numbness. Negative for headaches.   Psychiatric/Behavioral: Negative for agitation and confusion.     Scheduled Meds:   cyproheptadine  4 mg Oral QID    enoxaparin  100 mg Subcutaneous Q12H    ezetimibe  10 mg Oral QHS    tamsulosin  0.4 mg Oral Daily    venlafaxine  75 mg Oral Daily     Continuous Infusions:  PRN Meds:dextrose 50%, glucagon (human recombinant), influenza, insulin aspart U-100, labetalol, lidocaine, sodium chloride 0.9%    Objective:     Vital Signs (Most Recent):  Temp: 98.3 °F (36.8 °C) (10/20/20 1130)  Pulse: 77 (10/20/20 1130)  Resp: 13 (10/20/20 0810)  BP: (!) 148/72 (10/20/20 1130)  SpO2: 99 % (10/20/20 1130)  BP Location: Right arm    Vital Signs Range (Last 24H):  Temp:  [98.2 °F (36.8 °C)-98.7 °F (37.1 °C)]   Pulse:  [73-93]   Resp:  [9-15]   BP: (130-148)/(66-75)   SpO2:  [98 %-99 %]   BP Location: Right arm    Physical Exam  Vitals signs reviewed.   Constitutional:       General: She is not in acute distress.     Appearance: She is not toxic-appearing.   HENT:      Head: Normocephalic and atraumatic.   Eyes:      Extraocular Movements: Extraocular movements intact.      Conjunctiva/sclera: Conjunctivae normal.   Cardiovascular:       Rate and Rhythm: Normal rate.   Pulmonary:      Effort: Pulmonary effort is normal. No respiratory distress.   Musculoskeletal:         General: No tenderness or deformity.   Skin:     General: Skin is warm and dry.   Neurological:      Mental Status: She is oriented to person, place, and time.      Cranial Nerves: Dysarthria and facial asymmetry present.      Motor: Weakness present.   Psychiatric:         Mood and Affect: Mood normal.         Speech: Speech is slurred.         Cognition and Memory: Cognition is not impaired.         Neurological Exam:   LOC: Drowsy but easily arousable  Attention Span: Good  Language: No aphasia  Articulation: Dysarthria   Orientation: Person, Place, Time   Visual Fields: Full  EOM (CN III, IV, VI): Full/intact  Facial Movement (CN VII): Lower facial weakness on the Left  Motor: Arm left  Paresis: 1/5  Leg left  Paresis: proximal 1/5; distal 2/5  Arm right  Normal 5/5  Leg right Normal 5/5  Sensation: Andres-hypoesthesia left      Laboratory:  CMP:   No results for input(s): GLUCOSE, CALCIUM, ALBUMIN, PROT, NA, K, CO2, CL, BUN, CREATININE, ALKPHOS, ALT, AST, BILITOT in the last 24 hours.  CBC:   Recent Labs   Lab 10/18/20  0403   WBC 4.08   RBC 3.93*   HGB 10.5*   HCT 34.9*      MCV 89   MCH 26.7*   MCHC 30.1*     Lipid Panel:   No results for input(s): CHOL, LDLCALC, HDL, TRIG in the last 168 hours.  Hgb A1C: No results for input(s): HGBA1C in the last 168 hours.  TSH:   No results for input(s): TSH in the last 168 hours.      Diagnostic Results     Brain imaging:    MRI Brain w/o contrast 10/13/2020:     Current MR imaging confirms suspicion of foci of acute infarctions in the right MCA distribution.  Three additional punctate foci of restricted diffusion involving the left parietal lobe, possibly emboli.  Small subacute infarction involving the left cerebellum, similar to prior examination dated 09/28/2020.  Well rounded T2/FLAIR hyperintense focus within the left  lateral aspect of the body of the corpus callosum, likely related to evolving infarct as further discussed above.  If there is concerning for underlying intracranial metastasis in the setting of known metastatic lung cancer, further evaluation may be obtained with contrast enhanced MRI of the brain.    CT Head Without Contrast 10/13/2020:  No findings to suggest acute major vascular territory infarct or hemorrhage noting stable focus of low attenuation within the left cerebellum and stable right ICA region coil pack       Vessel Imaging:    CTA Multiphase 10/14/2020:  States that there is a possible M3 vessel stenosis or occlusion. However, on evaluation of imaging it does not seem that way. Please note that patient has multiple branches from R MCA   FORMAL READ: No acute intracranial pathology.  No evidence for intracranial hemorrhage or CT evidence of major vascular distribution infarct.  No intracranially large vessel occlusion.  Possible right M3 vessel stenosis or subtotal occlusion as described above.  Clinical correlation with symptoms is recommended.  Can consider MRI with diffusion-weighted imaging if concern for acute ischemia.  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  No evidence of high-grade stenosis or large vessel occlusion.  Numerous sclerotic lesions involving the vertebral bodies and remaining visualized axial skeleton, in keeping with patient's history of metastatic lung cancer.  Moderate left pleural effusion with patchy areas of consolidation in the left upper lobe may relate to pneumonia.       Cardiac Evaluation:     TTE 10/14/2020:  · With normal systolic function. The estimated ejection fraction is 55%.  · The quantitatively dervived ejection fraction is 52%.  · Normal left ventricular diastolic function.  · Normal right ventricular systolic function.  · Mild  tricuspid regurgitation.   · The left atrial volume index is normal.     TTE June 2020:   · Normal left ventricular systolic function. The estimated ejection fraction is 55%.  · Normal LV diastolic function.  · No wall motion abnormalities.  · Mildly reduced right ventricular systolic function.  · Normal central venous pressure (3 mmHg).  · The estimated PA systolic pressure is 26 mmHg      Mckayla Euceda PA-C  [facility]  Department of Vascular Neurology   [facility]

## 2020-10-20 NOTE — ASSESSMENT & PLAN NOTE
Reportedly metastatic to involve the spine.  Stroke risk factor - suspect contributing to an underlying hypercoagulability  Consulted Oncology on 10/15; Appreciate assistance  They agree with transition to therapeutic Lovenox for AC at this time. Recommend holding chemo agent until pt completes rehab and they are able to reassess then plan; discussed this with patient on 10/16 and reinforced with daughter via phone on 10/19.  Established clinic follow-up (Dr. Belcher)

## 2020-10-20 NOTE — ASSESSMENT & PLAN NOTE
- On periactin at home for urethral pain and retention. Was seen by urology last admission  - Seen in urology clinic 9/25 for incomplete bladder emptying; hennessy removed on 10/6. While at rehab, UA with positive nitrate, 2+ leukocytes, and many bacteria (10/7). Pt was on Cipro.   - Repeat UA 10/15 with nitrites only. Defer further abx treatment for now. To be further monitored for any symptoms at rehab.

## 2020-11-03 ENCOUNTER — PATIENT OUTREACH (OUTPATIENT)
Dept: ADMINISTRATIVE | Facility: HOSPITAL | Age: 65
End: 2020-11-03

## 2020-11-03 ENCOUNTER — PATIENT MESSAGE (OUTPATIENT)
Dept: ADMINISTRATIVE | Facility: HOSPITAL | Age: 65
End: 2020-11-03

## 2020-11-03 ENCOUNTER — TELEPHONE (OUTPATIENT)
Dept: ADMINISTRATIVE | Facility: HOSPITAL | Age: 65
End: 2020-11-03

## 2020-11-03 DIAGNOSIS — Z12.31 ENCOUNTER FOR SCREENING MAMMOGRAM FOR MALIGNANT NEOPLASM OF BREAST: Primary | ICD-10-CM

## 2020-11-03 NOTE — PROGRESS NOTES
Vaccines Updated- New PCP to address new orders, Statin Therapy and Vaccines Due- Message sent to pt to call to schedule her MMG and Eye Exam . Foot Exam Updated.

## 2020-11-05 ENCOUNTER — HOSPITAL ENCOUNTER (OUTPATIENT)
Dept: RADIOLOGY | Facility: HOSPITAL | Age: 65
Discharge: HOME | End: 2020-11-05
Attending: PHYSICAL MEDICINE & REHABILITATION
Payer: MEDICARE

## 2020-11-05 PROCEDURE — 71045 X-RAY EXAM CHEST 1 VIEW: CPT | Mod: 26,,, | Performed by: RADIOLOGY

## 2020-11-05 PROCEDURE — 71045 XR CHEST 1 VIEW: ICD-10-PCS | Mod: 26,,, | Performed by: RADIOLOGY

## 2020-11-06 ENCOUNTER — HOSPITAL ENCOUNTER (OUTPATIENT)
Dept: RADIOLOGY | Facility: HOSPITAL | Age: 65
Discharge: HOME | End: 2020-11-06
Attending: PHYSICAL MEDICINE & REHABILITATION
Payer: MEDICARE

## 2020-11-06 PROCEDURE — 71045 XR CHEST 1 VIEW: ICD-10-PCS | Mod: 26,,, | Performed by: RADIOLOGY

## 2020-11-06 PROCEDURE — 71045 X-RAY EXAM CHEST 1 VIEW: CPT | Mod: 26,,, | Performed by: RADIOLOGY

## 2020-11-07 ENCOUNTER — PATIENT OUTREACH (OUTPATIENT)
Dept: ADMINISTRATIVE | Facility: OTHER | Age: 65
End: 2020-11-07

## 2020-11-07 NOTE — PROGRESS NOTES
LINKS immunization registry updated  Care Everywhere updated  Health Maintenance updated  Chart reviewed for overdue [facility] health maintenance testing (CRS, Breast Ca, Diabetic Eye Exam)   Orders entered:N/A  Portal message sent to patient by PCP staff on 11/3/20 regarding overdue mammogram

## 2020-11-09 ENCOUNTER — HOSPITAL ENCOUNTER (OUTPATIENT)
Dept: RADIOLOGY | Facility: HOSPITAL | Age: 65
Discharge: HOME | End: 2020-11-09
Attending: NURSE PRACTITIONER
Payer: MEDICARE

## 2020-11-09 PROCEDURE — 71045 X-RAY EXAM CHEST 1 VIEW: CPT | Mod: 26,,, | Performed by: RADIOLOGY

## 2020-11-09 PROCEDURE — 71045 XR CHEST 1 VIEW: ICD-10-PCS | Mod: 26,,, | Performed by: RADIOLOGY

## 2020-11-11 ENCOUNTER — PATIENT MESSAGE (OUTPATIENT)
Dept: INTERNAL MEDICINE | Facility: CLINIC | Age: 65
End: 2020-11-11

## 2020-11-12 ENCOUNTER — TELEPHONE (OUTPATIENT)
Dept: NEUROLOGY | Facility: CLINIC | Age: 65
End: 2020-11-12

## 2020-11-13 DIAGNOSIS — I63.411 EMBOLIC STROKE INVOLVING RIGHT MIDDLE CEREBRAL ARTERY: Primary | ICD-10-CM

## 2020-11-16 ENCOUNTER — ANTI-COAG VISIT (OUTPATIENT)
Dept: CARDIOLOGY | Facility: CLINIC | Age: 65
End: 2020-11-16

## 2020-11-16 DIAGNOSIS — I63.411 EMBOLIC STROKE INVOLVING RIGHT MIDDLE CEREBRAL ARTERY: ICD-10-CM

## 2020-11-16 DIAGNOSIS — Z79.01 LONG TERM (CURRENT) USE OF ANTICOAGULANTS: Primary | ICD-10-CM

## 2020-11-16 LAB
INR PPP: 1.3
INR PPP: 1.5

## 2020-11-16 NOTE — PROGRESS NOTES
66 y/o female recently d/c from inpatient rehab after hospitalization for stroke. Embolic stroke etiology was suspected and DOAC was transitioned to warfarin. 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    Patient was d/c from rehab on warfarin 5mg daily and with home health services. INR 11/13 below goal.    Spoke to patient and niece (grandson/caregiver unavailable at the moment). Reviewed indication for use, importance of compliance and monitoring, possible food/drug interactions, possible adverse events. Written info emailed. Instructions per calendar provided and HH faxed.

## 2020-11-17 RX ORDER — WARFARIN SODIUM 5 MG/1
TABLET ORAL
Qty: 30 TABLET | Refills: 5 | Status: SHIPPED | OUTPATIENT
Start: 2020-11-17 | End: 2020-12-15 | Stop reason: SDUPTHER

## 2020-11-18 ENCOUNTER — ANTI-COAG VISIT (OUTPATIENT)
Dept: CARDIOLOGY | Facility: CLINIC | Age: 65
End: 2020-11-18
Payer: MEDICARE

## 2020-11-18 DIAGNOSIS — I63.411 EMBOLIC STROKE INVOLVING RIGHT MIDDLE CEREBRAL ARTERY: ICD-10-CM

## 2020-11-18 DIAGNOSIS — Z79.01 LONG TERM (CURRENT) USE OF ANTICOAGULANTS: ICD-10-CM

## 2020-11-18 LAB — INR PPP: 1.7

## 2020-11-18 PROCEDURE — 93793 ANTICOAG MGMT PT WARFARIN: CPT | Mod: S$GLB,,,

## 2020-11-18 PROCEDURE — 93793 PR ANTICOAGULANT MGMT FOR PT TAKING WARFARIN: ICD-10-PCS | Mod: S$GLB,,,

## 2020-11-18 NOTE — PROGRESS NOTES
INR not at goal. Medications, chart, and patient findings reviewed. See calendar for adjustments to dose and follow up plan. Plan to continue lovenox through 11/21 PM dose. Recheck INR Monday.    Update - pt reports she only has 1 lovenox injection left and will be unable to afford refill.

## 2020-11-19 ENCOUNTER — TELEPHONE (OUTPATIENT)
Dept: INTERNAL MEDICINE | Facility: CLINIC | Age: 65
End: 2020-11-19

## 2020-11-19 DIAGNOSIS — C34.12 MALIGNANT NEOPLASM OF UPPER LOBE OF LEFT LUNG: Primary | ICD-10-CM

## 2020-11-19 DIAGNOSIS — Z74.09 IMPAIRED FUNCTIONAL MOBILITY, BALANCE, GAIT, AND ENDURANCE: ICD-10-CM

## 2020-11-19 NOTE — TELEPHONE ENCOUNTER
----- Message from Elizabeth Craven sent at 11/19/2020  2:52 PM CST -----  Contact: [facility] [phone]  Requesting orders for a bedside commode be faxed to [phone]    Please call and advise.    Thank You

## 2020-11-23 ENCOUNTER — ANTI-COAG VISIT (OUTPATIENT)
Dept: CARDIOLOGY | Facility: CLINIC | Age: 65
End: 2020-11-23
Payer: MEDICARE

## 2020-11-23 DIAGNOSIS — I63.411 EMBOLIC STROKE INVOLVING RIGHT MIDDLE CEREBRAL ARTERY: ICD-10-CM

## 2020-11-23 DIAGNOSIS — Z79.01 LONG TERM (CURRENT) USE OF ANTICOAGULANTS: ICD-10-CM

## 2020-11-23 LAB — INR PPP: 2.1

## 2020-11-23 PROCEDURE — 93793 ANTICOAG MGMT PT WARFARIN: CPT | Mod: S$GLB,,,

## 2020-11-23 PROCEDURE — 93793 PR ANTICOAGULANT MGMT FOR PT TAKING WARFARIN: ICD-10-PCS | Mod: S$GLB,,,

## 2020-11-24 ENCOUNTER — TELEPHONE (OUTPATIENT)
Dept: INTERNAL MEDICINE | Facility: CLINIC | Age: 65
End: 2020-11-24

## 2020-11-24 ENCOUNTER — HOSPITAL ENCOUNTER (INPATIENT)
Facility: HOSPITAL | Age: 65
LOS: 3 days | Discharge: HOME HEALTH | DRG: 181 | End: 2020-11-27
Attending: EMERGENCY MEDICINE | Admitting: EMERGENCY MEDICINE
Payer: MEDICARE

## 2020-11-24 ENCOUNTER — OFFICE VISIT (OUTPATIENT)
Dept: INTERNAL MEDICINE | Facility: CLINIC | Age: 65
DRG: 181 | End: 2020-11-24
Payer: MEDICARE

## 2020-11-24 VITALS
BODY MASS INDEX: 31.45 KG/M2 | HEART RATE: 113 BPM | DIASTOLIC BLOOD PRESSURE: 72 MMHG | SYSTOLIC BLOOD PRESSURE: 130 MMHG | OXYGEN SATURATION: 97 % | HEIGHT: 69 IN

## 2020-11-24 DIAGNOSIS — M25.569 KNEE PAIN: ICD-10-CM

## 2020-11-24 DIAGNOSIS — E11.65 TYPE 2 DIABETES MELLITUS WITH HYPERGLYCEMIA, WITH LONG-TERM CURRENT USE OF INSULIN: ICD-10-CM

## 2020-11-24 DIAGNOSIS — N30.00 ACUTE CYSTITIS WITHOUT HEMATURIA: ICD-10-CM

## 2020-11-24 DIAGNOSIS — I69.398 ALTERED SENSATION DUE TO RECENT STROKE: Primary | ICD-10-CM

## 2020-11-24 DIAGNOSIS — Z79.4 TYPE 2 DIABETES MELLITUS WITH HYPERGLYCEMIA, WITH LONG-TERM CURRENT USE OF INSULIN: ICD-10-CM

## 2020-11-24 DIAGNOSIS — C34.92 NON-SMALL CELL CANCER OF LEFT LUNG: ICD-10-CM

## 2020-11-24 DIAGNOSIS — J90 PLEURAL EFFUSION: ICD-10-CM

## 2020-11-24 DIAGNOSIS — G93.89 BRAIN MASS: ICD-10-CM

## 2020-11-24 DIAGNOSIS — R41.3 MEMORY DIFFICULTY: ICD-10-CM

## 2020-11-24 DIAGNOSIS — Z86.73 HISTORY OF RECENT STROKE: ICD-10-CM

## 2020-11-24 DIAGNOSIS — T45.1X5A CHEMOTHERAPY-INDUCED NEUROPATHY: ICD-10-CM

## 2020-11-24 DIAGNOSIS — C79.31 METASTASIS TO BRAIN: ICD-10-CM

## 2020-11-24 DIAGNOSIS — R26.89 BALANCE DISORDER: ICD-10-CM

## 2020-11-24 DIAGNOSIS — C78.00 MALIGNANT NEOPLASM METASTATIC TO LUNG, UNSPECIFIED LATERALITY: ICD-10-CM

## 2020-11-24 DIAGNOSIS — R41.82 ALTERED MENTAL STATUS, UNSPECIFIED ALTERED MENTAL STATUS TYPE: ICD-10-CM

## 2020-11-24 DIAGNOSIS — R41.82 AMS (ALTERED MENTAL STATUS): ICD-10-CM

## 2020-11-24 DIAGNOSIS — G62.0 CHEMOTHERAPY-INDUCED NEUROPATHY: ICD-10-CM

## 2020-11-24 DIAGNOSIS — R00.0 TACHYCARDIA: ICD-10-CM

## 2020-11-24 DIAGNOSIS — F33.0 MILD EPISODE OF RECURRENT MAJOR DEPRESSIVE DISORDER: Primary | ICD-10-CM

## 2020-11-24 DIAGNOSIS — R20.9 ALTERED SENSATION DUE TO RECENT STROKE: Primary | ICD-10-CM

## 2020-11-24 PROBLEM — R53.1 WEAKNESS: Status: ACTIVE | Noted: 2020-11-24

## 2020-11-24 LAB
ALBUMIN SERPL BCP-MCNC: 3.4 G/DL (ref 3.5–5.2)
ALLENS TEST: ABNORMAL
ALP SERPL-CCNC: 72 U/L (ref 55–135)
ALT SERPL W/O P-5'-P-CCNC: 25 U/L (ref 10–44)
ANION GAP SERPL CALC-SCNC: 12 MMOL/L (ref 8–16)
AST SERPL-CCNC: 26 U/L (ref 10–40)
BACTERIA #/AREA URNS AUTO: ABNORMAL /HPF
BASOPHILS # BLD AUTO: 0.05 K/UL (ref 0–0.2)
BASOPHILS NFR BLD: 0.7 % (ref 0–1.9)
BILIRUB SERPL-MCNC: 0.5 MG/DL (ref 0.1–1)
BILIRUB UR QL STRIP: NEGATIVE
BUN SERPL-MCNC: 16 MG/DL (ref 8–23)
CALCIUM SERPL-MCNC: 9.3 MG/DL (ref 8.7–10.5)
CHLORIDE SERPL-SCNC: 100 MMOL/L (ref 95–110)
CLARITY UR REFRACT.AUTO: CLEAR
CO2 SERPL-SCNC: 25 MMOL/L (ref 23–29)
COLOR UR AUTO: YELLOW
CREAT SERPL-MCNC: 0.9 MG/DL (ref 0.5–1.4)
CTP QC/QA: YES
DIFFERENTIAL METHOD: ABNORMAL
EOSINOPHIL # BLD AUTO: 0.1 K/UL (ref 0–0.5)
EOSINOPHIL NFR BLD: 1.6 % (ref 0–8)
ERYTHROCYTE [DISTWIDTH] IN BLOOD BY AUTOMATED COUNT: 14.9 % (ref 11.5–14.5)
EST. GFR  (AFRICAN AMERICAN): >60 ML/MIN/1.73 M^2
EST. GFR  (NON AFRICAN AMERICAN): >60 ML/MIN/1.73 M^2
GLUCOSE SERPL-MCNC: 104 MG/DL (ref 70–110)
GLUCOSE SERPL-MCNC: 127 MG/DL (ref 70–110)
GLUCOSE UR QL STRIP: NEGATIVE
HCO3 UR-SCNC: 29.5 MMOL/L (ref 24–28)
HCT VFR BLD AUTO: 37.8 % (ref 37–48.5)
HGB BLD-MCNC: 11.4 G/DL (ref 12–16)
HGB UR QL STRIP: ABNORMAL
IMM GRANULOCYTES # BLD AUTO: 0.06 K/UL (ref 0–0.04)
IMM GRANULOCYTES NFR BLD AUTO: 0.8 % (ref 0–0.5)
INR PPP: 1.5 (ref 0.8–1.2)
KETONES UR QL STRIP: ABNORMAL
LACTATE SERPL-SCNC: 1.4 MMOL/L (ref 0.5–2.2)
LEUKOCYTE ESTERASE UR QL STRIP: NEGATIVE
LYMPHOCYTES # BLD AUTO: 1.3 K/UL (ref 1–4.8)
LYMPHOCYTES NFR BLD: 18.5 % (ref 18–48)
MCH RBC QN AUTO: 26.5 PG (ref 27–31)
MCHC RBC AUTO-ENTMCNC: 30.2 G/DL (ref 32–36)
MCV RBC AUTO: 88 FL (ref 82–98)
MICROSCOPIC COMMENT: ABNORMAL
MONOCYTES # BLD AUTO: 0.8 K/UL (ref 0.3–1)
MONOCYTES NFR BLD: 11 % (ref 4–15)
NEUTROPHILS # BLD AUTO: 4.8 K/UL (ref 1.8–7.7)
NEUTROPHILS NFR BLD: 67.4 % (ref 38–73)
NITRITE UR QL STRIP: NEGATIVE
NRBC BLD-RTO: 0 /100 WBC
PCO2 BLDA: 49.8 MMHG (ref 35–45)
PH SMN: 7.38 [PH] (ref 7.35–7.45)
PH UR STRIP: 5 [PH] (ref 5–8)
PLATELET # BLD AUTO: 308 K/UL (ref 150–350)
PMV BLD AUTO: 11.4 FL (ref 9.2–12.9)
PO2 BLDA: 20 MMHG (ref 40–60)
POC BE: 4 MMOL/L
POC SATURATED O2: 30 % (ref 95–100)
POC TCO2: 31 MMOL/L (ref 24–29)
POTASSIUM SERPL-SCNC: 4.1 MMOL/L (ref 3.5–5.1)
PROT SERPL-MCNC: 7.7 G/DL (ref 6–8.4)
PROT UR QL STRIP: NEGATIVE
PROTHROMBIN TIME: 16.6 SEC (ref 9–12.5)
RBC # BLD AUTO: 4.3 M/UL (ref 4–5.4)
RBC #/AREA URNS AUTO: 16 /HPF (ref 0–4)
SAMPLE: ABNORMAL
SARS-COV-2 RDRP RESP QL NAA+PROBE: NEGATIVE
SITE: ABNORMAL
SODIUM SERPL-SCNC: 137 MMOL/L (ref 136–145)
SP GR UR STRIP: 1.02 (ref 1–1.03)
SQUAMOUS #/AREA URNS AUTO: 2 /HPF
URN SPEC COLLECT METH UR: ABNORMAL
WBC # BLD AUTO: 7.07 K/UL (ref 3.9–12.7)
WBC #/AREA URNS AUTO: 2 /HPF (ref 0–5)

## 2020-11-24 PROCEDURE — 80053 COMPREHEN METABOLIC PANEL: CPT

## 2020-11-24 PROCEDURE — 99999 PR PBB SHADOW E&M-EST. PATIENT-LVL III: ICD-10-PCS | Mod: PBBFAC,GC,, | Performed by: STUDENT IN AN ORGANIZED HEALTH CARE EDUCATION/TRAINING PROGRAM

## 2020-11-24 PROCEDURE — 3075F SYST BP GE 130 - 139MM HG: CPT | Mod: CPTII,S$GLB,, | Performed by: INTERNAL MEDICINE

## 2020-11-24 PROCEDURE — 25000003 PHARM REV CODE 250: Performed by: STUDENT IN AN ORGANIZED HEALTH CARE EDUCATION/TRAINING PROGRAM

## 2020-11-24 PROCEDURE — 99285 EMERGENCY DEPT VISIT HI MDM: CPT | Mod: CS,,, | Performed by: EMERGENCY MEDICINE

## 2020-11-24 PROCEDURE — U0002 COVID-19 LAB TEST NON-CDC: HCPCS | Performed by: EMERGENCY MEDICINE

## 2020-11-24 PROCEDURE — 99285 EMERGENCY DEPT VISIT HI MDM: CPT | Mod: 25

## 2020-11-24 PROCEDURE — 25000003 PHARM REV CODE 250: Performed by: INTERNAL MEDICINE

## 2020-11-24 PROCEDURE — 85025 COMPLETE CBC W/AUTO DIFF WBC: CPT

## 2020-11-24 PROCEDURE — 85610 PROTHROMBIN TIME: CPT

## 2020-11-24 PROCEDURE — 99499 RISK ADDL DX/OHS AUDIT: ICD-10-PCS | Mod: S$GLB,,, | Performed by: INTERNAL MEDICINE

## 2020-11-24 PROCEDURE — 63600175 PHARM REV CODE 636 W HCPCS: Performed by: STUDENT IN AN ORGANIZED HEALTH CARE EDUCATION/TRAINING PROGRAM

## 2020-11-24 PROCEDURE — 3078F PR MOST RECENT DIASTOLIC BLOOD PRESSURE < 80 MM HG: ICD-10-PCS | Mod: CPTII,S$GLB,, | Performed by: INTERNAL MEDICINE

## 2020-11-24 PROCEDURE — 99900035 HC TECH TIME PER 15 MIN (STAT)

## 2020-11-24 PROCEDURE — 83605 ASSAY OF LACTIC ACID: CPT

## 2020-11-24 PROCEDURE — 81001 URINALYSIS AUTO W/SCOPE: CPT

## 2020-11-24 PROCEDURE — 3075F PR MOST RECENT SYSTOLIC BLOOD PRESS GE 130-139MM HG: ICD-10-PCS | Mod: CPTII,S$GLB,, | Performed by: INTERNAL MEDICINE

## 2020-11-24 PROCEDURE — 12000002 HC ACUTE/MED SURGE SEMI-PRIVATE ROOM

## 2020-11-24 PROCEDURE — 3078F DIAST BP <80 MM HG: CPT | Mod: CPTII,S$GLB,, | Performed by: INTERNAL MEDICINE

## 2020-11-24 PROCEDURE — 99214 OFFICE O/P EST MOD 30 MIN: CPT | Mod: S$GLB,,, | Performed by: INTERNAL MEDICINE

## 2020-11-24 PROCEDURE — 99214 PR OFFICE/OUTPT VISIT, EST, LEVL IV, 30-39 MIN: ICD-10-PCS | Mod: S$GLB,,, | Performed by: INTERNAL MEDICINE

## 2020-11-24 PROCEDURE — 82803 BLOOD GASES ANY COMBINATION: CPT

## 2020-11-24 PROCEDURE — 99285 PR EMERGENCY DEPT VISIT,LEVEL V: ICD-10-PCS | Mod: CS,,, | Performed by: EMERGENCY MEDICINE

## 2020-11-24 PROCEDURE — 82962 GLUCOSE BLOOD TEST: CPT | Mod: S$GLB,,, | Performed by: INTERNAL MEDICINE

## 2020-11-24 PROCEDURE — 82962 POCT GLUCOSE, HAND-HELD DEVICE: ICD-10-PCS | Mod: S$GLB,,, | Performed by: INTERNAL MEDICINE

## 2020-11-24 PROCEDURE — 99999 PR PBB SHADOW E&M-EST. PATIENT-LVL III: CPT | Mod: PBBFAC,GC,, | Performed by: STUDENT IN AN ORGANIZED HEALTH CARE EDUCATION/TRAINING PROGRAM

## 2020-11-24 PROCEDURE — 99499 UNLISTED E&M SERVICE: CPT | Mod: S$GLB,,, | Performed by: INTERNAL MEDICINE

## 2020-11-24 RX ORDER — POLYETHYLENE GLYCOL 3350 17 G/17G
17 POWDER, FOR SOLUTION ORAL DAILY
Status: DISCONTINUED | OUTPATIENT
Start: 2020-11-25 | End: 2020-11-27 | Stop reason: HOSPADM

## 2020-11-24 RX ORDER — VANCOMYCIN HCL IN 5 % DEXTROSE 1G/250ML
1000 PLASTIC BAG, INJECTION (ML) INTRAVENOUS
Status: DISCONTINUED | OUTPATIENT
Start: 2020-11-24 | End: 2020-11-24

## 2020-11-24 RX ORDER — EZETIMIBE 10 MG/1
10 TABLET ORAL NIGHTLY
Status: DISCONTINUED | OUTPATIENT
Start: 2020-11-24 | End: 2020-11-27 | Stop reason: HOSPADM

## 2020-11-24 RX ORDER — TAMSULOSIN HYDROCHLORIDE 0.4 MG/1
0.4 CAPSULE ORAL DAILY
Status: CANCELLED | OUTPATIENT
Start: 2020-11-24

## 2020-11-24 RX ORDER — WARFARIN 2.5 MG/1
5 TABLET ORAL DAILY
Status: DISCONTINUED | OUTPATIENT
Start: 2020-11-24 | End: 2020-11-25

## 2020-11-24 RX ORDER — VENLAFAXINE HYDROCHLORIDE 75 MG/1
75 CAPSULE, EXTENDED RELEASE ORAL DAILY
Status: DISCONTINUED | OUTPATIENT
Start: 2020-11-25 | End: 2020-11-27 | Stop reason: HOSPADM

## 2020-11-24 RX ORDER — GABAPENTIN 300 MG/1
300 CAPSULE ORAL NIGHTLY PRN
Qty: 90 CAPSULE | Refills: 3 | Status: CANCELLED | OUTPATIENT
Start: 2020-11-24 | End: 2021-11-24

## 2020-11-24 RX ORDER — SODIUM CHLORIDE 0.9 % (FLUSH) 0.9 %
10 SYRINGE (ML) INJECTION
Status: DISCONTINUED | OUTPATIENT
Start: 2020-11-24 | End: 2020-11-27 | Stop reason: HOSPADM

## 2020-11-24 RX ORDER — BETHANECHOL CHLORIDE 25 MG/1
25 TABLET ORAL 3 TIMES DAILY
Status: DISCONTINUED | OUTPATIENT
Start: 2020-11-25 | End: 2020-11-27 | Stop reason: HOSPADM

## 2020-11-24 RX ORDER — GABAPENTIN 100 MG/1
100 CAPSULE ORAL 3 TIMES DAILY
Qty: 90 CAPSULE | Refills: 11 | Status: CANCELLED | OUTPATIENT
Start: 2020-11-24 | End: 2021-11-24

## 2020-11-24 RX ORDER — GABAPENTIN 300 MG/1
300 CAPSULE ORAL NIGHTLY PRN
Status: DISCONTINUED | OUTPATIENT
Start: 2020-11-24 | End: 2020-11-27 | Stop reason: HOSPADM

## 2020-11-24 RX ORDER — DEXAMETHASONE SODIUM PHOSPHATE 4 MG/ML
10 INJECTION, SOLUTION INTRA-ARTICULAR; INTRALESIONAL; INTRAMUSCULAR; INTRAVENOUS; SOFT TISSUE ONCE
Status: COMPLETED | OUTPATIENT
Start: 2020-11-25 | End: 2020-11-25

## 2020-11-24 RX ORDER — DEXAMETHASONE SODIUM PHOSPHATE 4 MG/ML
4 INJECTION, SOLUTION INTRA-ARTICULAR; INTRALESIONAL; INTRAMUSCULAR; INTRAVENOUS; SOFT TISSUE EVERY 6 HOURS
Status: DISCONTINUED | OUTPATIENT
Start: 2020-11-25 | End: 2020-11-27

## 2020-11-24 RX ORDER — TAMSULOSIN HYDROCHLORIDE 0.4 MG/1
0.4 CAPSULE ORAL NIGHTLY
Status: DISCONTINUED | OUTPATIENT
Start: 2020-11-24 | End: 2020-11-27 | Stop reason: HOSPADM

## 2020-11-24 RX ORDER — HYDROCODONE BITARTRATE AND ACETAMINOPHEN 5; 325 MG/1; MG/1
1 TABLET ORAL EVERY 4 HOURS PRN
Status: DISCONTINUED | OUTPATIENT
Start: 2020-11-24 | End: 2020-11-27 | Stop reason: HOSPADM

## 2020-11-24 RX ORDER — TALC
6 POWDER (GRAM) TOPICAL NIGHTLY PRN
Status: DISCONTINUED | OUTPATIENT
Start: 2020-11-24 | End: 2020-11-27 | Stop reason: HOSPADM

## 2020-11-24 RX ORDER — ONDANSETRON 8 MG/1
8 TABLET, ORALLY DISINTEGRATING ORAL EVERY 8 HOURS PRN
Status: DISCONTINUED | OUTPATIENT
Start: 2020-11-24 | End: 2020-11-27 | Stop reason: HOSPADM

## 2020-11-24 RX ORDER — CEFEPIME HYDROCHLORIDE 2 G/1
2 INJECTION, POWDER, FOR SOLUTION INTRAVENOUS
Status: COMPLETED | OUTPATIENT
Start: 2020-11-24 | End: 2020-11-24

## 2020-11-24 RX ADMIN — TAMSULOSIN HYDROCHLORIDE 0.4 MG: 0.4 CAPSULE ORAL at 08:11

## 2020-11-24 RX ADMIN — CEFEPIME 2 G: 2 INJECTION, POWDER, FOR SOLUTION INTRAVENOUS at 08:11

## 2020-11-24 RX ADMIN — WARFARIN SODIUM 5 MG: 5 TABLET ORAL at 08:11

## 2020-11-24 RX ADMIN — VANCOMYCIN HYDROCHLORIDE 2000 MG: 10 INJECTION, POWDER, LYOPHILIZED, FOR SOLUTION INTRAVENOUS at 08:11

## 2020-11-24 NOTE — TELEPHONE ENCOUNTER
FYI    Pt was seen today in resident clinic by Dr Tiffany Watkins ans was sent to ED.    Thanks     Celeste

## 2020-11-24 NOTE — ED TRIAGE NOTES
Alison Branch, a 65 y.o. female presents to the ED w/ complaint of AMS. Sent from PCP for AMS, fatigue, and weakness leaving clinic. Pt's grandson reports pt went unresponsive in wheelchair for a brief moment. Pt was suspected to be hypoglycemic at clinic but CBG was 104 after eating peppermint candy. Pt has left side weakness r/t recent stroke and lung cancer. Pt has not had chemo treatment since stroke in October.     Triage note:  Chief Complaint   Patient presents with    Altered Mental Status     arrived from PCP clinic for altered mental status, , pt c/o weakness, pt aao on arrival to ED, clinic reports pt fatigued in clinic, falling asleep in chair     Review of patient's allergies indicates:   Allergen Reactions    Piperacillin-tazobactam Rash     Tolerated cefepime 06/2020     Past Medical History:   Diagnosis Date    Allergy     Brain aneurysm 2010    s/p coiling of one; another not coiled    Breast cyst     Depression 9/19/2019    Diabetes mellitus     Diabetes type 2, controlled     Fever blister     High cholesterol     History of Bell's palsy     HTN (hypertension) 5/20/2014    Recurrent upper respiratory infection (URI)      LOC: The patient is awake, alert, and oriented to place, time, situation. Affect is appropriate.  Speech is appropriate and clear.     APPEARANCE: Patient resting comfortably in no acute distress.  Patient is clean and well groomed.    SKIN: The skin is warm and dry; color consistent with ethnicity.  Patient has normal skin turgor and moist mucus membranes.  Skin intact; no breakdown or bruising noted.     MUSCULOSKELETAL: Patient moving upper extremities without difficulty. Left sided weakness r/t recent stroke. Needs full assist when transferring from wheelchair to stretcher.     RESPIRATORY: Airway is open and patent. Respirations spontaneous, even, easy, and non-labored.  Patient has a normal effort and rate.  No accessory muscle use noted. Denies cough.  Endorses chronic SOB    CARDIAC:  Normal rhythm and rate noted.  No peripheral edema noted. No complaints of chest pain.      ABDOMEN: Soft and non tender to palpation.  No distention noted. Denies abd     NEUROLOGIC: Eyes open spontaneously.  Behavior appropriate to situation.  Follows commands; facial expression symmetrical.  Purposeful motor response noted; decreased sensation to left side.

## 2020-11-24 NOTE — PROGRESS NOTES
64 y/o woman with DM2 on insulin, lung cancer with metastasis, recent admission for stroke noted as R MCA, L MCA, L cerebellar territories (discharged 10/20 to home with home health), on lovenox here for hospital follow up visit.  Found to be altered - somnolent but arousable, difficulty sitting up straight, tachycardic though BP normal.  on initial check though this is after she has had a peppermint. Has been taking her usual dose of insulin with significantly less PO intake (food/fluids) than prior. Grandson is with her, helps with her care.   Sending to ER for further evaluation and monitoring, determination for further treatment. May need more care than is available with home health - will defer to ER/inpatient team on this.    Antolin Campuzano MD

## 2020-11-24 NOTE — PROGRESS NOTES
Clinic Note  11/24/2020      Subjective:       Patient ID:  Ailson is a 65 y.o. female being seen for an established visit.    Chief Complaint: No chief complaint on file.    HPI     As per EMR    66 y/o female with history of lung cancer w/ metastases to bone (currently undergoing chemotherapy), HTN, HLD, tobacco abuse, hx of PE/DVT (on Eliquis), and DM (hemoglobin A1c 9.3) who was admitted on 9/28/20 to [facility] due to reported stroke symptoms.      She was transferred to [facility] to participate in acute inpatient rehabilitation on 10/5/2020. She was doing well until 10/13 when she complained of difficulty speaking and was transferred to acute care. She was found to have Acute infarct in the R MCA, L MCA, and L cerebellar territories. Echo EF 55%, no L atrial enlargement, despite compliance with Eliquis. Stroke etiology remained unclear - possible hypoperfusion though no vessel stenosis was seen nor signs of dehydration at presentation. There was also oncern remains for hypercoagulable state and she was switched to therapeutic lovenox. .     During this stay the patient's insulin was adjusted down as her blood sugars were relatively low. At the time of DC she was only requiring 12 units of lantus daily with a sliding scale at meals-however she only required 2 doses of 2 units of lispro during her hospital stay so she was not prescribed the lispro on dc.     Bridged over to coumadin and referred to coumadin clinic post discharge. She did have a UTI which was treated and a syncopal episode thus her flomax was discontinued.     Current problems 11/24/2020    Functional capacity  Patient on wheelchair. PT following at home. Impaired movement of left side of body. Alert and oriented x3. As per grandson she has not received any chemotherapy for her malignancy since one month ago. Has scheduled appointment with Heme/Onc for Friday Nov 27th as per patient.    Loss of appetite  Currently taking  Cyproheptadine, not helping. Patient mostly eating soup and does not like drink supplements.     DM2  12 units of lantus daily with a sliding scale at meals. BG's in the AM before breakfast 123-129 mg/dl BG on clinic 104 after drinking some orange juice. Patient feeling weak and lightheaded.    Urine incontinence  Patient was previously taking Flomax but medicine was discontinued because she had a UTI. Currently taking Bethanecol. Today patient reported burning while urinating. Denied any blood in urine or abdominal pain.    Stool incontinence  This is a new problem that started after she was discharged from the rehab facility. Stool is normal in consistency. Pt denies blood in stools and abdominal pain.      Review of Systems   Constitutional: Negative for chills and fever.   HENT: Negative for hearing loss.    Eyes: Negative.    Respiratory: Negative for cough and shortness of breath.    Cardiovascular: Negative for chest pain and palpitations.   Gastrointestinal: Negative for nausea and vomiting.   Genitourinary: Negative.    Musculoskeletal: Negative for myalgias and neck pain.   Skin: Negative for rash.   Neurological: Negative for dizziness and headaches.   Endo/Heme/Allergies: Negative.    Psychiatric/Behavioral: Negative.        Past Medical History:   Diagnosis Date    Allergy     Brain aneurysm 2010    s/p coiling of one; another not coiled    Breast cyst     Depression 9/19/2019    Diabetes mellitus     Diabetes type 2, controlled     Fever blister     High cholesterol     History of Bell's palsy     HTN (hypertension) 5/20/2014    Recurrent upper respiratory infection (URI)        Family History   Problem Relation Age of Onset    Rheum arthritis Father     Diabetes Father     Heart failure Father     Migraines Father     Cataracts Father     Cancer Mother 63        pancreatic    Stomach cancer Mother     Breast cancer Maternal Aunt         50s    Ovarian cancer Cousin     Allergies  Daughter     Diabetes Sister     Diabetes Brother     Cancer Maternal Aunt         Lung CA    Melanoma Neg Hx     Colon cancer Neg Hx     Amblyopia Neg Hx     Blindness Neg Hx     Glaucoma Neg Hx     Macular degeneration Neg Hx     Retinal detachment Neg Hx     Strabismus Neg Hx     Stroke Neg Hx     Thyroid disease Neg Hx     Allergic rhinitis Neg Hx     Angioedema Neg Hx     Asthma Neg Hx     Atopy Neg Hx     Eczema Neg Hx     Immunodeficiency Neg Hx     Rhinitis Neg Hx     Urticaria Neg Hx         reports that she quit smoking about 21 years ago. She has a 15.00 pack-year smoking history. She has never used smokeless tobacco. She reports that she does not drink alcohol or use drugs.    Medication List with Changes/Refills   Current Medications    BLOOD SUGAR DIAGNOSTIC STRP    To check BG 4 times daily, to use with insurance preferred meter    CARBOXYMETHYLCELLULOSE (REFRESH PLUS) 0.5 % DPET    1 drop 3 (three) times daily as needed.    CYPROHEPTADINE (PERIACTIN) 4 MG TABLET    Take 1 tablet (4 mg total) by mouth 4 (four) times daily.    ENOXAPARIN (LOVENOX) 100 MG/ML SYRG    Inject 1 mL (100 mg total) into the skin every 12 (twelve) hours.    ERGOCALCIFEROL (ERGOCALCIFEROL) 50,000 UNIT CAP    TAKE 1 CAPSULE BY MOUTH EVERY 7 DAYS    EZETIMIBE (ZETIA) 10 MG TABLET    Take 1 tablet (10 mg total) by mouth every evening.    FLUTICASONE PROPIONATE (FLONASE) 50 MCG/ACTUATION NASAL SPRAY    USE TWO SPRAY(S) IN EACH NOSTRIL ONCE DAILY    FLUTICASONE-SALMETEROL DISKUS INHALER 100-50 MCG    Inhale 1 puff into the lungs 2 (two) times daily. Controller    FUROSEMIDE (LASIX) 20 MG TABLET    Take 2 tablets (40 mg total) by mouth once daily.    GABAPENTIN (NEURONTIN) 300 MG CAPSULE    Take 1 capsule (300 mg total) by mouth nightly as needed (Take as needed at bed time for neuropathy pain and sleep).    INSULIN ASPART U-100 (NOVOLOG) 100 UNIT/ML (3 ML) INPN PEN    Inject 0-5 Units into the skin before meals  "and at bedtime as needed (Hyperglycemia).    INSULIN DETEMIR U-100 (LEVEMIR FLEXTOUCH) 100 UNIT/ML (3 ML) SUBQ INPN PEN    Inject 26 Units into the skin once daily.    INSULIN LISPRO (HUMALOG KWIKPEN INSULIN) 100 UNIT/ML PEN    Inject 12 units TID AC + correction scale. Max TDD of 57 units    LANCETS MISC    1 Device by Misc.(Non-Drug; Combo Route) route once daily. Heladio Result.  250.02.  Check Blood Sugar Twice Daily.    LIDOCAINE (XYLOCAINE) 5 % OINT OINTMENT    Apply topically as needed (For lab draws).    MELATONIN (MELATIN) 3 MG TABLET    Take 2 tablets (6 mg total) by mouth nightly as needed for Insomnia.    NYSTATIN (MYCOSTATIN) POWDER    Apply topically 2 (two) times daily.    ONDANSETRON (ZOFRAN) 8 MG TABLET    Take 1 tablet (8 mg total) by mouth 4 (four) times daily as needed for Nausea.    PEN NEEDLE, DIABETIC 33 GAUGE X 5/32" NDLE    1 each by Misc.(Non-Drug; Combo Route) route 4 (four) times daily with meals and nightly.    TAMSULOSIN (FLOMAX) 0.4 MG CAP    Take 1 capsule (0.4 mg total) by mouth every evening.    VENLAFAXINE (EFFEXOR XR) 75 MG 24 HR CAPSULE    Take 1 capsule (75 mg total) by mouth once daily.    WALKER MISC    Please provide rollator walker for this debilitated cancer patient.  Thank you.    WARFARIN (COUMADIN) 5 MG TABLET    Take 1 tablet (5mg) by mouth daily or As directed by coumadin clinic     Review of patient's allergies indicates:   Allergen Reactions    Piperacillin-tazobactam Rash     Tolerated cefepime 06/2020       Patient Active Problem List   Diagnosis    Type 2 diabetes mellitus with hyperglycemia, with long-term current use of insulin    Mixed hyperlipidemia due to type 2 diabetes mellitus    Attention and concentration deficit    Cerebral aneurysm, coiled in 2010    Hypertension associated with diabetes    Hyperkeratosis of palms and soles    Irritable bowel syndrome    Aneurysm of unspecified site    Obesity (BMI 30-39.9)    Vitamin D deficiency    Malignant " "neoplasm of upper lobe of left lung    Secondary malignant neoplasm of mediastinal lymph node    ROSEMARY (acute kidney injury)    Adrenal cortical steroids causing adverse effect in therapeutic use    Type 2 diabetes mellitus with diabetic polyneuropathy, with long-term current use of insulin    Major depressive disorder, recurrent, unspecified    Memory deficit    Dysuria    Chemotherapy-induced peripheral neuropathy    Shortness of breath on exertion    Impaired functional mobility, balance, gait, and endurance    Type 2 diabetes mellitus with hypoglycemia, with long-term current use of insulin    Pulmonary embolism    Secondary malignant neoplasm of bone    Septic shock    Elevated troponin    Metabolic acidosis    Anemia    Hypophosphatemia    Chronic respiratory failure with hypoxia    Urinary retention    Palliative care encounter    Elevated LFTs    Hypomagnesemia    Compression fracture of T10 vertebra    Edema    Continuous leakage of urine    Constipation    Pain and swelling of lower leg    Acute ischemic multifocal multiple vascular territories stroke    Cytotoxic cerebral edema    Feeling faint    Embolic stroke involving right middle cerebral artery    Oropharyngeal dysphagia    Left-sided weakness    Long term (current) use of anticoagulants           Objective:      There were no vitals taken for this visit.  Estimated body mass index is 31.45 kg/m² as calculated from the following:    Height as of 10/14/20: 5' 9" (1.753 m).    Weight as of 10/14/20: 96.6 kg (213 lb).  Physical Exam   Constitutional: She is oriented to person, place, and time.   HENT:   Head: Normocephalic and atraumatic.   Eyes: Pupils are equal, round, and reactive to light. Right eye exhibits no discharge. Left eye exhibits no discharge.   Neck: Normal range of motion. Neck supple. No thyromegaly present.   Cardiovascular: Normal rate and regular rhythm.   No murmur heard.  Pulmonary/Chest: Effort " normal and breath sounds normal. No respiratory distress.   Abdominal: Soft. Bowel sounds are normal. She exhibits no distension. There is no abdominal tenderness.   Genitourinary:    Genitourinary Comments: Deferred     Musculoskeletal: Normal range of motion.         General: No tenderness, deformity or edema.   Neurological: She is alert and oriented to person, place, and time.   Skin: Skin is warm and dry. No rash noted. She is not diaphoretic. No erythema.   Psychiatric: Mood and affect normal.         Assessment and Plan:     Patient was leaning on wheelchair, expressing that she felt weak. She was hypoactive but oriented x 3. Decreased alertness at times. Her speech was slurred but according to grandson it has been like that since she was discharged from the hospital. On physical examination pupils were responsive but sluggish and patient was tachycardic. Grandson accompanying her and very cooperative. Patient was sent to the ED to r/o UTI and because of poor food intake. May be experiencing episodes of hypoglycemia. Patient currently eating just soups and does not have an appetite.  There are no diagnoses linked to this encounter.    No follow-ups on file.    Other Orders Placed This Visit:  No orders of the defined types were placed in this encounter.          Tiffany Britt MD  PGY-1    Patient examination, assessment and plan are supervised by Dr Campuzano.

## 2020-11-24 NOTE — ED PROVIDER NOTES
"Encounter Date: 11/24/2020       History     Chief Complaint   Patient presents with    Altered Mental Status     arrived from PCP clinic for altered mental status, , pt c/o weakness, pt aao on arrival to ED, clinic reports pt fatigued in clinic, falling asleep in chair     Ms. Branch is a 66yo female with a history of diabetes, pulmonary adenocarcinoma with mets to the spine, previous brain aneurysm s/p coiling, and recent embolic stroke in October with left sided residual deficits who is presenting for acute AMS with somnolence from clinic. Her grandson reportsfunctional decline at home over the last 2 weeks since discharging from hospital and states she has also had decreased appetite with poor PO intake. She reports she has had chills, dysuria, and foul smelling urine since she left the hospital 2 weeks ago. She denies any cough, chest pain, sore throat, headache, abdominal pain, nausea, vomiting, diarrhea or fever.         Review of patient's allergies indicates:   Allergen Reactions    Piperacillin-tazobactam Rash     Tolerated cefepime 06/2020     Past Medical History:   Diagnosis Date    Allergy     Brain aneurysm 2010    s/p coiling of one; another not coiled    Breast cyst     Depression 9/19/2019    Diabetes mellitus     Diabetes type 2, controlled     Fever blister     High cholesterol     History of Bell's palsy     HTN (hypertension) 5/20/2014    Recurrent upper respiratory infection (URI)      Past Surgical History:   Procedure Laterality Date    BREAST SURGERY      BRONCHOSCOPY N/A 5/28/2019    Procedure: Bronchoscopy;  Surgeon: Iesha Diagnostic Provider;  Location: [facility] OR [address] [place] [address];  Service: Anesthesiology;  Laterality: N/A;    CERVICAL FUSION      COLONOSCOPY N/A 3/9/2016    Procedure: COLONOSCOPY;  Surgeon: Elliott Zimmerman MD;  Location: [facility] (4TH FLR);  Service: Endoscopy;  Laterality: N/A;    head surgery      stent and "curling" for aneurysm    HYSTERECTOMY      " TVH secondary to SUF    INSERTION OF TUNNELED CENTRAL VENOUS CATHETER (CVC) WITH SUBCUTANEOUS PORT Right 2019    Procedure: INSERTION, PORT-A-CATH;  Surgeon: Cali Damico MD;  Location: [facility] CATH LAB;  Service: Radiology;  Laterality: Right;    MENISCECTOMY Left      Family History   Problem Relation Age of Onset    Rheum arthritis Father     Diabetes Father     Heart failure Father     Migraines Father     Cataracts Father     Cancer Mother 63        pancreatic    Stomach cancer Mother     Breast cancer Maternal Aunt         50s    Ovarian cancer Cousin     Allergies Daughter     Diabetes Sister     Diabetes Brother     Cancer Maternal Aunt         Lung CA    Melanoma Neg Hx     Colon cancer Neg Hx     Amblyopia Neg Hx     Blindness Neg Hx     Glaucoma Neg Hx     Macular degeneration Neg Hx     Retinal detachment Neg Hx     Strabismus Neg Hx     Stroke Neg Hx     Thyroid disease Neg Hx     Allergic rhinitis Neg Hx     Angioedema Neg Hx     Asthma Neg Hx     Atopy Neg Hx     Eczema Neg Hx     Immunodeficiency Neg Hx     Rhinitis Neg Hx     Urticaria Neg Hx      Social History     Tobacco Use    Smoking status: Former Smoker     Packs/day: 0.50     Years: 30.00     Pack years: 15.00     Quit date: 1999     Years since quittin.9    Smokeless tobacco: Never Used   Substance Use Topics    Alcohol use: No     Alcohol/week: 0.0 standard drinks    Drug use: No     Review of Systems   Constitutional: Positive for activity change, appetite change, chills and fatigue. Negative for fever.   HENT: Negative for congestion, postnasal drip and sore throat.    Respiratory: Negative for cough, shortness of breath and wheezing.    Cardiovascular: Negative for chest pain and leg swelling.   Gastrointestinal: Negative for abdominal pain, constipation, diarrhea, nausea and vomiting.   Endocrine: Negative for polyuria.   Genitourinary: Positive for dysuria. Negative for frequency,  hematuria and urgency.   Musculoskeletal: Positive for arthralgias (left knee) and joint swelling (left knee). Negative for back pain and myalgias.   Skin: Negative for pallor and rash.   Neurological: Positive for weakness. Negative for dizziness, syncope, light-headedness and headaches.   Hematological: Does not bruise/bleed easily.       Physical Exam     Initial Vitals [11/24/20 1600]   BP Pulse Resp Temp SpO2   (!) 141/64 (!) 113 17 97.6 °F (36.4 °C) 95 %      MAP       --         Physical Exam    Constitutional: She appears well-developed and well-nourished. She is not diaphoretic. No distress.   HENT:   Head: Normocephalic and atraumatic.   Right Ear: External ear normal.   Left Ear: External ear normal.   Nose: Nose normal.   Mouth/Throat: Oropharynx is clear and moist.   Eyes: Conjunctivae and EOM are normal. Pupils are equal, round, and reactive to light. Right eye exhibits no discharge. Left eye exhibits no discharge. No scleral icterus.   Neck: Normal range of motion. Neck supple.   Cardiovascular: Normal rate, regular rhythm, normal heart sounds and intact distal pulses. Exam reveals no gallop and no friction rub.    No murmur heard.  Pulmonary/Chest: Breath sounds normal. No respiratory distress. She has no wheezes. She has no rhonchi. She has no rales.   Abdominal: Soft. Bowel sounds are normal. She exhibits no distension and no mass. There is no abdominal tenderness. There is no rebound and no guarding.   Musculoskeletal:      Comments: Residual left sided weakness     Neurological: She is oriented to person, place, and time. No cranial nerve deficit.   Alert but somnolent      Skin: Skin is warm and dry. Capillary refill takes less than 2 seconds. No rash noted. No erythema.   Psychiatric: She has a normal mood and affect. Her behavior is normal. Judgment and thought content normal.         ED Course   Procedures  Labs Reviewed - No data to display       Imaging Results    None       X-Rays:    Independently Interpreted Readings:   Head CT: No acute stroke.  No skull fracture.  No hemorrhage. No acute abnormalities and chronic changes from prior stroke    Other Readings:  Chest xray showing large left sided pleural effusion and right sided pulmonary infiltrates concerning for possible infectious process     Medical Decision Making:   History:   I obtained history from: someone other than patient.       <> Summary of History: History obtained from patient and her grandson at bedside   Old Medical Records: I decided to obtain old medical records.  Old Records Summarized: records from clinic visits.  Initial Assessment:   THAD Branch is a 66yo female with a history of diabetes, pulmonary adenocarcinoma with mets to the spine, previous brain aneurysm s/p coiling, and recent embolic stroke in October with left sided residual deficits who is presenting for acute AMS with somnolence from clinic.   Differential Diagnosis:   Differentials include infectious aetiology including but not limited to UTI/pylonephritis, pneumonia, acute bronchitis, meningitis, and sepsis. Neurological causes such as new onset ischemic stroke or intracranial hemorrhage. Metabolic abnormalities such as abnormal electrolytes or hypoglycemia. Most likely infectious source is urological due to recent dysuria and lack of symptoms to indicate other sources. No new neurologic deficits but important to rule out neurological causes due to recent history.    Clinical Tests:   Lab Tests: Ordered and Reviewed  The following lab test(s) were unremarkable: CBC, CMP, Urinalysis and Lactate       <> Summary of Lab: No leukocytosis or elevated lactate   CMP without any major electrolyte abnormalities   Radiological Study: Ordered and Reviewed  ED Management:  Infectious workup: cbc, cmp. Lactic acid, chest xray, and UA. Neurological workup: CT head. Metabolic workup: CMP and CBC. Also including an xray of her left knee to reported edema and pain. On  Warfarin therefore INR ordered. CXR with large left sided pleural effusion and right sided infiltrates concerning for infectious process. Pulmonology consulted and hematology consulted. HCAP coverage started with Cefepime and Vanc. Admitted to hematology oncology.               Attending Attestation:   Physician Attestation Statement for Resident:  As the supervising MD   Physician Attestation Statement: I have personally seen and examined this patient.   I agree with the above history. -:   As the supervising MD I agree with the above PE.    As the supervising MD I agree with the above treatment, course, plan, and disposition.                              Clinical Impression:       ICD-10-CM ICD-9-CM   1. AMS (altered mental status)  R41.82 780.97   2. Knee pain  M25.569 719.46                                               Joi Murphy MD  Resident  11/24/20 1859       Joi Murphy MD  Resident  11/24/20 1959       Joi Murphy MD  Resident  11/24/20 2224       Donald Arroyo MD  11/26/20 [id number]

## 2020-11-25 PROBLEM — C79.31 SECONDARY MALIGNANT NEOPLASM OF BRAIN: Status: ACTIVE | Noted: 2020-11-25

## 2020-11-25 PROBLEM — G93.89 BRAIN MASS: Status: ACTIVE | Noted: 2020-11-25

## 2020-11-25 LAB
ABO + RH BLD: NORMAL
ALBUMIN FLD-MCNC: 3.1 G/DL
ALBUMIN SERPL BCP-MCNC: 3.3 G/DL (ref 3.5–5.2)
ALLENS TEST: ABNORMAL
ALP SERPL-CCNC: 77 U/L (ref 55–135)
ALT SERPL W/O P-5'-P-CCNC: 28 U/L (ref 10–44)
ANION GAP SERPL CALC-SCNC: 12 MMOL/L (ref 8–16)
APPEARANCE FLD: NORMAL
APTT BLDCRRT: 32.7 SEC (ref 21–32)
AST SERPL-CCNC: 26 U/L (ref 10–40)
BASOPHILS # BLD AUTO: 0.02 K/UL (ref 0–0.2)
BASOPHILS NFR BLD: 0.3 % (ref 0–1.9)
BILIRUB SERPL-MCNC: 0.5 MG/DL (ref 0.1–1)
BLD GP AB SCN CELLS X3 SERPL QL: NORMAL
BODY FLD TYPE: NORMAL
BODY FLUID SOURCE, LDH: NORMAL
BUN SERPL-MCNC: 13 MG/DL (ref 8–23)
CALCIUM SERPL-MCNC: 8.9 MG/DL (ref 8.7–10.5)
CHLORIDE SERPL-SCNC: 100 MMOL/L (ref 95–110)
CO2 SERPL-SCNC: 21 MMOL/L (ref 23–29)
COLOR FLD: NORMAL
CREAT SERPL-MCNC: 0.8 MG/DL (ref 0.5–1.4)
DELSYS: ABNORMAL
DIFFERENTIAL METHOD: ABNORMAL
EOSINOPHIL # BLD AUTO: 0 K/UL (ref 0–0.5)
EOSINOPHIL NFR BLD: 0 % (ref 0–8)
EOSINOPHIL NFR FLD MANUAL: 4 %
ERYTHROCYTE [DISTWIDTH] IN BLOOD BY AUTOMATED COUNT: 14.9 % (ref 11.5–14.5)
EST. GFR  (AFRICAN AMERICAN): >60 ML/MIN/1.73 M^2
EST. GFR  (NON AFRICAN AMERICAN): >60 ML/MIN/1.73 M^2
GLUCOSE FLD-MCNC: 238 MG/DL
GLUCOSE SERPL-MCNC: 289 MG/DL (ref 70–110)
HCO3 UR-SCNC: 24.1 MMOL/L (ref 24–28)
HCT VFR BLD AUTO: 37.3 % (ref 37–48.5)
HGB BLD-MCNC: 11 G/DL (ref 12–16)
IMM GRANULOCYTES # BLD AUTO: 0.03 K/UL (ref 0–0.04)
IMM GRANULOCYTES NFR BLD AUTO: 0.5 % (ref 0–0.5)
INR PPP: 1.7 (ref 0.8–1.2)
LDH FLD L TO P-CCNC: 589 U/L
LYMPHOCYTES # BLD AUTO: 0.5 K/UL (ref 1–4.8)
LYMPHOCYTES NFR BLD: 7.5 % (ref 18–48)
LYMPHOCYTES NFR FLD MANUAL: 67 %
MCH RBC QN AUTO: 25.9 PG (ref 27–31)
MCHC RBC AUTO-ENTMCNC: 29.5 G/DL (ref 32–36)
MCV RBC AUTO: 88 FL (ref 82–98)
MONOCYTES # BLD AUTO: 0.1 K/UL (ref 0.3–1)
MONOCYTES NFR BLD: 1.1 % (ref 4–15)
MONOS+MACROS NFR FLD MANUAL: 19 %
NEUTROPHILS # BLD AUTO: 5.8 K/UL (ref 1.8–7.7)
NEUTROPHILS NFR BLD: 90.6 % (ref 38–73)
NEUTROPHILS NFR FLD MANUAL: 10 %
NRBC BLD-RTO: 0 /100 WBC
PCO2 BLDA: 38 MMHG (ref 35–45)
PH SMN: 7.41 [PH] (ref 7.35–7.45)
PLATELET # BLD AUTO: 278 K/UL (ref 150–350)
PMV BLD AUTO: 11.6 FL (ref 9.2–12.9)
PO2 BLDA: 74 MMHG (ref 80–100)
POC BE: 0 MMOL/L
POC SATURATED O2: 95 % (ref 95–100)
POC TCO2: 25 MMOL/L (ref 23–27)
POCT GLUCOSE: 188 MG/DL (ref 70–110)
POCT GLUCOSE: 249 MG/DL (ref 70–110)
POCT GLUCOSE: 279 MG/DL (ref 70–110)
POCT GLUCOSE: 323 MG/DL (ref 70–110)
POCT GLUCOSE: 357 MG/DL (ref 70–110)
POTASSIUM SERPL-SCNC: 4.9 MMOL/L (ref 3.5–5.1)
PROT FLD-MCNC: 5 G/DL
PROT SERPL-MCNC: 7.7 G/DL (ref 6–8.4)
PROTHROMBIN TIME: 18.2 SEC (ref 9–12.5)
RBC # BLD AUTO: 4.24 M/UL (ref 4–5.4)
SAMPLE: ABNORMAL
SITE: ABNORMAL
SODIUM SERPL-SCNC: 133 MMOL/L (ref 136–145)
SPECIMEN SOURCE: NORMAL
WBC # BLD AUTO: 6.37 K/UL (ref 3.9–12.7)
WBC # FLD: 491 /CU MM

## 2020-11-25 PROCEDURE — 99223 PR INITIAL HOSPITAL CARE,LEVL III: ICD-10-PCS | Mod: GC,,, | Performed by: EMERGENCY MEDICINE

## 2020-11-25 PROCEDURE — 20600001 HC STEP DOWN PRIVATE ROOM

## 2020-11-25 PROCEDURE — 99900035 HC TECH TIME PER 15 MIN (STAT)

## 2020-11-25 PROCEDURE — 84157 ASSAY OF PROTEIN OTHER: CPT

## 2020-11-25 PROCEDURE — 90834 PR PSYCHOTHERAPY W/PATIENT, 45 MIN: ICD-10-PCS | Mod: ,,, | Performed by: PSYCHOLOGIST

## 2020-11-25 PROCEDURE — 85025 COMPLETE CBC W/AUTO DIFF WBC: CPT

## 2020-11-25 PROCEDURE — 88341 IMHCHEM/IMCYTCHM EA ADD ANTB: CPT | Mod: 59 | Performed by: PATHOLOGY

## 2020-11-25 PROCEDURE — 25500020 PHARM REV CODE 255: Performed by: EMERGENCY MEDICINE

## 2020-11-25 PROCEDURE — 80053 COMPREHEN METABOLIC PANEL: CPT

## 2020-11-25 PROCEDURE — 97535 SELF CARE MNGMENT TRAINING: CPT

## 2020-11-25 PROCEDURE — 88341 IMHCHEM/IMCYTCHM EA ADD ANTB: CPT | Mod: 26,,, | Performed by: PATHOLOGY

## 2020-11-25 PROCEDURE — A9585 GADOBUTROL INJECTION: HCPCS | Performed by: EMERGENCY MEDICINE

## 2020-11-25 PROCEDURE — 88342 CHG IMMUNOCYTOCHEMISTRY: ICD-10-PCS | Mod: 26,,, | Performed by: PATHOLOGY

## 2020-11-25 PROCEDURE — 90834 PSYTX W PT 45 MINUTES: CPT | Mod: ,,, | Performed by: PSYCHOLOGIST

## 2020-11-25 PROCEDURE — 99223 1ST HOSP IP/OBS HIGH 75: CPT | Mod: AI,GC,, | Performed by: INTERNAL MEDICINE

## 2020-11-25 PROCEDURE — 88305 TISSUE EXAM BY PATHOLOGIST: ICD-10-PCS | Mod: 26,,, | Performed by: PATHOLOGY

## 2020-11-25 PROCEDURE — 88342 IMHCHEM/IMCYTCHM 1ST ANTB: CPT | Performed by: PATHOLOGY

## 2020-11-25 PROCEDURE — 88112 CYTOPATH CELL ENHANCE TECH: CPT | Performed by: PATHOLOGY

## 2020-11-25 PROCEDURE — 85610 PROTHROMBIN TIME: CPT

## 2020-11-25 PROCEDURE — 99223 1ST HOSP IP/OBS HIGH 75: CPT | Mod: 25,,, | Performed by: INTERNAL MEDICINE

## 2020-11-25 PROCEDURE — 32554 PR THORACEN W/O IMAG GUIDANC: ICD-10-PCS | Mod: LT,GC,, | Performed by: INTERNAL MEDICINE

## 2020-11-25 PROCEDURE — 88342 IMHCHEM/IMCYTCHM 1ST ANTB: CPT | Mod: 26,,, | Performed by: PATHOLOGY

## 2020-11-25 PROCEDURE — 36415 COLL VENOUS BLD VENIPUNCTURE: CPT

## 2020-11-25 PROCEDURE — 94761 N-INVAS EAR/PLS OXIMETRY MLT: CPT

## 2020-11-25 PROCEDURE — 97802 MEDICAL NUTRITION INDIV IN: CPT

## 2020-11-25 PROCEDURE — 25000003 PHARM REV CODE 250: Performed by: STUDENT IN AN ORGANIZED HEALTH CARE EDUCATION/TRAINING PROGRAM

## 2020-11-25 PROCEDURE — 87070 CULTURE OTHR SPECIMN AEROBIC: CPT

## 2020-11-25 PROCEDURE — 97530 THERAPEUTIC ACTIVITIES: CPT

## 2020-11-25 PROCEDURE — 86901 BLOOD TYPING SEROLOGIC RH(D): CPT

## 2020-11-25 PROCEDURE — 93005 ELECTROCARDIOGRAM TRACING: CPT

## 2020-11-25 PROCEDURE — 87205 SMEAR GRAM STAIN: CPT

## 2020-11-25 PROCEDURE — 97166 OT EVAL MOD COMPLEX 45 MIN: CPT

## 2020-11-25 PROCEDURE — 88305 TISSUE EXAM BY PATHOLOGIST: CPT | Mod: 26,,, | Performed by: PATHOLOGY

## 2020-11-25 PROCEDURE — 97161 PT EVAL LOW COMPLEX 20 MIN: CPT

## 2020-11-25 PROCEDURE — 88341 PR IHC OR ICC EACH ADD'L SINGLE ANTIBODY  STAINPR: ICD-10-PCS | Mod: 26,,, | Performed by: PATHOLOGY

## 2020-11-25 PROCEDURE — 99223 1ST HOSP IP/OBS HIGH 75: CPT | Mod: GC,,, | Performed by: EMERGENCY MEDICINE

## 2020-11-25 PROCEDURE — 83615 LACTATE (LD) (LDH) ENZYME: CPT

## 2020-11-25 PROCEDURE — 82042 OTHER SOURCE ALBUMIN QUAN EA: CPT

## 2020-11-25 PROCEDURE — 32554 ASPIRATE PLEURA W/O IMAGING: CPT | Mod: LT,GC,, | Performed by: INTERNAL MEDICINE

## 2020-11-25 PROCEDURE — C9399 UNCLASSIFIED DRUGS OR BIOLOG: HCPCS | Performed by: INTERNAL MEDICINE

## 2020-11-25 PROCEDURE — 85730 THROMBOPLASTIN TIME PARTIAL: CPT

## 2020-11-25 PROCEDURE — 25000003 PHARM REV CODE 250: Performed by: INTERNAL MEDICINE

## 2020-11-25 PROCEDURE — 99223 PR INITIAL HOSPITAL CARE,LEVL III: ICD-10-PCS | Mod: AI,GC,, | Performed by: INTERNAL MEDICINE

## 2020-11-25 PROCEDURE — 93010 ELECTROCARDIOGRAM REPORT: CPT | Mod: ,,, | Performed by: INTERNAL MEDICINE

## 2020-11-25 PROCEDURE — 63600175 PHARM REV CODE 636 W HCPCS: Performed by: INTERNAL MEDICINE

## 2020-11-25 PROCEDURE — 82803 BLOOD GASES ANY COMBINATION: CPT

## 2020-11-25 PROCEDURE — 89051 BODY FLUID CELL COUNT: CPT

## 2020-11-25 PROCEDURE — 99223 PR INITIAL HOSPITAL CARE,LEVL III: ICD-10-PCS | Mod: 25,,, | Performed by: INTERNAL MEDICINE

## 2020-11-25 PROCEDURE — 88112 PR  CYTOPATH, CELL ENHANCE TECH: ICD-10-PCS | Mod: 26,,, | Performed by: PATHOLOGY

## 2020-11-25 PROCEDURE — 88112 CYTOPATH CELL ENHANCE TECH: CPT | Mod: 26,,, | Performed by: PATHOLOGY

## 2020-11-25 PROCEDURE — 88305 TISSUE EXAM BY PATHOLOGIST: CPT | Performed by: PATHOLOGY

## 2020-11-25 PROCEDURE — 93010 EKG 12-LEAD: ICD-10-PCS | Mod: ,,, | Performed by: INTERNAL MEDICINE

## 2020-11-25 PROCEDURE — 36600 WITHDRAWAL OF ARTERIAL BLOOD: CPT

## 2020-11-25 PROCEDURE — 82945 GLUCOSE OTHER FLUID: CPT

## 2020-11-25 RX ORDER — LIDOCAINE HYDROCHLORIDE 10 MG/ML
10 INJECTION INFILTRATION; PERINEURAL ONCE
Status: COMPLETED | OUTPATIENT
Start: 2020-11-25 | End: 2020-11-25

## 2020-11-25 RX ORDER — PANTOPRAZOLE SODIUM 40 MG/1
40 TABLET, DELAYED RELEASE ORAL DAILY
Status: ON HOLD | COMMUNITY
End: 2021-05-26 | Stop reason: HOSPADM

## 2020-11-25 RX ORDER — IBUPROFEN 200 MG
16 TABLET ORAL
Status: DISCONTINUED | OUTPATIENT
Start: 2020-11-25 | End: 2020-11-27 | Stop reason: HOSPADM

## 2020-11-25 RX ORDER — WARFARIN 2.5 MG/1
5 TABLET ORAL DAILY
Status: DISCONTINUED | OUTPATIENT
Start: 2020-11-25 | End: 2020-11-27 | Stop reason: HOSPADM

## 2020-11-25 RX ORDER — IBUPROFEN 200 MG
24 TABLET ORAL
Status: DISCONTINUED | OUTPATIENT
Start: 2020-11-25 | End: 2020-11-27 | Stop reason: HOSPADM

## 2020-11-25 RX ORDER — PANTOPRAZOLE SODIUM 40 MG/1
40 TABLET, DELAYED RELEASE ORAL DAILY
Status: DISCONTINUED | OUTPATIENT
Start: 2020-11-25 | End: 2020-11-27 | Stop reason: HOSPADM

## 2020-11-25 RX ORDER — GADOBUTROL 604.72 MG/ML
10 INJECTION INTRAVENOUS
Status: COMPLETED | OUTPATIENT
Start: 2020-11-25 | End: 2020-11-25

## 2020-11-25 RX ORDER — INSULIN ASPART 100 [IU]/ML
0-5 INJECTION, SOLUTION INTRAVENOUS; SUBCUTANEOUS
Status: DISCONTINUED | OUTPATIENT
Start: 2020-11-25 | End: 2020-11-26

## 2020-11-25 RX ORDER — BENZONATATE 100 MG/1
100 CAPSULE ORAL 3 TIMES DAILY PRN
Status: DISCONTINUED | OUTPATIENT
Start: 2020-11-25 | End: 2020-11-27 | Stop reason: HOSPADM

## 2020-11-25 RX ORDER — GLUCAGON 1 MG
1 KIT INJECTION
Status: DISCONTINUED | OUTPATIENT
Start: 2020-11-25 | End: 2020-11-27 | Stop reason: HOSPADM

## 2020-11-25 RX ORDER — BETHANECHOL CHLORIDE 25 MG/1
25 TABLET ORAL 3 TIMES DAILY
COMMUNITY
End: 2021-05-13

## 2020-11-25 RX ADMIN — INSULIN ASPART 1 UNITS: 100 INJECTION, SOLUTION INTRAVENOUS; SUBCUTANEOUS at 09:11

## 2020-11-25 RX ADMIN — EZETIMIBE 10 MG: 10 TABLET ORAL at 09:11

## 2020-11-25 RX ADMIN — DEXAMETHASONE SODIUM PHOSPHATE 4 MG: 4 INJECTION INTRA-ARTICULAR; INTRALESIONAL; INTRAMUSCULAR; INTRAVENOUS; SOFT TISSUE at 12:11

## 2020-11-25 RX ADMIN — GADOBUTROL 10 ML: 604.72 INJECTION INTRAVENOUS at 12:11

## 2020-11-25 RX ADMIN — BETHANECHOL CHLORIDE 25 MG: 25 TABLET ORAL at 09:11

## 2020-11-25 RX ADMIN — INSULIN ASPART 4 UNITS: 100 INJECTION, SOLUTION INTRAVENOUS; SUBCUTANEOUS at 12:11

## 2020-11-25 RX ADMIN — TAMSULOSIN HYDROCHLORIDE 0.4 MG: 0.4 CAPSULE ORAL at 09:11

## 2020-11-25 RX ADMIN — BENZONATATE 100 MG: 100 CAPSULE ORAL at 03:11

## 2020-11-25 RX ADMIN — PANTOPRAZOLE SODIUM 40 MG: 40 TABLET, DELAYED RELEASE ORAL at 09:11

## 2020-11-25 RX ADMIN — VENLAFAXINE HYDROCHLORIDE 75 MG: 75 CAPSULE, EXTENDED RELEASE ORAL at 09:11

## 2020-11-25 RX ADMIN — HYDROCODONE BITARTRATE AND ACETAMINOPHEN 1 TABLET: 5; 325 TABLET ORAL at 10:11

## 2020-11-25 RX ADMIN — WARFARIN SODIUM 5 MG: 2.5 TABLET ORAL at 05:11

## 2020-11-25 RX ADMIN — DEXAMETHASONE SODIUM PHOSPHATE 4 MG: 4 INJECTION INTRA-ARTICULAR; INTRALESIONAL; INTRAMUSCULAR; INTRAVENOUS; SOFT TISSUE at 05:11

## 2020-11-25 RX ADMIN — LIDOCAINE HYDROCHLORIDE 10 ML: 10 INJECTION, SOLUTION INFILTRATION; PERINEURAL at 05:11

## 2020-11-25 RX ADMIN — EZETIMIBE 10 MG: 10 TABLET ORAL at 01:11

## 2020-11-25 RX ADMIN — INSULIN DETEMIR 5 UNITS: 100 INJECTION, SOLUTION SUBCUTANEOUS at 10:11

## 2020-11-25 RX ADMIN — DEXAMETHASONE SODIUM PHOSPHATE 10 MG: 4 INJECTION, SOLUTION INTRA-ARTICULAR; INTRALESIONAL; INTRAMUSCULAR; INTRAVENOUS; SOFT TISSUE at 01:11

## 2020-11-25 RX ADMIN — INSULIN ASPART 5 UNITS: 100 INJECTION, SOLUTION INTRAVENOUS; SUBCUTANEOUS at 05:11

## 2020-11-25 RX ADMIN — POLYETHYLENE GLYCOL 3350 17 G: 17 POWDER, FOR SOLUTION ORAL at 09:11

## 2020-11-25 NOTE — ASSESSMENT & PLAN NOTE
"Continue home gabapentin.   Of note, patient reports "intense R hand pain with urination" she is very adamant that this a significant amount of pain. Unable to find an explanation for this.     Plan:   - Continue home gabapentin.   "

## 2020-11-25 NOTE — CONSULTS
[facility]  Neurosurgery  Consult Note    Consults  Subjective:     Chief Complaint/Reason for Admission: R frontal and occipital brain lesions    History of Present Illness: Alison Branch is a 65-year-old female with a past medical history of known adenocarcinoma of the lung s/p chemotherapy who presents for admission to oncology service after be seen with generalized weakness and drowsiness at her clinic visit earlier today where she was found to have a large left-sided pleural effusion.  Neurosurgery consulted after MRI workup for stroke revealed concern for new brain metastasis.  Past medical history significant for ischemic strokes in September and October with residual left-sided weakness, remote hx of R ICA terminus aneurysm coiling, depression, diabetes, hypertension, hyperlipidemia, and daily coumadin use.  She is awake and alert on examination and states that she has had decreased p.o. intake for the last 2 weeks and that her family brought her in today due to a stepwise decline.  She denies headache, she denies nausea or vomiting, denies new numbness or weakness or neurologic symptoms.  She states that her weakness in her left upper and left lower extremity had been chronic over the course of weeks, since she left rehab.    Medications Prior to Admission   Medication Sig Dispense Refill Last Dose    blood sugar diagnostic Strp To check BG 4 times daily, to use with insurance preferred meter 200 each 11     carboxymethylcellulose (REFRESH PLUS) 0.5 % Dpet 1 drop 3 (three) times daily as needed.       cyproheptadine (PERIACTIN) 4 mg tablet Take 1 tablet (4 mg total) by mouth 4 (four) times daily. 120 tablet 5     ergocalciferol (ERGOCALCIFEROL) 50,000 unit Cap TAKE 1 CAPSULE BY MOUTH EVERY 7 DAYS 12 capsule 3     ezetimibe (ZETIA) 10 mg tablet Take 1 tablet (10 mg total) by mouth every evening. 90 tablet 3     fluticasone propionate (FLONASE) 50 mcg/actuation nasal spray USE TWO  "SPRAY(S) IN EACH NOSTRIL ONCE DAILY 16 g 3     fluticasone-salmeterol diskus inhaler 100-50 mcg Inhale 1 puff into the lungs 2 (two) times daily. Controller 60 each 2     furosemide (LASIX) 20 MG tablet Take 2 tablets (40 mg total) by mouth once daily. 60 tablet 11     gabapentin (NEURONTIN) 300 MG capsule Take 1 capsule (300 mg total) by mouth nightly as needed (Take as needed at bed time for neuropathy pain and sleep). 90 capsule 3     insulin aspart U-100 (NOVOLOG) 100 unit/mL (3 mL) InPn pen Inject 0-5 Units into the skin before meals and at bedtime as needed (Hyperglycemia).  0     insulin detemir U-100 (LEVEMIR FLEXTOUCH) 100 unit/mL (3 mL) SubQ InPn pen Inject 26 Units into the skin once daily. 2 Box 2     insulin lispro (HUMALOG KWIKPEN INSULIN) 100 unit/mL pen Inject 12 units TID AC + correction scale. Max TDD of 57 units 4 Box 2     lancets Misc 1 Device by Misc.(Non-Drug; Combo Route) route once daily. Heladio Result.  250.02.  Check Blood Sugar Twice Daily. 200 each 3     lidocaine (XYLOCAINE) 5 % Oint ointment Apply topically as needed (For lab draws).       melatonin (MELATIN) 3 mg tablet Take 2 tablets (6 mg total) by mouth nightly as needed for Insomnia.  0     nystatin (MYCOSTATIN) powder Apply topically 2 (two) times daily. 60 g 0     ondansetron (ZOFRAN) 8 MG tablet Take 1 tablet (8 mg total) by mouth 4 (four) times daily as needed for Nausea. 60 tablet 2     pen needle, diabetic 33 gauge x 5/32" Ndle 1 each by Misc.(Non-Drug; Combo Route) route 4 (four) times daily with meals and nightly. 100 each 5     tamsulosin (FLOMAX) 0.4 mg Cap Take 1 capsule (0.4 mg total) by mouth every evening. 30 capsule 11     venlafaxine (EFFEXOR XR) 75 MG 24 hr capsule Take 1 capsule (75 mg total) by mouth once daily. 30 capsule 2     walker Misc Please provide rollator walker for this debilitated cancer patient.  Thank you.  0     warfarin (COUMADIN) 5 MG tablet Take 1 tablet (5mg) by mouth daily or As " "directed by coumadin clinic 30 tablet 5        Review of patient's allergies indicates:   Allergen Reactions    Piperacillin-tazobactam Rash     Tolerated cefepime 2020       Past Medical History:   Diagnosis Date    Allergy     Brain aneurysm 2010    s/p coiling of one; another not coiled    Breast cyst     Depression 2019    Diabetes mellitus     Diabetes type 2, controlled     Fever blister     High cholesterol     History of Bell's palsy     HTN (hypertension) 2014    Recurrent upper respiratory infection (URI)      Past Surgical History:   Procedure Laterality Date    BREAST SURGERY      BRONCHOSCOPY N/A 2019    Procedure: Bronchoscopy;  [facility];  [facility];  Service: Anesthesiology;  Laterality: N/A;    CERVICAL FUSION      COLONOSCOPY N/A 3/9/2016    Procedure: COLONOSCOPY;  Surgeon: Elliott Zimmerman MD;  Location: [facility] ENDO (4TH FLR);  Service: Endoscopy;  Laterality: N/A;    head surgery      stent and "curling" for aneurysm    HYSTERECTOMY      TVH secondary to SUF    INSERTION OF TUNNELED CENTRAL VENOUS CATHETER (CVC) WITH SUBCUTANEOUS PORT Right 2019    Procedure: INSERTION, PORT-A-CATH;  Surgeon: Cali Damico MD;  [facility] CATH LAB;  Service: Radiology;  Laterality: Right;    MENISCECTOMY Left      Family History     Problem Relation (Age of Onset)    Allergies Daughter    Breast cancer Maternal Aunt    Cancer Mother (63), Maternal Aunt    Cataracts Father    Diabetes Father, Sister, Brother    Heart failure Father    Migraines Father    Ovarian cancer Cousin    Rheum arthritis Father    Stomach cancer Mother        Tobacco Use    Smoking status: Former Smoker     Packs/day: 0.50     Years: 30.00     Pack years: 15.00     Quit date: 1999     Years since quittin.9    Smokeless tobacco: Never Used   Substance and Sexual Activity    Alcohol use: No     Alcohol/week: 0.0 standard drinks    Drug use: " No    Sexual activity: Never     Partners: Female     Birth control/protection: Surgical     Review of Systems   Constitutional: Positive for activity change and appetite change.   HENT: Negative for congestion and dental problem.    Eyes: Negative for discharge and itching.   Respiratory: Negative for choking and chest tightness.    Cardiovascular: Negative for chest pain and leg swelling.   Gastrointestinal: Negative for abdominal distention and abdominal pain.   Endocrine: Negative for cold intolerance and heat intolerance.   Genitourinary: Negative for difficulty urinating and dyspareunia.   Musculoskeletal: Negative for arthralgias and back pain.   Allergic/Immunologic: Negative for environmental allergies and food allergies.   Neurological: Negative for dizziness and facial asymmetry.   Hematological: Negative for adenopathy. Does not bruise/bleed easily.   Psychiatric/Behavioral: Negative for agitation and behavioral problems.     Objective:     Weight: 92 kg (202 lb 13.2 oz)  Body mass index is 29.95 kg/m².  Vital Signs (Most Recent):  Temp: 98 °F (36.7 °C) (11/25/20 0051)  Pulse: 95 (11/25/20 0051)  Resp: 20 (11/25/20 0051)  BP: 132/65 (11/25/20 0051)  SpO2: 95 % (11/25/20 0051) Vital Signs (24h Range):  Temp:  [97.6 °F (36.4 °C)-98 °F (36.7 °C)] 98 °F (36.7 °C)  Pulse:  [] 95  Resp:  [17-20] 20  SpO2:  [94 %-97 %] 95 %  BP: (104-141)/(58-72) 132/65                          Neurosurgery Physical Exam  General: well developed, well nourished, no distress.   Head: normocephalic, atraumatic  Neurologic:   GCS: Eyes: 4/ Verbal: 5/ Motor: 6  Mental Status: Awake, Alert, Oriented x 3; mild drowsiness but awakens and converses easily.   Cranial nerves: PERRL, EOMI, face symmetric, tongue midline, shoulder shrug equal.  Vision grossly intact to all fields  Left upper extremity and lower extremity with baseline (chronic) weakness   LUE: 3/5 proximal strength, raises antigravity, 2/5 distal strength, very weak  hand /interossei.   LLE: 3/5 strength diffusely  Sensory: intact to light touch throughout; does not endorse any numbness.   Pulmonary: normal respirations, no tachypnea, no signs of respiratory distress  Abdomen: soft, non-distended, not tender to palpation                        Significant Labs:  Recent Labs   Lab 11/24/20  1702   *      K 4.1      CO2 25   BUN 16   CREATININE 0.9   CALCIUM 9.3     Recent Labs   Lab 11/24/20  1702   WBC 7.07   HGB 11.4*   HCT 37.8        Recent Labs   Lab 11/24/20  1719   INR 1.5*     Microbiology Results (last 7 days)     ** No results found for the last 168 hours. **        All pertinent labs from the last 24 hours have been reviewed.    Significant Diagnostics:  I have reviewed all pertinent imaging results/findings within the past 24 hours.    Assessment/Plan:     Brain mass  Alison Branch is a 66 yo female with a PMH of Lung adenoCA s/p chemotherapy, prior coiled cerebral aneurysm (2010), depression, DM, HTN, HLD and recent R MCA CVA (10/2020) who presents with stepwise decline, decreased appetite and slow decline in left sided strength for the last few weeks found to have large L sided pleural effusion as well as presumed new R frontal and R occipital subcentimeter brain metastasis.     --Admitted to Oncology; stable for q4 hour neurochecks  --No acute intervention  --MRI w/wo contrast obtained with a R frontal 1cm and R occipital 0.8cm vividly enhancing lesions likely metastatic disease given history of diffuse osseus mets with known lung primary  --may begin dexamethasone for vasogenic edema; 4q6. PPI while on steroids.   --Further medical care per primary team, will further discuss treatment options with patient; currently does not want to consider radiation or surgery but is amenable to further discussion.   --Hold Coumadin for goal INR < 1.4 if medically feasable, INR currently 1.5.             Thank you for your consult. I will follow-up  with patient. Please contact us if you have any additional questions.    John Wharton MD  Neurosurgery  [facility]

## 2020-11-25 NOTE — ASSESSMENT & PLAN NOTE
"- Diagnosed in 2019, mets to bone.  - See "Oncology History"  - Currently off of Entrectinib,    Plan:    - Continue to hold Entrectinib for now.  "

## 2020-11-25 NOTE — CARE UPDATE
Clinically unchanged from this morning.  Imaging reviewed with attending Dr. Kelsey, no neurosurgical intervention required at this time.   Recommend consultation with radiation oncology outpatient. Will plan on outpatient follow up with Dr. Kelsey in 8 weeks to be set up by neurosurgery clinic staff.     Neurosurgery will sign off now, please do not hesitate to call with any questions.    Viji Colbert  NSGY PGY1

## 2020-11-25 NOTE — CONSULTS
[facility]  Psychology  Progress Note  Individual Psychotherapy (PhD/LCSW)    Patient Name: Alison Branch  MRN: [id number]    Patient Class: IP- Inpatient  Admission Date: 11/24/2020  Hospital Length of Stay: 1 days  Attending Physician: Carolina Thomas MD  Primary Care Provider: Mike Rodriguez Ii, MD    Therapeutic Intervention: Met with patient.  Outpatient - Supportive psychotherapy 45 min - CPT Code 44724    Chief Complaint/Reason for Encounter: depression, anxiety and new brain lesions.      Interval History and Content of Current Session: Met with patient at bedside. Patient in generally good spirits. Patient was able to explain her understanding of her current disease statues, and adamantly status she wants to continue treatment for cancer. Patient reported that she may have been misunderstood by providers earlier but does want to continues with treatment and all life sustaining measures. Patient reported that she has shannon in God and her doctors here.     Patient with mild depression and worry. Concerned for her grandson and would like someone to help her explain things to him. Agreeable to Consult for Child Life. Mood and protective strategies were discussed.     Risk Parameters:  Patient reports no suicidal ideation  Patient reports no homicidal ideation  Patient reports no self-injurious behavior  Patient reports no violent behavior    Verbal Deficits: None    Patient's response to intervention:  The patient's response to intervention is accepting.    Progress toward goals and other mental status changes:  The patient's progress toward goals is n/a.    Objective:   Appearance: age appropriate  Behavior/Cooperation: friendly and cooperative  Speech: normal in rate, volume, and tone and appropriate quality, quantity and organization of sentences  Mood: euthymic  Affect: mood congruent  Thought Process: goal-directed, logical  Thought Content: normal,  No delusions or paranoia; did not appear  to be responding to internal stimuli during the session  Orientation: Oriented x4   Attention Span/Concentration: Attends to session without distraction; reports no difficulty  Insight: patient has awareness of illness; good insight into own behavior and behavior of others  Judgment: the patient's behavior is adequate to circumstances    Diagnostic Impression - Plan:     Major depressive disorder, recurrent, unspecified  Currently taking Effexor. Patient overall managing mood well. Psych will follow.    Recommend Consult to Child Life    Treatment Plan:  · Target symptoms: depression and anxiety  · Why chosen therapy is appropriate versus another modality: relevant to diagnosis, patient responds to this modality, evidence based practice  · Outcome monitoring methods: self-report, observation, checklist/rating scale  · Therapeutic intervention type: supportive psychotherapy    Plan:  individual psychotherapy    Length of Service (minutes): 45    Jagruti Garcia, PhD  Psychology  [facility]

## 2020-11-25 NOTE — ASSESSMENT & PLAN NOTE
During admission in 9/28-10/15 pt noted to have urinary retention.  Started on Flomax-- Switched then to bethacholine.  Pt reports she is not taking it.  In the ED pt complains of bladder fullness.     Plan:   - bethanechol 35 mg TID (pt was getting it at rehab)    - watch for retention   - continue to f/u with urology as outpatient.

## 2020-11-25 NOTE — SUBJECTIVE & OBJECTIVE
"Interval History:     Past Medical History:   Diagnosis Date    Allergy     Brain aneurysm 2010    s/p coiling of one; another not coiled    Breast cyst     Depression 9/19/2019    Diabetes mellitus     Diabetes type 2, controlled     Fever blister     High cholesterol     History of Bell's palsy     HTN (hypertension) 5/20/2014    Recurrent upper respiratory infection (URI)        Past Surgical History:   Procedure Laterality Date    BREAST SURGERY      BRONCHOSCOPY N/A 5/28/2019    Procedure: Bronchoscopy;  Surgeon: [facility] Diagnostic Provider;  [facility];  Service: Anesthesiology;  Laterality: N/A;    CERVICAL FUSION      COLONOSCOPY N/A 3/9/2016    Procedure: COLONOSCOPY;  Surgeon: Elliott Zimmerman MD;  Location: [facility] ENDO (4TH FLR);  Service: Endoscopy;  Laterality: N/A;    head surgery      stent and "curling" for aneurysm    HYSTERECTOMY      TVH secondary to SUF    INSERTION OF TUNNELED CENTRAL VENOUS CATHETER (CVC) WITH SUBCUTANEOUS PORT Right 7/8/2019    Procedure: INSERTION, PORT-A-CATH;  Surgeon: Cali Damico MD;  [place] [facility] [place];  Service: Radiology;  Laterality: Right;    MENISCECTOMY Left        Review of patient's allergies indicates:   Allergen Reactions    Piperacillin-tazobactam Rash     Tolerated cefepime 06/2020       Medications:  Continuous Infusions:  Scheduled Meds:   bethanechol  25 mg Oral TID    dexamethasone  4 mg Intravenous Q6H    ezetimibe  10 mg Oral QHS    insulin detemir U-100  5 Units Subcutaneous Daily    pantoprazole  40 mg Oral Daily    polyethylene glycol  17 g Oral Daily    tamsulosin  0.4 mg Oral QHS    venlafaxine  75 mg Oral Daily     PRN Meds:benzonatate, dextrose 50%, dextrose 50%, gabapentin, glucagon (human recombinant), glucose, glucose, HYDROcodone-acetaminophen, [START ON 11/26/2020] influenza, insulin aspart U-100, melatonin, ondansetron, sodium chloride 0.9%    Family History     Problem Relation (Age of " Onset)    Allergies Daughter    Breast cancer Maternal Aunt    Cancer Mother (63), Maternal Aunt    Cataracts Father    Diabetes Father, Sister, Brother    Heart failure Father    Migraines Father    Ovarian cancer Cousin    Rheum arthritis Father    Stomach cancer Mother        Tobacco Use    Smoking status: Former Smoker     Packs/day: 0.50     Years: 30.00     Pack years: 15.00     Quit date: 1999     Years since quittin.9    Smokeless tobacco: Never Used   Substance and Sexual Activity    Alcohol use: No     Alcohol/week: 0.0 standard drinks    Drug use: No    Sexual activity: Never     Partners: Female     Birth control/protection: Surgical       Review of Systems   Constitutional: Negative for activity change, appetite change, fatigue, fever and unexpected weight change.   HENT: Negative for congestion and facial swelling.    Respiratory: Positive for cough and shortness of breath (on exertion). Negative for wheezing and stridor.    Cardiovascular: Negative for chest pain, palpitations and leg swelling.   Gastrointestinal: Negative for abdominal pain, diarrhea, nausea and vomiting.   Endocrine: Positive for cold intolerance. Negative for polyphagia and polyuria.   Genitourinary: Negative for dysuria and hematuria.   Musculoskeletal: Negative.    Skin: Negative.  Negative for rash.   Neurological: Positive for dizziness, weakness and light-headedness.   Psychiatric/Behavioral: Negative for confusion. The patient is not nervous/anxious.      Objective:     Vital Signs (Most Recent):  Temp: 98.2 °F (36.8 °C) (20 1205)  Pulse: 104 (20 1205)  Resp: 19 (20 1205)  BP: (!) 142/82 (20 1205)  SpO2: 100 % (20 1205) Vital Signs (24h Range):  Temp:  [97.3 °F (36.3 °C)-98.2 °F (36.8 °C)] 98.2 °F (36.8 °C)  Pulse:  [] 104  Resp:  [12-20] 19  SpO2:  [94 %-100 %] 100 %  BP: (104-142)/(58-82) 142/82     Weight: 92 kg (202 lb 13.2 oz)  Body mass index is 29.95 kg/m².    Physical  Exam  Vitals signs and nursing note reviewed.   Constitutional:       General: She is not in acute distress.     Appearance: She is well-developed.   HENT:      Head: Normocephalic and atraumatic.   Eyes:      Pupils: Pupils are equal, round, and reactive to light.   Neck:      Musculoskeletal: Normal range of motion and neck supple.      Vascular: No JVD.   Cardiovascular:      Rate and Rhythm: Normal rate and regular rhythm.      Heart sounds: Normal heart sounds. No murmur.   Pulmonary:      Effort: Pulmonary effort is normal. No respiratory distress.      Breath sounds: Normal breath sounds. No wheezing or rales.      Comments: Absence of respiratory sounds on the left hemithorax  Abdominal:      General: Bowel sounds are normal. There is no distension.      Palpations: Abdomen is soft.      Tenderness: There is no abdominal tenderness.   Musculoskeletal: Normal range of motion.         General: No deformity.   Skin:     General: Skin is warm.   Neurological:      Mental Status: She is alert and oriented to person, place, and time.         Review of Symptoms    Symptom Assessment (ESAS 0-10 Scale)  Pain:  0  Dyspnea:  6  Anxiety:  0  Nausea:  0  Depression:  0  Anorexia:  0  Fatigue:  5  Insomnia:  0  Restlessness:  10  Agitation:  0     Constipation:  Negative  Diarrhea:  Negative          Performance Status:  50    ECOG Performance Status Grade:  3 - Confined to bed or chair 50% of waking hours    Living Arrangements:  Lives with family    Psychosocial/Cultural: Patient has 3 daughters and many grandchildren. Patient lives with her 28 year old grandson. Patient has great support from her family and [employer].     Spiritual:  F - Valeri and Belief:  Druze. Goes to [employer] [facility]  I - Importance:  Her valeri is very important.  C - Community:  Patient has great support from her 2 pastors at [employer].   A - Address in Care:  Patient would like one of her pastors involved in decision making after the rest of  her family has met.       Advance Care Planning   Advance Directives:   Living Will: Yes        Copy on chart: Yes    LaPOST: No    Do Not Resuscitate Status: No    Medical Power of : Yes    Agent's Name:  Chito Woodruff   Agent's Contact Number:  [phone]    Decision Making:  Patient answered questions         Significant Labs: All pertinent labs within the past 24 hours have been reviewed.  CBC:   Recent Labs   Lab 11/25/20  0810   WBC 6.37   HGB 11.0*   HCT 37.3   MCV 88        BMP:  Recent Labs   Lab 11/25/20  0810   *   *   K 4.9      CO2 21*   BUN 13   CREATININE 0.8   CALCIUM 8.9     LFT:  Lab Results   Component Value Date    AST 26 11/25/2020    ALKPHOS 77 11/25/2020    BILITOT 0.5 11/25/2020     Albumin:   Albumin   Date Value Ref Range Status   11/25/2020 3.3 (L) 3.5 - 5.2 g/dL Final     Protein:   Total Protein   Date Value Ref Range Status   11/25/2020 7.7 6.0 - 8.4 g/dL Final     Lactic acid:   Lab Results   Component Value Date    LACTATE 1.4 11/24/2020    LACTATE 1.7 07/11/2020       Significant Imaging: I have reviewed all pertinent imaging results/findings within the past 24 hours.

## 2020-11-25 NOTE — PLAN OF CARE
Evaluation complete and goals set.  Cont with POC  Tara Queen, OT  11/25/2020    Problem: Occupational Therapy Goal  Goal: Occupational Therapy Goal  Outcome: Ongoing, Progressing

## 2020-11-25 NOTE — PROGRESS NOTES
MRI of the brain with new 0.6 mm lesion concerning for metastatic disease with surrounding vasogenic edema.     - Will obtain MRI w/contrast  - Neurosurgery consult.   - Dexamethasone 10 mg once and then 4 mg IV q6 hours.      Manoj Cool MD.  Internal Medicine PGY-2

## 2020-11-25 NOTE — PT/OT/SLP EVAL
Occupational Therapy   Evaluation    Name: Alison Branch  MRN: [id number]  Admitting Diagnosis:  Pleural effusion      Recommendations:     Discharge Recommendations: home with home health  Discharge Equipment Recommendations:  none  Barriers to discharge:  None    Assessment:     Alison Branch is a 65 y.o. female with a medical diagnosis of Pleural effusion.  She presents supine and in good spirits.  Pt with noted decreased functional strength in Left UE and LE.  Able to grasp and open/close upon command but with weak and delayed response.  Rolling to Right in bed with min A and Min A for sidelying to sitting edge of bed.  Min A sit to stand with Left knee blocked as she states it ryland.  Pt with adequate care at home between family and friends but lives alone.  Pt with 3 steps to enter and with extreme difficulty.  Education for transfers and increased participation in daily tasks for gains and to ensure decreased decline.  Performance deficits affecting function: weakness, impaired endurance, impaired self care skills, impaired functional mobilty, gait instability, impaired balance, decreased coordination, decreased lower extremity function, decreased upper extremity function.      Rehab Prognosis: Good; patient would benefit from acute skilled OT services to address these deficits and reach maximum level of function.       Plan:     Patient to be seen 4 x/week to address the above listed problems via self-care/home management, therapeutic activities, therapeutic exercises  · Plan of Care Expires: 12/25/20  · Plan of Care Reviewed with: patient    Subjective     Chief Complaint: poor functional performance   Patient/Family Comments/goals: return to home     Occupational Profile:  Living Environment: Pt lives in 1 story house with 3 steps to enter alone  Previous level of function: Pt with overall 24 hour care   Equipment Used at Home:  bedside commode, rollator, cane, straight, shower chair, bath bench,  wheelchair, grab bar, slide board(power chair and drop arm commode on order per pt)  Assistance upon Discharge: grandson, niece, sisters, neighbors    Pain/Comfort:  · Pain Rating 1: 0/10    Patients cultural, spiritual, Mormonism conflicts given the current situation: no    Objective:     Communicated with: RN prior to session.  Patient found supine with PureWick, peripheral IV upon OT entry to room.    General Precautions: Standard, fall   Orthopedic Precautions:N/A   Braces: N/A     Occupational Performance:    Bed Mobility:    · Patient completed Rolling/Turning to Right with contact guard assistance  · Patient completed Scooting/Bridging with minimum assistance  · Patient completed Supine to Sit with minimum assistance  · Patient completed Sit to Supine with minimum assistance    Functional Mobility/Transfers:  · Patient completed Sit <> Stand Transfer with minimum assistance  with  hand-held assist       Activities of Daily Living:  · Feeding:  independence    · Grooming: stand by assistance    · Upper Body Dressing: moderate assistance    · Lower Body Dressing: maximal assistance    · Toileting: maximal assistance      Cognitive/Visual Perceptual:  Cognitive/Psychosocial Skills:     -       Oriented to: Person, Place, Time and Situation   -       Follows Commands/attention:Follows two-step commands  -       Communication: clear/fluent  -       Memory: No Deficits noted  -       Safety awareness/insight to disability: intact   -       Mood/Affect/Coping skills/emotional control: Appropriate to situation    Physical Exam:  Upper Extremity Range of Motion:     -       Right Upper Extremity: WFL  -       Left Upper Extremity: trace functional ROM, AAROM WFL  Upper Extremity Strength:    -       Right Upper Extremity: WFL  -       Left Upper Extremity: 2-/5    AMPAC 6 Click ADL:  AMPAC Total Score: 13    Treatment & Education:  Evaluation complete and goals set.  Cont with POC  Pt educated on safety, role of OT,  "importance of increased participation in self care for gains , expectations for participation, expectations for gains, POC, energy conservation, caregiver strain. White board updated.   - ADL training   Education:    Patient left supine with all lines intact    GOALS:   Multidisciplinary Problems     Occupational Therapy Goals        Problem: Occupational Therapy Goal    Goal Priority Disciplines Outcome Interventions   Occupational Therapy Goal     OT, PT/OT Ongoing, Progressing                    History:     Past Medical History:   Diagnosis Date    Allergy     Brain aneurysm 2010    s/p coiling of one; another not coiled    Breast cyst     Depression 9/19/2019    Diabetes mellitus     Diabetes type 2, controlled     Fever blister     High cholesterol     History of Bell's palsy     HTN (hypertension) 5/20/2014    Recurrent upper respiratory infection (URI)        Past Surgical History:   Procedure Laterality Date    BREAST SURGERY      BRONCHOSCOPY N/A 5/28/2019    Procedure: Bronchoscopy;  Surgeon: [facility] Diagnostic Provider;  [facility];  Service: Anesthesiology;  Laterality: N/A;    CERVICAL FUSION      COLONOSCOPY N/A 3/9/2016    Procedure: COLONOSCOPY;  Surgeon: Elliott Zimmerman MD;  Location: [facility] ENDO (4TH FLR);  Service: Endoscopy;  Laterality: N/A;    head surgery      stent and "curling" for aneurysm    HYSTERECTOMY      TVH secondary to SUF    INSERTION OF TUNNELED CENTRAL VENOUS CATHETER (CVC) WITH SUBCUTANEOUS PORT Right 7/8/2019    Procedure: INSERTION, PORT-A-CATH;  Surgeon: Cali Damico MD;  Location: [facility] CATH LAB;  Service: Radiology;  Laterality: Right;    MENISCECTOMY Left        Time Tracking:     OT Date of Treatment: 11/25/20  OT Start Time: 1345  OT Stop Time: 1415  OT Total Time (min): 30 min    Billable Minutes:Evaluation 7  Self Care/Home Management 23    Tara Queen, OT  11/25/2020    "

## 2020-11-25 NOTE — ED NOTES
Blood drawn and given to RT for VBG. Pt still unable to produce urine specimen. MD at bedside. Will straight cath when MD finishes assessment.

## 2020-11-25 NOTE — HPI
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     Patient was recently sent to Rehab on 10/20/20 after her last admission for a stroke and was discharged home from rehab on 11/11/20 with H/H. Per grandson who takes care of her, she was able to walk 10-15 feet for the first few days, however, she has continued to decline at home due to progressive weakness, lightheadedness, poor appetite and SOB on exertion. These symptoms have made her stay in bed most of the time and having difficulty getting around. When asked the grandson to elaborate on confusion, he states that she is always coherent, however, due to weakness she seems to be somewhat lethargic and sometimes slurs her speech. No new focal weakness noted. They went for their scheduled post-discharge appointment with Internal Medicine this morning (seen by Dr Curtis/Dr Campuzano) and ws sent to the ED for further investigations of her symptoms and management.

## 2020-11-25 NOTE — PT/OT/SLP EVAL
Physical Therapy Evaluation    Patient Name:  Alison Branch   MRN:  [id number]    Recommendations:     Discharge Recommendations:  home health PT   Discharge Equipment Recommendations: none   Barriers to discharge: None    Assessment:     Alison Branch is a 65 y.o. female admitted with a medical diagnosis of Pleural effusion.  She presents with the following impairments/functional limitations:  weakness, impaired endurance, impaired self care skills, impaired functional mobilty, gait instability, impaired balance, decreased upper extremity function, decreased lower extremity function Pt. cooperative and tolerated treatment well.    Rehab Prognosis: Good; patient would benefit from acute skilled PT services to address these deficits and reach maximum level of function.    Recent Surgery: * No surgery found *      Plan:     During this hospitalization, patient to be seen 3 x/week to address the identified rehab impairments via therapeutic activities, therapeutic exercises, wheelchair management/training and progress toward the following goals:    · Plan of Care Expires:  12/25/20    Subjective     Chief Complaint: weakness  Patient/Family Comments/goals: to get stronger  Pain/Comfort:  · Pain Rating 1: 0/10  · Pain Rating Post-Intervention 1: 0/10    Patients cultural, spiritual, Taoist conflicts given the current situation: no    Living Environment:  Pt. Lives alone in [facility] with 3 CIRA, but always has a family member/friend/caregiver present to assist  Prior to admission, patients level of function was w/c bound.  Equipment used at home: bedside commode, wheelchair, rollator, hospital bed, slide board, bath bench, cane, straight.  Upon discharge, patient will have assistance from family member/friend/caregiver.    Objective:     Communicated with nursing prior to session.  Patient found supine with peripheral IV, PureWick  upon PT entry to room.    General Precautions: Standard, fall   Orthopedic Precautions:N/A    Braces:       Exams:  · RLE ROM: WFL  · RLE Strength: WFL  · LLE ROM: WFL  · LLE Strength: 2/5    Functional Mobility:  · Bed Mobility:     · Rolling Right: minimum assistance  · Scooting: minimum assistance  · Supine to Sit: minimum assistance  · Sit to Supine: minimum assistance  · Transfers:     · Sit to Stand:  moderate assistance with hand-held assist  · Balance: fair sitting and poor standing    Therapeutic Activities and Exercises:   Discussed therapy needs, goals, and POC. Assisted pt. with rosalva-care and positioned for comfort.    AM-PAC 6 CLICK MOBILITY  Total Score:14     Patient left supine with all lines intact and call button in reach.    GOALS:   Multidisciplinary Problems     Physical Therapy Goals        Problem: Physical Therapy Goal    Goal Priority Disciplines Outcome Goal Variances Interventions   Physical Therapy Goal     PT, PT/OT Ongoing, Progressing     Description: Goals to be met by: 2020     Patient will increase functional independence with mobility by performin. Supine to sit with Contact Guard Assistance  2. Sit to supine with Contact Guard Assistance  3. Bed to chair transfer with Minimal Assistance with/without Slideboard  4. Wheelchair propulsion x50 feet with Stand-by Assistance using  right upper/lower extremity  5. Lower extremity exercise program x15 reps per handout, with assistance as needed                     History:     Past Medical History:   Diagnosis Date    Allergy     Brain aneurysm 2010    s/p coiling of one; another not coiled    Breast cyst     Depression 2019    Diabetes mellitus     Diabetes type 2, controlled     Fever blister     High cholesterol     History of Bell's palsy     HTN (hypertension) 2014    Recurrent upper respiratory infection (URI)        Past Surgical History:   Procedure Laterality Date    BREAST SURGERY      BRONCHOSCOPY N/A 2019    Procedure: Bronchoscopy;  Surgeon: [facility] Diagnostic Provider;   "Location: [facility] OR 2ND FLR;  Service: Anesthesiology;  Laterality: N/A;    CERVICAL FUSION      COLONOSCOPY N/A 3/9/2016    Procedure: COLONOSCOPY;  Surgeon: Elliott Zimmerman MD;  Location: [facility] ENDO (4TH FLR);  Service: Endoscopy;  Laterality: N/A;    head surgery      stent and "curling" for aneurysm    HYSTERECTOMY      TVH secondary to SUF    INSERTION OF TUNNELED CENTRAL VENOUS CATHETER (CVC) WITH SUBCUTANEOUS PORT Right 7/8/2019    Procedure: INSERTION, PORT-A-CATH;  Surgeon: Cali Damico MD;  Location: [facility] CATH LAB;  Service: Radiology;  Laterality: Right;    MENISCECTOMY Left        Time Tracking:     PT Received On: 11/25/20  PT Start Time: 1354     PT Stop Time: 1421  PT Total Time (min): 27 min     Billable Minutes: Evaluation 17 and Therapeutic Activity 10      Clay Castillo, PT  11/25/2020  "

## 2020-11-25 NOTE — SUBJECTIVE & OBJECTIVE
Medications Prior to Admission   Medication Sig Dispense Refill Last Dose    blood sugar diagnostic Strp To check BG 4 times daily, to use with insurance preferred meter 200 each 11     carboxymethylcellulose (REFRESH PLUS) 0.5 % Dpet 1 drop 3 (three) times daily as needed.       cyproheptadine (PERIACTIN) 4 mg tablet Take 1 tablet (4 mg total) by mouth 4 (four) times daily. 120 tablet 5     ergocalciferol (ERGOCALCIFEROL) 50,000 unit Cap TAKE 1 CAPSULE BY MOUTH EVERY 7 DAYS 12 capsule 3     ezetimibe (ZETIA) 10 mg tablet Take 1 tablet (10 mg total) by mouth every evening. 90 tablet 3     fluticasone propionate (FLONASE) 50 mcg/actuation nasal spray USE TWO SPRAY(S) IN EACH NOSTRIL ONCE DAILY 16 g 3     fluticasone-salmeterol diskus inhaler 100-50 mcg Inhale 1 puff into the lungs 2 (two) times daily. Controller 60 each 2     furosemide (LASIX) 20 MG tablet Take 2 tablets (40 mg total) by mouth once daily. 60 tablet 11     gabapentin (NEURONTIN) 300 MG capsule Take 1 capsule (300 mg total) by mouth nightly as needed (Take as needed at bed time for neuropathy pain and sleep). 90 capsule 3     insulin aspart U-100 (NOVOLOG) 100 unit/mL (3 mL) InPn pen Inject 0-5 Units into the skin before meals and at bedtime as needed (Hyperglycemia).  0     insulin detemir U-100 (LEVEMIR FLEXTOUCH) 100 unit/mL (3 mL) SubQ InPn pen Inject 26 Units into the skin once daily. 2 Box 2     insulin lispro (HUMALOG KWIKPEN INSULIN) 100 unit/mL pen Inject 12 units TID AC + correction scale. Max TDD of 57 units 4 Box 2     lancets Misc 1 Device by Misc.(Non-Drug; Combo Route) route once daily. Heladio Result.  250.02.  Check Blood Sugar Twice Daily. 200 each 3     lidocaine (XYLOCAINE) 5 % Oint ointment Apply topically as needed (For lab draws).       melatonin (MELATIN) 3 mg tablet Take 2 tablets (6 mg total) by mouth nightly as needed for Insomnia.  0     nystatin (MYCOSTATIN) powder Apply topically 2 (two) times daily. 60 g 0   "   ondansetron (ZOFRAN) 8 MG tablet Take 1 tablet (8 mg total) by mouth 4 (four) times daily as needed for Nausea. 60 tablet 2     pen needle, diabetic 33 gauge x 5/32" Ndle 1 each by Misc.(Non-Drug; Combo Route) route 4 (four) times daily with meals and nightly. 100 each 5     tamsulosin (FLOMAX) 0.4 mg Cap Take 1 capsule (0.4 mg total) by mouth every evening. 30 capsule 11     venlafaxine (EFFEXOR XR) 75 MG 24 hr capsule Take 1 capsule (75 mg total) by mouth once daily. 30 capsule 2     walker Misc Please provide rollator walker for this debilitated cancer patient.  Thank you.  0     warfarin (COUMADIN) 5 MG tablet Take 1 tablet (5mg) by mouth daily or As directed by coumadin clinic 30 tablet 5        Review of patient's allergies indicates:   Allergen Reactions    Piperacillin-tazobactam Rash     Tolerated cefepime 06/2020       Past Medical History:   Diagnosis Date    Allergy     Brain aneurysm 2010    s/p coiling of one; another not coiled    Breast cyst     Depression 9/19/2019    Diabetes mellitus     Diabetes type 2, controlled     Fever blister     High cholesterol     History of Bell's palsy     HTN (hypertension) 5/20/2014    Recurrent upper respiratory infection (URI)      Past Surgical History:   Procedure Laterality Date    BREAST SURGERY      BRONCHOSCOPY N/A 5/28/2019    Procedure: Bronchoscopy;  Surgeon: [facility] Provider;  [facility];  Service: Anesthesiology;  Laterality: N/A;    CERVICAL FUSION      COLONOSCOPY N/A 3/9/2016    Procedure: COLONOSCOPY;  Surgeon: Elliott Zimmerman MD;  Location: [facility] ENDO (4TH FLR);  Service: Endoscopy;  Laterality: N/A;    head surgery      stent and "curling" for aneurysm    HYSTERECTOMY      TVH secondary to SUF    INSERTION OF TUNNELED CENTRAL VENOUS CATHETER (CVC) WITH SUBCUTANEOUS PORT Right 7/8/2019    Procedure: INSERTION, PORT-A-CATH;  Surgeon: Cali Damico MD;  Location: [facility] CATH LAB;  Service: " Radiology;  Laterality: Right;    MENISCECTOMY Left      Family History     Problem Relation (Age of Onset)    Allergies Daughter    Breast cancer Maternal Aunt    Cancer Mother (63), Maternal Aunt    Cataracts Father    Diabetes Father, Sister, Brother    Heart failure Father    Migraines Father    Ovarian cancer Cousin    Rheum arthritis Father    Stomach cancer Mother        Tobacco Use    Smoking status: Former Smoker     Packs/day: 0.50     Years: 30.00     Pack years: 15.00     Quit date: 1999     Years since quittin.9    Smokeless tobacco: Never Used   Substance and Sexual Activity    Alcohol use: No     Alcohol/week: 0.0 standard drinks    Drug use: No    Sexual activity: Never     Partners: Female     Birth control/protection: Surgical     Review of Systems   Constitutional: Positive for activity change and appetite change.   HENT: Negative for congestion and dental problem.    Eyes: Negative for discharge and itching.   Respiratory: Negative for choking and chest tightness.    Cardiovascular: Negative for chest pain and leg swelling.   Gastrointestinal: Negative for abdominal distention and abdominal pain.   Endocrine: Negative for cold intolerance and heat intolerance.   Genitourinary: Negative for difficulty urinating and dyspareunia.   Musculoskeletal: Negative for arthralgias and back pain.   Allergic/Immunologic: Negative for environmental allergies and food allergies.   Neurological: Negative for dizziness and facial asymmetry.   Hematological: Negative for adenopathy. Does not bruise/bleed easily.   Psychiatric/Behavioral: Negative for agitation and behavioral problems.     Objective:     Weight: 92 kg (202 lb 13.2 oz)  Body mass index is 29.95 kg/m².  Vital Signs (Most Recent):  Temp: 98 °F (36.7 °C) (20)  Pulse: 95 (20)  Resp: 20 (20)  BP: 132/65 (20)  SpO2: 95 % (20) Vital Signs (24h Range):  Temp:  [97.6 °F (36.4 °C)-98 °F  (36.7 °C)] 98 °F (36.7 °C)  Pulse:  [] 95  Resp:  [17-20] 20  SpO2:  [94 %-97 %] 95 %  BP: (104-141)/(58-72) 132/65                          Neurosurgery Physical Exam  General: well developed, well nourished, no distress.   Head: normocephalic, atraumatic  Neurologic:   GCS: Eyes: 4/ Verbal: 5/ Motor: 6  Mental Status: Awake, Alert, Oriented x 3; mild drowsiness but awakens and converses easily.   Cranial nerves: PERRL, EOMI, face symmetric, tongue midline, shoulder shrug equal.  Vision grossly intact to all fields  Left upper extremity and lower extremity with baseline (chronic) weakness   LUE: 3/5 proximal strength, raises antigravity, 2/5 distal strength, very weak hand /interossei.   LLE: 3/5 strength diffusely  Sensory: intact to light touch throughout; does not endorse any numbness.   Pulmonary: normal respirations, no tachypnea, no signs of respiratory distress  Abdomen: soft, non-distended, not tender to palpation                        Significant Labs:  Recent Labs   Lab 11/24/20  1702   *      K 4.1      CO2 25   BUN 16   CREATININE 0.9   CALCIUM 9.3     Recent Labs   Lab 11/24/20  1702   WBC 7.07   HGB 11.4*   HCT 37.8        Recent Labs   Lab 11/24/20  1719   INR 1.5*     Microbiology Results (last 7 days)     ** No results found for the last 168 hours. **        All pertinent labs from the last 24 hours have been reviewed.    Significant Diagnostics:  I have reviewed all pertinent imaging results/findings within the past 24 hours.

## 2020-11-25 NOTE — SUBJECTIVE & OBJECTIVE
"Oncology History:    Per Dr Belcher's note (primary oncologist):    " 65-year-old female who smoked for about 30 years and quit 20 years ago, presented with cough to PCP, chest x-ray on 05/10/2019 revealed left upper lobe pneumonia and a repeat CT was done in the week after that on 05/17/2019 that showed left upper lobe mass arising from the lateral pleural surface in the left upper lobe posterior subsegment measuring 2.6 x 3 cm.  There are satellite mass more medially near the aortic arch that is 2 cm, also spiculated and irregular as well as thickened interlobar septa in the left lung apex and anterior segment, prevascular lymph node lateral to the aortic arch, short axis measuring 9 mm.       She then underwent a bronchoscopy on 05/28/2019, and that revealed there was an infiltration obtained in the left apical posterior segment of the left upper lobe cytology brush was done.  Transbronchial biopsies of an area of infiltration were also performed in the apicoposterior segment of the left upper lobe.    Pathology revealed adenocarcinoma; however, the specimen was not adequate enough to send for molecular testing.       She underwent a PET scan on 06/06/2019.  that reveals significant hypermetabolic activity in the large irregular spiculated pulmonary mass in the lateral aspect of the left upper lobe consistent with the patient's known pulmonary adenocarcinoma.  There is also tracer avidity in the medial left upper lobe satellite lesion and diffusely throughout much of the anterior left upper lobe where there is prominent nodular paraseptal thickening.  There are some numerous scattered subcentimeter pulmonary nodules throughout the right lung, all of which are too small for detection by PET.  For example, there is a 0.4 cm nodule in the superior right lower lobe and a micro nodule in the posterior segment of the right upper lobe.  There is a 0.5 cm, normal size right   paratracheal lymph node with increased " "radiotracer uptake as well as hypermetabolic aortic pulmonary lymph node and a left hilar lymph node.  There is a focal hypermetabolic lesion in the left anterior superior iliac spine associated with well defined lytic lesion.  There is a hypermetabolic focus in the anterior right fifth rib with a possible associated lytic lesion.  SUVs as   below lateral:  Left upper lobe  SUV max 17.9, anterior left upper lobe satellite mass and septal thickening SUV max of 10.1, right paratracheal lymph node SUV max of 4.8, left AP window lymph node SUV max of 17.9, left anterior superior iliac spine SUV max of 3.9"     MRI pelvis from 6/10/19  reveals "Region of osseous is irregularity at the left anterior iliac spine likely related to bone graft harvest procedure.  See  discussion above.  No suspicious signal or enhancement to suggest active/malignant process"   MRI brain from 6/10//19 reveal No intracranial abnormality.     We discussed her case at [facility], and plan was to wait for GAURDANT and proceed with treatment accordingly  Her GAURDANT is negative for molecular markers.     She has completed 4 cycles of Carboplatin, Alimta and Keytruda as of 9/5/19.       She has been on maintenance Alimta and Keytruda     Her CT scans from 4/23/2020 revealed "In this patient with a known history of lung cancer, there has been marked interval detrimental change when compared to CT dated 12/20/2019 as follows. Persistent left upper lobe volume loss with worsening masslike consolidation and enlarging index and non index lesions. Increased number of innumerable bilateral metastatic solid pulmonary nodules. Interval increased size and number of multiple osteoblastic metastatic lesions throughout the visualized axial skeleton. Stable mediastinal lymph nodes, several of which were noted to be hypermetabolic on previous PET-CT.  No new lymphadenopathy. Stable subcentimeter hepatic and splenic hypodensities, too small to accurately " "characterize.  Path addendum from 5/2019 reveals ROS1      She is now on Entrectinib at 400 mg dose on 7/1/2020  "    Entrecitinib is currently on hold.        Medications:  Continuous Infusions:  Scheduled Meds:   ezetimibe  10 mg Oral QHS    [START ON 11/25/2020] polyethylene glycol  17 g Oral Daily    tamsulosin  0.4 mg Oral QHS    vancomycin (VANCOCIN) IVPB  20 mg/kg Intravenous ED 1 Time    [START ON 11/25/2020] venlafaxine  75 mg Oral Daily    warfarin  5 mg Oral Daily     PRN Meds:gabapentin, HYDROcodone-acetaminophen, melatonin, ondansetron, sodium chloride 0.9%     Review of patient's allergies indicates:   Allergen Reactions    Piperacillin-tazobactam Rash     Tolerated cefepime 06/2020        Past Medical History:   Diagnosis Date    Allergy     Brain aneurysm 2010    s/p coiling of one; another not coiled    Breast cyst     Depression 9/19/2019    Diabetes mellitus     Diabetes type 2, controlled     Fever blister     High cholesterol     History of Bell's palsy     HTN (hypertension) 5/20/2014    Recurrent upper respiratory infection (URI)      Past Surgical History:   Procedure Laterality Date    BREAST SURGERY      BRONCHOSCOPY N/A 5/28/2019    Procedure: Bronchoscopy;  [facility];  [facility];  Service: Anesthesiology;  Laterality: N/A;    CERVICAL FUSION      COLONOSCOPY N/A 3/9/2016    Procedure: COLONOSCOPY;  Surgeon: Elliott Zimmerman MD;  [place] [facility]);  Service: Endoscopy;  Laterality: N/A;    head surgery      stent and "curling" for aneurysm    HYSTERECTOMY      TVH secondary to SUF    INSERTION OF TUNNELED CENTRAL VENOUS CATHETER (CVC) WITH SUBCUTANEOUS PORT Right 7/8/2019    Procedure: INSERTION, PORT-A-CATH;  Surgeon: Cali Damico MD;  Location: [facility] CATH LAB;  Service: Radiology;  Laterality: Right;    MENISCECTOMY Left      Family History     Problem Relation (Age of Onset)    Allergies Daughter    " "Breast cancer Maternal Aunt    Cancer Mother (63), Maternal Aunt    Cataracts Father    Diabetes Father, Sister, Brother    Heart failure Father    Migraines Father    Ovarian cancer Cousin    Rheum arthritis Father    Stomach cancer Mother        Tobacco Use    Smoking status: Former Smoker     Packs/day: 0.50     Years: 30.00     Pack years: 15.00     Quit date: 1999     Years since quittin.9    Smokeless tobacco: Never Used   Substance and Sexual Activity    Alcohol use: No     Alcohol/week: 0.0 standard drinks    Drug use: No    Sexual activity: Never     Partners: Female     Birth control/protection: Surgical       Review of Systems   Constitutional: Negative for activity change, appetite change, fatigue, fever and unexpected weight change.   HENT: Negative for congestion and facial swelling.    Respiratory: Positive for cough and shortness of breath (on exertion). Negative for wheezing and stridor.    Cardiovascular: Negative for chest pain, palpitations and leg swelling.   Gastrointestinal: Positive for nausea. Negative for abdominal pain, diarrhea and vomiting.   Endocrine: Positive for cold intolerance.   Genitourinary: Negative for dysuria and hematuria.        Patient reports "My right hand hurts a lot every time I urinate"   Musculoskeletal: Negative.    Skin: Negative.  Negative for rash.   Neurological: Positive for dizziness, weakness and light-headedness.   Psychiatric/Behavioral: Negative for confusion. The patient is not nervous/anxious.      Objective:     Vital Signs (Most Recent):  Temp: 97.6 °F (36.4 °C) (20)  Pulse: 101 (20)  Resp: 17 (20)  BP: (!) 125/59 (20)  SpO2: 95 % (20) Vital Signs (24h Range):  Temp:  [97.6 °F (36.4 °C)] 97.6 °F (36.4 °C)  Pulse:  [] 101  Resp:  [17] 17  SpO2:  [95 %-97 %] 95 %  BP: (125-141)/(59-72) 125/59     Weight: 99.8 kg (220 lb)  Body mass index is 32.49 kg/m².  Body surface area is 2.2 " meters squared.    No intake or output data in the 24 hours ending 11/24/20 2131    Physical Exam  Vitals signs reviewed.   Constitutional:       General: She is not in acute distress.     Appearance: She is well-developed.   HENT:      Head: Normocephalic and atraumatic.   Eyes:      Pupils: Pupils are equal, round, and reactive to light.   Neck:      Musculoskeletal: Normal range of motion and neck supple.      Vascular: No JVD.   Cardiovascular:      Rate and Rhythm: Normal rate and regular rhythm.      Heart sounds: Normal heart sounds. No murmur.   Pulmonary:      Effort: Pulmonary effort is normal. No respiratory distress.      Breath sounds: Normal breath sounds. No wheezing or rales.      Comments: Absence of respiratory sounds on the left hemithorax  Abdominal:      General: Bowel sounds are normal. There is no distension.      Palpations: Abdomen is soft.      Tenderness: There is no abdominal tenderness.   Musculoskeletal: Normal range of motion.         General: No deformity.   Skin:     General: Skin is warm.   Neurological:      Mental Status: She is alert and oriented to person, place, and time.         Significant Labs:   BMP:   Recent Labs   Lab 11/24/20  1702   *      K 4.1      CO2 25   BUN 16   CREATININE 0.9   CALCIUM 9.3   , CBC:   Recent Labs   Lab 11/24/20  1702   WBC 7.07   HGB 11.4*   HCT 37.8      , CMP:   Recent Labs   Lab 11/24/20  1702      K 4.1      CO2 25   *   BUN 16   CREATININE 0.9   CALCIUM 9.3   PROT 7.7   ALBUMIN 3.4*   BILITOT 0.5   ALKPHOS 72   AST 26   ALT 25   ANIONGAP 12   EGFRNONAA >60.0    and Coagulation:   Recent Labs   Lab 11/23/20 11/24/20  1719   INR 2.1 1.5*       Diagnostic Results:  CT: Known strokes and additional foci cannot be excluded   CXR: Complete opacification of the left hemithorax, likely from large pleural effusion.

## 2020-11-25 NOTE — SUBJECTIVE & OBJECTIVE
Therapeutic Intervention: Met with patient.  Outpatient - Supportive psychotherapy 45 min - CPT Code 70925    Chief Complaint/Reason for Encounter: depression, anxiety and new brain lesions.      Interval History and Content of Current Session: Met with patient at bedside. Patient in generally good spirits. Patient was able to explain her understanding of her current disease statues, and adamantly status she wants to continue treatment for cancer. Patient reported that she may have been misunderstood by providers earlier but does want to continues with treatment and all life sustaining measures. Patient reported that she has shannon in God and her doctors here.     Patient with mild depression and worry. Concerned for her grandson and would like someone to help her explain things to him. Agreeable to Consult for Child Life. Mood and protective strategies were discussed.     Risk Parameters:  Patient reports no suicidal ideation  Patient reports no homicidal ideation  Patient reports no self-injurious behavior  Patient reports no violent behavior    Verbal Deficits: None    Patient's response to intervention:  The patient's response to intervention is accepting.    Progress toward goals and other mental status changes:  The patient's progress toward goals is n/a.    Objective:   Appearance: age appropriate  Behavior/Cooperation: friendly and cooperative  Speech: normal in rate, volume, and tone and appropriate quality, quantity and organization of sentences  Mood: euthymic  Affect: mood congruent  Thought Process: goal-directed, logical  Thought Content: normal,  No delusions or paranoia; did not appear to be responding to internal stimuli during the session  Orientation: Oriented x4   Attention Span/Concentration: Attends to session without distraction; reports no difficulty  Insight: patient has awareness of illness; good insight into own behavior and behavior of others  Judgment: the patient's behavior is adequate  to circumstances

## 2020-11-25 NOTE — ED NOTES
Pt belongings of backpack and personal wheelchair sent home with pt's grandson. Transport to deliver pt's phone and phone  to pt along with transport from MRI to hospital room.

## 2020-11-25 NOTE — ASSESSMENT & PLAN NOTE
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 Found to have a large pleural effusion that completely opacifies the left hemithorax.     Patient does report feeling weak and tired since her discharge from rehab, that has been worsening, along with progressive worsening SOB. CXR from 11/9 with small L pleural effusion, but now has complete opacification of the L hemithorax which explains her SOB and potentially her weakness.   Pt has no fever,  no WBC, clinically stable, unlikely parapneumonic effusion. Most likely this is a malignant pleural effusion with worsening disease (pt has been off of her chemotherapy due to recent hospital admissions).   - Pt is stable, breathing comfortably on room air (sats 96-98%), Neurologically intact.     Plan:   - VBG to rule out hypercapnia given effusion.    - Pulmonary consult for thoracentesis and analysis of fluid.- rule out malignant effusion.    - Palliative Care consult in AM

## 2020-11-25 NOTE — ASSESSMENT & PLAN NOTE
Patient has had less requirements. Per notes from last admission, she was on less dose of long-acting (12 U of lantus), and lispro was discontinued.  Pt not eating or drinking well.  Goal 140-180 in the hospital.     Plan:  - Will hold lantus for now as pt is not eating or drinking  - Sliding scale for now.   - Resume Lantus if glucose >180.

## 2020-11-25 NOTE — CONSULTS
"  [facility]  Adult Nutrition  Consult Note    SUMMARY     Recommendations    1. Continue Diabetic 2000 kcal diet   2. If PO intake < 50%, consider adding Boost Glucose Control BID    Goals: 1. Pt to meet % EEN/EPN by RD f/u  Nutrition Goal Status: new  Communication of DARRYL Recs: (POC)    Reason for Assessment    Reason For Assessment: consult  Diagnosis: (Pleural effusion)  Relevant Medical History: adenocarcinoma of lung, depression, DM2, high cholesterol, HTN  General Information Comments: 64 y/o female with an active medical problems of stage IV pulmonary adenocarcinoma with bone metastasis. Pt sitting up in bed, having breakfast at the time of visit. Reports feeling okay, appetite is good. Denies n/v/d/c. PO intake PTA < 50% x 1 week due to appetite loss and denies any wt changes. Pt does not know UBW, states "200 something." Pt states she feels like she looks the same. Per chart review 7% wt loss in 3 months. NFPE completed 11/25, pt does not meet malnutrition criteria at this time. Pt at risk from recent wt loss. Will continue to monitor.    Nutrition Discharge Planning: Adequate nutrition via diabetic diet    Nutrition Risk Screen    Nutrition Risk Screen: no indicators present    Nutrition/Diet History    Spiritual, Cultural Beliefs, Scientologist Practices, Values that Affect Care: no  Food Allergies: NKFA  Factors Affecting Nutritional Intake: None identified at this time    Anthropometrics    Temp: 97.3 °F (36.3 °C)  Height Method: Stated  Height: 5' 9" (175.3 cm)  Height (inches): 69 in  Weight Method: Bed Scale  Weight: 92 kg (202 lb 13.2 oz)  Weight (lb): 202.83 lb  Ideal Body Weight (IBW), Female: 145 lb  % Ideal Body Weight, Female (lb): 139.88 %  BMI (Calculated): 29.9  BMI Grade: 25 - 29.9 - overweight       Lab/Procedures/Meds    Pertinent Labs Reviewed: reviewed  Pertinent Labs Comments: Na 133, Glu 289  Pertinent Medications Reviewed: reviewed  Pertinent Medications " Comments: insulin, pantoprazole, polyethylene glycol, vancomycin, warfarin    Estimated/Assessed Needs    Weight Used For Calorie Calculations: 92 kg (202 lb 13.2 oz)  Energy Calorie Requirements (kcal): 1847 kcal/day(PAL 1.2)  Energy Need Method: [place]-St Jeor  Protein Requirements:  g/day(1.0-1.2 g/kg)  Weight Used For Protein Calculations: 92 kg (202 lb 13.2 oz)  Fluid Requirements (mL): 1 mL/kcal or per MD     RDA Method (mL): 1847  CHO Requirement: 231 g      Nutrition Prescription Ordered    Current Diet Order: Diabetic 2000 kcal    Evaluation of Received Nutrient/Fluid Intake    Comments: LBM: 11/24  Tolerance: tolerating    Nutrition Risk    Level of Risk/Frequency of Follow-up: low     Assessment and Plan    Nutrition Problem  Inadequate energy intake    Related to (etiology):   Inability to consume sufficient energy    Signs and Symptoms (as evidenced by):   PO intake < 50% for ~ 1 week     Interventions (treatment strategy):  Collaboration with other providers  Carbohydrate modified diet    Nutrition Diagnosis Status:   New      Monitor and Evaluation    Food and Nutrient Intake: energy intake  Food and Nutrient Adminstration: diet order  Anthropometric Measurements: weight, weight change  Biochemical Data, Medical Tests and Procedures: electrolyte and renal panel, gastrointestinal profile, glucose/endocrine profile, inflammatory profile, lipid profile  Nutrition-Focused Physical Findings: overall appearance, skin     Malnutrition Assessment                 Orbital Region (Subcutaneous Fat Loss): well nourished  Upper Arm Region (Subcutaneous Fat Loss): well nourished  Thoracic and Lumbar Region: well nourished   [place] Region (Muscle Loss): well nourished  Clavicle Bone Region (Muscle Loss): well nourished  Scapular Bone Region (Muscle Loss): well nourished  Dorsal Hand (Muscle Loss): well nourished  Patellar Region (Muscle Loss): well nourished  Anterior Thigh Region (Muscle Loss): well  nourished  Posterior Calf Region (Muscle Loss): well nourished                 Nutrition Follow-Up    RD Follow-up?: Yes

## 2020-11-25 NOTE — PLAN OF CARE
Recommendations    1. Continue Diabetic 2000 kcal diet   2. If PO intake < 50%, consider adding Boost Glucose Control BID    Goals: 1. Pt to meet % EEN/EPN by RD f/u  Nutrition Goal Status: new  Communication of RD Recs: (POC)

## 2020-11-25 NOTE — ASSESSMENT & PLAN NOTE
Dx in May 2020. Was on lovenox (unable to afford)--> switched to Eliquis. Had strokes in sept and oct despite being on Eliquis-  On Warfarin

## 2020-11-25 NOTE — CONSULTS
[facility]  Radiation Oncology  Consult Note    Patient Name: Alison Branch  MRN: [id number]  Admission Date: 11/24/2020  Hospital Length of Stay: 1 days  Code Status: Full Code   Attending Provider: Carolina Thomas MD  Consulting Provider: Pascual Judge MD  Primary Care Physician: Mike Rodriguez Ii, MD  Principal Problem:Pleural effusion    Inpatient consult to Radiation Oncology  Consult performed by: Pascual Judge MD  Consult ordered by: Fam Holman MD        Assessment/Plan:     Secondary malignant neoplasm of brain  Ms. Branch is a 64 y/o female with metastatic NSCLC (adeno; ROS1+) treated by Dr. Belcher as outpatient who was admitted 11/24/20 for progression weakness and dyspnea. She has been diagnosed with CVA over the past 2 months with resultant Left-sided weakness since mid-October. MRI Brain with contrast 11/25/20 demonstrates new enhancing lesions in the Right lateral frontal, Right occipital, and Right inferior frontal (im 74 series 6) regions c/w metastases. It should be noted she also has enhancement in the Right corona radiata attributed to CVA. Radiation Oncology is consulted for consideration of treatment options.     I met with the patient at bedside and her daughter (LAUREANO) present by phone. It does not appear her symptoms on present admission are related to brain metastases. They both deny AMS, but she does note persistent focal Left sided weakness since mid-October and more recently progressive dyspnea on exertion and generalized weakness/fatigue.     I reviewed the management of brain metastases. Her intracranial disease volume is limited, so I would recommend treating the 3 brain mets with single fraction stereotactic radiosurgery. Potential side effects of treatment were reviewed. We also discussed the alternative of no cancer-directed therapy with plan for supportive care instead. I think this would also be a reasonable option if she does not want any additional  systemic therapy. Given recent CVA (unrelated to brain mets?), I can't say that she won't continue to have decline due to strokes even if we treat the cancer. I believe hospice would be a very reasonable option for her, but if she would like to pursue treatment, I think SRS would carry low risk for worsening her quality of life.     At the end of our discussion, they indicated they want more time to think before making a decision. I will follow-up with them when our clinic reopens on Monday 11/30 and proceed based on their wishes. SRS would be arranged as an outpatient procedure.         Thank you for your consult. I will follow-up with patient. Please contact us if you have any additional questions.    Pascual Judge MD  Radiation Oncology  [facility]

## 2020-11-25 NOTE — HPI
Alison Branch is a 65-year-old female with a past medical history of known adenocarcinoma of the lung s/p chemotherapy who presents for admission to oncology service after be seen with generalized weakness and drowsiness at her clinic visit earlier today where she was found to have a large left-sided pleural effusion.  Neurosurgery consulted after MRI workup for stroke revealed concern for new brain metastasis.  Past medical history significant for ischemic strokes in September and October with residual left-sided weakness, remote hx of R ICA terminus aneurysm coiling, depression, diabetes, hypertension, hyperlipidemia, and daily coumadin use.  She is awake and alert on examination and states that she has had decreased p.o. intake for the last 2 weeks and that her family brought her in today due to a stepwise decline.  She denies headache, she denies nausea or vomiting, denies new numbness or weakness or neurologic symptoms.  She states that her weakness in her left upper and left lower extremity had been chronic over the course of weeks, since she left rehab.

## 2020-11-25 NOTE — PLAN OF CARE
Problem: Physical Therapy Goal  Goal: Physical Therapy Goal  Description: Goals to be met by: 2020     Patient will increase functional independence with mobility by performin. Supine to sit with Contact Guard Assistance  2. Sit to supine with Contact Guard Assistance  3. Bed to chair transfer with Minimal Assistance with/without Slideboard  4. Wheelchair propulsion x50 feet with Stand-by Assistance using  right upper/lower extremity  5. Lower extremity exercise program x15 reps per handout, with assistance as needed    Outcome: Ongoing, Progressing

## 2020-11-25 NOTE — ASSESSMENT & PLAN NOTE
Admitted 9/28- 10/05  For RLE weakness, found to have a stroke.  Re-admitted from rehab 10/13-10/20 for L facial droop  MRI from that admission showed infarcts in the R MCA, L MCA, and L cerebellar territories.    Plan:    - Per Neuro, no ASA-> continue warfarin.    - Pt unable to receive statin due to chemotherapy (per notes)    - Continue ezetimibe 10 mg nightly.

## 2020-11-25 NOTE — PROGRESS NOTES
Admit Assessment    Patient Identification  Alison Branch   :  1955  Admit Date:  2020  Attending Provider:  Carolina Thomas MD              Referral:   Pt was admitted to [facility], medical oncology service with a diagnosis of Pleural effusion, and was admitted this hospital stay due to Knee pain [M25.569], AMS (altered mental status) [R41.82].       is involved.  Ms. Branch was referred to the Social Work Department via routine referal.  Patient presents as a 65 y.o. year old female.    Persons interviewed: Patient and her daughter (Chito on speaker phone).      Living Situation:      Resides at [address]  [facility] [address] [facility] [address], phone: [phone] (home). Patient lives with her grandson at the address listed below.  She said he is a big help to her.   [address] [place]  [address]    (RETIRED) Functional Status Prior  Ambulation Prior: 4-->completely dependent  Transferrin-->completely dependent  Toiletin-->completely dependent    Current or Past Agencies and Description of Services/Supplies    DME  Agency Name: [facility]   Agency Phone Number: [phone]       Home Health  Agency Name: [facility]   Agency Phone Number: [phone]  Services: SN/PT/OT/ST    IV Infusion:  N/A    Nutrition: Diabetic diet/Oral nutrition    Outpatient Pharmacy:     [facility] - [address]  [address]  [facility] [address]  Phone: [phone] Fax: [phone]    [facility] - [place], [place] - [address]  [address]  [facility] [address]  Phone: [phone] Fax: [phone]    [id number] - [place], [place] - [address] [facility] [address] [facility] [address]  [address] [facility] [address]  Phone: [phone] Fax: [phone]      Patient Preference of agencies include:  Patient has [facility]    Patient/Caregiver  informed of right to choose providers or agencies.  Patient provides permission to release any necessary information to [facility] and to [facility] agencies as needed to facilitate patient care, treatment planning, and patient discharge planning.  Written and verbal resources provided.    Coping:  Even though patient is totally dependent on others for assistance with ADLs, she said she is totally at peace and sited her shannon as her biggest coping mechanism.      Adjustment to Diagnosis and Treatment:  Provider just had a DNR discussion with patient this morning.  She said she is not ready to submit to a DNR agreement and believes that she will know when it is time.      Emotional/Behavioral/Cognitive Issues:  Patient enjoys talking and seems to enjoy her family/friend relationships.  She has a sense of humor.       History/Current Symptoms of Anxiety/Depression: Patient was previously treated with Lexipro for depression, but is no longer taking it.  She said she does not need it.   History/Current Substance Use:   Social History     Tobacco Use    Smoking status: Former Smoker     Packs/day: 0.50     Years: 30.00     Pack years: 15.00     Quit date: 1999     Years since quittin.9    Smokeless tobacco: Never Used   Substance and Sexual Activity    Alcohol use: No     Alcohol/week: 0.0 standard drinks    Drug use: No    Sexual activity: Never     Partners: Female     Birth control/protection: Surgical       Indications of Abuse/Neglect: No  Abuse Screen:  Not indicated  Feels Unsafe at Home or Work/School: No      Financial:  Payor/Plan Subscr  Sex Relation Sub. Ins. ID Effective Group Num   1. [facility]* BETINA CORMIER 1955 Female  [id number]                                   [address]   2. [facility]* BETINA CORMIER 1955 Female Self [id number]                                    [address] [address]     Other identified concerns/needs:  Patient and her daughter  are planning to move patient to daughter's home in [place], [place].  They will do so after patient's radiation treatment is complete, which according to radiation oncologist will be approximately mid-December.      Plan:  To be determined.    Interventions/Referrals:  outlined [facility] social work services, provided patient with a release of information so records can be released to [place].    Patient/caregiver engaged in treatment planning process.     providing psychosocial and supportive counseling, resources, education, assistance and discharge planning as appropriate.  Patient/caregiver state understanding of  available resources,  following, remains available.

## 2020-11-25 NOTE — ASSESSMENT & PLAN NOTE
Ms. Branch is a 66 y/o female with metastatic NSCLC (adeno; ROS1+) treated by Dr. Belcher as outpatient who was admitted 11/24/20 for progression weakness and dyspnea. She has been diagnosed with CVA over the past 2 months with resultant Left-sided weakness since mid-October. MRI Brain with contrast 11/25/20 demonstrates new enhancing lesions in the Right lateral frontal, Right occipital, and Right inferior frontal (im 74 series 6) regions c/w metastases. It should be noted she also has enhancement in the Right corona radiata attributed to CVA. Radiation Oncology is consulted for consideration of treatment options.     I met with the patient at bedside and her daughter (LAUREANO) present by phone. It does not appear her symptoms on present admission are related to brain metastases. They both deny AMS, but she does note persistent focal Left sided weakness since mid-October and more recently progressive dyspnea on exertion and generalized weakness/fatigue.     I reviewed the management of brain metastases. Her intracranial disease volume is limited, so I would recommend treating the 3 brain mets with single fraction stereotactic radiosurgery. Potential side effects of treatment were reviewed. We also discussed the alternative of no cancer-directed therapy with plan for supportive care instead. I think this would also be a reasonable option if she does not want any additional systemic therapy. Given recent CVA (unrelated to brain mets?), I can't say that she won't continue to have decline due to strokes even if we treat the cancer. I believe hospice would be a very reasonable option for her, but if she would like to pursue treatment, I think SRS would carry low risk for worsening her quality of life.     At the end of our discussion, they indicated they want more time to think before making a decision. I will follow-up with them when our clinic reopens on Monday 11/30 and proceed based on their wishes. SRS would be arranged  as an outpatient procedure.

## 2020-11-25 NOTE — PHARMACY MED REC
"Admission Medication Reconciliation - Pharmacy Consult Note    The home medication history was taken by Nathalie Arreguin PharmD.  Based on information gathered and subsequent review by the clinical pharmacist, the items below may need attention.     You may go to "Admission" then "Reconcile Home Medications" tabs to review and/or act upon these items.     Potentially problematic discrepancies with current MAR  o Patient is taking a drug DIFFERENTLY than how ordered upon admit  o Gabapentin 100 mg PO TID     Please address this information as you see fit.  Feel free to contact us if you have any questions or require assistance.    Nathalie Arreguin PharmD  [id number]                .    .            "

## 2020-11-25 NOTE — ASSESSMENT & PLAN NOTE
Admitted 9/28- 10/05  For RLE weakness, found to have a stroke.  Re-admitted from rehab 10/13-10/20 for L facial droop  MRI from that admission showed infarcts in the R MCA, L MCA, and L cerebellar territories

## 2020-11-25 NOTE — CONSULTS
"Pulmonology Consult Note    Attending Physician: Carolina Thomas MD      Date of Admit: 11/24/2020    Chief Complaint: Left pleural effusion     History of Present Illness:  Alison Branch is a 65 y.o.  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 Per pt, she has been relying on her home oxygen more and more. She could not ambulate around the house due to weakness and dyspnea. Pt also exerts poor appetite.     Of note, MRI brain with contrast revealed right frontal lobe with avid peripheral enhancement and smaller enhancing lesion in the right occipital lobe concerning for metastatic disease. Chest Xray revealed complete opacification of the left hemithorax suggesting large pleural effusion. Pulmonology was consulted for thoracentesis evaluation.     Past Medical History:  Past Medical History:   Diagnosis Date    Allergy     Brain aneurysm 2010    s/p coiling of one; another not coiled    Breast cyst     Depression 9/19/2019    Diabetes mellitus     Diabetes type 2, controlled     Fever blister     High cholesterol     History of Bell's palsy     HTN (hypertension) 5/20/2014    Recurrent upper respiratory infection (URI)        Past Surgical History:  Past Surgical History:   Procedure Laterality Date    BREAST SURGERY      BRONCHOSCOPY N/A 5/28/2019    Procedure: Bronchoscopy;  Surgeon: Iesha Diagnostic Provider;  Location: [facility] OR [address] [place] [address];  Service: Anesthesiology;  Laterality: N/A;    CERVICAL FUSION      COLONOSCOPY N/A 3/9/2016    Procedure: COLONOSCOPY;  Surgeon: Elliott Zimmerman MD;  Location: [facility] ENDO (4TH FLR);  Service: Endoscopy;  Laterality: N/A;    head surgery      stent and "curling" for aneurysm    HYSTERECTOMY      TVH secondary to SUF    " "INSERTION OF TUNNELED CENTRAL VENOUS CATHETER (CVC) WITH SUBCUTANEOUS PORT Right 2019    Procedure: INSERTION, PORT-A-CATH;  Surgeon: Cali Damico MD;  Location: [facility] CATH LAB;  Service: Radiology;  Laterality: Right;    MENISCECTOMY Left        Allergies:  Review of patient's allergies indicates:   Allergen Reactions    Piperacillin-tazobactam Rash     Tolerated cefepime 2020         Family History:  Family History   Problem Relation Age of Onset    Rheum arthritis Father     Diabetes Father     Heart failure Father     Migraines Father     Cataracts Father     Cancer Mother 63        pancreatic    Stomach cancer Mother     Breast cancer Maternal Aunt         50s    Ovarian cancer Cousin     Allergies Daughter     Diabetes Sister     Diabetes Brother     Cancer Maternal Aunt         Lung CA    Melanoma Neg Hx     Colon cancer Neg Hx     Amblyopia Neg Hx     Blindness Neg Hx     Glaucoma Neg Hx     Macular degeneration Neg Hx     Retinal detachment Neg Hx     Strabismus Neg Hx     Stroke Neg Hx     Thyroid disease Neg Hx     Allergic rhinitis Neg Hx     Angioedema Neg Hx     Asthma Neg Hx     Atopy Neg Hx     Eczema Neg Hx     Immunodeficiency Neg Hx     Rhinitis Neg Hx     Urticaria Neg Hx        Social History:  Social History     Tobacco Use    Smoking status: Former Smoker     Packs/day: 0.50     Years: 30.00     Pack years: 15.00     Quit date: 1999     Years since quittin.9    Smokeless tobacco: Never Used   Substance Use Topics    Alcohol use: No     Alcohol/week: 0.0 standard drinks    Drug use: No       Review of Systems:  Comprehensive ROS negative except as per HPI       Objective:   Last 24 Hour Vital Signs:  BP  Min: 104/61  Max: 141/64  Temp  Av.8 °F (36.6 °C)  Min: 97.6 °F (36.4 °C)  Max: 98 °F (36.7 °C)  Pulse  Av.8  Min: 88  Max: 113  Resp  Av  Min: 12  Max: 20  SpO2  Av.3 %  Min: 94 %  Max: 97 %  Height  Av' 9" " "(175.3 cm)  Min: 5' 9" (175.3 cm)  Max: 5' 9" (175.3 cm)  Weight  Av.9 kg (211 lb 6.6 oz)  Min: 92 kg (202 lb 13.2 oz)  Max: 99.8 kg (220 lb)  Body mass index is 29.95 kg/m².  I/O last 3 completed shifts:  In: -   Out: 400 [Urine:400]    Physical Exam:  General: Alert and awake in NAD  HENT:  NCAT; anicteric sclera; OP clear with MMM  Cardio:  Regular rate and rhythm with normal S1 and S2; no murmurs or rubs  Resp:  Respirations unlabored; no wheezes, crackles or rhonchi, absent lung sound on left lung field   Abdom:  Soft, NTND with normoactive bowel sounds  Extrem:  WWP with no clubbing, cyanosis or edema  Pulses:  2+ and symmetric distally  Neuro:  AAOx3; cooperative and pleasant with no focal deficits     Personal Review and Summary of Interval Diagnostics    Laboratory Studies:   Recent Labs   Lab 20  0606   PH 7.411   PCO2 38.0   PO2 74*   HCO3 24.1   POCSATURATED 95   BE 0     Recent Labs   Lab 20  0810   WBC 6.37   RBC 4.24   HGB 11.0*   HCT 37.3      MCV 88   MCH 25.9*   MCHC 29.5*     Recent Labs   Lab 20  1702      K 4.1      CO2 25   BUN 16   CREATININE 0.9       Microbiology Data:   Microbiology Results (last 7 days)     ** No results found for the last 168 hours. **          Chest Imaging:   X-ray Chest Ap Portable    Result Date: 2020  1. Interval complete opacification of the left hemithorax suggesting large pleural effusion.  Parenchymal changes of the right hemithorax suggests sequela of infectious or inflammatory process versus edema.  Given the nodular component, malignancy is not excluded. Electronically signed by: Dillon Contreras MD Date:    2020 Time:    18:05         Assessment & Plan:   Alison Branch is a 65 y.o.  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 Chest Xray revealed complete opacification of the left hemithorax suggesting large pleural effusion. Pulmonology was consulted for thoracentesis evaluation.     Left Pleural Effusion   Likely secondary to lung adenocarcinoma  - Bedside US revealed pocket of fluid of left lung   - Pt had warfarin last night 11/24, PT/INR 18.2/1.7  - Please hold any anticoagulation in anticipation for procedures   - Thoracentesis is likely to be performed today 11/25  - Currently sat 94% on RA     Left pulmonary adenocarcinoma   With new 6 mm focus in the right frontal lobe and 2nd smaller enhancing lesion in the right occipital lobe. Was on entrectinib and was held secondary to CVA   - Management per primary team   - See left pleural effusion       Alejandra Nichols D.O  Internal Medicine PGY-1  [facility]

## 2020-11-25 NOTE — CONSULTS
[facility]  Palliative Medicine  Consult Note    Patient Name: Alison Branch  MRN: [id number]  Admission Date: 11/24/2020  Hospital Length of Stay: 1 days  Code Status: Full Code   Attending Provider: Carolina Thomas MD  Consulting Provider: Clay Alexander MD  Primary Care Physician: Mike Rodriguez Ii, MD  Principal Problem:Pleural effusion    Patient information was obtained from patient, relative(s), past medical records and ER records.      Inpatient consult to Palliative Care  Consult performed by: Clay Alexander MD  Consult ordered by: Fam Holman MD        Assessment/Plan:     * Pleural effusion  - New large left pleural effusion  - Pulmonology consulted for therapeutic drainage     Chronic arterial ischemic stroke, multifocal, multiple vascular territories  Admitted 9/28- 10/05  For RLE weakness, found to have a stroke.  Re-admitted from rehab 10/13-10/20 for L facial droop  MRI from that admission showed infarcts in the R MCA, L MCA, and L cerebellar territories    Palliative care encounter  1) Symptoms: Fatigue, weakness, and shortness of breath    2) Code status: Full Code    3) Psychosocial: Patient has great support from her 3 daughters and her grandchildren.     4) Medicolegal: has an Advanced Directive on file     5) Spiritual: Buddhist, her shannon is very important     6) Goals of care/discussion: Patient is still deciding her options for treatment and will discuss with her daughters in the coming days. Chito is driving from [place] tomorrow and the two other daughters live here.       Experience with critical illness: has had cancer since 2019    Understanding of illness: Patient knows she has cancer with mets to brain but I do not think patient fully understands the severity     Present for discussion: patient's daughter, Brandon, was listening on the phone       7) Disposition plan: Ongoing goals of care. Plan for meeting with 3 daughters this Friday, 11/27, at 1:00 PM    Thank you  for the opportunity to care for this patient and family.     Please call with questions.             Pulmonary embolism  Dx in May 2020. Was on lovenox (unable to afford)--> switched to Eliquis. Had strokes in sept and oct despite being on Eliquis-  On Warfarin     Major depressive disorder, recurrent, unspecified  On venlafaxine      Thank you for your consult. I will follow-up with patient. Please contact us if you have any additional questions.    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     Patient was recently sent to Rehab on 10/20/20 after her last admission for a stroke and was discharged home from rehab on 11/11/20 with H/H. Per grandson who takes care of her, she was able to walk 10-15 feet for the first few days, however, she has continued to decline at home due to progressive weakness, lightheadedness, poor appetite and SOB on exertion. These symptoms have made her stay in bed most of the time and having difficulty getting around. When asked the grandson to elaborate on confusion, he states that she is always coherent, however, due to weakness she seems to be somewhat lethargic and sometimes slurs her speech. No new focal weakness noted. They went for their scheduled post-discharge appointment with Internal Medicine this morning (seen by Dr Curtis/Dr Campuzano) and ws sent to the ED for further investigations of her symptoms and management.    Hospital Course:  No notes on file    Interval History:     Past Medical History:   Diagnosis Date     "Allergy     Brain aneurysm 2010    s/p coiling of one; another not coiled    Breast cyst     Depression 9/19/2019    Diabetes mellitus     Diabetes type 2, controlled     Fever blister     High cholesterol     History of Bell's palsy     HTN (hypertension) 5/20/2014    Recurrent upper respiratory infection (URI)        Past Surgical History:   Procedure Laterality Date    BREAST SURGERY      BRONCHOSCOPY N/A 5/28/2019    Procedure: Bronchoscopy;  Surgeon: [facility]janet Diagnostic Provider;  Location: [facility] OR 2ND FLR;  Service: Anesthesiology;  Laterality: N/A;    CERVICAL FUSION      COLONOSCOPY N/A 3/9/2016    Procedure: COLONOSCOPY;  Surgeon: Elliott Zimmerman MD;  Location: [facility] ENDO (4TH FLR);  Service: Endoscopy;  Laterality: N/A;    head surgery      stent and "curling" for aneurysm    HYSTERECTOMY      TVH secondary to SUF    INSERTION OF TUNNELED CENTRAL VENOUS CATHETER (CVC) WITH SUBCUTANEOUS PORT Right 7/8/2019    Procedure: INSERTION, PORT-A-CATH;  Surgeon: Cali Damico MD;  Location: [facility] CATH LAB;  Service: Radiology;  Laterality: Right;    MENISCECTOMY Left        Review of patient's allergies indicates:   Allergen Reactions    Piperacillin-tazobactam Rash     Tolerated cefepime 06/2020       Medications:  Continuous Infusions:  Scheduled Meds:   bethanechol  25 mg Oral TID    dexamethasone  4 mg Intravenous Q6H    ezetimibe  10 mg Oral QHS    insulin detemir U-100  5 Units Subcutaneous Daily    pantoprazole  40 mg Oral Daily    polyethylene glycol  17 g Oral Daily    tamsulosin  0.4 mg Oral QHS    venlafaxine  75 mg Oral Daily     PRN Meds:benzonatate, dextrose 50%, dextrose 50%, gabapentin, glucagon (human recombinant), glucose, glucose, HYDROcodone-acetaminophen, [START ON 11/26/2020] influenza, insulin aspart U-100, melatonin, ondansetron, sodium chloride 0.9%    Family History     Problem Relation (Age of Onset)    Allergies Daughter    Breast cancer Maternal Aunt    Cancer " Mother (63), Maternal Aunt    Cataracts Father    Diabetes Father, Sister, Brother    Heart failure Father    Migraines Father    Ovarian cancer Cousin    Rheum arthritis Father    Stomach cancer Mother        Tobacco Use    Smoking status: Former Smoker     Packs/day: 0.50     Years: 30.00     Pack years: 15.00     Quit date: 1999     Years since quittin.9    Smokeless tobacco: Never Used   Substance and Sexual Activity    Alcohol use: No     Alcohol/week: 0.0 standard drinks    Drug use: No    Sexual activity: Never     Partners: Female     Birth control/protection: Surgical       Review of Systems   Constitutional: Negative for activity change, appetite change, fatigue, fever and unexpected weight change.   HENT: Negative for congestion and facial swelling.    Respiratory: Positive for cough and shortness of breath (on exertion). Negative for wheezing and stridor.    Cardiovascular: Negative for chest pain, palpitations and leg swelling.   Gastrointestinal: Negative for abdominal pain, diarrhea, nausea and vomiting.   Endocrine: Positive for cold intolerance. Negative for polyphagia and polyuria.   Genitourinary: Negative for dysuria and hematuria.   Musculoskeletal: Negative.    Skin: Negative.  Negative for rash.   Neurological: Positive for dizziness, weakness and light-headedness.   Psychiatric/Behavioral: Negative for confusion. The patient is not nervous/anxious.      Objective:     Vital Signs (Most Recent):  Temp: 98.2 °F (36.8 °C) (20 1205)  Pulse: 104 (20 1205)  Resp: 19 (20 1205)  BP: (!) 142/82 (20 1205)  SpO2: 100 % (20 1205) Vital Signs (24h Range):  Temp:  [97.3 °F (36.3 °C)-98.2 °F (36.8 °C)] 98.2 °F (36.8 °C)  Pulse:  [] 104  Resp:  [12-20] 19  SpO2:  [94 %-100 %] 100 %  BP: (104-142)/(58-82) 142/82     Weight: 92 kg (202 lb 13.2 oz)  Body mass index is 29.95 kg/m².    Physical Exam  Vitals signs and nursing note reviewed.   Constitutional:        General: She is not in acute distress.     Appearance: She is well-developed.   HENT:      Head: Normocephalic and atraumatic.   Eyes:      Pupils: Pupils are equal, round, and reactive to light.   Neck:      Musculoskeletal: Normal range of motion and neck supple.      Vascular: No JVD.   Cardiovascular:      Rate and Rhythm: Normal rate and regular rhythm.      Heart sounds: Normal heart sounds. No murmur.   Pulmonary:      Effort: Pulmonary effort is normal. No respiratory distress.      Breath sounds: Normal breath sounds. No wheezing or rales.      Comments: Absence of respiratory sounds on the left hemithorax  Abdominal:      General: Bowel sounds are normal. There is no distension.      Palpations: Abdomen is soft.      Tenderness: There is no abdominal tenderness.   Musculoskeletal: Normal range of motion.         General: No deformity.   Skin:     General: Skin is warm.   Neurological:      Mental Status: She is alert and oriented to person, place, and time.         Review of Symptoms    Symptom Assessment (ESAS 0-10 Scale)  Pain:  0  Dyspnea:  6  Anxiety:  0  Nausea:  0  Depression:  0  Anorexia:  0  Fatigue:  5  Insomnia:  0  Restlessness:  10  Agitation:  0     Constipation:  Negative  Diarrhea:  Negative          Performance Status:  50    ECOG Performance Status Grade:  3 - Confined to bed or chair 50% of waking hours    Living Arrangements:  Lives with family    Psychosocial/Cultural: Patient has 3 daughters and many grandchildren. Patient lives with her 28 year old grandson. Patient has great support from her family and [employer].     Spiritual:  F - Valeri and Belief:  Christianity. Goes to [employer] [facility]  I - Importance:  Her valeri is very important.  C - Community:  Patient has great support from her 2 pastors at [employer].   A - Address in Care:  Patient would like one of her pastors involved in decision making after the rest of her family has met.       Advance Care Planning   Advance Directives:    Living Will: Yes        Copy on chart: Yes    LaPOST: No    Do Not Resuscitate Status: No    Medical Power of : Yes    Agent's Name:  Chito Woodruff   Agent's Contact Number:  [phone]    Decision Making:  Patient answered questions         Significant Labs: All pertinent labs within the past 24 hours have been reviewed.  CBC:   Recent Labs   Lab 11/25/20  0810   WBC 6.37   HGB 11.0*   HCT 37.3   MCV 88        BMP:  Recent Labs   Lab 11/25/20  0810   *   *   K 4.9      CO2 21*   BUN 13   CREATININE 0.8   CALCIUM 8.9     LFT:  Lab Results   Component Value Date    AST 26 11/25/2020    ALKPHOS 77 11/25/2020    BILITOT 0.5 11/25/2020     Albumin:   Albumin   Date Value Ref Range Status   11/25/2020 3.3 (L) 3.5 - 5.2 g/dL Final     Protein:   Total Protein   Date Value Ref Range Status   11/25/2020 7.7 6.0 - 8.4 g/dL Final     Lactic acid:   Lab Results   Component Value Date    LACTATE 1.4 11/24/2020    LACTATE 1.7 07/11/2020       Significant Imaging: I have reviewed all pertinent imaging results/findings within the past 24 hours.        Clay Alexander MD  Palliative Medicine  [facility]

## 2020-11-25 NOTE — HPI
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    Patient was recently sent to Rehab on 10/20/20 after her last admission for a stroke and was discharged home from rehab on 11/11/20 with H/H. Per grandson who takes care of her, she was able to walk 10-15 feet for the first few days, however, she has continued to decline at home due to progressive weakness, lightheadedness, poor appetite and SOB on exertion. These symptoms have made her stay in bed most of the time and having difficulty getting around. When asked the grandson to elaborate on confusion, he states that she is always coherent, however, due to weakness she seems to be somewhat lethargic and sometimes slurs her speech. No new focal weakness noted.     In the ED, VS were WNL, labs with no abnormalities, CT scan w/ known prior strokes and additional foci that could not be excluded w/CT. CXR with large pleural effusion opacifying the left hemithorax. Med Onc called for admission.

## 2020-11-25 NOTE — HPI
Patient with Stage IV Lung Cancer currently admitted for pleural effusion. Altered mental status with new brain lesions. Psych follows consistently. Patient currently prescribed Effexor.

## 2020-11-25 NOTE — SUBJECTIVE & OBJECTIVE
"Patient information was obtained from {info source:[id number]::"ER records"}.     Oncology History: ***     (Not in a hospital admission)      Piperacillin-tazobactam     Past Medical History:   Diagnosis Date    Allergy     Brain aneurysm     s/p coiling of one; another not coiled    Breast cyst     Depression 2019    Diabetes mellitus     Diabetes type 2, controlled     Fever blister     High cholesterol     History of Bell's palsy     HTN (hypertension) 2014    Recurrent upper respiratory infection (URI)      Past Surgical History:   Procedure Laterality Date    BREAST SURGERY      BRONCHOSCOPY N/A 2019    Procedure: Bronchoscopy;  [facility];  Location: [facility] OR [address] [place] [address];  Service: Anesthesiology;  Laterality: N/A;    CERVICAL FUSION      COLONOSCOPY N/A 3/9/2016    Procedure: COLONOSCOPY;  Surgeon: Elliott Zimmerman MD;  Location: [facility] ENDO (4TH FLR);  Service: Endoscopy;  Laterality: N/A;    head surgery      stent and "curling" for aneurysm    HYSTERECTOMY      TVH secondary to SUF    INSERTION OF TUNNELED CENTRAL VENOUS CATHETER (CVC) WITH SUBCUTANEOUS PORT Right 2019    Procedure: INSERTION, PORT-A-CATH;  Surgeon: Cali Damico MD;  Location: [facility] CATH LAB;  Service: Radiology;  Laterality: Right;    MENISCECTOMY Left      Family History     Problem Relation (Age of Onset)    Allergies Daughter    Breast cancer Maternal Aunt    Cancer Mother (63), Maternal Aunt    Cataracts Father    Diabetes Father, Sister, Brother    Heart failure Father    Migraines Father    Ovarian cancer Cousin    Rheum arthritis Father    Stomach cancer Mother        Tobacco Use    Smoking status: Former Smoker     Packs/day: 0.50     Years: 30.00     Pack years: 15.00     Quit date: 1999     Years since quittin.9    Smokeless tobacco: Never Used   Substance and Sexual Activity    Alcohol use: No     Alcohol/week: 0.0 standard drinks    Drug use: No    " Sexual activity: Never     Partners: Female     Birth control/protection: Surgical       Review of Systems  Objective:     Vital Signs (Most Recent):  Temp: 97.6 °F (36.4 °C) (20)  Pulse: 101 (20)  Resp: 17 (20)  BP: (!) 125/59 (20)  SpO2: 95 % (20) Vital Signs (24h Range):  Temp:  [97.6 °F (36.4 °C)] 97.6 °F (36.4 °C)  Pulse:  [] 101  Resp:  [17] 17  SpO2:  [95 %-97 %] 95 %  BP: (125-141)/(59-72) 125/59     Weight: 99.8 kg (220 lb)  Body mass index is 32.49 kg/m².  Body surface area is 2.2 meters squared.    ECOG SCORE         [unfilled]    Lines/Drains/Airways     Drain            Female External Urinary Catheter 10/13/20 2000 42 days          Peripheral Intravenous Line                 Peripheral IV - Single Lumen 10/13/20 2000 20 G Anterior;Proximal;Right Forearm 42 days                Physical Exam    Significant Labs:   {Select Labs:05563}    Diagnostic Results:  {Select Imagin}

## 2020-11-25 NOTE — ASSESSMENT & PLAN NOTE
Alison Branch is a 66 yo female with a PMH of Lung adenoCA s/p chemotherapy, prior coiled cerebral aneurysm (2010), depression, DM, HTN, HLD and recent R MCA CVA (10/2020) who presents with stepwise decline, decreased appetite and slow decline in left sided strength for the last few weeks found to have large L sided pleural effusion as well as presumed new R frontal and R occipital subcentimeter brain metastasis.     --Admitted to Oncology; stable for q4 hour neurochecks  --MRI w/wo contrast obtained with a R frontal 1cm and R occipital 0.8cm vividly enhancing lesions likely metastatic disease given history of diffuse osseus mets with known lung primary  --may begin dexamethasone for vasogenic edema; 4q6. PPI while on steroids.   --Further medical care per primary team, will further discuss treatment options with patient; currently does not want to consider radiation or surgery but is amenable to further discussion.   --Hold Coumadin for goal INR < 1.4, INR currently 1.5.

## 2020-11-25 NOTE — PROCEDURES
"Alison Branch is a 65 y.o. female patient.    Temp: 98.3 °F (36.8 °C) (20)  Pulse: 102 (20)  Resp: 18 (20)  BP: (!) 160/69 (20)  SpO2: (!) 93 % (20)  Weight: 92 kg (202 lb 13.2 oz) (20)  Height: 5' 9" (175.3 cm) (20)       Thoracentesis    Date/Time: 2020 5:12 PM  Location procedure was performed: [facility] PULMONARY SERVICES  Performed by: Esme Correa MD  Authorized by: Esme Correa MD   Assisting provider: Alejandra Nichols DO  Pre-operative diagnosis: L sided pleural effusion  Post-operative diagnosis: L sided pleural effusion  Consent Done: Yes  Consent: Verbal consent obtained. Written consent obtained.  Risks and benefits: risks, benefits and alternatives were discussed  Consent given by: patient  Patient identity confirmed: , MRN and name  Time out: Immediately prior to procedure a "time out" was called to verify the correct patient, procedure, equipment, support staff and site/side marked as required.  Procedure purpose: therapeutic and diagnostic  Indications: pleural effusion  Preparation: Patient was prepped and draped in the usual sterile fashion.  Local anesthesia used: yes    Anesthesia:  Local anesthesia used: yes  Local Anesthetic: lidocaine 1% without epinephrine  Patient sedated: no  Description of findings: annette colored serosanguinous fluid removed   Preparation: skin prepped with ChloraPrep  Patient position: sitting  Ultrasound guidance: yes  Location: left posterior  Puncture method: over-the-needle catheter  Needle gauge: 17.  Catheter size: 14 gauge  Number of attempts: 1  Drainage amount: 1100 ml  Drainage characteristics: serosanguinous  Patient tolerance: Patient tolerated the procedure well with no immediate complications  Chest x-ray performed: no  Complications: No          Esme Correa  2020  "

## 2020-11-25 NOTE — ASSESSMENT & PLAN NOTE
"Per notes, patient sent with "AMS". Per grandson, she has just been weak and sometimes speaking slurred, but they believe this is just from her being tired/weak at home.  - Patient appears neurologically intact on my exam, no new focal deficits.   - CT head in the ED reported known CVAS with additional foci of ischemia within these regions cannot be definitively excluded with this exam.   - Curbsided Stroke team and they recommended MRI     Plan:    - MRI of the brain      -  If additional strokes are found will place formal consult for Neurology.        "

## 2020-11-25 NOTE — ASSESSMENT & PLAN NOTE
1) Symptoms: Fatigue, weakness, and shortness of breath    2) Code status: Full Code    3) Psychosocial: Patient has great support from her 3 daughters and her grandchildren.     4) Medicolegal: has an Advanced Directive on file     5) Spiritual: Spiritism, her shannon is very important     6) Goals of care/discussion: Patient is still deciding her options for treatment and will discuss with her daughters in the coming days. Chito is driving from [place] tomorrow and the two other daughters live here.       Experience with critical illness: has had cancer since 2019    Understanding of illness: Patient knows she has cancer with mets to brain but I do not think patient fully understands the severity     Present for discussion: patient's daughter, Brandon, was listening on the phone       7) Disposition plan: Ongoing goals of care. Plan for meeting with 3 daughters this Friday, 11/27, at 1:00 PM    Thank you for the opportunity to care for this patient and family.     Please call with questions.

## 2020-11-25 NOTE — H&P
[facility]  Hematology/Oncology  H&P    Patient Name: Alison Branch  MRN: [id number]  Admission Date: 11/24/2020  Code Status: Full Code   Attending Provider: No att. providers found  Primary Care Physician: Mike Rodriguez Ii, MD  Principal Problem:Pleural effusion    Subjective:     HPI:   65 year old woman with stage IV lung adenocarcinoma with metastasis to bone diagnosed in May 2019,  on PO Entrectinib since 6/6/2020, held in 10/20 due to CVA ( 09/20 and 10/20, bilateral, R MCA, L MCA, and L cerebellar territories), T2DM ( on insulin), HTN, HLD, PE/DVT diagnosed May 2020 (on warfarin), history of recurrent UTIs, snf history of brain aneurysm s/p coiling, presenting to the ED sent from the Internal medicine clinic, due to weakness, poor appetite, SOB and confusion.    Patient was recently sent to Rehab on 10/20/20 after her last admission for a stroke and was discharged home from rehab on 11/11/20 with H/H. Per grandson who takes care of her, she was able to walk 10-15 feet for the first few days, however, she has continued to decline at home due to progressive weakness, lightheadedness, poor appetite and SOB on exertion. These symptoms have made her stay in bed most of the time and having difficulty getting around. When asked the grandson to elaborate on confusion, he states that she is always coherent, however, due to weakness she seems to be somewhat lethargic and sometimes slurs her speech. No new focal weakness noted. They went for their scheduled post-discharge appointment with Internal Medicine this morning (seen by Dr Curtis/Dr Campuzano) and ws sent to the ED for further investigations of her symptoms and management.     In the ED, VS were WNL, labs with no abnormalities, CT scan w/ known prior strokes and additional foci that could not be excluded w/CT. CXR with large pleural effusion opacifying the left hemithorax. Med Onc called for admission.      Oncology History:    Per Dr Belcher's  "note (primary oncologist):    " 65-year-old female who smoked for about 30 years and quit 20 years ago, presented with cough to PCP, chest x-ray on 05/10/2019 revealed left upper lobe pneumonia and a repeat CT was done in the week after that on 05/17/2019 that showed left upper lobe mass arising from the lateral pleural surface in the left upper lobe posterior subsegment measuring 2.6 x 3 cm.  There are satellite mass more medially near the aortic arch that is 2 cm, also spiculated and irregular as well as thickened interlobar septa in the left lung apex and anterior segment, prevascular lymph node lateral to the aortic arch, short axis measuring 9 mm.       She then underwent a bronchoscopy on 05/28/2019, and that revealed there was an infiltration obtained in the left apical posterior segment of the left upper lobe cytology brush was done.  Transbronchial biopsies of an area of infiltration were also performed in the apicoposterior segment of the left upper lobe.    Pathology revealed adenocarcinoma; however, the specimen was not adequate enough to send for molecular testing.       She underwent a PET scan on 06/06/2019.  that reveals significant hypermetabolic activity in the large irregular spiculated pulmonary mass in the lateral aspect of the left upper lobe consistent with the patient's known pulmonary adenocarcinoma.  There is also tracer avidity in the medial left upper lobe satellite lesion and diffusely throughout much of the anterior left upper lobe where there is prominent nodular paraseptal thickening.  There are some numerous scattered subcentimeter pulmonary nodules throughout the right lung, all of which are too small for detection by PET.  For example, there is a 0.4 cm nodule in the superior right lower lobe and a micro nodule in the posterior segment of the right upper lobe.  There is a 0.5 cm, normal size right   paratracheal lymph node with increased radiotracer uptake as well as hypermetabolic " "aortic pulmonary lymph node and a left hilar lymph node.  There is a focal hypermetabolic lesion in the left anterior superior iliac spine associated with well defined lytic lesion.  There is a hypermetabolic focus in the anterior right fifth rib with a possible associated lytic lesion.  SUVs as   below lateral:  Left upper lobe  SUV max 17.9, anterior left upper lobe satellite mass and septal thickening SUV max of 10.1, right paratracheal lymph node SUV max of 4.8, left AP window lymph node SUV max of 17.9, left anterior superior iliac spine SUV max of 3.9"     MRI pelvis from 6/10/19  reveals "Region of osseous is irregularity at the left anterior iliac spine likely related to bone graft harvest procedure.  See  discussion above.  No suspicious signal or enhancement to suggest active/malignant process"   MRI brain from 6/10//19 reveal No intracranial abnormality.     We discussed her case at [facility], and plan was to wait for GAURDANT and proceed with treatment accordingly  Her GAURDANT is negative for molecular markers.     She has completed 4 cycles of Carboplatin, Alimta and Keytruda as of 9/5/19.       She has been on maintenance Alimta and Keytruda     Her CT scans from 4/23/2020 revealed "In this patient with a known history of lung cancer, there has been marked interval detrimental change when compared to CT dated 12/20/2019 as follows. Persistent left upper lobe volume loss with worsening masslike consolidation and enlarging index and non index lesions. Increased number of innumerable bilateral metastatic solid pulmonary nodules. Interval increased size and number of multiple osteoblastic metastatic lesions throughout the visualized axial skeleton. Stable mediastinal lymph nodes, several of which were noted to be hypermetabolic on previous PET-CT.  No new lymphadenopathy. Stable subcentimeter hepatic and splenic hypodensities, too small to accurately characterize.  Path addendum from 5/2019 reveals " "ROS1      She is now on Entrectinib at 400 mg dose on 7/1/2020  "    Entrecitinib is currently on hold.        Medications:  Continuous Infusions:  Scheduled Meds:   ezetimibe  10 mg Oral QHS    [START ON 11/25/2020] polyethylene glycol  17 g Oral Daily    tamsulosin  0.4 mg Oral QHS    vancomycin (VANCOCIN) IVPB  20 mg/kg Intravenous ED 1 Time    [START ON 11/25/2020] venlafaxine  75 mg Oral Daily    warfarin  5 mg Oral Daily     PRN Meds:gabapentin, HYDROcodone-acetaminophen, melatonin, ondansetron, sodium chloride 0.9%     Review of patient's allergies indicates:   Allergen Reactions    Piperacillin-tazobactam Rash     Tolerated cefepime 06/2020        Past Medical History:   Diagnosis Date    Allergy     Brain aneurysm 2010    s/p coiling of one; another not coiled    Breast cyst     Depression 9/19/2019    Diabetes mellitus     Diabetes type 2, controlled     Fever blister     High cholesterol     History of Bell's palsy     HTN (hypertension) 5/20/2014    Recurrent upper respiratory infection (URI)      Past Surgical History:   Procedure Laterality Date    BREAST SURGERY      BRONCHOSCOPY N/A 5/28/2019    Procedure: Bronchoscopy;  Surgeon: [facility] Diagnostic Provider;  [facility];  Service: Anesthesiology;  Laterality: N/A;    CERVICAL FUSION      COLONOSCOPY N/A 3/9/2016    Procedure: COLONOSCOPY;  Surgeon: Elliott Zimmerman MD;  Location: [facility] ENDO (4TH FLR);  Service: Endoscopy;  Laterality: N/A;    head surgery      stent and "curling" for aneurysm    HYSTERECTOMY      TVH secondary to SUF    INSERTION OF TUNNELED CENTRAL VENOUS CATHETER (CVC) WITH SUBCUTANEOUS PORT Right 7/8/2019    Procedure: INSERTION, PORT-A-CATH;  Surgeon: Cali Damico MD;  Location: [facility] CATH LAB;  Service: Radiology;  Laterality: Right;    MENISCECTOMY Left      Family History     Problem Relation (Age of Onset)    Allergies Daughter    Breast cancer Maternal Aunt    Cancer Mother (63), " "Maternal Aunt    Cataracts Father    Diabetes Father, Sister, Brother    Heart failure Father    Migraines Father    Ovarian cancer Cousin    Rheum arthritis Father    Stomach cancer Mother        Tobacco Use    Smoking status: Former Smoker     Packs/day: 0.50     Years: 30.00     Pack years: 15.00     Quit date: 1999     Years since quittin.9    Smokeless tobacco: Never Used   Substance and Sexual Activity    Alcohol use: No     Alcohol/week: 0.0 standard drinks    Drug use: No    Sexual activity: Never     Partners: Female     Birth control/protection: Surgical       Review of Systems   Constitutional: Negative for activity change, appetite change, fatigue, fever and unexpected weight change.   HENT: Negative for congestion and facial swelling.    Respiratory: Positive for cough and shortness of breath (on exertion). Negative for wheezing and stridor.    Cardiovascular: Negative for chest pain, palpitations and leg swelling.   Gastrointestinal: Positive for nausea. Negative for abdominal pain, diarrhea and vomiting.   Endocrine: Positive for cold intolerance.   Genitourinary: Negative for dysuria and hematuria.        Patient reports "My right hand hurts a lot every time I urinate"   Musculoskeletal: Negative.    Skin: Negative.  Negative for rash.   Neurological: Positive for dizziness, weakness and light-headedness.   Psychiatric/Behavioral: Negative for confusion. The patient is not nervous/anxious.      Objective:     Vital Signs (Most Recent):  Temp: 97.6 °F (36.4 °C) (20)  Pulse: 101 (20)  Resp: 17 (20 1600)  BP: (!) 125/59 (20)  SpO2: 95 % (20) Vital Signs (24h Range):  Temp:  [97.6 °F (36.4 °C)] 97.6 °F (36.4 °C)  Pulse:  [] 101  Resp:  [17] 17  SpO2:  [95 %-97 %] 95 %  BP: (125-141)/(59-72) 125/59     Weight: 99.8 kg (220 lb)  Body mass index is 32.49 kg/m².  Body surface area is 2.2 meters squared.    No intake or output data in the " 24 hours ending 11/24/20 2131    Physical Exam  Vitals signs reviewed.   Constitutional:       General: She is not in acute distress.     Appearance: She is well-developed.   HENT:      Head: Normocephalic and atraumatic.   Eyes:      Pupils: Pupils are equal, round, and reactive to light.   Neck:      Musculoskeletal: Normal range of motion and neck supple.      Vascular: No JVD.   Cardiovascular:      Rate and Rhythm: Normal rate and regular rhythm.      Heart sounds: Normal heart sounds. No murmur.   Pulmonary:      Effort: Pulmonary effort is normal. No respiratory distress.      Breath sounds: Normal breath sounds. No wheezing or rales.      Comments: Absence of respiratory sounds on the left hemithorax  Abdominal:      General: Bowel sounds are normal. There is no distension.      Palpations: Abdomen is soft.      Tenderness: There is no abdominal tenderness.   Musculoskeletal: Normal range of motion.         General: No deformity.   Skin:     General: Skin is warm.   Neurological:      Mental Status: She is alert and oriented to person, place, and time. Left sided weakness from prior strokes.  4/5        Significant Labs:   BMP:   Recent Labs   Lab 11/24/20  1702   *      K 4.1      CO2 25   BUN 16   CREATININE 0.9   CALCIUM 9.3   , CBC:   Recent Labs   Lab 11/24/20  1702   WBC 7.07   HGB 11.4*   HCT 37.8      , CMP:   Recent Labs   Lab 11/24/20  1702      K 4.1      CO2 25   *   BUN 16   CREATININE 0.9   CALCIUM 9.3   PROT 7.7   ALBUMIN 3.4*   BILITOT 0.5   ALKPHOS 72   AST 26   ALT 25   ANIONGAP 12   EGFRNONAA >60.0    and Coagulation:   Recent Labs   Lab 11/23/20 11/24/20  1719   INR 2.1 1.5*       Diagnostic Results:  CT: Known strokes and additional foci cannot be excluded   CXR: Complete opacification of the left hemithorax, likely from large pleural effusion.     Assessment/Plan:     * Pleural effusion   66 yo F with stage IV lung adenocarcinoma with  "metastasis to bone(May 2019), DVT/PE (on warfarin), recent CVAs , T2DM, HLD,  history of recurrent UTIs, h/o brain aneurysm s/p coiling, presenting to the ED sent from the Internal medicine clinic, due to weakness, poor appetite, SOB and confusion. Found to have a large pleural effusion that completely opacifies the left hemithorax.     Patient does report feeling weak and tired since her discharge from rehab, that has been worsening, along with progressive worsening SOB. CXR from 11/9 with small L pleural effusion, but now has complete opacification of the L hemithorax which explains her SOB and potentially her weakness.   Pt has no fever,  no WBC, clinically stable, unlikely parapneumonic effusion. Most likely this is a malignant pleural effusion with worsening disease (pt has been off of her chemotherapy due to recent hospital admissions).   - Pt is stable, breathing comfortably on room air (sats 96-98%), Neurologically intact.     Plan:   - VBG to rule out hypercapnia given effusion.    - Pulmonary consult for thoracentesis and analysis of fluid.- rule out malignant effusion.    - Palliative Care consult in AM    AMS (altered mental status)  Per notes, patient sent with "AMS". Per grandson, she has just been weak and sometimes speaking slurred, but they believe this is just from her being tired/weak at home.  - Patient appears neurologically intact on my exam, no new focal deficits.   - CT head in the ED reported known CVAS with additional foci of ischemia within these regions cannot be definitively excluded with this exam.   - Curbsided Stroke team and they recommended MRI     Plan:    - MRI of the brain      -  If additional strokes are found will place formal consult for Neurology.          Malignant neoplasm of upper lobe of left lung  - Diagnosed in 2019, mets to bone.  - See "Oncology History"  - Currently off of Entrectinib,    Plan:    - Continue to hold Entrectinib for now.    Weakness  PT/OT    Chronic " "arterial ischemic stroke, multifocal, multiple vascular territories  Admitted 9/28- 10/05  For RLE weakness, found to have a stroke.  Re-admitted from rehab 10/13-10/20 for L facial droop  MRI from that admission showed infarcts in the R MCA, L MCA, and L cerebellar territories.    Plan:    - Per Neuro, no ASA-> continue warfarin.    - Pt unable to receive statin due to chemotherapy (per notes)    - Continue ezetimibe 10 mg nightly.     Urinary retention  During admission in 9/28-10/15 pt noted to have urinary retention.  Started on Flomax-- Switched then to bethacholine.  Pt reports she is not taking it.  In the ED pt complains of bladder fullness.     Plan:   - bethanechol 35 mg TID (pt was getting it at rehab)    - watch for retention   - continue to f/u with urology as outpatient.     Pulmonary embolism  Dx in May 2020. Was on lovenox (unable to afford)--> switched to Eliquis. Had strokes in sept and oct despite being on Eliquis-  Currently on Warfarin.   INR subtherapeutic on admission.     Plan:  - Continue warfarin 5 mg qd  - Pharmacy consult to dose warfarin.        Chemotherapy-induced peripheral neuropathy  Continue home gabapentin.   Of note, patient reports "intense R hand pain with urination" she is very adamant that this a significant amount of pain. Unable to find an explanation for this.     Plan:   - Continue home gabapentin.     Major depressive disorder, recurrent, unspecified  Continue home venlafaxine.    Type 2 diabetes mellitus with hyperglycemia, with long-term current use of insulin  Patient has had less requirements. Per notes from last admission, she was on less dose of long-acting (12 U of lantus), and lispro was discontinued.  Pt not eating or drinking well.  Goal 140-180 in the hospital.     Plan:  - Will hold lantus for now as pt is not eating or drinking  - Sliding scale for now.   - Resume Lantus if glucose >180.        Manoj Pinto MD  Hematology/Oncology  [facility] [place]

## 2020-11-25 NOTE — HPI
"  65 year old woman with stage IV lung adenocarcinoma with metastasis to bone diagnosed in May 2019,  on PO Entrectinib since 6/6/2020, held in 10/20 due to CVA ( 09/20 and 10/20, bilateral, R MCA, L MCA, and L cerebellar territories), T2DM ( on insulin), HTN, HLD, PE/DVT diagnosed May 2020 (on warfarin), history of recurrent UTIs, snf history of brain aneurysm s/p coiling, presenting to the ED sent from the Internal medicine clinic, due to weakness, poor appetite, SOB and confusion.    Patient was recently sent to Rehab on 10/20/20 after her last admission for a stroke and was discharged home from rehab on 11/11/20 with H/H. Per grandson who takes care of her, she was able to walk 10-15 feet for the first few days, however, she has continued to decline at home due to progressive weakness, lightheadedness, poor appetite and SOB on exertion. These symptoms have made her stay in bed most of the time and having difficulty getting around. When asked the grandson to elaborate on confusion, he states that she is always coherent, however, due to weakness she seems to be somewhat lethargic and sometimes slurs her speech. No new focal weakness noted. They went for their scheduled post-discharge appointment with Internal Medicine this morning (seen by Dr Curtis/Dr Campuzano) and ws sent to the ED for further investigations of her symptoms and management.     In the ED, VS were WNL, labs with no abnormalities, CT scan w/ known prior strokes and additional foci that could not be excluded w/CT. CXR with large pleural effusion opacifying the left hemithorax. Med Onc called for admission.     Oncology History:  Per Dr Belcher's note (primary oncologist):     " 65-year-old female who smoked for about 30 years and quit 20 years ago, presented with cough to PCP, chest x-ray on 05/10/2019 revealed left upper lobe pneumonia and a repeat CT was done in the week after that on 05/17/2019 that showed left upper lobe mass arising from the " lateral pleural surface in the left upper lobe posterior subsegment measuring 2.6 x 3 cm.  There are satellite mass more medially near the aortic arch that is 2 cm, also spiculated and irregular as well as thickened interlobar septa in the left lung apex and anterior segment, prevascular lymph node lateral to the aortic arch, short axis measuring 9 mm.       She then underwent a bronchoscopy on 05/28/2019, and that revealed there was an infiltration obtained in the left apical posterior segment of the left upper lobe cytology brush was done.  Transbronchial biopsies of an area of infiltration were also performed in the apicoposterior segment of the left upper lobe.    Pathology revealed adenocarcinoma; however, the specimen was not adequate enough to send for molecular testing.       She underwent a PET scan on 06/06/2019.  that reveals significant hypermetabolic activity in the large irregular spiculated pulmonary mass in the lateral aspect of the left upper lobe consistent with the patient's known pulmonary adenocarcinoma.  There is also tracer avidity in the medial left upper lobe satellite lesion and diffusely throughout much of the anterior left upper lobe where there is prominent nodular paraseptal thickening.  There are some numerous scattered subcentimeter pulmonary nodules throughout the right lung, all of which are too small for detection by PET.  For example, there is a 0.4 cm nodule in the superior right lower lobe and a micro nodule in the posterior segment of the right upper lobe.  There is a 0.5 cm, normal size right   paratracheal lymph node with increased radiotracer uptake as well as hypermetabolic aortic pulmonary lymph node and a left hilar lymph node.  There is a focal hypermetabolic lesion in the left anterior superior iliac spine associated with well defined lytic lesion.  There is a hypermetabolic focus in the anterior right fifth rib with a possible associated lytic lesion.  SUVs as   below  "lateral:  Left upper lobe  SUV max 17.9, anterior left upper lobe satellite mass and septal thickening SUV max of 10.1, right paratracheal lymph node SUV max of 4.8, left AP window lymph node SUV max of 17.9, left anterior superior iliac spine SUV max of 3.9"     MRI pelvis from 6/10/19  reveals "Region of osseous is irregularity at the left anterior iliac spine likely related to bone graft harvest procedure.  See  discussion above.  No suspicious signal or enhancement to suggest active/malignant process"   MRI brain from 6/10//19 reveal No intracranial abnormality.     We discussed her case at [facility]acic [facility], and plan was to wait for GAURDANT and proceed with treatment accordingly  Her GAURDANT is negative for molecular markers.     She has completed 4 cycles of Carboplatin, Alimta and Keytruda as of 9/5/19.       She has been on maintenance Alimta and Keytruda     Her CT scans from 4/23/2020 revealed "In this patient with a known history of lung cancer, there has been marked interval detrimental change when compared to CT dated 12/20/2019 as follows. Persistent left upper lobe volume loss with worsening masslike consolidation and enlarging index and non index lesions. Increased number of innumerable bilateral metastatic solid pulmonary nodules. Interval increased size and number of multiple osteoblastic metastatic lesions throughout the visualized axial skeleton. Stable mediastinal lymph nodes, several of which were noted to be hypermetabolic on previous PET-CT.  No new lymphadenopathy. Stable subcentimeter hepatic and splenic hypodensities, too small to accurately characterize.  Path addendum from 5/2019 reveals ROS1      She is now on Entrectinib at 400 mg dose on 7/1/2020  "     Entrecitinib is currently on hold.    "

## 2020-11-26 LAB
ALBUMIN SERPL BCP-MCNC: 3 G/DL (ref 3.5–5.2)
ALP SERPL-CCNC: 73 U/L (ref 55–135)
ALT SERPL W/O P-5'-P-CCNC: 24 U/L (ref 10–44)
ANION GAP SERPL CALC-SCNC: 9 MMOL/L (ref 8–16)
AST SERPL-CCNC: 17 U/L (ref 10–40)
BASOPHILS # BLD AUTO: 0.01 K/UL (ref 0–0.2)
BASOPHILS NFR BLD: 0.1 % (ref 0–1.9)
BILIRUB SERPL-MCNC: 0.4 MG/DL (ref 0.1–1)
BUN SERPL-MCNC: 16 MG/DL (ref 8–23)
CALCIUM SERPL-MCNC: 8.8 MG/DL (ref 8.7–10.5)
CHLORIDE SERPL-SCNC: 105 MMOL/L (ref 95–110)
CO2 SERPL-SCNC: 23 MMOL/L (ref 23–29)
CREAT SERPL-MCNC: 0.8 MG/DL (ref 0.5–1.4)
DIFFERENTIAL METHOD: ABNORMAL
EOSINOPHIL # BLD AUTO: 0 K/UL (ref 0–0.5)
EOSINOPHIL NFR BLD: 0 % (ref 0–8)
ERYTHROCYTE [DISTWIDTH] IN BLOOD BY AUTOMATED COUNT: 14.7 % (ref 11.5–14.5)
EST. GFR  (AFRICAN AMERICAN): >60 ML/MIN/1.73 M^2
EST. GFR  (NON AFRICAN AMERICAN): >60 ML/MIN/1.73 M^2
GLUCOSE SERPL-MCNC: 306 MG/DL (ref 70–110)
GRAM STN SPEC: NORMAL
GRAM STN SPEC: NORMAL
HCT VFR BLD AUTO: 35.9 % (ref 37–48.5)
HGB BLD-MCNC: 10.9 G/DL (ref 12–16)
IMM GRANULOCYTES # BLD AUTO: 0.07 K/UL (ref 0–0.04)
IMM GRANULOCYTES NFR BLD AUTO: 0.8 % (ref 0–0.5)
INR PPP: 1.9 (ref 0.8–1.2)
LYMPHOCYTES # BLD AUTO: 0.8 K/UL (ref 1–4.8)
LYMPHOCYTES NFR BLD: 8.6 % (ref 18–48)
MCH RBC QN AUTO: 26.1 PG (ref 27–31)
MCHC RBC AUTO-ENTMCNC: 30.4 G/DL (ref 32–36)
MCV RBC AUTO: 86 FL (ref 82–98)
MONOCYTES # BLD AUTO: 0.4 K/UL (ref 0.3–1)
MONOCYTES NFR BLD: 4.6 % (ref 4–15)
NEUTROPHILS # BLD AUTO: 7.9 K/UL (ref 1.8–7.7)
NEUTROPHILS NFR BLD: 85.9 % (ref 38–73)
NRBC BLD-RTO: 0 /100 WBC
PLATELET # BLD AUTO: 327 K/UL (ref 150–350)
PMV BLD AUTO: 11.7 FL (ref 9.2–12.9)
POCT GLUCOSE: 274 MG/DL (ref 70–110)
POCT GLUCOSE: 294 MG/DL (ref 70–110)
POCT GLUCOSE: 317 MG/DL (ref 70–110)
POCT GLUCOSE: 381 MG/DL (ref 70–110)
POTASSIUM SERPL-SCNC: 4.5 MMOL/L (ref 3.5–5.1)
PROT SERPL-MCNC: 7.1 G/DL (ref 6–8.4)
PROTHROMBIN TIME: 20.1 SEC (ref 9–12.5)
RBC # BLD AUTO: 4.17 M/UL (ref 4–5.4)
SODIUM SERPL-SCNC: 137 MMOL/L (ref 136–145)
WBC # BLD AUTO: 9.21 K/UL (ref 3.9–12.7)

## 2020-11-26 PROCEDURE — 80053 COMPREHEN METABOLIC PANEL: CPT

## 2020-11-26 PROCEDURE — 25000003 PHARM REV CODE 250: Performed by: STUDENT IN AN ORGANIZED HEALTH CARE EDUCATION/TRAINING PROGRAM

## 2020-11-26 PROCEDURE — 99233 SBSQ HOSP IP/OBS HIGH 50: CPT | Mod: ,,, | Performed by: INTERNAL MEDICINE

## 2020-11-26 PROCEDURE — 63600175 PHARM REV CODE 636 W HCPCS: Performed by: INTERNAL MEDICINE

## 2020-11-26 PROCEDURE — 36415 COLL VENOUS BLD VENIPUNCTURE: CPT

## 2020-11-26 PROCEDURE — 85025 COMPLETE CBC W/AUTO DIFF WBC: CPT

## 2020-11-26 PROCEDURE — 25000003 PHARM REV CODE 250: Performed by: INTERNAL MEDICINE

## 2020-11-26 PROCEDURE — 63600175 PHARM REV CODE 636 W HCPCS: Performed by: STUDENT IN AN ORGANIZED HEALTH CARE EDUCATION/TRAINING PROGRAM

## 2020-11-26 PROCEDURE — 20600001 HC STEP DOWN PRIVATE ROOM

## 2020-11-26 PROCEDURE — 85610 PROTHROMBIN TIME: CPT

## 2020-11-26 PROCEDURE — 99233 PR SUBSEQUENT HOSPITAL CARE,LEVL III: ICD-10-PCS | Mod: ,,, | Performed by: INTERNAL MEDICINE

## 2020-11-26 RX ORDER — LIDOCAINE AND PRILOCAINE 25; 25 MG/G; MG/G
CREAM TOPICAL
Status: DISCONTINUED | OUTPATIENT
Start: 2020-11-26 | End: 2020-11-27 | Stop reason: HOSPADM

## 2020-11-26 RX ORDER — INSULIN ASPART 100 [IU]/ML
1-10 INJECTION, SOLUTION INTRAVENOUS; SUBCUTANEOUS
Status: DISCONTINUED | OUTPATIENT
Start: 2020-11-26 | End: 2020-11-27 | Stop reason: HOSPADM

## 2020-11-26 RX ADMIN — BETHANECHOL CHLORIDE 25 MG: 25 TABLET ORAL at 03:11

## 2020-11-26 RX ADMIN — DEXAMETHASONE SODIUM PHOSPHATE 4 MG: 4 INJECTION INTRA-ARTICULAR; INTRALESIONAL; INTRAMUSCULAR; INTRAVENOUS; SOFT TISSUE at 12:11

## 2020-11-26 RX ADMIN — INSULIN ASPART 3 UNITS: 100 INJECTION, SOLUTION INTRAVENOUS; SUBCUTANEOUS at 08:11

## 2020-11-26 RX ADMIN — BETHANECHOL CHLORIDE 25 MG: 25 TABLET ORAL at 09:11

## 2020-11-26 RX ADMIN — HYDROCODONE BITARTRATE AND ACETAMINOPHEN 1 TABLET: 5; 325 TABLET ORAL at 09:11

## 2020-11-26 RX ADMIN — EZETIMIBE 10 MG: 10 TABLET ORAL at 09:11

## 2020-11-26 RX ADMIN — WARFARIN SODIUM 5 MG: 2.5 TABLET ORAL at 05:11

## 2020-11-26 RX ADMIN — DEXAMETHASONE SODIUM PHOSPHATE 4 MG: 4 INJECTION INTRA-ARTICULAR; INTRALESIONAL; INTRAMUSCULAR; INTRAVENOUS; SOFT TISSUE at 05:11

## 2020-11-26 RX ADMIN — INSULIN ASPART 4 UNITS: 100 INJECTION, SOLUTION INTRAVENOUS; SUBCUTANEOUS at 09:11

## 2020-11-26 RX ADMIN — POLYETHYLENE GLYCOL 3350 17 G: 17 POWDER, FOR SOLUTION ORAL at 09:11

## 2020-11-26 RX ADMIN — TAMSULOSIN HYDROCHLORIDE 0.4 MG: 0.4 CAPSULE ORAL at 09:11

## 2020-11-26 RX ADMIN — INSULIN ASPART 5 UNITS: 100 INJECTION, SOLUTION INTRAVENOUS; SUBCUTANEOUS at 12:11

## 2020-11-26 RX ADMIN — PANTOPRAZOLE SODIUM 40 MG: 40 TABLET, DELAYED RELEASE ORAL at 08:11

## 2020-11-26 RX ADMIN — DEXAMETHASONE SODIUM PHOSPHATE 4 MG: 4 INJECTION INTRA-ARTICULAR; INTRALESIONAL; INTRAMUSCULAR; INTRAVENOUS; SOFT TISSUE at 11:11

## 2020-11-26 RX ADMIN — VENLAFAXINE HYDROCHLORIDE 75 MG: 75 CAPSULE, EXTENDED RELEASE ORAL at 08:11

## 2020-11-26 RX ADMIN — BETHANECHOL CHLORIDE 25 MG: 25 TABLET ORAL at 08:11

## 2020-11-26 RX ADMIN — INSULIN DETEMIR 5 UNITS: 100 INJECTION, SOLUTION SUBCUTANEOUS at 08:11

## 2020-11-26 NOTE — ASSESSMENT & PLAN NOTE
Patient with prior coiled cerebral aneurysm (2010) found to have R frontal (lateral and inferior) and R occipital sub centimeter lesions on MRI w/wo contrast likely due to metastatic non-small cell lung cancer.     Plan:  - neurochecks q4  - dexamethasone 4 mg q6 hours  - plan to follow up on 11/30 with radiation oncology to discuss SRS  - restart entrectinib upon discharge

## 2020-11-26 NOTE — ASSESSMENT & PLAN NOTE
During admission in 9/28-10/15 pt noted to have urinary retention. Started on Flomax-- Switched then to bethacholine. Pt reports she is not taking it. In the ED pt complains of bladder fullness.     Plan:   - bethanechol 35 mg TID (pt was getting it at rehab)    - monitor for retention   - continue to f/u with urology as outpatient.

## 2020-11-26 NOTE — ASSESSMENT & PLAN NOTE
"Reports "intense R hand pain with urination" she is very adamant that this a significant amount of pain. Unclear etiology.    Plan:   - Continue home gabapentin   - Lidocaine cream as needed  "

## 2020-11-26 NOTE — ASSESSMENT & PLAN NOTE
"Ms. Branch is a 64 y/o female with metastatic NSCLC (adeno; ROS1+) treated by Dr. Belcher. Diagnosed in 2019, metastatic disease to bone. See "Oncology History" for further details. Currently off of Entrectinib since 9/28 due to concern for stroke risk which likely contributed to progression of disease. There is no known associated risk of stroke or TIA with entrectinib. Patient would likely benefit from restarting in the context of previous effectiveness of treatment and CNS penetrance of entrectinib.     Plan:    - Restart entrectinib on discharge  "

## 2020-11-26 NOTE — HOSPITAL COURSE
Patient found to have new subcentimeter enhancing lesions in the right lateral frontal, right occipital, and right inferior frontal on MRI Brain with contrast 11/25 and started on dexamethasone. Chest radiographs with large pleural effusion of the left hemithorax. She underwent thoracentesis by pulmonology 11/25 with removal of 1100 ml of fluid and improvement of symptoms. Dexamethasone discontinued and patient instructed to restart entrectinib upon discharge. Plan to discharge home with home health via ambulance. Follow up within 1 week with Dr. Belcher in clinic. Patient intends on moving to [place] with daughter afterwards.

## 2020-11-26 NOTE — PROGRESS NOTES
"[facility]  Hematology/Oncology  Progress Note    Patient Name: Alison Branch  Admission Date: 11/24/2020  Hospital Length of Stay: 2 days  Code Status: Full Code     Subjective:     HPI:    65 year old woman with stage IV lung adenocarcinoma with metastasis to bone diagnosed in May 2019,  on PO Entrectinib since 6/6/2020, held in 10/20 due to CVA ( 09/20 and 10/20, bilateral, R MCA, L MCA, and L cerebellar territories), T2DM ( on insulin), HTN, HLD, PE/DVT diagnosed May 2020 (on warfarin), history of recurrent UTIs, snf history of brain aneurysm s/p coiling, presenting to the ED sent from the Internal medicine clinic, due to weakness, poor appetite, SOB and confusion.    Patient was recently sent to Rehab on 10/20/20 after her last admission for a stroke and was discharged home from rehab on 11/11/20 with H/H. Per grandson who takes care of her, she was able to walk 10-15 feet for the first few days, however, she has continued to decline at home due to progressive weakness, lightheadedness, poor appetite and SOB on exertion. These symptoms have made her stay in bed most of the time and having difficulty getting around. When asked the grandson to elaborate on confusion, he states that she is always coherent, however, due to weakness she seems to be somewhat lethargic and sometimes slurs her speech. No new focal weakness noted. They went for their scheduled post-discharge appointment with Internal Medicine this morning (seen by Dr Curtis/Dr Campuzano) and ws sent to the ED for further investigations of her symptoms and management.     In the ED, VS were WNL, labs with no abnormalities, CT scan w/ known prior strokes and additional foci that could not be excluded w/CT. CXR with large pleural effusion opacifying the left hemithorax. Med Onc called for admission.     Oncology History:  Per Dr Belcher's note (primary oncologist):     " 65-year-old female who smoked for about 30 years and quit 20 years " ago, presented with cough to PCP, chest x-ray on 05/10/2019 revealed left upper lobe pneumonia and a repeat CT was done in the week after that on 05/17/2019 that showed left upper lobe mass arising from the lateral pleural surface in the left upper lobe posterior subsegment measuring 2.6 x 3 cm.  There are satellite mass more medially near the aortic arch that is 2 cm, also spiculated and irregular as well as thickened interlobar septa in the left lung apex and anterior segment, prevascular lymph node lateral to the aortic arch, short axis measuring 9 mm.       She then underwent a bronchoscopy on 05/28/2019, and that revealed there was an infiltration obtained in the left apical posterior segment of the left upper lobe cytology brush was done.  Transbronchial biopsies of an area of infiltration were also performed in the apicoposterior segment of the left upper lobe.    Pathology revealed adenocarcinoma; however, the specimen was not adequate enough to send for molecular testing.       She underwent a PET scan on 06/06/2019.  that reveals significant hypermetabolic activity in the large irregular spiculated pulmonary mass in the lateral aspect of the left upper lobe consistent with the patient's known pulmonary adenocarcinoma.  There is also tracer avidity in the medial left upper lobe satellite lesion and diffusely throughout much of the anterior left upper lobe where there is prominent nodular paraseptal thickening.  There are some numerous scattered subcentimeter pulmonary nodules throughout the right lung, all of which are too small for detection by PET.  For example, there is a 0.4 cm nodule in the superior right lower lobe and a micro nodule in the posterior segment of the right upper lobe.  There is a 0.5 cm, normal size right   paratracheal lymph node with increased radiotracer uptake as well as hypermetabolic aortic pulmonary lymph node and a left hilar lymph node.  There is a focal hypermetabolic lesion  "in the left anterior superior iliac spine associated with well defined lytic lesion.  There is a hypermetabolic focus in the anterior right fifth rib with a possible associated lytic lesion.  SUVs as   below lateral:  Left upper lobe  SUV max 17.9, anterior left upper lobe satellite mass and septal thickening SUV max of 10.1, right paratracheal lymph node SUV max of 4.8, left AP window lymph node SUV max of 17.9, left anterior superior iliac spine SUV max of 3.9"     MRI pelvis from 6/10/19  reveals "Region of osseous is irregularity at the left anterior iliac spine likely related to bone graft harvest procedure.  See  discussion above.  No suspicious signal or enhancement to suggest active/malignant process"   MRI brain from 6/10//19 reveal No intracranial abnormality.     We discussed her case at [facility], and plan was to wait for GAURDANT and proceed with treatment accordingly  Her GAURDANT is negative for molecular markers.     She has completed 4 cycles of Carboplatin, Alimta and Keytruda as of 9/5/19.       She has been on maintenance Alimta and Keytruda     Her CT scans from 4/23/2020 revealed "In this patient with a known history of lung cancer, there has been marked interval detrimental change when compared to CT dated 12/20/2019 as follows. Persistent left upper lobe volume loss with worsening masslike consolidation and enlarging index and non index lesions. Increased number of innumerable bilateral metastatic solid pulmonary nodules. Interval increased size and number of multiple osteoblastic metastatic lesions throughout the visualized axial skeleton. Stable mediastinal lymph nodes, several of which were noted to be hypermetabolic on previous PET-CT.  No new lymphadenopathy. Stable subcentimeter hepatic and splenic hypodensities, too small to accurately characterize.  Path addendum from 5/2019 reveals ROS1      She is now on Entrectinib at 400 mg dose on 7/1/2020  "     Entrecitinib is currently on " hold.      Interval History: Patient with no acute events overnight. She is comfortable on room air with improvement in respiratory status following thoracentesis.    Oncology Treatment Plan:   [No treatment plan]    Medications:  Continuous Infusions:  Scheduled Meds:   bethanechol  25 mg Oral TID    dexamethasone  4 mg Intravenous Q6H    ezetimibe  10 mg Oral QHS    insulin detemir U-100  5 Units Subcutaneous Daily    pantoprazole  40 mg Oral Daily    polyethylene glycol  17 g Oral Daily    tamsulosin  0.4 mg Oral QHS    venlafaxine  75 mg Oral Daily    warfarin  5 mg Oral Daily     PRN Meds:benzonatate, dextrose 50%, dextrose 50%, gabapentin, glucagon (human recombinant), glucose, glucose, HYDROcodone-acetaminophen, influenza, insulin aspart U-100, lidocaine-prilocaine, melatonin, ondansetron, sodium chloride 0.9%     Review of Systems   Constitutional: Negative for activity change, appetite change, fatigue, fever and unexpected weight change.   HENT: Negative for congestion and facial swelling.    Respiratory: Positive for cough and shortness of breath (on exertion). Negative for wheezing and stridor.    Cardiovascular: Negative for chest pain, palpitations and leg swelling.   Gastrointestinal: Negative for abdominal pain, diarrhea, nausea and vomiting.   Endocrine: Positive for cold intolerance. Negative for polyphagia and polyuria.   Genitourinary: Negative for dysuria and hematuria.   Musculoskeletal: Negative.    Skin: Negative.  Negative for rash.   Neurological: Positive for dizziness, weakness and light-headedness.   Psychiatric/Behavioral: Negative for confusion. The patient is not nervous/anxious.      Objective:     Vital Signs (Most Recent):  Temp: 97.2 °F (36.2 °C) (11/26/20 1121)  Pulse: 90 (11/26/20 1121)  Resp: 17 (11/26/20 1121)  BP: (!) 140/64 (11/26/20 1121)  SpO2: (!) 93 % (11/26/20 1121) Vital Signs (24h Range):  Temp:  [97.2 °F (36.2 °C)-98.3 °F (36.8 °C)] 97.2 °F (36.2 °C)  Pulse:   [] 90  Resp:  [17-19] 17  SpO2:  [91 %-95 %] 93 %  BP: (133-160)/(64-76) 140/64     Weight: 92 kg (202 lb 13.2 oz)  Body mass index is 29.95 kg/m².  Body surface area is 2.12 meters squared.      Intake/Output Summary (Last 24 hours) at 11/26/2020 1228  Last data filed at 11/26/2020 0517  Gross per 24 hour   Intake --   Output 1200 ml   Net -1200 ml       Physical Exam  Vitals signs and nursing note reviewed.   Constitutional:       General: She is not in acute distress.     Appearance: She is well-developed.   HENT:      Head: Normocephalic and atraumatic.   Eyes:      General: No scleral icterus.     Pupils: Pupils are equal, round, and reactive to light.   Neck:      Musculoskeletal: Normal range of motion and neck supple.      Vascular: No JVD.   Cardiovascular:      Rate and Rhythm: Normal rate and regular rhythm.      Heart sounds: Normal heart sounds. No murmur.   Pulmonary:      Effort: Pulmonary effort is normal. No respiratory distress.      Breath sounds: Normal breath sounds. No wheezing or rales.      Comments: On room air  Abdominal:      General: Bowel sounds are normal. There is no distension.      Palpations: Abdomen is soft.      Tenderness: There is no abdominal tenderness.   Musculoskeletal: Normal range of motion.         General: No deformity.   Skin:     General: Skin is warm.   Neurological:      Mental Status: She is alert and oriented to person, place, and time.         Significant Labs:   All pertinent labs from the last 24 hours have been reviewed.    Diagnostic Results:  I have reviewed and interpreted all pertinent imaging results/findings within the past 24 hours.    Assessment/Plan:     * Pleural effusion  66 yo F with stage IV lung adenocarcinoma with metastasis to bone(May 2019), DVT/PE (on warfarin), recent CVAs , T2DM, HLD,  history of recurrent UTIs, h/o brain aneurysm s/p coiling, presenting to the ED sent from the Internal medicine clinic, due to weakness, poor appetite,  "SOB and confusion. Found to have a large pleural effusion that completely opacifies the left hemithorax.     Patient does report feeling weak and tired since her discharge from rehab, that has been worsening, along with progressive worsening SOB. CXR from 11/9 with small L pleural effusion, but now has complete opacification of the L hemithorax which explains her SOB and potentially her weakness. Pt has no fever,  no WBC, clinically stable, unlikely parapneumonic effusion. Most likely this is a malignant pleural effusion with worsening disease (pt has been off of her chemotherapy due to recent hospital admissions). Pt is stable, breathing comfortably on room air (sats 96-98%), Neurologically intact.     Therapeutic thoracentesis by pulmonology on 11/25 with improvement of respiratory status.    Plan:   - Continue to monitor respiratory status    Malignant neoplasm of upper lobe of left lung  Ms. Branch is a 66 y/o female with metastatic NSCLC (adeno; ROS1+) treated by Dr. Belcher. Diagnosed in 2019, metastatic disease to bone. See "Oncology History" for further details. Currently off of Entrectinib since 9/28 due to concern for stroke risk which likely contributed to progression of disease. There is no known associated risk of stroke or TIA with entrectinib. Patient would likely benefit from restarting in the context of previous effectiveness of treatment and CNS penetrance of entrectinib.     Plan:    - Restart entrectinib on discharge  (inpatient if able to get from home)    Secondary malignant neoplasm of brain  Patient with prior coiled cerebral aneurysm (2010) found to have R frontal (lateral and inferior) and R occipital sub centimeter lesions on MRI w/wo contrast likely due to metastatic non-small cell lung cancer.     Plan:  - neurochecks q4  - dexamethasone 4 mg q6 hours  - plan to follow up on 11/30 with radiation oncology to discuss SRS           - restart entrectinib upon discharge (inpatient if able to " "get from home)    Weakness  Plan:  - PT/OT ordered    AMS (altered mental status)  Per notes, patient sent with "AMS". Per grandson, she has just been weak and sometimes speaking slurred, but they believe this is just from her being tired/weak at home. Patient appears neurologically intact on admission with no new focal deficits. MRI consistent with new, likely metastatic lesions.          Chronic arterial ischemic stroke, multifocal, multiple vascular territories  Admitted 9/28- 10/05 for RLE weakness, found to have a stroke. Re-admitted from rehab 10/13-10/20 for L facial droop. MRI from that admission showed infarcts in the R MCA, L MCA, and L cerebellar territories.    Plan:    - Per Neuro, no aspirin and continue warfarin.    - Pt unable to receive statin due to chemotherapy (per notes)    - Continue ezetimibe 10 mg nightly.     Urinary retention  During admission in 9/28-10/15 pt noted to have urinary retention. Started on Flomax-- Switched then to bethacholine. Pt reports she is not taking it. In the ED pt complains of bladder fullness.     Plan:   - bethanechol 35 mg TID (pt was getting it at rehab)    - monitor for retention   - continue to f/u with urology as outpatient.     Pulmonary embolism  May 2020. Was on lovenox but was unable to afford and was switched to apixaban on which she had CVA. Now on warfarin. Subtherapeutic INR on admission. Held prior to thoracentesis.    Plan:  - Continue warfarin 5 mg qd  - Pharmacy consult to dose warfarin.        Chemotherapy-induced peripheral neuropathy  Reports "intense R hand pain with urination" she is very adamant that this a significant amount of pain. Unclear etiology.    Plan:   - Continue home gabapentin   - Lidocaine cream as needed    Major depressive disorder, recurrent, unspecified  Plan:  - Continue home venlafaxine  - Patient being followed by oncology psychologist    Type 2 diabetes mellitus with hyperglycemia, with long-term current use of " insulin  Patient has had less requirements. Per notes from last admission, she was on less dose of long-acting (12 U of lantus), and lispro was discontinued. Pt not eating or drinking well.    Plan:  - Detemir 5 units daily with 0-5 units aspart PRN  - Goals blood glucose 140-180           Fam Holman MD  Hematology/Oncology  [facility]    STAFF NOTE:  Agree with above. Start Entrectinib (home med today). It is also a CNS penetrant so there is a chance of avoiding cranial RT. Also patient is planning to move to [place] to be with her daughter and plan is to leave as soon as Saturday.  Will have PT assess her safety for discharge

## 2020-11-26 NOTE — ASSESSMENT & PLAN NOTE
-See assessment for lung transplant.    Dx in May 2020. Was on lovenox (unable to afford)--> switched to Eliquis. Had strokes in sept and oct despite being on Eliquis-  Currently on Warfarin.   INR subtherapeutic on admission.     Plan:  - Continue warfarin 5 mg qd  - Pharmacy consult to dose warfarin.

## 2020-11-26 NOTE — ASSESSMENT & PLAN NOTE
64 yo F with stage IV lung adenocarcinoma with metastasis to bone(May 2019), DVT/PE (on warfarin), recent CVAs , T2DM, HLD,  history of recurrent UTIs, h/o brain aneurysm s/p coiling, presenting to the ED sent from the Internal medicine clinic, due to weakness, poor appetite, SOB and confusion. Found to have a large pleural effusion that completely opacifies the left hemithorax.     Patient does report feeling weak and tired since her discharge from rehab, that has been worsening, along with progressive worsening SOB. CXR from 11/9 with small L pleural effusion, but now has complete opacification of the L hemithorax which explains her SOB and potentially her weakness. Pt has no fever,  no WBC, clinically stable, unlikely parapneumonic effusion. Most likely this is a malignant pleural effusion with worsening disease (pt has been off of her chemotherapy due to recent hospital admissions). Pt is stable, breathing comfortably on room air (sats 96-98%), Neurologically intact.     Therapeutic thoracentesis by pulmonology on 11/25 with improvement of respiratory status.    Plan:   - Continue to monitor respiratory status

## 2020-11-26 NOTE — PLAN OF CARE
Patient involved with plan of care and communicated needs throughout shift. AAOx4. VSS. No acute events overnight. PIV to right a/c found leaking; removed and started new PIV 20g to right forearm. PRN Norco given x1 for pain. 1 unit insulin given per sliding scale. Purewick in place; changed x2 during shift; linens changed accordingly. Instructed to call for additional needs. Nonskid socks in use, bed locked in lowest position, and belongings as well as call light within reach. Will continue to monitor with hourly rounds and clustered care to promote rest.

## 2020-11-26 NOTE — SUBJECTIVE & OBJECTIVE
Interval History: Patient with no acute events overnight. She is comfortable on room air with improvement in respiratory status following thoracentesis.    Oncology Treatment Plan:   [No treatment plan]    Medications:  Continuous Infusions:  Scheduled Meds:   bethanechol  25 mg Oral TID    dexamethasone  4 mg Intravenous Q6H    ezetimibe  10 mg Oral QHS    insulin detemir U-100  5 Units Subcutaneous Daily    pantoprazole  40 mg Oral Daily    polyethylene glycol  17 g Oral Daily    tamsulosin  0.4 mg Oral QHS    venlafaxine  75 mg Oral Daily    warfarin  5 mg Oral Daily     PRN Meds:benzonatate, dextrose 50%, dextrose 50%, gabapentin, glucagon (human recombinant), glucose, glucose, HYDROcodone-acetaminophen, influenza, insulin aspart U-100, lidocaine-prilocaine, melatonin, ondansetron, sodium chloride 0.9%     Review of Systems   Constitutional: Negative for activity change, appetite change, fatigue, fever and unexpected weight change.   HENT: Negative for congestion and facial swelling.    Respiratory: Positive for cough and shortness of breath (on exertion). Negative for wheezing and stridor.    Cardiovascular: Negative for chest pain, palpitations and leg swelling.   Gastrointestinal: Negative for abdominal pain, diarrhea, nausea and vomiting.   Endocrine: Positive for cold intolerance. Negative for polyphagia and polyuria.   Genitourinary: Negative for dysuria and hematuria.   Musculoskeletal: Negative.    Skin: Negative.  Negative for rash.   Neurological: Positive for dizziness, weakness and light-headedness.   Psychiatric/Behavioral: Negative for confusion. The patient is not nervous/anxious.      Objective:     Vital Signs (Most Recent):  Temp: 97.2 °F (36.2 °C) (11/26/20 1121)  Pulse: 90 (11/26/20 1121)  Resp: 17 (11/26/20 1121)  BP: (!) 140/64 (11/26/20 1121)  SpO2: (!) 93 % (11/26/20 1121) Vital Signs (24h Range):  Temp:  [97.2 °F (36.2 °C)-98.3 °F (36.8 °C)] 97.2 °F (36.2 °C)  Pulse:  []  90  Resp:  [17-19] 17  SpO2:  [91 %-95 %] 93 %  BP: (133-160)/(64-76) 140/64     Weight: 92 kg (202 lb 13.2 oz)  Body mass index is 29.95 kg/m².  Body surface area is 2.12 meters squared.      Intake/Output Summary (Last 24 hours) at 11/26/2020 1228  Last data filed at 11/26/2020 0517  Gross per 24 hour   Intake --   Output 1200 ml   Net -1200 ml       Physical Exam  Vitals signs and nursing note reviewed.   Constitutional:       General: She is not in acute distress.     Appearance: She is well-developed.   HENT:      Head: Normocephalic and atraumatic.   Eyes:      General: No scleral icterus.     Pupils: Pupils are equal, round, and reactive to light.   Neck:      Musculoskeletal: Normal range of motion and neck supple.      Vascular: No JVD.   Cardiovascular:      Rate and Rhythm: Normal rate and regular rhythm.      Heart sounds: Normal heart sounds. No murmur.   Pulmonary:      Effort: Pulmonary effort is normal. No respiratory distress.      Breath sounds: Normal breath sounds. No wheezing or rales.      Comments: On room air  Abdominal:      General: Bowel sounds are normal. There is no distension.      Palpations: Abdomen is soft.      Tenderness: There is no abdominal tenderness.   Musculoskeletal: Normal range of motion.         General: No deformity.   Skin:     General: Skin is warm.   Neurological:      Mental Status: She is alert and oriented to person, place, and time.         Significant Labs:   All pertinent labs from the last 24 hours have been reviewed.    Diagnostic Results:  I have reviewed and interpreted all pertinent imaging results/findings within the past 24 hours.

## 2020-11-26 NOTE — NURSING
Received a call from Jackie in lab. She received fluids from earlier thoracentesis but no accompanying paperwork. Fluid drawn by someone else during day shift; Stephanie AVINA reprinted requisition and sent paperwork down to Jackie.

## 2020-11-26 NOTE — ASSESSMENT & PLAN NOTE
"Per notes, patient sent with "AMS". Per grandson, she has just been weak and sometimes speaking slurred, but they believe this is just from her being tired/weak at home. Patient appears neurologically intact on admission with no new focal deficits. MRI consistent with new, likely metastatic lesions.        "

## 2020-11-26 NOTE — ASSESSMENT & PLAN NOTE
Admitted 9/28- 10/05 for RLE weakness, found to have a stroke. Re-admitted from rehab 10/13-10/20 for L facial droop. MRI from that admission showed infarcts in the R MCA, L MCA, and L cerebellar territories.    Plan:    - Per Neuro, no aspirin and continue warfarin.    - Pt unable to receive statin due to chemotherapy (per notes)    - Continue ezetimibe 10 mg nightly.

## 2020-11-26 NOTE — ASSESSMENT & PLAN NOTE
Patient has had less requirements. Per notes from last admission, she was on less dose of long-acting (12 U of lantus), and lispro was discontinued. Pt not eating or drinking well.    Plan:  - Detemir 5 units daily with 0-5 units aspart PRN  - Goals blood glucose 140-180

## 2020-11-26 NOTE — PLAN OF CARE
POC reviewed with patient, questions answered and concerns addressed. Telemetry-NSR; no ectopy noted. External catheter draining qs dark yellow urine. Had a thoracentesis done; 1000cc drained left lung, tolerated procedure well. In no acute distress; WCM.

## 2020-11-26 NOTE — ASSESSMENT & PLAN NOTE
May 2020. Was on lovenox but was unable to afford and was switched to apixaban on which she had CVA. Now on warfarin. Subtherapeutic INR on admission. Held prior to thoracentesis.    Plan:  - Continue warfarin 5 mg qd  - Pharmacy consult to dose warfarin.

## 2020-11-27 ENCOUNTER — DOCUMENTATION ONLY (OUTPATIENT)
Dept: ONCOLOGY | Facility: HOSPITAL | Age: 65
End: 2020-11-27

## 2020-11-27 VITALS
HEART RATE: 88 BPM | OXYGEN SATURATION: 98 % | SYSTOLIC BLOOD PRESSURE: 146 MMHG | BODY MASS INDEX: 30.04 KG/M2 | TEMPERATURE: 97 F | WEIGHT: 202.81 LBS | HEIGHT: 69 IN | DIASTOLIC BLOOD PRESSURE: 65 MMHG | RESPIRATION RATE: 16 BRPM

## 2020-11-27 PROBLEM — Z51.5 PALLIATIVE CARE ENCOUNTER: Status: RESOLVED | Noted: 2020-06-25 | Resolved: 2020-11-27

## 2020-11-27 LAB
ALBUMIN SERPL BCP-MCNC: 2.9 G/DL (ref 3.5–5.2)
ALP SERPL-CCNC: 68 U/L (ref 55–135)
ALT SERPL W/O P-5'-P-CCNC: 34 U/L (ref 10–44)
ANION GAP SERPL CALC-SCNC: 6 MMOL/L (ref 8–16)
AST SERPL-CCNC: 26 U/L (ref 10–40)
BASOPHILS # BLD AUTO: 0.01 K/UL (ref 0–0.2)
BASOPHILS NFR BLD: 0.1 % (ref 0–1.9)
BILIRUB SERPL-MCNC: 0.3 MG/DL (ref 0.1–1)
BUN SERPL-MCNC: 18 MG/DL (ref 8–23)
CALCIUM SERPL-MCNC: 8.3 MG/DL (ref 8.7–10.5)
CHLORIDE SERPL-SCNC: 105 MMOL/L (ref 95–110)
CO2 SERPL-SCNC: 26 MMOL/L (ref 23–29)
CREAT SERPL-MCNC: 0.8 MG/DL (ref 0.5–1.4)
DIFFERENTIAL METHOD: ABNORMAL
EOSINOPHIL # BLD AUTO: 0 K/UL (ref 0–0.5)
EOSINOPHIL NFR BLD: 0 % (ref 0–8)
ERYTHROCYTE [DISTWIDTH] IN BLOOD BY AUTOMATED COUNT: 14.8 % (ref 11.5–14.5)
EST. GFR  (AFRICAN AMERICAN): >60 ML/MIN/1.73 M^2
EST. GFR  (NON AFRICAN AMERICAN): >60 ML/MIN/1.73 M^2
GLUCOSE SERPL-MCNC: 283 MG/DL (ref 70–110)
HCT VFR BLD AUTO: 34.9 % (ref 37–48.5)
HGB BLD-MCNC: 10.6 G/DL (ref 12–16)
IMM GRANULOCYTES # BLD AUTO: 0.12 K/UL (ref 0–0.04)
IMM GRANULOCYTES NFR BLD AUTO: 1 % (ref 0–0.5)
INR PPP: 1.9 (ref 0.8–1.2)
LYMPHOCYTES # BLD AUTO: 1 K/UL (ref 1–4.8)
LYMPHOCYTES NFR BLD: 7.7 % (ref 18–48)
MCH RBC QN AUTO: 25.7 PG (ref 27–31)
MCHC RBC AUTO-ENTMCNC: 30.4 G/DL (ref 32–36)
MCV RBC AUTO: 85 FL (ref 82–98)
MONOCYTES # BLD AUTO: 0.5 K/UL (ref 0.3–1)
MONOCYTES NFR BLD: 3.9 % (ref 4–15)
NEUTROPHILS # BLD AUTO: 10.9 K/UL (ref 1.8–7.7)
NEUTROPHILS NFR BLD: 87.3 % (ref 38–73)
NRBC BLD-RTO: 0 /100 WBC
PLATELET # BLD AUTO: 322 K/UL (ref 150–350)
PMV BLD AUTO: 11.8 FL (ref 9.2–12.9)
POCT GLUCOSE: 307 MG/DL (ref 70–110)
POCT GLUCOSE: 323 MG/DL (ref 70–110)
POTASSIUM SERPL-SCNC: 4.8 MMOL/L (ref 3.5–5.1)
PROT SERPL-MCNC: 6.8 G/DL (ref 6–8.4)
PROTHROMBIN TIME: 20 SEC (ref 9–12.5)
RBC # BLD AUTO: 4.12 M/UL (ref 4–5.4)
SODIUM SERPL-SCNC: 137 MMOL/L (ref 136–145)
WBC # BLD AUTO: 12.43 K/UL (ref 3.9–12.7)

## 2020-11-27 PROCEDURE — 85025 COMPLETE CBC W/AUTO DIFF WBC: CPT

## 2020-11-27 PROCEDURE — 25000003 PHARM REV CODE 250: Performed by: STUDENT IN AN ORGANIZED HEALTH CARE EDUCATION/TRAINING PROGRAM

## 2020-11-27 PROCEDURE — 80053 COMPREHEN METABOLIC PANEL: CPT

## 2020-11-27 PROCEDURE — 25000003 PHARM REV CODE 250: Performed by: INTERNAL MEDICINE

## 2020-11-27 PROCEDURE — 99233 PR SUBSEQUENT HOSPITAL CARE,LEVL III: ICD-10-PCS | Mod: ,,, | Performed by: INTERNAL MEDICINE

## 2020-11-27 PROCEDURE — 85610 PROTHROMBIN TIME: CPT

## 2020-11-27 PROCEDURE — 99233 SBSQ HOSP IP/OBS HIGH 50: CPT | Mod: GC,,, | Performed by: EMERGENCY MEDICINE

## 2020-11-27 PROCEDURE — 94761 N-INVAS EAR/PLS OXIMETRY MLT: CPT

## 2020-11-27 PROCEDURE — 99233 SBSQ HOSP IP/OBS HIGH 50: CPT | Mod: ,,, | Performed by: INTERNAL MEDICINE

## 2020-11-27 PROCEDURE — 97168 OT RE-EVAL EST PLAN CARE: CPT

## 2020-11-27 PROCEDURE — 97535 SELF CARE MNGMENT TRAINING: CPT

## 2020-11-27 PROCEDURE — 36415 COLL VENOUS BLD VENIPUNCTURE: CPT

## 2020-11-27 PROCEDURE — 97530 THERAPEUTIC ACTIVITIES: CPT

## 2020-11-27 PROCEDURE — 63600175 PHARM REV CODE 636 W HCPCS: Performed by: INTERNAL MEDICINE

## 2020-11-27 PROCEDURE — 99233 PR SUBSEQUENT HOSPITAL CARE,LEVL III: ICD-10-PCS | Mod: GC,,, | Performed by: EMERGENCY MEDICINE

## 2020-11-27 RX ADMIN — WARFARIN SODIUM 5 MG: 2.5 TABLET ORAL at 05:11

## 2020-11-27 RX ADMIN — VENLAFAXINE HYDROCHLORIDE 75 MG: 75 CAPSULE, EXTENDED RELEASE ORAL at 08:11

## 2020-11-27 RX ADMIN — INSULIN ASPART 8 UNITS: 100 INJECTION, SOLUTION INTRAVENOUS; SUBCUTANEOUS at 12:11

## 2020-11-27 RX ADMIN — BETHANECHOL CHLORIDE 25 MG: 25 TABLET ORAL at 08:11

## 2020-11-27 RX ADMIN — INSULIN ASPART 8 UNITS: 100 INJECTION, SOLUTION INTRAVENOUS; SUBCUTANEOUS at 08:11

## 2020-11-27 RX ADMIN — PANTOPRAZOLE SODIUM 40 MG: 40 TABLET, DELAYED RELEASE ORAL at 08:11

## 2020-11-27 RX ADMIN — DEXAMETHASONE SODIUM PHOSPHATE 4 MG: 4 INJECTION INTRA-ARTICULAR; INTRALESIONAL; INTRAMUSCULAR; INTRAVENOUS; SOFT TISSUE at 05:11

## 2020-11-27 RX ADMIN — BETHANECHOL CHLORIDE 25 MG: 25 TABLET ORAL at 03:11

## 2020-11-27 NOTE — ASSESSMENT & PLAN NOTE
During admission in 9/28-10/15 pt noted to have urinary retention. Started on Flomax-- Switched then to bethacholine. Pt reports she is not taking it. In the ED pt complains of bladder fullness.     Plan:   - bethanechol 35 mg TID (pt was getting it at rehab)    - monitor for retention   - follow up with urology as outpatient.

## 2020-11-27 NOTE — ASSESSMENT & PLAN NOTE
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 Found to have a large pleural effusion that completely opacifies the left hemithorax.     Patient does report feeling weak and tired since her discharge from rehab, that has been worsening, along with progressive worsening SOB. CXR from 11/9 with small L pleural effusion, but now has complete opacification of the L hemithorax which explains her SOB and potentially her weakness. Pt has no fever,  no WBC, clinically stable, unlikely parapneumonic effusion. Most likely this is a malignant pleural effusion with worsening disease (pt has been off of her chemotherapy due to recent hospital admissions). Pt is stable, breathing comfortably on room air (sats 96-98%), Neurologically intact.     Therapeutic thoracentesis by pulmonology on 11/25 with improvement of respiratory status.    Plan:   - Continue to monitor respiratory status

## 2020-11-27 NOTE — SUBJECTIVE & OBJECTIVE
Interval History: No acute events overnight. Patient is comfortable on room air.     Oncology Treatment Plan:   [No treatment plan]    Medications:  Continuous Infusions:  Scheduled Meds:   bethanechol  25 mg Oral TID    ezetimibe  10 mg Oral QHS    [START ON 11/28/2020] insulin detemir U-100  5 Units Subcutaneous Daily    pantoprazole  40 mg Oral Daily    polyethylene glycol  17 g Oral Daily    tamsulosin  0.4 mg Oral QHS    venlafaxine  75 mg Oral Daily    warfarin  5 mg Oral Daily     PRN Meds:benzonatate, dextrose 50%, dextrose 50%, gabapentin, glucagon (human recombinant), glucose, glucose, HYDROcodone-acetaminophen, influenza, insulin aspart U-100, lidocaine-prilocaine, melatonin, ondansetron, sodium chloride 0.9%     Review of Systems   Constitutional: Negative for activity change, appetite change, fatigue, fever and unexpected weight change.   HENT: Negative for congestion and facial swelling.    Respiratory: Positive for cough. Negative for shortness of breath, wheezing and stridor.    Cardiovascular: Negative for chest pain, palpitations and leg swelling.   Gastrointestinal: Negative for abdominal pain, diarrhea, nausea and vomiting.   Endocrine: Negative for cold intolerance, polyphagia and polyuria.   Genitourinary: Negative for dysuria and hematuria.   Musculoskeletal: Negative.    Skin: Negative.  Negative for rash.   Neurological: Positive for weakness. Negative for dizziness and light-headedness.   Psychiatric/Behavioral: Negative for confusion. The patient is not nervous/anxious.      Objective:     Vital Signs (Most Recent):  Temp: 98 °F (36.7 °C) (11/27/20 0759)  Pulse: 78 (11/27/20 0759)  Resp: 16 (11/27/20 0759)  BP: (!) 157/66 (11/27/20 0759)  SpO2: (!) 91 % (11/27/20 0759) Vital Signs (24h Range):  Temp:  [97.2 °F (36.2 °C)-98.1 °F (36.7 °C)] 98 °F (36.7 °C)  Pulse:  [70-92] 78  Resp:  [16-19] 16  SpO2:  [91 %-94 %] 91 %  BP: (140-157)/(63-73) 157/66     Weight: 92 kg (202 lb 13.2  oz)  Body mass index is 29.95 kg/m².  Body surface area is 2.12 meters squared.      Intake/Output Summary (Last 24 hours) at 11/27/2020 1101  Last data filed at 11/27/2020 0638  Gross per 24 hour   Intake --   Output 200 ml   Net -200 ml       Physical Exam  Vitals signs and nursing note reviewed.   Constitutional:       General: She is not in acute distress.     Appearance: She is well-developed.   HENT:      Head: Normocephalic and atraumatic.   Eyes:      General: No scleral icterus.     Pupils: Pupils are equal, round, and reactive to light.   Neck:      Musculoskeletal: Normal range of motion and neck supple.      Vascular: No JVD.   Cardiovascular:      Rate and Rhythm: Normal rate and regular rhythm.      Heart sounds: Normal heart sounds. No murmur.   Pulmonary:      Effort: Pulmonary effort is normal. No respiratory distress.      Breath sounds: Normal breath sounds. No wheezing or rales.      Comments: On room air  Abdominal:      General: Bowel sounds are normal. There is no distension.      Palpations: Abdomen is soft.      Tenderness: There is no abdominal tenderness.   Musculoskeletal: Normal range of motion.         General: No deformity.   Skin:     General: Skin is warm.   Neurological:      Mental Status: She is alert and oriented to person, place, and time.         Significant Labs:   All pertinent labs from the last 24 hours have been reviewed.    Diagnostic Results:  I have reviewed all pertinent imaging results/findings within the past 24 hours.

## 2020-11-27 NOTE — ASSESSMENT & PLAN NOTE
"Ms. Branch is a 64 y/o female with metastatic NSCLC (adeno; ROS1+) treated by Dr. Belcher. Diagnosed in 2019, metastatic disease to bone. See "Oncology History" for further details. Currently off of Entrectinib since 9/28 due to concern for stroke risk which likely contributed to progression of disease. There is no known associated risk of stroke or TIA with entrectinib. Patient would likely benefit from restarting in the context of previous effectiveness of treatment and CNS penetrance of entrectinib. Patient would like to move to [place], [place] with daughter for further support but will require follow up with oncology there.    Plan:    - Restart entrectinib on discharge    - Discuss discharge plan and oncology follow up for patient with family at meeting today  "

## 2020-11-27 NOTE — SUBJECTIVE & OBJECTIVE
Interval History: Family meeting held today in person. Oncology will restart entrectinib and will f/u with patient prior to her moving to [place]. Patient being discharged home today with family.     Medications:  Continuous Infusions:  Scheduled Meds:   bethanechol  25 mg Oral TID    ezetimibe  10 mg Oral QHS    [START ON 11/28/2020] insulin detemir U-100  5 Units Subcutaneous Daily    pantoprazole  40 mg Oral Daily    polyethylene glycol  17 g Oral Daily    tamsulosin  0.4 mg Oral QHS    venlafaxine  75 mg Oral Daily    warfarin  5 mg Oral Daily     PRN Meds:benzonatate, dextrose 50%, dextrose 50%, gabapentin, glucagon (human recombinant), glucose, glucose, HYDROcodone-acetaminophen, influenza, insulin aspart U-100, lidocaine-prilocaine, melatonin, ondansetron, sodium chloride 0.9%    Objective:     Vital Signs (Most Recent):  Temp: 97.2 °F (36.2 °C) (11/27/20 1108)  Pulse: 88 (11/27/20 1108)  Resp: 18 (11/27/20 1108)  BP: (!) 146/65 (11/27/20 1108)  SpO2: 95 % (11/27/20 1108) Vital Signs (24h Range):  Temp:  [97.2 °F (36.2 °C)-98.1 °F (36.7 °C)] 97.2 °F (36.2 °C)  Pulse:  [70-92] 88  Resp:  [16-19] 18  SpO2:  [91 %-95 %] 95 %  BP: (140-157)/(63-73) 146/65     Weight: 92 kg (202 lb 13.2 oz)  Body mass index is 29.95 kg/m².    Physical Exam  Vitals signs and nursing note reviewed.   Constitutional:       General: She is not in acute distress.     Appearance: She is well-developed.   HENT:      Head: Normocephalic and atraumatic.   Eyes:      Pupils: Pupils are equal, round, and reactive to light.   Neck:      Musculoskeletal: Normal range of motion and neck supple.      Vascular: No JVD.   Cardiovascular:      Rate and Rhythm: Normal rate and regular rhythm.      Heart sounds: Normal heart sounds. No murmur.   Pulmonary:      Effort: Pulmonary effort is normal. No respiratory distress.      Breath sounds: Normal breath sounds. No wheezing or rales.      Comments: Absence of respiratory sounds on the left  hemithorax  Abdominal:      General: Bowel sounds are normal. There is no distension.      Palpations: Abdomen is soft.      Tenderness: There is no abdominal tenderness.   Musculoskeletal: Normal range of motion.         General: No deformity.   Skin:     General: Skin is warm.   Neurological:      Mental Status: She is alert and oriented to person, place, and time.         Review of Symptoms    Symptom Assessment (ESAS 0-10 Scale)  Pain:  0  Dyspnea:  0  Anxiety:  0  Nausea:  0  Depression:  0  Anorexia:  0  Fatigue:  0  Insomnia:  0  Restlessness:  0  Agitation:  0     Constipation:  Negative  Diarrhea:  Negative          Performance Status:  50    ECOG Performance Status Grade:  3 - Confined to bed or chair 50% of waking hours    Living Arrangements:  Lives with family    Psychosocial/Cultural: Patient has 3 daughters and many grandchildren. Patient lives with her 28 year old grandson. Patient has great support from her family and [employer].     Spiritual:  F - Valeri and Belief:  Buddhism. Goes to [employer] [facility] [facility] [employer] [facility]  I - Importance:  Her valeri is very important.  C - Community:  Patient has great support from her 2 pastors at [employer].   A - Address in Care:  Patient would like one of her pastors involved in decision making after the rest of her family has met.       Advance Care Planning   Advance Directives:   Living Will: Yes        Copy on chart: Yes    LaPOST: No    Do Not Resuscitate Status: No    Medical Power of : Yes    Agent's Name:  Chito Woodruff   Agent's Contact Number:  [phone]    Decision Making:  Patient answered questions         Significant Labs: All pertinent labs within the past 24 hours have been reviewed.  CBC:   Recent Labs   Lab 11/27/20  0535   WBC 12.43   HGB 10.6*   HCT 34.9*   MCV 85        BMP:  Recent Labs   Lab 11/27/20  0535   *      K 4.8      CO2 26   BUN 18   CREATININE 0.8   CALCIUM 8.3*     LFT:  Lab Results   Component Value Date    AST  26 11/27/2020    ALKPHOS 68 11/27/2020    BILITOT 0.3 11/27/2020     Albumin:   Albumin   Date Value Ref Range Status   11/27/2020 2.9 (L) 3.5 - 5.2 g/dL Final     Protein:   Total Protein   Date Value Ref Range Status   11/27/2020 6.8 6.0 - 8.4 g/dL Final     Lactic acid:   Lab Results   Component Value Date    LACTATE 1.4 11/24/2020    LACTATE 1.7 07/11/2020       Significant Imaging: I have reviewed all pertinent imaging results/findings within the past 24 hours.

## 2020-11-27 NOTE — ASSESSMENT & PLAN NOTE
Patient has had less requirements. Per notes from last admission, she was on less dose of long-acting (12 U of lantus), and lispro was discontinued. Pt not eating or drinking well.     Blood glucose elevated in the setting of corticosteroid use, discontinued today.    Plan:  - Detemir 5 units daily with 0-5 units aspart PRN  - Goals blood glucose 140-180

## 2020-11-27 NOTE — PLAN OF CARE
Problem: Physical Therapy Goal  Goal: Physical Therapy Goal  Description: Goals to be met by: 2020     Patient will increase functional independence with mobility by performin. Supine to sit with Contact Guard Assistance  2. Sit to supine with Contact Guard Assistance  3. Bed to chair transfer with Minimal Assistance with/without Slideboard= met w/ sliding board 2020   4. Wheelchair propulsion x50 feet with Stand-by Assistance using  right upper/lower extremity  5. Lower extremity exercise program x15 reps per handout, with assistance as needed    Goals remain appropriate. Continue with Physical therapy Plan of Care. Dari Gonzalez, PT 2020

## 2020-11-27 NOTE — PROGRESS NOTES
"[facility]  Hematology/Oncology  Progress Note    Patient Name: Alison Branch  Admission Date: 11/24/2020  Hospital Length of Stay: 3 days  Code Status: Full Code     Subjective:     HPI:    65 year old woman with stage IV lung adenocarcinoma with metastasis to bone diagnosed in May 2019,  on PO Entrectinib since 6/6/2020, held in 10/20 due to CVA ( 09/20 and 10/20, bilateral, R MCA, L MCA, and L cerebellar territories), T2DM ( on insulin), HTN, HLD, PE/DVT diagnosed May 2020 (on warfarin), history of recurrent UTIs, snf history of brain aneurysm s/p coiling, presenting to the ED sent from the Internal medicine clinic, due to weakness, poor appetite, SOB and confusion.    Patient was recently sent to Rehab on 10/20/20 after her last admission for a stroke and was discharged home from rehab on 11/11/20 with H/H. Per grandson who takes care of her, she was able to walk 10-15 feet for the first few days, however, she has continued to decline at home due to progressive weakness, lightheadedness, poor appetite and SOB on exertion. These symptoms have made her stay in bed most of the time and having difficulty getting around. When asked the grandson to elaborate on confusion, he states that she is always coherent, however, due to weakness she seems to be somewhat lethargic and sometimes slurs her speech. No new focal weakness noted. They went for their scheduled post-discharge appointment with Internal Medicine this morning (seen by Dr Curtis/Dr Campuzano) and ws sent to the ED for further investigations of her symptoms and management.     In the ED, VS were WNL, labs with no abnormalities, CT scan w/ known prior strokes and additional foci that could not be excluded w/CT. CXR with large pleural effusion opacifying the left hemithorax. Med Onc called for admission.     Oncology History:  Per Dr Belcher's note (primary oncologist):     " 65-year-old female who smoked for about 30 years and quit 20 years " ago, presented with cough to PCP, chest x-ray on 05/10/2019 revealed left upper lobe pneumonia and a repeat CT was done in the week after that on 05/17/2019 that showed left upper lobe mass arising from the lateral pleural surface in the left upper lobe posterior subsegment measuring 2.6 x 3 cm.  There are satellite mass more medially near the aortic arch that is 2 cm, also spiculated and irregular as well as thickened interlobar septa in the left lung apex and anterior segment, prevascular lymph node lateral to the aortic arch, short axis measuring 9 mm.       She then underwent a bronchoscopy on 05/28/2019, and that revealed there was an infiltration obtained in the left apical posterior segment of the left upper lobe cytology brush was done.  Transbronchial biopsies of an area of infiltration were also performed in the apicoposterior segment of the left upper lobe.    Pathology revealed adenocarcinoma; however, the specimen was not adequate enough to send for molecular testing.       She underwent a PET scan on 06/06/2019.  that reveals significant hypermetabolic activity in the large irregular spiculated pulmonary mass in the lateral aspect of the left upper lobe consistent with the patient's known pulmonary adenocarcinoma.  There is also tracer avidity in the medial left upper lobe satellite lesion and diffusely throughout much of the anterior left upper lobe where there is prominent nodular paraseptal thickening.  There are some numerous scattered subcentimeter pulmonary nodules throughout the right lung, all of which are too small for detection by PET.  For example, there is a 0.4 cm nodule in the superior right lower lobe and a micro nodule in the posterior segment of the right upper lobe.  There is a 0.5 cm, normal size right   paratracheal lymph node with increased radiotracer uptake as well as hypermetabolic aortic pulmonary lymph node and a left hilar lymph node.  There is a focal hypermetabolic lesion  "in the left anterior superior iliac spine associated with well defined lytic lesion.  There is a hypermetabolic focus in the anterior right fifth rib with a possible associated lytic lesion.  SUVs as   below lateral:  Left upper lobe  SUV max 17.9, anterior left upper lobe satellite mass and septal thickening SUV max of 10.1, right paratracheal lymph node SUV max of 4.8, left AP window lymph node SUV max of 17.9, left anterior superior iliac spine SUV max of 3.9"     MRI pelvis from 6/10/19  reveals "Region of osseous is irregularity at the left anterior iliac spine likely related to bone graft harvest procedure.  See  discussion above.  No suspicious signal or enhancement to suggest active/malignant process"   MRI brain from 6/10//19 reveal No intracranial abnormality.     We discussed her case at [facility], and plan was to wait for GAURDANT and proceed with treatment accordingly  Her GAURDANT is negative for molecular markers.     She has completed 4 cycles of Carboplatin, Alimta and Keytruda as of 9/5/19.       She has been on maintenance Alimta and Keytruda     Her CT scans from 4/23/2020 revealed "In this patient with a known history of lung cancer, there has been marked interval detrimental change when compared to CT dated 12/20/2019 as follows. Persistent left upper lobe volume loss with worsening masslike consolidation and enlarging index and non index lesions. Increased number of innumerable bilateral metastatic solid pulmonary nodules. Interval increased size and number of multiple osteoblastic metastatic lesions throughout the visualized axial skeleton. Stable mediastinal lymph nodes, several of which were noted to be hypermetabolic on previous PET-CT.  No new lymphadenopathy. Stable subcentimeter hepatic and splenic hypodensities, too small to accurately characterize.  Path addendum from 5/2019 reveals ROS1      She is now on Entrectinib at 400 mg dose on 7/1/2020  "     Entrecitinib is currently on " hold.      Interval History: No acute events overnight. Patient is comfortable on room air. Family meeting today with palliative care, psychology, and family. Discussed prognosis, need to restart entrectinib, and move to [place].    Oncology Treatment Plan:   [No treatment plan]    Medications:  Continuous Infusions:  Scheduled Meds:   bethanechol  25 mg Oral TID    ezetimibe  10 mg Oral QHS    [START ON 11/28/2020] insulin detemir U-100  5 Units Subcutaneous Daily    pantoprazole  40 mg Oral Daily    polyethylene glycol  17 g Oral Daily    tamsulosin  0.4 mg Oral QHS    venlafaxine  75 mg Oral Daily    warfarin  5 mg Oral Daily     PRN Meds:benzonatate, dextrose 50%, dextrose 50%, gabapentin, glucagon (human recombinant), glucose, glucose, HYDROcodone-acetaminophen, influenza, insulin aspart U-100, lidocaine-prilocaine, melatonin, ondansetron, sodium chloride 0.9%     Review of Systems   Constitutional: Negative for activity change, appetite change, fatigue, fever and unexpected weight change.   HENT: Negative for congestion and facial swelling.    Respiratory: Positive for cough. Negative for shortness of breath, wheezing and stridor.    Cardiovascular: Negative for chest pain, palpitations and leg swelling.   Gastrointestinal: Negative for abdominal pain, diarrhea, nausea and vomiting.   Endocrine: Negative for cold intolerance, polyphagia and polyuria.   Genitourinary: Negative for dysuria and hematuria.   Musculoskeletal: Negative.    Skin: Negative.  Negative for rash.   Neurological: Positive for weakness. Negative for dizziness and light-headedness.   Psychiatric/Behavioral: Negative for confusion. The patient is not nervous/anxious.      Objective:     Vital Signs (Most Recent):  Temp: 98 °F (36.7 °C) (11/27/20 0759)  Pulse: 78 (11/27/20 0759)  Resp: 16 (11/27/20 0759)  BP: (!) 157/66 (11/27/20 0759)  SpO2: (!) 91 % (11/27/20 0759) Vital Signs (24h Range):  Temp:  [97.2 °F (36.2 °C)-98.1 °F (36.7 °C)]  "98 °F (36.7 °C)  Pulse:  [70-92] 78  Resp:  [16-19] 16  SpO2:  [91 %-94 %] 91 %  BP: (140-157)/(63-73) 157/66     Weight: 92 kg (202 lb 13.2 oz)  Body mass index is 29.95 kg/m².  Body surface area is 2.12 meters squared.      Intake/Output Summary (Last 24 hours) at 11/27/2020 1101  Last data filed at 11/27/2020 0638  Gross per 24 hour   Intake --   Output 200 ml   Net -200 ml       Physical Exam  Vitals signs and nursing note reviewed.   Constitutional:       General: She is not in acute distress.     Appearance: She is well-developed.   HENT:      Head: Normocephalic and atraumatic.   Eyes:      General: No scleral icterus.     Pupils: Pupils are equal, round, and reactive to light.   Neck:      Musculoskeletal: Normal range of motion and neck supple.      Vascular: No JVD.   Cardiovascular:      Rate and Rhythm: Normal rate and regular rhythm.      Heart sounds: Normal heart sounds. No murmur.   Pulmonary:      Effort: Pulmonary effort is normal. No respiratory distress.      Breath sounds: Normal breath sounds. No wheezing or rales.      Comments: On room air  Abdominal:      General: Bowel sounds are normal. There is no distension.      Palpations: Abdomen is soft.      Tenderness: There is no abdominal tenderness.   Musculoskeletal: Normal range of motion.         General: No deformity.   Skin:     General: Skin is warm.   Neurological:      Mental Status: She is alert and oriented to person, place, and time.         Significant Labs:   All pertinent labs from the last 24 hours have been reviewed.    Diagnostic Results:  I have reviewed all pertinent imaging results/findings within the past 24 hours.    Assessment/Plan:       Malignant neoplasm of upper lobe of left lung  Ms. Branch is a 64 y/o female with metastatic NSCLC (adeno; ROS1+) treated by Dr. Belcher. Diagnosed in 2019, metastatic disease to bone. See "Oncology History" for further details. Currently off of Entrectinib since 9/28 due to concern for " stroke risk which likely contributed to progression of disease. There is no known associated risk of stroke or TIA with entrectinib. Patient would likely benefit from restarting in the context of previous effectiveness of treatment and CNS penetrance of entrectinib. Patient would like to move to [place], [place] with daughter for further support but will require follow up with oncology there.    Plan:    - Restart entrectinib on discharge    - Discussed discharge plan with family, will require ambulance    - Plan for home health at this time, PT/OT to assess patient    - Follow up with Dr. Belcher in clinic within 1 week prior to move to [place]        Secondary malignant neoplasm of brain  Patient with prior coiled cerebral aneurysm (2010) found to have R frontal (lateral and inferior) and R occipital sub centimeter lesions on MRI w/wo contrast likely due to metastatic non-small cell lung cancer. Patient may have improvement with entrectinib as it has CNS penetrance.    Plan:  - discontinue dexamethasone  - see malignant neoplasm of upper lobe of left lung    Pleural effusion  66 yo F with stage IV lung adenocarcinoma with metastasis to bone(May 2019), DVT/PE (on warfarin), recent CVAs , T2DM, HLD,  history of recurrent UTIs, h/o brain aneurysm s/p coiling, presenting to the ED sent from the Internal medicine clinic, due to weakness, poor appetite, SOB and confusion. Found to have a large pleural effusion that completely opacifies the left hemithorax.     Patient does report feeling weak and tired since her discharge from rehab, that has been worsening, along with progressive worsening SOB. CXR from 11/9 with small L pleural effusion, but now has complete opacification of the L hemithorax which explains her SOB and potentially her weakness. Pt has no fever,  no WBC, clinically stable, unlikely parapneumonic effusion. Most likely this is a malignant pleural effusion with worsening disease (pt has been off of her  "chemotherapy due to recent hospital admissions). Pt is stable, breathing comfortably on room air (sats 96-98%), Neurologically intact.     Therapeutic thoracentesis by pulmonology on 11/25 with improvement of respiratory status.    Plan:   - Continue to monitor respiratory status    Weakness  Plan:  - PT/OT ordered    AMS (altered mental status)  Per notes, patient sent with "AMS". Per grandson, she has just been weak and sometimes speaking slurred, but they believe this is just from her being tired/weak at home. Patient appears neurologically intact on admission with no new focal deficits. MRI consistent with new, likely metastatic lesions.    Chronic arterial ischemic stroke, multifocal, multiple vascular territories  Admitted 9/28- 10/05 for RLE weakness, found to have a stroke. Re-admitted from rehab 10/13-10/20 for L facial droop. MRI from that admission showed infarcts in the R MCA, L MCA, and L cerebellar territories.    Plan:    - Per Neuro, no aspirin and continue warfarin.    - Continue ezetimibe 10 mg nightly.     Urinary retention  During admission in 9/28-10/15 pt noted to have urinary retention. Started on Flomax-- Switched then to bethacholine. Pt reports she is not taking it. In the ED pt complains of bladder fullness.     Plan:   - bethanechol 35 mg TID (pt was getting it at rehab)    - monitor for retention   - follow up with urology as outpatient.     Pulmonary embolism  May 2020. Was on lovenox but was unable to afford and was switched to apixaban on which she had CVA. Now on warfarin. Subtherapeutic INR on admission. Held prior to thoracentesis.    Plan:  - Continue warfarin 5 mg qd    Chemotherapy-induced peripheral neuropathy  Reports "intense R hand pain with urination" she is very adamant that this a significant amount of pain. Unclear etiology.    Plan:   - Continue home gabapentin   - Lidocaine cream as needed    Major depressive disorder, recurrent, unspecified  Plan:  - Continue home " venlafaxine  - Patient being followed by oncology psychologist    Type 2 diabetes mellitus with hyperglycemia, with long-term current use of insulin  Patient has had less requirements. Per notes from last admission, she was on less dose of long-acting (12 U of lantus), and lispro was discontinued. Pt not eating or drinking well.     Blood glucose elevated in the setting of corticosteroid use, discontinued today.    Plan:  - Detemir 5 units daily with 0-5 units aspart PRN  - Goals blood glucose 140-180      Fam Holman MD  Hematology/Oncology  [facility]    STAFF NOTE:  Had extensive discussion with patient and her 2 daughters in her room, her other family members were in the waiting room and were on the phone.  Plan is to assess what PT/OT has for her and then most likely home with PT/OT.  She will start Entrectinib at a dose of 600 mg daily and I will assess her in 1 week. Hold off on RT as Entrectinib has CNS penetration.  She will return in 1 week for outpatient week. She is planning to go to [place] to be with her daughter in about 2 weeks. Daughter will get me the name of some potential oncologists in [place] to make arrangements for patient to be seen in 3 weeks.We will call them and also fax records accordingly

## 2020-11-27 NOTE — PLAN OF CARE
Discharge instructions and prescriptions given and explained to pt and pt's daughter. Pt. verbalized understanding with no further questions. Peripheral IV D/C'd with catheter tip intact. VS WDL. Patient is awaiting ride home by [facility].    ROAD TEST  O=SpO2 98% on RA  A=Pt bedfast  D=IV D/C'd  T=Tolerating diabetic diet  E=Voids  S=Performs self care with assistance  T=N/A

## 2020-11-27 NOTE — PROGRESS NOTES
[facility]  Palliative Medicine  Progress Note    Patient Name: Alison Branch  MRN: [id number]  Admission Date: 11/24/2020  Hospital Length of Stay: 3 days  Code Status: Full Code   Attending Provider: Tara Belcher MD  Consulting Provider: Clay Alexander MD  Primary Care Physician: Mike Rodriguez Ii, MD  Principal Problem:Pleural effusion    Patient information was obtained from patient, relative(s), past medical records and ER records.      Assessment/Plan:     * Pleural effusion  - New large left pleural effusion  - Pulmonology consulted for therapeutic drainage     Chronic arterial ischemic stroke, multifocal, multiple vascular territories  Admitted 9/28- 10/05  For RLE weakness, found to have a stroke.  Re-admitted from rehab 10/13-10/20 for L facial droop  MRI from that admission showed infarcts in the R MCA, L MCA, and L cerebellar territories    Palliative care encounter  1) Symptoms: Fatigue, weakness, and shortness of breath    2) Code status: Full Code    3) Psychosocial: Patient has great support from her 3 daughters and her grandchildren.     4) Medicolegal: has an Advanced Directive on file     5) Spiritual: [employer], her shannon is very important     6) Goals of care/discussion: Patient is still deciding her options for treatment and will discuss with her daughters in the coming days.      Family meeting held today. Patient wants to be home with family. Patient being discharge home today with family.     Experience with critical illness: has had cancer since 2019    Understanding of illness: Patient knows she has cancer with mets to brain but I do not think patient fully understands the severity     Present for discussion: patient's daughter, Brandon, was listening on the phone       7) Disposition plan: Ongoing goals of care.    Thank you for the opportunity to care for this patient and family.     Please call with questions.             Major depressive disorder, recurrent,  unspecified  On venlafaxine          I will sign off. Please contact us if you have any additional questions.    Subjective:     Chief Complaint:   Chief Complaint   Patient presents with    Altered Mental Status     arrived from PCP clinic for altered mental status, , pt c/o weakness, pt aao on arrival to ED, clinic reports pt fatigued in clinic, falling asleep in chair       HPI:   65 year old woman with stage IV lung adenocarcinoma with metastasis to bone diagnosed in May 2019,  on PO Entrectinib since 6/6/2020, held in 10/20 due to CVA ( 09/20 and 10/20, bilateral, R MCA, L MCA, and L cerebellar territories), T2DM ( on insulin), HTN, HLD, PE/DVT diagnosed May 2020 (on warfarin), history of recurrent UTIs, snf history of brain aneurysm s/p coiling, presenting to the ED sent from the Internal medicine clinic, due to weakness, poor appetite, SOB and confusion.     Patient was recently sent to Rehab on 10/20/20 after her last admission for a stroke and was discharged home from rehab on 11/11/20 with H/H. Per grandson who takes care of her, she was able to walk 10-15 feet for the first few days, however, she has continued to decline at home due to progressive weakness, lightheadedness, poor appetite and SOB on exertion. These symptoms have made her stay in bed most of the time and having difficulty getting around. When asked the grandson to elaborate on confusion, he states that she is always coherent, however, due to weakness she seems to be somewhat lethargic and sometimes slurs her speech. No new focal weakness noted. They went for their scheduled post-discharge appointment with Internal Medicine this morning (seen by Dr Curtis/Dr Campuzano) and ws sent to the ED for further investigations of her symptoms and management.    Hospital Course:  No notes on file    Interval History: Family meeting held today in person. Oncology will restart entrectinib and will f/u with patient prior to her moving to [place].  Patient being discharged home today with family.     Medications:  Continuous Infusions:  Scheduled Meds:   bethanechol  25 mg Oral TID    ezetimibe  10 mg Oral QHS    [START ON 11/28/2020] insulin detemir U-100  5 Units Subcutaneous Daily    pantoprazole  40 mg Oral Daily    polyethylene glycol  17 g Oral Daily    tamsulosin  0.4 mg Oral QHS    venlafaxine  75 mg Oral Daily    warfarin  5 mg Oral Daily     PRN Meds:benzonatate, dextrose 50%, dextrose 50%, gabapentin, glucagon (human recombinant), glucose, glucose, HYDROcodone-acetaminophen, influenza, insulin aspart U-100, lidocaine-prilocaine, melatonin, ondansetron, sodium chloride 0.9%    Objective:     Vital Signs (Most Recent):  Temp: 97.2 °F (36.2 °C) (11/27/20 1108)  Pulse: 88 (11/27/20 1108)  Resp: 18 (11/27/20 1108)  BP: (!) 146/65 (11/27/20 1108)  SpO2: 95 % (11/27/20 1108) Vital Signs (24h Range):  Temp:  [97.2 °F (36.2 °C)-98.1 °F (36.7 °C)] 97.2 °F (36.2 °C)  Pulse:  [70-92] 88  Resp:  [16-19] 18  SpO2:  [91 %-95 %] 95 %  BP: (140-157)/(63-73) 146/65     Weight: 92 kg (202 lb 13.2 oz)  Body mass index is 29.95 kg/m².    Physical Exam  Vitals signs and nursing note reviewed.   Constitutional:       General: She is not in acute distress.     Appearance: She is well-developed.   HENT:      Head: Normocephalic and atraumatic.   Eyes:      Pupils: Pupils are equal, round, and reactive to light.   Neck:      Musculoskeletal: Normal range of motion and neck supple.      Vascular: No JVD.   Cardiovascular:      Rate and Rhythm: Normal rate and regular rhythm.      Heart sounds: Normal heart sounds. No murmur.   Pulmonary:      Effort: Pulmonary effort is normal. No respiratory distress.      Breath sounds: Normal breath sounds. No wheezing or rales.      Comments: Absence of respiratory sounds on the left hemithorax  Abdominal:      General: Bowel sounds are normal. There is no distension.      Palpations: Abdomen is soft.      Tenderness: There is no  abdominal tenderness.   Musculoskeletal: Normal range of motion.         General: No deformity.   Skin:     General: Skin is warm.   Neurological:      Mental Status: She is alert and oriented to person, place, and time.         Review of Symptoms    Symptom Assessment (ESAS 0-10 Scale)  Pain:  0  Dyspnea:  0  Anxiety:  0  Nausea:  0  Depression:  0  Anorexia:  0  Fatigue:  0  Insomnia:  0  Restlessness:  0  Agitation:  0     Constipation:  Negative  Diarrhea:  Negative          Performance Status:  50    ECOG Performance Status Grade:  3 - Confined to bed or chair 50% of waking hours    Living Arrangements:  Lives with family    Psychosocial/Cultural: Patient has 3 daughters and many grandchildren. Patient lives with her 28 year old grandson. Patient has great support from her family and [employer].     Spiritual:  F - Valeri and Belief:  Restorationist. Goes to [employer]  I - Importance:  Her valeri is very important.  C - Community:  Patient has great support from her 2 pastors at [employer].   A - Address in Care:  Patient would like one of her pastors involved in decision making after the rest of her family has met.       Advance Care Planning   Advance Directives:   Living Will: Yes        Copy on chart: Yes    LaPOST: No    Do Not Resuscitate Status: No    Medical Power of : Yes    Agent's Name:  Chito Woodruff   Agent's Contact Number:  [phone]    Decision Making:  Patient answered questions         Significant Labs: All pertinent labs within the past 24 hours have been reviewed.  CBC:   Recent Labs   Lab 11/27/20  0535   WBC 12.43   HGB 10.6*   HCT 34.9*   MCV 85        BMP:  Recent Labs   Lab 11/27/20  0535   *      K 4.8      CO2 26   BUN 18   CREATININE 0.8   CALCIUM 8.3*     LFT:  Lab Results   Component Value Date    AST 26 11/27/2020    ALKPHOS 68 11/27/2020    BILITOT 0.3 11/27/2020     Albumin:   Albumin   Date Value Ref Range Status   11/27/2020 2.9 (L) 3.5 - 5.2  g/dL Final     Protein:   Total Protein   Date Value Ref Range Status   11/27/2020 6.8 6.0 - 8.4 g/dL Final     Lactic acid:   Lab Results   Component Value Date    LACTATE 1.4 11/24/2020    LACTATE 1.7 07/11/2020       Significant Imaging: I have reviewed all pertinent imaging results/findings within the past 24 hours.        Clay Alexander MD  Palliative Medicine  [facility]

## 2020-11-27 NOTE — CHAPLAIN
Completed Spiritual Assessment.  Ms. Branch stated the cancer had spread through her body and her brain, but she is trusting in God for healing, but also seems to understand that healing may not be possible.  She mentioned she would like to go live with her daughter in [facility].  Pt has strong family support and relies on her shannon for strength.

## 2020-11-27 NOTE — PLAN OF CARE
[facility]    HOME HEALTH ORDERS  FACE TO FACE ENCOUNTER    Patient Name: Alison Branch  YOB: 1955    PCP: Mike Rodriguez Ii, MD   PCP Address: [address] / [place] [address]  PCP Phone Number: [phone]  PCP Fax: [phone]    Encounter Date: 11/27/2020    Admit to Home Health    Diagnoses:  Active Hospital Problems    Diagnosis  POA    *Pleural effusion [J90]  Yes     Priority: 1 - High    Malignant neoplasm of upper lobe of left lung [C34.12]  Yes     Priority: 1 - High     CARLOS nodules.  Will plan on outpatient bronchoscopy with TBBx, BAL and perhaps brush.          Secondary malignant neoplasm of brain [C79.31]  Yes     Priority: 3     Malignant neoplasm metastatic to lung [C78.00]  Yes    Brain mass [G93.89]  Unknown    AMS (altered mental status) [R41.82]  Yes    Weakness [R53.1]  Unknown    Chronic arterial ischemic stroke, multifocal, multiple vascular territories [I69.30]  Not Applicable    Urinary retention [R33.9]  Yes    Pulmonary embolism [I26.99]  Yes    Chemotherapy-induced peripheral neuropathy [G62.0, T45.1X5A]  Yes    Major depressive disorder, recurrent, unspecified [F33.9]  Yes    Mixed hyperlipidemia due to type 2 diabetes mellitus [E11.69, E78.2]  Yes     Chronic    Type 2 diabetes mellitus with hyperglycemia, with long-term current use of insulin [E11.65, Z79.4]  Not Applicable      Resolved Hospital Problems    Diagnosis Date Resolved POA    Palliative care encounter [Z51.5] 11/27/2020 Not Applicable       Future Appointments   Date Time Provider Department Center   12/4/2020  7:20 AM Tara Belcher MD [facility] HEMONC3 [facility] [address]   12/10/2020  2:00 PM Aaliyah Hall MD [facility] NEURO [address]   1/20/2021 10:00 AM Nazario Kelsey DO [facility] NEUSUR [facility]           I have seen and examined this patient face to face today. My clinical findings that support the need for the home health skilled services and home bound status are  the following:  Weakness/numbness causing balance and gait disturbance due to Malignancy/Cancer making it taxing to leave home.    Allergies:  Review of patient's allergies indicates:   Allergen Reactions    Piperacillin-tazobactam Rash     Tolerated cefepime 06/2020       Diet: diabetic diet: 1800 calorie    Activities: activity as tolerated    Nursing:   SN to complete comprehensive assessment including routine vital signs. Instruct on disease process and s/s of complications to report to MD. Review/verify medication list sent home with the patient at time of discharge  and instruct patient/caregiver as needed. Frequency may be adjusted depending on start of care date.    Notify MD if SBP > 160 or < 90; DBP > 90 or < 50; HR > 120 or < 50; Temp > 101;       CONSULTS:    Physical Therapy to evaluate and treat. Evaluate for home safety and equipment needs; Establish/upgrade home exercise program. Perform / instruct on therapeutic exercises, gait training, transfer training, and Range of Motion.  Occupational Therapy to evaluate and treat. Evaluate home environment for safety and equipment needs. Perform/Instruct on transfers, ADL training, ROM, and therapeutic exercises.  Aide to provide assistance with personal care, ADLs, and vital signs.    MISCELLANEOUS CARE:  Diabetic Care:   Report CBG < 60 or > 350 to physician.    WOUND CARE ORDERS  n/a      Medications: Review discharge medications with patient and family and provide education.      Current Discharge Medication List      CONTINUE these medications which have CHANGED    Details   insulin detemir U-100 (LEVEMIR FLEXTOUCH) 100 unit/mL (3 mL) SubQ InPn pen Inject 5 Units into the skin once daily.  Qty: 1.5 mL, Refills: 11         CONTINUE these medications which have NOT CHANGED    Details   bethanechol (URECHOLINE) 25 MG Tab Take 25 mg by mouth 3 (three) times daily.      cyproheptadine (PERIACTIN) 4 mg tablet Take 1 tablet (4 mg total) by mouth 4 (four) times  daily.  Qty: 120 tablet, Refills: 5    Associated Diagnoses: Anorexia      gabapentin (NEURONTIN) 300 MG capsule Take 1 capsule (300 mg total) by mouth nightly as needed (Take as needed at bed time for neuropathy pain and sleep).  Qty: 90 capsule, Refills: 3    Associated Diagnoses: Memory difficulty; Chemotherapy-induced neuropathy; Balance disorder      pantoprazole (PROTONIX) 40 MG tablet Take 40 mg by mouth once daily.      tamsulosin (FLOMAX) 0.4 mg Cap Take 1 capsule (0.4 mg total) by mouth every evening.  Qty: 30 capsule, Refills: 11      venlafaxine (EFFEXOR XR) 75 MG 24 hr capsule Take 1 capsule (75 mg total) by mouth once daily.  Qty: 30 capsule, Refills: 2    Associated Diagnoses: Depression, unspecified depression type      warfarin (COUMADIN) 5 MG tablet Take 1 tablet (5mg) by mouth daily or As directed by coumadin clinic  Qty: 30 tablet, Refills: 5    Associated Diagnoses: Embolic stroke involving right middle cerebral artery; Long term (current) use of anticoagulants      blood sugar diagnostic Strp To check BG 4 times daily, to use with insurance preferred meter  Qty: 200 each, Refills: 11      carboxymethylcellulose (REFRESH PLUS) 0.5 % Dpet 1 drop 3 (three) times daily as needed.       ergocalciferol (ERGOCALCIFEROL) 50,000 unit Cap TAKE 1 CAPSULE BY MOUTH EVERY 7 DAYS  Qty: 12 capsule, Refills: 3    Comments: Please consider 90 day supplies to promote better adherence  Associated Diagnoses: Vitamin D deficiency      ezetimibe (ZETIA) 10 mg tablet Take 1 tablet (10 mg total) by mouth every evening.  Qty: 90 tablet, Refills: 3      fluticasone propionate (FLONASE) 50 mcg/actuation nasal spray USE TWO SPRAY(S) IN EACH NOSTRIL ONCE DAILY  Qty: 16 g, Refills: 3    Comments: Please consider 90 day supplies to promote better adherence  Associated Diagnoses: Chronic cough      fluticasone-salmeterol diskus inhaler 100-50 mcg Inhale 1 puff into the lungs 2 (two) times daily. Controller  Qty: 60 each,  "Refills: 2    Associated Diagnoses: Chronic respiratory failure with hypoxia      furosemide (LASIX) 20 MG tablet Take 2 tablets (40 mg total) by mouth once daily.  Qty: 60 tablet, Refills: 11    Associated Diagnoses: Edema, unspecified type      lancets Misc 1 Device by Misc.(Non-Drug; Combo Route) route once daily. Heladio Result.  250.02.  Check Blood Sugar Twice Daily.  Qty: 200 each, Refills: 3    Associated Diagnoses: Diabetes mellitus, type II      lidocaine (XYLOCAINE) 5 % Oint ointment Apply topically as needed (For lab draws).  Qty:        melatonin (MELATIN) 3 mg tablet Take 2 tablets (6 mg total) by mouth nightly as needed for Insomnia.  Qty:  , Refills: 0      nystatin (MYCOSTATIN) powder Apply topically 2 (two) times daily.  Qty: 60 g, Refills: 0    Associated Diagnoses: Candidal intertrigo      ondansetron (ZOFRAN) 8 MG tablet Take 1 tablet (8 mg total) by mouth 4 (four) times daily as needed for Nausea.  Qty: 60 tablet, Refills: 2    Associated Diagnoses: Malignant neoplasm of upper lobe of left lung      pen needle, diabetic 33 gauge x 5/32" Ndle 1 each by Misc.(Non-Drug; Combo Route) route 4 (four) times daily with meals and nightly.  Qty: 100 each, Refills: 5    Associated Diagnoses: Uncontrolled type 2 diabetes mellitus with hyperglycemia, without long-term current use of insulin      walker Misc Please provide rollator walker for this debilitated cancer patient.  Thank you.  Refills: 0    Associated Diagnoses: Debility; Malignant neoplasm of upper lobe of left lung         STOP taking these medications       insulin lispro (HUMALOG KWIKPEN INSULIN) 100 unit/mL pen Comments:   Reason for Stopping:         enoxaparin (LOVENOX) 100 mg/mL Syrg Comments:   Reason for Stopping:         insulin aspart U-100 (NOVOLOG) 100 unit/mL (3 mL) InPn pen Comments:   Reason for Stopping:         insulin regular 100 unit/mL Inj injection Comments:   Reason for Stopping:               I certify that this patient is " confined to her home and needs intermittent skilled nursing care, physical therapy and occupational therapy.

## 2020-11-27 NOTE — ASSESSMENT & PLAN NOTE
May 2020. Was on lovenox but was unable to afford and was switched to apixaban on which she had CVA. Now on warfarin. Subtherapeutic INR on admission. Held prior to thoracentesis.    Plan:  - Continue warfarin 5 mg qd   numerical 0-10

## 2020-11-27 NOTE — DISCHARGE SUMMARY
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    Patient was recently sent to Rehab on 10/20/20 after her last admission for a stroke and was discharged home from rehab on 11/11/20 with H/H. Per grandson who takes care of her, she was able to walk 10-15 feet for the first few days, however, she has continued to decline at home due to progressive weakness, lightheadedness, poor appetite and SOB on exertion. These symptoms have made her stay in bed most of the time and having difficulty getting around. When asked the grandson to elaborate on confusion, he states that she is always coherent, however, due to weakness she seems to be somewhat lethargic and sometimes slurs her speech. No new focal weakness noted. They went for their scheduled post-discharge appointment with Internal Medicine this morning (seen by Dr Curtis/Dr Campuzano) and ws sent to the ED for further investigations of her symptoms and management.     In the ED, VS were WNL, labs with no abnormalities, CT scan w/ known prior strokes and additional foci that could not be excluded w/CT. CXR with large pleural effusion opacifying the left hemithorax. Med Onc called  "for admission.     Oncology History:  Per Dr Belcher's note (primary oncologist):     " 65-year-old female who smoked for about 30 years and quit 20 years ago, presented with cough to PCP, chest x-ray on 05/10/2019 revealed left upper lobe pneumonia and a repeat CT was done in the week after that on 05/17/2019 that showed left upper lobe mass arising from the lateral pleural surface in the left upper lobe posterior subsegment measuring 2.6 x 3 cm.  There are satellite mass more medially near the aortic arch that is 2 cm, also spiculated and irregular as well as thickened interlobar septa in the left lung apex and anterior segment, prevascular lymph node lateral to the aortic arch, short axis measuring 9 mm.       She then underwent a bronchoscopy on 05/28/2019, and that revealed there was an infiltration obtained in the left apical posterior segment of the left upper lobe cytology brush was done.  Transbronchial biopsies of an area of infiltration were also performed in the apicoposterior segment of the left upper lobe.    Pathology revealed adenocarcinoma; however, the specimen was not adequate enough to send for molecular testing.       She underwent a PET scan on 06/06/2019.  that reveals significant hypermetabolic activity in the large irregular spiculated pulmonary mass in the lateral aspect of the left upper lobe consistent with the patient's known pulmonary adenocarcinoma.  There is also tracer avidity in the medial left upper lobe satellite lesion and diffusely throughout much of the anterior left upper lobe where there is prominent nodular paraseptal thickening.  There are some numerous scattered subcentimeter pulmonary nodules throughout the right lung, all of which are too small for detection by PET.  For example, there is a 0.4 cm nodule in the superior right lower lobe and a micro nodule in the posterior segment of the right upper lobe.  There is a 0.5 cm, normal size right   paratracheal lymph node with " "increased radiotracer uptake as well as hypermetabolic aortic pulmonary lymph node and a left hilar lymph node.  There is a focal hypermetabolic lesion in the left anterior superior iliac spine associated with well defined lytic lesion.  There is a hypermetabolic focus in the anterior right fifth rib with a possible associated lytic lesion.  SUVs as   below lateral:  Left upper lobe  SUV max 17.9, anterior left upper lobe satellite mass and septal thickening SUV max of 10.1, right paratracheal lymph node SUV max of 4.8, left AP window lymph node SUV max of 17.9, left anterior superior iliac spine SUV max of 3.9"     MRI pelvis from 6/10/19  reveals "Region of osseous is irregularity at the left anterior iliac spine likely related to bone graft harvest procedure.  See  discussion above.  No suspicious signal or enhancement to suggest active/malignant process"   MRI brain from 6/10//19 reveal No intracranial abnormality.     We discussed her case at [facility], and plan was to wait for GAURDANT and proceed with treatment accordingly  Her GAURDANT is negative for molecular markers.     She has completed 4 cycles of Carboplatin, Alimta and Keytruda as of 9/5/19.       She has been on maintenance Alimta and Keytruda     Her CT scans from 4/23/2020 revealed "In this patient with a known history of lung cancer, there has been marked interval detrimental change when compared to CT dated 12/20/2019 as follows. Persistent left upper lobe volume loss with worsening masslike consolidation and enlarging index and non index lesions. Increased number of innumerable bilateral metastatic solid pulmonary nodules. Interval increased size and number of multiple osteoblastic metastatic lesions throughout the visualized axial skeleton. Stable mediastinal lymph nodes, several of which were noted to be hypermetabolic on previous PET-CT.  No new lymphadenopathy. Stable subcentimeter hepatic and splenic hypodensities, too small to " "accurately characterize.  Path addendum from 5/2019 reveals ROS1      She is now on Entrectinib at 400 mg dose on 7/1/2020  "     Entrecitinib is currently on hold.      * No surgery found *     Hospital Course: Patient found to have new subcentimeter enhancing lesions in the right lateral frontal, right occipital, and right inferior frontal on MRI Brain with contrast 11/25 and started on dexamethasone. Chest radiographs with large pleural effusion of the left hemithorax. She underwent thoracentesis by pulmonology 11/25 with removal of 1100 ml of fluid and improvement of symptoms. Dexamethasone discontinued and patient instructed to restart entrectinib upon discharge. Plan to discharge home with home health via ambulance. Follow up within 1 week with Dr. Belcher in clinic. Patient intends on moving to [place] with daughter afterwards.    Consults:   Consults (From admission, onward)        Status Ordering Provider     Inpatient consult to Child Life  Once     Provider:  (Not yet assigned)    Acknowledged MATY DURAN     Inpatient consult to Hematology/Oncology Psychology  Once     Provider:  (Not yet assigned)    Completed SHANTELL GALVAN     Inpatient consult to Neurosurgery  Once     Provider:  (Not yet assigned)    Acknowledged YADIRA SOTO     Inpatient consult to Palliative Care  Once     Provider:  (Not yet assigned)    Completed SHANTELL GALVAN     Inpatient consult to Pulmonology  Once     Provider:  (Not yet assigned)    Completed NEFTALY DYER     Inpatient consult to Radiation Oncology  Once     Provider:  (Not yet assigned)    Completed SHANTELL GALVAN     IP consult to case management  Once     Provider:  (Not yet assigned)    Acknowledged DU, LINGLING     Nutrition Services Referral  Once     Provider:  (Not yet assigned)    Completed DU, LINGLING          Significant Diagnostic Studies: Labs: All labs within the past 24 hours have been reviewed    Pending Diagnostic Studies:     Procedure " Component Value Units Date/Time    Cytology, Pulmonary [[id number]] Collected: 11/25/20 1645    Order Status: Sent Lab Status: In process Updated: 11/27/20 1410        Final Active Diagnoses:    Diagnosis Date Noted POA    PRINCIPAL PROBLEM:  Pleural effusion [J90] 11/24/2020 Yes    Malignant neoplasm of upper lobe of left lung [C34.12] 05/23/2019 Yes    Secondary malignant neoplasm of brain [C79.31] 11/25/2020 Yes    Malignant neoplasm metastatic to lung [C78.00]  Yes    Brain mass [G93.89] 11/25/2020 Unknown    AMS (altered mental status) [R41.82] 11/24/2020 Yes    Weakness [R53.1] 11/24/2020 Unknown    Chronic arterial ischemic stroke, multifocal, multiple vascular territories [I69.30] 09/28/2020 Not Applicable    Urinary retention [R33.9] 06/25/2020 Yes    Pulmonary embolism [I26.99] 05/20/2020 Yes    Chemotherapy-induced peripheral neuropathy [G62.0, T45.1X5A] 11/27/2019 Yes    Major depressive disorder, recurrent, unspecified [F33.9] 11/05/2019 Yes    Mixed hyperlipidemia due to type 2 diabetes mellitus [E11.69, E78.2] 06/20/2013 Yes     Chronic    Type 2 diabetes mellitus with hyperglycemia, with long-term current use of insulin [E11.65, Z79.4] 06/20/2013 Not Applicable      Problems Resolved During this Admission:    Diagnosis Date Noted Date Resolved POA    Palliative care encounter [Z51.5] 06/25/2020 11/27/2020 Not Applicable      Discharged Condition: fair    Disposition: Home-Health Care [facility]    Follow Up:    Patient Instructions:   No discharge procedures on file.  Medications:  Reconciled Home Medications:      Medication List      CHANGE how you take these medications    insulin detemir U-100 100 unit/mL (3 mL) Inpn pen  Commonly known as: LEVEMIR FLEXTOUCH  Inject 5 Units into the skin once daily.  Start taking on: November 28, 2020  What changed: how much to take        CONTINUE taking these medications    bethanechol 25 MG Tab  Commonly known as: URECHOLINE  Take 25 mg by mouth 3  "(three) times daily.     blood sugar diagnostic Strp  To check BG 4 times daily, to use with insurance preferred meter     carboxymethylcellulose 0.5 % Dpet  Commonly known as: REFRESH PLUS  1 drop 3 (three) times daily as needed.     cyproheptadine 4 mg tablet  Commonly known as: PERIACTIN  Take 1 tablet (4 mg total) by mouth 4 (four) times daily.     ezetimibe 10 mg tablet  Commonly known as: ZETIA  Take 1 tablet (10 mg total) by mouth every evening.     fluticasone propionate 50 mcg/actuation nasal spray  Commonly known as: FLONASE  USE TWO SPRAY(S) IN EACH NOSTRIL ONCE DAILY     furosemide 20 MG tablet  Commonly known as: LASIX  Take 2 tablets (40 mg total) by mouth once daily.     gabapentin 300 MG capsule  Commonly known as: NEURONTIN  Take 1 capsule (300 mg total) by mouth nightly as needed (Take as needed at bed time for neuropathy pain and sleep).     lancets Misc  1 Device by Misc.(Non-Drug; Combo Route) route once daily. Heladio Result.  250.02.  Check Blood Sugar Twice Daily.     lidocaine 5 % Oint ointment  Commonly known as: XYLOCAINE  Apply topically as needed (For lab draws).     melatonin 3 mg tablet  Commonly known as: MELATIN  Take 2 tablets (6 mg total) by mouth nightly as needed for Insomnia.     NYAMYC powder  Generic drug: nystatin  Apply topically 2 (two) times daily.     ondansetron 8 MG tablet  Commonly known as: ZOFRAN  Take 1 tablet (8 mg total) by mouth 4 (four) times daily as needed for Nausea.     pantoprazole 40 MG tablet  Commonly known as: PROTONIX  Take 40 mg by mouth once daily.     pen needle, diabetic 33 gauge x 5/32" Ndle  1 each by Misc.(Non-Drug; Combo Route) route 4 (four) times daily with meals and nightly.     tamsulosin 0.4 mg Cap  Commonly known as: FLOMAX  Take 1 capsule (0.4 mg total) by mouth every evening.     venlafaxine 75 MG 24 hr capsule  Commonly known as: EFFEXOR XR  Take 1 capsule (75 mg total) by mouth once daily.     VITAMIN D2 50,000 unit Cap  Generic drug: " ergocalciferol  TAKE 1 CAPSULE BY MOUTH EVERY 7 DAYS     walker Misc  Please provide rollator walker for this debilitated cancer patient.  Thank you.     warfarin 5 MG tablet  Commonly known as: COUMADIN  Take 1 tablet (5mg) by mouth daily or As directed by coumadin clinic     WIXELA INHUB 100-50 mcg/dose diskus inhaler  Generic drug: fluticasone-salmeterol 100-50 mcg/dose  Inhale 1 puff into the lungs 2 (two) times daily. Controller        STOP taking these medications    enoxaparin 100 mg/mL Syrg  Commonly known as: LOVENOX     insulin aspart U-100 100 unit/mL (3 mL) Inpn pen  Commonly known as: NovoLOG     insulin lispro 100 unit/mL pen  Commonly known as: HumaLOG KwikPen Insulin     insulin regular 100 unit/mL injection            Fam Holman MD  Hematology/Oncology  [facility]      STAFF NOTE:  I have personally taken the history and examined this patient and agree with residents Note as stated above

## 2020-11-27 NOTE — PT/OT/SLP PROGRESS
Physical Therapy Treatment    Patient Name:  Alison Branch   MRN:  [id number]    Recommendations:     Discharge Recommendations:  home with home health, home health PT, home health OT   Discharge Equipment Recommendations: bedside commode(drop arm bedside commode)   Barriers to discharge: patient has 23 CIRA her home and will utilize professional transportation; She is moving in two weeks to live w/ family in [place]. Her daughter /caregiver has been trained at rehab.    Assessment:     Alison Branch is a 65 y.o. female admitted with a medical diagnosis of Pleural effusion.  She presents with the following impairments/functional limitations:  weakness, impaired endurance, impaired functional mobilty, gait instability, decreased upper extremity function, decreased lower extremity function, abnormal tone, impaired balance . Patient participated well and demonstrates fairly good carry over from training at rehab and the dtr who attended family training at rehab was present for the session. The patient has received DME from rehab, but also needs a drop arm bedside commode.  She performed a sliding board transfer from bed to w/c w/ min assistance to the left. She performed a stand pivot transfer w/ HW from w/c to bed, wearing her left AFO and shoe and required max assist of 1 and mod of second person for balance and safety. .    Rehab Prognosis: Good; patient would benefit from acute skilled PT services to address these deficits and reach maximum level of function.    Recent Surgery: * No surgery found *      Plan:     During this hospitalization, patient to be seen 3 x/week to address the identified rehab impairments via gait training, therapeutic activities, therapeutic exercises, wheelchair management/training, neuromuscular re-education and progress toward the following goals:    · Plan of Care Expires:  12/25/20    Subjective     Chief Complaint: would like to have a better hospital bed.  Patient/Family Comments/goals:  return home and progress to improved mobility  Pain/Comfort:  · Pain Rating 1: 0/10  · Pain Rating Post-Intervention 1: 0/10      Objective:     Communicated with nurse prior to session.  Patient found HOB elevated with PureWick upon PT entry to room.   (co-treatment performed with OT due to low activity tolerance; level of assistance required for mobility and skilled treatment, and for determining d/c needs and training)    General Precautions: Standard, fall   Orthopedic Precautions:N/A   Braces: (has a left AFO and shoe from rehab)     Functional Mobility:  · Bed Mobility:     · Supine to Sit: moderate assistance  · Sit to Supine: moderate assistance  · Transfers:     · Bed to Chair: minimum assistance with  Sliding board once board is placed, sliding from bed to w/c to the left   · SPT w/c to bed to the right w/ HW w/ patient wearing her Left AFO and shoe. Max assist of one and mod of second person- requires increased assistance and cues for standing;  · Gait: patient able to take approx 2 steps forward and then to pivot to bed w/ Andres walker and max assistance for safety and balance; second person for safety, min to mod assist .   · Balance: sits EOB w/ SBA; stands w/ HW and mod/max assist      AM-PAC 6 CLICK MOBILITY  Turning over in bed (including adjusting bedclothes, sheets and blankets)?: 3  Sitting down on and standing up from a chair with arms (e.g., wheelchair, bedside commode, etc.): 2  Moving from lying on back to sitting on the side of the bed?: 3  Moving to and from a bed to a chair (including a wheelchair)?: 2  Need to walk in hospital room?: 1  Climbing 3-5 steps with a railing?: 1  Basic Mobility Total Score: 12       Therapeutic Activities and Exercises:   patient and daughter educated on mobility as above. REcommend to continue training w/ home health therapy and progress to outpatient therapy after moving.      Patient left HOB elevated with all lines intact, call button in reach and daughter.  present..    GOALS:   Multidisciplinary Problems     Physical Therapy Goals        Problem: Physical Therapy Goal    Goal Priority Disciplines Outcome Goal Variances Interventions   Physical Therapy Goal     PT, PT/OT Ongoing, Progressing     Description: Goals to be met by: 2020     Patient will increase functional independence with mobility by performin. Supine to sit with Contact Guard Assistance  2. Sit to supine with Contact Guard Assistance  3. Bed to chair transfer with Minimal Assistance with/without Slideboard= met w/ sliding board 2020   4. Wheelchair propulsion x50 feet with Stand-by Assistance using  right upper/lower extremity  5. Lower extremity exercise program x15 reps per handout, with assistance as needed                     Time Tracking:     PT Received On: 20  PT Start Time: 1435     PT Stop Time: 1508  PT Total Time (min): 33 min     Billable Minutes: Therapeutic Activity 23    Treatment Type: Treatment  PT/PTA: PT     PTA Visit Number: 0     Dari Gonzalez, PT  2020

## 2020-11-27 NOTE — ASSESSMENT & PLAN NOTE
Patient with prior coiled cerebral aneurysm (2010) found to have R frontal (lateral and inferior) and R occipital sub centimeter lesions on MRI w/wo contrast likely due to metastatic non-small cell lung cancer. Patient may have improvement with entrectinib as it has CNS penetrance.    Plan:  - discontinue dexamethasone  - restart entrectinib upon discharge  - discuss discharge plan and oncology (and radiation therapy) follow up for patient with family at meeting today

## 2020-11-27 NOTE — PLAN OF CARE
Patient involved with plan of care and communicated needs throughout shift. AAOx4. VSS. No acute events overnight. Chemo not given; MD verification needed to proceed. PRN Norco given x1 for pain. 4 units insulin given per sliding scale. Purewick in place; changed x1 during shift; linens changed accordingly. Instructed to call for additional needs. Nonskid socks in use, bed locked in lowest position, and belongings as well as call light within reach. Will continue to monitor with hourly rounds and clustered care to promote rest.

## 2020-11-27 NOTE — ASSESSMENT & PLAN NOTE
Admitted 9/28- 10/05 for RLE weakness, found to have a stroke. Re-admitted from rehab 10/13-10/20 for L facial droop. MRI from that admission showed infarcts in the R MCA, L MCA, and L cerebellar territories.    Plan:    - Per Neuro, no aspirin and continue warfarin.    - Continue ezetimibe 10 mg nightly.

## 2020-11-27 NOTE — ASSESSMENT & PLAN NOTE
1) Symptoms: Fatigue, weakness, and shortness of breath    2) Code status: Full Code    3) Psychosocial: Patient has great support from her 3 daughters and her grandchildren.     4) Medicolegal: has an Advanced Directive on file     5) Spiritual: Hinduism, her shannon is very important     6) Goals of care/discussion: Patient is still deciding her options for treatment and will discuss with her daughters in the coming days.      Family meeting held today. Patient wants to be home with family. Patient being discharge home today with family.     Experience with critical illness: has had cancer since 2019    Understanding of illness: Patient knows she has cancer with mets to brain but I do not think patient fully understands the severity     Present for discussion: patient's daughter, Brandon, was listening on the phone       7) Disposition plan: Ongoing goals of care.    Thank you for the opportunity to care for this patient and family.     Please call with questions.

## 2020-11-27 NOTE — PROGRESS NOTES
Patient scheduled to d/c today. DEBORAH apoke with pt's daughter who reported that she would be taking pt to her home in [place] after pt's follow up with oncologist in about 10 days or 2 weeks.  She asked SW to order glucose controlled Boost (strawberry/vanilla) (message sent to PANKAJ Suazo) and asked that pt's ambulance transport take her to [address] [facility] [place] [address]. DEBORAH spoke to [facility] ([phone]).  Deborah provided alternate address.  They will see pt tomorrow.  DEBORAH sent  signed medical necessity form to  for auth for HH and ambulance transport. DEBORAH communicated with KAILYN Rivera, and resident, MD Fam who each confirmed pt was good to go from their standpoint.  Daughter informed of time of transport.  Pt is good to go from SW standpoint.

## 2020-11-28 NOTE — PLAN OF CARE
Re-Eval complete. Pt tolerated session well. Initiate OT POC.  Discharge Recommendation: HHOT  DME rec: Drop arm commode.    Problem: Occupational Therapy Goal  Goal: Occupational Therapy Goal  Description: oals to be met by: 12/15    Patient will increase functional independence with ADLs by performing:    UE Dressing with Contact Guard Assistance.  LE Dressing with Minimal Assistance.  Grooming while seated with Set-up Assistance.  Toileting from bedside commode with Minimal Assistance for hygiene and clothing management.   Bathing from  shower chair/bench with Moderate Assistance.      11/27/2020 1815 by Patel Collins, OT  Outcome: Ongoing, Progressing     Patel Collins LOTR  11/27/2020

## 2020-11-28 NOTE — PT/OT/SLP PROGRESS
Occupational Therapy   Treatment    Name: Alison Branch  MRN: [id number]  Admitting Diagnosis:  Pleural effusion       Recommendations:     Discharge Recommendations: home with home health, home health OT, home health PT  Discharge Equipment Recommendations:  bedside commode  Barriers to discharge:  None    Assessment:     Alison Branch is a 65 y.o. female with a medical diagnosis of Pleural effusion.  She presents with improving mobility and tolerance for activity.  She was able to assist w/ all transfers and could stand w/ assistance.  Pt was slightly impulsive and had difficulty following instructions.  Pt will need assistance to go home from this hospital 2* 23 step that her daughter's home has to climb. Performance deficits affecting function are weakness, impaired endurance, impaired functional mobilty, gait instability, decreased upper extremity function, decreased lower extremity function, abnormal tone, impaired balance.     Rehab Prognosis:  Fair; patient would benefit from acute skilled OT services to address these deficits and reach maximum level of function.       Plan:     Patient to be seen 4 x/week to address the above listed problems via self-care/home management, therapeutic activities, therapeutic exercises  · Plan of Care Expires: 12/25/20  · Plan of Care Reviewed with: patient, daughter    Subjective     Pain/Comfort:  Pain Rating 1: 0/10  Pain Rating Post-Intervention 1: 0/10    Objective:     Communicated with: RN prior to session.  Patient found HOB elevated with PureWick upon OT entry to room.    General Precautions: Standard, fall   Orthopedic Precautions:N/A   Braces: AFO(has a left AFO and shoe from rehab)     Occupational Performance:     Bed Mobility:    · Patient completed Rolling/Turning to Left with  moderate assistance  · Patient completed Rolling/Turning to Right with moderate assistance  · Patient completed Scooting/Bridging with moderate assistance  · Patient completed Supine to Sit  with moderate assistance  · Patient completed Sit to Supine with moderate assistance     Functional Mobility/Transfers:  · Patient completed Sit <> Stand Transfer with maximal assistance and of 2 persons  with  hand-held assist and hemiwalker   · Patient completed Bed <> Chair Transfer using Slide Board technique with minimum assistance with hand-held assist  Functional Mobility:  Pt was able to walk steps at EOB w/ HHAx2, theresa-walker, and Max A.      Activities of Daily Living:  · Upper Body Dressing: moderate assistance doning gown as jacket  · Lower Body Dressing: total assistance seated at EOB to don AFO a boot.       [facility] 6 Click ADL: 13    Treatment & Education:  -Pt edu on OT role/POC  -Importance of OOB activity with staff assistance  -Safety during functional t/f and mobility  - White board updated  - Multiple self care tasks/functional mobility completed-- assistance level noted above  - All questions/concerns answered within OT scope of practice  Reviewed transfer training w/ daughter.  Pt was able to assist in a weak sided t/f.     MD requested re-assessment and update for home recommendations.  Goals reviewed and updated.       Patient left HOB elevated with all lines intact, call button in reach, RN notified and daughter presentEducation:      GOALS:   Multidisciplinary Problems     Occupational Therapy Goals        Problem: Occupational Therapy Goal    Goal Priority Disciplines Outcome Interventions   Occupational Therapy Goal     OT, PT/OT Ongoing, Progressing    Description: oals to be met by: 12/15    Patient will increase functional independence with ADLs by performing:    UE Dressing with Contact Guard Assistance.  LE Dressing with Minimal Assistance.  Grooming while seated with Set-up Assistance.  Toileting from bedside commode with Minimal Assistance for hygiene and clothing management.   Bathing from  shower chair/bench with Moderate Assistance.                       Time Tracking:     OT  Date of Treatment: 11/27/20  OT Start Time: 1434  OT Stop Time: 1515  OT Total Time (min): 41 min    Billable Minutes:Re-eval 10 mins  Self Care/Home Management 31 mins    Patel Collins, OT  11/27/2020

## 2020-11-30 ENCOUNTER — PATIENT OUTREACH (OUTPATIENT)
Dept: ADMINISTRATIVE | Facility: CLINIC | Age: 65
End: 2020-11-30

## 2020-11-30 ENCOUNTER — TELEPHONE (OUTPATIENT)
Dept: RADIATION ONCOLOGY | Facility: CLINIC | Age: 65
End: 2020-11-30

## 2020-11-30 ENCOUNTER — TELEPHONE (OUTPATIENT)
Dept: HEMATOLOGY/ONCOLOGY | Facility: CLINIC | Age: 65
End: 2020-11-30

## 2020-11-30 LAB — BACTERIA FLD CULT: NORMAL

## 2020-11-30 NOTE — TELEPHONE ENCOUNTER
----- Message from Barry Marte sent at 11/30/2020  3:04 PM CST -----  Contact: Patient  Reschedule existing appt      Appt date rescheduling:: 12/04    Visit type :: f/u    Treating Provider:: Ye    Do you feel you need to be seen today:: No    Communication preference:: [phone]    Addition info::

## 2020-11-30 NOTE — PATIENT INSTRUCTIONS
Pleural Effusion     Pleural effusion is fluid buildup between the layers of tissue that line the outside of the lungs.   Your healthcare provider has told you that you have pleural effusion. Pleural effusion means that you have extra fluid between the pleura. This area is called the pleural space. The pleura are 2 layers of thin, smooth tissue that surround the lungs and line the chest. The pleural space usually holds only a small amount of fluid. This fluid lubricates the pleura. But if too much fluid fills the space, it can make it hard or painful to breathe.  There are 2 types of pleural effusion:  · Inflammatory. This is caused by a lung disease like pneumonia or lung cancer, both of which irritates the pleura.  · Noninflammatory. This is caused by abnormal fluid pressures inside the lungs. The pressure can be caused by congestive heart failure (CHF). In CHF, extra fluid collects inside the lung tissues because of a weak heart muscle. This extra fluid then leaks into the pleural space. Other causes of noninflammatory pleural effusions include kidney disease, liver disease, and malnutrition.  What are the symptoms of pleural effusion?  The symptoms of pleural effusion include:  · Sharp pains in the chest, especially when taking a breath, coughing, or sneezing  · Trouble breathing  · Cough  · Fever  What are the causes of pleural effusion?  Common causes include:  · Congestive heart failure  · Cirrhosis of the liver  · Pneumonia  · Viral lung infection  · Cancer  · Blood clot in the lung (pulmonary embolism)  · Heart surgery  Chest infections like pneumonia and heart disease are the most common causes. Less common causes include lung cancer.  How is pleural effusion diagnosed?  Your healthcare provider will examine you and ask about your health history. Tests include:  · Blood tests  · Analysis of fluid in pleural space, chest X-ray, CT scan, or ultrasound  How is pleural effusion treated?  The extra fluid may  be drained from the pleural space. This is done with a procedure called thoracentesis. This procedure uses a thin needle to draw out the fluid from the pleural space. In some cases, a tube is placed in the chest to drain the extra fluid. The tube will likely stay in place for several days.  You may have other treatments, depending on the cause of your pleural effusion. If its because of a bacterial infection, you will be given antibiotics to fight the infection. If its because of a heart condition, you will be given medicines and other treatment for your heart. Your healthcare provider can tell you more about the cause of your pleural effusion and your treatment choices.  What are the long-term concerns?  If untreated, pleural effusion can lead to serious health problems, such as collapsed lung from fluid filling the pleural space.     Call 911  Call 911 if you have:  · Trouble breathing  · Worsening chest pain   When to call your healthcare provider   Call your healthcare provider right away if you have:  · Continued coughing  · Fever  Date Last Reviewed: 12/1/2016  © 0986-9378 [employer]. [address] [place] [address], [place] [address]. All rights reserved. This information is not intended as a substitute for professional medical care. Always follow your healthcare professional's instructions.

## 2020-11-30 NOTE — TELEPHONE ENCOUNTER
Spoke with patient and subsequently her daughter by phone to discuss decision on radiation treatment for brain metastases. They have elected to proceed with targeted therapy first and re-staging of the brain afterwards at this time. Patient will contact our clinic if she has additional questions in the future.

## 2020-12-01 NOTE — PROGRESS NOTES
Sandra Thurman with [facility]  called to report Patient had been in the hospital and discharged 11/27/20,  service was resumed 11/28/20, inquiring when INR should be drawn

## 2020-12-02 ENCOUNTER — PATIENT MESSAGE (OUTPATIENT)
Dept: HEMATOLOGY/ONCOLOGY | Facility: CLINIC | Age: 65
End: 2020-12-02

## 2020-12-02 ENCOUNTER — TELEPHONE (OUTPATIENT)
Dept: HEMATOLOGY/ONCOLOGY | Facility: CLINIC | Age: 65
End: 2020-12-02

## 2020-12-02 ENCOUNTER — ANTI-COAG VISIT (OUTPATIENT)
Dept: CARDIOLOGY | Facility: CLINIC | Age: 65
End: 2020-12-02
Payer: MEDICARE

## 2020-12-02 DIAGNOSIS — I63.411 EMBOLIC STROKE INVOLVING RIGHT MIDDLE CEREBRAL ARTERY: ICD-10-CM

## 2020-12-02 DIAGNOSIS — Z79.01 LONG TERM (CURRENT) USE OF ANTICOAGULANTS: ICD-10-CM

## 2020-12-02 LAB
FINAL PATHOLOGIC DIAGNOSIS: NORMAL
INR PPP: 1.9

## 2020-12-02 PROCEDURE — 93793 PR ANTICOAGULANT MGMT FOR PT TAKING WARFARIN: ICD-10-PCS | Mod: S$GLB,,,

## 2020-12-02 PROCEDURE — 93793 ANTICOAG MGMT PT WARFARIN: CPT | Mod: S$GLB,,,

## 2020-12-02 RX ORDER — EZETIMIBE 10 MG/1
10 TABLET ORAL NIGHTLY
Qty: 90 TABLET | Refills: 3 | Status: ON HOLD | OUTPATIENT
Start: 2020-12-02 | End: 2021-05-26 | Stop reason: HOSPADM

## 2020-12-02 NOTE — TELEPHONE ENCOUNTER
----- Message from Lina Dent sent at 12/2/2020 10:15 AM CST -----  Contact: pt  Pt calling to reschedule 12/04/2020 appt       Call Back : [phone]

## 2020-12-02 NOTE — PROGRESS NOTES
Admitted 11/24/20-11/27/20 due to progressive weakness, lightheadedness, poor appetite and SOB on exertion. Hospital course per d/c summary: Patient found to have new subcentimeter enhancing lesions in the right lateral frontal, right occipital, and right inferior frontal on MRI Brain with contrast 11/25 and started on dexamethasone. Chest radiographs with large pleural effusion of the left hemithorax. She underwent thoracentesis by pulmonology 11/25 with removal of 1100 ml of fluid and improvement of symptoms. Dexamethasone discontinued and patient instructed to restart entrectinib upon discharge.    INR not at goal. Medications, chart, and patient findings reviewed. See calendar for adjustments to dose and follow up plan.

## 2020-12-03 NOTE — TELEPHONE ENCOUNTER
Contact that office and see if they can make an appointment with one of the docs and then let me know the name and I will call them. Fax records as well. Also I need to see her tomorrow?

## 2020-12-07 ENCOUNTER — LAB VISIT (OUTPATIENT)
Dept: LAB | Facility: HOSPITAL | Age: 65
End: 2020-12-07
Attending: INTERNAL MEDICINE
Payer: MEDICARE

## 2020-12-07 ENCOUNTER — OFFICE VISIT (OUTPATIENT)
Dept: INTERNAL MEDICINE | Facility: CLINIC | Age: 65
End: 2020-12-07
Payer: MEDICARE

## 2020-12-07 ENCOUNTER — ANTI-COAG VISIT (OUTPATIENT)
Dept: CARDIOLOGY | Facility: CLINIC | Age: 65
End: 2020-12-07
Payer: MEDICARE

## 2020-12-07 VITALS
BODY MASS INDEX: 25.7 KG/M2 | DIASTOLIC BLOOD PRESSURE: 74 MMHG | WEIGHT: 173.5 LBS | OXYGEN SATURATION: 95 % | SYSTOLIC BLOOD PRESSURE: 128 MMHG | HEART RATE: 94 BPM | HEIGHT: 69 IN

## 2020-12-07 DIAGNOSIS — Z79.01 LONG TERM (CURRENT) USE OF ANTICOAGULANTS: ICD-10-CM

## 2020-12-07 DIAGNOSIS — C34.12 MALIGNANT NEOPLASM OF UPPER LOBE OF LEFT LUNG: ICD-10-CM

## 2020-12-07 DIAGNOSIS — I63.411 EMBOLIC STROKE INVOLVING RIGHT MIDDLE CEREBRAL ARTERY: ICD-10-CM

## 2020-12-07 DIAGNOSIS — C79.31 SECONDARY MALIGNANT NEOPLASM OF BRAIN: Primary | ICD-10-CM

## 2020-12-07 LAB
INR PPP: 1.4 (ref 0.8–1.2)
INR PPP: 1.8
PROTHROMBIN TIME: 15.5 SEC (ref 9–12.5)

## 2020-12-07 PROCEDURE — 36415 COLL VENOUS BLD VENIPUNCTURE: CPT

## 2020-12-07 PROCEDURE — 99214 PR OFFICE/OUTPT VISIT, EST, LEVL IV, 30-39 MIN: ICD-10-PCS | Mod: S$GLB,,, | Performed by: PHYSICIAN ASSISTANT

## 2020-12-07 PROCEDURE — 3072F PR LOW RISK FOR RETINOPATHY: ICD-10-PCS | Mod: S$GLB,,, | Performed by: PHYSICIAN ASSISTANT

## 2020-12-07 PROCEDURE — 3078F PR MOST RECENT DIASTOLIC BLOOD PRESSURE < 80 MM HG: ICD-10-PCS | Mod: CPTII,S$GLB,, | Performed by: PHYSICIAN ASSISTANT

## 2020-12-07 PROCEDURE — 85610 PROTHROMBIN TIME: CPT

## 2020-12-07 PROCEDURE — 3008F PR BODY MASS INDEX (BMI) DOCUMENTED: ICD-10-PCS | Mod: CPTII,S$GLB,, | Performed by: PHYSICIAN ASSISTANT

## 2020-12-07 PROCEDURE — 3078F DIAST BP <80 MM HG: CPT | Mod: CPTII,S$GLB,, | Performed by: PHYSICIAN ASSISTANT

## 2020-12-07 PROCEDURE — 1126F PR PAIN SEVERITY QUANTIFIED, NO PAIN PRESENT: ICD-10-PCS | Mod: S$GLB,,, | Performed by: PHYSICIAN ASSISTANT

## 2020-12-07 PROCEDURE — 3008F BODY MASS INDEX DOCD: CPT | Mod: CPTII,S$GLB,, | Performed by: PHYSICIAN ASSISTANT

## 2020-12-07 PROCEDURE — 3074F SYST BP LT 130 MM HG: CPT | Mod: CPTII,S$GLB,, | Performed by: PHYSICIAN ASSISTANT

## 2020-12-07 PROCEDURE — 1126F AMNT PAIN NOTED NONE PRSNT: CPT | Mod: S$GLB,,, | Performed by: PHYSICIAN ASSISTANT

## 2020-12-07 PROCEDURE — 3072F LOW RISK FOR RETINOPATHY: CPT | Mod: S$GLB,,, | Performed by: PHYSICIAN ASSISTANT

## 2020-12-07 PROCEDURE — 99999 PR PBB SHADOW E&M-EST. PATIENT-LVL V: CPT | Mod: PBBFAC,,, | Performed by: PHYSICIAN ASSISTANT

## 2020-12-07 PROCEDURE — 99214 OFFICE O/P EST MOD 30 MIN: CPT | Mod: S$GLB,,, | Performed by: PHYSICIAN ASSISTANT

## 2020-12-07 PROCEDURE — 99999 PR PBB SHADOW E&M-EST. PATIENT-LVL V: ICD-10-PCS | Mod: PBBFAC,,, | Performed by: PHYSICIAN ASSISTANT

## 2020-12-07 PROCEDURE — 3074F PR MOST RECENT SYSTOLIC BLOOD PRESSURE < 130 MM HG: ICD-10-PCS | Mod: CPTII,S$GLB,, | Performed by: PHYSICIAN ASSISTANT

## 2020-12-07 NOTE — PROGRESS NOTES
Subjective:       Patient ID: Alison Branch is a 65 y.o. female.        Chief Complaint: Hospital Follow Up    Alison Branch is an established patient of Mike Rodriguez Ii, MD here today for hospital f/u visit.    She is here with her daughter.  She also has a daughter who lives in [place].  She plans to move to [place] but has not finalized this planning.      Went to ED secondary to progressive weakness, LH, poor appetite, and SOB with exertion.  CXR showed large pleural effusion.  She was admitted to med onc.  She was found to have new sub centimeter enhancing lesions in the right lateral frontal, right occipital, and right inferior frontal on MRI.  She was started on dexamethasone.  S/p thoracentesis for pleural effusion.  She was discharged home with HH and told to f/u with Dr. Belcher.  She has f/u scheduled in a few days.      She is upset about her HH.  She feels she is not getting all the care she needs.  She currently has PT, nurse, and aid.  Feels she needs more.  She plans to discuss this with Dr. Belcher.  She is eating and drinking much better and overall improved since hospital stay.  She lives with her grandson.      Lung cancer s/p chemotherapy, with new brain mets (11/2020), restarted on entrectinib at discharge  Prior coiled aneurysm 2010  Depression  DM  HTN  Lipids  Right MCA CVA (10/2020)         Review of Systems   Constitutional: Negative for chills, diaphoresis, fatigue and fever.   HENT: Negative for congestion and sore throat.    Eyes: Negative for visual disturbance.   Respiratory: Negative for cough, chest tightness and shortness of breath.    Cardiovascular: Negative for chest pain, palpitations and leg swelling.   Gastrointestinal: Negative for abdominal pain, blood in stool, constipation, diarrhea, nausea and vomiting.   Genitourinary: Negative for dysuria, frequency, hematuria and urgency.   Musculoskeletal: Negative for arthralgias and back pain.   Skin: Negative for rash.   Neurological:  Negative for dizziness, syncope, weakness and headaches.   Psychiatric/Behavioral: Negative for dysphoric mood and sleep disturbance. The patient is not nervous/anxious.        Objective:      Physical Exam  Vitals signs and nursing note reviewed.   Constitutional:       Appearance: Normal appearance. She is well-developed.      Comments: Sitting in wheelchair   HENT:      Head: Normocephalic.      Right Ear: External ear normal.      Left Ear: External ear normal.   Eyes:      Pupils: Pupils are equal, round, and reactive to light.   Cardiovascular:      Rate and Rhythm: Normal rate and regular rhythm.      Heart sounds: Normal heart sounds. No murmur. No friction rub. No gallop.    Pulmonary:      Effort: Pulmonary effort is normal. No respiratory distress.      Breath sounds: Normal breath sounds.   Abdominal:      Palpations: Abdomen is soft.      Tenderness: There is no abdominal tenderness.   Skin:     General: Skin is warm and dry.   Neurological:      Mental Status: She is alert.         Assessment:       1. Secondary malignant neoplasm of brain    2. Malignant neoplasm of upper lobe of left lung        Plan:       Alison was seen today for hospital follow up.    Diagnoses and all orders for this visit:    Secondary malignant neoplasm of brain    Malignant neoplasm of upper lobe of left lung    She plans to discuss home health with her people[facility] nurse navigator  She has f/u with Dr. Belcher scheduled 12/10  She is unsure yet if she will be moving to [place] but plans to and will arrange care there    She is in good spirits despite dx as above    She will call endo to try to get everything sorted out with her dexcom as she cannot check home blood sugar    Pt has been given instructions populated from [employer] database and has verbalized understanding of the after visit summary and information contained wherein.    Follow up with a primary care provider. May go to ER for acute shortness of breath,  "lightheadedness, fever, or any other emergent complaints or changes in condition.    "This note will be shared with the patient"    Future Appointments   Date Time Provider Department Center   12/10/2020  8:20 AM Tara Belcher MD [facility] HEMONC2 [facility]   12/10/2020  9:10 AM LAB, APPOINTMENT [facility] LAB [facility]   12/10/2020  2:00 PM Aaliyah Hall MD [facility] NEURO [address]   1/13/2021 11:30 AM Ai Billingsley NP [facility]   1/20/2021 10:00 AM Nazario Kelsey DO [facility]ISLA[facility] [address]                 "

## 2020-12-07 NOTE — PROGRESS NOTES
INR result drawn in clinic 12/07 in 1.4, Patient also had an INR drawn by Home Health and result faxed to coumadin clinic in 1.8 dated today also 12/07/20

## 2020-12-07 NOTE — PROGRESS NOTES
INR not at goal. Will dose based off of the 1.4 result drawn in lab by venipuncture. Medications, chart, and patient findings reviewed. See calendar for adjustments to dose and follow up plan.

## 2020-12-08 ENCOUNTER — TELEPHONE (OUTPATIENT)
Dept: HEMATOLOGY/ONCOLOGY | Facility: CLINIC | Age: 65
End: 2020-12-08

## 2020-12-08 NOTE — TELEPHONE ENCOUNTER
"----- Message from Obed Chan sent at 12/8/2020  8:09 AM CST -----   Consult/Advisory:    Name Of Caller:[place]   Contact Preference?: [phone]    Provider Name:   Does patient feel the need to be seen today? No     What is the nature of the call?:  -pt request a callback to regarding FMLA paperwork     Additional Notes:  "Thank you for all that you do for our patients'"    "

## 2020-12-08 NOTE — TELEPHONE ENCOUNTER
You haven't seen her since September 2020, and she is transferring care to [place].  Your thoughts on completing FMLA?  ~Donna

## 2020-12-08 NOTE — TELEPHONE ENCOUNTER
The FMLA is for her daughter keith, who lives in [place], who needs time off to move her mom to [place] and get her set up with oncology and PCP there.    Message routed to dr chapin

## 2020-12-09 ENCOUNTER — PATIENT OUTREACH (OUTPATIENT)
Dept: ADMINISTRATIVE | Facility: HOSPITAL | Age: 65
End: 2020-12-09

## 2020-12-09 ENCOUNTER — PATIENT MESSAGE (OUTPATIENT)
Dept: ADMINISTRATIVE | Facility: HOSPITAL | Age: 65
End: 2020-12-09

## 2020-12-09 ENCOUNTER — PATIENT OUTREACH (OUTPATIENT)
Dept: ADMINISTRATIVE | Facility: OTHER | Age: 65
End: 2020-12-09

## 2020-12-09 ENCOUNTER — PATIENT MESSAGE (OUTPATIENT)
Dept: HEMATOLOGY/ONCOLOGY | Facility: CLINIC | Age: 65
End: 2020-12-09

## 2020-12-09 DIAGNOSIS — Z79.4 TYPE 2 DIABETES MELLITUS WITH HYPERGLYCEMIA, WITH LONG-TERM CURRENT USE OF INSULIN: Primary | ICD-10-CM

## 2020-12-09 DIAGNOSIS — E11.65 TYPE 2 DIABETES MELLITUS WITH HYPERGLYCEMIA, WITH LONG-TERM CURRENT USE OF INSULIN: Primary | ICD-10-CM

## 2020-12-09 NOTE — PROGRESS NOTES
Health Maintenance Due   Topic Date Due    High Dose Statin  10/15/1978    Shingles Vaccine (1 of 2) 10/15/2007    Influenza Vaccine (1) 08/01/2020    Mammogram  09/13/2020    Eye Exam  10/09/2020    Pneumococcal Vaccine (Highest Risk) (3 of 3 - PPSV23) 10/29/2020     Portal message has been sent to patient regarding overdue health maintenance.  Chart review completed.

## 2020-12-09 NOTE — PROGRESS NOTES
Care Everywhere: updated  Immunization: updated, links delay   Health Maintenance: updated  Media Review: review for outside mammogram and eye exam report   Legacy Review:   Order placed:   Upcoming appts:  Mammogram scheduling ticket sent to patient's portal on 11.3.2020

## 2020-12-10 ENCOUNTER — HOSPITAL ENCOUNTER (OUTPATIENT)
Dept: RADIOLOGY | Facility: HOSPITAL | Age: 65
Discharge: HOME | End: 2020-12-10
Attending: INTERNAL MEDICINE
Payer: MEDICARE

## 2020-12-10 ENCOUNTER — ANTI-COAG VISIT (OUTPATIENT)
Dept: CARDIOLOGY | Facility: CLINIC | Age: 65
End: 2020-12-10
Payer: MEDICARE

## 2020-12-10 ENCOUNTER — OFFICE VISIT (OUTPATIENT)
Dept: NEUROLOGY | Facility: CLINIC | Age: 65
End: 2020-12-10
Payer: MEDICARE

## 2020-12-10 ENCOUNTER — OFFICE VISIT (OUTPATIENT)
Dept: HEMATOLOGY/ONCOLOGY | Facility: CLINIC | Age: 65
End: 2020-12-10
Payer: MEDICARE

## 2020-12-10 VITALS
OXYGEN SATURATION: 97 % | BODY MASS INDEX: 28.58 KG/M2 | TEMPERATURE: 98 F | SYSTOLIC BLOOD PRESSURE: 128 MMHG | WEIGHT: 193 LBS | HEART RATE: 95 BPM | HEIGHT: 69 IN | DIASTOLIC BLOOD PRESSURE: 59 MMHG | RESPIRATION RATE: 18 BRPM

## 2020-12-10 VITALS
DIASTOLIC BLOOD PRESSURE: 75 MMHG | SYSTOLIC BLOOD PRESSURE: 106 MMHG | HEIGHT: 69 IN | HEART RATE: 96 BPM | BODY MASS INDEX: 28.5 KG/M2

## 2020-12-10 DIAGNOSIS — C78.00 MALIGNANT NEOPLASM METASTATIC TO LUNG, UNSPECIFIED LATERALITY: ICD-10-CM

## 2020-12-10 DIAGNOSIS — I63.411 EMBOLIC STROKE INVOLVING RIGHT MIDDLE CEREBRAL ARTERY: ICD-10-CM

## 2020-12-10 DIAGNOSIS — Z79.01 LONG TERM (CURRENT) USE OF ANTICOAGULANTS: ICD-10-CM

## 2020-12-10 DIAGNOSIS — C79.51 SECONDARY MALIGNANT NEOPLASM OF BONE: ICD-10-CM

## 2020-12-10 DIAGNOSIS — J90 PLEURAL EFFUSION: ICD-10-CM

## 2020-12-10 DIAGNOSIS — E11.42 TYPE 2 DIABETES MELLITUS WITH DIABETIC POLYNEUROPATHY, WITH LONG-TERM CURRENT USE OF INSULIN: ICD-10-CM

## 2020-12-10 DIAGNOSIS — Z74.09 IMPAIRED FUNCTIONAL MOBILITY, BALANCE, GAIT, AND ENDURANCE: Primary | ICD-10-CM

## 2020-12-10 DIAGNOSIS — Z79.4 TYPE 2 DIABETES MELLITUS WITH HYPOGLYCEMIA WITHOUT COMA, WITH LONG-TERM CURRENT USE OF INSULIN: ICD-10-CM

## 2020-12-10 DIAGNOSIS — Z79.4 TYPE 2 DIABETES MELLITUS WITH DIABETIC POLYNEUROPATHY, WITH LONG-TERM CURRENT USE OF INSULIN: ICD-10-CM

## 2020-12-10 DIAGNOSIS — C79.31 SECONDARY MALIGNANT NEOPLASM OF BRAIN: ICD-10-CM

## 2020-12-10 DIAGNOSIS — E11.649 TYPE 2 DIABETES MELLITUS WITH HYPOGLYCEMIA WITHOUT COMA, WITH LONG-TERM CURRENT USE OF INSULIN: ICD-10-CM

## 2020-12-10 DIAGNOSIS — I63.89 ACUTE ISCHEMIC MULTIFOCAL MULTIPLE VASCULAR TERRITORIES STROKE: ICD-10-CM

## 2020-12-10 DIAGNOSIS — C34.12 MALIGNANT NEOPLASM OF UPPER LOBE OF LEFT LUNG: Primary | ICD-10-CM

## 2020-12-10 PROCEDURE — 93793 PR ANTICOAGULANT MGMT FOR PT TAKING WARFARIN: ICD-10-PCS | Mod: S$GLB,,,

## 2020-12-10 PROCEDURE — 1125F PR PAIN SEVERITY QUANTIFIED, PAIN PRESENT: ICD-10-PCS | Mod: S$GLB,,, | Performed by: INTERNAL MEDICINE

## 2020-12-10 PROCEDURE — 71046 XR CHEST PA AND LATERAL: ICD-10-PCS | Mod: 26,,, | Performed by: RADIOLOGY

## 2020-12-10 PROCEDURE — 71045 X-RAY EXAM CHEST 1 VIEW: CPT | Mod: TC,LT

## 2020-12-10 PROCEDURE — 3078F PR MOST RECENT DIASTOLIC BLOOD PRESSURE < 80 MM HG: ICD-10-PCS | Mod: CPTII,S$GLB,, | Performed by: INTERNAL MEDICINE

## 2020-12-10 PROCEDURE — 99999 PR PBB SHADOW E&M-EST. PATIENT-LVL III: CPT | Mod: PBBFAC,,, | Performed by: INTERNAL MEDICINE

## 2020-12-10 PROCEDURE — 3074F PR MOST RECENT SYSTOLIC BLOOD PRESSURE < 130 MM HG: ICD-10-PCS | Mod: CPTII,S$GLB,, | Performed by: INTERNAL MEDICINE

## 2020-12-10 PROCEDURE — 3072F PR LOW RISK FOR RETINOPATHY: ICD-10-PCS | Mod: S$GLB,,, | Performed by: INTERNAL MEDICINE

## 2020-12-10 PROCEDURE — 71045 XR CHEST LATERAL DECUBITUS LEFT: ICD-10-PCS | Mod: 26,LT,, | Performed by: RADIOLOGY

## 2020-12-10 PROCEDURE — 71045 X-RAY EXAM CHEST 1 VIEW: CPT | Mod: 26,LT,, | Performed by: RADIOLOGY

## 2020-12-10 PROCEDURE — 99999 PR PBB SHADOW E&M-EST. PATIENT-LVL V: CPT | Mod: PBBFAC,GC,, | Performed by: STUDENT IN AN ORGANIZED HEALTH CARE EDUCATION/TRAINING PROGRAM

## 2020-12-10 PROCEDURE — 3074F SYST BP LT 130 MM HG: CPT | Mod: CPTII,S$GLB,, | Performed by: INTERNAL MEDICINE

## 2020-12-10 PROCEDURE — 99214 PR OFFICE/OUTPT VISIT, EST, LEVL IV, 30-39 MIN: ICD-10-PCS | Mod: S$GLB,,, | Performed by: INTERNAL MEDICINE

## 2020-12-10 PROCEDURE — 3078F DIAST BP <80 MM HG: CPT | Mod: CPTII,S$GLB,, | Performed by: INTERNAL MEDICINE

## 2020-12-10 PROCEDURE — 3052F HG A1C>EQUAL 8.0%<EQUAL 9.0%: CPT | Mod: CPTII,S$GLB,, | Performed by: INTERNAL MEDICINE

## 2020-12-10 PROCEDURE — 99999 PR PBB SHADOW E&M-EST. PATIENT-LVL V: ICD-10-PCS | Mod: PBBFAC,GC,, | Performed by: STUDENT IN AN ORGANIZED HEALTH CARE EDUCATION/TRAINING PROGRAM

## 2020-12-10 PROCEDURE — 71046 X-RAY EXAM CHEST 2 VIEWS: CPT | Mod: TC

## 2020-12-10 PROCEDURE — 99214 PR OFFICE/OUTPT VISIT, EST, LEVL IV, 30-39 MIN: ICD-10-PCS | Mod: GC,S$GLB,, | Performed by: PSYCHIATRY & NEUROLOGY

## 2020-12-10 PROCEDURE — 3008F PR BODY MASS INDEX (BMI) DOCUMENTED: ICD-10-PCS | Mod: CPTII,S$GLB,, | Performed by: INTERNAL MEDICINE

## 2020-12-10 PROCEDURE — 3072F LOW RISK FOR RETINOPATHY: CPT | Mod: S$GLB,,, | Performed by: INTERNAL MEDICINE

## 2020-12-10 PROCEDURE — 99999 PR PBB SHADOW E&M-EST. PATIENT-LVL III: ICD-10-PCS | Mod: PBBFAC,,, | Performed by: INTERNAL MEDICINE

## 2020-12-10 PROCEDURE — 99214 OFFICE O/P EST MOD 30 MIN: CPT | Mod: S$GLB,,, | Performed by: INTERNAL MEDICINE

## 2020-12-10 PROCEDURE — 3052F PR MOST RECENT HEMOGLOBIN A1C LEVEL 8.0 - < 9.0%: ICD-10-PCS | Mod: CPTII,S$GLB,, | Performed by: INTERNAL MEDICINE

## 2020-12-10 PROCEDURE — 99214 OFFICE O/P EST MOD 30 MIN: CPT | Mod: GC,S$GLB,, | Performed by: PSYCHIATRY & NEUROLOGY

## 2020-12-10 PROCEDURE — 93793 ANTICOAG MGMT PT WARFARIN: CPT | Mod: S$GLB,,,

## 2020-12-10 PROCEDURE — 71046 X-RAY EXAM CHEST 2 VIEWS: CPT | Mod: 26,,, | Performed by: RADIOLOGY

## 2020-12-10 PROCEDURE — 1125F AMNT PAIN NOTED PAIN PRSNT: CPT | Mod: S$GLB,,, | Performed by: INTERNAL MEDICINE

## 2020-12-10 PROCEDURE — 3008F BODY MASS INDEX DOCD: CPT | Mod: CPTII,S$GLB,, | Performed by: INTERNAL MEDICINE

## 2020-12-10 NOTE — PROGRESS NOTES
"Neurology Clinic      Patient Name: Alison Branch  MRN: [id number]    CC: stroke follow up    HPI: Alison Branch is a 65 y.o. 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    Patent is accompanied by her daughter and the older daughter is on the phone throughout the visit. Patient was recently hospitalized to the oncology service due to pleural effusion and subsequent deconditioning. Patient and family are complaining that she is not back to level of functioning she was at before recent hospitalization. They also report that HH therapist are not visiting as frequently as initially scheduled and they only perform passive ROM and believe that this is contributing to her further decline.  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 Patient's last known normal was 10:30 AM before she took a nap. She woke up and noticed slurring and left facial droop. Stroke code called at 12 PM. On arrival of the vascular neurology team, there was left facial droop, dysarthia and left sided weakness. CTH did not show any infarct or hemorrhage. CTA MP does not show any LVO or high grade stenosis. There is mention of  possible right M3 vessel stenosis or subtotal occlusion however, it is not seen when imaging was personally reviewed.   Patient was recently discharged from our " "service on 10/5/20. She was initially admitted for acute onset of RLE shaking and weakness. As she was on eliquis for, tPA was not offered. MRI brain done in previous admission revealed infarct in the right centrum semiovale, left corpus collosum. Etiology most likely due to a hypercoagulable state. Her hospitalization was complicated by urinary rentention and hematuria. Patient was to follow up with urology in a month. NIHSS 1 at discharge. RLE deficits persistent"        Review of Systems:  12 system review is negative except as noted in HPI.     Past Medical History  Past Medical History:   Diagnosis Date    Allergy     Brain aneurysm 2010    s/p coiling of one; another not coiled    Breast cyst     Depression 9/19/2019    Diabetes mellitus     Diabetes type 2, controlled     Fever blister     High cholesterol     History of Bell's palsy     HTN (hypertension) 5/20/2014    Recurrent upper respiratory infection (URI)        Medications    Current Outpatient Medications:     bethanechol (URECHOLINE) 25 MG Tab, Take 25 mg by mouth 3 (three) times daily., Disp: , Rfl:     blood sugar diagnostic Strp, To check BG 4 times daily, to use with insurance preferred meter, Disp: 200 each, Rfl: 11    carboxymethylcellulose (REFRESH PLUS) 0.5 % Dpet, 1 drop 3 (three) times daily as needed. , Disp: , Rfl:     cyproheptadine (PERIACTIN) 4 mg tablet, Take 1 tablet (4 mg total) by mouth 4 (four) times daily., Disp: 120 tablet, Rfl: 5    ergocalciferol (ERGOCALCIFEROL) 50,000 unit Cap, TAKE 1 CAPSULE BY MOUTH EVERY 7 DAYS, Disp: 12 capsule, Rfl: 3    ezetimibe (ZETIA) 10 mg tablet, Take 1 tablet (10 mg total) by mouth every evening., Disp: 90 tablet, Rfl: 3    fluticasone propionate (FLONASE) 50 mcg/actuation nasal spray, USE TWO SPRAY(S) IN EACH NOSTRIL ONCE DAILY, Disp: 16 g, Rfl: 3    fluticasone-salmeterol diskus inhaler 100-50 mcg, Inhale 1 puff into the lungs 2 (two) times daily. Controller, Disp: 60 each, " "Rfl: 2    furosemide (LASIX) 20 MG tablet, Take 2 tablets (40 mg total) by mouth once daily., Disp: 60 tablet, Rfl: 11    gabapentin (NEURONTIN) 300 MG capsule, Take 1 capsule (300 mg total) by mouth nightly as needed (Take as needed at bed time for neuropathy pain and sleep)., Disp: 90 capsule, Rfl: 3    insulin detemir U-100 (LEVEMIR FLEXTOUCH) 100 unit/mL (3 mL) SubQ InPn pen, Inject 5 Units into the skin once daily., Disp: 1.5 mL, Rfl: 11    lancets Misc, 1 Device by Misc.(Non-Drug; Combo Route) route once daily. Heladio Result.  250.02.  Check Blood Sugar Twice Daily., Disp: 200 each, Rfl: 3    lidocaine (XYLOCAINE) 5 % Oint ointment, Apply topically as needed (For lab draws)., Disp:  , Rfl:     melatonin (MELATIN) 3 mg tablet, Take 2 tablets (6 mg total) by mouth nightly as needed for Insomnia., Disp:  , Rfl: 0    nystatin (MYCOSTATIN) powder, Apply topically 2 (two) times daily., Disp: 60 g, Rfl: 0    ondansetron (ZOFRAN) 8 MG tablet, Take 1 tablet (8 mg total) by mouth 4 (four) times daily as needed for Nausea., Disp: 60 tablet, Rfl: 2    pantoprazole (PROTONIX) 40 MG tablet, Take 40 mg by mouth once daily., Disp: , Rfl:     pen needle, diabetic 32 gauge x 5/32" Ndle, Use with insulin, Disp: 100 each, Rfl: 0    pen needle, diabetic 33 gauge x 5/32" Ndle, 1 each by Misc.(Non-Drug; Combo Route) route 4 (four) times daily with meals and nightly., Disp: 100 each, Rfl: 5    tamsulosin (FLOMAX) 0.4 mg Cap, Take 1 capsule (0.4 mg total) by mouth every evening., Disp: 30 capsule, Rfl: 11    venlafaxine (EFFEXOR XR) 75 MG 24 hr capsule, Take 1 capsule (75 mg total) by mouth once daily., Disp: 30 capsule, Rfl: 2    walker Misc, Please provide rollator walker for this debilitated cancer patient.  Thank you., Disp: , Rfl: 0    warfarin (COUMADIN) 5 MG tablet, Take 1 tablet (5mg) by mouth daily or As directed by coumadin clinic, Disp: 30 tablet, Rfl: 5  Any other notable medications as documented in " [facility]    Allergies  Review of patient's allergies indicates:   Allergen Reactions    Piperacillin-tazobactam Rash     Tolerated cefepime 2020       Social History  Social History     Socioeconomic History    Marital status: Single     Spouse name: Not on file    Number of children: Not on file    Years of education: Not on file    Highest education level: Not on file   Occupational History    Occupation: Student     Comment: Unemployed   Social Needs    Financial resource strain: Not on file    Food insecurity     Worry: Not on file     Inability: Not on file    Transportation needs     Medical: Not on file     Non-medical: Not on file   Tobacco Use    Smoking status: Former Smoker     Packs/day: 0.50     Years: 30.00     Pack years: 15.00     Quit date: 1999     Years since quittin.9    Smokeless tobacco: Never Used   Substance and Sexual Activity    Alcohol use: No     Alcohol/week: 0.0 standard drinks    Drug use: No    Sexual activity: Never     Partners: Female     Birth control/protection: Surgical   Lifestyle    Physical activity     Days per week: 3 days     Minutes per session: 20 min    Stress: Not on file   Relationships    Social connections     Talks on phone: More than three times a week     Gets together: More than three times a week     Attends Gnosticism service: Not on file     Active member of club or organization: No     Attends meetings of clubs or organizations: Never     Relationship status: Patient refused   Other Topics Concern    Are you pregnant or think you may be? No    Breast-feeding No   Social History Narrative    Not on file     Any other notable Social History as documented in HPI.    Family History  Family History   Problem Relation Age of Onset    Rheum arthritis Father     Diabetes Father     Heart failure Father     Migraines Father     Cataracts Father     Cancer Mother 63        pancreatic    Stomach cancer Mother     Breast cancer Maternal  "Aunt         50s    Ovarian cancer Cousin     Allergies Daughter     Diabetes Sister     Diabetes Brother     Cancer Maternal Aunt         Lung CA    Melanoma Neg Hx     Colon cancer Neg Hx     Amblyopia Neg Hx     Blindness Neg Hx     Glaucoma Neg Hx     Macular degeneration Neg Hx     Retinal detachment Neg Hx     Strabismus Neg Hx     Stroke Neg Hx     Thyroid disease Neg Hx     Allergic rhinitis Neg Hx     Angioedema Neg Hx     Asthma Neg Hx     Atopy Neg Hx     Eczema Neg Hx     Immunodeficiency Neg Hx     Rhinitis Neg Hx     Urticaria Neg Hx      Any other notable FMH as documented in HPI.    Physical Exam  /75   Pulse 96   Ht 5' 9" (1.753 m)   BMI 28.50 kg/m²     General: Well-developed, well-groomed, obese. She is in apparent distress due to intermittent severe pain in lower back and left leg.   HENT: Normocephalic, atraumatic.   Musculoskeletal: Peripheral edema in distal LUE. No joint swelling  Skin: No rash    Neurologic Exam:   Awake, alert and oriented x3  Short term memory intact  Praxis normal  Mild to moderate dysarthria  PERRLA. EOM intact. No Nystagmus. No ophthalmoplegia.   Visual fields are full to confrontation.    Facial sensation is normal to light touch.   Facial expression is symmetric except slight nasolabial flattening on left  Hearing is intact bilaterally.   Palate elevates symmetrically.   SCM and Trapezius full strength bilaterally.   Tongue is midline.   There are no atrophy or fasciculations.   Strength is 5/5 throughout.  Sensation to light touch decreased on left.  DTRs 2+ and symmetric throughout.    Finger to nose is normal bilaterally.  Deferred gait      Pertinent Lab Results:      Pertinent Imaging Studies:  MRI Brain W/WO (11/25/2020):  6 mm Right frontal lobe subcortical lesion identified on prior MRI demonstrates avid peripheral enhancement and there is a 2nd smaller enhancing lesion in the right occipital lobe overall concerning for " metastatic disease.    CTH non-contrast (11/24/2020):  Foci of low attenuation involving the right MCA territory as well as the left corpus callosum correlate with regions of ischemia identified on previous MRI.  Additional foci of ischemia within these regions cannot be definitively excluded on this exam.  No findings to suggest hemorrhage    MRI Brain W/WO (10/13/2020):  foci of acute infarctions in the right MCA distribution.  Three additional punctate foci of restricted diffusion involving the left parietal lobe, possibly emboli.  Small subacute infarction involving the left cerebellum, similar to prior examination dated 09/28/2020.  Well rounded T2/FLAIR hyperintense focus within the left lateral aspect of the body of the corpus callosum, likely related to evolving infarct as further discussed above.  If there is concerning for underlying intracranial metastasis in the setting of known metastatic lung cancer, further evaluation may be obtained with contrast enhanced MRI of the brain.    CTA-MP (10/13/2020):  No acute intracranial pathology       MRI Lumbar Spine  Multifocal osseous metastasis involving thoracolumbar spine and sacrum, overall similar in distribution as compared to most recent CT chest/abdomen/pelvis from 08/04/2020.  No involvement of spinal canal.  Normal cord signal.  Mild thoracolumbar spondylosis, as above, most prominent L4-5.  Large dependent left pleural effusion.  Mild bilateral hydronephrosis.       Assessment and Plan    Problem List Items Addressed This Visit        Neuro    Embolic stroke involving right middle cerebral artery    Current Assessment & Plan         - Discussed Lovenox versus warfarin and that Lovenox is the treatment of choice for anticoagulation in malignancy. Patient prefers to stay on warfarin  - Discussed palliative care, patient and family not open to the idea at this time  - Offered referral to pain management and treatment options to help alleviate the pain  caused by vertebral metastasis, however family is strongly against the notion of pain management and despite detailed explanations they believe patient would become dependent to pain pills  - Referred to outpatient case management to assist with transferring her care to [place] (scheduling appropriate appointments with required providers)  - ambulatory referral to  to help re-assess her options to change the providing company  - Hard script orders provided for wheelchair and hospital bed  - No need for follow up with our clinic since she is moving to [place]            Oncology    Malignant neoplasm metastatic to lung    Relevant Orders    Ambulatory referral/consult to Outpatient Case Management    Ambulatory referral/consult to Home Health    WHEELCHAIR FOR HOME USE    HOSPITAL BED FOR HOME USE       Other    Impaired functional mobility, balance, gait, and endurance - Primary    Relevant Orders    Ambulatory referral/consult to Outpatient Case Management    Ambulatory referral/consult to Home Health    WHEELCHAIR FOR HOME USE    HOSPITAL BED FOR HOME USE      Other Visit Diagnoses     Acute ischemic multifocal multiple vascular territories stroke                  Aaliyah Hall MD  Neurology Resident, PGY-IV  [facility]  [address] [place] [address]

## 2020-12-10 NOTE — PROGRESS NOTES
Subjective:       Patient ID: Alison Branch is a 65 y.o. female.    Chief Complaint: Malignant neoplasm of upper lobe of left lung  Ms. Branch is a 63-year-old female who smoked for about 30 years and quit 20 years ago, presented with cough end of last year, but worsened into January.  Since the cough persisted, she saw her PCP, underwent a chest x-ray on 05/10/2019, that revealed left upper lobe pneumonia and a repeat CT was done in the week after that on 05/17/2019 that showed left upper lobe   mass arising from the lateral pleural surface in the left upper lobe posterior subsegment measuring 2.6 x 3 cm.  There are satellite mass more medially near the aortic arch that is 2 cm, also spiculated and irregular as well as thickened interlobar septa in the left lung apex and anterior segment, prevascular lymph node lateral to the aortic arch, short axis measuring 9 mm.       She then underwent a bronchoscopy on 05/28/2019, and that revealed there was an infiltration obtained in the left apical posterior segment of the left upper lobe cytology brush was done.  Transbronchial biopsies of an area of infiltration were also performed in the apicoposterior segment of the left upper lobe.    Pathology revealed adenocarcinoma; however, the specimen was not adequate enough to send for molecular testing.       She underwent a PET scan on 06/06/2019.  that reveals significant hypermetabolic activity in the large irregular spiculated pulmonary mass in the lateral aspect of the left upper lobe consistent with the patient's known pulmonary adenocarcinoma.  There is also tracer avidity in the medial left upper lobe satellite lesion and diffusely throughout much of the anterior left upper lobe where there is prominent nodular paraseptal thickening.  There are some numerous scattered subcentimeter pulmonary nodules throughout the right lung, all of which are too small for detection by PET.  For example, there is a 0.4 cm nodule in the  "superior right lower lobe and a micro nodule in the posterior segment of the right upper lobe.  There is a 0.5 cm, normal size right   paratracheal lymph node with increased radiotracer uptake as well as hypermetabolic aortic pulmonary lymph node and a left hilar lymph node.  There is a focal hypermetabolic lesion in the left anterior superior iliac spine associated with well defined lytic lesion.  There is a hypermetabolic focus in the anterior right fifth rib with a possible associated lytic lesion.  SUVs as   below lateral:  Left upper lobe  SUV max 17.9, anterior left upper lobe satellite mass and septal thickening SUV max of 10.1, right paratracheal lymph node SUV max of 4.8, left AP window lymph node SUV max of 17.9, left anterior superior iliac spine SUV max of 3.9"     MRI pelvis from 6/10/19  reveals "Region of osseous is irregularity at the left anterior iliac spine likely related to bone graft harvest procedure.  See  discussion above.  No suspicious signal or enhancement to suggest active/malignant process"   MRI brain from 6/10//19 reveal No intracranial abnormality.     We discussed her case at [facility], and plan was to wait for GAURDANT and proceed with treatment accordingly  Her GAURDANT is negative for molecular markers.     She has completed 4 cycles of Carboplatin, Alimta and Keytruda as of 9/5/19. She is now on  ALimta and Keytruda maintenance.            She has been on maintenance Alimta and Keytruda     Her CT scans from 4/23/2020 revealed "In this patient with a known history of lung cancer, there has been marked interval detrimental change when compared to CT dated 12/20/2019 as follows. Persistent left upper lobe volume loss with worsening masslike consolidation and enlarging index and non index lesions. Increased number of innumerable bilateral metastatic solid pulmonary nodules. Interval increased size and number of multiple osteoblastic metastatic lesions throughout the visualized " "axial skeleton. Stable mediastinal lymph nodes, several of which were noted to be hypermetabolic on previous PET-CT.  No new lymphadenopathy. Stable subcentimeter hepatic and splenic hypodensities, too small to accurately characterize.  Path addendum from 5/2019 reveals ROS1      She was started on Entrectinib at 400 mg dose on 7/1/2020    HPIDiana was hospitalized between 10/13-10/20/2020 with Stroke and was then discharged to rehab. Never started back on Entrectinib.   She was again hospitalized between 11/24-11/27/2020 with "Patient found to have new subcentimeter enhancing lesions in the right lateral frontal, right occipital, and right inferior frontal on MRI Brain with contrast 11/25 and started on dexamethasone. Chest radiographs with large pleural effusion of the left hemithorax. She underwent thoracentesis by pulmonology 11/25 with removal of 1100 ml of fluid and improvement of symptoms. Dexamethasone discontinued and patient instructed to restart entrectinib upon discharge"  She comes in today for a follow-up after hospital discharge  She notes that she is taking Entrectinib since discharge and denies any new issues. She denies any headaches or dizziness or shortness of breath   PT/OT is working with her at home.        Review of Systems   Constitutional: Negative for appetite change, fatigue and unexpected weight change.   HENT: Negative for mouth sores.    Eyes: Negative for visual disturbance.   Respiratory: Negative for cough and shortness of breath.    Cardiovascular: Negative for chest pain.   Gastrointestinal: Negative for abdominal pain and diarrhea.   Genitourinary: Negative for frequency.   Musculoskeletal: Negative for back pain.   Integumentary:  Negative for rash.   Neurological: Negative for headaches.   Hematological: Negative for adenopathy.   Psychiatric/Behavioral: The patient is not nervous/anxious.    All other systems reviewed and are negative.        Objective:      Physical " Exam  Vitals signs reviewed.   Constitutional:       Appearance: She is well-developed.   HENT:      Mouth/Throat:      Pharynx: No oropharyngeal exudate.   Cardiovascular:      Rate and Rhythm: Normal rate.      Heart sounds: Normal heart sounds.   Pulmonary:      Effort: Pulmonary effort is normal.      Breath sounds: Normal breath sounds. No wheezing.   Abdominal:      General: Bowel sounds are normal.      Palpations: Abdomen is soft.      Tenderness: There is no abdominal tenderness.   Musculoskeletal:         General: No tenderness.   Lymphadenopathy:      Cervical: No cervical adenopathy.   Skin:     General: Skin is warm and dry.      Findings: No rash.   Neurological:      Mental Status: She is alert and oriented to person, place, and time.      Coordination: Coordination normal.   Psychiatric:         Thought Content: Thought content normal.         Judgment: Judgment normal.           LABS:  WBC   Date Value Ref Range Status   12/10/2020 5.96 3.90 - 12.70 K/uL Final     Hemoglobin   Date Value Ref Range Status   12/10/2020 10.8 (L) 12.0 - 16.0 g/dL Final     POC Hematocrit   Date Value Ref Range Status   10/13/2020 36 36 - 54 %PCV Final     Hematocrit   Date Value Ref Range Status   12/10/2020 35.3 (L) 37.0 - 48.5 % Final     Platelets   Date Value Ref Range Status   12/10/2020 285 150 - 350 K/uL Final     Gran # (ANC)   Date Value Ref Range Status   12/10/2020 2.8 1.8 - 7.7 K/uL Final     Comment:     The ANC is based on a white cell differential from an   automated cell counter. It has not been microscopically   reviewed for the presence of abnormal cells. Clinical   correlation is required.         Chemistry        Component Value Date/Time     12/10/2020 0903    K 4.2 12/10/2020 0903     12/10/2020 0903    CO2 27 12/10/2020 0903    BUN 14 12/10/2020 0903    CREATININE 1.2 12/10/2020 0903     (H) 12/10/2020 0903        Component Value Date/Time    CALCIUM 8.7 12/10/2020 0903     ALKPHOS 93 12/10/2020 0903    AST 16 12/10/2020 0903    ALT 15 12/10/2020 0903    BILITOT 0.4 12/10/2020 0903    ESTGFRAFRICA 55.6 (A) 12/10/2020 0903    EGFRNONAA 48.2 (A) 12/10/2020 0903          Assessment:       1. Malignant neoplasm of upper lobe of left lung    2. Secondary malignant neoplasm of bone    3. Secondary malignant neoplasm of brain    4. Pleural effusion    5. Type 2 diabetes mellitus with hypoglycemia without coma, with long-term current use of insulin    6. Type 2 diabetes mellitus with diabetic polyneuropathy, with long-term current use of insulin        Plan:        1,2,3,4. She is feeling better clinically. She still has pleural fluid but currently is asymptomatic so will hold off on pleurex for now especially as she is moving to [place] next week. Discussed with thoracic surgery as well and they agree. Discussed this with Chito who is the daughter that she will be living with in [place]    She will continue with Entrectenib, hope is that if the chemo starts working soon enough, she may not need a pleurex.  5. Stable on meds    Above care plan was discussed with patient and accompanying daughter and all questions were addressed to their satisfaction

## 2020-12-10 NOTE — Clinical Note
Add CBC,CMP, to lab appointment already scheduled today. Also schedule chest xray PA/lateral and left lateral decub today

## 2020-12-14 NOTE — PROGRESS NOTES
Johnna with [facility] called to reschedule today's lab draw to tomorrow 12/15/20, reports that she was unable to draw the lab today due to the  Patient being unable to get to the door and care taker is not with the Patient, lab date was rescheduled, new order was faxed to [facility]

## 2020-12-14 NOTE — ASSESSMENT & PLAN NOTE
- Discussed Lovenox versus warfarin and that Lovenox is the treatment of choice for anticoagulation in malignancy. Patient prefers to stay on warfarin  - Discussed palliative care, patient and family not open to the idea at this time  - Offered referral to pain management and treatment options to help alleviate the pain caused by vertebral metastasis, however family is strongly against the notion of pain management and despite detailed explanations they believe patient would become dependent to pain pills  - Referred to outpatient case management to assist with transferring her care to [place] (scheduling appropriate appointments with required providers)  - ambulatory referral to  to help re-assess her options to change the providing company  - Hard script orders provided for wheelchair and hospital bed  - No need for follow up with our clinic since she is moving to [place]

## 2020-12-15 ENCOUNTER — ANTI-COAG VISIT (OUTPATIENT)
Dept: CARDIOLOGY | Facility: CLINIC | Age: 65
End: 2020-12-15
Payer: MEDICARE

## 2020-12-15 DIAGNOSIS — I63.411 EMBOLIC STROKE INVOLVING RIGHT MIDDLE CEREBRAL ARTERY: ICD-10-CM

## 2020-12-15 DIAGNOSIS — Z79.01 LONG TERM (CURRENT) USE OF ANTICOAGULANTS: ICD-10-CM

## 2020-12-15 LAB — INR PPP: 1.7

## 2020-12-15 PROCEDURE — 93793 ANTICOAG MGMT PT WARFARIN: CPT | Mod: S$GLB,,,

## 2020-12-15 PROCEDURE — 93793 PR ANTICOAGULANT MGMT FOR PT TAKING WARFARIN: ICD-10-PCS | Mod: S$GLB,,,

## 2020-12-15 RX ORDER — WARFARIN SODIUM 5 MG/1
TABLET ORAL
Qty: 45 TABLET | Refills: 5 | Status: ON HOLD | OUTPATIENT
Start: 2020-12-15 | End: 2021-05-26 | Stop reason: HOSPADM

## 2020-12-15 NOTE — PROGRESS NOTES
Sandra Thurman with [facility]  called to report the Patient is being discharged from home health service today 12/15/20, states Patient is moving to [place]--not sure if temporary or permanent

## 2020-12-15 NOTE — PROGRESS NOTES
Patient's daughter Suellen reports their move is not for sure right now.   Reports patient to have lab drawn at [facility].   Calling back to phone and fax #

## 2020-12-16 ENCOUNTER — DOCUMENT SCAN (OUTPATIENT)
Dept: HOME HEALTH SERVICES | Facility: HOSPITAL | Age: 65
End: 2020-12-16
Payer: MEDICARE

## 2020-12-17 ENCOUNTER — TELEPHONE (OUTPATIENT)
Dept: INTERNAL MEDICINE | Facility: CLINIC | Age: 65
End: 2020-12-17

## 2020-12-17 NOTE — TELEPHONE ENCOUNTER
Spoke with [facility] Linda AVINA.  She stated that you can write on a script pad    PT/INR machine and supplies for home use  Put diagnosis Z79.01 long term use of anticoagulation   I26.99 Pulmonary embolism       I will fax it to them.    Celeste

## 2020-12-17 NOTE — TELEPHONE ENCOUNTER
----- Message from Elizabeth Craven sent at 12/17/2020  1:58 PM CST -----  Contact: Linda/[employer] [phone]  Requesting an order for a home PT/INR machine and supplies. Requesting medical necessity for and last clinic notes. Please fax to [phone].    Please call and advise.    Thank You

## 2020-12-17 NOTE — TELEPHONE ENCOUNTER
"Celeste,  I'm not opposed to this, but I have no idea how to order this.  Can you call them and ask exactly what they need ordered?  I might have to hand-write it.  The "and supplies" part of the request is not interpretable.    Thanks.  CLARICE  "

## 2020-12-18 ENCOUNTER — PATIENT MESSAGE (OUTPATIENT)
Dept: INTERNAL MEDICINE | Facility: CLINIC | Age: 65
End: 2020-12-18

## 2020-12-18 DIAGNOSIS — C34.12 MALIGNANT NEOPLASM OF UPPER LOBE OF LEFT LUNG: Primary | ICD-10-CM

## 2020-12-18 NOTE — TELEPHONE ENCOUNTER
All paperwork faxed  to [employer] [employer] and pt notified.  Pt needed fax to [place] in [place] and it has been done.    Celeste

## 2020-12-18 NOTE — TELEPHONE ENCOUNTER
She's in [place].  I doubt she will come back, knowing her and the prognosis of her disease.  Will readdress this if she returns.

## 2020-12-29 ENCOUNTER — PATIENT MESSAGE (OUTPATIENT)
Dept: NEUROLOGY | Facility: CLINIC | Age: 65
End: 2020-12-29

## 2020-12-29 ENCOUNTER — PATIENT MESSAGE (OUTPATIENT)
Dept: HEMATOLOGY/ONCOLOGY | Facility: CLINIC | Age: 65
End: 2020-12-29

## 2021-01-11 ENCOUNTER — PATIENT OUTREACH (OUTPATIENT)
Dept: ADMINISTRATIVE | Facility: OTHER | Age: 66
End: 2021-01-11

## 2021-01-13 ENCOUNTER — PATIENT MESSAGE (OUTPATIENT)
Dept: NEUROLOGY | Facility: CLINIC | Age: 66
End: 2021-01-13

## 2021-01-19 ENCOUNTER — TELEPHONE (OUTPATIENT)
Dept: HEMATOLOGY/ONCOLOGY | Facility: CLINIC | Age: 66
End: 2021-01-19

## 2021-01-19 NOTE — PROGRESS NOTES
Patient's daughter Chito advised to have patient set up MD in [place] to monitor warfarin as soon as possible and patient will be discharged from coumadin clinic

## 2021-01-19 NOTE — PROGRESS NOTES
MA note reviewed. Will advise that if she is no longer following with an [facility] physician, we can no longer manage her warfarin and her new primary care will need to manage. Recommend that she follow up with someone as soon as possible. Will discharge from our care at this time. Message sent to Dr. Rodriguez to notify as well.

## 2021-01-19 NOTE — PROGRESS NOTES
Patient's daughter Chito reports patient now living in [facility] and is arranging primacy care there.

## 2021-02-17 NOTE — TELEPHONE ENCOUNTER
Immediate Postoperative / Post-Procedure Note    Preoperative Diagnosis: _     fibroids,anemia,hyperplasia    Postoperative Diagnosis: _   same    Procedure(s): _   SAVANAH,BS,cysto    Surgeon/Proceduralist: _ moe    Assistant: Gillian welsh    Anesthesia Type: _   general     Estimated Blood Loss: _ 200 cc    Transfusion Summary: _  none    Specimen(s): _  uterus and tubes    Grafts/Implants: _   none    Complications: _  none           I attempted to call Ms. Rosas to notify her that as a higher risk patient she does not need to feel obligated to attend PT sessions. She will not be penalized for missed sessions and they can be added as necessary to the end of her POC. Left voicemail notifying her of this.

## 2021-03-31 DIAGNOSIS — E11.9 TYPE 2 DIABETES MELLITUS WITHOUT COMPLICATION: ICD-10-CM

## 2021-04-14 ENCOUNTER — PATIENT MESSAGE (OUTPATIENT)
Dept: HEMATOLOGY/ONCOLOGY | Facility: CLINIC | Age: 66
End: 2021-04-14

## 2021-04-21 ENCOUNTER — TELEPHONE (OUTPATIENT)
Dept: INFUSION THERAPY | Facility: HOSPITAL | Age: 66
End: 2021-04-21

## 2021-05-04 ENCOUNTER — TELEPHONE (OUTPATIENT)
Dept: INTERNAL MEDICINE | Facility: CLINIC | Age: 66
End: 2021-05-04

## 2021-05-04 DIAGNOSIS — Z74.09 IMPAIRED FUNCTIONAL MOBILITY, BALANCE, GAIT, AND ENDURANCE: ICD-10-CM

## 2021-05-04 DIAGNOSIS — C34.12 MALIGNANT NEOPLASM OF UPPER LOBE OF LEFT LUNG: Primary | ICD-10-CM

## 2021-05-05 ENCOUNTER — TELEPHONE (OUTPATIENT)
Dept: INFUSION THERAPY | Facility: HOSPITAL | Age: 66
End: 2021-05-05

## 2021-05-10 ENCOUNTER — TELEPHONE (OUTPATIENT)
Dept: INTERNAL MEDICINE | Facility: CLINIC | Age: 66
End: 2021-05-10

## 2021-05-10 DIAGNOSIS — C79.31 METASTASIS TO BRAIN: Primary | ICD-10-CM

## 2021-05-10 DIAGNOSIS — Z99.3 DEPENDENT ON WHEELCHAIR: ICD-10-CM

## 2021-05-13 ENCOUNTER — TELEPHONE (OUTPATIENT)
Dept: INFUSION THERAPY | Facility: HOSPITAL | Age: 66
End: 2021-05-13

## 2021-05-13 ENCOUNTER — OFFICE VISIT (OUTPATIENT)
Dept: INTERNAL MEDICINE | Facility: CLINIC | Age: 66
End: 2021-05-13
Payer: MEDICARE

## 2021-05-13 ENCOUNTER — LAB VISIT (OUTPATIENT)
Dept: LAB | Facility: HOSPITAL | Age: 66
End: 2021-05-13
Attending: INTERNAL MEDICINE
Payer: MEDICARE

## 2021-05-13 VITALS
DIASTOLIC BLOOD PRESSURE: 50 MMHG | BODY MASS INDEX: 28.5 KG/M2 | HEIGHT: 69 IN | SYSTOLIC BLOOD PRESSURE: 90 MMHG | OXYGEN SATURATION: 92 % | HEART RATE: 89 BPM

## 2021-05-13 DIAGNOSIS — D70.1 CHEMOTHERAPY INDUCED NEUTROPENIA: ICD-10-CM

## 2021-05-13 DIAGNOSIS — E11.649 TYPE 2 DIABETES MELLITUS WITH HYPOGLYCEMIA WITHOUT COMA, WITH LONG-TERM CURRENT USE OF INSULIN: ICD-10-CM

## 2021-05-13 DIAGNOSIS — T45.1X5A CHEMOTHERAPY INDUCED NEUTROPENIA: ICD-10-CM

## 2021-05-13 DIAGNOSIS — I69.354 FLACCID HEMIPLEGIA OF LEFT NONDOMINANT SIDE AS LATE EFFECT OF CEREBRAL INFARCTION: ICD-10-CM

## 2021-05-13 DIAGNOSIS — Z79.4 TYPE 2 DIABETES MELLITUS WITH HYPOGLYCEMIA WITHOUT COMA, WITH LONG-TERM CURRENT USE OF INSULIN: ICD-10-CM

## 2021-05-13 DIAGNOSIS — F33.9 RECURRENT MAJOR DEPRESSIVE DISORDER, REMISSION STATUS UNSPECIFIED: ICD-10-CM

## 2021-05-13 DIAGNOSIS — C79.51 SECONDARY MALIGNANT NEOPLASM OF BONE: ICD-10-CM

## 2021-05-13 DIAGNOSIS — G62.0 DRUG-INDUCED POLYNEUROPATHY: ICD-10-CM

## 2021-05-13 DIAGNOSIS — C78.00 MALIGNANT NEOPLASM METASTATIC TO LUNG, UNSPECIFIED LATERALITY: Primary | ICD-10-CM

## 2021-05-13 DIAGNOSIS — D68.59 OTHER PRIMARY THROMBOPHILIA: ICD-10-CM

## 2021-05-13 DIAGNOSIS — I27.82 OTHER CHRONIC PULMONARY EMBOLISM WITHOUT ACUTE COR PULMONALE: ICD-10-CM

## 2021-05-13 PROBLEM — G81.04 FLACCID HEMIPLEGIA AFFECTING LEFT NONDOMINANT SIDE: Status: ACTIVE | Noted: 2020-10-20

## 2021-05-13 LAB
ALBUMIN SERPL BCP-MCNC: 3.1 G/DL (ref 3.5–5.2)
ALP SERPL-CCNC: 84 U/L (ref 55–135)
ALT SERPL W/O P-5'-P-CCNC: 9 U/L (ref 10–44)
ANION GAP SERPL CALC-SCNC: 12 MMOL/L (ref 8–16)
AST SERPL-CCNC: 16 U/L (ref 10–40)
BILIRUB SERPL-MCNC: 0.8 MG/DL (ref 0.1–1)
BUN SERPL-MCNC: 18 MG/DL (ref 8–23)
CALCIUM SERPL-MCNC: 9.7 MG/DL (ref 8.7–10.5)
CHLORIDE SERPL-SCNC: 102 MMOL/L (ref 95–110)
CO2 SERPL-SCNC: 30 MMOL/L (ref 23–29)
CREAT SERPL-MCNC: 1.4 MG/DL (ref 0.5–1.4)
EST. GFR  (AFRICAN AMERICAN): 46.1 ML/MIN/1.73 M^2
EST. GFR  (NON AFRICAN AMERICAN): 40 ML/MIN/1.73 M^2
ESTIMATED AVG GLUCOSE: 128 MG/DL (ref 68–131)
GLUCOSE SERPL-MCNC: 107 MG/DL (ref 70–110)
HBA1C MFR BLD: 6.1 % (ref 4–5.6)
POTASSIUM SERPL-SCNC: 4.1 MMOL/L (ref 3.5–5.1)
PROT SERPL-MCNC: 7.5 G/DL (ref 6–8.4)
SODIUM SERPL-SCNC: 144 MMOL/L (ref 136–145)

## 2021-05-13 PROCEDURE — 83036 HEMOGLOBIN GLYCOSYLATED A1C: CPT | Performed by: INTERNAL MEDICINE

## 2021-05-13 PROCEDURE — 36415 COLL VENOUS BLD VENIPUNCTURE: CPT | Performed by: INTERNAL MEDICINE

## 2021-05-13 PROCEDURE — 99214 OFFICE O/P EST MOD 30 MIN: CPT | Mod: PBBFAC | Performed by: INTERNAL MEDICINE

## 2021-05-13 PROCEDURE — 99214 OFFICE O/P EST MOD 30 MIN: CPT | Mod: S$PBB,,, | Performed by: INTERNAL MEDICINE

## 2021-05-13 PROCEDURE — 99999 PR PBB SHADOW E&M-EST. PATIENT-LVL IV: CPT | Mod: PBBFAC,,, | Performed by: INTERNAL MEDICINE

## 2021-05-13 PROCEDURE — 99214 PR OFFICE/OUTPT VISIT, EST, LEVL IV, 30-39 MIN: ICD-10-PCS | Mod: S$PBB,,, | Performed by: INTERNAL MEDICINE

## 2021-05-13 PROCEDURE — 99999 PR PBB SHADOW E&M-EST. PATIENT-LVL IV: ICD-10-PCS | Mod: PBBFAC,,, | Performed by: INTERNAL MEDICINE

## 2021-05-13 PROCEDURE — 80053 COMPREHEN METABOLIC PANEL: CPT | Performed by: INTERNAL MEDICINE

## 2021-05-17 RX ORDER — DEXTROSE 4 G
1 TABLET,CHEWABLE ORAL DAILY
Qty: 1 EACH | Refills: 0 | Status: ON HOLD | OUTPATIENT
Start: 2021-05-17 | End: 2021-05-26 | Stop reason: HOSPADM

## 2021-05-17 RX ORDER — ISOPROPYL ALCOHOL 70 ML/100ML
1 SWAB TOPICAL 4 TIMES DAILY
Qty: 200 EACH | Refills: 11 | Status: ON HOLD | OUTPATIENT
Start: 2021-05-17 | End: 2021-05-26 | Stop reason: HOSPADM

## 2021-05-18 ENCOUNTER — TELEPHONE (OUTPATIENT)
Dept: INTERNAL MEDICINE | Facility: CLINIC | Age: 66
End: 2021-05-18

## 2021-05-19 ENCOUNTER — TELEPHONE (OUTPATIENT)
Dept: INFUSION THERAPY | Facility: HOSPITAL | Age: 66
End: 2021-05-19

## 2021-05-22 ENCOUNTER — HOSPITAL ENCOUNTER (INPATIENT)
Facility: HOSPITAL | Age: 66
LOS: 5 days | Discharge: HOSPICE | DRG: 871 | End: 2021-05-27
Attending: EMERGENCY MEDICINE | Admitting: INTERNAL MEDICINE
Payer: MEDICARE

## 2021-05-22 DIAGNOSIS — R41.82 AMS (ALTERED MENTAL STATUS): ICD-10-CM

## 2021-05-22 DIAGNOSIS — C78.00 MALIGNANT NEOPLASM METASTATIC TO LUNG, UNSPECIFIED LATERALITY: ICD-10-CM

## 2021-05-22 DIAGNOSIS — C79.31 SECONDARY MALIGNANT NEOPLASM OF BRAIN: ICD-10-CM

## 2021-05-22 DIAGNOSIS — R40.0 SOMNOLENCE: ICD-10-CM

## 2021-05-22 DIAGNOSIS — N17.9 AKI (ACUTE KIDNEY INJURY): ICD-10-CM

## 2021-05-22 DIAGNOSIS — N17.0 ATN (ACUTE TUBULAR NECROSIS): ICD-10-CM

## 2021-05-22 DIAGNOSIS — R07.9 CHEST PAIN: ICD-10-CM

## 2021-05-22 DIAGNOSIS — N39.0 URINARY TRACT INFECTION WITHOUT HEMATURIA, SITE UNSPECIFIED: ICD-10-CM

## 2021-05-22 DIAGNOSIS — J90 PLEURAL EFFUSION: Primary | ICD-10-CM

## 2021-05-22 LAB
ABO + RH BLD: NORMAL
ALBUMIN SERPL BCP-MCNC: 2.8 G/DL (ref 3.5–5.2)
ALP SERPL-CCNC: 86 U/L (ref 55–135)
ALT SERPL W/O P-5'-P-CCNC: 9 U/L (ref 10–44)
ANION GAP SERPL CALC-SCNC: 13 MMOL/L (ref 8–16)
AST SERPL-CCNC: 26 U/L (ref 10–40)
BACTERIA #/AREA URNS AUTO: ABNORMAL /HPF
BASOPHILS # BLD AUTO: 0.05 K/UL (ref 0–0.2)
BASOPHILS NFR BLD: 0.5 % (ref 0–1.9)
BILIRUB SERPL-MCNC: 0.7 MG/DL (ref 0.1–1)
BILIRUB UR QL STRIP: ABNORMAL
BLD GP AB SCN CELLS X3 SERPL QL: NORMAL
BUN SERPL-MCNC: 24 MG/DL (ref 8–23)
CALCIUM SERPL-MCNC: 9.5 MG/DL (ref 8.7–10.5)
CHLORIDE SERPL-SCNC: 103 MMOL/L (ref 95–110)
CLARITY UR REFRACT.AUTO: ABNORMAL
CO2 SERPL-SCNC: 27 MMOL/L (ref 23–29)
COLOR UR AUTO: ABNORMAL
CREAT SERPL-MCNC: 1.8 MG/DL (ref 0.5–1.4)
CTP QC/QA: YES
DIFFERENTIAL METHOD: ABNORMAL
EOSINOPHIL # BLD AUTO: 0.1 K/UL (ref 0–0.5)
EOSINOPHIL NFR BLD: 0.9 % (ref 0–8)
ERYTHROCYTE [DISTWIDTH] IN BLOOD BY AUTOMATED COUNT: 18.8 % (ref 11.5–14.5)
EST. GFR  (AFRICAN AMERICAN): 34 ML/MIN/1.73 M^2
EST. GFR  (NON AFRICAN AMERICAN): 29.5 ML/MIN/1.73 M^2
GLUCOSE SERPL-MCNC: 85 MG/DL (ref 70–110)
GLUCOSE UR QL STRIP: NEGATIVE
HCT VFR BLD AUTO: 29.1 % (ref 37–48.5)
HGB BLD-MCNC: 8.8 G/DL (ref 12–16)
HGB UR QL STRIP: ABNORMAL
HYALINE CASTS UR QL AUTO: 0 /LPF
IMM GRANULOCYTES # BLD AUTO: 0.11 K/UL (ref 0–0.04)
IMM GRANULOCYTES NFR BLD AUTO: 1.1 % (ref 0–0.5)
INR PPP: 2.9 (ref 0.8–1.2)
KETONES UR QL STRIP: NEGATIVE
LACTATE SERPL-SCNC: 2 MMOL/L (ref 0.5–2.2)
LACTATE SERPL-SCNC: 2.6 MMOL/L (ref 0.5–2.2)
LEUKOCYTE ESTERASE UR QL STRIP: ABNORMAL
LYMPHOCYTES # BLD AUTO: 1.3 K/UL (ref 1–4.8)
LYMPHOCYTES NFR BLD: 13.6 % (ref 18–48)
MAGNESIUM SERPL-MCNC: 2.2 MG/DL (ref 1.6–2.6)
MCH RBC QN AUTO: 25.9 PG (ref 27–31)
MCHC RBC AUTO-ENTMCNC: 30.2 G/DL (ref 32–36)
MCV RBC AUTO: 86 FL (ref 82–98)
MICROSCOPIC COMMENT: ABNORMAL
MONOCYTES # BLD AUTO: 1 K/UL (ref 0.3–1)
MONOCYTES NFR BLD: 10.2 % (ref 4–15)
NEUTROPHILS # BLD AUTO: 7.2 K/UL (ref 1.8–7.7)
NEUTROPHILS NFR BLD: 73.7 % (ref 38–73)
NITRITE UR QL STRIP: NEGATIVE
NRBC BLD-RTO: 0 /100 WBC
PH UR STRIP: 5 [PH] (ref 5–8)
PHOSPHATE SERPL-MCNC: 3.3 MG/DL (ref 2.7–4.5)
PLATELET # BLD AUTO: 477 K/UL (ref 150–450)
PMV BLD AUTO: 12.6 FL (ref 9.2–12.9)
POCT GLUCOSE: 102 MG/DL (ref 70–110)
POCT GLUCOSE: 87 MG/DL (ref 70–110)
POTASSIUM SERPL-SCNC: 4.7 MMOL/L (ref 3.5–5.1)
PROCALCITONIN SERPL IA-MCNC: 0.25 NG/ML
PROT SERPL-MCNC: 7 G/DL (ref 6–8.4)
PROT UR QL STRIP: ABNORMAL
PROTHROMBIN TIME: 29.7 SEC (ref 9–12.5)
RBC # BLD AUTO: 3.4 M/UL (ref 4–5.4)
RBC #/AREA URNS AUTO: 5 /HPF (ref 0–4)
SARS-COV-2 RDRP RESP QL NAA+PROBE: NEGATIVE
SODIUM SERPL-SCNC: 143 MMOL/L (ref 136–145)
SP GR UR STRIP: 1.01 (ref 1–1.03)
TROPONIN I SERPL DL<=0.01 NG/ML-MCNC: 0.05 NG/ML (ref 0–0.03)
TROPONIN I SERPL DL<=0.01 NG/ML-MCNC: 0.06 NG/ML (ref 0–0.03)
TSH SERPL DL<=0.005 MIU/L-ACNC: 1.8 UIU/ML (ref 0.4–4)
URN SPEC COLLECT METH UR: ABNORMAL
WBC # BLD AUTO: 9.72 K/UL (ref 3.9–12.7)
WBC #/AREA URNS AUTO: >100 /HPF (ref 0–5)
WBC CLUMPS UR QL AUTO: ABNORMAL

## 2021-05-22 PROCEDURE — 83735 ASSAY OF MAGNESIUM: CPT | Performed by: EMERGENCY MEDICINE

## 2021-05-22 PROCEDURE — 84484 ASSAY OF TROPONIN QUANT: CPT | Performed by: EMERGENCY MEDICINE

## 2021-05-22 PROCEDURE — 96365 THER/PROPH/DIAG IV INF INIT: CPT

## 2021-05-22 PROCEDURE — 85610 PROTHROMBIN TIME: CPT | Performed by: EMERGENCY MEDICINE

## 2021-05-22 PROCEDURE — 93005 ELECTROCARDIOGRAM TRACING: CPT

## 2021-05-22 PROCEDURE — 96361 HYDRATE IV INFUSION ADD-ON: CPT

## 2021-05-22 PROCEDURE — 87088 URINE BACTERIA CULTURE: CPT | Performed by: EMERGENCY MEDICINE

## 2021-05-22 PROCEDURE — 86703 HIV-1/HIV-2 1 RESULT ANTBDY: CPT | Performed by: EMERGENCY MEDICINE

## 2021-05-22 PROCEDURE — 81001 URINALYSIS AUTO W/SCOPE: CPT | Performed by: EMERGENCY MEDICINE

## 2021-05-22 PROCEDURE — 84100 ASSAY OF PHOSPHORUS: CPT | Performed by: EMERGENCY MEDICINE

## 2021-05-22 PROCEDURE — 85025 COMPLETE CBC W/AUTO DIFF WBC: CPT | Performed by: EMERGENCY MEDICINE

## 2021-05-22 PROCEDURE — 63600175 PHARM REV CODE 636 W HCPCS: Performed by: STUDENT IN AN ORGANIZED HEALTH CARE EDUCATION/TRAINING PROGRAM

## 2021-05-22 PROCEDURE — 84145 PROCALCITONIN (PCT): CPT | Performed by: EMERGENCY MEDICINE

## 2021-05-22 PROCEDURE — 20600001 HC STEP DOWN PRIVATE ROOM

## 2021-05-22 PROCEDURE — 87040 BLOOD CULTURE FOR BACTERIA: CPT | Mod: 59 | Performed by: EMERGENCY MEDICINE

## 2021-05-22 PROCEDURE — 80053 COMPREHEN METABOLIC PANEL: CPT | Performed by: EMERGENCY MEDICINE

## 2021-05-22 PROCEDURE — 86803 HEPATITIS C AB TEST: CPT | Performed by: EMERGENCY MEDICINE

## 2021-05-22 PROCEDURE — 63600175 PHARM REV CODE 636 W HCPCS: Performed by: EMERGENCY MEDICINE

## 2021-05-22 PROCEDURE — 87077 CULTURE AEROBIC IDENTIFY: CPT | Performed by: EMERGENCY MEDICINE

## 2021-05-22 PROCEDURE — 93010 EKG 12-LEAD: ICD-10-PCS | Mod: ,,, | Performed by: INTERNAL MEDICINE

## 2021-05-22 PROCEDURE — 86900 BLOOD TYPING SEROLOGIC ABO: CPT | Performed by: EMERGENCY MEDICINE

## 2021-05-22 PROCEDURE — 25000003 PHARM REV CODE 250: Performed by: EMERGENCY MEDICINE

## 2021-05-22 PROCEDURE — 99291 CRITICAL CARE FIRST HOUR: CPT | Mod: ,,, | Performed by: EMERGENCY MEDICINE

## 2021-05-22 PROCEDURE — 25000003 PHARM REV CODE 250: Performed by: STUDENT IN AN ORGANIZED HEALTH CARE EDUCATION/TRAINING PROGRAM

## 2021-05-22 PROCEDURE — 84443 ASSAY THYROID STIM HORMONE: CPT | Performed by: EMERGENCY MEDICINE

## 2021-05-22 PROCEDURE — 99223 PR INITIAL HOSPITAL CARE,LEVL III: ICD-10-PCS | Mod: GC,,, | Performed by: INTERNAL MEDICINE

## 2021-05-22 PROCEDURE — 99285 EMERGENCY DEPT VISIT HI MDM: CPT | Mod: 25

## 2021-05-22 PROCEDURE — 83605 ASSAY OF LACTIC ACID: CPT | Performed by: EMERGENCY MEDICINE

## 2021-05-22 PROCEDURE — 99223 1ST HOSP IP/OBS HIGH 75: CPT | Mod: GC,,, | Performed by: INTERNAL MEDICINE

## 2021-05-22 PROCEDURE — 84484 ASSAY OF TROPONIN QUANT: CPT | Mod: 91 | Performed by: STUDENT IN AN ORGANIZED HEALTH CARE EDUCATION/TRAINING PROGRAM

## 2021-05-22 PROCEDURE — 93010 ELECTROCARDIOGRAM REPORT: CPT | Mod: ,,, | Performed by: INTERNAL MEDICINE

## 2021-05-22 PROCEDURE — U0002 COVID-19 LAB TEST NON-CDC: HCPCS | Performed by: EMERGENCY MEDICINE

## 2021-05-22 PROCEDURE — 87186 SC STD MICRODIL/AGAR DIL: CPT | Performed by: EMERGENCY MEDICINE

## 2021-05-22 PROCEDURE — 87086 URINE CULTURE/COLONY COUNT: CPT | Performed by: EMERGENCY MEDICINE

## 2021-05-22 PROCEDURE — 99291 PR CRITICAL CARE, E/M 30-74 MINUTES: ICD-10-PCS | Mod: ,,, | Performed by: EMERGENCY MEDICINE

## 2021-05-22 RX ORDER — ACETAMINOPHEN 325 MG/1
650 TABLET ORAL EVERY 8 HOURS PRN
Status: DISCONTINUED | OUTPATIENT
Start: 2021-05-22 | End: 2021-05-27 | Stop reason: HOSPADM

## 2021-05-22 RX ORDER — PROCHLORPERAZINE MALEATE 5 MG
10 TABLET ORAL EVERY 6 HOURS PRN
Status: DISCONTINUED | OUTPATIENT
Start: 2021-05-22 | End: 2021-05-24

## 2021-05-22 RX ORDER — IBUPROFEN 200 MG
24 TABLET ORAL
Status: DISCONTINUED | OUTPATIENT
Start: 2021-05-22 | End: 2021-05-27 | Stop reason: HOSPADM

## 2021-05-22 RX ORDER — IBUPROFEN 200 MG
24 TABLET ORAL
Status: DISCONTINUED | OUTPATIENT
Start: 2021-05-22 | End: 2021-05-22

## 2021-05-22 RX ORDER — SODIUM CHLORIDE 9 MG/ML
INJECTION, SOLUTION INTRAVENOUS CONTINUOUS
Status: DISCONTINUED | OUTPATIENT
Start: 2021-05-22 | End: 2021-05-22

## 2021-05-22 RX ORDER — SODIUM CHLORIDE 0.9 % (FLUSH) 0.9 %
10 SYRINGE (ML) INJECTION
Status: DISCONTINUED | OUTPATIENT
Start: 2021-05-22 | End: 2021-05-27 | Stop reason: HOSPADM

## 2021-05-22 RX ORDER — SODIUM CHLORIDE 9 MG/ML
INJECTION, SOLUTION INTRAVENOUS CONTINUOUS
Status: DISCONTINUED | OUTPATIENT
Start: 2021-05-22 | End: 2021-05-23

## 2021-05-22 RX ORDER — CEFTRIAXONE 1 G/1
1 INJECTION, POWDER, FOR SOLUTION INTRAMUSCULAR; INTRAVENOUS
Status: DISCONTINUED | OUTPATIENT
Start: 2021-05-23 | End: 2021-05-23

## 2021-05-22 RX ORDER — IBUPROFEN 200 MG
16 TABLET ORAL
Status: DISCONTINUED | OUTPATIENT
Start: 2021-05-22 | End: 2021-05-22

## 2021-05-22 RX ORDER — IBUPROFEN 200 MG
16 TABLET ORAL
Status: DISCONTINUED | OUTPATIENT
Start: 2021-05-22 | End: 2021-05-27 | Stop reason: HOSPADM

## 2021-05-22 RX ORDER — FLUTICASONE PROPIONATE 50 MCG
2 SPRAY, SUSPENSION (ML) NASAL DAILY
Status: DISCONTINUED | OUTPATIENT
Start: 2021-05-23 | End: 2021-05-27 | Stop reason: HOSPADM

## 2021-05-22 RX ORDER — GLUCAGON 1 MG
1 KIT INJECTION
Status: DISCONTINUED | OUTPATIENT
Start: 2021-05-22 | End: 2021-05-22

## 2021-05-22 RX ORDER — ONDANSETRON 8 MG/1
8 TABLET, ORALLY DISINTEGRATING ORAL EVERY 8 HOURS PRN
Status: DISCONTINUED | OUTPATIENT
Start: 2021-05-22 | End: 2021-05-24

## 2021-05-22 RX ORDER — CEFTRIAXONE 1 G/1
1 INJECTION, POWDER, FOR SOLUTION INTRAMUSCULAR; INTRAVENOUS
Status: COMPLETED | OUTPATIENT
Start: 2021-05-22 | End: 2021-05-22

## 2021-05-22 RX ORDER — SODIUM CHLORIDE 0.9 % (FLUSH) 0.9 %
10 SYRINGE (ML) INJECTION
Status: DISCONTINUED | OUTPATIENT
Start: 2021-05-22 | End: 2021-05-22

## 2021-05-22 RX ORDER — INSULIN ASPART 100 [IU]/ML
0-5 INJECTION, SOLUTION INTRAVENOUS; SUBCUTANEOUS
Status: DISCONTINUED | OUTPATIENT
Start: 2021-05-22 | End: 2021-05-27 | Stop reason: HOSPADM

## 2021-05-22 RX ORDER — GLUCAGON 1 MG
1 KIT INJECTION
Status: DISCONTINUED | OUTPATIENT
Start: 2021-05-22 | End: 2021-05-27 | Stop reason: HOSPADM

## 2021-05-22 RX ORDER — PANTOPRAZOLE SODIUM 40 MG/1
40 TABLET, DELAYED RELEASE ORAL DAILY
Status: DISCONTINUED | OUTPATIENT
Start: 2021-05-23 | End: 2021-05-23

## 2021-05-22 RX ADMIN — PIPERACILLIN SODIUM AND TAZOBACTAM SODIUM 4.5 G: 4; .5 INJECTION, POWDER, FOR SOLUTION INTRAVENOUS at 04:05

## 2021-05-22 RX ADMIN — SODIUM CHLORIDE: 0.9 INJECTION, SOLUTION INTRAVENOUS at 06:05

## 2021-05-22 RX ADMIN — CEFTRIAXONE 1 G: 1 INJECTION, POWDER, FOR SOLUTION INTRAMUSCULAR; INTRAVENOUS at 05:05

## 2021-05-22 RX ADMIN — VANCOMYCIN HYDROCHLORIDE 2000 MG: 10 INJECTION, POWDER, LYOPHILIZED, FOR SOLUTION INTRAVENOUS at 05:05

## 2021-05-23 PROBLEM — Z75.8 DISCHARGE PLANNING ISSUES: Status: ACTIVE | Noted: 2021-05-23

## 2021-05-23 LAB
ALBUMIN SERPL BCP-MCNC: 2.2 G/DL (ref 3.5–5.2)
ALBUMIN SERPL BCP-MCNC: 2.3 G/DL (ref 3.5–5.2)
ALP SERPL-CCNC: 70 U/L (ref 55–135)
ALP SERPL-CCNC: 72 U/L (ref 55–135)
ALT SERPL W/O P-5'-P-CCNC: 10 U/L (ref 10–44)
ALT SERPL W/O P-5'-P-CCNC: 9 U/L (ref 10–44)
ANION GAP SERPL CALC-SCNC: 11 MMOL/L (ref 8–16)
ANION GAP SERPL CALC-SCNC: 13 MMOL/L (ref 8–16)
AST SERPL-CCNC: 22 U/L (ref 10–40)
AST SERPL-CCNC: 23 U/L (ref 10–40)
BASOPHILS # BLD AUTO: 0.04 K/UL (ref 0–0.2)
BASOPHILS # BLD AUTO: 0.06 K/UL (ref 0–0.2)
BASOPHILS NFR BLD: 0.4 % (ref 0–1.9)
BASOPHILS NFR BLD: 0.6 % (ref 0–1.9)
BILIRUB SERPL-MCNC: 0.4 MG/DL (ref 0.1–1)
BILIRUB SERPL-MCNC: 0.4 MG/DL (ref 0.1–1)
BUN SERPL-MCNC: 23 MG/DL (ref 8–23)
BUN SERPL-MCNC: 24 MG/DL (ref 8–23)
CALCIUM SERPL-MCNC: 8.2 MG/DL (ref 8.7–10.5)
CALCIUM SERPL-MCNC: 8.3 MG/DL (ref 8.7–10.5)
CHLORIDE SERPL-SCNC: 103 MMOL/L (ref 95–110)
CHLORIDE SERPL-SCNC: 104 MMOL/L (ref 95–110)
CO2 SERPL-SCNC: 23 MMOL/L (ref 23–29)
CO2 SERPL-SCNC: 27 MMOL/L (ref 23–29)
CREAT SERPL-MCNC: 1.8 MG/DL (ref 0.5–1.4)
CREAT SERPL-MCNC: 1.9 MG/DL (ref 0.5–1.4)
DIFFERENTIAL METHOD: ABNORMAL
DIFFERENTIAL METHOD: ABNORMAL
EOSINOPHIL # BLD AUTO: 0.2 K/UL (ref 0–0.5)
EOSINOPHIL # BLD AUTO: 0.2 K/UL (ref 0–0.5)
EOSINOPHIL NFR BLD: 1.7 % (ref 0–8)
EOSINOPHIL NFR BLD: 1.8 % (ref 0–8)
ERYTHROCYTE [DISTWIDTH] IN BLOOD BY AUTOMATED COUNT: 18.5 % (ref 11.5–14.5)
ERYTHROCYTE [DISTWIDTH] IN BLOOD BY AUTOMATED COUNT: 18.5 % (ref 11.5–14.5)
EST. GFR  (AFRICAN AMERICAN): 31.9 ML/MIN/1.73 M^2
EST. GFR  (AFRICAN AMERICAN): 34 ML/MIN/1.73 M^2
EST. GFR  (NON AFRICAN AMERICAN): 27.7 ML/MIN/1.73 M^2
EST. GFR  (NON AFRICAN AMERICAN): 29.5 ML/MIN/1.73 M^2
GLUCOSE SERPL-MCNC: 77 MG/DL (ref 70–110)
GLUCOSE SERPL-MCNC: 84 MG/DL (ref 70–110)
HCT VFR BLD AUTO: 26 % (ref 37–48.5)
HCT VFR BLD AUTO: 26.9 % (ref 37–48.5)
HGB BLD-MCNC: 7.6 G/DL (ref 12–16)
HGB BLD-MCNC: 8.4 G/DL (ref 12–16)
IMM GRANULOCYTES # BLD AUTO: 0.1 K/UL (ref 0–0.04)
IMM GRANULOCYTES # BLD AUTO: 0.17 K/UL (ref 0–0.04)
IMM GRANULOCYTES NFR BLD AUTO: 1 % (ref 0–0.5)
IMM GRANULOCYTES NFR BLD AUTO: 1.7 % (ref 0–0.5)
LYMPHOCYTES # BLD AUTO: 1 K/UL (ref 1–4.8)
LYMPHOCYTES # BLD AUTO: 1.3 K/UL (ref 1–4.8)
LYMPHOCYTES NFR BLD: 12.6 % (ref 18–48)
LYMPHOCYTES NFR BLD: 9.9 % (ref 18–48)
MCH RBC QN AUTO: 25.6 PG (ref 27–31)
MCH RBC QN AUTO: 25.6 PG (ref 27–31)
MCHC RBC AUTO-ENTMCNC: 29.2 G/DL (ref 32–36)
MCHC RBC AUTO-ENTMCNC: 31.2 G/DL (ref 32–36)
MCV RBC AUTO: 82 FL (ref 82–98)
MCV RBC AUTO: 88 FL (ref 82–98)
MONOCYTES # BLD AUTO: 1 K/UL (ref 0.3–1)
MONOCYTES # BLD AUTO: 1 K/UL (ref 0.3–1)
MONOCYTES NFR BLD: 9.7 % (ref 4–15)
MONOCYTES NFR BLD: 9.8 % (ref 4–15)
NEUTROPHILS # BLD AUTO: 7.4 K/UL (ref 1.8–7.7)
NEUTROPHILS # BLD AUTO: 7.7 K/UL (ref 1.8–7.7)
NEUTROPHILS NFR BLD: 73.7 % (ref 38–73)
NEUTROPHILS NFR BLD: 77.1 % (ref 38–73)
NRBC BLD-RTO: 0 /100 WBC
NRBC BLD-RTO: 0 /100 WBC
PLATELET # BLD AUTO: 273 K/UL (ref 150–450)
PLATELET # BLD AUTO: 375 K/UL (ref 150–450)
PMV BLD AUTO: 12.7 FL (ref 9.2–12.9)
PMV BLD AUTO: ABNORMAL FL (ref 9.2–12.9)
POCT GLUCOSE: 115 MG/DL (ref 70–110)
POCT GLUCOSE: 67 MG/DL (ref 70–110)
POCT GLUCOSE: 76 MG/DL (ref 70–110)
POCT GLUCOSE: 76 MG/DL (ref 70–110)
POCT GLUCOSE: 87 MG/DL (ref 70–110)
POTASSIUM SERPL-SCNC: 4 MMOL/L (ref 3.5–5.1)
POTASSIUM SERPL-SCNC: 4.6 MMOL/L (ref 3.5–5.1)
PROT SERPL-MCNC: 5.7 G/DL (ref 6–8.4)
PROT SERPL-MCNC: 5.9 G/DL (ref 6–8.4)
RBC # BLD AUTO: 2.97 M/UL (ref 4–5.4)
RBC # BLD AUTO: 3.28 M/UL (ref 4–5.4)
SODIUM SERPL-SCNC: 140 MMOL/L (ref 136–145)
SODIUM SERPL-SCNC: 141 MMOL/L (ref 136–145)
WBC # BLD AUTO: 10.03 K/UL (ref 3.9–12.7)
WBC # BLD AUTO: 9.94 K/UL (ref 3.9–12.7)

## 2021-05-23 PROCEDURE — 20600001 HC STEP DOWN PRIVATE ROOM

## 2021-05-23 PROCEDURE — 63600175 PHARM REV CODE 636 W HCPCS: Performed by: STUDENT IN AN ORGANIZED HEALTH CARE EDUCATION/TRAINING PROGRAM

## 2021-05-23 PROCEDURE — C9113 INJ PANTOPRAZOLE SODIUM, VIA: HCPCS | Performed by: STUDENT IN AN ORGANIZED HEALTH CARE EDUCATION/TRAINING PROGRAM

## 2021-05-23 PROCEDURE — 85025 COMPLETE CBC W/AUTO DIFF WBC: CPT | Performed by: STUDENT IN AN ORGANIZED HEALTH CARE EDUCATION/TRAINING PROGRAM

## 2021-05-23 PROCEDURE — 99232 SBSQ HOSP IP/OBS MODERATE 35: CPT | Mod: GC,,, | Performed by: INTERNAL MEDICINE

## 2021-05-23 PROCEDURE — 25000003 PHARM REV CODE 250: Performed by: STUDENT IN AN ORGANIZED HEALTH CARE EDUCATION/TRAINING PROGRAM

## 2021-05-23 PROCEDURE — 99232 PR SUBSEQUENT HOSPITAL CARE,LEVL II: ICD-10-PCS | Mod: GC,,, | Performed by: INTERNAL MEDICINE

## 2021-05-23 PROCEDURE — 80053 COMPREHEN METABOLIC PANEL: CPT | Mod: 91 | Performed by: STUDENT IN AN ORGANIZED HEALTH CARE EDUCATION/TRAINING PROGRAM

## 2021-05-23 PROCEDURE — 36415 COLL VENOUS BLD VENIPUNCTURE: CPT | Performed by: STUDENT IN AN ORGANIZED HEALTH CARE EDUCATION/TRAINING PROGRAM

## 2021-05-23 RX ORDER — DRONABINOL 2.5 MG/1
2.5 CAPSULE ORAL 2 TIMES DAILY WITH MEALS
Status: DISCONTINUED | OUTPATIENT
Start: 2021-05-23 | End: 2021-05-24

## 2021-05-23 RX ORDER — SODIUM POLYSTYRENE SULFONATE 4.1 MEQ/G
15 POWDER, FOR SUSPENSION ORAL; RECTAL ONCE
Qty: 15 G | Refills: 0 | Status: SHIPPED | OUTPATIENT
Start: 2021-05-23 | End: 2021-05-23 | Stop reason: HOSPADM

## 2021-05-23 RX ORDER — DROPERIDOL 2.5 MG/ML
2.5 INJECTION, SOLUTION INTRAMUSCULAR; INTRAVENOUS EVERY 6 HOURS
Status: DISCONTINUED | OUTPATIENT
Start: 2021-05-23 | End: 2021-05-23

## 2021-05-23 RX ORDER — CEFEPIME HYDROCHLORIDE 1 G/1
1 INJECTION, POWDER, FOR SOLUTION INTRAMUSCULAR; INTRAVENOUS EVERY 12 HOURS
Status: DISCONTINUED | OUTPATIENT
Start: 2021-05-23 | End: 2021-05-24

## 2021-05-23 RX ORDER — PANTOPRAZOLE SODIUM 40 MG/10ML
40 INJECTION, POWDER, LYOPHILIZED, FOR SOLUTION INTRAVENOUS DAILY
Status: DISCONTINUED | OUTPATIENT
Start: 2021-05-23 | End: 2021-05-25

## 2021-05-23 RX ORDER — SODIUM CHLORIDE 9 MG/ML
INJECTION, SOLUTION INTRAVENOUS CONTINUOUS
Status: DISCONTINUED | OUTPATIENT
Start: 2021-05-23 | End: 2021-05-23

## 2021-05-23 RX ORDER — DEXTROSE MONOHYDRATE AND SODIUM CHLORIDE 5; .9 G/100ML; G/100ML
INJECTION, SOLUTION INTRAVENOUS CONTINUOUS
Status: DISCONTINUED | OUTPATIENT
Start: 2021-05-23 | End: 2021-05-25

## 2021-05-23 RX ADMIN — CEFEPIME 1 G: 1 INJECTION, POWDER, FOR SOLUTION INTRAMUSCULAR; INTRAVENOUS at 10:05

## 2021-05-23 RX ADMIN — DEXTROSE MONOHYDRATE 12.5 G: 25 INJECTION, SOLUTION INTRAVENOUS at 10:05

## 2021-05-23 RX ADMIN — SODIUM CHLORIDE: 0.9 INJECTION, SOLUTION INTRAVENOUS at 07:05

## 2021-05-23 RX ADMIN — CEFEPIME 1 G: 1 INJECTION, POWDER, FOR SOLUTION INTRAMUSCULAR; INTRAVENOUS at 09:05

## 2021-05-23 RX ADMIN — PANTOPRAZOLE SODIUM 40 MG: 40 INJECTION, POWDER, FOR SOLUTION INTRAVENOUS at 02:05

## 2021-05-23 RX ADMIN — SODIUM CHLORIDE: 0.9 INJECTION, SOLUTION INTRAVENOUS at 02:05

## 2021-05-23 RX ADMIN — DEXTROSE MONOHYDRATE AND SODIUM CHLORIDE: 5; .9 INJECTION, SOLUTION INTRAVENOUS at 10:05

## 2021-05-24 PROBLEM — J91.0 MALIGNANT PLEURAL EFFUSION: Status: ACTIVE | Noted: 2021-05-24

## 2021-05-24 LAB
ALBUMIN SERPL BCP-MCNC: 2.3 G/DL (ref 3.5–5.2)
ALP SERPL-CCNC: 73 U/L (ref 55–135)
ALT SERPL W/O P-5'-P-CCNC: 9 U/L (ref 10–44)
ANION GAP SERPL CALC-SCNC: 11 MMOL/L (ref 8–16)
AST SERPL-CCNC: 18 U/L (ref 10–40)
BASOPHILS # BLD AUTO: 0.05 K/UL (ref 0–0.2)
BASOPHILS NFR BLD: 0.6 % (ref 0–1.9)
BILIRUB SERPL-MCNC: 0.3 MG/DL (ref 0.1–1)
BUN SERPL-MCNC: 27 MG/DL (ref 8–23)
CALCIUM SERPL-MCNC: 8.4 MG/DL (ref 8.7–10.5)
CHLORIDE SERPL-SCNC: 106 MMOL/L (ref 95–110)
CO2 SERPL-SCNC: 26 MMOL/L (ref 23–29)
CREAT SERPL-MCNC: 2.6 MG/DL (ref 0.5–1.4)
DIFFERENTIAL METHOD: ABNORMAL
EOSINOPHIL # BLD AUTO: 0.1 K/UL (ref 0–0.5)
EOSINOPHIL NFR BLD: 1.4 % (ref 0–8)
ERYTHROCYTE [DISTWIDTH] IN BLOOD BY AUTOMATED COUNT: 18.5 % (ref 11.5–14.5)
EST. GFR  (AFRICAN AMERICAN): 21.8 ML/MIN/1.73 M^2
EST. GFR  (NON AFRICAN AMERICAN): 18.9 ML/MIN/1.73 M^2
GLUCOSE SERPL-MCNC: 115 MG/DL (ref 70–110)
HCT VFR BLD AUTO: 25.6 % (ref 37–48.5)
HCV AB SERPL QL IA: NEGATIVE
HGB BLD-MCNC: 7.4 G/DL (ref 12–16)
HIV 1+2 AB+HIV1 P24 AG SERPL QL IA: NEGATIVE
IMM GRANULOCYTES # BLD AUTO: 0.1 K/UL (ref 0–0.04)
IMM GRANULOCYTES NFR BLD AUTO: 1.1 % (ref 0–0.5)
INR PPP: 3 (ref 0.8–1.2)
LYMPHOCYTES # BLD AUTO: 1 K/UL (ref 1–4.8)
LYMPHOCYTES NFR BLD: 10.8 % (ref 18–48)
MAGNESIUM SERPL-MCNC: 1.9 MG/DL (ref 1.6–2.6)
MCH RBC QN AUTO: 25.3 PG (ref 27–31)
MCHC RBC AUTO-ENTMCNC: 28.9 G/DL (ref 32–36)
MCV RBC AUTO: 88 FL (ref 82–98)
MONOCYTES # BLD AUTO: 1 K/UL (ref 0.3–1)
MONOCYTES NFR BLD: 10.5 % (ref 4–15)
NEUTROPHILS # BLD AUTO: 6.8 K/UL (ref 1.8–7.7)
NEUTROPHILS NFR BLD: 75.6 % (ref 38–73)
NRBC BLD-RTO: 0 /100 WBC
PHOSPHATE SERPL-MCNC: 3.5 MG/DL (ref 2.7–4.5)
PLATELET # BLD AUTO: 391 K/UL (ref 150–450)
PMV BLD AUTO: 12.4 FL (ref 9.2–12.9)
POCT GLUCOSE: 129 MG/DL (ref 70–110)
POCT GLUCOSE: 134 MG/DL (ref 70–110)
POCT GLUCOSE: 96 MG/DL (ref 70–110)
POTASSIUM SERPL-SCNC: 4 MMOL/L (ref 3.5–5.1)
PROT SERPL-MCNC: 6.1 G/DL (ref 6–8.4)
PROTHROMBIN TIME: 30.7 SEC (ref 9–12.5)
RBC # BLD AUTO: 2.92 M/UL (ref 4–5.4)
SODIUM SERPL-SCNC: 143 MMOL/L (ref 136–145)
WBC # BLD AUTO: 9.04 K/UL (ref 3.9–12.7)

## 2021-05-24 PROCEDURE — 84100 ASSAY OF PHOSPHORUS: CPT | Performed by: STUDENT IN AN ORGANIZED HEALTH CARE EDUCATION/TRAINING PROGRAM

## 2021-05-24 PROCEDURE — 97530 THERAPEUTIC ACTIVITIES: CPT

## 2021-05-24 PROCEDURE — C9113 INJ PANTOPRAZOLE SODIUM, VIA: HCPCS | Performed by: STUDENT IN AN ORGANIZED HEALTH CARE EDUCATION/TRAINING PROGRAM

## 2021-05-24 PROCEDURE — 97161 PT EVAL LOW COMPLEX 20 MIN: CPT

## 2021-05-24 PROCEDURE — 99223 PR INITIAL HOSPITAL CARE,LEVL III: ICD-10-PCS | Mod: GC,,, | Performed by: INTERNAL MEDICINE

## 2021-05-24 PROCEDURE — 99233 PR SUBSEQUENT HOSPITAL CARE,LEVL III: ICD-10-PCS | Mod: ,,, | Performed by: INTERNAL MEDICINE

## 2021-05-24 PROCEDURE — 63600175 PHARM REV CODE 636 W HCPCS: Performed by: STUDENT IN AN ORGANIZED HEALTH CARE EDUCATION/TRAINING PROGRAM

## 2021-05-24 PROCEDURE — 99223 1ST HOSP IP/OBS HIGH 75: CPT | Mod: GC,,, | Performed by: INTERNAL MEDICINE

## 2021-05-24 PROCEDURE — 85610 PROTHROMBIN TIME: CPT | Performed by: STUDENT IN AN ORGANIZED HEALTH CARE EDUCATION/TRAINING PROGRAM

## 2021-05-24 PROCEDURE — 97535 SELF CARE MNGMENT TRAINING: CPT

## 2021-05-24 PROCEDURE — 85025 COMPLETE CBC W/AUTO DIFF WBC: CPT | Performed by: STUDENT IN AN ORGANIZED HEALTH CARE EDUCATION/TRAINING PROGRAM

## 2021-05-24 PROCEDURE — 99233 SBSQ HOSP IP/OBS HIGH 50: CPT | Mod: ,,, | Performed by: INTERNAL MEDICINE

## 2021-05-24 PROCEDURE — 36415 COLL VENOUS BLD VENIPUNCTURE: CPT | Performed by: STUDENT IN AN ORGANIZED HEALTH CARE EDUCATION/TRAINING PROGRAM

## 2021-05-24 PROCEDURE — 80053 COMPREHEN METABOLIC PANEL: CPT | Performed by: STUDENT IN AN ORGANIZED HEALTH CARE EDUCATION/TRAINING PROGRAM

## 2021-05-24 PROCEDURE — 97112 NEUROMUSCULAR REEDUCATION: CPT

## 2021-05-24 PROCEDURE — 97110 THERAPEUTIC EXERCISES: CPT

## 2021-05-24 PROCEDURE — 83735 ASSAY OF MAGNESIUM: CPT | Performed by: STUDENT IN AN ORGANIZED HEALTH CARE EDUCATION/TRAINING PROGRAM

## 2021-05-24 PROCEDURE — 92610 EVALUATE SWALLOWING FUNCTION: CPT

## 2021-05-24 PROCEDURE — 97165 OT EVAL LOW COMPLEX 30 MIN: CPT

## 2021-05-24 PROCEDURE — 20600001 HC STEP DOWN PRIVATE ROOM

## 2021-05-24 PROCEDURE — 25500020 PHARM REV CODE 255: Performed by: STUDENT IN AN ORGANIZED HEALTH CARE EDUCATION/TRAINING PROGRAM

## 2021-05-24 PROCEDURE — 63600175 PHARM REV CODE 636 W HCPCS: Performed by: INTERNAL MEDICINE

## 2021-05-24 RX ORDER — PROCHLORPERAZINE EDISYLATE 5 MG/ML
10 INJECTION INTRAMUSCULAR; INTRAVENOUS EVERY 6 HOURS PRN
Status: DISCONTINUED | OUTPATIENT
Start: 2021-05-24 | End: 2021-05-27 | Stop reason: HOSPADM

## 2021-05-24 RX ORDER — DRONABINOL 2.5 MG/1
2.5 CAPSULE ORAL 2 TIMES DAILY
Status: DISCONTINUED | OUTPATIENT
Start: 2021-05-24 | End: 2021-05-27 | Stop reason: HOSPADM

## 2021-05-24 RX ORDER — IPRATROPIUM BROMIDE AND ALBUTEROL SULFATE 2.5; .5 MG/3ML; MG/3ML
3 SOLUTION RESPIRATORY (INHALATION) EVERY 6 HOURS PRN
Status: DISCONTINUED | OUTPATIENT
Start: 2021-05-24 | End: 2021-05-27 | Stop reason: HOSPADM

## 2021-05-24 RX ORDER — HYDROMORPHONE HYDROCHLORIDE 1 MG/ML
0.5 INJECTION, SOLUTION INTRAMUSCULAR; INTRAVENOUS; SUBCUTANEOUS EVERY 6 HOURS PRN
Status: DISCONTINUED | OUTPATIENT
Start: 2021-05-24 | End: 2021-05-27 | Stop reason: HOSPADM

## 2021-05-24 RX ORDER — GUAIFENESIN 600 MG/1
600 TABLET, EXTENDED RELEASE ORAL 2 TIMES DAILY
Status: DISCONTINUED | OUTPATIENT
Start: 2021-05-24 | End: 2021-05-27 | Stop reason: HOSPADM

## 2021-05-24 RX ORDER — CEFEPIME HYDROCHLORIDE 1 G/1
1 INJECTION, POWDER, FOR SOLUTION INTRAMUSCULAR; INTRAVENOUS
Status: DISCONTINUED | OUTPATIENT
Start: 2021-05-24 | End: 2021-05-25

## 2021-05-24 RX ORDER — ONDANSETRON 2 MG/ML
8 INJECTION INTRAMUSCULAR; INTRAVENOUS EVERY 8 HOURS PRN
Status: DISCONTINUED | OUTPATIENT
Start: 2021-05-24 | End: 2021-05-27 | Stop reason: HOSPADM

## 2021-05-24 RX ADMIN — IOHEXOL 15 ML: 350 INJECTION, SOLUTION INTRAVENOUS at 10:05

## 2021-05-24 RX ADMIN — DEXTROSE MONOHYDRATE AND SODIUM CHLORIDE: 5; .9 INJECTION, SOLUTION INTRAVENOUS at 10:05

## 2021-05-24 RX ADMIN — CEFEPIME 1 G: 1 INJECTION, POWDER, FOR SOLUTION INTRAMUSCULAR; INTRAVENOUS at 10:05

## 2021-05-24 RX ADMIN — ONDANSETRON 8 MG: 2 INJECTION INTRAMUSCULAR; INTRAVENOUS at 11:05

## 2021-05-24 RX ADMIN — PANTOPRAZOLE SODIUM 40 MG: 40 INJECTION, POWDER, FOR SOLUTION INTRAVENOUS at 08:05

## 2021-05-24 RX ADMIN — HYDROMORPHONE HYDROCHLORIDE 0.5 MG: 1 INJECTION, SOLUTION INTRAMUSCULAR; INTRAVENOUS; SUBCUTANEOUS at 03:05

## 2021-05-25 LAB
ALBUMIN SERPL BCP-MCNC: 2.3 G/DL (ref 3.5–5.2)
ALP SERPL-CCNC: 79 U/L (ref 55–135)
ALT SERPL W/O P-5'-P-CCNC: 10 U/L (ref 10–44)
ANION GAP SERPL CALC-SCNC: 11 MMOL/L (ref 8–16)
AST SERPL-CCNC: 17 U/L (ref 10–40)
BACTERIA UR CULT: ABNORMAL
BASOPHILS # BLD AUTO: 0.05 K/UL (ref 0–0.2)
BASOPHILS NFR BLD: 0.6 % (ref 0–1.9)
BILIRUB SERPL-MCNC: 0.3 MG/DL (ref 0.1–1)
BUN SERPL-MCNC: 29 MG/DL (ref 8–23)
CALCIUM SERPL-MCNC: 8.6 MG/DL (ref 8.7–10.5)
CHLORIDE SERPL-SCNC: 108 MMOL/L (ref 95–110)
CO2 SERPL-SCNC: 24 MMOL/L (ref 23–29)
CREAT SERPL-MCNC: 2.9 MG/DL (ref 0.5–1.4)
DIFFERENTIAL METHOD: ABNORMAL
EOSINOPHIL # BLD AUTO: 0.1 K/UL (ref 0–0.5)
EOSINOPHIL NFR BLD: 1.1 % (ref 0–8)
ERYTHROCYTE [DISTWIDTH] IN BLOOD BY AUTOMATED COUNT: 18.5 % (ref 11.5–14.5)
EST. GFR  (AFRICAN AMERICAN): 19.1 ML/MIN/1.73 M^2
EST. GFR  (NON AFRICAN AMERICAN): 16.6 ML/MIN/1.73 M^2
GLUCOSE SERPL-MCNC: 132 MG/DL (ref 70–110)
HCT VFR BLD AUTO: 28.5 % (ref 37–48.5)
HGB BLD-MCNC: 8.1 G/DL (ref 12–16)
IMM GRANULOCYTES # BLD AUTO: 0.13 K/UL (ref 0–0.04)
IMM GRANULOCYTES NFR BLD AUTO: 1.5 % (ref 0–0.5)
INR PPP: 3 (ref 0.8–1.2)
LYMPHOCYTES # BLD AUTO: 0.8 K/UL (ref 1–4.8)
LYMPHOCYTES NFR BLD: 9.3 % (ref 18–48)
MAGNESIUM SERPL-MCNC: 1.8 MG/DL (ref 1.6–2.6)
MCH RBC QN AUTO: 25.2 PG (ref 27–31)
MCHC RBC AUTO-ENTMCNC: 28.4 G/DL (ref 32–36)
MCV RBC AUTO: 89 FL (ref 82–98)
MONOCYTES # BLD AUTO: 0.8 K/UL (ref 0.3–1)
MONOCYTES NFR BLD: 9.4 % (ref 4–15)
NEUTROPHILS # BLD AUTO: 7 K/UL (ref 1.8–7.7)
NEUTROPHILS NFR BLD: 78.1 % (ref 38–73)
NRBC BLD-RTO: 0 /100 WBC
PHOSPHATE SERPL-MCNC: 3.8 MG/DL (ref 2.7–4.5)
PLATELET # BLD AUTO: 356 K/UL (ref 150–450)
PMV BLD AUTO: 12.2 FL (ref 9.2–12.9)
POCT GLUCOSE: 119 MG/DL (ref 70–110)
POCT GLUCOSE: 141 MG/DL (ref 70–110)
POTASSIUM SERPL-SCNC: 3.9 MMOL/L (ref 3.5–5.1)
PROT SERPL-MCNC: 6.3 G/DL (ref 6–8.4)
PROTHROMBIN TIME: 30.8 SEC (ref 9–12.5)
RBC # BLD AUTO: 3.21 M/UL (ref 4–5.4)
SODIUM SERPL-SCNC: 143 MMOL/L (ref 136–145)
VANCOMYCIN TROUGH SERPL-MCNC: 10.1 UG/ML (ref 10–22)
WBC # BLD AUTO: 8.91 K/UL (ref 3.9–12.7)

## 2021-05-25 PROCEDURE — 80053 COMPREHEN METABOLIC PANEL: CPT | Performed by: STUDENT IN AN ORGANIZED HEALTH CARE EDUCATION/TRAINING PROGRAM

## 2021-05-25 PROCEDURE — 99497 ADVNCD CARE PLAN 30 MIN: CPT | Mod: GC,,, | Performed by: INTERNAL MEDICINE

## 2021-05-25 PROCEDURE — 85610 PROTHROMBIN TIME: CPT | Performed by: STUDENT IN AN ORGANIZED HEALTH CARE EDUCATION/TRAINING PROGRAM

## 2021-05-25 PROCEDURE — 92526 ORAL FUNCTION THERAPY: CPT

## 2021-05-25 PROCEDURE — 63600175 PHARM REV CODE 636 W HCPCS: Performed by: STUDENT IN AN ORGANIZED HEALTH CARE EDUCATION/TRAINING PROGRAM

## 2021-05-25 PROCEDURE — 83735 ASSAY OF MAGNESIUM: CPT | Performed by: STUDENT IN AN ORGANIZED HEALTH CARE EDUCATION/TRAINING PROGRAM

## 2021-05-25 PROCEDURE — 84100 ASSAY OF PHOSPHORUS: CPT | Performed by: STUDENT IN AN ORGANIZED HEALTH CARE EDUCATION/TRAINING PROGRAM

## 2021-05-25 PROCEDURE — 36415 COLL VENOUS BLD VENIPUNCTURE: CPT | Performed by: STUDENT IN AN ORGANIZED HEALTH CARE EDUCATION/TRAINING PROGRAM

## 2021-05-25 PROCEDURE — 99232 SBSQ HOSP IP/OBS MODERATE 35: CPT | Mod: GC,,, | Performed by: INTERNAL MEDICINE

## 2021-05-25 PROCEDURE — 20600001 HC STEP DOWN PRIVATE ROOM

## 2021-05-25 PROCEDURE — 99222 PR INITIAL HOSPITAL CARE,LEVL II: ICD-10-PCS | Mod: GC,,, | Performed by: INTERNAL MEDICINE

## 2021-05-25 PROCEDURE — C9113 INJ PANTOPRAZOLE SODIUM, VIA: HCPCS | Performed by: STUDENT IN AN ORGANIZED HEALTH CARE EDUCATION/TRAINING PROGRAM

## 2021-05-25 PROCEDURE — 97535 SELF CARE MNGMENT TRAINING: CPT

## 2021-05-25 PROCEDURE — 25000242 PHARM REV CODE 250 ALT 637 W/ HCPCS: Performed by: STUDENT IN AN ORGANIZED HEALTH CARE EDUCATION/TRAINING PROGRAM

## 2021-05-25 PROCEDURE — 85025 COMPLETE CBC W/AUTO DIFF WBC: CPT | Performed by: STUDENT IN AN ORGANIZED HEALTH CARE EDUCATION/TRAINING PROGRAM

## 2021-05-25 PROCEDURE — 99233 PR SUBSEQUENT HOSPITAL CARE,LEVL III: ICD-10-PCS | Mod: GC,,, | Performed by: INTERNAL MEDICINE

## 2021-05-25 PROCEDURE — 63600175 PHARM REV CODE 636 W HCPCS: Performed by: INTERNAL MEDICINE

## 2021-05-25 PROCEDURE — 99232 PR SUBSEQUENT HOSPITAL CARE,LEVL II: ICD-10-PCS | Mod: GC,,, | Performed by: INTERNAL MEDICINE

## 2021-05-25 PROCEDURE — 99222 1ST HOSP IP/OBS MODERATE 55: CPT | Mod: GC,,, | Performed by: INTERNAL MEDICINE

## 2021-05-25 PROCEDURE — 99233 SBSQ HOSP IP/OBS HIGH 50: CPT | Mod: GC,,, | Performed by: INTERNAL MEDICINE

## 2021-05-25 PROCEDURE — 25000003 PHARM REV CODE 250: Performed by: STUDENT IN AN ORGANIZED HEALTH CARE EDUCATION/TRAINING PROGRAM

## 2021-05-25 PROCEDURE — 99900035 HC TECH TIME PER 15 MIN (STAT)

## 2021-05-25 PROCEDURE — 94640 AIRWAY INHALATION TREATMENT: CPT

## 2021-05-25 PROCEDURE — 80202 ASSAY OF VANCOMYCIN: CPT | Performed by: STUDENT IN AN ORGANIZED HEALTH CARE EDUCATION/TRAINING PROGRAM

## 2021-05-25 PROCEDURE — 99497 PR ADVNCD CARE PLAN 30 MIN: ICD-10-PCS | Mod: GC,,, | Performed by: INTERNAL MEDICINE

## 2021-05-25 RX ORDER — PANTOPRAZOLE SODIUM 40 MG/1
40 TABLET, DELAYED RELEASE ORAL DAILY
Status: DISCONTINUED | OUTPATIENT
Start: 2021-05-26 | End: 2021-05-27 | Stop reason: HOSPADM

## 2021-05-25 RX ORDER — CEFTRIAXONE 1 G/1
1 INJECTION, POWDER, FOR SOLUTION INTRAMUSCULAR; INTRAVENOUS
Status: DISCONTINUED | OUTPATIENT
Start: 2021-05-25 | End: 2021-05-27 | Stop reason: HOSPADM

## 2021-05-25 RX ADMIN — HYDROMORPHONE HYDROCHLORIDE 0.5 MG: 1 INJECTION, SOLUTION INTRAMUSCULAR; INTRAVENOUS; SUBCUTANEOUS at 12:05

## 2021-05-25 RX ADMIN — PANTOPRAZOLE SODIUM 40 MG: 40 INJECTION, POWDER, FOR SOLUTION INTRAVENOUS at 08:05

## 2021-05-25 RX ADMIN — IPRATROPIUM BROMIDE AND ALBUTEROL SULFATE 3 ML: .5; 2.5 SOLUTION RESPIRATORY (INHALATION) at 02:05

## 2021-05-25 RX ADMIN — ONDANSETRON 8 MG: 2 INJECTION INTRAMUSCULAR; INTRAVENOUS at 02:05

## 2021-05-25 RX ADMIN — DRONABINOL 2.5 MG: 2.5 CAPSULE ORAL at 08:05

## 2021-05-25 RX ADMIN — GUAIFENESIN 600 MG: 600 TABLET, EXTENDED RELEASE ORAL at 09:05

## 2021-05-25 RX ADMIN — GUAIFENESIN 600 MG: 600 TABLET, EXTENDED RELEASE ORAL at 08:05

## 2021-05-25 RX ADMIN — DRONABINOL 2.5 MG: 2.5 CAPSULE ORAL at 09:05

## 2021-05-25 RX ADMIN — CEFTRIAXONE 1 G: 1 INJECTION, POWDER, FOR SOLUTION INTRAMUSCULAR; INTRAVENOUS at 10:05

## 2021-05-25 RX ADMIN — FLUTICASONE PROPIONATE 100 MCG: 50 SPRAY, METERED NASAL at 10:05

## 2021-05-26 PROBLEM — Z75.8 DISCHARGE PLANNING ISSUES: Status: RESOLVED | Noted: 2021-05-23 | Resolved: 2021-05-26

## 2021-05-26 LAB
ABO + RH BLD: NORMAL
ALBUMIN SERPL BCP-MCNC: 2.1 G/DL (ref 3.5–5.2)
ALP SERPL-CCNC: 75 U/L (ref 55–135)
ALT SERPL W/O P-5'-P-CCNC: 10 U/L (ref 10–44)
ANION GAP SERPL CALC-SCNC: 10 MMOL/L (ref 8–16)
ANION GAP SERPL CALC-SCNC: 11 MMOL/L (ref 8–16)
APTT BLDCRRT: 42.5 SEC (ref 21–32)
AST SERPL-CCNC: 13 U/L (ref 10–40)
BASOPHILS # BLD AUTO: 0.04 K/UL (ref 0–0.2)
BASOPHILS NFR BLD: 0.5 % (ref 0–1.9)
BILIRUB SERPL-MCNC: 0.2 MG/DL (ref 0.1–1)
BLD GP AB SCN CELLS X3 SERPL QL: NORMAL
BUN SERPL-MCNC: 26 MG/DL (ref 8–23)
BUN SERPL-MCNC: 29 MG/DL (ref 8–23)
CALCIUM SERPL-MCNC: 8.4 MG/DL (ref 8.7–10.5)
CALCIUM SERPL-MCNC: 8.6 MG/DL (ref 8.7–10.5)
CHLORIDE SERPL-SCNC: 106 MMOL/L (ref 95–110)
CHLORIDE SERPL-SCNC: 109 MMOL/L (ref 95–110)
CO2 SERPL-SCNC: 24 MMOL/L (ref 23–29)
CO2 SERPL-SCNC: 26 MMOL/L (ref 23–29)
CREAT SERPL-MCNC: 2.8 MG/DL (ref 0.5–1.4)
CREAT SERPL-MCNC: 3.2 MG/DL (ref 0.5–1.4)
DIFFERENTIAL METHOD: ABNORMAL
EOSINOPHIL # BLD AUTO: 0.1 K/UL (ref 0–0.5)
EOSINOPHIL NFR BLD: 1.5 % (ref 0–8)
ERYTHROCYTE [DISTWIDTH] IN BLOOD BY AUTOMATED COUNT: 18.4 % (ref 11.5–14.5)
EST. GFR  (AFRICAN AMERICAN): 17 ML/MIN/1.73 M^2
EST. GFR  (AFRICAN AMERICAN): 20 ML/MIN/1.73 M^2
EST. GFR  (NON AFRICAN AMERICAN): 14.7 ML/MIN/1.73 M^2
EST. GFR  (NON AFRICAN AMERICAN): 17.3 ML/MIN/1.73 M^2
FIBRINOGEN PPP-MCNC: 693 MG/DL (ref 182–400)
GLUCOSE SERPL-MCNC: 73 MG/DL (ref 70–110)
GLUCOSE SERPL-MCNC: 82 MG/DL (ref 70–110)
HCT VFR BLD AUTO: 25.8 % (ref 37–48.5)
HGB BLD-MCNC: 7.5 G/DL (ref 12–16)
IMM GRANULOCYTES # BLD AUTO: 0.11 K/UL (ref 0–0.04)
IMM GRANULOCYTES NFR BLD AUTO: 1.4 % (ref 0–0.5)
INR PPP: 3.3 (ref 0.8–1.2)
LYMPHOCYTES # BLD AUTO: 0.7 K/UL (ref 1–4.8)
LYMPHOCYTES NFR BLD: 8.4 % (ref 18–48)
MAGNESIUM SERPL-MCNC: 1.9 MG/DL (ref 1.6–2.6)
MCH RBC QN AUTO: 26 PG (ref 27–31)
MCHC RBC AUTO-ENTMCNC: 29.1 G/DL (ref 32–36)
MCV RBC AUTO: 89 FL (ref 82–98)
MONOCYTES # BLD AUTO: 0.7 K/UL (ref 0.3–1)
MONOCYTES NFR BLD: 8.5 % (ref 4–15)
NEUTROPHILS # BLD AUTO: 6.3 K/UL (ref 1.8–7.7)
NEUTROPHILS NFR BLD: 79.7 % (ref 38–73)
NRBC BLD-RTO: 0 /100 WBC
PHOSPHATE SERPL-MCNC: 4.1 MG/DL (ref 2.7–4.5)
PLATELET # BLD AUTO: 379 K/UL (ref 150–450)
PMV BLD AUTO: 12.6 FL (ref 9.2–12.9)
POTASSIUM SERPL-SCNC: 4.1 MMOL/L (ref 3.5–5.1)
POTASSIUM SERPL-SCNC: 4.2 MMOL/L (ref 3.5–5.1)
PROT SERPL-MCNC: 5.9 G/DL (ref 6–8.4)
PROTHROMBIN TIME: 33.8 SEC (ref 9–12.5)
RBC # BLD AUTO: 2.89 M/UL (ref 4–5.4)
SODIUM SERPL-SCNC: 143 MMOL/L (ref 136–145)
SODIUM SERPL-SCNC: 143 MMOL/L (ref 136–145)
WBC # BLD AUTO: 7.88 K/UL (ref 3.9–12.7)

## 2021-05-26 PROCEDURE — 36415 COLL VENOUS BLD VENIPUNCTURE: CPT | Performed by: STUDENT IN AN ORGANIZED HEALTH CARE EDUCATION/TRAINING PROGRAM

## 2021-05-26 PROCEDURE — 36415 COLL VENOUS BLD VENIPUNCTURE: CPT | Performed by: INTERNAL MEDICINE

## 2021-05-26 PROCEDURE — 99232 PR SUBSEQUENT HOSPITAL CARE,LEVL II: ICD-10-PCS | Mod: GC,,, | Performed by: INTERNAL MEDICINE

## 2021-05-26 PROCEDURE — 80053 COMPREHEN METABOLIC PANEL: CPT | Performed by: STUDENT IN AN ORGANIZED HEALTH CARE EDUCATION/TRAINING PROGRAM

## 2021-05-26 PROCEDURE — 20600001 HC STEP DOWN PRIVATE ROOM

## 2021-05-26 PROCEDURE — 99232 SBSQ HOSP IP/OBS MODERATE 35: CPT | Mod: GC,,, | Performed by: INTERNAL MEDICINE

## 2021-05-26 PROCEDURE — 63600175 PHARM REV CODE 636 W HCPCS: Performed by: STUDENT IN AN ORGANIZED HEALTH CARE EDUCATION/TRAINING PROGRAM

## 2021-05-26 PROCEDURE — 99233 PR SUBSEQUENT HOSPITAL CARE,LEVL III: ICD-10-PCS | Mod: GC,,, | Performed by: INTERNAL MEDICINE

## 2021-05-26 PROCEDURE — 86900 BLOOD TYPING SEROLOGIC ABO: CPT | Performed by: INTERNAL MEDICINE

## 2021-05-26 PROCEDURE — 85730 THROMBOPLASTIN TIME PARTIAL: CPT | Performed by: STUDENT IN AN ORGANIZED HEALTH CARE EDUCATION/TRAINING PROGRAM

## 2021-05-26 PROCEDURE — 25000003 PHARM REV CODE 250: Performed by: INTERNAL MEDICINE

## 2021-05-26 PROCEDURE — 85610 PROTHROMBIN TIME: CPT | Performed by: STUDENT IN AN ORGANIZED HEALTH CARE EDUCATION/TRAINING PROGRAM

## 2021-05-26 PROCEDURE — 99233 SBSQ HOSP IP/OBS HIGH 50: CPT | Mod: GC,,, | Performed by: INTERNAL MEDICINE

## 2021-05-26 PROCEDURE — 80048 BASIC METABOLIC PNL TOTAL CA: CPT | Performed by: STUDENT IN AN ORGANIZED HEALTH CARE EDUCATION/TRAINING PROGRAM

## 2021-05-26 PROCEDURE — 85025 COMPLETE CBC W/AUTO DIFF WBC: CPT | Performed by: STUDENT IN AN ORGANIZED HEALTH CARE EDUCATION/TRAINING PROGRAM

## 2021-05-26 PROCEDURE — 25000003 PHARM REV CODE 250: Performed by: STUDENT IN AN ORGANIZED HEALTH CARE EDUCATION/TRAINING PROGRAM

## 2021-05-26 PROCEDURE — 83735 ASSAY OF MAGNESIUM: CPT | Performed by: STUDENT IN AN ORGANIZED HEALTH CARE EDUCATION/TRAINING PROGRAM

## 2021-05-26 PROCEDURE — 85384 FIBRINOGEN ACTIVITY: CPT | Performed by: STUDENT IN AN ORGANIZED HEALTH CARE EDUCATION/TRAINING PROGRAM

## 2021-05-26 PROCEDURE — 63600175 PHARM REV CODE 636 W HCPCS: Performed by: INTERNAL MEDICINE

## 2021-05-26 PROCEDURE — 84100 ASSAY OF PHOSPHORUS: CPT | Performed by: STUDENT IN AN ORGANIZED HEALTH CARE EDUCATION/TRAINING PROGRAM

## 2021-05-26 RX ADMIN — DRONABINOL 2.5 MG: 2.5 CAPSULE ORAL at 08:05

## 2021-05-26 RX ADMIN — CEFTRIAXONE 1 G: 1 INJECTION, POWDER, FOR SOLUTION INTRAMUSCULAR; INTRAVENOUS at 10:05

## 2021-05-26 RX ADMIN — GUAIFENESIN 600 MG: 600 TABLET, EXTENDED RELEASE ORAL at 08:05

## 2021-05-26 RX ADMIN — PANTOPRAZOLE SODIUM 40 MG: 40 TABLET, DELAYED RELEASE ORAL at 08:05

## 2021-05-26 RX ADMIN — SODIUM CHLORIDE, SODIUM LACTATE, POTASSIUM CHLORIDE, AND CALCIUM CHLORIDE 500 ML: .6; .31; .03; .02 INJECTION, SOLUTION INTRAVENOUS at 05:05

## 2021-05-27 VITALS
DIASTOLIC BLOOD PRESSURE: 54 MMHG | HEIGHT: 67 IN | TEMPERATURE: 98 F | OXYGEN SATURATION: 99 % | WEIGHT: 200 LBS | SYSTOLIC BLOOD PRESSURE: 90 MMHG | RESPIRATION RATE: 11 BRPM | BODY MASS INDEX: 31.39 KG/M2 | HEART RATE: 99 BPM

## 2021-05-27 LAB
ALBUMIN SERPL BCP-MCNC: 2.2 G/DL (ref 3.5–5.2)
ALP SERPL-CCNC: 78 U/L (ref 55–135)
ALT SERPL W/O P-5'-P-CCNC: 9 U/L (ref 10–44)
ANION GAP SERPL CALC-SCNC: 15 MMOL/L (ref 8–16)
AST SERPL-CCNC: 13 U/L (ref 10–40)
BACTERIA BLD CULT: NORMAL
BACTERIA BLD CULT: NORMAL
BASOPHILS # BLD AUTO: 0.06 K/UL (ref 0–0.2)
BASOPHILS NFR BLD: 0.8 % (ref 0–1.9)
BILIRUB SERPL-MCNC: 0.2 MG/DL (ref 0.1–1)
BUN SERPL-MCNC: 30 MG/DL (ref 8–23)
CALCIUM SERPL-MCNC: 9.1 MG/DL (ref 8.7–10.5)
CHLORIDE SERPL-SCNC: 110 MMOL/L (ref 95–110)
CO2 SERPL-SCNC: 20 MMOL/L (ref 23–29)
CREAT SERPL-MCNC: 3.2 MG/DL (ref 0.5–1.4)
DIFFERENTIAL METHOD: ABNORMAL
EOSINOPHIL # BLD AUTO: 0.1 K/UL (ref 0–0.5)
EOSINOPHIL NFR BLD: 1.4 % (ref 0–8)
ERYTHROCYTE [DISTWIDTH] IN BLOOD BY AUTOMATED COUNT: 18.5 % (ref 11.5–14.5)
EST. GFR  (AFRICAN AMERICAN): 17 ML/MIN/1.73 M^2
EST. GFR  (NON AFRICAN AMERICAN): 14.7 ML/MIN/1.73 M^2
GLUCOSE SERPL-MCNC: 83 MG/DL (ref 70–110)
HCT VFR BLD AUTO: 27.2 % (ref 37–48.5)
HGB BLD-MCNC: 7.8 G/DL (ref 12–16)
IMM GRANULOCYTES # BLD AUTO: 0.13 K/UL (ref 0–0.04)
IMM GRANULOCYTES NFR BLD AUTO: 1.7 % (ref 0–0.5)
INR PPP: 2.8 (ref 0.8–1.2)
LYMPHOCYTES # BLD AUTO: 1.1 K/UL (ref 1–4.8)
LYMPHOCYTES NFR BLD: 14.4 % (ref 18–48)
MAGNESIUM SERPL-MCNC: 1.9 MG/DL (ref 1.6–2.6)
MCH RBC QN AUTO: 26 PG (ref 27–31)
MCHC RBC AUTO-ENTMCNC: 28.7 G/DL (ref 32–36)
MCV RBC AUTO: 91 FL (ref 82–98)
MONOCYTES # BLD AUTO: 0.9 K/UL (ref 0.3–1)
MONOCYTES NFR BLD: 10.9 % (ref 4–15)
NEUTROPHILS # BLD AUTO: 5.6 K/UL (ref 1.8–7.7)
NEUTROPHILS NFR BLD: 70.8 % (ref 38–73)
NRBC BLD-RTO: 0 /100 WBC
PHOSPHATE SERPL-MCNC: 3.7 MG/DL (ref 2.7–4.5)
PLATELET # BLD AUTO: 371 K/UL (ref 150–450)
PMV BLD AUTO: 12.1 FL (ref 9.2–12.9)
POTASSIUM SERPL-SCNC: 4.3 MMOL/L (ref 3.5–5.1)
PROT SERPL-MCNC: 6.1 G/DL (ref 6–8.4)
PROTHROMBIN TIME: 28.4 SEC (ref 9–12.5)
RBC # BLD AUTO: 3 M/UL (ref 4–5.4)
SODIUM SERPL-SCNC: 145 MMOL/L (ref 136–145)
WBC # BLD AUTO: 7.87 K/UL (ref 3.9–12.7)

## 2021-05-27 PROCEDURE — 25000003 PHARM REV CODE 250: Performed by: STUDENT IN AN ORGANIZED HEALTH CARE EDUCATION/TRAINING PROGRAM

## 2021-05-27 PROCEDURE — 80053 COMPREHEN METABOLIC PANEL: CPT | Performed by: STUDENT IN AN ORGANIZED HEALTH CARE EDUCATION/TRAINING PROGRAM

## 2021-05-27 PROCEDURE — 83735 ASSAY OF MAGNESIUM: CPT | Performed by: STUDENT IN AN ORGANIZED HEALTH CARE EDUCATION/TRAINING PROGRAM

## 2021-05-27 PROCEDURE — 85025 COMPLETE CBC W/AUTO DIFF WBC: CPT | Performed by: STUDENT IN AN ORGANIZED HEALTH CARE EDUCATION/TRAINING PROGRAM

## 2021-05-27 PROCEDURE — 85610 PROTHROMBIN TIME: CPT | Performed by: STUDENT IN AN ORGANIZED HEALTH CARE EDUCATION/TRAINING PROGRAM

## 2021-05-27 PROCEDURE — 99239 PR HOSPITAL DISCHARGE DAY,>30 MIN: ICD-10-PCS | Mod: GC,,, | Performed by: INTERNAL MEDICINE

## 2021-05-27 PROCEDURE — 99239 HOSP IP/OBS DSCHRG MGMT >30: CPT | Mod: GC,,, | Performed by: INTERNAL MEDICINE

## 2021-05-27 PROCEDURE — 36415 COLL VENOUS BLD VENIPUNCTURE: CPT | Performed by: STUDENT IN AN ORGANIZED HEALTH CARE EDUCATION/TRAINING PROGRAM

## 2021-05-27 PROCEDURE — 84100 ASSAY OF PHOSPHORUS: CPT | Performed by: STUDENT IN AN ORGANIZED HEALTH CARE EDUCATION/TRAINING PROGRAM

## 2021-05-27 RX ORDER — ALPRAZOLAM 0.5 MG/1
0.5 TABLET ORAL EVERY 6 HOURS PRN
Qty: 28 TABLET | Refills: 0 | Status: SHIPPED | OUTPATIENT
Start: 2021-05-27 | End: 2021-05-27

## 2021-05-27 RX ORDER — ALPRAZOLAM 0.5 MG/1
0.5 TABLET ORAL EVERY 6 HOURS PRN
Qty: 28 TABLET | Refills: 0
Start: 2021-05-27 | End: 2021-06-03

## 2021-05-27 RX ORDER — ALPRAZOLAM 0.5 MG/1
0.5 TABLET ORAL EVERY 6 HOURS PRN
Status: DISCONTINUED | OUTPATIENT
Start: 2021-05-27 | End: 2021-05-27 | Stop reason: HOSPADM

## 2021-05-27 RX ADMIN — ALPRAZOLAM 0.5 MG: 0.5 TABLET ORAL at 03:05

## 2021-05-28 NOTE — TELEPHONE ENCOUNTER
Left messages and talked to patient about rescheduling the home sleep study,no response.  Sent out a message through [employer] to reschedule.   DISPLAY PLAN FREE TEXT DISPLAY PLAN FREE TEXT

## 2021-06-23 ENCOUNTER — TELEPHONE (OUTPATIENT)
Dept: PALLIATIVE MEDICINE | Facility: CLINIC | Age: 66
End: 2021-06-23

## 2021-07-26 NOTE — ASSESSMENT & PLAN NOTE
-- On ARB.  -- Controlled.  -- Blood pressure goals discussed with patient.   37 yo  @41w0d, GBS negative, Rh negative, AMA, presented for induction of labor for post-EDC  -admit to L&D  -f/u admission labs  -continuous efm/toco monitoring  -monitor vitals  -clear liquid diet  -pain management prn  -balloon for induction    Dr. Traylor and Dr. Jones aware 37 yo  @41w0d, GBS negative, Rh negative, AMA, presented for induction of labor for post-EDC  -admit to L&D  -f/u admission labs  -continuous efm/toco monitoring  -monitor vitals  -clear liquid diet  -pain management prn  -balloon for induction  -Rhogam postpartum    Dr. Traylor and Dr. Jones aware

## 2021-12-13 NOTE — PROGRESS NOTES
Subjective:       Patient ID: Alison Branch is a 64 y.o. female.    Chief Complaint: Foot Pain    Pt of Dr. Rodriguez, here for edema in her feet. Had Chemo 2 weeks ago, gets it every 21 days, has next round scheduled next week. Dr. Belcher ordered her compression stockings for this ongoing problem. States feet have been swollen with pain, no fever or redness. Has been elevating them on pillows. When she did this last night, the fluid moved from feet to knees she states. She is watching the salt in her diet. Recently had and US of both LE which was negative for DVT.    Edema   This is a new problem. Associated symptoms include arthralgias. Pertinent negatives include no abdominal pain, chest pain, chills, diaphoresis, fatigue, fever, nausea, numbness, rash, vomiting or weakness.     Review of Systems   Constitutional: Negative for activity change, appetite change, chills, diaphoresis, fatigue, fever and unexpected weight change.   Respiratory: Negative for chest tightness and shortness of breath.    Cardiovascular: Positive for leg swelling. Negative for chest pain and palpitations.        Foot and ankle edema   Gastrointestinal: Negative for abdominal pain, diarrhea, nausea and vomiting.   Genitourinary: Negative for dysuria.   Musculoskeletal: Positive for arthralgias.   Skin: Negative for color change, pallor and rash.   Allergic/Immunologic: Negative for environmental allergies, food allergies and immunocompromised state.   Neurological: Negative for dizziness, weakness, light-headedness and numbness.   Hematological: Negative for adenopathy. Does not bruise/bleed easily.   Psychiatric/Behavioral: Negative for suicidal ideas.         Review of patient's allergies indicates:  No Known Allergies      Current Outpatient Medications:     aspirin (ECOTRIN) 81 MG EC tablet, Take 1 tablet (81 mg total) by mouth once daily., Disp: , Rfl: 0    atorvastatin (LIPITOR) 40 MG tablet, TAKE 1 TABLET BY MOUTH ONCE DAILY, Disp: 90  tablet, Rfl: 3    benzonatate (TESSALON PERLES) 100 MG capsule, Take 1 capsule (100 mg total) by mouth 2 (two) times daily., Disp: 60 capsule, Rfl: 1    bisacodyl (DULCOLAX) 5 mg EC tablet, Take 5 mg by mouth daily as needed for Constipation., Disp: , Rfl:     blood sugar diagnostic Strp, To check BG 5 times per day, Disp: 450 each, Rfl: 3    blood-glucose meter kit, Use as instructed, Disp: 1 each, Rfl: 0    carboxymethylcellulose (REFRESH PLUS) 0.5 % Dpet, 1 drop 3 (three) times daily as needed., Disp: , Rfl:     dexAMETHasone (DECADRON) 6 MG tablet, Take 1 tablet by mouth two times a day with food the day before chemotherapy and for two days after chemotherapy. Do not take the day of chemotherapy., Disp: 24 tablet, Rfl: 4    diazePAM (VALIUM) 5 MG tablet, TAKE 1 TABLET BY MOUTH ONCE DAILY AS NEEDED FOR ANXIETY, Disp: , Rfl: 2    ergocalciferol (ERGOCALCIFEROL) 50,000 unit Cap, TAKE 1 CAPSULE BY MOUTH EVERY 7 DAYS, Disp: 12 capsule, Rfl: 3    escitalopram oxalate (LEXAPRO) 10 MG tablet, , Disp: , Rfl:     fluticasone propionate (FLONASE) 50 mcg/actuation nasal spray, USE TWO SPRAY(S) IN EACH NOSTRIL ONCE DAILY, Disp: 16 g, Rfl: 3    folic acid (FOLVITE) 1 MG tablet, Take 1 tablet (1 mg total) by mouth once daily. Start today, Disp: 100 tablet, Rfl: 3    gabapentin (NEURONTIN) 300 MG capsule, Take 1 capsule (300 mg total) by mouth nightly as needed (Take as needed at bed time for neuropathy pain and sleep)., Disp: 90 capsule, Rfl: 3    insulin aspart U-100 (NOVOLOG) 100 unit/mL (3 mL) InPn pen, Inject 12-20 units into the skin 3 times daily with meals plus correction scale. Max Total daily dose of 90 units, Disp: 3 Box, Rfl: 6    insulin detemir U-100 (LEVEMIR FLEXTOUCH) 100 unit/mL (3 mL) SubQ InPn pen, Inject 28-33 units nightly, Disp: 1 Box, Rfl: 6    lancets Misc, 1 Device by Misc.(Non-Drug; Combo Route) route once daily. Heladio Result.  250.02.  Check Blood Sugar Twice Daily., Disp: 200 each, Rfl:  "3    lidocaine-prilocaine (EMLA) cream, Apply to PORT one hour prior to chemo administration., Disp: 30 g, Rfl: 0    olmesartan (BENICAR) 20 MG tablet, Take 0.5 tablets (10 mg total) by mouth once daily., Disp: 45 tablet, Rfl: 3    ondansetron (ZOFRAN) 8 MG tablet, Take 1 tablet (8 mg total) by mouth 4 (four) times daily as needed for Nausea., Disp: 60 tablet, Rfl: 2    pen needle, diabetic (BD ULTRA-FINE BRYANT PEN NEEDLE) 32 gauge x 5/32" Ndle, To use 5 times per day with insulin injections., Disp: 150 each, Rfl: 11    phenazopyridine (PYRIDIUM) 200 MG tablet, Take 1 tablet (200 mg total) by mouth 3 (three) times daily as needed for Pain., Disp: 20 tablet, Rfl: 0    psyllium (METAMUCIL) packet, Take 1 packet by mouth once daily., Disp: , Rfl:     SITagliptin (JANUVIA) 100 MG Tab, Take 1 tablet (100 mg total) by mouth once daily., Disp: 90 tablet, Rfl: 3    terconazole (TERAZOL 7) 0.4 % Crea, INSERT 1 APPLICATORFUL VAGINALLY ONCE DAILY IN THE EVENING, Disp: 45 g, Rfl: 0    walker Misc, Please provide rollator walker for this debilitated cancer patient.  Thank you. (Patient not taking: Reported on 2/12/2020), Disp: , Rfl: 0    Patient Active Problem List   Diagnosis    Type 2 diabetes mellitus with hyperglycemia, with long-term current use of insulin    Mixed hyperlipidemia due to type 2 diabetes mellitus    Attention and concentration deficit    Cerebral aneurysm, coiled in 2010    Hypertension associated with diabetes    Hyperkeratosis of palms and soles    Irritable bowel syndrome    Aneurysm of unspecified site    Obesity (BMI 30-39.9)    Vitamin D deficiency    Malignant neoplasm of upper lobe of left lung    Secondary malignant neoplasm of mediastinal lymph node    Adrenal cortical steroids causing adverse effect in therapeutic use    Type 2 diabetes mellitus with diabetic polyneuropathy, with long-term current use of insulin    Major depressive disorder, recurrent, unspecified    Memory " "deficit    Dysuria    Edema, peripheral    Chemotherapy-induced peripheral neuropathy    Shortness of breath on exertion    Fever of unknown origin (FUO)    Impaired functional mobility, balance, gait, and endurance     Past Medical History:   Diagnosis Date    Allergy     Brain aneurysm 2010    s/p coiling of one; another not coiled    Breast cyst     Depression 9/19/2019    Diabetes mellitus     Diabetes type 2, controlled     Fever blister     High cholesterol     History of Bell's palsy     HTN (hypertension) 5/20/2014     Past Surgical History:   Procedure Laterality Date    BREAST CYST ASPIRATION      BRONCHOSCOPY N/A 5/28/2019    Procedure: Bronchoscopy;  Surgeon: [facility];  Location: [facility] OR [address] [place] [address];  Service: Anesthesiology;  Laterality: N/A;    CERVICAL FUSION      COLONOSCOPY N/A 3/9/2016    Procedure: COLONOSCOPY;  Surgeon: Elliott Zimmerman MD;  Location: [facility] ENDO (4TH FLR);  Service: Endoscopy;  Laterality: N/A;    head surgery      stent and "curling" for aneurysm    HYSTERECTOMY      TVH secondary to SUF    INSERTION OF TUNNELED CENTRAL VENOUS CATHETER (CVC) WITH SUBCUTANEOUS PORT Right 7/8/2019    Procedure: INSERTION, PORT-A-CATH;  Surgeon: Cali Damico MD;  Location: [facility] CATH LAB;  Service: Radiology;  Laterality: Right;     Social History     Socioeconomic History    Marital status: Single     Spouse name: Not on file    Number of children: Not on file    Years of education: Not on file    Highest education level: Not on file   Occupational History    Occupation: Student     Comment: Unemployed   Social Needs    Financial resource strain: Not on file    Food insecurity:     Worry: Not on file     Inability: Not on file    Transportation needs:     Medical: Not on file     Non-medical: Not on file   Tobacco Use    Smoking status: Former Smoker     Packs/day: 0.50     Years: 30.00     Pack years: 15.00     Last attempt to quit: 1/1/1999     " "Years since quittin.1    Smokeless tobacco: Never Used   Substance and Sexual Activity    Alcohol use: No     Alcohol/week: 0.0 standard drinks    Drug use: No    Sexual activity: Never     Partners: Female     Birth control/protection: Surgical   Lifestyle    Physical activity:     Days per week: Not on file     Minutes per session: Not on file    Stress: Not on file   Relationships    Social connections:     Talks on phone: Not on file     Gets together: Not on file     Attends [employer] service: Not on file     Active member of club or organization: Not on file     Attends meetings of clubs or organizations: Not on file     Relationship status: Not on file   Other Topics Concern    Are you pregnant or think you may be? No    Breast-feeding No   Social History Narrative    Not on file     Family History   Problem Relation Age of Onset    Rheum arthritis Father     Diabetes Father     Heart failure Father     Migraines Father     Cataracts Father     Cancer Mother 63        pancreatic    Stomach cancer Mother     Breast cancer Maternal Aunt         50s    Ovarian cancer Cousin     Diabetes Sister     Diabetes Brother     Cancer Maternal Aunt         Lung CA    Melanoma Neg Hx     Colon cancer Neg Hx     Amblyopia Neg Hx     Blindness Neg Hx     Glaucoma Neg Hx     Macular degeneration Neg Hx     Retinal detachment Neg Hx     Strabismus Neg Hx     Stroke Neg Hx     Thyroid disease Neg Hx        Objective:       Vitals:    20 1636   BP: 116/68   Pulse: 71   Weight: 98.9 kg (218 lb)   Height: 5' 9" (1.753 m)   PainSc:   9   PainLoc: Foot       Body mass index is 32.19 kg/m².    Physical Exam   Constitutional: She is oriented to person, place, and time. She appears well-developed and well-nourished.   obese   HENT:   Head: Normocephalic.   Eyes: Pupils are equal, round, and reactive to light. Conjunctivae and EOM are normal.   Neck: Normal range of motion. Neck supple. "   Cardiovascular: Normal rate, regular rhythm, normal heart sounds and intact distal pulses. Exam reveals no gallop and no friction rub.   No murmur heard.  Pulses:       Popliteal pulses are 2+ on the right side, and 2+ on the left side.        Dorsalis pedis pulses are 2+ on the right side, and 2+ on the left side.        Posterior tibial pulses are 2+ on the right side, and 2+ on the left side.   Bilateral foot edema +2   Pulmonary/Chest: Effort normal and breath sounds normal.   Musculoskeletal: Normal range of motion.   Neurological: She is alert and oriented to person, place, and time.   Skin: Skin is warm and dry. Capillary refill takes 2 to 3 seconds.   Psychiatric: She has a normal mood and affect. Her behavior is normal. Judgment and thought content normal.   Nursing note and vitals reviewed.      Assessment:       1. Edema of both feet    2. BMI 32.0-32.9,adult    3. Obesity (BMI 30-39.9)        Plan:       Alison was seen today for foot pain.    Diagnoses and all orders for this visit:    Edema of both feet  -     hydroCHLOROthiazide (MICROZIDE) 12.5 mg capsule; Take 1 capsule (12.5 mg total) by mouth daily as needed (for swelling).    Ultrasound of legs were negative for blood clot    Start HCTZ 12.5mg daily as needed for edema    Compression stockings per Dr. Belcher when they come in    Keep legs elevated, watch salt in diet, do not sit with legs prone long periods    BMI 32.0-32.9,adult  BMI reviewed    Obesity (BMI 30-39.9)  BMI reviewed.    Diet and exercise to lose weight.    Ultrasound of legs were negative for blood clot    Start HCTZ 12.5mg daily as needed for edema    Compression stockings per Dr. Belcher when they come in    Keep legs elevated, watch salt in diet, do not sit with legs prone long periods    Self care instructions provided in AVS    Follow up if symptoms worsen or fail to improve.           No

## 2022-06-09 NOTE — TELEPHONE ENCOUNTER
Physical Therapy  Patient not seen in therapy. Defer PT today secondary to pt with c/o anterior chest pain during OT this morning. Currently pending CT chest.  Will re-attempt once medically stable.         OBJECTIVE                     Therapy procedure time and total treatment time can be found documented on the Time Entry flowsheet Therapy Postponed / COVID-19 pandemic   Patient Check-In Courtesy Call    Courtesy call to check in with patient today to ensure they are doing their home exercise program and to answer any questions. Alison Branch stated she is doing well but would like to schedule a virtual visit with me after she moves to ensure she is doing her exercises correctly.   No new exercise recommendations given today.   Patient educated on options of telemedicine upcoming and stated yes for interest and desire to get scheduled.     Matias Nunez, PT  04/14/2020

## 2022-07-27 NOTE — SUBJECTIVE & OBJECTIVE
Neurologic Chief Complaint: RLE weakness     Subjective:     Interval History: Patient is seen for follow-up neurological assessment and treatment recommendations:    light pink hematuria w/ few clots, H/H stable, resume apixaban. Plans for discharge to rehab tomorrow.     HPI, Past Medical, Family, and Social History remains the same as documented in the initial encounter.     Review of Systems   Constitutional: Negative for chills and fever.   Eyes: Negative for visual disturbance.   Genitourinary: Positive for hematuria. Negative for flank pain and pelvic pain.   Musculoskeletal: Negative for back pain.   Neurological: Positive for weakness and numbness. Negative for facial asymmetry and speech difficulty.   Psychiatric/Behavioral: Negative for agitation and confusion.     Scheduled Meds:   apixaban  5 mg Oral BID    ergocalciferol  50,000 Units Oral Q7 Days    gabapentin  300 mg Oral QHS    insulin detemir U-100  15 Units Subcutaneous QHS    tamsulosin  0.4 mg Oral Daily    venlafaxine  75 mg Oral Daily     Continuous Infusions:   sodium chloride 0.9%       PRN Meds:acetaminophen, dextrose 50%, dextrose 50%, glucagon (human recombinant), glucose, glucose, insulin aspart U-100, labetaloL, ondansetron, sodium chloride 0.9%, sodium chloride 0.9%    Objective:     Vital Signs (Most Recent):  Temp: 97.6 °F (36.4 °C) (10/04/20 0900)  Pulse: 81 (10/04/20 1542)  Resp: 14 (10/04/20 0900)  BP: (!) 124/55 (10/04/20 0900)  SpO2: (pt refused; fingers have hair wax on them) (10/04/20 1437)  BP Location: Left arm    Vital Signs Range (Last 24H):  Temp:  [97.6 °F (36.4 °C)-99.1 °F (37.3 °C)]   Pulse:  [70-89]   Resp:  [12-15]   BP: (104-124)/(51-57)   SpO2:  [94 %-97 %]   BP Location: Left arm    Physical Exam  Vitals signs reviewed.   Constitutional:       General: She is not in acute distress.  HENT:      Head: Normocephalic and atraumatic.   Cardiovascular:      Rate and Rhythm: Normal rate.   Pulmonary:      Effort:  Pulmonary effort is normal. No respiratory distress.   Skin:     General: Skin is warm and dry.   Neurological:      Mental Status: She is alert and oriented to person, place, and time.      Motor: Weakness present.         Neurological Exam:   LOC: alert  Attention Span: Good   Language: No aphasia  Articulation: No dysarthria  Orientation: Person, Place, Time   Visual Fields: Full  EOM (CN III, IV, VI): Full/intact  Pupils (CN II, III): PERRL  Facial Movement (CN VII): Symmetric facial expression    Motor: Arm left  Normal 5/5  Leg left  Paresis: 5/5  Arm right  Normal 5/5  Leg right Paresis: 4/5  Cebellar: No evidence of appendicular or axial ataxia  Sensation: decrease sensation in RLE    Laboratory:  CMP:   Recent Labs   Lab 10/04/20  0640   CALCIUM 8.4*   ALBUMIN 3.2*   PROT 6.2      K 4.3   CO2 26      BUN 16   CREATININE 1.0   ALKPHOS 37*   ALT 11   AST 15   BILITOT 0.3     BMP:   Recent Labs   Lab 10/04/20  0640      K 4.3      CO2 26   BUN 16   CREATININE 1.0   CALCIUM 8.4*     CBC:   Recent Labs   Lab 10/04/20  0809   WBC 4.36   RBC 3.59*   HGB 9.7*   HCT 31.7*      MCV 88   MCH 27.0   MCHC 30.6*     Lipid Panel:   Recent Labs   Lab 09/28/20  1654   CHOL 242*   LDLCALC 147.0   HDL 62   TRIG 165*     Coagulation:   Recent Labs   Lab 09/29/20  0415   INR 1.0   APTT 24.9     Platelet Aggregation Study: No results for input(s): PLTAGG, PLTAGINTERP, PLTAGREGLACO, ADPPLTAGGREG in the last 168 hours.  Hgb A1C:   Recent Labs   Lab 09/28/20  2311   HGBA1C 9.3*     TSH:   Recent Labs   Lab 09/28/20  1654   TSH 0.675       Diagnostic Results     Brain Imaging   MRI brain 9/28  There are few high T2 small foci bilaterally.  A small lesion in the right centrum semiovale and left corpus callosum are diffusion positive only.  There are other similar small foci which are diffusion positive with associated enhancement.  Chart review indicates metastatic disease.  These findings could  represent metastatic disease also.  Few small foci of superimposed acute/subacute lacunar infarction cannot be excluded.  Recommend follow-up.    Vessel Imaging   US carotid bilateral   No evidence of a hemodynamically significant carotid bifurcation stenosis.  1- 39% stenosis of the ICAs bilaterally.    Cardiac Imaging   TTE 6/22/20  · Normal left ventricular systolic function. The estimated ejection fraction is 55%.  · Normal LV diastolic function.  · No wall motion abnormalities.  · Mildly reduced right ventricular systolic function.  · Normal central venous pressure (3 mmHg).  · The estimated PA systolic pressure is 26 mmHg.   Mastoid Interpolation Flap Text: Due to the full-thickness nature of the wound and to restore structure/function, a decision was made to reconstruct the defect utilizing an interpolation axial flap and a staged reconstruction.  A telfa template was made of the defect.  This telfa template was then used to outline the mastoid interpolation flap.  The donor area for the pedicle flap was then injected with anesthesia.  The flap was excised through the skin and subcutaneous tissue down to the layer of the underlying musculature.  The pedicle flap was carefully excised within this deep plane to maintain its blood supply.  The edges of the donor site were undermined.   The donor site was closed in a primary fashion.  The pedicle was then rotated into position and sutured.  Once the tube was sutured into place, adequate blood supply was confirmed with blanching and refill.  The pedicle was then wrapped with xeroform gauze and dressed appropriately with a telfa and gauze bandage to ensure continued blood supply and protect the attached pedicle.

## 2023-03-31 NOTE — PLAN OF CARE
Goals remain appropriate.  Verenice SalTIFFANY  10/4/2020    Problem: Occupational Therapy Goal  Goal: Occupational Therapy Goal  Description: Goals set 9/30 be addressed for 14 days with expiration date, 10/14:  Patient will increase functional independence with ADLs by performing:    Patient will demonstrate rolling to the right with modified independence  Not met   Patient will demonstrate rolling to the left with modified independence.   Not met  Patient will demonstrate supine -sit with modified independence.   Not met  Patient will demonstrate stand pivot transfers with SBA.   Not met  Patient will demonstrate grooming while standing with SBA.   Not met  Patient will demonstrate upper body dressing with SBA while seated EOB.   Not met  Patient will demonstrate lower body dressing with SBA while seated EOB.   Not met  Patient will demonstrate toileting with SBA.   Not met  Patient will demonstrate bathing while seated EOB with SBA.   Not met  Patient's family / caregiver will demonstrate independence and safety with assisting patient with self-care skills and functional mobility.     Not met          Outcome: Ongoing, Progressing      [FreeTextEntry1] : I reviewed the patient's AP lateral flexion-extension lumbar x-rays that he had done at our facility 4 years ago.  No instability.  I reviewed the patient's cervical spine MRI done in March 2023 at [facility].  Radiology report attached.

## 2023-07-11 NOTE — PROGRESS NOTES
Left pt vm that lab orders have been placed. Pt is made aware that lab apt's are walk in.   Subjective:       Patient ID: Alison Branch is a 63 y.o. female.    Chief Complaint: Malignant neoplasm of upper lobe of left lung; Bloated; dry skin/eyes; and constipation/loose stools  Ms. Branch is a 63-year-old female who smoked for about 30 years and quit 20 years ago, presented with cough end of last year, but worsened into January.  Since the cough persisted, she saw her PCP, underwent a chest x-ray on 05/10/2019, that revealed left upper lobe pneumonia and a repeat CT was done in the week after that on 05/17/2019 that showed left upper lobe   mass arising from the lateral pleural surface in the left upper lobe posterior subsegment measuring 2.6 x 3 cm.  There are satellite mass more medially near the aortic arch that is 2 cm, also spiculated and irregular as well as thickened interlobar septa in the left lung apex and anterior segment, prevascular lymph node lateral to the aortic arch, short axis measuring 9 mm.       She then underwent a bronchoscopy on 05/28/2019, and that revealed there was an infiltration obtained in the left apical posterior segment of the left upper lobe cytology brush was done.  Transbronchial biopsies of an area of infiltration were also performed in the apicoposterior segment of the left upper lobe.    Pathology revealed adenocarcinoma; however, the specimen was not adequate enough to send for molecular testing.       She underwent a PET scan on 06/06/2019.  that reveals significant hypermetabolic activity in the large irregular spiculated pulmonary mass in the lateral aspect of the left upper lobe consistent with the patient's known pulmonary adenocarcinoma.  There is also tracer avidity in the medial left upper lobe satellite lesion and diffusely throughout much of the anterior left upper lobe where there is prominent nodular paraseptal thickening.  There are some numerous scattered subcentimeter pulmonary nodules throughout the right lung, all of which are too small for detection  "by PET.  For example, there is a 0.4 cm nodule in the superior right lower lobe and a micro nodule in the posterior segment of the right upper lobe.  There is a 0.5 cm, normal size right   paratracheal lymph node with increased radiotracer uptake as well as hypermetabolic aortic pulmonary lymph node and a left hilar lymph node.  There is a focal hypermetabolic lesion in the left anterior superior iliac spine associated with well defined lytic lesion.  There is a hypermetabolic focus in the anterior right fifth rib with a possible associated lytic lesion.  SUVs as   below lateral:  Left upper lobe  SUV max 17.9, anterior left upper lobe satellite mass and septal thickening SUV max of 10.1, right paratracheal lymph node SUV max of 4.8, left AP window lymph node SUV max of 17.9, left anterior superior iliac spine SUV max of 3.9"     MRI pelvis from 6/10/19  reveals "Region of osseous is irregularity at the left anterior iliac spine likely related to bone graft harvest procedure.  See  discussion above.  No suspicious signal or enhancement to suggest active/malignant process"   MRI brain from 6/10//19 reveal No intracranial abnormality.     We discussed her case at [facility], and plan was to wait for GAURDANT and proceed with treatment accordingly  Her GAURDANT is negative for molecular markers.      HPI She comes in for cycle 3 of Carboplatin, Alimta and Keytruda. She notes constipation and takes Metamucil every other day. Notes abdomen pain on and off.  She denies any nausea, vomiting, diarrhea, abdominal pain, weight loss or loss of appetite, chest pain, shortness of breath, leg swelling, fatigue, pain, headache, dizziness, or mood changes. Her ECOG PS is zero. She is accompanied by her daughter.        .    Review of Systems   Constitutional: Negative for appetite change, fatigue and unexpected weight change.   HENT: Negative for mouth sores.    Eyes: Negative for visual disturbance.   Respiratory: Negative for " cough and shortness of breath.    Cardiovascular: Negative for chest pain.   Gastrointestinal: Positive for constipation. Negative for abdominal pain and diarrhea.   Genitourinary: Negative for frequency.   Musculoskeletal: Negative for back pain.   Skin: Negative for rash.   Neurological: Negative for headaches.   Hematological: Negative for adenopathy.   Psychiatric/Behavioral: The patient is not nervous/anxious.    All other systems reviewed and are negative.      Objective:      Physical Exam   Constitutional: She is oriented to person, place, and time. She appears well-developed and well-nourished.   HENT:   Mouth/Throat: No oropharyngeal exudate.   Cardiovascular: Normal rate and normal heart sounds.   Pulmonary/Chest: Effort normal and breath sounds normal. She has no wheezes.   Abdominal: Soft. Bowel sounds are normal. There is no tenderness.   Musculoskeletal: She exhibits no edema or tenderness.   Lymphadenopathy:     She has no cervical adenopathy.   Neurological: She is alert and oriented to person, place, and time. Coordination normal.   Skin: Skin is warm and dry. No rash noted.   Psychiatric: She has a normal mood and affect. Judgment and thought content normal.   Vitals reviewed.        LABS:  WBC   Date Value Ref Range Status   08/15/2019 4.58 3.90 - 12.70 K/uL Final     Hemoglobin   Date Value Ref Range Status   08/15/2019 11.5 (L) 12.0 - 16.0 g/dL Final     Hematocrit   Date Value Ref Range Status   08/15/2019 36.9 (L) 37.0 - 48.5 % Final     Platelets   Date Value Ref Range Status   08/15/2019 117 (L) 150 - 350 K/uL Final     Gran # (ANC)   Date Value Ref Range Status   08/15/2019 2.2 1.8 - 7.7 K/uL Final     Comment:     The ANC is based on a white cell differential from an   automated cell counter. It has not been microscopically   reviewed for the presence of abnormal cells. Clinical   correlation is required.         Chemistry        Component Value Date/Time     08/15/2019 0737    K  4.7 08/15/2019 0737     08/15/2019 0737    CO2 28 08/15/2019 0737    BUN 12 08/15/2019 0737    CREATININE 1.0 08/15/2019 0737     (H) 08/15/2019 0737        Component Value Date/Time    CALCIUM 10.3 08/15/2019 0737    ALKPHOS 77 08/15/2019 0737    AST 24 08/15/2019 0737    ALT 57 (H) 08/15/2019 0737    BILITOT 0.3 08/15/2019 0737    ESTGFRAFRICA >60.0 08/15/2019 0737    EGFRNONAA >60.0 08/15/2019 0737          Assessment:       1. 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 Uncontrolled type 2 diabetes mellitus with hyperglycemia, without long-term current use of insulin    4. Thyroid disorder        Plan:         1,2. She will proceed with cycle 3 of chemo with carboplatin, Alimta and keytruda and will return in 3 weeks for next cycle with restaging scans  3. Check Hgb A1C to be drawn in chemo next week.  4. Check TSh and free T4    Above care plan was discussed with patient and accompanying daughter and all questions were addressed to their satisfaction

## 2023-07-25 NOTE — TELEPHONE ENCOUNTER
Spoke with patient.  She is calling to see when her PORT placement will be, as she knows her chemo is approved. Her daughter from out of town is hoping to come in for PORT placement and chemo initiation----so they are trying to get everything close together.     Message routed to marcial (please contact patient to schedule port placement and chemo)   [NL] : moves all extremities x4, warm, well perfused x4 [de-identified] : Mouth is clear no vesicles [de-identified] : Small vesicles on his feet papular rash on buttocks looks more like a contact dermatitis,

## 2023-08-28 NOTE — TELEPHONE ENCOUNTER
Maggie,  This should have printed out at our printer.  Please send this in with my last office note.  Thanks.   Pinch Graft Text: The defect edges were debeveled with a #15 scalpel blade. Given the location of the defect, shape of the defect and the proximity to free margins a pinch graft was deemed most appropriate. Using a sterile surgical marker, the primary defect shape was transferred to the donor site. The area thus outlined was incised deep to adipose tissue with a #15 scalpel blade.  The harvested graft was then trimmed of adipose tissue until only dermis and epidermis was left. The skin margins of the secondary defect were undermined to an appropriate distance in all directions utilizing iris scissors.  The secondary defect was closed with interrupted buried subcutaneous sutures.  The skin edges were then re-apposed with running  sutures.  The skin graft was then placed in the primary defect and oriented appropriately.

## 2023-12-06 NOTE — TELEPHONE ENCOUNTER
----- Message from Barry Marte sent at 8/10/2020  2:37 PM CDT -----  Contact: Patient  Refill or New Rx: Refill    RX Name and Strength:  -folic acid 1mg  -Neulasta® (pegfilgrastim)     Preferred Pharmacy with phone number:  [id number] - [place] - [address]  [address] [place] [address]  Phone: [phone] Fax: [phone]    Best Call Back Number: [phone]    Additional Information: Pt say that she can't afford the cost of the Rx Neulasta® (pegfilgrastim) - Official Patient Website. Needs an alternative called in.        13 Hour(s) 57 Minute(s)

## 2024-01-28 NOTE — TELEPHONE ENCOUNTER
----- Message from Angeli Chacon sent at 11/16/2018  3:05 PM CST -----  Contact: [phone]  Patient would like to know do MD have any samples of JANUVIA 100 mg Tab, stated she is out of medication and would not be able to buy medication  till January. Please call and advise,Thanks     No

## 2024-04-04 NOTE — PATIENT INSTRUCTIONS
MAKE SURE YOUR COMPLETE ALL ANTIBIOTICS.     KEEP YOUR BLOOD SUGAR LOG BRING THIS TO YOUR ENDO APPOINTMENT    DRINK PLENTY OF WATER.     KEEP YOUR IMPORTANT FOLLOW UP APPOINTMENTS    
[Post Op: _________] : a [unfilled] post op visit

## 2024-04-20 NOTE — Clinical Note
Can you see when DONNA will be back. We need to decide treatment based on that 
Please schedule visit as soon as GUARDANT testing is back
normal...
Improved

## 2024-11-27 NOTE — PROGRESS NOTES
INFORMED CONSENT: Alison Branch   is known to this provider and identity was confirmed via NAME and .  The patient was has been informed of the risks and benefits associated with engaging in psychotherapy, the handling of protected health information, the rights of privacy and the limits of confidentiality. The patient has also been informed of the importance of reporting any suicidal or homicidal ideation to this or any provider to ensure safety of all parties, and the Alison Branch expressed understanding. The patient was agreeable to these terms and freely participates in individual psychotherapy.    PSYCHO-ONCOLOGY NOTE/ Individual Psychotherapy     Date: 2019   [facility]        Therapeutic Intervention: Met with patient.  Outpatient - Behavior modifying psychotherapy 45 min - CPT code 24037      Patient was last seen by me on 2019    Problem list  Patient Active Problem List   Diagnosis    Uncontrolled type 2 diabetes mellitus with hyperglycemia, without long-term current use of insulin    Mixed hyperlipidemia due to type 2 diabetes mellitus    Attention deficit hyperactivity disorder (ADHD), predominantly inattentive type    Cerebral aneurysm, coiled in     Hypertension associated with diabetes    Hyperkeratosis of palms and soles    Irritable bowel syndrome    Aneurysm of unspecified site    Obesity (BMI 30-39.9)    Vitamin D deficiency    Malignant neoplasm of upper lobe of left lung    Secondary malignant neoplasm of mediastinal lymph node    Adrenal cortical steroids causing adverse effect in therapeutic use    Type 2 diabetes mellitus with diabetic polyneuropathy, with long-term current use of insulin    Depression       Chief complaint/reason for encounter: attention deficit and goal setting   Met with patient to evaluate psychosocial adaptation to diagnosis/treatment/survivorship of Lung Cancer    Current Medications  Current Outpatient Medications   Medication  "   aspirin (ECOTRIN) 81 MG EC tablet    atorvastatin (LIPITOR) 40 MG tablet    bisacodyl (DULCOLAX) 5 mg EC tablet    blood sugar diagnostic Strp    blood sugar diagnostic Strp    blood-glucose meter kit    blood-glucose meter kit    carboxymethylcellulose (REFRESH PLUS) 0.5 % Dpet    dexAMETHasone (DECADRON) 6 MG tablet    dexAMETHasone (DECADRON) 6 MG tablet    diazePAM (VALIUM) 5 MG tablet    ergocalciferol (ERGOCALCIFEROL) 50,000 unit Cap    fluticasone propionate (FLONASE) 50 mcg/actuation nasal spray    folic acid (FOLVITE) 1 MG tablet    insulin aspart U-100 (NOVOLOG) 100 unit/mL (3 mL) InPn pen    insulin detemir U-100 (LEVEMIR FLEXTOUCH) 100 unit/mL (3 mL) SubQ InPn pen    insulin detemir U-100 (LEVEMIR FLEXTOUCH) 100 unit/mL (3 mL) SubQ InPn pen    JANUVIA 100 mg Tab    lancets Misc    lancets Misc    lidocaine-prilocaine (EMLA) cream    nitrofurantoin, macrocrystal-monohydrate, (MACROBID) 100 MG capsule    olmesartan (BENICAR) 20 MG tablet    ondansetron (ZOFRAN) 8 MG tablet    pen needle, diabetic (RELION PEN NEEDLES) 32 gauge x 5/32" Ndle    phenazopyridine (PYRIDIUM) 200 MG tablet    psyllium (METAMUCIL) packet    terconazole (TERAZOL 7) 0.4 % Crea    walker Misc     No current facility-administered medications for this visit.        Objective:  Alison Branch arrived early promptly for the session. Ms. Branch was independently ambulatory at the time of session. The patient was fully cooperative throughout the session.  Appearance: age appropriate, appropriately  dressed, adequately  groomed  Behavior/Cooperation: friendly and cooperative  Speech: normal in rate, volume, and tone and appropriate quality, quantity and organization of sentences  Mood: euthymic  Affect: appropriate  Thought Process: Distractible  Thought Content: normal,  No delusions or paranoia; did not appear to be responding to internal stimuli during the session  Orientation: grossly intact  Memory: " Grossly intact  Attention Span/Concentration: distraction present  Fund of Knowledge: average  Estimate of Intelligence: average from verbal skills and history  Cognition: grossly intact  Insight: patient has awareness of illness; good insight into own behavior and behavior of others  Judgment: the patient's behavior is adequate to circumstances    Interval history and content of current session: Patient presented with her diabetes education materials and her binder with daily logs. Patient possesses several different worksheets for documenting weight, meals, blood sugar, temperature, urine output, and others, which results in some confusion and significant disorganization. Patient reported that at times she misses doses of medication, will forget to eat, and will often feel mildly overwhelmed.   Discussed options for simplifying the process of documentation. Provided patient with an American Diabetes Association Worksheet for monitoring.  Evaluated cognitive response, paying particular attention to negative intrusive thoughts of a persistent and detrimental nature. Thoughts of this type are in evidence with mild distress. Provided cognitive behavioral therapy to address negative cognitions. Identified and evaluated psychosocial and environmental stressors secondary to diagnosis and treatment.  Examined proactive behaviors that may be implemented to minimize or ameliorate psychosocial stressors secondary to diagnosis and treatment.     Risk parameters:   Patient reports no suicidal ideation  Patient reports no homicidal ideation  Patient reports no self-injurious behavior  Patient reports no violent behavior   Safety needs:  None at this time      Verbal deficits: None     Patient's response to intervention:The patient's response to intervention is accepting.     Progress toward goals and other mental status changes:  The patient's progress toward goals is good.      Progress to date:Progress as Expected      Goals  from last visit: Attempted, partially met     Patient reported outcomes:    Distress Thermometer: 4     Client Strengths: verbal, intelligent, successful, good social support, good insight, commitment to wellness, strong shannon, strong cultural traditions     Diagnosis:   ADHD  Unspecified Depressive Disorder  Lung Cancer  DM2    Treatment Plan:individual psychotherapy and goal setting and development of compensatory skills for attentional and organizational deficit  · Target symptoms: depression, distractability, lack of focus  · Why chosen therapy is appropriate versus another modality: relevant to diagnosis, patient responds to this modality, evidence based practice  · Outcome monitoring methods: self-report, observation, checklist/rating scale  · Therapeutic intervention type: behavior modifying psychotherapy  · Prognosis: Good      Behavioral goals:   Use new ADA worksheet to document meals, blood glucose, insulin usage etc for 1 week to assist with simplifying documentation process, thereby increasing adherence to taking medications timely and decreasing distress    Return to clinic: 1 week    Next Session:   Goal Setting  Review process for using new and simplified documentation process     Length of Service (minutes direct face-to-face contact): 45    Jagruti Garcia, PhD  [place] License #[id number]     no

## 2025-05-02 NOTE — PLAN OF CARE
Reviewed plan of care with pt at the beginning of the shift. Pt reported no pain or n/v throughout the shift. Vital signs were stable throughout the shift.Fall precautions continued for pt. Bed locked and at lowest position. Call light within reach. Pt knows to call if assistance is needed. MRI completed w and w/out contrast. Pt Aox4 throughout the night.    None
